# Patient Record
Sex: MALE | Race: BLACK OR AFRICAN AMERICAN | NOT HISPANIC OR LATINO | Employment: OTHER | URBAN - METROPOLITAN AREA
[De-identification: names, ages, dates, MRNs, and addresses within clinical notes are randomized per-mention and may not be internally consistent; named-entity substitution may affect disease eponyms.]

---

## 2018-01-22 ENCOUNTER — HOSPITAL ENCOUNTER (INPATIENT)
Facility: HOSPITAL | Age: 53
LOS: 3 days | Discharge: HOME | DRG: 315 | End: 2018-01-26
Attending: INTERNAL MEDICINE | Admitting: INTERNAL MEDICINE
Payer: COMMERCIAL

## 2018-01-22 ENCOUNTER — APPOINTMENT (EMERGENCY)
Dept: RADIOLOGY | Facility: HOSPITAL | Age: 53
DRG: 315 | End: 2018-01-22
Payer: COMMERCIAL

## 2018-01-22 ENCOUNTER — APPOINTMENT (OUTPATIENT)
Dept: NON INVASIVE DIAGNOSTICS | Facility: HOSPITAL | Age: 53
DRG: 315 | End: 2018-01-22
Payer: COMMERCIAL

## 2018-01-22 ENCOUNTER — APPOINTMENT (EMERGENCY)
Dept: CT IMAGING | Facility: HOSPITAL | Age: 53
DRG: 315 | End: 2018-01-22
Payer: COMMERCIAL

## 2018-01-22 DIAGNOSIS — I31.3 PERICARDIAL EFFUSION: Primary | ICD-10-CM

## 2018-01-22 DIAGNOSIS — J44.9 COPD (CHRONIC OBSTRUCTIVE PULMONARY DISEASE) (HCC): ICD-10-CM

## 2018-01-22 PROBLEM — I82.403 LEG DVT (DEEP VENOUS THROMBOEMBOLISM), ACUTE, BILATERAL (HCC): Status: ACTIVE | Noted: 2018-01-22

## 2018-01-22 PROBLEM — I10 ESSENTIAL HYPERTENSION: Status: ACTIVE | Noted: 2018-01-22

## 2018-01-22 PROBLEM — I50.20 SYSTOLIC CONGESTIVE HEART FAILURE (HCC): Status: ACTIVE | Noted: 2018-01-22

## 2018-01-22 PROBLEM — I31.39 PERICARDIAL EFFUSION: Status: ACTIVE | Noted: 2018-01-22

## 2018-01-22 PROBLEM — R07.89 OTHER CHEST PAIN: Status: ACTIVE | Noted: 2018-01-22

## 2018-01-22 LAB
ALBUMIN SERPL BCP-MCNC: 3.9 G/DL (ref 3.5–5)
ALP SERPL-CCNC: 82 U/L (ref 46–116)
ALT SERPL W P-5'-P-CCNC: 31 U/L (ref 12–78)
ANION GAP SERPL CALCULATED.3IONS-SCNC: 9 MMOL/L (ref 4–13)
APTT PPP: 34 SECONDS (ref 23–35)
AST SERPL W P-5'-P-CCNC: 20 U/L (ref 5–45)
ATRIAL RATE: 69 BPM
BASOPHILS # BLD AUTO: 0.06 THOUSANDS/ΜL (ref 0–0.1)
BASOPHILS NFR BLD AUTO: 1 % (ref 0–1)
BILIRUB SERPL-MCNC: 0.9 MG/DL (ref 0.2–1)
BUN SERPL-MCNC: 19 MG/DL (ref 5–25)
CALCIUM SERPL-MCNC: 8.9 MG/DL (ref 8.3–10.1)
CHLORIDE SERPL-SCNC: 105 MMOL/L (ref 100–108)
CO2 SERPL-SCNC: 28 MMOL/L (ref 21–32)
CREAT SERPL-MCNC: 1.78 MG/DL (ref 0.6–1.3)
CRP SERPL QL: <3 MG/L
EOSINOPHIL # BLD AUTO: 0.18 THOUSAND/ΜL (ref 0–0.61)
EOSINOPHIL NFR BLD AUTO: 3 % (ref 0–6)
ERYTHROCYTE [DISTWIDTH] IN BLOOD BY AUTOMATED COUNT: 15.5 % (ref 11.6–15.1)
ERYTHROCYTE [SEDIMENTATION RATE] IN BLOOD: 14 MM/HOUR (ref 0–10)
GFR SERPL CREATININE-BSD FRML MDRD: 49 ML/MIN/1.73SQ M
GLUCOSE SERPL-MCNC: 84 MG/DL (ref 65–140)
HCT VFR BLD AUTO: 36.8 % (ref 36.5–49.3)
HGB BLD-MCNC: 12 G/DL (ref 12–17)
INR PPP: 1.35 (ref 0.86–1.16)
LYMPHOCYTES # BLD AUTO: 1.2 THOUSANDS/ΜL (ref 0.6–4.47)
LYMPHOCYTES NFR BLD AUTO: 21 % (ref 14–44)
MCH RBC QN AUTO: 26.8 PG (ref 26.8–34.3)
MCHC RBC AUTO-ENTMCNC: 32.6 G/DL (ref 31.4–37.4)
MCV RBC AUTO: 82 FL (ref 82–98)
MONOCYTES # BLD AUTO: 0.7 THOUSAND/ΜL (ref 0.17–1.22)
MONOCYTES NFR BLD AUTO: 12 % (ref 4–12)
NEUTROPHILS # BLD AUTO: 3.61 THOUSANDS/ΜL (ref 1.85–7.62)
NEUTS SEG NFR BLD AUTO: 63 % (ref 43–75)
NRBC BLD AUTO-RTO: 0 /100 WBCS
NT-PROBNP SERPL-MCNC: 757 PG/ML
P AXIS: 66 DEGREES
PLATELET # BLD AUTO: 305 THOUSANDS/UL (ref 149–390)
PMV BLD AUTO: 10.5 FL (ref 8.9–12.7)
POTASSIUM SERPL-SCNC: 3.7 MMOL/L (ref 3.5–5.3)
PR INTERVAL: 166 MS
PROT SERPL-MCNC: 8 G/DL (ref 6.4–8.2)
PROTHROMBIN TIME: 17 SECONDS (ref 12.1–14.4)
QRS AXIS: -54 DEGREES
QRSD INTERVAL: 88 MS
QT INTERVAL: 448 MS
QTC INTERVAL: 480 MS
RBC # BLD AUTO: 4.47 MILLION/UL (ref 3.88–5.62)
SODIUM SERPL-SCNC: 142 MMOL/L (ref 136–145)
T WAVE AXIS: 112 DEGREES
TROPONIN I SERPL-MCNC: <0.02 NG/ML
TSH SERPL DL<=0.05 MIU/L-ACNC: 4.15 UIU/ML (ref 0.36–3.74)
VENTRICULAR RATE: 69 BPM
WBC # BLD AUTO: 5.77 THOUSAND/UL (ref 4.31–10.16)

## 2018-01-22 PROCEDURE — 71275 CT ANGIOGRAPHY CHEST: CPT

## 2018-01-22 PROCEDURE — 96360 HYDRATION IV INFUSION INIT: CPT

## 2018-01-22 PROCEDURE — 85025 COMPLETE CBC W/AUTO DIFF WBC: CPT | Performed by: INTERNAL MEDICINE

## 2018-01-22 PROCEDURE — 86140 C-REACTIVE PROTEIN: CPT | Performed by: PHYSICIAN ASSISTANT

## 2018-01-22 PROCEDURE — 85730 THROMBOPLASTIN TIME PARTIAL: CPT | Performed by: INTERNAL MEDICINE

## 2018-01-22 PROCEDURE — 85610 PROTHROMBIN TIME: CPT | Performed by: INTERNAL MEDICINE

## 2018-01-22 PROCEDURE — 80053 COMPREHEN METABOLIC PANEL: CPT | Performed by: INTERNAL MEDICINE

## 2018-01-22 PROCEDURE — 96361 HYDRATE IV INFUSION ADD-ON: CPT

## 2018-01-22 PROCEDURE — 84484 ASSAY OF TROPONIN QUANT: CPT | Performed by: INTERNAL MEDICINE

## 2018-01-22 PROCEDURE — 93005 ELECTROCARDIOGRAM TRACING: CPT

## 2018-01-22 PROCEDURE — 85652 RBC SED RATE AUTOMATED: CPT | Performed by: PHYSICIAN ASSISTANT

## 2018-01-22 PROCEDURE — 93005 ELECTROCARDIOGRAM TRACING: CPT | Performed by: INTERNAL MEDICINE

## 2018-01-22 PROCEDURE — 36415 COLL VENOUS BLD VENIPUNCTURE: CPT

## 2018-01-22 PROCEDURE — 84443 ASSAY THYROID STIM HORMONE: CPT | Performed by: PHYSICIAN ASSISTANT

## 2018-01-22 PROCEDURE — 93306 TTE W/DOPPLER COMPLETE: CPT

## 2018-01-22 PROCEDURE — 71046 X-RAY EXAM CHEST 2 VIEWS: CPT

## 2018-01-22 PROCEDURE — 83880 ASSAY OF NATRIURETIC PEPTIDE: CPT | Performed by: PHYSICIAN ASSISTANT

## 2018-01-22 RX ORDER — AMLODIPINE BESYLATE 10 MG/1
10 TABLET ORAL DAILY
Status: ON HOLD | COMMUNITY
Start: 2017-05-23 | End: 2020-07-11

## 2018-01-22 RX ORDER — CHOLECALCIFEROL (VITAMIN D3) 25 MCG
1000 CAPSULE ORAL DAILY
COMMUNITY

## 2018-01-22 RX ORDER — ONDANSETRON 2 MG/ML
4 INJECTION INTRAMUSCULAR; INTRAVENOUS EVERY 6 HOURS PRN
Status: DISCONTINUED | OUTPATIENT
Start: 2018-01-22 | End: 2018-01-26 | Stop reason: HOSPADM

## 2018-01-22 RX ORDER — MINOXIDIL 10 MG/1
20 TABLET ORAL 2 TIMES DAILY
COMMUNITY
Start: 2017-11-21 | End: 2018-10-16

## 2018-01-22 RX ORDER — FUROSEMIDE 40 MG/1
80 TABLET ORAL DAILY
Status: DISCONTINUED | OUTPATIENT
Start: 2018-01-23 | End: 2018-01-23

## 2018-01-22 RX ORDER — HYDROXYZINE HYDROCHLORIDE 25 MG/1
25 TABLET, FILM COATED ORAL EVERY 6 HOURS PRN
Status: DISCONTINUED | OUTPATIENT
Start: 2018-01-22 | End: 2018-01-26 | Stop reason: HOSPADM

## 2018-01-22 RX ORDER — BUPROPION HYDROCHLORIDE 150 MG/1
150 TABLET, EXTENDED RELEASE ORAL DAILY
Status: DISCONTINUED | OUTPATIENT
Start: 2018-01-23 | End: 2018-01-26 | Stop reason: HOSPADM

## 2018-01-22 RX ORDER — PREDNISONE 20 MG/1
40 TABLET ORAL DAILY
Status: DISCONTINUED | OUTPATIENT
Start: 2018-01-22 | End: 2018-01-26 | Stop reason: HOSPADM

## 2018-01-22 RX ORDER — BUPROPION HYDROCHLORIDE 150 MG/1
150 TABLET, EXTENDED RELEASE ORAL DAILY
Status: ON HOLD | COMMUNITY
Start: 2017-12-18 | End: 2018-02-05 | Stop reason: ALTCHOICE

## 2018-01-22 RX ORDER — ALLOPURINOL 100 MG/1
100 TABLET ORAL DAILY
Status: DISCONTINUED | OUTPATIENT
Start: 2018-01-23 | End: 2018-01-26 | Stop reason: HOSPADM

## 2018-01-22 RX ORDER — HYDROXYZINE HYDROCHLORIDE 25 MG/1
25 TABLET, FILM COATED ORAL EVERY 6 HOURS PRN
COMMUNITY
Start: 2018-01-05 | End: 2018-05-21

## 2018-01-22 RX ORDER — OXYCODONE HYDROCHLORIDE 5 MG/1
5 TABLET ORAL EVERY 4 HOURS PRN
COMMUNITY
Start: 2018-01-10 | End: 2018-02-05

## 2018-01-22 RX ORDER — EPLERENONE 50 MG/1
50 TABLET, FILM COATED ORAL DAILY
COMMUNITY
Start: 2017-05-23

## 2018-01-22 RX ORDER — ALLOPURINOL 100 MG/1
100 TABLET ORAL DAILY
COMMUNITY
Start: 2017-12-18 | End: 2018-10-16

## 2018-01-22 RX ORDER — FOLIC ACID/MULTIVIT,IRON,MINER .4-18-35
1 TABLET,CHEWABLE ORAL DAILY
Status: DISCONTINUED | OUTPATIENT
Start: 2018-01-23 | End: 2018-01-22

## 2018-01-22 RX ORDER — NEBIVOLOL 20 MG/1
20 TABLET ORAL 2 TIMES DAILY
COMMUNITY
Start: 2016-06-13 | End: 2021-08-27 | Stop reason: SDUPTHER

## 2018-01-22 RX ORDER — ACETAMINOPHEN 325 MG/1
650 TABLET ORAL EVERY 6 HOURS PRN
Status: DISCONTINUED | OUTPATIENT
Start: 2018-01-22 | End: 2018-01-26 | Stop reason: HOSPADM

## 2018-01-22 RX ORDER — FOLIC ACID/MULTIVIT,IRON,MINER .4-18-35
1 TABLET,CHEWABLE ORAL DAILY
COMMUNITY
End: 2021-08-27

## 2018-01-22 RX ORDER — MINOXIDIL 2.5 MG/1
20 TABLET ORAL 2 TIMES DAILY
Status: DISCONTINUED | OUTPATIENT
Start: 2018-01-22 | End: 2018-01-26 | Stop reason: HOSPADM

## 2018-01-22 RX ORDER — DOXAZOSIN MESYLATE 4 MG/1
4 TABLET ORAL
COMMUNITY
End: 2021-04-02

## 2018-01-22 RX ORDER — GARLIC 200 MG
200 TABLET ORAL DAILY
COMMUNITY
End: 2018-02-05 | Stop reason: CLARIF

## 2018-01-22 RX ORDER — FUROSEMIDE 80 MG
80 TABLET ORAL DAILY
Status: ON HOLD | COMMUNITY
Start: 2017-03-28 | End: 2018-05-23

## 2018-01-22 RX ORDER — ATORVASTATIN CALCIUM 40 MG/1
40 TABLET, FILM COATED ORAL
Status: DISCONTINUED | OUTPATIENT
Start: 2018-01-22 | End: 2018-01-26 | Stop reason: HOSPADM

## 2018-01-22 RX ORDER — CALCIUM CARBONATE 200(500)MG
1000 TABLET,CHEWABLE ORAL DAILY PRN
Status: DISCONTINUED | OUTPATIENT
Start: 2018-01-22 | End: 2018-01-26 | Stop reason: HOSPADM

## 2018-01-22 RX ORDER — ATORVASTATIN CALCIUM 40 MG/1
40 TABLET, FILM COATED ORAL
COMMUNITY
Start: 2017-02-01 | End: 2021-08-27

## 2018-01-22 RX ORDER — NEBIVOLOL 10 MG/1
20 TABLET ORAL 2 TIMES DAILY
Status: DISCONTINUED | OUTPATIENT
Start: 2018-01-22 | End: 2018-01-26 | Stop reason: HOSPADM

## 2018-01-22 RX ORDER — MULTIVIT WITH IRON,MINERALS
15 LIQUID (ML) ORAL DAILY
Status: DISCONTINUED | OUTPATIENT
Start: 2018-01-23 | End: 2018-01-26 | Stop reason: HOSPADM

## 2018-01-22 RX ORDER — EPLERENONE 25 MG/1
50 TABLET, FILM COATED ORAL DAILY
Status: DISCONTINUED | OUTPATIENT
Start: 2018-01-23 | End: 2018-01-26 | Stop reason: HOSPADM

## 2018-01-22 RX ORDER — DOXAZOSIN MESYLATE 4 MG/1
4 TABLET ORAL
Status: DISCONTINUED | OUTPATIENT
Start: 2018-01-22 | End: 2018-01-26 | Stop reason: HOSPADM

## 2018-01-22 RX ORDER — POTASSIUM CHLORIDE 20 MEQ/1
20 TABLET, EXTENDED RELEASE ORAL 2 TIMES DAILY
Status: DISCONTINUED | OUTPATIENT
Start: 2018-01-22 | End: 2018-01-26 | Stop reason: HOSPADM

## 2018-01-22 RX ORDER — POTASSIUM CHLORIDE 20 MEQ/1
20 TABLET, EXTENDED RELEASE ORAL DAILY
Status: ON HOLD | COMMUNITY
Start: 2018-01-02 | End: 2018-05-23

## 2018-01-22 RX ORDER — AMOXICILLIN 500 MG
1200 CAPSULE ORAL
COMMUNITY

## 2018-01-22 RX ADMIN — FLUTICASONE PROPIONATE AND SALMETEROL 1 PUFF: 50; 250 POWDER RESPIRATORY (INHALATION) at 21:52

## 2018-01-22 RX ADMIN — IOHEXOL 85 ML: 350 INJECTION, SOLUTION INTRAVENOUS at 11:27

## 2018-01-22 RX ADMIN — SODIUM CHLORIDE 1000 ML: 0.9 INJECTION, SOLUTION INTRAVENOUS at 12:08

## 2018-01-22 RX ADMIN — MINOXIDIL 20 MG: 2.5 TABLET ORAL at 20:20

## 2018-01-22 RX ADMIN — NEBIVOLOL HYDROCHLORIDE 20 MG: 10 TABLET ORAL at 20:20

## 2018-01-22 RX ADMIN — APIXABAN 10 MG: 5 TABLET, FILM COATED ORAL at 18:52

## 2018-01-22 RX ADMIN — ATORVASTATIN CALCIUM 40 MG: 40 TABLET, FILM COATED ORAL at 21:52

## 2018-01-22 RX ADMIN — POTASSIUM CHLORIDE 20 MEQ: 1500 TABLET, EXTENDED RELEASE ORAL at 18:52

## 2018-01-22 RX ADMIN — PREDNISONE 40 MG: 20 TABLET ORAL at 16:14

## 2018-01-22 RX ADMIN — DOXAZOSIN 4 MG: 4 TABLET ORAL at 21:52

## 2018-01-22 NOTE — ASSESSMENT & PLAN NOTE
Has chronic systolic heart failure, it looks like his EF is around 48%  He follows with Dr Sharleen Boeck, we'll ask him to see patient    Otherwise does not appear overtly fluid overloaded, continue Lasix/K+ supplement

## 2018-01-22 NOTE — H&P
H&P- Aaron Gordon 1965, 45 y o  male MRN: [id number]    Unit/Bed#: FT 01 Encounter: [id number]    Primary Care Provider: No primary care provider on file  Date and time admitted to hospital: 1/22/2018 10:28 AM      * Pericardial effusion   Assessment & Plan    Bring in under observation, continue diuretic, consult Cardiology, obtain echocardiogram   EKG does not show any evidence of low voltage, however he does have questionable rub on exam   Patient unable to tolerate NSAIDs in the setting of CKD and anticoagulation, will give prednisone and monitor closely  BNP minimally elevated, Trop <0 02  Will repeat x1 and if negative, no further indicated  Telemetry, EKG as needed, no underlying history of CAD  Effusion is small, could be related to CHF  Check ESR/CRP, consider rheumatologic eval with [facility]UI  Systolic congestive heart failure (HCC)   Assessment & Plan    Has chronic systolic heart failure, it looks like his EF is around 48%  He follows with Dr Coleman Celestin, we'll ask him to see patient  Otherwise does not appear overtly fluid overloaded, continue Lasix/K+ supplement  CKD is at baseline creatinine 1 6-1 7        Essential hypertension   Assessment & Plan    Continue home meds, going to hold the amlodipine as he has listed anaphylaxis to nifedipine  Leg DVT (deep venous thromboembolism), acute, bilateral ([facility] [address] )   Assessment & Plan    Recently diagnosed, started on Eliquis 10 mg b i d  times 10 days through 1/28, then will decrease to 5 mg b i d  Elevate legs, CTA showing no PE; incidental finding of nodule can be followed up outpatient CT  No tobacco history  COPD (chronic obstructive pulmonary disease) ([facility])   Assessment & Plan    Stable, continue inhaler    Also has AGUS, will have Respiratory set him up CPAP tonight, setting is 11              VTE Prophylaxis: Apixaban (Eliquis)  / reason for no mechanical VTE prophylaxis acute DVT   Code Status: FULL  POLST: POLST is not applicable to this patient  Discussion with family: None, patient reports feeling comfortable relying information to his wife    Anticipated Length of Stay:  Patient will be admitted on an Observation basis with an anticipated length of stay of  < 2 midnights  Justification for Hospital Stay:  Observation for cardiac evaluation of small pericardial effusion chest pain    Total Time for Visit, including Counseling / Coordination of Care: 45 minutes  Greater than 50% of this total time spent on direct patient counseling and coordination of care  Chief Complaint:   Chest pain    History of Present Illness:    Ellyn Rodríguez is a 45 y o  male who presents with chest pain  Patient's past medical history significant for hypertension, COPD, CHF with low EF, CKD baseline creatinine 1 6-1 7  Patient presents with onset of chest pain last evening  He reports he was sitting in bed when he developed substernal pain, described as a baseball sitting in his chest   It seemed to go away on his own, he sub through the night  Then this morning again he had chest pain  He reports that he took Pepto-Bismol with a little bit of improvement this morning, but his pain continued  Symptoms worst supine, improved leaning forward  No recent illness, fevers or chills  He was recently diagnosed with a bilateral lower extremity DVT after a left inguinal lymph node excision 2 weeks ago  He has been compliant with Eliquis, CTA in the ER shows no evidence of PE  It does show small pericardial effusion  Review of Systems:    Review of Systems   Constitutional: Negative for chills and fever  Respiratory: Positive for shortness of breath (Chronic, no worse)  Cardiovascular: Positive for chest pain and leg swelling  Negative for palpitations  Hematological: Positive for adenopathy (Left inguinal, also noted to have fluid collection on ultrasound)  All other systems reviewed and are negative        Past Medical and Surgical History:     Past Medical History:   Diagnosis Date    Hypertension     Kidney disease     Leg DVT (deep venous thromboembolism), acute, bilateral ([address] ) 01/2018    AGUS on CPAP     setting 11    Systolic CHF [facility])        Past Surgical History:   Procedure Laterality Date    CHOLECYSTECTOMY      JOINT REPLACEMENT      LYMPH NODE DISSECTION Left 01/2018    left inguinal LN removed - benign        Meds/Allergies:    Prior to Admission medications    Medication Sig Start Date End Date Taking?  Authorizing Provider   allopurinol (ZYLOPRIM) 100 mg tablet Take 100 mg by mouth daily 12/18/17  Yes Historical Provider, MD   amLODIPine (NORVASC) 10 mg tablet Take 10 mg by mouth daily 5/23/17  Yes Historical Provider, MD   apixaban (ELIQUIS) 5 mg Take 10 mg by mouth 2 (two) times a day 1/18/18  Yes Historical Provider, MD   atorvastatin (LIPITOR) 40 mg tablet Take 40 mg by mouth daily at bedtime 2/1/17  Yes Historical Provider, MD   buPROPion [facility] SR) 150 mg 12 hr tablet Take 150 mg by mouth daily 12/18/17  Yes Historical Provider, MD   eplerenone (INSPRA) 50 MG tablet Take 50 mg by mouth daily 5/23/17  Yes Historical Provider, MD   furosemide (LASIX) 80 mg tablet Take 80 mg by mouth daily 3/28/17  Yes Historical Provider, MD   hydrOXYzine HCL (ATARAX) 25 mg tablet Take 25 mg by mouth every 6 (six) hours as needed for anxiety 1/5/18  Yes Historical Provider, MD   minoxidil (LONITEN) 10 mg tablet Take 20 mg by mouth 2 (two) times a day 11/21/17  Yes Historical Provider, MD   Mometasone Furo-Formoterol Fum 200-5 MCG/ACT AERO Take 2 puffs by mouth 2 (two) times a day 6/29/16  Yes Historical Provider, MD   nebivolol (BYSTOLIC) 20 MG tablet Take 20 mg by mouth 2 (two) times a day 6/13/16  Yes Historical Provider, MD   oxyCODONE (ROXICODONE) 5 mg immediate release tablet Take 5 mg by mouth every 4 (four) hours as needed for pain 1/10/18  Yes Historical Provider, MD   potassium chloride (KLOR-CON M20) 20 mEq tablet Take 20 mEq by mouth 2 (two) times a day 1/2/18  Yes Historical Provider, MD   aspirin (PX ENTERIC ASPIRIN) 325 mg EC tablet Take 325 mg by mouth 2 (two) times a day    Historical Provider, MD   Cholecalciferol (VITAMIN D-3) 1000 units CAPS Take 1,000 Units by mouth daily    Historical Provider, MD   doxazosin (CARDURA) 4 mg tablet Take 4 mg by mouth daily at bedtime    Historical Provider, MD   FERROUS GLUCONATE IRON PO Take 246 mg by mouth 2 (two) times a day    Historical Provider, MD   Garlic 360 MG TABS Take 200 mg by mouth daily    Historical Provider, MD   L-Arginine 500 MG TABS Take 500 mg by mouth 2 (two) times a day    Historical Provider, MD   L-Lysine 1000 MG TABS Take 1,000 mg by mouth 2 (two) times a day    Historical Provider, MD   MAGNESIUM PO Take 400 mg by mouth daily    Historical Provider, MD   multivitamin-iron-minerals-folic acid (CENTRUM) chewable tablet Chew 1 tablet daily    Historical Provider, MD   Omega-3 Fatty Acids (FISH OIL) 1200 MG CAPS Take 1,200 mg by mouth daily at bedtime    Historical Provider, MD     I have reviewed home medications with patient personally  Allergies:    Allergies   Allergen Reactions    Clonidine Anaphylaxis    Lisinopril Anaphylaxis    Nifedipine Anaphylaxis    Spironolactone Anaphylaxis       Social History:     Marital Status: /Civil Union   Occupation:   Patient Pre-hospital Living Situation:  Lives home with wife  Patient Pre-hospital Level of Mobility:  Independent  Patient Pre-hospital Diet Restrictions:  Regular, low-salt  Substance Use History:   History   Alcohol Use No     History   Smoking Status    Never Smoker   Smokeless Tobacco    Never Used     History   Drug Use No       Family History:    Family History   Problem Relation Age of Onset    Heart murmur Sister     Deep vein thrombosis Neg Hx        Physical Exam:     Vitals:   Blood Pressure: 162/88 (01/22/18 1230)  Pulse: 77 (01/22/18 1330)  Temperature: 98 8 °F (37 1 °C) (01/22/18 1017)  Respirations: (!) 26 (01/22/18 1330)  Height: 5' 8" (172 7 cm) (01/22/18 1017)  Weight - Scale: 102 kg (225 lb) (01/22/18 1017)  SpO2: 96 % (01/22/18 1330)    Physical Exam   Constitutional: He is oriented to person, place, and time  He appears well-developed and well-nourished  No distress  Neck: Neck supple  Cardiovascular: Normal rate, regular rhythm, S1 normal, S2 normal and intact distal pulses  Exam reveals friction rub  Exam reveals no gallop  No murmur heard  Pulmonary/Chest: Effort normal and breath sounds normal  No respiratory distress  He has no wheezes  He has no rhonchi  He has no rales  Abdominal: Soft  Normal appearance and bowel sounds are normal  He exhibits no distension  There is no tenderness  Obese   Musculoskeletal: Normal range of motion  He exhibits edema (Left > right, patient reports this is slightly improved since presentation last week)  Lymphadenopathy:     He has no cervical adenopathy  Neurological: He is alert and oriented to person, place, and time  He has normal reflexes  No cranial nerve deficit  Skin: Skin is warm, dry and intact  Psychiatric: He has a normal mood and affect  Nursing note and vitals reviewed  Additional Data:     Lab Results: I have personally reviewed pertinent reports          Results from last 7 days  Lab Units 01/22/18  1050   WBC Thousand/uL 5 77   HEMOGLOBIN g/dL 12 0   HEMATOCRIT % 36 8   PLATELETS Thousands/uL 305   NEUTROS PCT % 63   LYMPHS PCT % 21   MONOS PCT % 12   EOS PCT % 3       Results from last 7 days  Lab Units 01/22/18  1050   SODIUM mmol/L 142   POTASSIUM mmol/L 3 7   CHLORIDE mmol/L 105   CO2 mmol/L 28   BUN mg/dL 19   CREATININE mg/dL 1 78*   CALCIUM mg/dL 8 9   TOTAL PROTEIN g/dL 8 0   BILIRUBIN TOTAL mg/dL 0 90   ALK PHOS U/L 82   ALT U/L 31   AST U/L 20   GLUCOSE RANDOM mg/dL 84       Results from last 7 days  Lab Units 01/22/18  1050   INR  1 35*       Imaging: I have personally reviewed pertinent reports  CTA chest pe study   Final Result by Dimitry Garcia MD (01/22 5483)      No acute pathology  No pulmonary embolism  Cardiomegaly  Small pericardial effusion  Indeterminate 2 6 cm right basilar density  Based on current Fleischner Society 2017 Guidelines on incidental pulmonary nodule, either PET/CT scan evaluation, tissue sampling or short term interval followup non-contrast CT followup (initailly in 3    months) may be considered appropriate  This report will now be communicated to the patient's clinical team by the radiology liaison  Workstation performed: [id number]         X-ray chest 2 views   Final Result by Eagle Berg MD (01/22 5401)      Moderate cardiomegaly         Workstation performed: [id number]             EKG, Pathology, and Other Studies Reviewed on Admission:   · EKG:  Sinus rhythm, nonspecific T-wave inversion V6, left atrial enlargement  · Labs unremarkable, ESR/CRP pending    Allscripts / Epic Records Reviewed: Yes     ** Please Note: This note has been constructed using a voice recognition system   **

## 2018-01-22 NOTE — ED PROVIDER NOTES
History  Chief Complaint   Patient presents with    Chest Pain     dx with dvt on thursday, started on blood thinners and was told to go to ER if cp or sob, started last night     Hunter Golden is a 45 y o  male w PMH CHF, HTN, renal disease, DVT who presents for evaluation of chest pain  Patient with chest pain that began last night  Describes as ball of pressure in the center aspect of the chest without radiation  He does have an active DVT in the right lower extremity  The left leg was actually more swollen but this is negative for clot  He was traveling back in October, considered that this caused the DVT  Otherwise he does not have risk factors for clot development  He has no known history of CAD, reports his hypertension is well controlled call a month follows with Cardiology  Denies coughing or hemoptysis  Denies pain w inspiration  Denies f/c  He has been on Eliquis for DVT, reports compliance  Prior to Admission Medications   Prescriptions Last Dose Informant Patient Reported? Taking?    Cholecalciferol (VITAMIN D-3) 1000 units CAPS   Yes No   Sig: Take 1,000 Units by mouth daily   FERROUS GLUCONATE IRON PO   Yes No   Sig: Take 246 mg by mouth 2 (two) times a day   Garlic 962 MG TABS   Yes No   Sig: Take 200 mg by mouth daily   L-Arginine 500 MG TABS   Yes No   Sig: Take 500 mg by mouth 2 (two) times a day   L-Lysine 1000 MG TABS   Yes No   Sig: Take 1,000 mg by mouth 2 (two) times a day   MAGNESIUM PO   Yes No   Sig: Take 400 mg by mouth daily   Mometasone Furo-Formoterol Fum 200-5 MCG/ACT AERO   Yes Yes   Sig: Take 2 puffs by mouth 2 (two) times a day   Omega-3 Fatty Acids (FISH OIL) 1200 MG CAPS   Yes No   Sig: Take 1,200 mg by mouth daily at bedtime   allopurinol (ZYLOPRIM) 100 mg tablet   Yes Yes   Sig: Take 100 mg by mouth daily   amLODIPine (NORVASC) 10 mg tablet   Yes Yes   Sig: Take 10 mg by mouth daily   apixaban (ELIQUIS) 5 mg   Yes Yes   Sig: Take 10 mg by mouth 2 (two) times a day   aspirin (PX ENTERIC ASPIRIN) 325 mg EC tablet   Yes No   Sig: Take 325 mg by mouth 2 (two) times a day   atorvastatin (LIPITOR) 40 mg tablet   Yes Yes   Sig: Take 40 mg by mouth daily at bedtime   buPROPion (WELLBUTRIN SR) 150 mg 12 hr tablet   Yes Yes   Sig: Take 150 mg by mouth daily   doxazosin (CARDURA) 4 mg tablet   Yes No   Sig: Take 4 mg by mouth daily at bedtime   eplerenone (INSPRA) 50 MG tablet   Yes Yes   Sig: Take 50 mg by mouth daily   furosemide (LASIX) 80 mg tablet   Yes Yes   Sig: Take 80 mg by mouth daily   hydrOXYzine HCL (ATARAX) 25 mg tablet   Yes Yes   Sig: Take 25 mg by mouth every 6 (six) hours as needed for anxiety   minoxidil (LONITEN) 10 mg tablet   Yes Yes   Sig: Take 20 mg by mouth 2 (two) times a day   multivitamin-iron-minerals-folic acid (CENTRUM) chewable tablet   Yes No   Sig: Chew 1 tablet daily   nebivolol (BYSTOLIC) 20 MG tablet   Yes Yes   Sig: Take 20 mg by mouth 2 (two) times a day   oxyCODONE (ROXICODONE) 5 mg immediate release tablet   Yes Yes   Sig: Take 5 mg by mouth every 4 (four) hours as needed for pain   potassium chloride (KLOR-CON M20) 20 mEq tablet   Yes Yes   Sig: Take 20 mEq by mouth 2 (two) times a day      Facility-Administered Medications: None       Past Medical History:   Diagnosis Date    Hypertension     Kidney disease     Leg DVT (deep venous thromboembolism), acute, bilateral ([facility] [address] ) 01/2018    AGUS on CPAP     setting 11    Systolic CHF (HCC)        Past Surgical History:   Procedure Laterality Date    CHOLECYSTECTOMY      JOINT REPLACEMENT      LYMPH NODE DISSECTION Left 01/2018    left inguinal LN removed - benign        Family History   Problem Relation Age of Onset    Heart murmur Sister     Deep vein thrombosis Neg Hx      I have reviewed and agree with the history as documented      Social History   Substance Use Topics    Smoking status: Never Smoker    Smokeless tobacco: Never Used    Alcohol use No        Review of Systems Constitutional: Negative for activity change, chills, diaphoresis, fatigue and fever  HENT: Negative for congestion and rhinorrhea  Eyes: Negative for pain  Respiratory: Negative for cough, chest tightness, shortness of breath and wheezing  Cardiovascular: Positive for chest pain  Negative for palpitations  Gastrointestinal: Negative for abdominal distention, constipation, diarrhea, nausea and vomiting  Genitourinary: Negative for difficulty urinating and dysuria  Musculoskeletal: Negative for arthralgias and myalgias  Neurological: Negative for dizziness, weakness, light-headedness and headaches  Psychiatric/Behavioral: The patient is not nervous/anxious  Physical Exam  ED Triage Vitals [01/22/18 1017]   Temperature Pulse Respirations Blood Pressure SpO2   98 8 °F (37 1 °C) 70 18 (!) 174/93 97 %      Temp src Heart Rate Source Patient Position - Orthostatic VS BP Location FiO2 (%)   -- -- -- -- --      Pain Score       5           Orthostatic Vital Signs  Vitals:    01/22/18 1017 01/22/18 1200 01/22/18 1230 01/22/18 1330   BP: (!) 174/93 158/89 162/88    Pulse: 70 72 72 77       Physical Exam   Constitutional: He is oriented to person, place, and time  He appears well-developed and well-nourished  No distress  HENT:   Head: Normocephalic and atraumatic  Eyes: Pupils are equal, round, and reactive to light  Neck: Neck supple  No tracheal deviation present  Cardiovascular: Normal rate, regular rhythm and intact distal pulses  Exam reveals no gallop and no friction rub  No murmur heard  Pulmonary/Chest: Effort normal and breath sounds normal  No respiratory distress  He has no wheezes  He has no rales  Abdominal: Soft  Bowel sounds are normal  He exhibits no distension and no mass  There is no tenderness  There is no guarding  Musculoskeletal: He exhibits no edema or deformity     The left leg is more edematous than the right, nontender   Neurological: He is alert and oriented to person, place, and time  Skin: Skin is warm and dry  He is not diaphoretic  Psychiatric: He has a normal mood and affect  His behavior is normal    Nursing note and vitals reviewed  ED Medications  Medications   predniSONE tablet 40 mg (40 mg Oral Given 1/22/18 1614)   sodium chloride 0 9 % bolus 1,000 mL (0 mL Intravenous Stopped 1/22/18 1532)   iohexol (OMNIPAQUE) 350 MG/ML injection (MULTI-DOSE) 85 mL (85 mL Intravenous Given 1/22/18 1127)       Diagnostic Studies  Results Reviewed     Procedure Component Value Units Date/Time    Sedimentation rate, automated [[id number]]  (Abnormal) Collected:  01/22/18 1050    Lab Status:  Final result Specimen:  Blood from Arm, Left Updated:  01/22/18 1640     Sed Rate 14 (H) mm/hour     C-reactive protein [[id number]]  (Normal) Collected:  01/22/18 1050    Lab Status:  Final result Specimen:  Blood from Arm, Left Updated:  01/22/18 1536     CRP <3 0 mg/L     BNP [[id number]]  (Abnormal) Collected:  01/22/18 1050    Lab Status:  Final result Specimen:  Blood from Arm, Left Updated:  01/22/18 1408     NT-proBNP 757 (H) pg/mL     Troponin I [[id number]]  (Normal) Collected:  01/22/18 1050    Lab Status:  Final result Specimen:  Blood from Arm, Left Updated:  01/22/18 1118     Troponin I <0 02 ng/mL     Narrative:         [employer] Chemistry analyzer 99% cutoff is > 0 04 ng/mL in network labs    o cTnI 99% cutoff is useful only when applied to patients in the clinical setting of myocardial ischemia  o cTnI 99% cutoff should be interpreted in the context of clinical history, ECG findings and possibly cardiac imaging to establish correct diagnosis  o cTnI 99% cutoff may be suggestive but clearly not indicative of a coronary event without the clinical setting of myocardial ischemia      Comprehensive metabolic panel [[id number]]  (Abnormal) Collected:  01/22/18 1050    Lab Status:  Final result Specimen:  Blood from Arm, Left Updated:  01/22/18 1114     Sodium 142 mmol/L Potassium 3 7 mmol/L      Chloride 105 mmol/L      CO2 28 mmol/L      Anion Gap 9 mmol/L      BUN 19 mg/dL      Creatinine 1 78 (H) mg/dL      Glucose 84 mg/dL      Calcium 8 9 mg/dL      AST 20 U/L      ALT 31 U/L      Alkaline Phosphatase 82 U/L      Total Protein 8 0 g/dL      Albumin 3 9 g/dL      Total Bilirubin 0 90 mg/dL      eGFR 49 ml/min/1 73sq m     Narrative:         National Kidney Disease Education Program recommendations are as follows:  GFR calculation is accurate only with a steady state creatinine  Chronic Kidney disease less than 60 ml/min/1 73 sq  meters  Kidney failure less than 15 ml/min/1 73 sq  meters  Protime-INR [[id number]]  (Abnormal) Collected:  01/22/18 1050    Lab Status:  Final result Specimen:  Blood from Arm, Left Updated:  01/22/18 1113     Protime 17 0 (H) seconds      INR 1 35 (H)    APTT [[id number]]  (Normal) Collected:  01/22/18 1050    Lab Status:  Final result Specimen:  Blood from Arm, Left Updated:  01/22/18 1113     PTT 34 seconds     Narrative: Therapeutic Heparin Range = 60-90 seconds    CBC and differential [[id number]]  (Abnormal) Collected:  01/22/18 1050    Lab Status:  Final result Specimen:  Blood from Arm, Left Updated:  01/22/18 1104     WBC 5 77 Thousand/uL      RBC 4 47 Million/uL      Hemoglobin 12 0 g/dL      Hematocrit 36 8 %      MCV 82 fL      MCH 26 8 pg      MCHC 32 6 g/dL      RDW 15 5 (H) %      MPV 10 5 fL      Platelets 954 Thousands/uL      nRBC 0 /100 WBCs      Neutrophils Relative 63 %      Lymphocytes Relative 21 %      Monocytes Relative 12 %      Eosinophils Relative 3 %      Basophils Relative 1 %      Neutrophils Absolute 3 61 Thousands/µL      Lymphocytes Absolute 1 20 Thousands/µL      Monocytes Absolute 0 70 Thousand/µL      Eosinophils Absolute 0 18 Thousand/µL      Basophils Absolute 0 06 Thousands/µL                  CTA chest pe study   Final Result by Raina Cotto MD (01/22 1152)      No acute pathology   No pulmonary embolism  Cardiomegaly  Small pericardial effusion  Indeterminate 2 6 cm right basilar density  Based on current Fleischner Society 2017 Guidelines on incidental pulmonary nodule, either PET/CT scan evaluation, tissue sampling or short term interval followup non-contrast CT followup (initailly in 3    months) may be considered appropriate  This report will now be communicated to the patient's clinical team by the radiology liaison           Workstation performed: [id number]         X-ray chest 2 views   Final Result by Tyrone Gonzalez MD (01/22 1115)      Moderate cardiomegaly         Workstation performed: [id number]                    Procedures  Procedures       Phone Contacts  ED Phone Contact    ED Course  ED Course          HEART Risk Score    Flowsheet Row Most Recent Value   History  0 Filed at: 01/22/2018 1237   ECG  1 Filed at: 01/22/2018 1237   Age  1 Filed at: 01/22/2018 1237   Risk Factors  1 [HTN / male ] Filed at: 01/22/2018 1237   Troponin  0 Filed at: 01/22/2018 1237   Heart Score Risk Calculator   History  0 Filed at: 01/22/2018 1237   ECG  1 Filed at: 01/22/2018 1237   Age  1 Filed at: 01/22/2018 1237   Risk Factors  1 [HTN / male ] Filed at: 01/22/2018 1237   Troponin  0 Filed at: 01/22/2018 1237   HEART Score  3 Filed at: 01/22/2018 1237   HEART Score  3 Filed at: 01/22/2018 1237                            MDM  Number of Diagnoses or Management Options  Pericardial effusion:   Diagnosis management comments: DDX includes but not ltd to:   PE, ACS, msk related pain, not consistent w anxiety, do not suspect dissection or infectious etiology   Consider CHF exacerbation that is causing edema to the LLE    Plan is to obtain:  EKG/trop to check for ischemic changes   CXR to check for congestive changes, active pulmonary disease   CBC to check for anemia, leukocytosis, hydration status  Chemistry panel to check for lyte abnormalities, organ function   PT/INR and PTT to assess for coagulopathy and liver function  CTA PE study for PE     Based on results:  Suspect CHF exacerbation  Suspect pericardial effusion causing pain as worse w laying flat, relieved leaning forward, consider ACS  Will admit for diuresis       Portions of the record may have been created with voice recognition software   Occasional wrong word or "sound a like" substitutions may have occurred due to the inherent limitations of voice recognition software   Read the chart carefully and recognize, using context, where substitutions have occurred  CritCare Time    Disposition  Final diagnoses:   Pericardial effusion     Time reflects when diagnosis was documented in both MDM as applicable and the Disposition within this note     Time User Action Codes Description Comment    1/22/2018  2:41 PM Cortes Simms Add [I31 3] Pericardial effusion       ED Disposition     ED Disposition Condition Comment    Admit  Case was discussed with Dr Nirmal Mayberry and the patient's admission status was agreed to be Admission Status: observation status to the service of Dr Nirmal Mayberry   Follow-up Information    None       Patient's Medications   Discharge Prescriptions    No medications on file     No discharge procedures on file      ED Provider  Electronically Signed by           Kaley Coyle PA-C  01/22/18 2338

## 2018-01-22 NOTE — ASSESSMENT & PLAN NOTE
Stable, continue inhaler    Also has AGUS, will have Respiratory set him up CPAP tonight, setting is 11

## 2018-01-22 NOTE — ASSESSMENT & PLAN NOTE
Bring in under observation, continue diuretic, consult Cardiology, obtain echocardiogram   EKG does not show any evidence of low voltage, however he does have questionable rub on exam   Patient unable to tolerate NSAIDs in the setting of CKD and anticoagulation, will give prednisone and monitor closely  Telemetry, EKG as needed, no underlying history of CAD  Effusion is small, could be related to CHF

## 2018-01-22 NOTE — ASSESSMENT & PLAN NOTE
Recently diagnosed, started on Eliquis 10 mg b i d  times 10 days through 1/28, then will decrease to 5 mg b i d  Elevate legs, CTA showing no PE; incidental finding of nodule can be followed up outpatient CT  No tobacco history

## 2018-01-23 ENCOUNTER — APPOINTMENT (OUTPATIENT)
Dept: NON INVASIVE DIAGNOSTICS | Facility: HOSPITAL | Age: 53
DRG: 315 | End: 2018-01-23
Payer: COMMERCIAL

## 2018-01-23 LAB
ANION GAP SERPL CALCULATED.3IONS-SCNC: 11 MMOL/L (ref 4–13)
BASOPHILS # BLD AUTO: 0.02 THOUSANDS/ΜL (ref 0–0.1)
BASOPHILS NFR BLD AUTO: 0 % (ref 0–1)
BUN SERPL-MCNC: 19 MG/DL (ref 5–25)
CALCIUM SERPL-MCNC: 9.4 MG/DL (ref 8.3–10.1)
CHLORIDE SERPL-SCNC: 103 MMOL/L (ref 100–108)
CO2 SERPL-SCNC: 26 MMOL/L (ref 21–32)
CREAT SERPL-MCNC: 1.88 MG/DL (ref 0.6–1.3)
EOSINOPHIL # BLD AUTO: 0 THOUSAND/ΜL (ref 0–0.61)
EOSINOPHIL NFR BLD AUTO: 0 % (ref 0–6)
ERYTHROCYTE [DISTWIDTH] IN BLOOD BY AUTOMATED COUNT: 15.4 % (ref 11.6–15.1)
GFR SERPL CREATININE-BSD FRML MDRD: 46 ML/MIN/1.73SQ M
GLUCOSE SERPL-MCNC: 161 MG/DL (ref 65–140)
HCT VFR BLD AUTO: 39.3 % (ref 36.5–49.3)
HGB BLD-MCNC: 12.9 G/DL (ref 12–17)
LYMPHOCYTES # BLD AUTO: 0.96 THOUSANDS/ΜL (ref 0.6–4.47)
LYMPHOCYTES NFR BLD AUTO: 13 % (ref 14–44)
MCH RBC QN AUTO: 27 PG (ref 26.8–34.3)
MCHC RBC AUTO-ENTMCNC: 32.8 G/DL (ref 31.4–37.4)
MCV RBC AUTO: 82 FL (ref 82–98)
MONOCYTES # BLD AUTO: 0.31 THOUSAND/ΜL (ref 0.17–1.22)
MONOCYTES NFR BLD AUTO: 4 % (ref 4–12)
NEUTROPHILS # BLD AUTO: 6.11 THOUSANDS/ΜL (ref 1.85–7.62)
NEUTS SEG NFR BLD AUTO: 81 % (ref 43–75)
NRBC BLD AUTO-RTO: 0 /100 WBCS
PLATELET # BLD AUTO: 297 THOUSANDS/UL (ref 149–390)
PMV BLD AUTO: 9.7 FL (ref 8.9–12.7)
POTASSIUM SERPL-SCNC: 3.9 MMOL/L (ref 3.5–5.3)
RBC # BLD AUTO: 4.78 MILLION/UL (ref 3.88–5.62)
SODIUM SERPL-SCNC: 140 MMOL/L (ref 136–145)
TROPONIN I SERPL-MCNC: <0.02 NG/ML
WBC # BLD AUTO: 7.53 THOUSAND/UL (ref 4.31–10.16)

## 2018-01-23 PROCEDURE — 80048 BASIC METABOLIC PNL TOTAL CA: CPT | Performed by: PHYSICIAN ASSISTANT

## 2018-01-23 PROCEDURE — 36415 COLL VENOUS BLD VENIPUNCTURE: CPT | Performed by: PHYSICIAN ASSISTANT

## 2018-01-23 PROCEDURE — 94760 N-INVAS EAR/PLS OXIMETRY 1: CPT

## 2018-01-23 PROCEDURE — 85025 COMPLETE CBC W/AUTO DIFF WBC: CPT | Performed by: PHYSICIAN ASSISTANT

## 2018-01-23 PROCEDURE — 99285 EMERGENCY DEPT VISIT HI MDM: CPT

## 2018-01-23 PROCEDURE — 94640 AIRWAY INHALATION TREATMENT: CPT

## 2018-01-23 PROCEDURE — 84484 ASSAY OF TROPONIN QUANT: CPT | Performed by: PHYSICIAN ASSISTANT

## 2018-01-23 PROCEDURE — 93308 TTE F-UP OR LMTD: CPT

## 2018-01-23 PROCEDURE — 94660 CPAP INITIATION&MGMT: CPT

## 2018-01-23 RX ORDER — FUROSEMIDE 10 MG/ML
40 INJECTION INTRAMUSCULAR; INTRAVENOUS ONCE
Status: COMPLETED | OUTPATIENT
Start: 2018-01-23 | End: 2018-01-23

## 2018-01-23 RX ORDER — FUROSEMIDE 10 MG/ML
80 INJECTION INTRAMUSCULAR; INTRAVENOUS DAILY
Status: DISCONTINUED | OUTPATIENT
Start: 2018-01-23 | End: 2018-01-26 | Stop reason: HOSPADM

## 2018-01-23 RX ORDER — IPRATROPIUM BROMIDE AND ALBUTEROL SULFATE 2.5; .5 MG/3ML; MG/3ML
3 SOLUTION RESPIRATORY (INHALATION)
Status: DISCONTINUED | OUTPATIENT
Start: 2018-01-23 | End: 2018-01-26 | Stop reason: HOSPADM

## 2018-01-23 RX ADMIN — DOXAZOSIN 4 MG: 4 TABLET ORAL at 21:16

## 2018-01-23 RX ADMIN — MINOXIDIL 20 MG: 2.5 TABLET ORAL at 19:57

## 2018-01-23 RX ADMIN — ALLOPURINOL 100 MG: 100 TABLET ORAL at 09:20

## 2018-01-23 RX ADMIN — EPLERENONE 50 MG: 25 TABLET, FILM COATED ORAL at 09:20

## 2018-01-23 RX ADMIN — NEBIVOLOL HYDROCHLORIDE 20 MG: 10 TABLET ORAL at 09:19

## 2018-01-23 RX ADMIN — NEBIVOLOL HYDROCHLORIDE 20 MG: 10 TABLET ORAL at 17:18

## 2018-01-23 RX ADMIN — APIXABAN 10 MG: 5 TABLET, FILM COATED ORAL at 09:39

## 2018-01-23 RX ADMIN — BUPROPION HYDROCHLORIDE 150 MG: 150 TABLET, EXTENDED RELEASE ORAL at 09:22

## 2018-01-23 RX ADMIN — IPRATROPIUM BROMIDE AND ALBUTEROL SULFATE 3 ML: .5; 3 SOLUTION RESPIRATORY (INHALATION) at 20:30

## 2018-01-23 RX ADMIN — FUROSEMIDE 40 MG: 10 INJECTION, SOLUTION INTRAVENOUS at 15:38

## 2018-01-23 RX ADMIN — POTASSIUM CHLORIDE 20 MEQ: 1500 TABLET, EXTENDED RELEASE ORAL at 17:19

## 2018-01-23 RX ADMIN — FLUTICASONE PROPIONATE AND SALMETEROL 1 PUFF: 50; 250 POWDER RESPIRATORY (INHALATION) at 21:16

## 2018-01-23 RX ADMIN — ATORVASTATIN CALCIUM 40 MG: 40 TABLET, FILM COATED ORAL at 21:16

## 2018-01-23 RX ADMIN — MULTIVITAMIN 15 ML: LIQUID ORAL at 09:22

## 2018-01-23 RX ADMIN — PREDNISONE 40 MG: 20 TABLET ORAL at 09:19

## 2018-01-23 RX ADMIN — FLUTICASONE PROPIONATE AND SALMETEROL 1 PUFF: 50; 250 POWDER RESPIRATORY (INHALATION) at 09:39

## 2018-01-23 RX ADMIN — APIXABAN 10 MG: 5 TABLET, FILM COATED ORAL at 17:19

## 2018-01-23 RX ADMIN — FUROSEMIDE 80 MG: 10 INJECTION, SOLUTION INTRAMUSCULAR; INTRAVENOUS at 09:14

## 2018-01-23 RX ADMIN — POTASSIUM CHLORIDE 20 MEQ: 1500 TABLET, EXTENDED RELEASE ORAL at 09:19

## 2018-01-23 RX ADMIN — MINOXIDIL 20 MG: 2.5 TABLET ORAL at 09:21

## 2018-01-23 NOTE — ED NOTES
Pt was given personal care items to clean up, new gown, pajama pants and washcloths/towels  Pts linens were changed and bed was remade while cleaning up in the restroom       Jose D Matthew  01/23/18 9614

## 2018-01-23 NOTE — CONSULTS
Consultation - Cardiology   Tamia Orellana 45 y o  male MRN: [de-identified]  Unit/Bed#: FT 01 Encounter: [id number]    Assessment/Plan     Assessment:  1  Pericardial effusion  Moderate in size  2  Chest pain  MI ruled out  3  S/P left femoral lymph node resection  4  Localized edema  5  DVT  On Eliquis  6  Hx of hypertrophic cardiomyopathy secondary to HTN    Plan:  1  Limited echo in AM to assess stability of effusion  2  No cardiac tamponade noted, thus no clear indication for pericardial drainage    History of Present Illness   Physician Requesting Consult: Myles Tristan MD  Reason for Consult / Principal Problem: Pericardial effusion  HPI: Tamia Orellana is a 45y o  year old male who presents with chest pain  The pain is midsternal without radiation  He has some belching  Pain is alleviated by antiacids  CTA of chest showed no pulmonary embolus and normal thoracic aorta  A small pericardial effusion is noted  Echocardiogram shows moderate sized effusion with no evidence of cardiac tamponade  Pt has history of trace pericardial effusion on prior echocardiographic studies, last evaluation done 2/2017  Effusion has clearly increased in size  Pt has history of left femoral lymph node resection  He has residual nonpitting edema of left leg  He subsequently developed DVT in right leg and was resently started on Eliquis  Inpatient consult to Cardiology  Consult performed by: Dory Shepherd ordered by: Kishore Esteban          Review of Systems   Constitutional: Positive for activity change  Respiratory: Positive for chest tightness  Negative for cough, choking, shortness of breath, wheezing and stridor  Cardiovascular: Positive for chest pain and leg swelling  Negative for palpitations  Gastrointestinal: Negative for abdominal pain  Musculoskeletal: Negative for back pain         Historical Information   Past Medical History:   Diagnosis Date    Hypertension     Kidney disease     Leg DVT (deep venous thromboembolism), acute, bilateral ([facility] [address] ) 01/2018    AGUS on CPAP     setting 11    Systolic CHF ([facility] [address] )      Past Surgical History:   Procedure Laterality Date    CHOLECYSTECTOMY      JOINT REPLACEMENT      LYMPH NODE DISSECTION Left 01/2018    left inguinal LN removed - benign      History   Alcohol Use No     History   Drug Use No     History   Smoking Status    Never Smoker   Smokeless Tobacco    Never Used     Family History:   Family History   Problem Relation Age of Onset    Heart murmur Sister     Deep vein thrombosis Neg Hx        Meds/Allergies   all current active meds have been reviewed and current meds:   Current Facility-Administered Medications   Medication Dose Route Frequency    acetaminophen (TYLENOL) tablet 650 mg  650 mg Oral Q6H PRN    allopurinol (ZYLOPRIM) tablet 100 mg  100 mg Oral Daily    apixaban (ELIQUIS) tablet 10 mg  10 mg Oral BID    atorvastatin (LIPITOR) tablet 40 mg  40 mg Oral HS    buPROPion (WELLBUTRIN SR) 12 hr tablet 150 mg  150 mg Oral Daily    calcium carbonate (TUMS) chewable tablet 1,000 mg  1,000 mg Oral Daily PRN    doxazosin (CARDURA) tablet 4 mg  4 mg Oral HS    eplerenone (INSPRA) tablet 50 mg  50 mg Oral Daily    fluticasone-salmeterol (ADVAIR) 250-50 mcg/dose inhaler 1 puff  1 puff Inhalation Q12H [facility]    furosemide (LASIX) injection 80 mg  80 mg Intravenous Daily    hydrOXYzine HCL (ATARAX) tablet 25 mg  25 mg Oral Q6H PRN    minoxidil (LONITEN) tablet 20 mg  20 mg Oral BID    multivitamin with iron-minerals liquid 15 mL  15 mL Oral Daily    nebivolol (BYSTOLIC) tablet 20 mg  20 mg Oral BID    ondansetron (ZOFRAN) injection 4 mg  4 mg Intravenous Q6H PRN    potassium chloride (K-DUR,KLOR-CON) CR tablet 20 mEq  20 mEq Oral BID    predniSONE tablet 40 mg  40 mg Oral Daily     Allergies   Allergen Reactions    Clonidine Anaphylaxis    Lisinopril Anaphylaxis    Nifedipine Anaphylaxis    Spironolactone Anaphylaxis       Objective Vitals: Blood pressure (!) 172/90, pulse 81, temperature 98 8 °F (37 1 °C), resp  rate 20, height 5' 8" (1 727 m), weight 102 kg (225 lb), SpO2 97 %  Orthostatic Blood Pressures    Flowsheet Row Most Recent Value   Blood Pressure   172/90 filed at 01/23/2018 0387   Patient Position - Orthostatic VS  Lying filed at 01/23/2018 0912          No intake or output data in the 24 hours ending 01/23/18 0939    Invasive Devices     Peripheral Intravenous Line            Peripheral IV 01/23/18 Right Arm less than 1 day                Physical Exam   Constitutional: He is oriented to person, place, and time  He appears well-developed and well-nourished  No distress  HENT:   Head: Normocephalic and atraumatic  Neck: Neck supple  Cardiovascular: Normal rate and regular rhythm  Exam reveals no gallop and no friction rub  No murmur heard  Pulmonary/Chest: Breath sounds normal  No respiratory distress  He has no wheezes  He has no rales  He exhibits no tenderness  Abdominal: He exhibits no distension  Musculoskeletal:        Left ankle: He exhibits swelling  Neurological: He is alert and oriented to person, place, and time  Skin: Skin is warm and dry  He is not diaphoretic  Psychiatric: He has a normal mood and affect         Lab Results:   CBC with diff:   Results from last 7 days  Lab Units 01/23/18  0857   WBC Thousand/uL 7 53   RBC Million/uL 4 78   HEMOGLOBIN g/dL 12 9   HEMATOCRIT % 39 3   MCV fL 82   MCH pg 27 0   MCHC g/dL 32 8   RDW % 15 4*   MPV fL 9 7   PLATELETS Thousands/uL 297     CMP:   Results from last 7 days  Lab Units 01/23/18  0857 01/22/18  1050   SODIUM mmol/L 140 142   POTASSIUM mmol/L 3 9 3 7   CHLORIDE mmol/L 103 105   CO2 mmol/L 26 28   ANION GAP mmol/L 11 9   BUN mg/dL 19 19   CREATININE mg/dL 1 88* 1 78*   GLUCOSE RANDOM mg/dL 161* 84   CALCIUM mg/dL 9 4 8 9   AST U/L  --  20   ALT U/L  --  31   ALK PHOS U/L  --  82   TOTAL PROTEIN g/dL  --  8 0   ALBUMIN g/dL  --  3 9   BILIRUBIN TOTAL mg/dL  --  0 90   EGFR ml/min/1 73sq m 46 49     Troponin:   0  Lab Value Date/Time   TROPONINI <0 02 01/23/2018 0857   TROPONINI <0 02 01/22/2018 1050     BNP:   Results from last 7 days  Lab Units 01/23/18  0857   SODIUM mmol/L 140   POTASSIUM mmol/L 3 9   CHLORIDE mmol/L 103   CO2 mmol/L 26   ANION GAP mmol/L 11   BUN mg/dL 19   CREATININE mg/dL 1 88*   GLUCOSE RANDOM mg/dL 161*   CALCIUM mg/dL 9 4   EGFR ml/min/1 73sq m 46     Coags:   Results from last 7 days  Lab Units 01/22/18  1050   PTT seconds 34   INR  1 35*     TSH:   Results from last 7 days  Lab Units 01/22/18  1050   TSH 3RD GENERATON uIU/mL 4 153*     Magnesium:     Imaging: I have personally reviewed pertinent reports  EKG: Normal sinus rhythm  Left atrial enlargement  Left anterior fascicular block  VTE Prophylaxis:     Code Status: Level 1 - Full Code  Advance Directive and Living Will:      Power of :    POLST:      Counseling / Coordination of Care  Total floor / unit time spent today 50 minutes  Greater than 50% of total time was spent with the patient and / or family counseling and / or coordination of care  A description of the counseling / coordination of care: 50

## 2018-01-23 NOTE — PROGRESS NOTES
[facility] Progress Note  Patient: Elli Bruno 45 y o  male   MRN: [de-identified]  PCP: No primary care provider on file  Unit/Bed#: FT 01 Encounter: [id number]  Date Of Visit: 01/23/18    Assessment:    Principal Problem:    Pericardial effusion  Active Problems:    Essential hypertension    Leg DVT (deep venous thromboembolism), acute, bilateral (HCC)    COPD (chronic obstructive pulmonary disease) (HCC)    Systolic congestive heart failure (HCC)      Plan:  #1 pericardial effusion  Initially diagnosed by CT chest  Confirmed by 2-D echocardiogram moderate pericardial effusion  Patient is responding to conservative management at this time with IV Lasix  We'll give 1 more additional dose today  Overall her likely stable still reports some dyspnea on exertion  Cardiology following  Treatment plan discussed with patient's wife    #2History of HTN:       We will continue patient home medications  Although initially his blood pressure was higher in emergency department, it later stabilized  Well also initiate IV hydralazine and IV metoprolol for SBP greater than 1 60 mmHg  We will advise patient to follow-up with next PCP in regards to further better management of ongoing HTN  #3 recurrent DVT  Continue treatment with ELiQUIS  Recommended outpatient hypercoagulability workup with PCP  Denies any recent history of malignancy, denies weight loss weight and denies any other symptoms  #4Hyperlipidemia:    Currently on statin therapy for elevated lipids  Previously not at goal of LDL < 100 as indicated in PMHx  Will order fasting lipid panel to assess status of hyperlipidemia  Will restart [id number] mg po QHS  Patient was counseled in regards to Appropriate nutritional and lifestyle modifications      #5 COPD  With mild exertion  Continue prednisone  Continue toinhaled treatment with DuoNeb's      VTE Pharmacologic Prophylaxis:   Pharmacologic: Apixaban (Eliquis)  Mechanical VTE Prophylaxis in Place: No    Patient Centered Rounds: I have performed bedside rounds with nursing staff today  Discussions with Specialists or Other Care Team Provider: Yes    Education and Discussions with Family / Patient: Yes    Time Spent for Care: 45 minutes  More than 50% of total time spent on counseling and coordination of care as described above  Current Length of Stay: 0 day(s)    Current Patient Status: Observation   Certification Statement: The patient will continue to require additional inpatient hospital stay due to regarding effusion    Discharge Plan / Estimated Discharge Date: 24-48 hours    Code Status: Level 1 - Full Code      Subjective:   "feeling okay but short of breath when I walk around"  Objective:     Vitals:   No data recorded  HR:  [59-84] 84  Resp:  [17-22] 18  BP: (135-182)/(65-94) 174/92  SpO2:  [97 %-99 %] 99 %  Body mass index is 34 21 kg/m²  Input and Output Summary (last 24 hours): Intake/Output Summary (Last 24 hours) at 01/23/18 1440  Last data filed at 01/23/18 1132   Gross per 24 hour   Intake                0 ml   Output             1000 ml   Net            -1000 ml       Physical Exam:     Physical Exam  GENERAL APPEARANCE: WD/WN in NAD pleasant  SKIN: no rash  HEENT: NC/AT, PERRLA (B), moist MM, no epistaxis  NECK: Supple, no JVD    LUNGS: CTA (B) mildly prolonged expiratory phase,   no use of accessory muscles    HEART:          S1S 2, RRR  , PMI is not displaced  ABDOMEN: Soft, nontender, nondistended, +BS  Rectal exam:  EXTREMITIES: no edema   PERIPHERAL VASCULAR: palpable pulses   NEURO:  AAO x 3, CN 2-12: non focal  MUSCLE STRENGHT: 5/5 (B), SENSATION: nonfocal  DTR: ++, CEREBELLAR: non focal  Additional Data:     Labs:      Results from last 7 days  Lab Units 01/23/18  0857   WBC Thousand/uL 7 53   HEMOGLOBIN g/dL 12 9   HEMATOCRIT % 39 3   PLATELETS Thousands/uL 297   NEUTROS PCT % 81*   LYMPHS PCT % 13*   MONOS PCT % 4   EOS PCT % 0 Results from last 7 days  Lab Units 01/23/18  0857 01/22/18  1050   SODIUM mmol/L 140 142   POTASSIUM mmol/L 3 9 3 7   CHLORIDE mmol/L 103 105   CO2 mmol/L 26 28   BUN mg/dL 19 19   CREATININE mg/dL 1 88* 1 78*   CALCIUM mg/dL 9 4 8 9   TOTAL PROTEIN g/dL  --  8 0   BILIRUBIN TOTAL mg/dL  --  0 90   ALK PHOS U/L  --  82   ALT U/L  --  31   AST U/L  --  20   GLUCOSE RANDOM mg/dL 161* 84       Results from last 7 days  Lab Units 01/22/18  1050   INR  1 35*       * I Have Reviewed All Lab Data Listed Above  * Additional Pertinent Lab Tests Reviewed: [facility] Admission Reviewed    Imaging:    Imaging Reports Reviewed Today Include: Yes  Imaging Personally Reviewed by Myself Includes:  Yes    Recent Cultures (last 7 days):           Last 24 Hours Medication List:     allopurinol 100 mg Oral Daily   apixaban 10 mg Oral BID   atorvastatin 40 mg Oral HS   buPROPion 150 mg Oral Daily   doxazosin 4 mg Oral HS   eplerenone 50 mg Oral Daily   fluticasone-salmeterol 1 puff Inhalation Q12H JOANN   furosemide 40 mg Intravenous Once   furosemide 80 mg Intravenous Daily   ipratropium-albuterol 3 mL Nebulization Q6H   minoxidil 20 mg Oral BID   multivitamin with iron-minerals 15 mL Oral Daily   nebivolol 20 mg Oral BID   potassium chloride 20 mEq Oral BID   predniSONE 40 mg Oral Daily        Today, Patient Was Seen By: Mayte Castrejon MD    ** Please Note: This note has been constructed using a voice recognition system   **

## 2018-01-23 NOTE — PLAN OF CARE
CARDIOVASCULAR - ADULT     Maintains optimal cardiac output and hemodynamic stability Progressing     Absence of cardiac dysrhythmias or at baseline rhythm Progressing        HEMATOLOGIC - ADULT     Maintains hematologic stability Progressing        METABOLIC, FLUID AND ELECTROLYTES - ADULT     Electrolytes maintained within normal limits Progressing     Fluid balance maintained Progressing        PAIN - ADULT     Verbalizes/displays adequate comfort level or baseline comfort level Progressing        Potential for Falls     Patient will remain free of falls Progressing

## 2018-01-23 NOTE — PLAN OF CARE
CARDIOVASCULAR - ADULT     Maintains optimal cardiac output and hemodynamic stability Progressing     Absence of cardiac dysrhythmias or at baseline rhythm Progressing        PAIN - ADULT     Verbalizes/displays adequate comfort level or baseline comfort level Progressing

## 2018-01-23 NOTE — CASE MANAGEMENT
Initial Clinical Review    Admission: Date/Time/Statement: IP Order 1/23   1836  Admitting Physician Taylor Kovacs    Level of Care Med Surg    Estimated length of stay More than 2 Midnights    Certification I certify that inpatient services are medically necessary for this patient for a duration of greater than two midnights  See H&P and MD Progress Notes for additional information about the patient's course of treatment  OBS order 1/22   1443      ED: Date/Time/Mode of Arrival:   ED Arrival Information     Expected Arrival Acuity Means of Arrival Escorted By Service Admission Type    - 1/22/2018 10:15 Urgent Walk-In Self General Medicine Urgent    Arrival Complaint    SOB,CHEST PAIN          Chief Complaint:   Chief Complaint   Patient presents with    Chest Pain     dx with dvt on thursday, started on blood thinners and was told to go to ER if cp or sob, started last night       History of Illness: Lia Heredia is a 45 y o  male who presents with chest pain  Patient's past medical history significant for hypertension, COPD, CHF with low EF, CKD baseline creatinine 1 6-1 7      Patient presents with onset of chest pain last evening  He reports he was sitting in bed when he developed substernal pain, described as a baseball sitting in his chest   It seemed to go away on his own, he sub through the night  Then this morning again he had chest pain  He reports that he took    Pepto-Bismol with a little bit of improvement this morning, but his pain continued  Symptoms worst supine, improved leaning forward  No recent illness, fevers or chills  He was recently diagnosed with a bilateral lower extremity DVT after a left inguinal lymph node excision 2 weeks ago  He has been compliant with Eliquis, CTA in the ER shows no evidence of PE  It does show small pericardial effusion      ED Vital Signs:   ED Triage Vitals   Temperature Pulse Respirations Blood Pressure SpO2   01/22/18 1017 01/22/18 1017 01/22/18 1017 01/22/18 1017 01/22/18 1017   98 8 °F (37 1 °C) 70 18 (!) 174/93 97 %      Temp src Heart Rate Source Patient Position - Orthostatic VS BP Location FiO2 (%)   -- 01/22/18 1739 01/22/18 1739 01/22/18 1739 --    Monitor Sitting Right arm       Pain Score       01/22/18 1017       5        Wt Readings from Last 1 Encounters:   01/22/18 102 kg (225 lb)       Vital Signs (abnormal):   01/22/18 2100  --  59  17   182/91  98 %  --  --   01/22/18 2030  --  68  20  162/89  97 %  --  --   01/22/18 1739  --  75  18   175/91  97 %  None (Room air)  Sitting   01/22/18 1330  --  77   26  --  96 %  --  --   01/22/18 1230  --  72  20  162/88  95 %  --  --   01/22/18 1200  --  72   24  158/89  94 %  --       Abnormal Labs/Diagnostic Test Results: BNP  757, BUN creat   19 1 78, pt inr   17 0 1 35  CT chest -    No acute pathology  No pulmonary embolism  Cardiomegaly   Small pericardial effusion      CXR - Moderate cardiomegaly  EKG- NSR     ED Treatment:   Medication Administration from 01/22/2018 1015 to 01/23/2018 9172       Date/Time Order Dose Route Action Action by Comments     01/22/2018 1532 sodium chloride 0 9 % bolus 1,000 mL 0 mL Intravenous Stopped Sarita Santiago RN      01/22/2018 1208 sodium chloride 0 9 % bolus 1,000 mL 1,000 mL Intravenous New Bag Fany Ruffin RN      01/22/2018 1127 iohexol (OMNIPAQUE) 350 MG/ML injection (MULTI-DOSE) 85 mL 85 mL Intravenous Given Rhiannon Finley      01/22/2018 1614 predniSONE tablet 40 mg 40 mg Oral Given Sarita Santiago RN      01/22/2018 1852 apixaban (ELIQUIS) tablet 10 mg 10 mg Oral Given Sarita Santiago RN      01/22/2018 2152 atorvastatin (LIPITOR) tablet 40 mg 40 mg Oral Given Sarita Santiago RN      01/22/2018 2020 nebivolol (BYSTOLIC) tablet 20 mg 20 mg Oral Given Sarita Santiago RN      01/22/2018 2152 doxazosin (CARDURA) tablet 4 mg 4 mg Oral Given Sarita Santiago RN      01/22/2018 1852 potassium chloride (K-DUR,KLOR-CON) CR tablet 20 mEq 20 mEq Oral Given Lea Hood, RN      01/22/2018 2152 fluticasone-salmeterol (ADVAIR) 250-50 mcg/dose inhaler 1 puff 1 puff Inhalation Given Lea Hood, RN      01/22/2018 2020 minoxidil (LONITEN) tablet 20 mg 20 mg Oral Given Lea Hood, RN           Past Medical/Surgical History: Active Ambulatory Problems     Diagnosis Date Noted    No Active Ambulatory Problems     Resolved Ambulatory Problems     Diagnosis Date Noted    No Resolved Ambulatory Problems     Past Medical History:   Diagnosis Date    Hypertension     Kidney disease     Leg DVT (deep venous thromboembolism), acute, bilateral ([facility] [address] [address] ) 01/2018    AGUS on CPAP     Systolic CHF ([facility] )        Admitting Diagnosis: Chest pain [R07 9]    Age/Sex: 45 y o  male    Assessment/Plan:   * Pericardial effusion   Assessment & Plan     Bring in under observation, continue diuretic, consult Cardiology, obtain echocardiogram   EKG does not show any evidence of low voltage, however he does have questionable rub on exam   Patient unable to tolerate NSAIDs in the setting of CKD and anticoagulation, will give prednisone and monitor closely  BNP minimally elevated, Trop <0 02  Will repeat x1 and if negative, no further indicated      Telemetry, EKG as needed, no underlying history of CAD  Effusion is small, could be related to CHF  Check ESR/CRP, consider rheumatologic eval with HUI              Systolic congestive heart failure (HCC)   Assessment & Plan     Has chronic systolic heart failure, it looks like his EF is around 48%  He follows with Dr Abigail Kern, we'll ask him to see patient  Otherwise does not appear overtly fluid overloaded, continue Lasix/K+ supplement  CKD is at baseline creatinine 1 6-1  7          Essential hypertension   Assessment & Plan     Continue home meds, going to hold the amlodipine as he has listed anaphylaxis to nifedipine           Leg DVT (deep venous thromboembolism), acute, bilateral ([facility] )   Assessment & Plan     Recently diagnosed, started on Eliquis 10 mg b i d  times 10 days through 1/28, then will decrease to 5 mg b i d  Elevate legs, CTA showing no PE; incidental finding of nodule can be followed up outpatient CT  No tobacco history           COPD (chronic obstructive pulmonary disease) ([facility])   Assessment & Plan     Stable, continue inhaler  Also has AGUS, will have Respiratory set him up CPAP tonight, setting is 11              VTE Prophylaxis: Apixaban (Eliquis)  / reason for no mechanical VTE prophylaxis acute DVT   Code Status: FULL  POLST: POLST is not applicable to this patient  Discussion with family: None, patient reports feeling comfortable relying information to his wife     Anticipated Length of Stay:  Patient will be admitted on an Observation basis with an anticipated length of stay of  < 2 midnights     Justification for Hospital Stay:  Observation for cardiac evaluation of small pericardial effusion chest pain     Admission Orders:  Scheduled Meds:   allopurinol 100 mg Oral Daily   apixaban 10 mg Oral BID   atorvastatin 40 mg Oral HS   buPROPion 150 mg Oral Daily   doxazosin 4 mg Oral HS   eplerenone 50 mg Oral Daily   fluticasone-salmeterol 1 puff Inhalation Q12H JOANN   furosemide 80 mg Intravenous Daily   minoxidil 20 mg Oral BID   multivitamin with iron-minerals 15 mL Oral Daily   nebivolol 20 mg Oral BID   potassium chloride 20 mEq Oral BID   predniSONE 40 mg Oral Daily     Continuous Infusions:    PRN Meds:   acetaminophen    calcium carbonate    hydrOXYzine HCL    ondansetron     Cardiology consult   Up and OOB   Tele  Serial trop   1/23 cbc , bmp     IM progress note 1/23  Assessment:   Principal Problem:    Pericardial effusion  Active Problems:    Essential hypertension    Leg DVT (deep venous thromboembolism), acute, bilateral (HCC)    COPD (chronic obstructive pulmonary disease) ([facility])    Systolic congestive heart failure (HCC)   Plan:  #1 pericardial effusion  Initially diagnosed by CT chest  Confirmed by 2-D echocardiogram moderate pericardial effusion  Patient is responding to conservative management at this time with IV Lasix  We'll give 1 more additional dose today  Overall her likely stable still reports some dyspnea on exertion  Cardiology following  Treatment plan discussed with patient's wife   #2History of HTN:       We will continue patient home medications  Although initially his blood pressure was higher in emergency department, it later stabilized  Well also initiate IV hydralazine and IV metoprolol for SBP greater than 1 60 mmHg  We will advise patient to follow-up with next PCP in regards to further better management of ongoing HTN     #3 recurrent DVT  Continue [facility] with [facility]  Recommended outpatient hypercoagulability workup with PCP  Denies any recent history of malignancy, denies weight loss weight and denies any other symptoms    #4Hyperlipidemia:    Currently on statin therapy for elevated lipids  Previously not at goal of LDL < 100 as indicated in PMHx  Will order fasting lipid panel to assess status of hyperlipidemia  Will restart [id number] mg po QHS  Patient was counseled in regards to Appropriate nutritional and lifestyle modifications    #5 COPD  With mild exertion  Continue prednisone  Continue toinhaled treatment with DuoNeb's   VTE Pharmacologic Prophylaxis:   Pharmacologic: Apixaban (Eliquis)  Mechanical VTE Prophylaxis in Place: No   Patient Centered Rounds: I have performed bedside rounds with nursing staff today    Discussions with Specialists or Other Care Team Provider: Yes   Education and Discussions with Family / Patient: Yes   Time Spent for Care: 45 minutes    More than 50% of total time spent on counseling and coordination of care as described above    Current Length of Stay: 0 day(s)   Current Patient Status: Observation   Certification Statement: The patient will continue to require additional inpatient hospital stay due to regarding effusion   Discharge Plan / Estimated Discharge Date: 24-48 hours      IM progress note   1/24   Assessment:     Principal Problem:    Pericardial effusion  Active Problems:    Essential hypertension    Leg DVT (deep venous thromboembolism), acute, bilateral (HCC)    COPD (chronic obstructive pulmonary disease) (HCC)    Systolic congestive heart failure (HCC)   Plan:  #1 pericardial effusion  Initially diagnosed by CT chest  Patient continues to improve with IV Lasix  Ambulated 400 feet oxygen remains stable  We'll repeat echocardiogram if cardiogram negative or unchanged plan to DC in a m  If clinical course allows  Treatment plan discussed with patient's wife      #2History of HTN:       We will continue patient home medications  Although initially his blood pressure was higher in emergency department, it later stabilized  Well also initiate IV hydralazine and IV metoprolol for SBP greater than 1 60 mmHg  We will advise patient to follow-up with next PCP in regards to further better management of ongoing HTN     #3 recurrent DVT  Continue treatment with [facility]  Recommended outpatient hypercoagulability workup with PCP  Denies any recent history of malignancy, denies weight loss weight and denies any other symptoms    #4Hyperlipidemia:    Currently on statin therapy for elevated lipids  Previously not at goal of LDL < 100 as indicated in PMHx  Will order fasting lipid panel to assess status of hyperlipidemia  Will restart [id number] mg po QHS    Patient was counseled in regards to Appropriate nutritional and lifestyle modifications      #5 COPD  With mild exertion  Continue prednisone  Continue toinhaled treatment with DuoNeb's   VTE Pharmacologic Prophylaxis:   Pharmacologic: Apixaban (Eliquis)  Mechanical VTE Prophylaxis in Place: No   Patient Centered Rounds: I have performed bedside rounds with nursing staff today    Discussions with Specialists or Other Care Team Provider: Yes   Education and Discussions with Family / Patient: Yes   Time Spent for Care: 45 minutes  More than 50% of total time spent on counseling and coordination of care as described above    Current Length of Stay: 1 day(s)   Current Patient Status: Inpatient   Certification Statement: The patient will continue to require additional inpatient hospital stay due to regarding effusion  [address] / Estimated Discharge Date: 24-48 hours    Cardiology progress note  1/24   Assessment:  1  Pericardial effusion  Stable  2  Chest pain  Resolved   Plan:  1  Stable cardiovascular status  2  Follow pericardial effusion with serial echocardiograms as outpatient  DC summary  1/25   patient was subsequently admitted for further evaluation of chest pain  His workup included CTA chest which was negative for pulmonary embolism however did show small amount of pericardial effusion  This was further evaluated by 2-D echocardiogram which showed moderate pleural effusion however it remained stable over the next 72 hours  Patient was given IV Lasix he responded very well and currently has a completely normal exam   He denies any chest pain is always remained symptomatic and was able to ambulate with 450 feet today without any difficulty as well as his oxygen remained stable  Patient was evaluated by cardiology as well and they have recommended outpatient follow-up for repeat serial echocardiogram to ensure pleural effusion remained stable  To be precise CT chest as well as 3 echocardiograms done while inpatient does not reveal any kind of inflammatory process of the pericardium  Occasionally due to anxiety patient did express, it was noted patient's blood pressure would be elevated for that reason his discharge was prolonged to ensure this was not a permanent event that needed to be addressed  I did recommend him to follow-up with PCP and subsequently with psychiatry if anxiety issues not resolved    Currently is calm collected his mood and affect is was in normal limits and he does not have any suicidal or homicidal ideation  His current vital signs within normal limits including blood pressure of 130/74 which was confirmed manually  I have recommended him to follow-up with his PCP ×1 week he had expressed verbal understanding  Since patient'sPericardial effusion size has remained completely stable he will be discharged today but he does understand that he will need serial echocardiograms to ensure pericardial effusion remains stable which will be set up by his PCP and all cardiology teams      Thank you,  [address] [place] [address] in the [facility] by Everette Sanchez for 2017  Network Utilization Review Department  Phone: [phone]; Fax [phone]  ATTENTION: The Network Utilization Review Department is now centralized for our 7 Facilities  Make a note that we have a new phone and fax numbers for our Department  Please call with any questions or concerns to [phone] and carefully follow the prompts so that you are directed to the right person  All voicemails are confidential  Fax any determinations, approvals, denials, and requests for initial or continue stay review clinical to [phone]  Due to HIGH CALL volume, it would be easier if you could please send faxed requests to expedite your requests and in part, help us provide discharge notifications faster

## 2018-01-24 ENCOUNTER — APPOINTMENT (INPATIENT)
Dept: NON INVASIVE DIAGNOSTICS | Facility: HOSPITAL | Age: 53
DRG: 315 | End: 2018-01-24
Payer: COMMERCIAL

## 2018-01-24 LAB
ANION GAP SERPL CALCULATED.3IONS-SCNC: 9 MMOL/L (ref 4–13)
BUN SERPL-MCNC: 29 MG/DL (ref 5–25)
CALCIUM SERPL-MCNC: 9 MG/DL (ref 8.3–10.1)
CHLORIDE SERPL-SCNC: 104 MMOL/L (ref 100–108)
CO2 SERPL-SCNC: 28 MMOL/L (ref 21–32)
CREAT SERPL-MCNC: 1.67 MG/DL (ref 0.6–1.3)
GFR SERPL CREATININE-BSD FRML MDRD: 53 ML/MIN/1.73SQ M
GLUCOSE SERPL-MCNC: 100 MG/DL (ref 65–140)
POTASSIUM SERPL-SCNC: 3.4 MMOL/L (ref 3.5–5.3)
SODIUM SERPL-SCNC: 141 MMOL/L (ref 136–145)

## 2018-01-24 PROCEDURE — 94640 AIRWAY INHALATION TREATMENT: CPT

## 2018-01-24 PROCEDURE — 93308 TTE F-UP OR LMTD: CPT

## 2018-01-24 PROCEDURE — 99233 SBSQ HOSP IP/OBS HIGH 50: CPT | Performed by: INTERNAL MEDICINE

## 2018-01-24 PROCEDURE — 94660 CPAP INITIATION&MGMT: CPT

## 2018-01-24 PROCEDURE — 94760 N-INVAS EAR/PLS OXIMETRY 1: CPT

## 2018-01-24 PROCEDURE — 80048 BASIC METABOLIC PNL TOTAL CA: CPT | Performed by: INTERNAL MEDICINE

## 2018-01-24 RX ORDER — POTASSIUM CHLORIDE 14.9 MG/ML
20 INJECTION INTRAVENOUS
Status: COMPLETED | OUTPATIENT
Start: 2018-01-24 | End: 2018-01-24

## 2018-01-24 RX ORDER — ECHINACEA PURPUREA EXTRACT 125 MG
1 TABLET ORAL EVERY 4 HOURS PRN
Status: DISCONTINUED | OUTPATIENT
Start: 2018-01-24 | End: 2018-01-26 | Stop reason: HOSPADM

## 2018-01-24 RX ORDER — POTASSIUM CHLORIDE 20 MEQ/1
40 TABLET, EXTENDED RELEASE ORAL ONCE
Status: DISCONTINUED | OUTPATIENT
Start: 2018-01-24 | End: 2018-01-24

## 2018-01-24 RX ORDER — ALPRAZOLAM 0.5 MG/1
0.5 TABLET ORAL 2 TIMES DAILY PRN
Status: DISCONTINUED | OUTPATIENT
Start: 2018-01-24 | End: 2018-01-26 | Stop reason: HOSPADM

## 2018-01-24 RX ORDER — POTASSIUM CHLORIDE 29.8 MG/ML
40 INJECTION INTRAVENOUS ONCE
Status: DISCONTINUED | OUTPATIENT
Start: 2018-01-24 | End: 2018-01-24

## 2018-01-24 RX ADMIN — ATORVASTATIN CALCIUM 40 MG: 40 TABLET, FILM COATED ORAL at 21:15

## 2018-01-24 RX ADMIN — IPRATROPIUM BROMIDE AND ALBUTEROL SULFATE 3 ML: .5; 3 SOLUTION RESPIRATORY (INHALATION) at 08:13

## 2018-01-24 RX ADMIN — PREDNISONE 40 MG: 20 TABLET ORAL at 08:59

## 2018-01-24 RX ADMIN — BUPROPION HYDROCHLORIDE 150 MG: 150 TABLET, EXTENDED RELEASE ORAL at 08:59

## 2018-01-24 RX ADMIN — POTASSIUM CHLORIDE 20 MEQ: 1500 TABLET, EXTENDED RELEASE ORAL at 08:59

## 2018-01-24 RX ADMIN — EPLERENONE 50 MG: 25 TABLET, FILM COATED ORAL at 09:00

## 2018-01-24 RX ADMIN — ALPRAZOLAM 0.5 MG: 0.5 TABLET ORAL at 22:50

## 2018-01-24 RX ADMIN — POTASSIUM CHLORIDE 20 MEQ: 200 INJECTION, SOLUTION INTRAVENOUS at 12:38

## 2018-01-24 RX ADMIN — MINOXIDIL 20 MG: 2.5 TABLET ORAL at 17:10

## 2018-01-24 RX ADMIN — ALLOPURINOL 100 MG: 100 TABLET ORAL at 08:59

## 2018-01-24 RX ADMIN — APIXABAN 10 MG: 5 TABLET, FILM COATED ORAL at 17:10

## 2018-01-24 RX ADMIN — POTASSIUM CHLORIDE 20 MEQ: 200 INJECTION, SOLUTION INTRAVENOUS at 10:00

## 2018-01-24 RX ADMIN — Medication 1 SPRAY: at 15:55

## 2018-01-24 RX ADMIN — NEBIVOLOL HYDROCHLORIDE 20 MG: 10 TABLET ORAL at 17:10

## 2018-01-24 RX ADMIN — POTASSIUM CHLORIDE 20 MEQ: 1500 TABLET, EXTENDED RELEASE ORAL at 17:09

## 2018-01-24 RX ADMIN — APIXABAN 10 MG: 5 TABLET, FILM COATED ORAL at 08:58

## 2018-01-24 RX ADMIN — IPRATROPIUM BROMIDE AND ALBUTEROL SULFATE 3 ML: .5; 3 SOLUTION RESPIRATORY (INHALATION) at 20:23

## 2018-01-24 RX ADMIN — HYDROXYZINE HYDROCHLORIDE 25 MG: 25 TABLET, FILM COATED ORAL at 11:04

## 2018-01-24 RX ADMIN — FLUTICASONE PROPIONATE AND SALMETEROL 1 PUFF: 50; 250 POWDER RESPIRATORY (INHALATION) at 08:59

## 2018-01-24 RX ADMIN — MINOXIDIL 20 MG: 2.5 TABLET ORAL at 10:00

## 2018-01-24 RX ADMIN — FLUTICASONE PROPIONATE AND SALMETEROL 1 PUFF: 50; 250 POWDER RESPIRATORY (INHALATION) at 21:14

## 2018-01-24 RX ADMIN — NEBIVOLOL HYDROCHLORIDE 20 MG: 10 TABLET ORAL at 08:58

## 2018-01-24 RX ADMIN — FUROSEMIDE 80 MG: 10 INJECTION, SOLUTION INTRAMUSCULAR; INTRAVENOUS at 08:58

## 2018-01-24 RX ADMIN — MULTIVITAMIN 15 ML: LIQUID ORAL at 09:00

## 2018-01-24 RX ADMIN — DOXAZOSIN 4 MG: 4 TABLET ORAL at 21:15

## 2018-01-24 RX ADMIN — IPRATROPIUM BROMIDE AND ALBUTEROL SULFATE 3 ML: .5; 3 SOLUTION RESPIRATORY (INHALATION) at 14:13

## 2018-01-24 NOTE — SOCIAL WORK
Cm met w [place] who [place]) w wife and 5 children  Has support at home  ind w adls  No dme  Uses [facility] [place] for RXs  Hx of VNA w rev home health  No POA or AD  Has ride at d c no other reported needs at this time    CM reviewed discharge planning process including the following: identifying help at home, patient preference for discharge planning needs, pharmacy preference, and availability of treatment team to discuss questions or concerns patient and/or family may have regarding understanding medications and recognizing signs and symptoms once discharged  CM also encouraged patient to follow up with all recommended appointments after discharge  Patient advised of importance for patient and family to participate in managing patients medical well being

## 2018-01-24 NOTE — PLAN OF CARE
Problem: DISCHARGE PLANNING - CARE MANAGEMENT  Goal: Discharge to post-acute care or home with appropriate resources  INTERVENTIONS:  - Conduct assessment to determine patient/family and health care team treatment goals, and need for post-acute services based on payer coverage, community resources, and patient preferences, and barriers to discharge  - Address psychosocial, clinical, and financial barriers to discharge as identified in assessment in conjunction with the patient/family and health care team  - Arrange appropriate level of post-acute services according to patients   needs and preference and payer coverage in collaboration with the physician and health care team  - Communicate with and update the patient/family, physician, and health care team regarding progress on the discharge plan  - Arrange appropriate transportation to post-acute venues  Outcome: Progressing  Cm met w pt who [place] w wife and 5 children  Has support at home  ind w adls  No dme  Uses [facility] [place] for RXs  Hx of VNA w rev home health  No POA or AD  Has ride at d c no other reported needs at this time    CM reviewed discharge planning process including the following: identifying help at home, patient preference for discharge planning needs, pharmacy preference, and availability of treatment team to discuss questions or concerns patient and/or family may have regarding understanding medications and recognizing signs and symptoms once discharged  CM also encouraged patient to follow up with all recommended appointments after discharge  Patient advised of importance for patient and family to participate in managing patients medical well being

## 2018-01-24 NOTE — PROGRESS NOTES
[facility] Internal Medicine Progress Note  Patient: General Benavides 45 y o  male   MRN: [de-identified]  PCP: No primary care provider on file  Unit/Bed#: -01 Encounter: [id number]  Date Of Visit: 01/24/18    Assessment:    Principal Problem:    Pericardial effusion  Active Problems:    Essential hypertension    Leg DVT (deep venous thromboembolism), acute, bilateral (HCC)    COPD (chronic obstructive pulmonary disease) (HCC)    Systolic congestive heart failure (HCC)      Plan:  #1 pericardial effusion  Initially diagnosed by CT chest  Patient continues to improve with IV Lasix  Ambulated 400 feet oxygen remains stable  We'll repeat echocardiogram if cardiogram negative or unchanged plan to DC in a m  If clinical course allows  Treatment plan discussed with patient's wife  #2History of HTN:       We will continue patient home medications  Although initially his blood pressure was higher in emergency department, it later stabilized  Well also initiate IV hydralazine and IV metoprolol for SBP greater than 1 60 mmHg  We will advise patient to follow-up with next PCP in regards to further better management of ongoing HTN  #3 recurrent DVT  Continue treatment with ELiQUIS  Recommended outpatient hypercoagulability workup with PCP  Denies any recent history of malignancy, denies weight loss weight and denies any other symptoms  #4Hyperlipidemia:    Currently on statin therapy for elevated lipids  Previously not at goal of LDL < 100 as indicated in PMHx  Will order fasting lipid panel to assess status of hyperlipidemia  Will restart [id number] mg po QHS  Patient was counseled in regards to Appropriate nutritional and lifestyle modifications      #5 COPD  With mild exertion  Continue prednisone  Continue toinhaled treatment with DuoNeb's      VTE Pharmacologic Prophylaxis:   Pharmacologic: Apixaban (Eliquis)  Mechanical VTE Prophylaxis in Place: No    Patient Centered Rounds: I have performed bedside rounds with nursing staff today  Discussions with Specialists or Other Care Team Provider: Yes    Education and Discussions with Family / Patient: Yes    Time Spent for Care: 45 minutes  More than 50% of total time spent on counseling and coordination of care as described above  Current Length of Stay: 1 day(s)    Current Patient Status: Inpatient   Certification Statement: The patient will continue to require additional inpatient hospital stay due to regarding effusion    Discharge Plan / Estimated Discharge Date: 24-48 hours    Code Status: Level 1 - Full Code      Subjective:   "feeling okay but short of breath when I walk around"  Objective:     Vitals:   Temp (24hrs), Av 1 °F (36 7 °C), Min:97 8 °F (36 6 °C), Max:98 4 °F (36 9 °C)    HR:  [71-92] 71  Resp:  [18] 18  BP: (143-177)/(76-92) 152/81  SpO2:  [94 %-99 %] 97 %  Body mass index is 34 21 kg/m²  Input and Output Summary (last 24 hours): Intake/Output Summary (Last 24 hours) at 18 1122  Last data filed at 18 1052   Gross per 24 hour   Intake                0 ml   Output             4300 ml   Net            -4300 ml       Physical Exam:     Physical Exam  GENERAL APPEARANCE: WD/WN in NAD pleasant  SKIN: no rash  HEENT: NC/AT, PERRLA (B), moist MM, no epistaxis  NECK: Supple, no JVD    LUNGS: CTA (B) mildly prolonged expiratory phase,   no use of accessory muscles    HEART:          S1S 2, RRR  , PMI is not displaced  ABDOMEN: Soft, nontender, nondistended, +BS  Rectal exam:  EXTREMITIES: no edema   PERIPHERAL VASCULAR: palpable pulses   NEURO:  AAO x 3, CN 2-12: non focal  MUSCLE STRENGHT: 5/5 (B), SENSATION: nonfocal  DTR: ++, CEREBELLAR: non focal  Additional Data:     Labs:      Results from last 7 days  Lab Units 18  0857   WBC Thousand/uL 7 53   HEMOGLOBIN g/dL 12 9   HEMATOCRIT % 39 3   PLATELETS Thousands/uL 297   NEUTROS PCT % 81*   LYMPHS PCT % 13*   MONOS PCT % 4   EOS PCT % 0       Results from last 7 days  Lab Units 01/24/18  0542  01/22/18  1050   SODIUM mmol/L 141  < > 142   POTASSIUM mmol/L 3 4*  < > 3 7   CHLORIDE mmol/L 104  < > 105   CO2 mmol/L 28  < > 28   BUN mg/dL 29*  < > 19   CREATININE mg/dL 1 67*  < > 1 78*   CALCIUM mg/dL 9 0  < > 8 9   TOTAL PROTEIN g/dL  --   --  8 0   BILIRUBIN TOTAL mg/dL  --   --  0 90   ALK PHOS U/L  --   --  82   ALT U/L  --   --  31   AST U/L  --   --  20   GLUCOSE RANDOM mg/dL 100  < > 84   < > = values in this interval not displayed  Results from last 7 days  Lab Units 01/22/18  1050   INR  1 35*       * I Have Reviewed All Lab Data Listed Above  * Additional Pertinent Lab Tests Reviewed: Jennifer 66 Admission Reviewed    Imaging:    Imaging Reports Reviewed Today Include: Yes  Imaging Personally Reviewed by Myself Includes:  Yes    Recent Cultures (last 7 days):           Last 24 Hours Medication List:     allopurinol 100 mg Oral Daily   apixaban 10 mg Oral BID   atorvastatin 40 mg Oral HS   buPROPion 150 mg Oral Daily   doxazosin 4 mg Oral HS   eplerenone 50 mg Oral Daily   fluticasone-salmeterol 1 puff Inhalation Q12H JOANN   furosemide 80 mg Intravenous Daily   ipratropium-albuterol 3 mL Nebulization Q6H   minoxidil 20 mg Oral BID   multivitamin with iron-minerals 15 mL Oral Daily   nebivolol 20 mg Oral BID   potassium chloride 20 mEq Oral BID   potassium chloride 20 mEq Intravenous Q2H   predniSONE 40 mg Oral Daily        Today, Patient Was Seen By: Olman Lerner MD    ** Please Note: This note has been constructed using a voice recognition system   **

## 2018-01-24 NOTE — PLAN OF CARE
Problem: Potential for Falls  Goal: Patient will remain free of falls  INTERVENTIONS:  - Assess patient frequently for physical needs  -  Identify cognitive and physical deficits and behaviors that affect risk of falls  -  Earling fall precautions as indicated by assessment   - Educate patient/family on patient safety including physical limitations  - Instruct patient to call for assistance with activity based on assessment  - Modify environment to reduce risk of injury  - Consider OT/PT consult to assist with strengthening/mobility   Outcome: Progressing      Problem: CARDIOVASCULAR - ADULT  Goal: Maintains optimal cardiac output and hemodynamic stability  INTERVENTIONS:  - Monitor I/O, vital signs and rhythm  - Monitor for S/S and trends of decreased cardiac output i e  bleeding, hypotension  - Administer and titrate ordered vasoactive medications to optimize hemodynamic stability  - Assess quality of pulses, skin color and temperature  - Assess for signs of decreased coronary artery perfusion - ex   Angina  - Instruct patient to report change in severity of symptoms   Outcome: Progressing    Goal: Absence of cardiac dysrhythmias or at baseline rhythm  INTERVENTIONS:  - Continuous cardiac monitoring, monitor vital signs, obtain 12 lead EKG if indicated  - Administer antiarrhythmic and heart rate control medications as ordered  - Monitor electrolytes and administer replacement therapy as ordered   Outcome: Progressing      Problem: METABOLIC, FLUID AND ELECTROLYTES - ADULT  Goal: Electrolytes maintained within normal limits  INTERVENTIONS:  - Monitor labs and assess patient for signs and symptoms of electrolyte imbalances  - Administer electrolyte replacement as ordered  - Monitor response to electrolyte replacements, including repeat lab results as appropriate  - Instruct patient on fluid and nutrition as appropriate   Outcome: Progressing    Goal: Fluid balance maintained  INTERVENTIONS:  - Monitor labs and assess for signs and symptoms of volume excess or deficit  - Monitor I/O and WT  - Instruct patient on fluid and nutrition as appropriate   Outcome: Progressing      Problem: HEMATOLOGIC - ADULT  Goal: Maintains hematologic stability  INTERVENTIONS  - Assess for signs and symptoms of bleeding or hemorrhage  - Monitor labs  - Administer supportive blood products/factors as ordered and appropriate   Outcome: Progressing      Problem: PAIN - ADULT  Goal: Verbalizes/displays adequate comfort level or baseline comfort level  Interventions:  - Encourage patient to monitor pain and request assistance  - Assess pain using appropriate pain scale  - Administer analgesics based on type and severity of pain and evaluate response  - Implement non-pharmacological measures as appropriate and evaluate response  - Consider cultural and social influences on pain and pain management  - Notify physician/advanced practitioner if interventions unsuccessful or patient reports new pain   Outcome: Progressing

## 2018-01-25 LAB
ANION GAP SERPL CALCULATED.3IONS-SCNC: 8 MMOL/L (ref 4–13)
BASOPHILS # BLD AUTO: 0.03 THOUSANDS/ΜL (ref 0–0.1)
BASOPHILS NFR BLD AUTO: 0 % (ref 0–1)
BUN SERPL-MCNC: 25 MG/DL (ref 5–25)
CALCIUM SERPL-MCNC: 9.1 MG/DL (ref 8.3–10.1)
CHLORIDE SERPL-SCNC: 105 MMOL/L (ref 100–108)
CO2 SERPL-SCNC: 28 MMOL/L (ref 21–32)
CREAT SERPL-MCNC: 1.73 MG/DL (ref 0.6–1.3)
EOSINOPHIL # BLD AUTO: 0.01 THOUSAND/ΜL (ref 0–0.61)
EOSINOPHIL NFR BLD AUTO: 0 % (ref 0–6)
ERYTHROCYTE [DISTWIDTH] IN BLOOD BY AUTOMATED COUNT: 15.5 % (ref 11.6–15.1)
GFR SERPL CREATININE-BSD FRML MDRD: 51 ML/MIN/1.73SQ M
GLUCOSE SERPL-MCNC: 101 MG/DL (ref 65–140)
HCT VFR BLD AUTO: 34.3 % (ref 36.5–49.3)
HGB BLD-MCNC: 11.2 G/DL (ref 12–17)
LYMPHOCYTES # BLD AUTO: 1.5 THOUSANDS/ΜL (ref 0.6–4.47)
LYMPHOCYTES NFR BLD AUTO: 14 % (ref 14–44)
MCH RBC QN AUTO: 26.9 PG (ref 26.8–34.3)
MCHC RBC AUTO-ENTMCNC: 32.7 G/DL (ref 31.4–37.4)
MCV RBC AUTO: 83 FL (ref 82–98)
MONOCYTES # BLD AUTO: 1.02 THOUSAND/ΜL (ref 0.17–1.22)
MONOCYTES NFR BLD AUTO: 10 % (ref 4–12)
NEUTROPHILS # BLD AUTO: 7.95 THOUSANDS/ΜL (ref 1.85–7.62)
NEUTS SEG NFR BLD AUTO: 75 % (ref 43–75)
NRBC BLD AUTO-RTO: 0 /100 WBCS
PLATELET # BLD AUTO: 270 THOUSANDS/UL (ref 149–390)
PMV BLD AUTO: 9.3 FL (ref 8.9–12.7)
POTASSIUM SERPL-SCNC: 3.7 MMOL/L (ref 3.5–5.3)
RBC # BLD AUTO: 4.16 MILLION/UL (ref 3.88–5.62)
SODIUM SERPL-SCNC: 141 MMOL/L (ref 136–145)
WBC # BLD AUTO: 10.58 THOUSAND/UL (ref 4.31–10.16)

## 2018-01-25 PROCEDURE — 80048 BASIC METABOLIC PNL TOTAL CA: CPT | Performed by: INTERNAL MEDICINE

## 2018-01-25 PROCEDURE — 94640 AIRWAY INHALATION TREATMENT: CPT

## 2018-01-25 PROCEDURE — 94760 N-INVAS EAR/PLS OXIMETRY 1: CPT

## 2018-01-25 PROCEDURE — 85025 COMPLETE CBC W/AUTO DIFF WBC: CPT | Performed by: INTERNAL MEDICINE

## 2018-01-25 PROCEDURE — 94660 CPAP INITIATION&MGMT: CPT

## 2018-01-25 PROCEDURE — 99239 HOSP IP/OBS DSCHRG MGMT >30: CPT | Performed by: INTERNAL MEDICINE

## 2018-01-25 RX ORDER — HYDRALAZINE HYDROCHLORIDE 20 MG/ML
20 INJECTION INTRAMUSCULAR; INTRAVENOUS EVERY 6 HOURS PRN
Status: DISCONTINUED | OUTPATIENT
Start: 2018-01-25 | End: 2018-01-26 | Stop reason: HOSPADM

## 2018-01-25 RX ADMIN — IPRATROPIUM BROMIDE AND ALBUTEROL SULFATE 3 ML: .5; 3 SOLUTION RESPIRATORY (INHALATION) at 14:06

## 2018-01-25 RX ADMIN — FLUTICASONE PROPIONATE AND SALMETEROL 1 PUFF: 50; 250 POWDER RESPIRATORY (INHALATION) at 09:17

## 2018-01-25 RX ADMIN — DOXAZOSIN 4 MG: 4 TABLET ORAL at 21:29

## 2018-01-25 RX ADMIN — MULTIVITAMIN 15 ML: LIQUID ORAL at 09:20

## 2018-01-25 RX ADMIN — FLUTICASONE PROPIONATE AND SALMETEROL 1 PUFF: 50; 250 POWDER RESPIRATORY (INHALATION) at 21:29

## 2018-01-25 RX ADMIN — EPLERENONE 50 MG: 25 TABLET, FILM COATED ORAL at 09:18

## 2018-01-25 RX ADMIN — APIXABAN 10 MG: 5 TABLET, FILM COATED ORAL at 09:16

## 2018-01-25 RX ADMIN — HYDRALAZINE HYDROCHLORIDE 20 MG: 20 INJECTION INTRAMUSCULAR; INTRAVENOUS at 13:38

## 2018-01-25 RX ADMIN — POTASSIUM CHLORIDE 20 MEQ: 1500 TABLET, EXTENDED RELEASE ORAL at 09:16

## 2018-01-25 RX ADMIN — NEBIVOLOL HYDROCHLORIDE 20 MG: 10 TABLET ORAL at 09:16

## 2018-01-25 RX ADMIN — APIXABAN 10 MG: 5 TABLET, FILM COATED ORAL at 17:26

## 2018-01-25 RX ADMIN — NEBIVOLOL HYDROCHLORIDE 20 MG: 10 TABLET ORAL at 17:25

## 2018-01-25 RX ADMIN — MINOXIDIL 20 MG: 2.5 TABLET ORAL at 17:27

## 2018-01-25 RX ADMIN — FUROSEMIDE 80 MG: 10 INJECTION, SOLUTION INTRAMUSCULAR; INTRAVENOUS at 09:17

## 2018-01-25 RX ADMIN — ATORVASTATIN CALCIUM 40 MG: 40 TABLET, FILM COATED ORAL at 21:28

## 2018-01-25 RX ADMIN — POTASSIUM CHLORIDE 20 MEQ: 1500 TABLET, EXTENDED RELEASE ORAL at 17:26

## 2018-01-25 RX ADMIN — ALPRAZOLAM 0.5 MG: 0.5 TABLET ORAL at 11:49

## 2018-01-25 RX ADMIN — IPRATROPIUM BROMIDE AND ALBUTEROL SULFATE 3 ML: .5; 3 SOLUTION RESPIRATORY (INHALATION) at 08:34

## 2018-01-25 RX ADMIN — IPRATROPIUM BROMIDE AND ALBUTEROL SULFATE 3 ML: .5; 3 SOLUTION RESPIRATORY (INHALATION) at 19:36

## 2018-01-25 RX ADMIN — MINOXIDIL 20 MG: 2.5 TABLET ORAL at 09:19

## 2018-01-25 RX ADMIN — BUPROPION HYDROCHLORIDE 150 MG: 150 TABLET, EXTENDED RELEASE ORAL at 09:17

## 2018-01-25 RX ADMIN — ALLOPURINOL 100 MG: 100 TABLET ORAL at 09:16

## 2018-01-25 RX ADMIN — PREDNISONE 40 MG: 20 TABLET ORAL at 09:16

## 2018-01-25 RX ADMIN — IPRATROPIUM BROMIDE AND ALBUTEROL SULFATE 3 ML: .5; 3 SOLUTION RESPIRATORY (INHALATION) at 01:30

## 2018-01-25 NOTE — PROGRESS NOTES
Progress Note - Cardiology   Lia Heredia 45 y o  male MRN: [de-identified]  Unit/Bed#: -01 Encounter: [id number]    Assessment:  1  Pericardial effusion  Stable  2  Chest pain  Resolved    Plan:  1  Stable cardiovascular status  2  Follow pericardial effusion with serial echocardiograms as outpatient      Subjective/Objective   Chief Complaint: None    Subjective: No chest pain    Objective: No distress    Vitals: /72 (BP Location: Left arm)   Pulse 74   Temp 97 8 °F (36 6 °C) (Oral)   Resp 18   Ht 5' 8" (1 727 m)   Wt 99 7 kg (219 lb 12 8 oz)   SpO2 97%   BMI 33 42 kg/m²   Vitals:    01/24/18 0600 01/25/18 0600   Weight: 102 kg (225 lb) 99 7 kg (219 lb 12 8 oz)     Orthostatic Blood Pressures    Flowsheet Row Most Recent Value   Blood Pressure  166/72 filed at 01/25/2018 0300   Patient Position - Orthostatic VS  Lying filed at 01/25/2018 0300            Intake/Output Summary (Last 24 hours) at 01/25/18 0659  Last data filed at 01/24/18 1831   Gross per 24 hour   Intake              960 ml   Output             2500 ml   Net            -1540 ml       Invasive Devices     Peripheral Intravenous Line            Peripheral IV 01/23/18 Right Arm 1 day                Review of Systems: Cardiovascular ROS: no chest pain or dyspnea on exertion    Physical Exam:   Chest: CTA  Cardiac: RRR, No murmur, Normal S1 and S2, no rub    Lab Results:   CBC with diff:   Results from last 7 days  Lab Units 01/25/18  0449   WBC Thousand/uL 10 58*   RBC Million/uL 4 16   HEMOGLOBIN g/dL 11 2*   HEMATOCRIT % 34 3*   MCV fL 83   MCH pg 26 9   MCHC g/dL 32 7   RDW % 15 5*   MPV fL 9 3   PLATELETS Thousands/uL 270     CMP:   Results from last 7 days  Lab Units 01/25/18  0449  01/22/18  1050   SODIUM mmol/L 141  < > 142   POTASSIUM mmol/L 3 7  < > 3 7   CHLORIDE mmol/L 105  < > 105   CO2 mmol/L 28  < > 28   ANION GAP mmol/L 8  < > 9   BUN mg/dL 25  < > 19   CREATININE mg/dL 1 73*  < > 1 78*   GLUCOSE RANDOM mg/dL 101  < > 84 CALCIUM mg/dL 9 1  < > 8 9   AST U/L  --   --  20   ALT U/L  --   --  31   ALK PHOS U/L  --   --  82   TOTAL PROTEIN g/dL  --   --  8 0   BILIRUBIN TOTAL mg/dL  --   --  0 90   EGFR ml/min/1 73sq m 51  < > 49   < > = values in this interval not displayed  Troponin:   0  Lab Value Date/Time   TROPONINI <0 02 01/23/2018 0857   TROPONINI <0 02 01/22/2018 1050     Imaging: I have personally reviewed pertinent reports  EKG:   VTE Pharmacologic Prophylaxis:   VTE Mechanical Prophylaxis:     Counseling / Coordination of Care  Total Critical Care time spent 50 minutes excluding procedures, teaching and family updates    27

## 2018-01-25 NOTE — PLAN OF CARE
CARDIOVASCULAR - ADULT     Maintains optimal cardiac output and hemodynamic stability Progressing     Absence of cardiac dysrhythmias or at baseline rhythm Progressing        DISCHARGE PLANNING - CARE MANAGEMENT     Discharge to post-acute care or home with appropriate resources Progressing        HEMATOLOGIC - ADULT     Maintains hematologic stability Progressing        METABOLIC, FLUID AND ELECTROLYTES - ADULT     Electrolytes maintained within normal limits Progressing     Fluid balance maintained Progressing        PAIN - ADULT     Verbalizes/displays adequate comfort level or baseline comfort level Progressing        Potential for Falls     Patient will remain free of falls Progressing

## 2018-01-25 NOTE — DISCHARGE SUMMARY
Discharge Summary - Rosetta Swift 45 y o  male MRN: [de-identified]    Unit/Bed#: -01 Encounter: [id number]    Admission Date: 1/22/2018     Admitting Diagnosis: Pericardial effusion [I31 3]  Chest pain [R07 9]    Homero Angel is a 45 y o  male who presents with chest pain  Patient's past medical history significant for hypertension, COPD, CHF with low EF, CKD baseline creatinine 1 6-1 7      Patient presents with onset of chest pain last evening  He reports he was sitting in bed when he developed substernal pain, described as a baseball sitting in his chest   It seemed to go away on his own, he sub through the night  Then this morning again he had chest pain  He reports that he took Pepto-Bismol with a little bit of improvement this morning, but his pain continued  Symptoms worst supine, improved leaning forward  No recent illness, fevers or chills  He was recently diagnosed with a bilateral lower extremity DVT after a left inguinal lymph node excision 2 weeks ago  He has been compliant with Eliquis, CTA in the ER shows no evidence of PE  It does show small pericardial effusion  Procedures Performed: No orders of the defined types were placed in this encounter  Hospital Course: patient was subsequently admitted for further evaluation of chest pain  His workup included CTA chest which was negative for pulmonary embolism however did show small amount of pericardial effusion  This was further evaluated by 2-D echocardiogram which showed moderate pleural effusion however it remained stable over the next 72 hours  Patient was given IV Lasix he responded very well and currently has a completely normal exam   He denies any chest pain is always remained symptomatic and was able to ambulate with 450 feet today without any difficulty as well as his oxygen remained stable    Patient was evaluated by cardiology as well and they have recommended outpatient follow-up for repeat serial echocardiogram to ensure pleural effusion remained stable  To be precise CT chest as well as 3 echocardiograms done while inpatient does not reveal any kind of inflammatory process of the pericardium  Occasionally due to anxiety patient did express, it was noted patient's blood pressure would be elevated for that reason his discharge was prolonged to ensure this was not a permanent event that needed to be addressed  I did recommend him to follow-up with PCP and subsequently with psychiatry if anxiety issues not resolved  Currently is calm collected his mood and affect is was in normal limits and he does not have any suicidal or homicidal ideation  His current vital signs within normal limits including blood pressure of 130/74 which was confirmed manually  I have recommended him to follow-up with his PCP ×1 week he had expressed verbal understanding  Since patient'sPericardial effusion size has remained completely stable he will be discharged today but he does understand that he will need serial echocardiograms to ensure pericardial effusion remains stable which will be set up by his PCP and all cardiology teams  I also attempted to reach out to patient's wife:  Shyanne Eddy [phone]   Unfortunately this telephone line was disconnected and no longer in service  However I was able to speak to her in detail approximately 24 hours ago on the same telephone number And she is aware about treatment plan as well as discharge planning  Patient's discharge exam is as following:    GENERAL APPEARANCE: WD/WN in NAD pleasant  SKIN: no rash  HEENT: NC/AT, PERRLA (B), moist MM, no epistaxis  NECK: Supple, no JVD    LUNGS: CTA (B) mildly prolonged expiratory phase,   no use of accessory muscles    HEART:          S1S 2, RRR  , PMI is not displaced  ABDOMEN: Soft, nontender, nondistended, +BS  Rectal exam:  EXTREMITIES: no edema   PERIPHERAL VASCULAR: palpable pulses   NEURO:  AAO x 3, CN 2-12: non focal  MUSCLE STRENGHT: 5/5 (B), SENSATION: nonfocal  DTR: ++, CEREBELLAR: non focal      Significant Findings, Care, Treatment and Services Provided: as per description    Complications: no known complications    Discharge Diagnosis:     Pericardial effusion  Chest pain  Accelerated hypertension  Chronic kidney disease  History of CHF  Hyperlipidemia      Condition at Discharge: good     Discharge instructions/Information to patient and family:   See after visit summary for information provided to patient and family  Provisions for Follow-Up Care:  See after visit summary for information related to follow-up care and any pertinent home health orders  Disposition: Home    Planned Readmission: No    Discharge Statement   I spent 45 minutes discharging the patient  This time was spent on the day of discharge  I had direct contact with the patient on the day of discharge  Additional documentation is required if more than 30 minutes were spent on discharge  Discharge Medications:  See after visit summary for reconciled discharge medications provided to patient and family

## 2018-01-26 VITALS
RESPIRATION RATE: 18 BRPM | TEMPERATURE: 97.3 F | HEART RATE: 77 BPM | SYSTOLIC BLOOD PRESSURE: 168 MMHG | HEIGHT: 68 IN | WEIGHT: 216.49 LBS | DIASTOLIC BLOOD PRESSURE: 89 MMHG | BODY MASS INDEX: 32.81 KG/M2 | OXYGEN SATURATION: 98 %

## 2018-01-26 LAB
ANION GAP SERPL CALCULATED.3IONS-SCNC: 8 MMOL/L (ref 4–13)
BUN SERPL-MCNC: 26 MG/DL (ref 5–25)
CALCIUM SERPL-MCNC: 9.2 MG/DL (ref 8.3–10.1)
CHLORIDE SERPL-SCNC: 104 MMOL/L (ref 100–108)
CO2 SERPL-SCNC: 30 MMOL/L (ref 21–32)
CREAT SERPL-MCNC: 1.63 MG/DL (ref 0.6–1.3)
GFR SERPL CREATININE-BSD FRML MDRD: 54 ML/MIN/1.73SQ M
GLUCOSE SERPL-MCNC: 111 MG/DL (ref 65–140)
POTASSIUM SERPL-SCNC: 3.5 MMOL/L (ref 3.5–5.3)
SODIUM SERPL-SCNC: 142 MMOL/L (ref 136–145)

## 2018-01-26 PROCEDURE — 94760 N-INVAS EAR/PLS OXIMETRY 1: CPT

## 2018-01-26 PROCEDURE — 94660 CPAP INITIATION&MGMT: CPT

## 2018-01-26 PROCEDURE — 80048 BASIC METABOLIC PNL TOTAL CA: CPT | Performed by: INTERNAL MEDICINE

## 2018-01-26 PROCEDURE — 94640 AIRWAY INHALATION TREATMENT: CPT

## 2018-01-26 RX ORDER — FUROSEMIDE 40 MG/1
40 TABLET ORAL DAILY
Qty: 30 TABLET | Refills: 0 | Status: SHIPPED | OUTPATIENT
Start: 2018-01-26 | End: 2018-02-05 | Stop reason: CLARIF

## 2018-01-26 RX ORDER — PREDNISONE 20 MG/1
10 TABLET ORAL DAILY
Qty: 10 TABLET | Refills: 0 | Status: SHIPPED | OUTPATIENT
Start: 2018-01-26 | End: 2018-02-05 | Stop reason: CLARIF

## 2018-01-26 RX ADMIN — NEBIVOLOL HYDROCHLORIDE 20 MG: 10 TABLET ORAL at 08:34

## 2018-01-26 RX ADMIN — PREDNISONE 40 MG: 20 TABLET ORAL at 08:33

## 2018-01-26 RX ADMIN — FUROSEMIDE 80 MG: 10 INJECTION, SOLUTION INTRAMUSCULAR; INTRAVENOUS at 08:32

## 2018-01-26 RX ADMIN — FLUTICASONE PROPIONATE AND SALMETEROL 1 PUFF: 50; 250 POWDER RESPIRATORY (INHALATION) at 08:35

## 2018-01-26 RX ADMIN — IPRATROPIUM BROMIDE AND ALBUTEROL SULFATE 3 ML: .5; 3 SOLUTION RESPIRATORY (INHALATION) at 01:19

## 2018-01-26 RX ADMIN — BUPROPION HYDROCHLORIDE 150 MG: 150 TABLET, EXTENDED RELEASE ORAL at 08:34

## 2018-01-26 RX ADMIN — EPLERENONE 50 MG: 25 TABLET, FILM COATED ORAL at 08:35

## 2018-01-26 RX ADMIN — POTASSIUM CHLORIDE 20 MEQ: 1500 TABLET, EXTENDED RELEASE ORAL at 08:34

## 2018-01-26 RX ADMIN — MINOXIDIL 20 MG: 2.5 TABLET ORAL at 08:36

## 2018-01-26 RX ADMIN — APIXABAN 10 MG: 5 TABLET, FILM COATED ORAL at 08:34

## 2018-01-26 RX ADMIN — IPRATROPIUM BROMIDE AND ALBUTEROL SULFATE 3 ML: .5; 3 SOLUTION RESPIRATORY (INHALATION) at 08:18

## 2018-01-26 RX ADMIN — MULTIVITAMIN 15 ML: LIQUID ORAL at 08:37

## 2018-01-26 NOTE — SOCIAL WORK
CM met with pt at bedside  Pt to be discharged today with no needs  He will transport himself home-his car is here  IMM reviewed  No questions  Copy to pt and copy to MR for scanning

## 2018-01-26 NOTE — PLAN OF CARE
Problem: DISCHARGE PLANNING - CARE MANAGEMENT  Goal: Discharge to post-acute care or home with appropriate resources  INTERVENTIONS:  - Conduct assessment to determine patient/family and health care team treatment goals, and need for post-acute services based on payer coverage, community resources, and patient preferences, and barriers to discharge  - Address psychosocial, clinical, and financial barriers to discharge as identified in assessment in conjunction with the patient/family and health care team  - Arrange appropriate level of post-acute services according to patient's   needs and preference and payer coverage in collaboration with the physician and health care team  - Communicate with and update the patient/family, physician, and health care team regarding progress on the discharge plan  - Arrange appropriate transportation to post-acute venues   Outcome: Completed Date Met: 01/26/18  CM met with pt at bedside  Pt to be discharged today with no needs  He will transport himself home-his car is here  IMM reviewed  No questions  Copy to pt and copy to MR for scanning

## 2018-01-26 NOTE — PROGRESS NOTES
Progress Note - Cardiology   General Mcardle 45 y o  male MRN: [de-identified]  Unit/Bed#: -01 Encounter: [id number]    Assessment:  1  Pericardial effusion  Stable  2  Chest pain  Resolved  3  HTN    Plan:  1  Stable cardiovascular status  2  Follow pericardial effusion with serial echocardiograms as outpatient  Subjective/Objective   Chief Complaint: None    Subjective: No chest pain    Objective: No distress    Vitals: /89 (BP Location: Left arm)   Pulse 77   Temp (!) 97 3 °F (36 3 °C) (Oral)   Resp 18   Ht 5' 8" (1 727 m)   Wt 98 2 kg (216 lb 7 9 oz)   SpO2 98%   BMI 32 92 kg/m²   Vitals:    01/25/18 0600 01/26/18 0600   Weight: 99 7 kg (219 lb 12 8 oz) 98 2 kg (216 lb 7 9 oz)     Orthostatic Blood Pressures    Flowsheet Row Most Recent Value   Blood Pressure  168/89 filed at 01/26/2018 0700   Patient Position - Orthostatic VS  Sitting filed at 01/26/2018 0700            Intake/Output Summary (Last 24 hours) at 01/26/18 0859  Last data filed at 01/25/18 1614   Gross per 24 hour   Intake              940 ml   Output             1800 ml   Net             -860 ml       Invasive Devices          No matching active lines, drains, or airways          Review of Systems: Cardiovascular ROS: no chest pain or dyspnea on exertion    Physical Exam:   Chest: CTA  Cardiac: RRR, No murmur, Normal S1 and S2, no rub    Lab Results: I have personally reviewed pertinent lab results  Imaging: I have personally reviewed pertinent reports  EKG:   VTE Pharmacologic Prophylaxis:   VTE Mechanical Prophylaxis:     Counseling / Coordination of Care  Total time spent today 30 minutes  Greater than 50% of total time was spent with the patient and / or family counseling and / or coordination of care   A description of the counseling / coordination of care: 30

## 2018-01-26 NOTE — PLAN OF CARE
CARDIOVASCULAR - ADULT     Maintains optimal cardiac output and hemodynamic stability Adequate for Discharge     Absence of cardiac dysrhythmias or at baseline rhythm Adequate for Discharge        HEMATOLOGIC - ADULT     Maintains hematologic stability Adequate for Discharge        METABOLIC, FLUID AND ELECTROLYTES - ADULT     Electrolytes maintained within normal limits Adequate for Discharge     Fluid balance maintained Adequate for Discharge        PAIN - ADULT     Verbalizes/displays adequate comfort level or baseline comfort level Adequate for Discharge        Potential for Falls     Patient will remain free of falls Adequate for Discharge

## 2018-02-05 ENCOUNTER — APPOINTMENT (EMERGENCY)
Dept: CT IMAGING | Facility: HOSPITAL | Age: 53
DRG: 880 | End: 2018-02-05
Payer: COMMERCIAL

## 2018-02-05 ENCOUNTER — APPOINTMENT (EMERGENCY)
Dept: RADIOLOGY | Facility: HOSPITAL | Age: 53
DRG: 880 | End: 2018-02-05
Payer: COMMERCIAL

## 2018-02-05 ENCOUNTER — HOSPITAL ENCOUNTER (INPATIENT)
Facility: HOSPITAL | Age: 53
LOS: 1 days | Discharge: HOME | DRG: 880 | End: 2018-02-06
Attending: EMERGENCY MEDICINE | Admitting: INTERNAL MEDICINE
Payer: COMMERCIAL

## 2018-02-05 DIAGNOSIS — R77.8 ELEVATED TROPONIN: ICD-10-CM

## 2018-02-05 DIAGNOSIS — R07.9 CHEST PAIN: Primary | ICD-10-CM

## 2018-02-05 DIAGNOSIS — I50.9 CHF (CONGESTIVE HEART FAILURE) (HCC): ICD-10-CM

## 2018-02-05 DIAGNOSIS — I31.3 PERICARDIAL EFFUSION: ICD-10-CM

## 2018-02-05 PROBLEM — K06.8 GUMS, BLEEDING: Status: ACTIVE | Noted: 2018-02-05

## 2018-02-05 LAB
ALBUMIN SERPL BCP-MCNC: 3.6 G/DL (ref 3.5–5)
ALP SERPL-CCNC: 93 U/L (ref 46–116)
ALT SERPL W P-5'-P-CCNC: 39 U/L (ref 12–78)
ANION GAP SERPL CALCULATED.3IONS-SCNC: 9 MMOL/L (ref 4–13)
APTT PPP: 32 SECONDS (ref 23–35)
AST SERPL W P-5'-P-CCNC: 13 U/L (ref 5–45)
ATRIAL RATE: 77 BPM
ATRIAL RATE: 88 BPM
BASOPHILS # BLD AUTO: 0.04 THOUSANDS/ΜL (ref 0–0.1)
BASOPHILS NFR BLD AUTO: 0 % (ref 0–1)
BILIRUB SERPL-MCNC: 0.8 MG/DL (ref 0.2–1)
BUN SERPL-MCNC: 29 MG/DL (ref 5–25)
CALCIUM SERPL-MCNC: 9 MG/DL (ref 8.3–10.1)
CHLORIDE SERPL-SCNC: 106 MMOL/L (ref 100–108)
CO2 SERPL-SCNC: 29 MMOL/L (ref 21–32)
CREAT SERPL-MCNC: 1.56 MG/DL (ref 0.6–1.3)
EOSINOPHIL # BLD AUTO: 0.25 THOUSAND/ΜL (ref 0–0.61)
EOSINOPHIL NFR BLD AUTO: 2 % (ref 0–6)
ERYTHROCYTE [DISTWIDTH] IN BLOOD BY AUTOMATED COUNT: 16 % (ref 11.6–15.1)
GFR SERPL CREATININE-BSD FRML MDRD: 57 ML/MIN/1.73SQ M
GLUCOSE SERPL-MCNC: 103 MG/DL (ref 65–140)
HCT VFR BLD AUTO: 35.4 % (ref 36.5–49.3)
HGB BLD-MCNC: 11.5 G/DL (ref 12–17)
INR PPP: 1.21 (ref 0.86–1.16)
LYMPHOCYTES # BLD AUTO: 0.91 THOUSANDS/ΜL (ref 0.6–4.47)
LYMPHOCYTES NFR BLD AUTO: 9 % (ref 14–44)
MCH RBC QN AUTO: 26.9 PG (ref 26.8–34.3)
MCHC RBC AUTO-ENTMCNC: 32.5 G/DL (ref 31.4–37.4)
MCV RBC AUTO: 83 FL (ref 82–98)
MONOCYTES # BLD AUTO: 0.59 THOUSAND/ΜL (ref 0.17–1.22)
MONOCYTES NFR BLD AUTO: 6 % (ref 4–12)
NEUTROPHILS # BLD AUTO: 8.61 THOUSANDS/ΜL (ref 1.85–7.62)
NEUTS SEG NFR BLD AUTO: 82 % (ref 43–75)
NRBC BLD AUTO-RTO: 0 /100 WBCS
NT-PROBNP SERPL-MCNC: 1243 PG/ML
P AXIS: 64 DEGREES
P AXIS: 66 DEGREES
PLATELET # BLD AUTO: 239 THOUSANDS/UL (ref 149–390)
PMV BLD AUTO: 9.6 FL (ref 8.9–12.7)
POTASSIUM SERPL-SCNC: 3.8 MMOL/L (ref 3.5–5.3)
PR INTERVAL: 142 MS
PR INTERVAL: 152 MS
PROT SERPL-MCNC: 7.1 G/DL (ref 6.4–8.2)
PROTHROMBIN TIME: 15.6 SECONDS (ref 12.1–14.4)
QRS AXIS: -64 DEGREES
QRS AXIS: -64 DEGREES
QRSD INTERVAL: 88 MS
QRSD INTERVAL: 88 MS
QT INTERVAL: 384 MS
QT INTERVAL: 406 MS
QTC INTERVAL: 459 MS
QTC INTERVAL: 464 MS
RBC # BLD AUTO: 4.27 MILLION/UL (ref 3.88–5.62)
SODIUM SERPL-SCNC: 144 MMOL/L (ref 136–145)
T WAVE AXIS: 110 DEGREES
T WAVE AXIS: 96 DEGREES
TROPONIN I SERPL-MCNC: 0.04 NG/ML
TROPONIN I SERPL-MCNC: 0.04 NG/ML
TROPONIN I SERPL-MCNC: 0.05 NG/ML
TROPONIN I SERPL-MCNC: 0.05 NG/ML
VENTRICULAR RATE: 77 BPM
VENTRICULAR RATE: 88 BPM
WBC # BLD AUTO: 10.55 THOUSAND/UL (ref 4.31–10.16)

## 2018-02-05 PROCEDURE — 84484 ASSAY OF TROPONIN QUANT: CPT | Performed by: INTERNAL MEDICINE

## 2018-02-05 PROCEDURE — 36415 COLL VENOUS BLD VENIPUNCTURE: CPT | Performed by: EMERGENCY MEDICINE

## 2018-02-05 PROCEDURE — 93005 ELECTROCARDIOGRAM TRACING: CPT

## 2018-02-05 PROCEDURE — 85610 PROTHROMBIN TIME: CPT | Performed by: EMERGENCY MEDICINE

## 2018-02-05 PROCEDURE — 71046 X-RAY EXAM CHEST 2 VIEWS: CPT

## 2018-02-05 PROCEDURE — 80053 COMPREHEN METABOLIC PANEL: CPT | Performed by: EMERGENCY MEDICINE

## 2018-02-05 PROCEDURE — 93010 ELECTROCARDIOGRAM REPORT: CPT | Performed by: INTERNAL MEDICINE

## 2018-02-05 PROCEDURE — 99222 1ST HOSP IP/OBS MODERATE 55: CPT | Performed by: INTERNAL MEDICINE

## 2018-02-05 PROCEDURE — 94760 N-INVAS EAR/PLS OXIMETRY 1: CPT

## 2018-02-05 PROCEDURE — 94660 CPAP INITIATION&MGMT: CPT

## 2018-02-05 PROCEDURE — 85025 COMPLETE CBC W/AUTO DIFF WBC: CPT | Performed by: EMERGENCY MEDICINE

## 2018-02-05 PROCEDURE — 99285 EMERGENCY DEPT VISIT HI MDM: CPT

## 2018-02-05 PROCEDURE — 83880 ASSAY OF NATRIURETIC PEPTIDE: CPT | Performed by: EMERGENCY MEDICINE

## 2018-02-05 PROCEDURE — 85730 THROMBOPLASTIN TIME PARTIAL: CPT | Performed by: EMERGENCY MEDICINE

## 2018-02-05 PROCEDURE — 71275 CT ANGIOGRAPHY CHEST: CPT

## 2018-02-05 PROCEDURE — 84484 ASSAY OF TROPONIN QUANT: CPT | Performed by: EMERGENCY MEDICINE

## 2018-02-05 RX ORDER — ALLOPURINOL 100 MG/1
100 TABLET ORAL DAILY
Status: DISCONTINUED | OUTPATIENT
Start: 2018-02-06 | End: 2018-02-06 | Stop reason: HOSPADM

## 2018-02-05 RX ORDER — FUROSEMIDE 80 MG
80 TABLET ORAL DAILY
Status: DISCONTINUED | OUTPATIENT
Start: 2018-02-06 | End: 2018-02-06 | Stop reason: HOSPADM

## 2018-02-05 RX ORDER — MINOXIDIL 2.5 MG/1
20 TABLET ORAL 2 TIMES DAILY
Status: DISCONTINUED | OUTPATIENT
Start: 2018-02-05 | End: 2018-02-06 | Stop reason: HOSPADM

## 2018-02-05 RX ORDER — MELATONIN
1000 DAILY
Status: DISCONTINUED | OUTPATIENT
Start: 2018-02-06 | End: 2018-02-06 | Stop reason: HOSPADM

## 2018-02-05 RX ORDER — EPLERENONE 25 MG/1
50 TABLET, FILM COATED ORAL DAILY
Status: DISCONTINUED | OUTPATIENT
Start: 2018-02-06 | End: 2018-02-06 | Stop reason: HOSPADM

## 2018-02-05 RX ORDER — ATORVASTATIN CALCIUM 40 MG/1
40 TABLET, FILM COATED ORAL
Status: DISCONTINUED | OUTPATIENT
Start: 2018-02-05 | End: 2018-02-06 | Stop reason: HOSPADM

## 2018-02-05 RX ORDER — AMLODIPINE BESYLATE 10 MG/1
10 TABLET ORAL DAILY
Status: DISCONTINUED | OUTPATIENT
Start: 2018-02-06 | End: 2018-02-06 | Stop reason: HOSPADM

## 2018-02-05 RX ORDER — ACETAMINOPHEN 325 MG/1
650 TABLET ORAL EVERY 6 HOURS PRN
Status: DISCONTINUED | OUTPATIENT
Start: 2018-02-05 | End: 2018-02-06 | Stop reason: HOSPADM

## 2018-02-05 RX ORDER — HYDROXYZINE HYDROCHLORIDE 25 MG/1
25 TABLET, FILM COATED ORAL EVERY 6 HOURS PRN
Status: DISCONTINUED | OUTPATIENT
Start: 2018-02-05 | End: 2018-02-06 | Stop reason: HOSPADM

## 2018-02-05 RX ORDER — LORAZEPAM 1 MG/1
1 TABLET ORAL ONCE
Status: COMPLETED | OUTPATIENT
Start: 2018-02-05 | End: 2018-02-05

## 2018-02-05 RX ORDER — BUPROPION HYDROCHLORIDE 150 MG/1
150 TABLET, EXTENDED RELEASE ORAL DAILY
Status: DISCONTINUED | OUTPATIENT
Start: 2018-02-06 | End: 2018-02-06 | Stop reason: HOSPADM

## 2018-02-05 RX ORDER — ASPIRIN 325 MG
325 TABLET, DELAYED RELEASE (ENTERIC COATED) ORAL DAILY
Status: DISCONTINUED | OUTPATIENT
Start: 2018-02-06 | End: 2018-02-06 | Stop reason: HOSPADM

## 2018-02-05 RX ORDER — POTASSIUM CHLORIDE 20 MEQ/1
20 TABLET, EXTENDED RELEASE ORAL 2 TIMES DAILY
Status: DISCONTINUED | OUTPATIENT
Start: 2018-02-05 | End: 2018-02-06 | Stop reason: HOSPADM

## 2018-02-05 RX ORDER — LIDOCAINE HYDROCHLORIDE AND EPINEPHRINE 10; 10 MG/ML; UG/ML
5 INJECTION, SOLUTION INFILTRATION; PERINEURAL ONCE
Status: DISCONTINUED | OUTPATIENT
Start: 2018-02-05 | End: 2018-02-06 | Stop reason: HOSPADM

## 2018-02-05 RX ORDER — DOXAZOSIN MESYLATE 4 MG/1
4 TABLET ORAL
Status: DISCONTINUED | OUTPATIENT
Start: 2018-02-05 | End: 2018-02-06 | Stop reason: HOSPADM

## 2018-02-05 RX ORDER — NEBIVOLOL 10 MG/1
20 TABLET ORAL 2 TIMES DAILY
Status: DISCONTINUED | OUTPATIENT
Start: 2018-02-05 | End: 2018-02-06 | Stop reason: HOSPADM

## 2018-02-05 RX ORDER — FERROUS SULFATE 325(65) MG
325 TABLET ORAL 2 TIMES DAILY WITH MEALS
Status: DISCONTINUED | OUTPATIENT
Start: 2018-02-06 | End: 2018-02-06 | Stop reason: HOSPADM

## 2018-02-05 RX ORDER — ESCITALOPRAM OXALATE 10 MG/1
5 TABLET ORAL DAILY
COMMUNITY
End: 2018-02-06 | Stop reason: HOSPADM

## 2018-02-05 RX ADMIN — IOHEXOL 85 ML: 350 INJECTION, SOLUTION INTRAVENOUS at 14:57

## 2018-02-05 RX ADMIN — MINOXIDIL 20 MG: 2.5 TABLET ORAL at 21:28

## 2018-02-05 RX ADMIN — ATORVASTATIN CALCIUM 40 MG: 40 TABLET, FILM COATED ORAL at 21:28

## 2018-02-05 RX ADMIN — NEBIVOLOL HYDROCHLORIDE 20 MG: 10 TABLET ORAL at 21:27

## 2018-02-05 RX ADMIN — POTASSIUM CHLORIDE 20 MEQ: 1500 TABLET, EXTENDED RELEASE ORAL at 21:27

## 2018-02-05 RX ADMIN — LORAZEPAM 1 MG: 1 TABLET ORAL at 14:36

## 2018-02-05 RX ADMIN — DOXAZOSIN 4 MG: 4 TABLET ORAL at 21:27

## 2018-02-05 NOTE — ED PROVIDER NOTES
History  Chief Complaint   Patient presents with    Chest Pain     Was discharged from here last Friday and is still continuning to have chest pain  Patient also noticed that his gums started bleeding today and he is on eliquis  47-year-old male presents for evaluation of chest pain  He states that he was recently discharged from the hospital (Friday)  He was diagnosed with the DVT at that time and started on Eliquis  He comes back today because he has a return of his chest pain, feels short of breath, pain when he breathes, and he continues to have leg tenderness  He is also concerned because he noticed some gum bleeding after biting his cheek  No other excessive bleeding  He denies fever, chills, cough, any other concerning symptoms  No nausea, vomiting, no blood per rectum  Prior to Admission Medications   Prescriptions Last Dose Informant Patient Reported? Taking?    Cholecalciferol (VITAMIN D-3) 1000 units CAPS   Yes No   Sig: Take 1,000 Units by mouth daily   FERROUS GLUCONATE IRON PO   Yes No   Sig: Take 246 mg by mouth 2 (two) times a day   L-Arginine 500 MG TABS   Yes No   Sig: Take 500 mg by mouth 2 (two) times a day   L-Lysine 1000 MG TABS   Yes No   Sig: Take 1,000 mg by mouth 2 (two) times a day   MAGNESIUM PO   Yes No   Sig: Take 400 mg by mouth daily   Mometasone Furo-Formoterol Fum 200-5 MCG/ACT AERO   Yes No   Sig: Take 2 puffs by mouth 2 (two) times a day   Omega-3 Fatty Acids (FISH OIL) 1200 MG CAPS   Yes No   Sig: Take 1,200 mg by mouth daily at bedtime   allopurinol (ZYLOPRIM) 100 mg tablet   Yes No   Sig: Take 100 mg by mouth daily   amLODIPine (NORVASC) 10 mg tablet   Yes No   Sig: Take 10 mg by mouth daily   apixaban (ELIQUIS) 5 mg   Yes No   Sig: Take 5 mg by mouth 2 (two) times a day     aspirin (PX ENTERIC ASPIRIN) 325 mg EC tablet   Yes No   Sig: Take 325 mg by mouth daily At night    atorvastatin (LIPITOR) 40 mg tablet   Yes No   Sig: Take 40 mg by mouth daily at bedtime   doxazosin (CARDURA) 4 mg tablet   Yes No   Sig: Take 4 mg by mouth daily at bedtime   eplerenone (INSPRA) 50 MG tablet   Yes No   Sig: Take 50 mg by mouth daily   escitalopram (LEXAPRO) 10 mg tablet   Yes Yes   Sig: Take 5 mg by mouth daily   furosemide (LASIX) 80 mg tablet   Yes No   Sig: Take 80 mg by mouth daily   hydrOXYzine HCL (ATARAX) 25 mg tablet   Yes No   Sig: Take 25 mg by mouth every 6 (six) hours as needed for anxiety   minoxidil (LONITEN) 10 mg tablet   Yes No   Sig: Take 20 mg by mouth 2 (two) times a day   multivitamin-iron-minerals-folic acid (CENTRUM) chewable tablet   Yes No   Sig: Chew 1 tablet daily   nebivolol (BYSTOLIC) 20 MG tablet   Yes No   Sig: Take 20 mg by mouth 2 (two) times a day   potassium chloride (KLOR-CON M20) 20 mEq tablet   Yes No   Sig: Take 20 mEq by mouth 2 (two) times a day      Facility-Administered Medications: None       Past Medical History:   Diagnosis Date    Hypertension     Kidney disease     Leg DVT (deep venous thromboembolism), acute, [facility] ([facility] [address] ) 01/2018    AGUS on CPAP     setting 11    Systolic CHF (HCC)        Past Surgical History:   Procedure Laterality Date    CHOLECYSTECTOMY      JOINT REPLACEMENT      LYMPH NODE DISSECTION Left 01/2018    left inguinal LN removed - benign        Family History   Problem Relation Age of Onset    Heart murmur Sister     Deep vein thrombosis Neg Hx      I have reviewed and agree with the history as documented  Social History   Substance Use Topics    Smoking status: Never Smoker    Smokeless tobacco: Never Used    Alcohol use No        Review of Systems   Constitutional: Negative for chills, fatigue and fever  HENT: Negative for congestion and sore throat  Bleeding gums   Respiratory: Positive for shortness of breath  Negative for apnea, cough, chest tightness and wheezing  Cardiovascular: Positive for chest pain  Negative for palpitations  Gastrointestinal: Positive for nausea  Negative for abdominal pain, constipation, diarrhea and vomiting  Genitourinary: Negative for dysuria and flank pain  Musculoskeletal: Negative for back pain, neck pain and neck stiffness  Skin: Negative for color change and rash  Allergic/Immunologic: Negative for immunocompromised state  Neurological: Negative for dizziness, syncope and headaches  Hematological: Bruises/bleeds easily  Psychiatric/Behavioral: Negative for confusion  Physical Exam  ED Triage Vitals   Temperature Pulse Respirations Blood Pressure SpO2   02/05/18 1307 02/05/18 1306 02/05/18 1306 02/05/18 1306 02/05/18 1306   99 °F (37 2 °C) 80 22 (!) 173/89 96 %      Temp Source Heart Rate Source Patient Position - Orthostatic VS BP Location FiO2 (%)   02/05/18 1306 02/05/18 1306 02/05/18 1306 02/05/18 1306 --   Oral Monitor Lying Right arm       Pain Score       02/05/18 1306       No Pain           Orthostatic Vital Signs  Vitals:    02/06/18 0700 02/06/18 1100 02/06/18 1453 02/06/18 1748   BP: 158/74 163/79 146/70 (!) 171/90   Pulse: 63 73 73 72   Patient Position - Orthostatic VS: Lying Sitting Lying Sitting       Physical Exam   Constitutional: He is oriented to person, place, and time  He appears well-developed and well-nourished  No distress  HENT:   Head: Normocephalic and atraumatic  Mouth/Throat: Oropharynx is clear and moist    Bleeding gums on the left, appears consistent with having bit his lip and bleeding from the site  Eyes: Conjunctivae and EOM are normal  Pupils are equal, round, and reactive to light  Right eye exhibits no discharge  Left eye exhibits no discharge  No scleral icterus  Neck: Normal range of motion  Neck supple  No JVD present  Cardiovascular: Normal rate, regular rhythm, normal heart sounds and intact distal pulses  Exam reveals no gallop and no friction rub  No murmur heard  Pulmonary/Chest: Breath sounds normal  No respiratory distress  He has no wheezes  He has no rales   He exhibits tenderness  Increased respiratory effort   Abdominal: Soft  Bowel sounds are normal  He exhibits no distension  There is no tenderness  There is no rebound and no guarding  Musculoskeletal: Normal range of motion  He exhibits no edema, tenderness or deformity  Neurological: He is alert and oriented to person, place, and time  No cranial nerve deficit  Skin: Skin is warm and dry  No rash noted  He is not diaphoretic  No erythema  No pallor  Psychiatric: He has a normal mood and affect  His behavior is normal    Vitals reviewed  ED Medications  Medications   LORazepam (ATIVAN) tablet 1 mg (1 mg Oral Given 2/5/18 1436)   iohexol (OMNIPAQUE) 350 MG/ML injection (MULTI-DOSE) 100 mL (85 mL Intravenous Given 2/5/18 8937)   influenza inactivated quadrivalent vaccine (FLULAVAL) IM injection 0 5 mL (0 5 mL Intramuscular Given 2/6/18 1750)       Diagnostic Studies  Results Reviewed     Procedure Component Value Units Date/Time    Basic metabolic panel [[id number]]  (Abnormal) Collected:  02/06/18 0529    Lab Status:  Final result Specimen:  Blood from Arm, Right Updated:  02/06/18 4085     Sodium 143 mmol/L      Potassium 3 5 mmol/L      Chloride 107 mmol/L      CO2 28 mmol/L      Anion Gap 8 mmol/L      BUN 29 (H) mg/dL      Creatinine 1 51 (H) mg/dL      Glucose 88 mg/dL      Calcium 8 6 mg/dL      eGFR 60 ml/min/1 73sq m     Narrative:         National Kidney Disease Education Program recommendations are as follows:  GFR calculation is accurate only with a steady state creatinine  Chronic Kidney disease less than 60 ml/min/1 73 sq  meters  Kidney failure less than 15 ml/min/1 73 sq  meters      Magnesium [[id number]]  (Normal) Collected:  02/06/18 0529    Lab Status:  Final result Specimen:  Blood from Arm, Right Updated:  02/06/18 0626     Magnesium 2 2 mg/dL     CBC (With Platelets) [[id number]]  (Abnormal) Collected:  02/06/18 0529    Lab Status:  Final result Specimen:  Blood from Arm, Right Updated: 02/06/18 0548     WBC 9 28 Thousand/uL      RBC 3 99 Million/uL      Hemoglobin 10 8 (L) g/dL      Hematocrit 33 2 (L) %      MCV 83 fL      MCH 27 1 pg      MCHC 32 5 g/dL      RDW 15 8 (H) %      Platelets 562 Thousands/uL      MPV 10 1 fL     Troponin I [[id number]]  (Abnormal) Collected:  02/05/18 2228    Lab Status:  Final result Specimen:  Blood from Arm, Left Updated:  02/05/18 2254     Troponin I 0 05 (H) ng/mL     Narrative:         [employer] Chemistry analyzer 99% cutoff is > 0 04 ng/mL in network labs    o cTnI 99% cutoff is useful only when applied to patients in the clinical setting of myocardial ischemia  o cTnI 99% cutoff should be interpreted in the context of clinical history, ECG findings and possibly cardiac imaging to establish correct diagnosis  o cTnI 99% cutoff may be suggestive but clearly not indicative of a coronary event without the clinical setting of myocardial ischemia  Troponin I [[id number]]  (Normal) Collected:  02/05/18 2014    Lab Status:  Final result Specimen:  Blood from Arm, Left Updated:  02/05/18 2103     Troponin I 0 04 ng/mL     Narrative:         [employer] Chemistry analyzer 99% cutoff is > 0 04 ng/mL in network labs    o cTnI 99% cutoff is useful only when applied to patients in the clinical setting of myocardial ischemia  o cTnI 99% cutoff should be interpreted in the context of clinical history, ECG findings and possibly cardiac imaging to establish correct diagnosis  o cTnI 99% cutoff may be suggestive but clearly not indicative of a coronary event without the clinical setting of myocardial ischemia      Troponin I [[id number]]  (Normal) Collected:  02/05/18 1644    Lab Status:  Final result Specimen:  Blood from Arm, Left Updated:  02/05/18 1712     Troponin I 0 04 ng/mL     Narrative:         [employer] Chemistry analyzer 99% cutoff is > 0 04 ng/mL in network labs    o cTnI 99% cutoff is useful only when applied to patients in the clinical setting of myocardial ischemia  o cTnI 99% cutoff should be interpreted in the context of clinical history, ECG findings and possibly cardiac imaging to establish correct diagnosis  o cTnI 99% cutoff may be suggestive but clearly not indicative of a coronary event without the clinical setting of myocardial ischemia  Troponin I [[id number]]  (Abnormal) Collected:  02/05/18 1405    Lab Status:  Final result Specimen:  Blood from Arm, Right Updated:  02/05/18 1436     Troponin I 0 05 (H) ng/mL     Narrative:         [employer] Chemistry analyzer 99% cutoff is > 0 04 ng/mL in network labs    o cTnI 99% cutoff is useful only when applied to patients in the clinical setting of myocardial ischemia  o cTnI 99% cutoff should be interpreted in the context of clinical history, ECG findings and possibly cardiac imaging to establish correct diagnosis  o cTnI 99% cutoff may be suggestive but clearly not indicative of a coronary event without the clinical setting of myocardial ischemia      BNP [[id number]]  (Abnormal) Collected:  02/05/18 1400    Lab Status:  Final result Specimen:  Blood from Arm, Right Updated:  02/05/18 1432     NT-proBNP 1,243 (H) pg/mL     Comprehensive metabolic panel [[id number]]  (Abnormal) Collected:  02/05/18 1400    Lab Status:  Final result Specimen:  Blood from Arm, Right Updated:  02/05/18 1424     Sodium 144 mmol/L      Potassium 3 8 mmol/L      Chloride 106 mmol/L      CO2 29 mmol/L      Anion Gap 9 mmol/L      BUN 29 (H) mg/dL      Creatinine 1 56 (H) mg/dL      Glucose 103 mg/dL      Calcium 9 0 mg/dL      AST 13 U/L      ALT 39 U/L      Alkaline Phosphatase 93 U/L      Total Protein 7 1 g/dL      Albumin 3 6 g/dL      Total Bilirubin 0 80 mg/dL      eGFR 57 ml/min/1 73sq m     Narrative:         National Kidney Disease Education Program recommendations are as follows:  GFR calculation is accurate only with a steady state creatinine  Chronic Kidney disease less than 60 ml/min/1 73 sq  meters  Kidney failure less than 15 ml/min/1 73 sq  meters  Protime-INR [[id number]]  (Abnormal) Collected:  02/05/18 1400    Lab Status:  Final result Specimen:  Blood from Arm, Right Updated:  02/05/18 1418     Protime 15 6 (H) seconds      INR 1 21 (H)    APTT [[id number]]  (Normal) Collected:  02/05/18 1400    Lab Status:  Final result Specimen:  Blood from Arm, Right Updated:  02/05/18 1418     PTT 32 seconds     Narrative: Therapeutic Heparin Range = 60-90 seconds    CBC and differential [[id number]]  (Abnormal) Collected:  02/05/18 1400    Lab Status:  Final result Specimen:  Blood from Arm, Right Updated:  02/05/18 1406     WBC 10 55 (H) Thousand/uL      RBC 4 27 Million/uL      Hemoglobin 11 5 (L) g/dL      Hematocrit 35 4 (L) %      MCV 83 fL      MCH 26 9 pg      MCHC 32 5 g/dL      RDW 16 0 (H) %      MPV 9 6 fL      Platelets 502 Thousands/uL      nRBC 0 /100 WBCs      Neutrophils Relative 82 (H) %      Lymphocytes Relative 9 (L) %      Monocytes Relative 6 %      Eosinophils Relative 2 %      Basophils Relative 0 %      Neutrophils Absolute 8 61 (H) Thousands/µL      Lymphocytes Absolute 0 91 Thousands/µL      Monocytes Absolute 0 59 Thousand/µL      Eosinophils Absolute 0 25 Thousand/µL      Basophils Absolute 0 04 Thousands/µL                  CTA ED chest PE study   ED Interpretation by Denise Gonzalez DO (02/05 1607)   No acute findings; specifically, no pulmonary arterial embolism        Stable cardiomegaly and pericardial effusion        Stable 26 mm right lung base nodular density  Based on current Fleischner Society 2017 Guidelines on incidental pulmonary nodule, either PET/CT scan evaluation, tissue sampling or short term interval followup non-contrast CT followup (initailly in 3    months) may be considered appropriate  Final Result by Fox Adhikari MD (02/05 1450)      No acute findings; specifically, no pulmonary arterial embolism  Stable cardiomegaly and pericardial effusion        Stable 26 mm right lung base nodular density  Based on current Fleischner Society 2017 Guidelines on incidental pulmonary nodule, either PET/CT scan evaluation, tissue sampling or short term interval followup non-contrast CT followup (initailly in 3    months) may be considered appropriate  Workstation performed: [id number]         X-ray chest 2 views   ED Interpretation by Ivonne Farr DO (02/05 1348)   Chest x-ray is consistent with prior chest x-ray from 01/22/2018  No acute infiltrate      Final Result by Nohemi Pina MD (02/05 5499)   Stable cardiomegaly  No active pulmonary disease  Workstation performed: [id number]                    Procedures  ECG 12 Lead Documentation  Date/Time: 2/5/2018 1:10 PM  Performed by: Inez Estrada by: Lucas Lucio     Indications / Diagnosis:  Chest pain  ECG reviewed by me, the ED Provider: yes    Patient location:  ED  Previous ECG:     Previous ECG:  Compared to current    Comparison ECG info:  January 22nd    Similarity:  No change    Comparison to cardiac monitor: Yes    Interpretation:     Interpretation: normal    Rate:     ECG rate:  77    ECG rate assessment: normal    Rhythm:     Rhythm: sinus rhythm    Ectopy:     Ectopy: none    QRS:     QRS axis:  Normal    QRS intervals:  Normal  Conduction:     Conduction: normal    ST segments:     ST segments:  Normal  T waves:     T waves: normal    Comments:      Left atrial enlargement, T-wave inversion in V6 (consistent with prior)  Phone Contacts  ED Phone Contact    ED Course  ED Course as of Feb 08 1940   Mon Feb 05, 2018   1434 Worse from 2 weeks ago NT-proBNP: (!) 1,243   1435 Improved from 10 days ago Creatinine: (!) 1 56   1455 Takes daily aspirin, also on Eliquis  Troponin I: (!) 0 05   1518 Had a discussion with patient's wife    Patient is agreeable to admission to the hospital   She had the opportunity to ask questions regarding his care in she had no further questions  MDM  Number of Diagnoses or Management Options  Chest pain:   CHF (congestive heart failure) [facility]):   Elevated troponin:   Diagnosis management comments: Chest pain, shortness of breath  He will have an ACS workup, evaluate for CHF  He will have a scan performed for PE  Patient found to have elevated troponin  He will require admission for continued ACS workup  Amount and/or Complexity of Data Reviewed  Clinical lab tests: ordered and reviewed  Tests in the radiology section of CPT®: ordered and reviewed      CritCare Time    Disposition  Final diagnoses:   Chest pain   CHF (congestive heart failure) ([facility])   Elevated troponin     Time reflects when diagnosis was documented in both MDM as applicable and the Disposition within this note     Time User Action Codes Description Comment    2/5/2018  4:38 PM Mary Schaeffer Add [R07 9] Chest pain     2/5/2018  4:38 PM Geronimo Schaeffer Add [I50 9] CHF (congestive heart failure) ([address] )     2/5/2018  4:38 PM Geronimo Schaeffer Add [R74 8] Elevated troponin     2/5/2018  7:10 PM Carey Gtz [I31 3] Pericardial effusion     2/5/2018  7:10 PM Radha Corrales Modify [I31 3] Pericardial effusion       ED Disposition     ED Disposition Condition Comment    Admit  [facility] and the patient's admission status was agreed to be Admission Status: inpatient status to the service of Dr Glinda Oppenheim ([place])   Follow-up Information     Follow up With Specialties Details Why Contact Info    Beronica Dexter MD  Schedule an appointment as soon as possible for a visit in 1 week(s)  [address]      Yulissa Mckoy MD Cardiology Schedule an appointment as soon as possible for a visit in 1 week(s)  [address] [facility] [address] Du Hernández DO Pulmonary Disease, Neurology, Pulmonology Schedule an appointment as soon as possible for a visit in 1 month(s)  Emily [address]  [phone]          Discharge Medication List as of 2/6/2018  6:32 PM      CONTINUE these medications which have NOT CHANGED    Details   allopurinol (ZYLOPRIM) 100 mg tablet Take 100 mg by mouth daily, Starting Mon 12/18/2017, Historical Med      amLODIPine (NORVASC) 10 mg tablet Take 10 mg by mouth daily, Starting Tue 5/23/2017, Historical Med      apixaban (ELIQUIS) 5 mg Take 5 mg by mouth 2 (two) times a day  , Starting Thu 1/18/2018, Historical Med      atorvastatin (LIPITOR) 40 mg tablet Take 40 mg by mouth daily at bedtime, Starting Wed 2/1/2017, Historical Med      eplerenone (INSPRA) 50 MG tablet Take 50 mg by mouth daily, Starting Tue 5/23/2017, Historical Med      furosemide (LASIX) 80 mg tablet Take 80 mg by mouth daily, Starting Tue 3/28/2017, Historical Med      hydrOXYzine HCL (ATARAX) 25 mg tablet Take 25 mg by mouth every 6 (six) hours as needed for anxiety, Starting Fri 1/5/2018, Historical Med      minoxidil (LONITEN) 10 mg tablet Take 20 mg by mouth 2 (two) times a day, Starting Tue 11/21/2017, Historical Med      Mometasone Furo-Formoterol Fum 200-5 MCG/ACT AERO Take 2 puffs by mouth 2 (two) times a day, Starting Wed 6/29/2016, Historical Med      nebivolol (BYSTOLIC) 20 MG tablet Take 20 mg by mouth 2 (two) times a day, Starting Mon 6/13/2016, Historical Med      potassium chloride (KLOR-CON M20) 20 mEq tablet Take 20 mEq by mouth 2 (two) times a day, Starting Tue 1/2/2018, Historical Med      aspirin (PX ENTERIC ASPIRIN) 325 mg EC tablet Take 325 mg by mouth daily At night , Historical Med      Cholecalciferol (VITAMIN D-3) 1000 units CAPS Take 1,000 Units by mouth daily, Historical Med      doxazosin (CARDURA) 4 mg tablet Take 4 mg by mouth daily at bedtime, Historical Med      FERROUS GLUCONATE IRON PO Take 246 mg by mouth 2 (two) times a day, Historical Med      L-Arginine 500 MG TABS Take 500 mg by mouth 2 (two) times a day, Historical Med      MAGNESIUM PO Take 400 mg by mouth daily, Historical Med      multivitamin-iron-minerals-folic acid (CENTRUM) chewable tablet Chew 1 tablet daily, Historical Med      Omega-3 Fatty Acids (FISH OIL) 1200 MG CAPS Take 1,200 mg by mouth daily at bedtime, Historical Med         STOP taking these medications       buPROPion (WELLBUTRIN SR) 150 mg 12 hr tablet Comments:   Reason for Stopping:         escitalopram (LEXAPRO) 10 mg tablet Comments:   Reason for Stopping:         L-Lysine 1000 MG TABS Comments:   Reason for Stopping:               Outpatient Discharge Orders  Discharge Diet     Activity as tolerated     Call provider for: active or persistent bleeding     Call provider for:  difficulty breathing, headache or visual disturbances     Call provider for:  persistent dizziness or light-headedness     Call provider for:  extreme fatigue         ED Provider  Electronically Signed by           Kaleb Joyce DO  02/08/18 3097

## 2018-02-06 ENCOUNTER — APPOINTMENT (INPATIENT)
Dept: NON INVASIVE DIAGNOSTICS | Facility: HOSPITAL | Age: 53
DRG: 880 | End: 2018-02-06
Payer: COMMERCIAL

## 2018-02-06 VITALS
SYSTOLIC BLOOD PRESSURE: 171 MMHG | RESPIRATION RATE: 16 BRPM | OXYGEN SATURATION: 94 % | HEART RATE: 72 BPM | BODY MASS INDEX: 32.64 KG/M2 | TEMPERATURE: 98.2 F | WEIGHT: 215.39 LBS | DIASTOLIC BLOOD PRESSURE: 90 MMHG | HEIGHT: 68 IN

## 2018-02-06 PROBLEM — I50.32 CHRONIC DIASTOLIC CHF (CONGESTIVE HEART FAILURE) (HCC): Chronic | Status: ACTIVE | Noted: 2018-02-06

## 2018-02-06 PROBLEM — R91.1 PULMONARY NODULE, RIGHT: Status: ACTIVE | Noted: 2018-02-06

## 2018-02-06 PROBLEM — K06.8 GUMS, BLEEDING: Status: RESOLVED | Noted: 2018-02-05 | Resolved: 2018-02-06

## 2018-02-06 PROBLEM — D72.829 LEUKOCYTOSIS: Status: RESOLVED | Noted: 2018-02-06 | Resolved: 2018-02-06

## 2018-02-06 PROBLEM — D72.829 LEUKOCYTOSIS: Status: ACTIVE | Noted: 2018-02-06

## 2018-02-06 PROBLEM — R07.9 CHEST PAIN: Status: RESOLVED | Noted: 2018-02-05 | Resolved: 2018-02-06

## 2018-02-06 LAB
ANION GAP SERPL CALCULATED.3IONS-SCNC: 8 MMOL/L (ref 4–13)
BUN SERPL-MCNC: 29 MG/DL (ref 5–25)
CALCIUM SERPL-MCNC: 8.6 MG/DL (ref 8.3–10.1)
CHLORIDE SERPL-SCNC: 107 MMOL/L (ref 100–108)
CHOLEST SERPL-MCNC: 110 MG/DL (ref 50–200)
CO2 SERPL-SCNC: 28 MMOL/L (ref 21–32)
CREAT SERPL-MCNC: 1.51 MG/DL (ref 0.6–1.3)
ERYTHROCYTE [DISTWIDTH] IN BLOOD BY AUTOMATED COUNT: 15.8 % (ref 11.6–15.1)
FERRITIN SERPL-MCNC: 393 NG/ML (ref 8–388)
GFR SERPL CREATININE-BSD FRML MDRD: 60 ML/MIN/1.73SQ M
GLUCOSE SERPL-MCNC: 88 MG/DL (ref 65–140)
HCT VFR BLD AUTO: 33.2 % (ref 36.5–49.3)
HDLC SERPL-MCNC: 61 MG/DL (ref 40–60)
HGB BLD-MCNC: 10.8 G/DL (ref 12–17)
IRON SATN MFR SERPL: 23 %
IRON SERPL-MCNC: 46 UG/DL (ref 65–175)
LDLC SERPL CALC-MCNC: 45 MG/DL (ref 0–100)
MAGNESIUM SERPL-MCNC: 2.2 MG/DL (ref 1.6–2.6)
MCH RBC QN AUTO: 27.1 PG (ref 26.8–34.3)
MCHC RBC AUTO-ENTMCNC: 32.5 G/DL (ref 31.4–37.4)
MCV RBC AUTO: 83 FL (ref 82–98)
PLATELET # BLD AUTO: 221 THOUSANDS/UL (ref 149–390)
PMV BLD AUTO: 10.1 FL (ref 8.9–12.7)
POTASSIUM SERPL-SCNC: 3.5 MMOL/L (ref 3.5–5.3)
RBC # BLD AUTO: 3.99 MILLION/UL (ref 3.88–5.62)
SODIUM SERPL-SCNC: 143 MMOL/L (ref 136–145)
TIBC SERPL-MCNC: 204 UG/DL (ref 250–450)
TRIGL SERPL-MCNC: 22 MG/DL
WBC # BLD AUTO: 9.28 THOUSAND/UL (ref 4.31–10.16)

## 2018-02-06 PROCEDURE — 93308 TTE F-UP OR LMTD: CPT

## 2018-02-06 PROCEDURE — G0008 ADMIN INFLUENZA VIRUS VAC: HCPCS | Performed by: INTERNAL MEDICINE

## 2018-02-06 PROCEDURE — 83550 IRON BINDING TEST: CPT | Performed by: INTERNAL MEDICINE

## 2018-02-06 PROCEDURE — 80061 LIPID PANEL: CPT | Performed by: INTERNAL MEDICINE

## 2018-02-06 PROCEDURE — 90686 IIV4 VACC NO PRSV 0.5 ML IM: CPT | Performed by: INTERNAL MEDICINE

## 2018-02-06 PROCEDURE — 83735 ASSAY OF MAGNESIUM: CPT | Performed by: INTERNAL MEDICINE

## 2018-02-06 PROCEDURE — 85027 COMPLETE CBC AUTOMATED: CPT | Performed by: INTERNAL MEDICINE

## 2018-02-06 PROCEDURE — 82728 ASSAY OF FERRITIN: CPT | Performed by: INTERNAL MEDICINE

## 2018-02-06 PROCEDURE — 83540 ASSAY OF IRON: CPT | Performed by: INTERNAL MEDICINE

## 2018-02-06 PROCEDURE — 80048 BASIC METABOLIC PNL TOTAL CA: CPT | Performed by: INTERNAL MEDICINE

## 2018-02-06 PROCEDURE — 94760 N-INVAS EAR/PLS OXIMETRY 1: CPT

## 2018-02-06 PROCEDURE — 99238 HOSP IP/OBS DSCHRG MGMT 30/<: CPT | Performed by: INTERNAL MEDICINE

## 2018-02-06 RX ADMIN — POTASSIUM CHLORIDE 20 MEQ: 1500 TABLET, EXTENDED RELEASE ORAL at 10:02

## 2018-02-06 RX ADMIN — APIXABAN 5 MG: 5 TABLET, FILM COATED ORAL at 10:05

## 2018-02-06 RX ADMIN — EPLERENONE 50 MG: 25 TABLET, FILM COATED ORAL at 10:12

## 2018-02-06 RX ADMIN — ALLOPURINOL 100 MG: 100 TABLET ORAL at 10:21

## 2018-02-06 RX ADMIN — Medication 325 MG: at 10:04

## 2018-02-06 RX ADMIN — MINOXIDIL 20 MG: 2.5 TABLET ORAL at 17:54

## 2018-02-06 RX ADMIN — AMLODIPINE BESYLATE 10 MG: 10 TABLET ORAL at 10:05

## 2018-02-06 RX ADMIN — Medication 325 MG: at 16:15

## 2018-02-06 RX ADMIN — Medication 1 TABLET: at 10:04

## 2018-02-06 RX ADMIN — MINOXIDIL 20 MG: 2.5 TABLET ORAL at 10:15

## 2018-02-06 RX ADMIN — NEBIVOLOL HYDROCHLORIDE 20 MG: 10 TABLET ORAL at 17:46

## 2018-02-06 RX ADMIN — CHOLECALCIFEROL TAB 25 MCG (1000 UNIT) 1000 UNITS: 25 TAB at 10:02

## 2018-02-06 RX ADMIN — ASPIRIN 325 MG: 325 TABLET, DELAYED RELEASE ORAL at 10:04

## 2018-02-06 RX ADMIN — NEBIVOLOL HYDROCHLORIDE 20 MG: 10 TABLET ORAL at 10:03

## 2018-02-06 RX ADMIN — Medication 400 MG: at 10:05

## 2018-02-06 RX ADMIN — APIXABAN 5 MG: 5 TABLET, FILM COATED ORAL at 17:46

## 2018-02-06 RX ADMIN — INFLUENZA VIRUS VACCINE 0.5 ML: 15; 15; 15; 15 SUSPENSION INTRAMUSCULAR at 17:50

## 2018-02-06 RX ADMIN — POTASSIUM CHLORIDE 20 MEQ: 1500 TABLET, EXTENDED RELEASE ORAL at 17:46

## 2018-02-06 RX ADMIN — BUPROPION HYDROCHLORIDE 150 MG: 150 TABLET, EXTENDED RELEASE ORAL at 10:17

## 2018-02-06 RX ADMIN — FUROSEMIDE 80 MG: 80 TABLET ORAL at 10:04

## 2018-02-06 NOTE — PLAN OF CARE
Problem: DISCHARGE PLANNING - CARE MANAGEMENT  Goal: Discharge to post-acute care or home with appropriate resources  INTERVENTIONS:  - Conduct assessment to determine patient/family and health care team treatment goals, and need for post-acute services based on payer coverage, community resources, and patient preferences, and barriers to discharge  - Address psychosocial, clinical, and financial barriers to discharge as identified in assessment in conjunction with the patient/family and health care team  - Arrange appropriate level of post-acute services according to patients   needs and preference and payer coverage in collaboration with the physician and health care team  - Communicate with and update the patient/family, physician, and health care team regarding progress on the discharge plan  - Arrange appropriate transportation to post-acute venues  Outcome: Progressing  Spoke with patient in his room and patient states he lives with his family in a house with a full flight of stairs which he has no trouble using  He is independent with ADL's and ambulation  Does have a walker and a W/C if he needs it  He did state he did go to a [facility] after knee surgery  He has used [facility] in the past  He purchases his meds at [facility] in [facility] and can afford all of his meds  There is no history of substance abuse or mental illness  He has no POA  He does drive and his wife drives   No needs anticipated

## 2018-02-06 NOTE — CONSULTS
H&P Exam - Cardiology   Hermilo Lofton 45 y o  male MRN: [de-identified]  Unit/Bed#: -01 Encounter: [id number]    Assessment/Plan     Assessment:  1  Chest pain  MI ruled out  2  HOCM  3  Moderate sized pericardial effusion  Stable    Plan:  1  Stable cardiovascular status  2  May be discharged with follow up in my office  History of Present Illness   HPI:  Hermilo Lofton is a 45 y o  male who presents with chest pain  Patient states he developed bleeding from his mouth yesterday  He developed chest pain after this  He is pain free now  Troponin levels are minimally elevated  Echocardiogram shows persistent  Moderate sized pericardial effusion and HOCM with mild dynamic outflow tract gradient  Patient was recently admitted with pericarditis  Review of Systems   Constitutional: Positive for activity change  Respiratory: Positive for cough and chest tightness  Negative for shortness of breath  Cardiovascular: Positive for chest pain  Negative for palpitations and leg swelling  Historical Information   Past Medical History:   Diagnosis Date    Hypertension     Kidney disease     Leg DVT (deep venous thromboembolism), acute, bilateral ([address] ) 01/2018    AGUS on CPAP     setting 11    Systolic [address] [address]      Past Surgical History:   Procedure Laterality Date    CHOLECYSTECTOMY      JOINT REPLACEMENT      LYMPH NODE DISSECTION Left 01/2018    left inguinal LN removed - benign      Social History   History   Alcohol Use No     History   Drug Use No     History   Smoking Status    Never Smoker   Smokeless Tobacco    Never Used     Family History:   Family History   Problem Relation Age of Onset    Heart murmur Sister     Deep vein thrombosis Neg Hx        Meds/Allergies   PTA meds:   Prior to Admission Medications   Prescriptions Last Dose Informant Patient Reported? Taking?    Cholecalciferol (VITAMIN D-3) 1000 units CAPS   Yes No   Sig: Take 1,000 Units by mouth daily   FERROUS GLUCONATE IRON PO   Yes No   Sig: Take 246 mg by mouth 2 (two) times a day   L-Arginine 500 MG TABS   Yes No   Sig: Take 500 mg by mouth 2 (two) times a day   L-Lysine 1000 MG TABS   Yes No   Sig: Take 1,000 mg by mouth 2 (two) times a day   MAGNESIUM PO   Yes No   Sig: Take 400 mg by mouth daily   Mometasone Furo-Formoterol Fum 200-5 MCG/ACT AERO   Yes No   Sig: Take 2 puffs by mouth 2 (two) times a day   Omega-3 Fatty Acids (FISH OIL) 1200 MG CAPS   Yes No   Sig: Take 1,200 mg by mouth daily at bedtime   allopurinol (ZYLOPRIM) 100 mg tablet   Yes No   Sig: Take 100 mg by mouth daily   amLODIPine (NORVASC) 10 mg tablet   Yes No   Sig: Take 10 mg by mouth daily   apixaban (ELIQUIS) 5 mg   Yes No   Sig: Take 5 mg by mouth 2 (two) times a day     aspirin (PX ENTERIC ASPIRIN) 325 mg EC tablet   Yes No   Sig: Take 325 mg by mouth daily At night    atorvastatin (LIPITOR) 40 mg tablet   Yes No   Sig: Take 40 mg by mouth daily at bedtime   doxazosin (CARDURA) 4 mg tablet   Yes No   Sig: Take 4 mg by mouth daily at bedtime   eplerenone (INSPRA) 50 MG tablet   Yes No   Sig: Take 50 mg by mouth daily   escitalopram (LEXAPRO) 10 mg tablet   Yes Yes   Sig: Take 5 mg by mouth daily   furosemide (LASIX) 80 mg tablet   Yes No   Sig: Take 80 mg by mouth daily   hydrOXYzine HCL (ATARAX) 25 mg tablet   Yes No   Sig: Take 25 mg by mouth every 6 (six) hours as needed for anxiety   minoxidil (LONITEN) 10 mg tablet   Yes No   Sig: Take 20 mg by mouth 2 (two) times a day   multivitamin-iron-minerals-folic acid (CENTRUM) chewable tablet   Yes No   Sig: Chew 1 tablet daily   nebivolol (BYSTOLIC) 20 MG tablet   Yes No   Sig: Take 20 mg by mouth 2 (two) times a day   potassium chloride (KLOR-CON M20) 20 mEq tablet   Yes No   Sig: Take 20 mEq by mouth 2 (two) times a day      Facility-Administered Medications: None     Allergies   Allergen Reactions    Clonidine Anaphylaxis    Lisinopril Anaphylaxis    Nifedipine Anaphylaxis    Spironolactone Anaphylaxis       Objective   Vitals: Blood pressure 163/79, pulse 73, temperature 98 °F (36 7 °C), temperature source Oral, resp  rate 18, height 5' 8" (1 727 m), weight 97 7 kg (215 lb 6 2 oz), SpO2 95 %  Orthostatic Blood Pressures    Flowsheet Row Most Recent Value   Blood Pressure  163/79 filed at 02/06/2018 1100   Patient Position - Orthostatic VS  Sitting filed at 02/06/2018 1100          No intake or output data in the 24 hours ending 02/06/18 1411    Invasive Devices     Peripheral Intravenous Line            Peripheral IV 02/05/18 Right Antecubital 1 day                Physical Exam   Constitutional: He is oriented to person, place, and time  He appears well-developed and well-nourished  No distress  HENT:   Head: Normocephalic and atraumatic  Cardiovascular: Normal rate, regular rhythm and normal heart sounds  Exam reveals no gallop and no friction rub  No murmur heard  Pulmonary/Chest: Effort normal and breath sounds normal  No respiratory distress  He has no wheezes  He has no rales  He exhibits no tenderness  Musculoskeletal: He exhibits no edema  Neurological: He is alert and oriented to person, place, and time  Skin: Skin is warm and dry  He is not diaphoretic         Lab Results:   CBC with diff:   Results from last 7 days  Lab Units 02/06/18  0529   WBC Thousand/uL 9 28   RBC Million/uL 3 99   HEMOGLOBIN g/dL 10 8*   HEMATOCRIT % 33 2*   MCV fL 83   MCH pg 27 1   MCHC g/dL 32 5   RDW % 15 8*   MPV fL 10 1   PLATELETS Thousands/uL 221     CMP:   Results from last 7 days  Lab Units 02/06/18  0529 02/05/18  1400   SODIUM mmol/L 143 144   POTASSIUM mmol/L 3 5 3 8   CHLORIDE mmol/L 107 106   CO2 mmol/L 28 29   ANION GAP mmol/L 8 9   BUN mg/dL 29* 29*   CREATININE mg/dL 1 51* 1 56*   GLUCOSE RANDOM mg/dL 88 103   CALCIUM mg/dL 8 6 9 0   AST U/L  --  13   ALT U/L  --  39   ALK PHOS U/L  --  93   TOTAL PROTEIN g/dL  --  7 1   BILIRUBIN TOTAL mg/dL  --  0 80   EGFR ml/min/1 73sq m 60 57 Troponin:   0  Lab Value Date/Time   TROPONINI 0 05 (H) 02/05/2018 2228   TROPONINI 0 04 02/05/2018 2014   TROPONINI 0 04 02/05/2018 1644   TROPONINI 0 05 (H) 02/05/2018 1405   TROPONINI <0 02 01/23/2018 0857   TROPONINI <0 02 01/22/2018 1050     BNP:   Results from last 7 days  Lab Units 02/06/18  0529   SODIUM mmol/L 143   POTASSIUM mmol/L 3 5   CHLORIDE mmol/L 107   CO2 mmol/L 28   ANION GAP mmol/L 8   BUN mg/dL 29*   CREATININE mg/dL 1 51*   GLUCOSE RANDOM mg/dL 88   CALCIUM mg/dL 8 6   EGFR ml/min/1 73sq m 60     Coags:   Results from last 7 days  Lab Units 02/05/18  1400   PTT seconds 32   INR  1 21*     TSH:     Imaging: I have personally reviewed pertinent reports  EKG: NSR, LAFB, Nonspecific T wave abnormalities  Pathology and Other Studies: I have personally reviewed pertinent reports  VTE Prophylaxis:     Code Status: Level 1 - Full Code  Advance Directive and Living Will:      Power of :    POLST:      Counseling / Coordination of Care  Total floor / unit time spent today 50 minutes  Greater than 50% of total time was spent with the patient and / or family counseling and / or coordination of care  A description of the counseling / coordination of care: 50

## 2018-02-06 NOTE — DISCHARGE SUMMARY
Discharge Summary - [facility] Internal Medicine    Patient Information: Danielle Suresh 45 y o  male MRN: [de-identified]  Unit/Bed#: -01 Encounter: [id number]    Discharging Physician / Practitioner: Ashlyn Veras MD  PCP: Angella Pitts MD  Admission Date: 2/5/2018  Discharge Date: 02/06/18    Disposition:     Home    Reason for Admission: Chest pain and bleeding gums      Discharge Diagnoses:     Principal Problem:    Chest pain  Active Problems:    Gums, bleeding  Resolved Problems:    * No resolved hospital problems  *    Other problems:   RLE DVT   Moderate pericardial effusion   HOCM  HTN  Chronic diastolic CHF   HLP  Right lung nodule   Leukocytosis     Consultations During Hospital Stay:  · Cardiology     Procedures Performed:     · None    Significant Findings / Test Results:     2D ECHO: Systolic function was normal  Ejection fraction was estimated to be 60 %  There were no regional wall motion abnormalities  Wall thickness was increased      LEFT ATRIUM:  The atrium was mildly dilated      RIGHT ATRIUM:  The atrium was mildly dilated      MITRAL VALVE:  There was mild systolic anterior motion of the anterior leaflet  There was trace regurgitation      AORTIC VALVE:  Transaortic velocity was increased due to valvular stenosis      TRICUSPID VALVE:  There was mild regurgitation      PERICARDIUM:  · A moderate pericardial effusion was identified  · CXR - stable cardiomegaly, no acute pulmonary disease  · CTA chest - No acute findings; specifically, no pulmonary arterial embolism  Stable cardiomegaly and pericardial effusion  Stable 26 mm right lung base nodular density  Based on current Fleischner Society 2017 Guidelines on incidental pulmonary nodule, either PET/CT scan evaluation, tissue sampling or short term interval followup non-contrast CT followup (initailly in 3   · months) may be considered appropriate  Test Results Pending at Discharge (will require follow up):    · None Outpatient Tests Requested:  · None    Complications:  None    Hospital Course:     Terrence Chapman is a 45 y o  male patient w/PMH of chronic diastolic CHF, CKD stage III, moderate pericardial effusion, HOCM, RLE DVT on Eliquis and who originally presented to the hospital on 2/5/2018 due to chest pain  The patient reports that he bit down on his gums and had a large amount of bleeding from his gums to the point where he actually spat a significant amount of blood out  Thereafter, he became extremely anxious and nervous  He subsequently developed left sided chest pain radiating down to his armpit associated with SOB which lasted for about 45 minutes  He had no additional chest pain thereafter  He reports some SOB again when he underwent CT chest but states that he was nervous again  The patient was recently admitted about 1 wk ago where he had presented with chest pain  At that time, he was found to have pericarditis with a pericardial effusion for which the plan was to have it serially monitored outpatient  At that time, it was noted that his chest pain could be related to anxiety as well  Of note, that patient had been taking Eliquis 10 mg PO BID for the past 3 wks  He was supposed to decrease the dose to 5 mg PO BID after the 1st wk of therapy  In the ED, labs were drawn  CXR, EKG and CTA chest were obtained  The pericardial effusion was found to be stable  CTA chest did not show an acute PE  The bleeding from his gums stopped  The patient underwent another 2D ECHO which revealed a moderate sized pericardial effusion and HOCM with mild dynamic outflow tract gradient  There was no evidence of cardiac tamponade  TNI were 0 04, 0 04 and lastly 0 05  He was evaluated by cardiology who noted that his cardiovascular status has remained stable and that he is to f/u cardiology outpatient in the office       In regards to his HTN and chronic diastolic CHF, he remains on Nebivolol, Minoxidil, Eplerenone, Lasix, Amlodipine and Doxazosin  Eliquis was decreased to 5 mg PO BID as above  Of note, he is on  mg PO Qdaily as well  His renal function remained stable  Leukocytosis was self-limited and resolved without intervention  Other home medications such as Lipitor, ferrous sulfate, and Allopurinol were resumed as well  CTA chest revealed a right pulmonary nodule for which he will be referred to pulmonary outpatient for repeat CT chest as recommended  Condition at Discharge: stable     Discharge Day Visit / Exam:     Subjective:  Denies any chest pain or SOB  Also, states that he sleeps w/CPAP at night and woke up with some dried blood this AM   Otherwise, denies persistently/actively bleeding gums or epistaxis   States that he feels much better   Vitals: Blood Pressure: 146/70 (02/06/18 1453)  Pulse: 73 (02/06/18 1453)  Temperature: 98 2 °F (36 8 °C) (02/06/18 1453)  Temp Source: Oral (02/06/18 1453)  Respirations: 16 (02/06/18 1453)  Height: 5' 8" (172 7 cm) (02/05/18 1937)  Weight - Scale: 97 7 kg (215 lb 6 2 oz) (02/06/18 0600)  SpO2: 94 % (02/06/18 1453)  Exam:   Physical Exam   Constitutional: He appears well-developed and well-nourished  No distress  HENT:   Head: Normocephalic and atraumatic  Nose: Nose normal    Mouth/Throat: Oropharynx is clear and moist    Eyes: Conjunctivae and EOM are normal  Pupils are equal, round, and reactive to light  Neck: Normal range of motion  Neck supple  No JVD present  Cardiovascular: Normal rate, regular rhythm and normal heart sounds  Exam reveals no gallop and no friction rub  No murmur heard  Pulmonary/Chest: Effort normal and breath sounds normal  No respiratory distress  He has no wheezes  He has no rales  He exhibits no tenderness  Abdominal: Soft  Bowel sounds are normal  He exhibits no distension  There is no tenderness  There is no rebound and no guarding  Musculoskeletal: He exhibits no edema  Neurological: He is alert   No cranial nerve deficit  Skin: Skin is warm and dry  No rash noted  Psychiatric: He has a normal mood and affect  Discharge instructions/Information to patient and family:   See after visit summary for information provided to patient and family  Provisions for Follow-Up Care:  See after visit summary for information related to follow-up care and any pertinent home health orders  Planned Readmission: No     Discharge Statement:  I spent <30 minutes discharging the patient  This time was spent on the day of discharge  I had direct contact with the patient on the day of discharge  Greater than 50% of the total time was spent examining patient, answering all patient questions, arranging and discussing plan of care with patient as well as directly providing post-discharge instructions  Additional time then spent on discharge activities  Discharge Medications:  See after visit summary for reconciled discharge medications provided to patient and family        ** Please Note: This note has been constructed using a voice recognition system **

## 2018-02-06 NOTE — H&P
History and Physical - [facility]    Patient Information: Olinda Campbell 45 y o  male MRN: [de-identified]  Unit/Bed#: ED 06 Encounter: [id number]  Admitting Physician: Goldie Santacruz DO  PCP: Josette Rodarte MD  Date of Admission:  02/05/18    Assessment/Plan:    Hospital Problem List:     Principal Problem:    Chest pain  Active Problems:    Gums, bleeding      Plan for the Primary Problem(s):    # CP - ? If anxiety induced  - Given hx will admit to r/o acs, cont serial troponin  - Hx of pericardial effusion, s/p cta stable, will defer to cardiology if another echo is warranted as patient had recently had 1 done in January 24th on his last admission and even then he had a total of 3  Patient displays no signs of cardiac tamponade  - patient is also chest pain-free with no ischemic acute changes on his EKG  - CTA negative for PE  - consult Cardiology, Dr Mary Alice Clemons who he follows up with    # Chronic diastolic HF   - though elevated proBNP, patient displays no sign of acute decompensation  - continue Lasix    # Hx of RLE DVT  - adjusted dose of Eliquis, should be decreased to 5 mg b i d   Patient was initially diagnosed with this 3 weeks ago and has completed the 1st week of therapy with 10 mg b i d   communicated to patient the change of dosage of his medication and has been changed on his med list accordingly  At the elevated Eliquis does is likely the reason for bleeding from his gum which is now resolved and stable H&H  Plan for Additional Problems:     # HTN - cont meds    # CKD stg III - cr at baseline    # Leukocytosis, mild pt had been on steroid taper per him from last admission which was completed, inaddition maybe stress induced  Cont to monitor  No sign of acute infxn process    # Hx of depression/anxiety - cont wellbutrin, prn hydroxyzine    Disposition    Plan of care extensively discussed with patient  VTE Prophylaxis: Apixaban (Eliquis)  / reason for no mechanical VTE prophylaxis Fully anticoagulated   Code Status:  Full code  POLST: There is no POLST form on file for this patient (pre-hospital)    Anticipated Length of Stay:  Patient will be admitted on an Inpatient basis with an anticipated length of stay of  > 2 midnights  Justification for Hospital Stay: clinical condition    Total Time for Visit, including Counseling / Coordination of Care: 1 hour  Greater than 50% of this total time spent on direct patient counseling and coordination of care  Chief Complaint:   Chest pain    History of Present Illness:    Rob Avelar is a 45 y o  male medical history significant for chronic preserved EF heart failure, CKD stage 3, pericardial effusion, right lower extremity DVT on Eliquis presents from home with complaint of chest pain  As mentioned patient is chronically on Eliquis for right lower extremity edema obtained postop after the lymph node biopsy, he was in his usual state of health on today until today  Patient states that he subsequently bit down on his gums had a large amount of gum bleeding to the point where he actually spat a significant amount of blood  He states there after he became extremely anxious and nervous; subsequently had left sternal chest pain radiated under his armpit and associated with shortness of breath  He states it lasted for 45 minutes  Has had no recurrence of chest pain  He only had recurrence of shortness of breath when he subsequently went to have his CAT scan but he admits that he was nervous then as well  He was recently admitted about a week ago where he had presented with chest pain found with pericardial effusion and the plan was for serial monitoring as outpatient  During that time it was also felt that possibly was chest pain was been anxiety induced as well      As mentioned patient is on Eliquis for 3 weeks now secondary to postop right lower extremity DVT, upon review of his medications it states that he is on Eliquis 10 mg b i d , he has since completed the 1st week of therapy and is supposed to be on 5 mg b i d  but he reports that he has rather been taking 10 mg b i d  Lajean Cassette Upon presentation to the ER, another CTA chest was done rule out PE  Pericardial effusion was found to be stable  Patient displays no signs of cardiac tamponade  At this time he is chest pain-free  He only had recurrence of shortness of breath while he was obtaining  a CT scan and and that that has also resolved  Bleeding from his gum has also subsequently resolved  Review of Systems:    Review of Systems   Constitutional: Negative  HENT: Negative  Bleeding from gum, resolved   Respiratory: Negative  Cardiovascular: Negative  Gastrointestinal: Negative  Endocrine: Negative  Genitourinary: Negative  Musculoskeletal: Negative  Allergic/Immunologic: Negative  Neurological: Negative  Hematological: Negative  Psychiatric/Behavioral: Negative  Past Medical and Surgical History:     Past Medical History:   Diagnosis Date    Hypertension     Kidney disease     Leg DVT (deep venous thromboembolism), acute, bilateral ([facility] [address] ) 01/2018    AGUS on CPAP     setting 11    Systolic CHF [facility])        Past Surgical History:   Procedure Laterality Date    CHOLECYSTECTOMY      JOINT REPLACEMENT      LYMPH NODE DISSECTION Left 01/2018    left inguinal LN removed - benign        Meds/Allergies:    Prior to Admission medications    Medication Sig Start Date End Date Taking?  Authorizing Provider   allopurinol (ZYLOPRIM) 100 mg tablet Take 100 mg by mouth daily 12/18/17   Historical Provider, MD   amLODIPine (NORVASC) 10 mg tablet Take 10 mg by mouth daily 5/23/17   Historical Provider, MD   apixaban (ELIQUIS) 5 mg Take 5 mg by mouth 2 (two) times a day   1/18/18   Historical Provider, MD   aspirin (PX ENTERIC ASPIRIN) 325 mg EC tablet Take 325 mg by mouth daily At night     Historical Provider, MD   atorvastatin (LIPITOR) 40 mg tablet Take 40 mg by mouth daily at bedtime 2/1/17   Historical Provider, MD   buPROPion [facility] SR) 150 mg 12 hr tablet Take 150 mg by mouth daily 12/18/17   Historical Provider, MD   Cholecalciferol (VITAMIN D-3) 1000 units CAPS Take 1,000 Units by mouth daily    Historical Provider, MD   doxazosin (CARDURA) 4 mg tablet Take 4 mg by mouth daily at bedtime    Historical Provider, MD   eplerenone (INSPRA) 50 MG tablet Take 50 mg by mouth daily 5/23/17   Historical Provider, MD   FERROUS GLUCONATE IRON PO Take 246 mg by mouth 2 (two) times a day    Historical Provider, MD   furosemide (LASIX) 80 mg tablet Take 80 mg by mouth daily 3/28/17   Historical Provider, MD   hydrOXYzine HCL (ATARAX) 25 mg tablet Take 25 mg by mouth every 6 (six) hours as needed for anxiety 1/5/18   Historical Provider, MD   L-Arginine 500 MG TABS Take 500 mg by mouth 2 (two) times a day    Historical Provider, MD   L-Lysine 1000 MG TABS Take 1,000 mg by mouth 2 (two) times a day    Historical Provider, MD   MAGNESIUM PO Take 400 mg by mouth daily    Historical Provider, MD   minoxidil (LONITEN) 10 mg tablet Take 20 mg by mouth 2 (two) times a day 11/21/17   Historical Provider, MD   Mometasone Furo-Formoterol Fum 200-5 MCG/ACT AERO Take 2 puffs by mouth 2 (two) times a day 6/29/16   Historical Provider, MD   multivitamin-iron-minerals-folic acid (CENTRUM) chewable tablet Chew 1 tablet daily    Historical Provider, MD   nebivolol (BYSTOLIC) 20 MG tablet Take 20 mg by mouth 2 (two) times a day 6/13/16   Historical Provider, MD   Omega-3 Fatty Acids (FISH OIL) 1200 MG CAPS Take 1,200 mg by mouth daily at bedtime    Historical Provider, MD   potassium chloride (KLOR-CON M20) 20 mEq tablet Take 20 mEq by mouth 2 (two) times a day 1/2/18   Historical Provider, MD   furosemide (LASIX) 40 mg tablet Take 1 tablet by mouth daily 1/26/18 2/5/18  Clamarilys Larch, MD   Garlic 778 MG TABS Take 200 mg by mouth daily  2/5/18  Historical Provider, MD oxyCODONE (ROXICODONE) 5 mg immediate release tablet Take 5 mg by mouth every 4 (four) hours as needed for pain 1/10/18 2/5/18  Historical Provider, MD   predniSONE 20 mg tablet Take 0 5 tablets by mouth daily 1/26/18 2/5/18  Lupe Chu MD     I have reviewed home medications with patient personally  Allergies:    Allergies   Allergen Reactions    Clonidine Anaphylaxis    Lisinopril Anaphylaxis    Nifedipine Anaphylaxis    Spironolactone Anaphylaxis       Social History:     Marital Status: /Civil Union   Occupation:   Patient Pre-hospital Living Situation:  Lives at home with wife  Patient Pre-hospital Level of Mobility:  Independent  Patient Pre-hospital Diet Restrictions:  None  Substance Use History:   History   Alcohol Use No     History   Smoking Status    Never Smoker   Smokeless Tobacco    Never Used     History   Drug Use No       Family History:    non-contributory    Physical Exam:     Vitals:   Blood Pressure: 165/80 (02/05/18 1700)  Pulse: 79 (02/05/18 1700)  Temperature: 99 °F (37 2 °C) (02/05/18 1307)  Temp Source: Oral (02/05/18 1306)  Respirations: 22 (02/05/18 1700)  Height: 5' 8" (172 7 cm) (02/05/18 1306)  Weight - Scale: 102 kg (225 lb) (02/05/18 1306)  SpO2: 95 % (02/05/18 1700)    General Appearance:  Alert, cooperative, no distress, appears stated age   HEENT Normocephalic, without obvious abnormality, atraumatic; residual blood on gum no active bleeding   Neck: Supple   Lungs:   Clear to auscultation bilaterally, respirations unlabored   Chest Wall:  No tenderness or deformity    Heart:  Regular rate and rhythm, S1 and S2 normal, no murmur, rub or gallop   Abdomen:   Soft, non-tender, bowel sounds active all four quadrants,  no masses, no organomegaly   Extremities: Extremities normal, atraumatic, no cyanosis or edema   Pulses: 2+ and symmetric all extremities   Skin: Skin color, texture, turgor normal, no rashes or lesions   Lymph nodes:    Neurologic: CNII-XII intact, speech fluent, comprehensible, no facial asymmetry; normal strength 5/5 in all major muscle groups, sensation and reflexes throughout       Additional Data:     Lab Results: I have personally reviewed pertinent reports  Results from last 7 days  Lab Units 02/05/18  1400   WBC Thousand/uL 10 55*   HEMOGLOBIN g/dL 11 5*   HEMATOCRIT % 35 4*   PLATELETS Thousands/uL 239   NEUTROS PCT % 82*   LYMPHS PCT % 9*   MONOS PCT % 6   EOS PCT % 2       Results from last 7 days  Lab Units 02/05/18  1400   SODIUM mmol/L 144   POTASSIUM mmol/L 3 8   CHLORIDE mmol/L 106   CO2 mmol/L 29   BUN mg/dL 29*   CREATININE mg/dL 1 56*   CALCIUM mg/dL 9 0   TOTAL PROTEIN g/dL 7 1   BILIRUBIN TOTAL mg/dL 0 80   ALK PHOS U/L 93   ALT U/L 39   AST U/L 13   GLUCOSE RANDOM mg/dL 103       Results from last 7 days  Lab Units 02/05/18  1400   INR  1 21*       Imaging: I have personally reviewed pertinent reports  X-ray Chest 2 Views    Result Date: 2/5/2018  Narrative: CHEST INDICATION: chest pain  History taken directly from the electronic ordering system  COMPARISON:  1/22/2018 VIEWS:  Frontal and lateral projections IMAGES:  2 FINDINGS: Heart shadow is enlarged but unchanged from prior exam  The lungs are clear  No pneumothorax or pleural effusion  Visualized osseous structures appear within normal limits for the patient's age  Impression: Stable cardiomegaly  No active pulmonary disease  Workstation performed: [id number]     X-ray Chest 2 Views    Result Date: 1/22/2018  Narrative: CHEST INDICATION:  Chest pain and shortness of breath COMPARISON:  None VIEWS:  Frontal and lateral projections IMAGES:  2 FINDINGS:     There is moderate cardiomegaly  The lungs are clear  No pneumothorax or pleural effusion  Visualized osseous structures appear within normal limits for the patient's age       Impression: Moderate cardiomegaly Workstation performed: [id number]     Cta Chest Pe Study    Result Date: 1/22/2018  Narrative: CTA - CHEST WITH IV CONTRAST - PULMONARY ANGIOGRAM INDICATION: Chest pain  DVT  COMPARISON: None  TECHNIQUE: CTA examination of the chest was performed using angiographic technique according to a protocol specifically tailored to evaluate for pulmonary embolism  Reformatted images were created in axial, sagittal, and coronal planes  In addition, coronal 3D MIP postprocessing was performed on the acquisition scanner  Radiation dose length product (DLP) for this visit:  654 mGy-cm   This examination, like all CT scans performed in the [facility], was performed utilizing techniques to minimize radiation dose exposure, including the use of iterative reconstruction and automated exposure control  IV Contrast:  85 mL of iohexol (OMNIPAQUE)          FINDINGS: PULMONARY ARTERIAL TREE:  No pulmonary embolus is seen  LUNGS:  There is a lobulated pleural-based nodular at the right lung base along the hemidiaphragm, measuring 2 6 x 1 7 cm on series 3 image 54, and coronal series 601 image 108  There is subsegmental atelectasis or scarring in the lingula and left lung base  There is no tracheal or endobronchial lesion  PLEURA:  Unremarkable  HEART/AORTA:  Heart is enlarged  There is a small pericardial effusion  Thoracic aorta within normal limits  MEDIASTINUM AND VIJAYA:  Unremarkable  CHEST WALL AND LOWER NECK:       Normal  VISUALIZED STRUCTURES IN THE UPPER ABDOMEN:  Unremarkable  OSSEOUS STRUCTURES:  No acute fracture or destructive osseous lesion  Impression: No acute pathology  No pulmonary embolism  Cardiomegaly  Small pericardial effusion  Indeterminate 2 6 cm right basilar density  Based on current Fleischner Society 2017 Guidelines on incidental pulmonary nodule, either PET/CT scan evaluation, tissue sampling or short term interval followup non-contrast CT followup (initailly in 3 months) may be considered appropriate   This report will now be communicated to the patient's clinical team by the radiology [facility]  Workstation performed: [id number]     Cta Ed Chest Pe Study    Result Date: 2/5/2018  Narrative: CTA - CHEST WITH IV CONTRAST - PULMONARY ANGIOGRAM INDICATION: "Was discharged from here last Friday and is still continuning to have chest pain  Patient also noticed that his gums started bleeding today and he is on eliquis" COMPARISON: CTA chest 1/22/2018 TECHNIQUE: CTA examination of the chest was performed using angiographic technique according to a protocol specifically tailored to evaluate for pulmonary embolism  Reformatted images were created in axial, sagittal, and coronal planes  In addition, coronal 3D MIP postprocessing was performed on the acquisition scanner  Radiation dose length product (DLP) for this visit:  651 mGy-cm   This examination, like all CT scans performed in the [facility], was performed utilizing techniques to minimize radiation dose exposure, including the use of iterative reconstruction and automated exposure control  IV Contrast:  85 mL of iohexol (OMNIPAQUE)  350 Multi-dose  FINDINGS: PULMONARY ARTERIAL TREE:  No pulmonary embolus is seen  LUNGS:  As seen on the prior study there is a lobulated 2 6 cm indeterminate right lung base density  No acute findings  PLEURA:  Unremarkable  HEART/AORTA:  Stable cardiomegaly and pericardial effusion  No thoracic aortic aneurysm  MEDIASTINUM AND VIJAYA:  Small stable scattered lymph nodes  CHEST WALL AND LOWER NECK: Unremarkable  VISUALIZED STRUCTURES IN THE UPPER ABDOMEN:  Unremarkable  OSSEOUS STRUCTURES:  No acute fracture or destructive osseous lesion  Impression: No acute findings; specifically, no pulmonary arterial embolism  Stable cardiomegaly and pericardial effusion  Stable 26 mm right lung base nodular density   Based on current Fleischner Society 2017 Guidelines on incidental pulmonary nodule, either PET/CT scan evaluation, tissue sampling or short term interval followup non-contrast CT followup (initailly in 3 months) may be considered appropriate  Workstation performed: [id number]       EKG, Pathology, and Other Studies Reviewed on Admission:   · EKG: NSR @ 88bpm, left axis deviation, TWI in v6, no change from prior    [employer] Records Reviewed: Yes     ** Please Note: Dragon 360 Dictation voice to text software may have been used in the creation of this document   **

## 2018-02-06 NOTE — CASE MANAGEMENT
Initial Clinical Review    Admission: Date/Time/Statement: 2/5/18 @ 1640     Orders Placed This Encounter   Procedures    Inpatient Admission (expected length of stay for this patient is greater than two midnights)     Standing Status:   Standing     Number of Occurrences:   1     Order Specific Question:   Admitting Physician     Answer:   Makenna Rivera     Order Specific Question:   Level of Care     Answer:   Med Surg [16]     Order Specific Question:   Bed request comments     Answer:   tele     Order Specific Question:   Estimated length of stay     Answer:   More than 2 Midnights     Order Specific Question:   Certification     Answer:   I certify that inpatient services are medically necessary for this patient for a duration of greater than two midnights  See H&P and MD Progress Notes for additional information about the patient's course of treatment  ED: Date/Time/Mode of Arrival:   ED Arrival Information     Expected Arrival Acuity Means of Arrival Escorted By Service Admission Type    - 2/5/2018 13:00 Urgent Walk-In Self General Medicine Urgent    Arrival Complaint    CHEST PAIN/MOUTH BLEEDING          Chief Complaint:   Chief Complaint   Patient presents with    Chest Pain     Was discharged from here last Friday and is still continuning to have chest pain  Patient also noticed that his gums started bleeding today and he is on eliquis  History of Illness: Nick Smith is a 45 y o  male medical history significant for chronic preserved EF heart failure, CKD stage 3, pericardial effusion, right lower extremity DVT on Eliquis presents from home with complaint of chest pain  As mentioned patient is chronically on Eliquis for right lower extremity edema obtained postop after the lymph node biopsy, he was in his usual state of health on today until today    Patient states that he subsequently bit down on his gums had a large amount of gum bleeding to the point where he actually spat a significant amount of blood  He states there after he became extremely anxious and nervous; subsequently had left sternal chest pain radiated under his armpit and associated with shortness of breath  He states it lasted for 45 minutes  Has had no recurrence of chest pain  He only had recurrence of shortness of breath when he subsequently went to have his CAT scan but he admits that he was nervous then as well  He was recently admitted about a week ago where he had presented with chest pain found with pericardial effusion and the plan was for serial monitoring as outpatient  During that time it was also felt that possibly was chest pain was been anxiety induced as well      As mentioned patient is on Eliquis for 3 weeks now secondary to postop right lower extremity DVT, upon review of his medications it states that he is on Eliquis 10 mg b i d , he has since completed the 1st week of therapy and is supposed to be on 5 mg b i d  but he reports that he has rather been taking 10 mg b i d  ED Vital Signs:   ED Triage Vitals   Temperature Pulse Respirations Blood Pressure SpO2   02/05/18 1307 02/05/18 1306 02/05/18 1306 02/05/18 1306 02/05/18 1306   99 °F (37 2 °C) 80 22 (!) 173/89 96 %      Temp Source Heart Rate Source Patient Position - Orthostatic VS BP Location FiO2 (%)   02/05/18 1306 02/05/18 1306 02/05/18 1306 02/05/18 1306 --   Oral Monitor Lying Right arm       Pain Score       02/05/18 1306       No Pain        Wt Readings from Last 1 Encounters:   02/06/18 97 7 kg (215 lb 6 2 oz)       Vital Signs (abnormal): wnl     Abnormal Labs/Diagnostic Test Results: BNp  1243, BUN creat  29  1 56, pt inr  15 6 1 21, wbc 10 55, H&H   11 5  35 4  Ct chest -      No acute findings; specifically, no pulmonary arterial embolism  Stable cardiomegaly and pericardial effusion  CXR -    Stable cardiomegaly        EKG- NSR     ED Treatment:   Medication Administration from 02/05/2018 1300 to 02/05/2018 1930 Date/Time Order Dose Route Action Action by Comments     02/05/2018 1436 LORazepam (ATIVAN) tablet 1 mg 1 mg Oral Given Susanne Brooks RN      02/05/2018 1457 iohexol (OMNIPAQUE) 350 MG/ML injection (MULTI-DOSE) 100 mL 85 mL Intravenous Given Eric Oneillsavita           Past Medical/Surgical History: Active Ambulatory Problems     Diagnosis Date Noted    Pericardial effusion 01/22/2018    Essential hypertension 01/22/2018    Leg DVT (deep venous thromboembolism), acute, bilateral ([facility] ) 01/22/2018    [facility]ch[facility] 19/50/4760    Systolic congestive heart failure ([facility] ) 01/22/2018     Resolved Ambulatory Problems     Diagnosis Date Noted    No Resolved Ambulatory Problems     Past Medical History:   Diagnosis Date    Hypertension     Kidney disease     Leg DVT (deep venous thromboembolism), [facility] 01/2018    AGUS on CPAP     Systolic CHF ([facility] )        Admitting Diagnosis: CHF (congestive heart failure) ([facility]) [I50 9]  Pericardial effusion [I31 3]  Chest pain [R07 9]  Elevated troponin [R74 8]    Age/Sex: 45 y o  male    Assessment/Plan: Assessment/Plan:     Hospital Problem List:      Principal Problem:    Chest pain  Active Problems:    Gums, bleeding        Plan for the Primary Problem(s):     # CP - ? If anxiety induced  - Given hx will admit to r/o acs, cont serial troponin  - Hx of pericardial effusion, s/p cta stable, will defer to cardiology if another echo is warranted as patient had recently had 1 done in January 24th on his last admission and even then he had a total of 3  Patient displays no signs of cardiac tamponade    - patient is also chest pain-free with no ischemic acute changes on his EKG  - CTA negative for PE  - consult Cardiology, Dr Meghan Zeng who he follows up with     # Chronic diastolic HF   - though elevated proBNP, patient displays no sign of acute decompensation  - continue Lasix     # Hx of RLE DVT  - adjusted dose of Eliquis, should be decreased to 5 mg b i d   Patient was initially diagnosed with this 3 weeks ago and has completed the 1st week of therapy with 10 mg b i d   communicated to patient the change of dosage of his medication and has been changed on his med list accordingly  At the elevated Eliquis does is likely the reason for bleeding from his gum which is now resolved and stable H&H  Plan for Additional Problems:      # HTN - cont meds     # CKD stg III - cr at baseline     # Leukocytosis, mild pt had been on steroid taper per him from last admission which was completed, inaddition maybe stress induced  Cont to monitor  No sign of acute infxn process     # Hx of depression/anxiety - cont wellbutrin, prn hydroxyzine     Disposition  Plan of care extensively discussed with patient  VTE Prophylaxis: Apixaban (Eliquis)  / reason for no mechanical VTE prophylaxis Fully anticoagulated   Code Status:  Full code  POLST: There is no POLST form on file for this patient (pre-hospital)     Anticipated Length of Stay:  Patient will be admitted on an Inpatient basis with an anticipated length of stay of  > 2 midnights     Justification for Hospital Stay: clinical condition       Admission Orders:  Scheduled Meds:   Current Facility-Administered Medications:  acetaminophen 650 mg Oral Q6H PRN Ethyl Patricio, DO   allopurinol 100 mg Oral Daily Ethyl Patricio, DO   amLODIPine 10 mg Oral Daily Ethyl Patricio, DO   apixaban 5 mg Oral BID Ethyl Patricio, DO   aspirin 325 mg Oral Daily Ethyl Patricio, DO   atorvastatin 40 mg Oral HS Ethyl Patricio, DO   buPROPion 150 mg Oral Daily Ethyl Patricio, DO   cholecalciferol 1,000 Units Oral Daily Ethyl Patricio, DO   doxazosin 4 mg Oral HS Ethyl Patricio, DO   eplerenone 50 mg Oral Daily Ethyl Patricio, DO   ferrous sulfate 325 mg Oral BID With Meals Ethyl Patricio, DO   furosemide 80 mg Oral Daily Ethyl Patricio, DO   hydrOXYzine HCL 25 mg Oral Q6H PRN Ethyl Patricio, DO influenza vaccine 0 5 mL Intramuscular Prior to discharge Leela Roberson MD   lidocaine-epinephrine 5 mL Infiltration Once Meli L Coppersmith, DO   magnesium oxide 400 mg Oral Daily Theodor Jeramie, DO   minoxidil 20 mg Oral BID Theodor Jeramie, DO   multivitamin-minerals 1 tablet Oral Daily Theodor Jeramie, DO   nebivolol 20 mg Oral BID Theodor Jeramie, DO   potassium chloride 20 mEq Oral BID Theodor Jeramie, DO     Continuous Infusions:    PRN Meds:   acetaminophen    hydrOXYzine HCL    influenza vaccine    Cardiac diet   Up and OOB as delano   Daily weight   I&O   Tele   cpap  Echo   fe panel   Cardiology consult   2/6 ferritin, fe panel , cbc ,mg   H&H   10 8  33 2, BUN creat   29 1 51  Serial trop    #1  0 05   #4  0 05    Thank you,  [address] [facility] in the [facility] by Everette Sanchez for 2017  Network Utilization Review Department  Phone: [phone]; Fax [phone]  ATTENTION: The Network Utilization Review Department is now centralized for our 7 Facilities  Make a note that we have a new phone and fax numbers for our Department  Please call with any questions or concerns to [phone] and carefully follow the prompts so that you are directed to the right person  All voicemails are confidential  Fax any determinations, approvals, denials, and requests for initial or continue stay review clinical to [phone]  Due to HIGH CALL volume, it would be easier if you could please send faxed requests to expedite your requests and in part, help us provide discharge notifications faster

## 2018-02-06 NOTE — SOCIAL WORK
Spoke with patient in his room and patient states he lives with his family in a house with a full flight of stairs which he has no trouble using  He is independent with ADL's and ambulation  Does have a walker and a W/C if he needs it  He did state he did go to a [facility] in [facility] after knee surgery  He has used [facility] in the past  He purchases his meds at [facility] in [place] and can afford all of his meds  There is no history of substance abuse or mental illness  He has no POA  He does drive and his wife drives  No needs anticipated  CM reviewed discharge planning process including the following: identifying help at home, patient preference for discharge planning needs, pharmacy preference, and availability of treatment team to discuss questions or concerns patient and/or family may have regarding understanding medications and recognizing signs and symptoms once discharged  CM also encouraged patient to follow up with all recommended appointments after discharge  Patient advised of importance for patient and family to participate in managing patients medical well being

## 2018-02-06 NOTE — PLAN OF CARE
CARDIOVASCULAR - ADULT     Maintains optimal cardiac output and hemodynamic stability Progressing     Absence of cardiac dysrhythmias or at baseline rhythm Progressing        DISCHARGE PLANNING     Discharge to home or other facility with appropriate resources Progressing        INFECTION - ADULT     Absence or prevention of progression during hospitalization Progressing     Absence of fever/infection during neutropenic period Progressing        Knowledge Deficit     Patient/family/caregiver demonstrates understanding of disease process, treatment plan, medications, and discharge instructions Progressing        METABOLIC, FLUID AND ELECTROLYTES - ADULT     Electrolytes maintained within normal limits Progressing     Fluid balance maintained Progressing     Glucose maintained within target range Progressing        PAIN - ADULT     Verbalizes/displays adequate comfort level or baseline comfort level Progressing        Potential for Falls     Patient will remain free of falls Progressing        SAFETY ADULT     Maintain or return to baseline ADL function Progressing     Maintain or return mobility status to optimal level Progressing

## 2018-02-07 NOTE — CASE MANAGEMENT
Notification of Discharge  This is a Notification of Discharge from our facility [address]  Please be advised that this patient has been discharge from our facility  Below you will find the admission and discharge date and time including the patients disposition  PRESENTATION DATE: 2/5/2018  1:02 PM  IP ADMISSION DATE: 2/5/18 1640  DISCHARGE DATE: 2/6/2018  6:36 PM  DISPOSITION: [address] [facility] in the [facility] by Everette Sanchez for 2017  Network Utilization Review Department  Phone: [phone]; Fax [phone]  ATTENTION: The Network Utilization Review Department is now centralized for our 7 Facilities  Make a note that we have a new phone and fax numbers for our Department  Please call with any questions or concerns to [phone] and carefully follow the prompts so that you are directed to the right person  All voicemails are confidential  Fax any determinations, approvals, denials, and requests for initial or continue stay review clinical to [phone]  Due to HIGH CALL volume, it would be easier if you could please send faxed requests to expedite your requests and in part, help us provide discharge notifications faster

## 2018-03-15 ENCOUNTER — APPOINTMENT (EMERGENCY)
Dept: RADIOLOGY | Facility: HOSPITAL | Age: 53
DRG: 193 | End: 2018-03-15
Payer: COMMERCIAL

## 2018-03-15 ENCOUNTER — HOSPITAL ENCOUNTER (INPATIENT)
Facility: HOSPITAL | Age: 53
LOS: 4 days | Discharge: HOME | DRG: 193 | End: 2018-03-19
Attending: EMERGENCY MEDICINE | Admitting: FAMILY MEDICINE
Payer: COMMERCIAL

## 2018-03-15 ENCOUNTER — APPOINTMENT (EMERGENCY)
Dept: CT IMAGING | Facility: HOSPITAL | Age: 53
DRG: 193 | End: 2018-03-15
Payer: COMMERCIAL

## 2018-03-15 DIAGNOSIS — E87.3 ACUTE RESPIRATORY ALKALOSIS: ICD-10-CM

## 2018-03-15 DIAGNOSIS — J18.9 HEALTHCARE-ASSOCIATED PNEUMONIA: Primary | ICD-10-CM

## 2018-03-15 DIAGNOSIS — R04.2 HEMOPTYSIS: ICD-10-CM
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
## 2018-03-15 DIAGNOSIS — N17.9 AKI (ACUTE KIDNEY INJURY) (HCC): ICD-10-CM

## 2018-03-15 DIAGNOSIS — J18.9 HAP (HOSPITAL-ACQUIRED PNEUMONIA): ICD-10-CM

## 2018-03-15 DIAGNOSIS — R19.09 INGUINAL SWELLING: ICD-10-CM

## 2018-03-15 DIAGNOSIS — R04.2 COUGH WITH HEMOPTYSIS: ICD-10-CM

## 2018-03-15 PROBLEM — O22.30 DVT (DEEP VEIN THROMBOSIS) IN PREGNANCY: Status: ACTIVE | Noted: 2018-03-15

## 2018-03-15 PROBLEM — J96.01 ACUTE RESPIRATORY FAILURE WITH HYPOXIA (HCC): Status: ACTIVE | Noted: 2018-03-15

## 2018-03-15 PROBLEM — N18.30 STAGE 3 CHRONIC KIDNEY DISEASE (HCC): Status: ACTIVE | Noted: 2018-03-15

## 2018-03-15 LAB
ALBUMIN SERPL BCP-MCNC: 3.7 G/DL (ref 3.5–5)
ALP SERPL-CCNC: 77 U/L (ref 46–116)
ALT SERPL W P-5'-P-CCNC: 23 U/L (ref 12–78)
ANION GAP SERPL CALCULATED.3IONS-SCNC: 11 MMOL/L (ref 4–13)
APTT PPP: 39 SECONDS (ref 23–35)
AST SERPL W P-5'-P-CCNC: 16 U/L (ref 5–45)
ATRIAL RATE: 81 BPM
BASE EX.OXY STD BLDV CALC-SCNC: 71.1 % (ref 60–80)
BASE EXCESS BLDV CALC-SCNC: -1.6 MMOL/L
BASOPHILS # BLD AUTO: 0.03 THOUSANDS/ΜL (ref 0–0.1)
BASOPHILS NFR BLD AUTO: 0 % (ref 0–1)
BILIRUB DIRECT SERPL-MCNC: 0.41 MG/DL (ref 0–0.2)
BILIRUB SERPL-MCNC: 2.4 MG/DL (ref 0.2–1)
BUN SERPL-MCNC: 20 MG/DL (ref 5–25)
CALCIUM SERPL-MCNC: 9.6 MG/DL (ref 8.3–10.1)
CHLORIDE SERPL-SCNC: 105 MMOL/L (ref 100–108)
CO2 SERPL-SCNC: 25 MMOL/L (ref 21–32)
CREAT SERPL-MCNC: 1.55 MG/DL (ref 0.6–1.3)
EOSINOPHIL # BLD AUTO: 0.13 THOUSAND/ΜL (ref 0–0.61)
EOSINOPHIL NFR BLD AUTO: 1 % (ref 0–6)
ERYTHROCYTE [DISTWIDTH] IN BLOOD BY AUTOMATED COUNT: 15.8 % (ref 11.6–15.1)
GFR SERPL CREATININE-BSD FRML MDRD: 58 ML/MIN/1.73SQ M
GLUCOSE SERPL-MCNC: 85 MG/DL (ref 65–140)
HCO3 BLDV-SCNC: 21.4 MMOL/L (ref 24–30)
HCT VFR BLD AUTO: 37.7 % (ref 36.5–49.3)
HGB BLD-MCNC: 12.4 G/DL (ref 12–17)
INR PPP: 1.34 (ref 0.86–1.16)
LACTATE SERPL-SCNC: 0.9 MMOL/L (ref 0.5–2)
LYMPHOCYTES # BLD AUTO: 0.99 THOUSANDS/ΜL (ref 0.6–4.47)
LYMPHOCYTES NFR BLD AUTO: 10 % (ref 14–44)
MAGNESIUM SERPL-MCNC: 2 MG/DL (ref 1.6–2.6)
MCH RBC QN AUTO: 27 PG (ref 26.8–34.3)
MCHC RBC AUTO-ENTMCNC: 32.9 G/DL (ref 31.4–37.4)
MCV RBC AUTO: 82 FL (ref 82–98)
MONOCYTES # BLD AUTO: 0.8 THOUSAND/ΜL (ref 0.17–1.22)
MONOCYTES NFR BLD AUTO: 8 % (ref 4–12)
NEUTROPHILS # BLD AUTO: 7.9 THOUSANDS/ΜL (ref 1.85–7.62)
NEUTS SEG NFR BLD AUTO: 79 % (ref 43–75)
NRBC BLD AUTO-RTO: 0 /100 WBCS
NT-PROBNP SERPL-MCNC: 1773 PG/ML
O2 CT BLDV-SCNC: 13.1 ML/DL
P AXIS: 72 DEGREES
PCO2 BLDV: 31 MM HG (ref 42–50)
PH BLDV: 7.46 [PH] (ref 7.3–7.4)
PLATELET # BLD AUTO: 282 THOUSANDS/UL (ref 149–390)
PMV BLD AUTO: 10.1 FL (ref 8.9–12.7)
PO2 BLDV: 36.7 MM HG (ref 35–45)
POTASSIUM SERPL-SCNC: 3.5 MMOL/L (ref 3.5–5.3)
PR INTERVAL: 154 MS
PROT SERPL-MCNC: 7.7 G/DL (ref 6.4–8.2)
PROTHROMBIN TIME: 16.9 SECONDS (ref 12.1–14.4)
QRS AXIS: -55 DEGREES
QRSD INTERVAL: 86 MS
QT INTERVAL: 418 MS
QTC INTERVAL: 485 MS
RBC # BLD AUTO: 4.59 MILLION/UL (ref 3.88–5.62)
SODIUM SERPL-SCNC: 141 MMOL/L (ref 136–145)
T WAVE AXIS: 95 DEGREES
TROPONIN I SERPL-MCNC: <0.02 NG/ML
VENTRICULAR RATE: 81 BPM
WBC # BLD AUTO: 9.99 THOUSAND/UL (ref 4.31–10.16)

## 2018-03-15 PROCEDURE — 36415 COLL VENOUS BLD VENIPUNCTURE: CPT | Performed by: PHYSICIAN ASSISTANT

## 2018-03-15 PROCEDURE — 85025 COMPLETE CBC W/AUTO DIFF WBC: CPT | Performed by: PHYSICIAN ASSISTANT

## 2018-03-15 PROCEDURE — 96375 TX/PRO/DX INJ NEW DRUG ADDON: CPT

## 2018-03-15 PROCEDURE — 82805 BLOOD GASES W/O2 SATURATION: CPT | Performed by: PHYSICIAN ASSISTANT

## 2018-03-15 PROCEDURE — 99285 EMERGENCY DEPT VISIT HI MDM: CPT

## 2018-03-15 PROCEDURE — 83605 ASSAY OF LACTIC ACID: CPT | Performed by: PHYSICIAN ASSISTANT

## 2018-03-15 PROCEDURE — 87798 DETECT AGENT NOS DNA AMP: CPT | Performed by: FAMILY MEDICINE

## 2018-03-15 PROCEDURE — 94664 DEMO&/EVAL PT USE INHALER: CPT

## 2018-03-15 PROCEDURE — 71275 CT ANGIOGRAPHY CHEST: CPT

## 2018-03-15 PROCEDURE — 93005 ELECTROCARDIOGRAM TRACING: CPT

## 2018-03-15 PROCEDURE — 94760 N-INVAS EAR/PLS OXIMETRY 1: CPT

## 2018-03-15 PROCEDURE — 83735 ASSAY OF MAGNESIUM: CPT | Performed by: PHYSICIAN ASSISTANT

## 2018-03-15 PROCEDURE — 87081 CULTURE SCREEN ONLY: CPT | Performed by: FAMILY MEDICINE

## 2018-03-15 PROCEDURE — 85610 PROTHROMBIN TIME: CPT | Performed by: PHYSICIAN ASSISTANT

## 2018-03-15 PROCEDURE — 87205 SMEAR GRAM STAIN: CPT | Performed by: FAMILY MEDICINE

## 2018-03-15 PROCEDURE — 99223 1ST HOSP IP/OBS HIGH 75: CPT | Performed by: FAMILY MEDICINE

## 2018-03-15 PROCEDURE — 94660 CPAP INITIATION&MGMT: CPT

## 2018-03-15 PROCEDURE — 85730 THROMBOPLASTIN TIME PARTIAL: CPT | Performed by: PHYSICIAN ASSISTANT

## 2018-03-15 PROCEDURE — 83880 ASSAY OF NATRIURETIC PEPTIDE: CPT | Performed by: PHYSICIAN ASSISTANT

## 2018-03-15 PROCEDURE — 71045 X-RAY EXAM CHEST 1 VIEW: CPT

## 2018-03-15 PROCEDURE — 87070 CULTURE OTHR SPECIMN AEROBIC: CPT | Performed by: FAMILY MEDICINE

## 2018-03-15 PROCEDURE — 94640 AIRWAY INHALATION TREATMENT: CPT

## 2018-03-15 PROCEDURE — 87040 BLOOD CULTURE FOR BACTERIA: CPT | Performed by: PHYSICIAN ASSISTANT

## 2018-03-15 PROCEDURE — 96365 THER/PROPH/DIAG IV INF INIT: CPT

## 2018-03-15 PROCEDURE — 80048 BASIC METABOLIC PNL TOTAL CA: CPT | Performed by: PHYSICIAN ASSISTANT

## 2018-03-15 PROCEDURE — 80076 HEPATIC FUNCTION PANEL: CPT | Performed by: PHYSICIAN ASSISTANT

## 2018-03-15 PROCEDURE — 84484 ASSAY OF TROPONIN QUANT: CPT | Performed by: PHYSICIAN ASSISTANT

## 2018-03-15 PROCEDURE — 87449 NOS EACH ORGANISM AG IA: CPT | Performed by: FAMILY MEDICINE

## 2018-03-15 PROCEDURE — 93010 ELECTROCARDIOGRAM REPORT: CPT | Performed by: INTERNAL MEDICINE

## 2018-03-15 RX ORDER — AMLODIPINE BESYLATE 10 MG/1
10 TABLET ORAL DAILY
Status: DISCONTINUED | OUTPATIENT
Start: 2018-03-16 | End: 2018-03-19 | Stop reason: HOSPADM

## 2018-03-15 RX ORDER — ALBUTEROL SULFATE 2.5 MG/3ML
5 SOLUTION RESPIRATORY (INHALATION) ONCE
Status: COMPLETED | OUTPATIENT
Start: 2018-03-15 | End: 2018-03-15

## 2018-03-15 RX ORDER — ACETAMINOPHEN 325 MG/1
650 TABLET ORAL EVERY 6 HOURS PRN
Status: DISCONTINUED | OUTPATIENT
Start: 2018-03-15 | End: 2018-03-19 | Stop reason: HOSPADM

## 2018-03-15 RX ORDER — IPRATROPIUM BROMIDE AND ALBUTEROL SULFATE 2.5; .5 MG/3ML; MG/3ML
3 SOLUTION RESPIRATORY (INHALATION)
Status: DISCONTINUED | OUTPATIENT
Start: 2018-03-15 | End: 2018-03-17

## 2018-03-15 RX ORDER — BENZONATATE 100 MG/1
100 CAPSULE ORAL ONCE
Status: COMPLETED | OUTPATIENT
Start: 2018-03-15 | End: 2018-03-15

## 2018-03-15 RX ORDER — NEBIVOLOL 10 MG/1
20 TABLET ORAL 2 TIMES DAILY
Status: DISCONTINUED | OUTPATIENT
Start: 2018-03-15 | End: 2018-03-19 | Stop reason: HOSPADM

## 2018-03-15 RX ORDER — DOXAZOSIN MESYLATE 4 MG/1
4 TABLET ORAL
Status: DISCONTINUED | OUTPATIENT
Start: 2018-03-15 | End: 2018-03-19 | Stop reason: HOSPADM

## 2018-03-15 RX ORDER — ALLOPURINOL 100 MG/1
100 TABLET ORAL DAILY
Status: DISCONTINUED | OUTPATIENT
Start: 2018-03-16 | End: 2018-03-19 | Stop reason: HOSPADM

## 2018-03-15 RX ORDER — HYDROXYZINE HYDROCHLORIDE 25 MG/1
25 TABLET, FILM COATED ORAL EVERY 6 HOURS PRN
Status: DISCONTINUED | OUTPATIENT
Start: 2018-03-15 | End: 2018-03-19 | Stop reason: HOSPADM

## 2018-03-15 RX ORDER — ASPIRIN 325 MG
325 TABLET, DELAYED RELEASE (ENTERIC COATED) ORAL DAILY
Status: DISCONTINUED | OUTPATIENT
Start: 2018-03-16 | End: 2018-03-19 | Stop reason: HOSPADM

## 2018-03-15 RX ORDER — MINOXIDIL 2.5 MG/1
10 TABLET ORAL 2 TIMES DAILY
Status: DISCONTINUED | OUTPATIENT
Start: 2018-03-15 | End: 2018-03-18

## 2018-03-15 RX ORDER — LORAZEPAM 2 MG/ML
0.5 INJECTION INTRAMUSCULAR ONCE
Status: COMPLETED | OUTPATIENT
Start: 2018-03-15 | End: 2018-03-15

## 2018-03-15 RX ORDER — ATORVASTATIN CALCIUM 40 MG/1
40 TABLET, FILM COATED ORAL
Status: DISCONTINUED | OUTPATIENT
Start: 2018-03-15 | End: 2018-03-19 | Stop reason: HOSPADM

## 2018-03-15 RX ADMIN — IOHEXOL 85 ML: 350 INJECTION, SOLUTION INTRAVENOUS at 12:46

## 2018-03-15 RX ADMIN — VANCOMYCIN HYDROCHLORIDE 1500 MG: 1 INJECTION, POWDER, LYOPHILIZED, FOR SOLUTION INTRAVENOUS at 14:26

## 2018-03-15 RX ADMIN — BENZONATATE 100 MG: 100 CAPSULE ORAL at 12:32

## 2018-03-15 RX ADMIN — IPRATROPIUM BROMIDE AND ALBUTEROL SULFATE 3 ML: .5; 3 SOLUTION RESPIRATORY (INHALATION) at 20:52

## 2018-03-15 RX ADMIN — ALBUTEROL SULFATE 5 MG: 2.5 SOLUTION RESPIRATORY (INHALATION) at 12:32

## 2018-03-15 RX ADMIN — ATORVASTATIN CALCIUM 40 MG: 40 TABLET, FILM COATED ORAL at 21:17

## 2018-03-15 RX ADMIN — APIXABAN 5 MG: 5 TABLET, FILM COATED ORAL at 21:17

## 2018-03-15 RX ADMIN — CEFEPIME HYDROCHLORIDE 2000 MG: 2 INJECTION, POWDER, FOR SOLUTION INTRAVENOUS at 13:16

## 2018-03-15 RX ADMIN — IPRATROPIUM BROMIDE 0.5 MG: 0.5 SOLUTION RESPIRATORY (INHALATION) at 12:33

## 2018-03-15 RX ADMIN — NEBIVOLOL HYDROCHLORIDE 20 MG: 10 TABLET ORAL at 21:17

## 2018-03-15 RX ADMIN — LORAZEPAM 0.5 MG: 2 INJECTION INTRAMUSCULAR; INTRAVENOUS at 12:09

## 2018-03-15 RX ADMIN — ACETAMINOPHEN 650 MG: 325 TABLET, FILM COATED ORAL at 22:58

## 2018-03-15 RX ADMIN — MINOXIDIL 10 MG: 2.5 TABLET ORAL at 22:58

## 2018-03-15 RX ADMIN — DOXAZOSIN 4 MG: 4 TABLET ORAL at 21:17

## 2018-03-15 NOTE — ASSESSMENT & PLAN NOTE
Required BiPAP  Chest x-ray: New focal patchy consolidation in the left upper lobe compatible with pneumonia   Patient was hospitalized here 1 month ago  IV antibiotic  Now on 2 lt NC   Cont respiratory therapy  schedule nebulizer

## 2018-03-15 NOTE — ED PROVIDER NOTES
History  Chief Complaint   Patient presents with    Shortness of Breath     sudden onset on monday and getting worse, recent admission for dvt     This is a 61-year-old male here for shortness of breath  Began about 1 week ago  Hospitalized last month for chest pain, found to have a small pericardial effusion which they are continuing to monitor  Now over the past week having worsening cough with hemoptysis  Chills but no recorded fever  Burning in his chest   Dyspnea on exertion  States he can walk only very short distances for example from the ER stretcher to the door before becoming short of breath  No sick contacts  No prior history of similar symptoms  He does know swelling in his lower extra which are chronic and unchanged from baseline  He does have a history of DVT in [place] taking Eliquis for this  He is compliant with his medication has not missed any doses      Patient presents in mild respiratory distress        History provided by:  Patient   used: No    Shortness of Breath   Severity:  Severe  Onset quality:  Gradual  Duration:  1 week  Timing:  Constant  Progression:  Worsening  Chronicity:  New  Context: not activity, not animal exposure, not emotional upset, not fumes, not known allergens, not occupational exposure, not pollens, not smoke exposure, not strong odors, not URI and not weather changes    Relieved by:  Nothing  Worsened by:  Exertion  Ineffective treatments:  None tried  Associated symptoms: chest pain, cough, diaphoresis and sputum production    Associated symptoms: no abdominal pain, no claudication, no ear pain, no fever, no headaches, no hemoptysis, no neck pain, no PND, no rash, no sore throat, no syncope, no swollen glands, no vomiting and no wheezing    Cough:     Cough characteristics:  Productive and hacking    Sputum characteristics:  Bloody    Severity:  Mild    Onset quality:  Gradual    Duration:  1 week    Timing:  Sporadic    Progression: Unchanged    Chronicity:  New  Risk factors: hx of PE/DVT    Risk factors: no hx of cancer, no obesity, no prolonged immobilization, no recent surgery and no tobacco use        Prior to Admission Medications   Prescriptions Last Dose Informant Patient Reported? Taking?    Cholecalciferol (VITAMIN D-3) 1000 units CAPS   Yes No   Sig: Take 1,000 Units by mouth daily   FERROUS GLUCONATE IRON PO   Yes No   Sig: Take 246 mg by mouth 2 (two) times a day   L-Arginine 500 MG TABS   Yes No   Sig: Take 500 mg by mouth 2 (two) times a day   MAGNESIUM PO   Yes No   Sig: Take 400 mg by mouth daily   Mometasone Furo-Formoterol Fum 200-5 MCG/ACT AERO   Yes No   Sig: Take 2 puffs by mouth 2 (two) times a day   Omega-3 Fatty Acids (FISH OIL) 1200 MG CAPS   Yes No   Sig: Take 1,200 mg by mouth daily at bedtime   allopurinol (ZYLOPRIM) 100 mg tablet   Yes No   Sig: Take 100 mg by mouth daily   amLODIPine (NORVASC) 10 mg tablet   Yes No   Sig: Take 10 mg by mouth daily   apixaban (ELIQUIS) 5 mg   Yes No   Sig: Take 5 mg by mouth 2 (two) times a day     aspirin (PX ENTERIC ASPIRIN) 325 mg EC tablet   Yes No   Sig: Take 325 mg by mouth daily At night    atorvastatin (LIPITOR) 40 mg tablet   Yes No   Sig: Take 40 mg by mouth daily at bedtime   doxazosin (CARDURA) 4 mg tablet   Yes No   Sig: Take 4 mg by mouth daily at bedtime   eplerenone (INSPRA) 50 MG tablet   Yes No   Sig: Take 50 mg by mouth daily   furosemide (LASIX) 80 mg tablet   Yes No   Sig: Take 80 mg by mouth daily   hydrOXYzine HCL (ATARAX) 25 mg tablet   Yes No   Sig: Take 25 mg by mouth every 6 (six) hours as needed for anxiety   minoxidil (LONITEN) 10 mg tablet   Yes No   Sig: Take 20 mg by mouth 2 (two) times a day   multivitamin-iron-minerals-folic acid (CENTRUM) chewable tablet   Yes No   Sig: Chew 1 tablet daily   nebivolol (BYSTOLIC) 20 MG tablet   Yes No   Sig: Take 20 mg by mouth 2 (two) times a day   potassium chloride (KLOR-CON M20) 20 mEq tablet   Yes No   Sig: Take 20 mEq by mouth 2 (two) times a day      Facility-Administered Medications: None       Past Medical History:   Diagnosis Date    Hypertension     Kidney disease     Leg DVT (deep venous thromboembolism), acute[facility] ([facility]) 01/2018    AGUS on CPAP     setting 11    Systolic CHF ([facility])        Past Surgical History:   Procedure Laterality Date    CHOLECYSTECTOMY      JOINT REPLACEMENT      LYMPH NODE DISSECTION Left 01/2018    left inguinal LN removed - benign        Family History   Problem Relation Age of Onset    Heart murmur Sister     Deep vein thrombosis Neg Hx      I have reviewed and agree with the history as documented  Social History   Substance Use Topics    Smoking status: Never Smoker    Smokeless tobacco: Never Used    Alcohol use No        Review of Systems   Constitutional: Positive for diaphoresis  Negative for activity change, appetite change, chills, fatigue, fever and unexpected weight change  HENT: Negative for congestion, ear pain, rhinorrhea, sinus pressure, sore throat and trouble swallowing  Eyes: Negative for photophobia and visual disturbance  Respiratory: Positive for cough, sputum production and shortness of breath  Negative for apnea, hemoptysis, choking, chest tightness, wheezing and stridor  Cardiovascular: Positive for chest pain  Negative for palpitations, claudication, leg swelling, syncope and PND  Gastrointestinal: Negative for abdominal distention, abdominal pain, blood in stool, constipation, diarrhea, nausea and vomiting  Genitourinary: Negative for decreased urine volume, difficulty urinating, dysuria, enuresis, flank pain, frequency, hematuria and urgency  Musculoskeletal: Negative for arthralgias, myalgias, neck pain and neck stiffness  Skin: Negative for color change, pallor, rash and wound  Allergic/Immunologic: Negative  Neurological: Negative for dizziness, tremors, syncope, weakness, light-headedness, numbness and headaches  Hematological: Negative  Psychiatric/Behavioral: Negative  All other systems reviewed and are negative  Physical Exam  ED Triage Vitals   Temperature Pulse Respirations Blood Pressure SpO2   03/15/18 1107 03/15/18 1107 03/15/18 1107 03/15/18 1107 03/15/18 1107   99 3 °F (37 4 °C) 81 18 (!) 195/95 96 %      Temp src Heart Rate Source Patient Position - Orthostatic VS BP Location FiO2 (%)   -- 03/15/18 1304 03/15/18 1304 03/15/18 1304 --    Monitor Sitting Right arm       Pain Score       03/15/18 1107       No Pain           Orthostatic Vital Signs  Vitals:    03/15/18 1107 03/15/18 1304   BP: (!) 195/95 (!) 193/93   Pulse: 81 77   Patient Position - Orthostatic VS:  Sitting       Physical Exam   Constitutional: He is oriented to person, place, and time  He appears well-developed and well-nourished  Non-toxic appearance  He does not have a sickly appearance  He appears ill  He appears distressed  Diaphoretic   HENT:   Head: Normocephalic and atraumatic  Eyes: EOM and lids are normal  Pupils are equal, round, and reactive to light  Neck: Normal range of motion  Neck supple  Cardiovascular: Normal rate, regular rhythm, S1 normal, S2 normal, normal heart sounds, intact distal pulses and normal pulses  Exam reveals no gallop, no distant heart sounds, no friction rub and no decreased pulses  No murmur heard  Pulses:       Radial pulses are 2+ on the right side, and 2+ on the left side  Pulmonary/Chest: No accessory muscle usage  Tachypnea noted  No apnea and no bradypnea  He is in respiratory distress  He has no decreased breath sounds  He has no wheezes  He has no rhonchi  He has no rales  Tachypnea  Abdominal: Soft  Normal appearance and bowel sounds are normal  He exhibits no distension and no mass  There is no tenderness  There is no rigidity, no rebound and no guarding  No hernia  Musculoskeletal: Normal range of motion  He exhibits no edema, tenderness or deformity     Neurological: He is alert and oriented to person, place, and time  No cranial nerve deficit  GCS eye subscore is 4  GCS verbal subscore is 5  GCS motor subscore is 6  GCS 15  AAOx3  Ambulating in department without difficulty  CN II-XII grossly intact  No focal neuro deficits  Skin: Skin is warm, dry and intact  No rash noted  He is not diaphoretic  No erythema  No pallor  Psychiatric: His speech is normal    Nursing note and vitals reviewed        ED Medications  Medications   sodium chloride 0 9 % bolus 1,000 mL (0 mL Intravenous Hold 3/15/18 1133)   vancomycin (VANCOCIN) 1,500 mg in sodium chloride 0 9 % 250 mL IVPB (1,500 mg Intravenous New Bag 3/15/18 1426)   LORazepam (ATIVAN) 2 mg/mL injection 0 5 mg (0 5 mg Intravenous Given 3/15/18 1209)   benzonatate (TESSALON PERLES) capsule 100 mg (100 mg Oral Given 3/15/18 1232)   albuterol inhalation solution 5 mg (5 mg Nebulization Given 3/15/18 1232)   ipratropium (ATROVENT) 0 02 % inhalation solution 0 5 mg (0 5 mg Nebulization Given 3/15/18 1233)   iohexol (OMNIPAQUE) 350 MG/ML injection (MULTI-DOSE) 85 mL (85 mL Intravenous Given 3/15/18 1246)   cefepime (MAXIPIME) 2,000 mg in dextrose 5 % 50 mL IVPB (0 mg Intravenous Stopped 3/15/18 1426)       Diagnostic Studies  Results Reviewed     Procedure Component Value Units Date/Time    Blood gas, venous [[id number]]  (Abnormal) Collected:  03/15/18 1204    Lab Status:  Final result Specimen:  Blood from Arm, Left Updated:  03/15/18 1209     pH, Gold 7 456 (H)     pCO2, Gold 31 0 (L) mm Hg      pO2, Gold 36 7 mm Hg      HCO3, Gold 21 4 (L) mmol/L      Base Excess, Gold -1 6 mmol/L      O2 Content, Gold 13 1 ml/dL      O2 HGB, VENOUS 71 1 %     Hepatic function panel [[id number]]  (Abnormal) Collected:  03/15/18 1126    Lab Status:  Final result Specimen:  Blood from Arm, Right Updated:  03/15/18 1155     Total Bilirubin 2 40 (H) mg/dL      Bilirubin, Direct 0 41 (H) mg/dL      Alkaline Phosphatase 77 U/L      AST 16 U/L      ALT 23 U/L Total Protein 7 7 g/dL      Albumin 3 7 g/dL     Magnesium [[id number]]  (Normal) Collected:  03/15/18 1126    Lab Status:  Final result Specimen:  Blood from Arm, Right Updated:  03/15/18 1155     Magnesium 2 0 mg/dL     Lactic acid, plasma [[id number]]  (Normal) Collected:  03/15/18 1126    Lab Status:  Final result Specimen:  Blood from Arm, Right Updated:  03/15/18 1155     LACTIC ACID 0 9 mmol/L     Narrative:         Result may be elevated if tourniquet was used during collection  B-type natriuretic peptide [[id number]]  (Abnormal) Collected:  03/15/18 1126    Lab Status:  Final result Specimen:  Blood from Arm, Right Updated:  03/15/18 1155     NT-proBNP 1,773 (H) pg/mL     Troponin I [[id number]]  (Normal) Collected:  03/15/18 1126    Lab Status:  Final result Specimen:  Blood from Arm, Right Updated:  03/15/18 1152     Troponin I <0 02 ng/mL     Narrative:         [employer] Chemistry analyzer 99% cutoff is > 0 04 ng/mL in network labs    o cTnI 99% cutoff is useful only when applied to patients in the clinical setting of myocardial ischemia  o cTnI 99% cutoff should be interpreted in the context of clinical history, ECG findings and possibly cardiac imaging to establish correct diagnosis  o cTnI 99% cutoff may be suggestive but clearly not indicative of a coronary event without the clinical setting of myocardial ischemia  Protime-INR [[id number]]  (Abnormal) Collected:  03/15/18 1126    Lab Status:  Final result Specimen:  Blood from Arm, Right Updated:  03/15/18 1151     Protime 16 9 (H) seconds      INR 1 34 (H)    APTT [[id number]]  (Abnormal) Collected:  03/15/18 1126    Lab Status:  Final result Specimen:  Blood from Arm, Right Updated:  03/15/18 1151     PTT 39 (H) seconds     Narrative:          Therapeutic Heparin Range = 60-90 seconds    Basic metabolic panel [[id number]]  (Abnormal) Collected:  03/15/18 1126    Lab Status:  Final result Specimen:  Blood from Arm, Right Updated:  03/15/18 1150     Sodium 141 mmol/L      Potassium 3 5 mmol/L      Chloride 105 mmol/L      CO2 25 mmol/L      Anion Gap 11 mmol/L      BUN 20 mg/dL      Creatinine 1 55 (H) mg/dL      Glucose 85 mg/dL      Calcium 9 6 mg/dL      eGFR 58 ml/min/1 73sq m     Narrative:         National Kidney Disease Education Program recommendations are as follows:  GFR calculation is accurate only with a steady state creatinine  Chronic Kidney disease less than 60 ml/min/1 73 sq  meters  Kidney failure less than 15 ml/min/1 73 sq  meters  CBC and differential [[id number]]  (Abnormal) Collected:  03/15/18 1126    Lab Status:  Final result Specimen:  Blood from Arm, Right Updated:  03/15/18 1137     WBC 9 99 Thousand/uL      RBC 4 59 Million/uL      Hemoglobin 12 4 g/dL      Hematocrit 37 7 %      MCV 82 fL      MCH 27 0 pg      MCHC 32 9 g/dL      RDW 15 8 (H) %      MPV 10 1 fL      Platelets 217 Thousands/uL      nRBC 0 /100 WBCs      Neutrophils Relative 79 (H) %      Lymphocytes Relative 10 (L) %      Monocytes Relative 8 %      Eosinophils Relative 1 %      Basophils Relative 0 %      Neutrophils Absolute 7 90 (H) Thousands/µL      Lymphocytes Absolute 0 99 Thousands/µL      Monocytes Absolute 0 80 Thousand/µL      Eosinophils Absolute 0 13 Thousand/µL      Basophils Absolute 0 03 Thousands/µL     Blood culture #2 [[id number]] Collected:  03/15/18 1126    Lab Status: In process Specimen:  Blood from Arm, Right Updated:  03/15/18 1131    Blood culture #1 [[id number]] Collected:  03/15/18 1126    Lab Status: In process Specimen:  Blood from Arm, Left Updated:  03/15/18 1131                 CTA ED chest PE study   Final Result by Lazarus Harts, MD (03/15 1333)      No pulmonary embolus  New left upper lobe and to a lesser extent superior segment left lower lobe alveolar infiltrates  New trace left pleural effusion  Mild interval enlargement of AP window lymph nodes measuring up to 14 mm in short axis    This is likely reactive grace disease  Cardiomegaly and small to moderate pericardial effusion unchanged  Minimal subdiaphragmatic free fluid  Workstation performed: [id number]         XR chest 1 view portable   Final Result by Johnna Bucio MD (03/15 1314)      New focal patchy consolidation in the left upper lobe compatible with pneumonia  Recommend follow up to resolution  The study was marked in EPIC for significant notification              Workstation performed: [id number]                    Procedures  ECG 12 Lead Documentation  Date/Time: 3/15/2018 1:47 PM  Performed by: Vargas Bobo  Authorized by: Savannah Gates     Indications / Diagnosis:  Sob  ECG reviewed by me, the ED Provider: yes    Patient location:  ED  Previous ECG:     Previous ECG:  Unavailable    Comparison to cardiac monitor: Yes    Interpretation:     Interpretation: normal    Quality:     Tracing quality:  Limited by artifact  Rate:     ECG rate:  81    ECG rate assessment: normal    Rhythm:     Rhythm: sinus rhythm    Ectopy:     Ectopy: none    QRS:     QRS axis:  Normal    QRS intervals:  Normal  Conduction:     Conduction: normal    ST segments:     ST segments:  Normal  T waves:     T waves: normal    Other findings:     Other findings: YANDY      CriticalCare Time  Performed by: Vargas Bobo  Authorized by: Savannah Gates     Critical care provider statement:     Critical care time (minutes):  35    Critical care time was exclusive of:  Separately billable procedures and treating other patients    Critical care was necessary to treat or prevent imminent or life-threatening deterioration of the following conditions:  Respiratory failure and dehydration    Critical care was time spent personally by me on the following activities:  Blood draw for specimens, obtaining history from patient or surrogate, development of treatment plan with patient or surrogate, evaluation of patient's response to treatment, examination of patient, discussions with consultants, ordering and performing treatments and interventions, ordering and review of laboratory studies, ordering and review of radiographic studies, re-evaluation of patient's condition, review of old charts and ventilator management  Comments:      Critical care time documented for acute respiratory failure requiring BiPAP  Phone Contacts  ED Phone Contact    ED Course  ED Course as of Mar 15 1548   Thu Mar 15, 2018   1232 Respiratory alkalosis likely from his tachypnea    1232 Deferring heparin drip at this time as alternative diagnoses for his shortness of breath and cough would potentially cause heparin drip to be detrimental such as pulmonary alveolar hemorrhage  1300 Respiratory distress much better after BiPap    1342 Spoke with ICU, will come down to see patient for dispo (step down vs med/surg)                                MDM  Number of Diagnoses or Management Options  Acute respiratory alkalosis: new and requires workup  TRUDI (acute kidney injury) ([facility] [address] ): new and requires workup  Healthcare-associated pneumonia: new and requires workup  Hemoptysis: new and requires workup  Diagnosis management comments: DDX including but not limited to: pneumonia, pleural effusion, CHF, PE, PTX, ACS, MI, asthma exacerbation, COPD exacerbation, anemia, metabolic abnormality, renal failure  Plan:  Cardiac workup, portable chest x-ray, breathing treatment, will initiate BiPAP given his increased work of breathing  PE study for concern of PE versus pulmonary alveolar hemorrhage       Amount and/or Complexity of Data Reviewed  Clinical lab tests: ordered and reviewed  Tests in the radiology section of CPT®: ordered and reviewed  Independent visualization of images, tracings, or specimens: yes    Risk of Complications, Morbidity, and/or Mortality  Presenting problems: moderate  Management options: low  General comments: 20-year-old male with shortness of breath    Found to have large left-sided pneumonia  PE study obtained for concern of PE versus pulmonary alveolar hemorrhage with his hemoptysis  This again showed the large pneumonia, no signs of hemorrhage in no signs of PE  He feels much better after short period of time on BiPAP  He clinically looks like he is improving  He is not hypoxic  Patient was titrated down to 2 L nasal cannula and continues to feel comfortable  I spoke with intensive care who evaluated the patient at bedside and feels the patient is okay to go to Med surge  Patient will be treated for healthcare associated pneumonia with is admission recently  Will give vancomycin and cefepime  IV fluids, breathing treatment as needed  Patient is stable at the time of admission  He was informed of all results and agrees with current plan  Patient Progress  Patient progress: stable    CritCare Time    Disposition  Final diagnoses:   Healthcare-associated pneumonia   TRUDI (acute kidney injury) ([facility] )   Hemoptysis   Acute respiratory alkalosis     Time reflects when diagnosis was documented in both MDM as applicable and the Disposition within this note     Time User Action Codes Description Comment    3/15/2018  2:37 PM Jarome Brought Add [J18 9] Healthcare-associated pneumonia     3/15/2018  2:37 PM [place] Ridgeort[place] Flores L Add [N17 9] [facility]UDI ([facility] ([facility] )     3/15/2018  2:37 PM [place] Flores L Add [R04 2] Hemoptysis     3/15/2018  2:37 PM [place] Flores L Add [E87 3] Acute respiratory alkalosis       ED Disposition     ED Disposition Condition Comment    Admit  Case was discussed with Dr Michelle Salamanca and the patient's admission status was agreed to be Admission Status: inpatient status to the service of Dr Michelle Salamanca  Follow-up Information    None       Patient's Medications   Discharge Prescriptions    No medications on file     No discharge procedures on file      ED Provider  Electronically Signed by           eJsus Henriquez PA-C  03/15/18 4018

## 2018-03-15 NOTE — PROGRESS NOTES
Progress Note - Nicolasa Norwood 45 y o  male MRN: [de-identified]    Unit/Bed#: FT 02 Encounter: [id number]    Cristo Sampson is 45year old male who presented to [facility] ED 3/15/18 for evaluation of shortness of breath  Patient has ascitic past medical history of AGUS on CPAP, CHF, hypertension, CKD III and subacute DVT on Eliquis  Patient reported 3-4 days of acute onset shortness of breath, and dyspnea with exertion  He reports productive cough with increase sputum production  Sputum described as as thickened blood tinge, of note patient is on Eliquis for subacute  DVT  He states that's he has had zero appetite, with poor PO intake  He denies fever, chills, lightheadedness, dizziness or chest pain  Upon arrival to the ED patient noted to be in diaphoretic and in acute respiratory distress  Patient was placed on BiPAP for his work of breathing  Patient afebrile without acute leukocytosis  CT chest was obtained which revealed multilobar pneumonia of left chest   Patient was started on broad-spectrum antibiotic therapy cefepime and vancomycin  At time of critical care evaluation patient was transitioned off of BiPAP to daily nasal cannula  He notes subjective improvement in breathing  He is saturating 95% on 2 L nasal cannula  Patient is conversational and non dyspneic during time of interview  Assessment  Multilobar pneumonia  Acute hypoxic respiratory distress  Systolic CHF not in exacerbation  Accelerated hypertension  CKD III    Plan  - Blood cultures pending  - would recommend sputum cultures, strep/Legionella urine antigen, and influenza  - antibiotic coverage for community-acquired pneumonia  - maintain O2 saturations greater than 92%  - respiratory protocol   - Q h s  CPAP  - restart home antihypertensives  - Continue Eliquis for acute DVT      - patient is stable for Med surge floor    Should anything change please call critical Care Medicine for ongoing medical management    Objective: Vitals: Blood pressure (!) 193/93, pulse 77, temperature 99 3 °F (37 4 °C), resp  rate 22, height 5' 8" (1 727 m), weight 102 kg (224 lb), SpO2 97 %  ,Body mass index is 34 06 kg/m²  No intake or output data in the 24 hours ending 03/15/18 1418   Physical Exam        Physical Exam   Constitutional: He is oriented to person, place, and time  He appears well-developed and well-nourished  No distress  Neck: Normal range of motion  Neck supple  Cardiovascular: Normal rate and regular rhythm  Pulmonary/Chest: Effort normal  No respiratory distress  He has no wheezes  Rhonchorous breath sounds of the left posterior lung field   Abdominal: Soft  Bowel sounds are normal  He exhibits no distension and no mass  There is no tenderness  There is no guarding  Musculoskeletal: Normal range of motion  Edema of left lower extremities   Neurological: He is alert and oriented to person, place, and time  He displays normal reflexes  No cranial nerve deficit  He exhibits normal muscle tone  Coordination normal    Skin:   Enlarged lymph node of left groin  Invasive Devices     Peripheral Intravenous Line            Peripheral IV 03/15/18 Left Antecubital less than 1 day                Lab, Imaging and other studies: I have personally reviewed pertinent reports     and I have personally reviewed pertinent films in PACS  VTE Pharmacologic Prophylaxis: Eliquis  VTE Mechanical Prophylaxis: sequential compression device

## 2018-03-15 NOTE — ASSESSMENT & PLAN NOTE
Less likely to be acute CHF decompensation even though  Even though elevated BNP closely monitor   Continue home medication  Input and output  Continue telemetric

## 2018-03-15 NOTE — H&P
H&P- Aaron Gordon 1965, 45 y o  male MRN: [id number]    Unit/Bed#: FT 02 Encounter: [id number]    Primary Care Provider: Jose Rubio MD   Date and time admitted to hospital: 3/15/2018 11:10 AM        * Acute respiratory failure with hypoxia (HCC)   Assessment & Plan    Required BiPAP  Chest x-ray: New focal patchy consolidation in the left upper lobe compatible with pneumonia  IV antibiotic  Now on 2 lt NC   Cont respiratory therapy  schedule nebulizer         HAP (hospital-acquired pneumonia)   Assessment & Plan    Required BiPAP  Chest x-ray: New focal patchy consolidation in the left upper lobe compatible with pneumonia  Patient was hospitalized here 1 month ago  IV antibiotic  Now on 2 lt NC   Cont respiratory therapy  schedule nebulizer         Chronic diastolic CHF (congestive heart failure) (HCC)   Assessment & Plan    Less likely to be acute CHF decompensation even though  Even though elevated BNP closely monitor   Continue home medication  Input and output  Continue telemetric        DVT (deep vein thrombosis) in pregnancy [facility])   Assessment & Plan    Continue Eliquis        Stage 3 chronic kidney disease   Assessment & Plan    No TRUDI,  Hold Lasix for today         Essential hypertension   Assessment & Plan    Stable   Cont home medication           VTE Prophylaxis: Apixaban (Eliquis)  / sequential compression device   Code Status:   Full cod  eCode Status:POLST: There is no POLST form on file for this patient (pre-hospital)    Anticipated Length of Stay:  Patient will be admitted on an Inpatient basis with an anticipated length of stay of  > 2 midnights  Justification for Hospital Stay:  Acute respiratory failure secondary to Hcap on IV antibiotic   Total Time for Visit, including Counseling / Coordination of Care: 45 minutes  Greater than 50% of this total time spent on direct patient counseling and coordination of care      Chief Complaint:  Short of breath  History of Present Illness:    Srikanth Vick is a 45 y o  male significant past medical history of congestive heart failure, HTN, DVT, patient was brought to the ER complaining of short of breath, he states short of breath began 1 week ago but for the last 24 hours is getting worse  Short of breath is accompanied with dry cough and hemoptysis previous  He states he has been feeling chills, no fever, denies any headache,  chest pain, palpitation, abdominal pain, urinary symptoms  No ill contact  Review of Systems:    Review of Systems   Constitutional: Positive for diaphoresis and fatigue  Negative for fever and unexpected weight change  HENT: Negative  Negative for congestion, dental problem, drooling, ear discharge, ear pain and facial swelling  Eyes: Negative for pain, discharge, redness and itching  Respiratory: Positive for cough, shortness of breath and wheezing  Negative for apnea, choking and chest tightness  Cardiovascular: Negative for chest pain, palpitations and leg swelling  Gastrointestinal: Negative for abdominal distention, abdominal pain, anal bleeding, blood in stool and constipation  Endocrine: Negative for cold intolerance, heat intolerance and polydipsia  Genitourinary: Negative for difficulty urinating, dysuria, enuresis, flank pain, frequency and genital sores  Musculoskeletal: Negative for arthralgias, back pain, gait problem, joint swelling, myalgias, neck pain and neck stiffness  Skin: Negative  Negative for color change and pallor  Neurological: Negative for dizziness, tremors, seizures, syncope, facial asymmetry, speech difficulty, weakness, light-headedness, numbness and headaches  Hematological: Negative  Negative for adenopathy  Psychiatric/Behavioral: Negative  Negative for agitation         Past Medical and Surgical History:     Past Medical History:   Diagnosis Date    Hypertension     Kidney disease     Leg DVT (deep venous thromboembolism), acute, bilateral ([facility] [place] [address] ) 01/2018    AGUS on CPAP     setting 11    Systolic CHF [facility])        Past Surgical History:   Procedure Laterality Date    CHOLECYSTECTOMY      JOINT REPLACEMENT      LYMPH NODE DISSECTION Left 01/2018    left inguinal LN removed - benign        Meds/Allergies:    Prior to Admission medications    Medication Sig Start Date End Date Taking?  Authorizing Provider   allopurinol (ZYLOPRIM) 100 mg tablet Take 100 mg by mouth daily 12/18/17   Historical Provider, MD   amLODIPine (NORVASC) 10 mg tablet Take 10 mg by mouth daily 5/23/17   Historical Provider, MD   apixaban (ELIQUIS) 5 mg Take 5 mg by mouth 2 (two) times a day   1/18/18   Historical Provider, MD   aspirin (PX ENTERIC ASPIRIN) 325 mg EC tablet Take 325 mg by mouth daily At night     Historical Provider, MD   atorvastatin (LIPITOR) 40 mg tablet Take 40 mg by mouth daily at bedtime 2/1/17   Historical Provider, MD   Cholecalciferol (VITAMIN D-3) 1000 units CAPS Take 1,000 Units by mouth daily    Historical Provider, MD   doxazosin (CARDURA) 4 mg tablet Take 4 mg by mouth daily at bedtime    Historical Provider, MD   eplerenone (INSPRA) 50 MG tablet Take 50 mg by mouth daily 5/23/17   Historical Provider, MD   FERROUS GLUCONATE IRON PO Take 246 mg by mouth 2 (two) times a day    Historical Provider, MD   furosemide (LASIX) 80 mg tablet Take 80 mg by mouth daily 3/28/17   Historical Provider, MD   hydrOXYzine HCL (ATARAX) 25 mg tablet Take 25 mg by mouth every 6 (six) hours as needed for anxiety 1/5/18   Historical Provider, MD   L-Arginine 500 MG TABS Take 500 mg by mouth 2 (two) times a day    Historical Provider, MD   MAGNESIUM PO Take 400 mg by mouth daily    Historical Provider, MD   minoxidil (LONITEN) 10 mg tablet Take 20 mg by mouth 2 (two) times a day 11/21/17   Historical Provider, MD   Mometasone Furo-Formoterol Fum 200-5 MCG/ACT AERO Take 2 puffs by mouth 2 (two) times a day 6/29/16   Historical Provider, MD   multivitamin-iron-minerals-folic acid (CENTRUM) chewable tablet Chew 1 tablet daily    Historical Provider, MD   nebivolol (BYSTOLIC) 20 MG tablet Take 20 mg by mouth 2 (two) times a day 6/13/16   Historical Provider, MD   Omega-3 Fatty Acids (FISH OIL) 1200 MG CAPS Take 1,200 mg by mouth daily at bedtime    Historical Provider, MD   potassium chloride (KLOR-CON M20) 20 mEq tablet Take 20 mEq by mouth 2 (two) times a day 1/2/18   Historical Provider, MD     I have reviewed home medications with patient personally  Allergies: Allergies   Allergen Reactions    Clonidine Anaphylaxis    Lisinopril Anaphylaxis    Nifedipine Anaphylaxis    Spironolactone Anaphylaxis       Social History:     Marital Status: /Civil Union   Occupation:  Patient Pre-hospital Living Situation:   Patient Pre-hospital Level of Mobility:   Patient Pre-hospital Diet Restrictions:   Substance Use History:   History   Alcohol Use No     History   Smoking Status    Never Smoker   Smokeless Tobacco    Never Used     History   Drug Use No       Family History:    non-contributory    Physical Exam:     Vitals:   Blood Pressure: 170/83 (03/15/18 1600)  Pulse: 81 (03/15/18 1600)  Temperature: 99 3 °F (37 4 °C) (03/15/18 1107)  Respirations: (!) 28 (03/15/18 1600)  Height: 5' 8" (172 7 cm) (03/15/18 1107)  Weight - Scale: 102 kg (224 lb) (03/15/18 1107)  SpO2: 96 % (03/15/18 1600)    Physical Exam   Constitutional: He is oriented to person, place, and time  No distress  Neck: No JVD present  No tracheal deviation present  No thyromegaly present  Cardiovascular: Normal rate and normal heart sounds  Exam reveals no gallop and no friction rub  No murmur heard  Pulmonary/Chest: He has wheezes  He has rales  Abdominal: Soft  He exhibits no distension and no mass  There is no tenderness  There is no rebound and no guarding  Musculoskeletal: He exhibits no edema, tenderness or deformity  Lymphadenopathy:     He has no cervical adenopathy     Neurological: He is alert and oriented to person, place, and time  He has normal reflexes  He displays normal reflexes  No cranial nerve deficit  Coordination normal    Skin: Skin is warm  He is not diaphoretic  Additional Data:     Lab Results: I have personally reviewed pertinent reports  Results from last 7 days  Lab Units 03/15/18  1126   WBC Thousand/uL 9 99   HEMOGLOBIN g/dL 12 4   HEMATOCRIT % 37 7   PLATELETS Thousands/uL 282   NEUTROS PCT % 79*   LYMPHS PCT % 10*   MONOS PCT % 8   EOS PCT % 1       Results from last 7 days  Lab Units 03/15/18  1126   SODIUM mmol/L 141   POTASSIUM mmol/L 3 5   CHLORIDE mmol/L 105   CO2 mmol/L 25   BUN mg/dL 20   CREATININE mg/dL 1 55*   CALCIUM mg/dL 9 6   TOTAL PROTEIN g/dL 7 7   BILIRUBIN TOTAL mg/dL 2 40*   ALK PHOS U/L 77   ALT U/L 23   AST U/L 16   GLUCOSE RANDOM mg/dL 85       Results from last 7 days  Lab Units 03/15/18  1126   INR  1 34*       Imaging: I have personally reviewed pertinent reports  Xr Chest 1 View Portable    Result Date: 3/15/2018  Narrative: CHEST INDICATION:   Shortness of breath  COMPARISON:  Chest x-ray 2/5/2018 EXAM PERFORMED/VIEWS:  XR CHEST PORTABLE FINDINGS: Stable cardiomegaly noted  New focal patchy consolidation is noted in the left upper lobe  Right lung is clear  Limited evaluation of the left lung base due to the enlarged cardiac silhouette  Osseous structures appear within normal limits for patient age  Impression: New focal patchy consolidation in the left upper lobe compatible with pneumonia  Recommend follow up to resolution  The study was marked in EPIC for significant notification  Workstation performed: [id number]     Cta Ed Chest Pe Study    Result Date: 3/15/2018  Narrative: CTA - CHEST WITH IV CONTRAST - PULMONARY ANGIOGRAM INDICATION:   hemoptysis, sob, tachypnea, h/o dvt  r/o pe   COMPARISON: CTA chest 2/5/2018 TECHNIQUE: CTA examination of the chest was performed using angiographic technique according to a protocol specifically tailored to evaluate for pulmonary embolism  Axial, sagittal, and coronal 2D reformatted images were created from the source data and  submitted for interpretation  In addition, coronal 3D MIP postprocessing was performed on the acquisition scanner  Radiation dose length product (DLP) for this visit:  637 mGy-cm   This examination, like all CT scans performed in the [facility], was performed utilizing techniques to minimize radiation dose exposure, including the use of iterative reconstruction and automated exposure control  IV Contrast:  85 mL of iohexol (OMNIPAQUE)  350  FINDINGS: PULMONARY ARTERIAL TREE:  No pulmonary embolus is seen  LUNGS:  New left upper lobe multifocal alveolar consolidation  New minimal alveolar consolidation superior segment left lower lobe  No cavitary airspace disease, abscess, or alveolar necrosis  Trace subpleural bullous disease at the lung apices  Minimal bibasilar and lingular subsegmental atelectasis  Minimal more focal discoid-like scar or atelectasis right lung base is unchanged  PLEURA:  New trace left pleural effusion  HEART/AORTA:  Heart is enlarged  Small to moderate pericardial effusion pooling in the dependent sac unchanged    MEDIASTINUM AND VIJAYA:  14 mm short axis medial AP window lymph node image 100 previously measured 10 mm  8 mm short axis lateral AP window lymph node image 99 series 2 previously measured 6 mm  Shotty prevascular and right hilar lymph nodes are present  CHEST WALL AND LOWER NECK:   Unremarkable  VISUALIZED STRUCTURES IN THE UPPER ABDOMEN:  Trace subdiaphragmatic perihepatic and perisplenic free fluid  Colonic diverticulosis  Surgical clips adjacent to the origin of the celiac and superior mesenteric arteries  OSSEOUS STRUCTURES:  No acute fracture or destructive osseous lesion  Multilevel cervicothoracic spondylosis  Bilateral acromioclavicular degenerative disease  Impression: No pulmonary embolus   New left upper lobe and to a lesser extent superior segment left lower lobe alveolar infiltrates  New trace left pleural effusion  Mild interval enlargement of AP window lymph nodes measuring up to 14 mm in short axis  This is likely reactive grace disease  Cardiomegaly and small to moderate pericardial effusion unchanged  Minimal subdiaphragmatic free fluid  Workstation performed: [id number]       EKG, Pathology, and Other Studies Reviewed on Admission:   · EKG: No available     Allscripts / Epic Records Reviewed: Yes     ** Please Note: This note has been constructed using a voice recognition system   **

## 2018-03-15 NOTE — ASSESSMENT & PLAN NOTE
Required BiPAP    Chest x-ray: New focal patchy consolidation in the left upper lobe compatible with pneumonia  IV antibiotic  Now on 2 lt NC   Cont respiratory therapy  schedule nebulizer

## 2018-03-16 PROBLEM — I82.409 DVT (DEEP VENOUS THROMBOSIS) (HCC): Status: ACTIVE | Noted: 2018-03-15

## 2018-03-16 PROBLEM — I89.0 LYMPHEDEMA: Status: ACTIVE | Noted: 2018-03-16

## 2018-03-16 LAB
ANION GAP SERPL CALCULATED.3IONS-SCNC: 12 MMOL/L (ref 4–13)
BACTERIA SPT RESP CULT: NORMAL
BASOPHILS # BLD AUTO: 0.04 THOUSANDS/ΜL (ref 0–0.1)
BASOPHILS NFR BLD AUTO: 1 % (ref 0–1)
BUN SERPL-MCNC: 21 MG/DL (ref 5–25)
CALCIUM SERPL-MCNC: 8.6 MG/DL (ref 8.3–10.1)
CHLORIDE SERPL-SCNC: 107 MMOL/L (ref 100–108)
CO2 SERPL-SCNC: 25 MMOL/L (ref 21–32)
CREAT SERPL-MCNC: 1.75 MG/DL (ref 0.6–1.3)
EOSINOPHIL # BLD AUTO: 0.23 THOUSAND/ΜL (ref 0–0.61)
EOSINOPHIL NFR BLD AUTO: 3 % (ref 0–6)
ERYTHROCYTE [DISTWIDTH] IN BLOOD BY AUTOMATED COUNT: 15.8 % (ref 11.6–15.1)
FLUAV AG SPEC QL: NORMAL
FLUBV AG SPEC QL: NORMAL
GFR SERPL CREATININE-BSD FRML MDRD: 50 ML/MIN/1.73SQ M
GLUCOSE SERPL-MCNC: 88 MG/DL (ref 65–140)
GRAM STN SPEC: NORMAL
HCT VFR BLD AUTO: 31.5 % (ref 36.5–49.3)
HGB BLD-MCNC: 10.1 G/DL (ref 12–17)
L PNEUMO1 AG UR QL IA.RAPID: NEGATIVE
LYMPHOCYTES # BLD AUTO: 1.09 THOUSANDS/ΜL (ref 0.6–4.47)
LYMPHOCYTES NFR BLD AUTO: 13 % (ref 14–44)
MCH RBC QN AUTO: 26.9 PG (ref 26.8–34.3)
MCHC RBC AUTO-ENTMCNC: 32.1 G/DL (ref 31.4–37.4)
MCV RBC AUTO: 84 FL (ref 82–98)
MONOCYTES # BLD AUTO: 1.13 THOUSAND/ΜL (ref 0.17–1.22)
MONOCYTES NFR BLD AUTO: 13 % (ref 4–12)
NEUTROPHILS # BLD AUTO: 6.04 THOUSANDS/ΜL (ref 1.85–7.62)
NEUTS SEG NFR BLD AUTO: 70 % (ref 43–75)
NRBC BLD AUTO-RTO: 0 /100 WBCS
PLATELET # BLD AUTO: 247 THOUSANDS/UL (ref 149–390)
PMV BLD AUTO: 10.3 FL (ref 8.9–12.7)
POTASSIUM SERPL-SCNC: 3.5 MMOL/L (ref 3.5–5.3)
RBC # BLD AUTO: 3.76 MILLION/UL (ref 3.88–5.62)
RSV B RNA SPEC QL NAA+PROBE: NORMAL
S PNEUM AG UR QL: NEGATIVE
SODIUM SERPL-SCNC: 144 MMOL/L (ref 136–145)
WBC # BLD AUTO: 8.57 THOUSAND/UL (ref 4.31–10.16)

## 2018-03-16 PROCEDURE — 94640 AIRWAY INHALATION TREATMENT: CPT

## 2018-03-16 PROCEDURE — 85025 COMPLETE CBC W/AUTO DIFF WBC: CPT | Performed by: FAMILY MEDICINE

## 2018-03-16 PROCEDURE — 94760 N-INVAS EAR/PLS OXIMETRY 1: CPT

## 2018-03-16 PROCEDURE — 94664 DEMO&/EVAL PT USE INHALER: CPT

## 2018-03-16 PROCEDURE — 80048 BASIC METABOLIC PNL TOTAL CA: CPT | Performed by: FAMILY MEDICINE

## 2018-03-16 PROCEDURE — 99232 SBSQ HOSP IP/OBS MODERATE 35: CPT | Performed by: PHYSICIAN ASSISTANT

## 2018-03-16 PROCEDURE — 94660 CPAP INITIATION&MGMT: CPT

## 2018-03-16 RX ORDER — FUROSEMIDE 40 MG/1
40 TABLET ORAL DAILY
Status: DISCONTINUED | OUTPATIENT
Start: 2018-03-16 | End: 2018-03-17

## 2018-03-16 RX ORDER — LORAZEPAM 0.5 MG/1
0.5 TABLET ORAL ONCE
Status: COMPLETED | OUTPATIENT
Start: 2018-03-16 | End: 2018-03-16

## 2018-03-16 RX ADMIN — IPRATROPIUM BROMIDE AND ALBUTEROL SULFATE 3 ML: .5; 3 SOLUTION RESPIRATORY (INHALATION) at 20:02

## 2018-03-16 RX ADMIN — IPRATROPIUM BROMIDE AND ALBUTEROL SULFATE 3 ML: .5; 3 SOLUTION RESPIRATORY (INHALATION) at 01:09

## 2018-03-16 RX ADMIN — NEBIVOLOL HYDROCHLORIDE 20 MG: 10 TABLET ORAL at 09:24

## 2018-03-16 RX ADMIN — LORAZEPAM 0.5 MG: 0.5 TABLET ORAL at 14:11

## 2018-03-16 RX ADMIN — DOXAZOSIN 4 MG: 4 TABLET ORAL at 23:23

## 2018-03-16 RX ADMIN — MINOXIDIL 10 MG: 2.5 TABLET ORAL at 09:24

## 2018-03-16 RX ADMIN — MINOXIDIL 10 MG: 2.5 TABLET ORAL at 21:25

## 2018-03-16 RX ADMIN — IPRATROPIUM BROMIDE AND ALBUTEROL SULFATE 3 ML: .5; 3 SOLUTION RESPIRATORY (INHALATION) at 14:10

## 2018-03-16 RX ADMIN — AMLODIPINE BESYLATE 10 MG: 10 TABLET ORAL at 09:24

## 2018-03-16 RX ADMIN — VANCOMYCIN HYDROCHLORIDE 750 MG: 750 INJECTION, SOLUTION INTRAVENOUS at 13:00

## 2018-03-16 RX ADMIN — IPRATROPIUM BROMIDE AND ALBUTEROL SULFATE 3 ML: .5; 3 SOLUTION RESPIRATORY (INHALATION) at 08:42

## 2018-03-16 RX ADMIN — APIXABAN 5 MG: 5 TABLET, FILM COATED ORAL at 21:22

## 2018-03-16 RX ADMIN — NEBIVOLOL HYDROCHLORIDE 20 MG: 10 TABLET ORAL at 21:22

## 2018-03-16 RX ADMIN — ACETAMINOPHEN 650 MG: 325 TABLET, FILM COATED ORAL at 14:10

## 2018-03-16 RX ADMIN — FUROSEMIDE 40 MG: 40 TABLET ORAL at 15:00

## 2018-03-16 RX ADMIN — ASPIRIN 325 MG: 325 TABLET, COATED ORAL at 09:23

## 2018-03-16 RX ADMIN — ALLOPURINOL 100 MG: 100 TABLET ORAL at 09:24

## 2018-03-16 RX ADMIN — APIXABAN 5 MG: 5 TABLET, FILM COATED ORAL at 09:24

## 2018-03-16 RX ADMIN — ATORVASTATIN CALCIUM 40 MG: 40 TABLET, FILM COATED ORAL at 23:23

## 2018-03-16 RX ADMIN — CEFEPIME HYDROCHLORIDE 2000 MG: 2 INJECTION, POWDER, FOR SOLUTION INTRAVENOUS at 15:00

## 2018-03-16 NOTE — ASSESSMENT & PLAN NOTE
· Required BiPAP  Improved, saturating well on nasal cannula  Likely related to hospital-acquired pneumonia  · Chest x-ray: New focal patchy consolidation in the left upper lobe compatible with pneumonia  · CTA -  No pulmonary embolus  New left upper lobe and to a lesser extent superior segment left lower lobe alveolar infiltrates    · Restart IV antibiotics of IV cefepime and vancomycin  · O2 sat mid to low 90s currently on nasal cannula, attempt to wean with improvement  · Continue nebulizer treatments

## 2018-03-16 NOTE — PLAN OF CARE
DISCHARGE PLANNING     Discharge to home or other facility with appropriate resources Progressing        GENITOURINARY - ADULT     Maintains or returns to baseline urinary function Progressing     Absence of urinary retention Progressing     Urinary catheter remains patent Progressing        HEMATOLOGIC - ADULT     Maintains hematologic stability Progressing        INFECTION - ADULT     Absence or prevention of progression during hospitalization Progressing     Absence of fever/infection during neutropenic period Progressing        Knowledge Deficit     Patient/family/caregiver demonstrates understanding of disease process, treatment plan, medications, and discharge instructions Progressing        METABOLIC, FLUID AND ELECTROLYTES - ADULT     Electrolytes maintained within normal limits Progressing     Fluid balance maintained Progressing     Glucose maintained within target range Progressing        Nutrition/Hydration-ADULT     Nutrient/Hydration intake appropriate for improving, restoring or maintaining nutritional needs Progressing        PAIN - ADULT     Verbalizes/displays adequate comfort level or baseline comfort level Progressing        Potential for Falls     Patient will remain free of falls Progressing        RESPIRATORY - ADULT     Achieves optimal ventilation and oxygenation Progressing        SAFETY ADULT     Maintain or return to baseline ADL function Progressing     Maintain or return mobility status to optimal level Progressing

## 2018-03-16 NOTE — ASSESSMENT & PLAN NOTE
· Less likely to be acute CHF decompensation patient with elevated BNP, however BNP has been elevated in the past per records  · Patient with mild edema of the lower extremities bilaterally  · Will restart patient Lasix 40 mg daily (decreased from home dose)  · Obtain daily weights, I&Os, will place on low-sodium diet  · Continue telemetry

## 2018-03-16 NOTE — PROGRESS NOTES
Progress Note Ashley Older 1965, 45 y o  male MRN: [id number]    Unit/Bed#: -01 Encounter: [id number]    Primary Care Provider: Tasha Espinal MD   Date and time admitted to hospital: 3/15/2018 11:10 AM       DOS: 3/16/2018      * Acute respiratory failure with hypoxia (HCC)   Assessment & Plan    · Required BiPAP  Improved, saturating well on nasal cannula  Likely related to hospital-acquired pneumonia  · Chest x-ray: New focal patchy consolidation in the left upper lobe compatible with pneumonia  · CTA -  No pulmonary embolus  New left upper lobe and to a lesser extent superior segment left lower lobe alveolar infiltrates  · Restart IV antibiotics of IV cefepime and vancomycin  · O2 sat mid to low 90s currently on nasal cannula, attempt to wean with improvement  · Continue nebulizer treatments          Lymphedema   Assessment & Plan    · Patient with recent biopsy of inguinal lymph nodes on the left side  · Patient continues to have swelling in the area of biopsy and along the left lower extremity  · Encourage compression  · Patient was seen and examined with nursing staff at bedside, no evidence of infection, no erythema or drainage, no induration  · Patient to follow up with staff who performed biopsy as an outpatient after discharge        DVT (deep venous thrombosis) (HCC)   Assessment & Plan    · Continue anticoagulation with Eliquis  · Diagnosed in January of this year        Stage 3 chronic kidney disease   Assessment & Plan    · Patient's creatinine baseline per records around 1 5-1 6  · Creatinine increased today to 1 75  · Patient's Lasix has been on hold, patient with mild edema of the lower extremities bilaterally, will restart patient's Lasix at lower dose and monitor creatinine closely  Patient is on Lasix 80 mg daily at home, will start Lasix 40 mg daily here          HAP (hospital-acquired pneumonia)   Assessment & Plan    · As evidenced on chest x-ray and CTA with left upper lobe infiltrate  · Patient was hospitalized in January >2 days  · Will treat with IV cefepime and vancomycin  · Influenza negative  · Blood cultures and MRSA culture pending  · Sputum culture pending  · Strep pneumo and Legionella negative  · Continue nebulizer treatments        Chronic diastolic CHF (congestive heart failure) ([facility] [place] [address] )   Assessment & Plan    · Less likely to be acute CHF decompensation patient with elevated BNP, however BNP has been elevated in the past per records  · Patient with mild edema of the lower extremities bilaterally  · Will restart patient Lasix 40 mg daily (decreased from home dose)  · Obtain daily weights, I&Os, will place on low-sodium diet  · Continue telemetry        COPD (chronic obstructive pulmonary disease) ([facility])   Assessment & Plan    · Does not seem to be in acute exacerbation, mild wheezing on exam, no indication for IV steroids at this time  · Continue nebulizer treatments  · Monitor symptoms        Essential hypertension   Assessment & Plan    · Most recent /70  · Found to be in hypertensive urgency on admission, has improved  · Continue amlodipine 10 mg daily, doxazosin 4 mg, minoxidil 10 mg bid, bystolic 20 mg bid   · Monitor        Pericardial effusion   Assessment & Plan    · Unchanged from previous imaging  · Monitor          VTE Pharmacologic Prophylaxis:   Pharmacologic: Apixaban (Eliquis)  Mechanical VTE Prophylaxis in Place: No    Patient Centered Rounds: I have performed bedside rounds with nursing staff today  Discussions with Specialists or Other Care Team Provider:  Discussed with RNalexus and reviewed previous notes    Education and Discussions with Family / Patient:  Discussed with patient at bedside, attempted to call patient's wife, however was unable to reach at this time  Will continue to attempt to call patient's wife  Time Spent for Care: 30 minutes    More than 50% of total time spent on counseling and coordination of care as described above     Current Length of Stay: 1 day(s)    Current Patient Status: Inpatient   Certification Statement: The patient will continue to require additional inpatient hospital stay due to IV antibiotics, symptomatic improvement, monitoring creatinine    Discharge Plan:  Pending symptomatic improvement and improvement of creatinine  Patient not currently medically stable for discharge at this time  Code Status: Level 1 - Full Code      Subjective:   Patient reports that he does not feel well today  States that his shortness of breath started about 3-5 days ago  States that he is unable to walk from his bed to the bathroom without getting shortness of breath  He was also having hemoptysis  Reports that usually takes about 10 minutes to regain his strength  He currently denies chest pain, abdominal pain, nausea, vomiting, constipation or urinary difficulties  Patient did report some loose stools yesterday, and reported 1 episode today  He reports that he recently had an inguinal lymph node biopsy and excision and is scheduled to follow up with this provider next week  Objective:     Vitals:   Temp (24hrs), Av 8 °F (37 1 °C), Min:98 2 °F (36 8 °C), Max:99 3 °F (37 4 °C)    HR:  [77-98] 89  Resp:  [20-44] 20  BP: (135-193)/(70-93) 135/70  SpO2:  [92 %-97 %] 93 %  Body mass index is 31 98 kg/m²  Input and Output Summary (last 24 hours): Intake/Output Summary (Last 24 hours) at 18 1239  Last data filed at 18 0900   Gross per 24 hour   Intake              290 ml   Output                0 ml   Net              290 ml       Physical Exam:     Physical Exam   Constitutional: No distress  Patient is in no acute distress lying in his hospital bed  Nasal cannula in place  Does have conversational dyspnea   HENT:   Head: Normocephalic and atraumatic  Eyes: Conjunctivae are normal  No scleral icterus  Cardiovascular: Normal rate, regular rhythm and intact distal pulses      Murmur heard   Pulmonary/Chest: Effort normal  No respiratory distress  He has wheezes  Breath sounds decreased bilaterally, with some scattered wheezing   Abdominal: Soft  Bowel sounds are normal  He exhibits no distension  There is no tenderness  There is no rebound  Musculoskeletal: He exhibits edema (mild trace, lower extemities bilaterally  L>R )  Neurological: He is alert  Skin: Skin is warm and dry  He is not diaphoretic  No erythema  Left groin with swelling, no erythema, drainage or induration  Psychiatric: He has a normal mood and affect  Vitals reviewed  Additional Data:     Labs:      Results from last 7 days  Lab Units 03/16/18  0504   WBC Thousand/uL 8 57   HEMOGLOBIN g/dL 10 1*   HEMATOCRIT % 31 5*   PLATELETS Thousands/uL 247   NEUTROS PCT % 70   LYMPHS PCT % 13*   MONOS PCT % 13*   EOS PCT % 3       Results from last 7 days  Lab Units 03/16/18  0504 03/15/18  1126   SODIUM mmol/L 144 141   POTASSIUM mmol/L 3 5 3 5   CHLORIDE mmol/L 107 105   CO2 mmol/L 25 25   BUN mg/dL 21 20   CREATININE mg/dL 1 75* 1 55*   CALCIUM mg/dL 8 6 9 6   TOTAL PROTEIN g/dL  --  7 7   BILIRUBIN TOTAL mg/dL  --  2 40*   ALK PHOS U/L  --  77   ALT U/L  --  23   AST U/L  --  16   GLUCOSE RANDOM mg/dL 88 85       Results from last 7 days  Lab Units 03/15/18  1126   INR  1 34*       * I Have Reviewed All Lab Data Listed Above  * Additional Pertinent Lab Tests Reviewed:  All Labs Within Last 24 Hours Reviewed    Imaging:    Imaging Reports Reviewed Today Include:  CTA, chest x-ray  Imaging Personally Reviewed by Myself Includes:  None    Recent Cultures (last 7 days):       Results from last 7 days  Lab Units 03/15/18  2221 03/15/18  2036   INFLUENZA A PCR   --  None Detected   INFLUENZA B PCR   --  None Detected   RSV PCR   --  None Detected   LEGIONELLA URINARY ANTIGEN  Negative  --        Last 24 Hours Medication List:     Current Facility-Administered Medications:  acetaminophen 650 mg Oral Q6H PRN Katja Corona Antionette Barrow PA-C    allopurinol 100 mg Oral Daily Mary Reynolds MD    amLODIPine 10 mg Oral Daily Mary Reynolds MD    ap[facility]aban 5 [facility] BID [facility]    aspirin 325 mg Oral Daily Mary Reynolds MD    atorvastatin 40 mg Oral HS Mary Reynolds MD    cefepime 2,000 mg Intravenous Q12H Cindy Donahue PA-C    doxazosin 4 mg Oral HS Mary Reynolds MD    hydrOXYzine HCL 25 mg Oral Q6H PRN [facility] [place]    ipratropium-albuterol 3 mL Nebulization Q6H [facility] [place], MD    minoxidil 10 mg Oral BID Cindy Donahue PA-C    nebivolol 20 mg Oral BID Mary Reynolds MD    sodium chloride 1,000 mL Intravenous Once Justine Campbell PA-C Last Rate: Stopped (03/15/18 1133)   vancomycin 10 mg/kg (Adjusted) Intravenous Q12H Elias Marin PA-C         Today, Patient Was Seen By: Elias Marin PA-C    ** Please Note: Dictation voice to text software may have been used in the creation of this document   **

## 2018-03-16 NOTE — ASSESSMENT & PLAN NOTE
· Patient with recent biopsy of inguinal lymph nodes on the left side  · Patient continues to have swelling in the area of biopsy and along the left lower extremity  · Encourage compression  · Patient was seen and examined with nursing staff at bedside, no evidence of infection, no erythema or drainage, no induration    · Patient to follow up with staff who performed biopsy as an outpatient after discharge

## 2018-03-16 NOTE — ASSESSMENT & PLAN NOTE
· Patient's creatinine baseline per records around 1 5-1 6  · Creatinine increased today to 1 75  · Patient's Lasix has been on hold, patient with mild edema of the lower extremities bilaterally, will restart patient's Lasix at lower dose and monitor creatinine closely  Patient is on Lasix 80 mg daily at home, will start Lasix 40 mg daily here

## 2018-03-16 NOTE — ASSESSMENT & PLAN NOTE
· Most recent /70  · Found to be in hypertensive urgency on admission, has improved  · Continue amlodipine 10 mg daily, doxazosin 4 mg, minoxidil 10 mg bid, bystolic 20 mg bid   · Monitor

## 2018-03-16 NOTE — CASE MANAGEMENT
Initial Clinical Review    Admission: Date/Time/Statement: 3/15/18 @ 1439     Orders Placed This Encounter   Procedures    Inpatient Admission (expected length of stay for this patient is greater than two midnights)     Standing Status:   Standing     Number of Occurrences:   1     Order Specific Question:   Admitting Physician     Answer:   Tamar Chavez [[id number]]     Order Specific Question:   Level of Care     Answer:   Med Surg [[id number]]     Order Specific Question:   Estimated length of stay     Answer:   More than 2 Midnights     Order Specific Question:   Certification     Answer:   I certify that inpatient services are medically necessary for this patient for a duration of greater than two midnights  See H&P and MD Progress Notes for additional information about the patient's course of treatment  ED: Date/Time/Mode of Arrival:   ED Arrival Information     Expected Arrival Acuity Means of Arrival Escorted By Service Admission Type    - 3/15/2018 11:03 Urgent Walk-In Self General Medicine Urgent    Arrival Complaint    SHORTNESS OF BREATH          Chief Complaint:   Chief Complaint   Patient presents with    Shortness of Breath     sudden onset on monday and getting worse, recent admission for dvt       History of Illness: Gray Tang is a 45 y o  male significant past medical history of congestive heart failure, HTN, DVT, patient was brought to the ER complaining of short of breath, he states short of breath began 1 week ago but for the last 24 hours is getting worse  Short of breath is accompanied with dry cough and hemoptysis previous  He states he has been feeling chills, no fever, denies any headache,  chest pain, palpitation, abdominal pain, urinary symptoms    No ill contact    ED Vital Signs:   ED Triage Vitals   Temperature Pulse Respirations Blood Pressure SpO2   03/15/18 1107 03/15/18 1107 03/15/18 1107 03/15/18 1107 03/15/18 1107   99 3 °F (37 4 °C) 81 18 (!) 195/95 96 %      Temp Source Heart Rate Source Patient Position - Orthostatic VS BP Location FiO2 (%)   03/15/18 2145 03/15/18 1304 03/15/18 1304 03/15/18 1304 --   Oral Monitor Sitting Right arm       Pain Score       03/15/18 1107       No Pain        Wt Readings from Last 1 Encounters:   03/15/18 95 4 kg (210 lb 5 1 oz)       Vital Signs (abnormal):   03/15/18 2053  --  --  --  --  96 %  Nasal cannula  --   03/15/18 1945  --  86   27  162/76  96 %  Nasal cannula  Sitting   03/15/18 1930  --  86   28   173/87  94 %  --  --   03/15/18 1800  --  85   44   172/87  --  --  --   03/15/18 1715  --  83   32  --  95 %  --  --   03/15/18 1631  --  --  --  --  --  Nasal cannula  --   03/15/18 1600  --  81   28  170/83  96 %  Nasal cannula  --   03/15/18 1304  --  77  22   193/93  97 %  Other (comment)  Sitting   O2 Device: Bi PAP at 03/15/18 1304         Abnormal Labs/Diagnostic Test Results:   pH, Gold 7 456 (H) 7 300 - 7 400     pCO2, Gold 31 0 (L) 42 0 - 50 0 mm Hg     pO2, Gold 36 7 35 0 - 45 0 mm Hg     HCO3, Gold 21 4 (L) 24 - 30 mmol/L     Base Excess, Gold -1 6 mmol/L     O2 Content, Gold 13 1 ml/dL     O2 HGB, VENOUS 71 1 60 0 - 80 0 %           Total Bilirubin 2 40 (H) 0 20 - 1 00 mg/dL     Bilirubin, Direct 0 41 (H) 0 00 - 0 20 mg/dL           NT-proBNP 1,773 (H) <125 pg/mL       Protime 16 9 (H) 12 1 - 14 4 seconds     INR 1 34 (H) 0 86 - 1 16       PTT 39 (H) 23 - 35 seconds     BUN 20 5 - 25 mg/dL     Creatinine 1 55 (H) 0 60 - 1 30 mg/dL   CT chest      No pulmonary embolus  New left upper lobe and to a lesser extent superior segment left lower lobe alveolar infiltrates  New trace left pleural effusion  Mild interval enlargement of AP window lymph nodes measuring up to 14 mm in short axis   This is likely reactive grace disease  Cardiomegaly and small to moderate pericardial effusion unchanged        PCXR-     New focal patchy consolidation in the left upper lobe compatible with pneumonia        EKG- NSR       ED Treatment: Medication Administration from 03/15/2018 1103 to 03/15/2018 2015       Date/Time Order Dose Route Action Action by Comments     03/15/2018 1133 sodium chloride 0 9 % bolus 1,000 mL 0 mL Intravenous Hold Alexsander Robles RN      03/15/2018 1209 LORazepam (ATIVAN) 2 mg/mL injection 0 5 mg 0 5 mg Intravenous Given Alexsander Robles RN      03/15/2018 1232 benzonatate (TESSALON PERLES) capsule 100 mg 100 mg Oral Given Alexsander Robles RN      03/15/2018 1232 albuterol inhalation solution 5 mg 5 mg Nebulization Given Alexsander Robles RN      03/15/2018 1233 ipratropium (ATROVENT) 0 02 % inhalation solution 0 5 mg 0 5 mg Nebulization Given Alexsander Robles RN      03/15/2018 1246 iohexol (OMNIPAQUE) 350 MG/ML injection (MULTI-DOSE) 85 mL 85 mL Intravenous Given [employer]      03/15/2018 1556 vancomycin (VANCOCIN) 1,500 mg in sodium chloride 0 9 % 250 mL IVPB 0 mg/kg Intravenous Stopped Alexsander Robles RN      03/15/2018 1426 vancomycin (VANCOCIN) 1,500 mg in sodium chloride 0 9 % 250 mL IVPB 1,500 mg Intravenous [address] Avelino Murphy RN      03/15/2018 1426 cefepime (MAXIPIME) 2,000 mg in dextrose 5 % 50 mL IVPB 0 mg Intravenous Stopped Avelino Murphy RN      03/15/2018 1316 cefepime (MAXIPIME) 2,000 mg in dextrose 5 % 50 mL IVPB 2,000 mg Intravenous New Bag Alexsander Robles RN           Past Medical/Surgical History:    Active Ambulatory Problems     Diagnosis Date Noted    Pericardial effusion 01/22/2018    Essential hypertension 01/22/2018    Leg DVT (deep venous thromboembolism), acute, bilateral ([facility] ) 01/22/2018    COPD (chronic obstructive pulmonary disease) ([facility] [facility] ) 78/33/6937    Systolic congestive heart failure (HCC) 01/22/2018    Chronic diastolic CHF (congestive heart failure) ([facility] ) 02/06/2018    Pulmonary nodule, right 02/06/2018     Resolved Ambulatory Problems     Diagnosis Date Noted    Chest pain 02/05/2018    Gums, bleeding 02/05/2018    Leukocytosis 02/06/2018     Past Medical History: Diagnosis Date    Hypertension     Kidney disease     Leg DVT [facility]deep [facility]boembolism[facility] 01/2018    AGUS on CPAP     Systolic CHF ([facility] )        Admitting Diagnosis: Acute respiratory alkalosis [E87 3]  SOB (shortness of breath) [R06 02]  Healthcare-associated pneumonia [J18 9]  TRUDI (acute kidney injury) ([facility] ) [N17 9]  Hemoptysis [R04 2]    Age/Sex: 45 y o  male    Assessment/Plan:   * Acute respiratory failure with hypoxia (HCC)   Assessment & Plan     Required BiPAP  Chest x-ray: New focal patchy consolidation in the left upper lobe compatible with pneumonia  IV antibiotic  Now on 2 lt NC   Cont respiratory therapy  schedule nebulizer           HAP (hospital-acquired pneumonia)   Assessment & Plan     Required BiPAP  Chest x-ray: New focal patchy consolidation in the left upper lobe compatible with pneumonia  Patient was hospitalized here 1 month ago  IV antibiotic  Now on 2 lt NC   Cont respiratory therapy  schedule nebulizer           Chronic diastolic CHF (congestive heart failure) (HCC)   Assessment & Plan     Less likely to be acute CHF decompensation even though  Even though elevated BNP closely monitor   Continue home medication  Input and output  Continue telemetric          DVT (deep vein thrombosis) in pregnancy ([facility] )   Assessment & Plan     Continue Eliquis          Stage 3 chronic kidney disease   Assessment & Plan     No TRUDI,  Hold Lasix for today           Essential hypertension   Assessment & Plan     Stable   Cont home medication              VTE Prophylaxis: Apixaban (Eliquis)  / sequential compression device   Code Status:   Full cod  eCode Status:POLST: There is no POLST form on file for this patient (pre-hospital)     Anticipated Length of Stay:  Patient will be admitted on an Inpatient basis with an anticipated length of stay of  > 2 midnights     Justification for Hospital Stay:  Acute respiratory failure secondary to Hcap on IV antibiotic   Total Time for Visit, including Counseling / Coordination of Care: 45 minutes  Greater than 50% of this total time spent on direct patient counseling and coordination of care          Admission Orders:  Scheduled Meds:   Current Facility-Administered Medications:  acetaminophen 650 mg Oral Q6H PRN Pushpa Osorio PA-C    allopurinol 100 mg Oral Daily Mary Reynolds MD    amLODIPine 10 mg Oral Daily Mary Reynolds MD    apixaban 5 mg Oral BID Augustin Ames MD    aspirin 325 mg Oral Daily Augustin Ames MD    atorvastatin 40 mg Oral HS Mary Reynolds MD    cefepime 2,000 mg Intravenous Q12H Cindy Donahue PA-C    doxazosin 4 mg Oral HS Augustin Ames MD    furosemide 40 mg Oral Daily Cindy Donahue PA-C    hydrOXYzine HCL 25 mg Oral Q6H PRN Augustin Ames MD    ipratropium-albuterol 3 mL Nebulization Q6H Augustin Ames MD    minoxidil 10 mg Oral BID Cindy Donahue PA-C    nebivolol 20 mg Oral BID Mary Reynolds MD    sodium chloride 1,000 mL Intravenous Once Sharon Gonzalez PA-C Last Rate: Stopped (03/15/18 1133)   vancomycin 10 mg/kg (Adjusted) Intravenous Q12H Cindy Donahue PA-C      Continuous Infusions:    PRN Meds:   acetaminophen    hydrOXYzine HCL     Cardiac diet   Up and OOB as delano   I&O   3/17 bmp, cbc   vanco trough   Elevate HOB   SCD  Tele   Droplet isolation   3/16 cbc , bmp , vanco trough   BUN creat   21  1 75, H&H   10 1  31 5      IM note  3/16  * Acute respiratory failure with hypoxia (HCC)   Assessment & Plan     · Required BiPAP  Improved, saturating well on nasal cannula  Likely related to hospital-acquired pneumonia  · Chest x-ray: New focal patchy consolidation in the left upper lobe compatible with pneumonia  · CTA -  No pulmonary embolus  New left upper lobe and to a lesser extent superior segment left lower lobe alveolar infiltrates    · Restart IV antibiotics of IV cefepime and vancomycin  · O2 sat mid to low 90s currently on nasal cannula, attempt to wean with improvement  · Continue nebulizer treatments             Lymphedema   Assessment & Plan     · Patient with recent biopsy of inguinal lymph nodes on the left side  · Patient continues to have swelling in the area of biopsy and along the left lower extremity  · Encourage compression  · Patient was seen and examined with nursing staff at bedside, no evidence of infection, no erythema or drainage, no induration  · Patient to follow up with staff who performed biopsy as an outpatient after discharge          DVT (deep venous thrombosis) ([facility])   Assessment & Plan     · Continue anticoagulation with Eliquis  · Diagnosed in January of this year          Stage 3 chronic kidney disease   Assessment & Plan     · Patient's creatinine baseline per records around 1 5-1 6  · Creatinine increased today to 1 75  · Patient's Lasix has been on hold, patient with mild edema of the lower extremities bilaterally, will restart patient's Lasix at lower dose and monitor creatinine closely    Patient is on Lasix 80 mg daily at home, will start Lasix 40 mg daily here           HAP (hospital-acquired pneumonia)   Assessment & Plan     · As evidenced on chest x-ray and CTA with left upper lobe infiltrate  · Patient was hospitalized in January >2 days  · Will treat with IV cefepime and vancomycin  · Influenza negative  · Blood cultures and MRSA culture pending  · Sputum culture pending  · Strep pneumo and Legionella negative  · Continue nebulizer treatments          Chronic diastolic CHF (congestive heart failure) ([facility] [address] )   Assessment & Plan     · Less likely to be acute CHF decompensation patient with elevated BNP, however BNP has been elevated in the past per records  · Patient with mild edema of the lower extremities bilaterally  · Will restart patient Lasix 40 mg daily (decreased from home dose)  · Obtain daily weights, I&Os, will place on low-sodium diet  · Continue telemetry        COPD (chronic obstructive pulmonary disease) ([facility])   Assessment & Plan     · Does not seem to be in acute exacerbation, mild wheezing on exam, no indication for IV steroids at this time  · Continue nebulizer treatments  · Monitor symptoms          Essential hypertension   Assessment & Plan     · Most recent /70  · Found to be in hypertensive urgency on admission, has improved  · Continue amlodipine 10 mg daily, doxazosin 4 mg, minoxidil 10 mg bid, bystolic 20 mg bid   · Monitor          Pericardial effusion   Assessment & Plan     · Unchanged from previous imaging  · Monitor             VTE Pharmacologic Prophylaxis:   Pharmacologic: Apixaban (Eliquis)  Mechanical VTE Prophylaxis in Place: No     Patient Centered Rounds: I have performed bedside rounds with nursing staff today      Discussions with Specialists or Other Care Team Provider:  Discussed with RNalexus and reviewed previous notes     Education and Discussions with Family / Patient:  Discussed with patient at bedside, attempted to call patient's wife, however was unable to reach at this time  Will continue to attempt to call patient's wife      Time Spent for Care: 30 minutes  More than 50% of total time spent on counseling and coordination of care as described above      Current Length of Stay: 1 day(s)     Current Patient Status: Inpatient   Certification Statement: The patient will continue to require additional inpatient hospital stay due to IV antibiotics, symptomatic improvement, monitoring creatinine     Discharge Plan:  Pending symptomatic improvement and improvement of creatinine    Patient not currently medically stable for discharge at this time

## 2018-03-16 NOTE — ASSESSMENT & PLAN NOTE
· Does not seem to be in acute exacerbation, mild wheezing on exam, no indication for IV steroids at this time  · Continue nebulizer treatments  · Monitor symptoms

## 2018-03-16 NOTE — ASSESSMENT & PLAN NOTE
· As evidenced on chest x-ray and CTA with left upper lobe infiltrate  · Patient was hospitalized in January >2 days  · Will treat with IV cefepime and vancomycin  · Influenza negative  · Blood cultures and MRSA culture pending  · Sputum culture pending  · Strep pneumo and Legionella negative  · Continue nebulizer treatments

## 2018-03-17 LAB
ANION GAP SERPL CALCULATED.3IONS-SCNC: 11 MMOL/L (ref 4–13)
BUN SERPL-MCNC: 16 MG/DL (ref 5–25)
CALCIUM SERPL-MCNC: 8.5 MG/DL (ref 8.3–10.1)
CHLORIDE SERPL-SCNC: 108 MMOL/L (ref 100–108)
CO2 SERPL-SCNC: 25 MMOL/L (ref 21–32)
CREAT SERPL-MCNC: 1.42 MG/DL (ref 0.6–1.3)
ERYTHROCYTE [DISTWIDTH] IN BLOOD BY AUTOMATED COUNT: 15.4 % (ref 11.6–15.1)
GFR SERPL CREATININE-BSD FRML MDRD: 64 ML/MIN/1.73SQ M
GLUCOSE SERPL-MCNC: 89 MG/DL (ref 65–140)
HCT VFR BLD AUTO: 30.1 % (ref 36.5–49.3)
HGB BLD-MCNC: 9.9 G/DL (ref 12–17)
MCH RBC QN AUTO: 27 PG (ref 26.8–34.3)
MCHC RBC AUTO-ENTMCNC: 32.9 G/DL (ref 31.4–37.4)
MCV RBC AUTO: 82 FL (ref 82–98)
MRSA NOSE QL CULT: NORMAL
PLATELET # BLD AUTO: 265 THOUSANDS/UL (ref 149–390)
PMV BLD AUTO: 10 FL (ref 8.9–12.7)
POTASSIUM SERPL-SCNC: 3.2 MMOL/L (ref 3.5–5.3)
RBC # BLD AUTO: 3.67 MILLION/UL (ref 3.88–5.62)
SODIUM SERPL-SCNC: 144 MMOL/L (ref 136–145)
WBC # BLD AUTO: 8.74 THOUSAND/UL (ref 4.31–10.16)

## 2018-03-17 PROCEDURE — 94660 CPAP INITIATION&MGMT: CPT

## 2018-03-17 PROCEDURE — 80048 BASIC METABOLIC PNL TOTAL CA: CPT | Performed by: PHYSICIAN ASSISTANT

## 2018-03-17 PROCEDURE — 99232 SBSQ HOSP IP/OBS MODERATE 35: CPT | Performed by: PHYSICIAN ASSISTANT

## 2018-03-17 PROCEDURE — 94760 N-INVAS EAR/PLS OXIMETRY 1: CPT

## 2018-03-17 PROCEDURE — 85027 COMPLETE CBC AUTOMATED: CPT | Performed by: PHYSICIAN ASSISTANT

## 2018-03-17 PROCEDURE — 94640 AIRWAY INHALATION TREATMENT: CPT

## 2018-03-17 RX ORDER — IPRATROPIUM BROMIDE AND ALBUTEROL SULFATE 2.5; .5 MG/3ML; MG/3ML
3 SOLUTION RESPIRATORY (INHALATION)
Status: DISCONTINUED | OUTPATIENT
Start: 2018-03-18 | End: 2018-03-19 | Stop reason: HOSPADM

## 2018-03-17 RX ORDER — IPRATROPIUM BROMIDE AND ALBUTEROL SULFATE 2.5; .5 MG/3ML; MG/3ML
3 SOLUTION RESPIRATORY (INHALATION)
Status: DISCONTINUED | OUTPATIENT
Start: 2018-03-17 | End: 2018-03-17

## 2018-03-17 RX ORDER — FUROSEMIDE 40 MG/1
40 TABLET ORAL
Status: DISCONTINUED | OUTPATIENT
Start: 2018-03-17 | End: 2018-03-19 | Stop reason: HOSPADM

## 2018-03-17 RX ORDER — POTASSIUM CHLORIDE 20 MEQ/1
40 TABLET, EXTENDED RELEASE ORAL DAILY
Status: DISCONTINUED | OUTPATIENT
Start: 2018-03-17 | End: 2018-03-19 | Stop reason: HOSPADM

## 2018-03-17 RX ADMIN — FUROSEMIDE 40 MG: 40 TABLET ORAL at 15:07

## 2018-03-17 RX ADMIN — HYDROXYZINE HYDROCHLORIDE 25 MG: 25 TABLET, FILM COATED ORAL at 12:36

## 2018-03-17 RX ADMIN — ASPIRIN 325 MG: 325 TABLET, COATED ORAL at 09:19

## 2018-03-17 RX ADMIN — NEBIVOLOL HYDROCHLORIDE 20 MG: 10 TABLET ORAL at 17:23

## 2018-03-17 RX ADMIN — HYDROXYZINE HYDROCHLORIDE 25 MG: 25 TABLET, FILM COATED ORAL at 18:53

## 2018-03-17 RX ADMIN — MINOXIDIL 10 MG: 2.5 TABLET ORAL at 09:20

## 2018-03-17 RX ADMIN — IPRATROPIUM BROMIDE AND ALBUTEROL SULFATE 3 ML: .5; 3 SOLUTION RESPIRATORY (INHALATION) at 08:54

## 2018-03-17 RX ADMIN — CEFEPIME HYDROCHLORIDE 2000 MG: 2 INJECTION, POWDER, FOR SOLUTION INTRAVENOUS at 12:27

## 2018-03-17 RX ADMIN — DOXAZOSIN 4 MG: 4 TABLET ORAL at 21:14

## 2018-03-17 RX ADMIN — APIXABAN 5 MG: 5 TABLET, FILM COATED ORAL at 09:20

## 2018-03-17 RX ADMIN — APIXABAN 5 MG: 5 TABLET, FILM COATED ORAL at 17:23

## 2018-03-17 RX ADMIN — ATORVASTATIN CALCIUM 40 MG: 40 TABLET, FILM COATED ORAL at 21:14

## 2018-03-17 RX ADMIN — NEBIVOLOL HYDROCHLORIDE 20 MG: 10 TABLET ORAL at 09:19

## 2018-03-17 RX ADMIN — IPRATROPIUM BROMIDE AND ALBUTEROL SULFATE 3 ML: .5; 3 SOLUTION RESPIRATORY (INHALATION) at 01:31

## 2018-03-17 RX ADMIN — VANCOMYCIN HYDROCHLORIDE 750 MG: 750 INJECTION, SOLUTION INTRAVENOUS at 13:08

## 2018-03-17 RX ADMIN — POTASSIUM CHLORIDE 40 MEQ: 1500 TABLET, EXTENDED RELEASE ORAL at 15:07

## 2018-03-17 RX ADMIN — AMLODIPINE BESYLATE 10 MG: 10 TABLET ORAL at 09:20

## 2018-03-17 RX ADMIN — ALLOPURINOL 100 MG: 100 TABLET ORAL at 09:19

## 2018-03-17 RX ADMIN — CEFEPIME HYDROCHLORIDE 2000 MG: 2 INJECTION, POWDER, FOR SOLUTION INTRAVENOUS at 01:22

## 2018-03-17 RX ADMIN — VANCOMYCIN HYDROCHLORIDE 750 MG: 750 INJECTION, SOLUTION INTRAVENOUS at 01:30

## 2018-03-17 RX ADMIN — MINOXIDIL 10 MG: 2.5 TABLET ORAL at 17:23

## 2018-03-17 RX ADMIN — IPRATROPIUM BROMIDE AND ALBUTEROL SULFATE 3 ML: .5; 3 SOLUTION RESPIRATORY (INHALATION) at 21:35

## 2018-03-17 RX ADMIN — FUROSEMIDE 40 MG: 40 TABLET ORAL at 09:19

## 2018-03-17 NOTE — ASSESSMENT & PLAN NOTE
· Persistent, if does not continue to improve, will have cardiology evaluate  No evidence of tamponade on previous echoes, do not feel he needs pericardial window at this time, symptoms have improved    Monitor

## 2018-03-17 NOTE — ASSESSMENT & PLAN NOTE
· Blood pressure is on the higher side, increased furosemide as above  · Continue amlodipine 10 mg daily, doxazosin 4 mg, minoxidil 10 mg bid, bystolic 20 mg bid and Monitor

## 2018-03-17 NOTE — ASSESSMENT & PLAN NOTE
· I do believe there is a degree of acute on chronic diastolic CHF   · Elevated BNP, lower extremity edema, improvement with resumption of Lasix  · Patient also has pericardial effusion, known  · Increase Lasix back to 40 mg b i d  (he is on 80 mg daily at home, hold eplerenone for now)  · Replete K po and check BMP tomorrow    · Obtain daily weights, I&Os, will place on low-sodium diet  · Tele reviewed - no events, Ok to d/c

## 2018-03-17 NOTE — PROGRESS NOTES
Progress Note Krissy Fallon 1965, 45 y o  male MRN: [id number]    Unit/Bed#: -Greg Encounter: [id number]    Primary Care Provider: Elvi Alonzo MD   Date and time admitted to hospital: 3/15/2018 11:10 AM        * Acute respiratory failure with hypoxia ([address] )   Assessment & Plan    · Required BiPAP on admission  Overall improved, saturating well on nasal cannula  Likely related to hospital-acquired pneumonia +/- acute CHF with elevated BNP present on admission  · Chest x-ray: New focal patchy consolidation in the left upper lobe compatible with pneumonia  · CTA -  No pulmonary embolus  New left upper lobe and to a lesser extent superior segment left lower lobe alveolar infiltrates    · BNP 1773  · Continue cefepime, discontinue Vanco with MRSA screen (-)    · O2 sat mid to low 90s currently on nasal cannula, attempt to wean with improvement, ambulatory O2 sat tomorrow  · Continue nebulizer treatments, incentive spirometer          Chronic diastolic CHF (congestive heart failure) (HCC)   Assessment & Plan    · I do believe there is a degree of acute on chronic diastolic CHF   · Elevated BNP, lower extremity edema, improvement with resumption of Lasix  · Patient also has pericardial effusion, known  · Increase Lasix back to 40 mg b i d  (he is on 80 mg daily at home, hold eplerenone for now)  · Replete K po and check BMP tomorrow    · Obtain daily weights, I&Os, will place on low-sodium diet  · Tele reviewed - no events, Ok to d/c        Essential hypertension   Assessment & Plan    · Blood pressure is on the higher side, increased furosemide as above  · Continue amlodipine 10 mg daily, doxazosin 4 mg, minoxidil 10 mg bid, bystolic 20 mg bid and Monitor        HAP (hospital-acquired pneumonia)   Assessment & Plan    · As evidenced on chest x-ray and CTA with left upper lobe infiltrate, Patient was hospitalized in January >2 days  · IV cefepime as above, Vanco discontinued with MRSA (-)  · Influenza negative, blood culture NGTD x 24 hours  · Sputum culture pending contaminated  · Strep pneumo and Legionella negative  · Continue nebulizer treatments b i d  set        COPD (chronic obstructive pulmonary disease) ([facility] [address] )   Assessment & Plan    · Does not seem to be in acute exacerbation, mild wheezing on exam, no indication for IV steroids at this time  Continue nebulizer treatments  · Monitor symptoms        Pericardial effusion   Assessment & Plan    · Persistent, if does not continue to improve, will have cardiology evaluate  No evidence of tamponade on previous echoes, do not feel he needs pericardial window at this time, symptoms have improved  Monitor        Stage 3 chronic kidney disease   Assessment & Plan    · Patient's creatinine baseline per records around 1 5-1 6  · Creatinine improved with resumption of furosemide, likely cardiorenal   · BMP in a m  DVT (deep venous thrombosis) ([facility])   Assessment & Plan    · Continue anticoagulation with Eliquis, diagnosed in January of this year, has been compliant        Lymphedema   Assessment & Plan    · Patient with recent biopsy of inguinal lymph nodes on the left side  Follow up outpatient as planned  · Patient continues to have swelling in the area of biopsy and along the left lower extremity  · Encourage compression - he is not wearing presently          VTE Pharmacologic Prophylaxis:   Pharmacologic: Apixaban (Eliquis)  Mechanical VTE Prophylaxis in Place: No - he is OOB presently     Patient Centered Rounds: I have performed bedside rounds with nursing staff today  Discussions with Specialists or Other Care Team Provider:  None at this time    Education and Discussions with Family / Patient:  No family members present this time, he did ask me to call his wife -- the number listed is not working, will retry     Time Spent for Care: 20 minutes    More than 50% of total time spent on counseling and coordination of care as described above     Current Length of Stay: 2 day(s)    Current Patient Status: Inpatient   Certification Statement: The patient will continue to require additional inpatient hospital stay due to Acute hypoxic respiratory failure secondary to cap and heart failure    Discharge Plan: not cleared, pending continued improvement in respiratory status     Code Status: Level 1 - Full Code    CC:  Shortness of breath, improved  Subjective:   Patient seen at of bed to chair  Overall he thinks he is better, still getting short of breath with getting up out of bed to use the facility  He denies orthopnea, chest pain or palpitations  He does not feel fevers  He has a cough but that is better  Does not report any hemoptysis today  Objective:     Vitals:   Temp (24hrs), Av 4 °F (36 9 °C), Min:98 °F (36 7 °C), Max:98 8 °F (37 1 °C)    HR:  [69-87] 82  Resp:  [18] 18  BP: (140-167)/(69-80) 150/79  SpO2:  [91 %-96 %] 94 %  Body mass index is 31 98 kg/m²  Input and Output Summary (last 24 hours): Intake/Output Summary (Last 24 hours) at 18 1439  Last data filed at 18 1334   Gross per 24 hour   Intake             1340 ml   Output             1775 ml   Net             -435 ml       Physical Exam:     Physical Exam   Constitutional: Vital signs are normal  He appears well-developed and well-nourished  No distress   gentleman, mildly short of breath, he just ambulated from the bathroom to chair   Cardiovascular: Normal rate, regular rhythm, S1 normal, S2 normal and normal heart sounds  Exam reveals no friction rub  No murmur heard  Pulmonary/Chest: Effort normal  No respiratory distress  He has decreased breath sounds  He has no wheezes  He has no rhonchi  He has no rales  Abdominal: Soft  Bowel sounds are normal  He exhibits no distension  There is no tenderness  Obese   Musculoskeletal: He exhibits edema (Trace bilat)  Nursing note and vitals reviewed        Additional Data: Labs:      Results from last 7 days  Lab Units 03/17/18  0531 03/16/18  0504   WBC Thousand/uL 8 74 8 57   HEMOGLOBIN g/dL 9 9* 10 1*   HEMATOCRIT % 30 1* 31 5*   PLATELETS Thousands/uL 265 247   NEUTROS PCT %  --  70   LYMPHS PCT %  --  13*   MONOS PCT %  --  13*   EOS PCT %  --  3       Results from last 7 days  Lab Units 03/17/18  0531  03/15/18  1126   SODIUM mmol/L 144  < > 141   POTASSIUM mmol/L 3 2*  < > 3 5   CHLORIDE mmol/L 108  < > 105   CO2 mmol/L 25  < > 25   BUN mg/dL 16  < > 20   CREATININE mg/dL 1 42*  < > 1 55*   CALCIUM mg/dL 8 5  < > 9 6   TOTAL PROTEIN g/dL  --   --  7 7   BILIRUBIN TOTAL mg/dL  --   --  2 40*   ALK PHOS U/L  --   --  77   ALT U/L  --   --  23   AST U/L  --   --  16   GLUCOSE RANDOM mg/dL 89  < > 85   < > = values in this interval not displayed  Results from last 7 days  Lab Units 03/15/18  1126   INR  1 34*       * I Have Reviewed All Lab Data Listed Above  * Additional Pertinent Lab Tests Reviewed: Jennifer [facility] Admission Reviewed    Imaging:    Imaging Reports Reviewed Today Include:  None at this time  Imaging Personally Reviewed by Myself Includes:  Telemetry reviewed    Recent Cultures (last 7 days):       Results from last 7 days  Lab Units 03/15/18  2221 03/15/18  2038 03/15/18  2036 03/15/18  1126   BLOOD CULTURE   --   --   --  No Growth at 24 hrs  No Growth at 24 hrs  SPUTUM CULTURE   --  Test not performed  Suggest repeat specimen  --   --    GRAM STAIN RESULT   --  >10 squamous epithelial cells/lpf, indicating orophayngeal contamination    1+ Polys  2+ Gram positive cocci in pairs  --   --    INFLUENZA A PCR   --   --  None Detected  --    INFLUENZA B PCR   --   --  None Detected  --    RSV PCR   --   --  None Detected  --    LEGIONELLA URINARY ANTIGEN  Negative  --   --   --        Last 24 Hours Medication List:     Current Facility-Administered Medications:  acetaminophen 650 mg Oral Q6H PRN Daysi Han PA-C    allopurinol 100 mg Oral Daily Jamir Verdugo MD    amLODIPine 10 mg Oral Daily Mary Reynolds MD    apixaban 5 mg Oral BID Jamir Verdugo MD    aspirin 325 mg Oral Daily Mary Reynolds MD    atorvastatin 40 mg Oral HS Mary Reynolds MD    cefepime 2,000 mg Intravenous Q12H Michelle Pritchett PA-C Last Rate: 2,000 mg (03/17/18 1227)   doxazosin 4 mg Oral HS Jamir Verdugo MD    furosemide 40 mg Oral BID (diuretic) [place] PA-C    hydrOXYzine HCL 25 mg Oral Q6H PRN Jamir Verdugo MD    ipratropium-albuterol 3 mL Nebulization BID Jamir Verdugo MD    minoxidil 10 mg Oral BID Cindy Donahue PA-C    nebivolol 20 mg Oral BID Mary Reynolds MD    potassium chloride 40 mEq Oral Daily [place] PA-C    sodium chloride 1,000 mL Intravenous Once Wiley Delaney PA-C Last Rate: Stopped (03/15/18 1133)        Today, Patient Was Seen By: Orestes Ceron PA-C    ** Please Note: Dictation voice to text software may have been used in the creation of this document   **

## 2018-03-17 NOTE — ASSESSMENT & PLAN NOTE
· As evidenced on chest x-ray and CTA with left upper lobe infiltrate, Patient was hospitalized in January >2 days  · IV cefepime as above, Vanco discontinued with MRSA (-)  · Influenza negative, blood culture NGTD x 24 hours  · Sputum culture pending contaminated  · Strep pneumo and Legionella negative  · Continue nebulizer treatments b i d  set

## 2018-03-17 NOTE — ASSESSMENT & PLAN NOTE
· Does not seem to be in acute exacerbation, mild wheezing on exam, no indication for IV steroids at this time    Continue nebulizer treatments  · Monitor symptoms

## 2018-03-17 NOTE — ASSESSMENT & PLAN NOTE
· Required BiPAP on admission  Overall improved, saturating well on nasal cannula  Likely related to hospital-acquired pneumonia +/- acute CHF with elevated BNP present on admission  · Chest x-ray: New focal patchy consolidation in the left upper lobe compatible with pneumonia  · CTA -  No pulmonary embolus  New left upper lobe and to a lesser extent superior segment left lower lobe alveolar infiltrates    · BNP 1773  · Continue cefepime, discontinue Vanco with MRSA screen (-)    · O2 sat mid to low 90s currently on nasal cannula, attempt to wean with improvement, ambulatory O2 sat tomorrow  · Continue nebulizer treatments, incentive spirometer

## 2018-03-17 NOTE — ASSESSMENT & PLAN NOTE
· Patient's creatinine baseline per records around 1 5-1 6  · Creatinine improved with resumption of furosemide, likely cardiorenal   · BMP in a m

## 2018-03-17 NOTE — ASSESSMENT & PLAN NOTE
· Patient with recent biopsy of inguinal lymph nodes on the left side  Follow up outpatient as planned    · Patient continues to have swelling in the area of biopsy and along the left lower extremity  · Encourage compression - he is not wearing presently

## 2018-03-18 ENCOUNTER — APPOINTMENT (INPATIENT)
Dept: ULTRASOUND IMAGING | Facility: HOSPITAL | Age: 53
DRG: 193 | End: 2018-03-18
Payer: COMMERCIAL

## 2018-03-18 LAB
ANION GAP SERPL CALCULATED.3IONS-SCNC: 10 MMOL/L (ref 4–13)
BUN SERPL-MCNC: 12 MG/DL (ref 5–25)
CALCIUM SERPL-MCNC: 8.8 MG/DL (ref 8.3–10.1)
CHLORIDE SERPL-SCNC: 107 MMOL/L (ref 100–108)
CO2 SERPL-SCNC: 27 MMOL/L (ref 21–32)
CREAT SERPL-MCNC: 1.3 MG/DL (ref 0.6–1.3)
GFR SERPL CREATININE-BSD FRML MDRD: 72 ML/MIN/1.73SQ M
GLUCOSE SERPL-MCNC: 95 MG/DL (ref 65–140)
HCT VFR BLD AUTO: 34.1 % (ref 36.5–49.3)
HGB BLD-MCNC: 11.2 G/DL (ref 12–17)
MAGNESIUM SERPL-MCNC: 1.7 MG/DL (ref 1.6–2.6)
POTASSIUM SERPL-SCNC: 3.3 MMOL/L (ref 3.5–5.3)
SODIUM SERPL-SCNC: 144 MMOL/L (ref 136–145)

## 2018-03-18 PROCEDURE — 85018 HEMOGLOBIN: CPT | Performed by: PHYSICIAN ASSISTANT

## 2018-03-18 PROCEDURE — 83735 ASSAY OF MAGNESIUM: CPT | Performed by: PHYSICIAN ASSISTANT

## 2018-03-18 PROCEDURE — 94660 CPAP INITIATION&MGMT: CPT

## 2018-03-18 PROCEDURE — 99232 SBSQ HOSP IP/OBS MODERATE 35: CPT | Performed by: PHYSICIAN ASSISTANT

## 2018-03-18 PROCEDURE — 80048 BASIC METABOLIC PNL TOTAL CA: CPT | Performed by: PHYSICIAN ASSISTANT

## 2018-03-18 PROCEDURE — 76705 ECHO EXAM OF ABDOMEN: CPT

## 2018-03-18 PROCEDURE — 85014 HEMATOCRIT: CPT | Performed by: PHYSICIAN ASSISTANT

## 2018-03-18 PROCEDURE — 94640 AIRWAY INHALATION TREATMENT: CPT

## 2018-03-18 PROCEDURE — 94760 N-INVAS EAR/PLS OXIMETRY 1: CPT

## 2018-03-18 RX ORDER — MINOXIDIL 2.5 MG/1
20 TABLET ORAL 2 TIMES DAILY
Status: DISCONTINUED | OUTPATIENT
Start: 2018-03-18 | End: 2018-03-19 | Stop reason: HOSPADM

## 2018-03-18 RX ORDER — ECHINACEA PURPUREA EXTRACT 125 MG
2 TABLET ORAL AS NEEDED
Status: DISCONTINUED | OUTPATIENT
Start: 2018-03-18 | End: 2018-03-19 | Stop reason: HOSPADM

## 2018-03-18 RX ORDER — PREDNISONE 20 MG/1
40 TABLET ORAL ONCE
Status: COMPLETED | OUTPATIENT
Start: 2018-03-18 | End: 2018-03-18

## 2018-03-18 RX ADMIN — CEFEPIME HYDROCHLORIDE 2000 MG: 2 INJECTION, POWDER, FOR SOLUTION INTRAVENOUS at 03:04

## 2018-03-18 RX ADMIN — ASPIRIN 325 MG: 325 TABLET, COATED ORAL at 08:38

## 2018-03-18 RX ADMIN — ALLOPURINOL 100 MG: 100 TABLET ORAL at 08:38

## 2018-03-18 RX ADMIN — FUROSEMIDE 40 MG: 40 TABLET ORAL at 16:55

## 2018-03-18 RX ADMIN — AMLODIPINE BESYLATE 10 MG: 10 TABLET ORAL at 08:38

## 2018-03-18 RX ADMIN — MINOXIDIL 10 MG: 2.5 TABLET ORAL at 08:39

## 2018-03-18 RX ADMIN — ATORVASTATIN CALCIUM 40 MG: 40 TABLET, FILM COATED ORAL at 21:53

## 2018-03-18 RX ADMIN — FUROSEMIDE 40 MG: 40 TABLET ORAL at 08:38

## 2018-03-18 RX ADMIN — IPRATROPIUM BROMIDE AND ALBUTEROL SULFATE 3 ML: .5; 3 SOLUTION RESPIRATORY (INHALATION) at 13:59

## 2018-03-18 RX ADMIN — HYDROXYZINE HYDROCHLORIDE 25 MG: 25 TABLET, FILM COATED ORAL at 23:40

## 2018-03-18 RX ADMIN — APIXABAN 5 MG: 5 TABLET, FILM COATED ORAL at 08:38

## 2018-03-18 RX ADMIN — CEFEPIME HYDROCHLORIDE 2000 MG: 2 INJECTION, POWDER, FOR SOLUTION INTRAVENOUS at 23:20

## 2018-03-18 RX ADMIN — NEBIVOLOL HYDROCHLORIDE 20 MG: 10 TABLET ORAL at 08:38

## 2018-03-18 RX ADMIN — IPRATROPIUM BROMIDE AND ALBUTEROL SULFATE 3 ML: .5; 3 SOLUTION RESPIRATORY (INHALATION) at 20:01

## 2018-03-18 RX ADMIN — NEBIVOLOL HYDROCHLORIDE 20 MG: 10 TABLET ORAL at 17:15

## 2018-03-18 RX ADMIN — MINOXIDIL 20 MG: 2.5 TABLET ORAL at 21:54

## 2018-03-18 RX ADMIN — IPRATROPIUM BROMIDE AND ALBUTEROL SULFATE 3 ML: .5; 3 SOLUTION RESPIRATORY (INHALATION) at 09:20

## 2018-03-18 RX ADMIN — PREDNISONE 40 MG: 20 TABLET ORAL at 11:40

## 2018-03-18 RX ADMIN — CEFEPIME HYDROCHLORIDE 2000 MG: 2 INJECTION, POWDER, FOR SOLUTION INTRAVENOUS at 12:41

## 2018-03-18 RX ADMIN — DOXAZOSIN 4 MG: 4 TABLET ORAL at 21:53

## 2018-03-18 RX ADMIN — APIXABAN 5 MG: 5 TABLET, FILM COATED ORAL at 17:15

## 2018-03-18 RX ADMIN — POTASSIUM CHLORIDE 40 MEQ: 1500 TABLET, EXTENDED RELEASE ORAL at 08:38

## 2018-03-18 NOTE — ASSESSMENT & PLAN NOTE
· Required BiPAP on admission  Overall improved, saturating well on nasal cannula  Likely related to hospital-acquired pneumonia +/- acute CHF with elevated BNP present on admission  · Chest x-ray: New focal patchy consolidation in the left upper lobe compatible with pneumonia  · CTA -  No pulmonary embolus  New left upper lobe and to a lesser extent superior segment left lower lobe alveolar infiltrates  · BNP 1773  · Continue cefepime, MRSA (-)    · O2 sat mid to low 90s currently on nasal cannula - patient feels he does well with oxygen at night with his breathing machine    Will do ambulatory O2 sat today, will also spot check oxygen sat at night within without supplement  · Continue nebulizer treatments, incentive spirometer

## 2018-03-18 NOTE — ASSESSMENT & PLAN NOTE
· Patient with recent biopsy of inguinal lymph nodes on the left side  He has significant swelling in the left inguinal region, will get follow-up ultrasound to see if this is just postop from the biopsy with a seroma or hematoma  Can consider surgical drainage, though he is having no pain    · Knee-high compression

## 2018-03-18 NOTE — ASSESSMENT & PLAN NOTE
· He has mild wheezing on exam today, will give single dose prednisone 40 mg today  Continue nebulizer treatments    · Monitor symptoms

## 2018-03-18 NOTE — PROGRESS NOTES
Progress Note Karen Stephenson 1965, 45 y o  male MRN: [id number]    Unit/Bed#: -Greg Encounter: [id number]    Primary Care Provider: Marcella Candelario MD   Date and time admitted to hospital: 3/15/2018 11:10 AM        * Acute respiratory failure with hypoxia ([address] )   Assessment & Plan    · Required BiPAP on admission  Overall improved, saturating well on nasal cannula  Likely related to hospital-acquired pneumonia +/- acute CHF with elevated BNP present on admission  · Chest x-ray: New focal patchy consolidation in the left upper lobe compatible with pneumonia  · CTA -  No pulmonary embolus  New left upper lobe and to a lesser extent superior segment left lower lobe alveolar infiltrates  · BNP 1773  · Continue cefepime, MRSA (-)    · O2 sat mid to low 90s currently on nasal cannula - patient feels he does well with oxygen at night with his breathing machine  Will do ambulatory O2 sat today, will also spot check oxygen sat at night within without supplement  · Continue nebulizer treatments, incentive spirometer          Chronic diastolic CHF (congestive heart failure) (HCC)   Assessment & Plan    · Suspect mild degree acute on chronic diastolic CHF   · Elevated BNP on admission w/ lower extremity edema - improvement with resumption of Lasix  · Patient also has pericardial effusion, known  · Continue Lasix 40 mg b i d , resume 80 mg daily on discharge  · Creatinine improving with diuresis, suspected cardiorenal  · Continue replete K po and daily BMP     · Obtain daily weights, I&Os, will place on low-sodium diet        Essential hypertension   Assessment & Plan    · Blood pressure remains above goal  · Continue amlodipine 10 mg daily, doxazosin 4 mg, minoxidil 10 mg b i d , bystolic 20 mg b i d  and monitor  · Will increase minoxidil today to 20 mg b i d          HAP (hospital-acquired pneumonia)   Assessment & Plan    · As evidenced on chest x-ray and CTA with left upper lobe infiltrate, Patient was hospitalized in January >2 days  · IV cefepime as above, Vanco discontinued with MRSA (-)  · Influenza negative, blood culture NGTD x 48 hours  · Sputum culture pending contaminated, no need to repeat  · Strep pneumo and Legionella negative  · Continue nebulizer treatments b i d  Set  · Patient with continued complaints of hemoptysis, seems more irritated than anything, will add nasal saline, humidification to oxygen, pulmonology consult  No fevers or sweats, no unintentional weight loss, no cavitary lesions noted on CTA  He has had a (-) PPD in the past        COPD (chronic obstructive pulmonary disease) ([address] )   Assessment & Plan    · He has mild wheezing on exam today, will give single dose prednisone 40 mg today  Continue nebulizer treatments  · Monitor symptoms        Pericardial effusion   Assessment & Plan    · Persistent and stable  I think his respiratory status has improved, will hold off on cardiology evaluation  He can follow up outpatient  Lasix has been resumed  Previous echoes have not showed any evidence of tamponade        Stage 3 chronic kidney disease   Assessment & Plan    · Patient's creatinine baseline per records around 1 5-1 6  · Creatinine improved with resumption of furosemide, likely cardiorenal   · BMP in a m  DVT (deep venous thrombosis) (HCC)   Assessment & Plan    · Continue anticoagulation with Eliquis, diagnosed in January of this year, has been compliant        Lymphedema   Assessment & Plan    · Patient with recent biopsy of inguinal lymph nodes on the left side  He has significant swelling in the left inguinal region, will get follow-up ultrasound to see if this is just postop from the biopsy with a seroma or hematoma  Can consider surgical drainage, though he is having no pain    · Knee-high compression          VTE Pharmacologic Prophylaxis:   Pharmacologic: Apixaban (Eliquis)  Mechanical VTE Prophylaxis in Place: No - he has compression on presently     Patient Centered Rounds: I have performed bedside rounds with nursing staff today  Discussions with Specialists or Other Care Team Provider:  None at this time    Education and Discussions with Family / Patient:  Discussed with the patient, I discussed with his wife over the phone yesterday  Time Spent for Care: 20 minutes  More than 50% of total time spent on counseling and coordination of care as described above  Current Length of Stay: 3 day(s)    Current Patient Status: Inpatient   Certification Statement: The patient will continue to require additional inpatient hospital stay due to Will trial prednisone today, overall respiratory status is improved however he continues to wheeze today  Await ultrasound of the groin    Discharge Plan:  Possible discharge in the next 24 hours if respiratory status improves    Code Status: Level 1 - Full Code    CC:  Inguinal swelling  Subjective:   Patient seen examined with a nurse  He is wheezing this morning, he feels better overnight with the oxygen on for his CPAP  He does not have that at home  He denies chest pain or palpitations presently  He really has not been ambulatory today but does continue to have shortness of breath with ambulation  He has persistent left inguinal swelling, recent lymph node biopsy taken  Prior to the biopsy, he reports he did not have this area of swelling  Reports lower extremity edema is improved  Objective:     Vitals:   Temp (24hrs), Av 2 °F (36 8 °C), Min:97 5 °F (36 4 °C), Max:98 6 °F (37 °C)    HR:  [70-85] 70  Resp:  [18] 18  BP: (150-162)/(79-85) 162/85  SpO2:  [91 %-98 %] 97 %  Body mass index is 32 52 kg/m²  Input and Output Summary (last 24 hours):        Intake/Output Summary (Last 24 hours) at 18 1033  Last data filed at 18 0952   Gross per 24 hour   Intake             1020 ml   Output             1925 ml   Net             -905 ml       Physical Exam:     Physical Exam   Constitutional: Vital signs are normal  He appears well-developed and well-nourished  No distress  AAM in NAD, oxygen not on currently (he has it lying next to him on bed)   Cardiovascular: Normal rate, regular rhythm, S1 normal, S2 normal and normal heart sounds  Exam reveals no friction rub  No murmur heard  Pulmonary/Chest: Effort normal  No respiratory distress  He has decreased breath sounds in the right lower field and the left lower field  He has wheezes in the right lower field and the left lower field  He has no rhonchi  He has no rales  Abdominal: Soft  Bowel sounds are normal  He exhibits no distension  There is no tenderness  obese   Musculoskeletal: He exhibits no edema  Compression stockings on    Nursing note and vitals reviewed  Additional Data:     Labs:      Results from last 7 days  Lab Units 03/18/18  1005 03/17/18  0531 03/16/18  0504   WBC Thousand/uL  --  8 74 8 57   HEMOGLOBIN g/dL 11 2* 9 9* 10 1*   HEMATOCRIT % 34 1* 30 1* 31 5*   PLATELETS Thousands/uL  --  265 247   NEUTROS PCT %  --   --  70   LYMPHS PCT %  --   --  13*   MONOS PCT %  --   --  13*   EOS PCT %  --   --  3       Results from last 7 days  Lab Units 03/18/18  0518  03/15/18  1126   SODIUM mmol/L 144  < > 141   POTASSIUM mmol/L 3 3*  < > 3 5   CHLORIDE mmol/L 107  < > 105   CO2 mmol/L 27  < > 25   BUN mg/dL 12  < > 20   CREATININE mg/dL 1 30  < > 1 55*   CALCIUM mg/dL 8 8  < > 9 6   TOTAL PROTEIN g/dL  --   --  7 7   BILIRUBIN TOTAL mg/dL  --   --  2 40*   ALK PHOS U/L  --   --  77   ALT U/L  --   --  23   AST U/L  --   --  16   GLUCOSE RANDOM mg/dL 95  < > 85   < > = values in this interval not displayed  Results from last 7 days  Lab Units 03/15/18  1126   INR  1 34*       * I Have Reviewed All Lab Data Listed Above  * Additional Pertinent Lab Tests Reviewed:  All Labs Within Last 24 Hours Reviewed    Imaging:    Imaging Reports Reviewed Today Include:  None at this time, inguinal ultrasound pending  Imaging Personally Reviewed by Myself Includes:  None    Recent Cultures (last 7 days):       Results from last 7 days  Lab Units 03/15/18  2221 03/15/18  2038 03/15/18  2036 03/15/18  1126   BLOOD CULTURE   --   --   --  No Growth at 48 hrs  No Growth at 48 hrs  SPUTUM CULTURE   --  Test not performed  Suggest repeat specimen  --   --    GRAM STAIN RESULT   --  >10 squamous epithelial cells/lpf, indicating orophayngeal contamination  1+ Polys  2+ Gram positive cocci in pairs  --   --    INFLUENZA A PCR   --   --  None Detected  --    INFLUENZA B PCR   --   --  None Detected  --    RSV PCR   --   --  None Detected  --    LEGIONELLA URINARY ANTIGEN  Negative  --   --   --        Last 24 Hours Medication List:     Current Facility-Administered Medications:  acetaminophen 650 mg Oral Q6H PRN Darleen Gunter PA-C    allopurinol 100 mg Oral Daily Mary Reynolds MD    amLODIPine 10 mg Oral Daily Mary Reynolds MD    apixaban 5 mg Oral BID Joaquina Daniel MD    aspirin 325 mg Oral Daily Mary Reynolds MD    atorvastatin 40 mg Oral HS Mary Reynolds MD    cefepime 2,000 mg Intravenous Q12H Tiana Olson PA-C Last Rate: 2,000 mg (03/18/18 0304)   doxazosin 4 mg Oral HS Joaquina Daniel MD    furosemide 40 mg Oral BID (diuretic) [place] PA-C    hydrOXYzine HCL 25 mg Oral Q6H PRN Joaquina Daniel MD    ipratropium-albuterol 3 mL Nebulization TID Joaquina Daniel MD    minoxidil 20 mg Oral BID [place] PA-C    nebivolol 20 mg Oral BID Joaquina Daniel MD    potassium chloride 40 mEq Oral Daily [place] PA-C    predniSONE 40 mg Oral Once [place] PA-C    sodium chloride 1,000 mL Intravenous Once Cristin Frederick PA-C Last Rate: Stopped (03/15/18 1133)        Today, Patient Was Seen By: Joon Watts PA-C    ** Please Note: Dictation voice to text software may have been used in the creation of this document   **

## 2018-03-18 NOTE — ASSESSMENT & PLAN NOTE
· Blood pressure remains above goal  · Continue amlodipine 10 mg daily, doxazosin 4 mg, minoxidil 10 mg b i d , bystolic 20 mg b i d  and monitor  · Will increase minoxidil today to 20 mg b i d

## 2018-03-18 NOTE — ASSESSMENT & PLAN NOTE
· As evidenced on chest x-ray and CTA with left upper lobe infiltrate, Patient was hospitalized in January >2 days  · IV cefepime as above, Vanco discontinued with MRSA (-)  · Influenza negative, blood culture NGTD x 48 hours  · Sputum culture pending contaminated, no need to repeat  · Strep pneumo and Legionella negative  · Continue nebulizer treatments b i d  Set  · Patient with continued complaints of hemoptysis, seems more irritated than anything, will add nasal saline, humidification to oxygen, pulmonology consult  No fevers or sweats, no unintentional weight loss, no cavitary lesions noted on CTA    He has had a (-) PPD in the past

## 2018-03-18 NOTE — ASSESSMENT & PLAN NOTE
· Suspect mild degree acute on chronic diastolic CHF   · Elevated BNP on admission w/ lower extremity edema - improvement with resumption of Lasix  · Patient also has pericardial effusion, known  · Continue Lasix 40 mg b i d , resume 80 mg daily on discharge  · Creatinine improving with diuresis, suspected cardiorenal  · Continue replete K po and daily BMP     · Obtain daily weights, I&Os, will place on low-sodium diet

## 2018-03-19 VITALS
TEMPERATURE: 97.5 F | HEART RATE: 69 BPM | OXYGEN SATURATION: 93 % | RESPIRATION RATE: 18 BRPM | DIASTOLIC BLOOD PRESSURE: 81 MMHG | BODY MASS INDEX: 32.41 KG/M2 | SYSTOLIC BLOOD PRESSURE: 153 MMHG | HEIGHT: 68 IN | WEIGHT: 213.86 LBS

## 2018-03-19 LAB
ANION GAP SERPL CALCULATED.3IONS-SCNC: 9 MMOL/L (ref 4–13)
BUN SERPL-MCNC: 18 MG/DL (ref 5–25)
CALCIUM SERPL-MCNC: 9.2 MG/DL (ref 8.3–10.1)
CHLORIDE SERPL-SCNC: 107 MMOL/L (ref 100–108)
CO2 SERPL-SCNC: 27 MMOL/L (ref 21–32)
CREAT SERPL-MCNC: 1.33 MG/DL (ref 0.6–1.3)
GFR SERPL CREATININE-BSD FRML MDRD: 70 ML/MIN/1.73SQ M
GLUCOSE SERPL-MCNC: 127 MG/DL (ref 65–140)
MAGNESIUM SERPL-MCNC: 1.8 MG/DL (ref 1.6–2.6)
POTASSIUM SERPL-SCNC: 3.6 MMOL/L (ref 3.5–5.3)
SODIUM SERPL-SCNC: 143 MMOL/L (ref 136–145)

## 2018-03-19 PROCEDURE — 94660 CPAP INITIATION&MGMT: CPT

## 2018-03-19 PROCEDURE — 83735 ASSAY OF MAGNESIUM: CPT | Performed by: PHYSICIAN ASSISTANT

## 2018-03-19 PROCEDURE — 94760 N-INVAS EAR/PLS OXIMETRY 1: CPT

## 2018-03-19 PROCEDURE — 94640 AIRWAY INHALATION TREATMENT: CPT

## 2018-03-19 PROCEDURE — 99221 1ST HOSP IP/OBS SF/LOW 40: CPT | Performed by: SURGERY

## 2018-03-19 PROCEDURE — 99238 HOSP IP/OBS DSCHRG MGMT 30/<: CPT | Performed by: PHYSICIAN ASSISTANT

## 2018-03-19 PROCEDURE — 94762 N-INVAS EAR/PLS OXIMTRY CONT: CPT

## 2018-03-19 PROCEDURE — 99222 1ST HOSP IP/OBS MODERATE 55: CPT | Performed by: INTERNAL MEDICINE

## 2018-03-19 PROCEDURE — 80048 BASIC METABOLIC PNL TOTAL CA: CPT | Performed by: PHYSICIAN ASSISTANT

## 2018-03-19 RX ORDER — LEVOFLOXACIN 750 MG/1
750 TABLET ORAL EVERY 24 HOURS
Qty: 4 TABLET | Refills: 0 | Status: SHIPPED | OUTPATIENT
Start: 2018-03-19 | End: 2018-03-23

## 2018-03-19 RX ORDER — ALBUTEROL SULFATE 90 UG/1
2 AEROSOL, METERED RESPIRATORY (INHALATION) EVERY 6 HOURS PRN
Qty: 1 EACH | Refills: 0 | Status: ON HOLD | OUTPATIENT
Start: 2018-03-19 | End: 2020-07-11

## 2018-03-19 RX ADMIN — IPRATROPIUM BROMIDE AND ALBUTEROL SULFATE 3 ML: .5; 3 SOLUTION RESPIRATORY (INHALATION) at 13:08

## 2018-03-19 RX ADMIN — NEBIVOLOL HYDROCHLORIDE 20 MG: 10 TABLET ORAL at 09:40

## 2018-03-19 RX ADMIN — ALLOPURINOL 100 MG: 100 TABLET ORAL at 09:42

## 2018-03-19 RX ADMIN — MINOXIDIL 20 MG: 2.5 TABLET ORAL at 09:44

## 2018-03-19 RX ADMIN — ASPIRIN 325 MG: 325 TABLET, COATED ORAL at 09:41

## 2018-03-19 RX ADMIN — AMLODIPINE BESYLATE 10 MG: 10 TABLET ORAL at 09:42

## 2018-03-19 RX ADMIN — IPRATROPIUM BROMIDE AND ALBUTEROL SULFATE 3 ML: .5; 3 SOLUTION RESPIRATORY (INHALATION) at 08:24

## 2018-03-19 RX ADMIN — APIXABAN 5 MG: 5 TABLET, FILM COATED ORAL at 09:42

## 2018-03-19 RX ADMIN — POTASSIUM CHLORIDE 40 MEQ: 1500 TABLET, EXTENDED RELEASE ORAL at 09:40

## 2018-03-19 RX ADMIN — FUROSEMIDE 40 MG: 40 TABLET ORAL at 09:39

## 2018-03-19 NOTE — PROGRESS NOTES
Patient ambulated approximately 100 ft, tolerated well, talking while walking pulse ox remained at 94%, when back at bed pulse ox dropped to 93% and came back up to 94-95% on room air

## 2018-03-19 NOTE — SOCIAL WORK
Spoke with patient in his room and patient stated that he lives in a house with his wife and there is a full flight of stairs which he uses without difficulty  He is independent with ADL's and ambulation  He does have a walker, W/C, shower chair  He has used [facility] in the past  He purchases his meds at [facility] and has been able to purchases all of his meds  There is no history of substance abuse or mental illness  He has no POA  He drives and does have a ride home at [place]  CM reviewed discharge planning process including the following: identifying help at home, patient preference for discharge planning needs, pharmacy preference, and availability of treatment team to discuss questions or concerns patient and/or family may have regarding understanding medications and recognizing signs and symptoms once discharged  CM also encouraged patient to follow up with all recommended appointments after discharge  Patient advised of importance for patient and family to participate in managing patients medical well being

## 2018-03-19 NOTE — ASSESSMENT & PLAN NOTE
· Suspect mild degree acute on chronic diastolic CHF   · Elevated BNP on admission w/ lower extremity edema - improvement with resumption of Lasix  · Patient also has pericardial effusion, known  · Continue Lasix 40 mg b i d , resume 80 mg daily on discharge and restart eplerenone  · Creatinine improving with diuresis, suspected cardiorenal  · Creatinine appears to be baseline on discharge    · Continue daily weights, low-sodium diet

## 2018-03-19 NOTE — PLAN OF CARE
Problem: DISCHARGE PLANNING - CARE MANAGEMENT  Goal: Discharge to post-acute care or home with appropriate resources  INTERVENTIONS:  - Conduct assessment to determine patient/family and health care team treatment goals, and need for post-acute services based on payer coverage, community resources, and patient preferences, and barriers to discharge  - Address psychosocial, clinical, and financial barriers to discharge as identified in assessment in conjunction with the patient/family and health care team  - Arrange appropriate level of post-acute services according to patients   needs and preference and payer coverage in collaboration with the physician and health care team  - Communicate with and update the patient/family, physician, and health care team regarding progress on the discharge plan  - Arrange appropriate transportation to post-acute venues  Outcome: Progressing  Spoke with patient in his room and patient stated that he lives in a house with his wife and there is a full flight of stairs which he uses without difficulty  He is independent with ADL's and ambulation  He does have a walker, W/C, shower chair  He has used [facility] in the past  He purchases his meds at [facility] and has been able to purchases all of his meds  There is no history of substance abuse or mental illness  He has no POA  He drives and does have a ride home at [place]

## 2018-03-19 NOTE — DISCHARGE INSTRUCTIONS
COPD: Prevent Exacerbations   AMBULATORY CARE:   An exacerbation of COPD  means your symptoms get much worse very quickly  Exacerbations can be triggered by infections such as a cold or the flu  Lung irritants such as air pollution, dust, fumes, or smoke can also trigger an exacerbation  Exacerbations of COPD can be life-threatening  Protect yourself from germs:  Germs can get into your lungs and cause an infection  An infection in your lungs can create more mucus and make it harder to breathe  An infection can also create swelling in your airways and prevent air from getting in  You can decrease your risk for infection by doing the following:  · Wash your hands often with soap and water  Carry germ-killing gel with you  You can use the gel to clean your hands when soap and water are not available  · Do not touch your eyes, nose, or mouth unless you have washed your hands first      · Always cover your mouth when you cough  Cough into a tissue or your shirtsleeve so you do not spread germs from your hands  · Try to avoid people who have a cold or the flu  If you are sick, stay away from others as much as possible  Do not smoke, and avoid others who smoke:  Nicotine and other substances can cause lung irritation or damage and make it harder for you to breathe  Do not use e-cigarettes or smokeless tobacco  They still contain nicotine  Ask your healthcare provider for information if you currently smoke and need help to quit  For support and more information:  · [employer]  Phone: 3- 975 - 322-2147  Web Address: [employer]  Use pursed-lip breathing any time you feel short of breath: Take a deep breath in through your nose  Slowly breathe out through your mouth with your lips pursed for twice as long as you inhaled  You can also practice this breathing pattern while you bend, lift, climb stairs, or exercise  It slows down your breathing and helps move more air in and out of your lungs  Take your medicines as directed: Your healthcare provider may prescribe medicine to treat an infection or help you breathe easier  It is important that you take your medicines as directed to prevent an exacerbation  Refill your medicines before you are out so that you do not miss a dose  Ask your healthcare provider if you have any questions on how to take your medicines  Avoid lung irritants:  Stay out of high altitudes and places with high humidity  Stay inside, or cover your mouth and nose with a scarf when you are outside during cold weather  Stay inside on days when air pollution or pollen counts are high  Do not use aerosol sprays such as deodorant, bug spray, and hair spray  Drink more liquids: This will help to keep your air passages moist and help you cough up mucus  Ask how much liquid to drink each day and which liquids are best for you  Ask about vaccines: Your healthcare provider may recommend that you get regular flu and pneumonia vaccines  Pneumonia can become life-threatening for a person who has COPD  Ask about other vaccines you may need  Ask your healthcare provider about the flu and pneumonia vaccines  All adults should get the flu (influenza) vaccine every year as soon as it becomes available  The pneumonia vaccine is given to adults aged 72 or older to prevent pneumococcal disease, such as pneumonia  Adults aged 23 to 59 years who are at high risk for pneumococcal disease also should get the pneumococcal vaccine  It may need to be repeated 1 or 5 years later  © 2017 [address] Information is for End User's use only and may not be sold, redistributed or otherwise used for commercial purposes  All illustrations and images included in CareNotes® are the copyrighted property of [employer]  or [employer]  The above information is an  only  It is not intended as medical advice for individual conditions or treatments   Talk to your doctor, nurse or pharmacist before following any medical regimen to see if it is safe and effective for you  Community Acquired Pneumonia   WHAT YOU NEED TO KNOW:   What is community-acquired pneumonia (CAP)? CAP is a lung infection that you get outside of a hospital or nursing home setting  Your lungs become inflamed and cannot work well  CAP may be caused by bacteria, viruses, or fungi  What increases my risk for CAP? · Chronic lung disease    · Cigarette smoking    · Brain disorders such as stroke, dementia, and cerebral palsy    · Weakened immune system    · Recent surgery or trauma    · Surgery for cancer of the mouth, throat, or neck    · Medical conditions such as diabetes or heart disease  What are the signs and symptoms of CAP?   · Cough that may bring up green, yellow, or bloody mucus    · Fever, chills, or severe shaking    · Shortness of breath    · Breathing and heartbeat that are faster than usual    · Pain in your chest or back when you breathe in or cough    · Fatigue and loss of appetite    · Trouble thinking clearly (especially in elderly people)  How is CAP diagnosed? Your healthcare provider will listen to your lungs for abnormal sounds  You may also need any of the following:  · X-ray or CT scan  pictures may show a lung infection or other problems, such as fluid around your lungs  You may be given contrast liquid to help your lungs show up better in the pictures  Tell the healthcare provider if you have ever had an allergic reaction to contrast liquid  · A pulse oximeter  is a device that measures the amount of oxygen in your blood  · Blood and sputum tests  may be done to check for the germ causing your infection  · Bronchoscopy  is a procedure to look inside your airway and learn the cause of your airway or lung condition  A bronchoscope (thin tube with a light) is inserted into your mouth and moved down your throat to your airway   You may be given medicine to numb your throat and help you relax during the procedure  Tissue and fluid may be collected from your airway or lungs to be tested  How is CAP treated? Treatment will depend on what type of germ is causing your CAP, and how bad your symptoms are  You may need antibiotics if your pneumonia is caused by bacteria  Antiviral medicines may be given if you have viral pneumonia  You may need medicines that dilate your bronchial tubes  You may need oxygen if your blood oxygen level is lower than it should be  You may need to be admitted to the hospital if your pneumonia is severe  What can I do to manage CAP?   · Do not smoke or allow others to smoke around you  Nicotine and other chemicals in cigarettes and cigars can cause lung damage  Ask your healthcare provider for information if you currently smoke and need help to quit  E-cigarettes or smokeless tobacco still contain nicotine  Talk to your healthcare provider before you use these products  · Breathe warm, moist air  This helps loosen mucus  Loosely place a warm, wet washcloth over your nose and mouth  A room humidifier may also help make the air moist     · Take deep breaths  Deep breaths help open your airway  Take 2 deep breaths and cough 2 or 3 times every hour  Coughing helps get mucus out of your body  · Drink liquids as directed  Ask your healthcare provider how much liquid to drink each day and which liquids to drink  Liquids help make mucus thin and easier to get out of your body  · Gently tap your chest   This helps loosen mucus so it is easier to cough  Lie with your head lower than your chest several times a day and tap your chest      · Get plenty of rest   Rest helps your body heal   How can I prevent CAP? · Wash your hands often with soap and water  Carry germ-killing hand gel with you  You can use the gel to clean your hands when soap and water are not available   Do not touch your eyes, nose, or mouth unless you have washed your hands first      · Clean surfaces often  Clean doorknobs, countertops, cell phones, and other surfaces that are touched often  · Always cover your mouth when you cough  Cough into a tissue or your shirtsleeve so you do not spread germs from your hands  · Try to avoid people who have a cold or the flu  If you are sick, stay away from others as much as possible  · Ask about vaccines  You may need a vaccine to help prevent pneumonia  Get an influenza (flu) vaccine every year as soon as it becomes available  When should I seek immediate care? · You are confused and cannot think clearly  · You have increased trouble breathing  · Your lips or fingernails turn gray or blue  When should I contact my healthcare provider? · Your symptoms do not get better, or get worse  · You are urinating less, or not at all  · You have questions or concerns about your condition or care  CARE AGREEMENT:   You have the right to help plan your care  Learn about your health condition and how it may be treated  Discuss treatment options with your caregivers to decide what care you want to receive  You always have the right to refuse treatment  The above information is an  only  It is not intended as medical advice for individual conditions or treatments  Talk to your doctor, nurse or pharmacist before following any medical regimen to see if it is safe and effective for you  © 2017 [address]  Information is for End User's use only and may not be sold, redistributed or otherwise used for commercial purposes  All illustrations and images included in CareNotes® are the copyrighted property of [employer]  or Everette Sanchez  Pneumonia   WHAT YOU NEED TO KNOW:   Pneumonia is an infection in your lungs caused by bacteria, viruses, fungi, or parasites  You can become infected if you come in contact with someone who is sick   You can get pneumonia if you recently had surgery or needed a ventilator to help you breathe  Pneumonia can also be caused by accidentally inhaling saliva or small pieces of food  Pneumonia may cause mild symptoms, or it can be severe and life-threatening  DISCHARGE INSTRUCTIONS:   Seek care immediately if:   · You cough up blood  · Your heart beats more than 100 beats in 1 minute  · You are very tired, confused, and cannot think clearly  · You have chest pain or trouble breathing  · Your lips or fingernails turn gray or blue  Contact your healthcare provider if:   · Your symptoms are the same or get worse 48 hours after you start antibiotics  · Your fever is not below 99°F (37 2°C) 48 hours after you start antibiotics  · You have a fever higher than 101°F (38 3°C)  · You cannot eat, or you have loss of appetite, nausea, or are vomiting  · You have questions or concerns about your condition or care  Medicines:   · Antibiotics  treat pneumonia caused by bacteria  · Acetaminophen  decreases pain and fever  It is available without a doctor's order  Ask how much to take and how often to take it  Follow directions  Read the labels of all other medicines you are using to see if they also contain acetaminophen, or ask your doctor or pharmacist  Acetaminophen can cause liver damage if not taken correctly  Do not use more than 4 grams (4,000 milligrams) total of acetaminophen in one day  · NSAIDs , such as ibuprofen, help decrease swelling, pain, and fever  This medicine is available with or without a doctor's order  NSAIDs can cause stomach bleeding or kidney problems in certain people  If you take blood thinner medicine, always ask your healthcare provider if NSAIDs are safe for you  Always read the medicine label and follow directions  · Take your medicine as directed  Contact your healthcare provider if you think your medicine is not helping or if you have side effects  Tell him or her if you are allergic to any medicine  Keep a list of the medicines, vitamins, and herbs you take  Include the amounts, and when and why you take them  Bring the list or the pill bottles to follow-up visits  Carry your medicine list with you in case of an emergency  Follow up with your healthcare provider as directed: You will need to return for more tests  Write down your questions so you remember to ask them during your visits  Manage your symptoms:   · Rest as needed  Rest often throughout the day  Alternate times of activity with times of rest     · Drink liquids as directed  Ask how much liquid to drink each day and which liquids are best for you  Liquids help thin your mucus, which may make it easier for you to cough it up  · Do not smoke  Avoid secondhand smoke  Smoking increases your risk for pneumonia  Smoking also makes it harder for you to get better after you have had pneumonia  Ask your healthcare provider for information if you need help to quit smoking  · Use a cool mist humidifier  A humidifier will help increase air moisture in your home  This may make it easier for you to breathe and help decrease your cough  · Keep your head elevated  You may be able to breathe better if you lie down with the head of your bed up  Prevent pneumonia:   · Prevent the spread of germs  Wash your hands often with soap and water  Use gel hand cleanser when there is no soap and water available  Do not touch your eyes, nose, or mouth unless you have washed your hands first  Cover your mouth when you cough  Cough into a tissue or your shirtsleeve so you do not spread germs from your hands  If you are sick, stay away from others as much as possible  · Limit alcohol  Women should limit alcohol to 1 drink a day  Men should limit alcohol to 2 drinks a day  A drink of alcohol is 12 ounces of beer, 5 ounces of wine, or 1½ ounces of liquor  · Ask about vaccines  You may need a vaccine to help prevent pneumonia   Get an influenza (flu) vaccine every year as soon as it becomes available  © 2017 [address] Information is for End User's use only and may not be sold, redistributed or otherwise used for commercial purposes  All illustrations and images included in CareNotes® are the copyrighted property of [employer]  or [address]  The above information is an  only  It is not intended as medical advice for individual conditions or treatments  Talk to your doctor, nurse or pharmacist before following any medical regimen to see if it is safe and effective for you  Pericardial Effusion   WHAT YOU NEED TO KNOW:   Pericardial effusion is a buildup of fluid in the pericardium  The pericardium is a 2-layer sac that surrounds the heart  The sac normally contains a small amount of clear fluid between its layers  This allows the heart to move smoothly against other organs in the chest as it beats  The buildup of fluid may affect how the heart works  DISCHARGE INSTRUCTIONS:   Follow up with your healthcare provider as directed:  Write down your questions so you remember to ask them during your visits  Contact your healthcare provider if:   · You have a fever  · You have questions or concerns about your condition or care  Seek care immediately or call 911 if:   · You feel lightheaded or faint  · You have swelling in your legs or feet  · You have shortness of breath  · Your chest pain does not get better or becomes worse  © 2017 [address] Information is for End User's use only and may not be sold, redistributed or otherwise used for commercial purposes  All illustrations and images included in CareNotes® are the copyrighted property of [employer]  or [address]  The above information is an  only  It is not intended as medical advice for individual conditions or treatments   Talk to your doctor, nurse or pharmacist before following any medical regimen to see if it is safe and effective for you

## 2018-03-19 NOTE — CONSULTS
Consultation - Pulmonary Medicine   Terrence Chapman 45 y o  male MRN: [de-identified]  Unit/Bed#: -01 Encounter: [id number]      Assessment:  1  Acute hypoxemic respiratory failure  2  HAP  3  Chronic CHF  4  COPD without exacerbation  5  AGUS on CPAP    Plan:   1  Acute hypoxemic respiratory failure secondary to HAP and chronic CHF  - CT chest shows new OSIEL and LLL alveolar infiltrates with reactive mediastinal nodes  Will need repeat CT chest in 6-8 weeks  - He required BiPAP on admission, now on RA with sats in mid-high 90s  No oxygen required with exertion or at night, Pulse ox done on CPAP  - No leukocytosis, has been afebrile  No hemoptysis during admission  - Also had elevated BNP, diuresis restarted with improvement in symptoms and edema  - Influenza negative, sputum culture with oropharyngeal contamination  Blood cultures NGTD  - He is stable for discharge home from pulmonary standpoint to complete 7 days of antibiotics, no need for steroids  Follow up with Dr Amarilys Hein in 1 week   2  COPD without exacerbation  - Continue dulera and spiriva on discharge, no need for steroids  3  AGUS on CPAP  - Continue CPAP  No need for nocturnal O2, pulse ox done overnight on CPAP showed sats remained in 90s for 99% of the night    History of Present Illness   Physician Requesting Consult: Mackenzie Santiago MD  Reason for Consult / Principal Problem: HAP  Hx and PE limited by: None  HPI: Terrence Chapman is a 45y o  year old male nonsmoker with second hand smoke exposure, PMH of COPD, HTN, CHF, AGUS who presents with complaint of shortness of breath that began about 1 week ago  He also had some sinus congestion and a cough  Cough was mostly dry but did have some streaky hemoptysis prior to admission  He initially required BiPAP on admission, now on room air  No fever or chills  He was recently admitted here  He follows with Dr Amarilys Hein for his COPD   He does not frequenlty have exacerbations, has not had pneumonia in the past      Inpatient consult to Pulmonology  Consult performed by: Carlee Buckner ordered by: Jesús Schumacher          Review of Systems   Constitutional: Negative  HENT: Negative  Respiratory: Positive for cough  Cardiovascular: Negative  Gastrointestinal: Negative  Genitourinary: Negative  Musculoskeletal: Negative  Skin: Negative  Allergic/Immunologic: Negative  Neurological: Negative  Psychiatric/Behavioral: Negative          Historical Information   Past Medical History:   Diagnosis Date    Hypertension     Kidney disease     Leg DVT (deep venous thromboembolism), acute, bilateral ([facility] [address] 01/2018    AGUS on CPAP     setting 11    [facility]      Past Surgical History:   Procedure Laterality Date    CHOLECYSTECTOMY      JOINT REPLACEMENT      LYMPH NODE DISSECTION Left 01/2018    left inguinal LN removed - benign      Social History   History   Alcohol Use No     History   Drug Use No     History   Smoking Status    Never Smoker   Smokeless Tobacco    Never Used     Occupational History: Noncontributlry    Family History: non-contributory    Meds/Allergies   all current active meds have been reviewed, pertinent pulmonary meds have been reviewed and current meds:   Current Facility-Administered Medications   Medication Dose Route Frequency    acetaminophen (TYLENOL) tablet 650 mg  650 mg Oral Q6H PRN    allopurinol (ZYLOPRIM) tablet 100 mg  100 mg Oral Daily    amLODIPine (NORVASC) tablet 10 mg  10 mg Oral Daily    apixaban (ELIQUIS) tablet 5 mg  5 mg Oral BID    aspirin (ECOTRIN) EC tablet 325 mg  325 mg Oral Daily    atorvastatin (LIPITOR) tablet 40 mg  40 mg Oral HS    cefepime (MAXIPIME) 2,000 mg in dextrose 5 % 50 mL IVPB  2,000 mg Intravenous Q12H    doxazosin (CARDURA) tablet 4 mg  4 mg Oral HS    furosemide (LASIX) tablet 40 mg  40 mg Oral BID (diuretic)    hydrOXYzine HCL (ATARAX) tablet 25 mg  25 mg Oral Q6H PRN    ipratropium-albuterol (DUO-NEB) 0 5-2 5 mg/3 mL inhalation solution 3 mL  3 mL Nebulization TID    minoxidil (LONITEN) tablet 20 mg  20 mg Oral BID    nebivolol (BYSTOLIC) tablet 20 mg  20 mg Oral BID    potassium chloride (K-DUR,KLOR-CON) CR tablet 40 mEq  40 mEq Oral Daily    sodium chloride (OCEAN) 0 65 % nasal spray 2 spray  2 spray Each Nare PRN    sodium chloride 0 9 % bolus 1,000 mL  1,000 mL Intravenous Once       Allergies   Allergen Reactions    Clonidine Anaphylaxis    Lisinopril Anaphylaxis    Nifedipine Anaphylaxis    Spironolactone Anaphylaxis       Objective   Vitals: Blood pressure 153/81, pulse 69, temperature 97 5 °F (36 4 °C), temperature source Oral, resp  rate 18, height 5' 8" (1 727 m), weight 97 kg (213 lb 13 7 oz), SpO2 93 %  ,Body mass index is 32 52 kg/m²  Intake/Output Summary (Last 24 hours) at 03/19/18 1547  Last data filed at 03/18/18 1901   Gross per 24 hour   Intake              420 ml   Output              300 ml   Net              120 ml     Invasive Devices     Peripheral Intravenous Line            Peripheral IV 03/17/18 Right Antecubital 1 day                Physical Exam   Constitutional: He is oriented to person, place, and time  He appears well-developed and well-nourished  No distress  HENT:   Mouth/Throat: Oropharynx is clear and moist    Eyes: Pupils are equal, round, and reactive to light  Cardiovascular: Normal rate, regular rhythm and normal heart sounds  No murmur heard  Pulmonary/Chest: Effort normal and breath sounds normal  No accessory muscle usage  No respiratory distress  He has no decreased breath sounds  He has no wheezes  He has no rhonchi  He has no rales  Abdominal: Soft  There is no tenderness  Musculoskeletal: Normal range of motion  Neurological: He is alert and oriented to person, place, and time  Skin: Skin is warm and dry  No rash noted  Psychiatric: He has a normal mood and affect         Lab Results:   I have personally reviewed pertinent lab results  , CBC: No results found for: WBC, HGB, HCT, MCV, PLT, ADJUSTEDWBC, MCH, MCHC, RDW, MPV, NRBC, CMP:   Lab Results   Component Value Date     03/19/2018    K 3 6 03/19/2018     03/19/2018    CO2 27 03/19/2018    ANIONGAP 9 03/19/2018    BUN 18 03/19/2018    CREATININE 1 33 (H) 03/19/2018    GLUCOSE 127 03/19/2018    CALCIUM 9 2 03/19/2018    EGFR 70 03/19/2018     Imaging Studies: I have personally reviewed pertinent reports  and I have personally reviewed pertinent films in PACS  EKG, Pathology, and Other Studies: I have personally reviewed pertinent reports      VTE Prophylaxis: Sequential compression device [place] [employer])     Code Status: Level 1 - Full Code  Advance Directive and Living Will:      Power of :    POLST:

## 2018-03-19 NOTE — ASSESSMENT & PLAN NOTE
· Blood pressure stable, still above goal  · Continue amlodipine 10 mg daily, doxazosin 4 mg, minoxidil 10 mg b i d , bystolic 20 mg b i d  and monitor  · Increased minoxidil to 20 mg b i d    · Resume lasix 80 mg and eplerenone on discharge

## 2018-03-19 NOTE — NURSING NOTE
Discharged to home, PIV removed, tolerated well, patient able to ambulate, given ride to lobby in wheelchair for comfort

## 2018-03-19 NOTE — ASSESSMENT & PLAN NOTE
· Patient with recent biopsy of inguinal lymph nodes on the left side  Ultrasound showing cyst versus hematoma, more likely cyst as there is no signs of infection  · Surgery evaluated the area, no indication for incision and drainage this time    Continue with supportive measures, heat  · Follow up with surgeon and Hematology/Oncology

## 2018-03-19 NOTE — ASSESSMENT & PLAN NOTE
· Persistent and stable  He can follow up outpatient  Lasix has been resumed    Previous echoes have not showed any evidence of tamponade, resume eplerenone

## 2018-03-19 NOTE — ASSESSMENT & PLAN NOTE
· Patient's creatinine baseline per records around 1 5-1 6  · Creatinine improved with resumption of furosemide, likely cardiorenal   · Below baseline on discharge

## 2018-03-19 NOTE — ASSESSMENT & PLAN NOTE
· Wheezing resolved, no further indication for steroids, received single dose of prednisone this hospitalization

## 2018-03-19 NOTE — ASSESSMENT & PLAN NOTE
· Required BiPAP on admission  Overall improved, saturating well on room air  · Likely related to hospital-acquired pneumonia +/- acute CHF with elevated BNP present on admission  · Workup included:  · Chest x-ray: New focal patchy consolidation in the left upper lobe compatible with pneumonia  · CTA -  No pulmonary embolus  New left upper lobe and to a lesser extent superior segment left lower lobe alveolar infiltrates  · BNP 1773  · Continue cefepime, MRSA (-)    · Ambulatory O2 sat shows no desaturations, overnight oxygen testing showed no desaturations  No need for oxygen at home    · Continue albuterol as needed on discharge, maintenance inhaler, incentive spirometer  · Follow up with Dr Donn Cross

## 2018-03-19 NOTE — ASSESSMENT & PLAN NOTE
· As evidenced on chest x-ray and CTA with left upper lobe infiltrate, Patient was hospitalized in January >2 days  · IV cefepime transition to oral Levaquin on discharge x7 day total treatment course  · Influenza negative, blood culture NGTD x 72 hours  · Sputum culture contaminated, no need to repeat, Strep pneumo and Legionella negative  · Appreciate pulmonology input, cleared from their standpoint for discharge with follow-up with his primary pulmonologist

## 2018-03-19 NOTE — CONSULTS
Consultation - General Surgery   Tim Garcia 45 y o  male MRN: [de-identified]  Unit/Bed#: -01 Encounter: [id number]    Assessment/Plan     Assessment:  Left groin hematoma, uninfected and nondraining   Plan:  Continue conservative TX  Recommend f/u with his surgeon as scheduled previously   No acute surgical intervention indicated    History of Present Illness     HPI:  Tim Garcia is a 45 y o  male who presents with a HX of excisional lymph node excision left groin weeks ago which developed a hematoma postoperatively  It has been followed by his surgeon from [facility] with conservative management  It has never been erythematous or tender  He was told that it was a hematoma  Recently it has softened and now is quite soft and completely nontender  US evaluation was performed as ordered by his surgeon which confirmed appearance of hematoma and also found incidental DVT on the other side (RLE)  As a result he is now taking Eliquis      Inpatient consult to Acute Care Surgery  Consult performed by: Umer Clarke ordered by: Agustín Chapin          Review of Systems    Historical Information   Past Medical History:   Diagnosis Date    Hypertension     Kidney disease     Leg DVT (deep venous thromboembolism), acute, bilateral ([address] ) 01/2018    AGUS on CPAP     setting 11    Systolic CHF ([facility] [address] )      Past Surgical History:   Procedure Laterality Date    CHOLECYSTECTOMY      JOINT REPLACEMENT      LYMPH NODE DISSECTION Left 01/2018    left inguinal LN removed - benign      Social History   History   Alcohol Use No     History   Drug Use No     History   Smoking Status    Never Smoker   Smokeless Tobacco    Never Used     Family History: non-contributory    Meds/Allergies   all current active meds have been reviewed  Allergies   Allergen Reactions    Clonidine Anaphylaxis    Lisinopril Anaphylaxis    Nifedipine Anaphylaxis    Spironolactone Anaphylaxis       Objective   First Vitals:   Blood Pressure: (!) 195/95 (03/15/18 1107)  Pulse: 81 (03/15/18 1107)  Temperature: 99 3 °F (37 4 °C) (03/15/18 1107)  Temp Source: Oral (03/15/18 2145)  Respirations: 18 (03/15/18 1107)  Height: 5' 8" (172 7 cm) (03/15/18 1107)  Weight - Scale: 102 kg (224 lb) (03/15/18 1107)  SpO2: 96 % (03/15/18 1107)    Current Vitals:   Blood Pressure: 153/81 (03/19/18 0700)  Pulse: 69 (03/19/18 0700)  Temperature: 97 5 °F (36 4 °C) (03/19/18 0700)  Temp Source: Oral (03/19/18 0700)  Respirations: 18 (03/19/18 0700)  Height: 5' 8" (172 7 cm) (03/15/18 2145)  Weight - Scale: 97 kg (213 lb 13 7 oz) (03/18/18 0822)  SpO2: 96 % (03/19/18 1308)      Intake/Output Summary (Last 24 hours) at 03/19/18 1421  Last data filed at 03/18/18 1901   Gross per 24 hour   Intake              420 ml   Output              300 ml   Net              120 ml       Invasive Devices     Peripheral Intravenous Line            Peripheral IV 03/17/18 Right Antecubital 1 day                Physical Exam   Constitutional: He is oriented to person, place, and time  He appears well-developed and well-nourished  HENT:   Head: Normocephalic  Abdominal: Soft  Bowel sounds are normal  He exhibits no distension  There is no tenderness  Neurological: He is oriented to person, place, and time  No cranial nerve deficit  Skin:   There is a 6 CM by 4 CM soft collection in the left anterior groin beneath previous incision for LNBx  The incision is well healed  There is no drainage or erythema or tenderness  Psychiatric: He has a normal mood and affect   His behavior is normal  Judgment and thought content normal        Lab Results:   CBC: No results found for: WBC, HGB, HCT, MCV, PLT, ADJUSTEDWBC, MCH, MCHC, RDW, MPV, NRBC, CMP:   Lab Results   Component Value Date     03/19/2018    K 3 6 03/19/2018     03/19/2018    CO2 27 03/19/2018    ANIONGAP 9 03/19/2018    BUN 18 03/19/2018    CREATININE 1 33 (H) 03/19/2018    GLUCOSE 127 03/19/2018    CALCIUM 9 2 03/19/2018    EGFR 70 03/19/2018     Imaging: I have personally reviewed pertinent reports  EKG, Pathology, and Other Studies: I have personally reviewed pertinent reports  Counseling / Coordination of Care  Total floor / unit time spent today 30 minutes  Greater than 50% of total time was spent with the patient and / or family counseling and / or coordination of care

## 2018-03-19 NOTE — DISCHARGE SUMMARY
Discharge- General Benavides 1965, 45 y o  male MRN: [id number]    Unit/Bed#: -01 Encounter: [id number]    Primary Care Provider: Abelino Scheuermann, MD   Date and time admitted to hospital: 3/15/2018 11:10 AM        * Acute respiratory failure with hypoxia ([address] )   Assessment & Plan    · Required BiPAP on admission  Overall improved, saturating well on room air  · Likely related to hospital-acquired pneumonia +/- acute CHF with elevated BNP present on admission  · Workup included:  · Chest x-ray: New focal patchy consolidation in the left upper lobe compatible with pneumonia  · CTA -  No pulmonary embolus  New left upper lobe and to a lesser extent superior segment left lower lobe alveolar infiltrates  · BNP 1773  · Continue cefepime, MRSA (-)    · Ambulatory O2 sat shows no desaturations, overnight oxygen testing showed no desaturations  No need for oxygen at home  · Continue albuterol as needed on discharge, maintenance inhaler, incentive spirometer  · Follow up with Dr Victorino Conway          Chronic diastolic CHF (congestive heart failure) (HCC)   Assessment & Plan    · Suspect mild degree acute on chronic diastolic CHF   · Elevated BNP on admission w/ lower extremity edema - improvement with resumption of Lasix  · Patient also has pericardial effusion, known  · Continue Lasix 40 mg b i d , resume 80 mg daily on discharge and restart eplerenone  · Creatinine improving with diuresis, suspected cardiorenal  · Creatinine appears to be baseline on discharge    · Continue daily weights, low-sodium diet        Essential hypertension   Assessment & Plan    · Blood pressure stable, still above goal  · Continue amlodipine 10 mg daily, doxazosin 4 mg, minoxidil 10 mg b i d , bystolic 20 mg b i d  and monitor  · Increased minoxidil to 20 mg b i d    · Resume lasix 80 mg and eplerenone on discharge        HAP (hospital-acquired pneumonia)   Assessment & Plan    · As evidenced on chest x-ray and CTA with left upper lobe infiltrate, Patient was hospitalized in January >2 days  · IV cefepime transition to oral Levaquin on discharge x7 day total treatment course  · Influenza negative, blood culture NGTD x 72 hours  · Sputum culture contaminated, no need to repeat, Strep pneumo and Legionella negative  · Appreciate pulmonology input, cleared from their standpoint for discharge with follow-up with his primary pulmonologist        COPD (chronic obstructive pulmonary disease) ([facility] [address] )   Assessment & Plan    · Wheezing resolved, no further indication for steroids, received single dose of prednisone this hospitalization        Pericardial effusion   Assessment & Plan    · Persistent and stable  He can follow up outpatient  Lasix has been resumed  Previous echoes have not showed any evidence of tamponade, resume eplerenone         Stage 3 chronic kidney disease   Assessment & Plan    · Patient's creatinine baseline per records around 1 5-1 6  · Creatinine improved with resumption of furosemide, likely cardiorenal   · Below baseline on discharge        DVT (deep venous thrombosis) ([facility])   Assessment & Plan    · Continue anticoagulation with Eliquis, diagnosed in January of this year, has been compliant        Lymphedema   Assessment & Plan    · Patient with recent biopsy of inguinal lymph nodes on the left side  Ultrasound showing cyst versus hematoma, more likely cyst as there is no signs of infection  · Surgery evaluated the area, no indication for incision and drainage this time    Continue with supportive measures, heat  · Follow up with surgeon and Hematology/Oncology              Resolved Problems  Date Reviewed: 3/19/2018    None          Consultations During Hospital Stay:  · Pulmonology  · General surgery    Procedures Performed:     · CXR - new focal patchy consolidation left upper lobe  · CTA chest - no PE, new OSIEL with lesser extent LLL alveolar infiltrates, new trace left pleural effusion, reactive lymph nodes, cardiomegaly with small to moderate pericardial effusion, unchanged  · Ultrasound groin - large cystic mass versus chronic hematoma left groin    Significant Findings / Test Results:     · As above  · BUN 18/creatinine 1 33 on discharge  · Potassium 3 6, magnesium 1 8 on discharge  · Legionella, MRSA, strep (-)    Incidental Findings:   · None     Test Results Pending at Discharge (will require follow up): · Final blood cultures, NGTD x 72h on discharge     Outpatient Tests Requested:  · Follow-up CXR in 6 weeks to ensure resolution  · Follow-up Cardiology in 10-14 days for management pericardial effusion  · Follow-up pulmonology in 7-10 days for follow-up pneumonia  · Follow-up PCP in 7-10 days for general medical follow-up    Complications:  None    Reason for Admission:  Shortness of breath    Hospital Course:     Damari Aguilera is a 45 y o  male patient who originally presented to the hospital on 3/15/2018 due to shortness of breath  He has past medical history significant for CHF, pericardial effusion, hypertension, DVT, left inguinal lymph node  Patient had about 1 week of shortness of breath, acutely worsened 24 hours prior to admission  Associated with dry cough, complaints of hemoptysis  Workup revealed left upper lobe and left lower lobe pneumonia  He was started on healthcare associated pathway secondary to recent admission in January  Symptomatically improved, patient was weaned from BiPAP and oxygen  Saturating well on room air on discharge  Pulmonology evaluated the patient, no further workup needed at this time  Follow up outpatient  General surgery saw the patient for the left inguinal swelling, no acute surgical intervention this time  Follow up outpatient with his previous surgeon, Hematology Oncology, PCP  Please see above list of diagnoses and related plan for additional information       Condition at Discharge: good     Discharge Day Visit / Exam:     Subjective:  Patient seen examined, overall he feels a lot better  He denies any shortness of breath presently  He says I feel dry now that the oxygen/medication is off "  He denies lower extremity edema, no dyspnea orthopnea  He denies any palpitations  No pain in the left inguinal region, no warmth, no increase in swelling  He says that it actually feels better since he has been here  Vitals: Blood Pressure: 153/81 (03/19/18 0700)  Pulse: 69 (03/19/18 0700)  Temperature: 97 5 °F (36 4 °C) (03/19/18 0700)  Temp Source: Oral (03/19/18 0700)  Respirations: 18 (03/19/18 0700)  Height: 5' 8" (172 7 cm) (03/15/18 2145)  Weight - Scale: 97 kg (213 lb 13 7 oz) (03/18/18 0822)  SpO2: 93 % (ambulating O2) (03/19/18 1440)  Exam:   Physical Exam   Constitutional: Vital signs are normal  He appears well-developed and well-nourished  No distress  Afternoon American gentleman in no apparent distress, speaking full sentences   Cardiovascular: Normal rate, regular rhythm, S1 normal, S2 normal, normal heart sounds and intact distal pulses  No murmur heard  Pulmonary/Chest: Effort normal and breath sounds normal  No respiratory distress  He has no wheezes  He has no rhonchi  He has no rales  Abdominal: Soft  Bowel sounds are normal  He exhibits no distension  There is no hepatosplenomegaly  There is no tenderness  Obese   Musculoskeletal: He exhibits no edema  Left inguinal swelling, no warmth no overlying erythema   Nursing note and vitals reviewed  Discussion with Family:  Plan of care discussed with his wife as well as patient    Discharge instructions/Information to patient and family:   See after visit summary for information provided to patient and family  Provisions for Follow-Up Care:  See after visit summary for information related to follow-up care and any pertinent home health orders        Disposition:     Home    For Discharges to [facility] SNF:   · Not Applicable to this Patient - Not Applicable to this Patient    Planned Readmission:  None     Discharge Statement:  I spent approximately 25 minutes discharging the patient  This time was spent on the day of discharge  I had direct contact with the patient on the day of discharge  Greater than 50% of the total time was spent examining patient, answering all patient questions, arranging and discussing plan of care with patient as well as directly providing post-discharge instructions  Additional time then spent on discharge activities  Discharge Medications:  See after visit summary for reconciled discharge medications provided to patient and family        ** Please Note: This note has been constructed using a voice recognition system **

## 2018-03-20 LAB
BACTERIA BLD CULT: NORMAL
BACTERIA BLD CULT: NORMAL

## 2018-05-21 ENCOUNTER — APPOINTMENT (INPATIENT)
Dept: CT IMAGING | Facility: HOSPITAL | Age: 53
DRG: 194 | End: 2018-05-21
Payer: COMMERCIAL

## 2018-05-21 ENCOUNTER — APPOINTMENT (EMERGENCY)
Dept: RADIOLOGY | Facility: HOSPITAL | Age: 53
DRG: 194 | End: 2018-05-21
Payer: COMMERCIAL

## 2018-05-21 ENCOUNTER — HOSPITAL ENCOUNTER (INPATIENT)
Facility: HOSPITAL | Age: 53
LOS: 2 days | Discharge: HOME | DRG: 194 | End: 2018-05-23
Attending: EMERGENCY MEDICINE | Admitting: FAMILY MEDICINE
Payer: COMMERCIAL

## 2018-05-21 DIAGNOSIS — N17.9 AKI (ACUTE KIDNEY INJURY) (HCC): ICD-10-CM

## 2018-05-21 DIAGNOSIS — I50.32 CHRONIC DIASTOLIC CHF (CONGESTIVE HEART FAILURE) (HCC): Chronic | ICD-10-CM

## 2018-05-21 DIAGNOSIS — J18.9 PNEUMONIA: ICD-10-CM

## 2018-05-21 DIAGNOSIS — R19.09 INGUINAL MASS: ICD-10-CM

## 2018-05-21 DIAGNOSIS — R06.00 DYSPNEA: Primary | ICD-10-CM

## 2018-05-21 DIAGNOSIS — I50.9 CHF (CONGESTIVE HEART FAILURE) (HCC): ICD-10-CM

## 2018-05-21 DIAGNOSIS — R94.31 ABNORMAL EKG: ICD-10-CM

## 2018-05-21 DIAGNOSIS — I31.3 PERICARDIAL EFFUSION: ICD-10-CM

## 2018-05-21 DIAGNOSIS — J44.9 CHRONIC OBSTRUCTIVE PULMONARY DISEASE, UNSPECIFIED COPD TYPE (HCC): ICD-10-CM

## 2018-05-21 PROBLEM — R06.02 SOB (SHORTNESS OF BREATH): Status: ACTIVE | Noted: 2018-05-21

## 2018-05-21 PROBLEM — N18.2 HYPERTENSIVE KIDNEY DISEASE WITH STAGE 2 CHRONIC KIDNEY DISEASE: Status: ACTIVE | Noted: 2018-05-21

## 2018-05-21 PROBLEM — I12.9 HYPERTENSIVE KIDNEY DISEASE WITH STAGE 2 CHRONIC KIDNEY DISEASE: Status: ACTIVE | Noted: 2018-05-21

## 2018-05-21 LAB
ALBUMIN SERPL BCP-MCNC: 4 G/DL (ref 3.5–5)
ALP SERPL-CCNC: 83 U/L (ref 46–116)
ALT SERPL W P-5'-P-CCNC: 52 U/L (ref 12–78)
ANION GAP SERPL CALCULATED.3IONS-SCNC: 9 MMOL/L (ref 4–13)
AST SERPL W P-5'-P-CCNC: 38 U/L (ref 5–45)
BACTERIA UR QL AUTO: ABNORMAL /HPF
BASOPHILS # BLD AUTO: 0.04 THOUSANDS/ΜL (ref 0–0.1)
BASOPHILS NFR BLD AUTO: 1 % (ref 0–1)
BILIRUB SERPL-MCNC: 1.4 MG/DL (ref 0.2–1)
BILIRUB UR QL STRIP: NEGATIVE
BUN SERPL-MCNC: 17 MG/DL (ref 5–25)
CALCIUM SERPL-MCNC: 9.2 MG/DL (ref 8.3–10.1)
CHLORIDE SERPL-SCNC: 104 MMOL/L (ref 100–108)
CLARITY UR: CLEAR
CO2 SERPL-SCNC: 30 MMOL/L (ref 21–32)
COLOR UR: ABNORMAL
CREAT SERPL-MCNC: 1.62 MG/DL (ref 0.6–1.3)
CREAT UR-MCNC: 145 MG/DL
EOSINOPHIL # BLD AUTO: 0.21 THOUSAND/ΜL (ref 0–0.61)
EOSINOPHIL NFR BLD AUTO: 3 % (ref 0–6)
ERYTHROCYTE [DISTWIDTH] IN BLOOD BY AUTOMATED COUNT: 15.9 % (ref 11.6–15.1)
GFR SERPL CREATININE-BSD FRML MDRD: 55 ML/MIN/1.73SQ M
GLUCOSE SERPL-MCNC: 89 MG/DL (ref 65–140)
GLUCOSE UR STRIP-MCNC: NEGATIVE MG/DL
HCT VFR BLD AUTO: 38.5 % (ref 36.5–49.3)
HGB BLD-MCNC: 12.4 G/DL (ref 12–17)
HGB UR QL STRIP.AUTO: ABNORMAL
HYALINE CASTS #/AREA URNS LPF: ABNORMAL /LPF
IMM GRANULOCYTES # BLD AUTO: 0.03 THOUSAND/UL (ref 0–0.2)
IMM GRANULOCYTES NFR BLD AUTO: 0 % (ref 0–2)
KETONES UR STRIP-MCNC: NEGATIVE MG/DL
L PNEUMO1 AG UR QL IA.RAPID: NEGATIVE
LACTATE SERPL-SCNC: 1.2 MMOL/L (ref 0.5–2)
LEUKOCYTE ESTERASE UR QL STRIP: NEGATIVE
LYMPHOCYTES # BLD AUTO: 0.8 THOUSANDS/ΜL (ref 0.6–4.47)
LYMPHOCYTES NFR BLD AUTO: 11 % (ref 14–44)
MCH RBC QN AUTO: 26.7 PG (ref 26.8–34.3)
MCHC RBC AUTO-ENTMCNC: 32.2 G/DL (ref 31.4–37.4)
MCV RBC AUTO: 83 FL (ref 82–98)
MICROALBUMIN UR-MCNC: 265 MG/L (ref 0–20)
MICROALBUMIN/CREAT 24H UR: 183 MG/G CREATININE (ref 0–30)
MONOCYTES # BLD AUTO: 1.05 THOUSAND/ΜL (ref 0.17–1.22)
MONOCYTES NFR BLD AUTO: 15 % (ref 4–12)
NEUTROPHILS # BLD AUTO: 4.86 THOUSANDS/ΜL (ref 1.85–7.62)
NEUTS SEG NFR BLD AUTO: 70 % (ref 43–75)
NITRITE UR QL STRIP: NEGATIVE
NON-SQ EPI CELLS URNS QL MICRO: ABNORMAL /HPF
NRBC BLD AUTO-RTO: 0 /100 WBCS
NT-PROBNP SERPL-MCNC: 1411 PG/ML
PH UR STRIP.AUTO: 6 [PH] (ref 4.5–8)
PLATELET # BLD AUTO: 220 THOUSANDS/UL (ref 149–390)
PMV BLD AUTO: 10.3 FL (ref 8.9–12.7)
POTASSIUM SERPL-SCNC: 3.7 MMOL/L (ref 3.5–5.3)
PROCALCITONIN SERPL-MCNC: <0.05 NG/ML
PROT SERPL-MCNC: 8 G/DL (ref 6.4–8.2)
PROT UR STRIP-MCNC: ABNORMAL MG/DL
RBC # BLD AUTO: 4.64 MILLION/UL (ref 3.88–5.62)
RBC #/AREA URNS AUTO: ABNORMAL /HPF
S PNEUM AG UR QL: NEGATIVE
SODIUM 24H UR-SCNC: 55 MOL/L
SODIUM SERPL-SCNC: 143 MMOL/L (ref 136–145)
SP GR UR STRIP.AUTO: 1.01 (ref 1–1.03)
TROPONIN I SERPL-MCNC: 0.02 NG/ML
TROPONIN I SERPL-MCNC: 0.02 NG/ML
TROPONIN I SERPL-MCNC: <0.02 NG/ML
UROBILINOGEN UR QL STRIP.AUTO: 0.2 E.U./DL
UUN 24H UR-MCNC: 524 MG/DL
WBC # BLD AUTO: 6.99 THOUSAND/UL (ref 4.31–10.16)
WBC #/AREA URNS AUTO: ABNORMAL /HPF

## 2018-05-21 PROCEDURE — 87449 NOS EACH ORGANISM AG IA: CPT | Performed by: GENERAL PRACTICE

## 2018-05-21 PROCEDURE — 99223 1ST HOSP IP/OBS HIGH 75: CPT | Performed by: INTERNAL MEDICINE

## 2018-05-21 PROCEDURE — 84540 ASSAY OF URINE/UREA-N: CPT | Performed by: INTERNAL MEDICINE

## 2018-05-21 PROCEDURE — 96365 THER/PROPH/DIAG IV INF INIT: CPT

## 2018-05-21 PROCEDURE — 71250 CT THORAX DX C-: CPT

## 2018-05-21 PROCEDURE — 81001 URINALYSIS AUTO W/SCOPE: CPT | Performed by: INTERNAL MEDICINE

## 2018-05-21 PROCEDURE — 93005 ELECTROCARDIOGRAM TRACING: CPT

## 2018-05-21 PROCEDURE — 99223 1ST HOSP IP/OBS HIGH 75: CPT | Performed by: GENERAL PRACTICE

## 2018-05-21 PROCEDURE — 80053 COMPREHEN METABOLIC PANEL: CPT | Performed by: EMERGENCY MEDICINE

## 2018-05-21 PROCEDURE — 94640 AIRWAY INHALATION TREATMENT: CPT

## 2018-05-21 PROCEDURE — 83880 ASSAY OF NATRIURETIC PEPTIDE: CPT | Performed by: EMERGENCY MEDICINE

## 2018-05-21 PROCEDURE — 82570 ASSAY OF URINE CREATININE: CPT | Performed by: INTERNAL MEDICINE

## 2018-05-21 PROCEDURE — 94660 CPAP INITIATION&MGMT: CPT

## 2018-05-21 PROCEDURE — 99285 EMERGENCY DEPT VISIT HI MDM: CPT

## 2018-05-21 PROCEDURE — 84300 ASSAY OF URINE SODIUM: CPT | Performed by: INTERNAL MEDICINE

## 2018-05-21 PROCEDURE — 94760 N-INVAS EAR/PLS OXIMETRY 1: CPT

## 2018-05-21 PROCEDURE — 87040 BLOOD CULTURE FOR BACTERIA: CPT | Performed by: EMERGENCY MEDICINE

## 2018-05-21 PROCEDURE — 96375 TX/PRO/DX INJ NEW DRUG ADDON: CPT

## 2018-05-21 PROCEDURE — 84484 ASSAY OF TROPONIN QUANT: CPT | Performed by: GENERAL PRACTICE

## 2018-05-21 PROCEDURE — 99222 1ST HOSP IP/OBS MODERATE 55: CPT | Performed by: INTERNAL MEDICINE

## 2018-05-21 PROCEDURE — 85025 COMPLETE CBC W/AUTO DIFF WBC: CPT | Performed by: EMERGENCY MEDICINE

## 2018-05-21 PROCEDURE — 84484 ASSAY OF TROPONIN QUANT: CPT | Performed by: EMERGENCY MEDICINE

## 2018-05-21 PROCEDURE — 84145 PROCALCITONIN (PCT): CPT | Performed by: INTERNAL MEDICINE

## 2018-05-21 PROCEDURE — 99232 SBSQ HOSP IP/OBS MODERATE 35: CPT | Performed by: PHYSICIAN ASSISTANT

## 2018-05-21 PROCEDURE — 71046 X-RAY EXAM CHEST 2 VIEWS: CPT

## 2018-05-21 PROCEDURE — 96367 TX/PROPH/DG ADDL SEQ IV INF: CPT

## 2018-05-21 PROCEDURE — 83605 ASSAY OF LACTIC ACID: CPT | Performed by: EMERGENCY MEDICINE

## 2018-05-21 PROCEDURE — 36415 COLL VENOUS BLD VENIPUNCTURE: CPT | Performed by: EMERGENCY MEDICINE

## 2018-05-21 PROCEDURE — 82043 UR ALBUMIN QUANTITATIVE: CPT | Performed by: INTERNAL MEDICINE

## 2018-05-21 RX ORDER — BUDESONIDE AND FORMOTEROL FUMARATE DIHYDRATE 160; 4.5 UG/1; UG/1
2 AEROSOL RESPIRATORY (INHALATION) 2 TIMES DAILY
Status: DISCONTINUED | OUTPATIENT
Start: 2018-05-21 | End: 2018-05-23 | Stop reason: HOSPADM

## 2018-05-21 RX ORDER — LORAZEPAM 0.5 MG/1
0.5 TABLET ORAL EVERY 6 HOURS PRN
Status: DISCONTINUED | OUTPATIENT
Start: 2018-05-21 | End: 2018-05-23 | Stop reason: HOSPADM

## 2018-05-21 RX ORDER — FUROSEMIDE 10 MG/ML
40 INJECTION INTRAMUSCULAR; INTRAVENOUS ONCE
Status: COMPLETED | OUTPATIENT
Start: 2018-05-21 | End: 2018-05-21

## 2018-05-21 RX ORDER — MINOXIDIL 2.5 MG/1
20 TABLET ORAL 2 TIMES DAILY
Status: DISCONTINUED | OUTPATIENT
Start: 2018-05-21 | End: 2018-05-23 | Stop reason: HOSPADM

## 2018-05-21 RX ORDER — POTASSIUM CHLORIDE 20 MEQ/1
20 TABLET, EXTENDED RELEASE ORAL DAILY
Status: DISCONTINUED | OUTPATIENT
Start: 2018-05-21 | End: 2018-05-23 | Stop reason: HOSPADM

## 2018-05-21 RX ORDER — ACETAMINOPHEN 325 MG/1
650 TABLET ORAL EVERY 6 HOURS PRN
Status: DISCONTINUED | OUTPATIENT
Start: 2018-05-21 | End: 2018-05-23 | Stop reason: HOSPADM

## 2018-05-21 RX ORDER — MILK THISTLE 500 MG
500 CAPSULE ORAL 2 TIMES DAILY
COMMUNITY
End: 2021-08-27

## 2018-05-21 RX ORDER — DOXAZOSIN MESYLATE 4 MG/1
4 TABLET ORAL
Status: DISCONTINUED | OUTPATIENT
Start: 2018-05-21 | End: 2018-05-23 | Stop reason: HOSPADM

## 2018-05-21 RX ORDER — FERROUS SULFATE 325(65) MG
325 TABLET ORAL 2 TIMES DAILY
COMMUNITY

## 2018-05-21 RX ORDER — METHYLPREDNISOLONE SODIUM SUCCINATE 40 MG/ML
20 INJECTION, POWDER, LYOPHILIZED, FOR SOLUTION INTRAMUSCULAR; INTRAVENOUS 3 TIMES DAILY
Status: DISCONTINUED | OUTPATIENT
Start: 2018-05-21 | End: 2018-05-22

## 2018-05-21 RX ORDER — ONDANSETRON 2 MG/ML
4 INJECTION INTRAMUSCULAR; INTRAVENOUS EVERY 6 HOURS PRN
Status: DISCONTINUED | OUTPATIENT
Start: 2018-05-21 | End: 2018-05-23 | Stop reason: HOSPADM

## 2018-05-21 RX ORDER — ESCITALOPRAM OXALATE 10 MG/1
10 TABLET ORAL DAILY
COMMUNITY
End: 2020-12-11

## 2018-05-21 RX ORDER — AZITHROMYCIN 250 MG/1
500 TABLET, FILM COATED ORAL EVERY 24 HOURS
Status: DISCONTINUED | OUTPATIENT
Start: 2018-05-22 | End: 2018-05-22

## 2018-05-21 RX ORDER — ATORVASTATIN CALCIUM 40 MG/1
40 TABLET, FILM COATED ORAL
Status: DISCONTINUED | OUTPATIENT
Start: 2018-05-21 | End: 2018-05-23 | Stop reason: HOSPADM

## 2018-05-21 RX ORDER — ALLOPURINOL 100 MG/1
100 TABLET ORAL DAILY
Status: DISCONTINUED | OUTPATIENT
Start: 2018-05-21 | End: 2018-05-23 | Stop reason: HOSPADM

## 2018-05-21 RX ORDER — FUROSEMIDE 80 MG
80 TABLET ORAL DAILY
Status: DISCONTINUED | OUTPATIENT
Start: 2018-05-21 | End: 2018-05-22

## 2018-05-21 RX ORDER — AMLODIPINE BESYLATE 10 MG/1
10 TABLET ORAL DAILY
Status: DISCONTINUED | OUTPATIENT
Start: 2018-05-21 | End: 2018-05-23 | Stop reason: HOSPADM

## 2018-05-21 RX ORDER — LEVALBUTEROL 1.25 MG/.5ML
1.25 SOLUTION, CONCENTRATE RESPIRATORY (INHALATION)
Status: DISCONTINUED | OUTPATIENT
Start: 2018-05-21 | End: 2018-05-23 | Stop reason: HOSPADM

## 2018-05-21 RX ORDER — ACETAMINOPHEN 325 MG/1
650 TABLET ORAL ONCE
Status: COMPLETED | OUTPATIENT
Start: 2018-05-21 | End: 2018-05-21

## 2018-05-21 RX ORDER — LORAZEPAM 0.5 MG/1
0.5 TABLET ORAL EVERY 6 HOURS PRN
Status: ON HOLD | COMMUNITY
End: 2020-07-11

## 2018-05-21 RX ORDER — ASPIRIN 325 MG
325 TABLET, DELAYED RELEASE (ENTERIC COATED) ORAL DAILY
Status: DISCONTINUED | OUTPATIENT
Start: 2018-05-21 | End: 2018-05-23 | Stop reason: HOSPADM

## 2018-05-21 RX ORDER — GARLIC 200 MG
200 TABLET ORAL DAILY
COMMUNITY
End: 2018-05-21

## 2018-05-21 RX ORDER — EPLERENONE 25 MG/1
50 TABLET, FILM COATED ORAL DAILY
Status: DISCONTINUED | OUTPATIENT
Start: 2018-05-21 | End: 2018-05-23 | Stop reason: HOSPADM

## 2018-05-21 RX ORDER — ESCITALOPRAM OXALATE 10 MG/1
10 TABLET ORAL DAILY
Status: DISCONTINUED | OUTPATIENT
Start: 2018-05-21 | End: 2018-05-23 | Stop reason: HOSPADM

## 2018-05-21 RX ORDER — ALBUTEROL SULFATE 2.5 MG/3ML
5 SOLUTION RESPIRATORY (INHALATION) ONCE
Status: COMPLETED | OUTPATIENT
Start: 2018-05-21 | End: 2018-05-21

## 2018-05-21 RX ORDER — FERROUS SULFATE 325(65) MG
325 TABLET ORAL
Status: DISCONTINUED | OUTPATIENT
Start: 2018-05-22 | End: 2018-05-23 | Stop reason: HOSPADM

## 2018-05-21 RX ORDER — NEBIVOLOL 10 MG/1
20 TABLET ORAL 2 TIMES DAILY
Status: DISCONTINUED | OUTPATIENT
Start: 2018-05-21 | End: 2018-05-23 | Stop reason: HOSPADM

## 2018-05-21 RX ADMIN — SODIUM CHLORIDE 500 ML: 0.9 INJECTION, SOLUTION INTRAVENOUS at 11:48

## 2018-05-21 RX ADMIN — IPRATROPIUM BROMIDE 0.5 MG: 0.5 SOLUTION RESPIRATORY (INHALATION) at 19:13

## 2018-05-21 RX ADMIN — DOXAZOSIN 4 MG: 4 TABLET ORAL at 21:03

## 2018-05-21 RX ADMIN — LORAZEPAM 0.5 MG: 0.5 TABLET ORAL at 16:41

## 2018-05-21 RX ADMIN — BUDESONIDE AND FORMOTEROL FUMARATE DIHYDRATE 2 PUFF: 160; 4.5 AEROSOL RESPIRATORY (INHALATION) at 17:11

## 2018-05-21 RX ADMIN — ACETAMINOPHEN 650 MG: 325 TABLET, FILM COATED ORAL at 17:46

## 2018-05-21 RX ADMIN — FUROSEMIDE 80 MG: 80 TABLET ORAL at 17:10

## 2018-05-21 RX ADMIN — ESCITALOPRAM OXALATE 10 MG: 10 TABLET ORAL at 17:09

## 2018-05-21 RX ADMIN — NEBIVOLOL HYDROCHLORIDE 20 MG: 10 TABLET ORAL at 17:14

## 2018-05-21 RX ADMIN — IPRATROPIUM BROMIDE 0.5 MG: 0.5 SOLUTION RESPIRATORY (INHALATION) at 10:04

## 2018-05-21 RX ADMIN — ALLOPURINOL 100 MG: 100 TABLET ORAL at 17:09

## 2018-05-21 RX ADMIN — POTASSIUM CHLORIDE 20 MEQ: 1500 TABLET, EXTENDED RELEASE ORAL at 17:10

## 2018-05-21 RX ADMIN — ACETAMINOPHEN 650 MG: 325 TABLET ORAL at 11:48

## 2018-05-21 RX ADMIN — METHYLPREDNISOLONE SODIUM SUCCINATE 20 MG: 40 INJECTION, POWDER, FOR SOLUTION INTRAMUSCULAR; INTRAVENOUS at 17:09

## 2018-05-21 RX ADMIN — ASPIRIN 325 MG: 325 TABLET, COATED ORAL at 17:10

## 2018-05-21 RX ADMIN — FUROSEMIDE 40 MG: 10 INJECTION, SOLUTION INTRAMUSCULAR; INTRAVENOUS at 11:36

## 2018-05-21 RX ADMIN — AZITHROMYCIN MONOHYDRATE 500 MG: 500 INJECTION, POWDER, LYOPHILIZED, FOR SOLUTION INTRAVENOUS at 12:58

## 2018-05-21 RX ADMIN — ATORVASTATIN CALCIUM 40 MG: 40 TABLET, FILM COATED ORAL at 21:03

## 2018-05-21 RX ADMIN — METHYLPREDNISOLONE SODIUM SUCCINATE 20 MG: 40 INJECTION, POWDER, FOR SOLUTION INTRAMUSCULAR; INTRAVENOUS at 21:03

## 2018-05-21 RX ADMIN — MINOXIDIL 20 MG: 2.5 TABLET ORAL at 17:12

## 2018-05-21 RX ADMIN — EPLERENONE 50 MG: 25 TABLET, FILM COATED ORAL at 17:11

## 2018-05-21 RX ADMIN — CEFTRIAXONE 1000 MG: 1 INJECTION, SOLUTION INTRAVENOUS at 11:41

## 2018-05-21 RX ADMIN — ALBUTEROL SULFATE 5 MG: 2.5 SOLUTION RESPIRATORY (INHALATION) at 10:04

## 2018-05-21 RX ADMIN — LEVALBUTEROL HYDROCHLORIDE 1.25 MG: 1.25 SOLUTION, CONCENTRATE RESPIRATORY (INHALATION) at 19:13

## 2018-05-21 RX ADMIN — AMLODIPINE BESYLATE 10 MG: 10 TABLET ORAL at 17:09

## 2018-05-21 RX ADMIN — APIXABAN 5 MG: 5 TABLET, FILM COATED ORAL at 17:15

## 2018-05-21 NOTE — CONSULTS
NEPHROLOGY CONSULTATION NOTE    Patient: Hima Mills               Sex: male          DOA: 5/21/2018  9:10 AM   YOB: 1965        Age:  45 y o         LOS:  LOS: 0 days     REFERRING PHYSICIAN: Nuvia Cheatham MD      REASON FOR THE REFERRAL / CONSULTATION:  Further management of TRUDI    DATE OF CONSULTATION / SERVICE: 5/21/2018    ADMISSION DIAGNOSIS: CHF (congestive heart failure) ([address] )     CHIEF COMPLAINT     I have shortness of breath    HPI     This is a 70-year-old male with a past medical history of underlying chronic kidney disease admitted with shortness of breath  Patient does have a history of diastolic heart failure  On admission patient was found to have elevated creatinine of 1 62 and Nephrology were consulted for further management of TRUDI  Upon review of old medical record patient is been followed by [facility] associates-Dr Lauren Gusman  According to patient he has underlying CKD stage 3 and his kidney function was around 52% when he was seen last by outpatient nephrologist   Upon review of old medical record patient's baseline creatinine seems to be around 1 3 which is more like CKD stage 2  Patient does have a history of diastolic heart failure and on admission due to shortness of breath patient was started on diuretics  Pulmonology were also consulted and I have personally reviewed their note with the recommendations  Patient is also currently being treated for severe persistent asthma/COPD exacerbation  Patient is currently also receiving IV steroid + IV antibiotics  Patient does have a history of DVT  Currently patient is taking Eliquis 5 mg PO BID  Patient also a history of hypertension which seems to be under well controlled on current regimen  Currently patient denies nausea, vomiting, headache, dizziness, abdominal pain, constipation or rash      PAST MEDICAL HISTORY     Past Medical History:   Diagnosis Date    COPD (chronic obstructive pulmonary disease) ([facility] )     Hypertension     Kidney disease     Leg DVT (deep venous thromboembolism), acute, bilateral ([place] [address] ) 01/2018    AGUS on CPAP     setting 11    Systolic CHF ([place] [address] )        PAST SURGICAL HISTORY     Past Surgical History:   Procedure Laterality Date    CHOLECYSTECTOMY      JOINT REPLACEMENT      LYMPH NODE DISSECTION Left 01/2018    left inguinal LN removed - benign        ALLERGIES     Allergies   Allergen Reactions    Clonidine Anaphylaxis    Lisinopril Anaphylaxis    Nifedipine Anaphylaxis    Spironolactone Anaphylaxis       SOCIAL HISTORY     History   Alcohol Use No     Comment: socially     History   Drug Use No     History   Smoking Status    Never Smoker   Smokeless Tobacco    Never Used       FAMILY HISTORY     Family History   Problem Relation Age of Onset    Heart murmur Sister     Deep vein thrombosis Neg Hx        CURRENT MEDICATIONS       Current Facility-Administered Medications:     acetaminophen (TYLENOL) tablet 650 mg, 650 mg, Oral, Q6H PRN, Charles Levy MD    allopurinol (ZYLOPRIM) tablet 100 mg, 100 mg, Oral, Daily, Mick Banks MD    amLODIPine (NORVASC) tablet 10 mg, 10 mg, Oral, Daily, Charles Levy MD    apixaban (ELIQUIS) tablet 5 mg, 5 mg, Oral, BID, Mick Banks MD    aspirin (ECOTRIN) EC tablet 325 mg, 325 mg, Oral, Daily, Mick Banks MD    atorvastatin (LIPITOR) tablet 40 mg, 40 mg, Oral, HS, Carla Banks MD    [START ON 5/22/2018] azithromycin (ZITHROMAX) tablet 500 mg, 500 mg, Oral, Q24H, Mary Reynolds MD    budesonide-formoterol (SYMBICORT) 160-4 5 mcg/act inhaler 2 puff, 2 puff, Inhalation, BID, Enrrique Jones DO    [START ON 5/22/2018] cefTRIAXone (ROCEPHIN) IVPB (premix) 1,000 mg, 1,000 mg, Intravenous, Q24H, Carla Banks MD    doxazosin (CARDURA) tablet 4 mg, 4 mg, Oral, HS, Carla Banks MD    eplerenone (INSPRA) tablet 50 mg, 50 mg, Oral, Daily, Kavin Banks MD    escitalopram (LEXAPRO) tablet 10 mg, 10 mg, Oral, Daily, Kavin Banks MD  Meri Yusuf  [START ON 5/22/2018] ferrous sulfate tablet 325 mg, 325 mg, Oral, Daily With Breakfast, Kavin Banks MD    furosemide (LASIX) tablet 80 mg, 80 mg, Oral, Daily, Kavin Banks MD    ipratropium (ATROVENT) 0 02 % inhalation solution 0 5 mg, 0 5 mg, Nebulization, TID, Jenness Friday, DO    levalbuterol [facility]) inhalation solution 1 25 mg, 1 25 mg, Nebulization, TID, Jenness Friday, DO    LORazepam (ATIVAN) tablet 0 5 mg, 0 5 mg, Oral, Q6H PRN, Edward Cuadra MD    methylPREDNISolone sodium succinate (Solu-MEDROL) injection 20 mg, 20 mg, Intravenous, TID, Kavin Banks MD    minoxidil (LONITEN) tablet 20 mg, 20 mg, Oral, BID, Kavin Banks MD    nebivolol (BYSTOLIC) tablet 20 mg, 20 mg, Oral, BID, Kavin Banks MD    ondansetron (ZOFRAN) injection 4 mg, 4 mg, Intravenous, Q6H PRN, Edward Cuadra MD    potassium chloride (K-DUR,KLOR-CON) CR tablet 20 mEq, 20 mEq, Oral, Daily, Kavin Banks MD    REVIEW OF SYSTEMS     Complete 10 points of review of systems were obtained and discussed in length with patient today  Complete 10 points of review of systems were negative/unremarkable except mentioned in the HPI section  OBJECTIVE     Current Weight: Weight - Scale: 101 kg (222 lb)  Vitals:    05/21/18 1500   BP: 142/65   Pulse: 82   Resp: 18   Temp: 99 8 °F (37 7 °C)   SpO2: 97%     Body mass index is 33 75 kg/m²  Intake/Output Summary (Last 24 hours) at 05/21/18 1630  Last data filed at 05/21/18 1337   Gross per 24 hour   Intake           121 67 ml   Output             1300 ml   Net         -1178 33 ml       PHYSICAL EXAMINATION     Physical Exam   Constitutional: He appears well-nourished  No distress  HENT:   Head: Normocephalic and atraumatic     Eyes: Conjunctivae are normal  No scleral icterus  Right eye exhibits normal extraocular motion  Left eye exhibits normal extraocular motion  Neck: Neck supple  No JVD present  Cardiovascular: Normal rate, S1 normal and S2 normal     Pulmonary/Chest: No accessory muscle usage  No respiratory distress  He has no wheezes  Abdominal: Soft  He exhibits no distension  Musculoskeletal:        Right ankle: He exhibits swelling  Left ankle: He exhibits swelling  Right hand: He exhibits no laceration  Left hand: He exhibits no laceration  Lymphadenopathy:        Right cervical: No superficial cervical adenopathy present  Left cervical: No superficial cervical adenopathy present  Right: No supraclavicular adenopathy present  Left: No supraclavicular adenopathy present  Neurological: He is alert  He is not disoriented  Skin: Skin is warm  No laceration noted  No cyanosis  Psychiatric: He is not combative  He does not exhibit a depressed mood  He expresses no suicidal ideation  LAB RESULTS          Results from last 7 days  Lab Units 05/21/18  0932   WBC Thousand/uL 6 99   HEMOGLOBIN g/dL 12 4   HEMATOCRIT % 38 5   PLATELETS Thousands/uL 220   SODIUM mmol/L 143   POTASSIUM mmol/L 3 7   CHLORIDE mmol/L 104   CO2 mmol/L 30   BUN mg/dL 17   CREATININE mg/dL 1 62*   EGFR ml/min/1 73sq m 55   CALCIUM mg/dL 9 2   TOTAL PROTEIN g/dL 8 0   GLUCOSE RANDOM mg/dL 89       I have personally reviewed the old medical records and patient's previously known baseline creatinine level is 1 3    RADIOLOGY RESULTS     Results for orders placed during the hospital encounter of 05/21/18   X-ray chest 2 views:  I have personally reviewed the images of the chest x-ray along with radiology report    Narrative CHEST     INDICATION:   chest pain  COMPARISON:  03/15/2018    EXAM PERFORMED/VIEWS:  XR CHEST PA & LATERAL  Images: 2    FINDINGS:    Cardiomediastinal silhouette appears enlarged    Pulmonary vessels are normal     Suspected infiltrate or atelectasis at the left lung base  The right lung is clear  Minimal blunting of the costophrenic angles  Osseous structures appear within normal limits for patient age  Impression Cardiomegaly  Suspected infiltrate or atelectasis at the left lung base  Workstation performed: [id number]         12 lead EKG done on 5/21/2017 showed sinus rhythm with ventricular rate 70 per minute  2D echocardiogram done on 2/6/2018 showed EF of 60%  Sherrill Holder PLAN / RECOMMENDATIONS      1  TRUDI on top of CKD stage 2   Multifactorial    Upon review of old medical record patient is been followed by [facility] associates-Dr Jose Alfredo Walter and was told that he was in stage III  Patient told me that his GFR was around 46 when he was seen last by Dr Jose Alfredo Walter  Upon review of old medical record patient's baseline creatinine seems to be at around 1 3 which is more like CKD stage 2  Patient was admitted with elevated creatinine of 1 6 to  Currently patient is been treated for CHF/COPD exacerbation and currently receiving Lasix, IV steroid and antibiotics  Plan to check urine lytes, CK and BNP for further evaluation of TRUDI  Patient does have leg swelling and plan to continue Lasix for time being  Plan to recheck renal function again in the morning  2   Hypertension in chronic kidney disease  Currently patient's blood pressure is controlled and at goal and plan to monitor hypertension with doxazosin 4 mg PO daily, amlodipine 10 mg PO daily, Bystolic 20 mg PO BID and minoxidil 20 mg PO BID  Thank you for the consultation to participate in patient's care  I have personally discussed my plan with the referring physician  Corrie Mancilla MD  Nephrology  5/21/2018        Portions of the record may have been created with voice recognition software  Occasional wrong word or "sound a like" substitutions may have occurred due to the inherent limitations of voice recognition software   Read the chart carefully and recognize, using context, where substitutions have occurred

## 2018-05-21 NOTE — H&P
History and Physical - [facility]    Patient Information: Jesica Scales 45 y o  male MRN: [de-identified]  Unit/Bed#: -01 Encounter: [id number]  Admitting Physician: Shai Maciel MD  PCP: Zabrina Chavez MD  Date of Admission:  05/21/18    Assessment/Plan:    Hospital Problem List:     Principal Problem:    CHF (congestive heart failure) ([facility] )  Active Problems:    Leg DVT (deep venous thromboembolism), acute, bilateral (HCC)    Chronic diastolic CH[facility] (congestive [facility] failure) ([facility] )    SOB (shortness of breath)    TRUDI (acute kidney injury) ([place] [address] )      Plan for the Primary Problem(s):  Shortness of breath abnormal ekg-serial enzymes; echo 5/18 reviewed ef 60% no wma; telemetry; cxr unrevealling of infiltrate; bnp similar to prior; check ct chest; cardiology consult for transient ekg changes ; follows with dr Nusrat To for chf and recent dx pericardial effusion-check echo; ct chest today; also with h/o copd-start solumedrol and respiratory protocol; zithromax/rocephin for now for bronchitis vs infiltrate pending ct chest-pulmonary consult ordered    h/o dvt-on eliquis        VTE Prophylaxis: Apixaban (Eliquis)  / sequential compression device   Code Status: full  POLST: POLST is not applicable to this patient    Anticipated Length of Stay:  Patient will be admitted on an Inpatient basis with an anticipated length of stay of  > 2 midnights  Justification for Hospital Stay: dyspnea    Total Time for Visit, including Counseling / Coordination of Care: 30 minutes  Greater than 50% of this total time spent on direct patient counseling and coordination of care  Chief Complaint:   Shortness of breath    History of Present Illness:    Jesica Scales is a 45 y o  male who presents with shortness of breath  Patient has a history of chronic diastolic heart failure he had an echo in February of 2018 which showed an ejection fraction of 60 percent    Apparently has had a 2 day history of increasing shortness of breath to the point where he has difficulty speaking in full sentences  He saw his primary care provider in EKG changes were noted in his office that apparently resolved by the time he got to the emergency department  He denies associated chest pain he does have a history of DVT    Review of Systems:    Review of Systems   Constitutional: Positive for activity change  HENT: Positive for congestion  Respiratory: Positive for cough and shortness of breath  Cardiovascular: Negative for chest pain, palpitations and leg swelling  Gastrointestinal: Negative  Genitourinary: Negative  Musculoskeletal: Negative  Hematological: Negative  Past Medical and Surgical History:     Past Medical History:   Diagnosis Date    [facility]     Hypertension     Kidney disease     Leg DVT (deep venous thromboembolism), acute, bilateral ([facility] ) 01/2018    AGUS on CPAP     setting 11    Systolic CHF [facility])        Past Surgical History:   Procedure Laterality Date    CHOLECYSTECTOMY      JOINT REPLACEMENT      LYMPH NODE DISSECTION Left 01/2018    left inguinal LN removed - benign        Meds/Allergies:    Prior to Admission medications    Medication Sig Start Date End Date Taking?  Authorizing Provider   albuterol (PROVENTIL HFA,VENTOLIN HFA) 90 mcg/act inhaler Inhale 2 puffs every 6 (six) hours as needed for wheezing 3/19/18  Yes Dave Higuera PA-C   allopurinol (ZYLOPRIM) 100 mg tablet Take 100 mg by mouth daily 12/18/17  Yes Historical Provider, MD   amLODIPine (NORVASC) 10 mg tablet Take 10 mg by mouth daily 5/23/17  Yes Historical Provider, MD   apixaban (ELIQUIS) 5 mg Take 5 mg by mouth 2 (two) times a day   1/18/18  Yes Historical Provider, MD   aspirin (PX ENTERIC ASPIRIN) 325 mg EC tablet Take 325 mg by mouth daily At night    Yes Historical Provider, MD   atorvastatin (LIPITOR) 40 mg tablet Take 40 mg by mouth daily at bedtime 2/1/17  Yes Historical Provider, MD   Cholecalciferol (VITAMIN D-3) 1000 units CAPS Take 1,000 Units by mouth daily   Yes Historical Provider, MD   doxazosin (CARDURA) 4 mg tablet Take 4 mg by mouth daily at bedtime   Yes Historical Provider, MD   eplerenone (INSPRA) 50 MG tablet Take 50 mg by mouth daily 5/23/17  Yes Historical Provider, MD   escitalopram (LEXAPRO) 10 mg tablet Take 10 mg by mouth daily   Yes Historical Provider, MD   ferrous sulfate 325 (65 Fe) mg tablet Take 325 mg by mouth daily with breakfast   Yes Historical Provider, MD   furosemide (LASIX) 80 mg tablet Take 80 mg by mouth daily 3/28/17  Yes Historical Provider, MD   Glycopyrrolate-Formoterol (Kenard Golder) 9-4 8 MCG/ACT AERO Inhale 2 puffs daily after breakfast   Yes Historical Provider, MD   Glycopyrrolate-Formoterol (Kenard Golder) 9-4 8 MCG/ACT AERO Inhale 1 puff daily at bedtime   Yes Historical Provider, MD   LORazepam (ATIVAN) 0 5 mg tablet Take 0 5 mg by mouth every 6 (six) hours as needed for anxiety   Yes Historical Provider, MD   MAGNESIUM PO Take 400 mg by mouth daily   Yes Historical Provider, MD   Milk Thistle 500 MG CAPS Take 500 mg by mouth 2 (two) times a day   Yes Historical Provider, MD   minoxidil (LONITEN) 10 mg tablet Take 20 mg by mouth 2 (two) times a day 11/21/17  Yes Historical Provider, MD   Mometasone Furo-Formoterol Fum 200-5 MCG/ACT AERO Take 2 puffs by mouth 2 (two) times a day 6/29/16  Yes Historical Provider, MD   multivitamin-iron-minerals-folic acid (CENTRUM) chewable tablet Chew 1 tablet daily   Yes Historical Provider, MD   nebivolol (BYSTOLIC) 20 MG tablet Take 20 mg by mouth 2 (two) times a day 6/13/16  Yes Historical Provider, MD   Omega-3 Fatty Acids (FISH OIL) 1200 MG CAPS Take 1,200 mg by mouth daily at bedtime   Yes Historical Provider, MD   potassium chloride (KLOR-CON M20) 20 mEq tablet Take 20 mEq by mouth daily   1/2/18  Yes Historical Provider, MD   Garlic 418 MG TABS Take 200 mg by mouth daily 5/21/18 Yes Historical Provider, MD   hydrOXYzine HCL (ATARAX) 25 mg tablet Take 25 mg by mouth every 6 (six) hours as needed for anxiety 1/5/18 5/21/18 Yes Historical Provider, MD   FERROUS GLUCONATE IRON PO Take 246 mg by mouth 2 (two) times a day  5/21/18  Historical Provider, MD   L-Arginine 500 MG TABS Take 500 mg by mouth 2 (two) times a day  5/21/18  Historical Provider, MD     I have reviewed home medications with patient personally  Allergies: Allergies   Allergen Reactions    Clonidine Anaphylaxis    Lisinopril Anaphylaxis    Nifedipine Anaphylaxis    Spironolactone Anaphylaxis       Social History:     Marital Status: /Civil Union   Occupation:   Patient Pre-hospital Living Situation:   Patient Pre-hospital Level of Mobility:   Patient Pre-hospital Diet Restrictions:   Substance Use History:   History   Alcohol Use No     Comment: socially     History   Smoking Status    Never Smoker   Smokeless Tobacco    Never Used     History   Drug Use No       Family History:    non-contributory    Physical Exam:     Vitals:   Blood Pressure: 142/68 (05/21/18 1344)  Pulse: 89 (05/21/18 1344)  Temperature: 97 5 °F (36 4 °C) (05/21/18 1344)  Temp Source: Oral (05/21/18 1344)  Respirations: (!) 24 (05/21/18 1344)  Height: 5' 8" (172 7 cm) (05/21/18 1344)  Weight - Scale: 101 kg (222 lb) (05/21/18 1344)  SpO2: 96 % (05/21/18 1344)    Physical Exam   Constitutional: He appears well-developed and well-nourished  HENT:   Head: Normocephalic and atraumatic  Eyes: Pupils are equal, round, and reactive to light  Cardiovascular: Normal rate and regular rhythm  Pulmonary/Chest: Effort normal and breath sounds normal    Abdominal: Soft  Bowel sounds are normal    Musculoskeletal: He exhibits edema  Skin:   Left groin with large fluctuant mass       Additional Data:     Lab Results: I have personally reviewed pertinent reports          Results from last 7 days  Lab Units 05/21/18  0932   WBC Thousand/uL 6  99   HEMOGLOBIN g/dL 12 4   HEMATOCRIT % 38 5   PLATELETS Thousands/uL 220   NEUTROS PCT % 70   LYMPHS PCT % 11*   MONOS PCT % 15*   EOS PCT % 3       Results from last 7 days  Lab Units 05/21/18  0932   SODIUM mmol/L 143   POTASSIUM mmol/L 3 7   CHLORIDE mmol/L 104   CO2 mmol/L 30   BUN mg/dL 17   CREATININE mg/dL 1 62*   CALCIUM mg/dL 9 2   TOTAL PROTEIN g/dL 8 0   BILIRUBIN TOTAL mg/dL 1 40*   ALK PHOS U/L 83   ALT U/L 52   AST U/L 38   GLUCOSE RANDOM mg/dL 89           Imaging: I have personally reviewed pertinent reports  X-ray Chest 2 Views    Result Date: 5/21/2018  Narrative: CHEST INDICATION:   chest pain  COMPARISON:  03/15/2018 EXAM PERFORMED/VIEWS:  XR CHEST PA & LATERAL Images: 2 FINDINGS: Cardiomediastinal silhouette appears enlarged  Pulmonary vessels are normal  Suspected infiltrate or atelectasis at the left lung base  The right lung is clear  Minimal blunting of the costophrenic angles  Osseous structures appear within normal limits for patient age  Impression: Cardiomegaly  Suspected infiltrate or atelectasis at the left lung base  Workstation performed: [id number]       EKG, Pathology, and Other Studies Reviewed on Admission:   · EKG:     Allscripts Records Reviewed: Yes     ** Please Note: Dragon 360 Dictation voice to text software may have been used in the creation of this document   **

## 2018-05-21 NOTE — RESPIRATORY THERAPY NOTE
RT Protocol Note  Vargas Brock 45 y o  male MRN: [de-identified]  Unit/Bed#: -01 Encounter: [id number]    Assessment    Principal Problem:    CHF (congestive heart failure) ([place] [address] )  Active Problems:    Leg DVT (deep venous thromboembolism), acute, bilateral (HCC)    Chronic diastolic [facility]) ([facility])    SOB (shortness of breath)    TRUDI (acute kidney injury) ([place] [address] )      Home Pulmonary Medications:  none       Past Medical History:   Diagnosis Date    COPD (chronic obstructive pulmonary disease) ([place] [address] )     Hypertension     Kidney disease     Leg DVT (deep venous thromboembolism), acute, bilateral ([place] [address] ) 01/2018    AGUS on CPAP     setting 11    Systolic CHF ([place] [address] )      Social History     Social History    Marital status: /Civil Union     Spouse name: N/A    Number of children: N/A    Years of education: N/A     Social History Main Topics    Smoking status: Never Smoker    Smokeless tobacco: Never Used    Alcohol use No      Comment: socially    Drug use: No    Sexual activity: Not Asked     Other Topics Concern    None     Social History Narrative    None       Subjective         Objective    Physical Exam:        Vitals:  Blood pressure 142/65, pulse 82, temperature 99 8 °F (37 7 °C), temperature source Oral, resp  rate 18, height 5' 8" (1 727 m), weight 101 kg (222 lb), SpO2 97 %  Imaging and other studies: I have personally reviewed pertinent reports  Plan    Respiratory Plan: Mild Distress pathway        Resp Comments: pt has a pulmonary consult    Will continue with xop/atr tid

## 2018-05-21 NOTE — ED PROVIDER NOTES
History  Chief Complaint   Patient presents with    Shortness of Breath     pt c/o feeling SOB for the past 2-3 days  was sent from PCP for abnormal ECG     45 y o  M presents for evaluation of shortness of breath over the past 2 days  Wife says that he has been getting short of breath lately in over the past 2 days is when it has been the worst   Patient now has difficulty speaking in full sentences  He went to the primary care doctor today for routine visit, had EKG performed and was sent in for evaluation of abnormal EKG  The only abnormality noted in the EKG was that the machine read " possible anterior infarct" which was not noted on the EKG on physician review, and he has a new isolated flipped T in V5, which is resolved by the time he is in the ED  Otherwise the EKG appears consistent with his prior EKG from March  Patient denies CP, states his on actual complaint is SOB, cough  Patient has concerning PMHx:  COPD, hypertension, kidney disease, DVT, sleep apnea, CHF, chronic left lower extremity lymphedema  Prior to Admission Medications   Prescriptions Last Dose Informant Patient Reported? Taking?    Cholecalciferol (VITAMIN D-3) 1000 units CAPS  Spouse/Significant Other Yes Yes   Sig: Take 1,000 Units by mouth daily   Glycopyrrolate-Formoterol (BEVESPI AEROSPHERE) 9-4 8 MCG/ACT AERO  Spouse/Significant Other Yes Yes   Sig: Inhale 2 puffs daily after breakfast   Glycopyrrolate-Formoterol (BEVESPI AEROSPHERE) 9-4 8 MCG/ACT AERO  Spouse/Significant Other Yes Yes   Sig: Inhale 1 puff daily at bedtime   LORazepam (ATIVAN) 0 5 mg tablet  Spouse/Significant Other Yes Yes   Sig: Take 0 5 mg by mouth every 6 (six) hours as needed for anxiety   MAGNESIUM PO  Spouse/Significant Other Yes Yes   Sig: Take 400 mg by mouth daily   Milk Thistle 500 MG CAPS  Spouse/Significant Other Yes Yes   Sig: Take 500 mg by mouth 2 (two) times a day   Mometasone Furo-Formoterol Fum 200-5 MCG/ACT AERO Spouse/Significant Other Yes Yes   Sig: Take 2 puffs by mouth 2 (two) times a day   Omega-3 Fatty Acids (FISH OIL) 1200 MG CAPS  Spouse/Significant Other Yes Yes   Sig: Take 1,200 mg by mouth daily at bedtime   albuterol (PROVENTIL HFA,VENTOLIN HFA) 90 mcg/act inhaler  Spouse/Significant Other No Yes   Sig: Inhale 2 puffs every 6 (six) hours as needed for wheezing   allopurinol (ZYLOPRIM) 100 mg tablet  Spouse/Significant Other Yes Yes   Sig: Take 100 mg by mouth daily   amLODIPine (NORVASC) 10 mg tablet  Spouse/Significant Other Yes Yes   Sig: Take 10 mg by mouth daily   apixaban (ELIQUIS) 5 mg  Spouse/Significant Other Yes Yes   Sig: Take 5 mg by mouth 2 (two) times a day     aspirin (PX ENTERIC ASPIRIN) 325 mg EC tablet  Spouse/Significant Other Yes Yes   Sig: Take 325 mg by mouth daily At night    atorvastatin (LIPITOR) 40 mg tablet  Spouse/Significant Other Yes Yes   Sig: Take 40 mg by mouth daily at bedtime   doxazosin (CARDURA) 4 mg tablet  Spouse/Significant Other Yes Yes   Sig: Take 4 mg by mouth daily at bedtime   eplerenone (INSPRA) 50 MG tablet  Spouse/Significant Other Yes Yes   Sig: Take 50 mg by mouth daily   escitalopram (LEXAPRO) 10 mg tablet  Spouse/Significant Other Yes Yes   Sig: Take 10 mg by mouth daily   ferrous sulfate 325 (65 Fe) mg tablet  Spouse/Significant Other Yes Yes   Sig: Take 325 mg by mouth daily with breakfast   furosemide (LASIX) 80 mg tablet  Spouse/Significant Other Yes Yes   Sig: Take 80 mg by mouth daily   minoxidil (LONITEN) 10 mg tablet  Spouse/Significant Other Yes Yes   Sig: Take 20 mg by mouth 2 (two) times a day   multivitamin-iron-minerals-folic acid (CENTRUM) chewable tablet  Spouse/Significant Other Yes Yes   Sig: Chew 1 tablet daily   nebivolol (BYSTOLIC) 20 MG tablet  Spouse/Significant Other Yes Yes   Sig: Take 20 mg by mouth 2 (two) times a day   potassium chloride (KLOR-CON M20) 20 mEq tablet  Spouse/Significant Other Yes Yes   Sig: Take 20 mEq by mouth daily Facility-Administered Medications: None       Past Medical History:   Diagnosis Date    [facility]chronic obstructive pulmonary disease) ([facility] )     Hypertension     Kidney disease     Leg DVT (deep venous thromboembolism), acute, bilateral ([facility] ) 01/2018    AGUS on CPAP     setting 11    Systolic CHF ([facility] )        Past Surgical History:   Procedure Laterality Date    CHOLECYSTECTOMY      JOINT REPLACEMENT      LYMPH NODE DISSECTION Left 01/2018    left inguinal LN removed - benign        Family History   Problem Relation Age of Onset    Heart murmur Sister     Deep vein thrombosis Neg Hx      I have reviewed and agree with the history as documented  Social History   Substance Use Topics    Smoking status: Never Smoker    Smokeless tobacco: Never Used    Alcohol use No      Comment: socially        Review of Systems   Constitutional: Negative for chills, fatigue and fever  HENT: Negative for congestion and sore throat  Respiratory: Positive for cough, chest tightness, shortness of breath and wheezing  Negative for apnea  Cardiovascular: Positive for leg swelling (chronic lymphedema LLE)  Negative for chest pain and palpitations  Gastrointestinal: Negative for abdominal pain, constipation, diarrhea, nausea and vomiting  Genitourinary: Negative for dysuria and flank pain  Musculoskeletal: Negative for back pain and neck pain  Skin: Negative for color change and rash  Allergic/Immunologic: Negative for immunocompromised state  Neurological: Negative for dizziness, syncope, weakness and headaches  Hematological: Does not bruise/bleed easily  Physical Exam  Physical Exam   Constitutional: He is oriented to person, place, and time  He appears well-developed and well-nourished  He appears distressed (resp)  HENT:   Head: Normocephalic and atraumatic  Eyes: Conjunctivae and EOM are normal  Pupils are equal, round, and reactive to light  Right eye exhibits no discharge   Left eye exhibits no discharge  No scleral icterus  Neck: Normal range of motion  Neck supple  JVD present  Cardiovascular: Normal rate, regular rhythm, normal heart sounds and intact distal pulses  Exam reveals no gallop and no friction rub  No murmur heard  Pulmonary/Chest: He is in respiratory distress  He has no wheezes  He has rales  He exhibits no tenderness  Abdominal: Soft  Bowel sounds are normal  He exhibits no distension  There is tenderness (mild, diffuse)  There is no rebound and no guarding  Musculoskeletal: Normal range of motion  He exhibits edema (LLE, chronic lymphedema, no change from prior ED visit)  He exhibits no tenderness or deformity  Neurological: He is alert and oriented to person, place, and time  No cranial nerve deficit or sensory deficit  Skin: Skin is warm and dry  No rash noted  He is not diaphoretic  No erythema  No pallor  Psychiatric: He has a normal mood and affect  His behavior is normal    Vitals reviewed        Vital Signs  ED Triage Vitals   Temperature Pulse Respirations Blood Pressure SpO2   05/21/18 0914 05/21/18 0914 05/21/18 0914 05/21/18 0914 05/21/18 0914   98 8 °F (37 1 °C) 78 22 (!) 176/85 97 %      Temp Source Heart Rate Source Patient Position - Orthostatic VS BP Location FiO2 (%)   05/21/18 0914 05/21/18 0914 05/21/18 0914 05/21/18 0914 --   Oral Monitor Sitting Right arm       Pain Score       05/21/18 1123       No Pain           Vitals:    05/21/18 0914 05/21/18 1123   BP: (!) 176/85 156/74   Pulse: 78 92   Patient Position - Orthostatic VS: Sitting Lying       Visual Acuity      ED Medications  Medications   azithromycin (ZITHROMAX) 500 mg in sodium chloride 0 9% 250mL IVPB 500 mg (500 mg Intravenous New Bag 5/21/18 1258)   albuterol inhalation solution 5 mg (5 mg Nebulization Given 5/21/18 1004)   ipratropium (ATROVENT) 0 02 % inhalation solution 0 5 mg (0 5 mg Nebulization Given 5/21/18 1004)   cefTRIAXone (ROCEPHIN) IVPB (premix) 1,000 mg (0 mg Intravenous Stopped 5/21/18 1254)   furosemide (LASIX) injection 40 mg (40 mg Intravenous Given 5/21/18 1136)   acetaminophen (TYLENOL) tablet 650 mg (650 mg Oral Given 5/21/18 1148)   sodium chloride 0 9 % bolus 500 mL (500 mL Intravenous New Bag 5/21/18 1148)       Diagnostic Studies  Results Reviewed     Procedure Component Value Units Date/Time    Troponin I [[id number]]     Lab Status:  No result Specimen:  Blood     Lactic acid, plasma [[id number]]  (Normal) Collected:  05/21/18 1130    Lab Status:  Final result Specimen:  Blood from Arm, Right Updated:  05/21/18 1204     LACTIC ACID 1 2 mmol/L     Narrative:         Result may be elevated if tourniquet was used during collection  Blood culture [[id number]] Collected:  05/21/18 1130    Lab Status: In process Specimen:  Blood from Arm, Right Updated:  05/21/18 1140    Blood culture [[id number]] Collected:  05/21/18 1134    Lab Status:   In process Specimen:  Blood from Line, Venous Updated:  05/21/18 1140    NT-BNP PRO [[id number]]  (Abnormal) Collected:  05/21/18 0932    Lab Status:  Final result Specimen:  Blood from Arm, Left Updated:  05/21/18 1047     NT-proBNP 1,411 (H) pg/mL     Comprehensive metabolic panel [[id number]]  (Abnormal) Collected:  05/21/18 0932    Lab Status:  Final result Specimen:  Blood from Arm, Left Updated:  05/21/18 1041     Sodium 143 mmol/L      Potassium 3 7 mmol/L      Chloride 104 mmol/L      CO2 30 mmol/L      Anion Gap 9 mmol/L      BUN 17 mg/dL      Creatinine 1 62 (H) mg/dL      Glucose 89 mg/dL      Calcium 9 2 mg/dL      AST 38 U/L      ALT 52 U/L      Alkaline Phosphatase 83 U/L      Total Protein 8 0 g/dL      Albumin 4 0 g/dL      Total Bilirubin 1 40 (H) mg/dL      eGFR 55 ml/min/1 73sq m     Narrative:         National Kidney Disease Education Program recommendations are as follows:  GFR calculation is accurate only with a steady state creatinine  Chronic Kidney disease less than 60 ml/min/1 73 sq  meters  Kidney failure less than 15 ml/min/1 73 sq  meters  Troponin I [[id number]]  (Normal) Collected:  05/21/18 0932    Lab Status:  Final result Specimen:  Blood from Arm, Left Updated:  05/21/18 1012     Troponin I <0 02 ng/mL     Narrative:         [employer] Chemistry analyzer 99% cutoff is > 0 04 ng/mL in network labs    o cTnI 99% cutoff is useful only when applied to patients in the clinical setting of myocardial ischemia  o cTnI 99% cutoff should be interpreted in the context of clinical history, ECG findings and possibly cardiac imaging to establish correct diagnosis  o cTnI 99% cutoff may be suggestive but clearly not indicative of a coronary event without the clinical setting of myocardial ischemia  CBC and differential [[id number]]  (Abnormal) Collected:  05/21/18 0932    Lab Status:  Final result Specimen:  Blood from Arm, Left Updated:  05/21/18 0950     WBC 6 99 Thousand/uL      RBC 4 64 Million/uL      Hemoglobin 12 4 g/dL      Hematocrit 38 5 %      MCV 83 fL      MCH 26 7 (L) pg      MCHC 32 2 g/dL      RDW 15 9 (H) %      MPV 10 3 fL      Platelets 581 Thousands/uL      nRBC 0 /100 WBCs      Neutrophils Relative 70 %      Immat GRANS % 0 %      Lymphocytes Relative 11 (L) %      Monocytes Relative 15 (H) %      Eosinophils Relative 3 %      Basophils Relative 1 %      Neutrophils Absolute 4 86 Thousands/µL      Immature Grans Absolute 0 03 Thousand/uL      Lymphocytes Absolute 0 80 Thousands/µL      Monocytes Absolute 1 05 Thousand/µL      Eosinophils Absolute 0 21 Thousand/µL      Basophils Absolute 0 04 Thousands/µL                  X-ray chest 2 views   ED Interpretation by Sherman Marquez DO (05/21 1002)   Left lower and retrocardiac infiltrate  Increased congestion concerning for CHF  Final Result by Michelle Atkins MD (05/21 1128)      Cardiomegaly  Suspected infiltrate or atelectasis at the left lung base              Workstation performed: [id number]                    Procedures  ECG 12 Lead Documentation  Date/Time: 5/21/2018 9:45 AM  Performed by: Tien Trejo  Authorized by: Tien Trejo     Indications / Diagnosis:  SOB  ECG reviewed by me, the ED Provider: yes    Patient location:  ED  Previous ECG:     Previous ECG:  Compared to current    Comparison ECG info:  March 15 2018    Similarity:  No change    Comparison to cardiac monitor: Yes    Interpretation:     Interpretation: non-specific    Rate:     ECG rate:  70    ECG rate assessment: normal    Rhythm:     Rhythm: sinus rhythm    Ectopy:     Ectopy: none    QRS:     QRS axis:  Normal    QRS intervals:  Normal  Conduction:     Conduction: normal    ST segments:     ST segments:  Normal  T waves:     T waves: inverted      Inverted:  V6, III, II, I and aVL           Phone Contacts  ED Phone Contact    ED Course  ED Course as of May 21 1317   Mon May 21, 2018   1027 Troponin I: <0 02   1110 baseline NT-proBNP: (!) 1,411   1115   Advised patient of findings of the workup, concern for CHF and pneumonia  Advised patient he needs to stay in the hospital and he is agreeable to admission  States that the breathing treatment did help him somewhat however he is still short of breath  1116 Slightly worse than baseline - mild acute kidney injury Creatinine: (!) 1 62   1132 Patient's temperature starts to elevate - will give APAP    1227 LACTIC ACID: 1 2                               MDM  Number of Diagnoses or Management Options  Abnormal EKG:   TRUDI (acute kidney injury) ([facility] ):   [facility]congestive heart failure) ([facility] ):   Dyspnea:   Pneumonia:   Diagnosis management comments: Shortness of breath, either pneumonia, CHF, COPD  Also abnormal EKG prior to arrival   Will evaluate with ACS workup, chest x-ray  Will provide breathing treatment for symptomatic relief  Will require admission for pneumonia, CHF, ACS workup         Amount and/or Complexity of Data Reviewed  Clinical lab tests: ordered and reviewed  Tests in the radiology section of CPT®: ordered and reviewed      CritCare Time    Disposition  Final diagnoses:   Dyspnea   Pneumonia   CHF (congestive heart failure) ([facility])   Abnormal EKG   TRUDI (acute kidney injury) ([facility] )     Time reflects when diagnosis was documented in both MDM as applicable and the Disposition within this note     Time User Action Codes Description Comment    5/21/2018  9:47 AM CoppersmAugusta cabezas Benny L Add [R06 00] Dyspnea     5/21/2018  1:13 PM CoppersmithMeli L Add [J18 9] Pneumonia     5/21/2018  1:14 PM [place], [place] Benny L Add [I50 9] CHF (congestive heart failure) ([facility] )     5/21/2018  1:14 PM CoppersmithMaurice Add [R94 31] Abnormal EKG     5/21/2018  1:14 PM Co[place]mith, [place] Benny L Add [N17 9] TRUDI (acute kidney injury) [facility])       ED Disposition     ED Disposition Condition Comment    Admit  Case was discussed with Lynette Roman ([facility]) and the patient's admission status was agreed to be Admission Status: inpatient status to the service of Dr Lynette Roman [facility])   Follow-up Information    None         Patient's Medications   Discharge Prescriptions    No medications on file     No discharge procedures on file      ED Provider  Electronically Signed by           Kendall Rabago DO  05/21/18 6642

## 2018-05-21 NOTE — CONSULTS
Pulmonary Consultation   Elma Portillo 45 y o  male MRN: [de-identified]  Unit/Bed#: -01 Encounter: [id number]      Reason for consultation:  Pneumonia    Requesting physician: Dr Medardo Cameron    Impressions/Recommendations:     · Acute respiratory insufficiency with abnormal chest x-ray findings concerning for pneumonia  Await chest CT  Check procalcitonin  Continue antibiotics pending results  · Severe persistent Asthma/COPD with acute exacerbation - most recent pulmonary function testing showed restrictive impairment  He does have some mild wheezing on exam today  Continue IV steroids at current dose  Schedule Xopenex with Atrovent 3 times daily  Outpatient regimen includes Dulera and Bevespi  Place on Symbicort while hospitalized  OP follow-up with Dr Uyen Zarate    · Lower extremity edema in patient with history of DVT  Per patient report, recent Doppler negative for DVT  Await repeat echocardiogram and cardiology evaluation  He was given 1 dose of IV Lasix  History of Present Illness   HPI:  Elma Portillo is a 45 y o  male who has history of asthma/COPD and follows with Dr Uyen Zarate from pulmonary standpoint  His most recent visit there was about 2 weeks ago  At that time, the patient was complaining of congestion and he was treated with steroids and a Z-Carlitos  His symptoms mostly improved, but he presented to the emergency department today after being seen by his primary care physician with complaints of worsening lower extremity edema and concern for abnormal EKG findings  He reports a cough productive of yellow sputum  He has mild wheezing  He has increasing shortness of breath  As an outpatient, he takes Michele  He reports low-grade fevers with associated chills  On presentation, patient underwent chest x-ray which showed concern for left lower lobe infiltrate  He was given antibiotics and CT has been ordered  He has 5 cats and 2 dogs at home    He denies having allergy to them  He also noted increasing lower extremity edema, left greater than right without associated calf tenderness  Patient states that he recently underwent lower extremity Dopplers which revealed no evidence of ongoing DVT  He is maintained on Eliquis as an outpatient  Review of systems:  Patient denies headache or vision changes  Weight is stable  (+) sore throat and runny nose  (+) fever, chills no sweats  Denies chest pain or palpitations  Denies nausea, vomiting, (+) diarrhea  (+) lower extremity edema  Denies skin rashes  Denies dysuria or hematuria  All other 12-point review of systems are negative      Historical Information   Past Medical History:   Diagnosis Date    [facility]chronic obstructive [facility] disease) ([facility] )     Hypertension     Kidney disease     Leg DVT (deep venous thromboembolism), acute, bilateral ([facility] ) 01/2018    AGUS on CPAP     setting 11    [facility]      Past Surgical History:   Procedure Laterality Date    CHOLECYSTECTOMY      JOINT REPLACEMENT      LYMPH NODE DISSECTION Left 01/2018    left inguinal LN removed - benign      Family History   Problem Relation Age of Onset    Heart murmur Sister     Deep vein thrombosis Neg Hx        Tobacco history:  Lifelong nonsmoker, but did have significant secondhand exposure    Family history:  Negative from pulmonary standpoint    Meds/Allergies   Current Facility-Administered Medications   Medication Dose Route Frequency    acetaminophen (TYLENOL) tablet 650 mg  650 mg Oral Q6H PRN    allopurinol (ZYLOPRIM) tablet 100 mg  100 mg Oral Daily    amLODIPine (NORVASC) tablet 10 mg  10 mg Oral Daily    apixaban (ELIQUIS) tablet 5 mg  5 mg Oral BID    aspirin (ECOTRIN) EC tablet 325 mg  325 mg Oral Daily    atorvastatin (LIPITOR) tablet 40 mg  40 mg Oral HS    [START ON 5/22/2018] azithromycin (ZITHROMAX) tablet 500 mg  500 mg Oral Q24H    [START ON 5/22/2018] cefTRIAXone (ROCEPHIN) IVPB (premix) 1,000 mg  1,000 mg Intravenous Q24H    doxazosin (CARDURA) tablet 4 mg  4 mg Oral HS    eplerenone (INSPRA) tablet 50 mg  50 mg Oral Daily    escitalopram (LEXAPRO) tablet 10 mg  10 mg Oral Daily    [START ON 5/22/2018] ferrous sulfate tablet 325 mg  325 mg Oral Daily With Breakfast    furosemide (LASIX) tablet 80 mg  80 mg Oral Daily    LORazepam (ATIVAN) tablet 0 5 mg  0 5 mg Oral Q6H PRN    methylPREDNISolone sodium succinate (Solu-MEDROL) injection 20 mg  20 mg Intravenous TID    minoxidil (LONITEN) tablet 20 mg  20 mg Oral BID    nebivolol (BYSTOLIC) tablet 20 mg  20 mg Oral BID    ondansetron (ZOFRAN) injection 4 mg  4 mg Intravenous Q6H PRN    potassium chloride (K-DUR,KLOR-CON) CR tablet 20 mEq  20 mEq Oral Daily     Allergies   Allergen Reactions    Clonidine Anaphylaxis    Lisinopril Anaphylaxis    Nifedipine Anaphylaxis    Spironolactone Anaphylaxis       Vitals: Blood pressure 142/65, pulse 82, temperature 99 8 °F (37 7 °C), temperature source Oral, resp  rate 18, height 5' 8" (1 727 m), weight 101 kg (222 lb), SpO2 97 % , room air, Body mass index is 33 75 kg/m²      Intake/Output Summary (Last 24 hours) at 05/21/18 1557  Last data filed at 05/21/18 1337   Gross per 24 hour   Intake           121 67 ml   Output             1300 ml   Net         -1178 33 ml       Physical exam:    General Appearance:    Alert, cooperative, no conversational dyspnea or   accessory muscle use       Head/eyes:    Normocephalic, without obvious abnormality, atraumatic,         PERRL, extraocular muscles intact, no scleral icterus    Nose:   Nares normal, septum midline, mucosa normal, no drainage    or sinus tenderness   Throat:   Moist mucous membranes, no thrush   Neck:   Supple, trachea midline, no adenopathy; no carotid    bruit or JVD   Lungs:     Bilateral bronchi with scant expiratory wheeze   Chest Wall:    No tenderness or deformity    Heart:    Regular rate and rhythm, S1 and S2 normal, no murmur, rub   or gallop   Abdomen:     Soft, non-tender, bowel sounds active all four quadrants,     no masses, no organomegaly   Extremities:   Extremities normal, atraumatic, no cyanosis, (+) L>R LE edema   Skin:   Warm, dry, turgor normal, no rashes or lesions   Lymph nodes:   Cervical and supraclavicular nodes normal, left inguinal adenopathy/mass   Neurologic:   CNII-XII intact, normal strength, non-focal     Labs: I have personally reviewed pertinent lab results  Results from last 7 days  Lab Units 05/21/18  0932   WBC Thousand/uL 6 99   HEMOGLOBIN g/dL 12 4   HEMATOCRIT % 38 5   PLATELETS Thousands/uL 220           Results from last 7 days  Lab Units 05/21/18  0932   SODIUM mmol/L 143   POTASSIUM mmol/L 3 7   CHLORIDE mmol/L 104   CO2 mmol/L 30   BUN mg/dL 17   CREATININE mg/dL 1 62*   CALCIUM mg/dL 9 2   TOTAL PROTEIN g/dL 8 0   BILIRUBIN TOTAL mg/dL 1 40*   ALK PHOS U/L 83   ALT U/L 52   AST U/L 38   GLUCOSE RANDOM mg/dL 89     NT-BNP 1411    Imaging and other studies: I have personally reviewed pertinent films in PACS chest x-ray done today shows left lower lobe infiltrate versus atelectasis    Pulmonary function testing:  Performed at [facility] on 05/11/2018  FEV1/FVC ratio - increased per report    FEV1 66% predicted  There is response to bronchodilators  TLC 71 % predicted  DLCO corrected for hemoglobin 73 % predicted    Other Studies: I have personally reviewed pertinent reports  Echocardiogram from 02/06/2018 - EF 60%    Code Status: Level 1 - Full Code    Thank you for allowing us to participate in the care of your patient      Marilee GarcíaDO

## 2018-05-21 NOTE — PROGRESS NOTES
The azithromycin has / have been converted to Oral per [facility] IV-to-PO Auto-Conversion Protocol for Adults as approved by the Pharmacy and Therapeutics Committee  The patient met all eligible criteria:  3 Age = 25years old   2) Received at least one dose of the IV form   3) Receiving at least one other scheduled oral/enteral medication   4) Tolerating an oral/enteral diet   and did not have any exclusions:   1) Critical care patient   2) Active GI bleed (IF assessing H2RAs or PPIs)   3) Continuous tube feeding (IF assessing cipro, doxycycline, levofloxacin, minocycline, rifampin, or voriconazole)   4) Receiving PO vancomycin (IF assessing metronidazole)   5) Persistent nausea and/or vomiting   6) Ileus or gastrointestinal obstruction   7) Esvin/nasogastric tube set for continuous suction   8) Specific order not to automatically convert to PO (in the order's comments or if discussed in the most recent Infectious Disease or primary team's progress notes)

## 2018-05-21 NOTE — CONSULTS
Consultation - General Surgery   Denilson Albarran 45 y o  male MRN: [de-identified]  Unit/Bed#: -01 Encounter: [id number]    Assessment/Plan     Assessment:  Left groin seroma  Plan:  f/u with his surgeon as scheduled  No acute surgical intervention indicated  History of Present Illness     HPI:  Denilson Albarran is a 45 y o  male who presents with a HX of excisional lymph node excision left groin several months ago which developed a hematoma postoperatively  It has been followed by his surgeon from [facility] with conservative management  It has never been erythematous or tender  He was told that it was a hematoma  It has softened in there past and now is a bit firmer and completely nontender  US evaluation was performed as ordered by his surgeon in the past which confirmed appearance of hematoma and also found incidental DVT on the other side (RLE)  As a result he is now taking Eliquis         Inpatient consult to Acute Care Surgery  Consult performed by: Teodoro Gonzalez ordered by: Brittanie Franks          Review of Systems    Historical Information   Past Medical History:   Diagnosis Date    [facility]ch[facility]) [facility]     Hypertension     Kidney disease     Leg DVT (deep venous thromboembolism), acute, bilateral ([facility] ) 01/2018    AGUS on CPAP     setting 11    Systolic CHF ([facility] )      Past Surgical History:   Procedure Laterality Date    CHOLECYSTECTOMY      JOINT REPLACEMENT      LYMPH NODE DISSECTION Left 01/2018    left inguinal LN removed - benign      Social History   History   Alcohol Use No     Comment: socially     History   Drug Use No     History   Smoking Status    Never Smoker   Smokeless Tobacco    Never Used     Family History: non-contributory    Meds/Allergies   all current active meds have been reviewed  Allergies   Allergen Reactions    Clonidine Anaphylaxis    Lisinopril Anaphylaxis    Nifedipine Anaphylaxis    Spironolactone Anaphylaxis Objective   First Vitals:   Blood Pressure: (!) 176/85 (05/21/18 0914)  Pulse: 78 (05/21/18 0914)  Temperature: 98 8 °F (37 1 °C) (05/21/18 0914)  Temp Source: Oral (05/21/18 0914)  Respirations: 22 (05/21/18 0914)  Height: 5' 8" (172 7 cm) (05/21/18 1344)  Weight - Scale: 102 kg (224 lb 10 4 oz) (05/21/18 0914)  SpO2: 97 % (05/21/18 0914)    Current Vitals:   Blood Pressure: 142/65 (05/21/18 1500)  Pulse: 82 (05/21/18 1500)  Temperature: 99 8 °F (37 7 °C) (05/21/18 1500)  Temp Source: Oral (05/21/18 1500)  Respirations: 18 (05/21/18 1500)  Height: 5' 8" (172 7 cm) (05/21/18 1344)  Weight - Scale: 101 kg (222 lb) (05/21/18 1344)  SpO2: 97 % (05/21/18 1500)      Intake/Output Summary (Last 24 hours) at 05/21/18 1656  Last data filed at 05/21/18 1337   Gross per 24 hour   Intake           121 67 ml   Output             1300 ml   Net         -1178 33 ml       Invasive Devices     Peripheral Intravenous Line            Peripheral IV 05/21/18 Left Antecubital less than 1 day                Physical Exam   Constitutional: He is oriented to person, place, and time  He appears well-developed and well-nourished  HENT:   Head: Normocephalic and atraumatic  Eyes: No scleral icterus  Neck: Normal range of motion  No JVD present  No tracheal deviation present  Pulmonary/Chest: Effort normal  No respiratory distress  Abdominal: Soft  He exhibits no distension  Genitourinary:   Genitourinary Comments: A 7 cm by 4 cm mass is palpable and nontender in the left groin over area of previous LNBx months ago   Musculoskeletal: Normal range of motion  Neurological: He is alert and oriented to person, place, and time  No cranial nerve deficit  Skin: Skin is warm and dry  Psychiatric: He has a normal mood and affect   His behavior is normal  Judgment and thought content normal        Lab Results:   CBC:   Lab Results   Component Value Date    WBC 6 99 05/21/2018    HGB 12 4 05/21/2018    HCT 38 5 05/21/2018    MCV 83 05/21/2018     05/21/2018    MCH 26 7 (L) 05/21/2018    MCHC 32 2 05/21/2018    RDW 15 9 (H) 05/21/2018    MPV 10 3 05/21/2018    NRBC 0 05/21/2018   , CMP:   Lab Results   Component Value Date     05/21/2018    K 3 7 05/21/2018     05/21/2018    CO2 30 05/21/2018    ANIONGAP 9 05/21/2018    BUN 17 05/21/2018    CREATININE 1 62 (H) 05/21/2018    GLUCOSE 89 05/21/2018    CALCIUM 9 2 05/21/2018    AST 38 05/21/2018    ALT 52 05/21/2018    ALKPHOS 83 05/21/2018    PROT 8 0 05/21/2018    BILITOT 1 40 (H) 05/21/2018    EGFR 55 05/21/2018     Imaging: I have personally reviewed pertinent reports  EKG, Pathology, and Other Studies: I have personally reviewed pertinent reports  Counseling / Coordination of Care  Total floor / unit time spent today 25 minutes  Greater than 50% of total time was spent with the patient and / or family counseling and / or coordination of care

## 2018-05-22 ENCOUNTER — APPOINTMENT (INPATIENT)
Dept: NON INVASIVE DIAGNOSTICS | Facility: HOSPITAL | Age: 53
DRG: 194 | End: 2018-05-22
Payer: COMMERCIAL

## 2018-05-22 LAB
ALBUMIN SERPL BCP-MCNC: 3.8 G/DL (ref 3.5–5)
ALP SERPL-CCNC: 77 U/L (ref 46–116)
ALT SERPL W P-5'-P-CCNC: 42 U/L (ref 12–78)
ANION GAP SERPL CALCULATED.3IONS-SCNC: 10 MMOL/L (ref 4–13)
AST SERPL W P-5'-P-CCNC: 22 U/L (ref 5–45)
ATRIAL RATE: 70 BPM
BASOPHILS # BLD AUTO: 0.01 THOUSANDS/ΜL (ref 0–0.1)
BASOPHILS NFR BLD AUTO: 0 % (ref 0–1)
BILIRUB SERPL-MCNC: 1.2 MG/DL (ref 0.2–1)
BUN SERPL-MCNC: 19 MG/DL (ref 5–25)
CALCIUM SERPL-MCNC: 9.6 MG/DL (ref 8.3–10.1)
CHLORIDE SERPL-SCNC: 103 MMOL/L (ref 100–108)
CK SERPL-CCNC: 155 U/L (ref 39–308)
CO2 SERPL-SCNC: 27 MMOL/L (ref 21–32)
CREAT SERPL-MCNC: 1.73 MG/DL (ref 0.6–1.3)
EOSINOPHIL # BLD AUTO: 0 THOUSAND/ΜL (ref 0–0.61)
EOSINOPHIL NFR BLD AUTO: 0 % (ref 0–6)
ERYTHROCYTE [DISTWIDTH] IN BLOOD BY AUTOMATED COUNT: 15.8 % (ref 11.6–15.1)
GFR SERPL CREATININE-BSD FRML MDRD: 51 ML/MIN/1.73SQ M
GLUCOSE SERPL-MCNC: 143 MG/DL (ref 65–140)
HCT VFR BLD AUTO: 37.8 % (ref 36.5–49.3)
HGB BLD-MCNC: 12 G/DL (ref 12–17)
IMM GRANULOCYTES # BLD AUTO: 0.03 THOUSAND/UL (ref 0–0.2)
IMM GRANULOCYTES NFR BLD AUTO: 1 % (ref 0–2)
LYMPHOCYTES # BLD AUTO: 0.48 THOUSANDS/ΜL (ref 0.6–4.47)
LYMPHOCYTES NFR BLD AUTO: 8 % (ref 14–44)
MCH RBC QN AUTO: 26.3 PG (ref 26.8–34.3)
MCHC RBC AUTO-ENTMCNC: 31.7 G/DL (ref 31.4–37.4)
MCV RBC AUTO: 83 FL (ref 82–98)
MONOCYTES # BLD AUTO: 0.07 THOUSAND/ΜL (ref 0.17–1.22)
MONOCYTES NFR BLD AUTO: 1 % (ref 4–12)
NEUTROPHILS # BLD AUTO: 5.46 THOUSANDS/ΜL (ref 1.85–7.62)
NEUTS SEG NFR BLD AUTO: 90 % (ref 43–75)
NRBC BLD AUTO-RTO: 0 /100 WBCS
NT-PROBNP SERPL-MCNC: 2755 PG/ML
P AXIS: 71 DEGREES
PLATELET # BLD AUTO: 223 THOUSANDS/UL (ref 149–390)
PMV BLD AUTO: 10 FL (ref 8.9–12.7)
POTASSIUM SERPL-SCNC: 4.2 MMOL/L (ref 3.5–5.3)
PR INTERVAL: 156 MS
PROT SERPL-MCNC: 8.3 G/DL (ref 6.4–8.2)
QRS AXIS: -56 DEGREES
QRSD INTERVAL: 84 MS
QT INTERVAL: 448 MS
QTC INTERVAL: 483 MS
RBC # BLD AUTO: 4.57 MILLION/UL (ref 3.88–5.62)
SODIUM SERPL-SCNC: 140 MMOL/L (ref 136–145)
T WAVE AXIS: 88 DEGREES
VENTRICULAR RATE: 70 BPM
WBC # BLD AUTO: 6.05 THOUSAND/UL (ref 4.31–10.16)

## 2018-05-22 PROCEDURE — 85025 COMPLETE CBC W/AUTO DIFF WBC: CPT | Performed by: INTERNAL MEDICINE

## 2018-05-22 PROCEDURE — 87070 CULTURE OTHR SPECIMN AEROBIC: CPT | Performed by: INTERNAL MEDICINE

## 2018-05-22 PROCEDURE — 94760 N-INVAS EAR/PLS OXIMETRY 1: CPT

## 2018-05-22 PROCEDURE — 82550 ASSAY OF CK (CPK): CPT | Performed by: INTERNAL MEDICINE

## 2018-05-22 PROCEDURE — 80053 COMPREHEN METABOLIC PANEL: CPT | Performed by: GENERAL PRACTICE

## 2018-05-22 PROCEDURE — 94640 AIRWAY INHALATION TREATMENT: CPT

## 2018-05-22 PROCEDURE — 94660 CPAP INITIATION&MGMT: CPT

## 2018-05-22 PROCEDURE — 93010 ELECTROCARDIOGRAM REPORT: CPT | Performed by: INTERNAL MEDICINE

## 2018-05-22 PROCEDURE — 94664 DEMO&/EVAL PT USE INHALER: CPT

## 2018-05-22 PROCEDURE — 83880 ASSAY OF NATRIURETIC PEPTIDE: CPT | Performed by: INTERNAL MEDICINE

## 2018-05-22 PROCEDURE — 99232 SBSQ HOSP IP/OBS MODERATE 35: CPT | Performed by: INTERNAL MEDICINE

## 2018-05-22 PROCEDURE — 93308 TTE F-UP OR LMTD: CPT

## 2018-05-22 PROCEDURE — 87205 SMEAR GRAM STAIN: CPT | Performed by: INTERNAL MEDICINE

## 2018-05-22 RX ORDER — CEFUROXIME AXETIL 250 MG/1
500 TABLET ORAL EVERY 12 HOURS SCHEDULED
Status: DISCONTINUED | OUTPATIENT
Start: 2018-05-22 | End: 2018-05-23 | Stop reason: HOSPADM

## 2018-05-22 RX ORDER — PREDNISONE 20 MG/1
40 TABLET ORAL DAILY
Status: DISCONTINUED | OUTPATIENT
Start: 2018-05-22 | End: 2018-05-23 | Stop reason: HOSPADM

## 2018-05-22 RX ADMIN — MINOXIDIL 20 MG: 2.5 TABLET ORAL at 09:37

## 2018-05-22 RX ADMIN — IPRATROPIUM BROMIDE 0.5 MG: 0.5 SOLUTION RESPIRATORY (INHALATION) at 14:02

## 2018-05-22 RX ADMIN — FERROUS SULFATE TAB 325 MG (65 MG ELEMENTAL FE) 325 MG: 325 (65 FE) TAB at 07:30

## 2018-05-22 RX ADMIN — IPRATROPIUM BROMIDE 0.5 MG: 0.5 SOLUTION RESPIRATORY (INHALATION) at 08:25

## 2018-05-22 RX ADMIN — LEVALBUTEROL HYDROCHLORIDE 1.25 MG: 1.25 SOLUTION, CONCENTRATE RESPIRATORY (INHALATION) at 19:35

## 2018-05-22 RX ADMIN — ATORVASTATIN CALCIUM 40 MG: 40 TABLET, FILM COATED ORAL at 21:02

## 2018-05-22 RX ADMIN — ESCITALOPRAM OXALATE 10 MG: 10 TABLET ORAL at 09:36

## 2018-05-22 RX ADMIN — CEFUROXIME AXETIL 500 MG: 250 TABLET ORAL at 13:52

## 2018-05-22 RX ADMIN — BUDESONIDE AND FORMOTEROL FUMARATE DIHYDRATE 2 PUFF: 160; 4.5 AEROSOL RESPIRATORY (INHALATION) at 17:04

## 2018-05-22 RX ADMIN — NEBIVOLOL HYDROCHLORIDE 20 MG: 10 TABLET ORAL at 09:35

## 2018-05-22 RX ADMIN — APIXABAN 5 MG: 5 TABLET, FILM COATED ORAL at 09:36

## 2018-05-22 RX ADMIN — APIXABAN 5 MG: 5 TABLET, FILM COATED ORAL at 17:03

## 2018-05-22 RX ADMIN — CEFUROXIME AXETIL 500 MG: 250 TABLET ORAL at 21:02

## 2018-05-22 RX ADMIN — IPRATROPIUM BROMIDE 0.5 MG: 0.5 SOLUTION RESPIRATORY (INHALATION) at 19:35

## 2018-05-22 RX ADMIN — BUDESONIDE AND FORMOTEROL FUMARATE DIHYDRATE 2 PUFF: 160; 4.5 AEROSOL RESPIRATORY (INHALATION) at 09:37

## 2018-05-22 RX ADMIN — POTASSIUM CHLORIDE 20 MEQ: 1500 TABLET, EXTENDED RELEASE ORAL at 09:35

## 2018-05-22 RX ADMIN — METHYLPREDNISOLONE SODIUM SUCCINATE 20 MG: 40 INJECTION, POWDER, FOR SOLUTION INTRAMUSCULAR; INTRAVENOUS at 09:35

## 2018-05-22 RX ADMIN — LEVALBUTEROL HYDROCHLORIDE 1.25 MG: 1.25 SOLUTION, CONCENTRATE RESPIRATORY (INHALATION) at 14:02

## 2018-05-22 RX ADMIN — ALLOPURINOL 100 MG: 100 TABLET ORAL at 09:36

## 2018-05-22 RX ADMIN — ASPIRIN 325 MG: 325 TABLET, COATED ORAL at 09:35

## 2018-05-22 RX ADMIN — LEVALBUTEROL HYDROCHLORIDE 1.25 MG: 1.25 SOLUTION, CONCENTRATE RESPIRATORY (INHALATION) at 08:25

## 2018-05-22 RX ADMIN — NEBIVOLOL HYDROCHLORIDE 20 MG: 10 TABLET ORAL at 17:03

## 2018-05-22 RX ADMIN — AMLODIPINE BESYLATE 10 MG: 10 TABLET ORAL at 09:36

## 2018-05-22 RX ADMIN — MINOXIDIL 20 MG: 2.5 TABLET ORAL at 17:04

## 2018-05-22 RX ADMIN — DOXAZOSIN 4 MG: 4 TABLET ORAL at 21:02

## 2018-05-22 RX ADMIN — EPLERENONE 50 MG: 25 TABLET, FILM COATED ORAL at 09:37

## 2018-05-22 NOTE — PROGRESS NOTES
NEPHROLOGY PROGRESS NOTE    Patient: Hima Mills               Sex: male          DOA: 5/21/2018  9:10 AM   YOB: 1965        Age:  45 y o         LOS:  LOS: 1 day   5/22/2018    REASON FOR THE CONSULTATION:  Further management of TRUDI    HPI     This is a 45 y o  male admitted for CHF (congestive heart failure) (HCC)     SUBJECTIVE     -overnight breathing has improved significantly  Patient was also found to be nonoliguric  Patient denies nausea / vomiting / headache / dizziness / SOB / chest pain today    - Reviewed last 24 hrs events     CURRENT MEDICATIONS       Current Facility-Administered Medications:     acetaminophen (TYLENOL) tablet 650 mg, 650 mg, Oral, Q6H PRN, Greer Banks MD, 650 mg at 05/21/18 1746    allopurinol (ZYLOPRIM) tablet 100 mg, 100 mg, Oral, Daily, Greer Banks MD, 100 mg at 05/22/18 0936    amLODIPine (NORVASC) tablet 10 mg, 10 mg, Oral, Daily, Greer Banks MD, 10 mg at 05/22/18 7528    apixaban (ELIQUIS) tablet 5 mg, 5 mg, Oral, BID, Greer Banks MD, 5 mg at 05/22/18 1805    aspirin (ECOTRIN) EC tablet 325 mg, 325 mg, Oral, Daily, Greer Banks MD, 325 mg at 05/22/18 0935    atorvastatin (LIPITOR) tablet 40 mg, 40 mg, Oral, HS, Carla Banks MD, 40 mg at 05/21/18 2103    budesonide-formoterol (SYMBICORT) 160-4 5 mcg/act inhaler 2 puff, 2 puff, Inhalation, BID, [employer], DO, 2 puff at 05/22/18 2235    cefuroxime (CEFTIN) tablet 500 mg, 500 mg, Oral, Q12H [address], Ladarius De Luna MD    doxazosin (CARDURA) tablet 4 mg, 4 mg, Oral, HS, Carla Banks MD, 4 mg at 05/21/18 2103    eplerenone (INSPRA) tablet 50 mg, 50 mg, Oral, Daily, Greer Banks MD, 50 mg at 05/22/18 3861    escitalopram (LEXAPRO) tablet 10 mg, 10 mg, Oral, Daily, Greer Banks MD, 10 mg at 05/22/18 9975    ferrous sulfate tablet 325 mg, 325 mg, Oral, Daily With Breakfast, Carla Banks, MD, 325 mg at 05/22/18 0730    ipratropium (ATROVENT) 0 02 % inhalation solution 0 5 mg, 0 5 mg, Nebulization, TID, [employer], DO, 0 5 mg at 05/22/18 0825    levalbuterol (Wynelle San) inhalation solution 1 25 mg, 1 25 mg, Nebulization, TID, [employer], DO, 1 25 mg at 05/22/18 0825    LORazepam (ATIVAN) tablet 0 5 mg, 0 5 mg, Oral, Q6H PRN, Felicia Tom MD, 0 5 mg at 05/21/18 1641    minoxidil (LONITEN) tablet 20 mg, 20 mg, Oral, BID, Agueda Banks MD, 20 mg at 05/22/18 0361    nebivolol (BYSTOLIC) tablet 20 mg, 20 mg, Oral, BID, Agueda Banks MD, 20 mg at 05/22/18 0935    ondansetron (ZOFRAN) injection 4 mg, 4 mg, Intravenous, Q6H PRN, Felicia Tom MD    potassium chloride (K-DUR,KLOR-CON) CR tablet 20 mEq, 20 mEq, Oral, Daily, Agueda Banks MD, 20 mEq at 05/22/18 0935    predniSONE tablet 40 mg, 40 mg, Oral, Daily, Rosie Rios MD    OBJECTIVE     Current Weight: Weight - Scale: 101 kg (222 lb)  Vitals:    05/22/18 1100   BP: 169/78   Pulse: 94   Resp: 18   Temp: 98 °F (36 7 °C)   SpO2: 97%     Body mass index is 33 75 kg/m²  Intake/Output Summary (Last 24 hours) at 05/22/18 1311  Last data filed at 05/22/18 0901   Gross per 24 hour   Intake              360 ml   Output              475 ml   Net             -115 ml       PHYSICAL EXAMINATION     Physical Exam   Constitutional: He appears well-nourished  No distress  HENT:   Head: Normocephalic and atraumatic  Eyes: Conjunctivae are normal  No scleral icterus  Right eye exhibits normal extraocular motion  Left eye exhibits normal extraocular motion  Neck: Neck supple  No JVD present  Cardiovascular: Normal rate, S1 normal and S2 normal     Pulmonary/Chest: No accessory muscle usage  No respiratory distress  He has no wheezes  Abdominal: Soft  He exhibits no distension  Musculoskeletal:        Right ankle: He exhibits swelling  Left ankle: He exhibits swelling  Right hand: He exhibits no laceration  Left hand: He exhibits no laceration  Lymphadenopathy:        Right cervical: No superficial cervical adenopathy present  Left cervical: No superficial cervical adenopathy present  Right: No supraclavicular adenopathy present  Left: No supraclavicular adenopathy present  Neurological: He is alert  He is not disoriented  Skin: Skin is warm  No laceration noted  No cyanosis  Psychiatric: He is not combative  He does not exhibit a depressed mood  He expresses no suicidal ideation  LAB RESULTS       Results from last 7 days  Lab Units 05/22/18  0521 05/21/18  0932   WBC Thousand/uL 6 05 6 99   HEMOGLOBIN g/dL 12 0 12 4   HEMATOCRIT % 37 8 38 5   PLATELETS Thousands/uL 223 220   SODIUM mmol/L 140 143   POTASSIUM mmol/L 4 2 3 7   CHLORIDE mmol/L 103 104   CO2 mmol/L 27 30   BUN mg/dL 19 17   CREATININE mg/dL 1 73* 1 62*   EGFR ml/min/1 73sq m 51 55   CALCIUM mg/dL 9 6 9 2   TOTAL PROTEIN g/dL 8 3* 8 0   GLUCOSE RANDOM mg/dL 143* 89       I have personally reviewed the old medical records and patient's previously known baseline creatinine level is ~ 1 3    RADIOLOGY RESULTS      Results for orders placed during the hospital encounter of 03/15/18   XR chest 1 view portable    Narrative CHEST     INDICATION:   Shortness of breath  COMPARISON:  Chest x-ray 2/5/2018    EXAM PERFORMED/VIEWS:  XR CHEST PORTABLE      FINDINGS:    Stable cardiomegaly noted  New focal patchy consolidation is noted in the left upper lobe  Right lung is clear  Limited evaluation of the left lung base due to the enlarged cardiac silhouette  Osseous structures appear within normal limits for patient age  Impression New focal patchy consolidation in the left upper lobe compatible with pneumonia  Recommend follow up to resolution  The study was marked in EPIC for significant notification          Workstation performed: [id number]       Results for orders placed during the hospital encounter of 05/21/18   X-ray chest 2 views    Narrative CHEST     INDICATION:   chest pain  COMPARISON:  03/15/2018    EXAM PERFORMED/VIEWS:  XR CHEST PA & LATERAL  Images: 2    FINDINGS:    Cardiomediastinal silhouette appears enlarged  Pulmonary vessels are normal     Suspected infiltrate or atelectasis at the left lung base  The right lung is clear  Minimal blunting of the costophrenic angles  Osseous structures appear within normal limits for patient age  Impression Cardiomegaly  Suspected infiltrate or atelectasis at the left lung base  Workstation performed: [id number]         PLAN / RECOMMENDATIONS      1  TRUDI on top of CKD stage 2  Multifactorial, urine lytes were intrinsic in nature suggesting underlying cardiorenal syndrome would be least likely etiology  Patient is been followed by Dr Shirni Ocasio at [facility]  Upon review of old medical record patient's baseline creatinine seems to be around 1 3  Currently patient is on Lasix and overnight patient's renal function has worsened to 1 73  Patient's breathing has significantly improved during the hospital stay  Plan to stop Lasix today and advised patient to continue to hold Lasix this week     Continue monitor renal function while in the hospital     2   Hypertension in chronic kidney disease  Overnight patient's blood pressure has remained controlled and at goal and plan to monitor hypertension with doxazosin 4 mg PO daily, amlodipine 10 mg PO daily, Bystolic 20 mg PO BID and minoxidil 20 mg PO BID today  Disposition:  Okay to discharge patient from renal standpoint  Plan to hold Lasix this week  Please have patient follow up with his primary nephrologist-  [facility] in 1-2 weeks upon discharge      Plan was also d/w Asuncion Phalen, MD  Nephrology  5/22/2018        Portions of the record may have been created with voice recognition software  Occasional wrong word or "sound a like" substitutions may have occurred due to the inherent limitations of voice recognition software  Read the chart carefully and recognize, using context, where substitutions have occurred

## 2018-05-22 NOTE — CONSULTS
Consultation - Cardiology   Lexx Zapata 45 y o  male MRN: [de-identified]  Unit/Bed#: -01 Encounter: [id number]    Assessment/Plan     Assessment:  Abnormal ECG  Plan:  Echocardiogram pending  History of Present Illness   Physician Requesting Consult: Merlin Alfredo MD  Reason for Consult / Principal Problem: Abnormal ECG  HPI: Lexx Zapata is a 45y o  year old male who presents with cough productive for yellowish sputum, swelling in left foot and abnormal ECG noted by family physician  Consults    Review of Systems   Respiratory: Positive for cough and wheezing  Cardiovascular: Positive for leg swelling  Negative for chest pain and palpitations         Historical Information   Past Medical History:   Diagnosis Date    [facility]) [facility]     Hypertension     Kidney disease     Leg DVT (deep venous thromboembolism), acute, bilateral ([facility] ) 01/2018    AGUS on CPAP     setting 11    Systolic CHF ([facility] )      Past Surgical History:   Procedure Laterality Date    CHOLECYSTECTOMY      JOINT REPLACEMENT      LYMPH NODE DISSECTION Left 01/2018    left inguinal LN removed - benign      History   Alcohol Use No     Comment: socially     History   Drug Use No     History   Smoking Status    Never Smoker   Smokeless Tobacco    Never Used     Family History:   Family History   Problem Relation Age of Onset    Heart murmur Sister     Deep vein thrombosis Neg Hx        Meds/Allergies   current meds:   Current Facility-Administered Medications   Medication Dose Route Frequency    acetaminophen (TYLENOL) tablet 650 mg  650 mg Oral Q6H PRN    allopurinol (ZYLOPRIM) tablet 100 mg  100 mg Oral Daily    amLODIPine (NORVASC) tablet 10 mg  10 mg Oral Daily    apixaban (ELIQUIS) tablet 5 mg  5 mg Oral BID    aspirin (ECOTRIN) EC tablet 325 mg  325 mg Oral Daily    atorvastatin (LIPITOR) tablet 40 mg  40 mg Oral HS    azithromycin (ZITHROMAX) tablet 500 mg  500 mg Oral Q24H    budesonide-formoterol (SYMBICORT) 160-4 5 mcg/act inhaler 2 puff  2 puff Inhalation BID    cefTRIAXone (ROCEPHIN) IVPB (premix) 1,000 mg  1,000 mg Intravenous Q24H    doxazosin (CARDURA) tablet 4 mg  4 mg Oral HS    eplerenone (INSPRA) tablet 50 mg  50 mg Oral Daily    escitalopram (LEXAPRO) tablet 10 mg  10 mg Oral Daily    ferrous sulfate tablet 325 mg  325 mg Oral Daily With Breakfast    ipratropium (ATROVENT) 0 02 % inhalation solution 0 5 mg  0 5 mg Nebulization TID    levalbuterol (XOPENEX) inhalation solution 1 25 mg  1 25 mg Nebulization TID    LORazepam (ATIVAN) tablet 0 5 mg  0 5 mg Oral Q6H PRN    methylPREDNISolone sodium succinate (Solu-MEDROL) injection 20 mg  20 mg Intravenous TID    minoxidil (LONITEN) tablet 20 mg  20 mg Oral BID    nebivolol (BYSTOLIC) tablet 20 mg  20 mg Oral BID    ondansetron (ZOFRAN) injection 4 mg  4 mg Intravenous Q6H PRN    potassium chloride (K-DUR,KLOR-CON) CR tablet 20 mEq  20 mEq Oral Daily    and PTA meds:   Prior to Admission Medications   Prescriptions Last Dose Informant Patient Reported? Taking?    Cholecalciferol (VITAMIN D-3) 1000 units CAPS 5/21/2018 at Unknown time Spouse/Significant Other Yes Yes   Sig: Take 1,000 Units by mouth daily   Glycopyrrolate-Formoterol (BEVESPI AEROSPHERE) 9-4 8 MCG/ACT AERO 5/21/2018 at Unknown time Spouse/Significant Other Yes Yes   Sig: Inhale 2 puffs daily after breakfast   Glycopyrrolate-Formoterol (BEVESPI AEROSPHERE) 9-4 8 MCG/ACT AERO 5/21/2018 at Unknown time Spouse/Significant Other Yes Yes   Sig: Inhale 1 puff daily at bedtime   LORazepam (ATIVAN) 0 5 mg tablet 5/21/2018 at Unknown time Spouse/Significant Other Yes Yes   Sig: Take 0 5 mg by mouth every 6 (six) hours as needed for anxiety   MAGNESIUM PO 5/21/2018 at Unknown time Spouse/Significant Other Yes Yes   Sig: Take 400 mg by mouth daily   Milk Thistle 500 MG CAPS 5/21/2018 at Unknown time Spouse/Significant Other Yes Yes   Sig: Take 500 mg by mouth 2 (two) times a day   Mometasone Furo-Formoterol Fum 200-5 MCG/ACT AERO 5/21/2018 at Unknown time Spouse/Significant Other Yes Yes   Sig: Take 2 puffs by mouth 2 (two) times a day   Omega-3 Fatty Acids (FISH OIL) 1200 MG CAPS 5/21/2018 at Unknown time Spouse/Significant Other Yes Yes   Sig: Take 1,200 mg by mouth daily at bedtime   albuterol (PROVENTIL HFA,VENTOLIN HFA) 90 mcg/act inhaler 5/21/2018 at Unknown time Spouse/Significant Other No Yes   Sig: Inhale 2 puffs every 6 (six) hours as needed for wheezing   allopurinol (ZYLOPRIM) 100 mg tablet 5/21/2018 at Unknown time Spouse/Significant Other Yes Yes   Sig: Take 100 mg by mouth daily   amLODIPine (NORVASC) 10 mg tablet 5/21/2018 at Unknown time Spouse/Significant Other Yes Yes   Sig: Take 10 mg by mouth daily   apixaban (ELIQUIS) 5 mg 5/21/2018 at Unknown time Spouse/Significant Other Yes Yes   Sig: Take 5 mg by mouth 2 (two) times a day     aspirin (PX ENTERIC ASPIRIN) 325 mg EC tablet 5/21/2018 at Unknown time Spouse/Significant Other Yes Yes   Sig: Take 325 mg by mouth daily At night    atorvastatin (LIPITOR) 40 mg tablet 5/21/2018 at Unknown time Spouse/Significant Other Yes Yes   Sig: Take 40 mg by mouth daily at bedtime   doxazosin (CARDURA) 4 mg tablet 5/21/2018 at Unknown time Spouse/Significant Other Yes Yes   Sig: Take 4 mg by mouth daily at bedtime   eplerenone (INSPRA) 50 MG tablet 5/21/2018 at Unknown time Spouse/Significant Other Yes Yes   Sig: Take 50 mg by mouth daily   escitalopram (LEXAPRO) 10 mg tablet 5/21/2018 at Unknown time Spouse/Significant Other Yes Yes   Sig: Take 10 mg by mouth daily   ferrous sulfate 325 (65 Fe) mg tablet 5/21/2018 at Unknown time Spouse/Significant Other Yes Yes   Sig: Take 325 mg by mouth daily with breakfast   furosemide (LASIX) 80 mg tablet 5/21/2018 at Unknown time Spouse/Significant Other Yes Yes   Sig: Take 80 mg by mouth daily   minoxidil (LONITEN) 10 mg tablet 5/21/2018 at Unknown time Spouse/Significant Other Yes Yes   Sig: Take 20 mg by mouth 2 (two) times a day   multivitamin-iron-minerals-folic acid (CENTRUM) chewable tablet 5/21/2018 at Unknown time Spouse/Significant Other Yes Yes   Sig: Chew 1 tablet daily   nebivolol (BYSTOLIC) 20 MG tablet 2/76/8596 at Unknown time Spouse/Significant Other Yes Yes   Sig: Take 20 mg by mouth 2 (two) times a day   potassium chloride (KLOR-CON M20) 20 mEq tablet 5/21/2018 at Unknown time Spouse/Significant Other Yes Yes   Sig: Take 20 mEq by mouth daily        Facility-Administered Medications: None     Allergies   Allergen Reactions    Clonidine Anaphylaxis    Lisinopril Anaphylaxis    Nifedipine Anaphylaxis    Spironolactone Anaphylaxis       Objective   Vitals: Blood pressure 145/74, pulse 75, temperature 97 5 °F (36 4 °C), temperature source Oral, resp  rate 18, height 5' 8" (1 727 m), weight 101 kg (222 lb), SpO2 96 %  Orthostatic Blood Pressures      Most Recent Value   Blood Pressure  145/74 filed at 05/22/2018 0700   Patient Position - Orthostatic VS  Sitting filed at 05/22/2018 0700            Intake/Output Summary (Last 24 hours) at 05/22/18 7305  Last data filed at 05/21/18 1859   Gross per 24 hour   Intake           241 67 ml   Output             1500 ml   Net         -1258 33 ml       Invasive Devices     Peripheral Intravenous Line            Peripheral IV 05/21/18 Left Antecubital less than 1 day                Physical Exam   Constitutional: He is oriented to person, place, and time  He appears well-developed and well-nourished  No distress  Cardiovascular: Normal rate, regular rhythm and normal heart sounds  Exam reveals no gallop and no friction rub  No murmur heard  Pulmonary/Chest: Effort normal  No respiratory distress  He has wheezes  He has no rales  Musculoskeletal: He exhibits edema  Neurological: He is alert and oriented to person, place, and time         Lab Results:   CBC with diff:   Results from last 7 days  Lab Units 05/22/18  0521 WBC Thousand/uL 6 05   RBC Million/uL 4 57   HEMOGLOBIN g/dL 12 0   HEMATOCRIT % 37 8   MCV fL 83   MCH pg 26 3*   MCHC g/dL 31 7   RDW % 15 8*   MPV fL 10 0   PLATELETS Thousands/uL 223     CMP:   Results from last 7 days  Lab Units 05/22/18  0521   SODIUM mmol/L 140   POTASSIUM mmol/L 4 2   CHLORIDE mmol/L 103   CO2 mmol/L 27   ANION GAP mmol/L 10   BUN mg/dL 19   CREATININE mg/dL 1 73*   GLUCOSE RANDOM mg/dL 143*   CALCIUM mg/dL 9 6   AST U/L 22   ALT U/L 42   ALK PHOS U/L 77   TOTAL PROTEIN g/dL 8 3*   BILIRUBIN TOTAL mg/dL 1 20*   EGFR ml/min/1 73sq m 51     Troponin:   0  Lab Value Date/Time   TROPONINI 0 02 05/21/2018 1743   TROPONINI 0 02 05/21/2018 1359   TROPONINI <0 02 05/21/2018 0932   TROPONINI <0 02 03/15/2018 1126   TROPONINI 0 05 (H) 02/05/2018 2228   TROPONINI 0 04 02/05/2018 2014   TROPONINI 0 04 02/05/2018 1644   TROPONINI 0 05 (H) 02/05/2018 1405   TROPONINI <0 02 01/23/2018 0857   TROPONINI <0 02 01/22/2018 1050     BNP:   Results from last 7 days  Lab Units 05/22/18  0521   SODIUM mmol/L 140   POTASSIUM mmol/L 4 2   CHLORIDE mmol/L 103   CO2 mmol/L 27   ANION GAP mmol/L 10   BUN mg/dL 19   CREATININE mg/dL 1 73*   GLUCOSE RANDOM mg/dL 143*   CALCIUM mg/dL 9 6   EGFR ml/min/1 73sq m 51     Coags:     TSH:     Magnesium:     Imaging: I have personally reviewed pertinent reports  EKG: Abnormal  VTE Prophylaxis:     Code Status: Level 1 - Full Code  Advance Directive and Living Will:      Power of :    POLST:      Counseling / Coordination of Care  Total floor / unit time spent today 50 minutes  Greater than 50% of total time was spent with the patient and / or family counseling and / or coordination of care  A description of the counseling / coordination of care: 50

## 2018-05-22 NOTE — SOCIAL WORK
CM met with pt at bedside  Pt lives with family in [facility] in a two story home with stairs and railings  Pt denies hx of DME/Oxygen and Substance abuse  Pt has hx of Rehab in [facility] and [place] [id number] hx with [facility]  Pt has hx of depression and anxiety  Pt is able to complete ADL's on his own  Pt uses [employer] in [place]  Pt's wife is POA/AD  Pt is un-employed and able to drive himself to necessary appointments  Pt's family will transport pt home when ready for discharge  Pt has no other questions at this time  CM department to follow pt through discharge process  CM reviewed discharge planning process including the following: identifying help at home, patient preference for discharge planning needs, pharmacy preference, and availability of treatment team to discuss questions or concerns patient and/or family may have regarding understanding medications and recognizing signs and symptoms once discharged  CM also encouraged patient to follow up with all recommended appointments after discharge  Patient advised of importance for patient and family to participate in managing patients medical well being

## 2018-05-22 NOTE — PLAN OF CARE
Problem: DISCHARGE PLANNING - CARE MANAGEMENT  Goal: Discharge to post-acute care or home with appropriate resources  INTERVENTIONS:  - Conduct assessment to determine patient/family and health care team treatment goals, and need for post-acute services based on payer coverage, community resources, and patient preferences, and barriers to discharge  - Address psychosocial, clinical, and financial barriers to discharge as identified in assessment in conjunction with the patient/family and health care team  - Arrange appropriate level of post-acute services according to patients   needs and preference and payer coverage in collaboration with the physician and health care team  - Communicate with and update the patient/family, physician, and health care team regarding progress on the discharge plan  - Arrange appropriate transportation to post-acute venues  Outcome: Progressing  CM met with pt at bedside  Pt lives with family in [facility] in a two story home with stairs and railings  Pt denies hx of DME/Oxygen and Substance abuse  Pt has hx of Rehab in [place] [address] hx with [facility]  Pt has hx of depression and anxiety  Pt is able to complete ADL's on his own  Pt uses [facility] in [place]  Pt's wife is POA/AD  Pt is un-employed and able to drive himself to necessary appointments  Pt's family will transport pt home when ready for discharge  Pt has no other questions at this time  CM department to follow pt through discharge process  CM reviewed discharge planning process including the following: identifying help at home, patient preference for discharge planning needs, pharmacy preference, and availability of treatment team to discuss questions or concerns patient and/or family may have regarding understanding medications and recognizing signs and symptoms once discharged  CM also encouraged patient to follow up with all recommended appointments after discharge   Patient advised of importance for patient and family to participate in managing patients medical well being

## 2018-05-22 NOTE — PLAN OF CARE
CARDIOVASCULAR - ADULT     Maintains optimal cardiac output and hemodynamic stability Progressing     Absence of cardiac dysrhythmias or at baseline rhythm Progressing        DISCHARGE PLANNING     Discharge to home or other facility with appropriate resources Progressing        INFECTION - ADULT     Absence or prevention of progression during hospitalization Progressing     Absence of fever/infection during neutropenic period Progressing        PAIN - ADULT     Verbalizes/displays adequate comfort level or baseline comfort level Progressing        Potential for Falls     Patient will remain free of falls Progressing        RESPIRATORY - ADULT     Achieves optimal ventilation and oxygenation Progressing        SAFETY ADULT     Maintain or return to baseline ADL function Progressing     Maintain or return mobility status to optimal level Progressing

## 2018-05-22 NOTE — CASE MANAGEMENT
Initial Clinical Review    Admission: Date/Time/Statement: 5/21/18 @ 1315     Orders Placed This Encounter   Procedures    Inpatient Admission (expected length of stay for this patient is greater than two midnights)     Standing Status:   Standing     Number of Occurrences:   1     Order Specific Question:   Admitting Physician     Answer:   Andrew Reid [[id number]]     Order Specific Question:   Level of Care     Answer:   Med Surg [[id number]]     Order Specific Question:   Bed request comments     Answer:   tele     Order Specific Question:   Estimated length of stay     Answer:   More than 2 Midnights     Order Specific Question:   Certification     Answer:   I certify that inpatient services are medically necessary for this patient for a duration of greater than two midnights  See H&P and MD Progress Notes for additional information about the patient's course of treatment  ED: Date/Time/Mode of Arrival:   ED Arrival Information     Expected Arrival Acuity Means of Arrival Escorted By Service Admission Type    - 5/21/2018 09:08 Urgent Walk-In Family Member General Medicine Urgent    Arrival Complaint    ABNORMAL ECG/SOB          Chief Complaint:   Chief Complaint   Patient presents with    Shortness of Breath     pt c/o feeling SOB for the past 2-3 days  was sent from PCP for abnormal ECG       History of Illness: 45 y o  male who presents with shortness of breath  Patient has a history of chronic diastolic heart failure he had an echo in February of 2018 which showed an ejection fraction of 60 percent  Apparently has had a 2 day history of increasing shortness of breath to the point where he has difficulty speaking in full sentences    He saw his primary care provider in EKG changes were noted in his office    ED Vital Signs:   ED Triage Vitals   Temperature Pulse Respirations Blood Pressure SpO2   05/21/18 0914 05/21/18 0914 05/21/18 0914 05/21/18 0914 05/21/18 0914   98 8 °F (37 1 °C) 78 22 (!) 176/85 97 %      Temp Source Heart Rate Source Patient Position - Orthostatic VS BP Location FiO2 (%)   05/21/18 0914 05/21/18 0914 05/21/18 0914 05/21/18 0914 --   Oral Monitor Sitting Right arm       Pain Score       05/21/18 1123       No Pain        Wt Readings from Last 1 Encounters:   05/21/18 101 kg (222 lb)       Vital Signs (abnormal): respiratory rate 27 - 24  Maximum /85  Maximum temperature 101 1  Exam JVD  Respiratory distress  Rales  Abdomen mild diffuse tenderness  LLE, chronic lymphedema  Abnormal Labs/Diagnostic Test Results:   NT- proBNP 1411  Total bilirubin 1 40  UA 1+ protein  Small blood  Microalbumin / creatinine urine ratio [[id number]] (Abnormal) Collected: 05/21/18 1642   Lab Status: Final result Specimen: Urine from Urine, Clean Catch Updated: 05/21/18 1908    Creatinine, Ur 145 0 mg/dL     Microalbum  ,U,Random 265 0 (H) 0 0 - 20 0 mg/L     Microalb Creat Ratio 183 (H) 0 - 30 mg/g creatinine        CxR- Cardiomegaly   Suspected infiltrate or atelectasis at the left lung base    ekg - sinus  T wave inverted V6, III, III, I and aVl    Ct chest - Small infiltrate at the posterior right upper lobe suspicious for bronchopneumonia  Yumiko Winsted is also patchy bibasilar atelectasis versus early infiltrates   A repeat CT chest in 6-8 weeks following treatment is recommended to assess for   improvement/resolution  Interval resolution of a previously described left upper lobe alveolar infiltrate when compared to a CTA chest dated March 15, 2018  Stable cardiomegaly with a small pericardial effusion    5/22/2018-  Bun 19  Creatinine 1 73  Glucose 143  Total protein 8 3  Total bilirubin 1 20  ED Treatment: blood cultures     Medication Administration from 05/21/2018 0908 to 05/21/2018 1343       Date/Time Order Dose Route Action Action by Comments     05/21/2018 1004 albuterol inhalation solution 5 mg 5 mg Nebulization Given Dora Wyman RN      05/21/2018 1004 ipratropium (ATROVENT) 0 02 % inhalation solution 0 5 mg 0 5 mg Nebulization Given Getachew Lerma RN      05/21/2018 1254 cefTRIAXone (ROCEPHIN) IVPB (premix) 1,000 mg 0 mg Intravenous Stopped Getachew Lerma RN      05/21/2018 1141 cefTRIAXone (ROCEPHIN) IVPB (premix) 1,000 mg 1,000 mg Intravenous [address] Getachew Lerma RN      05/21/2018 1258 azithromycin (ZITHROMAX) 500 mg in sodium chloride 0 9% 250mL IVPB 500 mg 500 mg Intravenous New Bag Getachew Lerma RN      05/21/2018 1136 furosemide (LASIX) injection 40 mg 40 mg Intravenous Given Getachew Lerma RN      05/21/2018 1148 acetaminophen (TYLENOL) tablet 650 mg 650 mg Oral Given Getachew Lerma RN      05/21/2018 1148 sodium chloride 0 9 % bolus 500 mL 500 mL Intravenous New Bag Getachew Lerma RN           Past Medical/Surgical History:    Active Ambulatory Problems     Diagnosis Date Noted    Pericardial effusion 01/22/2018    Essential hypertension 01/22/2018    Leg DVT (deep venous thromboembolism), acute, bilateral ([facility] ) 01/22/2018    COPD (chronic obstructive pulmonary disease) ([facility] ) 01/22/2018    CHF (congestive heart failure) ([facility]) 01/22/2018    Chronic diastolic CHF (congestive heart failure) ([facility] ) 02/06/2018    Pulmonary nodule, right 02/06/2018    Acute respiratory failure with hypoxia (HCC) 03/15/2018    HAP (hospital-acquired pneumonia) 03/15/2018    Stage 3 chronic kidney disease 03/15/2018    DVT (deep venous thrombosis) ([facility] ) 03/15/2018    Lymphedema 03/16/2018     Resolved Ambulatory Problems     Diagnosis Date Noted    Chest pain 02/05/2018    Gums, bleeding 02/05/2018    Leukocytosis 02/06/2018     Past Medical History:   Diagnosis Date    COPD (chronic obstructive pulmonary disease) ([facility] )     Hypertension     Kidney disease     Leg DVT (deep venous thromboembolism), acute, bilateral ([facility] ) 01/2018    AGUS on CPAP     Systolic CHF ([facility] )        Admitting Diagnosis: CHF (congestive heart failure) ([facility]) [I50 9]  Pneumonia [J18 9]  Dyspnea [R06 00]  SOB (shortness of breath) [R06 02]  Abnormal EKG [R94 31]  TRUDI (acute kidney injury) ([facility] ) [N17 9]    Age/Sex: 45 y o  male    Assessment/Plan: CHF (congestive heart failure) ([facility])  Active Problems:    Leg DVT (deep venous thromboembolism), acute, bilateral ([facility])    Chronic diastolic CHF (congestive heart failure) ([facility])    SOB (shortness of breath)    TRUDI (acute kidney injury) ([facility] )        Plan for the Primary Problem(s):  Shortness of breath abnormal ekg-serial enzymes; echo 5/18 reviewed ef 60% no wma; telemetry; cxr unrevealling of infiltrate; bnp similar to prior; check ct chest; cardiology consult for transient ekg changes ; follows with dr Bhumika Osorio for chf and recent dx pericardial effusion-check echo; ct chest today; also with h/o copd-start solumedrol and respiratory protocol; zithromax/rocephin for now for bronchitis vs infiltrate pending ct chest-pulmonary consult ordered     h/o dvt-on eliquis    Admission Orders:  5/21/2018  1316 INPATIENT   Scheduled Meds:   Current Facility-Administered Medications:  allopurinol 100 mg Oral Daily Opal Banks MD   amLODIPine 10 mg Oral Daily Opal Banks MD   apixaban 5 mg Oral BID Nathaniel Hendrix MD   aspirin 325 mg Oral Daily Nathaniel Hendrix MD   atorvastatin 40 mg Oral HS Opal Banks MD   azithromycin 500 mg Oral Q24H Mary Reynolds MD   budesonide-formoterol 2 puff Inhalation BID Rosiland Denisha, DO   cefTRIAXone 1,000 mg Intravenous Q24H Nathaniel Hendrix MD   doxazosin 4 mg Oral HS Opal Banks MD   eplerenone 50 mg Oral Daily Opal Banks MD   escitalopram 10 mg Oral Daily Carla Banks MD   ferrous sulfate 325 mg Oral Daily With Breakfast Carla Banks MD   ipratropium 0 5 mg Nebulization TID Rosiland Denisha, DO   levalbuterol 1 25 mg Nebulization TID Rosiland Denisha, DO   methylPREDNISolone sodium succinate 20 mg Intravenous TID Octavio Amin MD   minoxidil 20 mg Oral BID Jeniffer Banks MD   nebivolol 20 mg Oral BID Jeniffer Banks MD   potassium chloride 20 mEq Oral Daily Jeniffer Banks MD     Continuous Infusions:    PRN Meds:   acetaminophen    LORazepam    Ondansetron    Aspiration precautions   Telemetry  Consult nephrology; cardiology; pulmonary; acute care surgery  cpap  Respiratory protocol - on room air    · Per pulmonary - Acute respiratory insufficiency with abnormal chest x-ray findings concerning for pneumonia  Await chest CT  Check procalcitonin  Continue antibiotics pending results  · Severe persistent Asthma/COPD with acute exacerbation - most recent pulmonary function testing showed restrictive impairment  He does have some mild wheezing on exam today  Continue IV steroids at current dose  Schedule Xopenex with Atrovent 3 times daily  Outpatient regimen includes Dulera and Bevespi  Place on Symbicort while hospitalized  OP follow-up with Dr Tori Bishop  Lower extremity edema in patient with history of DVT  Per patient report, recent Doppler negative for DVT  Await repeat echocardiogram and cardiology evaluation  He was given 1 dose of IV Lasix  Per nephrology - TRUDI on top of CKD stage 2   Multifactorial  Upon review of old medical record patient is been followed by [facility]-Dr Mora Miner and was told that he was in stage III  Patient told me that his GFR was around 46 when he was seen last by Dr Mora Miner  Upon review of old medical record patient's baseline creatinine seems to be at around 1 3 which is more like CKD stage 2  Patient was admitted with elevated creatinine of 1 6 to  Currently patient is been treated for CHF/COPD exacerbation and currently receiving Lasix, IV steroid and antibiotics  Plan to check urine lytes, CK and BNP for further evaluation of TRUDI    Patient does have leg swelling and plan to continue Lasix for time being   Plan to recheck renal function again in the morning  2   Hypertension in chronic kidney disease  Currently patient's blood pressure is controlled and at goal and plan to monitor hypertension with doxazosin 4 mg PO daily, amlodipine 10 mg PO daily, Bystolic 20 mg PO BID and minoxidil 20 mg PO BID  Per surgery - Impressions: seroma after lymph node biopsy, no evidence of infection at this time    Thank you,  [address] [facility] in the [facility] [address] for 2017  Network Utilization Review Department  Phone: [phone]; Fax [phone]  ATTENTION: The Network Utilization Review Department is now centralized for our 7 Facilities  Make a note that we have a new phone and fax numbers for our Department  Please call with any questions or concerns to [phone] and carefully follow the prompts so that you are directed to the right person  All voicemails are confidential  Fax any determinations, approvals, denials, and requests for initial or continue stay review clinical to [phone]  Due to HIGH CALL volume, it would be easier if you could please send faxed requests to expedite your requests and in part, help us provide discharge notifications faster

## 2018-05-22 NOTE — PROGRESS NOTES
Progress Note - Pulmonary   Santhosh Cook 45 y o  male MRN: [de-identified]  Unit/Bed#: -01 Encounter: [id number]    Assessment:  1  Acute pulmonary insufficiency, multifactorial as below, improving  2  Severe persistent asthma with acute exacerbation (patient states that he has COPD but his pulmonologist note states only severe persistent asthma, patient never smoked cigarettes), improving  3  Community-acquired pneumonia as on CT scan  4  Acute on chronic diastolic CHF, improved  5  AGUS on CPAP at home  6  History of DVT on Eliquis    Plan:  · Switch to prednisone and taper by 10 mg every 3 days to stop  · Continue bronchodilators nebulizer therapy  · Continue inhalers with Symbicort for now, discharge patient on home regimen  · Stop azithromycin, switch to Ceftin 500 mg b i d  for 5 days  · Diurese as per Cardiology  · Continue CPAP at night  · From pulmonary standpoint patient is stable for discharge, he needs to follow with his pulmonologist as outpatient  Will sign off, please call with questions    ----------------------------------------------------------------------------------------------------------------------    HPI/Interval History:   Patient feels better but still feels mild chest congestion  Improving shortness of breath and cough  Afebrile    Vitals:   Blood pressure 145/74, pulse 75, temperature 97 5 °F (36 4 °C), temperature source Oral, resp  rate 18, height 5' 8" (1 727 m), weight 101 kg (222 lb), SpO2 96 %  ,Body mass index is 33 75 kg/m²  SpO2: 96 %  SpO2 Activity: At Rest  O2 Device: None (Room air)    Physical Exam:   Gen: Alert and without respiratory distress  Chest:   Equal breath sounds with minimal wheezing bilaterally  Cardiac:  S1-S2 regular  Abdomen: Soft and nontender  Bowel sounds are present    Extremities:  + edema      Labs:    CBC:    Results from last 7 days  Lab Units 05/22/18  0521 05/21/18  0932   WBC Thousand/uL 6 05 6 99   HEMOGLOBIN g/dL 12 0 12 4   HEMATOCRIT % 37 8 38 5   PLATELETS Thousands/uL 223 220       CMP:     Results from last 7 days  Lab Units 05/22/18  0521 05/21/18  0932   SODIUM mmol/L 140 143   POTASSIUM mmol/L 4 2 3 7   CHLORIDE mmol/L 103 104   CO2 mmol/L 27 30   BUN mg/dL 19 17   CREATININE mg/dL 1 73* 1 62*   CALCIUM mg/dL 9 6 9 2   TOTAL PROTEIN g/dL 8 3* 8 0   BILIRUBIN TOTAL mg/dL 1 20* 1 40*   ALK PHOS U/L 77 83   ALT U/L 42 52   AST U/L 22 38   GLUCOSE RANDOM mg/dL 143* 89         Imaging studies:  Chest CT scan reviewed on PACs:  Questionable infiltrates in the right upper lobe posteriorly and also atelectasis in the lower lobes      Debi Beal MD    Portions of the record may have been created with voice recognition software  Occasional wrong word or "sound a like" substitutions may have occurred due to the inherent limitations of voice recognition software  Read the chart carefully and recognize, using context, where substitutions have occurred

## 2018-05-23 VITALS
WEIGHT: 222 LBS | TEMPERATURE: 98.2 F | DIASTOLIC BLOOD PRESSURE: 62 MMHG | SYSTOLIC BLOOD PRESSURE: 136 MMHG | OXYGEN SATURATION: 94 % | HEART RATE: 87 BPM | RESPIRATION RATE: 18 BRPM | HEIGHT: 68 IN | BODY MASS INDEX: 33.65 KG/M2

## 2018-05-23 PROBLEM — D64.9 ANEMIA: Status: ACTIVE | Noted: 2018-05-23

## 2018-05-23 LAB
ANION GAP SERPL CALCULATED.3IONS-SCNC: 7 MMOL/L (ref 4–13)
BASOPHILS # BLD AUTO: 0.02 THOUSANDS/ΜL (ref 0–0.1)
BASOPHILS NFR BLD AUTO: 0 % (ref 0–1)
BUN SERPL-MCNC: 26 MG/DL (ref 5–25)
CALCIUM SERPL-MCNC: 9.2 MG/DL (ref 8.3–10.1)
CHLORIDE SERPL-SCNC: 106 MMOL/L (ref 100–108)
CO2 SERPL-SCNC: 28 MMOL/L (ref 21–32)
CREAT SERPL-MCNC: 1.65 MG/DL (ref 0.6–1.3)
EOSINOPHIL # BLD AUTO: 0.01 THOUSAND/ΜL (ref 0–0.61)
EOSINOPHIL NFR BLD AUTO: 0 % (ref 0–6)
ERYTHROCYTE [DISTWIDTH] IN BLOOD BY AUTOMATED COUNT: 15.9 % (ref 11.6–15.1)
GFR SERPL CREATININE-BSD FRML MDRD: 54 ML/MIN/1.73SQ M
GLUCOSE SERPL-MCNC: 113 MG/DL (ref 65–140)
HCT VFR BLD AUTO: 32.3 % (ref 36.5–49.3)
HGB BLD-MCNC: 10.4 G/DL (ref 12–17)
IMM GRANULOCYTES # BLD AUTO: 0.14 THOUSAND/UL (ref 0–0.2)
IMM GRANULOCYTES NFR BLD AUTO: 1 % (ref 0–2)
LYMPHOCYTES # BLD AUTO: 1.35 THOUSANDS/ΜL (ref 0.6–4.47)
LYMPHOCYTES NFR BLD AUTO: 14 % (ref 14–44)
MCH RBC QN AUTO: 26.7 PG (ref 26.8–34.3)
MCHC RBC AUTO-ENTMCNC: 32.2 G/DL (ref 31.4–37.4)
MCV RBC AUTO: 83 FL (ref 82–98)
MONOCYTES # BLD AUTO: 1.33 THOUSAND/ΜL (ref 0.17–1.22)
MONOCYTES NFR BLD AUTO: 13 % (ref 4–12)
NEUTROPHILS # BLD AUTO: 7.06 THOUSANDS/ΜL (ref 1.85–7.62)
NEUTS SEG NFR BLD AUTO: 72 % (ref 43–75)
NRBC BLD AUTO-RTO: 0 /100 WBCS
PLATELET # BLD AUTO: 195 THOUSANDS/UL (ref 149–390)
PMV BLD AUTO: 9.2 FL (ref 8.9–12.7)
POTASSIUM SERPL-SCNC: 3.9 MMOL/L (ref 3.5–5.3)
RBC # BLD AUTO: 3.9 MILLION/UL (ref 3.88–5.62)
SODIUM SERPL-SCNC: 141 MMOL/L (ref 136–145)
WBC # BLD AUTO: 9.91 THOUSAND/UL (ref 4.31–10.16)

## 2018-05-23 PROCEDURE — 80048 BASIC METABOLIC PNL TOTAL CA: CPT | Performed by: INTERNAL MEDICINE

## 2018-05-23 PROCEDURE — 94760 N-INVAS EAR/PLS OXIMETRY 1: CPT

## 2018-05-23 PROCEDURE — 99232 SBSQ HOSP IP/OBS MODERATE 35: CPT | Performed by: INTERNAL MEDICINE

## 2018-05-23 PROCEDURE — 99238 HOSP IP/OBS DSCHRG MGMT 30/<: CPT | Performed by: INTERNAL MEDICINE

## 2018-05-23 PROCEDURE — 94640 AIRWAY INHALATION TREATMENT: CPT

## 2018-05-23 PROCEDURE — 85025 COMPLETE CBC W/AUTO DIFF WBC: CPT | Performed by: INTERNAL MEDICINE

## 2018-05-23 RX ORDER — GUAIFENESIN 100 MG/5ML
200 SOLUTION ORAL EVERY 4 HOURS PRN
Status: DISCONTINUED | OUTPATIENT
Start: 2018-05-23 | End: 2018-05-23 | Stop reason: HOSPADM

## 2018-05-23 RX ORDER — FUROSEMIDE 80 MG
80 TABLET ORAL DAILY
Qty: 30 TABLET | Refills: 0 | Status: ON HOLD | OUTPATIENT
Start: 2018-05-23 | End: 2020-07-11

## 2018-05-23 RX ORDER — CEFUROXIME AXETIL 500 MG/1
500 TABLET ORAL EVERY 12 HOURS SCHEDULED
Qty: 7 TABLET | Refills: 0 | Status: SHIPPED | OUTPATIENT
Start: 2018-05-23 | End: 2018-05-27

## 2018-05-23 RX ORDER — FLUTICASONE PROPIONATE 50 MCG
1 SPRAY, SUSPENSION (ML) NASAL 2 TIMES DAILY
Status: DISCONTINUED | OUTPATIENT
Start: 2018-05-23 | End: 2018-05-23 | Stop reason: HOSPADM

## 2018-05-23 RX ORDER — FLUTICASONE PROPIONATE 50 MCG
1 SPRAY, SUSPENSION (ML) NASAL 2 TIMES DAILY
Qty: 16 G | Refills: 0 | Status: ON HOLD | OUTPATIENT
Start: 2018-05-23 | End: 2020-07-11

## 2018-05-23 RX ORDER — GUAIFENESIN 600 MG
600 TABLET, EXTENDED RELEASE 12 HR ORAL 2 TIMES DAILY
Status: DISCONTINUED | OUTPATIENT
Start: 2018-05-23 | End: 2018-05-23 | Stop reason: HOSPADM

## 2018-05-23 RX ORDER — POTASSIUM CHLORIDE 20 MEQ/1
20 TABLET, EXTENDED RELEASE ORAL DAILY
Qty: 1 TABLET | Refills: 0 | Status: SHIPPED | OUTPATIENT
Start: 2018-05-23 | End: 2021-04-18 | Stop reason: HOSPADM

## 2018-05-23 RX ORDER — PREDNISONE 20 MG/1
40 TABLET ORAL DAILY
Qty: 15 TABLET | Refills: 0 | Status: SHIPPED | OUTPATIENT
Start: 2018-05-24 | End: 2018-06-29 | Stop reason: ALTCHOICE

## 2018-05-23 RX ORDER — GUAIFENESIN 600 MG
600 TABLET, EXTENDED RELEASE 12 HR ORAL 2 TIMES DAILY
Qty: 60 TABLET | Refills: 0 | Status: SHIPPED | OUTPATIENT
Start: 2018-05-23 | End: 2018-06-29 | Stop reason: ALTCHOICE

## 2018-05-23 RX ADMIN — IPRATROPIUM BROMIDE 0.5 MG: 0.5 SOLUTION RESPIRATORY (INHALATION) at 07:48

## 2018-05-23 RX ADMIN — FERROUS SULFATE TAB 325 MG (65 MG ELEMENTAL FE) 325 MG: 325 (65 FE) TAB at 08:00

## 2018-05-23 RX ADMIN — APIXABAN 5 MG: 5 TABLET, FILM COATED ORAL at 08:58

## 2018-05-23 RX ADMIN — CEFUROXIME AXETIL 500 MG: 250 TABLET ORAL at 09:04

## 2018-05-23 RX ADMIN — ASPIRIN 325 MG: 325 TABLET, COATED ORAL at 08:59

## 2018-05-23 RX ADMIN — NEBIVOLOL HYDROCHLORIDE 20 MG: 10 TABLET ORAL at 08:59

## 2018-05-23 RX ADMIN — MINOXIDIL 20 MG: 2.5 TABLET ORAL at 17:44

## 2018-05-23 RX ADMIN — LEVALBUTEROL HYDROCHLORIDE 1.25 MG: 1.25 SOLUTION, CONCENTRATE RESPIRATORY (INHALATION) at 13:56

## 2018-05-23 RX ADMIN — APIXABAN 5 MG: 5 TABLET, FILM COATED ORAL at 17:42

## 2018-05-23 RX ADMIN — BUDESONIDE AND FORMOTEROL FUMARATE DIHYDRATE 2 PUFF: 160; 4.5 AEROSOL RESPIRATORY (INHALATION) at 17:43

## 2018-05-23 RX ADMIN — NEBIVOLOL HYDROCHLORIDE 20 MG: 10 TABLET ORAL at 17:43

## 2018-05-23 RX ADMIN — LEVALBUTEROL HYDROCHLORIDE 1.25 MG: 1.25 SOLUTION, CONCENTRATE RESPIRATORY (INHALATION) at 07:48

## 2018-05-23 RX ADMIN — GUAIFENESIN 600 MG: 600 TABLET, EXTENDED RELEASE ORAL at 17:42

## 2018-05-23 RX ADMIN — PREDNISONE 40 MG: 20 TABLET ORAL at 08:59

## 2018-05-23 RX ADMIN — GUAIFENESIN 600 MG: 600 TABLET, EXTENDED RELEASE ORAL at 09:19

## 2018-05-23 RX ADMIN — BUDESONIDE AND FORMOTEROL FUMARATE DIHYDRATE 2 PUFF: 160; 4.5 AEROSOL RESPIRATORY (INHALATION) at 09:01

## 2018-05-23 RX ADMIN — ALLOPURINOL 100 MG: 100 TABLET ORAL at 08:59

## 2018-05-23 RX ADMIN — IPRATROPIUM BROMIDE 0.5 MG: 0.5 SOLUTION RESPIRATORY (INHALATION) at 13:56

## 2018-05-23 RX ADMIN — POTASSIUM CHLORIDE 20 MEQ: 1500 TABLET, EXTENDED RELEASE ORAL at 09:00

## 2018-05-23 RX ADMIN — ESCITALOPRAM OXALATE 10 MG: 10 TABLET ORAL at 09:00

## 2018-05-23 RX ADMIN — FLUTICASONE PROPIONATE 1 SPRAY: 50 SPRAY, METERED NASAL at 12:24

## 2018-05-23 RX ADMIN — EPLERENONE 50 MG: 25 TABLET, FILM COATED ORAL at 09:01

## 2018-05-23 RX ADMIN — AMLODIPINE BESYLATE 10 MG: 10 TABLET ORAL at 09:00

## 2018-05-23 RX ADMIN — MINOXIDIL 20 MG: 2.5 TABLET ORAL at 09:03

## 2018-05-23 RX ADMIN — FLUTICASONE PROPIONATE 1 SPRAY: 50 SPRAY, METERED NASAL at 17:43

## 2018-05-23 NOTE — PROGRESS NOTES
NEPHROLOGY PROGRESS NOTE    Patient: Jorge Perez               Sex: male          DOA: 5/21/2018  9:10 AM   YOB: 1965        Age:  45 y o         LOS:  LOS: 2 days   5/23/2018    REASON FOR THE CONSULTATION:  Further management of TRUDI    HPI     This is a 45 y o  male admitted for Chronic diastolic CHF (congestive heart failure) (HCC)     SUBJECTIVE     -overnight breathing has remained stable and also found to be nonoliguric  Patient denies nausea / vomiting / headache / dizziness / SOB / chest pain today    - Reviewed last 24 hrs events     CURRENT MEDICATIONS       Current Facility-Administered Medications:     acetaminophen (TYLENOL) tablet 650 mg, 650 mg, Oral, Q6H PRN, Bev Banks MD, 650 mg at 05/21/18 1746    allopurinol (ZYLOPRIM) tablet 100 mg, 100 mg, Oral, Daily, Bev Banks MD, 100 mg at 05/23/18 0859    amLODIPine (NORVASC) tablet 10 mg, 10 mg, Oral, Daily, Bev Banks MD, 10 mg at 05/23/18 0900    apixaban (ELIQUIS) tablet 5 mg, 5 mg, Oral, BID, Bev Banks MD, 5 mg at 05/23/18 5846    aspirin (ECOTRIN) EC tablet 325 mg, 325 mg, Oral, Daily, Bev Banks MD, 325 mg at 05/23/18 0859    atorvastatin (LIPITOR) tablet 40 mg, 40 mg, Oral, HS, Carla Banks MD, 40 mg at 05/22/18 2102    budesonide-formoterol (SYMBICORT) 160-4 5 mcg/act inhaler 2 puff, 2 puff, Inhalation, BID, Karolyn Mancera DO, 2 puff at 05/23/18 0901    cefuroxime (CEFTIN) tablet 500 mg, 500 mg, Oral, Q12H [facility], Viola Mcgowan MD, 500 mg at 05/23/18 8601    doxazosin (CARDURA) tablet 4 mg, 4 mg, Oral, HS, Carla Banks MD, 4 mg at 05/22/18 2102    eplerenone (INSPRA) tablet 50 mg, 50 mg, Oral, Daily, Bev Banks MD, 50 mg at 05/23/18 0901    escitalopram (LEXAPRO) tablet 10 mg, 10 mg, Oral, Daily, Carla Banks MD, 10 mg at 05/23/18 0900    ferrous sulfate tablet 325 mg, 325 mg, Oral, Daily With Breakfast, Kuldeep Banks MD, 325 mg at 05/23/18 0800    fluticasone (FLONASE) 50 mcg/act nasal spray 1 spray, 1 spray, Each Nare, BID, Ayleen Culp MD    guaiFENesin (MUCINEX) 12 hr tablet 600 mg, 600 mg, Oral, BID, Jaiden Posada MD, 600 mg at 05/23/18 0919    guaiFENesin (ROBITUSSIN) oral solution 200 mg, 200 mg, Oral, Q4H PRN, Jaiden Posada MD    ipratropium (ATROVENT) 0 02 % inhalation solution 0 5 mg, 0 5 mg, Nebulization, TID, Syl Aguilera, DO, 0 5 mg at 05/23/18 5191    levalbuterol ([employer]ia [employer]) inhalation solution 1 25 mg, 1 25 mg, Nebulization, TID, Syl Aguilera, DO, 1 25 mg at 05/23/18 0748    LORazepam (ATIVAN) tablet 0 5 mg, 0 5 mg, Oral, Q6H PRN, Shai Maciel MD, 0 5 mg at 05/21/18 1641    minoxidil (LONITEN) tablet 20 mg, 20 mg, Oral, BID, Kuldeep Banks MD, 20 mg at 05/23/18 0903    nebivolol (BYSTOLIC) tablet 20 mg, 20 mg, Oral, BID, Kuldeep Banks MD, 20 mg at 05/23/18 0859    ondansetron (ZOFRAN) injection 4 mg, 4 mg, Intravenous, Q6H PRN, Shai Maciel MD    potassium chloride (K-DUR,KLOR-CON) CR tablet 20 mEq, 20 mEq, Oral, Daily, Kuldeep Banks MD, 20 mEq at 05/23/18 0900    predniSONE tablet 40 mg, 40 mg, Oral, Daily, Ayleen Culp MD, 40 mg at 05/23/18 0859    OBJECTIVE     Current Weight: Weight - Scale: 101 kg (222 lb)  Vitals:    05/23/18 1100   BP: 161/83   Pulse: 81   Resp: 20   Temp: 98 4 °F (36 9 °C)   SpO2: 96%     Body mass index is 33 75 kg/m²  Intake/Output Summary (Last 24 hours) at 05/23/18 1158  Last data filed at 05/23/18 0800   Gross per 24 hour   Intake             1450 ml   Output              850 ml   Net              600 ml       PHYSICAL EXAMINATION     Physical Exam   Constitutional: He appears well-nourished  No distress  HENT:   Head: Normocephalic and atraumatic  Eyes: Conjunctivae are normal  No scleral icterus  Right eye exhibits normal extraocular motion   Left eye exhibits normal extraocular motion  Neck: Neck supple  No JVD present  Cardiovascular: Normal rate, S1 normal and S2 normal     Pulmonary/Chest: No accessory muscle usage  No respiratory distress  He has no wheezes  Abdominal: Soft  He exhibits no distension  Musculoskeletal:        Right ankle: He exhibits swelling  Left ankle: He exhibits swelling  Right hand: He exhibits no laceration  Left hand: He exhibits no laceration  Lymphadenopathy:        Right cervical: No superficial cervical adenopathy present  Left cervical: No superficial cervical adenopathy present  Right: No supraclavicular adenopathy present  Left: No supraclavicular adenopathy present  Neurological: He is alert  He is not disoriented  Skin: Skin is warm  No laceration noted  No cyanosis  Psychiatric: He is not combative  He does not exhibit a depressed mood  He expresses no suicidal ideation  LAB RESULTS       Results from last 7 days  Lab Units 05/23/18  0532 05/22/18  0521 05/21/18  0932   WBC Thousand/uL 9 91 6 05 6 99   HEMOGLOBIN g/dL 10 4* 12 0 12 4   HEMATOCRIT % 32 3* 37 8 38 5   PLATELETS Thousands/uL 195 223 220   SODIUM mmol/L 141 140 143   POTASSIUM mmol/L 3 9 4 2 3 7   CHLORIDE mmol/L 106 103 104   CO2 mmol/L 28 27 30   BUN mg/dL 26* 19 17   CREATININE mg/dL 1 65* 1 73* 1 62*   EGFR ml/min/1 73sq m 54 51 55   CALCIUM mg/dL 9 2 9 6 9 2   TOTAL PROTEIN g/dL  --  8 3* 8 0   GLUCOSE RANDOM mg/dL 113 143* 89       I have personally reviewed the old medical records and patient's previously known baseline creatinine level is ~ 1 3    RADIOLOGY RESULTS      Results for orders placed during the hospital encounter of 03/15/18   XR chest 1 view portable    Narrative CHEST     INDICATION:   Shortness of breath  COMPARISON:  Chest x-ray 2/5/2018    EXAM PERFORMED/VIEWS:  XR CHEST PORTABLE      FINDINGS:    Stable cardiomegaly noted      New focal patchy consolidation is noted in the left upper lobe   Right lung is clear  Limited evaluation of the left lung base due to the enlarged cardiac silhouette  Osseous structures appear within normal limits for patient age  Impression New focal patchy consolidation in the left upper lobe compatible with pneumonia  Recommend follow up to resolution  The study was marked in EPIC for significant notification  Workstation performed: [id number]       Results for orders placed during the hospital encounter of 05/21/18   X-ray chest 2 views    Narrative CHEST     INDICATION:   chest pain  COMPARISON:  03/15/2018    EXAM PERFORMED/VIEWS:  XR CHEST PA & LATERAL  Images: 2    FINDINGS:    Cardiomediastinal silhouette appears enlarged  Pulmonary vessels are normal     Suspected infiltrate or atelectasis at the left lung base  The right lung is clear  Minimal blunting of the costophrenic angles  Osseous structures appear within normal limits for patient age  Impression Cardiomegaly  Suspected infiltrate or atelectasis at the left lung base  Workstation performed: [id number]         PLAN / RECOMMENDATIONS      1  TRUDI on top of CKD stage 2  Multifactorial    Patient been [facility] by [facility] and upon review of old medical record patient's baseline creatinine seems to be fluctuating around 1 3  Patient's urine lytes were intrinsic in nature and due to rise in creatinine Lasix was stopped earlier  Overnight patient has remained nonoliguric and renal function has also improved to creatinine of 1 65  Currently patient is not in respiratory distress and plan to hold off Lasix for time being  Advised patient to resume Lasix next week if leg swelling starts to get worse  Plan to monitor renal function while in the hospital     2   Hypertension in chronic kidney disease  Overnight patient's blood pressure was found to be controlled and at goal and plan to monitor hypertension without the Zosyn 4 mg p o  daily, amlodipine 10 mg p o  daily, minoxidil 20 mg p o  BID and Bystolic 20 mg p o  BID today  3   Anemia  Multifactorial area no active signs of bleeding seen overnight  Current hemoglobin is 10 4  Monitor hemoglobin level today  Disposition:  Stable from renal standpoint for discharge  Will consider holding Lasix until next week  Please have patient follow up with Dr [facility] in 1-2 weeks upon discharge  Malcolm Cee MD  Nephrology  5/23/2018        Portions of the record may have been created with voice recognition software  Occasional wrong word or "sound a like" substitutions may have occurred due to the inherent limitations of voice recognition software  Read the chart carefully and recognize, using context, where substitutions have occurred

## 2018-05-23 NOTE — ASSESSMENT & PLAN NOTE
Reviewed with nephrology  Lasix stopped  Instruction not to resume until next week   F/U with primary nephrologist

## 2018-05-23 NOTE — ASSESSMENT & PLAN NOTE
Right upper lobe pneumonia noted on CT scan  Patient with persistent symptoms of cough with mild hypoxemia on exertion   Will send sputum culture and step up respiratory protocol  Continue ceftin for now and monitor for improvement  Can add mucinex and or robitussion for cough

## 2018-05-23 NOTE — PLAN OF CARE
CARDIOVASCULAR - ADULT     Maintains optimal cardiac output and hemodynamic stability Progressing     Absence of cardiac dysrhythmias or at baseline rhythm Progressing        DISCHARGE PLANNING     Discharge to home or other facility with appropriate resources Progressing        DISCHARGE PLANNING - CARE MANAGEMENT     Discharge to post-acute care or home with appropriate resources Progressing        INFECTION - ADULT     Absence or prevention of progression during hospitalization Progressing     Absence of fever/infection during neutropenic period Progressing        PAIN - ADULT     Verbalizes/displays adequate comfort level or baseline comfort level Progressing        Potential for Falls     Patient will remain free of falls Progressing        RESPIRATORY - ADULT     Achieves optimal ventilation and oxygenation Progressing        SAFETY ADULT     Maintain or return to baseline ADL function Progressing     Maintain or return mobility status to optimal level Progressing

## 2018-05-23 NOTE — DISCHARGE SUMMARY
Discharge Summary - [facility]    Patient Information: Denilson Albarran 45 y o  male MRN: [[id number]]  Unit/Bed#: -01 Encounter: [id number]    Discharging Physician / Practitioner: Karen Stoll MD  PCP: Krishan Bobo MD  Admission Date: 5/21/2018  Discharge Date: 05/23/18    Reason for Admission:  Shortness of breath and cough    Discharge Diagnoses:     Principal Problem:    Chronic diastolic CHF (congestive heart failure) [facility]):  Patient euvolemic at the time of discharge  Echo was completed showing ejection fraction of 55-65% with no wall motion abnormalities  He does have mild concentric hypertrophy  There is also mild regurgitation of mitral valve moderate regurgitation of the tricuspid valve mild regurgitation of pulmonic valve and a small pericardial effusion in the posterior heart  Nephrology has recommended the patient be off of his Lasix until the Monday after discharge at which time he can resume his medications  He will follow up with his primary care physician nephrologist Dr Nile Olguin  He is to call his primary office if he becomes short of breath or has a weight gain of greater than 2 lb  Patient was to follow up with Dr Osiris Esqueda regarding noted aortic stenosis  Active Problems:  ·   Leg DVT (deep venous thromboembolism), acute, bilateral [facility]):  Patient will continue Ellik  ·   SOB (shortness of breath): Improved, patient completed a course of azithromycin prior to and during this admission  He was also treated with Ceftin, which was switched to cefuroxime for 7 more days at the time of discharge to treat documented pneumonia  Patient will resume his home inhalers  And continue with his CPAP at night  He is to follow up with his primary pulmonologist as an outpatient  He was discharged on a tapering dose of prednisone  ·   TRUDI (acute kidney injury) [facility]):  Improved at the time of discharge discharging creatinine 1 65    Patient appears to have an element of chronic kidney disease stage 2 with baseline creatinines in the 1 3-1 5 range  Patient will be off Lasix and will follow up with his primary care physician for repeat laboratories  ·   Hypertensive kidney disease with stage 2 chronic kidney disease:  Patient continue with Norvasc, Bystolic, minoxidil, and and follow up with Dr Chester Rowland or the cardiologist of his choice for evaluation of aortic stenosis  ·   Pneumonia:  Right upper lobe infiltrate on CT scan with patchy bibasilar atelectasis  Patient completed a Z-Carlitos and will be sent home on 7 days of Ceftin  He is to follow up with his pulmonologist in the next month for re-evaluation by CT scan to assure resolution of the disease process  ·   Anemia:  Likely multifactorial   Currently clinically stable  Will follow up with his primary care physician for repeat laboratories  Discharging hemoglobin 10 4  · Hyperlipidemia:  Continue statin  · Aortic stenosis noted on echo: f/u in 1 month with Cardiology        Consultations During Hospital Stay:  · Nephrology  · Pulmonary  · Cardiology  ·     Procedures Performed:     · 2D echo:  Ejection fraction 55-65% with no wall motion abnormalities  There is mild concentric hypertrophy  No evidence for apical thrombus  Left ventricular diastolic function parameters were normal  · Chest x-ray shows suspected infiltrate and atelectasis at the left lung base  · CT chest:  Small infiltrate in the posterior right upper lobe suspicious for broncho pneumonia as well as patchy dense bibasilar infiltrates  Patient will need follow-up CT in 6-8 weeks    Significant Findings / Test Results:   · As above  · Discharging creatinine 1 65  · Discharging potassium 3 9  · Discharging hemoglobin 10 4  · Discharging white count 9 9  ·     Incidental Findings:   · Aortic stenosis    Test Results Pending at Discharge (will require follow up):    · None     Outpatient Tests Requested:  · None    Complications:  None    Hospital Course: Katlyn Truong is a 45 y o  male patient who originally presented to the hospital on 5/21/2018 due to shortness of breath  Patient had a 2 day history of increasing shortness of breath to the point where he is having difficulty completing sentences  He is also primary care physician who noted some EKG changes or different from previous incident to the urgency room for further evaluation  Patient is a known history of chronic diastolic heart failure and history of DVT on Eliquis  Patient was seen and evaluated he was admitted for diagnosis  He was found to have what appeared to be a right upper lobe occult pneumonia with some bibasilar changes  He was started on antibiotic therapy and completed a course of azithromycin which had already started prior to admission  He was seen and evaluated by Pulmonary who felt there was an element of exacerbation of his asthma along with pneumonia  He was therefore discharged home on completing 7 more days of antibiotic with Ceftin and a tapering dose of prednisone  He was bridge to promote aggressive pulmonary toilet by using his home nebulizers and inhalers along with Mucinex  Patient was seen evaluated by Cardiology echo was completed which again showed a ejection fraction 55-65% but also an element of aortic stenosis    Held for follow-up as an outpatient with cardiologist of his choice or Rosibel Campbell who saw him in the hospital     Condition at Discharge: good     Discharge Day Visit / Exam:     Subjective:  Patient feeling better mobilizing mucus cough is improved shortness of breath improved  Vitals: Blood Pressure: 136/62 (05/23/18 1500)  Pulse: 87 (05/23/18 1500)  Temperature: 98 2 °F (36 8 °C) (05/23/18 1500)  Temp Source: Oral (05/23/18 1500)  Respirations: 18 (05/23/18 1500)  Height: 5' 8" (172 7 cm) (05/21/18 1344)  Weight - Scale: 101 kg (222 lb) (05/21/18 1344)  SpO2: 94 % (05/23/18 1500)  Exam:   Physical Exam   Constitutional: He is oriented to person, place, and time  He appears well-developed  No distress  HENT:   Head: Normocephalic and atraumatic  Right Ear: External ear normal    Left Ear: External ear normal    Nose: Nose normal    Mouth/Throat: Oropharynx is clear and moist  No oropharyngeal exudate  Eyes: Conjunctivae and EOM are normal  Pupils are equal, round, and reactive to light  Right eye exhibits no discharge  Left eye exhibits no discharge  No scleral icterus  Neck: Normal range of motion  Neck supple  No JVD present  No thyromegaly present  Cardiovascular: Normal rate, regular rhythm and intact distal pulses  Exam reveals no gallop and no friction rub  Murmur (Wanted to have 6) heard  Pulmonary/Chest: No respiratory distress  He has no wheezes  He has rales (Improved but intermittently present does appear with cough)  Abdominal: Soft  Bowel sounds are normal  He exhibits no distension  There is no tenderness  There is no rebound and no guarding  Musculoskeletal: Normal range of motion  He exhibits no edema or deformity  Lymphadenopathy:     He has no cervical adenopathy  Neurological: He is alert and oriented to person, place, and time  He has normal reflexes  No cranial nerve deficit  He exhibits normal muscle tone  Skin: Skin is warm and dry  No rash noted  He is not diaphoretic  No erythema  Psychiatric: He has a normal mood and affect  Vitals reviewed  Discharge instructions/Information to patient and family:   See after visit summary for information provided to patient and family  Provisions for Follow-Up Care:  See after visit summary for information related to follow-up care and any pertinent home health orders  Disposition:     Home    For Discharges to [facility] SNF:   · Not Applicable to this Patient - Not Applicable to this Patient    Planned Readmission:   None  Planned Readmission:  None     Discharge Statement:  I spent 20 minutes discharging the patient   This time was spent on the day of discharge  I had direct contact with the patient on the day of discharge  Greater than 50% of the total time was spent examining patient, answering all patient questions, arranging and discussing plan of care with patient as well as directly providing post-discharge instructions  Additional time then spent on discharge activities  Discharge Medications:  See after visit summary for reconciled discharge medications provided to patient and family        ** Please Note: This note has been constructed using a voice recognition system **

## 2018-05-23 NOTE — ASSESSMENT & PLAN NOTE
Patient with persistent loose wet cough right greater than right consistent with known right sided pneumonia  Nephrology has recommended to stop lasix at this time and resume next Monday, as he appears euvolemic with a bump in creatinine  Patient to follow up with his nephrologist Dr Miranda Monroy at [facility]  Continue beta blocker

## 2018-05-23 NOTE — ASSESSMENT & PLAN NOTE
Persistent shortness of breath with mild hypoxemia on exertion  Reviewed Pulmonolgy note  Patient switched to Ceftin and tapering dose of prednisone but remains very symptomatic with cough and congestion  Patient completed course of Azithromycin prior to and extending into admission  This was , therefore, discontinued     · Request airway clearance protocol with respiratory  · Continue Symbicort  · As needed cough medicine   · Sputum culture

## 2018-05-23 NOTE — PROGRESS NOTES
Progress Note Lindsay Fernandez 1965, 45 y o  male MRN: [id number]    Unit/Bed#: -01 Encounter: [id number]    Primary Care Provider: Kylee Philip MD   Date and time admitted to hospital: 5/21/2018  9:10 AM        * Chronic diastolic CHF (congestive heart failure) ([facility] [address] )   Assessment & Plan    Patient with persistent loose wet cough right greater than right consistent with known right sided pneumonia  Nephrology has recommended to stop lasix at this time and resume next Monday, as he appears euvolemic with a bump in creatinine  Patient to follow up with his nephrologist Dr Raymon Boswell at [facility]  Continue beta blocker  Echo completed but not read yet  Pneumonia   Assessment & Plan    Right upper lobe pneumonia noted on CT scan  Patient with persistent symptoms of cough with mild hypoxemia on exertion   Will send sputum culture and step up respiratory protocol  Continue ceftin for now and monitor for improvement  Can add mucinex and or robitussion for cough  Hypertensive kidney disease with stage 2 chronic kidney disease   Assessment & Plan    Continue treatment of hypertension with  norvasc, minoxidil , nebivolol,  And cardura        TRUDI (acute kidney injury) [facility])   Assessment & Plan    Reviewed with nephrology  Lasix stopped  Instruction not to resume until next week  F/U with primary nephrologist         SOB (shortness of breath)   Assessment & Plan    Persistent shortness of breath with mild hypoxemia on exertion  Reviewed Pulmonolgy note  Patient switched to Ceftin and tapering dose of prednisone but remains very symptomatic with cough and congestion  Patient completed course of Azithromycin prior to and extending into admission  This was , therefore, discontinued     · Request airway clearance protocol with respiratory  · Continue Symbicort  · As needed cough medicine   · Sputum culture        Leg DVT (deep venous thromboembolism), acute, bilateral ([facility] [address] )   Assessment & Plan    Continue eliquis  VTE Pharmacologic Prophylaxis:   Pharmacologic: Apixaban (Eliquis)  Mechanical: Mechanical VTE prophylaxis in place  Patient Centered Rounds: I have performed bedside rounds with nursing staff today  Discussions with Specialists or Other Care Team Provider: pulmonary and nephrology  Education and Discussions with Family / Patient: updated patient  Time Spent for Care: 30 minutes  More than 50% of total time spent on counseling and coordination of care as described above  Current Length of Stay: 1 day(s)  Current Patient Status: Inpatient   Certification Statement: The patient will continue to require additional inpatient hospital stay due to persistent cough hypoxemia    Discharge Plan: to be determined hopeful for next 24 hours  Code Status: Level 1 - Full Code    Subjective:   Patient reports shortness of breatht and worsened cough    Objective:   Vitals:   Temp (24hrs), Av 2 °F (36 8 °C), Min:97 5 °F (36 4 °C), Max:99 2 °F (37 3 °C)    HR:  [66-98] 84  Resp:  [18-20] 18  BP: (127-169)/(64-78) 137/64  SpO2:  [94 %-97 %] 96 %  Body mass index is 33 75 kg/m²  Input and Output Summary (last 24 hours): Intake/Output Summary (Last 24 hours) at 18 0149  Last data filed at 18 0103   Gross per 24 hour   Intake             1320 ml   Output              350 ml   Net              970 ml       Physical Exam:     Physical Exam   Constitutional: He is oriented to person, place, and time  He appears well-developed and well-nourished  No distress  HENT:   Head: Normocephalic and atraumatic  Right Ear: External ear normal    Left Ear: External ear normal    Nose: Nose normal    Mouth/Throat: Oropharynx is clear and moist  No oropharyngeal exudate  Eyes: Conjunctivae and EOM are normal  Pupils are equal, round, and reactive to light  Right eye exhibits no discharge  Left eye exhibits no discharge  No scleral icterus  Neck: Normal range of motion   Neck supple  No JVD present  No thyromegaly present  Cardiovascular: Normal rate, regular rhythm, normal heart sounds and intact distal pulses  Exam reveals no gallop and no friction rub  No murmur heard  Pulmonary/Chest: No respiratory distress  He has no wheezes  He has rales (rigth greater than left base and upper field)  Abdominal: Soft  Bowel sounds are normal  He exhibits no distension  There is no tenderness  There is no rebound and no guarding  Musculoskeletal: Normal range of motion  He exhibits no edema or deformity  Lymphadenopathy:     He has no cervical adenopathy  Neurological: He is alert and oriented to person, place, and time  He has normal reflexes  No cranial nerve deficit  He exhibits normal muscle tone  Skin: Skin is warm and dry  No rash noted  He is not diaphoretic  No erythema  Psychiatric: He has a normal mood and affect  Vitals reviewed  Additional Data:   Labs:    Results from last 7 days  Lab Units 05/22/18  0521   WBC Thousand/uL 6 05   HEMOGLOBIN g/dL 12 0   HEMATOCRIT % 37 8   PLATELETS Thousands/uL 223   NEUTROS PCT % 90*   LYMPHS PCT % 8*   MONOS PCT % 1*   EOS PCT % 0       Results from last 7 days  Lab Units 05/22/18  0521   SODIUM mmol/L 140   POTASSIUM mmol/L 4 2   CHLORIDE mmol/L 103   CO2 mmol/L 27   BUN mg/dL 19   CREATININE mg/dL 1 73*   CALCIUM mg/dL 9 6   TOTAL PROTEIN g/dL 8 3*   BILIRUBIN TOTAL mg/dL 1 20*   ALK PHOS U/L 77   ALT U/L 42   AST U/L 22   GLUCOSE RANDOM mg/dL 143*           * I Have Reviewed All Lab Data Listed Above  * Additional Pertinent Lab Tests Reviewed: All Labs Within Last 24 Hours Reviewed    Imaging:    Imaging Reports Reviewed Today Include: none      Cultures:   Blood Culture:   Lab Results   Component Value Date    BLOODCX No Growth at 24 hrs  05/21/2018    BLOODCX No Growth at 24 hrs  05/21/2018    BLOODCX No Growth After 5 Days  03/15/2018    BLOODCX No Growth After 5 Days   03/15/2018     Urine Culture: No results found for: URINECX  Sputum Culture: No components found for: SPUTUMCX  Wound Culture: No results found for: WOUNDCULT    Last 24 Hours Medication List:     Current Facility-Administered Medications:  acetaminophen 650 mg Oral Q6H PRN Reena Arora MD   allopurinol 100 mg Oral Daily Reena Arora MD   amLODIPine 10 mg Oral Daily Reena Arora MD   apixaban 5 mg Oral BID Reena Arora MD   aspirin 325 mg Oral Daily Nael Banks MD   atorvastatin 40 mg Oral HS Nael Banks MD   budesonide-formoterol 2 puff Inhalation BID Bessie Beatty DO   cefuroxime 500 mg Oral Q12H Nan Perez MD   doxazosin 4 mg Oral HS Reena Arora MD   eplerenone 50 mg Oral Daily Reena Arora MD   escitalopram 10 mg Oral Daily Carla Banks MD   ferrous sulfate 325 mg Oral Daily With Breakfast Carla Banks MD   guaiFENesin 600 mg Oral BID Lashae Jewell MD   guaiFENesin 200 mg Oral Q4H PRN Lashae Jewell MD   ipratropium 0 5 mg Nebulization TID Bessie Beatty DO   levalbuterol 1 25 mg Nebulization TID Bessie Beatty DO   LORazepam 0 5 mg Oral Q6H PRN Reena Arora MD   minoxidil 20 mg Oral BID Nael Banks MD   nebivolol 20 mg Oral BID Nael Banks MD   ondansetron 4 mg Intravenous Q6H PRN Nael Banks MD   potassium chloride 20 mEq Oral Daily Nael Banks MD   predniSONE 40 mg Oral Daily Parker Patel MD        Today, Patient Was Seen By: Lashae Jewell MD    ** Please Note: Dragon 360 Dictation voice to text software may have been used in the creation of this document   **

## 2018-05-23 NOTE — PROGRESS NOTES
Progress Note - Pulmonary   Bernard Negro 45 y o  male MRN: [de-identified]  Unit/Bed#: -01 Encounter: [id number]    Assessment:  1  Acute pulmonary insufficiency, multifactorial as below, improving  2  Severe persistent asthma with acute exacerbation (patient states that he has COPD but his pulmonologist note states only severe persistent asthma, patient never smoked cigarettes), improving  3  Community-acquired pneumonia as on CT scan  4  Acute on chronic diastolic CHF, improved  5  AGUS on CPAP at home  6  History of DVT on Eliquis  7  Allergic rhinitis with possible postnasal drip     Plan:  · Continue prednisone tapering course as planned  · Continue antibiotic course with Ceftin as planned  · Continue Symbicort while in hospital, on discharge she can go back to his home inhalers regimen as per his pulmonologist  · Continue diuretics as per Cardiology  · Continue CPAP at night  · Start on Flonase b i d   · Outpatient follow-up with his home pulmonologist, I believe patient is stable from pulmonary standpoint for discharge, will sign off, please call with questions    ----------------------------------------------------------------------------------------------------------------------    HPI/Interval History:   Patient feels fine, yesterday had some dyspnea on exertion but is pulse ox remained 91 with exertion as he states, he still has cough productive of clear sputum, he feels increased nasal congestion but otherwise he feels fine  Afebrile  Vitals:   Blood pressure 150/82, pulse 81, temperature 98 °F (36 7 °C), temperature source Oral, resp  rate 20, height 5' 8" (1 727 m), weight 101 kg (222 lb), SpO2 95 %  ,Body mass index is 33 75 kg/m²  SpO2: 95 %  SpO2 Activity: At Rest  O2 Device: None (Room air)    Physical Exam:   Gen: Alert and without respiratory distress  Chest:  Equal breath sounds with mild rhonchi, no wheezing or crackles  Cardiac:  S1-S2 regular  Abdomen: Soft and nontender   Bowel sounds are present  Extremities:  No edema      Labs:    CBC:    Results from last 7 days  Lab Units 05/23/18  0532 05/22/18  0521 05/21/18  0932   WBC Thousand/uL 9 91 6 05 6 99   HEMOGLOBIN g/dL 10 4* 12 0 12 4   HEMATOCRIT % 32 3* 37 8 38 5   PLATELETS Thousands/uL 195 223 220       CMP:     Results from last 7 days  Lab Units 05/23/18  0532 05/22/18  0521 05/21/18  0932   SODIUM mmol/L 141 140 143   POTASSIUM mmol/L 3 9 4 2 3 7   CHLORIDE mmol/L 106 103 104   CO2 mmol/L 28 27 30   BUN mg/dL 26* 19 17   CREATININE mg/dL 1 65* 1 73* 1 62*   CALCIUM mg/dL 9 2 9 6 9 2   TOTAL PROTEIN g/dL  --  8 3* 8 0   BILIRUBIN TOTAL mg/dL  --  1 20* 1 40*   ALK PHOS U/L  --  77 83   ALT U/L  --  42 52   AST U/L  --  22 38   GLUCOSE RANDOM mg/dL 113 143* 89         Royal Hollis MD    Portions of the record may have been created with voice recognition software  Occasional wrong word or "sound a like" substitutions may have occurred due to the inherent limitations of voice recognition software  Read the chart carefully and recognize, using context, where substitutions have occurred

## 2018-05-25 LAB
BACTERIA SPT RESP CULT: NORMAL
GRAM STN SPEC: NORMAL

## 2018-05-26 LAB
BACTERIA BLD CULT: NORMAL
BACTERIA BLD CULT: NORMAL

## 2018-06-26 ENCOUNTER — TELEPHONE (OUTPATIENT)
Dept: INTERVENTIONAL RADIOLOGY/VASCULAR | Facility: HOSPITAL | Age: 53
End: 2018-06-26

## 2018-06-26 RX ORDER — SODIUM CHLORIDE 9 MG/ML
50 INJECTION, SOLUTION INTRAVENOUS CONTINUOUS
Status: CANCELLED | OUTPATIENT
Start: 2018-06-29 | End: 2019-06-28

## 2018-06-29 ENCOUNTER — HOSPITAL ENCOUNTER (OUTPATIENT)
Dept: INTERVENTIONAL RADIOLOGY/VASCULAR | Facility: HOSPITAL | Age: 53
Discharge: HOME | End: 2018-06-29
Admitting: RADIOLOGY
Payer: COMMERCIAL

## 2018-06-29 VITALS
HEIGHT: 68 IN | SYSTOLIC BLOOD PRESSURE: 156 MMHG | BODY MASS INDEX: 33.61 KG/M2 | TEMPERATURE: 97.3 F | WEIGHT: 221.78 LBS | RESPIRATION RATE: 11 BRPM | OXYGEN SATURATION: 95 % | DIASTOLIC BLOOD PRESSURE: 77 MMHG | HEART RATE: 74 BPM

## 2018-06-29 DIAGNOSIS — I89.8 LYMPHOCELE AFTER SURGICAL PROCEDURE: ICD-10-CM

## 2018-06-29 DIAGNOSIS — T81.89XA LYMPHOCELE AFTER SURGICAL PROCEDURE: ICD-10-CM

## 2018-06-29 LAB
ERYTHROCYTE [DISTWIDTH] IN BLOOD BY AUTOMATED COUNT: 16.4 % (ref 11.6–15.1)
HCT VFR BLD AUTO: 37.3 % (ref 36.5–49.3)
HGB BLD-MCNC: 12.1 G/DL (ref 12–17)
INR PPP: 1.22 (ref 0.86–1.17)
MCH RBC QN AUTO: 26.9 PG (ref 26.8–34.3)
MCHC RBC AUTO-ENTMCNC: 32.4 G/DL (ref 31.4–37.4)
MCV RBC AUTO: 83 FL (ref 82–98)
PLATELET # BLD AUTO: 298 THOUSANDS/UL (ref 149–390)
PMV BLD AUTO: 9.1 FL (ref 8.9–12.7)
PROTHROMBIN TIME: 15.3 SECONDS (ref 11.8–14.2)
RBC # BLD AUTO: 4.5 MILLION/UL (ref 3.88–5.62)
WBC # BLD AUTO: 5.86 THOUSAND/UL (ref 4.31–10.16)

## 2018-06-29 PROCEDURE — 87205 SMEAR GRAM STAIN: CPT | Performed by: RADIOLOGY

## 2018-06-29 PROCEDURE — 85027 COMPLETE CBC AUTOMATED: CPT | Performed by: RADIOLOGY

## 2018-06-29 PROCEDURE — 76942 ECHO GUIDE FOR BIOPSY: CPT | Performed by: RADIOLOGY

## 2018-06-29 PROCEDURE — 87077 CULTURE AEROBIC IDENTIFY: CPT | Performed by: RADIOLOGY

## 2018-06-29 PROCEDURE — 10030 IMG GID FLU COLL DRG SFT TIS: CPT

## 2018-06-29 PROCEDURE — 87186 SC STD MICRODIL/AGAR DIL: CPT | Performed by: RADIOLOGY

## 2018-06-29 PROCEDURE — 10160 PNXR ASPIR ABSC HMTMA BULLA: CPT | Performed by: RADIOLOGY

## 2018-06-29 PROCEDURE — 85610 PROTHROMBIN TIME: CPT | Performed by: RADIOLOGY

## 2018-06-29 PROCEDURE — 87070 CULTURE OTHR SPECIMN AEROBIC: CPT | Performed by: RADIOLOGY

## 2018-06-29 RX ORDER — SODIUM CHLORIDE 9 MG/ML
50 INJECTION, SOLUTION INTRAVENOUS CONTINUOUS
Status: DISCONTINUED | OUTPATIENT
Start: 2018-06-29 | End: 2018-07-03 | Stop reason: HOSPADM

## 2018-06-29 RX ORDER — LIDOCAINE HYDROCHLORIDE 10 MG/ML
INJECTION, SOLUTION INFILTRATION; PERINEURAL CODE/TRAUMA/SEDATION MEDICATION
Status: COMPLETED | OUTPATIENT
Start: 2018-06-29 | End: 2018-06-29

## 2018-06-29 RX ADMIN — SODIUM CHLORIDE 50 ML/HR: 0.9 INJECTION, SOLUTION INTRAVENOUS at 08:28

## 2018-06-29 RX ADMIN — LIDOCAINE HYDROCHLORIDE 10 ML: 10 INJECTION, SOLUTION INFILTRATION; PERINEURAL at 09:14

## 2018-06-29 NOTE — BRIEF OP NOTE (RAD/CATH)
Ultrasound guided seroma aspiration  Procedure Note    PATIENT NAME: Cesar Fenton  : 1965  MRN: [id number]     Pre-op Diagnosis:   1  Lymphocele after surgical procedure      Post-op Diagnosis:   1   Lymphocele after surgical procedure        Surgeon:   Salena Buchanan DO  Assistants:     No qualified resident was available, Resident is only observing    Estimated Blood Loss: minimal  Findings: serous fluid aspirated from left groin seroma    Specimens: 180 cc serous fluid    Complications:  None apparent    Anesthesia: Local    Salena Buchanan DO     Date: 2018  Time: 9:21 AM

## 2018-06-29 NOTE — PROGRESS NOTES
Patient arrived to IR for left groin seroma aspiration  The procedure and risks were discussed with the patient  All questions were answered  Informed consent was obtained

## 2018-07-03 LAB
BACTERIA SPEC BFLD CULT: ABNORMAL
BACTERIA SPEC BFLD CULT: ABNORMAL
GRAM STN SPEC: ABNORMAL

## 2018-07-11 ENCOUNTER — TELEPHONE (OUTPATIENT)
Dept: INTERVENTIONAL RADIOLOGY/VASCULAR | Facility: HOSPITAL | Age: 53
End: 2018-07-11

## 2018-07-11 ENCOUNTER — TELEPHONE (OUTPATIENT)
Dept: NON INVASIVE DIAGNOSTICS | Facility: HOSPITAL | Age: 53
End: 2018-07-11

## 2018-07-11 RX ORDER — SODIUM CHLORIDE 9 MG/ML
50 INJECTION, SOLUTION INTRAVENOUS CONTINUOUS
Status: CANCELLED | OUTPATIENT
Start: 2018-07-11 | End: 2019-07-11

## 2018-07-12 ENCOUNTER — HOSPITAL ENCOUNTER (OUTPATIENT)
Dept: INTERVENTIONAL RADIOLOGY/VASCULAR | Facility: HOSPITAL | Age: 53
Discharge: HOME | End: 2018-07-12
Payer: COMMERCIAL

## 2018-07-12 VITALS
SYSTOLIC BLOOD PRESSURE: 168 MMHG | HEART RATE: 69 BPM | DIASTOLIC BLOOD PRESSURE: 87 MMHG | HEIGHT: 68 IN | TEMPERATURE: 97.4 F | OXYGEN SATURATION: 95 % | RESPIRATION RATE: 20 BRPM | BODY MASS INDEX: 34.56 KG/M2 | WEIGHT: 228 LBS

## 2018-07-12 DIAGNOSIS — I89.8 LYMPHOCELE: ICD-10-CM

## 2018-07-12 PROCEDURE — 10030 IMG GID FLU COLL DRG SFT TIS: CPT

## 2018-07-12 PROCEDURE — 99152 MOD SED SAME PHYS/QHP 5/>YRS: CPT

## 2018-07-12 PROCEDURE — 49185 SCLEROTX FLUID COLLECTION: CPT

## 2018-07-12 PROCEDURE — C1729 CATH, DRAINAGE: HCPCS

## 2018-07-12 PROCEDURE — 76942 ECHO GUIDE FOR BIOPSY: CPT | Performed by: RADIOLOGY

## 2018-07-12 PROCEDURE — 10160 PNXR ASPIR ABSC HMTMA BULLA: CPT | Performed by: RADIOLOGY

## 2018-07-12 PROCEDURE — C1769 GUIDE WIRE: HCPCS

## 2018-07-12 RX ORDER — SODIUM CHLORIDE 9 MG/ML
50 INJECTION, SOLUTION INTRAVENOUS CONTINUOUS
Status: DISCONTINUED | OUTPATIENT
Start: 2018-07-12 | End: 2018-07-16 | Stop reason: HOSPADM

## 2018-07-12 RX ORDER — FENTANYL CITRATE 50 UG/ML
INJECTION, SOLUTION INTRAMUSCULAR; INTRAVENOUS CODE/TRAUMA/SEDATION MEDICATION
Status: COMPLETED | OUTPATIENT
Start: 2018-07-12 | End: 2018-07-12

## 2018-07-12 RX ORDER — MIDAZOLAM HYDROCHLORIDE 1 MG/ML
INJECTION INTRAMUSCULAR; INTRAVENOUS CODE/TRAUMA/SEDATION MEDICATION
Status: COMPLETED | OUTPATIENT
Start: 2018-07-12 | End: 2018-07-12

## 2018-07-12 RX ORDER — LIDOCAINE HYDROCHLORIDE 10 MG/ML
INJECTION, SOLUTION INFILTRATION; PERINEURAL CODE/TRAUMA/SEDATION MEDICATION
Status: COMPLETED | OUTPATIENT
Start: 2018-07-12 | End: 2018-07-12

## 2018-07-12 RX ADMIN — FENTANYL CITRATE 50 MCG: 50 INJECTION, SOLUTION INTRAMUSCULAR; INTRAVENOUS at 15:32

## 2018-07-12 RX ADMIN — MIDAZOLAM HYDROCHLORIDE 1 MG: 1 INJECTION, SOLUTION INTRAMUSCULAR; INTRAVENOUS at 15:32

## 2018-07-12 RX ADMIN — MIDAZOLAM HYDROCHLORIDE 1 MG: 1 INJECTION, SOLUTION INTRAMUSCULAR; INTRAVENOUS at 15:40

## 2018-07-12 RX ADMIN — LIDOCAINE HYDROCHLORIDE 10 ML: 10 INJECTION, SOLUTION INFILTRATION; PERINEURAL at 15:39

## 2018-07-12 RX ADMIN — ALCOHOL 20 ML: 0.98 INJECTION INTRASPINAL at 15:48

## 2018-07-12 NOTE — BRIEF OP NOTE (RAD/CATH)
IR IMAGE GUIDED DRAINAGE W TUBE PLACEMENT  Procedure Note    PATIENT NAME: Joselin Staton  : 1965  MRN: [id number]     Pre-op Diagnosis:   1  Lymphocele      Post-op Diagnosis:   1  Lymphocele        Surgeon:   Yuniel Lea MD  Assistants:     No qualified resident was available  Estimated Blood Loss: None  Findings: Recurrent left groin seroma  20 mL anhydrous alcohol instilled after aspiration  Alcohol aspirated after 1 hour dwell time  Specimens: 175 mL clear pale yellow serous fluid aspirated  Complications:  None      Anesthesia: Trini Shelton MD     Date: 2018  Time: 5:43 PM

## 2018-07-12 NOTE — DISCHARGE INSTRUCTIONS
Drainage Tube Removal    Your drainage tube was removed today  What you need know at home:   Keep a clean dry dressing at the tube site until the small opening closes  It will take twenty four to forty eight hours  Keep the site dry until it heals  A small amount of drainage on your dressing is normal  Resume your normal diet  Small sips of flat soda will help with any nausea  Contact Interventional Radiology for any of the following: You have pain, fever greater than 101, shaking chills  If you have increased redness or swelling at the site  I the drainage from your site does not stop  If the site drains pus or has a bad odor       Contact Interventional Radiology at [phone]   Js PATIENTS: Contact Interventional Radiology at [phone]) Betzaida Ronquillo PATIENTS: Contact Interventional Radiology at [phone]) if:

## 2018-07-12 NOTE — PROGRESS NOTES
52 M, left groin lymph node biopsy in Jan 2018  Developed a seroma which was drained once  This re-accumulated  H&P reviewed, labs checked  Stable for seroma aspiration and sclerosis

## 2018-07-19 ENCOUNTER — APPOINTMENT (EMERGENCY)
Dept: RADIOLOGY | Facility: HOSPITAL | Age: 53
End: 2018-07-19
Payer: COMMERCIAL

## 2018-07-19 ENCOUNTER — HOSPITAL ENCOUNTER (EMERGENCY)
Facility: HOSPITAL | Age: 53
Discharge: HOME | End: 2018-07-19
Attending: EMERGENCY MEDICINE
Payer: COMMERCIAL

## 2018-07-19 VITALS
OXYGEN SATURATION: 93 % | TEMPERATURE: 98.7 F | DIASTOLIC BLOOD PRESSURE: 77 MMHG | WEIGHT: 225.31 LBS | BODY MASS INDEX: 34.26 KG/M2 | RESPIRATION RATE: 20 BRPM | SYSTOLIC BLOOD PRESSURE: 160 MMHG | HEART RATE: 72 BPM

## 2018-07-19 DIAGNOSIS — R07.89 CHEST WALL PAIN: Primary | ICD-10-CM

## 2018-07-19 LAB
ALBUMIN SERPL BCP-MCNC: 3.9 G/DL (ref 3.5–5)
ALP SERPL-CCNC: 70 U/L (ref 46–116)
ALT SERPL W P-5'-P-CCNC: 41 U/L (ref 12–78)
ANION GAP SERPL CALCULATED.3IONS-SCNC: 7 MMOL/L (ref 4–13)
AST SERPL W P-5'-P-CCNC: 29 U/L (ref 5–45)
ATRIAL RATE: 70 BPM
BACTERIA UR QL AUTO: ABNORMAL /HPF
BASOPHILS # BLD AUTO: 0.05 THOUSANDS/ΜL (ref 0–0.1)
BASOPHILS NFR BLD AUTO: 1 % (ref 0–1)
BILIRUB DIRECT SERPL-MCNC: 0.3 MG/DL (ref 0–0.2)
BILIRUB SERPL-MCNC: 1.4 MG/DL (ref 0.2–1)
BILIRUB UR QL STRIP: NEGATIVE
BUN SERPL-MCNC: 17 MG/DL (ref 5–25)
CALCIUM SERPL-MCNC: 8.8 MG/DL (ref 8.3–10.1)
CHLORIDE SERPL-SCNC: 105 MMOL/L (ref 100–108)
CLARITY UR: CLEAR
CO2 SERPL-SCNC: 30 MMOL/L (ref 21–32)
COLOR UR: YELLOW
CREAT SERPL-MCNC: 1.69 MG/DL (ref 0.6–1.3)
EOSINOPHIL # BLD AUTO: 0.21 THOUSAND/ΜL (ref 0–0.61)
EOSINOPHIL NFR BLD AUTO: 3 % (ref 0–6)
ERYTHROCYTE [DISTWIDTH] IN BLOOD BY AUTOMATED COUNT: 16 % (ref 11.6–15.1)
GFR SERPL CREATININE-BSD FRML MDRD: 52 ML/MIN/1.73SQ M
GLUCOSE SERPL-MCNC: 88 MG/DL (ref 65–140)
GLUCOSE UR STRIP-MCNC: NEGATIVE MG/DL
HCT VFR BLD AUTO: 34.7 % (ref 36.5–49.3)
HGB BLD-MCNC: 11.4 G/DL (ref 12–17)
HGB UR QL STRIP.AUTO: NEGATIVE
IMM GRANULOCYTES # BLD AUTO: 0.04 THOUSAND/UL (ref 0–0.2)
IMM GRANULOCYTES NFR BLD AUTO: 1 % (ref 0–2)
INR PPP: 1.29 (ref 0.86–1.17)
KETONES UR STRIP-MCNC: NEGATIVE MG/DL
LEUKOCYTE ESTERASE UR QL STRIP: NEGATIVE
LYMPHOCYTES # BLD AUTO: 1.52 THOUSANDS/ΜL (ref 0.6–4.47)
LYMPHOCYTES NFR BLD AUTO: 25 % (ref 14–44)
MCH RBC QN AUTO: 27.1 PG (ref 26.8–34.3)
MCHC RBC AUTO-ENTMCNC: 32.9 G/DL (ref 31.4–37.4)
MCV RBC AUTO: 82 FL (ref 82–98)
MONOCYTES # BLD AUTO: 0.79 THOUSAND/ΜL (ref 0.17–1.22)
MONOCYTES NFR BLD AUTO: 13 % (ref 4–12)
NEUTROPHILS # BLD AUTO: 3.55 THOUSANDS/ΜL (ref 1.85–7.62)
NEUTS SEG NFR BLD AUTO: 57 % (ref 43–75)
NITRITE UR QL STRIP: NEGATIVE
NON-SQ EPI CELLS URNS QL MICRO: ABNORMAL /HPF
NRBC BLD AUTO-RTO: 0 /100 WBCS
P AXIS: 70 DEGREES
PH UR STRIP.AUTO: 6 [PH] (ref 4.5–8)
PLATELET # BLD AUTO: 226 THOUSANDS/UL (ref 149–390)
PMV BLD AUTO: 9.8 FL (ref 8.9–12.7)
POTASSIUM SERPL-SCNC: 3.5 MMOL/L (ref 3.5–5.3)
PR INTERVAL: 154 MS
PROT SERPL-MCNC: 7.5 G/DL (ref 6.4–8.2)
PROT UR STRIP-MCNC: ABNORMAL MG/DL
PROTHROMBIN TIME: 15.9 SECONDS (ref 11.8–14.2)
QRS AXIS: -78 DEGREES
QRSD INTERVAL: 88 MS
QT INTERVAL: 446 MS
QTC INTERVAL: 481 MS
RBC # BLD AUTO: 4.21 MILLION/UL (ref 3.88–5.62)
RBC #/AREA URNS AUTO: ABNORMAL /HPF
SODIUM SERPL-SCNC: 142 MMOL/L (ref 136–145)
SP GR UR STRIP.AUTO: 1.02 (ref 1–1.03)
T WAVE AXIS: 76 DEGREES
TROPONIN I SERPL-MCNC: 0.03 NG/ML
UROBILINOGEN UR QL STRIP.AUTO: 0.2 E.U./DL
VENTRICULAR RATE: 70 BPM
WBC # BLD AUTO: 6.16 THOUSAND/UL (ref 4.31–10.16)
WBC #/AREA URNS AUTO: ABNORMAL /HPF

## 2018-07-19 PROCEDURE — 99285 EMERGENCY DEPT VISIT HI MDM: CPT

## 2018-07-19 PROCEDURE — 81001 URINALYSIS AUTO W/SCOPE: CPT | Performed by: EMERGENCY MEDICINE

## 2018-07-19 PROCEDURE — 84484 ASSAY OF TROPONIN QUANT: CPT | Performed by: EMERGENCY MEDICINE

## 2018-07-19 PROCEDURE — 36415 COLL VENOUS BLD VENIPUNCTURE: CPT | Performed by: EMERGENCY MEDICINE

## 2018-07-19 PROCEDURE — 85610 PROTHROMBIN TIME: CPT | Performed by: EMERGENCY MEDICINE

## 2018-07-19 PROCEDURE — 93010 ELECTROCARDIOGRAM REPORT: CPT | Performed by: INTERNAL MEDICINE

## 2018-07-19 PROCEDURE — 80076 HEPATIC FUNCTION PANEL: CPT | Performed by: EMERGENCY MEDICINE

## 2018-07-19 PROCEDURE — 93005 ELECTROCARDIOGRAM TRACING: CPT

## 2018-07-19 PROCEDURE — 71046 X-RAY EXAM CHEST 2 VIEWS: CPT

## 2018-07-19 PROCEDURE — 80048 BASIC METABOLIC PNL TOTAL CA: CPT | Performed by: EMERGENCY MEDICINE

## 2018-07-19 PROCEDURE — 85025 COMPLETE CBC W/AUTO DIFF WBC: CPT | Performed by: EMERGENCY MEDICINE

## 2018-07-19 NOTE — ED PROVIDER NOTES
History  Chief Complaint   Patient presents with    Chest Pain     pt c/o chest pain since this morning  states that he has been feeling SOB for the past three days     HPI patient is a 78-year-old male, reports a history of COPD, CHF, reports never had a heart attack, reports history of a DVT in his right leg  Patient reports the last 3-4 days he has been having some shortness of breath  Patient reports he was seen by his doctor and placed on antibiotics for UTI patient does not know the name of the antibiotic  He reports he did tell his doctor he was short of breath but he is chronically short of breath so they did not think it was anything severe  the patient was apparently in the office yesterday but he reports he has been on antibiotics for few days  Patient reports some sharp left-sided chest pain over the last 24 hours  Describes it as a sharp somewhat stabbing feeling in his left chest worse when he moves or takes a deep breath  Patient reports he developed the pain earlier this morning, possibly at noon, greater than 3 4 hours ago  Past medical history of diastolic heart failure, pulmonary embolism, chronic leg edema, chronic dyspnea  Family history noncontributory  Social history, nonsmoker no history of drug abuse    Prior to Admission Medications   Prescriptions Last Dose Informant Patient Reported? Taking?    Cholecalciferol (VITAMIN D-3) 1000 units CAPS  Spouse/Significant Other Yes No   Sig: Take 1,000 Units by mouth daily   Glycopyrrolate-Formoterol (BEVESPI AEROSPHERE) 9-4 8 MCG/ACT AERO  Spouse/Significant Other Yes No   Sig: Inhale 2 puffs daily after breakfast   LORazepam (ATIVAN) 0 5 mg tablet  Spouse/Significant Other Yes No   Sig: Take 0 5 mg by mouth every 6 (six) hours as needed for anxiety   MAGNESIUM PO  Spouse/Significant Other Yes No   Sig: Take 400 mg by mouth daily   Milk Thistle 500 MG CAPS  Spouse/Significant Other Yes No   Sig: Take 500 mg by mouth 2 (two) times a day Mometasone Furo-Formoterol Fum 200-5 MCG/ACT AERO  Spouse/Significant Other Yes No   Sig: Take 2 puffs by mouth 2 (two) times a day   Omega-3 Fatty Acids (FISH OIL) 1200 MG CAPS  Spouse/Significant Other Yes No   Sig: Take 1,200 mg by mouth daily at bedtime   albuterol (PROVENTIL HFA,VENTOLIN HFA) 90 mcg/act inhaler  Spouse/Significant Other No No   Sig: Inhale 2 puffs every 6 (six) hours as needed for wheezing   allopurinol (ZYLOPRIM) 100 mg tablet  Spouse/Significant Other Yes No   Sig: Take 100 mg by mouth daily   amLODIPine (NORVASC) 10 mg tablet  Spouse/Significant Other Yes No   Sig: Take 10 mg by mouth daily   apixaban (ELIQUIS) 5 mg  Spouse/Significant Other Yes No   Sig: Take 5 mg by mouth 2 (two) times a day     aspirin (PX ENTERIC ASPIRIN) 325 mg EC tablet  Spouse/Significant Other Yes No   Sig: Take 325 mg by mouth daily At night    atorvastatin (LIPITOR) 40 mg tablet  Spouse/Significant Other Yes No   Sig: Take 40 mg by mouth daily at bedtime   doxazosin (CARDURA) 4 mg tablet  Spouse/Significant Other Yes No   Sig: Take 4 mg by mouth daily at bedtime   eplerenone (INSPRA) 50 MG tablet  Spouse/Significant Other Yes No   Sig: Take 50 mg by mouth daily   escitalopram (LEXAPRO) 10 mg tablet  Spouse/Significant Other Yes No   Sig: Take 10 mg by mouth daily   ferrous sulfate 325 (65 Fe) mg tablet  Spouse/Significant Other Yes No   Sig: Take 325 mg by mouth daily with breakfast   fluticasone (FLONASE) 50 mcg/act nasal spray   No No   Si spray into each nostril 2 (two) times a day   furosemide (LASIX) 80 mg tablet   No No   Sig: Take 1 tablet (80 mg total) by mouth daily Starting next Monday  If you become short of breath in meantime call your pcp to consider resuming earlier, or if you have 2 lb wt gain     minoxidil (LONITEN) 10 mg tablet  Spouse/Significant Other Yes No   Sig: Take 20 mg by mouth 2 (two) times a day   multivitamin-iron-minerals-folic acid (CENTRUM) chewable tablet  Spouse/Significant Other Yes No   Sig: Chew 1 tablet daily   nebivolol (BYSTOLIC) 20 MG tablet  Spouse/Significant Other Yes No   Sig: Take 20 mg by mouth 2 (two) times a day   potassium chloride (KLOR-CON M20) 20 mEq tablet   No No   Sig: Take 1 tablet (20 mEq total) by mouth daily Restart with your lasix next monday      Facility-Administered Medications: None       Past Medical History:   Diagnosis Date    [facility]     Gout     Hypertension     Kidney disease     renal failure    Leg DVT (deep venous thromboembolism), acute, bilateral ([facility] ) 01/2018    AGUS on CPAP     setting 11    Systolic CHF ([facility] )        Past Surgical History:   Procedure Laterality Date    CHOLECYSTECTOMY      JOINT REPLACEMENT Bilateral     knee    LYMPH NODE DISSECTION Left 01/2018    left inguinal LN removed - benign     OTHER SURGICAL HISTORY      kidney nodule removal    PALATE / UVULA BIOPSY / EXCISION      SINUS SURGERY         Family History   Problem Relation Age of Onset    Heart murmur Sister     Deep vein thrombosis Neg Hx      I have reviewed and agree with the history as documented  Social History   Substance Use Topics    Smoking status: Never Smoker    Smokeless tobacco: Never Used    Alcohol use Yes      Comment: socially        Review of Systems   Constitutional: Negative for diaphoresis, fatigue and fever  HENT: Negative for congestion, ear pain, nosebleeds and sore throat  Eyes: Negative for photophobia, pain, discharge and visual disturbance  Respiratory: Positive for shortness of breath  Negative for cough, choking, chest tightness and wheezing  Cardiovascular: Positive for chest pain  Negative for palpitations  Gastrointestinal: Negative for abdominal distention, abdominal pain, diarrhea and vomiting  Genitourinary: Negative for dysuria, flank pain and frequency  Musculoskeletal: Negative for back pain, gait problem and joint swelling     Skin: Negative for color change and rash  Neurological: Negative for dizziness, syncope and headaches  Psychiatric/Behavioral: Negative for behavioral problems and confusion  The patient is not nervous/anxious  All other systems reviewed and are negative  Physical Exam  Physical Exam   Constitutional: He is oriented to person, place, and time  He appears well-developed and well-nourished  HENT:   Head: Normocephalic  Right Ear: External ear normal    Left Ear: External ear normal    Nose: Nose normal    Mouth/Throat: Oropharynx is clear and moist    Eyes: EOM and lids are normal  Pupils are equal, round, and reactive to light  Neck: Normal range of motion  Neck supple  Cardiovascular: Normal rate, regular rhythm and normal heart sounds  Pulmonary/Chest: Effort normal and breath sounds normal  No respiratory distress  He exhibits tenderness  Pain is reproduced with deep inspiration and moving and palpation  Abdominal: Soft  Bowel sounds are normal  There is no tenderness  Musculoskeletal: Normal range of motion  He exhibits no deformity  Neurological: He is alert and oriented to person, place, and time  Skin: Skin is warm and dry  Psychiatric: He has a normal mood and affect  Nursing note and vitals reviewed     Pulse oximetry 95% on room air adequate oxygenation, there is no hypoxia    Vital Signs  ED Triage Vitals [07/19/18 1657]   Temperature Pulse Respirations Blood Pressure SpO2   98 7 °F (37 1 °C) 74 17 163/77 95 %      Temp Source Heart Rate Source Patient Position - Orthostatic VS BP Location FiO2 (%)   Oral Monitor Lying Right arm --      Pain Score       3           Vitals:    07/19/18 1657 07/19/18 1830   BP: 163/77 160/77   Pulse: 74 72   Patient Position - Orthostatic VS: Lying Lying       Visual Acuity      ED Medications  Medications - No data to display    Diagnostic Studies  Results Reviewed     Procedure Component Value Units Date/Time    Urine Microscopic [[id number]]  (Abnormal) Collected:  07/19/18 1730    Lab Status:  Final result Specimen:  Urine from Urine, Clean Catch Updated:  07/19/18 1803     RBC, UA None Seen /hpf      WBC, UA 0-1 (A) /hpf      Epithelial Cells Occasional /hpf      Bacteria, UA None Seen /hpf     Troponin I [[id number]]  (Normal) Collected:  07/19/18 1722    Lab Status:  Final result Specimen:  Blood from Arm, Right Updated:  07/19/18 1754     Troponin I 0 03 ng/mL     UA w Reflex to Microscopic w Reflex to Culture [[id number]]  (Abnormal) Collected:  07/19/18 1730    Lab Status:  Final result Specimen:  Urine from Urine, Clean Catch Updated:  07/19/18 1753     Color, UA Yellow     Clarity, UA Clear     Specific Burton, UA 1 020     pH, UA 6 0     Leukocytes, UA Negative     Nitrite, UA Negative     Protein,  (2+) (A) mg/dl      Glucose, UA Negative mg/dl      Ketones, UA Negative mg/dl      Urobilinogen, UA 0 2 E U /dl      Bilirubin, UA Negative     Blood, UA Negative    Basic metabolic panel [[id number]]  (Abnormal) Collected:  07/19/18 1722    Lab Status:  Final result Specimen:  Blood from Arm, Right Updated:  07/19/18 1751     Sodium 142 mmol/L      Potassium 3 5 mmol/L      Chloride 105 mmol/L      CO2 30 mmol/L      Anion Gap 7 mmol/L      BUN 17 mg/dL      Creatinine 1 69 (H) mg/dL      Glucose 88 mg/dL      Calcium 8 8 mg/dL      eGFR 52 ml/min/1 73sq m     Narrative:         National Kidney Disease Education Program recommendations are as follows:  GFR calculation is accurate only with a steady state creatinine  Chronic Kidney disease less than 60 ml/min/1 73 sq  meters  Kidney failure less than 15 ml/min/1 73 sq  meters      Hepatic function panel [[id number]]  (Abnormal) Collected:  07/19/18 1722    Lab Status:  Final result Specimen:  Blood from Arm, Right Updated:  07/19/18 1751     Total Bilirubin 1 40 (H) mg/dL      Bilirubin, Direct 0 30 (H) mg/dL      Alkaline Phosphatase 70 U/L      AST 29 U/L      ALT 41 U/L      Total Protein 7 5 g/dL Albumin 3 9 g/dL     Protime-INR [[id number]]  (Abnormal) Collected:  07/19/18 1722    Lab Status:  Final result Specimen:  Blood from Arm, Right Updated:  07/19/18 1750     Protime 15 9 (H) seconds      INR 1 29 (H)    CBC and differential [[id number]]  (Abnormal) Collected:  07/19/18 1722    Lab Status:  Final result Specimen:  Blood from Arm, Right Updated:  07/19/18 1736     WBC 6 16 Thousand/uL      RBC 4 21 Million/uL      Hemoglobin 11 4 (L) g/dL      Hematocrit 34 7 (L) %      MCV 82 fL      MCH 27 1 pg      MCHC 32 9 g/dL      RDW 16 0 (H) %      MPV 9 8 fL      Platelets 161 Thousands/uL      nRBC 0 /100 WBCs      Neutrophils Relative 57 %      Immat GRANS % 1 %      Lymphocytes Relative 25 %      Monocytes Relative 13 (H) %      Eosinophils Relative 3 %      Basophils Relative 1 %      Neutrophils Absolute 3 55 Thousands/µL      Immature Grans Absolute 0 04 Thousand/uL      Lymphocytes Absolute 1 52 Thousands/µL      Monocytes Absolute 0 79 Thousand/µL      Eosinophils Absolute 0 21 Thousand/µL      Basophils Absolute 0 05 Thousands/µL                  XR chest pa & lateral   Final Result by Octavia Sampson MD (07/19 1759)      Stable cardio mainly  Minimal improved atelectasis left lingular region  Minimal right base atelectasis  Otherwise no new consolidation  Workstation performed: [id number]                    Procedures  ECG 12 Lead Documentation  Date/Time: 7/19/2018 5:43 PM  Performed by: Lamar Orr  Authorized by: Lamar Orr     Indications / Diagnosis:  Chest pain  ECG reviewed by me, the ED Provider: yes    Patient location:  ED  Previous ECG:     Previous ECG:  Compared to current    Comparison ECG info:   May 21, 2018  Interpretation:     Interpretation: non-specific    Rate:     ECG rate:  Seventy    ECG rate assessment: normal    Rhythm:     Rhythm: sinus rhythm    Ectopy:     Ectopy: none    QRS:     QRS axis:  Normal  ST segments:     ST segments:  Normal  Comments: Normal sinus rhythm, left anterior fascicular block, no acute ST-T wave changes           Phone Contacts  ED Phone Contact    ED Course         HEART Risk Score      Most Recent Value   History  0 Filed at: 07/19/2018 1839   ECG  0 Filed at: 07/19/2018 1839   Age  1 Filed at: 07/19/2018 1839   Risk Factors  2 Filed at: 07/19/2018 1839   Troponin  0 Filed at: 07/19/2018 1839   Heart Score Risk Calculator   History  0 Filed at: 07/19/2018 1839   ECG  0 Filed at: 07/19/2018 1839   Age  1 Filed at: 07/19/2018 1839   Risk Factors  2 Filed at: 07/19/2018 1839   Troponin  0 Filed at: 07/19/2018 1839   HEART Score  3 Filed at: 07/19/2018 1839   HEART Score  3 Filed at: 07/19/2018 1839         chest x-ray showed significant cardiomegaly, there were no focal infiltrates, no pneumonia, read by Radiology as possible atelectasis, initially interpreted by me I was initial   Urinalysis showed no sign of infection  Patient's electrolytes showed a serum creatinine 1 69 consistent with patient's past renal insufficiency  Patient is glucose was 88, other electrolytes were normal    liver functions were not elevated no sign of hepatitis  Cardiac troponin was not elevated no sign of cardiac ischemia  Pain was present greater than 3 hours prior to arrival                   MDM medical decision making 49-year-old male with left-sided chest pain worse with palpation and movement worse with deep inspiration, history of chronic shortness of breath due to the diastolic congestive heart failure, no history of heart ischemic heart disease secondary to the patient    Pain was most consistent with chest wall pain, patient's heart score was 3, cardiac troponin the showed no acute acute change, discussed with patient  consistent with chest wall     CritCare Time    Disposition  Final diagnoses:   Chest wall pain     Time reflects when diagnosis was documented in both MDM as applicable and the Disposition within this note     Time User Action Codes Description Comment    7/19/2018  6:04 PM Uyen Hernández Add [R07 89] Chest wall pain       ED Disposition     ED Disposition Condition Comment    Discharge  [facility] discharge to home/self care  Condition at discharge: Good        Follow-up Information     Follow up With Specialties Details Why Contact Info    Sagrario Mcfarland MD    [facility] [address] [facility] [address]  [phone]            Discharge Medication List as of 7/19/2018  6:05 PM      CONTINUE these medications which have NOT CHANGED    Details   albuterol (PROVENTIL HFA,VENTOLIN HFA) 90 mcg/act inhaler Inhale 2 puffs every 6 (six) hours as needed for wheezing, Starting Mon 3/19/2018, Print      allopurinol (ZYLOPRIM) 100 mg tablet Take 100 mg by mouth daily, Starting Mon 12/18/2017, Historical Med      amLODIPine (NORVASC) 10 mg tablet Take 10 mg by mouth daily, Starting Tue 5/23/2017, Historical Med      apixaban (ELIQUIS) 5 mg Take 5 mg by mouth 2 (two) times a day  , Starting Thu 1/18/2018, Historical Med      aspirin (PX ENTERIC ASPIRIN) 325 mg EC tablet Take 325 mg by mouth daily At night , Historical Med      atorvastatin (LIPITOR) 40 mg tablet Take 40 mg by mouth daily at bedtime, Starting Wed 2/1/2017, Historical Med      Cholecalciferol (VITAMIN D-3) 1000 units CAPS Take 1,000 Units by mouth daily, Historical Med      doxazosin (CARDURA) 4 mg tablet Take 4 mg by mouth daily at bedtime, Historical Med      eplerenone (INSPRA) 50 MG tablet Take 50 mg by mouth daily, Starting Tue 5/23/2017, Historical Med      escitalopram (LEXAPRO) 10 mg tablet Take 10 mg by mouth daily, Historical Med      ferrous sulfate 325 (65 Fe) mg tablet Take 325 mg by mouth daily with breakfast, Historical Med      fluticasone (FLONASE) 50 mcg/act nasal spray 1 spray into each nostril 2 (two) times a day, Starting Wed 5/23/2018, Normal      furosemide (LASIX) 80 mg tablet Take 1 tablet (80 mg total) by mouth daily Starting next Monday    If you become short of breath in meantime call your pcp to consider resuming earlier, or if you have 2 lb wt gain  , Starting Wed 5/23/2018, Print      Glycopyrrolate-Formoterol (BEVESPI AEROSPHERE) 9-4 8 MCG/ACT AERO Inhale 2 puffs daily after breakfast, Historical Med      LORazepam (ATIVAN) 0 5 mg tablet Take 0 5 mg by mouth every 6 (six) hours as needed for anxiety, Historical Med      MAGNESIUM PO Take 400 mg by mouth daily, Historical Med      Milk Thistle 500 MG CAPS Take 500 mg by mouth 2 (two) times a day, Historical Med      minoxidil (LONITEN) 10 mg tablet Take 20 mg by mouth 2 (two) times a day, Starting Tue 11/21/2017, Historical Med      Mometasone Furo-Formoterol Fum 200-5 MCG/ACT AERO Take 2 puffs by mouth 2 (two) times a day, Starting Wed 6/29/2016, Historical Med      multivitamin-iron-minerals-folic acid (CENTRUM) chewable tablet Chew 1 tablet daily, Historical Med      nebivolol (BYSTOLIC) 20 MG tablet Take 20 mg by mouth 2 (two) times a day, Starting Mon 6/13/2016, Historical Med      Omega-3 Fatty Acids (FISH OIL) 1200 MG CAPS Take 1,200 mg by mouth daily at bedtime, Historical Med      potassium chloride (KLOR-CON M20) 20 mEq tablet Take 1 tablet (20 mEq total) by mouth daily Restart with your lasix next monday, Starting Wed 5/23/2018, Normal           No discharge procedures on file      ED Provider  Electronically Signed by           Hong Sanchez MD  07/19/18 7790

## 2018-07-19 NOTE — ED NOTES
D/c reviewed with pt prior to discharge, ambulatory off unit with steady gait        Jone Holder RN  46/32/00 [id number]

## 2018-07-19 NOTE — DISCHARGE INSTRUCTIONS
Follow up with your physician  Return increasing pain, increasing shortness of breath, sweating or any problems    Chest Wall Pain   WHAT YOU NEED TO KNOW:   Chest wall pain may be caused by problems with the muscles, cartilage, or bones of the chest wall  Chest wall pain may also be caused by pain that spreads to your chest from another part of your body  The pain may be aching, severe, dull, or sharp  It may come and go, or it may be constant  The pain may be worse when you move in certain ways, breathe deeply, or cough  DISCHARGE INSTRUCTIONS:   Call 911 if:   · You have any of the following signs of a heart attack:      ¨ Squeezing, pressure, or pain in your chest that lasts longer than 5 minutes or returns    ¨ Discomfort or pain in your back, neck, jaw, stomach, or arm     ¨ Trouble breathing    ¨ Nausea or vomiting    ¨ Lightheadedness or a sudden cold sweat, especially with chest pain or trouble breathing    Return to the emergency department if:   · You have severe pain  Contact your healthcare provider if:   · You develop a rash  · You have other new symptoms  · Your pain does not improve, even with treatment  · You have questions or concerns about your condition or care  Medicines: You may need any of the following:  · NSAIDs , such as ibuprofen, help decrease swelling, pain, and fever  This medicine is available with or without a doctor's order  NSAIDs can cause stomach bleeding or kidney problems in certain people  If you take blood thinner medicine, always ask your healthcare provider if NSAIDs are safe for you  Always read the medicine label and follow directions  · Acetaminophen  decreases pain  It is available without a doctor's order  Ask how much to take and how often to take it  Follow directions  Acetaminophen can cause liver damage if not taken correctly  · A cream  may be applied to your chest to decrease pain  · Take your medicine as directed    Contact your healthcare provider if you think your medicine is not helping or if you have side effects  Tell him of her if you are allergic to any medicine  Keep a list of the medicines, vitamins, and herbs you take  Include the amounts, and when and why you take them  Bring the list or the pill bottles to follow-up visits  Carry your medicine list with you in case of an emergency  Follow up with your healthcare provider as directed:  Write down your questions so you remember to ask them during your visits  Self-care:   · Rest  as needed  Avoid activities that make your chest wall pain worse  · Apply heat  on your chest for 20 to 30 minutes every 2 hours for as many days as directed  Heat helps decrease pain and muscle spasms  · Apply ice  on your chest for 15 to 20 minutes every hour or as directed  Use an ice pack, or put crushed ice in a plastic bag  Cover it with a towel  Ice helps prevent tissue damage and decreases swelling and pain  © 2017 [employer] Information is for End User's use only and may not be sold, redistributed or otherwise used for commercial purposes  All illustrations and images included in CareNotes® are the copyrighted property of A D A M , Inc  or Everette Sanchez  The above information is an  only  It is not intended as medical advice for individual conditions or treatments  Talk to your doctor, nurse or pharmacist before following any medical regimen to see if it is safe and effective for you

## 2018-07-20 ENCOUNTER — TELEPHONE (OUTPATIENT)
Dept: INTERVENTIONAL RADIOLOGY/VASCULAR | Facility: HOSPITAL | Age: 53
End: 2018-07-20

## 2018-07-23 ENCOUNTER — TELEPHONE (OUTPATIENT)
Dept: NON INVASIVE DIAGNOSTICS | Facility: HOSPITAL | Age: 53
End: 2018-07-23

## 2018-07-23 RX ORDER — SODIUM CHLORIDE 9 MG/ML
75 INJECTION, SOLUTION INTRAVENOUS CONTINUOUS
Status: CANCELLED | OUTPATIENT
Start: 2018-07-23 | End: 2019-07-23

## 2018-07-26 ENCOUNTER — HOSPITAL ENCOUNTER (OUTPATIENT)
Dept: INTERVENTIONAL RADIOLOGY/VASCULAR | Facility: HOSPITAL | Age: 53
Discharge: HOME | End: 2018-07-26
Payer: COMMERCIAL

## 2018-07-26 VITALS
DIASTOLIC BLOOD PRESSURE: 95 MMHG | HEART RATE: 70 BPM | OXYGEN SATURATION: 96 % | WEIGHT: 221.78 LBS | TEMPERATURE: 97.6 F | BODY MASS INDEX: 34.81 KG/M2 | HEIGHT: 67 IN | SYSTOLIC BLOOD PRESSURE: 165 MMHG | RESPIRATION RATE: 22 BRPM

## 2018-07-26 DIAGNOSIS — T79.2XXA POST-TRAUMATIC SEROMA (HCC): ICD-10-CM

## 2018-07-26 PROCEDURE — 10030 IMG GID FLU COLL DRG SFT TIS: CPT | Performed by: RADIOLOGY

## 2018-07-26 PROCEDURE — 10030 IMG GID FLU COLL DRG SFT TIS: CPT

## 2018-07-26 PROCEDURE — C1729 CATH, DRAINAGE: HCPCS

## 2018-07-26 PROCEDURE — C1769 GUIDE WIRE: HCPCS

## 2018-07-26 PROCEDURE — 49185 SCLEROTX FLUID COLLECTION: CPT

## 2018-07-26 RX ORDER — SODIUM CHLORIDE 9 MG/ML
75 INJECTION, SOLUTION INTRAVENOUS CONTINUOUS
Status: DISCONTINUED | OUTPATIENT
Start: 2018-07-26 | End: 2018-07-30 | Stop reason: HOSPADM

## 2018-07-26 RX ORDER — FENTANYL CITRATE 50 UG/ML
INJECTION, SOLUTION INTRAMUSCULAR; INTRAVENOUS CODE/TRAUMA/SEDATION MEDICATION
Status: COMPLETED | OUTPATIENT
Start: 2018-07-26 | End: 2018-07-26

## 2018-07-26 RX ORDER — LIDOCAINE HYDROCHLORIDE 10 MG/ML
INJECTION, SOLUTION INFILTRATION; PERINEURAL CODE/TRAUMA/SEDATION MEDICATION
Status: COMPLETED | OUTPATIENT
Start: 2018-07-26 | End: 2018-07-26

## 2018-07-26 RX ADMIN — LIDOCAINE HYDROCHLORIDE 10 ML: 10 INJECTION, SOLUTION INFILTRATION; PERINEURAL at 11:23

## 2018-07-26 RX ADMIN — ALCOHOL 20 ML: 0.98 INJECTION INTRASPINAL at 11:39

## 2018-07-26 RX ADMIN — FENTANYL CITRATE 50 MCG: 50 INJECTION, SOLUTION INTRAMUSCULAR; INTRAVENOUS at 11:18

## 2018-07-26 RX ADMIN — FENTANYL CITRATE 50 MCG: 50 INJECTION, SOLUTION INTRAMUSCULAR; INTRAVENOUS at 11:25

## 2018-07-26 NOTE — SEDATION DOCUMENTATION
Pt shown how to manage catheter by Dr Geri Bates instructed to open draianage valve in 2hrs post alcohol injection and leave valve open for draianage to monitor output  Pt also instructed if alcohol starts to burn before the 2hrs to open valve early and drain  Pt verbalized understanding

## 2018-07-26 NOTE — DISCHARGE INSTRUCTIONS
TUBE CARE INSTRUCTIONS    Care after your procedure:    Resume your normal diet  Small sips of flat soda will help with nausea  1  The properly functioning catheter should be forward flushed once (1x) daily with 10ml of normal saline using clean technique  You will be given a prescription for flushes  To flush the tube, clean both connections with alcohol swab  Twist off the drainage bag/ bulb  tubing and twist the saline syringe into the drainage tube and flush  Remove the syringe and twist the drainage bag / bulb tubing tubing back on     2  The drainage bag/bulb may be emptied as necessary  Keep a record of the amount of fluid you drain from your tube  This should be done with clean technique as well  3  A fresh dressing should be applied daily over the tube insertion site  4  As the tube is secured to the skin with only a suture,try not to pull on your tube  Tub baths are not permitted  Showers are permitted if the patient's skin entry site is prevented from getting wet  Similarly, washcloth "baths" are acceptable  Contact Interventional Radiology at [phone] Js PATIENTS: Contact Interventional Radiology at [phone]) Fredi Rodriguez PATIENTS: Contact Interventional Radiology at [phone]) if:    1  Leakage or large amounts of liquid around the catheter  2  Fever of 101 degrees lasting several hours without other obvious cause (such as sore throat, flu, etc)  3  Persistent nausea or vomiting  4  Diminished drainage, which may be associated with pressure or pain  Or when the     drainage from your tube is less than 10mls for 48 hours  5  Catheter pulled back or falls out  The following pharmacies carry the flush syringes         [facility]                     [facility]  [address]                         [address] [facility]  Phone [phone]            Phone [phone]   [place] [address]                                [phone]  [address] [facility] [address]                      [address] [facility]  Phone [phone]            Phone [phone]                      Maricel Mitchell                                                                                                          [phone]  [facility]  [facility] [address]    [address]  Phone [phone]

## 2018-07-31 ENCOUNTER — HOSPITAL ENCOUNTER (OUTPATIENT)
Dept: INTERVENTIONAL RADIOLOGY/VASCULAR | Facility: HOSPITAL | Age: 53
Discharge: HOME | End: 2018-07-31
Admitting: SURGERY
Payer: COMMERCIAL

## 2018-07-31 DIAGNOSIS — I89.8 LYMPHOCELE: ICD-10-CM

## 2018-07-31 PROCEDURE — 49185 SCLEROTX FLUID COLLECTION: CPT

## 2018-07-31 PROCEDURE — 49185 SCLEROTX FLUID COLLECTION: CPT | Performed by: RADIOLOGY

## 2018-07-31 RX ORDER — LIDOCAINE HYDROCHLORIDE 10 MG/ML
INJECTION, SOLUTION INFILTRATION; PERINEURAL CODE/TRAUMA/SEDATION MEDICATION
Status: COMPLETED | OUTPATIENT
Start: 2018-07-31 | End: 2018-07-31

## 2018-07-31 RX ORDER — ALCOHOL 0.98 ML/ML
INJECTION INTRASPINAL CODE/TRAUMA/SEDATION MEDICATION
Status: COMPLETED | OUTPATIENT
Start: 2018-07-31 | End: 2018-07-31

## 2018-07-31 RX ADMIN — ALCOHOL 15 ML: 0.98 INJECTION INTRASPINAL at 14:16

## 2018-07-31 RX ADMIN — LIDOCAINE HYDROCHLORIDE 5 ML: 10 INJECTION, SOLUTION INFILTRATION; PERINEURAL at 14:14

## 2018-08-06 ENCOUNTER — HOSPITAL ENCOUNTER (OUTPATIENT)
Dept: INTERVENTIONAL RADIOLOGY/VASCULAR | Facility: HOSPITAL | Age: 53
Discharge: HOME | End: 2018-08-06
Payer: COMMERCIAL

## 2018-08-06 DIAGNOSIS — I89.8 LYMPHOCELE: ICD-10-CM

## 2018-08-06 PROCEDURE — 49424 ASSESS CYST CONTRAST INJECT: CPT

## 2018-08-06 PROCEDURE — 49185 SCLEROTX FLUID COLLECTION: CPT | Performed by: RADIOLOGY

## 2018-08-06 RX ORDER — ALCOHOL 0.98 ML/ML
INJECTION INTRASPINAL CODE/TRAUMA/SEDATION MEDICATION
Status: COMPLETED | OUTPATIENT
Start: 2018-08-06 | End: 2018-08-06

## 2018-08-06 RX ADMIN — IOHEXOL 20 ML: 300 INJECTION, SOLUTION INTRAVENOUS at 13:47

## 2018-08-06 RX ADMIN — ALCOHOL 20 ML: 0.98 INJECTION INTRASPINAL at 13:46

## 2018-08-06 NOTE — SEDATION DOCUMENTATION
Pt taken to [facility]  Instructed to rotate positions every 10 minutes, then dehydrated alcohol will be removed

## 2018-08-06 NOTE — BRIEF OP NOTE (RAD/CATH)
IR IMAGE GUIDED ASPIRATION / DRAINAGE W TUBE  Procedure Note    PATIENT NAME: Vesna Pagan  : 1965  MRN: [id number]     Pre-op Diagnosis:   1  Lymphocele      Post-op Diagnosis:   1  Lymphocele        Surgeon:   Cristy Vieira MD  Assistants:     No qualified resident was available, Resident is only observing    Estimated Blood Loss: None  Findings:   Output from drain is 100 mL daily  Aspiration of catheter failed to return fluid  However, the drain had been open all day  Catheter flushed easily and was then aspirated easily with return of the full flushed volume  Contrast injection demonstrates a large residual pocket, easily filled with 40 mL fluid  The catheter was filled with 20 mL of 98% alcohol and the patient was taken to APU  Patient was then positioned on his back, right side down, prone, then left side down for 10 minutes in each position to allow the alcohol to contact all the walls of the cyst/seroma  The catheter was then aspirated with full return of the 20 mL of alcohol  Patient had no pain with alcohol sclerosis  No leakage of alcohol  Drain was left in place  Patient to return in two weeks  Patient instructed to leave the drain capped 24 hours before the next procedure, to better estimate the volume of the seroma  Will likely need repeat sclerosis  May need to change the sclerosant agent      Specimens: None    Complications:  Nothing immediate    Anesthesia: None    Cristy Vieira MD     Date: 2018  Time: 2:45 PM

## 2018-08-17 ENCOUNTER — HOSPITAL ENCOUNTER (OUTPATIENT)
Dept: INTERVENTIONAL RADIOLOGY/VASCULAR | Facility: HOSPITAL | Age: 53
Discharge: HOME | End: 2018-08-17
Payer: COMMERCIAL

## 2018-08-17 DIAGNOSIS — I89.8 LYMPHOCELE: ICD-10-CM

## 2018-08-17 PROCEDURE — C1729 CATH, DRAINAGE: HCPCS

## 2018-08-17 PROCEDURE — 75984 XRAY CONTROL CATHETER CHANGE: CPT | Performed by: RADIOLOGY

## 2018-08-17 PROCEDURE — 75984 XRAY CONTROL CATHETER CHANGE: CPT

## 2018-08-17 PROCEDURE — 49423 EXCHANGE DRAINAGE CATHETER: CPT | Performed by: RADIOLOGY

## 2018-08-17 PROCEDURE — C1769 GUIDE WIRE: HCPCS

## 2018-08-17 PROCEDURE — 49185 SCLEROTX FLUID COLLECTION: CPT | Performed by: RADIOLOGY

## 2018-08-17 PROCEDURE — 49185 SCLEROTX FLUID COLLECTION: CPT

## 2018-08-17 PROCEDURE — 49423 EXCHANGE DRAINAGE CATHETER: CPT

## 2018-08-17 RX ORDER — ALCOHOL 0.98 ML/ML
INJECTION INTRASPINAL CODE/TRAUMA/SEDATION MEDICATION
Status: COMPLETED | OUTPATIENT
Start: 2018-08-17 | End: 2018-08-17

## 2018-08-17 RX ORDER — LIDOCAINE HYDROCHLORIDE 10 MG/ML
INJECTION, SOLUTION INFILTRATION; PERINEURAL CODE/TRAUMA/SEDATION MEDICATION
Status: COMPLETED | OUTPATIENT
Start: 2018-08-17 | End: 2018-08-17

## 2018-08-17 RX ADMIN — LIDOCAINE HYDROCHLORIDE 5 ML: 10 INJECTION, SOLUTION INFILTRATION; PERINEURAL at 13:58

## 2018-08-17 RX ADMIN — IOHEXOL 25 ML: 300 INJECTION, SOLUTION INTRAVENOUS at 14:33

## 2018-08-17 RX ADMIN — ALCOHOL 25 ML: 0.98 INJECTION INTRASPINAL at 13:58

## 2018-08-17 NOTE — BRIEF OP NOTE (RAD/CATH)
IR IMAGE GUIDED ASPIRATION / DRAINAGE W TUBE  Procedure Note    PATIENT NAME: Khang Gray  : 1965  MRN: [id number]     Pre-op Diagnosis:   1  Lymphocele      Post-op Diagnosis:   1  Lymphocele        Surgeon:   Gretel Jarquin MD  Assistants:     No qualified resident was available, Resident is only observing    Estimated Blood Loss:  1 milliliter  Findings:   Gilliam yellow drainage within bulb, approximately 100 milliliters  Drain had retracted into the tract with the tip remaining in the collection  Successful exchange of 8 [place] drain over a wire under fluoroscopy  Contrast injection confirmed placement within the collection and aspiration confirmed tube function  Tube was then injected with saline until there was resistance, a total of 50 milliliters  The fluid was then removed in its entirety and replaced with 25 milliliters of 98 percent ethanol, half the volume of the total collection  Patient was then taken to recovery and rotated every 20 minutes, said he was supine, left-side-down, prone, right-side-down, then supine  Patient remained in each position for 20 minutes  The drain was then aspirated with removal of 40 milliliters fluid  Patient had no complaints  Patient denies pain  Patient's wife drove him home  Given the significant amount of recurrent fluid in the small time that the patient was in recovery, this appears to be a high-flow leak  Patient has undergone multiple sclerosis attempts  Prior to the next check in 2 weeks, patient should undergo ultrasound, CT or MRI of the left thigh for evaluation of the fluid collection  If there is no change and we are to continue with sclerosis, would recommend a different agent such as Betadine or doxycycline  Alternately, patient can undergo evaluation by surgeon      Specimens:  None requested    Complications:  Nothing immediately apparent    Anesthesia: Ambrosio Howard MD     Date: 2018  Time: 2:06 PM

## 2018-09-04 DIAGNOSIS — T81.89XA LYMPHOCELE AFTER SURGICAL PROCEDURE: Primary | ICD-10-CM

## 2018-09-04 DIAGNOSIS — I89.8 LYMPHOCELE AFTER SURGICAL PROCEDURE: Primary | ICD-10-CM

## 2018-09-04 NOTE — PROGRESS NOTES
Vascular and Interventional Radiology brief note    Patient called and states he continues to have over 100 mL of daily output from his left seroma/lymphocele drain  Patient has undergone multiple attempts at sclerosis  As previously discussed, will obtain MRI to evaluate for other underlying abnormality or additional collections that may not be receiving sclerotic treatment and thus contributing to continued output  This should be completed prior to the next attempt at sclerosis

## 2018-09-17 ENCOUNTER — HOSPITAL ENCOUNTER (OUTPATIENT)
Dept: MRI IMAGING | Facility: HOSPITAL | Age: 53
Discharge: HOME | End: 2018-09-17
Attending: RADIOLOGY
Payer: COMMERCIAL

## 2018-09-17 DIAGNOSIS — T81.89XA LYMPHOCELE AFTER SURGICAL PROCEDURE: ICD-10-CM

## 2018-09-17 DIAGNOSIS — I89.8 LYMPHOCELE AFTER SURGICAL PROCEDURE: ICD-10-CM

## 2018-09-17 PROCEDURE — A9585 GADOBUTROL INJECTION: HCPCS | Performed by: RADIOLOGY

## 2018-09-17 PROCEDURE — 73720 MRI LWR EXTREMITY W/O&W/DYE: CPT

## 2018-09-17 RX ADMIN — GADOBUTROL 9 ML: 604.72 INJECTION INTRAVENOUS at 10:43

## 2018-09-28 ENCOUNTER — TELEPHONE (OUTPATIENT)
Dept: NON INVASIVE DIAGNOSTICS | Facility: HOSPITAL | Age: 53
End: 2018-09-28

## 2018-10-04 ENCOUNTER — TELEPHONE (OUTPATIENT)
Dept: SURGERY | Facility: HOSPITAL | Age: 53
End: 2018-10-04

## 2018-10-04 ENCOUNTER — TELEPHONE (OUTPATIENT)
Dept: NON INVASIVE DIAGNOSTICS | Facility: HOSPITAL | Age: 53
End: 2018-10-04

## 2018-10-05 ENCOUNTER — TELEPHONE (OUTPATIENT)
Dept: NON INVASIVE DIAGNOSTICS | Facility: HOSPITAL | Age: 53
End: 2018-10-05

## 2018-10-08 ENCOUNTER — HOSPITAL ENCOUNTER (EMERGENCY)
Facility: HOSPITAL | Age: 53
Discharge: HOME | End: 2018-10-09
Attending: EMERGENCY MEDICINE
Payer: COMMERCIAL

## 2018-10-08 ENCOUNTER — HOSPITAL ENCOUNTER (OUTPATIENT)
Dept: INTERVENTIONAL RADIOLOGY/VASCULAR | Facility: HOSPITAL | Age: 53
Discharge: HOME | End: 2018-10-08
Admitting: RADIOLOGY
Payer: COMMERCIAL

## 2018-10-08 VITALS
BODY MASS INDEX: 34.22 KG/M2 | OXYGEN SATURATION: 98 % | DIASTOLIC BLOOD PRESSURE: 98 MMHG | HEART RATE: 65 BPM | TEMPERATURE: 98.5 F | WEIGHT: 218.03 LBS | SYSTOLIC BLOOD PRESSURE: 176 MMHG | HEIGHT: 67 IN | RESPIRATION RATE: 18 BRPM

## 2018-10-08 DIAGNOSIS — R58 BLEEDING: Primary | ICD-10-CM

## 2018-10-08 DIAGNOSIS — I89.8 LYMPHOCELE: ICD-10-CM

## 2018-10-08 PROCEDURE — 75984 XRAY CONTROL CATHETER CHANGE: CPT | Performed by: RADIOLOGY

## 2018-10-08 PROCEDURE — 49423 EXCHANGE DRAINAGE CATHETER: CPT

## 2018-10-08 PROCEDURE — C1769 GUIDE WIRE: HCPCS

## 2018-10-08 PROCEDURE — 99283 EMERGENCY DEPT VISIT LOW MDM: CPT

## 2018-10-08 PROCEDURE — 49423 EXCHANGE DRAINAGE CATHETER: CPT | Performed by: RADIOLOGY

## 2018-10-08 PROCEDURE — 49185 SCLEROTX FLUID COLLECTION: CPT | Performed by: RADIOLOGY

## 2018-10-08 PROCEDURE — C1729 CATH, DRAINAGE: HCPCS

## 2018-10-08 PROCEDURE — 76080 X-RAY EXAM OF FISTULA: CPT

## 2018-10-08 PROCEDURE — 49185 SCLEROTX FLUID COLLECTION: CPT

## 2018-10-08 RX ORDER — LIDOCAINE HYDROCHLORIDE 10 MG/ML
INJECTION, SOLUTION INFILTRATION; PERINEURAL CODE/TRAUMA/SEDATION MEDICATION
Status: COMPLETED | OUTPATIENT
Start: 2018-10-08 | End: 2018-10-08

## 2018-10-08 RX ADMIN — IOHEXOL 10 ML: 300 INJECTION, SOLUTION INTRAVENOUS at 13:30

## 2018-10-08 RX ADMIN — LIDOCAINE HYDROCHLORIDE 5 ML: 10 INJECTION, SOLUTION INFILTRATION; PERINEURAL at 13:04

## 2018-10-08 NOTE — BRIEF OP NOTE (RAD/CATH)
IR SCLEROTHERAPY and TUBE CHANGE  Procedure Note    PATIENT NAME: Justus Jeronimo  : 1965  MRN: [id number]     Pre-op Diagnosis:   1  Lymphocele      Post-op Diagnosis:   1  Lymphocele        Surgeon:   Chance Greer MD    Estimated Blood Loss: Minimal    Findings:   1  Pre-procedure  film shows left groin drain in stable position  2  Contrast injection through drain shows interval decrease in size of seroma cavity  3  Successful exchange of existing drain with a new 8 5 Fr catheter  4  Repeat sclerotherapy performed through drain using 10 mL 100% alcohol  A different sclerosing agent such as Sotradecol was not available  5  Patient will call for drain check when output is less than 10 mL per day for 2 consecutive days      Specimens: None    Complications:  None    Anesthesia: Local    Chance Greer MD     Date: 10/8/2018  Time: 1:36 PM

## 2018-10-08 NOTE — SEDATION DOCUMENTATION
10cc dehydrated alcohol instilled, will dwell for 1 hour and then reconnect to ANNE MARIE drainage system

## 2018-10-08 NOTE — H&P
IR H&P    HPI:  52year old male with history of bilateral inguinal lymphadenopathy s/p left inguinal lymphadenectomy on 1/10/2018 returns for a left groin drain check  Patient underwent aspiration of left groin seroma on 6/29/2018, followed by alcohol sclerotherapy and drain placement on 7/12/2018 for recurrent seroma  Patient has returned 4 times for repeat alcohol sclerotherapy, most recently on 8/17/2018  MRI left leg was performed on 9/17/2018 which showed residual left inguinal fluid collection  Patient has had minimal output from the drain, measuring less than 20 cc per day, for the past week  He feels that it could be due to the drain being clogged  Patient has not been flushing the drain  PMH:  COPD  HTN  DVT  AGUS  CHF    PSH:  Left inguinal lymphadenectomy  Cholecystectomy  Bilateral knee replacement  Sinus surgery    Physical exam:  Gen: NAD  Left groin: Drain in place with scant serous fluid in bulb    Lab Results   Component Value Date    WBC 6 16 07/19/2018    HGB 11 4 (L) 07/19/2018    HCT 34 7 (L) 07/19/2018    MCV 82 07/19/2018     07/19/2018     Lab Results   Component Value Date    INR 1 29 (H) 07/19/2018    INR 1 22 (H) 06/29/2018    INR 1 34 (H) 03/15/2018    PROTIME 15 9 (H) 07/19/2018    PROTIME 15 3 (H) 06/29/2018    PROTIME 16 9 (H) 03/15/2018     Lab Results   Component Value Date     07/19/2018    K 3 5 07/19/2018     07/19/2018    CO2 30 07/19/2018    BUN 17 07/19/2018    CREATININE 1 69 (H) 07/19/2018    CALCIUM 8 8 07/19/2018    AST 29 07/19/2018    ALT 41 07/19/2018    ALKPHOS 70 07/19/2018    EGFR 52 07/19/2018     A:  52year old with long standing left groin seroma s/p 5 alcohol sclerotherapy treatments continues to have minimal output through the left groin drain      P:  - Left groin drain check with possible exchange and repeat sclerotherapy

## 2018-10-08 NOTE — PROGRESS NOTES
Aspirated 10cc dehydrated alcohol out of cavity  Patient tolerated well  Instructed patient that  will schedule him for a 2 week follow up

## 2018-10-09 ENCOUNTER — HOSPITAL ENCOUNTER (OUTPATIENT)
Dept: INTERVENTIONAL RADIOLOGY/VASCULAR | Facility: HOSPITAL | Age: 53
Discharge: HOME | End: 2018-10-09
Admitting: RADIOLOGY
Payer: COMMERCIAL

## 2018-10-09 DIAGNOSIS — I89.8 LYMPHOCELE: ICD-10-CM

## 2018-10-09 PROBLEM — R58 BLEEDING: Status: ACTIVE | Noted: 2018-02-05

## 2018-10-09 PROCEDURE — 49424 ASSESS CYST CONTRAST INJECT: CPT

## 2018-10-09 PROCEDURE — 76080 X-RAY EXAM OF FISTULA: CPT

## 2018-10-09 PROCEDURE — 76080 X-RAY EXAM OF FISTULA: CPT | Performed by: RADIOLOGY

## 2018-10-09 PROCEDURE — 49424 ASSESS CYST CONTRAST INJECT: CPT | Performed by: RADIOLOGY

## 2018-10-09 RX ADMIN — IOHEXOL 4 ML: 300 INJECTION, SOLUTION INTRAVENOUS at 15:03

## 2018-10-09 NOTE — ED PROVIDER NOTES
History  Chief Complaint   Patient presents with    Post-op Problem     pt states he had a procedure done today to replace a drain for a lymphocele and now drain is bleeding and full of blood      Pt is a 52year old male with complaints of bloody drainage into his ANNE MARIE drain  Pt has an IR placed drain for a lymphocele in his left groin  Pt had a catheter exchange today in IR  Pt states that the procedure was uneventful and was discharged home  Pt states that he went to use the restroom and noticed blood on his pants and his underwear  Pt states that the ANNE MARIE drain was full of blood  Pt emptied the drain and it continued to empty blood into the drain  Pt denies fever, chills, pain  Prior to Admission Medications   Prescriptions Last Dose Informant Patient Reported? Taking?    Cholecalciferol (VITAMIN D-3) 1000 units CAPS  Spouse/Significant Other Yes No   Sig: Take 1,000 Units by mouth daily   Glycopyrrolate-Formoterol (BEVESPI AEROSPHERE) 9-4 8 MCG/ACT AERO  Spouse/Significant Other Yes No   Sig: Inhale 2 puffs daily after breakfast   LORazepam (ATIVAN) 0 5 mg tablet  Spouse/Significant Other Yes No   Sig: Take 0 5 mg by mouth every 6 (six) hours as needed for anxiety   MAGNESIUM PO  Spouse/Significant Other Yes No   Sig: Take 400 mg by mouth daily   Milk Thistle 500 MG CAPS  Spouse/Significant Other Yes No   Sig: Take 500 mg by mouth 2 (two) times a day   Mometasone Furo-Formoterol Fum 200-5 MCG/ACT AERO  Spouse/Significant Other Yes No   Sig: Take 2 puffs by mouth 2 (two) times a day   Omega-3 Fatty Acids (FISH OIL) 1200 MG CAPS  Spouse/Significant Other Yes No   Sig: Take 1,200 mg by mouth daily at bedtime   albuterol (PROVENTIL HFA,VENTOLIN HFA) 90 mcg/act inhaler  Spouse/Significant Other No No   Sig: Inhale 2 puffs every 6 (six) hours as needed for wheezing   allopurinol (ZYLOPRIM) 100 mg tablet  Spouse/Significant Other Yes No   Sig: Take 100 mg by mouth daily   amLODIPine (NORVASC) 10 mg tablet  Spouse/Significant Other Yes No   Sig: Take 10 mg by mouth daily   apixaban (ELIQUIS) 5 mg  Spouse/Significant Other Yes No   Sig: Take 5 mg by mouth 2 (two) times a day     aspirin (PX ENTERIC ASPIRIN) 325 mg EC tablet  Spouse/Significant Other Yes No   Sig: Take 325 mg by mouth daily At night    atorvastatin (LIPITOR) 40 mg tablet  Spouse/Significant Other Yes No   Sig: Take 40 mg by mouth daily at bedtime   doxazosin (CARDURA) 4 mg tablet  Spouse/Significant Other Yes No   Sig: Take 4 mg by mouth daily at bedtime   eplerenone (INSPRA) 50 MG tablet  Spouse/Significant Other Yes No   Sig: Take 50 mg by mouth daily   escitalopram (LEXAPRO) 10 mg tablet  Spouse/Significant Other Yes No   Sig: Take 10 mg by mouth daily   ferrous sulfate 325 (65 Fe) mg tablet  Spouse/Significant Other Yes No   Sig: Take 325 mg by mouth daily with breakfast   fluticasone (FLONASE) 50 mcg/act nasal spray   No No   Si spray into each nostril 2 (two) times a day   furosemide (LASIX) 80 mg tablet   No No   Sig: Take 1 tablet (80 mg total) by mouth daily Starting next Monday  If you become short of breath in meantime call your pcp to consider resuming earlier, or if you have 2 lb wt gain     minoxidil (LONITEN) 10 mg tablet  Spouse/Significant Other Yes No   Sig: Take 20 mg by mouth 2 (two) times a day   multivitamin-iron-minerals-folic acid (CENTRUM) chewable tablet  Spouse/Significant Other Yes No   Sig: Chew 1 tablet daily   nebivolol (BYSTOLIC) 20 MG tablet  Spouse/Significant Other Yes No   Sig: Take 20 mg by mouth 2 (two) times a day   potassium chloride (KLOR-CON M20) 20 mEq tablet   No No   Sig: Take 1 tablet (20 mEq total) by mouth daily Restart with your lasix next monday      Facility-Administered Medications: None       Past Medical History:   Diagnosis Date    COPD (chronic obstructive pulmonary disease) (HCC)     Gout     Hypertension     Kidney disease     renal failure    Leg DVT (deep venous thromboembolism), acute, bilateral ([facility] [address] ) 01/2018    AGUS on CPAP     setting 11    Systolic CHF [facility])        Past Surgical History:   Procedure Laterality Date    CHOLECYSTECTOMY      IR IMAGE GUIDED ASPIRATION / DRAINAGE W TUBE  8/17/2018    IR IMAGE GUIDED ASPIRATION / DRAINAGE W TUBE  7/31/2018    JOINT REPLACEMENT Bilateral     knee    KIDNEY SURGERY  2009    nodule removal    LYMPH NODE DISSECTION Left 01/2018    left inguinal LN removed - benign     OTHER SURGICAL HISTORY      kidney nodule removal    PALATE / UVULA BIOPSY / EXCISION      SINUS SURGERY         Family History   Problem Relation Age of Onset    Heart murmur Sister     Deep vein thrombosis Neg Hx      I have reviewed and agree with the history as documented  Social History   Substance Use Topics    Smoking status: Never Smoker    Smokeless tobacco: Never Used    Alcohol use Yes      Comment: socially        Review of Systems   Constitutional: Negative for fever  Respiratory: Negative for shortness of breath  Cardiovascular: Negative for chest pain  Hematological: Bruises/bleeds easily  All other systems reviewed and are negative  Physical Exam  Physical Exam   Constitutional: He is oriented to person, place, and time  He appears well-developed and well-nourished  HENT:   Head: Normocephalic and atraumatic  Eyes: Pupils are equal, round, and reactive to light  Conjunctivae and EOM are normal    Neck: Normal range of motion  Cardiovascular: Normal rate, regular rhythm and normal heart sounds  Pulmonary/Chest: Effort normal and breath sounds normal    Abdominal:   DP drain noted from left upper thigh  There is no evidence of hematoma  Catheter site looks well  There is no draining from the site  There is jose blood in the tubing and into the ANNE MARIE drain  Neurological: He is alert and oriented to person, place, and time  Skin: Skin is warm  Psychiatric: He has a normal mood and affect   His behavior is normal  Judgment and thought content normal    Vitals reviewed  Vital Signs  ED Triage Vitals [10/08/18 2128]   Temperature Pulse Respirations Blood Pressure SpO2   98 5 °F (36 9 °C) 65 18 (!) 176/98 98 %      Temp src Heart Rate Source Patient Position - Orthostatic VS BP Location FiO2 (%)   -- Monitor -- -- --      Pain Score       --           Vitals:    10/08/18 2128   BP: (!) 176/98   Pulse: 65       Visual Acuity      ED Medications  Medications - No data to display    Diagnostic Studies  Results Reviewed     None                 No orders to display              Procedures  Procedures       Phone Contacts  ED Phone Contact    ED Course  ED Course as of Oct 09 0047   Mon Oct 08, 2018   8558 Spoke with Dr Antonio De Los Santos from IR, recommended removing the suction ANNE MARIE, capping, and have patient follow up with IR tomorrow  MDM  Number of Diagnoses or Management Options  Bleeding:   Diagnosis management comments: Discussed patient with Dr Antonio De Los Santos from Interventional Radiology  He recommended removing the ANNE MARIE suction drain and capping  They will follow up with him in the IR lab in the morning  Amount and/or Complexity of Data Reviewed  Review and summarize past medical records: yes  Discuss the patient with other providers: yes    Risk of Complications, Morbidity, and/or Mortality  Presenting problems: moderate  Diagnostic procedures: low  Management options: moderate    Patient Progress  Patient progress: stable    CritCare Time    Disposition  Final diagnoses:   Bleeding     Time reflects when diagnosis was documented in both MDM as applicable and the Disposition within this note     Time User Action Codes Description Comment    10/9/2018 12:08 AM Fatemeh Carrizales Add [R58] Bleeding       ED Disposition     ED Disposition Condition Comment    Discharge  [facility] discharge to home/self care      Condition at discharge: Good        Follow-up Information     Follow up With Specialties Details Why Merritt Lozano MD Radiology Call  [address]  [phone]            Discharge Medication List as of 10/9/2018 12:13 AM      CONTINUE these medications which have NOT CHANGED    Details   albuterol (PROVENTIL HFA,VENTOLIN HFA) 90 mcg/act inhaler Inhale 2 puffs every 6 (six) hours as needed for wheezing, Starting Mon 3/19/2018, Print      allopurinol (ZYLOPRIM) 100 mg tablet Take 100 mg by mouth daily, Starting Mon 12/18/2017, Historical Med      amLODIPine (NORVASC) 10 mg tablet Take 10 mg by mouth daily, Starting Tue 5/23/2017, Historical Med      apixaban (ELIQUIS) 5 mg Take 5 mg by mouth 2 (two) times a day  , Starting Thu 1/18/2018, Historical Med      aspirin (PX ENTERIC ASPIRIN) 325 mg EC tablet Take 325 mg by mouth daily At night , Historical Med      atorvastatin (LIPITOR) 40 mg tablet Take 40 mg by mouth daily at bedtime, Starting Wed 2/1/2017, Historical Med      Cholecalciferol (VITAMIN D-3) 1000 units CAPS Take 1,000 Units by mouth daily, Historical Med      doxazosin (CARDURA) 4 mg tablet Take 4 mg by mouth daily at bedtime, Historical Med      eplerenone (INSPRA) 50 MG tablet Take 50 mg by mouth daily, Starting Tue 5/23/2017, Historical Med      escitalopram (LEXAPRO) 10 mg tablet Take 10 mg by mouth daily, Historical Med      ferrous sulfate 325 (65 Fe) mg tablet Take 325 mg by mouth daily with breakfast, Historical Med      fluticasone (FLONASE) 50 mcg/act nasal spray 1 spray into each nostril 2 (two) times a day, Starting Wed 5/23/2018, Normal      furosemide (LASIX) 80 mg tablet Take 1 tablet (80 mg total) by mouth daily Starting next Monday  If you become short of breath in meantime call your pcp to consider resuming earlier, or if you have 2 lb wt gain  , Starting Wed 5/23/2018, Print      Glycopyrrolate-Formoterol (BEVESPI AEROSPHERE) 9-4 8 MCG/ACT AERO Inhale 2 puffs daily after breakfast, Historical Med      LORazepam (ATIVAN) 0 5 mg tablet Take 0 5 mg by mouth every 6 (six) hours as needed for anxiety, Historical Med      MAGNESIUM PO Take 400 mg by mouth daily, Historical Med      Milk Thistle 500 MG CAPS Take 500 mg by mouth 2 (two) times a day, Historical Med      minoxidil (LONITEN) 10 mg tablet Take 20 mg by mouth 2 (two) times a day, Starting Tue 11/21/2017, Historical Med      Mometasone Furo-Formoterol Fum 200-5 MCG/ACT AERO Take 2 puffs by mouth 2 (two) times a day, Starting Wed 6/29/2016, Historical Med      multivitamin-iron-minerals-folic acid (CENTRUM) chewable tablet Chew 1 tablet daily, Historical Med      nebivolol (BYSTOLIC) 20 MG tablet Take 20 mg by mouth 2 (two) times a day, Starting Mon 6/13/2016, Historical Med      Omega-3 Fatty Acids (FISH OIL) 1200 MG CAPS Take 1,200 mg by mouth daily at bedtime, Historical Med      potassium chloride (KLOR-CON M20) 20 mEq tablet Take 1 tablet (20 mEq total) by mouth daily Restart with your lasix next monday, Starting Wed 5/23/2018, Normal           No discharge procedures on file      ED Provider  Electronically Signed by           Sebastien Garrett PA-C  10/09/18 9028

## 2018-10-16 ENCOUNTER — APPOINTMENT (EMERGENCY)
Dept: CT IMAGING | Facility: HOSPITAL | Age: 53
DRG: 580 | End: 2018-10-16
Payer: COMMERCIAL

## 2018-10-16 ENCOUNTER — HOSPITAL ENCOUNTER (INPATIENT)
Facility: HOSPITAL | Age: 53
LOS: 4 days | Discharge: HOME | DRG: 580 | End: 2018-10-20
Attending: EMERGENCY MEDICINE | Admitting: SURGERY
Payer: COMMERCIAL

## 2018-10-16 DIAGNOSIS — I50.33 ACUTE ON CHRONIC DIASTOLIC CONGESTIVE HEART FAILURE (HCC): ICD-10-CM

## 2018-10-16 DIAGNOSIS — J44.9 CHRONIC OBSTRUCTIVE PULMONARY DISEASE, UNSPECIFIED COPD TYPE (HCC): ICD-10-CM

## 2018-10-16 DIAGNOSIS — L02.214 ABSCESS OF LEFT GROIN: Primary | ICD-10-CM

## 2018-10-16 DIAGNOSIS — I82.409 DEEP VEIN THROMBOSIS (DVT) OF LOWER EXTREMITY, UNSPECIFIED CHRONICITY, UNSPECIFIED LATERALITY, UNSPECIFIED VEIN (HCC): ICD-10-CM

## 2018-10-16 DIAGNOSIS — I10 ESSENTIAL HYPERTENSION: ICD-10-CM

## 2018-10-16 DIAGNOSIS — I50.32 CHRONIC DIASTOLIC CHF (CONGESTIVE HEART FAILURE) (HCC): Chronic | ICD-10-CM

## 2018-10-16 DIAGNOSIS — N18.30 STAGE 3 CHRONIC KIDNEY DISEASE (HCC): ICD-10-CM

## 2018-10-16 DIAGNOSIS — I31.3 PERICARDIAL EFFUSION: ICD-10-CM

## 2018-10-16 PROBLEM — G47.33 OSA (OBSTRUCTIVE SLEEP APNEA): Status: ACTIVE | Noted: 2018-10-16

## 2018-10-16 LAB
ANION GAP SERPL CALCULATED.3IONS-SCNC: 9 MMOL/L (ref 4–13)
BACTERIA UR QL AUTO: ABNORMAL /HPF
BASOPHILS # BLD AUTO: 0.07 THOUSANDS/ΜL (ref 0–0.1)
BASOPHILS NFR BLD AUTO: 1 % (ref 0–1)
BILIRUB UR QL STRIP: NEGATIVE
BUN SERPL-MCNC: 23 MG/DL (ref 5–25)
CALCIUM SERPL-MCNC: 9.6 MG/DL (ref 8.3–10.1)
CHLORIDE SERPL-SCNC: 101 MMOL/L (ref 100–108)
CLARITY UR: CLEAR
CO2 SERPL-SCNC: 32 MMOL/L (ref 21–32)
COLOR UR: YELLOW
CREAT SERPL-MCNC: 1.35 MG/DL (ref 0.6–1.3)
EOSINOPHIL # BLD AUTO: 0.38 THOUSAND/ΜL (ref 0–0.61)
EOSINOPHIL NFR BLD AUTO: 3 % (ref 0–6)
ERYTHROCYTE [DISTWIDTH] IN BLOOD BY AUTOMATED COUNT: 15.6 % (ref 11.6–15.1)
GFR SERPL CREATININE-BSD FRML MDRD: 68 ML/MIN/1.73SQ M
GLUCOSE SERPL-MCNC: 95 MG/DL (ref 65–140)
GLUCOSE UR STRIP-MCNC: NEGATIVE MG/DL
HCT VFR BLD AUTO: 41.5 % (ref 36.5–49.3)
HGB BLD-MCNC: 13.7 G/DL (ref 12–17)
HGB UR QL STRIP.AUTO: NEGATIVE
IMM GRANULOCYTES # BLD AUTO: 0.04 THOUSAND/UL (ref 0–0.2)
IMM GRANULOCYTES NFR BLD AUTO: 0 % (ref 0–2)
KETONES UR STRIP-MCNC: NEGATIVE MG/DL
LEUKOCYTE ESTERASE UR QL STRIP: NEGATIVE
LYMPHOCYTES # BLD AUTO: 1.72 THOUSANDS/ΜL (ref 0.6–4.47)
LYMPHOCYTES NFR BLD AUTO: 14 % (ref 14–44)
MCH RBC QN AUTO: 27.1 PG (ref 26.8–34.3)
MCHC RBC AUTO-ENTMCNC: 33 G/DL (ref 31.4–37.4)
MCV RBC AUTO: 82 FL (ref 82–98)
MONOCYTES # BLD AUTO: 1.25 THOUSAND/ΜL (ref 0.17–1.22)
MONOCYTES NFR BLD AUTO: 10 % (ref 4–12)
MUCOUS THREADS UR QL AUTO: ABNORMAL
NEUTROPHILS # BLD AUTO: 9.05 THOUSANDS/ΜL (ref 1.85–7.62)
NEUTS SEG NFR BLD AUTO: 72 % (ref 43–75)
NITRITE UR QL STRIP: NEGATIVE
NON-SQ EPI CELLS URNS QL MICRO: ABNORMAL /HPF
NRBC BLD AUTO-RTO: 0 /100 WBCS
PH UR STRIP.AUTO: 8 [PH] (ref 4.5–8)
PLATELET # BLD AUTO: 326 THOUSANDS/UL (ref 149–390)
PMV BLD AUTO: 9.2 FL (ref 8.9–12.7)
POTASSIUM SERPL-SCNC: 3.8 MMOL/L (ref 3.5–5.3)
PROT UR STRIP-MCNC: ABNORMAL MG/DL
RBC # BLD AUTO: 5.05 MILLION/UL (ref 3.88–5.62)
RBC #/AREA URNS AUTO: ABNORMAL /HPF
SODIUM SERPL-SCNC: 142 MMOL/L (ref 136–145)
SP GR UR STRIP.AUTO: 1.01 (ref 1–1.03)
UROBILINOGEN UR QL STRIP.AUTO: 0.2 E.U./DL
WBC # BLD AUTO: 12.51 THOUSAND/UL (ref 4.31–10.16)
WBC #/AREA URNS AUTO: ABNORMAL /HPF

## 2018-10-16 PROCEDURE — 36415 COLL VENOUS BLD VENIPUNCTURE: CPT | Performed by: EMERGENCY MEDICINE

## 2018-10-16 PROCEDURE — 80048 BASIC METABOLIC PNL TOTAL CA: CPT | Performed by: EMERGENCY MEDICINE

## 2018-10-16 PROCEDURE — 99285 EMERGENCY DEPT VISIT HI MDM: CPT

## 2018-10-16 PROCEDURE — 96367 TX/PROPH/DG ADDL SEQ IV INF: CPT

## 2018-10-16 PROCEDURE — 81001 URINALYSIS AUTO W/SCOPE: CPT | Performed by: EMERGENCY MEDICINE

## 2018-10-16 PROCEDURE — 96375 TX/PRO/DX INJ NEW DRUG ADDON: CPT

## 2018-10-16 PROCEDURE — 99223 1ST HOSP IP/OBS HIGH 75: CPT | Performed by: PHYSICIAN ASSISTANT

## 2018-10-16 PROCEDURE — 74177 CT ABD & PELVIS W/CONTRAST: CPT

## 2018-10-16 PROCEDURE — 93005 ELECTROCARDIOGRAM TRACING: CPT

## 2018-10-16 PROCEDURE — 99221 1ST HOSP IP/OBS SF/LOW 40: CPT | Performed by: INTERNAL MEDICINE

## 2018-10-16 PROCEDURE — 96365 THER/PROPH/DIAG IV INF INIT: CPT

## 2018-10-16 PROCEDURE — 85025 COMPLETE CBC W/AUTO DIFF WBC: CPT | Performed by: EMERGENCY MEDICINE

## 2018-10-16 RX ORDER — FLUTICASONE FUROATE AND VILANTEROL 100; 25 UG/1; UG/1
1 POWDER RESPIRATORY (INHALATION)
Status: DISCONTINUED | OUTPATIENT
Start: 2018-10-17 | End: 2018-10-20 | Stop reason: HOSPADM

## 2018-10-16 RX ORDER — ONDANSETRON 2 MG/ML
4 INJECTION INTRAMUSCULAR; INTRAVENOUS EVERY 6 HOURS PRN
Status: DISCONTINUED | OUTPATIENT
Start: 2018-10-16 | End: 2018-10-20 | Stop reason: HOSPADM

## 2018-10-16 RX ORDER — CHLORAL HYDRATE 500 MG
1000 CAPSULE ORAL
Status: DISCONTINUED | OUTPATIENT
Start: 2018-10-16 | End: 2018-10-20 | Stop reason: HOSPADM

## 2018-10-16 RX ORDER — ESCITALOPRAM OXALATE 10 MG/1
10 TABLET ORAL DAILY
Status: DISCONTINUED | OUTPATIENT
Start: 2018-10-17 | End: 2018-10-20 | Stop reason: HOSPADM

## 2018-10-16 RX ORDER — CLINDAMYCIN PHOSPHATE 600 MG/50ML
600 INJECTION INTRAVENOUS EVERY 8 HOURS
Status: DISCONTINUED | OUTPATIENT
Start: 2018-10-16 | End: 2018-10-20 | Stop reason: HOSPADM

## 2018-10-16 RX ORDER — POTASSIUM CHLORIDE 20 MEQ/1
20 TABLET, EXTENDED RELEASE ORAL DAILY
Status: DISCONTINUED | OUTPATIENT
Start: 2018-10-17 | End: 2018-10-20 | Stop reason: HOSPADM

## 2018-10-16 RX ORDER — HYDROMORPHONE HCL/PF 1 MG/ML
0.2 SYRINGE (ML) INJECTION
Status: DISCONTINUED | OUTPATIENT
Start: 2018-10-16 | End: 2018-10-20 | Stop reason: HOSPADM

## 2018-10-16 RX ORDER — FLUTICASONE PROPIONATE 50 MCG
1 SPRAY, SUSPENSION (ML) NASAL 2 TIMES DAILY
Status: DISCONTINUED | OUTPATIENT
Start: 2018-10-16 | End: 2018-10-20 | Stop reason: HOSPADM

## 2018-10-16 RX ORDER — FERROUS SULFATE 325(65) MG
325 TABLET ORAL
Status: DISCONTINUED | OUTPATIENT
Start: 2018-10-17 | End: 2018-10-20 | Stop reason: HOSPADM

## 2018-10-16 RX ORDER — ASPIRIN 81 MG/1
81 TABLET ORAL DAILY
Status: DISCONTINUED | OUTPATIENT
Start: 2018-10-17 | End: 2018-10-20 | Stop reason: HOSPADM

## 2018-10-16 RX ORDER — ACETAMINOPHEN 325 MG/1
650 TABLET ORAL EVERY 6 HOURS PRN
Status: DISCONTINUED | OUTPATIENT
Start: 2018-10-16 | End: 2018-10-20 | Stop reason: HOSPADM

## 2018-10-16 RX ORDER — MORPHINE SULFATE 10 MG/ML
6 INJECTION, SOLUTION INTRAMUSCULAR; INTRAVENOUS ONCE
Status: COMPLETED | OUTPATIENT
Start: 2018-10-16 | End: 2018-10-16

## 2018-10-16 RX ORDER — ATORVASTATIN CALCIUM 40 MG/1
40 TABLET, FILM COATED ORAL
Status: DISCONTINUED | OUTPATIENT
Start: 2018-10-16 | End: 2018-10-20 | Stop reason: HOSPADM

## 2018-10-16 RX ORDER — HYDRALAZINE HYDROCHLORIDE 20 MG/ML
5 INJECTION INTRAMUSCULAR; INTRAVENOUS EVERY 6 HOURS PRN
Status: DISCONTINUED | OUTPATIENT
Start: 2018-10-16 | End: 2018-10-20 | Stop reason: HOSPADM

## 2018-10-16 RX ORDER — DOXAZOSIN MESYLATE 4 MG/1
8 TABLET ORAL
Status: DISCONTINUED | OUTPATIENT
Start: 2018-10-16 | End: 2018-10-20 | Stop reason: HOSPADM

## 2018-10-16 RX ORDER — SODIUM CHLORIDE 9 MG/ML
50 INJECTION, SOLUTION INTRAVENOUS CONTINUOUS
Status: DISCONTINUED | OUTPATIENT
Start: 2018-10-17 | End: 2018-10-20 | Stop reason: HOSPADM

## 2018-10-16 RX ORDER — ALBUTEROL SULFATE 90 UG/1
2 AEROSOL, METERED RESPIRATORY (INHALATION) EVERY 6 HOURS PRN
Status: DISCONTINUED | OUTPATIENT
Start: 2018-10-16 | End: 2018-10-20 | Stop reason: HOSPADM

## 2018-10-16 RX ORDER — EPLERENONE 25 MG/1
50 TABLET, FILM COATED ORAL DAILY
Status: DISCONTINUED | OUTPATIENT
Start: 2018-10-17 | End: 2018-10-20 | Stop reason: HOSPADM

## 2018-10-16 RX ORDER — LORAZEPAM 0.5 MG/1
0.5 TABLET ORAL EVERY 6 HOURS PRN
Status: DISCONTINUED | OUTPATIENT
Start: 2018-10-16 | End: 2018-10-20 | Stop reason: HOSPADM

## 2018-10-16 RX ORDER — FLUTICASONE FUROATE AND VILANTEROL 100; 25 UG/1; UG/1
1 POWDER RESPIRATORY (INHALATION) DAILY
Status: DISCONTINUED | OUTPATIENT
Start: 2018-10-17 | End: 2018-10-16

## 2018-10-16 RX ORDER — MELATONIN
1000 DAILY
Status: DISCONTINUED | OUTPATIENT
Start: 2018-10-17 | End: 2018-10-20 | Stop reason: HOSPADM

## 2018-10-16 RX ORDER — NEBIVOLOL 10 MG/1
20 TABLET ORAL 2 TIMES DAILY
Status: DISCONTINUED | OUTPATIENT
Start: 2018-10-16 | End: 2018-10-20 | Stop reason: HOSPADM

## 2018-10-16 RX ORDER — AMLODIPINE BESYLATE 10 MG/1
10 TABLET ORAL DAILY
Status: DISCONTINUED | OUTPATIENT
Start: 2018-10-17 | End: 2018-10-20 | Stop reason: HOSPADM

## 2018-10-16 RX ADMIN — NEBIVOLOL HYDROCHLORIDE 20 MG: 10 TABLET ORAL at 20:19

## 2018-10-16 RX ADMIN — VANCOMYCIN HYDROCHLORIDE 1250 MG: 1 INJECTION, POWDER, LYOPHILIZED, FOR SOLUTION INTRAVENOUS at 17:25

## 2018-10-16 RX ADMIN — ATORVASTATIN CALCIUM 40 MG: 40 TABLET, FILM COATED ORAL at 21:23

## 2018-10-16 RX ADMIN — IOHEXOL 100 ML: 350 INJECTION, SOLUTION INTRAVENOUS at 14:49

## 2018-10-16 RX ADMIN — DOXAZOSIN 8 MG: 4 TABLET ORAL at 21:24

## 2018-10-16 RX ADMIN — MORPHINE SULFATE 6 MG: 10 INJECTION, SOLUTION INTRAMUSCULAR; INTRAVENOUS at 13:56

## 2018-10-16 RX ADMIN — HYDROMORPHONE HYDROCHLORIDE 0.2 MG: 1 INJECTION, SOLUTION INTRAMUSCULAR; INTRAVENOUS; SUBCUTANEOUS at 20:19

## 2018-10-16 RX ADMIN — CLINDAMYCIN IN 5 PERCENT DEXTROSE 600 MG: 12 INJECTION, SOLUTION INTRAVENOUS at 21:24

## 2018-10-16 RX ADMIN — Medication 1000 MG: at 21:24

## 2018-10-16 RX ADMIN — CEFEPIME HYDROCHLORIDE 2000 MG: 2 INJECTION, POWDER, FOR SOLUTION INTRAVENOUS at 16:11

## 2018-10-16 RX ADMIN — MAGNESIUM OXIDE TAB 400 MG (241.3 MG ELEMENTAL MG) 400 MG: 400 (241.3 MG) TAB at 21:24

## 2018-10-16 NOTE — ED PROVIDER NOTES
History  Chief Complaint   Patient presents with    Wound Check     pt had ANNE MARIE drain removed from left groin a week ago, now pt c/o pain and infection at the site     HPI   58-year-old male with history COPD, gout, HTN, CKD, DVT, CHF, left groin lymphocele presents to the emergency department with left groin pain  Patient states that he underwent left groin lymph node biopsy in January at [address]  He subsequently developed left groin lymphocele/seroma and has undergone repeated sclerosis and IR drain placement/tube exchanges over the past few months  Tube was pulled on 10/9 and patient has had increasing swelling since that time  Over the past 2 days, he has also noted warmth, erythema, and pain along swelling in his left groin  Pain has been worse with Valsalva, slightly better with rest   He denies having had similar pain in the past   On ROS, he denies fevers, chills, chest pain, shortness of breath, abdominal pain, nausea, vomiting, testicular pain, change in urinary/bowel function, or complaints other than stated above  Prior to Admission Medications   Prescriptions Last Dose Informant Patient Reported? Taking?    Cholecalciferol (VITAMIN D-3) 1000 units CAPS  Spouse/Significant Other Yes Yes   Sig: Take 1,000 Units by mouth daily   Glycopyrrolate-Formoterol (BEVESPI AEROSPHERE) 9-4 8 MCG/ACT AERO  Spouse/Significant Other Yes Yes   Sig: Inhale 2 puffs daily after breakfast   LORazepam (ATIVAN) 0 5 mg tablet  Spouse/Significant Other Yes Yes   Sig: Take 0 5 mg by mouth every 6 (six) hours as needed for anxiety   MAGNESIUM PO  Spouse/Significant Other Yes Yes   Sig: Take 400 mg by mouth daily   Milk Thistle 500 MG CAPS  Spouse/Significant Other Yes Yes   Sig: Take 500 mg by mouth 2 (two) times a day   Mometasone Furo-Formoterol Fum 200-5 MCG/ACT AERO  Spouse/Significant Other Yes Yes   Sig: Take 2 puffs by mouth 2 (two) times a day   Omega-3 Fatty Acids (FISH OIL) 1200 MG CAPS  Spouse/Significant Other Yes Yes   Sig: Take 1,200 mg by mouth daily at bedtime   albuterol (PROVENTIL HFA,VENTOLIN HFA) 90 mcg/act inhaler  Spouse/Significant Other No Yes   Sig: Inhale 2 puffs every 6 (six) hours as needed for wheezing   amLODIPine (NORVASC) 10 mg tablet  Spouse/Significant Other Yes Yes   Sig: Take 10 mg by mouth daily   apixaban (ELIQUIS) 5 mg  Spouse/Significant Other Yes Yes   Sig: Take 5 mg by mouth 2 (two) times a day     aspirin (PX ENTERIC ASPIRIN) 325 mg EC tablet  Spouse/Significant Other Yes Yes   Sig: Take 325 mg by mouth daily At night    atorvastatin (LIPITOR) 40 mg tablet  Spouse/Significant Other Yes Yes   Sig: Take 40 mg by mouth daily at bedtime   doxazosin (CARDURA) 4 mg tablet  Spouse/Significant Other Yes Yes   Sig: Take 4 mg by mouth daily at bedtime   eplerenone (INSPRA) 50 MG tablet  Spouse/Significant Other Yes Yes   Sig: Take 50 mg by mouth daily   escitalopram (LEXAPRO) 10 mg tablet  Spouse/Significant Other Yes Yes   Sig: Take 10 mg by mouth daily   ferrous sulfate 325 (65 Fe) mg tablet  Spouse/Significant Other Yes Yes   Sig: Take 325 mg by mouth 2 (two) times a day     fluticasone (FLONASE) 50 mcg/act nasal spray   No Yes   Si spray into each nostril 2 (two) times a day   furosemide (LASIX) 80 mg tablet   No Yes   Sig: Take 1 tablet (80 mg total) by mouth daily Starting next Monday  If you become short of breath in meantime call your pcp to consider resuming earlier, or if you have 2 lb wt gain     multivitamin-iron-minerals-folic acid (CENTRUM) chewable tablet  Spouse/Significant Other Yes Yes   Sig: Chew 1 tablet daily   nebivolol (BYSTOLIC) 20 MG tablet  Spouse/Significant Other Yes Yes   Sig: Take 20 mg by mouth 2 (two) times a day   potassium chloride (KLOR-CON M20) 20 mEq tablet   No Yes   Sig: Take 1 tablet (20 mEq total) by mouth daily Restart with your lasix next monday      Facility-Administered Medications: None       Past Medical History:   Diagnosis Date    [facility]) [facility] [address] [facility]     Gout     Hypertension     Kidney disease     renal failure    Leg DVT (deep venous thromboembolism), acute, bilateral ([facility] [address] ) 01/2018    AGUS on CPAP     setting 11    Systolic CHF [facility])        Past Surgical History:   Procedure Laterality Date    CHOLECYSTECTOMY      INCISION AND DRAINAGE OF WOUND Left 10/17/2018    Procedure: INCISION AND DRAINAGE (I&D) GROIN;  Surgeon: Ulises Calles MD;  Location: [facility];  Service: General    IR IMAGE [address] / DRAINAGE W TUBE  8/17/2018    IR IMAGE GUIDED ASPIRATION / DRAINAGE W TUBE  7/31/2018    JOINT REPLACEMENT Bilateral     knee    KIDNEY SURGERY  2009    nodule removal    LYMPH NODE DISSECTION Left 01/2018    left inguinal LN removed - benign     OTHER SURGICAL HISTORY      kidney nodule removal    PALATE / UVULA BIOPSY / EXCISION      SINUS SURGERY         Family History   Problem Relation Age of Onset    Heart murmur Sister     Deep vein thrombosis Neg Hx      I have reviewed and agree with the history as documented  Social History   Substance Use Topics    Smoking status: Never Smoker    Smokeless tobacco: Never Used    Alcohol use Yes      Comment: socially        Review of Systems   Constitutional: Negative for chills and fever  Respiratory: Negative for shortness of breath  Cardiovascular: Negative for chest pain  Gastrointestinal: Negative for abdominal pain, nausea and vomiting  Musculoskeletal: Negative for back pain  Skin: Positive for color change  Negative for rash  Allergic/Immunologic: Negative for immunocompromised state  Neurological: Negative for headaches  Hematological: Bruises/bleeds easily  Psychiatric/Behavioral: The patient is not nervous/anxious  All other systems reviewed and are negative  Physical Exam  Physical Exam   Constitutional: He is oriented to person, place, and time  He appears well-nourished  No distress     HENT:   Head: Normocephalic and atraumatic  Eyes: EOM are normal    Neck: Normal range of motion  Neck supple  Cardiovascular: Normal rate and regular rhythm  Pulmonary/Chest: Effort normal and breath sounds normal  No respiratory distress  Abdominal: Soft  He exhibits no distension  There is no tenderness  Musculoskeletal: Normal range of motion  Left hip: He exhibits tenderness and swelling  Legs:  Neurological: He is alert and oriented to person, place, and time  Skin: Skin is warm and dry  He is not diaphoretic  Psychiatric: He has a normal mood and affect  His behavior is normal    Nursing note and vitals reviewed      LEFT GROIN       Vital Signs  ED Triage Vitals   Temperature Pulse Respirations Blood Pressure SpO2   10/16/18 1256 10/16/18 1256 10/16/18 1256 10/16/18 1256 10/16/18 1256   98 4 °F (36 9 °C) 68 18 161/85 98 %      Temp Source Heart Rate Source Patient Position - Orthostatic VS BP Location FiO2 (%)   10/16/18 1256 10/16/18 1515 10/16/18 1256 10/16/18 1256 --   Oral Monitor Sitting Right arm       Pain Score       10/16/18 1256       Worst Possible Pain           Vitals:    10/18/18 1718 10/18/18 2159 10/18/18 2215 10/18/18 2300   BP: (!) 181/97 (!) 179/81 (!) 180/85 170/87   Pulse:    58   Patient Position - Orthostatic VS:    Lying       Visual Acuity      ED Medications  Medications   ondansetron (ZOFRAN) injection 4 mg ( Intravenous MAR Unhold 10/18/18 0713)   sodium chloride 0 9 % infusion (50 mL/hr Intravenous New Bag 10/18/18 1730)   acetaminophen (TYLENOL) tablet 650 mg ( Oral MAR Unhold 10/18/18 0713)   HYDROmorphone (DILAUDID) injection 0 2 mg (0 2 mg Intravenous Given 10/18/18 0800)   albuterol (PROVENTIL HFA,VENTOLIN HFA) inhaler 2 puff (not administered)   amLODIPine (NORVASC) tablet 10 mg (10 mg Oral Given 10/18/18 0920)   atorvastatin (LIPITOR) tablet 40 mg (40 mg Oral Given 10/18/18 2137)   cholecalciferol (VITAMIN D3) tablet 1,000 Units (1,000 Units Oral Given 10/18/18 0920)   aspirin (ECOTRIN LOW STRENGTH) EC tablet 81 mg (81 mg Oral Given 10/18/18 0920)   doxazosin (CARDURA) tablet 8 mg (8 mg Oral Given 10/18/18 2215)   escitalopram (LEXAPRO) tablet 10 mg (10 mg Oral Given 10/18/18 0919)   eplerenone (INSPRA) tablet 50 mg (50 mg Oral Given 10/18/18 0920)   ferrous sulfate tablet 325 mg (325 mg Oral Given 10/18/18 0919)   fluticasone (FLONASE) 50 mcg/act nasal spray 1 spray (1 spray Nasal Given 10/18/18 1715)   LORazepam (ATIVAN) tablet 0 5 mg (not administered)   magnesium oxide (MAG-OX) tablet 400 mg (400 mg Oral Given 10/18/18 1715)   nebivolol (BYSTOLIC) tablet 20 mg (20 mg Oral Given 10/18/18 1715)   fish oil capsule 1,000 mg (1,000 mg Oral Given 10/18/18 2136)   potassium chloride (K-DUR,KLOR-CON) CR tablet 20 mEq (20 mEq Oral Given 10/18/18 0920)   fluticasone-vilanterol (BREO ELLIPTA) 100-25 mcg/inh inhaler 1 puff (1 puff Inhalation Given 10/18/18 0918)   hydrALAZINE (APRESOLINE) injection 5 mg (5 mg Intravenous Given 10/18/18 2215)   vancomycin (VANCOCIN) 1,250 mg in sodium chloride 0 9 % 250 mL IVPB (1,250 mg Intravenous New Bag 10/18/18 2135)   clindamycin (CLEOCIN) IVPB (premix) 600 mg (600 mg Intravenous New Bag 10/19/18 0248)   furosemide (LASIX) tablet 80 mg (80 mg Oral Given 10/18/18 0920)   oxyCODONE-acetaminophen (PERCOCET) 5-325 mg per tablet 1 tablet (1 tablet Oral Given 10/18/18 1049)   HYDROmorphone (DILAUDID) injection 0 5 mg (0 5 mg Intravenous Given 10/17/18 1712)   morphine (PF) 10 mg/mL injection 6 mg (6 mg Intravenous Given 10/16/18 1356)   iohexol (OMNIPAQUE) 350 MG/ML injection (MULTI-DOSE) 100 mL (100 mL Intravenous Given 10/16/18 1449)   vancomycin (VANCOCIN) 1,250 mg in sodium chloride 0 9 % 250 mL IVPB (0 mg Intravenous Stopped 10/17/18 1322)   cefepime (MAXIPIME) 2,000 mg in dextrose 5 % 50 mL IVPB (0 mg Intravenous Stopped 10/16/18 1641)       Diagnostic Studies  Results Reviewed     Procedure Component Value Units Date/Time Comprehensive metabolic panel [[id number]]  (Abnormal) Collected:  10/17/18 0511    Lab Status:  Final result Specimen:  Blood from Arm, Left Updated:  10/17/18 0540     Sodium 139 mmol/L      Potassium 3 7 mmol/L      Chloride 102 mmol/L      CO2 30 mmol/L      ANION GAP 7 mmol/L      BUN 21 mg/dL      Creatinine 1 44 (H) mg/dL      Glucose 96 mg/dL      Calcium 8 9 mg/dL      AST 18 U/L      ALT 29 U/L      Alkaline Phosphatase 85 U/L      Total Protein 7 4 g/dL      Albumin 3 1 (L) g/dL      Total Bilirubin 0 90 mg/dL      eGFR 63 ml/min/1 73sq m     Narrative:         National Kidney Disease Education Program recommendations are as follows:  GFR calculation is accurate only with a steady state creatinine  Chronic Kidney disease less than 60 ml/min/1 73 sq  meters  Kidney failure less than 15 ml/min/1 73 sq  meters      CBC (With Platelets) [[id number]]  (Abnormal) Collected:  10/17/18 0511    Lab Status:  Final result Specimen:  Blood from Arm, Left Updated:  10/17/18 0516     WBC 12 54 (H) Thousand/uL      RBC 4 56 Million/uL      Hemoglobin 12 3 g/dL      Hematocrit 37 9 %      MCV 83 fL      MCH 27 0 pg      MCHC 32 5 g/dL      RDW 15 6 (H) %      Platelets 864 Thousands/uL      MPV 9 0 fL     Urinalysis with microscopic [[id number]]  (Abnormal) Collected:  10/16/18 1431    Lab Status:  Final result Specimen:  Urine from Urine, Clean Catch Updated:  10/16/18 1443     Clarity, UA Clear     Color, UA Yellow     Specific Gravity, UA 1 015     pH, UA 8 0     Glucose, UA Negative mg/dl      Ketones, UA Negative mg/dl      Blood, UA Negative     Protein, UA 30 (1+) (A) mg/dl      Nitrite, UA Negative     Bilirubin, UA Negative     Urobilinogen, UA 0 2 E U /dl      Leukocytes, UA Negative     WBC, UA 0-1 (A) /hpf      RBC, UA 0-1 (A) /hpf      Bacteria, UA None Seen /hpf      Epithelial Cells Occasional /hpf      MUCOUS THREADS Occasional (A)    Basic metabolic panel [[id number]]  (Abnormal) Collected:  10/16/18 1354    Lab Status:  Final result Specimen:  Blood from Arm, Right Updated:  10/16/18 1415     Sodium 142 mmol/L      Potassium 3 8 mmol/L      Chloride 101 mmol/L      CO2 32 mmol/L      ANION GAP 9 mmol/L      BUN 23 mg/dL      Creatinine 1 35 (H) mg/dL      Glucose 95 mg/dL      Calcium 9 6 mg/dL      eGFR 68 ml/min/1 73sq m     Narrative:         National Kidney Disease Education Program recommendations are as follows:  GFR calculation is accurate only with a steady state creatinine  Chronic Kidney disease less than 60 ml/min/1 73 sq  meters  Kidney failure less than 15 ml/min/1 73 sq  meters  CBC and differential [[id number]]  (Abnormal) Collected:  10/16/18 1354    Lab Status:  Final result Specimen:  Blood from Arm, Right Updated:  10/16/18 1403     WBC 12 51 (H) Thousand/uL      RBC 5 05 Million/uL      Hemoglobin 13 7 g/dL      Hematocrit 41 5 %      MCV 82 fL      MCH 27 1 pg      MCHC 33 0 g/dL      RDW 15 6 (H) %      MPV 9 2 fL      Platelets 077 Thousands/uL      nRBC 0 /100 WBCs      Neutrophils Relative 72 %      Immat GRANS % 0 %      Lymphocytes Relative 14 %      Monocytes Relative 10 %      Eosinophils Relative 3 %      Basophils Relative 1 %      Neutrophils Absolute 9 05 (H) Thousands/µL      Immature Grans Absolute 0 04 Thousand/uL      Lymphocytes Absolute 1 72 Thousands/µL      Monocytes Absolute 1 25 (H) Thousand/µL      Eosinophils Absolute 0 38 Thousand/µL      Basophils Absolute 0 07 Thousands/µL                  CT abdomen pelvis with contrast   Final Result by Yulissa Bergman MD (10/16 6855)   4 3 x 4 8 x 5 6 cm left inguinal region fluid collection with small foci of air  Larger from the previous the MRI from September 17, 2018    Correlate clinically for infected fluid collection      A lobulated fluid collection seen in the lower left anterior abdominal, measuring 3 6 x 3 8 cm interposed between the adjacent bowel loops and located deep to the anterior abdominal wall is seen in image 45 series 2  This may be a seroma or lymphocele    or mesenteric cyst      An indeterminate small splenic lesion measuring about 8 mm, too small to characterize, can be evaluated for stability with follow-up MRI at 6 months          I personally discussed this study with Tracey Lenug on 10/16/2018 at 3:56 PM                    Workstation performed: [id number]                    Procedures  Procedures       Phone Contacts  ED Phone Contact    ED Course  ED Course as of Oct 19 0537   Tue Oct 16, 2018   1555 Left groin collection,     1623 Discussed with Dr Caroline Borjas  Will page surgical PA     9958 Surgery aware     Surgery evaluated - requested medical admission                                MDM  Number of Diagnoses or Management Options  Abscess of left groin:   Diagnosis management comments: 55-year-old male with left groin collection, likely abscess  Will obtain CT, labs, provide analgesia, discuss with surgery       CritCare Time    Disposition  Final diagnoses:   Abscess of left groin     Time reflects when diagnosis was documented in both MDM as applicable and the Disposition within this note     Time User Action Codes Description Comment    10/16/2018  5:13 PM Peshtigo Add [L02 214] Abscess of left groin     10/16/2018  6:11 PM Adrienne Regan Add [N17 9] TRUDI (acute kidney injury) ([facility] [address] )     10/16/2018  6:11 PM Dorlene Schooner Modify [N17 9] TRUDI (acute kidney injury) ([facility] [address] )     10/16/2018  6:11 PM Dorlene Schooner Remove [N17 9] TRUDI (acute kidney injury) ([facility] [address] )     10/16/2018  6:11 PM Dorlene Schooner Add [I12 9,  N18 2] Hypertensive kidney disease with stage 2 chronic kidney disease     10/16/2018  6:11 PM Dorlene Schooner Modify [I12 9,  N18 2] Hypertensive kidney disease with stage 2 chronic kidney disease     10/16/2018  6:11 PM Dorlene Schooner Add [N18 3] Stage 3 chronic kidney disease ([facility] [address] )     10/16/2018  6:11 PM Dorlene Schooner Modify [N18 3] Stage 3 chronic kidney disease ([facility] [address] )     10/16/2018 6:11 PM Wan Rodriguez [I12 9,  N18 2] Hypertensive kidney disease with stage 2 chronic kidney disease     10/16/2018  6:11 PM Dollene Hatter Add [I82 409] Deep vein thrombosis (DVT) of lower extremity, unspecified chronicity, unspecified laterality, unspecified vein ([place] [address] )     10/16/2018  6:11 PM Dollene Hatter Modify [I82 409] Deep vein thrombosis (DVT) of lower extremity, unspecified chronicity, unspecified laterality, unspecified vein ([place] [address] )     10/16/2018  6:11 PM Dollene Hatter Add [M40 73] Chronic diastolic CHF (congestive heart failure) ([place] [address] )     10/16/2018  6:11 PM Dollene Hatter Modify [T64 42] Chronic diastolic CHF (congestive heart failure) ([place] [address] )     10/16/2018  6:11 PM Dollene Hatter Modify [K03 64] Chronic diastolic CHF (congestive heart failure) ([place] [address] )     10/16/2018  6:11 PM Dollene Hatter Add [I10] Essential hypertension     10/16/2018  6:11 PM Dollene Hatter Modify [I10] Essential hypertension     10/16/2018  6:11 PM Dollene Hatter Add [J44 9] Chronic obstructive pulmonary disease, unspecified COPD type ([place] [address] )     10/16/2018  6:11 PM Dollene Hatter Modify [J44 9] Chronic obstructive pulmonary disease, unspecified COPD type ([place] [address] )     10/16/2018  6:12 PM Dollene Hatter Add [I50 33] Acute on chronic diastolic congestive heart failure ([place] [address] )     10/16/2018  6:12 PM Dollene Hatter Modify [I50 33] Acute on chronic diastolic congestive heart failure ([place] [address] )     10/17/2018  2:43 PM Corrie Copas Modify [N29 912] Abscess of left groin     10/17/2018  3:11 PM Jennifer Desanctis Modify [L02 214] Abscess of left groin     10/17/2018  3:11 PM Jermaine RUSSELL Add [I31 3] Pericardial effusion       ED Disposition     ED Disposition Condition Comment    Admit  Case was discussed with Dr Elmer Presley and the patient's admission status was agreed to be Admission Status: inpatient status to the service of Dr Woodrow Babb   Follow-up Information    None       No discharge procedures on file      ED Provider  Electronically Signed by           Elizabeth Lowry MD  10/19/18 2710

## 2018-10-16 NOTE — H&P
GENERAL SURGERY HISTORY AND PHYSICAL    Jonathan Aldrich 52 y o  male MRN: [de-identified]  Unit/Bed#: ED 08 Encounter: [id number]      Assessment/Plan   Left proximal thigh/inguinal abscess with cellulitis  - 61-year-old male status post left inguinal lymph node excision in January of 2018 with sequela of persistent seroma, status post IR drain with several changes, last on 10/8/18  Patient then had bleeding into the drain so it was removed  Over the past week patient developed increased swelling and erythema to the left proximal thigh/groin in this region  Now with 8 x 15 cm well-demarcated fluid collection with tenderness and erythema  Leukocytosis of 12 41  HTN - elevated in the ED, in the setting of acute pain  [facility]IM following   AGUS on CPAP   H/o Acute DVT in 1/2018  - incidental finding on US, asymptomatic at time  - on Eliquis  CKD Stage III    Plan:  - consult SLIM for medical management of patient conditions while inpatient including HTN, AGUS, anticoagulation use, CKD stage 3, COPD, along with medication regimen  - well demarcated area marked with a dotted line and erythema outlined with solid line with skin marking pen  - plan for I&D of L thigh abscess in the OR tomorrow  - Broad spectrum antibiotics  Will obtain cultures with surgical intervention and follow  - Heat to affected area with Aqua K 20m every hour, while awake  NO ICE  - NPO and IVF starting at midnight  - analgesics, prn    ______________________________________________________________________  Chief Complaint:  I had the drain taken out last Wednesday and then the started to swell again  Now it is very painful when it even hurts to walk around  HPI: Jonathan Aldrich is a 52y o  year old male with PMHx of hypertension, CKD stage 3, acute unprovoked DVT in January of 2018 now on Eliquis  He had an excisional biopsy of left inguinal lymph node which had become enlarged in January of 2018   Subsequently had a persistent seroma for which she had IR drainage  Patient had several drain changes the last being on 10/8/18 after which she experienced bleeding into the drain  He presented to the emergency department on 10/09/18 for evaluation  The bulb was removed and tubing capped  Upon following with IR the next morning they removed the drain  Since that time he has experienced increased swelling and pain in the area  He had been applying intermittent heat and ice to the area with little improvement  He was taking Tylenol for pain which helps a little bit  He states that it had become so painful that it hurt to walk around  He denies fever or chills  He denies nausea or vomiting  He denies chest pain, shortness of breath, palpitations  Review of Systems  Otherwise negative    Meds/Allergies   Allergies   Allergen Reactions    Clonidine Anaphylaxis    Lisinopril Anaphylaxis    Nifedipine Anaphylaxis    Spironolactone Anaphylaxis     PTA meds:   Prior to Admission Medications   Prescriptions Last Dose Informant Patient Reported? Taking?    Cholecalciferol (VITAMIN D-3) 1000 units CAPS  Spouse/Significant Other Yes Yes   Sig: Take 1,000 Units by mouth daily   Glycopyrrolate-Formoterol (BEVESPI AEROSPHERE) 9-4 8 MCG/ACT AERO  Spouse/Significant Other Yes Yes   Sig: Inhale 2 puffs daily after breakfast   LORazepam (ATIVAN) 0 5 mg tablet  Spouse/Significant Other Yes Yes   Sig: Take 0 5 mg by mouth every 6 (six) hours as needed for anxiety   MAGNESIUM PO  Spouse/Significant Other Yes Yes   Sig: Take 400 mg by mouth daily   Milk Thistle 500 MG CAPS  Spouse/Significant Other Yes Yes   Sig: Take 500 mg by mouth 2 (two) times a day   Mometasone Furo-Formoterol Fum 200-5 MCG/ACT AERO  Spouse/Significant Other Yes Yes   Sig: Take 2 puffs by mouth 2 (two) times a day   Omega-3 Fatty Acids (FISH OIL) 1200 MG CAPS  Spouse/Significant Other Yes Yes   Sig: Take 1,200 mg by mouth daily at bedtime   albuterol (PROVENTIL HFA,VENTOLIN HFA) 90 mcg/act inhaler Spouse/Significant Other No Yes   Sig: Inhale 2 puffs every 6 (six) hours as needed for wheezing   amLODIPine (NORVASC) 10 mg tablet  Spouse/Significant Other Yes Yes   Sig: Take 10 mg by mouth daily   apixaban (ELIQUIS) 5 mg  Spouse/Significant Other Yes Yes   Sig: Take 5 mg by mouth 2 (two) times a day     aspirin (PX ENTERIC ASPIRIN) 325 mg EC tablet  Spouse/Significant Other Yes Yes   Sig: Take 325 mg by mouth daily At night    atorvastatin (LIPITOR) 40 mg tablet  Spouse/Significant Other Yes Yes   Sig: Take 40 mg by mouth daily at bedtime   doxazosin (CARDURA) 4 mg tablet  Spouse/Significant Other Yes Yes   Sig: Take 4 mg by mouth daily at bedtime   eplerenone (INSPRA) 50 MG tablet  Spouse/Significant Other Yes Yes   Sig: Take 50 mg by mouth daily   escitalopram (LEXAPRO) 10 mg tablet  Spouse/Significant Other Yes Yes   Sig: Take 10 mg by mouth daily   ferrous sulfate 325 (65 Fe) mg tablet  Spouse/Significant Other Yes Yes   Sig: Take 325 mg by mouth daily with breakfast   fluticasone (FLONASE) 50 mcg/act nasal spray   No Yes   Si spray into each nostril 2 (two) times a day   furosemide (LASIX) 80 mg tablet   No Yes   Sig: Take 1 tablet (80 mg total) by mouth daily Starting next Monday  If you become short of breath in meantime call your pcp to consider resuming earlier, or if you have 2 lb wt gain     multivitamin-iron-minerals-folic acid (CENTRUM) chewable tablet  Spouse/Significant Other Yes Yes   Sig: Chew 1 tablet daily   nebivolol (BYSTOLIC) 20 MG tablet  Spouse/Significant Other Yes Yes   Sig: Take 20 mg by mouth 2 (two) times a day   potassium chloride (KLOR-CON M20) 20 mEq tablet   No Yes   Sig: Take 1 tablet (20 mEq total) by mouth daily Restart with your lasix next monday      Facility-Administered Medications: None       Historical Information   Past Medical History:   Diagnosis Date    COPD (chronic obstructive pulmonary disease) (HCC)     Gout     Hypertension     Kidney disease     renal failure    Leg DVT (deep venous thromboembolism), acute, bilateral ([address] ) 01/2018    AGUS on CPAP     setting 11    Systolic CHF [facility])      Past Surgical History:   Procedure Laterality Date    CHOLECYSTECTOMY      IR IMAGE GUIDED ASPIRATION / DRAINAGE W TUBE  8/17/2018    IR IMAGE GUIDED ASPIRATION / DRAINAGE W TUBE  7/31/2018    JOINT REPLACEMENT Bilateral     knee    KIDNEY SURGERY  2009    nodule removal    LYMPH NODE DISSECTION Left 01/2018    left inguinal LN removed - benign     OTHER SURGICAL HISTORY      kidney nodule removal    PALATE / UVULA BIOPSY / EXCISION      SINUS SURGERY       Social History   History   Alcohol Use    Yes     Comment: socially     History   Drug Use No     History   Smoking Status    Never Smoker   Smokeless Tobacco    Never Used       Family History:  Negative/unremarkable except as detailed in HPI  Objective   Vitals: BP (!) 194/103 (BP Location: Right arm)   Pulse 72   Temp 98 4 °F (36 9 °C) (Oral)   Resp 18   Wt 90 7 kg (200 lb)   SpO2 98%   BMI 31 32 kg/m² ,Body mass index is 31 32 kg/m²      Intake/Output Summary (Last 24 hours) at 10/16/18 1744  Last data filed at 10/16/18 1641   Gross per 24 hour   Intake               50 ml   Output                0 ml   Net               50 ml     Invasive Devices     Peripheral Intravenous Line            Peripheral IV 10/16/18 Right Antecubital less than 1 day                Lab Results:    CBC with diff:   Lab Results   Component Value Date    WBC 12 51 (H) 10/16/2018    HGB 13 7 10/16/2018    HCT 41 5 10/16/2018    MCV 82 10/16/2018     10/16/2018    MCH 27 1 10/16/2018    MCHC 33 0 10/16/2018    RDW 15 6 (H) 10/16/2018    MPV 9 2 10/16/2018    NRBC 0 10/16/2018     BMP/CMP:   Lab Results   Component Value Date     10/16/2018    K 3 8 10/16/2018     10/16/2018    CO2 32 10/16/2018    BUN 23 10/16/2018    CREATININE 1 35 (H) 10/16/2018    CALCIUM 9 6 10/16/2018    EGFR 68 10/16/2018 Urinalysis:   Lab Results   Component Value Date    COLORU Yellow 10/16/2018    CLARITYU Clear 10/16/2018    SPECGRAV 1 015 10/16/2018    PHUR 8 0 10/16/2018    LEUKOCYTESUR Negative 10/16/2018    NITRITE Negative 10/16/2018    PROTEINUA 30 (1+) (A) 10/16/2018    GLUCOSEU Negative 10/16/2018    KETONESU Negative 10/16/2018    BILIRUBINUR Negative 10/16/2018    BLOODU Negative 10/16/2018       Physical Exam  Vitals: BP (!) 194/103 (BP Location: Right arm)   Pulse 72   Temp 98 4 °F (36 9 °C) (Oral)   Resp 18   Wt 90 7 kg (200 lb)   SpO2 98%   BMI 31 32 kg/m² ,Body mass index is 31 32 kg/m²  General appearance: AAO x3, mild distress, appears stated age, cooperative  HEENT: PERRL, sclera clear, anicterus, oral mucosa is moist  Neck: No carotid bruits, trachea is midline    Back: no tenderness,deformity,   Lungs:clear throughout, no wheezes or rhonchi  Heart[de-identified] RRR, S1, S2 normal, no murmur  Abdomen: +BS, soft nontender, no masses or guarding  Extremities: LLE with 8x15cm well demarcated fluid collection and overlying erythema consistent with cellulitis  Notably tender to palpation  Previous drain site inferior to area, well healed with no erythema or drainage  Neurologic: CN II-XII grossly intact, no tremor, affect appropriate    Imaging Studies: Ct Abdomen Pelvis With Contrast    Result Date: 10/16/2018  Impression: 4 3 x 4 8 x 5 6 cm left inguinal region fluid collection with small foci of air  Larger from the previous the MRI from September 17, 2018  Correlate clinically for infected fluid collection A lobulated fluid collection seen in the lower left anterior abdominal, measuring 3 6 x 3 8 cm interposed between the adjacent bowel loops and located deep to the anterior abdominal wall is seen in image 45 series 2    This may be a seroma or lymphocele or mesenteric cyst An indeterminate small splenic lesion measuring about 8 mm, too small to characterize, can be evaluated for stability with follow-up MRI at 6 months  I personally discussed this study with Robert Guidry on 10/16/2018 at 3:56 PM   Workstation performed: [id number]     EKG, Pathology, and Other Studies: I have personally reviewed pertinent reports  VTE Prophylaxis: Sequential compression device (Venodyne)  Will start heparin scheduled   On Eliquis at home, to be held until post op   Code Status: Prior    Hawk Shepherd PA-C  10/16/2018

## 2018-10-17 ENCOUNTER — ANESTHESIA (INPATIENT)
Dept: PERIOP | Facility: HOSPITAL | Age: 53
DRG: 580 | End: 2018-10-17
Payer: COMMERCIAL

## 2018-10-17 ENCOUNTER — DOCUMENTATION (OUTPATIENT)
Dept: MEDSURG UNIT | Facility: HOSPITAL | Age: 53
End: 2018-10-17

## 2018-10-17 ENCOUNTER — ANESTHESIA EVENT (INPATIENT)
Dept: PERIOP | Facility: HOSPITAL | Age: 53
DRG: 580 | End: 2018-10-17
Payer: COMMERCIAL

## 2018-10-17 PROBLEM — Z86.718 HISTORY OF DVT (DEEP VEIN THROMBOSIS): Status: ACTIVE | Noted: 2018-10-17

## 2018-10-17 PROBLEM — D73.89 SPLENIC LESION: Status: ACTIVE | Noted: 2018-10-17

## 2018-10-17 LAB
ALBUMIN SERPL BCP-MCNC: 3.1 G/DL (ref 3.5–5)
ALP SERPL-CCNC: 85 U/L (ref 46–116)
ALT SERPL W P-5'-P-CCNC: 29 U/L (ref 12–78)
ANION GAP SERPL CALCULATED.3IONS-SCNC: 7 MMOL/L (ref 4–13)
APTT PPP: 58 SECONDS (ref 24–36)
AST SERPL W P-5'-P-CCNC: 18 U/L (ref 5–45)
ATRIAL RATE: 60 BPM
ATRIAL RATE: 62 BPM
BILIRUB SERPL-MCNC: 0.9 MG/DL (ref 0.2–1)
BUN SERPL-MCNC: 21 MG/DL (ref 5–25)
CALCIUM SERPL-MCNC: 8.9 MG/DL (ref 8.3–10.1)
CHLORIDE SERPL-SCNC: 102 MMOL/L (ref 100–108)
CO2 SERPL-SCNC: 30 MMOL/L (ref 21–32)
CREAT SERPL-MCNC: 1.44 MG/DL (ref 0.6–1.3)
ERYTHROCYTE [DISTWIDTH] IN BLOOD BY AUTOMATED COUNT: 15.6 % (ref 11.6–15.1)
GFR SERPL CREATININE-BSD FRML MDRD: 63 ML/MIN/1.73SQ M
GLUCOSE SERPL-MCNC: 96 MG/DL (ref 65–140)
HCT VFR BLD AUTO: 37.9 % (ref 36.5–49.3)
HGB BLD-MCNC: 12.3 G/DL (ref 12–17)
INR PPP: 1.24 (ref 0.86–1.17)
MCH RBC QN AUTO: 27 PG (ref 26.8–34.3)
MCHC RBC AUTO-ENTMCNC: 32.5 G/DL (ref 31.4–37.4)
MCV RBC AUTO: 83 FL (ref 82–98)
P AXIS: 65 DEGREES
P AXIS: 73 DEGREES
PLATELET # BLD AUTO: 320 THOUSANDS/UL (ref 149–390)
PMV BLD AUTO: 9 FL (ref 8.9–12.7)
POTASSIUM SERPL-SCNC: 3.7 MMOL/L (ref 3.5–5.3)
PR INTERVAL: 172 MS
PR INTERVAL: 174 MS
PROT SERPL-MCNC: 7.4 G/DL (ref 6.4–8.2)
PROTHROMBIN TIME: 15.5 SECONDS (ref 11.8–14.2)
QRS AXIS: -32 DEGREES
QRS AXIS: -45 DEGREES
QRSD INTERVAL: 94 MS
QRSD INTERVAL: 96 MS
QT INTERVAL: 464 MS
QT INTERVAL: 476 MS
QTC INTERVAL: 470 MS
QTC INTERVAL: 476 MS
RBC # BLD AUTO: 4.56 MILLION/UL (ref 3.88–5.62)
SODIUM SERPL-SCNC: 139 MMOL/L (ref 136–145)
T WAVE AXIS: 73 DEGREES
T WAVE AXIS: 83 DEGREES
VENTRICULAR RATE: 60 BPM
VENTRICULAR RATE: 62 BPM
WBC # BLD AUTO: 12.54 THOUSAND/UL (ref 4.31–10.16)

## 2018-10-17 PROCEDURE — 94660 CPAP INITIATION&MGMT: CPT

## 2018-10-17 PROCEDURE — 93010 ELECTROCARDIOGRAM REPORT: CPT | Performed by: INTERNAL MEDICINE

## 2018-10-17 PROCEDURE — 87070 CULTURE OTHR SPECIMN AEROBIC: CPT | Performed by: SURGERY

## 2018-10-17 PROCEDURE — 10061 I&D ABSCESS COMP/MULTIPLE: CPT | Performed by: SURGERY

## 2018-10-17 PROCEDURE — 99232 SBSQ HOSP IP/OBS MODERATE 35: CPT | Performed by: PHYSICIAN ASSISTANT

## 2018-10-17 PROCEDURE — 87075 CULTR BACTERIA EXCEPT BLOOD: CPT | Performed by: SURGERY

## 2018-10-17 PROCEDURE — 85027 COMPLETE CBC AUTOMATED: CPT | Performed by: PHYSICIAN ASSISTANT

## 2018-10-17 PROCEDURE — 87186 SC STD MICRODIL/AGAR DIL: CPT | Performed by: SURGERY

## 2018-10-17 PROCEDURE — 0Y960ZZ DRAINAGE OF LEFT INGUINAL REGION, OPEN APPROACH: ICD-10-PCS | Performed by: SURGERY

## 2018-10-17 PROCEDURE — 87205 SMEAR GRAM STAIN: CPT | Performed by: SURGERY

## 2018-10-17 PROCEDURE — 85610 PROTHROMBIN TIME: CPT | Performed by: SURGERY

## 2018-10-17 PROCEDURE — 87147 CULTURE TYPE IMMUNOLOGIC: CPT | Performed by: SURGERY

## 2018-10-17 PROCEDURE — 87077 CULTURE AEROBIC IDENTIFY: CPT | Performed by: SURGERY

## 2018-10-17 PROCEDURE — 94760 N-INVAS EAR/PLS OXIMETRY 1: CPT

## 2018-10-17 PROCEDURE — 85730 THROMBOPLASTIN TIME PARTIAL: CPT | Performed by: SURGERY

## 2018-10-17 PROCEDURE — 80053 COMPREHEN METABOLIC PANEL: CPT | Performed by: PHYSICIAN ASSISTANT

## 2018-10-17 RX ORDER — ALBUTEROL SULFATE 2.5 MG/3ML
2.5 SOLUTION RESPIRATORY (INHALATION) ONCE
Status: DISCONTINUED | OUTPATIENT
Start: 2018-10-17 | End: 2018-10-17 | Stop reason: HOSPADM

## 2018-10-17 RX ORDER — FENTANYL CITRATE 50 UG/ML
INJECTION, SOLUTION INTRAMUSCULAR; INTRAVENOUS AS NEEDED
Status: DISCONTINUED | OUTPATIENT
Start: 2018-10-17 | End: 2018-10-17 | Stop reason: SURG

## 2018-10-17 RX ORDER — MAGNESIUM HYDROXIDE 1200 MG/15ML
LIQUID ORAL AS NEEDED
Status: DISCONTINUED | OUTPATIENT
Start: 2018-10-17 | End: 2018-10-17 | Stop reason: HOSPADM

## 2018-10-17 RX ORDER — ONDANSETRON 2 MG/ML
4 INJECTION INTRAMUSCULAR; INTRAVENOUS ONCE
Status: DISCONTINUED | OUTPATIENT
Start: 2018-10-17 | End: 2018-10-17 | Stop reason: HOSPADM

## 2018-10-17 RX ORDER — LIDOCAINE HYDROCHLORIDE 10 MG/ML
INJECTION, SOLUTION INFILTRATION; PERINEURAL AS NEEDED
Status: DISCONTINUED | OUTPATIENT
Start: 2018-10-17 | End: 2018-10-17 | Stop reason: SURG

## 2018-10-17 RX ORDER — FUROSEMIDE 80 MG
80 TABLET ORAL DAILY
Status: DISCONTINUED | OUTPATIENT
Start: 2018-10-17 | End: 2018-10-20 | Stop reason: HOSPADM

## 2018-10-17 RX ORDER — HYDROMORPHONE HCL 110MG/55ML
0.5 PATIENT CONTROLLED ANALGESIA SYRINGE INTRAVENOUS EVERY 4 HOURS PRN
Status: DISCONTINUED | OUTPATIENT
Start: 2018-10-17 | End: 2018-10-20 | Stop reason: HOSPADM

## 2018-10-17 RX ORDER — FENTANYL CITRATE/PF 50 MCG/ML
50 SYRINGE (ML) INJECTION
Status: DISCONTINUED | OUTPATIENT
Start: 2018-10-17 | End: 2018-10-17 | Stop reason: HOSPADM

## 2018-10-17 RX ORDER — OXYCODONE HYDROCHLORIDE AND ACETAMINOPHEN 5; 325 MG/1; MG/1
1 TABLET ORAL EVERY 4 HOURS PRN
Status: DISCONTINUED | OUTPATIENT
Start: 2018-10-17 | End: 2018-10-20 | Stop reason: HOSPADM

## 2018-10-17 RX ORDER — SODIUM CHLORIDE, SODIUM LACTATE, POTASSIUM CHLORIDE, CALCIUM CHLORIDE 600; 310; 30; 20 MG/100ML; MG/100ML; MG/100ML; MG/100ML
INJECTION, SOLUTION INTRAVENOUS CONTINUOUS PRN
Status: DISCONTINUED | OUTPATIENT
Start: 2018-10-17 | End: 2018-10-17 | Stop reason: SURG

## 2018-10-17 RX ORDER — MIDAZOLAM HYDROCHLORIDE 1 MG/ML
INJECTION INTRAMUSCULAR; INTRAVENOUS AS NEEDED
Status: DISCONTINUED | OUTPATIENT
Start: 2018-10-17 | End: 2018-10-17 | Stop reason: SURG

## 2018-10-17 RX ORDER — HYDROMORPHONE HCL/PF 1 MG/ML
0.5 SYRINGE (ML) INJECTION
Status: DISCONTINUED | OUTPATIENT
Start: 2018-10-17 | End: 2018-10-17 | Stop reason: HOSPADM

## 2018-10-17 RX ORDER — ONDANSETRON 2 MG/ML
INJECTION INTRAMUSCULAR; INTRAVENOUS AS NEEDED
Status: DISCONTINUED | OUTPATIENT
Start: 2018-10-17 | End: 2018-10-17 | Stop reason: SURG

## 2018-10-17 RX ORDER — PROPOFOL 10 MG/ML
INJECTION, EMULSION INTRAVENOUS AS NEEDED
Status: DISCONTINUED | OUTPATIENT
Start: 2018-10-17 | End: 2018-10-17 | Stop reason: SURG

## 2018-10-17 RX ADMIN — VITAMIN D, TAB 1000IU (100/BT) 1000 UNITS: 25 TAB at 09:04

## 2018-10-17 RX ADMIN — MAGNESIUM OXIDE TAB 400 MG (241.3 MG ELEMENTAL MG) 400 MG: 400 (241.3 MG) TAB at 09:04

## 2018-10-17 RX ADMIN — PROPOFOL 200 MG: 10 INJECTION, EMULSION INTRAVENOUS at 14:26

## 2018-10-17 RX ADMIN — FENTANYL CITRATE 25 MCG: 50 INJECTION, SOLUTION INTRAMUSCULAR; INTRAVENOUS at 14:30

## 2018-10-17 RX ADMIN — POTASSIUM CHLORIDE 20 MEQ: 1500 TABLET, EXTENDED RELEASE ORAL at 09:04

## 2018-10-17 RX ADMIN — Medication 1000 MG: at 22:01

## 2018-10-17 RX ADMIN — SODIUM CHLORIDE 100 ML/HR: 0.9 INJECTION, SOLUTION INTRAVENOUS at 00:50

## 2018-10-17 RX ADMIN — ATORVASTATIN CALCIUM 40 MG: 40 TABLET, FILM COATED ORAL at 22:01

## 2018-10-17 RX ADMIN — VANCOMYCIN HYDROCHLORIDE 1250 MG: 1 INJECTION, POWDER, LYOPHILIZED, FOR SOLUTION INTRAVENOUS at 04:05

## 2018-10-17 RX ADMIN — EPLERENONE 50 MG: 25 TABLET, FILM COATED ORAL at 09:05

## 2018-10-17 RX ADMIN — FENTANYL CITRATE 25 MCG: 50 INJECTION, SOLUTION INTRAMUSCULAR; INTRAVENOUS at 14:35

## 2018-10-17 RX ADMIN — VANCOMYCIN HYDROCHLORIDE 1250 MG: 1 INJECTION, POWDER, LYOPHILIZED, FOR SOLUTION INTRAVENOUS at 16:06

## 2018-10-17 RX ADMIN — LIDOCAINE HYDROCHLORIDE 50 MG: 10 INJECTION, SOLUTION INFILTRATION; PERINEURAL at 14:26

## 2018-10-17 RX ADMIN — FENTANYL CITRATE 50 MCG: 50 INJECTION INTRAMUSCULAR; INTRAVENOUS at 15:35

## 2018-10-17 RX ADMIN — SODIUM CHLORIDE, SODIUM LACTATE, POTASSIUM CHLORIDE, AND CALCIUM CHLORIDE: .6; .31; .03; .02 INJECTION, SOLUTION INTRAVENOUS at 14:15

## 2018-10-17 RX ADMIN — FLUTICASONE PROPIONATE 1 SPRAY: 50 SPRAY, METERED NASAL at 17:13

## 2018-10-17 RX ADMIN — MAGNESIUM OXIDE TAB 400 MG (241.3 MG ELEMENTAL MG) 400 MG: 400 (241.3 MG) TAB at 17:12

## 2018-10-17 RX ADMIN — HYDROMORPHONE HYDROCHLORIDE 0.5 MG: 2 INJECTION, SOLUTION INTRAMUSCULAR; INTRAVENOUS; SUBCUTANEOUS at 17:12

## 2018-10-17 RX ADMIN — ESCITALOPRAM OXALATE 10 MG: 10 TABLET, FILM COATED ORAL at 09:04

## 2018-10-17 RX ADMIN — ONDANSETRON 4 MG: 2 INJECTION INTRAMUSCULAR; INTRAVENOUS at 14:26

## 2018-10-17 RX ADMIN — NEBIVOLOL HYDROCHLORIDE 20 MG: 10 TABLET ORAL at 17:12

## 2018-10-17 RX ADMIN — FENTANYL CITRATE 50 MCG: 50 INJECTION, SOLUTION INTRAMUSCULAR; INTRAVENOUS at 14:40

## 2018-10-17 RX ADMIN — NEBIVOLOL HYDROCHLORIDE 20 MG: 10 TABLET ORAL at 09:03

## 2018-10-17 RX ADMIN — FENTANYL CITRATE 50 MCG: 50 INJECTION INTRAMUSCULAR; INTRAVENOUS at 15:24

## 2018-10-17 RX ADMIN — FLUTICASONE FUROATE AND VILANTEROL TRIFENATATE 1 PUFF: 100; 25 POWDER RESPIRATORY (INHALATION) at 09:05

## 2018-10-17 RX ADMIN — HYDRALAZINE HYDROCHLORIDE 5 MG: 20 INJECTION INTRAMUSCULAR; INTRAVENOUS at 17:19

## 2018-10-17 RX ADMIN — DOXAZOSIN 8 MG: 4 TABLET ORAL at 22:04

## 2018-10-17 RX ADMIN — FLUTICASONE PROPIONATE 1 SPRAY: 50 SPRAY, METERED NASAL at 09:05

## 2018-10-17 RX ADMIN — HYDROMORPHONE HYDROCHLORIDE 0.2 MG: 1 INJECTION, SOLUTION INTRAMUSCULAR; INTRAVENOUS; SUBCUTANEOUS at 09:03

## 2018-10-17 RX ADMIN — FUROSEMIDE 80 MG: 80 TABLET ORAL at 16:07

## 2018-10-17 RX ADMIN — MIDAZOLAM HYDROCHLORIDE 2 MG: 1 INJECTION, SOLUTION INTRAMUSCULAR; INTRAVENOUS at 14:20

## 2018-10-17 RX ADMIN — CLINDAMYCIN IN 5 PERCENT DEXTROSE 600 MG: 12 INJECTION, SOLUTION INTRAVENOUS at 10:19

## 2018-10-17 RX ADMIN — FERROUS SULFATE TAB 325 MG (65 MG ELEMENTAL FE) 325 MG: 325 (65 FE) TAB at 09:03

## 2018-10-17 RX ADMIN — CLINDAMYCIN IN 5 PERCENT DEXTROSE 600 MG: 12 INJECTION, SOLUTION INTRAVENOUS at 03:07

## 2018-10-17 RX ADMIN — ASPIRIN 81 MG: 81 TABLET, COATED ORAL at 09:04

## 2018-10-17 RX ADMIN — AMLODIPINE BESYLATE 10 MG: 10 TABLET ORAL at 09:04

## 2018-10-17 NOTE — ANESTHESIA PREPROCEDURE EVALUATION
Review of Systems/Medical History  Patient summary reviewed    No history of anesthetic complications     Cardiovascular  EKG reviewed, Exercise tolerance (METS): >4,  Hypertension , CHF , DVT  Comment: LEFT VENTRICLE:  Systolic function was normal  Ejection fraction was estimated in the range of 55 % to 65 %  There were no regional wall motion abnormalities  Wall thickness was increased  There was mild concentric hypertrophy      LEFT ATRIUM:  The atrium was dilated      RIGHT ATRIUM:  The atrium was dilated      MITRAL VALVE:  There was mild systolic anterior motion of the anterior leaflet  There was mild regurgitation      AORTIC VALVE:  Transaortic velocity was increased due to valvular stenosis      TRICUSPID VALVE:  There was mild regurgitation      PULMONIC VALVE:  There was mild regurgitation      PERICARDIUM:  A small pericardial effusion was identified posterior to the heart ,  Pulmonary  Pneumonia, COPD , Shortness of breath, Sleep apnea ,        GI/Hepatic  Negative GI/hepatic ROS          Negative  ROS        Endo/Other  Negative endo/other ROS      GYN  Negative gynecology ROS          Hematology  Anemia ,     Musculoskeletal  Gout,        Neurology  Negative neurology ROS      Psychology   Negative psychology ROS              Physical Exam    Airway    Mallampati score: II  TM Distance: >3 FB  Neck ROM: full     Dental       Cardiovascular  Cardiovascular exam normal    Pulmonary  Pulmonary exam normal     Other Findings        Anesthesia Plan  ASA Score- 3     Anesthesia Type- general with ASA Monitors  Additional Monitors:   Airway Plan: LMA  Plan Factors-    Induction- intravenous  Postoperative Plan-     Informed Consent- Anesthetic plan and risks discussed with patient  I personally reviewed this patient with the CRNA  Discussed and agreed on the Anesthesia Plan with the CRNA  Heriberto Camejo

## 2018-10-17 NOTE — ASSESSMENT & PLAN NOTE
· Appears euvolemic on exam   · Will restart patient's lasix and monitor  · Gentle IVF for acute infection

## 2018-10-17 NOTE — UTILIZATION REVIEW
Thank you,  [address]  Utilization Review Department  Phone: [phone]; Fax [phone]  ATTENTION: Please call with any questions or concerns to [phone]  and carefully follow the prompts so that you are directed to the right person  Send all requests for admission clinical reviews, approved or denied determinations and any other requests to fax [phone]  All voicemails are confidential      Initial Clinical Review    Admission: Date/Time/Statement: inpatient 10/16/18 @ 1812     Orders Placed This Encounter   Procedures    Inpatient Admission     Standing Status:   Standing     Number of Occurrences:   1     Order Specific Question:   Admitting Physician     Answer:   [employer]     Order Specific Question:   Level of Care     Answer:   Med Surg [16]     Order Specific Question:   Estimated length of stay     Answer:   More than 2 Midnights     Order Specific Question:   Certification     Answer:   I certify that inpatient services are medically necessary for this patient for a duration of greater than two midnights  See H&P and MD Progress Notes for additional information about the patient's course of treatment  ED: Date/Time/Mode of Arrival:   ED Arrival Information     Expected Arrival Acuity Means of Arrival Escorted By Service Admission Type    - 10/16/2018 12:40 Urgent Walk-In Self Surgery-General Urgent    Arrival Complaint    Wound check          Chief Complaint:   Chief Complaint   Patient presents with    Wound Check     pt had ANNE MARIE drain removed from left groin a week ago, now pt c/o pain and infection at the site       History of Illness: 53 yo m to ED from home due to increased swelling and pain in area where L inguinal drain was removed  Applying intermittent heat/ice w/o improvement  Tylenol helps  Too painful to walk  [In January 2018 had excisional bx L inguinal node with persistent seroma requiring IR drainage   Several drain changes performed, last on 10/8, and was bleeding into the drain  On 10/9, went to ED and bulb was removed with tubing capped   Then to IR the following AM for drain removal ]     ED Vital Signs:   ED Triage Vitals   Temperature Pulse Respirations Blood Pressure SpO2   10/16/18 1256 10/16/18 1256 10/16/18 1256 10/16/18 1256 10/16/18 1256   98 4 °F (36 9 °C) 68 18 161/85 98 %      Temp Source Heart Rate Source Patient Position - Orthostatic VS BP Location FiO2 (%)   10/16/18 1256 10/16/18 1515 10/16/18 1256 10/16/18 1256 --   Oral Monitor Sitting Right arm       Pain Score       10/16/18 1256       Worst Possible Pain        Wt Readings from Last 1 Encounters:   10/16/18 90 7 kg (200 lb)       Vital Signs (abnormal): 214/105   194/103   184/102   174/97   179/93    Abnormal Labs/Diagnostic Test Results:   Creat 1 35, 1 44  Wbc 12 51, 12 54     Pt 15 5  inr 1 24   ptt 58  UA: +1 prot   0-1 rbc/wbc   occ mucous  occ epithelials  CT a&P: 4 3 x 4 8 x 5 6 cm left inguinal region fluid collection with small foci of air   Larger from the previous the MRI from September 17, 2018   Correlate clinically for infected fluid collection  A lobulated fluid collection seen in the lower left anterior abdominal, measuring 3 6 x 3 8 cm interposed between the adjacent bowel loops and located deep to the anterior abdominal wall is seen in image 45 series 2   This may be a seroma or lymphocele or mesenteric cyst  An indeterminate small splenic lesion measuring about 8 mm, too small to characterize      ED Treatment:   Medication Administration from 10/16/2018 1240 to 10/16/2018 1949       Date/Time Order Dose Route Action Action by Comments     10/16/2018 1356 morphine (PF) 10 mg/mL injection 6 mg 6 mg Intravenous Given Jeremy Shea RN      10/16/2018 1725 vancomycin (VANCOCIN) 1,250 mg in sodium chloride 0 9 % 250 mL IVPB 1,250 mg Intravenous [address] Jeremy Shea RN      10/16/2018 1611 cefepime (MAXIPIME) 2,000 mg in dextrose 5 % 50 mL IVPB 2,000 mg Intravenous New Kristen Blood RN           Past Medical/Surgical History:    Active Ambulatory Problems     Diagnosis Date Noted    Pericardial effusion 01/22/2018    Essential hypertension 01/22/2018    Leg DVT (deep venous thromboembolism), acute, bilateral ([facility] ) 01/22/2018    COPD (chronic obstructive pulmonary disease) ([place] [address] ) 01/22/2018    CHF (congestive heart failure) ([facility] ) 01/22/2018    Bleeding 02/05/2018    Chronic diastolic CHF (congestive heart failure) ([place] [address] ) 02/06/2018    Pulmonary nodule, right 02/06/2018    Acute respiratory failure with hypoxia (HCC) 03/15/2018    HAP (hospital-acquired pneumonia) 03/15/2018    Stage 3 chronic kidney disease ([place] [address] ) 03/15/2018    DVT (deep venous thrombosis) ([place] [address] ) 03/15/2018    Lymphedema 03/16/2018    SOB (shortness of breath) 05/21/2018    TRUDI (acute kidney injury) ([place] [address] ) 05/21/2018    Hypertensive kidney disease with stage 2 chronic kidney disease 05/21/2018    Pneumonia     Anemia 05/23/2018     Resolved Ambulatory Problems     Diagnosis Date Noted    Chest pain 02/05/2018    Leukocytosis 02/06/2018     Past Medical History:   Diagnosis Date    COPD (chronic obstructive pulmonary disease) ([place] [address] )     Gout     Hypertension     Kidney disease     Leg DVT (deep venous thromboembolism), acute, bilateral ([place] [address] ) 01/2018    AGUS on CPAP     Systolic CHF ([place] [address] )        Admitting Diagnosis: Essential hypertension [I10]  Acute on chronic diastolic congestive heart failure (HCC) [I50 33]  Abscess of left groin [L02 214]  Visit for wound check [P69 52]  Chronic diastolic CHF (congestive heart failure) (HCC) [I50 32]  Chronic obstructive pulmonary disease, unspecified COPD type ([place] [address] ) [J44 9]  Deep vein thrombosis (DVT) of lower extremity, unspecified chronicity, unspecified laterality, unspecified vein ([place] [address] ) [I82 409]  Stage 3 chronic kidney disease ([place] [address] ) [N18 3]    Age/Sex: 52 y o  male    Assessment/Plan: 51 yo m to ED from home admitted to surgical service due to L proximal thigh/inguinal abscess with cellulitis  Has 8x15 cm well demarcated fluid collection with tenderness & erythema  Cons medicine, plan I&D L thigh abscess in the OR on 10/17, broad spectrum antbx, obtain cultures during surgery   Aqua K for heat hourly wa, no ice, NPO, IVF at midnight, analgesics    Admission Orders:  Scheduled Meds:   Current Facility-Administered Medications:  acetaminophen 650 mg Oral Q6H PRN   albuterol 2 puff Inhalation Q6H PRN   amLODIPine 10 mg Oral Daily   aspirin 81 mg Oral Daily   atorvastatin 40 mg Oral HS   cholecalciferol 1,000 Units Oral Daily   clindamycin 600 mg Intravenous Q8H   doxazosin 8 mg Oral HS   eplerenone 50 mg Oral Daily   escitalopram 10 mg Oral Daily   ferrous sulfate 325 mg Oral Daily With Breakfast   fish oil 1,000 mg Oral HS   fluticasone 1 spray Nasal BID   fluticasone-vilanterol 1 puff Inhalation Daily   hydrALAZINE 5 mg Intravenous Q6H PRN   HYDROmorphone 0 2 mg Intravenous Q3H PRN  X 2   LORazepam 0 5 mg Oral Q6H PRN   magnesium oxide 400 mg Oral BID   nebivolol 20 mg Oral BID   ondansetron 4 mg Intravenous Q6H PRN   potassium chloride 20 mEq Oral Daily   sodium chloride 100 mL/hr Intravenous Continuous   vancomycin 15 mg/kg Intravenous Q12H     scd's  I/O  Incentive spirom hourly wa  oob as [place]  NPO  Cons medicine  cpap HS

## 2018-10-17 NOTE — ASSESSMENT & PLAN NOTE
· Appears improved today, however still mildly elevated, could be secondary to pain  · Continue amlodipine, cardura, eplerenone, bystolic and PRN hydralazine  · Will restart patient's lasix today   · Monitor

## 2018-10-17 NOTE — ASSESSMENT & PLAN NOTE
· CT with evidence of left inguinal fluid collection with foci of air  · General surgery is primary  · S/p left inguinal node excision 1/2018 with seroma needing drain placed, this was removed and patient now experiencing erythema over inguinal site    · Pt to go to OR today for I&D of abscess   · Continue IV clindamycin and vancomycin for abx coverage   · PRN pain management per surgery

## 2018-10-17 NOTE — OP NOTE
OPERATIVE REPORT  PATIENT NAME: Kiara Burden    :  1965  MRN: [id number]  Pt Location: [facility] OR ROOM 01    SURGERY DATE: 10/17/2018    Surgeon(s) and Role:     * Piyush Velez MD - Primary     * Ethan Wagner Arm - Assisting    Preop Diagnosis:  Abscess of left groin [L02 214]    Post-Op Diagnosis Codes:     * Abscess of left groin [L02 214]    Procedure(s) (LRB):  INCISION AND DRAINAGE (I&D) GROIN (Left)    Specimen(s):  * No specimens in log *    Estimated Blood Loss:   Minimal    Drains:       Anesthesia Type:   General/LMA    Operative Indications:  Abscess of left groin [L02 214]      Operative Findings: There was a moderate size abscess on the left groin, multiloculated, moderate amount of pus  Complications:   None    Procedure and Technique:  Patient was identified he was placed in the operating table in a supine position  After adequate anesthesia induction and satisfactory LMA intubation the left groin was prepped and draped in a sterile usual fashion with ChloraPrep  Time-out was called the patient was identified as was surgical site  An elliptical incision was made along the groin crease with a scalpel, taken down through the subcutaneous tissue with cautery until the abscess cavity was entered  Copious amount of pus was identified, cultures were taken for aerobic and anaerobic  Hemostasis was accomplished with cautery  The wound was packed open with Kerlix soaked in saline  Sterile dressing was applied  At the end of the case instrument, needles, and sponges counts were correct  The patient tolerated the procedure well     I was present for the entire procedure, A qualified resident physician was not available and A physician assistant was required during the procedure for retraction tissue handling,dissection and suturing    Patient Disposition:  PACU , hemodynamically stable and extubated and stable    SIGNATURE: Piyush Velez MD  DATE: 2018  TIME: 2:39 PM

## 2018-10-17 NOTE — SOCIAL WORK
CM met with pt at bedside  Pt lives with his wife Sanam Rosales in a 2 story house with 13 steps w/railing to reach his bedroom and 4 RAFIA  Pt has no problem navigating steps and is independent with ADL's  He uses a C-pap at  for sleep apnea  His PCP is Dr Scott Vega and he has a [facility]  CM attempted to reach her at [phone], but had to leave message  Denies substance abuse  He does have anxiety and depression that is treated with medication by his PCP  He was in a rehab in [facility] following b/l knee replacements 8 years ago  He has also used OP/PT but does not remember the name of the agency  He has never used [facility] services  He uses [facility] in [facility] and has no problem with his co-pays  He does not have a POA or Advanced Directive  CM supplied info on both  He does not work-he is disabled, but still drives  CM discussed d/c needs including [facility] services, but pt does not feel he will need this on d/c   CM department will continue to follow through hospitalization  CM reviewed discharge planning process including the following: identifying help at home, patient preference for discharge planning needs, pharmacy preference, and availability of treatment team to discuss questions or concerns patient and/or family may have regarding understanding medications and recognizing signs and symptoms once discharged  CM also encouraged patient to follow up with all recommended appointments after discharge  Patient advised of importance for patient and family to participate in managing patients medical well being  CM name and role reviewed  Discharge Checklist reviewed and CM will continue to monitor for progress toward discharge goals in nursing and provider rounds

## 2018-10-17 NOTE — ANESTHESIA POSTPROCEDURE EVALUATION
Post-Op Assessment Note      CV Status:  Stable    Mental Status:  Alert and awake    Hydration Status:  Euvolemic    PONV Controlled:  Controlled    Airway Patency:  Patent    Post Op Vitals Reviewed: Yes          Staff: CRNA           BP  154/82   Temp   99 1   Pulse 59   Resp   16   SpO2   98%

## 2018-10-17 NOTE — ASSESSMENT & PLAN NOTE
· Does not appear to be in any acute exacerbation   · Continue Breo and PRN albuterol inhaler  · Will add PRN neb treatments

## 2018-10-17 NOTE — ASSESSMENT & PLAN NOTE
· Pt on Eliquis, on hold due to surgical procedure today  · Restart a/c per surgery recommendations   · Pt to see hematology as an outpatient for recommendations of continued a/c as he has completed 6 months of Eliquis therapy for provoked DVT  · Monitor

## 2018-10-17 NOTE — PLAN OF CARE
Problem: DISCHARGE PLANNING - CARE MANAGEMENT  Goal: Discharge to post-acute care or home with appropriate resources  INTERVENTIONS:  - Conduct assessment to determine patient/family and health care team treatment goals, and need for post-acute services based on payer coverage, community resources, and patient preferences, and barriers to discharge  - Address psychosocial, clinical, and financial barriers to discharge as identified in assessment in conjunction with the patient/family and health care team  - Arrange appropriate level of post-acute services according to patients   needs and preference and payer coverage in collaboration with the physician and health care team  - Communicate with and update the patient/family, physician, and health care team regarding progress on the discharge plan  - Arrange appropriate transportation to post-acute venues   Outcome: [place]  CM spoke to Nu RN/CM from [address] [place] [address]  Pt is involved in a new program where a doctor and nurse will go to pt's home a couple times per wk to prevent readmissions when pt is discharged  Will keep Shavon informed of d/c date

## 2018-10-17 NOTE — CONSULTS
Inpatient Medical Consultation - [facility] Internal Medicine    Patient Information: Jonathan Aldrich 52 y o  male MRN: [de-identified]  Unit/Bed#: -01 Encounter: [id number]  PCP: Rosio Mendoza MD  Date of Admission:  10/16/2018  Date of Consultation: 10/16/18  Requesting Physician: Sarahy Sosa MD    Reason For Consultation:   Medical management    Assessment/Plan:  Left proximal thigh/inguinal abscess with cellulitis  CT of the abdomen/pelvis did show 4 in to 6 cm left inguinal region fluid collection with small foci of air  White count 12 41  Management per surgery team    Hypertension  Blood pressure was elevated in the ED, will resume home medications amlodipine, Bystolic, doxycycline, eplerenone  Will hold Lasix for tonight as patient is currently on IV fluid hydration  Hydralazine p r n  Monitor blood pressure    CKD stage 3  Creatinine at baseline    History of provoked acute DVT in January 2018  Currently Eliquis held secondary to anticipated surgery tomorrow  Patient has seen his PCP recently who recommended him to see hematologist for recommendations to discontinue anticoagulation  he completed 6 months of Eliquis  Preoperative evaluation  No prior history of cardiac disease, stents, valvular heart disease as reported by patient  EKG ordered and pending  Previous echocardiogram in May 2018 did not show any evidence of valvular or wall motion abnormality  Overall patient is low risk for low risk surgery    VTE Prophylaxis: Heparin  / sequential compression device     Recommendations for Discharge:    Counseling / Coordination of Care Time: 30 minutes  Greater than 50% of total time spent on patient counseling and coordination of care  Collaboration of Care:  Were Recommendations Directly Discussed with Primary Treatment Team? - Yes     History of Present Illness:    Jonathan Aldrich is a 52 y o  male who is originally admitted to the acute care surgery service on 10/16/2018 due to left inguinal region abscess  We are consulted for medical management of hypertension, sleep apnea    Review of Systems:    Review of Systems   Constitutional: Negative for chills and fever  Respiratory: Negative for cough, chest tightness and shortness of breath  Gastrointestinal: Negative for abdominal pain and constipation  Genitourinary: Negative for dysuria  Musculoskeletal: Negative for back pain  Skin: Positive for rash  Neurological: Negative for dizziness, light-headedness and numbness  Past Medical and Surgical History:     Past Medical History:   Diagnosis Date    COPD (chronic obstructive pulmonary disease) ([place] [id number] )     Gout     Hypertension     Kidney disease     renal failure    Leg DVT (deep venous thromboembolism), acute, bilateral ([place] [address] ) 01/2018    AGUS on CPAP     setting 11    Systolic CHF ([place] [address] )        Past Surgical History:   Procedure Laterality Date    CHOLECYSTECTOMY      IR IMAGE GUIDED ASPIRATION / DRAINAGE W TUBE  8/17/2018    IR IMAGE GUIDED ASPIRATION / DRAINAGE W TUBE  7/31/2018    JOINT REPLACEMENT Bilateral     knee    KIDNEY SURGERY  2009    nodule removal    LYMPH NODE DISSECTION Left 01/2018    left inguinal LN removed - benign     OTHER SURGICAL HISTORY      kidney nodule removal    PALATE / UVULA BIOPSY / EXCISION      SINUS SURGERY         Meds/Allergies:    all medications and allergies reviewed    Allergies:    Allergies   Allergen Reactions    Clonidine Anaphylaxis    Lisinopril Anaphylaxis    Nifedipine Anaphylaxis    Spironolactone Anaphylaxis       Social History:     Marital Status: /Civil Union    Substance Use History:   History   Alcohol Use    Yes     Comment: socially     History   Smoking Status    Never Smoker   Smokeless Tobacco    Never Used     History   Drug Use No       Family History:    Family History   Problem Relation Age of Onset    Heart murmur Sister     Deep vein thrombosis Neg Hx        Physical Exam: Vitals:   Blood Pressure: (!) 179/93 (10/16/18 2004)  Pulse: 57 (10/16/18 2004)  Temperature: 98 6 °F (37 °C) (10/16/18 2004)  Temp Source: Oral (10/16/18 2004)  Respirations: 18 (10/16/18 2004)  Height: 5' 7" (170 2 cm) (10/16/18 2004)  Weight - Scale: 90 7 kg (200 lb) (10/16/18 2004)  SpO2: 97 % (10/16/18 2004)    Physical Exam   Constitutional: He is oriented to person, place, and time  He appears well-developed and well-nourished  HENT:   Head: Normocephalic and atraumatic  Eyes: Pupils are equal, round, and reactive to light  EOM are normal    Neck: Normal range of motion  Neck supple  Cardiovascular: Normal rate, regular rhythm and normal heart sounds  Pulmonary/Chest: Effort normal and breath sounds normal    Abdominal: Soft  Bowel sounds are normal    Musculoskeletal: Normal range of motion  Left lower extremity-about 8-12 cm fluid collection/abscess with area of cellulitis/erythema on upper medial part of thigh   Neurological: He is alert and oriented to person, place, and time  Skin: Skin is warm  Additional Data:     Lab Results: I have personally reviewed pertinent reports  Results from last 7 days  Lab Units 10/16/18  1354   WBC Thousand/uL 12 51*   HEMOGLOBIN g/dL 13 7   HEMATOCRIT % 41 5   PLATELETS Thousands/uL 326   NEUTROS PCT % 72   LYMPHS PCT % 14   MONOS PCT % 10   EOS PCT % 3       Results from last 7 days  Lab Units 10/16/18  1354   SODIUM mmol/L 142   POTASSIUM mmol/L 3 8   CHLORIDE mmol/L 101   CO2 mmol/L 32   BUN mg/dL 23   CREATININE mg/dL 1 35*   CALCIUM mg/dL 9 6           Imaging: I have personally reviewed pertinent reports  and I have personally reviewed pertinent films in PACS    Ir Tube Check    Result Date: 10/12/2018  Narrative: Abscess drainage catheter check and removal Clinical History: Low outputs from indwelling drain   Fluoro time: 0 12 Technique: The patient was brought to the interventional radiology suite and identified verbally and by wristband  The patient was placed supine on the table and a  view was obtained of indwelling catheter within the left hip   After review of the patient's prior imaging studies, the indwelling catheter was injected with a small amount of contrast   The abscess cavity held less than 5 mL's of contrast  Given low outputs and relatively small size of the remaining cavity, the decision to remove the catheter was made  Catheter was cut, internal locking suture was released, and the catheter was removed in sterile fashion  Sterile dressing was placed  Patient tolerated the procedure well with no immediate post procedural complication  Impression: Impression: Check and removal of indwelling drainage catheter  Workstation performed: [id number]     Mri Femur Left W Wo Contrast    Result Date: 9/17/2018  Narrative: MRI LEFT LOWER EXTREMITY WITH AND WITHOUT CONTRAST (Extremity) INDICATION:   I89 8: Other specified noninfective disorders of lymphatic vessels and lymph nodes  COMPARISON:  None  TECHNIQUE:  MR examination of the was performed with and without contrast  Precontrast pulse sequences:Pulse sequences Postcontrast pulse sequences: Pulse Sequences (Please note the coronal images also include the contralateral lower extremity )  IV Contrast:  9 mL of gadobutrol injection (MULTI-DOSE) FINDINGS: SUBCUTANEOUS TISSUES: There is a fluid collection measuring about 5 cm x 1 7 cm x 0 8 cm in the left Compatible with patient's known seroma  There is no extension of this collection within the pelvis on above the inguinal ligament  Multiple left inguinal region lymph nodes are seen  The largest lymph node in the left inguinal region measures 1 9 x 1 3 cm  A left-sided pelvic sidewall lymph node is seen measuring about 8 mm BONES: No fracture, dislocation or destructive osseous lesion  ARTICULAR SURFACES:  Normal  VISUALIZED MUSCULATURE:  Unremarkable  OTHER SOFT TISSUES:  Unremarkable  OTHER PERTINENT FINDINGS:  None  PARTIALLY IMAGED CONTRALATERAL LOWER EXTREMITY: There are no abnormalities in the partially imaged contralateral lower extremity  Impression: There is a residual linear fluid collection in the left inguinal region measuring about 5 cm x 1 7 x 0 8 cm  This has become smaller from the previous study from August 17, 2018 multiple enlarged left inguinal lymph nodes No extension of this fluid collection within the abdominal cavity or above the inguinal ligament Workstation performed: [id number]     Ir Tube Change    Result Date: 10/8/2018  Narrative: EXAMINATION: Drain check with sclerotherapy INDICATION: 47-year-old male with history of bilateral inguinal lymphadenopathy at the status post left inguinal lymphadenectomy on 1/10/2018 underwent left groin seroma aspiration on 6/29/2018, followed by drain placement with alcohol sclerotherapy on 7/12/2018  Patient has had a total of 5 alcohol sclerotherapies since then, however, continues to have output through the drain  MRI left leg was performed on 9/17/2018 which showed residual left inguinal fluid collection  Patient has had minimal output from the drain, however, he feels that it may be due to the drain being clogged  CONTRAST: 10 mL Omnipaque 300 intracatheter FLUOROSCOPY TIME:   1MIN 34SEC IMAGES:  178 ANESTHESIA: Local lidocaine PROCEDURE:  The patient was identified verbally and by wristband  Timeout was performed  Informed consent was obtained  Following obtaining informed consent, the patient was prepped and draped in the usual sterile fashion  All elements of maximal sterile barrier technique, cap and mask and sterile gown and sterile gloves and sterile full-body drape and hand hygiene and 2% chlorhexidine for cutaneous antisepsis  Contrast was injected through the drain to assess for residual fluid cavity  1% local lidocaine was infiltrated in the skin and subcutaneous tissues surrounding the drain    The existing drain was cut to release its locking mechanism and unsecured from skin  The catheter was exchanged for a new 8 5-Latvian pigtail catheter over a [employer]son guidewire  The new catheter was locked into place and secured to skin using 2-0 Prolene suture  10 mL of 100% alcohol was injected through the drain for sclerotherapy  The sclerotherapy was performed over an hour with the patient in various positions to allow the sclerosing agent to contact all surfaces of the cavity  The patient tolerated the procedure well without complication  The patient left the IR department in stable condition  Findings: 1  Preprocedure  film showed the existing left groin drain to be in stable position  2   Contrast injection through the drain showed interval decrease in size of the seroma cavity  3   Successful exchange of existing left groin drain with a new 8 5-Latvian catheter  4   Repeat sclerotherapy was performed through the drain using 10 mL 100% alcohol  A different sclerosing agent such as Sotradecol was not available  5   Patient will call for repeat drain check when output is less than 10 mL per day for 2 consecutive days  Impression: Impression: 1  Interval decrease in size of left seroma cavity  2   Successful exchange of left groin seroma drainage catheter followed by repeat alcohol sclerotherapy  Workstation performed: [id number]     Ct Abdomen Pelvis With Contrast    Result Date: 10/16/2018  Narrative: CT ABDOMEN AND PELVIS WITH IV CONTRAST INDICATION:   left groin pain, swelling, erythema s/p lymphocele drain d/c  COMPARISON:  None  TECHNIQUE:  CT examination of the abdomen and pelvis was performed  Axial, sagittal, and coronal 2D reformatted images were created from the source data and submitted for interpretation  Radiation dose length product (DLP) for this visit:  752 mGy-cm     This examination, like all CT scans performed in the [facility], was performed utilizing techniques to minimize radiation dose exposure, including the use of iterative reconstruction and automated exposure control  IV Contrast:  100 mL of iohexol (OMNIPAQUE) Enteric Contrast:  Enteric contrast was not administered  FINDINGS: ABDOMEN LOWER CHEST:  Right basilar density seen suggests atelectasis LIVER/BILIARY TREE:  Unremarkable  GALLBLADDER:  Gallbladder is surgically absent SPLEEN:  A rounded hypodensity seen within the spleen measuring about 8 mm PANCREAS:  Unremarkable  ADRENAL GLANDS:  The right adrenal gland appear unremarkable Surgical clips are seen in the left side KIDNEYS/URETERS:  Right renal cyst seen measuring about 3 3 cm] additional cysts seen in the lower pole measuring 2 8 cm STOMACH AND BOWEL:  No abnormal dilation of the bowel loops seen APPENDIX:  No findings to suggest appendicitis  ABDOMINOPELVIC CAVITY: There is a lobulated fluid-containing area at the anterolateral aspect of the mid to lower abdomen, measuring 3 8 x 3 6 cm this demonstrates attenuation of 2 8 Hounsfield units No pelvic sidewall lymph node enlargement seen Left external iliac lymph nodes are seen measuring about 10 mm VESSELS:  The celiac trunk, SMA, KI are patent PELVIS REPRODUCTIVE ORGANS:  Prostate demonstrates calcification URINARY BLADDER:  Unremarkable  ABDOMINAL WALL/INGUINAL REGIONS:  Umbilical hernia containing fat seen Left inguinal region lymph nodes are seen with largest lymph node measuring about 18 mm There is a fluid collection noted within the left inguinal region which demonstrates foci of air  This measures 4 3 x 4 8 x 5 6 cm OSSEOUS STRUCTURES:  No acute fracture or destructive osseous lesion  Impression: 4 3 x 4 8 x 5 6 cm left inguinal region fluid collection with small foci of air  Larger from the previous the MRI from September 17, 2018    Correlate clinically for infected fluid collection A lobulated fluid collection seen in the lower left anterior abdominal, measuring 3 6 x 3 8 cm interposed between the adjacent bowel loops and located deep to the anterior abdominal wall is seen in image 45 series 2  This may be a seroma or lymphocele or mesenteric cyst An indeterminate small splenic lesion measuring about 8 mm, too small to characterize, can be evaluated for stability with follow-up MRI at 6 months  I personally discussed this study with Mimi Adams on 10/16/2018 at 3:56 PM   Workstation performed: [id number]       EKG, Pathology, and Other Studies Reviewed on Admission:   · EKG:  Currently unavailable    ** Please Note: This note has been constructed using a voice recognition system   **

## 2018-10-17 NOTE — PLAN OF CARE
Problem: DISCHARGE PLANNING - CARE MANAGEMENT  Goal: Discharge to post-acute care or home with appropriate resources  INTERVENTIONS:  - Conduct assessment to determine patient/family and health care team treatment goals, and need for post-acute services based on payer coverage, community resources, and patient preferences, and barriers to discharge  - Address psychosocial, clinical, and financial barriers to discharge as identified in assessment in conjunction with the patient/family and health care team  - Arrange appropriate level of post-acute services according to patients   needs and preference and payer coverage in collaboration with the physician and health care team  - Communicate with and update the patient/family, physician, and health care team regarding progress on the discharge plan  - Arrange appropriate transportation to post-acute venues  Outcome: Progressing  CM met with pt at bedside  Pt lives with his wife Ade Leigh in a 2 story house with 13 steps w/railing to reach his bedroom and 4 RAFIA  Pt has no problem navigating steps and is independent with ADL's  He uses a C-pap at  for sleep apnea  His PCP is Dr Veda Gong and he has a [facility]  CM attempted to reach her at [phone], but had to leave message  Denies substance abuse  He does have anxiety and depression that is treated with medication by his PCP  He was in a rehab in [facility] following b/l knee replacements 8 years ago  He has also used OP/PT but does not remember the name of the agency  He has never used [place] services  He uses [employer] in [facility] and has no problem with his co-pays  He does not have a POA or Advanced Directive  CM supplied info on both  He does not work-he is disabled, but still drives  CM discussed d/c needs including [place] services, but pt does not feel he will need this on d/c   CM department will continue to follow through hospitalization      CM reviewed discharge planning process including the following: identifying help at home, patient preference for discharge planning needs, pharmacy preference, and availability of treatment team to discuss questions or concerns patient and/or family may have regarding understanding medications and recognizing signs and symptoms once discharged  CM also encouraged patient to follow up with all recommended appointments after discharge  Patient advised of importance for patient and family to participate in managing patients medical well being  CM name and role reviewed  Discharge Checklist reviewed and CM will continue to monitor for progress toward discharge goals in nursing and provider rounds

## 2018-10-17 NOTE — ASSESSMENT & PLAN NOTE
· Cr  Worsened slightly today to 1 44, however still within patient's baseline  · Baseline per records around 1 4-1 5   · Would decrease rate of IVF hydration and monitor BMP in the AM

## 2018-10-18 LAB
ANION GAP SERPL CALCULATED.3IONS-SCNC: 6 MMOL/L (ref 4–13)
BUN SERPL-MCNC: 23 MG/DL (ref 5–25)
CALCIUM SERPL-MCNC: 8.8 MG/DL (ref 8.3–10.1)
CHLORIDE SERPL-SCNC: 101 MMOL/L (ref 100–108)
CO2 SERPL-SCNC: 31 MMOL/L (ref 21–32)
CREAT SERPL-MCNC: 1.38 MG/DL (ref 0.6–1.3)
ERYTHROCYTE [DISTWIDTH] IN BLOOD BY AUTOMATED COUNT: 15.6 % (ref 11.6–15.1)
GFR SERPL CREATININE-BSD FRML MDRD: 66 ML/MIN/1.73SQ M
GLUCOSE SERPL-MCNC: 100 MG/DL (ref 65–140)
HCT VFR BLD AUTO: 37.8 % (ref 36.5–49.3)
HGB BLD-MCNC: 12.2 G/DL (ref 12–17)
MCH RBC QN AUTO: 26.8 PG (ref 26.8–34.3)
MCHC RBC AUTO-ENTMCNC: 32.3 G/DL (ref 31.4–37.4)
MCV RBC AUTO: 83 FL (ref 82–98)
PLATELET # BLD AUTO: 339 THOUSANDS/UL (ref 149–390)
PMV BLD AUTO: 8.7 FL (ref 8.9–12.7)
POTASSIUM SERPL-SCNC: 3.6 MMOL/L (ref 3.5–5.3)
RBC # BLD AUTO: 4.55 MILLION/UL (ref 3.88–5.62)
SODIUM SERPL-SCNC: 138 MMOL/L (ref 136–145)
WBC # BLD AUTO: 9.89 THOUSAND/UL (ref 4.31–10.16)

## 2018-10-18 PROCEDURE — 80048 BASIC METABOLIC PNL TOTAL CA: CPT | Performed by: PHYSICIAN ASSISTANT

## 2018-10-18 PROCEDURE — 99232 SBSQ HOSP IP/OBS MODERATE 35: CPT | Performed by: NURSE PRACTITIONER

## 2018-10-18 PROCEDURE — 94660 CPAP INITIATION&MGMT: CPT

## 2018-10-18 PROCEDURE — 99024 POSTOP FOLLOW-UP VISIT: CPT | Performed by: PHYSICIAN ASSISTANT

## 2018-10-18 PROCEDURE — 85027 COMPLETE CBC AUTOMATED: CPT | Performed by: PHYSICIAN ASSISTANT

## 2018-10-18 RX ADMIN — FERROUS SULFATE TAB 325 MG (65 MG ELEMENTAL FE) 325 MG: 325 (65 FE) TAB at 09:19

## 2018-10-18 RX ADMIN — AMLODIPINE BESYLATE 10 MG: 10 TABLET ORAL at 09:20

## 2018-10-18 RX ADMIN — POTASSIUM CHLORIDE 20 MEQ: 1500 TABLET, EXTENDED RELEASE ORAL at 09:20

## 2018-10-18 RX ADMIN — OXYCODONE HYDROCHLORIDE AND ACETAMINOPHEN 1 TABLET: 5; 325 TABLET ORAL at 10:49

## 2018-10-18 RX ADMIN — MAGNESIUM OXIDE TAB 400 MG (241.3 MG ELEMENTAL MG) 400 MG: 400 (241.3 MG) TAB at 09:20

## 2018-10-18 RX ADMIN — SODIUM CHLORIDE 50 ML/HR: 0.9 INJECTION, SOLUTION INTRAVENOUS at 00:11

## 2018-10-18 RX ADMIN — FUROSEMIDE 80 MG: 80 TABLET ORAL at 09:20

## 2018-10-18 RX ADMIN — CLINDAMYCIN IN 5 PERCENT DEXTROSE 600 MG: 12 INJECTION, SOLUTION INTRAVENOUS at 10:49

## 2018-10-18 RX ADMIN — MAGNESIUM OXIDE TAB 400 MG (241.3 MG ELEMENTAL MG) 400 MG: 400 (241.3 MG) TAB at 17:15

## 2018-10-18 RX ADMIN — ATORVASTATIN CALCIUM 40 MG: 40 TABLET, FILM COATED ORAL at 21:37

## 2018-10-18 RX ADMIN — FLUTICASONE FUROATE AND VILANTEROL TRIFENATATE 1 PUFF: 100; 25 POWDER RESPIRATORY (INHALATION) at 09:18

## 2018-10-18 RX ADMIN — NEBIVOLOL HYDROCHLORIDE 20 MG: 10 TABLET ORAL at 09:19

## 2018-10-18 RX ADMIN — Medication 1000 MG: at 21:36

## 2018-10-18 RX ADMIN — VITAMIN D, TAB 1000IU (100/BT) 1000 UNITS: 25 TAB at 09:20

## 2018-10-18 RX ADMIN — FLUTICASONE PROPIONATE 1 SPRAY: 50 SPRAY, METERED NASAL at 17:15

## 2018-10-18 RX ADMIN — SODIUM CHLORIDE 50 ML/HR: 0.9 INJECTION, SOLUTION INTRAVENOUS at 17:30

## 2018-10-18 RX ADMIN — ESCITALOPRAM OXALATE 10 MG: 10 TABLET, FILM COATED ORAL at 09:19

## 2018-10-18 RX ADMIN — DOXAZOSIN 8 MG: 4 TABLET ORAL at 22:15

## 2018-10-18 RX ADMIN — NEBIVOLOL HYDROCHLORIDE 20 MG: 10 TABLET ORAL at 17:15

## 2018-10-18 RX ADMIN — EPLERENONE 50 MG: 25 TABLET, FILM COATED ORAL at 09:20

## 2018-10-18 RX ADMIN — HYDRALAZINE HYDROCHLORIDE 5 MG: 20 INJECTION INTRAMUSCULAR; INTRAVENOUS at 22:15

## 2018-10-18 RX ADMIN — VANCOMYCIN HYDROCHLORIDE 1250 MG: 1 INJECTION, POWDER, LYOPHILIZED, FOR SOLUTION INTRAVENOUS at 09:31

## 2018-10-18 RX ADMIN — HYDROMORPHONE HYDROCHLORIDE 0.2 MG: 1 INJECTION, SOLUTION INTRAMUSCULAR; INTRAVENOUS; SUBCUTANEOUS at 08:00

## 2018-10-18 RX ADMIN — CLINDAMYCIN IN 5 PERCENT DEXTROSE 600 MG: 12 INJECTION, SOLUTION INTRAVENOUS at 19:03

## 2018-10-18 RX ADMIN — FLUTICASONE PROPIONATE 1 SPRAY: 50 SPRAY, METERED NASAL at 09:18

## 2018-10-18 RX ADMIN — ASPIRIN 81 MG: 81 TABLET, COATED ORAL at 09:20

## 2018-10-18 RX ADMIN — HYDRALAZINE HYDROCHLORIDE 5 MG: 20 INJECTION INTRAMUSCULAR; INTRAVENOUS at 17:18

## 2018-10-18 RX ADMIN — VANCOMYCIN HYDROCHLORIDE 1250 MG: 1 INJECTION, POWDER, LYOPHILIZED, FOR SOLUTION INTRAVENOUS at 21:35

## 2018-10-18 NOTE — ASSESSMENT & PLAN NOTE
· CT with evidence of left inguinal fluid collection with foci of air  · General surgery is primary  · S/p left inguinal node excision 1/2018 with seroma needing drain placed, this was removed and patient now experiencing erythema over inguinal site    · Postop day 1 of I&D of inguinal abscess  · Continue IV clindamycin and vancomycin for abx coverage   · PRN pain management per surgery

## 2018-10-18 NOTE — PROGRESS NOTES
Progress Note - General Surgery   Deandra Carter 52 y o  male MRN: [de-identified]  Unit/Bed#: -01 Encounter: [id number]    Assessment/Plan  Left Groin Abscess, POD 1 I & D, surgical packing remains  -plan removal of surgical packing tomorrow  Ok to change or reinforce abd     -await cultures  -cont Clindamycin and Vanco      Chief Complaint: I have some pain at that area  But it is better than when I came in         Objective Directed Exam:  Surgical packing left upper thigh  Blood pressure 167/88, pulse 55, temperature 97 5 °F (36 4 °C), temperature source Axillary, resp  rate 18, height 5' 7" (1 702 m), weight 90 7 kg (200 lb), SpO2 98 %  ,Body mass index is 31 32 kg/m²        Intake/Output Summary (Last 24 hours) at 10/18/18 1101  Last data filed at 10/18/18 0931   Gross per 24 hour   Intake              910 ml   Output             1975 ml   Net            -1065 ml       Invasive Devices     Peripheral Intravenous Line            Peripheral IV 10/16/18 Right Antecubital 1 day                Physical Exam:   General Appearance:    Alert and orientated x 3, cooperative, no distress   Lungs:     Clear to auscultation bilaterally, respirations unlabored    Heart:    Regular rate and rhythm   Abdomen:     As above     Wound/Dressing:  C/d/i,       Extremities:   Extremities normal,  no cyanosis or edema   Pulses:   2+ and symmetric all extremities, no calf tenderness   Skin:   Skin color, texture, turgor normal, no rashes or lesions   Neurologic:   CNII-XII intact, normal strength, sensation and reflexes     Throughout, affect appropriate                           Labs:   CBC with diff: Lab Results   Component Value Date    WBC 9 89 10/18/2018    HGB 12 2 10/18/2018    HCT 37 8 10/18/2018    MCV 83 10/18/2018     10/18/2018    MCH 26 8 10/18/2018    MCHC 32 3 10/18/2018    RDW 15 6 (H) 10/18/2018    MPV 8 7 (L) 10/18/2018    NRBC 0 10/16/2018   ,   BMP/CMP:  Lab Results   Component Value Date     10/18/2018    K 3 6 10/18/2018     10/18/2018    CO2 31 10/18/2018    BUN 23 10/18/2018    CREATININE 1 38 (H) 10/18/2018    CALCIUM 8 8 10/18/2018    AST 18 10/17/2018    ALT 29 10/17/2018    ALKPHOS 85 10/17/2018    EGFR 66 10/18/2018   ,   Lipid Panel: No results found for: CHOL,   Coags:   Lab Results   Component Value Date    PTT 58 (H) 10/17/2018    INR 1 24 (H) 10/17/2018   ,     Blood Culture:   Lab Results   Component Value Date    BLOODCX No Growth After 5 Days  05/21/2018   ,   Urinalysis: Lab Results   Component Value Date    COLORU Yellow 10/16/2018    CLARITYU Clear 10/16/2018    SPECGRAV 1 015 10/16/2018    PHUR 8 0 10/16/2018    LEUKOCYTESUR Negative 10/16/2018    NITRITE Negative 10/16/2018    PROTEINUA 30 (1+) (A) 10/16/2018    GLUCOSEU Negative 10/16/2018    KETONESU Negative 10/16/2018    BILIRUBINUR Negative 10/16/2018    BLOODU Negative 10/16/2018   ,   Urine Culture: No results found for: URINECX,   Wound Culure: No results found for: WOUNDCULT      Imaging: Ct Abdomen Pelvis With Contrast    Result Date: 10/16/2018  Impression: 4 3 x 4 8 x 5 6 cm left inguinal region fluid collection with small foci of air  Larger from the previous the MRI from September 17, 2018  Correlate clinically for infected fluid collection A lobulated fluid collection seen in the lower left anterior abdominal, measuring 3 6 x 3 8 cm interposed between the adjacent bowel loops and located deep to the anterior abdominal wall is seen in image 45 series 2    This may be a seroma or lymphocele or mesenteric cyst An indeterminate small splenic lesion measuring about 8 mm, too small to characterize, can be evaluated for stability with follow-up MRI at 6 months  I personally discussed this study with Iris Valdivia on 10/16/2018 at 3:56 PM   Workstation performed: Cj Raya PA-C  10/18/2018

## 2018-10-18 NOTE — PROGRESS NOTES
[facility]  Progress Note - Hailey Solano 1965, 52 y o  male MRN: [id number]    Unit/Bed#: -01 Encounter: [id number]    Primary Care Provider: Elizabeth Tiwari MD   Date and time admitted to hospital: 10/16/2018  1:05 PM        * Abscess of left groin   Assessment & Plan    · CT with evidence of left inguinal fluid collection with foci of air  · General surgery is primary  · S/p left inguinal node excision 1/2018 with seroma needing drain placed, this was removed and patient now experiencing erythema over inguinal site  · Postop day 1 of I&D of inguinal abscess  · Continue IV clindamycin and vancomycin for abx coverage   · PRN pain management per surgery      Essential hypertension   Assessment & Plan    · Continuing to improve, however still mildly elevated, could be secondary to pain  · Continue amlodipine, cardura, eplerenone, bystolic and PRN hydralazine  · Patient's Lasix restarted  · Monitor     Stage 3 chronic kidney disease (HCC)   Assessment & Plan    · Cr    Improved to 1 38, still within patient's baseline  · Baseline per records around 1 4-1 5        COPD (chronic obstructive pulmonary disease) ([facility])   Assessment & Plan    · Does not appear to be in any acute exacerbation   · Continue Breo and PRN albuterol inhaler  · Will add PRN neb treatments      History of DVT (deep vein thrombosis)   Assessment & Plan    · Pt on Eliquis, on hold due to surgical procedure today  · Restart a/c per surgery recommendations   · Pt to see hematology as an outpatient for recommendations of continued a/c as he has completed 6 months of Eliquis therapy for provoked DVT  · Monitor     Splenic lesion   Assessment & Plan    · POA, noted on CT scan, 8 mm lesion  · Will need follow up MRI in next 6 months as an outpatient     AGUS (obstructive sleep apnea)   Assessment & Plan    · Continue Cpap at bedtime     Chronic diastolic CHF (congestive heart failure) [facility] )   Assessment & Plan    · Appears euvolemic on exam   · Will restart patient's lasix and monitor              VTE Pharmacologic Prophylaxis:   Pharmacologic: On hold given recent procedure  Mechanical VTE Prophylaxis in Place: Yes    Patient Centered Rounds: I have performed bedside rounds with nursing staff today  Discussions with Specialists or Other Care Team Provider:  Reviewed previous provider's notes, reviewed OR note discussed with case management discussed with primary RN    Education and Discussions with Family / Patient:  Discussed with patient    Time Spent for Care: 15 minutes  More than 50% of total time spent on counseling and coordination of care as described above  Current Length of Stay: 2 day(s)    Current Patient Status: Inpatient   Certification Statement: The patient will continue to require additional inpatient hospital stay due to IV antibiotics and packing change  Pain control    Discharge Plan / Estimated Discharge Date:  Once patient is medically improved      Code Status: Level 1 - Full Code      Subjective:   Patient reports that pain is improved with the use of oral pain medication  He is tolerating his diet  He is ambulating to the restroom with minimal difficulty  He denies any chest pain chest tightness shortness of breath or difficulty breathing    Objective:     Vitals:   Temp (24hrs), Av 4 °F (36 9 °C), Min:97 5 °F (36 4 °C), Max:99 1 °F (37 3 °C)    Temp:  [97 5 °F (36 4 °C)-99 1 °F (37 3 °C)] 97 5 °F (36 4 °C)  HR:  [52-63] 55  Resp:  [12-27] 18  BP: (150-183)/(72-97) 167/88  SpO2:  [92 %-100 %] 98 %  Body mass index is 31 32 kg/m²  Input and Output Summary (last 24 hours): Intake/Output Summary (Last 24 hours) at 10/18/18 1321  Last data filed at 10/18/18 0931   Gross per 24 hour   Intake              910 ml   Output             1975 ml   Net            -1065 ml       Physical Exam:     Physical Exam   Constitutional: He is oriented to person, place, and time   He appears well-developed and well-nourished  HENT:   Head: Normocephalic  Eyes: Pupils are equal, round, and reactive to light  Neck: Normal range of motion  Neck supple  Cardiovascular: Normal rate and regular rhythm  Pulmonary/Chest: Effort normal and breath sounds normal    Abdominal: Soft  Bowel sounds are normal    Musculoskeletal: Normal range of motion  Neurological: He is alert and oriented to person, place, and time  Skin: Skin is warm and dry  Psychiatric: He has a normal mood and affect  His behavior is normal  Judgment and thought content normal        Additional Data:     Labs:      Results from last 7 days  Lab Units 10/18/18  0449  10/16/18  1354   WBC Thousand/uL 9 89  < > 12 51*   HEMOGLOBIN g/dL 12 2  < > 13 7   HEMATOCRIT % 37 8  < > 41 5   PLATELETS Thousands/uL 339  < > 326   NEUTROS PCT %  --   --  72   LYMPHS PCT %  --   --  14   MONOS PCT %  --   --  10   EOS PCT %  --   --  3   < > = values in this interval not displayed  Results from last 7 days  Lab Units 10/18/18  0449 10/17/18  0511   SODIUM mmol/L 138 139   POTASSIUM mmol/L 3 6 3 7   CHLORIDE mmol/L 101 102   CO2 mmol/L 31 30   BUN mg/dL 23 21   CREATININE mg/dL 1 38* 1 44*   CALCIUM mg/dL 8 8 8 9   ALK PHOS U/L  --  85   ALT U/L  --  29   AST U/L  --  18       Results from last 7 days  Lab Units 10/17/18  0551   INR  1 24*       * I Have Reviewed All Lab Data Listed Above  * Additional Pertinent Lab Tests Reviewed:  [facility]        Recent Cultures (last 7 days):       Results from last 7 days  Lab Units 10/17/18  1442   GRAM STAIN RESULT  1+ Disintegrating polys  1+ Gram positive cocci in clusters   WOUND CULTURE  Culture too young- will reincubate       Last 24 Hours Medication List:     Current Facility-Administered Medications:  acetaminophen 650 mg Oral Q6H PRN Adrienne Dewitt PA-C    albuterol 2 puff Inhalation Q6H PRN Alyssa Christian MD    amLODIPine 10 mg Oral Daily Alyssa Christian MD aspirin 81 mg Oral Daily Dewanda Shock, MD    atorvastatin 40 mg Oral HS Dewanda Shock, MD    cholecalciferol 1,000 Units Oral Daily Dewanda Shock, MD    clindamycin 600 mg Intravenous Q8H Adrienne Dewitt PA-C Last Rate: 600 mg (10/18/18 1049)   doxazosin 8 mg Oral HS Dewanda Shock, MD    eplerenone 50 mg Oral Daily Dewanda Shock, MD    escitalopram 10 mg Oral Daily Dewanda Shock, MD    ferrous sulfate 325 mg Oral Daily With Breakfast Dewanda Shock, MD    fish oil 1,000 mg Oral HS Dewanda Shock, MD    fluticasone 1 spray Nasal BID Dewanda Shock, MD    fluticasone-vilanterol 1 puff Inhalation Daily Dewanda Shock, MD    furosemide 80 mg Oral Daily Cindy Donahue PA-C    hydrALAZINE 5 mg Intravenous Q6H PRN Dewanda Shock, MD    HYDROmorphone 0 2 mg Intravenous Q3H PRN Adrienne Dewitt PA-C    HYDROmorphone 0 5 mg Intravenous Q4H PRN Ivis Taveras PA-C    LORazepam 0 5 mg Oral Q6H PRN Dewanda Shock, MD    magnesium oxide 400 mg Oral BID Dewanda Shock, MD    nebivolol 20 mg Oral BID Dewanda Shock, MD    ondansetron 4 mg Intravenous Q6H PRN Yomi Dewitt PA-C    oxyCODONE-acetaminophen 1 tablet Oral Q4H PRN Leander Campa PA-C    potassium chloride 20 mEq Oral Daily Dewanda Shock, MD    sodium chloride 50 mL/hr Intravenous Continuous Etha HEMANT Osullivan Last Rate: 50 mL/hr (10/18/18 0011)   vancomycin 15 mg/kg Intravenous Q12H Adrienne Dewitt PA-C Last Rate: 1,250 mg (10/18/18 0931)        Today, Patient Was Seen By: MARBIN Coelho    ** Please Note: Dragon 360 Dictation voice to text software may have been used in the creation of this document   **

## 2018-10-18 NOTE — ASSESSMENT & PLAN NOTE
· Continuing to improve, however still mildly elevated, could be secondary to pain  · Continue amlodipine, cardura, eplerenone, bystolic and PRN hydralazine  · Patient's Lasix restarted  · Monitor

## 2018-10-19 LAB
ANION GAP SERPL CALCULATED.3IONS-SCNC: 4 MMOL/L (ref 4–13)
BACTERIA SPEC ANAEROBE CULT: NORMAL
BUN SERPL-MCNC: 15 MG/DL (ref 5–25)
CALCIUM SERPL-MCNC: 8.9 MG/DL (ref 8.3–10.1)
CHLORIDE SERPL-SCNC: 102 MMOL/L (ref 100–108)
CO2 SERPL-SCNC: 33 MMOL/L (ref 21–32)
CREAT SERPL-MCNC: 1.19 MG/DL (ref 0.6–1.3)
ERYTHROCYTE [DISTWIDTH] IN BLOOD BY AUTOMATED COUNT: 15.2 % (ref 11.6–15.1)
GFR SERPL CREATININE-BSD FRML MDRD: 79 ML/MIN/1.73SQ M
GLUCOSE SERPL-MCNC: 110 MG/DL (ref 65–140)
HCT VFR BLD AUTO: 39.1 % (ref 36.5–49.3)
HGB BLD-MCNC: 12.6 G/DL (ref 12–17)
MCH RBC QN AUTO: 26.6 PG (ref 26.8–34.3)
MCHC RBC AUTO-ENTMCNC: 32.2 G/DL (ref 31.4–37.4)
MCV RBC AUTO: 83 FL (ref 82–98)
PLATELET # BLD AUTO: 351 THOUSANDS/UL (ref 149–390)
PMV BLD AUTO: 8.8 FL (ref 8.9–12.7)
POTASSIUM SERPL-SCNC: 3.4 MMOL/L (ref 3.5–5.3)
RBC # BLD AUTO: 4.74 MILLION/UL (ref 3.88–5.62)
SODIUM SERPL-SCNC: 139 MMOL/L (ref 136–145)
VANCOMYCIN TROUGH SERPL-MCNC: 15.9 UG/ML (ref 10–20)
WBC # BLD AUTO: 8.23 THOUSAND/UL (ref 4.31–10.16)

## 2018-10-19 PROCEDURE — 80202 ASSAY OF VANCOMYCIN: CPT | Performed by: PHYSICIAN ASSISTANT

## 2018-10-19 PROCEDURE — 85027 COMPLETE CBC AUTOMATED: CPT | Performed by: NURSE PRACTITIONER

## 2018-10-19 PROCEDURE — 99232 SBSQ HOSP IP/OBS MODERATE 35: CPT | Performed by: NURSE PRACTITIONER

## 2018-10-19 PROCEDURE — 80048 BASIC METABOLIC PNL TOTAL CA: CPT | Performed by: NURSE PRACTITIONER

## 2018-10-19 PROCEDURE — 99024 POSTOP FOLLOW-UP VISIT: CPT | Performed by: PHYSICIAN ASSISTANT

## 2018-10-19 RX ORDER — OXYCODONE HYDROCHLORIDE AND ACETAMINOPHEN 5; 325 MG/1; MG/1
1 TABLET ORAL EVERY 4 HOURS PRN
Qty: 24 TABLET | Refills: 0 | Status: SHIPPED | OUTPATIENT
Start: 2018-10-19 | End: 2018-10-29

## 2018-10-19 RX ORDER — CEPHALEXIN 500 MG/1
500 CAPSULE ORAL EVERY 8 HOURS SCHEDULED
Qty: 21 CAPSULE | Refills: 0 | Status: SHIPPED | OUTPATIENT
Start: 2018-10-19 | End: 2018-10-26

## 2018-10-19 RX ADMIN — CLINDAMYCIN IN 5 PERCENT DEXTROSE 600 MG: 12 INJECTION, SOLUTION INTRAVENOUS at 20:23

## 2018-10-19 RX ADMIN — MAGNESIUM OXIDE TAB 400 MG (241.3 MG ELEMENTAL MG) 400 MG: 400 (241.3 MG) TAB at 08:23

## 2018-10-19 RX ADMIN — HYDROMORPHONE HYDROCHLORIDE 0.2 MG: 1 INJECTION, SOLUTION INTRAMUSCULAR; INTRAVENOUS; SUBCUTANEOUS at 13:31

## 2018-10-19 RX ADMIN — VANCOMYCIN HYDROCHLORIDE 1250 MG: 1 INJECTION, POWDER, LYOPHILIZED, FOR SOLUTION INTRAVENOUS at 22:10

## 2018-10-19 RX ADMIN — FLUTICASONE PROPIONATE 1 SPRAY: 50 SPRAY, METERED NASAL at 08:28

## 2018-10-19 RX ADMIN — CLINDAMYCIN IN 5 PERCENT DEXTROSE 600 MG: 12 INJECTION, SOLUTION INTRAVENOUS at 02:48

## 2018-10-19 RX ADMIN — ESCITALOPRAM OXALATE 10 MG: 10 TABLET, FILM COATED ORAL at 08:23

## 2018-10-19 RX ADMIN — FUROSEMIDE 80 MG: 80 TABLET ORAL at 08:24

## 2018-10-19 RX ADMIN — ASPIRIN 81 MG: 81 TABLET, COATED ORAL at 08:24

## 2018-10-19 RX ADMIN — ATORVASTATIN CALCIUM 40 MG: 40 TABLET, FILM COATED ORAL at 22:08

## 2018-10-19 RX ADMIN — OXYCODONE HYDROCHLORIDE AND ACETAMINOPHEN 1 TABLET: 5; 325 TABLET ORAL at 22:09

## 2018-10-19 RX ADMIN — OXYCODONE HYDROCHLORIDE AND ACETAMINOPHEN 1 TABLET: 5; 325 TABLET ORAL at 08:23

## 2018-10-19 RX ADMIN — POTASSIUM CHLORIDE 20 MEQ: 1500 TABLET, EXTENDED RELEASE ORAL at 08:23

## 2018-10-19 RX ADMIN — NEBIVOLOL HYDROCHLORIDE 20 MG: 10 TABLET ORAL at 08:24

## 2018-10-19 RX ADMIN — OXYCODONE HYDROCHLORIDE AND ACETAMINOPHEN 1 TABLET: 5; 325 TABLET ORAL at 17:14

## 2018-10-19 RX ADMIN — VITAMIN D, TAB 1000IU (100/BT) 1000 UNITS: 25 TAB at 08:24

## 2018-10-19 RX ADMIN — CLINDAMYCIN IN 5 PERCENT DEXTROSE 600 MG: 12 INJECTION, SOLUTION INTRAVENOUS at 12:17

## 2018-10-19 RX ADMIN — VANCOMYCIN HYDROCHLORIDE 1250 MG: 1 INJECTION, POWDER, LYOPHILIZED, FOR SOLUTION INTRAVENOUS at 10:50

## 2018-10-19 RX ADMIN — FLUTICASONE PROPIONATE 1 SPRAY: 50 SPRAY, METERED NASAL at 17:15

## 2018-10-19 RX ADMIN — FERROUS SULFATE TAB 325 MG (65 MG ELEMENTAL FE) 325 MG: 325 (65 FE) TAB at 08:24

## 2018-10-19 RX ADMIN — FLUTICASONE FUROATE AND VILANTEROL TRIFENATATE 1 PUFF: 100; 25 POWDER RESPIRATORY (INHALATION) at 08:28

## 2018-10-19 RX ADMIN — Medication 1000 MG: at 22:08

## 2018-10-19 RX ADMIN — MAGNESIUM OXIDE TAB 400 MG (241.3 MG ELEMENTAL MG) 400 MG: 400 (241.3 MG) TAB at 17:14

## 2018-10-19 RX ADMIN — AMLODIPINE BESYLATE 10 MG: 10 TABLET ORAL at 08:23

## 2018-10-19 RX ADMIN — NEBIVOLOL HYDROCHLORIDE 20 MG: 10 TABLET ORAL at 17:14

## 2018-10-19 RX ADMIN — DOXAZOSIN 8 MG: 4 TABLET ORAL at 22:08

## 2018-10-19 RX ADMIN — HYDROMORPHONE HYDROCHLORIDE 0.2 MG: 1 INJECTION, SOLUTION INTRAMUSCULAR; INTRAVENOUS; SUBCUTANEOUS at 13:19

## 2018-10-19 RX ADMIN — EPLERENONE 50 MG: 25 TABLET, FILM COATED ORAL at 08:29

## 2018-10-19 NOTE — DISCHARGE SUMMARY
Discharge Summary - sEtrella Ramirez 52 y o  male MRN: [de-identified]    Unit/Bed#: -01 Encounter: [id number]    Admission Date:   Admission Orders     Ordered        10/16/18 1812  Inpatient Admission (expected length of stay for this patient is greater than two midnights)  Once         10/16/18 1816  Inpatient Admission  Once               Admitting Diagnosis: Essential hypertension [I10]  Acute on chronic diastolic congestive heart failure (HCC) [I50 33]  Abscess of left groin [L02 214]  Visit for wound check [U36 70]  Chronic diastolic CHF (congestive heart failure) ([facility] ) [I50 32]  Chronic obstructive pulmonary disease, unspecified COPD type ([facility] ) [J44 9]  Deep vein thrombosis (DVT) of lower extremity, unspecified chronicity, unspecified laterality, unspecified vein ([facility] ) [I82 409]  Stage 3 chronic kidney disease ([facility] ) [N18 3]    HPI: Estrella Ramirez is a 52y o  year old male with PMHx of hypertension, CKD stage 3, acute unprovoked DVT in January of 2018 now on Eliquis  He had an excisional biopsy of left inguinal lymph node which had become enlarged in January of 2018  Subsequently had a persistent seroma for which she had IR drainage  Patient had several drain changes the last being on 10/8/18 after which she experienced bleeding into the drain  He presented to the emergency department on 10/09/18 for evaluation  The bulb was removed and tubing capped  Upon following with IR the next morning they removed the drain  Since that time he has experienced increased swelling and pain in the area  He had been applying intermittent heat and ice to the area with little improvement  He was taking Tylenol for pain which helps a little bit  He states that it had become so painful that it hurt to walk around  He denies fever or chills  He denies nausea or vomiting    He denies chest pain, shortness of breath, palpitations    Procedures Performed: Incision and drainage of left inguinal abscess    Summary of Hospital Course: Upon presentation to the Emergency Department, CT of his abdomen and pelvis showed a 4 3 x 4 8 x 5 6 cm left inguinal region fluid collection with a small foci of air  Given findings, the patient was admitted to the hospital and started IV antibiotics  The following day, the patient was brought back to the OR for an incision and drainage of his left inguinal region  Intraoperative findings showed a moderate size abscess on the left groin, multiloculated, moderate amount of pus  The wound was packed with wet to dry dressings and the patient was transferred back to the floor without any complications  POD#1 the packing was notably saturated with serous drainage  Patient was symptomatically improved with resolution of leukocytosis  POD#2 the packing was removed and dry dressings were placed overtop  IV antibiotics were transitioned to PO Keflex given findings on wound culture  The patient was then discharged home with a 7 day course of PO Keflex and informed to follow up with the surgeon in two weeks  Significant Findings, Care, Treatment and Services Provided: See above    Complications: None    Discharge Diagnosis: left inguinal abscess    Resolved Problems  Date Reviewed: 10/17/2018    None          Condition at Discharge: good         Discharge instructions/Information to patient and family:   See after visit summary for information provided to patient and family  Provisions for Follow-Up Care:  See after visit summary for information related to follow-up care and any pertinent home health orders  PCP: Daniel Hill MD    Disposition: See After Visit Summary for discharge disposition information  Planned Readmission: No    Discharge Statement   I spent 30 minutes discharging the patient  This time was spent on the day of discharge  I had direct contact with the patient on the day of discharge  Additional documentation is required if more than 30 minutes were spent on discharge  Discharge Medications:  See after visit summary for reconciled discharge medications provided to patient and family

## 2018-10-19 NOTE — PLAN OF CARE
Problem: DISCHARGE PLANNING - CARE MANAGEMENT  Goal: Discharge to post-acute care or home with appropriate resources  INTERVENTIONS:  - Conduct assessment to determine patient/family and health care team treatment goals, and need for post-acute services based on payer coverage, community resources, and patient preferences, and barriers to discharge  - Address psychosocial, clinical, and financial barriers to discharge as identified in assessment in conjunction with the patient/family and health care team  - Arrange appropriate level of post-acute services according to patient's   needs and preference and payer coverage in collaboration with the physician and health care team  - Communicate with and update the patient/family, physician, and health care team regarding progress on the discharge plan  - Arrange appropriate transportation to post-acute venues   Outcome: Completed Date Met: 10/19/18  CM met with pt at bedside  Pt to be discharged home with [facility] at [address] [place] [address] from [facility] notified of d/c

## 2018-10-19 NOTE — PLAN OF CARE
Problem: DISCHARGE PLANNING - CARE MANAGEMENT  Goal: Discharge to post-acute care or home with appropriate resources  INTERVENTIONS:  - Conduct assessment to determine patient/family and health care team treatment goals, and need for post-acute services based on payer coverage, community resources, and patient preferences, and barriers to discharge  - Address psychosocial, clinical, and financial barriers to discharge as identified in assessment in conjunction with the patient/family and health care team  - Arrange appropriate level of post-acute services according to patient's   needs and preference and payer coverage in collaboration with the physician and health care team  - Communicate with and update the patient/family, physician, and health care team regarding progress on the discharge plan  - Arrange appropriate transportation to post-acute venues    Outcome: Completed Date Met: 10/19/18  Pt's car is in the lot and he is going to drive himself home

## 2018-10-19 NOTE — INCIDENTAL FINDINGS
The following findings require follow up:  Radiographic finding   Finding:  An indeterminate small splenic lesion measuring about 8 mm,   Follow up required: MRI   Follow up should be done within 8 month(s)    Please notify the following clinician to assist with the follow up:   Dr Forest Tesfaye

## 2018-10-19 NOTE — ASSESSMENT & PLAN NOTE
· Continuing to improve, however still mildly elevated, could be secondary to pain  · Continue amlodipine, cardura, eplerenone, bystolic  · Patient's Lasix restarted

## 2018-10-19 NOTE — ASSESSMENT & PLAN NOTE
· Pt on Eliquis, on hold due to surgical procedure today  · Restart a/c per surgery recommendations   · Pt to see hematology as an outpatient for recommendations of continued a/c as he has completed 6 months of Eliquis therapy for provoked DVT

## 2018-10-19 NOTE — PROGRESS NOTES
Received a call from patient's nurse that he had bleeding from the wound which was soaking his dressing  I checked the patient's wound and he does have an area of slow active bleeding in the medial edge of the left groin wound  Wound was irrigated with saline and passed a wet to dry dressing  Pressure dressing applied  Patient will stay overnight for monitoring of wound  Dressing to be removed tomorrow and wound assessed at that time    Fina Johnson PA-C  10/19/2018

## 2018-10-19 NOTE — ASSESSMENT & PLAN NOTE
· CT with evidence of left inguinal fluid collection with foci of air  · General surgery is primary  · S/p left inguinal node excision 1/2018 with seroma needing drain placed, this was removed and patient now experiencing erythema over inguinal site  · Postop day 2 of I&D of inguinal abscess, packing and dressing change by surgery today  · Patient is in a new [facility] program where a nurse in position will come out to the home to monitor his progress, no need for home care at this time  · Transitioned to p o   Keflex for 7 days  · Follow up with surgery outpatient in 1-2 weeks

## 2018-10-19 NOTE — SOCIAL WORK
CM met with pt at bedside  Pt to be discharged home with [facility] at [address] [place] [address] from [facility] notified of d/c

## 2018-10-19 NOTE — PROGRESS NOTES
Progress Note - General Surgery   Lawyer Day 52 y o  male MRN: [de-identified]  Unit/Bed#: -01 Encounter: [id number]    Assessment:  1  Lawyer Day is a 52 y o  male POD#2 s/p I&D of left inguinal abscess    Plan:  - Plan for packing to be removed today  Will place topical dry dressings overtop afterwards   - Continue IV antibiotics  Wound cultures pending  Patient will be transitioned to PO antibiotics prior to discharge   - Continue regular house diet  - Pain control PRN    Subjective/Objective     Subjective: Lying in bed in no acute distress  Still having pain over surgical site but it is improved since yesterday  Having serous drainage from incision saturating dressing  Denies fevers, chills, chest pain, or shortness of breath  Ambulating and able to tolerate diet  No other complaints  Objective:     Blood pressure (!) 173/93, pulse 58, temperature 98 2 °F (36 8 °C), temperature source Oral, resp  rate 18, height 5' 7" (1 702 m), weight 90 7 kg (200 lb), SpO2 99 %  ,Body mass index is 31 32 kg/m²        Intake/Output Summary (Last 24 hours) at 10/19/18 1019  Last data filed at 10/19/18 0131   Gross per 24 hour   Intake              740 ml   Output             2700 ml   Net            -1960 ml       Invasive Devices     Peripheral Intravenous Line            Peripheral IV 10/18/18 Right Forearm less than 1 day                Physical Exam: BP (!) 173/93 (BP Location: Right arm)   Pulse 58   Temp 98 2 °F (36 8 °C) (Oral)   Resp 18   Ht 5' 7" (1 702 m)   Wt 90 7 kg (200 lb)   SpO2 99%   BMI 31 32 kg/m²   General appearance: alert and oriented, in no acute distress  Lungs: clear to auscultation bilaterally  Heart: regular rate and rhythm, S1, S2 normal, no murmur, click, rub or gallop  Extremities: left inguinal wound packed with wet to dry dressing and covered with abd, noted serous saturation of dressing, minimal surrounding erythema    Lab, Imaging and other studies:I have personally reviewed pertinent lab results       VTE Pharmacologic Prophylaxis: Sequential compression device (Venodyne)   VTE Mechanical Prophylaxis: sequential compression device    Recent Results (from the past 36 hour(s))   Basic metabolic panel    Collection Time: 10/18/18  4:49 AM   Result Value Ref Range    Sodium 138 136 - 145 mmol/L    Potassium 3 6 3 5 - 5 3 mmol/L    Chloride 101 100 - 108 mmol/L    CO2 31 21 - 32 mmol/L    ANION GAP 6 4 - 13 mmol/L    BUN 23 5 - 25 mg/dL    Creatinine 1 38 (H) 0 60 - 1 30 mg/dL    Glucose 100 65 - 140 mg/dL    Calcium 8 8 8 3 - 10 1 mg/dL    eGFR 66 ml/min/1 73sq m   CBC    Collection Time: 10/18/18  4:49 AM   Result Value Ref Range    WBC 9 89 4 31 - 10 16 Thousand/uL    RBC 4 55 3 88 - 5 62 Million/uL    Hemoglobin 12 2 12 0 - 17 0 g/dL    Hematocrit 37 8 36 5 - 49 3 %    MCV 83 82 - 98 fL    MCH 26 8 26 8 - 34 3 pg    MCHC 32 3 31 4 - 37 4 g/dL    RDW 15 6 (H) 11 6 - 15 1 %    Platelets 198 586 - 651 Thousands/uL    MPV 8 7 (L) 8 9 - 12 7 fL   Basic metabolic panel    Collection Time: 10/19/18  4:46 AM   Result Value Ref Range    Sodium 139 136 - 145 mmol/L    Potassium 3 4 (L) 3 5 - 5 3 mmol/L    Chloride 102 100 - 108 mmol/L    CO2 33 (H) 21 - 32 mmol/L    ANION GAP 4 4 - 13 mmol/L    BUN 15 5 - 25 mg/dL    Creatinine 1 19 0 60 - 1 30 mg/dL    Glucose 110 65 - 140 mg/dL    Calcium 8 9 8 3 - 10 1 mg/dL    eGFR 79 ml/min/1 73sq m   CBC    Collection Time: 10/19/18  4:47 AM   Result Value Ref Range    WBC 8 23 4 31 - 10 16 Thousand/uL    RBC 4 74 3 88 - 5 62 Million/uL    Hemoglobin 12 6 12 0 - 17 0 g/dL    Hematocrit 39 1 36 5 - 49 3 %    MCV 83 82 - 98 fL    MCH 26 6 (L) 26 8 - 34 3 pg    MCHC 32 2 31 4 - 37 4 g/dL    RDW 15 2 (H) 11 6 - 15 1 %    Platelets 457 535 - 930 Thousands/uL    MPV 8 8 (L) 8 9 - 12 7 fL

## 2018-10-19 NOTE — PROGRESS NOTES
Progress- Dariela Bustamante 1965, 52 y o  male MRN: [id number]    Unit/Bed#: -01 Encounter: [id number]    Primary Care Provider: Olena Casey MD   Date and time admitted to hospital: 10/16/2018  1:05 PM        * Abscess of left groin   Assessment & Plan    · CT with evidence of left inguinal fluid collection with foci of air  · General surgery is primary  · S/p left inguinal node excision 1/2018 with seroma needing drain placed, this was removed and patient now experiencing erythema over inguinal site  · Postop day 2 of I&D of inguinal abscess, packing and dressing change by surgery today  · [facility] where a nurse in position will come out to the home to monitor his progress, no need for home care at this time  · Transitioned to p o  Keflex for 7 days  · Follow up with surgery outpatient in 1-2 weeks     Essential hypertension   Assessment & Plan    · Continuing to improve, however still mildly elevated, could be secondary to pain  · Continue amlodipine, cardura, eplerenone, bystolic  · Patient's Lasix restarted       Stage 3 chronic kidney disease (HCC)   Assessment & Plan    · Cr    Improved to 1 38, still within patient's baseline  · Baseline per records around 1 4-1 5        COPD (chronic obstructive pulmonary disease) ([facility] [address] )   Assessment & Plan    · Does not appear to be in any acute exacerbation   · Continue Breo and PRN albuterol inhaler       History of DVT (deep vein thrombosis)   Assessment & Plan    · Pt on Eliquis, on hold due to surgical procedure today  · Restart a/c per surgery recommendations   · Pt to see hematology as an outpatient for recommendations of continued a/c as he has completed 6 months of Eliquis therapy for provoked DVT       Splenic lesion   Assessment & Plan    · POA, noted on CT scan, 8 mm lesion  · Will need follow up MRI in next 6 months as an outpatient     AGUS (obstructive sleep apnea)   Assessment & Plan    · Continue Cpap at bedtime     Chronic diastolic CHF (congestive heart failure) ([facility])   Assessment & Plan    · Appears euvolemic on exam   · Will restart patient's lasix               VTE Pharmacologic Prophylaxis:   Pharmacologic:discharging on eliquis  Mechanical VTE Prophylaxis in Place: Yes    Patient Centered Rounds: I have performed bedside rounds with nursing staff today  Discussions with Specialists or Other Care Team Provider:  Reviewed surgery notes, discussed with primary RN, discussed with case management    Education and Discussions with Family / Patient:  Discussed with patient    Time Spent for Care: 20 minutes  More than 50% of total time spent on counseling and coordination of care as described above  Current Length of Stay: 3 day(s)    Current Patient Status: Inpatient   Certification Statement: The patient will continue to require additional inpatient hospital stay due to Patient will likely be discharged today with antibiotics and pain management per primary service    Discharge Plan / Estimated Discharge Date: today      Code Status: Level 1 - Full Code      Subjective:   Patient denies any chest pain chest tightness shortness of breath or difficulty breathing  Denies any headaches lightheadedness dizziness  Tolerating his meals ambulating freely to the restroom pain is controlled with current regimen    Objective:     Vitals:   Temp (24hrs), Av 2 °F (36 8 °C), Min:98 °F (36 7 °C), Max:98 4 °F (36 9 °C)    Temp:  [98 °F (36 7 °C)-98 4 °F (36 9 °C)] 98 2 °F (36 8 °C)  HR:  [58-60] 58  Resp:  [18] 18  BP: (170-181)/(81-97) 173/93  SpO2:  [96 %-99 %] 99 %  Body mass index is 31 32 kg/m²  Input and Output Summary (last 24 hours):        Intake/Output Summary (Last 24 hours) at 10/19/18 1335  Last data filed at 10/19/18 0131   Gross per 24 hour   Intake              320 ml   Output             2700 ml   Net            -2380 ml       Physical Exam:     Physical Exam   Constitutional: He is oriented to person, place, and time  He appears well-developed and well-nourished  HENT:   Head: Normocephalic  Eyes: Pupils are equal, round, and reactive to light  Neck: Normal range of motion  Neck supple  Cardiovascular: Normal rate and regular rhythm  Pulmonary/Chest: Effort normal and breath sounds normal    Abdominal: Soft  Bowel sounds are normal    Musculoskeletal: Normal range of motion  Neurological: He is alert and oriented to person, place, and time  Skin: Skin is warm and dry  Surgical groin site dressing is CDI   Psychiatric: He has a normal mood and affect  His behavior is normal  Judgment and thought content normal    Nursing note and vitals reviewed  Additional Data:     Labs:      Results from last 7 days  Lab Units 10/19/18  0447  10/16/18  1354   WBC Thousand/uL 8 23  < > 12 51*   HEMOGLOBIN g/dL 12 6  < > 13 7   HEMATOCRIT % 39 1  < > 41 5   PLATELETS Thousands/uL 351  < > 326   NEUTROS PCT %  --   --  72   LYMPHS PCT %  --   --  14   MONOS PCT %  --   --  10   EOS PCT %  --   --  3   < > = values in this interval not displayed  Results from last 7 days  Lab Units 10/19/18  0446  10/17/18  0511   SODIUM mmol/L 139  < > 139   POTASSIUM mmol/L 3 4*  < > 3 7   CHLORIDE mmol/L 102  < > 102   CO2 mmol/L 33*  < > 30   BUN mg/dL 15  < > 21   CREATININE mg/dL 1 19  < > 1 44*   CALCIUM mg/dL 8 9  < > 8 9   ALK PHOS U/L  --   --  85   ALT U/L  --   --  29   AST U/L  --   --  18   < > = values in this interval not displayed  Results from last 7 days  Lab Units 10/17/18  0551   INR  1 24*       * I Have Reviewed All Lab Data Listed Above  * Additional Pertinent Lab Tests Reviewed:  [facility]        Recent Cultures (last 7 days):       Results from last 7 days  Lab Units 10/17/18  1442   GRAM STAIN RESULT  1+ Disintegrating polys  1+ Gram positive cocci in clusters   WOUND CULTURE  2+ Growth of Staphylococcus coagulase negative*       Last 24 Hours Medication List:     Current Facility-Administered Medications:  acetaminophen 650 mg Oral Q6H PRN Adrienne Dewitt PA-C    albuterol 2 puff Inhalation Q6H PRN Dewanda Shock, MD    amLODIPine 10 mg Oral Daily Dewanda Shock, MD    aspirin 81 mg Oral Daily Dewanda Shock, MD    atorvastatin 40 mg Oral HS Dewanda Shock, MD    cholecalciferol 1,000 Units Oral Daily Dewanda Shock, MD    clindamycin 600 mg Intravenous Q8H Adrienne Dewitt PA-C Last Rate: 600 mg (10/19/18 1217)   doxazosin 8 mg Oral HS Dewanda Shock, MD    eplerenone 50 mg Oral Daily Dewanda Shock, MD    escitalopram 10 mg Oral Daily Dewanda Shock, MD    ferrous sulfate 325 mg Oral Daily With Breakfast Dewanda Shock, MD    fish oil 1,000 mg Oral HS Dewanda Shock, MD    fluticasone 1 spray Nasal BID Dewanda Shock, MD    fluticasone-vilanterol 1 puff Inhalation Daily Dewanda Shock, MD    furosemide 80 mg Oral Daily Cindy Donahue PA-C    hydrALAZINE 5 mg Intravenous Q6H PRN Dewanda Shock, MD    HYDROmorphone 0 2 mg Intravenous Q3H PRN Adrienne Dewitt PA-C    HYDROmorphone 0 5 mg Intravenous Q4H PRN Ivis Taveras PA-C    LORazepam 0 5 mg Oral Q6H PRN Dewanda Shock, MD    magnesium oxide 400 mg Oral BID Dewanda Shock, MD    nebivolol 20 mg Oral BID Dewanda Shock, MD    ondansetron 4 mg Intravenous Q6H PRN Yomi Dewitt PA-C    oxyCODONE-acetaminophen 1 tablet Oral Q4H PRN Leander Campa PA-C    potassium chloride 20 mEq Oral Daily Dewanda Shock, MD    sodium chloride 50 mL/hr Intravenous Continuous Yves Osullivan PA-C Last Rate: 50 mL/hr (10/18/18 1730)   vancomycin 15 mg/kg Intravenous Q12H Adrienne Dewitt PA-C Last Rate: 1,250 mg (10/19/18 1050)        Today, Patient Was Seen By: MARBIN Coelho    ** Please Note: Dragon 360 Dictation voice to text software may have been used in the creation of this document   **

## 2018-10-20 VITALS
WEIGHT: 200 LBS | HEIGHT: 67 IN | BODY MASS INDEX: 31.39 KG/M2 | TEMPERATURE: 98.2 F | RESPIRATION RATE: 18 BRPM | OXYGEN SATURATION: 95 % | SYSTOLIC BLOOD PRESSURE: 192 MMHG | DIASTOLIC BLOOD PRESSURE: 98 MMHG | HEART RATE: 65 BPM

## 2018-10-20 LAB
BACTERIA WND AEROBE CULT: ABNORMAL
GRAM STN SPEC: ABNORMAL
GRAM STN SPEC: ABNORMAL

## 2018-10-20 PROCEDURE — 94660 CPAP INITIATION&MGMT: CPT

## 2018-10-20 PROCEDURE — 99024 POSTOP FOLLOW-UP VISIT: CPT | Performed by: SURGERY

## 2018-10-20 PROCEDURE — 94760 N-INVAS EAR/PLS OXIMETRY 1: CPT

## 2018-10-20 RX ADMIN — FLUTICASONE PROPIONATE 1 SPRAY: 50 SPRAY, METERED NASAL at 09:06

## 2018-10-20 RX ADMIN — MAGNESIUM OXIDE TAB 400 MG (241.3 MG ELEMENTAL MG) 400 MG: 400 (241.3 MG) TAB at 17:26

## 2018-10-20 RX ADMIN — FLUTICASONE PROPIONATE 1 SPRAY: 50 SPRAY, METERED NASAL at 17:28

## 2018-10-20 RX ADMIN — NEBIVOLOL HYDROCHLORIDE 20 MG: 10 TABLET ORAL at 09:04

## 2018-10-20 RX ADMIN — CLINDAMYCIN IN 5 PERCENT DEXTROSE 600 MG: 12 INJECTION, SOLUTION INTRAVENOUS at 10:43

## 2018-10-20 RX ADMIN — ASPIRIN 81 MG: 81 TABLET, COATED ORAL at 09:05

## 2018-10-20 RX ADMIN — MAGNESIUM OXIDE TAB 400 MG (241.3 MG ELEMENTAL MG) 400 MG: 400 (241.3 MG) TAB at 09:04

## 2018-10-20 RX ADMIN — CLINDAMYCIN IN 5 PERCENT DEXTROSE 600 MG: 12 INJECTION, SOLUTION INTRAVENOUS at 05:15

## 2018-10-20 RX ADMIN — FERROUS SULFATE TAB 325 MG (65 MG ELEMENTAL FE) 325 MG: 325 (65 FE) TAB at 09:04

## 2018-10-20 RX ADMIN — FLUTICASONE FUROATE AND VILANTEROL TRIFENATATE 1 PUFF: 100; 25 POWDER RESPIRATORY (INHALATION) at 09:06

## 2018-10-20 RX ADMIN — POTASSIUM CHLORIDE 20 MEQ: 1500 TABLET, EXTENDED RELEASE ORAL at 09:04

## 2018-10-20 RX ADMIN — VITAMIN D, TAB 1000IU (100/BT) 1000 UNITS: 25 TAB at 09:04

## 2018-10-20 RX ADMIN — FUROSEMIDE 80 MG: 80 TABLET ORAL at 09:04

## 2018-10-20 RX ADMIN — EPLERENONE 50 MG: 25 TABLET, FILM COATED ORAL at 09:06

## 2018-10-20 RX ADMIN — AMLODIPINE BESYLATE 10 MG: 10 TABLET ORAL at 09:04

## 2018-10-20 RX ADMIN — ESCITALOPRAM OXALATE 10 MG: 10 TABLET, FILM COATED ORAL at 09:04

## 2018-10-20 RX ADMIN — NEBIVOLOL HYDROCHLORIDE 20 MG: 10 TABLET ORAL at 17:26

## 2018-10-20 RX ADMIN — VANCOMYCIN HYDROCHLORIDE 1250 MG: 1 INJECTION, POWDER, LYOPHILIZED, FOR SOLUTION INTRAVENOUS at 09:13

## 2018-10-20 RX ADMIN — OXYCODONE HYDROCHLORIDE AND ACETAMINOPHEN 1 TABLET: 5; 325 TABLET ORAL at 11:47

## 2018-10-20 NOTE — PROGRESS NOTES
Progress Note - General Surgery   Salli Sires 52 y o  male MRN: [de-identified]  Unit/Bed#: -01 Encounter: [id number]    Assessment:  The patient is status post incision and drainage of left groin abscess, improved  No further bleeding noted  Plan:  Discharge home later today  Subjective/Objective   Chief Complaint:  No major complaints at this time  Pain from the left groin subsided    Subjective:  Denies any chills or fever, drainage or bleeding  Objective:     Blood pressure 169/91, pulse (!) 52, temperature 98 3 °F (36 8 °C), temperature source Oral, resp  rate 16, height 5' 7" (1 702 m), weight 90 7 kg (200 lb), SpO2 100 %  ,Body mass index is 31 32 kg/m²  Intake/Output Summary (Last 24 hours) at 10/20/18 1059  Last data filed at 10/20/18 0745   Gross per 24 hour   Intake             1890 ml   Output             1650 ml   Net              240 ml       Invasive Devices     Peripheral Intravenous Line            Peripheral IV 10/19/18 Left Arm less than 1 day                Physical Exam:  The left groin wound had pressure dressing  Pressure dressing was removed and there was no evidence of active bleeding at this time  Dressings were changed

## 2018-10-20 NOTE — DISCHARGE INSTRUCTIONS
Abscess   AMBULATORY CARE:   An abscess  is an area under the skin where pus (infected fluid) collects  An abscess is often caused by bacteria, fungi or other germs that get into an open wound  You can get an abscess anywhere on your body  Common signs and symptoms of an abscess: You may have a swollen mass that is red and painful  Pus may leak out of the mass  The pus will be white or yellow and may smell bad  You may have redness and pain days before the mass appears  You may have a fever and chills if the infection spreads  Seek immediate care if:   · The area around your abscess becomes very painful, warm, or has red streaks  · You have a fever and chills  · Your heart is beating faster than usual      · You feel faint or confused  Contact your healthcare provider if:   · Your abscess gets bigger or does not get better  · Your abscess returns  · You have questions or concerns about your condition or care  Treatment for an abscess: Your healthcare provider may need to make a cut in the abscess to allow the pus to drain  You may need surgery to remove your abscess  You may  need any of the following:  · Antibiotics  help treat a bacterial infection  You will be given a 10 day course of Keflex     · Acetaminophen  decreases pain and fever  It is available without a doctor's order  Ask how much to take and how often to take it  Follow directions  Acetaminophen can cause liver damage if not taken correctly  · NSAIDs , such as ibuprofen, help decrease swelling, pain, and fever  This medicine is available with or without a doctor's order  NSAIDs can cause stomach bleeding or kidney problems in certain people  If you take blood thinner medicine, always ask your healthcare provider if NSAIDs are safe for you  Always read the medicine label and follow directions  · Take your medicine as directed    Contact your healthcare provider if you think your medicine is not helping or if you have side effects  Tell him or her if you are allergic to any medicine  Keep a list of the medicines, vitamins, and herbs you take  Include the amounts, and when and why you take them  Bring the list or the pill bottles to follow-up visits  Carry your medicine list with you in case of an emergency  Self-care:   · Apply a warm compress to your abscess  This will help it open and drain  Wet a washcloth in warm, but not hot, water  Apply the compress for 10 minutes  Repeat this 4 times each day  Do not  press on an abscess or try to open it with a needle  You may push the bacteria deeper or into your blood  · Do not share your clothes, towels, or sheets with anyone  This can spread the infection to others  · Wash your hands often  This can help prevent the spread of germs  Use soap and water or an alcohol-based hand rub  Care for your wound after it is drained:   Care for your wound as directed  After the packing is removed, continue with daily topical dry dressings over the wound  Follow up with your healthcare provider in two weeks      No diet restriction for this surgery  May shower every day  Removed dressings every day, apply dry gauze after drying wound  Call office to make an appointment in 2 weeks [phone]  Call office with any issues regarding the surgery  No driving, heavy lifting or strenuous exercise for one week  Resume home medications  May take regular Tylenol or ibuprofen for pain  Alternating narcotics with ibuprofen or Advil leave will have better pain control

## 2018-10-20 NOTE — ASSESSMENT & PLAN NOTE
· CT with evidence of left inguinal fluid collection with foci of air  · General surgery is primary  · S/p left inguinal node excision 1/2018 with seroma needing drain placed, this was removed and patient now experiencing erythema over inguinal site  · Postop day 3 of I&D of inguinal abscess, packing and dressing change by surgery yesterday  After dressing change, patient began to ooze from incision site  A pressure dressing was placed hemostasis was achieved and patient is now stable for discharge today  · Patient is in a new [facility] program where a nurse in position will come out to the home to monitor his progress, no need for home care at this time  · Transitioned to p o   Keflex for 7 days  · Follow up with surgery outpatient in 1-2 weeks

## 2018-10-20 NOTE — ASSESSMENT & PLAN NOTE
· Continuing to improve, however still mildly elevated, could be secondary to pain  · Continue amlodipine, cardura, eplerenone, bystolic, Lasix

## 2018-10-20 NOTE — PROGRESS NOTES
[facility]  Progress Note - Ramesh Mccurdy 1965, 52 y o  male MRN: [id number]    Unit/Bed#: -01 Encounter: [id number]    Primary Care Provider: Daniel Hill MD   Date and time admitted to hospital: 10/16/2018  1:05 PM        * Abscess of left groin   Assessment & Plan    · CT with evidence of left inguinal fluid collection with foci of air  · General surgery is primary  · S/p left inguinal node excision 1/2018 with seroma needing drain placed, this was removed and patient now experiencing erythema over inguinal site  · Postop day 3 of I&D of inguinal abscess, packing and dressing change by surgery yesterday  After dressing change, patient began to ooze from incision site  A pressure dressing was placed hemostasis was achieved and patient is now stable for discharge today  · Patient is in a new [facility] program where a nurse in position will come out to the home to monitor his progress, no need for home care at this time  · Transitioned to p o  Keflex for 7 days  · Follow up with surgery outpatient in 1-2 weeks     Essential hypertension   Assessment & Plan    · Continuing to improve, however still mildly elevated, could be secondary to pain  · Continue amlodipine, cardura, eplerenone, bystolic, Lasix         Stage 3 chronic kidney disease (HCC)   Assessment & Plan    · Cr    Improved to 1 19, still within patient's baseline  · Baseline per records around 1 4-1 5        COPD (chronic obstructive pulmonary disease) (HCC)   Assessment & Plan    · Does not appear to be in any acute exacerbation   · Continue Breo and PRN albuterol inhaler       History of DVT (deep vein thrombosis)   Assessment & Plan    · Pt on Eliquis, on hold due to recent surgical procedure, can resume at discharge, this will be determined by surgery  · Restart a/c per surgery recommendations   · Pt to see hematology as an outpatient for recommendations of continued a/c as he has completed 6 months of Eliquis therapy for provoked DVT       Splenic lesion   Assessment & Plan    · POA, noted on CT scan, 8 mm lesion  · Will need follow up MRI in next 6 months as an outpatient     AGUS (obstructive sleep apnea)   Assessment & Plan    · Continue Cpap at bedtime     Chronic diastolic CHF (congestive heart failure) (HCC)   Assessment & Plan    · Appears euvolemic on exam   Continue Lasix            VTE Pharmacologic Prophylaxis:   Pharmacologic: Pharmacologic VTE Prophylaxis contraindicated due to Recent surgical procedure  Mechanical VTE Prophylaxis in Place: Yes    Patient Centered Rounds: I have performed bedside rounds with nursing staff today  Discussions with Specialists or Other Care Team Provider:  Discussed with surgery    Education and Discussions with Family / Patient:  Discussed with patient    Time Spent for Care: 15 minutes  More than 50% of total time spent on counseling and coordination of care as described above  Current Length of Stay: 4 day(s)    Current Patient Status: Inpatient   Certification Statement: The patient will continue to require additional inpatient hospital stay due to Evaluation of bleeding surgical site    Discharge Plan / Estimated Discharge Date:  Today      Code Status: Level 1 - Full Code      Subjective:   Patient denies any chest pain chest tightness shortness of breath or difficulty breathing  Patient is trying to ambulate more and sit in a chair more to assure that his incision site in lead today  He is tolerating meals, moving his bowels  His pain is controlled with oral pain medications    Objective:     Vitals:   Temp (24hrs), Av 4 °F (36 9 °C), Min:98 °F (36 7 °C), Max:98 6 °F (37 °C)    Temp:  [98 °F (36 7 °C)-98 6 °F (37 °C)] 98 °F (36 7 °C)  HR:  [52-68] 67  Resp:  [16-18] 18  BP: (154-190)/(86-95) 186/86  SpO2:  [96 %-100 %] 97 %  Body mass index is 31 32 kg/m²  Input and Output Summary (last 24 hours):        Intake/Output Summary (Last 24 hours) at 10/20/18 [address] filed at 10/20/18 1101   Gross per 24 hour   Intake             1890 ml   Output             1850 ml   Net               40 ml       Physical Exam:     Physical Exam   Constitutional: He is oriented to person, place, and time  He appears well-developed and well-nourished  HENT:   Head: Normocephalic  Eyes: Pupils are equal, round, and reactive to light  Neck: Normal range of motion  Neck supple  Cardiovascular: Normal rate and regular rhythm  Pulmonary/Chest: Effort normal and breath sounds normal    Abdominal: Soft  Bowel sounds are normal    Musculoskeletal: Normal range of motion  Neurological: He is alert and oriented to person, place, and time  Skin: Skin is warm and dry  Psychiatric: He has a normal mood and affect  His behavior is normal  Judgment and thought content normal    Nursing note and vitals reviewed  Additional Data:     Labs:      Results from last 7 days  Lab Units 10/19/18  0447  10/16/18  1354   WBC Thousand/uL 8 23  < > 12 51*   HEMOGLOBIN g/dL 12 6  < > 13 7   HEMATOCRIT % 39 1  < > 41 5   PLATELETS Thousands/uL 351  < > 326   NEUTROS PCT %  --   --  72   LYMPHS PCT %  --   --  14   MONOS PCT %  --   --  10   EOS PCT %  --   --  3   < > = values in this interval not displayed  Results from last 7 days  Lab Units 10/19/18  0446  10/17/18  0511   SODIUM mmol/L 139  < > 139   POTASSIUM mmol/L 3 4*  < > 3 7   CHLORIDE mmol/L 102  < > 102   CO2 mmol/L 33*  < > 30   BUN mg/dL 15  < > 21   CREATININE mg/dL 1 19  < > 1 44*   CALCIUM mg/dL 8 9  < > 8 9   ALK PHOS U/L  --   --  85   ALT U/L  --   --  29   AST U/L  --   --  18   < > = values in this interval not displayed  Results from last 7 days  Lab Units 10/17/18  0551   INR  1 24*       * I Have Reviewed All Lab Data Listed Above  * Additional Pertinent Lab Tests Reviewed:  [facility]      Recent Cultures (last 7 days):       Results from last 7 days  Lab Units 10/17/18  1442   GRAM STAIN RESULT  1+ Disintegrating polys  1+ Gram positive cocci in clusters   WOUND CULTURE  2+ Growth of Staphylococcus lugdunensis*       Last 24 Hours Medication List:     Current Facility-Administered Medications:  acetaminophen 650 mg Oral Q6H PRN Adrienne Dewitt PA-C    albuterol 2 puff Inhalation Q6H PRN Gerald Burkett MD    amLODIPine 10 mg Oral Daily Gerald Burkett MD    aspirin 81 mg Oral Daily Gerald Burkett MD    atorvastatin 40 mg Oral HS Gerald Burkett MD    cholecalciferol 1,000 Units Oral Daily Denis Diaz MD    clindamycin 600 mg Intravenous Q8H Adrienne Dewitt PA-C Last Rate: 600 mg (10/20/18 1043)   doxazosin 8 mg Oral HS Gerald Burkett MD    eplerenone 50 mg Oral Daily Gerald Burkett MD    escitalopram 10 mg Oral Daily Gerald Burkett MD    ferrous sulfate 325 mg Oral Daily With Breakfast Gerald Burkett MD    fish oil 1,000 mg Oral HS Gerald Burkett MD    fluticasone 1 spray Nasal BID Gerald Burkett MD    fluticasone-vilanterol 1 puff Inhalation Daily Gerald Burkett MD    furosemide 80 mg Oral Daily Cindy Donahue PA-C    hydrALAZINE 5 mg Intravenous Q6H PRN Gerald Burkett MD    HYDROmorphone 0 2 mg Intravenous Q3H PRN Adrienne Dewitt PA-C    HYDROmorphone 0 5 mg Intravenous Q4H PRN Ivis Taveras PA-C    LORazepam 0 5 mg Oral Q6H PRN Gerald Burkett MD    magnesium oxide 400 mg Oral BID Gerald Burkett MD    nebivolol 20 mg Oral BID Gerald Burkett MD    ondansetron 4 mg Intravenous Q6H PRN Mardell Elizabet Dewitt PA-C    oxyCODONE-acetaminophen 1 tablet Oral Q4H PRN Paula Walker PA-C    potassium chloride 20 mEq Oral Daily Gerald Burkett MD    sodium chloride 50 mL/hr Intravenous Continuous Sarah Naranjo PA-C Last Rate: Stopped (10/19/18 0900)   vancomycin 15 mg/kg Intravenous Q12H Adrienne Dewitt PA-C Last Rate: 1,250 mg (10/20/18 0913)        Today, Patient Was Seen By: MARBIN Stone    ** Please Note: Dragon 360 Dictation voice to text software may have been used in the creation of this document   **

## 2018-10-20 NOTE — ASSESSMENT & PLAN NOTE
· Pt on Eliquis, on hold due to recent surgical procedure, can resume at discharge, this will be determined by surgery  · Restart a/c per surgery recommendations   · Pt to see hematology as an outpatient for recommendations of continued a/c as he has completed 6 months of Eliquis therapy for provoked DVT

## 2018-10-24 ENCOUNTER — TELEPHONE (OUTPATIENT)
Dept: SURGERY | Facility: CLINIC | Age: 53
End: 2018-10-24

## 2018-10-24 NOTE — TELEPHONE ENCOUNTER
Dr Delfino Akers pt    Nurse who is taking care of patient after being discharged is questioning why patient was not discharged with wound care orders  Concerned with infection,  Has suggestions    Please call ASAP  [phone]

## 2018-10-24 NOTE — TELEPHONE ENCOUNTER
Dr Caroline Borjas The nurse is concerned with the hole being exposed, the area the wound is and that the patient has a multiple cats all over the home  She is worried that the pt will get an infection  She would like to pack it and keep an eye on it until pt follows up with you  Is this ok?

## 2018-10-24 NOTE — TELEPHONE ENCOUNTER
All he needs is shower every day, dry the wound well after shower, then apply dry dressings, nothing else

## 2018-11-01 ENCOUNTER — OFFICE VISIT (OUTPATIENT)
Dept: SURGERY | Facility: CLINIC | Age: 53
End: 2018-11-01

## 2018-11-01 VITALS
HEART RATE: 68 BPM | TEMPERATURE: 98.9 F | WEIGHT: 200 LBS | RESPIRATION RATE: 14 BRPM | HEIGHT: 67 IN | BODY MASS INDEX: 31.39 KG/M2 | DIASTOLIC BLOOD PRESSURE: 92 MMHG | SYSTOLIC BLOOD PRESSURE: 158 MMHG

## 2018-11-01 DIAGNOSIS — Z48.89 POSTOPERATIVE VISIT: Primary | ICD-10-CM

## 2018-11-01 PROCEDURE — 99024 POSTOP FOLLOW-UP VISIT: CPT | Performed by: SURGERY

## 2018-11-01 NOTE — PROGRESS NOTES
Post-Op Follow Up- General Surgery   Jonathan Aldrich 52 y o  male MRN: [de-identified]  Unit/Bed#:  Encounter: [id number]    Assessment/Plan     Assessment:  The patient is status post incision and drainage of infected seroma from the left groin after multiple attempts to drain the seroma by IR, clinically improving  Plan:  The patient was advised to do dressing changes 2 to 3 times a day to keep the wound dry as much as possible  I also advised to avoid packing  He will return to my office in 2 weeks for follow-up  History of Present Illness     HPI:  Jonathan Aldrich is a 52 y o  male who presents to my office for 1st postop follow-up after incision and drainage of infected seroma from the left groin  He initially had lymph node removed and he developed seroma  He had several attempts to drain the seroma by IR but the seroma getting infected  Subsequently I proceeded to open the wound and drain the infected seroma  Cultures positive for Staph lugdunensis  The patient denied having any fever or chills        Historical Information   Past Medical History:   Diagnosis Date    [facility]) [facility]     Gout     Hypertension     Kidney disease     renal failure    Leg DVT (deep venous thromboembolism), acute, bilateral ([facility] ) 01/2018    AGUS on CPAP     setting 11    Systolic CHF [facility])      Past Surgical History:   Procedure Laterality Date    CHOLECYSTECTOMY      INCISION AND DRAINAGE OF WOUND Left 10/17/2018    Procedure: INCISION AND DRAINAGE (I&D) GROIN;  Surgeon: Sarahy Sosa MD;  Location: [facility];  Service: General    IR IMAGE [address] / DRAINAGE W TUBE  8/17/2018    IR IMAGE GUIDED ASPIRATION / DRAINAGE W TUBE  7/31/2018    JOINT REPLACEMENT Bilateral     knee    KIDNEY SURGERY  2009    nodule removal    LYMPH NODE DISSECTION Left 01/2018    left inguinal LN removed - benign     OTHER SURGICAL HISTORY      kidney nodule removal    PALATE / Lety Trevizo / EXCISION      SINUS SURGERY       Social History   History   Alcohol Use    Yes     Comment: socially     History   Drug Use No     History   Smoking Status    Never Smoker   Smokeless Tobacco    Never Used     Family History: non-contributory    Meds/Allergies   all medications and allergies reviewed     Current Outpatient Prescriptions:     albuterol (PROVENTIL HFA,VENTOLIN HFA) 90 mcg/act inhaler, Inhale 2 puffs every 6 (six) hours as needed for wheezing, Disp: 1 each, Rfl: 0    amLODIPine (NORVASC) 10 mg tablet, Take 10 mg by mouth daily, Disp: , Rfl:     apixaban (ELIQUIS) 5 mg, Take 5 mg by mouth 2 (two) times a day  , Disp: , Rfl:     aspirin (PX ENTERIC ASPIRIN) 325 mg EC tablet, Take 325 mg by mouth daily At night , Disp: , Rfl:     atorvastatin (LIPITOR) 40 mg tablet, Take 40 mg by mouth daily at bedtime, Disp: , Rfl:     Cholecalciferol (VITAMIN D-3) 1000 units CAPS, Take 1,000 Units by mouth daily, Disp: , Rfl:     doxazosin (CARDURA) 4 mg tablet, Take 4 mg by mouth daily at bedtime, Disp: , Rfl:     eplerenone (INSPRA) 50 MG tablet, Take 50 mg by mouth daily, Disp: , Rfl:     escitalopram (LEXAPRO) 10 mg tablet, Take 10 mg by mouth daily, Disp: , Rfl:     ferrous sulfate 325 (65 Fe) mg tablet, Take 325 mg by mouth 2 (two) times a day  , Disp: , Rfl:     fluticasone (FLONASE) 50 mcg/act nasal spray, 1 spray into each nostril 2 (two) times a day, Disp: 16 g, Rfl: 0    furosemide (LASIX) 80 mg tablet, Take 1 tablet (80 mg total) by mouth daily Starting next Monday  If you become short of breath in meantime call your pcp to consider resuming earlier, or if you have 2 lb wt gain  , Disp: 30 tablet, Rfl: 0    Glycopyrrolate-Formoterol (BEVESPI AEROSPHERE) 9-4 8 MCG/ACT AERO, Inhale 2 puffs daily after breakfast, Disp: , Rfl:     LORazepam (ATIVAN) 0 5 mg tablet, Take 0 5 mg by mouth every 6 (six) hours as needed for anxiety, Disp: , Rfl:     MAGNESIUM PO, Take 400 mg by mouth daily, Disp: , Rfl:     Milk Thistle 500 MG CAPS, Take 500 mg by mouth 2 (two) times a day, Disp: , Rfl:     Mometasone Furo-Formoterol Fum 200-5 MCG/ACT AERO, Take 2 puffs by mouth 2 (two) times a day, Disp: , Rfl:     multivitamin-iron-minerals-folic acid (CENTRUM) chewable tablet, Chew 1 tablet daily, Disp: , Rfl:     nebivolol (BYSTOLIC) 20 MG tablet, Take 20 mg by mouth 2 (two) times a day, Disp: , Rfl:     Omega-3 Fatty Acids (FISH OIL) 1200 MG CAPS, Take 1,200 mg by mouth daily at bedtime, Disp: , Rfl:     potassium chloride (KLOR-CON M20) 20 mEq tablet, Take 1 tablet (20 mEq total) by mouth daily Restart with your lasix next monday, Disp: 1 tablet, Rfl: 0  Allergies   Allergen Reactions    Clonidine Anaphylaxis    Lisinopril Anaphylaxis    Nifedipine Anaphylaxis    Spironolactone Anaphylaxis       Objective     Current Vitals:   Blood Pressure: 158/92 (11/01/18 0907)  Pulse: 68 (11/01/18 0907)  Temperature: 98 9 °F (37 2 °C) (11/01/18 0907)  Temp Source: Oral (11/01/18 0907)  Respirations: 14 (11/01/18 0907)  Height: 5' 7" (170 2 cm) (11/01/18 0907)  Weight - Scale: 90 7 kg (200 lb) (11/01/18 0907)      Invasive Devices          No matching active lines, drains, or airways          Physical Exam:  The incision from the left groin measures approximately 4 cm and it is 0 5 cm wide, it is approximately 1 cm deep  There is no evidence of infection  The patient still has moderate amount of drainage from the wound

## 2018-11-15 ENCOUNTER — OFFICE VISIT (OUTPATIENT)
Dept: SURGERY | Facility: CLINIC | Age: 53
End: 2018-11-15
Payer: COMMERCIAL

## 2018-11-15 VITALS
DIASTOLIC BLOOD PRESSURE: 90 MMHG | WEIGHT: 207 LBS | TEMPERATURE: 99.3 F | SYSTOLIC BLOOD PRESSURE: 140 MMHG | HEIGHT: 67 IN | HEART RATE: 68 BPM | RESPIRATION RATE: 13 BRPM | BODY MASS INDEX: 32.49 KG/M2

## 2018-11-15 DIAGNOSIS — L02.214 ABSCESS OF LEFT GROIN: Primary | ICD-10-CM

## 2018-11-15 PROCEDURE — 99213 OFFICE O/P EST LOW 20 MIN: CPT | Performed by: SURGERY

## 2018-11-15 NOTE — PROGRESS NOTES
Follow Up - General Surgery   Ankur Garcia 52 y o  male MRN: [de-identified]  Unit/Bed#:  Encounter: [id number]    Assessment/Plan     Assessment:  The patient is status post incision and drainage of infected left groin seroma, improved  Plan:  The patient will return to my office in 1 month for follow-up  The patient will continue with local wound care  History of Present Illness     HPI:  Ankur Garcia is a 52 y o  male who presents to my office for follow-up after incision and drainage of infected seroma from the left groin  He offers no complaints at this time  He stated that the wound is almost closed and minimal drainage  Denies having any fever or chills  Review of Systems: The rest of the review of system total of 10 were negative except for the HPI      Historical Information   Past Medical History:   Diagnosis Date    [facility]     Gout     Hypertension     Kidney disease     renal failure    Leg DVT (deep venous [facility]boembolism[facility], [facility] 01/2018    AGUS on CPAP     setting 11    Systolic CHF [facility])      Past Surgical History:   Procedure Laterality Date    CHOLECYSTECTOMY      INCISION AND DRAINAGE OF WOUND Left 10/17/2018    Procedure: INCISION AND DRAINAGE (I&D) GROIN;  Surgeon: Chris Garces MD;  Location: [facility] OR;  Service: General    IR IMAGE [address] / DRAINAGE W TUBE  8/17/2018    IR IMAGE GUIDED ASPIRATION / DRAINAGE W TUBE  7/31/2018    JOINT REPLACEMENT Bilateral     knee    KIDNEY SURGERY  2009    nodule removal    LYMPH NODE DISSECTION Left 01/2018    left inguinal LN removed - benign     OTHER SURGICAL HISTORY      kidney nodule removal    PALATE / UVULA BIOPSY / EXCISION      SINUS SURGERY       Social History   History   Alcohol Use    Yes     Comment: socially     History   Drug Use No     History   Smoking Status    Never Smoker   Smokeless Tobacco    Never Used     Family History: non-contributory    Meds/Allergies   all medications and allergies reviewed     Current Outpatient Prescriptions:     albuterol (PROVENTIL HFA,VENTOLIN HFA) 90 mcg/act inhaler, Inhale 2 puffs every 6 (six) hours as needed for wheezing, Disp: 1 each, Rfl: 0    amLODIPine (NORVASC) 10 mg tablet, Take 10 mg by mouth daily, Disp: , Rfl:     apixaban (ELIQUIS) 5 mg, Take 5 mg by mouth 2 (two) times a day  , Disp: , Rfl:     aspirin (PX ENTERIC ASPIRIN) 325 mg EC tablet, Take 325 mg by mouth daily At night , Disp: , Rfl:     atorvastatin (LIPITOR) 40 mg tablet, Take 40 mg by mouth daily at bedtime, Disp: , Rfl:     Cholecalciferol (VITAMIN D-3) 1000 units CAPS, Take 1,000 Units by mouth daily, Disp: , Rfl:     doxazosin (CARDURA) 4 mg tablet, Take 4 mg by mouth daily at bedtime, Disp: , Rfl:     eplerenone (INSPRA) 50 MG tablet, Take 50 mg by mouth daily, Disp: , Rfl:     escitalopram (LEXAPRO) 10 mg tablet, Take 10 mg by mouth daily, Disp: , Rfl:     ferrous sulfate 325 (65 Fe) mg tablet, Take 325 mg by mouth 2 (two) times a day  , Disp: , Rfl:     fluticasone (FLONASE) 50 mcg/act nasal spray, 1 spray into each nostril 2 (two) times a day, Disp: 16 g, Rfl: 0    furosemide (LASIX) 80 mg tablet, Take 1 tablet (80 mg total) by mouth daily Starting next Monday  If you become short of breath in meantime call your pcp to consider resuming earlier, or if you have 2 lb wt gain  , Disp: 30 tablet, Rfl: 0    Glycopyrrolate-Formoterol (BEVESPI AEROSPHERE) 9-4 8 MCG/ACT AERO, Inhale 2 puffs daily after breakfast, Disp: , Rfl:     LORazepam (ATIVAN) 0 5 mg tablet, Take 0 5 mg by mouth every 6 (six) hours as needed for anxiety, Disp: , Rfl:     MAGNESIUM PO, Take 400 mg by mouth daily, Disp: , Rfl:     Milk Thistle 500 MG CAPS, Take 500 mg by mouth 2 (two) times a day, Disp: , Rfl:     Mometasone Furo-Formoterol Fum 200-5 MCG/ACT AERO, Take 2 puffs by mouth 2 (two) times a day, Disp: , Rfl:    multivitamin-iron-minerals-folic acid (CENTRUM) chewable tablet, Chew 1 tablet daily, Disp: , Rfl:     nebivolol (BYSTOLIC) 20 MG tablet, Take 20 mg by mouth 2 (two) times a day, Disp: , Rfl:     Omega-3 Fatty Acids (FISH OIL) 1200 MG CAPS, Take 1,200 mg by mouth daily at bedtime, Disp: , Rfl:     potassium chloride (KLOR-CON M20) 20 mEq tablet, Take 1 tablet (20 mEq total) by mouth daily Restart with your lasix next monday, Disp: 1 tablet, Rfl: 0  Allergies   Allergen Reactions    Clonidine Anaphylaxis    Lisinopril Anaphylaxis    Nifedipine Anaphylaxis    Spironolactone Anaphylaxis       Objective     Current Vitals:   Blood Pressure: 140/90 (11/15/18 0829)  Pulse: 68 (11/15/18 0829)  Temperature: 99 3 °F (37 4 °C) (11/15/18 0829)  Respirations: 13 (11/15/18 0829)  Height: 5' 7" (170 2 cm) (11/15/18 0829)  Weight - Scale: 93 9 kg (207 lb) (11/15/18 0829)      Invasive Devices          No matching active lines, drains, or airways          Physical Exam   Constitutional: He is oriented to person, place, and time  He appears well-developed and well-nourished  No distress  HENT:   Head: Normocephalic  Mouth/Throat: No oropharyngeal exudate  Eyes: Pupils are equal, round, and reactive to light  No scleral icterus  Neck: Normal range of motion  Cardiovascular: Normal rate and regular rhythm  No murmur heard  Pulmonary/Chest: Effort normal and breath sounds normal  No respiratory distress  Abdominal: Soft  He exhibits no mass  There is no tenderness  Musculoskeletal: He exhibits no edema or tenderness  Lymphadenopathy:     He has no cervical adenopathy  Neurological: He is alert and oriented to person, place, and time  No cranial nerve deficit  Skin: No rash noted  No erythema  The wound from the left groin is healing well, measure approximately 4 cm long and 0 3 cm wide with hypergranulation tissue without evidence of infection or residual abscess     Psychiatric: He has a normal mood and affect   His behavior is normal

## 2018-12-26 ENCOUNTER — TELEPHONE (OUTPATIENT)
Dept: SURGERY | Facility: CLINIC | Age: 53
End: 2018-12-26

## 2018-12-26 NOTE — TELEPHONE ENCOUNTER
Surgery on 10/17 for  groin lymphnode bx  3rd Post Op was supposed to be 12/18  90 days of covered Post Op surgery f/u appts  ends 1/14/2019  Pt did not want to schedule yet for the missed appt on 12/18 because he needs to get testing done prior  Patient is aware of the Post OP dates ( for insurance/ coverage purposes)  He said he would call our office back to r/s at a later time        FYALISSA

## 2019-01-01 NOTE — SOCIAL WORK
CM spoke to Dianne Doshi RN/CM from [facility]  Pt is involved in a new program where a doctor and nurse will go to pt's home a couple times per wk to prevent readmissions when pt is discharged  Will keep Shavon informed of d/c date  46

## 2019-10-10 ENCOUNTER — APPOINTMENT (EMERGENCY)
Dept: RADIOLOGY | Facility: HOSPITAL | Age: 54
End: 2019-10-10
Payer: COMMERCIAL

## 2019-10-10 ENCOUNTER — HOSPITAL ENCOUNTER (EMERGENCY)
Facility: HOSPITAL | Age: 54
Discharge: HOME | End: 2019-10-10
Attending: EMERGENCY MEDICINE | Admitting: EMERGENCY MEDICINE
Payer: COMMERCIAL

## 2019-10-10 ENCOUNTER — APPOINTMENT (EMERGENCY)
Dept: CT IMAGING | Facility: HOSPITAL | Age: 54
End: 2019-10-10
Payer: COMMERCIAL

## 2019-10-10 VITALS
WEIGHT: 230 LBS | BODY MASS INDEX: 36.1 KG/M2 | DIASTOLIC BLOOD PRESSURE: 95 MMHG | SYSTOLIC BLOOD PRESSURE: 185 MMHG | HEIGHT: 67 IN | HEART RATE: 67 BPM | TEMPERATURE: 98 F | OXYGEN SATURATION: 97 % | RESPIRATION RATE: 18 BRPM

## 2019-10-10 DIAGNOSIS — I16.0 HYPERTENSIVE URGENCY: Primary | ICD-10-CM

## 2019-10-10 LAB
ABO GROUP BLD: NORMAL
ALBUMIN SERPL BCP-MCNC: 3.9 G/DL (ref 3.5–5)
ALP SERPL-CCNC: 145 U/L (ref 46–116)
ALT SERPL W P-5'-P-CCNC: 64 U/L (ref 12–78)
ANION GAP SERPL CALCULATED.3IONS-SCNC: 9 MMOL/L (ref 4–13)
AST SERPL W P-5'-P-CCNC: 28 U/L (ref 5–45)
ATRIAL RATE: 61 BPM
ATRIAL RATE: 66 BPM
ATRIAL RATE: 74 BPM
BASOPHILS # BLD AUTO: 0.07 THOUSANDS/ΜL (ref 0–0.1)
BASOPHILS NFR BLD AUTO: 1 % (ref 0–1)
BILIRUB SERPL-MCNC: 0.5 MG/DL (ref 0.2–1)
BLD GP AB SCN SERPL QL: NEGATIVE
BUN SERPL-MCNC: 15 MG/DL (ref 5–25)
CALCIUM SERPL-MCNC: 9.3 MG/DL (ref 8.3–10.1)
CHLORIDE SERPL-SCNC: 102 MMOL/L (ref 100–108)
CO2 SERPL-SCNC: 28 MMOL/L (ref 21–32)
CREAT SERPL-MCNC: 1.28 MG/DL (ref 0.6–1.3)
EOSINOPHIL # BLD AUTO: 0.39 THOUSAND/ΜL (ref 0–0.61)
EOSINOPHIL NFR BLD AUTO: 4 % (ref 0–6)
ERYTHROCYTE [DISTWIDTH] IN BLOOD BY AUTOMATED COUNT: 15.9 % (ref 11.6–15.1)
GFR SERPL CREATININE-BSD FRML MDRD: 72 ML/MIN/1.73SQ M
GLUCOSE SERPL-MCNC: 83 MG/DL (ref 65–140)
HCT VFR BLD AUTO: 42.9 % (ref 36.5–49.3)
HGB BLD-MCNC: 13.6 G/DL (ref 12–17)
IMM GRANULOCYTES # BLD AUTO: 0.03 THOUSAND/UL (ref 0–0.2)
IMM GRANULOCYTES NFR BLD AUTO: 0 % (ref 0–2)
INR PPP: 2.31 (ref 0.84–1.19)
LYMPHOCYTES # BLD AUTO: 2.42 THOUSANDS/ΜL (ref 0.6–4.47)
LYMPHOCYTES NFR BLD AUTO: 26 % (ref 14–44)
MCH RBC QN AUTO: 26.7 PG (ref 26.8–34.3)
MCHC RBC AUTO-ENTMCNC: 31.7 G/DL (ref 31.4–37.4)
MCV RBC AUTO: 84 FL (ref 82–98)
MONOCYTES # BLD AUTO: 0.89 THOUSAND/ΜL (ref 0.17–1.22)
MONOCYTES NFR BLD AUTO: 10 % (ref 4–12)
NEUTROPHILS # BLD AUTO: 5.58 THOUSANDS/ΜL (ref 1.85–7.62)
NEUTS SEG NFR BLD AUTO: 59 % (ref 43–75)
NRBC BLD AUTO-RTO: 0 /100 WBCS
NT-PROBNP SERPL-MCNC: 359 PG/ML
P AXIS: 53 DEGREES
P AXIS: 56 DEGREES
P AXIS: 58 DEGREES
PLATELET # BLD AUTO: 413 THOUSANDS/UL (ref 149–390)
PMV BLD AUTO: 8.5 FL (ref 8.9–12.7)
POTASSIUM SERPL-SCNC: 3.5 MMOL/L (ref 3.5–5.3)
PR INTERVAL: 162 MS
PR INTERVAL: 168 MS
PR INTERVAL: 172 MS
PROT SERPL-MCNC: 8.6 G/DL (ref 6.4–8.2)
PROTHROMBIN TIME: 25.6 SECONDS (ref 11.6–14.5)
QRS AXIS: -34 DEGREES
QRS AXIS: -35 DEGREES
QRS AXIS: -36 DEGREES
QRSD INTERVAL: 90 MS
QRSD INTERVAL: 92 MS
QRSD INTERVAL: 92 MS
QT INTERVAL: 428 MS
QT INTERVAL: 464 MS
QT INTERVAL: 470 MS
QTC INTERVAL: 467 MS
QTC INTERVAL: 475 MS
QTC INTERVAL: 492 MS
RBC # BLD AUTO: 5.09 MILLION/UL (ref 3.88–5.62)
RH BLD: POSITIVE
SODIUM SERPL-SCNC: 139 MMOL/L (ref 136–145)
SPECIMEN EXPIRATION DATE: NORMAL
T WAVE AXIS: 63 DEGREES
T WAVE AXIS: 65 DEGREES
T WAVE AXIS: 68 DEGREES
TROPONIN I SERPL-MCNC: <0.02 NG/ML
VENTRICULAR RATE: 61 BPM
VENTRICULAR RATE: 66 BPM
VENTRICULAR RATE: 74 BPM
WBC # BLD AUTO: 9.38 THOUSAND/UL (ref 4.31–10.16)

## 2019-10-10 PROCEDURE — 99283 EMERGENCY DEPT VISIT LOW MDM: CPT | Performed by: EMERGENCY MEDICINE

## 2019-10-10 PROCEDURE — 85025 COMPLETE CBC W/AUTO DIFF WBC: CPT | Performed by: EMERGENCY MEDICINE

## 2019-10-10 PROCEDURE — 70450 CT HEAD/BRAIN W/O DYE: CPT

## 2019-10-10 PROCEDURE — 71046 X-RAY EXAM CHEST 2 VIEWS: CPT

## 2019-10-10 PROCEDURE — 99285 EMERGENCY DEPT VISIT HI MDM: CPT

## 2019-10-10 PROCEDURE — 85610 PROTHROMBIN TIME: CPT | Performed by: EMERGENCY MEDICINE

## 2019-10-10 PROCEDURE — 83880 ASSAY OF NATRIURETIC PEPTIDE: CPT | Performed by: EMERGENCY MEDICINE

## 2019-10-10 PROCEDURE — 80053 COMPREHEN METABOLIC PANEL: CPT | Performed by: EMERGENCY MEDICINE

## 2019-10-10 PROCEDURE — 86901 BLOOD TYPING SEROLOGIC RH(D): CPT | Performed by: EMERGENCY MEDICINE

## 2019-10-10 PROCEDURE — 86850 RBC ANTIBODY SCREEN: CPT | Performed by: EMERGENCY MEDICINE

## 2019-10-10 PROCEDURE — 36415 COLL VENOUS BLD VENIPUNCTURE: CPT | Performed by: EMERGENCY MEDICINE

## 2019-10-10 PROCEDURE — 93010 ELECTROCARDIOGRAM REPORT: CPT | Performed by: INTERNAL MEDICINE

## 2019-10-10 PROCEDURE — 86900 BLOOD TYPING SEROLOGIC ABO: CPT | Performed by: EMERGENCY MEDICINE

## 2019-10-10 PROCEDURE — 93005 ELECTROCARDIOGRAM TRACING: CPT

## 2019-10-10 PROCEDURE — 84484 ASSAY OF TROPONIN QUANT: CPT | Performed by: EMERGENCY MEDICINE

## 2019-10-10 RX ORDER — NITROGLYCERIN 0.4 MG/1
0.8 TABLET SUBLINGUAL ONCE
Status: COMPLETED | OUTPATIENT
Start: 2019-10-10 | End: 2019-10-10

## 2019-10-10 RX ORDER — AMLODIPINE BESYLATE 10 MG/1
10 TABLET ORAL ONCE
Status: DISCONTINUED | OUTPATIENT
Start: 2019-10-10 | End: 2019-10-10 | Stop reason: HOSPADM

## 2019-10-10 RX ADMIN — NITROGLYCERIN 0.8 MG: 0.4 TABLET SUBLINGUAL at 11:22

## 2019-10-10 NOTE — ED NOTES
Pt concerned with blood pressure at time of discharge  Provider made aware       Collette Shad, RN  10/10/19 [id number]

## 2019-10-10 NOTE — ED NOTES
Discharge instructions reviewed, patient has no new complaints or concerns at time of discharge  Patient ambulated out of department, steady gait, vss         Madonna Tripathi RN  10/10/19 [id number]

## 2019-10-10 NOTE — ED PROVIDER NOTES
History  Chief Complaint   Patient presents with    High Blood Pressure     pt presents for c/o HTN home [facility] states BP was 199/133    Shortness of Breath     Also reports developing SOB while sitting in triage      38-year-old male presenting to the emergency department for evaluation of high blood pressure  Patient has history of hypertension COPD and CHF  Patient was checking his blood pressure routinely at home last night he noticed his blood pressure cuff was reading there were check that again today noticed that his systolic blood pressure was 027 systolic, thought about going to his primary care but recognize the word of sent into the emergency room so presented here instead  He also describes some chest tightness and mild frontal headaches as well  Prior to Admission Medications   Prescriptions Last Dose Informant Patient Reported? Taking?    Cholecalciferol (VITAMIN D-3) 1000 units CAPS  Spouse/Significant Other Yes No   Sig: Take 1,000 Units by mouth daily   Glycopyrrolate-Formoterol (BEVESPI AEROSPHERE) 9-4 8 MCG/ACT AERO  Spouse/Significant Other Yes No   Sig: Inhale 2 puffs daily after breakfast   LORazepam (ATIVAN) 0 5 mg tablet  Spouse/Significant Other Yes No   Sig: Take 0 5 mg by mouth every 6 (six) hours as needed for anxiety   MAGNESIUM PO  Spouse/Significant Other Yes No   Sig: Take 400 mg by mouth daily   Milk Thistle 500 MG CAPS  Spouse/Significant Other Yes No   Sig: Take 500 mg by mouth 2 (two) times a day   Mometasone Furo-Formoterol Fum 200-5 MCG/ACT AERO  Spouse/Significant Other Yes No   Sig: Take 2 puffs by mouth 2 (two) times a day   Omega-3 Fatty Acids (FISH OIL) 1200 MG CAPS  Spouse/Significant Other Yes No   Sig: Take 1,200 mg by mouth daily at bedtime   albuterol (PROVENTIL HFA,VENTOLIN HFA) 90 mcg/act inhaler  Spouse/Significant Other No No   Sig: Inhale 2 puffs every 6 (six) hours as needed for wheezing   amLODIPine (NORVASC) 10 mg tablet  Spouse/Significant Other Yes No   Sig: Take 10 mg by mouth daily   apixaban (ELIQUIS) 5 mg  Spouse/Significant Other Yes No   Sig: Take 5 mg by mouth 2 (two) times a day     aspirin (PX ENTERIC ASPIRIN) 325 mg EC tablet  Spouse/Significant Other Yes No   Sig: Take 325 mg by mouth daily At night    atorvastatin (LIPITOR) 40 mg tablet  Spouse/Significant Other Yes No   Sig: Take 40 mg by mouth daily at bedtime   doxazosin (CARDURA) 4 mg tablet  Spouse/Significant Other Yes No   Sig: Take 4 mg by mouth daily at bedtime   eplerenone (INSPRA) 50 MG tablet  Spouse/Significant Other Yes No   Sig: Take 50 mg by mouth daily   escitalopram (LEXAPRO) 10 mg tablet  Spouse/Significant Other Yes No   Sig: Take 10 mg by mouth daily   ferrous sulfate 325 (65 Fe) mg tablet  Spouse/Significant Other Yes No   Sig: Take 325 mg by mouth 2 (two) times a day     fluticasone (FLONASE) 50 mcg/act nasal spray   No No   Si spray into each nostril 2 (two) times a day   furosemide (LASIX) 80 mg tablet   No No   Sig: Take 1 tablet (80 mg total) by mouth daily Starting next Monday  If you become short of breath in meantime call your pcp to consider resuming earlier, or if you have 2 lb wt gain     multivitamin-iron-minerals-folic acid (CENTRUM) chewable tablet  Spouse/Significant Other Yes No   Sig: Chew 1 tablet daily   nebivolol (BYSTOLIC) 20 MG tablet  Spouse/Significant Other Yes No   Sig: Take 20 mg by mouth 2 (two) times a day   potassium chloride (KLOR-CON M20) 20 mEq tablet   No No   Sig: Take 1 tablet (20 mEq total) by mouth daily Restart with your lasix next monday      Facility-Administered Medications: None       Past Medical History:   Diagnosis Date    COPD (chronic obstructive pulmonary disease) ([address] )     Gout     Hypertension     Kidney disease     renal failure    Leg DVT (deep venous thromboembolism), acute, bilateral ([address] ) 2018    AGUS on CPAP     setting 11    Systolic CHF [facility])        Past Surgical History:   Procedure Laterality Date    CHOLECYSTECTOMY      INCISION AND DRAINAGE OF WOUND Left 10/17/2018    Procedure: INCISION AND DRAINAGE (I&D) GROIN;  Surgeon: Kristina Best MD;  Location: [facility];  Service: General    IR IMAGE [address] / DRAINAGE W TUBE  8/17/2018    IR IMAGE GUIDED ASPIRATION / DRAINAGE W TUBE  7/31/2018    JOINT REPLACEMENT Bilateral     knee    KIDNEY SURGERY  2009    nodule removal    LYMPH NODE DISSECTION Left 01/2018    left inguinal LN removed - benign     OTHER SURGICAL HISTORY      kidney nodule removal    PALATE / UVULA BIOPSY / EXCISION      SINUS SURGERY         Family History   Problem Relation Age of Onset    Heart murmur Sister     Deep vein thrombosis Neg Hx      I have reviewed and agree with the history as documented  Social History     Tobacco Use    Smoking status: Never Smoker    Smokeless tobacco: Never Used   Substance Use Topics    Alcohol use: Yes     Comment: socially    Drug use: No        Review of Systems   Constitutional: Negative for appetite change, chills, fatigue and fever  HENT: Negative for sneezing and sore throat  Eyes: Negative for visual disturbance  Respiratory: Positive for chest tightness and shortness of breath  Negative for cough, choking and wheezing  Cardiovascular: Negative for chest pain and palpitations  Gastrointestinal: Negative for abdominal pain, constipation, diarrhea, nausea and vomiting  Genitourinary: Negative for difficulty urinating and dysuria  Neurological: Positive for headaches  Negative for dizziness, weakness, light-headedness and numbness  All other systems reviewed and are negative  Physical Exam  Physical Exam   Constitutional: He is oriented to person, place, and time  He appears well-developed and well-nourished  No distress  HENT:   Head: Normocephalic and atraumatic  Mouth/Throat: Oropharynx is clear and moist    Eyes: Pupils are equal, round, and reactive to light   EOM are normal    Neck: No JVD present  No tracheal deviation present  Cardiovascular: Normal rate, regular rhythm, normal heart sounds and intact distal pulses  Exam reveals no gallop and no friction rub  No murmur heard  Pulmonary/Chest: Effort normal and breath sounds normal  No respiratory distress  He has no wheezes  He has no rales  Abdominal: Soft  Bowel sounds are normal  He exhibits no distension  There is no tenderness  There is no rebound and no guarding  Neurological: He is alert and oriented to person, place, and time  No cranial nerve deficit  He exhibits normal muscle tone  Skin: Skin is warm and dry  He is not diaphoretic  No pallor  Psychiatric: He has a normal mood and affect  His behavior is normal    Nursing note and vitals reviewed        Vital Signs  ED Triage Vitals   Temperature Pulse Respirations Blood Pressure SpO2   10/10/19 1046 10/10/19 1045 10/10/19 1045 10/10/19 1046 10/10/19 1045   98 °F (36 7 °C) 80 20 (!) 213/120 99 %      Temp src Heart Rate Source Patient Position - Orthostatic VS BP Location FiO2 (%)   -- 10/10/19 1045 10/10/19 1045 10/10/19 1045 --    Monitor Sitting Left arm       Pain Score       10/10/19 1045       No Pain           Vitals:    10/10/19 1330 10/10/19 1400 10/10/19 1430 10/10/19 1511   BP: (!) 183/99 (!) 188/105 (!) 203/104 (!) 185/95   Pulse: 71 65 63 67   Patient Position - Orthostatic VS: Lying Lying Lying Lying         Visual Acuity      ED Medications  Medications   amLODIPine (NORVASC) tablet 10 mg (10 mg Oral Not Given 10/10/19 1516)   nitroglycerin (NITROSTAT) SL tablet 0 8 mg (0 8 mg Sublingual Given 10/10/19 1122)       Diagnostic Studies  Results Reviewed     Procedure Component Value Units Date/Time    NT-BNP PRO (BNP for [place], [place], [place], [place], [place], [facility], [facility], [place] campuses) [[id number]]  (Abnormal) Collected:  10/10/19 1102    Lab Status:  Final result Specimen:  Blood from Arm, Right Updated:  10/10/19 1407     NT-proBNP 359 pg/mL     Comprehensive metabolic panel [[id number]] (Abnormal) Collected:  10/10/19 1102    Lab Status:  Final result Specimen:  Blood from Arm, Right Updated:  10/10/19 1143     Sodium 139 mmol/L      Potassium 3 5 mmol/L      Chloride 102 mmol/L      CO2 28 mmol/L      ANION GAP 9 mmol/L      BUN 15 mg/dL      Creatinine 1 28 mg/dL      Glucose 83 mg/dL      Calcium 9 3 mg/dL      AST 28 U/L      ALT 64 U/L      Alkaline Phosphatase 145 U/L      Total Protein 8 6 g/dL      Albumin 3 9 g/dL      Total Bilirubin 0 50 mg/dL      eGFR 72 ml/min/1 73sq m     Narrative:       National Kidney Disease Foundation guidelines for Chronic Kidney Disease (CKD):     Stage 1 with normal or high GFR (GFR > 90 mL/min/1 73 square meters)    Stage 2 Mild CKD (GFR = 60-89 mL/min/1 73 square meters)    Stage 3A Moderate CKD (GFR = 45-59 mL/min/1 73 square meters)    Stage 3B Moderate CKD (GFR = 30-44 mL/min/1 73 square meters)    Stage 4 Severe CKD (GFR = 15-29 mL/min/1 73 square meters)    Stage 5 End Stage CKD (GFR <15 mL/min/1 73 square meters)  Note: GFR calculation is accurate only with a steady state creatinine    Protime-INR [[id number]]  (Abnormal) Collected:  10/10/19 1102    Lab Status:  Final result Specimen:  Blood from Arm, Right Updated:  10/10/19 1142     Protime 25 6 seconds      INR 2 31    Troponin I [[id number]]  (Normal) Collected:  10/10/19 1102    Lab Status:  Final result Specimen:  Blood from Arm, Right Updated:  10/10/19 1142     Troponin I <0 02 ng/mL     CBC and differential [[id number]]  (Abnormal) Collected:  10/10/19 1102    Lab Status:  Final result Specimen:  Blood from Arm, Right Updated:  10/10/19 1115     WBC 9 38 Thousand/uL      RBC 5 09 Million/uL      Hemoglobin 13 6 g/dL      Hematocrit 42 9 %      MCV 84 fL      MCH 26 7 pg      MCHC 31 7 g/dL      RDW 15 9 %      MPV 8 5 fL      Platelets 802 Thousands/uL      nRBC 0 /100 WBCs      Neutrophils Relative 59 %      Immat GRANS % 0 %      Lymphocytes Relative 26 %      Monocytes Relative 10 % Eosinophils Relative 4 %      Basophils Relative 1 %      Neutrophils Absolute 5 58 Thousands/µL      Immature Grans Absolute 0 03 Thousand/uL      Lymphocytes Absolute 2 42 Thousands/µL      Monocytes Absolute 0 89 Thousand/µL      Eosinophils Absolute 0 39 Thousand/µL      Basophils Absolute 0 07 Thousands/µL                  XR chest 2 views   ED Interpretation by Alex Messer MD (10/10 1125)   No acute cardiopulmonary disease  Final Result by Eduardo Liu DO (10/10 1210)      No acute cardiopulmonary disease  Cardiomegaly  Workstation performed: [id number]         CT head without contrast   Final Result by Eduardo Liu DO (10/10 [phone])      No acute intracranial abnormality  1 7 x 1 1 cm peripherally calcified dural based mass along the medial left cerebellar tentorium, likely representing meningioma  Patient would benefit from nonemergent outpatient neurosurgical consultation and follow-up MRI brain with and without IV    contrast (timeline to be determined by neurosurgery)  Workstation performed: [id number]                    Procedures  Procedures       ED Course  ED Course as of Oct 10 1626   Thu Oct 10, 2019   1450 Patient had been discharged with improved blood pressure however blood pressure is now elevated, will give Norvasc and reassess              HEART Risk Score      Most Recent Value   History  0 Filed at: 10/10/2019 1418   ECG  0 Filed at: 10/10/2019 1418   Age  1 Filed at: 10/10/2019 1418   Risk Factors  2 Filed at: 10/10/2019 1418   Troponin  0 Filed at: 10/10/2019 1418   Heart Score Risk Calculator   History  0 Filed at: 10/10/2019 1418   ECG  0 Filed at: 10/10/2019 1418   Age  1 Filed at: 10/10/2019 1418   Risk Factors  2 Filed at: 10/10/2019 1418   Troponin  0 Filed at: 10/10/2019 1418   HEART Score  3 Filed at: 10/10/2019 1418   HEART Score  3 Filed at: 10/10/2019 1418                            MDM  Number of Diagnoses or Management Options  Diagnosis management comments: 66-year-old male with symptomatic hypertension, will check CT head, cardiac workup, give nitroglycerin, reassess  Disposition  Final diagnoses:   Hypertensive urgency     Time reflects when diagnosis was documented in both MDM as applicable and the Disposition within this note     Time User Action Codes Description Comment    10/10/2019  2:14 PM Cristian, Harriet1 [facility] [I16 0] Hypertensive urgency       ED Disposition     ED Disposition Condition Date/Time Comment    Discharge Stable Thu Oct 10, 2019  2:14 PM Shant Mosher discharge to home/self care              Follow-up Information     Follow up With Specialties Details Why Contact Info    Garima Arora MD    [address] [facility] [address] [place] [address]  N  [phone]            Discharge Medication List as of 10/10/2019  2:14 PM      CONTINUE these medications which have NOT CHANGED    Details   albuterol (PROVENTIL HFA,VENTOLIN HFA) 90 mcg/act inhaler Inhale 2 puffs every 6 (six) hours as needed for wheezing, Starting Mon 3/19/2018, Print      amLODIPine (NORVASC) 10 mg tablet Take 10 mg by mouth daily, Starting Tue 5/23/2017, Historical Med      apixaban (ELIQUIS) 5 mg Take 5 mg by mouth 2 (two) times a day  , Starting Thu 1/18/2018, Historical Med      aspirin (PX ENTERIC ASPIRIN) 325 mg EC tablet Take 325 mg by mouth daily At night , Historical Med      atorvastatin (LIPITOR) 40 mg tablet Take 40 mg by mouth daily at bedtime, Starting Wed 2/1/2017, Historical Med      Cholecalciferol (VITAMIN D-3) 1000 units CAPS Take 1,000 Units by mouth daily, Historical Med      doxazosin (CARDURA) 4 mg tablet Take 4 mg by mouth daily at bedtime, Historical Med      eplerenone (INSPRA) 50 MG tablet Take 50 mg by mouth daily, Starting Tue 5/23/2017, Historical Med      escitalopram (LEXAPRO) 10 mg tablet Take 10 mg by mouth daily, Historical Med      ferrous sulfate 325 (65 Fe) mg tablet Take 325 mg by mouth 2 (two) times a day  , Historical Med fluticasone (FLONASE) 50 mcg/act nasal spray 1 spray into each nostril 2 (two) times a day, Starting Wed 5/23/2018, Normal      furosemide (LASIX) 80 mg tablet Take 1 tablet (80 mg total) by mouth daily Starting next Monday  If you become short of breath in meantime call your pcp to consider resuming earlier, or if you have 2 lb wt gain  , Starting Wed 5/23/2018, Print      Glycopyrrolate-Formoterol (BEVESPI AEROSPHERE) 9-4 8 MCG/ACT AERO Inhale 2 puffs daily after breakfast, Historical Med      LORazepam (ATIVAN) 0 5 mg tablet Take 0 5 mg by mouth every 6 (six) hours as needed for anxiety, Historical Med      MAGNESIUM PO Take 400 mg by mouth daily, Historical Med      Milk Thistle 500 MG CAPS Take 500 mg by mouth 2 (two) times a day, Historical Med      Mometasone Furo-Formoterol Fum 200-5 MCG/ACT AERO Take 2 puffs by mouth 2 (two) times a day, Starting Wed 6/29/2016, Historical Med      multivitamin-iron-minerals-folic acid (CENTRUM) chewable tablet Chew 1 tablet daily, Historical Med      nebivolol (BYSTOLIC) 20 MG tablet Take 20 mg by mouth 2 (two) times a day, Starting Mon 6/13/2016, Historical Med      Omega-3 Fatty Acids (FISH OIL) 1200 MG CAPS Take 1,200 mg by mouth daily at bedtime, Historical Med      potassium chloride (KLOR-CON M20) 20 mEq tablet Take 1 tablet (20 mEq total) by mouth daily Restart with your lasix next monday, Starting Wed 5/23/2018, Normal           No discharge procedures on file      ED Provider  Electronically Signed by           Teagan Grossman MD  10/10/19 1415       Teagan Grossman MD  10/10/19 1411       Teagan Grossman MD  10/10/19 3220

## 2019-12-24 ENCOUNTER — TELEPHONE (OUTPATIENT)
Dept: BARIATRICS | Facility: CLINIC | Age: 54
End: 2019-12-24

## 2019-12-26 RX ORDER — CLONAZEPAM 0.5 MG/1
1 TABLET ORAL
Status: ON HOLD | COMMUNITY
End: 2020-07-11

## 2019-12-26 RX ORDER — VALSARTAN 320 MG/1
TABLET ORAL
Status: ON HOLD | COMMUNITY
Start: 2018-01-22 | End: 2020-07-11

## 2019-12-26 RX ORDER — ALLOPURINOL 100 MG/1
TABLET ORAL
Status: ON HOLD | COMMUNITY
Start: 2018-03-22 | End: 2020-07-11

## 2019-12-26 RX ORDER — METHYLPREDNISOLONE 4 MG/1
TABLET ORAL
Refills: 0 | Status: ON HOLD | COMMUNITY
Start: 2019-10-07 | End: 2020-07-11

## 2019-12-26 RX ORDER — ESCITALOPRAM OXALATE 20 MG/1
20 TABLET ORAL
Status: ON HOLD | COMMUNITY
Start: 2019-11-04 | End: 2020-07-11

## 2019-12-26 RX ORDER — AZITHROMYCIN 250 MG/1
TABLET, FILM COATED ORAL
Status: ON HOLD | COMMUNITY
Start: 2018-10-31 | End: 2020-07-11

## 2019-12-26 RX ORDER — PREDNISONE 10 MG/1
TABLET ORAL
Status: ON HOLD | COMMUNITY
Start: 2018-10-31 | End: 2020-07-11

## 2019-12-26 RX ORDER — MINOXIDIL 10 MG/1
TABLET ORAL
Status: ON HOLD | COMMUNITY
Start: 2018-02-13 | End: 2020-07-11

## 2019-12-26 RX ORDER — WARFARIN SODIUM 5 MG/1
TABLET ORAL
Refills: 5 | COMMUNITY
Start: 2019-11-14 | End: 2020-07-24

## 2019-12-30 ENCOUNTER — CLINICAL SUPPORT (OUTPATIENT)
Dept: BARIATRICS | Facility: CLINIC | Age: 54
End: 2019-12-30

## 2019-12-30 VITALS — WEIGHT: 225 LBS | HEIGHT: 67 IN | BODY MASS INDEX: 35.31 KG/M2

## 2019-12-30 DIAGNOSIS — E66.01 MORBID (SEVERE) OBESITY DUE TO EXCESS CALORIES (HCC): Primary | ICD-10-CM

## 2019-12-30 PROCEDURE — RECHECK

## 2019-12-30 NOTE — PROGRESS NOTES
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

## 2020-01-28 ENCOUNTER — APPOINTMENT (EMERGENCY)
Dept: CT IMAGING | Facility: HOSPITAL | Age: 55
End: 2020-01-28
Payer: COMMERCIAL

## 2020-01-28 ENCOUNTER — HOSPITAL ENCOUNTER (EMERGENCY)
Facility: HOSPITAL | Age: 55
Discharge: HOME | End: 2020-01-28
Attending: EMERGENCY MEDICINE | Admitting: EMERGENCY MEDICINE
Payer: COMMERCIAL

## 2020-01-28 VITALS
OXYGEN SATURATION: 96 % | DIASTOLIC BLOOD PRESSURE: 124 MMHG | SYSTOLIC BLOOD PRESSURE: 234 MMHG | HEART RATE: 60 BPM | RESPIRATION RATE: 16 BRPM | WEIGHT: 233.69 LBS | BODY MASS INDEX: 36.17 KG/M2 | TEMPERATURE: 98 F

## 2020-01-28 DIAGNOSIS — I10 HYPERTENSION: Primary | ICD-10-CM

## 2020-01-28 LAB
ALBUMIN SERPL BCP-MCNC: 3.8 G/DL (ref 3.5–5)
ALP SERPL-CCNC: 122 U/L (ref 46–116)
ALT SERPL W P-5'-P-CCNC: 45 U/L (ref 12–78)
ANION GAP SERPL CALCULATED.3IONS-SCNC: 7 MMOL/L (ref 4–13)
AST SERPL W P-5'-P-CCNC: 30 U/L (ref 5–45)
ATRIAL RATE: 62 BPM
BASOPHILS # BLD AUTO: 0.06 THOUSANDS/ΜL (ref 0–0.1)
BASOPHILS NFR BLD AUTO: 1 % (ref 0–1)
BILIRUB SERPL-MCNC: 0.8 MG/DL (ref 0.2–1)
BUN SERPL-MCNC: 22 MG/DL (ref 5–25)
CALCIUM SERPL-MCNC: 8.9 MG/DL (ref 8.3–10.1)
CHLORIDE SERPL-SCNC: 102 MMOL/L (ref 100–108)
CO2 SERPL-SCNC: 33 MMOL/L (ref 21–32)
CREAT SERPL-MCNC: 1.56 MG/DL (ref 0.6–1.3)
EOSINOPHIL # BLD AUTO: 0.42 THOUSAND/ΜL (ref 0–0.61)
EOSINOPHIL NFR BLD AUTO: 5 % (ref 0–6)
ERYTHROCYTE [DISTWIDTH] IN BLOOD BY AUTOMATED COUNT: 15.5 % (ref 11.6–15.1)
GFR SERPL CREATININE-BSD FRML MDRD: 57 ML/MIN/1.73SQ M
GLUCOSE SERPL-MCNC: 68 MG/DL (ref 65–140)
HCT VFR BLD AUTO: 42.6 % (ref 36.5–49.3)
HGB BLD-MCNC: 14.3 G/DL (ref 12–17)
IMM GRANULOCYTES # BLD AUTO: 0.02 THOUSAND/UL (ref 0–0.2)
IMM GRANULOCYTES NFR BLD AUTO: 0 % (ref 0–2)
LYMPHOCYTES # BLD AUTO: 3.39 THOUSANDS/ΜL (ref 0.6–4.47)
LYMPHOCYTES NFR BLD AUTO: 37 % (ref 14–44)
MCH RBC QN AUTO: 28.8 PG (ref 26.8–34.3)
MCHC RBC AUTO-ENTMCNC: 33.6 G/DL (ref 31.4–37.4)
MCV RBC AUTO: 86 FL (ref 82–98)
MONOCYTES # BLD AUTO: 0.87 THOUSAND/ΜL (ref 0.17–1.22)
MONOCYTES NFR BLD AUTO: 10 % (ref 4–12)
NEUTROPHILS # BLD AUTO: 4.42 THOUSANDS/ΜL (ref 1.85–7.62)
NEUTS SEG NFR BLD AUTO: 47 % (ref 43–75)
NRBC BLD AUTO-RTO: 0 /100 WBCS
P AXIS: 61 DEGREES
PLATELET # BLD AUTO: 404 THOUSANDS/UL (ref 149–390)
PMV BLD AUTO: 9.9 FL (ref 8.9–12.7)
POTASSIUM SERPL-SCNC: 2.9 MMOL/L (ref 3.5–5.3)
PR INTERVAL: 154 MS
PROT SERPL-MCNC: 8.3 G/DL (ref 6.4–8.2)
QRS AXIS: -54 DEGREES
QRSD INTERVAL: 92 MS
QT INTERVAL: 506 MS
QTC INTERVAL: 513 MS
RBC # BLD AUTO: 4.96 MILLION/UL (ref 3.88–5.62)
SODIUM SERPL-SCNC: 142 MMOL/L (ref 136–145)
T WAVE AXIS: -3 DEGREES
TROPONIN I SERPL-MCNC: 0.02 NG/ML
VENTRICULAR RATE: 62 BPM
WBC # BLD AUTO: 9.18 THOUSAND/UL (ref 4.31–10.16)

## 2020-01-28 PROCEDURE — 80053 COMPREHEN METABOLIC PANEL: CPT | Performed by: EMERGENCY MEDICINE

## 2020-01-28 PROCEDURE — 36415 COLL VENOUS BLD VENIPUNCTURE: CPT | Performed by: EMERGENCY MEDICINE

## 2020-01-28 PROCEDURE — 70450 CT HEAD/BRAIN W/O DYE: CPT

## 2020-01-28 PROCEDURE — 99284 EMERGENCY DEPT VISIT MOD MDM: CPT | Performed by: EMERGENCY MEDICINE

## 2020-01-28 PROCEDURE — 84484 ASSAY OF TROPONIN QUANT: CPT | Performed by: EMERGENCY MEDICINE

## 2020-01-28 PROCEDURE — 85025 COMPLETE CBC W/AUTO DIFF WBC: CPT | Performed by: EMERGENCY MEDICINE

## 2020-01-28 PROCEDURE — 99284 EMERGENCY DEPT VISIT MOD MDM: CPT

## 2020-01-28 PROCEDURE — 93005 ELECTROCARDIOGRAM TRACING: CPT

## 2020-01-28 PROCEDURE — 93010 ELECTROCARDIOGRAM REPORT: CPT | Performed by: INTERNAL MEDICINE

## 2020-01-28 RX ORDER — EPLERENONE 25 MG/1
50 TABLET, FILM COATED ORAL DAILY
Status: DISCONTINUED | OUTPATIENT
Start: 2020-01-29 | End: 2020-01-28

## 2020-01-28 RX ORDER — DOXAZOSIN MESYLATE 4 MG/1
4 TABLET ORAL DAILY
Status: DISCONTINUED | OUTPATIENT
Start: 2020-01-29 | End: 2020-01-28

## 2020-01-28 RX ORDER — POTASSIUM CHLORIDE 20 MEQ/1
40 TABLET, EXTENDED RELEASE ORAL ONCE
Status: COMPLETED | OUTPATIENT
Start: 2020-01-28 | End: 2020-01-28

## 2020-01-28 RX ORDER — AMLODIPINE BESYLATE 10 MG/1
10 TABLET ORAL ONCE
Status: COMPLETED | OUTPATIENT
Start: 2020-01-28 | End: 2020-01-28

## 2020-01-28 RX ORDER — DOXAZOSIN MESYLATE 4 MG/1
4 TABLET ORAL ONCE
Status: COMPLETED | OUTPATIENT
Start: 2020-01-28 | End: 2020-01-28

## 2020-01-28 RX ORDER — EPLERENONE 25 MG/1
50 TABLET, FILM COATED ORAL ONCE
Status: COMPLETED | OUTPATIENT
Start: 2020-01-28 | End: 2020-01-28

## 2020-01-28 RX ADMIN — AMLODIPINE BESYLATE 10 MG: 10 TABLET ORAL at 17:07

## 2020-01-28 RX ADMIN — POTASSIUM CHLORIDE 40 MEQ: 1500 TABLET, EXTENDED RELEASE ORAL at 17:06

## 2020-01-28 RX ADMIN — EPLERENONE 50 MG: 25 TABLET, FILM COATED ORAL at 17:07

## 2020-01-28 RX ADMIN — DOXAZOSIN 4 MG: 4 TABLET ORAL at 17:07

## 2020-01-28 NOTE — ED PROVIDER NOTES
History  Chief Complaint   Patient presents with    Hypertension     Patient states he was at his nephrologist office who advised him to come to ED for furthur evaluation of high blood pressure  78-year-old male referred for evaluation of high lightheaded  Patient has chronic hypertension managed by his cardiologist and nephrologist, was seen at Nephrology today for routine follow-up, endorse that he was having some mild headache as well as shortness of breath, however to me states that he has COPD and that his shortness of breath is not worse now than it normally is  He does endorse medication noncompliance  Patient states that his nephrologist previously increased his doses of medications, and so it been taking those medicines at higher doses ran out sooner, the pharmacy would not fill because the new           Prior to Admission Medications   Prescriptions Last Dose Informant Patient Reported? Taking?    Cholecalciferol (VITAMIN D-3) 1000 units CAPS  Spouse/Significant Other Yes No   Sig: Take 1,000 Units by mouth daily   Coenzyme Q10 (CO Q 10) 100 MG CAPS   Yes No   Si capsule   Glycopyrrolate-Formoterol (BEVESPI AEROSPHERE) 9-4 8 MCG/ACT AERO  Spouse/Significant Other Yes No   Sig: Inhale 2 puffs daily after breakfast   LORazepam (ATIVAN) 0 5 mg tablet  Spouse/Significant Other Yes No   Sig: Take 0 5 mg by mouth every 6 (six) hours as needed for anxiety   MAGNESIUM PO  Spouse/Significant Other Yes No   Sig: Take 400 mg by mouth daily   Milk Thistle 500 MG CAPS  Spouse/Significant Other Yes No   Sig: Take 500 mg by mouth 2 (two) times a day   Mometasone Furo-Formoterol Fum 200-5 MCG/ACT AERO  Spouse/Significant Other Yes No   Sig: Take 2 puffs by mouth 2 (two) times a day   Multiple Vitamins-Minerals (MULTIVITAMIN & MINERAL PO)   Yes No   Si tablet   Omega-3 Fatty Acids (FISH OIL) 1200 MG CAPS  Spouse/Significant Other Yes No   Sig: Take 1,200 mg by mouth daily at bedtime   albuterol (PROVENTIL HFA,VENTOLIN HFA) 90 mcg/act inhaler  Spouse/Significant Other No No   Sig: Inhale 2 puffs every 6 (six) hours as needed for wheezing   allopurinol (ZYLOPRIM) 100 mg tablet   Yes No   amLODIPine (NORVASC) 10 mg tablet  Spouse/Significant Other Yes No   Sig: Take 10 mg by mouth daily   apixaban (ELIQUIS) 5 mg  Spouse/Significant Other Yes No   Sig: Take 5 mg by mouth 2 (two) times a day     aspirin (PX ENTERIC ASPIRIN) 325 mg EC tablet  Spouse/Significant Other Yes No   Sig: Take 325 mg by mouth daily At night    atorvastatin (LIPITOR) 40 mg tablet  Spouse/Significant Other Yes No   Sig: Take 40 mg by mouth daily at bedtime   azilsartan medoxomil (EDARBI) 80 MG tablet   Yes No   Sig: Once a day   azithromycin (ZITHROMAX) 250 mg tablet   Yes No   Sig: Take 2 tablets by mouth on day one; then one tablet daily for 4 days  clonazePAM (KlonoPIN) 0 5 mg tablet   Yes No   Si tablet   doxazosin (CARDURA) 4 mg tablet  Spouse/Significant Other Yes No   Sig: Take 4 mg by mouth daily at bedtime   enoxaparin (LOVENOX) 100 mg/mL   Yes No   Sig: INJECT 1 SYRINGE SUBCUTANEOUSLY EVERY 12 HOURS   eplerenone (INSPRA) 50 MG tablet  Spouse/Significant Other Yes No   Sig: Take 50 mg by mouth daily   escitalopram (LEXAPRO) 10 mg tablet  Spouse/Significant Other Yes No   Sig: Take 10 mg by mouth daily   escitalopram (LEXAPRO) 20 mg tablet   Yes No   Sig: Take 20 mg by mouth   ferrous sulfate 325 (65 Fe) mg tablet  Spouse/Significant Other Yes No   Sig: Take 325 mg by mouth 2 (two) times a day     fluticasone (FLONASE) 50 mcg/act nasal spray   No No   Si spray into each nostril 2 (two) times a day   fluticasone-salmeterol (ADVAIR, WIXELA) 250-50 mcg/dose inhaler   Yes No   Sig: INHALE 1 DOSE BY MOUTH TWICE DAILY   furosemide (LASIX) 80 mg tablet   No No   Sig: Take 1 tablet (80 mg total) by mouth daily Starting next Monday    If you become short of breath in meantime call your pcp to consider resuming earlier, or if you have 2 lb wt gain    methylPREDNISolone 4 MG tablet therapy pack   Yes No   Sig: TAKE BY MOUTH AS DIRECTED ON INSIDE OF PACKAGE   minoxidil (LONITEN) 10 mg tablet   Yes No   multivitamin-iron-minerals-folic acid (CENTRUM) chewable tablet  Spouse/Significant Other Yes No   Sig: Chew 1 tablet daily   nebivolol (BYSTOLIC) 20 MG tablet  Spouse/Significant Other Yes No   Sig: Take 20 mg by mouth 2 (two) times a day   potassium chloride (KLOR-CON M20) 20 mEq tablet   No No   Sig: Take 1 tablet (20 mEq total) by mouth daily Restart with your lasix next monday   predniSONE 10 mg tablet   Yes No   Sig: Take 4 tablets for 2 days then take 3 tablets for 2 days then take 2 tablets for 2 days then take 1 tablet for 2 days   tiotropium (SPIRIVA RESPIMAT) 2 5 MCG/ACT AERS inhaler   Yes No   Sig: Inhale daily   valsartan (DIOVAN) 320 MG tablet   Yes No   Sig: Once a day   warfarin (COUMADIN) 5 mg tablet   Yes No   Sig: TAKE 1 TO 2 TABLETS BY MOUTH ONCE DAILY IN THE EVENING AS DIRECTED BY COUMADIN CLINIC      Facility-Administered Medications: None       Past Medical History:   Diagnosis Date    COPD (chronic obstructive pulmonary disease) (HCC)     Gout     Hypertension     Kidney disease     renal failure    Leg DVT (deep venous thromboembolism), acute, bilateral (HCC) 01/2018    Obesity (BMI 30-39  9)     AGUS on CPAP     setting 11    Systolic CHF [facility])        Past Surgical History:   Procedure Laterality Date    CHOLECYSTECTOMY      INCISION AND DRAINAGE OF WOUND Left 10/17/2018    Procedure: INCISION AND DRAINAGE (I&D) GROIN;  Surgeon: Maria A Souza MD;  Location: [facility] OR;  Service: General    IR IMAGE [address] / DRAINAGE W TUBE  8/17/2018    IR IMAGE GUIDED ASPIRATION / DRAINAGE W TUBE  7/31/2018    JOINT REPLACEMENT Bilateral     knee    KIDNEY SURGERY  2009    nodule removal    LYMPH NODE DISSECTION Left 01/2018    left inguinal LN removed - benign     OTHER SURGICAL HISTORY      kidney nodule removal    PALATE / UVULA BIOPSY / EXCISION      SINUS SURGERY         Family History   Problem Relation Age of Onset    Heart murmur Sister     Deep vein thrombosis Neg Hx      I have reviewed and agree with the history as documented  Social History     Tobacco Use    Smoking status: Never Smoker    Smokeless tobacco: Never Used   Substance Use Topics    Alcohol use: Yes     Comment: socially    Drug use: No        Review of Systems   Constitutional: Negative for appetite change, chills, fatigue and fever  HENT: Negative for sneezing and sore throat  Eyes: Negative for visual disturbance  Respiratory: Negative for cough, choking, chest tightness, shortness of breath and wheezing  Cardiovascular: Negative for chest pain and palpitations  Gastrointestinal: Negative for abdominal pain, constipation, diarrhea, nausea and vomiting  Genitourinary: Negative for difficulty urinating and dysuria  Neurological: Negative for dizziness, weakness, light-headedness, numbness and headaches  All other systems reviewed and are negative  Physical Exam  Physical Exam   Constitutional: He is oriented to person, place, and time  He appears well-developed and well-nourished  No distress  HENT:   Head: Normocephalic and atraumatic  Mouth/Throat: Oropharynx is clear and moist    Eyes: Pupils are equal, round, and reactive to light  EOM are normal    Neck: No JVD present  No tracheal deviation present  Cardiovascular: Normal rate, regular rhythm, normal heart sounds and intact distal pulses  Exam reveals no gallop and no friction rub  No murmur heard  Pulmonary/Chest: Effort normal and breath sounds normal  No respiratory distress  He has no wheezes  He has no rales  Abdominal: Soft  Bowel sounds are normal  He exhibits no distension  There is no tenderness  There is no rebound and no guarding  Neurological: He is alert and oriented to person, place, and time  No cranial nerve deficit   He exhibits normal muscle tone  Skin: Skin is warm and dry  He is not diaphoretic  No pallor  Psychiatric: He has a normal mood and affect  His behavior is normal    Nursing note and vitals reviewed  Vital Signs  ED Triage Vitals   Temperature Pulse Respirations Blood Pressure SpO2   01/28/20 1531 01/28/20 1531 01/28/20 1531 01/28/20 1531 01/28/20 1531   98 °F (36 7 °C) 60 17 (!) 228/125 98 %      Temp Source Heart Rate Source Patient Position - Orthostatic VS BP Location FiO2 (%)   01/28/20 1531 01/28/20 1531 01/28/20 1531 01/28/20 1531 --   Oral Monitor Sitting Left arm       Pain Score       01/28/20 1541       No Pain           Vitals:    01/28/20 1531 01/28/20 1541 01/28/20 1700 01/28/20 1747   BP: (!) 228/125 (!) 244/132 (!) 218/114 (!) 234/124   Pulse: 60 65 60    Patient Position - Orthostatic VS: Sitting  Lying Lying         Visual Acuity      ED Medications  Medications   amLODIPine (NORVASC) tablet 10 mg (10 mg Oral Given 1/28/20 1707)   potassium chloride (K-DUR,KLOR-CON) CR tablet 40 mEq (40 mEq Oral Given 1/28/20 1706)   doxazosin (CARDURA) tablet 4 mg (4 mg Oral Given 1/28/20 1707)   eplerenone (INSPRA) tablet 50 mg (50 mg Oral Given 1/28/20 1707)       Diagnostic Studies  Results Reviewed     Procedure Component Value Units Date/Time    Beth[place] draw [[id number]] Collected:  01/28/20 1543    Lab Status:  Final result Specimen:  Blood from Arm, Right Updated:  01/28/20 1703    Narrative: The following orders were created for panel order [place] draw  Procedure                               Abnormality         Status                     ---------                               -----------         ------                     Rosamaria Scriver Top on HLBS[[id number]]                           Final result               Gold top on JOSEPH[[id number]]                                 Final result                 Please view results for these tests on the individual orders      Troponin I [[id number]]  (Normal) Collected: 01/28/20 1543    Lab Status:  Final result Specimen:  Blood from Arm, Right Updated:  01/28/20 1611     Troponin I 0 02 ng/mL     Comprehensive metabolic panel [[id number]]  (Abnormal) Collected:  01/28/20 1543    Lab Status:  Final result Specimen:  Blood from Arm, Right Updated:  01/28/20 1609     Sodium 142 mmol/L      Potassium 2 9 mmol/L      Chloride 102 mmol/L      CO2 33 mmol/L      ANION GAP 7 mmol/L      BUN 22 mg/dL      Creatinine 1 56 mg/dL      Glucose 68 mg/dL      Calcium 8 9 mg/dL      AST 30 U/L      ALT 45 U/L      Alkaline Phosphatase 122 U/L      Total Protein 8 3 g/dL      Albumin 3 8 g/dL      Total Bilirubin 0 80 mg/dL      eGFR 57 ml/min/1 73sq m     Narrative:       [place] guidelines for Chronic Kidney Disease (CKD):     Stage 1 with normal or high GFR (GFR > 90 mL/min/1 73 square meters)    Stage 2 Mild CKD (GFR = 60-89 mL/min/1 73 square meters)    Stage 3A Moderate CKD (GFR = 45-59 mL/min/1 73 square meters)    Stage 3B Moderate CKD (GFR = 30-44 mL/min/1 73 square meters)    Stage 4 Severe CKD (GFR = 15-29 mL/min/1 73 square meters)    Stage 5 End Stage CKD (GFR <15 mL/min/1 73 square meters)  Note: GFR calculation is accurate only with a steady state creatinine    CBC and differential [[id number]]  (Abnormal) Collected:  01/28/20 1543    Lab Status:  Final result Specimen:  Blood from Arm, Right Updated:  01/28/20 1550     WBC 9 18 Thousand/uL      RBC 4 96 Million/uL      Hemoglobin 14 3 g/dL      Hematocrit 42 6 %      MCV 86 fL      MCH 28 8 pg      MCHC 33 6 g/dL      RDW 15 5 %      MPV 9 9 fL      Platelets 063 Thousands/uL      nRBC 0 /100 WBCs      Neutrophils Relative 47 %      Immat GRANS % 0 %      Lymphocytes Relative 37 %      Monocytes Relative 10 %      Eosinophils Relative 5 %      Basophils Relative 1 %      Neutrophils Absolute 4 42 Thousands/µL      Immature Grans Absolute 0 02 Thousand/uL      Lymphocytes Absolute 3 39 Thousands/µL Monocytes Absolute 0 87 Thousand/µL      Eosinophils Absolute 0 42 Thousand/µL      Basophils Absolute 0 06 Thousands/µL                  CT head without contrast   Final Result by Colletta Riser, MD (01/28 1636)      No acute intracranial abnormality  Mild cerebral chronic microangiopathic disease  Stable partially calcified left infratentorial meningioma  Workstation performed: [id number]                    Procedures  Procedures         ED Course  ED Course as of Jan 28 1809 Tue Jan 28, 2020 1808 Patient's blood pressure still high, though he remains to be asymptomatic, I expect this will correct when he is compliant again with his medications  MDM  Number of Diagnoses or Management Options  Hypertension:   Diagnosis management comments: 12-year-old male presenting for evaluation of hypertension, well-appearing here, this is likely rebound hypertension due to his history of medication noncompliance, explained why is dangerous to him, expresses understanding, also tells me that he has access to medications new it filled when he gets home, will give him 1 dose of each here  Check cardiac panel CT head to make sure there is no evidence of end-organ damage and discharge with PCP/        Disposition  Final diagnoses:   Hypertension     Time reflects when diagnosis was documented in both MDM as applicable and the Disposition within this note     Time User Action Codes Description Comment    1/28/2020  5:46 PM Elsa Foster 1334 Hypertension       ED Disposition     ED Disposition Condition Date/Time Comment    Discharge Stable Tue Jan 28, 2020  5:46 PM Vera Betzy discharge to home/self care              Follow-up Information     Follow up With Specialties Details Why Contact Info    Nelson Finnegan MD    [facility] [address] [facility]  [facility] [address]  [phone]            Patient's Medications   Discharge Prescriptions    No medications on file     No discharge procedures on file      ED Provider  Electronically Signed by           Sanju Valdivia MD  01/28/20 1746       Sanju Valdivia MD  01/28/20 1076       Sanju Valdivia MD  01/28/20 0399

## 2020-05-27 ENCOUNTER — TELEPHONE (OUTPATIENT)
Dept: BARIATRICS | Facility: CLINIC | Age: 55
End: 2020-05-27

## 2020-05-29 RX ORDER — ASPIRIN 325 MG
TABLET ORAL
Status: ON HOLD | COMMUNITY
End: 2020-07-11

## 2020-05-29 RX ORDER — BUSPIRONE HYDROCHLORIDE 5 MG/1
5 TABLET ORAL
Status: ON HOLD | COMMUNITY
End: 2020-07-11

## 2020-06-01 ENCOUNTER — CLINICAL SUPPORT (OUTPATIENT)
Dept: BARIATRICS | Facility: CLINIC | Age: 55
End: 2020-06-01

## 2020-06-01 VITALS — WEIGHT: 241.5 LBS | BODY MASS INDEX: 37.9 KG/M2 | HEIGHT: 67 IN

## 2020-06-01 DIAGNOSIS — E66.01 MORBID (SEVERE) OBESITY DUE TO EXCESS CALORIES (HCC): Primary | ICD-10-CM

## 2020-06-01 PROCEDURE — RECHECK

## 2020-06-01 RX ORDER — BUDESONIDE 0.25 MG/2ML
0.25 INHALANT ORAL 2 TIMES DAILY
Status: ON HOLD | COMMUNITY
End: 2020-07-11

## 2020-06-15 ENCOUNTER — TELEPHONE (OUTPATIENT)
Dept: BARIATRICS | Facility: CLINIC | Age: 55
End: 2020-06-15

## 2020-06-16 ENCOUNTER — OFFICE VISIT (OUTPATIENT)
Dept: BARIATRICS | Facility: CLINIC | Age: 55
End: 2020-06-16
Payer: COMMERCIAL

## 2020-06-16 ENCOUNTER — TELEPHONE (OUTPATIENT)
Dept: BARIATRICS | Facility: CLINIC | Age: 55
End: 2020-06-16

## 2020-06-16 ENCOUNTER — PREP FOR PROCEDURE (OUTPATIENT)
Dept: BARIATRICS | Facility: CLINIC | Age: 55
End: 2020-06-16

## 2020-06-16 VITALS
HEART RATE: 72 BPM | DIASTOLIC BLOOD PRESSURE: 78 MMHG | SYSTOLIC BLOOD PRESSURE: 156 MMHG | HEIGHT: 67 IN | WEIGHT: 242.2 LBS | RESPIRATION RATE: 18 BRPM | TEMPERATURE: 99.4 F | BODY MASS INDEX: 38.01 KG/M2

## 2020-06-16 DIAGNOSIS — Z48.815 ENCOUNTER FOR SURGICAL AFTERCARE FOLLOWING SURGERY ON THE DIGESTIVE SYSTEM: Primary | ICD-10-CM

## 2020-06-16 DIAGNOSIS — J18.9 PNEUMONIA: ICD-10-CM

## 2020-06-16 DIAGNOSIS — R53.81 MALAISE AND FATIGUE: ICD-10-CM

## 2020-06-16 DIAGNOSIS — I10 ESSENTIAL HYPERTENSION: ICD-10-CM

## 2020-06-16 DIAGNOSIS — Z86.718 HISTORY OF DVT (DEEP VEIN THROMBOSIS): ICD-10-CM

## 2020-06-16 DIAGNOSIS — I31.3 PERICARDIAL EFFUSION: ICD-10-CM

## 2020-06-16 DIAGNOSIS — I50.32 CHRONIC DIASTOLIC CHF (CONGESTIVE HEART FAILURE) (HCC): Chronic | ICD-10-CM

## 2020-06-16 DIAGNOSIS — I89.0 LYMPHEDEMA: ICD-10-CM

## 2020-06-16 DIAGNOSIS — E66.01 MORBID (SEVERE) OBESITY DUE TO EXCESS CALORIES (HCC): ICD-10-CM

## 2020-06-16 DIAGNOSIS — I82.409 DVT (DEEP VENOUS THROMBOSIS) (HCC): ICD-10-CM

## 2020-06-16 DIAGNOSIS — J44.9 COPD (CHRONIC OBSTRUCTIVE PULMONARY DISEASE) (HCC): ICD-10-CM

## 2020-06-16 DIAGNOSIS — G47.33 OSA (OBSTRUCTIVE SLEEP APNEA): ICD-10-CM

## 2020-06-16 DIAGNOSIS — E66.01 MORBID OBESITY (HCC): Primary | ICD-10-CM

## 2020-06-16 DIAGNOSIS — R53.83 MALAISE AND FATIGUE: ICD-10-CM

## 2020-06-16 DIAGNOSIS — I82.403 LEG DVT (DEEP VENOUS THROMBOEMBOLISM), ACUTE, BILATERAL (HCC): ICD-10-CM

## 2020-06-16 DIAGNOSIS — J96.01 ACUTE RESPIRATORY FAILURE WITH HYPOXIA (HCC): ICD-10-CM

## 2020-06-16 DIAGNOSIS — Z20.828 EXPOSURE TO SARS-ASSOCIATED CORONAVIRUS: Primary | ICD-10-CM

## 2020-06-16 DIAGNOSIS — N18.30 STAGE 3 CHRONIC KIDNEY DISEASE (HCC): ICD-10-CM

## 2020-06-16 DIAGNOSIS — I50.9 CHF (CONGESTIVE HEART FAILURE) (HCC): ICD-10-CM

## 2020-06-16 DIAGNOSIS — N17.9 AKI (ACUTE KIDNEY INJURY) (HCC): ICD-10-CM

## 2020-06-16 PROCEDURE — 99204 OFFICE O/P NEW MOD 45 MIN: CPT | Performed by: SURGERY

## 2020-06-30 ENCOUNTER — TELEPHONE (OUTPATIENT)
Dept: BARIATRICS | Facility: CLINIC | Age: 55
End: 2020-06-30

## 2020-07-03 ENCOUNTER — HOSPITAL ENCOUNTER (EMERGENCY)
Facility: HOSPITAL | Age: 55
Discharge: HOME | End: 2020-07-03
Attending: EMERGENCY MEDICINE | Admitting: EMERGENCY MEDICINE
Payer: COMMERCIAL

## 2020-07-03 VITALS
HEART RATE: 65 BPM | BODY MASS INDEX: 35.15 KG/M2 | TEMPERATURE: 98.5 F | WEIGHT: 231.92 LBS | RESPIRATION RATE: 15 BRPM | OXYGEN SATURATION: 98 % | HEIGHT: 68 IN | DIASTOLIC BLOOD PRESSURE: 91 MMHG | SYSTOLIC BLOOD PRESSURE: 176 MMHG

## 2020-07-03 DIAGNOSIS — E11.9 DIABETES MELLITUS, NEW ONSET (HCC): Primary | ICD-10-CM

## 2020-07-03 LAB
ALBUMIN SERPL BCP-MCNC: 3.7 G/DL (ref 3.5–5)
ALP SERPL-CCNC: 173 U/L (ref 46–116)
ALT SERPL W P-5'-P-CCNC: 31 U/L (ref 12–78)
ANION GAP SERPL CALCULATED.3IONS-SCNC: 10 MMOL/L (ref 4–13)
ANION GAP SERPL CALCULATED.3IONS-SCNC: 9 MMOL/L (ref 4–13)
AST SERPL W P-5'-P-CCNC: 20 U/L (ref 5–45)
ATRIAL RATE: 71 BPM
BACTERIA UR QL AUTO: ABNORMAL /HPF
BASE EX.OXY STD BLDV CALC-SCNC: 66.5 % (ref 60–80)
BASE EXCESS BLDV CALC-SCNC: -0.6 MMOL/L
BASOPHILS # BLD AUTO: 0.08 THOUSANDS/ΜL (ref 0–0.1)
BASOPHILS NFR BLD AUTO: 1 % (ref 0–1)
BETA-HYDROXYBUTYRATE: 1.5 MMOL/L
BILIRUB SERPL-MCNC: 0.8 MG/DL (ref 0.2–1)
BILIRUB UR QL STRIP: NEGATIVE
BUN SERPL-MCNC: 28 MG/DL (ref 5–25)
BUN SERPL-MCNC: 28 MG/DL (ref 5–25)
CALCIUM SERPL-MCNC: 8 MG/DL (ref 8.3–10.1)
CALCIUM SERPL-MCNC: 9.5 MG/DL (ref 8.3–10.1)
CHLORIDE SERPL-SCNC: 92 MMOL/L (ref 100–108)
CHLORIDE SERPL-SCNC: 99 MMOL/L (ref 100–108)
CLARITY UR: CLEAR
CO2 SERPL-SCNC: 25 MMOL/L (ref 21–32)
CO2 SERPL-SCNC: 26 MMOL/L (ref 21–32)
COLOR UR: YELLOW
CREAT SERPL-MCNC: 1.81 MG/DL (ref 0.6–1.3)
CREAT SERPL-MCNC: 2.07 MG/DL (ref 0.6–1.3)
EOSINOPHIL # BLD AUTO: 0.36 THOUSAND/ΜL (ref 0–0.61)
EOSINOPHIL NFR BLD AUTO: 4 % (ref 0–6)
ERYTHROCYTE [DISTWIDTH] IN BLOOD BY AUTOMATED COUNT: 14 % (ref 11.6–15.1)
GFR SERPL CREATININE-BSD FRML MDRD: 40 ML/MIN/1.73SQ M
GFR SERPL CREATININE-BSD FRML MDRD: 47 ML/MIN/1.73SQ M
GLUCOSE SERPL-MCNC: 446 MG/DL (ref 65–140)
GLUCOSE SERPL-MCNC: 577 MG/DL (ref 65–140)
GLUCOSE UR STRIP-MCNC: ABNORMAL MG/DL
HCO3 BLDV-SCNC: 24.9 MMOL/L (ref 24–30)
HCT VFR BLD AUTO: 42.4 % (ref 36.5–49.3)
HGB BLD-MCNC: 13.8 G/DL (ref 12–17)
HGB UR QL STRIP.AUTO: ABNORMAL
HYALINE CASTS #/AREA URNS LPF: ABNORMAL /LPF
IMM GRANULOCYTES # BLD AUTO: 0.03 THOUSAND/UL (ref 0–0.2)
IMM GRANULOCYTES NFR BLD AUTO: 0 % (ref 0–2)
KETONES UR STRIP-MCNC: NEGATIVE MG/DL
LEUKOCYTE ESTERASE UR QL STRIP: ABNORMAL
LYMPHOCYTES # BLD AUTO: 1.85 THOUSANDS/ΜL (ref 0.6–4.47)
LYMPHOCYTES NFR BLD AUTO: 22 % (ref 14–44)
MCH RBC QN AUTO: 27.2 PG (ref 26.8–34.3)
MCHC RBC AUTO-ENTMCNC: 32.5 G/DL (ref 31.4–37.4)
MCV RBC AUTO: 84 FL (ref 82–98)
MONOCYTES # BLD AUTO: 0.61 THOUSAND/ΜL (ref 0.17–1.22)
MONOCYTES NFR BLD AUTO: 7 % (ref 4–12)
NEUTROPHILS # BLD AUTO: 5.39 THOUSANDS/ΜL (ref 1.85–7.62)
NEUTS SEG NFR BLD AUTO: 66 % (ref 43–75)
NITRITE UR QL STRIP: NEGATIVE
NON-SQ EPI CELLS URNS QL MICRO: ABNORMAL /HPF
NRBC BLD AUTO-RTO: 0 /100 WBCS
O2 CT BLDV-SCNC: 12.8 ML/DL
P AXIS: 78 DEGREES
PCO2 BLDV: 44.3 MM HG (ref 42–50)
PH BLDV: 7.37 [PH] (ref 7.3–7.4)
PH UR STRIP.AUTO: 6 [PH]
PLATELET # BLD AUTO: 348 THOUSANDS/UL (ref 149–390)
PMV BLD AUTO: 9.6 FL (ref 8.9–12.7)
PO2 BLDV: 35.8 MM HG (ref 35–45)
POTASSIUM SERPL-SCNC: 4.1 MMOL/L (ref 3.5–5.3)
POTASSIUM SERPL-SCNC: 4.5 MMOL/L (ref 3.5–5.3)
PR INTERVAL: 164 MS
PROT SERPL-MCNC: 8.3 G/DL (ref 6.4–8.2)
PROT UR STRIP-MCNC: ABNORMAL MG/DL
QRS AXIS: -54 DEGREES
QRSD INTERVAL: 96 MS
QT INTERVAL: 446 MS
QTC INTERVAL: 484 MS
RBC # BLD AUTO: 5.08 MILLION/UL (ref 3.88–5.62)
RBC #/AREA URNS AUTO: ABNORMAL /HPF
SODIUM SERPL-SCNC: 128 MMOL/L (ref 136–145)
SODIUM SERPL-SCNC: 133 MMOL/L (ref 136–145)
SP GR UR STRIP.AUTO: <=1.005 (ref 1–1.03)
T WAVE AXIS: 65 DEGREES
TROPONIN I SERPL-MCNC: <0.02 NG/ML
UROBILINOGEN UR QL STRIP.AUTO: 0.2 E.U./DL
VENTRICULAR RATE: 71 BPM
WBC # BLD AUTO: 8.32 THOUSAND/UL (ref 4.31–10.16)
WBC #/AREA URNS AUTO: ABNORMAL /HPF

## 2020-07-03 PROCEDURE — 80048 BASIC METABOLIC PNL TOTAL CA: CPT | Performed by: PHYSICIAN ASSISTANT

## 2020-07-03 PROCEDURE — 93005 ELECTROCARDIOGRAM TRACING: CPT

## 2020-07-03 PROCEDURE — 84484 ASSAY OF TROPONIN QUANT: CPT | Performed by: PHYSICIAN ASSISTANT

## 2020-07-03 PROCEDURE — 82010 KETONE BODYS QUAN: CPT | Performed by: PHYSICIAN ASSISTANT

## 2020-07-03 PROCEDURE — 80053 COMPREHEN METABOLIC PANEL: CPT

## 2020-07-03 PROCEDURE — 99284 EMERGENCY DEPT VISIT MOD MDM: CPT

## 2020-07-03 PROCEDURE — 36415 COLL VENOUS BLD VENIPUNCTURE: CPT

## 2020-07-03 PROCEDURE — 96374 THER/PROPH/DIAG INJ IV PUSH: CPT

## 2020-07-03 PROCEDURE — 82805 BLOOD GASES W/O2 SATURATION: CPT | Performed by: PHYSICIAN ASSISTANT

## 2020-07-03 PROCEDURE — 93010 ELECTROCARDIOGRAM REPORT: CPT | Performed by: INTERNAL MEDICINE

## 2020-07-03 PROCEDURE — 99285 EMERGENCY DEPT VISIT HI MDM: CPT | Performed by: PHYSICIAN ASSISTANT

## 2020-07-03 PROCEDURE — 81001 URINALYSIS AUTO W/SCOPE: CPT | Performed by: PHYSICIAN ASSISTANT

## 2020-07-03 PROCEDURE — 96361 HYDRATE IV INFUSION ADD-ON: CPT

## 2020-07-03 PROCEDURE — 85025 COMPLETE CBC W/AUTO DIFF WBC: CPT

## 2020-07-03 RX ADMIN — INSULIN HUMAN 10 UNITS: 100 INJECTION, SOLUTION PARENTERAL at 11:36

## 2020-07-03 RX ADMIN — SODIUM CHLORIDE 1000 ML: 0.9 INJECTION, SOLUTION INTRAVENOUS at 11:38

## 2020-07-03 RX ADMIN — METFORMIN HYDROCHLORIDE 500 MG: 500 TABLET ORAL at 13:30

## 2020-07-03 RX ADMIN — SODIUM CHLORIDE 1000 ML: 0.9 INJECTION, SOLUTION INTRAVENOUS at 09:36

## 2020-07-03 NOTE — ED PROVIDER NOTES
History  Chief Complaint   Patient presents with    Abnormal Lab     Patient states he was phoned by his primary care doctor after bloodwork for evaluation of elevated blood sugar  56yo male with a remote history of type 2 diabetes, COPD, systolic heart failure, and previous DVT on Coumadin presenting for evaluation of an abnormal outpatient lab  Patient has been experiencing fatigue, polyuria, polydipsia, and blurred vision over the past several days  He was sent for blood work by his PCP and he was found to have a blood sugar of 631 and a HbA1c of 11  Patient was advised to report to the ER for evaluation  Patient reports a remote history of type 2 diabetes and was on metformin approximately 5 years ago  Patient denies chest pain, shortness of breath, dizziness, abdominal pain, vomiting, fevers, chills  History provided by:  Patient   used: No    Hyperglycemia - Symptomatic   Blood sugar level PTA:  631  Severity:  Moderate  Timing:  Constant  Progression:  Worsening  Chronicity:  New  Diabetes status:  Non-diabetic  Current diabetic therapy:  N/a  Relieved by:  Nothing  Ineffective treatments:  None tried  Associated symptoms: blurred vision, fatigue, increased thirst, malaise and polyuria    Associated symptoms: no abdominal pain, no chest pain, no confusion, no diaphoresis, no dizziness, no dysuria, no fever, no shortness of breath, no syncope, no vomiting and no weakness        Prior to Admission Medications   Prescriptions Last Dose Informant Patient Reported? Taking?    Azilsartan-Chlorthalidone (EDARBYCLOR PO)   Yes No   Sig: Take by mouth   Cholecalciferol (VITAMIN D-3) 1000 units CAPS  Spouse/Significant Other Yes No   Sig: Take 1,000 Units by mouth daily   Coenzyme Q10 (CO Q 10) 100 MG CAPS   Yes No   Si capsule   Glycopyrrolate-Formoterol (BEVESPI AEROSPHERE) 9-4 8 MCG/ACT AERO  Spouse/Significant Other Yes No   Sig: Inhale 2 puffs daily after breakfast   LORazepam (ATIVAN) 0 5 mg tablet  Spouse/Significant Other Yes No   Sig: Take 0 5 mg by mouth every 6 (six) hours as needed for anxiety   MAGNESIUM PO  Spouse/Significant Other Yes No   Sig: Take 400 mg by mouth daily   Milk Thistle 500 MG CAPS  Spouse/Significant Other Yes No   Sig: Take 500 mg by mouth 2 (two) times a day   Mometasone Furo-Formoterol Fum 200-5 MCG/ACT AERO  Spouse/Significant Other Yes No   Sig: Take 2 puffs by mouth 2 (two) times a day   Multiple Vitamins-Minerals (MULTIVITAMIN & MINERAL PO)   Yes No   Si tablet   NON FORMULARY   Yes No   Omega-3 Fatty Acids (FISH OIL) 1200 MG CAPS  Spouse/Significant Other Yes No   Sig: Take 1,200 mg by mouth daily at bedtime   Turmeric Curcumin 500 MG CAPS   Yes No   Sig: Take 1 capsule by mouth   albuterol (PROVENTIL HFA,VENTOLIN HFA) 90 mcg/act inhaler  Spouse/Significant Other No No   Sig: Inhale 2 puffs every 6 (six) hours as needed for wheezing   allopurinol (ZYLOPRIM) 100 mg tablet   Yes No   amLODIPine (NORVASC) 10 mg tablet  Spouse/Significant Other Yes No   Sig: Take 10 mg by mouth daily   apixaban (ELIQUIS) 5 mg  Spouse/Significant Other Yes No   Sig: Take 5 mg by mouth 2 (two) times a day     aspirin (PX ENTERIC ASPIRIN) 325 mg EC tablet  Spouse/Significant Other Yes No   Sig: Take 325 mg by mouth daily At night    aspirin 325 mg tablet   Yes No   Sig: TAKE ONE TABLET BY MOUTH EVERY DAY   atorvastatin (LIPITOR) 40 mg tablet  Spouse/Significant Other Yes No   Sig: Take 40 mg by mouth daily at bedtime   azilsartan medoxomil (EDARBI) 80 MG tablet   Yes No   Sig: Once a day   azithromycin (ZITHROMAX) 250 mg tablet   Yes No   Sig: Take 2 tablets by mouth on day one; then one tablet daily for 4 days  budesonide (PULMICORT) 0 25 mg/2 mL nebulizer solution   Yes No   Sig: Take 0 25 mg by nebulization 2 (two) times a day Rinse mouth after use     busPIRone (BUSPAR) 5 mg tablet   Yes No   Sig: Take 5 mg by mouth   clonazePAM (KlonoPIN) 0 5 mg tablet   Yes No   Sig: 1 tablet   doxazosin (CARDURA) 4 mg tablet  Spouse/Significant Other Yes No   Sig: Take 4 mg by mouth daily at bedtime   enoxaparin (LOVENOX) 100 mg/mL   Yes No   Sig: INJECT 1 SYRINGE SUBCUTANEOUSLY EVERY 12 HOURS   eplerenone (INSPRA) 50 MG tablet  Spouse/Significant Other Yes No   Sig: Take 50 mg by mouth daily   escitalopram (LEXAPRO) 10 mg tablet  Spouse/Significant Other Yes No   Sig: Take 10 mg by mouth daily   escitalopram (LEXAPRO) 20 mg tablet   Yes No   Sig: Take 20 mg by mouth   ferrous sulfate 325 (65 Fe) mg tablet  Spouse/Significant Other Yes No   Sig: Take 325 mg by mouth 2 (two) times a day     fluticasone (FLONASE) 50 mcg/act nasal spray   No No   Si spray into each nostril 2 (two) times a day   Patient not taking: Reported on 2020   fluticasone-salmeterol (ADVAIR, WIXELA) 250-50 mcg/dose inhaler   Yes No   Sig: INHALE 1 DOSE BY MOUTH TWICE DAILY   furosemide (LASIX) 80 mg tablet   No No   Sig: Take 1 tablet (80 mg total) by mouth daily Starting next Monday  If you become short of breath in meantime call your pcp to consider resuming earlier, or if you have 2 lb wt gain     methylPREDNISolone 4 MG tablet therapy pack   Yes No   Sig: TAKE BY MOUTH AS DIRECTED ON INSIDE OF PACKAGE   minoxidil (LONITEN) 10 mg tablet   Yes No   multivitamin-iron-minerals-folic acid (CENTRUM) chewable tablet  Spouse/Significant Other Yes No   Sig: Chew 1 tablet daily   nebivolol (BYSTOLIC) 20 MG tablet  Spouse/Significant Other Yes No   Sig: Take 20 mg by mouth 2 (two) times a day   potassium chloride (KLOR-CON M20) 20 mEq tablet   No No   Sig: Take 1 tablet (20 mEq total) by mouth daily Restart with your lasix next monday   predniSONE 10 mg tablet   Yes No   Sig: Take 4 tablets for 2 days then take 3 tablets for 2 days then take 2 tablets for 2 days then take 1 tablet for 2 days   tiotropium (SPIRIVA RESPIMAT) 2 5 MCG/ACT AERS inhaler   Yes No   Sig: Inhale daily   umeclidinium-vilanterol (Anoro Ellipta) 62 5-25 MCG/INH inhaler   Yes No   Sig: Inhale 1 puff daily   valsartan (DIOVAN) 320 MG tablet   Yes No   Sig: Once a day   warfarin (COUMADIN) 5 mg tablet   Yes No   Sig: TAKE 1 TO 2 TABLETS BY MOUTH ONCE DAILY IN THE EVENING AS DIRECTED BY COUMADIN CLINIC      Facility-Administered Medications: None       Past Medical History:   Diagnosis Date    Asthma     COPD (chronic obstructive pulmonary disease) (HCC)     Emphysema of lung (HCC)     Gout     Hypertension     Kidney disease     renal failure    Leg DVT (deep venous thromboembolism), acute, bilateral ([address] ) 01/2018    Obesity (BMI 30-39  9)     AGUS on CPAP     setting 11    Systolic CHF [facility])        Past Surgical History:   Procedure Laterality Date    ADRENALECTOMY      CHOLECYSTECTOMY      INCISION AND DRAINAGE OF WOUND Left 10/17/2018    Procedure: INCISION AND DRAINAGE (I&D) GROIN;  Surgeon: Nissa Herr MD;  Location: [facility] OR;  Service: General    IR IMAGE [address] / DRAINAGE W TUBE  8/17/2018    IR IMAGE GUIDED ASPIRATION / DRAINAGE W TUBE  7/31/2018    JOINT REPLACEMENT Bilateral     knee    KIDNEY SURGERY  2009    nodule removal    LYMPH NODE DISSECTION Left 01/2018    left inguinal LN removed - benign     OTHER SURGICAL HISTORY      kidney nodule removal    PALATE / UVULA BIOPSY / EXCISION      SINUS SURGERY         Family History   Problem Relation Age of Onset    Heart murmur Sister     Asthma Sister     Hypertension Sister     Kidney disease Mother     Cancer Father     Bell's palsy Brother     Kidney disease Brother     Deep vein thrombosis Neg Hx      I have reviewed and agree with the history as documented      E-Cigarette/Vaping    E-Cigarette Use Never User      E-Cigarette/Vaping Substances     Social History     Tobacco Use    Smoking status: Never Smoker    Smokeless tobacco: Never Used   Substance Use Topics    Alcohol use: Yes     Comment: socially    Drug use: No       Review of Systems Constitutional: Positive for fatigue  Negative for chills, diaphoresis and fever  HENT: Negative for congestion and drooling  Eyes: Positive for blurred vision  Negative for discharge and redness  Respiratory: Negative for cough and shortness of breath  Cardiovascular: Negative for chest pain, palpitations and syncope  Gastrointestinal: Negative for abdominal pain and vomiting  Endocrine: Positive for polydipsia and polyuria  Genitourinary: Negative for dysuria  Musculoskeletal: Negative for neck pain and neck stiffness  Skin: Negative for color change and rash  Allergic/Immunologic: Negative for immunocompromised state  Neurological: Negative for dizziness, syncope and weakness  Psychiatric/Behavioral: Negative for confusion  All other systems reviewed and are negative  Physical Exam  Physical Exam   Constitutional: He appears well-developed and well-nourished  No distress  Non-toxic appearing    HENT:   Head: Normocephalic and atraumatic  Right Ear: External ear normal    Left Ear: External ear normal    Mouth/Throat: Oropharynx is clear and moist    Eyes: Conjunctivae are normal  Right eye exhibits no discharge  Left eye exhibits no discharge  No scleral icterus  Neck: Normal range of motion  Neck supple  Cardiovascular: Normal rate, regular rhythm and normal heart sounds  No murmur heard  Pulmonary/Chest: Effort normal and breath sounds normal  No stridor  No respiratory distress  He has no wheezes  He has no rales  Abdominal: Soft  Bowel sounds are normal  He exhibits no distension  There is no tenderness  There is no guarding  Musculoskeletal: Normal range of motion  He exhibits no deformity  Neurological: He is alert  He is not disoriented  GCS eye subscore is 4  GCS verbal subscore is 5  GCS motor subscore is 6  Skin: Skin is warm and dry  He is not diaphoretic  Psychiatric: He has a normal mood and affect   His behavior is normal    Nursing note and vitals reviewed        Vital Signs  ED Triage Vitals   Temperature Pulse Respirations Blood Pressure SpO2   07/03/20 0927 07/03/20 0923 07/03/20 0923 07/03/20 0923 07/03/20 0923   98 5 °F (36 9 °C) 73 18 (!) 180/94 98 %      Temp Source Heart Rate Source Patient Position - Orthostatic VS BP Location FiO2 (%)   07/03/20 0927 07/03/20 0923 07/03/20 0923 07/03/20 0923 --   Oral Monitor Sitting Right arm       Pain Score       07/03/20 1139       No Pain           Vitals:    07/03/20 0923 07/03/20 0929 07/03/20 1139 07/03/20 1259   BP: (!) 180/94 163/82 151/93 (!) 176/91   Pulse: 73 76 70 65   Patient Position - Orthostatic VS: Sitting Sitting Lying Sitting         Visual Acuity  Visual Acuity      Most Recent Value   L Pupil Size (mm)  5   R Pupil Size (mm)  5          ED Medications  Medications   sodium chloride 0 9 % bolus 1,000 mL (0 mL Intravenous Stopped 7/3/20 1244)   sodium chloride 0 9 % bolus 1,000 mL (0 mL Intravenous Stopped 7/3/20 1244)   insulin regular (HumuLIN R,NovoLIN R) injection 10 Units (10 Units Intravenous Given 7/3/20 1136)   metFORMIN (GLUCOPHAGE) tablet 500 mg (500 mg Oral Given 7/3/20 1330)       Diagnostic Studies  Results Reviewed     Procedure Component Value Units Date/Time    Basic metabolic panel [[id number]]  (Abnormal) Collected:  07/03/20 1246    Lab Status:  Final result Specimen:  Blood from Arm, Right Updated:  07/03/20 1306     Sodium 133 mmol/L      Potassium 4 1 mmol/L      Chloride 99 mmol/L      CO2 25 mmol/L      ANION GAP 9 mmol/L      BUN 28 mg/dL      Creatinine 1 81 mg/dL      Glucose 446 mg/dL      Calcium 8 0 mg/dL      eGFR 47 ml/min/1 73sq m     Narrative:       [place] guidelines for Chronic Kidney Disease (CKD):     Stage 1 with normal or high GFR (GFR > 90 mL/min/1 73 square meters)    Stage 2 Mild CKD (GFR = 60-89 mL/min/1 73 square meters)    Stage 3A Moderate CKD (GFR = 45-59 mL/min/1 73 square meters)    Stage 3B Moderate CKD (GFR = 30-44 mL/min/1 73 square meters)    Stage 4 Severe CKD (GFR = 15-29 mL/min/1 73 square meters)    Stage 5 End Stage CKD (GFR <15 mL/min/1 73 square meters)  Note: GFR calculation is accurate only with a steady state creatinine    Urine Microscopic [[id number]]  (Abnormal) Collected:  07/03/20 1009    Lab Status:  Final result Specimen:  Urine, Clean Catch Updated:  07/03/20 1036     RBC, UA 0-1 /hpf      WBC, UA 1-2 /hpf      Epithelial Cells Occasional /hpf      Bacteria, UA None Seen /hpf      Hyaline Casts, UA 0-1 /lpf     UA w Reflex to Microscopic w Reflex to Culture [[id number]]  (Abnormal) Collected:  07/03/20 1009    Lab Status:  Final result Specimen:  Urine, Clean Catch Updated:  07/03/20 1023     Color, UA Yellow     Clarity, UA Clear     Specific Gravity, UA <=1 005     pH, UA 6 0     Leukocytes, UA Elevated glucose may cause decreased leukocyte values   See urine microscopic for [facility] result/     Nitrite, UA Negative     Protein, UA Trace mg/dl      Glucose, UA >=1000 (1%) mg/dl      Ketones, UA Negative mg/dl      Urobilinogen, UA 0 2 E U /dl      Bilirubin, UA Negative     Blood, UA Trace-Intact    Troponin I [[id number]]  (Normal) Collected:  07/03/20 0952    Lab Status:  Final result Specimen:  Blood from Arm, Right Updated:  07/03/20 1022     Troponin I <0 02 ng/mL     Comprehensive metabolic panel [[id number]]  (Abnormal) Collected:  07/03/20 0937    Lab Status:  Final result Specimen:  Blood from Arm, Right Updated:  07/03/20 1010     Sodium 128 mmol/L      Potassium 4 5 mmol/L      Chloride 92 mmol/L      CO2 26 mmol/L      ANION GAP 10 mmol/L      BUN 28 mg/dL      Creatinine 2 07 mg/dL      Glucose 577 mg/dL      Calcium 9 5 mg/dL      AST 20 U/L      ALT 31 U/L      Alkaline Phosphatase 173 U/L      Total Protein 8 3 g/dL      Albumin 3 7 g/dL      Total Bilirubin 0 80 mg/dL      eGFR 40 ml/min/1 73sq m     Narrative:       [place] guidelines for Chronic Kidney Disease (CKD):     Stage 1 with normal or high GFR (GFR > 90 mL/min/1 73 square meters)    Stage 2 Mild CKD (GFR = 60-89 mL/min/1 73 square meters)    Stage 3A Moderate CKD (GFR = 45-59 mL/min/1 73 square meters)    Stage 3B Moderate CKD (GFR = 30-44 mL/min/1 73 square meters)    Stage 4 Severe CKD (GFR = 15-29 mL/min/1 73 square meters)    Stage 5 End Stage CKD (GFR <15 mL/min/1 73 square meters)  Note: GFR calculation is accurate only with a steady state creatinine    Beta Hydroxybutyrate [[id number]]  (Abnormal) Collected:  07/03/20 0952    Lab Status:  Final result Specimen:  Blood from Arm, Right Updated:  07/03/20 1008     BETA-HYDROXYBUTYRATE 1 5 mmol/L     Blood gas, venous [[id number]] Collected:  07/03/20 0952    Lab Status:  Final result Specimen:  Blood from Arm, Right Updated:  07/03/20 0958     pH, Gold 7 368     pCO2, Gold 44 3 mm Hg      pO2, Gold 35 8 mm Hg      HCO3, Gold 24 9 mmol/L      Base Excess, Gold -0 6 mmol/L      O2 Content, Gold 12 8 ml/dL      O2 HGB, VENOUS 66 5 %     CBC and differential [[id number]] Collected:  07/03/20 0937    Lab Status:  Final result Specimen:  Blood from Arm, Right Updated:  07/03/20 0942     WBC 8 32 Thousand/uL      RBC 5 08 Million/uL      Hemoglobin 13 8 g/dL      Hematocrit 42 4 %      MCV 84 fL      MCH 27 2 pg      MCHC 32 5 g/dL      RDW 14 0 %      MPV 9 6 fL      Platelets 306 Thousands/uL      nRBC 0 /100 WBCs      Neutrophils Relative 66 %      Immat GRANS % 0 %      Lymphocytes Relative 22 %      Monocytes Relative 7 %      Eosinophils Relative 4 %      Basophils Relative 1 %      Neutrophils Absolute 5 39 Thousands/µL      Immature Grans Absolute 0 03 Thousand/uL      Lymphocytes Absolute 1 85 Thousands/µL      Monocytes Absolute 0 61 Thousand/µL      Eosinophils Absolute 0 36 Thousand/µL      Basophils Absolute 0 08 Thousands/µL                  No orders to display              Procedures  ECG 12 Lead Documentation Only  Date/Time: 7/3/2020 4:13 PM  Performed by: Umesh Mckeon PA-C  Authorized by: Umesh Mckeon PA-C     Indications / Diagnosis:  Hyperglycemia   ECG reviewed by me, the ED Provider: yes    Patient location:  ED  Previous ECG:     Previous ECG:  Unavailable  Rate:     ECG rate:  71    ECG rate assessment: normal    Rhythm:     Rhythm: sinus rhythm    Ectopy:     Ectopy: none    QRS:     QRS axis:  Left    QRS intervals:  Normal  Conduction:     Conduction: normal    ST segments:     ST segments:  Normal  T waves:     T waves: non-specific               ED Course  ED Course as of Jul 03 1614 Fri Jul 03, 2020   1018 CO2: 26   1033 Creatinine(!): 2 07   1220 Baseline creatinine 1 5-1 6  Will give 2L IV fluids and re-check BMP  US AUDIT      Most Recent Value   Initial Alcohol Screen: US AUDIT-C    1  How often do you have a drink containing alcohol?  0 Filed at: 07/03/2020 1332   2  How many drinks containing alcohol do you have on a typical day you are drinking? 0 Filed at: 07/03/2020 1332   3a  Male UNDER 65: How often do you have five or more drinks on one occasion? 0 Filed at: 07/03/2020 1332   Audit-C Score  0 Filed at: 07/03/2020 1332                  MDM  Number of Diagnoses or Management Options  Diabetes mellitus, new onset [facility]): new and requires workup  Diagnosis management comments: 58yo male presenting for evaluation of an elevated blood sugar of 631 on outpatient labs  HbA1c 11  Patient has been experiencing polyuria, polydipsia, and intermittent blurred vision for the past week  He has a remote history of type 2 diabetes and was on metformin about 5 years ago  Differential diagnosis includes but is not limited to: new onset diabetes, DKA, HHS, TRUDI, dehydration    Initial ED plan: Check CBC, CMP, VBG, beta hydroxybutyrate, UA, troponin, and EKG  IV fluid bolus and re-evaluate  Final assessment: Glucose 570 here  Creatinine 2 07 (baseline 1 5-1 6)  Bicarb and pH normal, no evidence of acidosis   Ketones negative  DKA criteria are not met  Troponin negative and EKG reveals NSR without ST changes  Remainder of labs unremarkable  Patient ultimately given 2L fluids and 10 units regular insulin IV  BMP repeated after fluids and creatinine improved to 1 8  Suspect prerenal TRUDI  Given that creatinine improved to almost baseline and glucose down-trended to 450, no indication for admission at this time  Will initiate patient on metformin  Discussed with patient that he will likely require additional diabetic medication vs insulin  Stressed the needed for close PCP follow-up  ED return precautions discussed  Patient expressed understanding and is agreeable to plan  Patient discharged in stable condition  Amount and/or Complexity of Data Reviewed  Clinical lab tests: ordered and reviewed  Tests in the medicine section of CPT®: ordered and reviewed  Review and summarize past medical records: yes  Independent visualization of images, tracings, or specimens: yes    Risk of Complications, Morbidity, and/or Mortality  Presenting problems: high  Diagnostic procedures: moderate  Management options: moderate    Patient Progress  Patient progress: improved        Disposition  Final diagnoses:   Diabetes mellitus, new onset ([facility] [address] )     Time reflects when diagnosis was documented in both MDM as applicable and the Disposition within this note     Time User Action Codes Description Comment    7/3/2020  1:19 PM [address] [place] [address] [E11 9] Diabetes mellitus, new onset [facility])       ED Disposition     ED Disposition Condition Date/Time Comment    Discharge Stable Fri Jul 3, 2020  1:19 PM Monica Ruiz discharge to home/self care              Follow-up Information     Follow up With Specialties Details Why Contact Info Additional Information    Patricia Inman MD  Schedule an appointment as soon as possible for a visit   [address] [facility] [id number]  [facility] Emergency Department Emergency Medicine  If symptoms worsen [address] [facility] [place] [id number]  [address] [place] [address], [place] [address], [place], [place], [address]          Discharge Medication List as of 7/3/2020  1:20 PM      START taking these medications    Details   metFORMIN (GLUCOPHAGE) 500 mg tablet Take 1 tablet (500 mg total) by mouth 2 (two) times a day with meals for 14 days, Starting Fri 7/3/2020, Until Fri 7/17/2020, Normal         CONTINUE these medications which have NOT CHANGED    Details   albuterol (PROVENTIL HFA,VENTOLIN HFA) 90 mcg/act inhaler Inhale 2 puffs every 6 (six) hours as needed for wheezing, Starting Mon 3/19/2018, Print      allopurinol (ZYLOPRIM) 100 mg tablet Starting Thu 3/22/2018, Historical Med      amLODIPine (NORVASC) 10 mg tablet Take 10 mg by mouth daily, Starting Tue 5/23/2017, Historical Med      apixaban (ELIQUIS) 5 mg Take 5 mg by mouth 2 (two) times a day  , Starting Thu 1/18/2018, Historical Med      aspirin (PX ENTERIC ASPIRIN) 325 mg EC tablet Take 325 mg by mouth daily At night , Historical Med      aspirin 325 mg tablet TAKE ONE TABLET BY MOUTH EVERY DAY, Historical Med      atorvastatin (LIPITOR) 40 mg tablet Take 40 mg by mouth daily at bedtime, Starting Wed 2/1/2017, Historical Med      azilsartan medoxomil (EDARBI) 80 MG tablet Once a day, Historical Med      Azilsartan-Chlorthalidone (EDARBYCLOR PO) Take by mouth, Historical Med      azithromycin (ZITHROMAX) 250 mg tablet Take 2 tablets by mouth on day one; then one tablet daily for 4 days  , Historical Med      budesonide (PULMICORT) 0 25 mg/2 mL nebulizer solution Take 0 25 mg by nebulization 2 (two) times a day Rinse mouth after use , Historical Med      busPIRone (BUSPAR) 5 mg tablet Take 5 mg by mouth, Historical Med      Cholecalciferol (VITAMIN D-3) 1000 units CAPS Take 1,000 Units by mouth daily, Historical Med      clonazePAM (KlonoPIN) 0 5 mg tablet 1 tablet, Historical Med      Coenzyme Q10 (CO Q 10) 100 MG CAPS 1 capsule, Historical Med      doxazosin (CARDURA) 4 mg tablet Take 4 mg by mouth daily at bedtime, Historical Med      enoxaparin (LOVENOX) 100 mg/mL INJECT 1 SYRINGE SUBCUTANEOUSLY EVERY 12 HOURS, Historical Med      eplerenone (INSPRA) 50 MG tablet Take 50 mg by mouth daily, Starting Tue 5/23/2017, Historical Med      !! escitalopram (LEXAPRO) 10 mg tablet Take 10 mg by mouth daily, Historical Med      !! escitalopram (LEXAPRO) 20 mg tablet Take 20 mg by mouth, Starting Mon 11/4/2019, Historical Med      ferrous sulfate 325 (65 Fe) mg tablet Take 325 mg by mouth 2 (two) times a day  , Historical Med      fluticasone (FLONASE) 50 mcg/act nasal spray 1 spray into each nostril 2 (two) times a day, Starting Wed 5/23/2018, Normal      fluticasone-salmeterol (ADVAIR, WIXELA) 250-50 mcg/dose inhaler INHALE 1 DOSE BY MOUTH TWICE DAILY, Historical Med      furosemide (LASIX) 80 mg tablet Take 1 tablet (80 mg total) by mouth daily Starting next Monday  If you become short of breath in meantime call your pcp to consider resuming earlier, or if you have 2 lb wt gain  , Starting Wed 5/23/2018, Print      Glycopyrrolate-Formoterol (BEVESPI AEROSPHERE) 9-4 8 MCG/ACT AERO Inhale 2 puffs daily after breakfast, Historical Med      LORazepam (ATIVAN) 0 5 mg tablet Take 0 5 mg by mouth every 6 (six) hours as needed for anxiety, Historical Med      MAGNESIUM PO Take 400 mg by mouth daily, Historical Med      methylPREDNISolone 4 MG tablet therapy pack TAKE BY MOUTH AS DIRECTED ON INSIDE OF PACKAGE, Historical Med      Milk Thistle 500 MG CAPS Take 500 mg by mouth 2 (two) times a day, Historical Med      minoxidil (LONITEN) 10 mg tablet Starting Tue 2/13/2018, Historical Med      Mometasone Furo-Formoterol Fum 200-5 MCG/ACT AERO Take 2 puffs by mouth 2 (two) times a day, Starting Wed 6/29/2016, Historical Med      Multiple Vitamins-Minerals (MULTIVITAMIN & MINERAL PO) 1 tablet, Historical Med multivitamin-iron-minerals-folic acid (CENTRUM) chewable tablet Chew 1 tablet daily, Historical Med      nebivolol (BYSTOLIC) 20 MG tablet Take 20 mg by mouth 2 (two) times a day, Starting Mon 6/13/2016, Historical Med      NON FORMULARY Historical Med      Omega-3 Fatty Acids (FISH OIL) 1200 MG CAPS Take 1,200 mg by mouth daily at bedtime, Historical Med      potassium chloride (KLOR-CON M20) 20 mEq tablet Take 1 tablet (20 mEq total) by mouth daily Restart with your lasix next monday, Starting Wed 5/23/2018, Normal      predniSONE 10 mg tablet Take 4 tablets for 2 days then take 3 tablets for 2 days then take 2 tablets for 2 days then take 1 tablet for 2 days, Historical Med      tiotropium (SPIRIVA RESPIMAT) 2 5 MCG/ACT AERS inhaler Inhale daily, Starting Mon 8/12/2019, Historical Med      Turmeric Curcumin 500 MG CAPS Take 1 capsule by mouth, Historical Med      umeclidinium-vilanterol (Anoro Ellipta) 62 5-25 MCG/INH inhaler Inhale 1 puff daily, Historical Med      valsartan (DIOVAN) 320 MG tablet Once a day, Historical Med      warfarin (COUMADIN) 5 mg tablet TAKE 1 TO 2 TABLETS BY MOUTH ONCE DAILY IN THE EVENING AS DIRECTED BY COUMADIN CLINIC, Historical Med       !! - Potential duplicate medications found  Please discuss with provider  No discharge procedures on file      PDMP Review     None          ED Provider  Electronically Signed by           Davy Rod PA-C  07/03/20 5216

## 2020-07-03 NOTE — DISCHARGE INSTRUCTIONS
Take metformin as prescribed  Increase your fluid intake  Watch your carb intake  Call your family doctor later today to schedule a follow-up appointment  Return to ER with any new or worsening symptoms

## 2020-07-09 ENCOUNTER — HOSPITAL ENCOUNTER (INPATIENT)
Facility: HOSPITAL | Age: 55
LOS: 2 days | Discharge: HOME HEALTH | DRG: 638 | End: 2020-07-11
Attending: EMERGENCY MEDICINE | Admitting: INTERNAL MEDICINE
Payer: COMMERCIAL

## 2020-07-09 ENCOUNTER — APPOINTMENT (INPATIENT)
Dept: ULTRASOUND IMAGING | Facility: HOSPITAL | Age: 55
DRG: 638 | End: 2020-07-09
Payer: COMMERCIAL

## 2020-07-09 ENCOUNTER — APPOINTMENT (EMERGENCY)
Dept: CT IMAGING | Facility: HOSPITAL | Age: 55
DRG: 638 | End: 2020-07-09
Payer: COMMERCIAL

## 2020-07-09 DIAGNOSIS — R74.8 ELEVATED LIPASE: ICD-10-CM

## 2020-07-09 DIAGNOSIS — N17.9 AKI (ACUTE KIDNEY INJURY) (HCC): ICD-10-CM

## 2020-07-09 DIAGNOSIS — IMO0002 UNCONTROLLED DIABETES MELLITUS: Primary | ICD-10-CM

## 2020-07-09 DIAGNOSIS — E11.65 HYPERGLYCEMIA DUE TO TYPE 2 DIABETES MELLITUS (HCC): ICD-10-CM

## 2020-07-09 PROBLEM — R30.0 DYSURIA: Status: ACTIVE | Noted: 2020-07-09

## 2020-07-09 PROBLEM — E11.22 CKD STAGE 3 SECONDARY TO DIABETES (HCC): Status: ACTIVE | Noted: 2018-03-15

## 2020-07-09 PROBLEM — E11.9 TYPE 2 DIABETES MELLITUS (HCC): Status: ACTIVE | Noted: 2020-07-09

## 2020-07-09 LAB
ALBUMIN SERPL BCP-MCNC: 3.7 G/DL (ref 3.5–5)
ALP SERPL-CCNC: 188 U/L (ref 46–116)
ALT SERPL W P-5'-P-CCNC: 29 U/L (ref 12–78)
ANION GAP SERPL CALCULATED.3IONS-SCNC: 11 MMOL/L (ref 4–13)
ANION GAP SERPL CALCULATED.3IONS-SCNC: 8 MMOL/L (ref 4–13)
AST SERPL W P-5'-P-CCNC: 18 U/L (ref 5–45)
ATRIAL RATE: 73 BPM
BACTERIA UR QL AUTO: ABNORMAL /HPF
BASE EX.OXY STD BLDV CALC-SCNC: 56.1 % (ref 60–80)
BASE EXCESS BLDV CALC-SCNC: -2 MMOL/L
BASOPHILS # BLD AUTO: 0.08 THOUSANDS/ΜL (ref 0–0.1)
BASOPHILS NFR BLD AUTO: 1 % (ref 0–1)
BETA-HYDROXYBUTYRATE: 1.2 MMOL/L
BILIRUB DIRECT SERPL-MCNC: 0.23 MG/DL (ref 0–0.2)
BILIRUB SERPL-MCNC: 0.8 MG/DL (ref 0.2–1)
BILIRUB UR QL STRIP: NEGATIVE
BUN SERPL-MCNC: 24 MG/DL (ref 5–25)
BUN SERPL-MCNC: 27 MG/DL (ref 5–25)
CALCIUM SERPL-MCNC: 8.7 MG/DL (ref 8.3–10.1)
CALCIUM SERPL-MCNC: 9.3 MG/DL (ref 8.3–10.1)
CHLORIDE SERPL-SCNC: 102 MMOL/L (ref 100–108)
CHLORIDE SERPL-SCNC: 91 MMOL/L (ref 100–108)
CLARITY UR: CLEAR
CO2 SERPL-SCNC: 26 MMOL/L (ref 21–32)
CO2 SERPL-SCNC: 27 MMOL/L (ref 21–32)
COLOR UR: YELLOW
CREAT SERPL-MCNC: 1.71 MG/DL (ref 0.6–1.3)
CREAT SERPL-MCNC: 2.03 MG/DL (ref 0.6–1.3)
EOSINOPHIL # BLD AUTO: 0.32 THOUSAND/ΜL (ref 0–0.61)
EOSINOPHIL NFR BLD AUTO: 4 % (ref 0–6)
ERYTHROCYTE [DISTWIDTH] IN BLOOD BY AUTOMATED COUNT: 13.8 % (ref 11.6–15.1)
GFR SERPL CREATININE-BSD FRML MDRD: 41 ML/MIN/1.73SQ M
GFR SERPL CREATININE-BSD FRML MDRD: 50 ML/MIN/1.73SQ M
GLUCOSE SERPL-MCNC: 159 MG/DL (ref 65–140)
GLUCOSE SERPL-MCNC: 211 MG/DL (ref 65–140)
GLUCOSE SERPL-MCNC: 216 MG/DL (ref 65–140)
GLUCOSE SERPL-MCNC: 258 MG/DL (ref 65–140)
GLUCOSE SERPL-MCNC: 297 MG/DL (ref 65–140)
GLUCOSE SERPL-MCNC: 315 MG/DL (ref 65–140)
GLUCOSE SERPL-MCNC: 318 MG/DL (ref 65–140)
GLUCOSE SERPL-MCNC: 677 MG/DL (ref 65–140)
GLUCOSE SERPL-MCNC: >500 MG/DL (ref 65–140)
GLUCOSE SERPL-MCNC: >500 MG/DL (ref 65–140)
GLUCOSE UR STRIP-MCNC: ABNORMAL MG/DL
HCO3 BLDV-SCNC: 24 MMOL/L (ref 24–30)
HCT VFR BLD AUTO: 40.2 % (ref 36.5–49.3)
HGB BLD-MCNC: 13.2 G/DL (ref 12–17)
HGB UR QL STRIP.AUTO: ABNORMAL
IMM GRANULOCYTES # BLD AUTO: 0.02 THOUSAND/UL (ref 0–0.2)
IMM GRANULOCYTES NFR BLD AUTO: 0 % (ref 0–2)
KETONES UR STRIP-MCNC: ABNORMAL MG/DL
LEUKOCYTE ESTERASE UR QL STRIP: ABNORMAL
LIPASE SERPL-CCNC: 608 U/L (ref 73–393)
LYMPHOCYTES # BLD AUTO: 1.58 THOUSANDS/ΜL (ref 0.6–4.47)
LYMPHOCYTES NFR BLD AUTO: 21 % (ref 14–44)
MCH RBC QN AUTO: 27.3 PG (ref 26.8–34.3)
MCHC RBC AUTO-ENTMCNC: 32.8 G/DL (ref 31.4–37.4)
MCV RBC AUTO: 83 FL (ref 82–98)
MONOCYTES # BLD AUTO: 0.66 THOUSAND/ΜL (ref 0.17–1.22)
MONOCYTES NFR BLD AUTO: 9 % (ref 4–12)
NEUTROPHILS # BLD AUTO: 4.84 THOUSANDS/ΜL (ref 1.85–7.62)
NEUTS SEG NFR BLD AUTO: 65 % (ref 43–75)
NITRITE UR QL STRIP: NEGATIVE
NON-SQ EPI CELLS URNS QL MICRO: ABNORMAL /HPF
NRBC BLD AUTO-RTO: 0 /100 WBCS
O2 CT BLDV-SCNC: 10.9 ML/DL
P AXIS: 70 DEGREES
PCO2 BLDV: 45.9 MM HG (ref 42–50)
PH BLDV: 7.34 [PH] (ref 7.3–7.4)
PH UR STRIP.AUTO: 5.5 [PH]
PLATELET # BLD AUTO: 376 THOUSANDS/UL (ref 149–390)
PMV BLD AUTO: 10 FL (ref 8.9–12.7)
PO2 BLDV: 31.2 MM HG (ref 35–45)
POTASSIUM SERPL-SCNC: 3.3 MMOL/L (ref 3.5–5.3)
POTASSIUM SERPL-SCNC: 4.4 MMOL/L (ref 3.5–5.3)
PR INTERVAL: 166 MS
PROT SERPL-MCNC: 8.1 G/DL (ref 6.4–8.2)
PROT UR STRIP-MCNC: NEGATIVE MG/DL
QRS AXIS: -59 DEGREES
QRSD INTERVAL: 90 MS
QT INTERVAL: 432 MS
QTC INTERVAL: 475 MS
RBC # BLD AUTO: 4.84 MILLION/UL (ref 3.88–5.62)
RBC #/AREA URNS AUTO: ABNORMAL /HPF
SODIUM SERPL-SCNC: 128 MMOL/L (ref 136–145)
SODIUM SERPL-SCNC: 137 MMOL/L (ref 136–145)
SP GR UR STRIP.AUTO: <=1.005 (ref 1–1.03)
T WAVE AXIS: 37 DEGREES
TROPONIN I SERPL-MCNC: <0.02 NG/ML
TSH SERPL DL<=0.05 MIU/L-ACNC: 1.1 UIU/ML (ref 0.36–3.74)
UROBILINOGEN UR QL STRIP.AUTO: 0.2 E.U./DL
VENTRICULAR RATE: 73 BPM
WBC # BLD AUTO: 7.5 THOUSAND/UL (ref 4.31–10.16)
WBC #/AREA URNS AUTO: ABNORMAL /HPF

## 2020-07-09 PROCEDURE — 36415 COLL VENOUS BLD VENIPUNCTURE: CPT | Performed by: PHYSICIAN ASSISTANT

## 2020-07-09 PROCEDURE — 82948 REAGENT STRIP/BLOOD GLUCOSE: CPT

## 2020-07-09 PROCEDURE — 94760 N-INVAS EAR/PLS OXIMETRY 1: CPT

## 2020-07-09 PROCEDURE — 84484 ASSAY OF TROPONIN QUANT: CPT | Performed by: PHYSICIAN ASSISTANT

## 2020-07-09 PROCEDURE — 87086 URINE CULTURE/COLONY COUNT: CPT | Performed by: FAMILY MEDICINE

## 2020-07-09 PROCEDURE — 96361 HYDRATE IV INFUSION ADD-ON: CPT

## 2020-07-09 PROCEDURE — 99223 1ST HOSP IP/OBS HIGH 75: CPT | Performed by: FAMILY MEDICINE

## 2020-07-09 PROCEDURE — 86341 ISLET CELL ANTIBODY: CPT | Performed by: INTERNAL MEDICINE

## 2020-07-09 PROCEDURE — 99285 EMERGENCY DEPT VISIT HI MDM: CPT | Performed by: PHYSICIAN ASSISTANT

## 2020-07-09 PROCEDURE — NC001 PR NO CHARGE: Performed by: INTERNAL MEDICINE

## 2020-07-09 PROCEDURE — 80048 BASIC METABOLIC PNL TOTAL CA: CPT | Performed by: PHYSICIAN ASSISTANT

## 2020-07-09 PROCEDURE — 74177 CT ABD & PELVIS W/CONTRAST: CPT

## 2020-07-09 PROCEDURE — 94660 CPAP INITIATION&MGMT: CPT

## 2020-07-09 PROCEDURE — 80076 HEPATIC FUNCTION PANEL: CPT | Performed by: PHYSICIAN ASSISTANT

## 2020-07-09 PROCEDURE — 76770 US EXAM ABDO BACK WALL COMP: CPT

## 2020-07-09 PROCEDURE — 93010 ELECTROCARDIOGRAM REPORT: CPT | Performed by: INTERNAL MEDICINE

## 2020-07-09 PROCEDURE — G0427 INPT/ED TELECONSULT70: HCPCS | Performed by: INTERNAL MEDICINE

## 2020-07-09 PROCEDURE — 93005 ELECTROCARDIOGRAM TRACING: CPT

## 2020-07-09 PROCEDURE — 83690 ASSAY OF LIPASE: CPT | Performed by: PHYSICIAN ASSISTANT

## 2020-07-09 PROCEDURE — 85025 COMPLETE CBC W/AUTO DIFF WBC: CPT | Performed by: PHYSICIAN ASSISTANT

## 2020-07-09 PROCEDURE — 83519 RIA NONANTIBODY: CPT | Performed by: INTERNAL MEDICINE

## 2020-07-09 PROCEDURE — 99285 EMERGENCY DEPT VISIT HI MDM: CPT

## 2020-07-09 PROCEDURE — 84443 ASSAY THYROID STIM HORMONE: CPT | Performed by: INTERNAL MEDICINE

## 2020-07-09 PROCEDURE — 82010 KETONE BODYS QUAN: CPT | Performed by: PHYSICIAN ASSISTANT

## 2020-07-09 PROCEDURE — 81001 URINALYSIS AUTO W/SCOPE: CPT | Performed by: PHYSICIAN ASSISTANT

## 2020-07-09 PROCEDURE — 96374 THER/PROPH/DIAG INJ IV PUSH: CPT

## 2020-07-09 PROCEDURE — 94664 DEMO&/EVAL PT USE INHALER: CPT

## 2020-07-09 PROCEDURE — 80048 BASIC METABOLIC PNL TOTAL CA: CPT | Performed by: FAMILY MEDICINE

## 2020-07-09 PROCEDURE — 82805 BLOOD GASES W/O2 SATURATION: CPT | Performed by: PHYSICIAN ASSISTANT

## 2020-07-09 RX ORDER — SODIUM CHLORIDE FOR INHALATION 0.9 %
3 VIAL, NEBULIZER (ML) INHALATION
Status: DISCONTINUED | OUTPATIENT
Start: 2020-07-10 | End: 2020-07-11 | Stop reason: HOSPADM

## 2020-07-09 RX ORDER — ESCITALOPRAM OXALATE 10 MG/1
20 TABLET ORAL DAILY
Status: DISCONTINUED | OUTPATIENT
Start: 2020-07-09 | End: 2020-07-11 | Stop reason: HOSPADM

## 2020-07-09 RX ORDER — ATORVASTATIN CALCIUM 40 MG/1
40 TABLET, FILM COATED ORAL
COMMUNITY
Start: 2020-06-18 | End: 2020-07-09

## 2020-07-09 RX ORDER — IPRATROPIUM BROMIDE AND ALBUTEROL SULFATE 2.5; .5 MG/3ML; MG/3ML
3 SOLUTION RESPIRATORY (INHALATION)
Status: DISCONTINUED | OUTPATIENT
Start: 2020-07-09 | End: 2020-07-09

## 2020-07-09 RX ORDER — SODIUM CHLORIDE 9 MG/ML
75 INJECTION, SOLUTION INTRAVENOUS CONTINUOUS
Status: DISCONTINUED | OUTPATIENT
Start: 2020-07-09 | End: 2020-07-09

## 2020-07-09 RX ORDER — CLONAZEPAM 0.5 MG/1
0.5 TABLET ORAL 2 TIMES DAILY PRN
Status: DISCONTINUED | OUTPATIENT
Start: 2020-07-09 | End: 2020-07-11 | Stop reason: HOSPADM

## 2020-07-09 RX ORDER — BUDESONIDE 0.25 MG/2ML
0.25 INHALANT ORAL
Status: DISCONTINUED | OUTPATIENT
Start: 2020-07-09 | End: 2020-07-11 | Stop reason: HOSPADM

## 2020-07-09 RX ORDER — POTASSIUM CHLORIDE AND SODIUM CHLORIDE 900; 300 MG/100ML; MG/100ML
75 INJECTION, SOLUTION INTRAVENOUS CONTINUOUS
Status: DISCONTINUED | OUTPATIENT
Start: 2020-07-09 | End: 2020-07-10

## 2020-07-09 RX ORDER — BUSPIRONE HYDROCHLORIDE 5 MG/1
5 TABLET ORAL DAILY
Status: DISCONTINUED | OUTPATIENT
Start: 2020-07-09 | End: 2020-07-11 | Stop reason: HOSPADM

## 2020-07-09 RX ORDER — ATORVASTATIN CALCIUM 40 MG/1
40 TABLET, FILM COATED ORAL
Status: DISCONTINUED | OUTPATIENT
Start: 2020-07-09 | End: 2020-07-11 | Stop reason: HOSPADM

## 2020-07-09 RX ORDER — CHLORAL HYDRATE 500 MG
1000 CAPSULE ORAL
Status: DISCONTINUED | OUTPATIENT
Start: 2020-07-09 | End: 2020-07-11 | Stop reason: HOSPADM

## 2020-07-09 RX ORDER — BUDESONIDE 0.25 MG/2ML
0.25 INHALANT ORAL 2 TIMES DAILY
Status: DISCONTINUED | OUTPATIENT
Start: 2020-07-09 | End: 2020-07-09

## 2020-07-09 RX ORDER — NEBIVOLOL 10 MG/1
20 TABLET ORAL 2 TIMES DAILY
Status: DISCONTINUED | OUTPATIENT
Start: 2020-07-09 | End: 2020-07-11 | Stop reason: HOSPADM

## 2020-07-09 RX ORDER — LEVALBUTEROL 1.25 MG/.5ML
1.25 SOLUTION, CONCENTRATE RESPIRATORY (INHALATION)
Status: DISCONTINUED | OUTPATIENT
Start: 2020-07-10 | End: 2020-07-11 | Stop reason: HOSPADM

## 2020-07-09 RX ORDER — POTASSIUM CHLORIDE 20 MEQ/1
20 TABLET, EXTENDED RELEASE ORAL DAILY
Status: DISCONTINUED | OUTPATIENT
Start: 2020-07-09 | End: 2020-07-09

## 2020-07-09 RX ORDER — FLUTICASONE PROPIONATE 50 MCG
1 SPRAY, SUSPENSION (ML) NASAL 2 TIMES DAILY
Status: DISCONTINUED | OUTPATIENT
Start: 2020-07-09 | End: 2020-07-11 | Stop reason: HOSPADM

## 2020-07-09 RX ORDER — ALBUTEROL SULFATE 90 UG/1
2 AEROSOL, METERED RESPIRATORY (INHALATION) EVERY 6 HOURS PRN
Status: DISCONTINUED | OUTPATIENT
Start: 2020-07-09 | End: 2020-07-11 | Stop reason: HOSPADM

## 2020-07-09 RX ORDER — DOXAZOSIN MESYLATE 4 MG/1
4 TABLET ORAL
Status: DISCONTINUED | OUTPATIENT
Start: 2020-07-09 | End: 2020-07-11 | Stop reason: HOSPADM

## 2020-07-09 RX ORDER — DOXAZOSIN 8 MG/1
8 TABLET ORAL 2 TIMES DAILY
COMMUNITY
Start: 2020-04-23 | End: 2020-07-09

## 2020-07-09 RX ORDER — AMLODIPINE BESYLATE 10 MG/1
10 TABLET ORAL DAILY
Status: DISCONTINUED | OUTPATIENT
Start: 2020-07-09 | End: 2020-07-11 | Stop reason: HOSPADM

## 2020-07-09 RX ORDER — FERROUS SULFATE 325(65) MG
325 TABLET ORAL 2 TIMES DAILY
Status: DISCONTINUED | OUTPATIENT
Start: 2020-07-09 | End: 2020-07-11 | Stop reason: HOSPADM

## 2020-07-09 RX ORDER — POTASSIUM CHLORIDE 20 MEQ/1
20 TABLET, EXTENDED RELEASE ORAL DAILY
Status: DISCONTINUED | OUTPATIENT
Start: 2020-07-09 | End: 2020-07-11 | Stop reason: HOSPADM

## 2020-07-09 RX ORDER — WARFARIN SODIUM 5 MG/1
5 TABLET ORAL
Status: DISCONTINUED | OUTPATIENT
Start: 2020-07-09 | End: 2020-07-11 | Stop reason: HOSPADM

## 2020-07-09 RX ADMIN — DOXAZOSIN 4 MG: 4 TABLET ORAL at 22:15

## 2020-07-09 RX ADMIN — INSULIN LISPRO 3 UNITS: 100 INJECTION, SOLUTION INTRAVENOUS; SUBCUTANEOUS at 17:21

## 2020-07-09 RX ADMIN — MAGNESIUM GLUCONATE 500 MG ORAL TABLET 400 MG: 500 TABLET ORAL at 14:14

## 2020-07-09 RX ADMIN — METFORMIN HYDROCHLORIDE 1000 MG: 500 TABLET ORAL at 09:56

## 2020-07-09 RX ADMIN — IPRATROPIUM BROMIDE AND ALBUTEROL SULFATE 3 ML: 2.5; .5 SOLUTION RESPIRATORY (INHALATION) at 20:26

## 2020-07-09 RX ADMIN — SODIUM CHLORIDE 125 ML/HR: 0.9 INJECTION, SOLUTION INTRAVENOUS at 12:36

## 2020-07-09 RX ADMIN — SODIUM CHLORIDE 1000 ML: 0.9 INJECTION, SOLUTION INTRAVENOUS at 10:04

## 2020-07-09 RX ADMIN — POTASSIUM CHLORIDE 20 MEQ: 1500 TABLET, EXTENDED RELEASE ORAL at 17:21

## 2020-07-09 RX ADMIN — POTASSIUM CHLORIDE AND SODIUM CHLORIDE 75 ML/HR: 900; 300 INJECTION, SOLUTION INTRAVENOUS at 17:11

## 2020-07-09 RX ADMIN — ESCITALOPRAM OXALATE 20 MG: 20 TABLET ORAL at 14:14

## 2020-07-09 RX ADMIN — SODIUM CHLORIDE 1000 ML: 0.9 INJECTION, SOLUTION INTRAVENOUS at 10:05

## 2020-07-09 RX ADMIN — POTASSIUM CHLORIDE 20 MEQ: 1500 TABLET, EXTENDED RELEASE ORAL at 14:14

## 2020-07-09 RX ADMIN — IOHEXOL 100 ML: 350 INJECTION, SOLUTION INTRAVENOUS at 11:04

## 2020-07-09 RX ADMIN — IPRATROPIUM BROMIDE AND ALBUTEROL SULFATE 3 ML: 2.5; .5 SOLUTION RESPIRATORY (INHALATION) at 14:16

## 2020-07-09 RX ADMIN — BUSPIRONE HYDROCHLORIDE 5 MG: 5 TABLET ORAL at 14:14

## 2020-07-09 RX ADMIN — NEBIVOLOL HYDROCHLORIDE 20 MG: 10 TABLET ORAL at 17:29

## 2020-07-09 RX ADMIN — ATORVASTATIN CALCIUM 40 MG: 40 TABLET, FILM COATED ORAL at 22:16

## 2020-07-09 RX ADMIN — SODIUM CHLORIDE 6 UNITS/HR: 9 INJECTION, SOLUTION INTRAVENOUS at 11:58

## 2020-07-09 RX ADMIN — INSULIN HUMAN 10 UNITS: 100 INJECTION, SOLUTION PARENTERAL at 11:15

## 2020-07-09 RX ADMIN — BUDESONIDE 0.25 MG: 0.25 INHALANT RESPIRATORY (INHALATION) at 20:26

## 2020-07-09 RX ADMIN — AMLODIPINE BESYLATE 10 MG: 10 TABLET ORAL at 14:13

## 2020-07-09 RX ADMIN — FERROUS SULFATE TAB 325 MG (65 MG ELEMENTAL FE) 325 MG: 325 (65 FE) TAB at 17:29

## 2020-07-09 RX ADMIN — OMEGA-3 FATTY ACIDS CAP 1000 MG 1000 MG: 1000 CAP at 22:16

## 2020-07-09 RX ADMIN — WARFARIN SODIUM 5 MG: 5 TABLET ORAL at 17:29

## 2020-07-09 NOTE — ED NOTES
1  CC Hyperglycemia     2  Is this admission due to an injury? No     3  Orientation status A&Oxs4     4  Abnormal labs/abnormal focused assessment/abnormal vitals POCT Blood Glucose due @ 20:00    5  Medications/ drips Insulin gtt @ 6 units/hr, NSS with [email or web address]    6  Last time narcotics were given/ pain medication    7  IV lines/drains/ etc  20RAC, 20RW    8  Isolation status None    9  Skin WDL    10  Ambulation status Ambulatory     11   ED phone # [phone]     Jesu Gorman RN  07/09/20 [id number] [facility] Alejo Angelucci RN  07/09/20 [id number]

## 2020-07-09 NOTE — ASSESSMENT & PLAN NOTE
No results found for: HGBA1C    Recent Labs     07/09/20  0912 07/09/20  1148   POCGLU >500* >500*       Blood Sugar Average: Last 72 hrs:       Noted blood glucose of more than 600  Emergency room started the patient on insulin drip  Continue the same management  Will involve Endocrinology and monitor glucoses  No DKA at this time  Will recheck BMP now

## 2020-07-09 NOTE — ASSESSMENT & PLAN NOTE
No results found for: HGBA1C    Recent Labs     07/09/20  0912 07/09/20  1148   POCGLU >500* >500*       Blood Sugar Average: Last 72 hrs: This appears to be new onset diabetes  Actually, upon review of the chart I have found his hemoglobin A1c from the beginning of July 2020  It was recorded at 11 3%  Therefore, patient is poorly controlled diabetic and would benefit from long-term use of insulin

## 2020-07-09 NOTE — CONSULTS
REQUIRED DOCUMENTATION:     1  This service was provided via Telemedicine  2  Provider located at [address] [place] [address] and Endocrinology at [facility]  3  TeleMed provider: Josh Black MD   4  Identify all parties in room with patient during tele consult:  None  5  After connecting through [employer], patient was identified by name and date of birth and assistant checked wristband  Patient was then informed that this was a Telemedicine visit and that the exam was being conducted confidentially over secure lines  My office door was closed  No one else was in the room  Patient acknowledged consent and understanding of privacy and security of the Telemedicine visit, and gave us permission to have the assistant stay in the room in order to assist with the history and to conduct the exam   I informed the patient that I have reviewed their record in Epic and presented the opportunity for them to ask any questions regarding the visit today  The patient agreed to participate  Consultation - Lawyer Day 60 y o  male MRN: [de-identified]    Unit/Bed#: ED 29 Encounter: [id number]      Assessment/Plan     Assessment: This is a 60y o -year-old male with past medical history of diabetes mellitus, hypertension, hyperlipidemia, CKD, COPD, CHF, DVT obesity who presented with hyperglycemia, polyuria, polydipsia, blurry vision    Plan:  1   Diabetes mellitus not on any medication with hyperglycemia  Poorly controlled with A1c 11 3%  Currently on insulin drip, with improvement in blood sugars however still hyperglycemic  Given presentation with polyuria, polydipsia, unintentional significant weight loss, elevated ketone levels and past history of diabetes, not requiring medication over the last few years (though labs, glycemic management during this time is not known), recommend ruling out type 1 diabetes mellitus/SHERI    Recommend the following for now  -continue insulin drip for now  -start Humalog 3 units with meals  -check anti RETA, anti islet antibody  -when clinically appropriate, will transition to subcutaneous basal bolus insulin like to tomorrow  - if renal function improves, may resume metformin at the time of discharge  -patient will need insulin teaching, Glucometer with supplies on discharge  -recommend follow-up with Endocrinology as an outpatient    2  Acute kidney injury on CKD-creatinine is improving  Continue fluids per primary team   Trend BMP  3  Hypertension - blood pressure close to goal   Medications per primary team  4  Hyperlipidemia-continue statin  5  History of DVT-continue anticoagulation  7  Abnormal thyroid function tests-Mildly elevated TSH in 2018  Check TSH     CC: Diabetes Consult    History of Present Illness     HPI: Monica Ruiz is a 60y o  year old male with past medical history of hypertension, hyperlipidemia, CKD, COPD, CHF, DVT, obesity, AGUS     Was diagnosed with type 2 diabetes mellitus 6-8 years ago, took metformin for a short time and was then told to discontinue  2 weeks ago - started having blurriness in vision and polyuria, polydipsia   Went to primary care physician and on labs was diagnosed with diabetes mellitus  Was advised to come to the ER whech he tid on 7/3, was given medication and discharged home on metformin 500 mg po daily  Did not notice any changes  Followed up with his heme/onc and based on labs was again advised to come to the ER in view of BG >600 mg/dl   Was supposed to follow up with primary care physician tomorrow and diabetes educator at [facility]     On presentation, blood sugars 677 mg/dL, not in DKA based on BMP  Had trace ketones in the urine, elevated beta hydroxybutyrate to 1 2  Patient was given regular insulin 10 units x 1, metformin 1000 mg orally x1 is started on an insulin drip  Receiving IV fluids with potassium     Also noticed dysuria  No fever   Has been having cramps in the legs   Some diarrhea   Appetite is low for the lats few days   Lost 15 lbs in the last 2 weeks   Had lunch at the hospital today   Has been following up with the bariatrics center     No known history of CAD/CVA  Denies numbness, tingling in the hands or feet    All other systems were reviewed and are negative  Review of Systems    Historical Information   Past Medical History:   Diagnosis Date    Asthma     [facility]     Diabetes mellitus ([facility] )     Emphysema of lung ([facility] )     Gout     History of transfusion     pt stated they had a transfusion when they had gallbladder surgery   Hypertension     Kidney disease     renal failure    Leg DVT (deep venous thromboembolism), acute, bilateral ([facility] ) 01/2018    Obesity (BMI 30-39  9)     AGUS on CPAP     setting 11    Sleep apnea     Systolic CHF [facility])      Past Surgical History:   Procedure Laterality Date    ADRENALECTOMY      CHOLECYSTECTOMY      INCISION AND DRAINAGE OF WOUND Left 10/17/2018    Procedure: INCISION AND DRAINAGE (I&D) GROIN;  Surgeon: Hillary Casanova MD;  Location: [facility] OR;  Service: General    IR IMAGE [address] / DRAINAGE W TUBE  8/17/2018    IR IMAGE GUIDED ASPIRATION / DRAINAGE W TUBE  7/31/2018    JOINT REPLACEMENT Bilateral     knee    KIDNEY SURGERY  2009    nodule removal    LYMPH NODE DISSECTION Left 01/2018    left inguinal LN removed - benign     OTHER SURGICAL HISTORY      kidney nodule removal    PALATE / UVULA BIOPSY / EXCISION      SINUS SURGERY       Social History   Social History     Substance and Sexual Activity   Alcohol Use Yes    Comment: socially     Social History     Substance and Sexual Activity   Drug Use No     Social History     Tobacco Use   Smoking Status Never Smoker   Smokeless Tobacco Never Used     Family History:   Family History   Problem Relation Age of Onset    Heart murmur Sister     Asthma Sister     Hypertension Sister     Kidney disease Mother     Cancer Father     Bell's palsy Brother     Kidney disease Brother     Deep vein thrombosis Neg Hx        Meds/Allergies   Current Facility-Administered Medications   Medication Dose Route Frequency Provider Last Rate Last Dose    albuterol (PROVENTIL HFA,VENTOLIN HFA) inhaler 2 puff  2 puff Inhalation Q6H PRN Beena Gupta MD        amLODIPine (NORVASC) tablet 10 mg  10 mg Oral Daily Beena Gupta MD   10 mg at 07/09/20 1413    atorvastatin (LIPITOR) tablet 40 mg  40 mg Oral HS Beena Gupta MD        budesonide (PULMICORT) inhalation solution 0 25 mg  0 25 mg Nebulization Q12H Viviana Cordova DO        busPIRone (BUSPAR) tablet 5 mg  5 mg Oral Daily Beena Gupta MD   5 mg at 07/09/20 1414    clonazePAM (KlonoPIN) tablet 0 5 mg  0 5 mg Oral BID PRN Beena Gupta MD        doxazosin (CARDURA) tablet 4 mg  4 mg Oral HS Beena Gupta MD        escitalopram (LEXAPRO) tablet 20 mg  20 mg Oral Daily Beena Gupta MD   20 mg at 07/09/20 1414    ferrous sulfate tablet 325 mg  325 mg Oral BID Beena Gupta MD        fish oil capsule 1,000 mg  1,000 mg Oral HS Beena Gupta MD        fluticasone (FLONASE) 50 mcg/act nasal spray 1 spray  1 spray Nasal BID Beena Gupta MD        insulin lispro (HumaLOG) 100 units/mL subcutaneous injection 3 Units  3 Units Subcutaneous TID With Meals Dawit Miller MD        insulin regular (HumuLIN R,NovoLIN R) 1 Units/mL in sodium chloride 0 9 % 100 mL infusion  0 3-21 Units/hr Intravenous Titrated Tricia Carrillo PA-C 4 mL/hr at 07/09/20 1609 4 Units/hr at 07/09/20 1609    ipratropium-albuterol (DUO-NEB) 0 5-2 5 mg/3 mL inhalation solution 3 mL  3 mL Nebulization Q6H Beena Gupta MD   3 mL at 07/09/20 1416    magnesium oxide (MAG-OX) tablet 400 mg  400 mg Oral Daily Beena Gupta MD   400 mg at 07/09/20 1414    nebivolol (BYSTOLIC) tablet 20 mg  20 mg Oral BID Beena Gupta MD        potassium chloride (K-DUR,KLOR-CON) CR tablet 20 mEq  20 mEq Oral Daily Beena Gupta MD   20 mEq at 07/09/20 1414    sodium chloride 0 9 % with KCl 40 mEq/L infusion (premix)  75 mL/hr Intravenous Continuous Leticia Langford MD        warfarin (COUMADIN) tablet 5 mg  5 mg Oral Daily (warfarin) Leticia Langford MD         Current Outpatient Medications   Medication Sig Dispense Refill    amLODIPine (NORVASC) 10 mg tablet Take 10 mg by mouth daily      aspirin (PX ENTERIC ASPIRIN) 325 mg EC tablet Take 325 mg by mouth daily At night       atorvastatin (LIPITOR) 40 mg tablet Take 40 mg by mouth daily at bedtime      azilsartan medoxomil (EDARBI) 80 MG tablet Once a day      budesonide (PULMICORT) 0 25 mg/2 mL nebulizer solution Take 0 25 mg by nebulization 2 (two) times a day Rinse mouth after use   Cholecalciferol (VITAMIN D-3) 1000 units CAPS Take 1,000 Units by mouth daily      doxazosin (CARDURA) 4 mg tablet Take 4 mg by mouth daily at bedtime      eplerenone (INSPRA) 50 MG tablet Take 50 mg by mouth daily      escitalopram (LEXAPRO) 10 mg tablet Take 10 mg by mouth daily      ferrous sulfate 325 (65 Fe) mg tablet Take 325 mg by mouth 2 (two) times a day        furosemide (LASIX) 80 mg tablet Take 1 tablet (80 mg total) by mouth daily Starting next Monday  If you become short of breath in meantime call your pcp to consider resuming earlier, or if you have 2 lb wt gain   30 tablet 0    Glycopyrrolate-Formoterol (BEVESPI AEROSPHERE) 9-4 8 MCG/ACT AERO Inhale 2 puffs daily after breakfast      LORazepam (ATIVAN) 0 5 mg tablet Take 0 5 mg by mouth every 6 (six) hours as needed for anxiety      MAGNESIUM PO Take 400 mg by mouth daily      metFORMIN (GLUCOPHAGE) 500 mg tablet Take 1 tablet (500 mg total) by mouth 2 (two) times a day with meals for 14 days 28 tablet 0    Milk Thistle 500 MG CAPS Take 500 mg by mouth 2 (two) times a day      multivitamin-iron-minerals-folic acid (CENTRUM) chewable tablet Chew 1 tablet daily      nebivolol (BYSTOLIC) 20 MG tablet Take 20 mg by mouth 2 (two) times a day      Omega-3 Fatty Acids (FISH OIL) 1200 MG CAPS Take 1,200 mg by mouth daily at bedtime      potassium chloride (KLOR-CON M20) 20 mEq tablet Take 1 tablet (20 mEq total) by mouth daily Restart with your lasix next monday 1 tablet 0    Turmeric Curcumin 500 MG CAPS Take 1 capsule by mouth      umeclidinium-vilanterol (Anoro Ellipta) 62 5-25 MCG/INH inhaler Inhale 1 puff daily      warfarin (COUMADIN) 5 mg tablet TAKE 1 TO 2 TABLETS BY MOUTH ONCE DAILY IN THE EVENING AS DIRECTED BY COUMADIN CLINIC  5    albuterol (PROVENTIL HFA,VENTOLIN HFA) 90 mcg/act inhaler Inhale 2 puffs every 6 (six) hours as needed for wheezing (Patient not taking: Reported on 7/9/2020) 1 each 0    allopurinol (ZYLOPRIM) 100 mg tablet       aspirin 325 mg tablet TAKE ONE TABLET BY MOUTH EVERY DAY      Azilsartan-Chlorthalidone (EDARBYCLOR PO) Take by mouth      azithromycin (ZITHROMAX) 250 mg tablet Take 2 tablets by mouth on day one; then one tablet daily for 4 days        busPIRone (BUSPAR) 5 mg tablet Take 5 mg by mouth      clonazePAM (KlonoPIN) 0 5 mg tablet 1 tablet      Coenzyme Q10 (CO Q 10) 100 MG CAPS 1 capsule      enoxaparin (LOVENOX) 100 mg/mL INJECT 1 SYRINGE SUBCUTANEOUSLY EVERY 12 HOURS  1    escitalopram (LEXAPRO) 20 mg tablet Take 20 mg by mouth      fluticasone (FLONASE) 50 mcg/act nasal spray 1 spray into each nostril 2 (two) times a day (Patient not taking: Reported on 6/1/2020) 16 g 0    methylPREDNISolone 4 MG tablet therapy pack TAKE BY MOUTH AS DIRECTED ON INSIDE OF PACKAGE  0    minoxidil (LONITEN) 10 mg tablet       Mometasone Furo-Formoterol Fum 200-5 MCG/ACT AERO Take 2 puffs by mouth 2 (two) times a day      Multiple Vitamins-Minerals (MULTIVITAMIN & MINERAL PO) 1 tablet      NON FORMULARY       predniSONE 10 mg tablet Take 4 tablets for 2 days then take 3 tablets for 2 days then take 2 tablets for 2 days then take 1 tablet for 2 days      valsartan (DIOVAN) 320 MG tablet Once a day Allergies   Allergen Reactions    Clonidine Anaphylaxis    Lisinopril Anaphylaxis    Nifedipine Anaphylaxis    Spironolactone Anaphylaxis, Other (See Comments) and Shortness Of Breath    Buspirone      Headaches,     Enoxaparin     Minoxidil      Retains fluid around heart       Objective   Vitals: Blood pressure 147/82, pulse 72, temperature 99 2 °F (37 3 °C), temperature source Oral, resp  rate 20, height 5' 7 5" (1 715 m), weight 104 kg (229 lb 8 oz), SpO2 100 %  Intake/Output Summary (Last 24 hours) at 7/9/2020 1654  Last data filed at 7/9/2020 1159  Gross per 24 hour   Intake 4000 ml   Output    Net 4000 ml     Invasive Devices     Peripheral Intravenous Line            Peripheral IV 07/09/20 Right Antecubital less than 1 day    Peripheral IV 07/09/20 Right Wrist less than 1 day                Physical Exam  Constitutional:Oriented to person, place, and time  Appears well-developed and well-nourished  Not in any acute distress  HENT:   Head: Normocephalic and atraumatic  Neck: Normal range of motion  Pulmonary/Chest: Effort normal/ breathing comfortably on room air   Musculoskeletal: Normal range of motion  Neurological: Alert and oriented to person, place, and time  Skin: Not diaphoretic  Psychiatric: Normal mood and affect  Behavior is normal      The history was obtained from the review of the chart, patient  Lab Results:       Lab Results   Component Value Date    WBC 7 50 07/09/2020    HGB 13 2 07/09/2020    HCT 40 2 07/09/2020    MCV 83 07/09/2020     07/09/2020     Lab Results   Component Value Date/Time    BUN 24 07/09/2020 02:06 PM    K 3 3 (L) 07/09/2020 02:06 PM     07/09/2020 02:06 PM    CO2 27 07/09/2020 02:06 PM    CREATININE 1 71 (H) 07/09/2020 02:06 PM    AST 18 07/09/2020 10:01 AM    ALT 29 07/09/2020 10:01 AM    ALB 3 7 07/09/2020 10:01 AM     No results for input(s): CHOL, HDL, LDL, TRIG, VLDL in the last 72 hours    No results found for: Kris Field, [id number]  POC Glucose (mg/dl)   Date Value   07/09/2020 258 (H)   07/09/2020 318 (H)   07/09/2020 >500 (HH)   07/09/2020 >500 (HH)     A1c 7/2/2020 11 3%    Imaging Studies: I have personally reviewed pertinent reports  Portions of the record may have been created with voice recognition software  Occasional wrong word or "sound a like" substitutions may have occurred due to the inherent limitations of voice recognition software  Read the chart carefully and recognize, using context, where substitutions have occurred

## 2020-07-09 NOTE — ASSESSMENT & PLAN NOTE
Wt Readings from Last 3 Encounters:   07/09/20 104 kg (229 lb 8 oz)   07/03/20 105 kg (231 lb 14 8 oz)   06/16/20 110 kg (242 lb 3 2 oz)       Patient with a history of chronic CHF with preserved ejection fraction  Review 2D echo from 2018  Patient continues to take Lasix 80 mg daily  However, currently presents with acute kidney injury, therefore will hold diuresis  Patient is not in acute exacerbation

## 2020-07-09 NOTE — ASSESSMENT & PLAN NOTE
Patient with a history of reported stage 3 kidney disease  Current creatinine appears to be worsened  His baseline is around 1 5 in January 2020  Will hold Ace inhibitors and diuretics and provide with mild hydration

## 2020-07-09 NOTE — ASSESSMENT & PLAN NOTE
Chronic COPD without acute exacerbation  Continue Pulmicort b i d , albuterol as needed and DuoNeb  Patient does not use oxygen at home

## 2020-07-09 NOTE — ED PROVIDER NOTES
History  Chief Complaint   Patient presents with    Hyperglycemia - Symptomatic     Pt stated he went to do blood work yesterday ([id number]) and recieved a call today by PCP office stating BS level was high and to go to ER     58yo male with a history of hypertension, CKD, type 2 diabetes, AGUS on CPAP, and DVT on Coumadin presenting for evaluation of hyperglycemia  Patient was seen here last week for the same  He was diagnosed with new type 2 diabetes and was given a prescription for metformin 500mg BID which he reports compliance with  Patient called his PCP and scheduled a follow-up appointment and diabetes education session for tomorrow  He was also in contact with case management and is set to get a glucometer soon  Patient was sent for routine labs yesterday and was found to have a blood sugar of 648  He was called this morning and advised to report to the ER  Patient has no new complaints from his last visit  Patient continues to have blurred vision, dry mouth, polydipsia, and polyuria  Patient admits to a weight loss of about 30 pounds in the past 2 weeks  He is otherwise asymptomatic and denies fevers, chills, nausea, vomiting, abdominal pain, chest pain, and shortness of breath  History provided by:  Patient and medical records   used: No    Hyperglycemia - Symptomatic   Blood sugar level PTA:  648  Severity:  Moderate  Onset quality:  Gradual  Timing:  Constant  Progression:  Unchanged  Chronicity:  New  Current diabetic treatments: Uncontrolled    Current diabetic therapy:  Metformin 500mg BID  Context: new diabetes diagnosis    Relieved by:  Nothing  Ineffective treatments:  Oral agents  Associated symptoms: blurred vision, increased thirst, polyuria and weight change    Associated symptoms: no abdominal pain, no chest pain, no confusion, no diaphoresis, no dizziness, no dysuria, no fatigue, no fever, no nausea, no shortness of breath, no syncope and no vomiting        Prior to Admission Medications   Prescriptions Last Dose Informant Patient Reported? Taking?    Azilsartan-Chlorthalidone (EDARBYCLOR PO) Not Taking at Unknown time  Yes No   Sig: Take by mouth   Cholecalciferol (VITAMIN D-3) 1000 units CAPS  Spouse/Significant Other Yes Yes   Sig: Take 1,000 Units by mouth daily   Coenzyme Q10 (CO Q 10) 100 MG CAPS Not Taking at Unknown time  Yes No   Si capsule   Glycopyrrolate-Formoterol (BEVESPI AEROSPHERE) 9-4 8 MCG/ACT AERO  Spouse/Significant Other Yes Yes   Sig: Inhale 2 puffs daily after breakfast   LORazepam (ATIVAN) 0 5 mg tablet  Spouse/Significant Other Yes Yes   Sig: Take 0 5 mg by mouth every 6 (six) hours as needed for anxiety   MAGNESIUM PO  Spouse/Significant Other Yes Yes   Sig: Take 400 mg by mouth daily   Milk Thistle 500 MG CAPS  Spouse/Significant Other Yes Yes   Sig: Take 500 mg by mouth 2 (two) times a day   Mometasone Furo-Formoterol Fum 200-5 MCG/ACT AERO Not Taking at Unknown time Spouse/Significant Other Yes No   Sig: Take 2 puffs by mouth 2 (two) times a day   Multiple Vitamins-Minerals (MULTIVITAMIN & MINERAL PO) Not Taking at Unknown time  Yes No   Si tablet   NON FORMULARY Not Taking at Unknown time  Yes No   Omega-3 Fatty Acids (FISH OIL) 1200 MG CAPS  Spouse/Significant Other Yes Yes   Sig: Take 1,200 mg by mouth daily at bedtime   Turmeric Curcumin 500 MG CAPS   Yes Yes   Sig: Take 1 capsule by mouth   albuterol (PROVENTIL HFA,VENTOLIN HFA) 90 mcg/act inhaler Not Taking at Unknown time Spouse/Significant Other No No   Sig: Inhale 2 puffs every 6 (six) hours as needed for wheezing   Patient not taking: Reported on 2020   allopurinol (ZYLOPRIM) 100 mg tablet Not Taking at Unknown time  Yes No   amLODIPine (NORVASC) 10 mg tablet  Spouse/Significant Other Yes Yes   Sig: Take 10 mg by mouth daily   apixaban (ELIQUIS) 5 mg Not Taking at Unknown time Spouse/Significant Other Yes No   Sig: Take 5 mg by mouth 2 (two) times a day     aspirin (PX ENTERIC ASPIRIN) 325 mg EC tablet  Spouse/Significant Other Yes Yes   Sig: Take 325 mg by mouth daily At night    aspirin 325 mg tablet Not Taking at Unknown time  Yes No   Sig: TAKE ONE TABLET BY MOUTH EVERY DAY   atorvastatin (LIPITOR) 40 mg tablet  Spouse/Significant Other Yes Yes   Sig: Take 40 mg by mouth daily at bedtime   azilsartan medoxomil (EDARBI) 80 MG tablet   Yes Yes   Sig: Once a day   azithromycin (ZITHROMAX) 250 mg tablet Not Taking at Unknown time  Yes No   Sig: Take 2 tablets by mouth on day one; then one tablet daily for 4 days  budesonide (PULMICORT) 0 25 mg/2 mL nebulizer solution   Yes Yes   Sig: Take 0 25 mg by nebulization 2 (two) times a day Rinse mouth after use  busPIRone (BUSPAR) 5 mg tablet Not Taking at Unknown time  Yes No   Sig: Take 5 mg by mouth   clonazePAM (KlonoPIN) 0 5 mg tablet Not Taking at Unknown time  Yes No   Si tablet   doxazosin (CARDURA) 4 mg tablet  Spouse/Significant Other Yes Yes   Sig: Take 4 mg by mouth daily at bedtime   enoxaparin (LOVENOX) 100 mg/mL Not Taking at Unknown time  Yes No   Sig: INJECT 1 SYRINGE SUBCUTANEOUSLY EVERY 12 HOURS   eplerenone (INSPRA) 50 MG tablet  Spouse/Significant Other Yes Yes   Sig: Take 50 mg by mouth daily   escitalopram (LEXAPRO) 10 mg tablet  Spouse/Significant Other Yes Yes   Sig: Take 10 mg by mouth daily   escitalopram (LEXAPRO) 20 mg tablet Not Taking at Unknown time  Yes No   Sig: Take 20 mg by mouth   ferrous sulfate 325 (65 Fe) mg tablet  Spouse/Significant Other Yes Yes   Sig: Take 325 mg by mouth 2 (two) times a day     fluticasone (FLONASE) 50 mcg/act nasal spray Not Taking at Unknown time  No No   Si spray into each nostril 2 (two) times a day   Patient not taking: Reported on 2020   furosemide (LASIX) 80 mg tablet   No Yes   Sig: Take 1 tablet (80 mg total) by mouth daily Starting next Monday  If you become short of breath in meantime call your pcp to consider resuming earlier, or if you have 2 lb wt gain  metFORMIN (GLUCOPHAGE) 500 mg tablet   No Yes   Sig: Take 1 tablet (500 mg total) by mouth 2 (two) times a day with meals for 14 days   methylPREDNISolone 4 MG tablet therapy pack Not Taking at Unknown time  Yes No   Sig: TAKE BY MOUTH AS DIRECTED ON INSIDE OF PACKAGE   minoxidil (LONITEN) 10 mg tablet Not Taking at Unknown time  Yes No   multivitamin-iron-minerals-folic acid (CENTRUM) chewable tablet  Spouse/Significant Other Yes Yes   Sig: Chew 1 tablet daily   nebivolol (BYSTOLIC) 20 MG tablet  Spouse/Significant Other Yes Yes   Sig: Take 20 mg by mouth 2 (two) times a day   potassium chloride (KLOR-CON M20) 20 mEq tablet   No Yes   Sig: Take 1 tablet (20 mEq total) by mouth daily Restart with your lasix next monday   predniSONE 10 mg tablet Not Taking at Unknown time  Yes No   Sig: Take 4 tablets for 2 days then take 3 tablets for 2 days then take 2 tablets for 2 days then take 1 tablet for 2 days   umeclidinium-vilanterol (Anoro Ellipta) 62 5-25 MCG/INH inhaler   Yes Yes   Sig: Inhale 1 puff daily   valsartan (DIOVAN) 320 MG tablet Not Taking at Unknown time  Yes No   Sig: Once a day   warfarin (COUMADIN) 5 mg tablet   Yes Yes   Sig: TAKE 1 TO 2 TABLETS BY MOUTH ONCE DAILY IN THE EVENING AS DIRECTED BY COUMADIN CLINIC      Facility-Administered Medications: None       Past Medical History:   Diagnosis Date    Asthma     COPD (chronic obstructive pulmonary disease) (HCC)     Diabetes mellitus (HCC)     Emphysema of lung (HCC)     Gout     History of transfusion     pt stated they had a transfusion when they had gallbladder surgery   Hypertension     Kidney disease     renal failure    Leg DVT (deep venous thromboembolism), acute, bilateral ([facility] [address] ) 01/2018    Obesity (BMI 30-39  9)     AGUS on CPAP     setting 11    Sleep apnea     Systolic CHF [facility])        Past Surgical History:   Procedure Laterality Date    ADRENALECTOMY      CHOLECYSTECTOMY      INCISION AND DRAINAGE OF WOUND Left 10/17/2018 Procedure: INCISION AND DRAINAGE (I&D) GROIN;  Surgeon: Jai Ramsey MD;  Location: [facility];  Service: General    IR IMAGE [address] / DRAINAGE W TUBE  8/17/2018    IR IMAGE GUIDED ASPIRATION / DRAINAGE W TUBE  7/31/2018    JOINT REPLACEMENT Bilateral     knee    KIDNEY SURGERY  2009    nodule removal    LYMPH NODE DISSECTION Left 01/2018    left inguinal LN removed - benign     OTHER SURGICAL HISTORY      kidney nodule removal    PALATE / UVULA BIOPSY / EXCISION      SINUS SURGERY         Family History   Problem Relation Age of Onset    Heart murmur Sister     Asthma Sister     Hypertension Sister     Kidney disease Mother     Cancer Father     Bell's palsy Brother     Kidney disease Brother     Deep vein thrombosis Neg Hx      I have reviewed and agree with the history as documented  E-Cigarette/Vaping    E-Cigarette Use Never User      E-Cigarette/Vaping Substances     Social History     Tobacco Use    Smoking status: Never Smoker    Smokeless tobacco: Never Used   Substance Use Topics    Alcohol use: Yes     Comment: socially    Drug use: No       Review of Systems   Constitutional: Negative for chills, diaphoresis, fatigue and fever  HENT: Negative for congestion and drooling  Eyes: Positive for blurred vision  Negative for discharge and redness  Respiratory: Negative for shortness of breath and stridor  Cardiovascular: Negative for chest pain, palpitations and syncope  Gastrointestinal: Negative for abdominal pain, nausea and vomiting  Endocrine: Positive for polydipsia and polyuria  Genitourinary: Positive for frequency  Negative for difficulty urinating and dysuria  Musculoskeletal: Negative for neck pain and neck stiffness  Skin: Negative for color change and rash  Allergic/Immunologic: Negative for immunocompromised state  Neurological: Negative for dizziness and syncope  Psychiatric/Behavioral: Negative for confusion     All other systems reviewed and are negative  Physical Exam  Physical Exam   Constitutional: He appears well-developed and well-nourished  No distress  Non-toxic appearing   HENT:   Head: Normocephalic and atraumatic  Right Ear: External ear normal    Left Ear: External ear normal    Mouth/Throat: Oropharynx is clear and moist    Eyes: Conjunctivae are normal  Right eye exhibits no discharge  Left eye exhibits no discharge  No scleral icterus  Neck: Normal range of motion  Neck supple  Cardiovascular: Normal rate, regular rhythm and normal heart sounds  No murmur heard  Pulmonary/Chest: Effort normal and breath sounds normal  No stridor  No respiratory distress  He has no wheezes  He has no rales  Abdominal: Soft  Bowel sounds are normal  He exhibits no distension  There is tenderness in the right upper quadrant and epigastric area  There is no rigidity, no rebound and no guarding  Musculoskeletal: Normal range of motion  He exhibits no deformity  Neurological: He is alert  He is not disoriented  GCS eye subscore is 4  GCS verbal subscore is 5  GCS motor subscore is 6  Skin: Skin is warm and dry  He is not diaphoretic  Psychiatric: He has a normal mood and affect  His behavior is normal    Nursing note and vitals reviewed        Vital Signs  ED Triage Vitals   Temperature Pulse Respirations Blood Pressure SpO2   07/09/20 0913 07/09/20 0910 07/09/20 0910 07/09/20 0910 07/09/20 0910   99 2 °F (37 3 °C) 85 18 122/75 97 %      Temp Source Heart Rate Source Patient Position - Orthostatic VS BP Location FiO2 (%)   07/09/20 0913 07/09/20 0910 07/09/20 0910 07/09/20 0910 --   Oral Monitor Lying Right arm       Pain Score       --                  Vitals:    07/09/20 0910 07/09/20 1115   BP: 122/75 167/96   Pulse: 85 76   Patient Position - Orthostatic VS: Lying Lying         Visual Acuity      ED Medications  Medications   insulin regular (HumuLIN R,NovoLIN R) 1 Units/mL in sodium chloride 0 9 % 100 mL infusion (has no administration in time range)   sodium chloride 0 9 % bolus 1,000 mL (1,000 mL Intravenous New Bag 7/9/20 1005)   sodium chloride 0 9 % bolus 1,000 mL (1,000 mL Intravenous New Bag 7/9/20 1004)   metFORMIN (GLUCOPHAGE) tablet 1,000 mg (1,000 mg Oral Given 7/9/20 0956)   insulin regular (HumuLIN R,NovoLIN R) injection 10 Units (10 Units Intravenous Given 7/9/20 1115)   iohexol (OMNIPAQUE) 350 MG/ML injection (MULTI-DOSE) 100 mL (100 mL Intravenous Given 7/9/20 1104)       Diagnostic Studies  Results Reviewed     Procedure Component Value Units Date/Time    Fingerstick Glucose (POCT) [[id number]]  (Abnormal) Collected:  07/09/20 1148    Lab Status:  Final result Updated:  07/09/20 1150     POC Glucose >500 mg/dl     Magnesium [[id number]]     Lab Status:  No result Specimen:  Blood     Phosphorus [[id number]]     Lab Status:  No result Specimen:  Blood     UA w Reflex to Microscopic w Reflex to Culture [[id number]]  (Abnormal) Collected:  07/09/20 1100    Lab Status:  Final result Specimen:  Urine, Clean Catch Updated:  07/09/20 1127     Color, UA Yellow     Clarity, UA Clear     Specific Gravity, UA <=1 005     pH, UA 5 5     Leukocytes, UA Elevated glucose may cause decreased leukocyte values   See urine microscopic for [facility] result/     Nitrite, UA Negative     Protein, UA Negative mg/dl      Glucose, UA >=1000 (1%) mg/dl      Ketones, UA Trace mg/dl      Urobilinogen, UA 0 2 E U /dl      Bilirubin, UA Negative     Blood, UA Trace-Intact    Urine Microscopic [[id number]]  (Abnormal) Collected:  07/09/20 1100    Lab Status:  Final result Specimen:  Urine, Clean Catch Updated:  07/09/20 1127     RBC, UA None Seen /hpf      WBC, UA 0-1 /hpf      Epithelial Cells None Seen /hpf      Bacteria, UA None Seen /hpf     Basic metabolic panel [[id number]]  (Abnormal) Collected:  07/09/20 1001    Lab Status:  Final result Specimen:  Blood from Arm, Right Updated:  07/09/20 1050     Sodium 128 mmol/L      Potassium 4 4 mmol/L Chloride 91 mmol/L      CO2 26 mmol/L      ANION GAP 11 mmol/L      BUN 27 mg/dL      Creatinine 2 03 mg/dL      Glucose 677 mg/dL      Calcium 9 3 mg/dL      eGFR 41 ml/min/1 73sq m     Narrative:       [place] guidelines for Chronic Kidney Disease (CKD):     Stage 1 with normal or high GFR (GFR > 90 mL/min/1 73 square meters)    Stage 2 Mild CKD (GFR = 60-89 mL/min/1 73 square meters)    Stage 3A Moderate CKD (GFR = 45-59 mL/min/1 73 square meters)    Stage 3B Moderate CKD (GFR = 30-44 mL/min/1 73 square meters)    Stage 4 Severe CKD (GFR = 15-29 mL/min/1 73 square meters)    Stage 5 End Stage CKD (GFR <15 mL/min/1 73 square meters)  Note: GFR calculation is accurate only with a steady state creatinine    Hepatic function panel [[id number]]  (Abnormal) Collected:  07/09/20 1001    Lab Status:  Final result Specimen:  Blood from Arm, Right Updated:  07/09/20 1045     Total Bilirubin 0 80 mg/dL      Bilirubin, Direct 0 23 mg/dL      Alkaline Phosphatase 188 U/L      AST 18 U/L      ALT 29 U/L      Total Protein 8 1 g/dL      Albumin 3 7 g/dL     Lipase [[id number]]  (Abnormal) Collected:  07/09/20 1001    Lab Status:  Final result Specimen:  Blood from Arm, Right Updated:  07/09/20 1045     Lipase 608 u/L     Troponin I [[id number]]  (Normal) Collected:  07/09/20 1001    Lab Status:  Final result Specimen:  Blood from Arm, Right Updated:  07/09/20 1032     Troponin I <0 02 ng/mL     Beta Hydroxybutyrate [[id number]]  (Abnormal) Collected:  07/09/20 1001    Lab Status:  Final result Specimen:  Blood from Arm, Right Updated:  07/09/20 1018     BETA-HYDROXYBUTYRATE 1 2 mmol/L     CBC and differential [[id number]] Collected:  07/09/20 1001    Lab Status:  Final result Specimen:  Blood from Arm, Right Updated:  07/09/20 1016     WBC 7 50 Thousand/uL      RBC 4 84 Million/uL      Hemoglobin 13 2 g/dL      Hematocrit 40 2 %      MCV 83 fL      MCH 27 3 pg      MCHC 32 8 g/dL      RDW 13 8 % MPV 10 0 fL      Platelets 031 Thousands/uL      nRBC 0 /100 WBCs      Neutrophils Relative 65 %      Immat GRANS % 0 %      Lymphocytes Relative 21 %      Monocytes Relative 9 %      Eosinophils Relative 4 %      Basophils Relative 1 %      Neutrophils Absolute 4 84 Thousands/µL      Immature Grans Absolute 0 02 Thousand/uL      Lymphocytes Absolute 1 58 Thousands/µL      Monocytes Absolute 0 66 Thousand/µL      Eosinophils Absolute 0 32 Thousand/µL      Basophils Absolute 0 08 Thousands/µL     Blood gas, venous [[id number]]  (Abnormal) Collected:  07/09/20 1001    Lab Status:  Final result Specimen:  Blood from Arm, Right Updated:  07/09/20 1015     pH, Gold 7 337     pCO2, Gold 45 9 mm Hg      pO2, Gold 31 2 mm Hg      HCO3, Gold 24 0 mmol/L      Base Excess, Gold -2 0 mmol/L      O2 Content, Gold 10 9 ml/dL      O2 HGB, VENOUS 56 1 %     Fingerstick Glucose (POCT) [[id number]]  (Abnormal) Collected:  07/09/20 0912    Lab Status:  Final result Updated:  07/09/20 0914     POC Glucose >500 mg/dl                  CT abdomen pelvis with contrast   Final Result by Srini Stack MD (07/09 1136)      Interval resolution of the left inguinal abscess and left mid abdominal intraperitoneal fluid collection  No acute inflammatory changes within the abdomen or pelvis  Hepatic steatosis  Chronic hypodense lesions of the spleen and kidneys as described above                    Workstation performed: [id number]                    Procedures  ECG 12 Lead Documentation Only  Date/Time: 7/9/2020 12:03 PM  Performed by: Michael Lackey PA-C  Authorized by: Michael Lackey PA-C     Indications / Diagnosis:  Hyperglycemia  ECG reviewed by me, the ED Provider: yes    Patient location:  ED  Previous ECG:     Previous ECG:  Compared to current    Comparison ECG info:  7/3/20    Similarity:  No change  Interpretation:     Interpretation: non-specific    Rate:     ECG rate:  73    ECG rate assessment: normal    Rhythm: Rhythm: sinus rhythm    Ectopy:     Ectopy: none    QRS:     QRS axis:  Left    QRS intervals:  Normal  Conduction:     Conduction: normal    ST segments:     ST segments:  Normal  T waves:     T waves: non-specific    Comments:      QTc 475             ED Course       US AUDIT      Most Recent Value   Initial Alcohol Screen: US AUDIT-C    1  How often do you have a drink containing alcohol? 1 Filed at: 07/09/2020 0948   2  How many drinks containing alcohol do you have on a typical day you are drinking? 0 Filed at: 07/09/2020 0948   3a  Male UNDER 65: How often do you have five or more drinks on one occasion? 0 Filed at: 07/09/2020 0948   Audit-C Score  1 Filed at: 07/09/2020 7070                    MDM  Number of Diagnoses or Management Options  TRUDI (acute kidney injury) [facility]): new and requires workup  Elevated lipase: new and requires workup  Uncontrolled diabetes mellitus ([facility] [address] ): new and requires workup  Diagnosis management comments: 24-year-old male with a history of type 2 diabetes presenting for hyperglycemia  Patient was seen here by me last week for the same  He was diagnosed with type 2 diabetes at that time and prescribed metformin 500 mg b i d  He reports compliance with this medication  Patient was sent for labs yesterday by his PCP  He is found to have a blood sugar above 600 and was referred here for further evaluation  Patient denies any new complaints from last week  He continues to complain of blurred vision, polyuria, polydipsia  Mild epigastric and right upper quadrant tenderness on exam   Exam otherwise unremarkable  Differential diagnosis includes but is not limited to: Uncontrolled diabetes, HHS, DKA, dehydration, TRUDI, electrolyte abnormality, ACS, pancreatitis    Initial ED plan: Will check CBC, CMP, lipase, VBG, ketones, UA  Will give metformin dose here as he did not take his medication this morning  2L IV fluid bolus       Final assessment: Labs significant for an elevated blood sugar of 677  Bicarb is normal so does not meet DKA criteria  Creatinine is 2, baseline appears to be 1 6  Lipase mildly elevated at 600  Remainder of labs unremarkable  CT abdomen negative and there are no imaging findings to suggest pancreatitis  Patient given 10 units IV insulin and started on insulin infusion  Patient admitted for further management  Amount and/or Complexity of Data Reviewed  Clinical lab tests: ordered and reviewed  Tests in the radiology section of CPT®: ordered and reviewed  Review and summarize past medical records: yes  Discuss the patient with other providers: yes  Independent visualization of images, tracings, or specimens: yes    Risk of Complications, Morbidity, and/or Mortality  Presenting problems: high  Diagnostic procedures: high  Management options: high    Patient Progress  Patient progress: stable        Disposition  Final diagnoses:   Uncontrolled diabetes mellitus ([facility] [address] )   Elevated lipase   TRUDI (acute kidney injury) ([facility] )     Time reflects when diagnosis was documented in both MDM as applicable and the Disposition within this note     Time User Action Codes Description Comment    7/9/2020 11:54 AM Yonatan Carrillo [E11 [id number]] Uncontrolled diabetes mellitus ([facility] [address] )     7/9/2020 11:54 AM Tricia Carrillo Add [R74 8] Elevated lipase     7/9/2020 11:54 AM Tricia Carrillo Add [N17 9] TRUDI (acute kidney injury) [facility])       ED Disposition     ED Disposition Condition Date/Time Comment    Admit Stable Thu Jul 9, 2020 11:54 AM Case was discussed with Dr Sunday Bran and the patient's admission status was agreed to be Admission Status: inpatient status to the service of Dr Sunday Bran   Follow-up Information    None         Patient's Medications   Discharge Prescriptions    No medications on file     No discharge procedures on file      PDMP Review     None          ED Provider  Electronically Signed by           Laina Khan PA-C  07/09/20 5828

## 2020-07-09 NOTE — H&P
H&P- Esperanza Anderson 1965, 60 y o  male MRN: [id number]    Unit/Bed#: ED 29 Encounter: [id number]    Primary Care Provider: Gopi Shaw MD   Date and time admitted to hospital: 7/9/2020  9:07 AM        Dysuria  Assessment & Plan  Patient complains with burning with urination  Urinary analysis was negative for acute infection  Will continue monitoring for symptoms  Type 2 diabetes mellitus ([facility] [address] )  Assessment & Plan  No results found for: HGBA1C    Recent Labs     07/09/20  0912 07/09/20  1148   POCGLU >500* >500*       Blood Sugar Average: Last 72 hrs: This appears to be new onset diabetes  Actually, upon review of the chart I have found his hemoglobin A1c from the beginning of July 2020  It was recorded at 11 3%  Therefore, patient is poorly controlled diabetic and would benefit from long-term use of insulin  History of DVT (deep vein thrombosis)  Assessment & Plan  Distance history of DVT  Patient follows with Oncology  Continue Coumadin  INR is therapeutic  TRUDI (acute kidney injury) [facility])  Assessment & Plan  Patient with a history of reported stage 3 kidney disease  Current creatinine appears to be worsened  His baseline is around 1 5 in January 2020  Will hold Ace inhibitors and diuretics and provide with mild hydration  Chronic diastolic CHF (congestive heart failure) ([facility])  Assessment & Plan  Wt Readings from Last 3 Encounters:   07/09/20 104 kg (229 lb 8 oz)   07/03/20 105 kg (231 lb 14 8 oz)   06/16/20 110 kg (242 lb 3 2 oz)       Patient with a history of chronic CHF with preserved ejection fraction  Review 2D echo from 2018  Patient continues to take Lasix 80 mg daily  However, currently presents with acute kidney injury, therefore will hold diuresis  Patient is not in acute exacerbation  COPD (chronic obstructive pulmonary disease) ([facility])  Assessment & Plan  Chronic COPD without acute exacerbation    Continue Pulmicort b i d , albuterol as needed and DuoNeb  Patient does not use oxygen at home  Essential hypertension  Assessment & Plan  Currently well controlled  Continue Bystolic, Cardura and Norvasc  Holding diuretics and Ace inhibitors due to renal failure  * Hyperglycemia due to type 2 diabetes mellitus [facility])  Assessment & Plan  No results found for: HGBA1C    Recent Labs     07/09/20  0912 07/09/20  1148   POCGLU >500* >500*       Blood Sugar Average: Last 72 hrs:       Noted blood glucose of more than 600  Emergency room started the patient on insulin drip  Continue the same management  Will involve Endocrinology and monitor glucoses  No DKA at this time  Will recheck BMP now  VTE Prophylaxis: Warfarin (Coumadin)  / sequential compression device   Code Status: full  POLST: POLST form is not discussed and not completed at this time  Discussion with family:  No    Anticipated Length of Stay:  Patient will be admitted on an Inpatient basis with an anticipated length of stay of  at least 2 midnights  Justification for Hospital Stay:  Hyperglycemia    Total Time for Visit, including Counseling / Coordination of Care: 1 hour  Greater than 50% of this total time spent on direct patient counseling and coordination of care  Chief Complaint:  Abnormal lab work    History of Present Illness:    Ankur Garcia is a 60 y o  male with a history of CHF , COPD, CKD who presents with hyperglycemia  Patient was seen in emergency room on 7/5 and diagnosed with diabetes  He was started on metformin  Later this week, he was seen by his PCP who did blood work and revealed glucose of more than 600  He also has hemoglobin A1c of more than 11% on 07/03  Patient confirms that he has been having blurry vision, burning with urination, urinary frequency and thirst in addition to weight loss of about 30 lb in the past 2 weeks  Patient denies chest pain, but he reports chronic shortness of breath and cough due to COPD    Patient is chronically disabled and lives at home with family and ambulates without assistance  Review of Systems:    Review of Systems   Constitutional: Positive for activity change, fatigue and unexpected weight change  Negative for diaphoresis and fever  Respiratory: Positive for cough and shortness of breath  Cardiovascular: Negative for chest pain and leg swelling  Gastrointestinal: Negative for abdominal pain, nausea and vomiting  Endocrine: Positive for polydipsia and polyuria  Negative for polyphagia  Genitourinary: Positive for dysuria, frequency and urgency  Negative for discharge  Musculoskeletal: Negative for back pain  Neurological: Negative for dizziness  Psychiatric/Behavioral: Negative for behavioral problems  Past Medical and Surgical History:     Past Medical History:   Diagnosis Date    Asthma     COPD (chronic obstructive pulmonary disease) ([facility] )     Diabetes mellitus ([facility] )     Emphysema of lung ([facility] )     Gout     History of transfusion     pt stated they had a transfusion when they had gallbladder surgery   Hypertension     Kidney disease     renal failure    Leg DVT (deep venous thromboembolism), [facility] ([facility] ) 01/2018    Obesity (BMI 30-39  9)     AGUS on CPAP     setting 11    Sleep apnea     Systolic CHF [facility])        Past Surgical History:   Procedure Laterality Date    ADRENALECTOMY      CHOLECYSTECTOMY      INCISION AND DRAINAGE OF WOUND Left 10/17/2018    Procedure: INCISION AND DRAINAGE (I&D) GROIN;  Surgeon: Laurinda Lundborg, MD;  Location: [facility];  Service: General    IR IMAGE [address] / DRAINAGE W TUBE  8/17/2018    IR IMAGE GUIDED ASPIRATION / DRAINAGE W TUBE  7/31/2018    JOINT REPLACEMENT Bilateral     knee    KIDNEY SURGERY  2009    nodule removal    LYMPH NODE DISSECTION Left 01/2018    left inguinal LN removed - benign     OTHER SURGICAL HISTORY      kidney nodule removal    PALATE / UVULA BIOPSY / EXCISION      SINUS SURGERY Meds/Allergies:    Prior to Admission medications    Medication Sig Start Date End Date Taking? Authorizing Provider   amLODIPine (NORVASC) 10 mg tablet Take 10 mg by mouth daily 5/23/17  Yes Historical Provider, MD   aspirin (PX ENTERIC ASPIRIN) 325 mg EC tablet Take 325 mg by mouth daily At night    Yes Historical Provider, MD   atorvastatin (LIPITOR) 40 mg tablet Take 40 mg by mouth daily at bedtime 2/1/17  Yes Historical Provider, MD   azilsartan medoxomil (EDARBI) 80 MG tablet Once a day 10/9/18  Yes Historical Provider, MD   budesonide (PULMICORT) 0 25 mg/2 mL nebulizer solution Take 0 25 mg by nebulization 2 (two) times a day Rinse mouth after use  Yes Historical Provider, MD   Cholecalciferol (VITAMIN D-3) 1000 units CAPS Take 1,000 Units by mouth daily   Yes Historical Provider, MD   doxazosin (CARDURA) 4 mg tablet Take 4 mg by mouth daily at bedtime   Yes Historical Provider, MD   eplerenone (INSPRA) 50 MG tablet Take 50 mg by mouth daily 5/23/17  Yes Historical Provider, MD   escitalopram (LEXAPRO) 10 mg tablet Take 10 mg by mouth daily   Yes Historical Provider, MD   ferrous sulfate 325 (65 Fe) mg tablet Take 325 mg by mouth 2 (two) times a day     Yes Historical Provider, MD   furosemide (LASIX) 80 mg tablet Take 1 tablet (80 mg total) by mouth daily Starting next Monday  If you become short of breath in meantime call your pcp to consider resuming earlier, or if you have 2 lb wt gain   5/23/18  Yes Urszula Reynolds MD   Glycopyrrolate-Formoterol (BEVESPI AEROSPHERE) 9-4 8 MCG/ACT AERO Inhale 2 puffs daily after breakfast   Yes Historical Provider, MD   LORazepam (ATIVAN) 0 5 mg tablet Take 0 5 mg by mouth every 6 (six) hours as needed for anxiety   Yes Historical Provider, MD   MAGNESIUM PO Take 400 mg by mouth daily   Yes Historical Provider, MD   metFORMIN (GLUCOPHAGE) 500 mg tablet Take 1 tablet (500 mg total) by mouth 2 (two) times a day with meals for 14 days 7/3/20 7/17/20 Yes Tricia CLARK HEMANT Carrillo   Milk Thistle 500 MG CAPS Take 500 mg by mouth 2 (two) times a day   Yes Historical Provider, MD   multivitamin-iron-minerals-folic acid (CENTRUM) chewable tablet Chew 1 tablet daily   Yes Historical Provider, MD   nebivolol (BYSTOLIC) 20 MG tablet Take 20 mg by mouth 2 (two) times a day 6/13/16  Yes Historical Provider, MD   Omega-3 Fatty Acids (FISH OIL) 1200 MG CAPS Take 1,200 mg by mouth daily at bedtime   Yes Historical Provider, MD   potassium chloride (KLOR-CON M20) 20 mEq tablet Take 1 tablet (20 mEq total) by mouth daily Restart with your lasix next monday 5/23/18  Yes Yoselin Diaz MD   Turmeric Curcumin 500 MG CAPS Take 1 capsule by mouth   Yes Historical Provider, MD   umeclidinium-vilanterol (Anoro Ellipta) 62 5-25 MCG/INH inhaler Inhale 1 puff daily   Yes Historical Provider, MD   warfarin (COUMADIN) 5 mg tablet TAKE 1 TO 2 TABLETS BY MOUTH ONCE DAILY IN THE EVENING AS DIRECTED BY COUMADIN CLINIC 11/14/19  Yes Historical Provider, MD   atorvastatin (LIPITOR) 40 mg tablet Take 40 mg by mouth 6/18/20 7/9/20 Yes Historical Provider, MD   umeclidinium-vilanterol (Anoro Ellipta) 62 5-25 MCG/INH inhaler INHALE 1 PUFF BY MOUTH ONCE DAILY 6/8/20 7/9/20 Yes Historical Provider, MD   albuterol (PROVENTIL HFA,VENTOLIN HFA) 90 mcg/act inhaler Inhale 2 puffs every 6 (six) hours as needed for wheezing  Patient not taking: Reported on 7/9/2020 3/19/18   Lindsay Bay PA-C   allopurinol (ZYLOPRIM) 100 mg tablet  3/22/18   Historical Provider, MD   aspirin 325 mg tablet TAKE ONE TABLET BY MOUTH EVERY DAY    Historical Provider, MD   Azilsartan-Chlorthalidone (EDARBYCLOR PO) Take by mouth    Historical Provider, MD   azithromycin (ZITHROMAX) 250 mg tablet Take 2 tablets by mouth on day one; then one tablet daily for 4 days   10/31/18   Historical Provider, MD   busPIRone (BUSPAR) 5 mg tablet Take 5 mg by mouth    Historical Provider, MD   clonazePAM (KlonoPIN) 0 5 mg tablet 1 tablet    Historical Provider, MD   Coenzyme Q10 (CO Q 10) 100 MG CAPS 1 capsule    Historical Provider, MD   enoxaparin (LOVENOX) 100 mg/mL INJECT 1 SYRINGE SUBCUTANEOUSLY EVERY 12 HOURS 9/30/19   Historical Provider, MD   escitalopram (LEXAPRO) 20 mg tablet Take 20 mg by mouth 11/4/19   Historical Provider, MD   fluticasone (FLONASE) 50 mcg/act nasal spray 1 spray into each nostril 2 (two) times a day  Patient not taking: Reported on 6/1/2020 5/23/18   Mallory Fernandez MD   methylPREDNISolone 4 MG tablet therapy pack TAKE BY MOUTH AS DIRECTED ON INSIDE OF PACKAGE 10/7/19   Historical Provider, MD   minoxidil (LONITEN) 10 mg tablet  2/13/18   Historical Provider, MD   Mometasone Furo-Formoterol Fum 200-5 MCG/ACT AERO Take 2 puffs by mouth 2 (two) times a day 6/29/16   Historical Provider, MD   Multiple Vitamins-Minerals (MULTIVITAMIN & MINERAL PO) 1 tablet    Historical Provider, MD   NON FORMULARY     Historical Provider, MD   predniSONE 10 mg tablet Take 4 tablets for 2 days then take 3 tablets for 2 days then take 2 tablets for 2 days then take 1 tablet for 2 days 10/31/18   Historical Provider, MD   valsartan (DIOVAN) 320 MG tablet Once a day 1/22/18   Historical Provider, MD   apixaban (ELIQUIS) 5 mg Take 5 mg by mouth 2 (two) times a day   1/18/18 7/9/20  Historical Provider, MD   doxazosin (CARDURA) 8 MG tablet Take 8 mg by mouth 2 (two) times a day 4/23/20 7/9/20  Historical Provider, MD   fluticasone-salmeterol (ADVAIR, [address]) 250-50 mcg/dose inhaler INHALE 1 DOSE BY MOUTH TWICE DAILY 11/11/19 7/9/20  Historical Provider, MD   tiotropium (SPIRIVA RESPIMAT) 2 5 MCG/ACT AERS inhaler Inhale daily 8/12/19 7/9/20  Historical Provider, MD MAXWELL have reviewed home medications using allscripts  Allergies:    Allergies   Allergen Reactions    Clonidine Anaphylaxis    Lisinopril Anaphylaxis    Nifedipine Anaphylaxis    Spironolactone Anaphylaxis, Other (See Comments) and Shortness Of Breath    Buspirone      Headaches,     Enoxaparin     Minoxidil      Retains fluid around heart       Social History:     Marital Status: /Civil Union   Occupation:  Disabled  Patient Pre-hospital Living Situation:  Lives with family  Patient Pre-hospital Level of Mobility:  Ambulates without assistance  Patient Pre-hospital Diet Restrictions:  None  Substance Use History:   Social History     Substance and Sexual Activity   Alcohol Use Yes    Comment: socially     Social History     Tobacco Use   Smoking Status Never Smoker   Smokeless Tobacco Never Used     Social History     Substance and Sexual Activity   Drug Use No       Family History:    non-contributory    Physical Exam:     Vitals:   Blood Pressure: 154/83 (07/09/20 1330)  Pulse: 70 (07/09/20 1330)  Temperature: 99 2 °F (37 3 °C) (07/09/20 0913)  Temp Source: Oral (07/09/20 0913)  Respirations: 18 (07/09/20 1330)  Height: 5' 7 5" (171 5 cm) (07/09/20 0910)  Weight - Scale: 104 kg (229 lb 8 oz) (07/09/20 0910)  SpO2: 95 % (07/09/20 1330)    Physical Exam   Constitutional: He appears well-developed and well-nourished  HENT:   Head: Normocephalic and atraumatic  Cardiovascular: Normal rate, regular rhythm and normal heart sounds  Pulmonary/Chest: Effort normal and breath sounds normal  No respiratory distress  Abdominal: Soft  There is no tenderness  Musculoskeletal: He exhibits no edema or deformity  Neurological: He is alert  Skin: Skin is warm  No rash noted  No erythema  Psychiatric: He has a normal mood and affect  Additional Data:     Lab Results: I have personally reviewed pertinent reports        Results from last 7 days   Lab Units 07/09/20  1001   WBC Thousand/uL 7 50   HEMOGLOBIN g/dL 13 2   HEMATOCRIT % 40 2   PLATELETS Thousands/uL 376   NEUTROS PCT % 65   LYMPHS PCT % 21   MONOS PCT % 9   EOS PCT % 4     Results from last 7 days   Lab Units 07/09/20  1001   SODIUM mmol/L 128*   POTASSIUM mmol/L 4 4   CHLORIDE mmol/L 91*   CO2 mmol/L 26   BUN mg/dL 27* CREATININE mg/dL 2 03*   ANION GAP mmol/L 11   CALCIUM mg/dL 9 3   ALBUMIN g/dL 3 7   TOTAL BILIRUBIN mg/dL 0 80   ALK PHOS U/L 188*   ALT U/L 29   AST U/L 18   GLUCOSE RANDOM mg/dL 677*         Results from last 7 days   Lab Units 07/09/20  1148 07/09/20  0912   POC GLUCOSE mg/dl >500* >500*               Imaging: I have personally reviewed pertinent reports  CT abdomen pelvis with contrast   Final Result by Sherley Gramajo MD (07/09 1136)      Interval resolution of the left inguinal abscess and left mid abdominal intraperitoneal fluid collection  No acute inflammatory changes within the abdomen or pelvis  Hepatic steatosis  Chronic hypodense lesions of the spleen and kidneys as described above  Workstation performed: [id number]             EKG, Pathology, and Other Studies Reviewed on Admission:   · EKG:  Sinus at 73 beats per minute    Allscripts / Epic Records Reviewed: Yes     ** Please Note: This note has been constructed using a voice recognition system   **

## 2020-07-09 NOTE — ASSESSMENT & PLAN NOTE
Patient complains with burning with urination  Urinary analysis was negative for acute infection  Will continue monitoring for symptoms

## 2020-07-09 NOTE — ASSESSMENT & PLAN NOTE
Currently well controlled  Continue Bystolic, Cardura and Norvasc  Holding diuretics and Ace inhibitors due to renal failure

## 2020-07-10 LAB
ANION GAP SERPL CALCULATED.3IONS-SCNC: 8 MMOL/L (ref 4–13)
BACTERIA UR CULT: NORMAL
BASOPHILS # BLD AUTO: 0.06 THOUSANDS/ΜL (ref 0–0.1)
BASOPHILS NFR BLD AUTO: 1 % (ref 0–1)
BUN SERPL-MCNC: 18 MG/DL (ref 5–25)
CALCIUM SERPL-MCNC: 8.5 MG/DL (ref 8.3–10.1)
CHLORIDE SERPL-SCNC: 107 MMOL/L (ref 100–108)
CO2 SERPL-SCNC: 26 MMOL/L (ref 21–32)
CREAT SERPL-MCNC: 1.39 MG/DL (ref 0.6–1.3)
EOSINOPHIL # BLD AUTO: 0.45 THOUSAND/ΜL (ref 0–0.61)
EOSINOPHIL NFR BLD AUTO: 6 % (ref 0–6)
ERYTHROCYTE [DISTWIDTH] IN BLOOD BY AUTOMATED COUNT: 14.2 % (ref 11.6–15.1)
EST. AVERAGE GLUCOSE BLD GHB EST-MCNC: 321 MG/DL
GFR SERPL CREATININE-BSD FRML MDRD: 65 ML/MIN/1.73SQ M
GLUCOSE SERPL-MCNC: 154 MG/DL (ref 65–140)
GLUCOSE SERPL-MCNC: 158 MG/DL (ref 65–140)
GLUCOSE SERPL-MCNC: 179 MG/DL (ref 65–140)
GLUCOSE SERPL-MCNC: 182 MG/DL (ref 65–140)
GLUCOSE SERPL-MCNC: 189 MG/DL (ref 65–140)
GLUCOSE SERPL-MCNC: 203 MG/DL (ref 65–140)
GLUCOSE SERPL-MCNC: 233 MG/DL (ref 65–140)
GLUCOSE SERPL-MCNC: 310 MG/DL (ref 65–140)
GLUCOSE SERPL-MCNC: 389 MG/DL (ref 65–140)
HBA1C MFR BLD: 12.8 %
HCT VFR BLD AUTO: 37.2 % (ref 36.5–49.3)
HGB BLD-MCNC: 12 G/DL (ref 12–17)
IMM GRANULOCYTES # BLD AUTO: 0.03 THOUSAND/UL (ref 0–0.2)
IMM GRANULOCYTES NFR BLD AUTO: 0 % (ref 0–2)
INR PPP: 2.39 (ref 0.84–1.19)
LYMPHOCYTES # BLD AUTO: 2.36 THOUSANDS/ΜL (ref 0.6–4.47)
LYMPHOCYTES NFR BLD AUTO: 29 % (ref 14–44)
MCH RBC QN AUTO: 27.5 PG (ref 26.8–34.3)
MCHC RBC AUTO-ENTMCNC: 32.3 G/DL (ref 31.4–37.4)
MCV RBC AUTO: 85 FL (ref 82–98)
MONOCYTES # BLD AUTO: 0.7 THOUSAND/ΜL (ref 0.17–1.22)
MONOCYTES NFR BLD AUTO: 9 % (ref 4–12)
NEUTROPHILS # BLD AUTO: 4.61 THOUSANDS/ΜL (ref 1.85–7.62)
NEUTS SEG NFR BLD AUTO: 55 % (ref 43–75)
NRBC BLD AUTO-RTO: 0 /100 WBCS
PLATELET # BLD AUTO: 335 THOUSANDS/UL (ref 149–390)
PMV BLD AUTO: 9.6 FL (ref 8.9–12.7)
POTASSIUM SERPL-SCNC: 4 MMOL/L (ref 3.5–5.3)
PROTHROMBIN TIME: 26.4 SECONDS (ref 11.6–14.5)
RBC # BLD AUTO: 4.37 MILLION/UL (ref 3.88–5.62)
SODIUM SERPL-SCNC: 141 MMOL/L (ref 136–145)
WBC # BLD AUTO: 8.21 THOUSAND/UL (ref 4.31–10.16)

## 2020-07-10 PROCEDURE — 83036 HEMOGLOBIN GLYCOSYLATED A1C: CPT | Performed by: INTERNAL MEDICINE

## 2020-07-10 PROCEDURE — 82948 REAGENT STRIP/BLOOD GLUCOSE: CPT

## 2020-07-10 PROCEDURE — 99232 SBSQ HOSP IP/OBS MODERATE 35: CPT | Performed by: INTERNAL MEDICINE

## 2020-07-10 PROCEDURE — 94660 CPAP INITIATION&MGMT: CPT

## 2020-07-10 PROCEDURE — 94760 N-INVAS EAR/PLS OXIMETRY 1: CPT

## 2020-07-10 PROCEDURE — 85025 COMPLETE CBC W/AUTO DIFF WBC: CPT | Performed by: FAMILY MEDICINE

## 2020-07-10 PROCEDURE — 85610 PROTHROMBIN TIME: CPT | Performed by: FAMILY MEDICINE

## 2020-07-10 PROCEDURE — 80048 BASIC METABOLIC PNL TOTAL CA: CPT | Performed by: FAMILY MEDICINE

## 2020-07-10 PROCEDURE — 94640 AIRWAY INHALATION TREATMENT: CPT

## 2020-07-10 RX ORDER — FUROSEMIDE 80 MG
80 TABLET ORAL DAILY
Status: DISCONTINUED | OUTPATIENT
Start: 2020-07-11 | End: 2020-07-11 | Stop reason: HOSPADM

## 2020-07-10 RX ORDER — LOSARTAN POTASSIUM 50 MG/1
100 TABLET ORAL DAILY
Status: DISCONTINUED | OUTPATIENT
Start: 2020-07-10 | End: 2020-07-11 | Stop reason: HOSPADM

## 2020-07-10 RX ORDER — INSULIN ASPART 100 [IU]/ML
25 INJECTION, SUSPENSION SUBCUTANEOUS
Status: DISCONTINUED | OUTPATIENT
Start: 2020-07-10 | End: 2020-07-11 | Stop reason: HOSPADM

## 2020-07-10 RX ADMIN — ISODIUM CHLORIDE 3 ML: 0.03 SOLUTION RESPIRATORY (INHALATION) at 13:19

## 2020-07-10 RX ADMIN — INSULIN ASPART 25 UNITS: 100 INJECTION, SUSPENSION SUBCUTANEOUS at 16:22

## 2020-07-10 RX ADMIN — FERROUS SULFATE TAB 325 MG (65 MG ELEMENTAL FE) 325 MG: 325 (65 FE) TAB at 18:34

## 2020-07-10 RX ADMIN — FERROUS SULFATE TAB 325 MG (65 MG ELEMENTAL FE) 325 MG: 325 (65 FE) TAB at 09:05

## 2020-07-10 RX ADMIN — BUSPIRONE HYDROCHLORIDE 5 MG: 5 TABLET ORAL at 09:05

## 2020-07-10 RX ADMIN — LEVALBUTEROL HYDROCHLORIDE 1.25 MG: 1.25 SOLUTION, CONCENTRATE RESPIRATORY (INHALATION) at 08:07

## 2020-07-10 RX ADMIN — ISODIUM CHLORIDE 3 ML: 0.03 SOLUTION RESPIRATORY (INHALATION) at 19:31

## 2020-07-10 RX ADMIN — MAGNESIUM GLUCONATE 500 MG ORAL TABLET 400 MG: 500 TABLET ORAL at 09:05

## 2020-07-10 RX ADMIN — ESCITALOPRAM OXALATE 20 MG: 20 TABLET ORAL at 09:06

## 2020-07-10 RX ADMIN — INSULIN LISPRO 6 UNITS: 100 INJECTION, SOLUTION INTRAVENOUS; SUBCUTANEOUS at 16:22

## 2020-07-10 RX ADMIN — LOSARTAN POTASSIUM 100 MG: 50 TABLET, FILM COATED ORAL at 13:02

## 2020-07-10 RX ADMIN — INSULIN ASPART 25 UNITS: 100 INJECTION, SUSPENSION SUBCUTANEOUS at 13:03

## 2020-07-10 RX ADMIN — NEBIVOLOL HYDROCHLORIDE 20 MG: 10 TABLET ORAL at 18:34

## 2020-07-10 RX ADMIN — INSULIN LISPRO 3 UNITS: 100 INJECTION, SOLUTION INTRAVENOUS; SUBCUTANEOUS at 09:07

## 2020-07-10 RX ADMIN — BUDESONIDE 0.25 MG: 0.25 INHALANT RESPIRATORY (INHALATION) at 08:07

## 2020-07-10 RX ADMIN — OMEGA-3 FATTY ACIDS CAP 1000 MG 1000 MG: 1000 CAP at 21:50

## 2020-07-10 RX ADMIN — POTASSIUM CHLORIDE 20 MEQ: 1500 TABLET, EXTENDED RELEASE ORAL at 09:05

## 2020-07-10 RX ADMIN — WARFARIN SODIUM 5 MG: 5 TABLET ORAL at 18:34

## 2020-07-10 RX ADMIN — DOXAZOSIN 4 MG: 4 TABLET ORAL at 21:50

## 2020-07-10 RX ADMIN — LEVALBUTEROL HYDROCHLORIDE 1.25 MG: 1.25 SOLUTION, CONCENTRATE RESPIRATORY (INHALATION) at 13:19

## 2020-07-10 RX ADMIN — NEBIVOLOL HYDROCHLORIDE 20 MG: 10 TABLET ORAL at 09:05

## 2020-07-10 RX ADMIN — POTASSIUM CHLORIDE AND SODIUM CHLORIDE 75 ML/HR: 900; 300 INJECTION, SOLUTION INTRAVENOUS at 06:37

## 2020-07-10 RX ADMIN — INSULIN LISPRO 3 UNITS: 100 INJECTION, SOLUTION INTRAVENOUS; SUBCUTANEOUS at 21:51

## 2020-07-10 RX ADMIN — INSULIN LISPRO 5 UNITS: 100 INJECTION, SOLUTION INTRAVENOUS; SUBCUTANEOUS at 13:02

## 2020-07-10 RX ADMIN — AMLODIPINE BESYLATE 10 MG: 10 TABLET ORAL at 09:06

## 2020-07-10 RX ADMIN — LEVALBUTEROL HYDROCHLORIDE 1.25 MG: 1.25 SOLUTION, CONCENTRATE RESPIRATORY (INHALATION) at 19:31

## 2020-07-10 RX ADMIN — ISODIUM CHLORIDE 3 ML: 0.03 SOLUTION RESPIRATORY (INHALATION) at 08:07

## 2020-07-10 RX ADMIN — ATORVASTATIN CALCIUM 40 MG: 40 TABLET, FILM COATED ORAL at 21:50

## 2020-07-10 RX ADMIN — BUDESONIDE 0.25 MG: 0.25 INHALANT RESPIRATORY (INHALATION) at 19:31

## 2020-07-10 NOTE — RESPIRATORY THERAPY NOTE
RT Protocol Note  Nella Chaudhry 60 y o  male MRN: [de-identified]  Unit/Bed#: -01 Encounter: [id number]    Assessment    Principal Problem:    Hyperglycemia due to type 2 diabetes mellitus ([place] [address] )  Active Problems:    Essential hypertension    COPD (chronic obstructive pulmonary disease) ([facility])    Chronic diastolic CHF (congestive heart failure) ([facility])    CKD stage 3 secondary to diabetes ([facility])    TRUDI (acute kidney injury) ([place] [address] )    History of DVT (deep vein thrombosis)    Type 2 diabetes mellitus ([facility])    Dysuria      Home Pulmonary Medications:  Pulmicort  Bevespi  Albuterol MDI PRN  Home Devices/Therapy: BiPAP/CPAP    Past Medical History:   Diagnosis Date    Asthma     COPD (chronic obstructive pulmonary disease) ([place] [address] )     Diabetes mellitus ([place] [address] )     Emphysema of lung ([place] [address] )     Gout     History of transfusion     pt stated they had a transfusion when they had gallbladder surgery   Hypertension     Kidney disease     renal failure    Leg DVT (deep venous thromboembolism), acute, bilateral ([place] [address] ) 01/2018    Obesity (BMI 30-39  9)     AGUS on CPAP     setting 11    Sleep apnea     Systolic CHF ([place] [address] )      Social History     Socioeconomic History    Marital status: /Civil Union     Spouse name: Not on file    Number of children: Not on file    Years of education: Not on file    Highest education level: Not on file   Occupational History    Not on file   Social Needs    Financial resource strain: Not on file    Food insecurity:     Worry: Not on file     Inability: Not on file    Transportation needs:     Medical: Not on file     Non-medical: Not on file   Tobacco Use    Smoking status: Never Smoker    Smokeless tobacco: Never Used   Substance and Sexual Activity    Alcohol use: Yes     Comment: socially    Drug use: No    Sexual activity: Never   Lifestyle    Physical activity:     Days per week: Not on file     Minutes per session: Not on file    Stress: Not on file   Relationships    Social connections:     Talks on phone: Not on file     Gets together: Not on file     Attends Oriental orthodox service: Not on file     Active member of club or organization: Not on file     Attends meetings of clubs or organizations: Not on file     Relationship status: Not on file    Intimate partner violence:     Fear of current or ex partner: Not on file     Emotionally abused: Not on file     Physically abused: Not on file     Forced sexual activity: Not on file   Other Topics Concern    Not on file   Social History Narrative    Not on file       Subjective         Objective    Physical Exam:   Assessment Type: Pre-treatment  General Appearance: Alert, Awake  Respiratory Pattern: Normal  Chest Assessment: Chest expansion symmetrical  Bilateral Breath Sounds: Clear  O2 Device: RA    Vitals:  Blood pressure 169/96, pulse 68, temperature 98 °F (36 7 °C), resp  rate 18, height 5' 7 5" (1 715 m), weight 104 kg (229 lb 8 oz), SpO2 97 %  Imaging and other studies: I have personally reviewed pertinent reports  O2 Device: RA     Plan    Respiratory Plan: Home Bronchodilator Patient pathway        Resp Comments: pt with history of COPD and asthma was admitted for diabetes related illness  Pt states his COPD and asthma are well controlled on Pulm and Bevespi daily, pt states he has not needed to use his rescue inhaler in a long time  Will change patinet to X/NSS TID

## 2020-07-10 NOTE — SOCIAL WORK
CM spoke with pt at bedside &  Introduced self and role     Pt lives with his wife Eddie Henderson in a 2 story house with 13 steps w/railing to reach his bedroom and 4 RAFIA  Pt has no problem navigating steps and is independent with ADL's  He uses a C-pap at  for sleep apnea  His PCP is Dr Fabby Will and he has a [facility]  at [phone], who has ordered Diabetic supplies and it is being delivered  Denies substance abuse  He does have anxiety and depression that is treated with medication by his PCP  He was in a rehab in [facility] following b/l knee replacements 8 years ago  He has also used OP/PT but does not remember the name of the agency  He has never used [facility] services  He uses [facility] in [facility] and has no problem with his co-pays  He does not have a POA or Advanced Directive  He has been supplied info on both  He does not work-he is disabled, but still drives  CM discussed d/c needs including [facility] services, and pt agreeshe will need this on d/c  [facility] home care as agreed and elected  Referral in [facility]  CM department will continue to follow through hospitalization  CM reviewed discharge planning process including the following: identifying help at home, patient preference for discharge planning needs, pharmacy preference, and availability of treatment team to discuss questions or concerns patient and/or family may have regarding understanding medications and recognizing signs and symptoms once discharged  CM also encouraged patient to follow up with all recommended appointments after discharge  Patient advised of importance for patient and family to participate in managing patients medical well being  CM name and role reviewed  Discharge Checklist reviewed and CM will continue to monitor for progress toward discharge goals in nursing and provider rounds  A post acute care recommendation was made by your care team for [facility]  Discussed [place] [facility] Choice with patient   List of agencies given to patient via in person  patient aware the list is custom filtered for them by preference  and that [facility]'s post acute providers are designated

## 2020-07-10 NOTE — ASSESSMENT & PLAN NOTE
· History of DVT on warfarin  · Patient states that hematologist planning on discontinuing warfarin if D-dimer within limits as DVT was two years ago    Results from last 7 days   Lab Units 07/10/20  0551   INR  2 39*

## 2020-07-10 NOTE — UTILIZATION REVIEW
Notification of Inpatient Admission/Inpatient Authorization Request   This is a Notification of Inpatient Admission for [address]  Be advised that this patient was admitted to our facility under Inpatient Status  Contact Mary Molina at [phone] for additional admission information  [facility] DEPT DEDICATED [facility] Daily [phone]  Patient Name:   Parth Smith   YOB: 1965       [address]   [address]:   [address]   Tax ID: [id number]  NPI: [id number] Attending Provider/NPI:  Phone:  Address: Janiya Turner [[id number]]  [phone]  Same as PASQUALE/Pippa Cavazos [address] of Service Code: 24     Place of Service Name:  [address] [place] [address]   Start Date: 7/9/20 1154 Discharge Date & Time: No discharge date for patient encounter  Type of Admission: Inpatient Status Discharge Disposition   (if discharged): Home/Self Care   Patient Diagnoses: Hyperglycemia [R73 9]  Uncontrolled diabetes mellitus ([facility] ) [E11 65]  TRUDI (acute kidney injury) ([facility] ) [N17 9]  Elevated lipase [R74 8]     Orders: Admission Orders (From admission, onward)     Ordered        07/09/20 1154  Inpatient Admission  Once                    Assigned Utilization Review Contact: Mary Molina  Utilization   Network Utilization Review Department  Phone: [phone]; Fax [phone]  Email: [email or web address]   ATTENTION PAYERS: Please call the assigned Utilization  directly with any questions or concerns ALL voicemails in the department are confidential  Send all requests for admission clinical reviews, approved or denied determinations and any other requests to dedicated fax number belonging to the campus where the patient is receiving treatment

## 2020-07-10 NOTE — ASSESSMENT & PLAN NOTE
· TRUDI on CKD 3 secondary to hyperglycemia  · Resolved  Will stop IV fluids and restart furosemide in a m      Results from last 7 days   Lab Units 07/10/20  0551 07/09/20  1406 07/09/20  1001 07/03/20  1246   BUN mg/dL 18 24 27* 28*   CREATININE mg/dL 1 39* 1 71* 2 03* 1 81*

## 2020-07-10 NOTE — PROGRESS NOTES
Progress Note - Osvaldo James 1965, 60 y o  male MRN: [id number]    Unit/Bed#: -01 Encounter: [id number]    Primary Care Provider: Angella Pitts MD   Date and time admitted to hospital: 7/9/2020  9:07 AM        * Hyperglycemia due to type 2 diabetes mellitus [facility])  Assessment & Plan  No results found for: HGBA1C    Results from last 7 days   Lab Units 07/10/20  0811 07/10/20  0611 07/10/20  0402 07/10/20  0201 07/10/20  0001 07/09/20  2205 07/09/20  1957 07/09/20  1821 07/09/20  1711 07/09/20  1554 07/09/20  1400 07/09/20  1148   POC GLUCOSE mg/dl 189* 182* 179* 158* 154* 159* 211* 315* 216* 258* 318* >500*     · Diabetes mellitus with hyperglycemia  Prior to admission on metformin monotherapy  · No ketosis  Seen by endocrinology  Will discontinue insulin infusion and start 70/30 mix insulin with sliding scale    History of DVT (deep vein thrombosis)  Assessment & Plan  · History of DVT on warfarin  · Patient states that hematologist planning on discontinuing warfarin if D-dimer within limits as DVT was two years ago    Results from last 7 days   Lab Units 07/10/20  0551   INR  2 39*       TRUDI (acute kidney injury) ([facility] [address] )  Assessment & Plan  · TRUDI on CKD 3 secondary to hyperglycemia  · Resolved  Will stop IV fluids and restart furosemide in a m      Results from last 7 days   Lab Units 07/10/20  0551 07/09/20  1406 07/09/20  1001 07/03/20  1246   BUN mg/dL 18 24 27* 28*   CREATININE mg/dL 1 39* 1 71* 2 03* 1 81*       Chronic diastolic CHF (congestive heart failure) ([facility])  Assessment & Plan  Wt Readings from Last 3 Encounters:   07/10/20 104 kg (229 lb 4 5 oz)   07/03/20 105 kg (231 lb 14 8 oz)   06/16/20 110 kg (242 lb 3 2 oz)     · Chronic diastolic CHF furosemide was on hold secondary to kidney injury  · Restart furosemide in a m  but discontinue IVF today    COPD (chronic obstructive pulmonary disease) (HCC)  Assessment & Plan  · COPD without exacerbation on Anoro prior to admission  · Continue nebulized bronchodilators during hospitalization    Essential hypertension  Assessment & Plan  · Essential hypertension continue nebivolol doxazosin and amlodipine  · Restart ARB given resolution of kidney injury      VTE Pharmacologic Prophylaxis: Warfarin (Coumadin)    Patient Centered Rounds: I have performed bedside rounds with nursing staff today  Discussions with Specialists or Other Care Team Provider:   Education and Discussions with Family / Patient:     Time Spent for Care: 25 mins  More than 50% of total time spent on counseling and coordination of care as described above  Current Length of Stay: 1 day(s)  Current Patient Status: Inpatient     Certification Statement: The patient will continue to require additional inpatient hospital stay due to Hyperglycemia due to type 2 diabetes mellitus [facility])  Discharge Plan / Estimated Discharge Date:  Possibly next 24 hours    Code Status: Level 1 - Full Code  ______________________________________________________________________________    Subjective:   Patient seen and examined  Feeling much better today  No new complaints  No shortness of breath  Objective:   Vitals: Blood pressure 164/97, pulse 73, temperature 98 1 °F (36 7 °C), resp  rate 18, height 5' 7 5" (1 715 m), weight 104 kg (229 lb 4 5 oz), SpO2 98 %      Physical Exam:   General appearance: alert, appears stated age and cooperative  Head: Normocephalic, without obvious abnormality, atraumatic  Lungs: clear to auscultation bilaterally  Heart: regular rate and rhythm  Abdomen: soft, non-tender, positive bowel sounds   Back: negative, range of motion normal  Extremities: extremities atraumatic, no cyanosis or edema  Neurologic: Grossly normal    Additional Data:   Labs:  Results from last 7 days   Lab Units 07/10/20  0551 07/09/20  1001   WBC Thousand/uL 8 21 7 50   HEMOGLOBIN g/dL 12 0 13 2   HEMATOCRIT % 37 2 40 2   MCV fL 85 83   PLATELETS Thousands/uL 335 376   INR 2 39*  --      Results from last 7 days   Lab Units 07/10/20  0551 07/09/20  1406 07/09/20  1001 07/03/20  1246   SODIUM mmol/L 141 137 128* 133*   POTASSIUM mmol/L 4 0 3 3* 4 4 4 1   CHLORIDE mmol/L 107 102 91* 99*   CO2 mmol/L 26 27 26 25   ANION GAP mmol/L 8 8 11 9   BUN mg/dL 18 24 27* 28*   CREATININE mg/dL 1 39* 1 71* 2 03* 1 81*   CALCIUM mg/dL 8 5 8 7 9 3 8 0*   ALBUMIN g/dL  --   --  3 7  --    TOTAL BILIRUBIN mg/dL  --   --  0 80  --    ALK PHOS U/L  --   --  188*  --    ALT U/L  --   --  29  --    AST U/L  --   --  18  --    EGFR ml/min/1 73sq m 65 50 41 47   GLUCOSE RANDOM mg/dL 203* 297* 677* 446*         Results from last 7 days   Lab Units 07/09/20  1001   TROPONIN I ng/mL <0 02              Results from last 7 days   Lab Units 07/10/20  0811 07/10/20  0611 07/10/20  0402 07/10/20  0201 07/10/20  0001 07/09/20  2205 07/09/20  1957 07/09/20  1821 07/09/20  1711 07/09/20  1554 07/09/20  1400 07/09/20  1148   POC GLUCOSE mg/dl 189* 182* 179* 158* 154* 159* 211* 315* 216* 258* 318* >500*         Results from last 7 days   Lab Units 07/09/20  1406   TSH 3RD GENERATON uIU/mL 1 096     * I Have Reviewed All Lab Data Listed Above  Cultures:               Imaging:  Imaging Reports Reviewed Today Include:   Us Kidney And Bladder    Result Date: 7/9/2020  Impression: Unremarkable ultrasound of the kidneys and bladder  Workstation performed: [id number]     Ct Abdomen Pelvis With Contrast    Result Date: 7/9/2020  Impression: Interval resolution of the left inguinal abscess and left mid abdominal intraperitoneal fluid collection  No acute inflammatory changes within the abdomen or pelvis  Hepatic steatosis  Chronic hypodense lesions of the spleen and kidneys as described above   Workstation performed: [id number]     Scheduled Meds:  Current Facility-Administered Medications:  albuterol 2 puff Inhalation Q6H PRN Yuli Remy MD    amLODIPine 10 mg Oral Daily Yuli Remy MD    atorvastatin 40 mg Oral HS Gerson Cabral MD    budesonide 0 25 mg Nebulization Q12H Viviana Cordova DO    busPIRone 5 mg Oral Daily Gerson Cabral MD    clonazePAM 0 5 mg Oral BID PRN Gerson Cabral MD    doxazosin 4 mg Oral HS Gerson Cabral MD    escitalopram 20 mg Oral Daily Gerson Cabral MD    ferrous sulfate 325 mg Oral BID Gerson Cabral MD    fish oil 1,000 mg Oral HS Gerson Cabral MD    fluticasone 1 spray Nasal BID Gerson Cabral MD    insulin lispro 3 Units Subcutaneous TID With Meals Lam Drew MD    insulin regular (HumuLIN R,NovoLIN R) infusion 0 3-21 Units/hr Intravenous Titrated Tricia Carrillo PA-C Last Rate: 2 Units/hr (07/10/20 4440)   levalbuterol 1 25 mg Nebulization TID Christopher Pavon MD    magnesium oxide 400 mg Oral Daily Gerson Cabral MD    nebivolol 20 mg Oral BID Gerson Cabral MD    potassium chloride 20 mEq Oral Daily Gerson Cabral MD    sodium chloride 3 mL Nebulization TID Christopher Pavon MD    sodium chloride 0 9 % with KCl 40 mEq/L 75 mL/hr Intravenous Continuous Gerson Cabral MD Last Rate: 75 mL/hr (07/10/20 9711)   warfarin 5 mg Oral Daily (warfarin) MD Roxana Shipman DO [facility]  Hospitalist    ** Please Note: This note has been constructed using a voice recognition system   **

## 2020-07-10 NOTE — UTILIZATION REVIEW
Initial Clinical Review    Admission: Date/Time/Statement: Admission Orders (From admission, onward)     Ordered        07/09/20 1154  Inpatient Admission  Once                   Orders Placed This Encounter   Procedures    Inpatient Admission     Standing Status:   Standing     Number of Occurrences:   1     Order Specific Question:   Admitting Physician     Answer:   Arianne President [[id number]]     Order Specific Question:   Level of Care     Answer:   Med Surg [16]     Order Specific Question:   Estimated length of stay     Answer:   More than 2 Midnights     Order Specific Question:   Certification     Answer:   I certify that inpatient services are medically necessary for this patient for a duration of greater than two midnights  See H&P and MD Progress Notes for additional information about the patient's course of treatment  ED Arrival Information     Expected Arrival Acuity Means of Arrival Escorted By Service Admission Type    - 7/9/2020 08:58 Urgent Walk-In Self Hospitalist Urgent    Arrival Complaint    hyperglycemia        Chief Complaint   Patient presents with    Hyperglycemia - Symptomatic     Pt stated he went to do blood work yesterday (SDMI7) and recieved a call today by PCP office stating BS level was high and to go to ER     Assessment/Plan:60year old male instructed to go to ED by PCP for glucose of 648 on blood work from the day prior  Admitted to inpatient for hyperglycemia  H/O htn, CKD, AGUS, DVT new onset DMtype II diagnosed a week prior  He was started on Metformin at that time and reports compliance  Pending glucometer set up at home with case management  HE camplins of blurred vision, polydipsia, dry mouth, and polyuria  He arrives with right upper quad abdominal tenderness  Glucose 677  IV fluid bolus given in the ED  Started on insulin drip  Creatinine 2 with baseline 1 6  UA does not show infection  INR therapeutic   HOld ace inhibitor and diuretics and continue with mild hydration  Endocrine consult    7/9 Endocrine consult: Poorly controlled with A1c 11 3%  Continue insulin drip  If renal function improves, restart Metformin  Check anti RETA anti islet antibody  7/10 Update: DC insulin drip  Start 70/30 insulin with sliding scale  Case management following for discharge needs and diabetic teaching       ED Triage Vitals   Temperature Pulse Respirations Blood Pressure SpO2   07/09/20 0913 07/09/20 0910 07/09/20 0910 07/09/20 0910 07/09/20 0910   99 2 °F (37 3 °C) 85 18 122/75 97 %      Temp Source Heart Rate Source Patient Position - Orthostatic VS BP Location FiO2 (%)   07/09/20 0913 07/09/20 0910 07/09/20 0910 07/09/20 0910 --   Oral Monitor Lying Right arm       Pain Score       07/09/20 1933       No Pain        Wt Readings from Last 1 Encounters:   07/10/20 104 kg (229 lb 4 5 oz)     Additional Vital Signs:   Date/Time Temp Pulse  Resp  BP  MAP (mmHg)  SpO2 O2 Device Patient Position - Orthostatic VS   07/10/20 0808          98 % None (Room air)    07/10/20 08:07:51 98 1 °F (36 7 °C) 73  18  164/97  119  98 %     07/09/20 2300 98 1 °F (36 7 °C) 66  18  160/96    98 % None (Room air) Lying   07/09/20 22:16:26  63    148/78  101  97 %     07/09/20 2215      148/78         07/09/20 2026          97 % None (Room air)    07/09/20 18:52:05 98 °F (36 7 °C) 68  18  169/96  120  97 %     07/09/20 1815  66  20  168/77  110  96 % None (Room air) Lying   07/09/20 1730  68  20  168/91  123  98 % None (Room air) Lying   07/09/20 1700  68  20  152/79  108  100 % None (Room air) Lying   07/09/20 1630  72  20  147/82  108  100 % None (Room air) Lying   07/09/20 1600  72  18  142/79  104  98 % None (Room air) Lying   07/09/20 1500  77  18  132/71  95  100 % None (Room air) Lying   07/09/20 1415  72  20  154/89  115  96 % None (Room air) Lying   07/09/20 1413      154/89         07/09/20 1300  73  18  174/86Abnormal   118  95 % None (Room air) Lying   07/09/20 1230  69  18  169/94  125  97 % None (Room air) Lying   07/09/20 1200  72  20  156/79  111  98 % None (Room air) Lying   07/09/20 1115  76  20  167/96             Pertinent Labs/Diagnostic Test Results:   7/9 Renal U/S: Unremarkable ultrasound of the kidneys and bladder  7/9 CT A/P: Interval resolution of the left inguinal abscess and left mid abdominal intraperitoneal fluid collection  No acute inflammatory changes within the abdomen or pelvis  Hepatic steatosis  Chronic hypodense lesions of the spleen and kidneys  7/9 EKG Interpretation:     Interpretation: non-specific    Rate:     ECG rate:  73    ECG rate assessment: normal    Rhythm:     Rhythm: sinus rhythm    Ectopy:     Ectopy: none    QRS:     QRS axis:  Left    QRS intervals:  Normal  Conduction:     Conduction: normal    ST segments:     ST segments:  Normal  T waves:     T waves: non-specific    Comments:      QTc 475      Results from last 7 days   Lab Units 07/10/20  0551 07/09/20  1001   WBC Thousand/uL 8 21 7 50   HEMOGLOBIN g/dL 12 0 13 2   HEMATOCRIT % 37 2 40 2   PLATELETS Thousands/uL 335 376   NEUTROS ABS Thousands/µL 4 61 4 84         Results from last 7 days   Lab Units 07/10/20  0551 07/09/20  1406 07/09/20  1001 07/03/20  1246   SODIUM mmol/L 141 137 128* 133*   POTASSIUM mmol/L 4 0 3 3* 4 4 4 1   CHLORIDE mmol/L 107 102 91* 99*   CO2 mmol/L 26 27 26 25   ANION GAP mmol/L 8 8 11 9   BUN mg/dL 18 24 27* 28*   CREATININE mg/dL 1 39* 1 71* 2 03* 1 81*   EGFR ml/min/1 73sq m 65 50 41 47   CALCIUM mg/dL 8 5 8 7 9 3 8 0*     Results from last 7 days   Lab Units 07/09/20  1001   AST U/L 18   ALT U/L 29   ALK PHOS U/L 188*   TOTAL PROTEIN g/dL 8 1   ALBUMIN g/dL 3 7   TOTAL BILIRUBIN mg/dL 0 80   BILIRUBIN DIRECT mg/dL 0 23*     Results from last 7 days   Lab Units 07/10/20  0811 07/10/20  1192 07/10/20  0402 07/10/20  0201 07/10/20  0001 07/09/20  2205 07/09/20  1957 07/09/20  1821 07/09/20  1711 07/09/20  1559 07/09/20  1400 07/09/20  1148   POC GLUCOSE mg/dl 189* 182* 179* 158* 154* 159* 211* 315* 216* 258* 318* >500*     Results from last 7 days   Lab Units 07/10/20  0551 07/09/20  1406 07/09/20  1001 07/03/20  1246   GLUCOSE RANDOM mg/dL 203* 297* 677* 446*     BETA-HYDROXYBUTYRATE   Date Value Ref Range Status   07/09/2020 1 2 (H) <0 6 mmol/L Final      Results from last 7 days   Lab Units 07/09/20  1001   PH OSKAR  7 337   PCO2 OSKAR mm Hg 45 9   PO2 OSKAR mm Hg 31 2*   HCO3 OSKAR mmol/L 24 0   BASE EXC OSKAR mmol/L -2 0   O2 CONTENT OSKAR ml/dL 10 9   O2 HGB, VENOUS % 56 1*             Results from last 7 days   Lab Units 07/09/20  1001   TROPONIN I ng/mL <0 02         Results from last 7 days   Lab Units 07/10/20  0551   PROTIME seconds 26 4*   INR  2 39*     Results from last 7 days   Lab Units 07/09/20  1406   TSH 3RD GENERATON uIU/mL 1 096     Results from last 7 days   Lab Units 07/09/20  1001   LIPASE u/L 608*     Results from last 7 days   Lab Units 07/09/20  1100   CLARITY UA  Clear   COLOR UA  Yellow   SPEC GRAV UA  <=1 005   PH UA  5 5   GLUCOSE UA mg/dl >=1000 (1%)*   KETONES UA mg/dl Trace*   BLOOD UA  Trace-Intact*   PROTEIN UA mg/dl Negative   NITRITE UA  Negative   BILIRUBIN UA  Negative   UROBILINOGEN UA E U /dl 0 2   LEUKOCYTES UA  Elevated glucose may cause decreased leukocyte values   See urine microscopic for [facility] result/*   WBC UA /hpf 0-1*   RBC UA /hpf None Seen   BACTERIA UA /hpf None Seen   EPITHELIAL CELLS WET PREP /hpf None Seen     ED Treatment:   Medication Administration from 07/09/2020 0858 to 07/09/2020 1844       Date/Time Order Dose Route Action     07/09/2020 1005 sodium chloride 0 9 % bolus 1,000 mL 1,000 mL Intravenous New Bag     07/09/2020 1004 sodium chloride 0 9 % bolus 1,000 mL 1,000 mL Intravenous New Bag     07/09/2020 0956 metFORMIN (GLUCOPHAGE) tablet 1,000 mg 1,000 mg Oral Given     07/09/2020 1115 insulin regular (HumuLIN R,NovoLIN R) injection 10 Units 10 Units Intravenous Given 07/09/2020 1158 insulin regular (HumuLIN R,NovoLIN R) 1 Units/mL in sodium chloride 0 9 % 100 mL infusion 6 Units/hr Intravenous New Bag     07/09/2020 1236 sodium chloride 0 9 % infusion 125 mL/hr Intravenous New Bag     07/09/2020 1413 amLODIPine (NORVASC) tablet 10 mg 10 mg Oral Given     07/09/2020 1414 busPIRone (BUSPAR) tablet 5 mg 5 mg Oral Given     07/09/2020 1414 escitalopram (LEXAPRO) tablet 20 mg 20 mg Oral Given     07/09/2020 1729 ferrous sulfate tablet 325 mg 325 mg Oral Given     07/09/2020 1416 ipratropium-albuterol (DUO-NEB) 0 5-2 5 mg/3 mL inhalation solution 3 mL 3 mL Nebulization Given     07/09/2020 1414 magnesium oxide (MAG-OX) tablet 400 mg 400 mg Oral Given     07/09/2020 1729 nebivolol (BYSTOLIC) tablet 20 mg 20 mg Oral Given     07/09/2020 1414 potassium chloride (K-DUR,KLOR-CON) CR tablet 20 mEq 20 mEq Oral Given     07/09/2020 1729 warfarin (COUMADIN) tablet 5 mg 5 mg Oral Given     07/09/2020 1711 sodium chloride 0 9 % with KCl 40 mEq/L infusion (premix) 75 mL/hr Intravenous New Bag     07/09/2020 1721 insulin lispro (HumaLOG) 100 units/mL subcutaneous injection 3 Units 3 Units Subcutaneous Given     07/09/2020 1721 potassium chloride (K-DUR,KLOR-CON) CR tablet 20 mEq 20 mEq Oral Given        Past Medical History:   Diagnosis Date    Asthma     [facility]     Diabetes mellitus ([facility] )     Emphysema of lung ([facility] )     Gout     History of transfusion     pt stated they had a transfusion when they had gallbladder surgery   Hypertension     Kidney disease     renal failure    Leg DVT (deep venous thromboembolism), acute, bilateral ([facility] ) 01/2018    Obesity (BMI 30-39  9)     AGUS on CPAP     setting 11    Sleep apnea     Systolic CHF (HCC)      Admitting Diagnosis: Hyperglycemia [R73 9]  Uncontrolled diabetes mellitus (HCC) [E11 65]  TRUDI (acute kidney injury) ([facility] ) [N17 9]  Elevated lipase [R74 8]  Age/Sex: 60 y o  male  Admission Orders:  SCDS  Up and OOB  I/O  CBC, CMP, HGB A1c, pt/inr, Urine culture, mag, phos  Scheduled Medications:    Medications:  amLODIPine 10 mg Oral Daily   atorvastatin 40 mg Oral HS   budesonide 0 25 mg Nebulization Q12H   busPIRone 5 mg Oral Daily   doxazosin 4 mg Oral HS   escitalopram 20 mg Oral Daily   ferrous sulfate 325 mg Oral BID   fish oil 1,000 mg Oral HS   fluticasone 1 spray Nasal BID   [START ON 7/11/2020] furosemide 80 mg Oral Daily   insulin aspart protamine-insulin aspart 25 Units Subcutaneous BID AC   insulin lispro 1-6 Units Subcutaneous 4x Daily (AC & HS)   levalbuterol 1 25 mg Nebulization TID   losartan 100 mg Oral Daily   magnesium oxide 400 mg Oral Daily   nebivolol 20 mg Oral BID   potassium chloride 20 mEq Oral Daily   sodium chloride 3 mL Nebulization TID   warfarin 5 mg Oral Daily (warfarin)     Continuous IV Infusions:     PRN Meds:    albuterol 2 puff Inhalation Q6H PRN   clonazePAM 0 5 mg Oral BID PRN       IP CONSULT TO ENDOCRINOLOGY    Network Utilization Review Department  [email or web address]  ATTENTION: Please call with any questions or concerns to [phone] and carefully listen to the prompts so that you are directed to the right person  All voicemails are confidential   Preston Delfco all requests for admission clinical reviews, approved or denied determinations and any other requests to dedicated fax number below belonging to the campus where the patient is receiving treatment   List of dedicated fax numbers for the Facilities:  [address] DENIALS (Administrative/Medical Necessity) [phone]   [address] [place] [address] (Maternity/NICU/Pediatrics) [phone]   Chanel Marquez [phone]   Vivian Mcfadden [phone]   [facility]  [phone]   [address] [facility] [address] [facility] [phone] [address] [facility]  [phone]   [address] [place] [address] [facility] [phone]

## 2020-07-10 NOTE — ASSESSMENT & PLAN NOTE
· COPD without exacerbation on Anoro prior to admission  · Continue nebulized bronchodilators during hospitalization

## 2020-07-10 NOTE — ASSESSMENT & PLAN NOTE
Wt Readings from Last 3 Encounters:   07/10/20 104 kg (229 lb 4 5 oz)   07/03/20 105 kg (231 lb 14 8 oz)   06/16/20 110 kg (242 lb 3 2 oz)     · Chronic diastolic CHF furosemide was on hold secondary to kidney injury  · Restart furosemide in a m  but discontinue IVF today

## 2020-07-10 NOTE — ASSESSMENT & PLAN NOTE
No results found for: HGBA1C    Results from last 7 days   Lab Units 07/10/20  0811 07/10/20  0611 07/10/20  0402 07/10/20  0201 07/10/20  0001 07/09/20  2205 07/09/20  1957 07/09/20  1821 07/09/20  1711 07/09/20  1554 07/09/20  1400 07/09/20  1148   POC GLUCOSE mg/dl 189* 182* 179* 158* 154* 159* 211* 315* 216* 258* 318* >500*     · Diabetes mellitus with hyperglycemia  Prior to admission on metformin monotherapy  · No ketosis  Seen by endocrinology    Will discontinue insulin infusion and start 70/30 mix insulin with sliding scale

## 2020-07-10 NOTE — ASSESSMENT & PLAN NOTE
· Essential hypertension continue nebivolol doxazosin and amlodipine  · Restart ARB given resolution of kidney injury

## 2020-07-10 NOTE — PLAN OF CARE
Problem: Nutrition/Hydration-ADULT  Goal: Nutrient/Hydration intake appropriate for improving, restoring or maintaining nutritional needs  Description  Monitor and assess patient's nutrition/hydration status for malnutrition  Collaborate with interdisciplinary team and initiate plan and interventions as ordered  Monitor patient's weight and dietary intake as ordered or per policy  Utilize nutrition screening tool and intervene as necessary  Determine patient's food preferences and provide high-protein, high-caloric foods as appropriate       INTERVENTIONS:  - Monitor oral intake, urinary output, labs, and treatment plans  - Assess nutrition and hydration status and recommend course of action  - Evaluate amount of meals eaten  - Assist patient with eating if necessary   - Allow adequate time for meals  - Recommend/ encourage appropriate diets, oral nutritional supplements, and vitamin/mineral supplements  - Order, calculate, and assess calorie counts as needed  - Assess need for intravenous fluids  - Provide CHO intake nutrition/hydration education as appropriate  - Include patient/family/caregiver in decisions related to nutrition   Outcome: Progressing

## 2020-07-11 VITALS
OXYGEN SATURATION: 98 % | SYSTOLIC BLOOD PRESSURE: 175 MMHG | HEIGHT: 68 IN | HEART RATE: 77 BPM | DIASTOLIC BLOOD PRESSURE: 98 MMHG | WEIGHT: 228.4 LBS | TEMPERATURE: 99.2 F | RESPIRATION RATE: 16 BRPM | BODY MASS INDEX: 34.62 KG/M2

## 2020-07-11 PROBLEM — N28.1 RENAL CYST: Status: ACTIVE | Noted: 2020-07-11

## 2020-07-11 LAB
ALBUMIN SERPL BCP-MCNC: 2.9 G/DL (ref 3.5–5)
ALP SERPL-CCNC: 89 U/L (ref 46–116)
ALT SERPL W P-5'-P-CCNC: 28 U/L (ref 12–78)
ANION GAP SERPL CALCULATED.3IONS-SCNC: 9 MMOL/L (ref 4–13)
AST SERPL W P-5'-P-CCNC: 22 U/L (ref 5–45)
BILIRUB SERPL-MCNC: 0.6 MG/DL (ref 0.2–1)
BUN SERPL-MCNC: 13 MG/DL (ref 5–25)
CALCIUM SERPL-MCNC: 8.7 MG/DL (ref 8.3–10.1)
CHLORIDE SERPL-SCNC: 107 MMOL/L (ref 100–108)
CO2 SERPL-SCNC: 26 MMOL/L (ref 21–32)
CREAT SERPL-MCNC: 1.3 MG/DL (ref 0.6–1.3)
ERYTHROCYTE [DISTWIDTH] IN BLOOD BY AUTOMATED COUNT: 14.4 % (ref 11.6–15.1)
GFR SERPL CREATININE-BSD FRML MDRD: 70 ML/MIN/1.73SQ M
GLUCOSE SERPL-MCNC: 183 MG/DL (ref 65–140)
GLUCOSE SERPL-MCNC: 201 MG/DL (ref 65–140)
GLUCOSE SERPL-MCNC: 224 MG/DL (ref 65–140)
GLUCOSE SERPL-MCNC: 314 MG/DL (ref 65–140)
HCT VFR BLD AUTO: 35.4 % (ref 36.5–49.3)
HGB BLD-MCNC: 11.5 G/DL (ref 12–17)
INR PPP: 2.02 (ref 0.84–1.19)
MCH RBC QN AUTO: 27.6 PG (ref 26.8–34.3)
MCHC RBC AUTO-ENTMCNC: 32.5 G/DL (ref 31.4–37.4)
MCV RBC AUTO: 85 FL (ref 82–98)
PLATELET # BLD AUTO: 307 THOUSANDS/UL (ref 149–390)
PMV BLD AUTO: 9.4 FL (ref 8.9–12.7)
POTASSIUM SERPL-SCNC: 3.3 MMOL/L (ref 3.5–5.3)
PROT SERPL-MCNC: 6.5 G/DL (ref 6.4–8.2)
PROTHROMBIN TIME: 23.1 SECONDS (ref 11.6–14.5)
RBC # BLD AUTO: 4.16 MILLION/UL (ref 3.88–5.62)
SODIUM SERPL-SCNC: 142 MMOL/L (ref 136–145)
WBC # BLD AUTO: 7.97 THOUSAND/UL (ref 4.31–10.16)

## 2020-07-11 PROCEDURE — 85027 COMPLETE CBC AUTOMATED: CPT | Performed by: INTERNAL MEDICINE

## 2020-07-11 PROCEDURE — 94640 AIRWAY INHALATION TREATMENT: CPT

## 2020-07-11 PROCEDURE — 80053 COMPREHEN METABOLIC PANEL: CPT | Performed by: INTERNAL MEDICINE

## 2020-07-11 PROCEDURE — 94760 N-INVAS EAR/PLS OXIMETRY 1: CPT

## 2020-07-11 PROCEDURE — 82948 REAGENT STRIP/BLOOD GLUCOSE: CPT

## 2020-07-11 PROCEDURE — 99239 HOSP IP/OBS DSCHRG MGMT >30: CPT | Performed by: INTERNAL MEDICINE

## 2020-07-11 PROCEDURE — 85610 PROTHROMBIN TIME: CPT | Performed by: FAMILY MEDICINE

## 2020-07-11 RX ORDER — BLOOD-GLUCOSE METER
1 KIT MISCELLANEOUS 2 TIMES DAILY
Qty: 1 EACH | Refills: 0 | Status: SHIPPED | OUTPATIENT
Start: 2020-07-11

## 2020-07-11 RX ORDER — PEN NEEDLE, DIABETIC 30 GX3/16"
NEEDLE, DISPOSABLE MISCELLANEOUS
Qty: 100 EACH | Refills: 0 | Status: SHIPPED | OUTPATIENT
Start: 2020-07-11 | End: 2020-12-11

## 2020-07-11 RX ORDER — FUROSEMIDE 80 MG
80 TABLET ORAL DAILY
COMMUNITY
End: 2021-04-18 | Stop reason: HOSPADM

## 2020-07-11 RX ADMIN — INSULIN ASPART 25 UNITS: 100 INJECTION, SUSPENSION SUBCUTANEOUS at 16:51

## 2020-07-11 RX ADMIN — LEVALBUTEROL HYDROCHLORIDE 1.25 MG: 1.25 SOLUTION, CONCENTRATE RESPIRATORY (INHALATION) at 08:56

## 2020-07-11 RX ADMIN — ESCITALOPRAM OXALATE 20 MG: 20 TABLET ORAL at 08:53

## 2020-07-11 RX ADMIN — MAGNESIUM GLUCONATE 500 MG ORAL TABLET 400 MG: 500 TABLET ORAL at 08:53

## 2020-07-11 RX ADMIN — NEBIVOLOL HYDROCHLORIDE 20 MG: 10 TABLET ORAL at 17:19

## 2020-07-11 RX ADMIN — FERROUS SULFATE TAB 325 MG (65 MG ELEMENTAL FE) 325 MG: 325 (65 FE) TAB at 17:19

## 2020-07-11 RX ADMIN — INSULIN ASPART 25 UNITS: 100 INJECTION, SUSPENSION SUBCUTANEOUS at 08:54

## 2020-07-11 RX ADMIN — ISODIUM CHLORIDE 3 ML: 0.03 SOLUTION RESPIRATORY (INHALATION) at 08:55

## 2020-07-11 RX ADMIN — NEBIVOLOL HYDROCHLORIDE 20 MG: 10 TABLET ORAL at 08:53

## 2020-07-11 RX ADMIN — INSULIN LISPRO 5 UNITS: 100 INJECTION, SOLUTION INTRAVENOUS; SUBCUTANEOUS at 12:09

## 2020-07-11 RX ADMIN — INSULIN LISPRO 2 UNITS: 100 INJECTION, SOLUTION INTRAVENOUS; SUBCUTANEOUS at 08:55

## 2020-07-11 RX ADMIN — WARFARIN SODIUM 5 MG: 5 TABLET ORAL at 17:19

## 2020-07-11 RX ADMIN — LEVALBUTEROL HYDROCHLORIDE 1.25 MG: 1.25 SOLUTION, CONCENTRATE RESPIRATORY (INHALATION) at 13:09

## 2020-07-11 RX ADMIN — INSULIN LISPRO 2 UNITS: 100 INJECTION, SOLUTION INTRAVENOUS; SUBCUTANEOUS at 16:50

## 2020-07-11 RX ADMIN — FERROUS SULFATE TAB 325 MG (65 MG ELEMENTAL FE) 325 MG: 325 (65 FE) TAB at 08:53

## 2020-07-11 RX ADMIN — POTASSIUM CHLORIDE 20 MEQ: 1500 TABLET, EXTENDED RELEASE ORAL at 08:53

## 2020-07-11 RX ADMIN — BUSPIRONE HYDROCHLORIDE 5 MG: 5 TABLET ORAL at 08:53

## 2020-07-11 RX ADMIN — BUDESONIDE 0.25 MG: 0.25 INHALANT RESPIRATORY (INHALATION) at 09:00

## 2020-07-11 RX ADMIN — FUROSEMIDE 80 MG: 80 TABLET ORAL at 08:53

## 2020-07-11 RX ADMIN — AMLODIPINE BESYLATE 10 MG: 10 TABLET ORAL at 08:53

## 2020-07-11 RX ADMIN — ISODIUM CHLORIDE 3 ML: 0.03 SOLUTION RESPIRATORY (INHALATION) at 13:09

## 2020-07-11 NOTE — ASSESSMENT & PLAN NOTE
Wt Readings from Last 3 Encounters:   07/11/20 104 kg (228 lb 6 3 oz)   07/03/20 105 kg (231 lb 14 8 oz)   06/16/20 110 kg (242 lb 3 2 oz)     · Chronic diastolic CHF which furosemide was on hold secondary to kidney injury at time of admission      · Restarted furosemide on day of discharge

## 2020-07-11 NOTE — ASSESSMENT & PLAN NOTE
Wt Readings from Last 3 Encounters:   07/11/20 104 kg (228 lb 6 3 oz)   07/03/20 105 kg (231 lb 14 8 oz)   06/16/20 110 kg (242 lb 3 2 oz)     · Chronic diastolic CHF which furosemide was on hold secondary to kidney injury    Restarted furosemide morning

## 2020-07-11 NOTE — ASSESSMENT & PLAN NOTE
Lab Results   Component Value Date    HGBA1C 12 8 (H) 07/10/2020       Results from last 7 days   Lab Units 07/11/20  1104 07/11/20  0750 07/10/20  2043 07/10/20  1620 07/10/20  1154 07/10/20  0811 07/10/20  0611 07/10/20  0402 07/10/20  0201 07/10/20  0001 07/09/20  2205 07/09/20  1957   POC GLUCOSE mg/dl 314* 224* 233* 389* 310* 189* 182* 179* 158* 154* 159* 211*     · Diabetes mellitus with hyperglycemia  Prior to admission on metformin monotherapy  · No ketosis  Seen by endocrinology    Was on insulin infusion and has been discontinued and started 70/30 mix insulin with sliding scale

## 2020-07-11 NOTE — ASSESSMENT & PLAN NOTE
Lab Results   Component Value Date    HGBA1C 12 8 (H) 07/10/2020     Results from last 7 days   Lab Units 07/11/20  1556 07/11/20  1104 07/11/20  0750 07/10/20  2043 07/10/20  1620 07/10/20  1154 07/10/20  0811 07/10/20  0611 07/10/20  0402 07/10/20  0201 07/10/20  0001 07/09/20  2205   POC GLUCOSE mg/dl 201* 314* 224* 233* 389* 310* 189* 182* 179* 158* 154* 159*     · Diabetes mellitus with hyperglycemia  Prior to admission was started on metformin monotherapy  · No ketosis  Seen by endocrinology    Was on insulin infusion and has been discontinued and started 70/30 mix insulin 28 units b i d  with sliding scale  · will be discharged with 28 units b i d  and should follow up with PCP

## 2020-07-11 NOTE — ASSESSMENT & PLAN NOTE
· Essential hypertension continue nebivolol doxazosin and edarbi after discharge as previously taken without change

## 2020-07-11 NOTE — ASSESSMENT & PLAN NOTE
· Essential hypertension continue nebivolol doxazosin and amlodipine  · Restarted ARB given resolution of kidney injury

## 2020-07-11 NOTE — PLAN OF CARE
Problem: METABOLIC, FLUID AND ELECTROLYTES - ADULT  Goal: Glucose maintained within target range  Description  INTERVENTIONS:  - Monitor Blood Glucose as ordered  - Assess for signs and symptoms of hyperglycemia and hypoglycemia  - Administer ordered medications to maintain glucose within target range  - Assess nutritional intake and initiate nutrition service referral as needed  Outcome: Progressing     Problem: DISCHARGE PLANNING  Goal: Discharge to home or other facility with appropriate resources  Description  INTERVENTIONS:  - Identify barriers to discharge w/patient and caregiver  - Arrange for needed discharge resources and transportation as appropriate  - Identify discharge learning needs (diabetes medication syringes/pen teaching of insulin administration)  - Arrange for interpretive services to assist at discharge as needed  - Refer to Case Management Department for coordinating discharge planning if the patient needs post-hospital services based on physician/advanced practitioner order or complex needs related to functional status, cognitive ability, or social support system   Outcome: Progressing

## 2020-07-11 NOTE — PLAN OF CARE
Problem: Potential for Falls  Goal: Patient will remain free of falls  Description  INTERVENTIONS:  - Assess patient frequently for physical needs  -  Identify cognitive and physical deficits and behaviors that affect risk of falls    -  [place] fall precautions as indicated by assessment   - Educate patient/family on patient safety including physical limitations  - Instruct patient to call for assistance with activity based on assessment  - Modify environment to reduce risk of injury  - Consider OT/PT consult to assist with strengthening/mobility  Outcome: Progressing     Problem: METABOLIC, FLUID AND ELECTROLYTES - ADULT  Goal: Glucose maintained within target range  Description  INTERVENTIONS:  - Monitor Blood Glucose as ordered  - Assess for signs and symptoms of hyperglycemia and hypoglycemia  - Administer ordered medications to maintain glucose within target range  - Assess nutritional intake and initiate nutrition service referral as needed  Outcome: Progressing     Problem: DISCHARGE PLANNING  Goal: Discharge to home or other facility with appropriate resources  Description  INTERVENTIONS:  - Identify barriers to discharge w/patient and caregiver  - Arrange for needed discharge resources and transportation as appropriate  - Identify discharge learning needs (diabetes medication syringes/pen teaching of insulin administration)  - Arrange for interpretive services to assist at discharge as needed  - Refer to Case Management Department for coordinating discharge planning if the patient needs post-hospital services based on physician/advanced practitioner order or complex needs related to functional status, cognitive ability, or social support system   Outcome: Progressing     Problem: Knowledge Deficit  Goal: Patient/family/caregiver demonstrates understanding of disease process, treatment plan, medications, and discharge instructions  Description  Complete learning assessment and assess knowledge base   Interventions:  - Provide teaching at level of understanding  - Provide teaching via preferred learning methods  Outcome: Progressing

## 2020-07-11 NOTE — ASSESSMENT & PLAN NOTE
· TRUDI on CKD 3 secondary to hyperglycemia    · Resolved with IV fluids and restart furosemide this morning    Results from last 7 days   Lab Units 07/11/20  0605 07/10/20  0551 07/09/20  1406 07/09/20  1001   BUN mg/dL 13 18 24 27*   CREATININE mg/dL 1 30 1 39* 1 71* 2 03*

## 2020-07-11 NOTE — PROGRESS NOTES
Progress Note - Kaira Burden 1965, 60 y o  male MRN: [id number]    Unit/Bed#: -01 Encounter: [id number]    Primary Care Provider: Halie White MD   Date and time admitted to hospital: 7/9/2020  9:07 AM        * Hyperglycemia due to type 2 diabetes mellitus [facility])  Assessment & Plan  Lab Results   Component Value Date    HGBA1C 12 8 (H) 07/10/2020       Results from last 7 days   Lab Units 07/11/20  1104 07/11/20  0750 07/10/20  2043 07/10/20  1620 07/10/20  1154 07/10/20  0811 07/10/20  0611 07/10/20  0402 07/10/20  0201 07/10/20  0001 07/09/20  2205 07/09/20  1957   POC GLUCOSE mg/dl 314* 224* 233* 389* 310* 189* 182* 179* 158* 154* 159* 211*     · Diabetes mellitus with hyperglycemia  Prior to admission on metformin monotherapy  · No ketosis  Seen by endocrinology  Was on insulin infusion and has been discontinued and started 70/30 mix insulin with sliding scale    History of DVT (deep vein thrombosis)  Assessment & Plan  · History of DVT on warfarin  · Patient states that hematologist planning on discontinuing warfarin if D-dimer within limits as DVT was two years ago    Results from last 7 days   Lab Units 07/11/20  0605 07/10/20  0551   INR  2 02* 2 39*       TRUDI (acute kidney injury) ([facility] [address] )  Assessment & Plan  · TRUDI on CKD 3 secondary to hyperglycemia  · Resolved with IV fluids and restart furosemide this morning    Results from last 7 days   Lab Units 07/11/20  0605 07/10/20  0551 07/09/20  1406 07/09/20  1001   BUN mg/dL 13 18 24 27*   CREATININE mg/dL 1 30 1 39* 1 71* 2 03*       Chronic diastolic CHF (congestive heart failure) ([facility])  Assessment & Plan  Wt Readings from Last 3 Encounters:   07/11/20 104 kg (228 lb 6 3 oz)   07/03/20 105 kg (231 lb 14 8 oz)   06/16/20 110 kg (242 lb 3 2 oz)     · Chronic diastolic CHF which furosemide was on hold secondary to kidney injury    Restarted furosemide morning    COPD (chronic obstructive pulmonary disease) (HCC)  Assessment & Plan  · COPD without exacerbation on Anoro prior to admission  · Continue nebulized bronchodilators during hospitalization    Essential hypertension  Assessment & Plan  · Essential hypertension continue nebivolol doxazosin and amlodipine  · Restarted ARB given resolution of kidney injury      VTE Pharmacologic Prophylaxis: Warfarin (Coumadin)    Patient Centered Rounds: I have performed bedside rounds with nursing staff today  Discussions with Specialists or Other Care Team Provider:   Education and Discussions with Family / Patient:  Spouse    Time Spent for Care: 25 mins  More than 50% of total time spent on counseling and coordination of care as described above  Current Length of Stay: 2 day(s)  Current Patient Status: Inpatient     Certification Statement: The patient will continue to require additional inpatient hospital stay due to Hyperglycemia due to type 2 diabetes mellitus [facility])  Discharge Plan / Estimated Discharge Date:  Today or tomorrow depending on accucheks    Code Status: Level 1 - Full Code  ______________________________________________________________________________    Subjective:   Patient seen and examined  No new complaints  Feeling pretty well    Objective:   Vitals: Blood pressure (!) 175/98, pulse 77, temperature 99 2 °F (37 3 °C), resp  rate 16, height 5' 7 5" (1 715 m), weight 104 kg (228 lb 6 3 oz), SpO2 98 %      Physical Exam:   General appearance: alert, appears stated age and cooperative  Head: Normocephalic, without obvious abnormality, atraumatic  Lungs: clear to auscultation bilaterally  Heart: regular rate and rhythm  Abdomen: soft, non-tender, positive bowel sounds   Back: negative, range of motion normal  Extremities: extremities atraumatic, no cyanosis or edema  Neurologic: Grossly normal    Additional Data:   Labs:  Results from last 7 days   Lab Units 07/11/20  0605 07/10/20  0551 07/09/20  1001   WBC Thousand/uL 7 97 8 21 7 50   HEMOGLOBIN g/dL 11 5* 12 0 13 2   HEMATOCRIT % 35 4* 37 2 40 2   MCV fL 85 85 83   PLATELETS Thousands/uL 307 335 376   INR  2 02* 2 39*  --      Results from last 7 days   Lab Units 07/11/20  0605 07/10/20  0551 07/09/20  1406 07/09/20  1001   SODIUM mmol/L 142 141 137 128*   POTASSIUM mmol/L 3 3* 4 0 3 3* 4 4   CHLORIDE mmol/L 107 107 102 91*   CO2 mmol/L 26 26 27 26   ANION GAP mmol/L 9 8 8 11   BUN mg/dL 13 18 24 27*   CREATININE mg/dL 1 30 1 39* 1 71* 2 03*   CALCIUM mg/dL 8 7 8 5 8 7 9 3   ALBUMIN g/dL 2 9*  --   --  3 7   TOTAL BILIRUBIN mg/dL 0 60  --   --  0 80   ALK PHOS U/L 89  --   --  188*   ALT U/L 28  --   --  29   AST U/L 22  --   --  18   EGFR ml/min/1 73sq m 70 65 50 41   GLUCOSE RANDOM mg/dL 183* 203* 297* 677*         Results from last 7 days   Lab Units 07/09/20  1001   TROPONIN I ng/mL <0 02              Results from last 7 days   Lab Units 07/11/20  1104 07/11/20  0750 07/10/20  2043 07/10/20  1620 07/10/20  1154 07/10/20  0811 07/10/20  0611 07/10/20  0402 07/10/20  0201 07/10/20  0001 07/09/20  2205 07/09/20  1957   POC GLUCOSE mg/dl 314* 224* 233* 389* 310* 189* 182* 179* 158* 154* 159* 211*     Results from last 7 days   Lab Units 07/10/20  0551   HEMOGLOBIN A1C % 12 8*     Results from last 7 days   Lab Units 07/09/20  1406   TSH 3RD GENERATON uIU/mL 1 096     * I Have Reviewed All Lab Data Listed Above  Cultures:   Results from last 7 days   Lab Units 07/09/20  1407   URINE CULTURE  1503-8194 cfu/ml              Imaging:  Imaging Reports Reviewed Today Include:   Us Kidney And Bladder    Result Date: 7/9/2020  Impression: Unremarkable ultrasound of the kidneys and bladder  Workstation performed: [id number]     Ct Abdomen Pelvis With Contrast    Result Date: 7/9/2020  Impression: Interval resolution of the left inguinal abscess and left mid abdominal intraperitoneal fluid collection  No acute inflammatory changes within the abdomen or pelvis  Hepatic steatosis   Chronic hypodense lesions of the spleen and kidneys as described above  Workstation performed: [id number]     Scheduled Meds:  Current Facility-Administered Medications:  albuterol 2 puff Inhalation Q6H PRN Katharina Castillo MD   amLODIPine 10 mg Oral Daily Katharina Castillo MD   atorvastatin 40 mg Oral HS Katharina Castillo MD   budesonide 0 25 mg Nebulization Q12H Viviana Cordova DO   busPIRone 5 mg Oral Daily Katharina Castillo MD   clonazePAM 0 5 mg Oral BID PRN Katharina Castillo MD   doxazosin 4 mg Oral HS Katharina Castillo MD   escitalopram 20 mg Oral Daily Katharina Castillo MD   ferrous sulfate 325 mg Oral BID Katharina Castillo MD   fish oil 1,000 mg Oral HS Katharina Castillo MD   fluticasone 1 spray Nasal BID Katharina Castillo MD   furosemide 80 mg Oral Daily Virgil Reveles DO   insulin aspart protamine-insulin aspart 25 Units Subcutaneous BID AC Virgil Reveles DO   insulin lispro 1-6 Units Subcutaneous 4x Daily (AC & HS) Virgil Reveles DO   levalbuterol 1 25 mg Nebulization TID Barbara Mckeon MD   losartan 100 mg Oral Daily Virgil Reveles DO   magnesium oxide 400 mg Oral Daily Katharina Castillo MD   nebivolol 20 mg Oral BID Katharina Castillo MD   potassium chloride 20 mEq Oral Daily Katharina Castillo MD   sodium chloride 3 mL Nebulization TID Barbara Mckeon MD   warfarin 5 mg Oral Daily (warfarin) MD Shirin Robles DO  [facility] Internal Medicine  Hospitalist    ** Please Note: This note has been constructed using a voice recognition system   **

## 2020-07-11 NOTE — DISCHARGE SUMMARY
Discharge- Margy Butcher 1965, 60 y o  male MRN: [id number]  Unit/Bed#: -01 Encounter: [id number]  Primary Care Provider: Zahida Stevenson MD   Date and time admitted to hospital: 7/9/2020  9:07 AM        Admitting Provider:  Lance Sawyer MD  Discharge Provider:  Toshia Penn DO  Admission Date: 7/9/2020       Discharge Date: 07/11/20   LOS: 2  Primary Care Physician at Discharge: Zahida Stevenson, [phone] COURSE:  Margy Butcher is a 60 y o  male who presented hospital with abnormal blood work  The patient was called by his PCP and found to have hyperglycemia  He was recently started on metformin a continued to have blurry vision with increased urination  In the emergency department he was started on insulin infusion and admitted to medical floor  He had tele medicine consultation with endocrinology which anti islet cell antibodies are pending at time of this note  He was transitioned to 70/30 insulin with stability and will be discharged with instructions to take 20 units b i d  until seen by PCP  He is advised to keep a log of accucheks  The case was discussed with spouse on telephone during hospitalization and at time of discharge  All questions answered at this time  Please see problem list listed below  DISCHARGE DIAGNOSES  * Hyperglycemia due to type 2 diabetes mellitus [facility])  Assessment & Plan  Lab Results   Component Value Date    HGBA1C 12 8 (H) 07/10/2020     Results from last 7 days   Lab Units 07/11/20  1556 07/11/20  1104 07/11/20  0750 07/10/20  2043 07/10/20  1620 07/10/20  1154 07/10/20  0811 07/10/20  0611 07/10/20  0402 07/10/20  0201 07/10/20  0001 07/09/20  2205   POC GLUCOSE mg/dl 201* 314* 224* 233* 389* 310* 189* 182* 179* 158* 154* 159*     · Diabetes mellitus with hyperglycemia  Prior to admission was started on metformin monotherapy  · No ketosis  Seen by endocrinology    Was on insulin infusion and has been discontinued and started 70/30 mix insulin 28 units b i d  with sliding scale  · will be discharged with 28 units b i d  and should follow up with PCP    Renal cyst  Assessment & Plan  · Incidental finding of renal cyst on imaging    History of DVT (deep vein thrombosis)  Assessment & Plan  · History of DVT on warfarin  · Patient states that hematologist planning on discontinuing warfarin if D-dimer within limits as DVT was two years ago    Results from last 7 days   Lab Units 07/11/20  0605 07/10/20  0551   INR  2 02* 2 39*       TRUDI (acute kidney injury) ([facility] [address] )  Assessment & Plan  · TRUDI on CKD 3 secondary to hyperglycemia  · Resolved with IV fluids and restart furosemide this morning    Results from last 7 days   Lab Units 07/11/20  0605 07/10/20  0551 07/09/20  1406 07/09/20  1001   BUN mg/dL 13 18 24 27*   CREATININE mg/dL 1 30 1 39* 1 71* 2 03*       Chronic diastolic CHF (congestive heart failure) ([facility])  Assessment & Plan  Wt Readings from Last 3 Encounters:   07/11/20 104 kg (228 lb 6 3 oz)   07/03/20 105 kg (231 lb 14 8 oz)   06/16/20 110 kg (242 lb 3 2 oz)     · Chronic diastolic CHF which furosemide was on hold secondary to kidney injury at time of admission  · Restarted furosemide on day of discharge    COPD (chronic obstructive pulmonary disease) ([facility])  Assessment & Plan  · COPD without exacerbation on anoro prior to admission    Essential hypertension  Assessment & Plan  · Essential hypertension continue nebivolol doxazosin and edarbi after discharge as previously taken without change    CONSULTING PROVIDERS   IP CONSULT TO ENDOCRINOLOGY      RADIOLOGY RESULTS  Us Kidney And Bladder  Result Date: 7/9/2020  Impression: Unremarkable ultrasound of the kidneys and bladder  Workstation performed: [id number]     Ct Abdomen Pelvis With Contrast  Result Date: 7/9/2020  Impression: Interval resolution of the left inguinal abscess and left mid abdominal intraperitoneal fluid collection   No acute inflammatory changes within the abdomen or pelvis  Hepatic steatosis  Chronic hypodense lesions of the spleen and kidneys as described above  Workstation performed: [id number]       LABS  Results from last 7 days   Lab Units 07/11/20  0605 07/10/20  0551 07/09/20  1001   WBC Thousand/uL 7 97 8 21 7 50   HEMOGLOBIN g/dL 11 5* 12 0 13 2   HEMATOCRIT % 35 4* 37 2 40 2   MCV fL 85 85 83   PLATELETS Thousands/uL 307 335 376   INR  2 02* 2 39*  --      Results from last 7 days   Lab Units 07/11/20  0605 07/10/20  0551 07/09/20  1406 07/09/20  1001   SODIUM mmol/L 142 141 137 128*   POTASSIUM mmol/L 3 3* 4 0 3 3* 4 4   CHLORIDE mmol/L 107 107 102 91*   CO2 mmol/L 26 26 27 26   BUN mg/dL 13 18 24 27*   CREATININE mg/dL 1 30 1 39* 1 71* 2 03*   CALCIUM mg/dL 8 7 8 5 8 7 9 3   ALBUMIN g/dL 2 9*  --   --  3 7   TOTAL BILIRUBIN mg/dL 0 60  --   --  0 80   ALK PHOS U/L 89  --   --  188*   ALT U/L 28  --   --  29   AST U/L 22  --   --  18   EGFR ml/min/1 73sq m 70 65 50 41   GLUCOSE RANDOM mg/dL 183* 203* 297* 677*     Results from last 7 days   Lab Units 07/09/20  1001   TROPONIN I ng/mL <0 02              Results from last 7 days   Lab Units 07/11/20  1556 07/11/20  1104 07/11/20  0750 07/10/20  2043 07/10/20  1620 07/10/20  1154 07/10/20  0811 07/10/20  0611 07/10/20  0402 07/10/20  0201   POC GLUCOSE mg/dl 201* 314* 224* 233* 389* 310* 189* 182* 179* 158*     Results from last 7 days   Lab Units 07/10/20  0551   HEMOGLOBIN A1C % 12 8*     Results from last 7 days   Lab Units 07/09/20  1406   TSH 3RD GENERATON uIU/mL 1 096               Cultures:   Results from last 7 days   Lab Units 07/09/20  1100   COLOR UA  Yellow   CLARITY UA  Clear   SPEC GRAV UA  <=1 005   PH UA  5 5   LEUKOCYTES UA  Elevated glucose may cause decreased leukocyte values   See urine microscopic for [facility] result/*   NITRITE UA  Negative   GLUCOSE UA mg/dl >=1000 (1%)*   KETONES UA mg/dl Trace*   BILIRUBIN UA  Negative   BLOOD UA  Trace-Intact*      Results from last 7 days   Lab Units 07/09/20  1100   RBC UA /hpf None Seen   WBC UA /hpf 0-1*   EPITHELIAL CELLS WET PREP /hpf None Seen   BACTERIA UA /hpf None Seen      Results from last 7 days   Lab Units 07/09/20  1407   URINE CULTURE  1039-2755 cfu/ml        PHYSICAL EXAM:  Please see physical exam found on progress note dated from earlier today    Planned Re-admission:  No  Discharge Disposition:  Home with [facility]    Test Results Pending at Discharge:    Order Current Status    Anti-islet cell antibody In process    Glutamic acid decarboxylase In process      Incidental findings:  Renal and splenic cyst   Did have ultrasound  Medications   · Discharge Medication List: See after visit summary for reconciled discharge medications  Diet restrictions:  Diabetic diet   Activity restrictions: No strenuous activity  Discharge Condition: stable    Outpatient Follow-Up and Discharge Instructions  See after visit summary section titled Discharge Instructions for information provided to patient and family  Code Status: Level 1 - Full Code  Discharge Statement   I spent 35 minutes discharging the patient  This time was spent on the day of discharge  Greater than 50% of total time was spent with the patient and / or family counseling and / or coordination of care  ** Please Note: This note has been constructed using a voice recognition system   **

## 2020-07-11 NOTE — ASSESSMENT & PLAN NOTE
· History of DVT on warfarin  · Patient states that hematologist planning on discontinuing warfarin if D-dimer within limits as DVT was two years ago    Results from last 7 days   Lab Units 07/11/20  0605 07/10/20  0551   INR  2 02* 2 39*

## 2020-07-13 LAB
GAD65 AB SER-ACNC: <5 U/ML (ref 0–5)
PANC ISLET CELL AB TITR SER: NEGATIVE {TITER}

## 2020-07-13 NOTE — UTILIZATION REVIEW
Notification of Discharge  This is a Notification of Discharge from our facility [address]  Please be advised that this patient has been discharge from our facility  Below you will find the admission and discharge date and time including the patients disposition  PRESENTATION DATE: 7/9/2020  9:07 AM  OBS ADMISSION DATE:   IP ADMISSION DATE: 7/9/20 1154   DISCHARGE DATE: 7/11/2020  6:32 PM  [facility]   Admission Orders listed below:  Admission Orders (From admission, onward)     Ordered        07/09/20 1154  Inpatient Admission  Once                   Please contact the UR Department if additional information is required to close this patient's authorization/case  [address] [facility] Utilization Review Department  Main: [phone] x carefully listen to the prompts  All voicemails are confidential   [email or web address]  Send all requests for admission clinical reviews, approved or denied determinations and any other requests to dedicated fax number below belonging to the campus where the patient is receiving treatment   List of dedicated fax numbers:  [address] DENIALS (Administrative/Medical Necessity) [phone]   [address] (Maternity/NICU/Pediatrics) [phone]   [place] [phone]   [place] [phone]   [address] [place] [address] [phone]   [address] [facility] [address] [place] [address] [phone]   [place] [phone]   [address] [facility]  [address] [facility] [address] [facility] [phone]

## 2020-07-21 ENCOUNTER — OFFICE VISIT (OUTPATIENT)
Dept: BARIATRICS | Facility: CLINIC | Age: 55
End: 2020-07-21

## 2020-07-21 VITALS — HEIGHT: 67 IN | WEIGHT: 231 LBS | BODY MASS INDEX: 36.26 KG/M2

## 2020-07-21 VITALS — BODY MASS INDEX: 35.75 KG/M2 | TEMPERATURE: 97.6 F | WEIGHT: 231 LBS

## 2020-07-21 DIAGNOSIS — Z98.84 BARIATRIC SURGERY STATUS: ICD-10-CM

## 2020-07-21 DIAGNOSIS — E11.69 DIABETES MELLITUS TYPE 2 IN OBESE (HCC): ICD-10-CM

## 2020-07-21 DIAGNOSIS — E66.9 DIABETES MELLITUS TYPE 2 IN OBESE (HCC): ICD-10-CM

## 2020-07-21 DIAGNOSIS — Z20.828 EXPOSURE TO SARS-ASSOCIATED CORONAVIRUS: ICD-10-CM

## 2020-07-21 DIAGNOSIS — E66.9 OBESITY, CLASS II, BMI 35-39.9: Primary | ICD-10-CM

## 2020-07-21 PROCEDURE — RECHECK

## 2020-07-21 PROCEDURE — U0003 INFECTIOUS AGENT DETECTION BY NUCLEIC ACID (DNA OR RNA); SEVERE ACUTE RESPIRATORY SYNDROME CORONAVIRUS 2 (SARS-COV-2) (CORONAVIRUS DISEASE [COVID-19]), AMPLIFIED PROBE TECHNIQUE, MAKING USE OF HIGH THROUGHPUT TECHNOLOGIES AS DESCRIBED BY CMS-2020-01-R: HCPCS

## 2020-07-21 NOTE — PROGRESS NOTES
Bariatric Nutrition Assessment Note    Type of surgery  Pre-op program: Pt requires 0 weight checks 2/2 insurance requirements  Surgery Date: TBD  Surgeon: Dr Sadie Lopez  60 y o   male    There were no vitals filed for this visit  Weight (last 2 days)     Date/Time   Weight    07/21/20 1001   105 (231)            Height: 67 4 inches  Body mass index is 35 75 kg/m²  Weight in (lb) to have BMI = 25: 161 5 lbs  Pre-Op Excess Wt: 69 5 lbs  Pt qualifies for surgery at BMI of 35  At BMI of 35 and 67 4 inches, minimum qualifying weight at BMI 35 = 226 2 lbs  (4 8 difference from today's weight)  Pt is aware that his final pre-operative weight goal is based on surgeon's individual recommendations  Pt is aware that he cannot gain weight in the pre-operative process  Estimated needs via [address] Equation based on pt's current height, weight, age, and sex:  BMR: 1893 kcal/day  Estimated calorie needs for weight maintenance = 2271 kcal per day (sedentary)  Estimated calorie needs for weight loss = 5424-3042 kcal per day (1-2 lb  wt loss per week - sedentary)  Estimated protein needs = 73-88 grams per day (1 0-1 2 grams/kg IBW)    Review of History and Medications   Past Medical History:   Diagnosis Date    Asthma     COPD (chronic obstructive pulmonary disease) ([facility] )     Diabetes mellitus ([facility] )     Emphysema of lung ([facility] )     Gout     History of transfusion     pt stated they had a transfusion when they had gallbladder surgery   Hypertension     Kidney disease     renal failure    Leg DVT (deep venous thromboembolism), acute, bilateral ([facility] ) 01/2018    Obesity (BMI 30-39  9)     AGUS on CPAP     setting 11    Sleep apnea     Systolic CHF [facility])      Past Surgical History:   Procedure Laterality Date    ADRENALECTOMY      CHOLECYSTECTOMY      INCISION AND DRAINAGE OF WOUND Left 10/17/2018    Procedure: INCISION AND DRAINAGE (I&D) GROIN;  Surgeon: Stefania Tao MD;  Location: [facility];  Service: General    IR [address] / DRAINAGE W TUBE  8/17/2018    IR IMAGE GUIDED ASPIRATION / DRAINAGE W TUBE  7/31/2018    JOINT REPLACEMENT Bilateral     knee    KIDNEY SURGERY  2009    nodule removal    LYMPH NODE DISSECTION Left 01/2018    left inguinal LN removed - benign     OTHER SURGICAL HISTORY      kidney nodule removal    PALATE / UVULA BIOPSY / EXCISION      SINUS SURGERY       Social History     Socioeconomic History    Marital status: /Civil Union     Spouse name: Not on file    Number of children: Not on file    Years of education: Not on file    Highest education level: Not on file   Occupational History    Not on file   Social Needs    Financial resource strain: Not on file    Food insecurity:     Worry: Not on file     Inability: Not on file    Transportation needs:     Medical: Not on file     Non-medical: Not on file   Tobacco Use    Smoking status: Never Smoker    Smokeless tobacco: Never Used   Substance and Sexual Activity    Alcohol use: Yes     Comment: socially    Drug use: No    Sexual activity: Never   Lifestyle    Physical activity:     Days per week: Not on file     Minutes per session: Not on file    Stress: Not on file   Relationships    Social connections:     Talks on phone: Not on file     Gets together: Not on file     Attends [employer] service: Not on file     Active member of club or organization: Not on file     Attends meetings of clubs or organizations: Not on file     Relationship status: Not on file    Intimate partner violence:     Fear of current or ex partner: Not on file     Emotionally abused: Not on file     Physically abused: Not on file     Forced sexual activity: Not on file   Other Topics Concern    Not on file   Social History Narrative    Not on file       Current Outpatient Medications:     aspirin (PX ENTERIC ASPIRIN) 325 mg EC tablet, Take 325 mg by mouth daily At night , Disp: , Rfl:     atorvastatin (LIPITOR) 40 mg tablet, Take 40 mg by mouth daily at bedtime, Disp: , Rfl:     azilsartan medoxomil (EDARBI) 80 MG tablet, Take 80 mg by mouth daily , Disp: , Rfl:     Cholecalciferol (VITAMIN D-3) 1000 units CAPS, Take 1,000 Units by mouth daily, Disp: , Rfl:     doxazosin (CARDURA) 4 mg tablet, Take 4 mg by mouth daily at bedtime, Disp: , Rfl:     eplerenone (INSPRA) 50 MG tablet, Take 50 mg by mouth daily, Disp: , Rfl:     escitalopram (LEXAPRO) 10 mg tablet, Take 10 mg by mouth daily, Disp: , Rfl:     ferrous sulfate 325 (65 Fe) mg tablet, Take 325 mg by mouth 2 (two) times a day  , Disp: , Rfl:     furosemide (LASIX) 80 mg tablet, Take 80 mg by mouth daily, Disp: , Rfl:     glucose monitoring kit (FREESTYLE) monitoring kit, 1 each by Does not apply route 2 (two) times a day Any brand covered by insurance:  Dispense one glucometer, test strips #50, lancets #100, Disp: 1 each, Rfl: 0    Glycopyrrolate-Formoterol (BEVESPI AEROSPHERE) 9-4 8 MCG/ACT AERO, Inhale 2 puffs daily after breakfast, Disp: , Rfl:     insulin aspart protamine-insulin aspart (NovoLOG Mix 70/30 FlexPen) 100 Units/mL injection pen, Inject 28 Units under the skin 2 (two) times a day before meals, Disp: 5 pen, Rfl: 0    Insulin Pen Needle (PEN NEEDLES) 31G X 8 MM MISC, by Does not apply route 2 (two) times a day before meals (any size/brand covered by insurance), Disp: 100 each, Rfl: 0    MAGNESIUM PO, Take 400 mg by mouth daily, Disp: , Rfl:     metFORMIN (GLUCOPHAGE) 500 mg tablet, Take 1 tablet (500 mg total) by mouth 2 (two) times a day with meals for 14 days, Disp: 28 tablet, Rfl: 0    Milk Thistle 500 MG CAPS, Take 500 mg by mouth 2 (two) times a day, Disp: , Rfl:     multivitamin-iron-minerals-folic acid (CENTRUM) chewable tablet, Chew 1 tablet daily, Disp: , Rfl:     nebivolol (BYSTOLIC) 20 MG tablet, Take 20 mg by mouth 2 (two) times a day, Disp: , Rfl:     Omega-3 Fatty Acids (FISH OIL) 1200 MG CAPS, Take 1,200 mg by mouth daily at bedtime, Disp: , Rfl:     potassium chloride (KLOR-CON M20) 20 mEq tablet, Take 1 tablet (20 mEq total) by mouth daily Restart with your lasix next monday, Disp: 1 tablet, Rfl: 0    umeclidinium-vilanterol (Anoro Ellipta) 62 5-25 MCG/INH inhaler, Inhale 1 puff daily, Disp: , Rfl:     warfarin (COUMADIN) 5 mg tablet, TAKE 1 TO 2 TABLETS BY MOUTH ONCE DAILY IN THE EVENING AS DIRECTED BY COUMADIN CLINIC, Disp: , Rfl: 5    Food Intake and Lifestyle Assessment:   Food Intake Assessment completed via usual diet recall:  Breakfast:  7-10am oatmeal w/ blue berries w/ almond milk  Sometimes coffee w/ sf creamer  Snack: none   Lunch: chicken salad with fruit (7 cherries)  Snack: none  Dinner: pork chops/protein with starch (cut back on the amount of the carb since dx with DM) and veggies  Yesterday was "bad" fries and whopper  Snack: apple    Beverage intake: water, no more juice, using crystal light, and coffee/tea (maybe one per day)  Estimated fluid intake per day: 3-4 bottles of water per day + 1-2 glasses of crystal light  Protein supplement: none  Estimated protein intake per day: 50-70 grams  Meals eaten away from home: 2 times per month - [facility] [employer] takeout or pizza or [employer]  Typical meal pattern: 3 meals per day and 0-1 snacks per day  Eating Behaviors: Consumption of high calorie/ high fat foods, Consumption of high calorie beverages and Large portion sizes  Cultural or Mosque considerations: n/a  Food allergies or intolerances: none per pt    Allergies   Allergen Reactions    Clonidine Anaphylaxis    Lisinopril Anaphylaxis    Nifedipine Anaphylaxis    Spironolactone Anaphylaxis, Other (See Comments) and Shortness Of Breath    Buspirone      Headaches,     Enoxaparin     Minoxidil      Retains fluid around heart     Physical Assessment  Physical Activity  Types of exercise: Walking 1-3 miles per day with dog, but more daily activity    Current physical limitations: pt reports bilateral knee surgery in 2011, which pt reports limits his movement at times    Psychosocial Assessment   Support systems: relative(s)  Socioeconomic factors: n/a    Nutrition Diagnosis  Diagnosis: Overweight / Obesity (NC-3 3)  Related to: Physical inactivity and Excessive energy intake  As Evidenced by: BMI >25     Nutrition Prescription: Recommend the following diet  Low sugar, High protein, Regular     Interventions and Teaching   Discussed pre-op and post-op nutrition guidelines  Patient educated and handouts provided  Capacity of post-surgery stomach - about 1/4 of a cup of food/fluid at a time immediately post-op  Adequate hydration  Sugar and fat restriction to decrease "dumping syndrome"  Weight loss plateaus / possibility of weight regain  Exercise  Nutrition considerations after surgery  Protein supplements  Meal planning and preparation  Appropriate carbohydrate, protein, and fat intake, and food/fluid choices to maximize safe weight loss, nutrient intake, and tolerance   Dietary and lifestyle changes  Possible problems with poor eating habits  Intuitive eating  Techniques for self monitoring and keeping daily food journal    Education provided to: patient   Barriers to learning: No barriers identified  Readiness to change: preparation  Prior research on procedure: Internet, provider  Comprehension: verbalizes understanding   Expected Compliance: very good      Evaluation / Monitoring  Dietitian to Monitor: Eating pattern as discussed, Tolerance of nutrition prescription, Body weight, Physical activity    Workflow:  Cardio:  Still needs to make appt  PCP:   EGD: scheduled for 7/24   Labs:  Orders entered  Pt will likely get done the morning of the EGD  Psych:  Needs psych and D&A clearance  Sleep:  Has AGUS-wears CPAP  Support group: completed 6/2    Pt recently dx with diabetes and started on insulin and metformin  Noted A1c 12 8 on July 10th  Advised pt need A1c below 9 0 for surgery  Briefly reviewed carbohydrates and diabetes  Advised to always have a protein when he has a carb, never to have a carbohydrate alone, and to monitor the portion size limiting to 1/2 cup at a meal  Pt going to a DM center soon for further education on the diabetic diet  Since dx pt has lost 10lbs "because afraid to eat much"  Pt is aware he can't go below a BMI of 35 (226 2lbs) to remain qualified for surgery  Did some menu planning with pt working on adding a protein to every meal and provided with a protein snack list if needed  Reviewed workflow as above  Goals:  Have protein with each meal and snack  Can incorporate a protein shake (premier or ensure max protein) as needed  Choose a protein snack from list provided       Time Spent:   30 mins

## 2020-07-21 NOTE — PROGRESS NOTES
Patient presented to office to report efforts to balance his weight loss with keeping his blood sugars controlled  Patient has a new diagnosis of diabetes, he lost a significant amount of weight when first diagnosed because he had no appetite  Lately he has been working on controlling his blood sugars while keeping his weight within qualifying range for surgery  He has been watching portions, decreasing carbohydrates and focusing on healthy snacks  He has engaged in some emotional eating but he mostly focuses on healthy snacking with the focus to control his diabetes  He was encouraged to incorporate activity as tolerated since he has been feeling pretty tired  He is getting enough rest with his CPAP machine  Patient denies any mood disruption, he is talking with his psychiatrist about having psych and D&A evaluations completed, consent form signed  Patient will let SW know when appointment is so documents can be sent over

## 2020-07-22 LAB
INPATIENT: NORMAL
SARS-COV-2 RNA SPEC QL NAA+PROBE: NOT DETECTED

## 2020-07-24 ENCOUNTER — APPOINTMENT (OUTPATIENT)
Dept: LAB | Facility: HOSPITAL | Age: 55
End: 2020-07-24
Attending: SURGERY
Payer: COMMERCIAL

## 2020-07-24 ENCOUNTER — ANESTHESIA EVENT (OUTPATIENT)
Dept: GASTROENTEROLOGY | Facility: HOSPITAL | Age: 55
End: 2020-07-24

## 2020-07-24 ENCOUNTER — ANESTHESIA (OUTPATIENT)
Dept: GASTROENTEROLOGY | Facility: HOSPITAL | Age: 55
End: 2020-07-24

## 2020-07-24 ENCOUNTER — HOSPITAL ENCOUNTER (OUTPATIENT)
Dept: GASTROENTEROLOGY | Facility: HOSPITAL | Age: 55
Setting detail: OUTPATIENT SURGERY
Discharge: HOME | End: 2020-07-24
Attending: SURGERY
Payer: COMMERCIAL

## 2020-07-24 VITALS
SYSTOLIC BLOOD PRESSURE: 154 MMHG | TEMPERATURE: 97.6 F | BODY MASS INDEX: 35.38 KG/M2 | RESPIRATION RATE: 20 BRPM | HEART RATE: 61 BPM | WEIGHT: 233.47 LBS | DIASTOLIC BLOOD PRESSURE: 89 MMHG | OXYGEN SATURATION: 97 % | HEIGHT: 68 IN

## 2020-07-24 DIAGNOSIS — E66.9 DIABETES MELLITUS TYPE 2 IN OBESE (HCC): ICD-10-CM

## 2020-07-24 DIAGNOSIS — E11.69 DIABETES MELLITUS TYPE 2 IN OBESE (HCC): ICD-10-CM

## 2020-07-24 DIAGNOSIS — E11.69 DIABETES MELLITUS TYPE 2 IN OBESE (HCC): Primary | ICD-10-CM

## 2020-07-24 DIAGNOSIS — E66.01 MORBID OBESITY (HCC): ICD-10-CM

## 2020-07-24 DIAGNOSIS — E66.9 DIABETES MELLITUS TYPE 2 IN OBESE (HCC): Primary | ICD-10-CM

## 2020-07-24 DIAGNOSIS — Z98.84 BARIATRIC SURGERY STATUS: ICD-10-CM

## 2020-07-24 DIAGNOSIS — E66.01 MORBID (SEVERE) OBESITY DUE TO EXCESS CALORIES (HCC): ICD-10-CM

## 2020-07-24 DIAGNOSIS — R53.81 MALAISE AND FATIGUE: ICD-10-CM

## 2020-07-24 DIAGNOSIS — E66.9 OBESITY, CLASS II, BMI 35-39.9: ICD-10-CM

## 2020-07-24 DIAGNOSIS — R53.83 MALAISE AND FATIGUE: ICD-10-CM

## 2020-07-24 LAB
ALBUMIN SERPL BCP-MCNC: 3.6 G/DL (ref 3.5–5)
ALP SERPL-CCNC: 105 U/L (ref 46–116)
ALT SERPL W P-5'-P-CCNC: 35 U/L (ref 12–78)
ANION GAP SERPL CALCULATED.3IONS-SCNC: 6 MMOL/L (ref 4–13)
AST SERPL W P-5'-P-CCNC: 19 U/L (ref 5–45)
BILIRUB SERPL-MCNC: 0.8 MG/DL (ref 0.2–1)
BUN SERPL-MCNC: 22 MG/DL (ref 5–25)
CALCIUM SERPL-MCNC: 9 MG/DL (ref 8.3–10.1)
CHLORIDE SERPL-SCNC: 103 MMOL/L (ref 100–108)
CHOLEST SERPL-MCNC: 126 MG/DL (ref 50–200)
CO2 SERPL-SCNC: 29 MMOL/L (ref 21–32)
CREAT SERPL-MCNC: 1.5 MG/DL (ref 0.6–1.3)
ERYTHROCYTE [DISTWIDTH] IN BLOOD BY AUTOMATED COUNT: 14.6 % (ref 11.6–15.1)
EST. AVERAGE GLUCOSE BLD GHB EST-MCNC: 298 MG/DL
GFR SERPL CREATININE-BSD FRML MDRD: 59 ML/MIN/1.73SQ M
GLUCOSE P FAST SERPL-MCNC: 86 MG/DL (ref 65–99)
GLUCOSE SERPL-MCNC: 85 MG/DL (ref 65–140)
HBA1C MFR BLD: 12 %
HCT VFR BLD AUTO: 38.9 % (ref 36.5–49.3)
HDLC SERPL-MCNC: 37 MG/DL
HGB BLD-MCNC: 12 G/DL (ref 12–17)
LDLC SERPL CALC-MCNC: 66 MG/DL (ref 0–100)
MCH RBC QN AUTO: 27 PG (ref 26.8–34.3)
MCHC RBC AUTO-ENTMCNC: 30.8 G/DL (ref 31.4–37.4)
MCV RBC AUTO: 88 FL (ref 82–98)
NONHDLC SERPL-MCNC: 89 MG/DL
PLATELET # BLD AUTO: 327 THOUSANDS/UL (ref 149–390)
PMV BLD AUTO: 9.3 FL (ref 8.9–12.7)
POTASSIUM SERPL-SCNC: 4 MMOL/L (ref 3.5–5.3)
PROT SERPL-MCNC: 7.6 G/DL (ref 6.4–8.2)
RBC # BLD AUTO: 4.44 MILLION/UL (ref 3.88–5.62)
SODIUM SERPL-SCNC: 138 MMOL/L (ref 136–145)
TRIGL SERPL-MCNC: 113 MG/DL
TSH SERPL DL<=0.05 MIU/L-ACNC: 3.09 UIU/ML (ref 0.36–3.74)
WBC # BLD AUTO: 9.4 THOUSAND/UL (ref 4.31–10.16)

## 2020-07-24 PROCEDURE — 43239 EGD BIOPSY SINGLE/MULTIPLE: CPT | Performed by: SURGERY

## 2020-07-24 PROCEDURE — 85027 COMPLETE CBC AUTOMATED: CPT

## 2020-07-24 PROCEDURE — 80053 COMPREHEN METABOLIC PANEL: CPT

## 2020-07-24 PROCEDURE — 84443 ASSAY THYROID STIM HORMONE: CPT

## 2020-07-24 PROCEDURE — 80061 LIPID PANEL: CPT

## 2020-07-24 PROCEDURE — 88305 TISSUE EXAM BY PATHOLOGIST: CPT | Performed by: PATHOLOGY

## 2020-07-24 PROCEDURE — 36415 COLL VENOUS BLD VENIPUNCTURE: CPT

## 2020-07-24 PROCEDURE — 83036 HEMOGLOBIN GLYCOSYLATED A1C: CPT

## 2020-07-24 PROCEDURE — 82948 REAGENT STRIP/BLOOD GLUCOSE: CPT

## 2020-07-24 RX ORDER — KETAMINE HYDROCHLORIDE 50 MG/ML
INJECTION, SOLUTION, CONCENTRATE INTRAMUSCULAR; INTRAVENOUS AS NEEDED
Status: DISCONTINUED | OUTPATIENT
Start: 2020-07-24 | End: 2020-07-24 | Stop reason: SURG

## 2020-07-24 RX ORDER — PROPOFOL 10 MG/ML
INJECTION, EMULSION INTRAVENOUS AS NEEDED
Status: DISCONTINUED | OUTPATIENT
Start: 2020-07-24 | End: 2020-07-24 | Stop reason: SURG

## 2020-07-24 RX ORDER — LIDOCAINE HYDROCHLORIDE 10 MG/ML
INJECTION, SOLUTION EPIDURAL; INFILTRATION; INTRACAUDAL; PERINEURAL AS NEEDED
Status: DISCONTINUED | OUTPATIENT
Start: 2020-07-24 | End: 2020-07-24 | Stop reason: SURG

## 2020-07-24 RX ORDER — LIDOCAINE HYDROCHLORIDE 10 MG/ML
0.5 INJECTION, SOLUTION EPIDURAL; INFILTRATION; INTRACAUDAL; PERINEURAL ONCE AS NEEDED
Status: DISCONTINUED | OUTPATIENT
Start: 2020-07-24 | End: 2020-07-28 | Stop reason: HOSPADM

## 2020-07-24 RX ORDER — SODIUM CHLORIDE, SODIUM LACTATE, POTASSIUM CHLORIDE, CALCIUM CHLORIDE 600; 310; 30; 20 MG/100ML; MG/100ML; MG/100ML; MG/100ML
50 INJECTION, SOLUTION INTRAVENOUS CONTINUOUS
Status: DISCONTINUED | OUTPATIENT
Start: 2020-07-24 | End: 2020-07-28 | Stop reason: HOSPADM

## 2020-07-24 RX ADMIN — PROPOFOL 50 MG: 10 INJECTION, EMULSION INTRAVENOUS at 07:34

## 2020-07-24 RX ADMIN — PROPOFOL 20 MG: 10 INJECTION, EMULSION INTRAVENOUS at 07:35

## 2020-07-24 RX ADMIN — SODIUM CHLORIDE, SODIUM LACTATE, POTASSIUM CHLORIDE, AND CALCIUM CHLORIDE: .6; .31; .03; .02 INJECTION, SOLUTION INTRAVENOUS at 07:24

## 2020-07-24 RX ADMIN — PROPOFOL 50 MG: 10 INJECTION, EMULSION INTRAVENOUS at 07:32

## 2020-07-24 RX ADMIN — PROPOFOL 100 MG: 10 INJECTION, EMULSION INTRAVENOUS at 07:29

## 2020-07-24 RX ADMIN — KETAMINE HYDROCHLORIDE 30 MG: 50 INJECTION, SOLUTION INTRAMUSCULAR; INTRAVENOUS at 07:28

## 2020-07-24 RX ADMIN — LIDOCAINE HYDROCHLORIDE 50 MG: 10 INJECTION, SOLUTION EPIDURAL; INFILTRATION; INTRACAUDAL; PERINEURAL at 07:28

## 2020-07-24 NOTE — DISCHARGE INSTRUCTIONS
Upper Endoscopy   WHAT YOU NEED TO KNOW:   An upper endoscopy is also called an upper gastrointestinal (GI) endoscopy, or an esophagogastroduodenoscopy (EGD)  You may feel bloated, gassy, or have some abdominal discomfort after your procedure  Your throat may be sore for 24 to 36 hours  You may burp or pass gas from air that is still inside your body  DISCHARGE INSTRUCTIONS:   Call 911 for any of the following:   · You have sudden chest pain or trouble breathing  Seek care immediately if:   · You feel dizzy or faint  · You have trouble swallowing  · Your bowel movements are very dark or black  · Your abdomen is hard and firm and you have severe pain  · You vomit blood  Contact your healthcare provider if:   · You feel full or bloated and cannot burp or pass gas  · You have not had a bowel movement for 3 days after your procedure  · You have neck pain  · You have a fever or chills  · You have nausea or are vomiting  · You have a rash or hives  · You have questions or concerns about your endoscopy  Relieve a sore throat:  Suck on throat lozenges or crushed ice  Gargle with a small amount of warm salt water  Mix 1 teaspoon of salt and 1 cup of warm water to make salt water  Relieve gas and discomfort from bloating:  Lie on your right side with a heating pad on your abdomen  Take short walks to help pass gas  Eat small meals until bloating is relieved  Rest after your procedure: You have been given medicine to relax you  Do not  drive or make important decisions until the day after your procedure  Return to your normal activity as directed  You can usually return to work the day after your procedure  Follow up with your healthcare provider as directed:  Write down your questions so you remember to ask them during your visits     © 2017 [address] is for End User's use only and may not be sold, redistributed or otherwise used for commercial purposes  All illustrations and images included in CareNotes® are the copyrighted property of A D A M [employer] Inc  or Everette Sanchez  The above information is an  only  It is not intended as medical advice for individual conditions or treatments  Talk to your doctor, nurse or pharmacist before following any medical regimen to see if it is safe and effective for you

## 2020-07-24 NOTE — H&P
This is a 60 y o  male with a history of morbid obesity and Body mass index is 36 03 kg/m²  Here for an EGD to evaluate the anatomy of the GI tract and to rule out the presence of H  pylori  Physical Exam    /87   Pulse 64   Temp (!) 96 9 °F (36 1 °C) (Temporal)   Resp 20   Ht 5' 7 5" (1 715 m)   Wt 106 kg (233 lb 7 5 oz)   SpO2 99%   BMI 36 03 kg/m²    AAOx3  RRR  CTA B  Abdomen obese  Benign  A/P:    This is a 60 y o  male with a history of morbid obesity and Body mass index is 36 03 kg/m²       Will proceed with the EGD and biopsies        Stefano Sanders MD  7/24/2020  7:22 AM

## 2020-07-24 NOTE — ANESTHESIA POSTPROCEDURE EVALUATION
Post-Op Assessment Note    CV Status:  Stable  Pain Score: 0    Pain management: adequate     Mental Status:  Sleepy and awake   Hydration Status:  Stable   PONV Controlled:  None   Airway Patency:  Patent and adequate   Post Op Vitals Reviewed: Yes      Staff: CRNA   Comments: 6LPM/Mask          BP   137/74   Temp  97 4   Pulse  67   Resp   20   SpO2   98

## 2020-07-24 NOTE — ANESTHESIA PREPROCEDURE EVALUATION
Review of Systems/Medical History  Patient summary reviewed  Chart reviewed  No history of anesthetic complications     Cardiovascular  EKG reviewed, Exercise tolerance (METS): >4,  Hypertension , Valvular heart disease (mild) , aortic valve stenosis, CHF (diastolic) , DVT   Pulmonary  COPD moderate- medication dependent , Sleep apnea CPAP,        GI/Hepatic  Negative GI/hepatic ROS     Comment: Confirmed NPO appropriate       Negative  ROS Chronic kidney disease ,        Endo/Other  Negative endo/other ROS Diabetes (7/10/20 HgbA1c 12 8%) poorly controlled type 2 ,   Obesity (BMI 35 8)    GYN  Negative gynecology ROS          Hematology  Anemia ,     Musculoskeletal  Gout,        Neurology  Negative neurology ROS      Psychology   Negative psychology ROS              Physical Exam    Airway    Mallampati score: II  TM Distance: >3 FB  Neck ROM: full     Dental   No notable dental hx     Cardiovascular      Pulmonary      Other Findings        5/22/18 TTE:  LEFT VENTRICLE:  Systolic function was normal  Ejection fraction was estimated in the range of 55 % to 65 %  There were no regional wall motion abnormalities  Wall thickness was increased  There was mild concentric hypertrophy      LEFT ATRIUM:  The atrium was dilated      RIGHT ATRIUM:  The atrium was dilated      MITRAL VALVE:  There was mild systolic anterior motion of the anterior leaflet  There was mild regurgitation      AORTIC VALVE:  Transaortic velocity was increased due to valvular stenosis      TRICUSPID VALVE:  There was mild regurgitation      PULMONIC VALVE:  There was mild regurgitation      PERICARDIUM:  A small pericardial effusion was identified posterior to the heart  Anesthesia Plan  ASA Score- 3     Anesthesia Type- IV sedation with anesthesia with ASA Monitors     Additional Monitors:   Airway Plan:     Comment: I discussed the risks and benefits of IV sedation anesthesia including the possibility of the need to convert to general anesthesia and the potential risk of awareness    Plan Factors-    Induction- intravenous  Postoperative Plan-     Informed Consent- Anesthetic plan and risks discussed with patient

## 2020-07-29 NOTE — ED NOTES
Patient assessed by student nurse, reviewed by primary RN     Willy David RN  07/09/20 9082 PT seen and examined. rbus sono images visualized demonstrating no hydro, enlarged prostate ~80g, pvr negligible, no stones.  he has UTI and sepsis however no anatomical abnormalities. no surgical intervention required. recommend optimize dm control and fu w gu as outpatient.  flomax 86 yo M HTN, DM gout admitted with Kleb bacteremia, suspect acute prostatitis  US enlarged prostate  CTAP unremarkable   UA + and UCX Kleb  PHILLIP    - Continue ceftriaxone  - On d/c renally dosed Cipro x 30 days  - Check PSA  - Urology f/u

## 2020-08-17 ENCOUNTER — TELEPHONE (OUTPATIENT)
Dept: BARIATRICS | Facility: CLINIC | Age: 55
End: 2020-08-17

## 2020-08-17 NOTE — TELEPHONE ENCOUNTER
COVID Pre-Visit Screening     1  Is this a family member screening? NO:[id number]}  2  Have you traveled outside of your state in the past 2 weeks? [place]:[id number]}  3  Do you presently have a fever or flu-like symptoms? NO:[id number]}  4  Do you have symptoms of an upper respiratory infection like runny nose, sore throat, or cough? NO:07324}  5  Are you suffering from new headache that you have not had in the past?  NO:39487}  6  Do you have/have you experienced any new shortness of breath recently? NO:00215}  7  Do you have any new diarrhea, nausea or vomiting? NO:14678}  8  Have you been in contact with anyone who has been sick or diagnosed with COVID-19? NO:87527}  9  Do you have any new loss of taste or smell? NO:63286}  10  Are you able to wear a mask without a valve for the entire visit?  YES

## 2020-08-18 ENCOUNTER — OFFICE VISIT (OUTPATIENT)
Dept: BARIATRICS | Facility: CLINIC | Age: 55
End: 2020-08-18

## 2020-08-18 VITALS — HEIGHT: 67 IN | BODY MASS INDEX: 37.1 KG/M2 | WEIGHT: 236.4 LBS

## 2020-08-18 DIAGNOSIS — E66.9 OBESITY, CLASS II, BMI 35-39.9: Primary | ICD-10-CM

## 2020-08-18 DIAGNOSIS — Z98.84 BARIATRIC SURGERY STATUS: ICD-10-CM

## 2020-08-18 PROCEDURE — RECHECK

## 2020-08-18 NOTE — PROGRESS NOTES
Bariatric Nutrition Assessment Note    Type of surgery  Pre-op program: Pt requires 0 weight checks 2/2 insurance requirements  Surgery Date: TBD  Surgeon: Dr Desiree Katz  60 y o   male    There were no vitals filed for this visit  Weight (last 2 days)     Date/Time   Weight    08/18/20 1054   107 (236 4)            Height: 67 4 inches  Body mass index is 36 59 kg/m²  Weight in (lb) to have BMI = 25: 161 5 lbs  Pre-Op Excess Wt: 69 5 lbs  Pt qualifies for surgery at BMI of 35  At BMI of 35 and 67 4 inches, minimum qualifying weight at BMI 35 = 226 2 lbs  Estimated needs via [address] Equation based on pt's current height, weight, age, and sex:  BMR: 1893 kcal/day  Estimated calorie needs for weight maintenance = 2271 kcal per day (sedentary)  Estimated calorie needs for weight loss = 7609-9589 kcal per day (1-2 lb  wt loss per week - sedentary)  Estimated protein needs = 73-88 grams per day (1 0-1 2 grams/kg IBW)    Review of History and Medications   This morning blood sugar was 88    Past Medical History:   Diagnosis Date    Asthma     Chronic kidney disease     [facility]     Diabetes mellitus [facility]     Emphysema of lung ([facility]     Gout     History of transfusion     pt stated they had a transfusion when they had gallbladder surgery   Hypertension     Kidney disease     renal failure    Leg DVT (deep venous thromboembolism), acute, bilateral ([facility] ) 01/2018    Obesity (BMI 30-39  9)     AGUS on CPAP     setting 11    Sleep apnea     Systolic CHF [facility])      Past Surgical History:   Procedure Laterality Date    ADRENALECTOMY      CHOLECYSTECTOMY      INCISION AND DRAINAGE OF WOUND Left 10/17/2018    Procedure: INCISION AND DRAINAGE (I&D) GROIN;  Surgeon: Royce Rondon MD;  Location: [facility];  Service: General    IR IMAGE [address] / DRAINAGE W TUBE  8/17/2018    IR IMAGE GUIDED ASPIRATION / DRAINAGE W TUBE  7/31/2018    JOINT REPLACEMENT Bilateral     knee    KIDNEY SURGERY  2009    nodule removal    LYMPH NODE DISSECTION Left 01/2018    left inguinal LN removed - benign     OTHER SURGICAL HISTORY      kidney nodule removal    PALATE / UVULA BIOPSY / EXCISION      SINUS SURGERY       Social History     Socioeconomic History    Marital status: /Civil Union     Spouse name: Not on file    Number of children: Not on file    Years of education: Not on file    Highest education level: Not on file   Occupational History    Not on file   Social Needs    Financial resource strain: Not on file    Food insecurity     Worry: Not on file     Inability: Not on file   [employer] needs     Medical: Not on file     Non-medical: Not on file   Tobacco Use    Smoking status: Never Smoker    Smokeless tobacco: Never Used   Substance and Sexual Activity    Alcohol use: Yes     Comment: socially    Drug use: No    Sexual activity: Never   Lifestyle    Physical activity     Days per week: Not on file     Minutes per session: Not on file    Stress: Not on file   Relationships    Social connections     Talks on phone: Not on file     Gets together: Not on file     Attends [employer] service: Not on file     Active member of club or organization: Not on file     Attends meetings of clubs or organizations: Not on file     Relationship status: Not on file    Intimate partner violence     Fear of current or ex partner: Not on file     Emotionally abused: Not on file     Physically abused: Not on file     Forced sexual activity: Not on file   Other Topics Concern    Not on file   Social History Narrative    Not on file       Current Outpatient Medications:     aspirin (PX ENTERIC ASPIRIN) 325 mg EC tablet, Take 325 mg by mouth daily At night , Disp: , Rfl:     atorvastatin (LIPITOR) 40 mg tablet, Take 40 mg by mouth daily at bedtime, Disp: , Rfl:     azilsartan medoxomil (EDARBI) 80 MG tablet, Take 80 mg by mouth daily , Disp: , Rfl:     Cholecalciferol (VITAMIN D-3) 1000 units CAPS, Take 1,000 Units by mouth daily, Disp: , Rfl:     doxazosin (CARDURA) 4 mg tablet, Take 4 mg by mouth daily at bedtime, Disp: , Rfl:     eplerenone (INSPRA) 50 MG tablet, Take 50 mg by mouth daily, Disp: , Rfl:     escitalopram (LEXAPRO) 10 mg tablet, Take 10 mg by mouth daily, Disp: , Rfl:     ferrous sulfate 325 (65 Fe) mg tablet, Take 325 mg by mouth 2 (two) times a day  , Disp: , Rfl:     furosemide (LASIX) 80 mg tablet, Take 80 mg by mouth daily, Disp: , Rfl:     glucose monitoring kit (FREESTYLE) monitoring kit, 1 each by Does not apply route 2 (two) times a day Any brand covered by insurance:  Dispense one glucometer, test strips #50, lancets #100, Disp: 1 each, Rfl: 0    Glycopyrrolate-Formoterol (BEVESPI AEROSPHERE) 9-4 8 MCG/ACT AERO, Inhale 2 puffs daily after breakfast, Disp: , Rfl:     insulin aspart protamine-insulin aspart (NovoLOG Mix 70/30 FlexPen) 100 Units/mL injection pen, Inject 28 Units under the skin 2 (two) times a day before meals, Disp: 5 pen, Rfl: 0    Insulin Pen Needle (PEN NEEDLES) 31G X 8 MM MISC, by Does not apply route 2 (two) times a day before meals (any size/brand covered by insurance), Disp: 100 each, Rfl: 0    MAGNESIUM PO, Take 400 mg by mouth daily, Disp: , Rfl:     metFORMIN (GLUCOPHAGE) 500 mg tablet, Take 1 tablet (500 mg total) by mouth 2 (two) times a day with meals for 14 days, Disp: 28 tablet, Rfl: 0    Milk Thistle 500 MG CAPS, Take 500 mg by mouth 2 (two) times a day, Disp: , Rfl:     multivitamin-iron-minerals-folic acid (CENTRUM) chewable tablet, Chew 1 tablet daily, Disp: , Rfl:     nebivolol (BYSTOLIC) 20 MG tablet, Take 20 mg by mouth 2 (two) times a day, Disp: , Rfl:     Omega-3 Fatty Acids (FISH OIL) 1200 MG CAPS, Take 1,200 mg by mouth daily at bedtime, Disp: , Rfl:     potassium chloride (KLOR-CON M20) 20 mEq tablet, Take 1 tablet (20 mEq total) by mouth daily Restart with your lasix next monday, Disp: 1 tablet, Rfl: 0    umeclidinium-vilanterol (Anoro Ellipta) 62 5-25 MCG/INH inhaler, Inhale 1 puff daily, Disp: , Rfl:     Food Intake and Lifestyle Assessment:   Food Intake Assessment completed via usual diet recall: Has gained some weight in the past month  Feels is probably over compensating lately (sweets and carbs)  Breakfast:  7-10am leftover pepperoni pizza or this morning was rushing so had a spoonful of PB OR 2 egg omelet with swiss and mushrooms  Snack: none   Lunch: ham sandwich or just fruit  Snack: none  Dinner: Last night was 5 guys because it was his daughter's bday  Other nights was fish, 1cup rice and veggies OR pork chops/protein with starch (cut back on the amount of the carb since dx with DM) and veggies      Snack: sometimes some fruit    Beverage intake: water, some cranberry juice, using crystal light, and coffee/tea (maybe one per day)  Estimated fluid intake per day: 2-3 bottles of water per day + 1-2 glasses of crystal light  Protein supplement: none--provided with a sample of Ensure max protein  Estimated protein intake per day: 50-70 grams  Meals eaten away from home: 2 times per month   Typical meal pattern: 3 meals per day and 0-1 snacks per day  Eating Behaviors: Consumption of high calorie/ high fat foods, Consumption of high calorie beverages and Large portion sizes  Cultural or Bahai considerations: n/a  Food allergies or intolerances: none per pt    Allergies   Allergen Reactions    Clonidine Anaphylaxis    Lisinopril Anaphylaxis    Nifedipine Anaphylaxis    Spironolactone Anaphylaxis, Other (See Comments) and Shortness Of Breath    Buspirone      Headaches,     Enoxaparin     Minoxidil      Retains fluid around heart     Physical Assessment  Physical Activity  Types of exercise: Walking dog 1x/day for 10-15min, but more daily activity--suggested to increase to 10 mins 3x/day  Current physical limitations: pt reports bilateral knee surgery in 2011, which pt reports limits his movement at times    Psychosocial Assessment   Support systems: relative(s)  Socioeconomic factors: n/a    Nutrition Diagnosis  Diagnosis: Overweight / Obesity (NC-3 3)  Related to: Physical inactivity and Excessive energy intake  As Evidenced by: BMI >25     Nutrition Prescription: Recommend the following diet  Low sugar, High protein, Regular     Interventions and Teaching   Discussed pre-op and post-op nutrition guidelines  Patient educated and handouts provided  Capacity of post-surgery stomach - about 1/4 of a cup of food/fluid at a time immediately post-op  Adequate hydration  Sugar and fat restriction to decrease "dumping syndrome"  Weight loss plateaus / possibility of weight regain  Exercise  Nutrition considerations after surgery  Protein supplements  Meal planning and preparation  Appropriate carbohydrate, protein, and fat intake, and food/fluid choices to maximize safe weight loss, nutrient intake, and tolerance   Dietary and lifestyle changes  Possible problems with poor eating habits  Intuitive eating  Techniques for self monitoring and keeping daily food journal    Education provided to: patient   Barriers to learning: No barriers identified  Readiness to change: preparation  Prior research on procedure: Internet, provider  Comprehension: verbalizes understanding   Expected Compliance: very good      Evaluation / Monitoring  Dietitian to Monitor: Eating pattern as discussed, Tolerance of nutrition prescription, Body weight, Physical activity    Workflow:  Cardio:  Still needs to make appt--will call his cardiologist  PCP:   EGD: completed 7/24   Labs:  Completed 7/24---need to bring A1c down  currently at 12 0  Psych:  Needs psych and D&A clearance--sent it in the mail on 8/14  Sleep:  Has AGUS-wears CPAP  Support group: completed 6/2    Pt recently dx with diabetes and started on insulin and metformin   Noted A1c 12 8 on July 10th  Advised pt need A1c below 9 0 for surgery  PT gained 5lbs in the past month  He states he is aware he needs to maintain a BMI of 35 to remain qualified for surgery so may be over compensating to maintain his weight  Reviewed diet recall and it appears he is choosing high fat, high carbs foods and is consuming larger portions  Discussed the need to decrease carb intake to better control diabetes and not rely on the insulin to cover the high sugar levels  Discussed having protein at each meal and limit to 1/2 cup carb at a meal       Goals:  Have protein with each meal and snack  Can incorporate a protein shake (premier or ensure max protein) for breakfast or lunch  Limit to 1/2 cup carbs per meal  Avoid juices  Call cardiologist and PCP for clearances/letter of support  Choose a protein snack from list provided       Time Spent:   30 mins

## 2020-08-19 ENCOUNTER — TELEPHONE (OUTPATIENT)
Dept: BARIATRICS | Facility: CLINIC | Age: 55
End: 2020-08-19

## 2020-08-27 ENCOUNTER — TELEPHONE (OUTPATIENT)
Dept: BARIATRICS | Facility: CLINIC | Age: 55
End: 2020-08-27

## 2020-08-27 NOTE — TELEPHONE ENCOUNTER
I received this email from the patient  Do you want to give him a call with your recommendations or shall I make an appointment? Albert So went to Dr Yohannes Morrow office on 8/25/20 for the risk assessment, Nurse Practitioner Krystina Will stated that she would write the Risk Assessment depending upon the surgery that I was having done  Also, if I decide to have the bypass surgery, she wants me to get a Stress Test      Today 8/27/20, I returned to Dr Esther Friedman office for an ultrasound of the heart  Are you able to retrieve that information? And, I want to ask Dr Skinny Robles for his professional opinion once again, considering all of my comorbidities  Attached, you'll find the letter of support from my PCP, Dr Dick Chavez      Thanks,  Lee Ann Azevedo

## 2020-09-01 RX ORDER — AMLODIPINE BESYLATE 10 MG/1
10 TABLET ORAL
COMMUNITY
Start: 2020-04-15

## 2020-09-01 RX ORDER — DOXYCYCLINE HYCLATE 100 MG/1
100 CAPSULE ORAL
COMMUNITY
Start: 2020-08-25 | End: 2020-09-04

## 2020-09-01 RX ORDER — WARFARIN SODIUM 10 MG/1
TABLET ORAL
COMMUNITY
End: 2020-12-11

## 2020-09-01 RX ORDER — LORAZEPAM 0.5 MG/1
TABLET ORAL
COMMUNITY

## 2020-09-01 RX ORDER — ALBUTEROL SULFATE 90 UG/1
AEROSOL, METERED RESPIRATORY (INHALATION)
COMMUNITY

## 2020-09-01 RX ORDER — BUSPIRONE HYDROCHLORIDE 15 MG/1
TABLET ORAL
COMMUNITY

## 2020-09-02 ENCOUNTER — TELEPHONE (OUTPATIENT)
Dept: BARIATRICS | Facility: CLINIC | Age: 55
End: 2020-09-02

## 2020-09-02 NOTE — TELEPHONE ENCOUNTER
COVID Pre-Visit Screening     1  Is this a family member screening? NO:[id number]}  2  Have you traveled outside of your state in the past 2 weeks? EY:[id number]}  3  Do you presently have a fever or flu-like symptoms? NO:71982}  4  Do you have symptoms of an upper respiratory infection like runny nose, sore throat, or cough? NO:44906}  5  Are you suffering from new headache that you have not had in the past?  NO:73618}  6  Do you have/have you experienced any new shortness of breath recently? NO:04085}  7  Do you have any new diarrhea, nausea or vomiting? NO:89058}  8  Have you been in contact with anyone who has been sick or diagnosed with COVID-19? NO:82331}  9  Do you have any new loss of taste or smell? NO:06611}  10  Are you able to wear a mask without a valve for the entire visit?  YES

## 2020-09-03 ENCOUNTER — OFFICE VISIT (OUTPATIENT)
Dept: BARIATRICS | Facility: CLINIC | Age: 55
End: 2020-09-03
Payer: COMMERCIAL

## 2020-09-03 VITALS
TEMPERATURE: 100 F | HEIGHT: 67 IN | HEART RATE: 68 BPM | SYSTOLIC BLOOD PRESSURE: 150 MMHG | WEIGHT: 236.3 LBS | DIASTOLIC BLOOD PRESSURE: 90 MMHG | BODY MASS INDEX: 37.09 KG/M2

## 2020-09-03 DIAGNOSIS — G47.33 OSA (OBSTRUCTIVE SLEEP APNEA): ICD-10-CM

## 2020-09-03 DIAGNOSIS — E66.9 OBESITY, CLASS II, BMI 35-39.9: ICD-10-CM

## 2020-09-03 DIAGNOSIS — E66.01 MORBID (SEVERE) OBESITY DUE TO EXCESS CALORIES (HCC): ICD-10-CM

## 2020-09-03 DIAGNOSIS — I10 ESSENTIAL HYPERTENSION: ICD-10-CM

## 2020-09-03 DIAGNOSIS — Z86.718 HISTORY OF DVT (DEEP VEIN THROMBOSIS): ICD-10-CM

## 2020-09-03 DIAGNOSIS — E11.65 HYPERGLYCEMIA DUE TO TYPE 2 DIABETES MELLITUS (HCC): ICD-10-CM

## 2020-09-03 DIAGNOSIS — Z01.818 ENCOUNTER FOR OTHER PREPROCEDURAL EXAMINATION: Primary | ICD-10-CM

## 2020-09-03 DIAGNOSIS — I50.32 CHRONIC DIASTOLIC CHF (CONGESTIVE HEART FAILURE) (HCC): Chronic | ICD-10-CM

## 2020-09-03 DIAGNOSIS — I50.9 CHF (CONGESTIVE HEART FAILURE) (HCC): ICD-10-CM

## 2020-09-03 DIAGNOSIS — J44.9 COPD (CHRONIC OBSTRUCTIVE PULMONARY DISEASE) (HCC): ICD-10-CM

## 2020-09-03 DIAGNOSIS — E11.22 CKD STAGE 3 SECONDARY TO DIABETES (HCC): ICD-10-CM

## 2020-09-03 DIAGNOSIS — N18.30 CKD STAGE 3 SECONDARY TO DIABETES (HCC): ICD-10-CM

## 2020-09-03 PROCEDURE — 99212 OFFICE O/P EST SF 10 MIN: CPT | Performed by: SURGERY

## 2020-09-03 RX ORDER — GLYCOPYRROLATE AND FORMOTEROL FUMARATE 9; 4.8 UG/1; UG/1
2 AEROSOL, METERED RESPIRATORY (INHALATION) 2 TIMES DAILY
COMMUNITY
End: 2020-12-11

## 2020-09-03 RX ORDER — METFORMIN HYDROCHLORIDE 500 MG/1
TABLET, EXTENDED RELEASE ORAL
COMMUNITY
Start: 2020-07-28 | End: 2020-12-11

## 2020-09-03 NOTE — PROGRESS NOTES
Progress Note - Bariatric Surgery   Jose Campbell 60 y o  male MRN: [de-identified]  Unit/Bed#:  Encounter: [id number]    Assessment:  55/M w/ morbid obesity with medical conditions caused and/or exacerbated by obesity including DM II, AGUS (on CPAP), CKD, COPD, CHF, HTN    After discussion of his medical conditions and comorbidities, it was decided that the laparoscopic humaira-en-y gastric bypass would be the optimal metabolic procedure for him    HbA1C needs to be less than 10 prior to surgery     Plan:  Cont mindful eating, stress reduction sleep hygiene   Cont exercising   Cont PCP/endocrine f/u  WIll f/u on Psych clearance   Cardiac stress test pending     Subjective/Objective     Subjective: Met w/ PCP and endocrinologist (glucose controlled now)    Objective: Looks well     Blood pressure 150/90, pulse 68, temperature 100 °F (37 8 °C), temperature source Tympanic, height 5' 7 4" (1 712 m), weight 107 kg (236 lb 4 8 oz)  ,Body mass index is 36 57 kg/m²  Physical Exam:     No distress  RRR  Breathing non labored   Abd soft; NT/ND     Lab, Imaging and other studies:I have personally reviewed pertinent lab results

## 2020-09-11 ENCOUNTER — TELEPHONE (OUTPATIENT)
Dept: BARIATRICS | Facility: CLINIC | Age: 55
End: 2020-09-11

## 2020-09-11 NOTE — TELEPHONE ENCOUNTER
COVID Pre-Visit Screening     1  Is this a family member screening? NO:89953}  2  Have you traveled outside of your state in the past 2 weeks? EJ:30416}  3  Do you presently have a fever or flu-like symptoms? NO:58506}  4  Do you have symptoms of an upper respiratory infection like runny nose, sore throat, or cough? NO:03754}  5  Are you suffering from new headache that you have not had in the past?  NO:79787}  6  Do you have/have you experienced any new shortness of breath recently? NO:84092}  7  Do you have any new diarrhea, nausea or vomiting? NO:96454}  8  Have you been in contact with anyone who has been sick or diagnosed with COVID-19? NO:13137}  9  Do you have any new loss of taste or smell? NO:22236}  10  Are you able to wear a mask without a valve for the entire visit?  YES

## 2020-09-14 ENCOUNTER — OFFICE VISIT (OUTPATIENT)
Dept: BARIATRICS | Facility: CLINIC | Age: 55
End: 2020-09-14

## 2020-09-14 VITALS — WEIGHT: 236.4 LBS | BODY MASS INDEX: 37.1 KG/M2 | HEIGHT: 67 IN

## 2020-09-14 DIAGNOSIS — E66.9 OBESITY, CLASS II, BMI 35-39.9: Primary | ICD-10-CM

## 2020-09-14 DIAGNOSIS — E11.69 DIABETES MELLITUS TYPE 2 IN OBESE (HCC): ICD-10-CM

## 2020-09-14 DIAGNOSIS — Z98.84 BARIATRIC SURGERY STATUS: ICD-10-CM

## 2020-09-14 DIAGNOSIS — E66.9 DIABETES MELLITUS TYPE 2 IN OBESE (HCC): ICD-10-CM

## 2020-09-14 PROCEDURE — RECHECK
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
## 2020-09-14 NOTE — PROGRESS NOTES
Patient reported feeling good about his weight management journey since last visit, he is more confident in his handling of his diabetes and balancing it with his weight loss  He reports eating portioned meals, his blood sugars have been within range except for one day when he eat a treat that had more sugar and he ate it from the package rather than a bowl like he normally does  ADAIR talked with patient about the benefits of physical activity for his weight management as well as a way to manage his blood sugar  Patient is going for walks with his dog and doing chores around the house  He said he and his wife plan to partner up on walks and be more active together  Patient reports he's getting good rest, he denies any mood changes and has not been emotionally eating as he had been before  Non-food coping skills he uses are playing games online and watching TV to manage stress, he is trying to limit his exposure to news and social media  Patient continues to see his PTSD doctor and psychiatrist   He is anticipating that his therapeutic treatment will be completed in October although he will continue to see his providers for ongoing treatment  Patient is aware that he needs a summary from his psychiatrist when his treatment round is completed that there are no issues with him proceeding to surgery  As a part of workflow, patient must complete psych eval, get A1C done, and complete cardiac clearance which is scheduled for 9/23 before all can be submitted to insurance for authorization  Patient is scheduled to see ADAIR and CECE in October for follow up

## 2020-09-22 ENCOUNTER — TELEPHONE (OUTPATIENT)
Dept: UROLOGY | Facility: CLINIC | Age: 55
End: 2020-09-22

## 2020-09-22 NOTE — TELEPHONE ENCOUNTER
Received records  via fax for pt to set up a NP appt for office consult  to discuss and evaluate for a Urethroplasty    Called and l/m for pt to call back to set up Np appt w/AP - records will be scanned to chart

## 2020-10-09 ENCOUNTER — TELEPHONE (OUTPATIENT)
Dept: BARIATRICS | Facility: CLINIC | Age: 55
End: 2020-10-09

## 2020-10-12 ENCOUNTER — OFFICE VISIT (OUTPATIENT)
Dept: BARIATRICS | Facility: CLINIC | Age: 55
End: 2020-10-12

## 2020-10-12 VITALS — HEIGHT: 67 IN | WEIGHT: 241.8 LBS | BODY MASS INDEX: 37.95 KG/M2

## 2020-10-12 DIAGNOSIS — Z98.84 BARIATRIC SURGERY STATUS: Primary | ICD-10-CM

## 2020-10-12 DIAGNOSIS — E66.9 OBESITY, CLASS II, BMI 35-39.9: ICD-10-CM

## 2020-10-12 DIAGNOSIS — E66.9 OBESITY, CLASS II, BMI 35-39.9: Primary | ICD-10-CM

## 2020-10-12 PROCEDURE — RECHECK

## 2020-10-16 ENCOUNTER — TELEPHONE (OUTPATIENT)
Dept: BARIATRICS | Facility: CLINIC | Age: 55
End: 2020-10-16

## 2020-11-04 ENCOUNTER — TELEPHONE (OUTPATIENT)
Dept: UROLOGY | Facility: CLINIC | Age: 55
End: 2020-11-04

## 2020-11-05 ENCOUNTER — OFFICE VISIT (OUTPATIENT)
Dept: UROLOGY | Facility: CLINIC | Age: 55
End: 2020-11-05
Payer: COMMERCIAL

## 2020-11-05 VITALS
TEMPERATURE: 97.8 F | HEIGHT: 68 IN | RESPIRATION RATE: 18 BRPM | WEIGHT: 243 LBS | SYSTOLIC BLOOD PRESSURE: 152 MMHG | DIASTOLIC BLOOD PRESSURE: 90 MMHG | BODY MASS INDEX: 36.83 KG/M2 | HEART RATE: 75 BPM

## 2020-11-05 DIAGNOSIS — R30.0 DYSURIA: Primary | ICD-10-CM

## 2020-11-05 DIAGNOSIS — N35.014 POST-TRAUMATIC MALE URETHRAL STRICTURE: ICD-10-CM

## 2020-11-05 LAB — POST-VOID RESIDUAL VOLUME, ML POC: 169 ML

## 2020-11-05 PROCEDURE — 99204 OFFICE O/P NEW MOD 45 MIN: CPT | Performed by: UROLOGY

## 2020-11-05 PROCEDURE — 51798 US URINE CAPACITY MEASURE: CPT | Performed by: UROLOGY

## 2020-11-12 ENCOUNTER — OFFICE VISIT (OUTPATIENT)
Dept: BARIATRICS | Facility: CLINIC | Age: 55
End: 2020-11-12

## 2020-11-12 ENCOUNTER — TELEPHONE (OUTPATIENT)
Dept: BARIATRICS | Facility: CLINIC | Age: 55
End: 2020-11-12

## 2020-11-12 VITALS — HEIGHT: 67 IN | BODY MASS INDEX: 38.36 KG/M2 | WEIGHT: 244.4 LBS

## 2020-11-12 DIAGNOSIS — Z98.84 BARIATRIC SURGERY STATUS: Primary | ICD-10-CM

## 2020-11-12 DIAGNOSIS — E66.9 OBESITY, CLASS II, BMI 35-39.9: ICD-10-CM

## 2020-11-12 PROCEDURE — RECHECK

## 2020-12-04 ENCOUNTER — TELEPHONE (OUTPATIENT)
Dept: BARIATRICS | Facility: CLINIC | Age: 55
End: 2020-12-04

## 2020-12-04 ENCOUNTER — PREP FOR PROCEDURE (OUTPATIENT)
Dept: BARIATRICS | Facility: CLINIC | Age: 55
End: 2020-12-04

## 2020-12-04 DIAGNOSIS — G47.33 OBSTRUCTIVE SLEEP APNEA: ICD-10-CM

## 2020-12-04 DIAGNOSIS — E11.69 DIABETES MELLITUS TYPE 2 IN OBESE (HCC): ICD-10-CM

## 2020-12-04 DIAGNOSIS — E66.01 MORBID OBESITY (HCC): Primary | ICD-10-CM

## 2020-12-04 DIAGNOSIS — I10 ESSENTIAL HYPERTENSION: ICD-10-CM

## 2020-12-04 DIAGNOSIS — E66.9 DIABETES MELLITUS TYPE 2 IN OBESE (HCC): ICD-10-CM

## 2020-12-11 ENCOUNTER — ANESTHESIA EVENT (OUTPATIENT)
Dept: PERIOP | Facility: HOSPITAL | Age: 55
DRG: 620 | End: 2020-12-11
Payer: COMMERCIAL

## 2020-12-11 ENCOUNTER — OFFICE VISIT (OUTPATIENT)
Dept: BARIATRICS | Facility: CLINIC | Age: 55
End: 2020-12-11
Payer: COMMERCIAL

## 2020-12-11 VITALS
BODY MASS INDEX: 38.34 KG/M2 | HEIGHT: 67 IN | TEMPERATURE: 97.3 F | HEART RATE: 77 BPM | DIASTOLIC BLOOD PRESSURE: 90 MMHG | SYSTOLIC BLOOD PRESSURE: 140 MMHG | WEIGHT: 244.3 LBS

## 2020-12-11 DIAGNOSIS — I10 ESSENTIAL HYPERTENSION: ICD-10-CM

## 2020-12-11 DIAGNOSIS — Z01.818 ENCOUNTER FOR OTHER PREPROCEDURAL EXAMINATION: Primary | ICD-10-CM

## 2020-12-11 DIAGNOSIS — I12.9 HYPERTENSIVE KIDNEY DISEASE WITH STAGE 2 CHRONIC KIDNEY DISEASE: ICD-10-CM

## 2020-12-11 DIAGNOSIS — E11.9 TYPE 2 DIABETES MELLITUS (HCC): ICD-10-CM

## 2020-12-11 DIAGNOSIS — E11.22 CKD STAGE 3 SECONDARY TO DIABETES (HCC): ICD-10-CM

## 2020-12-11 DIAGNOSIS — N18.30 CKD STAGE 3 SECONDARY TO DIABETES (HCC): ICD-10-CM

## 2020-12-11 DIAGNOSIS — I50.9 CHF (CONGESTIVE HEART FAILURE) (HCC): ICD-10-CM

## 2020-12-11 DIAGNOSIS — G47.33 OSA (OBSTRUCTIVE SLEEP APNEA): ICD-10-CM

## 2020-12-11 DIAGNOSIS — N18.2 HYPERTENSIVE KIDNEY DISEASE WITH STAGE 2 CHRONIC KIDNEY DISEASE: ICD-10-CM

## 2020-12-11 DIAGNOSIS — E66.01 MORBID (SEVERE) OBESITY DUE TO EXCESS CALORIES (HCC): ICD-10-CM

## 2020-12-11 DIAGNOSIS — J44.9 COPD (CHRONIC OBSTRUCTIVE PULMONARY DISEASE) (HCC): ICD-10-CM

## 2020-12-11 DIAGNOSIS — Z01.818 PRE-OP TESTING: Primary | ICD-10-CM

## 2020-12-11 DIAGNOSIS — E66.9 OBESITY, CLASS II, BMI 35-39.9: Primary | ICD-10-CM

## 2020-12-11 DIAGNOSIS — I82.403 LEG DVT (DEEP VENOUS THROMBOEMBOLISM), ACUTE, BILATERAL (HCC): ICD-10-CM

## 2020-12-11 DIAGNOSIS — E66.9 OBESITY, CLASS II, BMI 35-39.9: ICD-10-CM

## 2020-12-11 PROCEDURE — 99213 OFFICE O/P EST LOW 20 MIN: CPT | Performed by: SURGERY

## 2020-12-11 RX ORDER — ESCITALOPRAM OXALATE 20 MG/1
20 TABLET ORAL DAILY
COMMUNITY
Start: 2020-11-14

## 2020-12-11 RX ORDER — SCOLOPAMINE TRANSDERMAL SYSTEM 1 MG/1
1 PATCH, EXTENDED RELEASE TRANSDERMAL ONCE
Status: CANCELLED | OUTPATIENT
Start: 2020-12-11 | End: 2020-12-11

## 2020-12-11 RX ORDER — ACETAMINOPHEN 325 MG/1
975 TABLET ORAL ONCE
Status: CANCELLED | OUTPATIENT
Start: 2020-12-11 | End: 2020-12-11

## 2020-12-11 RX ORDER — ESCITALOPRAM OXALATE 20 MG/1
20 TABLET ORAL
COMMUNITY
Start: 2020-11-16 | End: 2020-12-11

## 2020-12-11 RX ORDER — ENOXAPARIN SODIUM 300 MG/3ML
30 INJECTION INTRAVENOUS; SUBCUTANEOUS
Status: CANCELLED | OUTPATIENT
Start: 2020-12-11 | End: 2020-12-12

## 2020-12-11 RX ORDER — OXYCODONE HYDROCHLORIDE 5 MG/1
5 TABLET ORAL EVERY 4 HOURS PRN
Qty: 10 TABLET | Refills: 0 | Status: SHIPPED | OUTPATIENT
Start: 2020-12-11 | End: 2021-04-02 | Stop reason: ALTCHOICE

## 2020-12-11 RX ORDER — OMEPRAZOLE 20 MG/1
20 CAPSULE, DELAYED RELEASE ORAL DAILY
Qty: 90 CAPSULE | Refills: 1 | Status: SHIPPED | OUTPATIENT
Start: 2020-12-11 | End: 2021-08-27

## 2020-12-11 RX ORDER — GABAPENTIN 100 MG/1
300 CAPSULE ORAL ONCE
Status: CANCELLED | OUTPATIENT
Start: 2020-12-11 | End: 2020-12-11

## 2020-12-15 ENCOUNTER — TELEPHONE (OUTPATIENT)
Dept: BARIATRICS | Facility: CLINIC | Age: 55
End: 2020-12-15

## 2020-12-16 ENCOUNTER — TELEPHONE (OUTPATIENT)
Dept: BARIATRICS | Facility: CLINIC | Age: 55
End: 2020-12-16

## 2020-12-16 ENCOUNTER — LAB (OUTPATIENT)
Dept: LAB | Facility: CLINIC | Age: 55
DRG: 620 | End: 2020-12-16
Payer: COMMERCIAL

## 2020-12-16 DIAGNOSIS — E66.9 DIABETES MELLITUS TYPE 2 IN OBESE (HCC): ICD-10-CM

## 2020-12-16 DIAGNOSIS — E11.69 DIABETES MELLITUS TYPE 2 IN OBESE (HCC): ICD-10-CM

## 2020-12-16 DIAGNOSIS — Z01.818 PRE-OP TESTING: ICD-10-CM

## 2020-12-16 DIAGNOSIS — E66.01 MORBID OBESITY (HCC): ICD-10-CM

## 2020-12-16 DIAGNOSIS — G47.33 OBSTRUCTIVE SLEEP APNEA: ICD-10-CM

## 2020-12-16 DIAGNOSIS — I10 ESSENTIAL HYPERTENSION: ICD-10-CM

## 2020-12-16 LAB
ANION GAP SERPL CALCULATED.3IONS-SCNC: 5 MMOL/L (ref 4–13)
BASOPHILS # BLD AUTO: 0.07 THOUSANDS/ΜL (ref 0–0.1)
BASOPHILS NFR BLD AUTO: 1 % (ref 0–1)
BUN SERPL-MCNC: 26 MG/DL (ref 5–25)
CALCIUM SERPL-MCNC: 9.8 MG/DL (ref 8.3–10.1)
CHLORIDE SERPL-SCNC: 106 MMOL/L (ref 100–108)
CO2 SERPL-SCNC: 29 MMOL/L (ref 21–32)
CREAT SERPL-MCNC: 1.72 MG/DL (ref 0.6–1.3)
EOSINOPHIL # BLD AUTO: 0.32 THOUSAND/ΜL (ref 0–0.61)
EOSINOPHIL NFR BLD AUTO: 4 % (ref 0–6)
ERYTHROCYTE [DISTWIDTH] IN BLOOD BY AUTOMATED COUNT: 15.7 % (ref 11.6–15.1)
GFR SERPL CREATININE-BSD FRML MDRD: 50 ML/MIN/1.73SQ M
GLUCOSE P FAST SERPL-MCNC: 77 MG/DL (ref 65–99)
HCT VFR BLD AUTO: 41.1 % (ref 36.5–49.3)
HGB BLD-MCNC: 12.7 G/DL (ref 12–17)
IMM GRANULOCYTES # BLD AUTO: 0.03 THOUSAND/UL (ref 0–0.2)
IMM GRANULOCYTES NFR BLD AUTO: 0 % (ref 0–2)
LYMPHOCYTES # BLD AUTO: 1.8 THOUSANDS/ΜL (ref 0.6–4.47)
LYMPHOCYTES NFR BLD AUTO: 20 % (ref 14–44)
MCH RBC QN AUTO: 26.6 PG (ref 26.8–34.3)
MCHC RBC AUTO-ENTMCNC: 30.9 G/DL (ref 31.4–37.4)
MCV RBC AUTO: 86 FL (ref 82–98)
MONOCYTES # BLD AUTO: 0.79 THOUSAND/ΜL (ref 0.17–1.22)
MONOCYTES NFR BLD AUTO: 9 % (ref 4–12)
NEUTROPHILS # BLD AUTO: 5.93 THOUSANDS/ΜL (ref 1.85–7.62)
NEUTS SEG NFR BLD AUTO: 66 % (ref 43–75)
NRBC BLD AUTO-RTO: 0 /100 WBCS
PLATELET # BLD AUTO: 371 THOUSANDS/UL (ref 149–390)
PMV BLD AUTO: 9 FL (ref 8.9–12.7)
POTASSIUM SERPL-SCNC: 3.6 MMOL/L (ref 3.5–5.3)
RBC # BLD AUTO: 4.77 MILLION/UL (ref 3.88–5.62)
SODIUM SERPL-SCNC: 140 MMOL/L (ref 136–145)
WBC # BLD AUTO: 8.94 THOUSAND/UL (ref 4.31–10.16)

## 2020-12-16 PROCEDURE — 36415 COLL VENOUS BLD VENIPUNCTURE: CPT

## 2020-12-16 PROCEDURE — U0003 INFECTIOUS AGENT DETECTION BY NUCLEIC ACID (DNA OR RNA); SEVERE ACUTE RESPIRATORY SYNDROME CORONAVIRUS 2 (SARS-COV-2) (CORONAVIRUS DISEASE [COVID-19]), AMPLIFIED PROBE TECHNIQUE, MAKING USE OF HIGH THROUGHPUT TECHNOLOGIES AS DESCRIBED BY CMS-2020-01-R: HCPCS | Performed by: SURGERY

## 2020-12-16 PROCEDURE — 80048 BASIC METABOLIC PNL TOTAL CA: CPT

## 2020-12-16 PROCEDURE — 85025 COMPLETE CBC W/AUTO DIFF WBC: CPT

## 2020-12-18 ENCOUNTER — OFFICE VISIT (OUTPATIENT)
Dept: BARIATRICS | Facility: CLINIC | Age: 55
End: 2020-12-18

## 2020-12-18 DIAGNOSIS — Z98.84 BARIATRIC SURGERY STATUS: Primary | ICD-10-CM

## 2020-12-18 DIAGNOSIS — E66.9 OBESITY, CLASS II, BMI 35-39.9: ICD-10-CM

## 2020-12-18 LAB — SARS-COV-2 RNA SPEC QL NAA+PROBE: NOT DETECTED

## 2020-12-18 PROCEDURE — RECHECK

## 2020-12-21 PROBLEM — I12.9 HYPERTENSIVE KIDNEY DISEASE WITH STAGE 2 CHRONIC KIDNEY DISEASE: Status: RESOLVED | Noted: 2018-05-21 | Resolved: 2020-12-21
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
## 2020-12-21 PROBLEM — J96.01 ACUTE RESPIRATORY FAILURE WITH HYPOXIA (HCC): Status: RESOLVED | Noted: 2018-03-15 | Resolved: 2020-12-21

## 2020-12-21 PROBLEM — I31.3 PERICARDIAL EFFUSION: Status: RESOLVED | Noted: 2018-01-22 | Resolved: 2020-12-21

## 2020-12-21 PROBLEM — I82.409 DVT (DEEP VENOUS THROMBOSIS) (HCC): Status: RESOLVED | Noted: 2018-03-15 | Resolved: 2020-12-21

## 2020-12-21 PROBLEM — J18.9 HAP (HOSPITAL-ACQUIRED PNEUMONIA): Status: RESOLVED | Noted: 2018-03-15 | Resolved: 2020-12-21

## 2020-12-21 PROBLEM — R58 BLEEDING: Status: RESOLVED | Noted: 2018-02-05 | Resolved: 2020-12-21

## 2020-12-21 PROBLEM — N18.2 HYPERTENSIVE KIDNEY DISEASE WITH STAGE 2 CHRONIC KIDNEY DISEASE: Status: RESOLVED | Noted: 2018-05-21 | Resolved: 2020-12-21

## 2020-12-21 PROBLEM — E11.65 HYPERGLYCEMIA DUE TO TYPE 2 DIABETES MELLITUS (HCC): Status: RESOLVED | Noted: 2020-07-09 | Resolved: 2020-12-21

## 2020-12-21 PROBLEM — R06.02 SOB (SHORTNESS OF BREATH): Status: RESOLVED | Noted: 2018-05-21 | Resolved: 2020-12-21

## 2020-12-21 PROBLEM — N17.9 AKI (ACUTE KIDNEY INJURY) (HCC): Status: RESOLVED | Noted: 2018-05-21 | Resolved: 2020-12-21

## 2020-12-21 PROBLEM — I82.403 LEG DVT (DEEP VENOUS THROMBOEMBOLISM), ACUTE, BILATERAL (HCC): Status: RESOLVED | Noted: 2018-01-22 | Resolved: 2020-12-21

## 2020-12-21 PROBLEM — I31.39 PERICARDIAL EFFUSION: Status: RESOLVED | Noted: 2018-01-22 | Resolved: 2020-12-21

## 2020-12-22 ENCOUNTER — HOSPITAL ENCOUNTER (INPATIENT)
Facility: HOSPITAL | Age: 55
LOS: 2 days | Discharge: HOME | DRG: 620 | End: 2020-12-24
Attending: SURGERY | Admitting: SURGERY
Payer: COMMERCIAL

## 2020-12-22 ENCOUNTER — ANESTHESIA (OUTPATIENT)
Dept: PERIOP | Facility: HOSPITAL | Age: 55
DRG: 620 | End: 2020-12-22
Payer: COMMERCIAL

## 2020-12-22 VITALS — HEART RATE: 76 BPM

## 2020-12-22 DIAGNOSIS — L76.82 INCISIONAL PAIN: ICD-10-CM

## 2020-12-22 DIAGNOSIS — N18.30 CKD STAGE 3 SECONDARY TO DIABETES (HCC): ICD-10-CM

## 2020-12-22 DIAGNOSIS — E11.22 CKD STAGE 3 SECONDARY TO DIABETES (HCC): ICD-10-CM

## 2020-12-22 DIAGNOSIS — I10 ESSENTIAL HYPERTENSION: ICD-10-CM

## 2020-12-22 DIAGNOSIS — E11.65 TYPE 2 DIABETES MELLITUS WITH HYPERGLYCEMIA, UNSPECIFIED WHETHER LONG TERM INSULIN USE (HCC): Primary | ICD-10-CM

## 2020-12-22 DIAGNOSIS — Z86.718 HISTORY OF DVT (DEEP VEIN THROMBOSIS): ICD-10-CM

## 2020-12-22 PROBLEM — E66.01 MORBID OBESITY DUE TO EXCESS CALORIES (HCC): Status: ACTIVE | Noted: 2020-12-22

## 2020-12-22 LAB
GLUCOSE SERPL-MCNC: 137 MG/DL (ref 65–140)
GLUCOSE SERPL-MCNC: 141 MG/DL (ref 65–140)
GLUCOSE SERPL-MCNC: 86 MG/DL (ref 65–140)
GLUCOSE SERPL-MCNC: 99 MG/DL (ref 65–140)
PLATELET # BLD AUTO: 376 THOUSANDS/UL (ref 149–390)
PMV BLD AUTO: 8.6 FL (ref 8.9–12.7)

## 2020-12-22 PROCEDURE — 99252 IP/OBS CONSLTJ NEW/EST SF 35: CPT | Performed by: INTERNAL MEDICINE

## 2020-12-22 PROCEDURE — 43644 LAP GASTRIC BYPASS/ROUX-EN-Y: CPT | Performed by: SURGERY

## 2020-12-22 PROCEDURE — C9290 INJ, BUPIVACAINE LIPOSOME: HCPCS | Performed by: SURGERY

## 2020-12-22 PROCEDURE — 94762 N-INVAS EAR/PLS OXIMTRY CONT: CPT

## 2020-12-22 PROCEDURE — 82948 REAGENT STRIP/BLOOD GLUCOSE: CPT

## 2020-12-22 PROCEDURE — 0D164ZA BYPASS STOMACH TO JEJUNUM, PERCUTANEOUS ENDOSCOPIC APPROACH: ICD-10-PCS | Performed by: SURGERY

## 2020-12-22 PROCEDURE — 0BQT4ZZ REPAIR DIAPHRAGM, PERCUTANEOUS ENDOSCOPIC APPROACH: ICD-10-PCS | Performed by: SURGERY

## 2020-12-22 PROCEDURE — 85049 AUTOMATED PLATELET COUNT: CPT | Performed by: SURGERY

## 2020-12-22 RX ORDER — FENTANYL CITRATE 50 UG/ML
INJECTION, SOLUTION INTRAMUSCULAR; INTRAVENOUS AS NEEDED
Status: DISCONTINUED | OUTPATIENT
Start: 2020-12-22 | End: 2020-12-22

## 2020-12-22 RX ORDER — PROPOFOL 10 MG/ML
INJECTION, EMULSION INTRAVENOUS CONTINUOUS PRN
Status: DISCONTINUED | OUTPATIENT
Start: 2020-12-22 | End: 2020-12-22

## 2020-12-22 RX ORDER — ALBUTEROL SULFATE 90 UG/1
2 AEROSOL, METERED RESPIRATORY (INHALATION) EVERY 6 HOURS PRN
Status: DISCONTINUED | OUTPATIENT
Start: 2020-12-22 | End: 2020-12-24 | Stop reason: HOSPADM

## 2020-12-22 RX ORDER — OXYCODONE HCL 5 MG/5 ML
10 SOLUTION, ORAL ORAL EVERY 4 HOURS PRN
Status: DISCONTINUED | OUTPATIENT
Start: 2020-12-22 | End: 2020-12-24 | Stop reason: HOSPADM

## 2020-12-22 RX ORDER — PROPOFOL 10 MG/ML
INJECTION, EMULSION INTRAVENOUS AS NEEDED
Status: DISCONTINUED | OUTPATIENT
Start: 2020-12-22 | End: 2020-12-22

## 2020-12-22 RX ORDER — GABAPENTIN 300 MG/1
300 CAPSULE ORAL ONCE
Status: COMPLETED | OUTPATIENT
Start: 2020-12-22 | End: 2020-12-22

## 2020-12-22 RX ORDER — NEBIVOLOL 10 MG/1
20 TABLET ORAL 2 TIMES DAILY
Status: DISCONTINUED | OUTPATIENT
Start: 2020-12-22 | End: 2020-12-22

## 2020-12-22 RX ORDER — METOCLOPRAMIDE HYDROCHLORIDE 5 MG/ML
10 INJECTION INTRAMUSCULAR; INTRAVENOUS EVERY 6 HOURS PRN
Status: DISCONTINUED | OUTPATIENT
Start: 2020-12-22 | End: 2020-12-24 | Stop reason: HOSPADM

## 2020-12-22 RX ORDER — SODIUM CHLORIDE, SODIUM LACTATE, POTASSIUM CHLORIDE, CALCIUM CHLORIDE 600; 310; 30; 20 MG/100ML; MG/100ML; MG/100ML; MG/100ML
75 INJECTION, SOLUTION INTRAVENOUS CONTINUOUS
Status: DISCONTINUED | OUTPATIENT
Start: 2020-12-22 | End: 2020-12-22

## 2020-12-22 RX ORDER — LORAZEPAM 2 MG/ML
0.5 INJECTION INTRAMUSCULAR EVERY 6 HOURS PRN
Status: DISCONTINUED | OUTPATIENT
Start: 2020-12-22 | End: 2020-12-24 | Stop reason: HOSPADM

## 2020-12-22 RX ORDER — OXYCODONE HCL 5 MG/5 ML
5 SOLUTION, ORAL ORAL EVERY 4 HOURS PRN
Status: DISCONTINUED | OUTPATIENT
Start: 2020-12-22 | End: 2020-12-24 | Stop reason: HOSPADM

## 2020-12-22 RX ORDER — SODIUM CHLORIDE, SODIUM LACTATE, POTASSIUM CHLORIDE, CALCIUM CHLORIDE 600; 310; 30; 20 MG/100ML; MG/100ML; MG/100ML; MG/100ML
50 INJECTION, SOLUTION INTRAVENOUS CONTINUOUS
Status: DISCONTINUED | OUTPATIENT
Start: 2020-12-22 | End: 2020-12-24

## 2020-12-22 RX ORDER — CEFAZOLIN SODIUM 2 G/50ML
SOLUTION INTRAVENOUS AS NEEDED
Status: DISCONTINUED | OUTPATIENT
Start: 2020-12-22 | End: 2020-12-22

## 2020-12-22 RX ORDER — ROCURONIUM BROMIDE 10 MG/ML
INJECTION, SOLUTION INTRAVENOUS AS NEEDED
Status: DISCONTINUED | OUTPATIENT
Start: 2020-12-22 | End: 2020-12-22

## 2020-12-22 RX ORDER — ACETAMINOPHEN 325 MG/1
975 TABLET ORAL ONCE
Status: COMPLETED | OUTPATIENT
Start: 2020-12-22 | End: 2020-12-22

## 2020-12-22 RX ORDER — ACETAMINOPHEN 325 MG/1
975 TABLET ORAL EVERY 6 HOURS SCHEDULED
Status: DISCONTINUED | OUTPATIENT
Start: 2020-12-22 | End: 2020-12-22

## 2020-12-22 RX ORDER — NEBIVOLOL 10 MG/1
20 TABLET ORAL 2 TIMES DAILY
Status: DISCONTINUED | OUTPATIENT
Start: 2020-12-22 | End: 2020-12-24 | Stop reason: HOSPADM

## 2020-12-22 RX ORDER — ONDANSETRON 2 MG/ML
INJECTION INTRAMUSCULAR; INTRAVENOUS AS NEEDED
Status: DISCONTINUED | OUTPATIENT
Start: 2020-12-22 | End: 2020-12-22

## 2020-12-22 RX ORDER — LABETALOL 20 MG/4 ML (5 MG/ML) INTRAVENOUS SYRINGE
20
Status: DISCONTINUED | OUTPATIENT
Start: 2020-12-22 | End: 2020-12-24 | Stop reason: HOSPADM

## 2020-12-22 RX ORDER — FENTANYL CITRATE/PF 50 MCG/ML
25 SYRINGE (ML) INJECTION
Status: DISCONTINUED | OUTPATIENT
Start: 2020-12-22 | End: 2020-12-22 | Stop reason: HOSPADM

## 2020-12-22 RX ORDER — AMLODIPINE BESYLATE 10 MG/1
10 TABLET ORAL DAILY
Status: DISCONTINUED | OUTPATIENT
Start: 2020-12-23 | End: 2020-12-24 | Stop reason: HOSPADM

## 2020-12-22 RX ORDER — CEFAZOLIN SODIUM 2 G/50ML
2000 SOLUTION INTRAVENOUS EVERY 8 HOURS
Status: COMPLETED | OUTPATIENT
Start: 2020-12-22 | End: 2020-12-23

## 2020-12-22 RX ORDER — ATORVASTATIN CALCIUM 40 MG/1
40 TABLET, FILM COATED ORAL
Status: DISCONTINUED | OUTPATIENT
Start: 2020-12-22 | End: 2020-12-24 | Stop reason: HOSPADM

## 2020-12-22 RX ORDER — HYDRALAZINE HYDROCHLORIDE 20 MG/ML
10 INJECTION INTRAMUSCULAR; INTRAVENOUS ONCE AS NEEDED
Status: COMPLETED | OUTPATIENT
Start: 2020-12-22 | End: 2020-12-22

## 2020-12-22 RX ORDER — LABETALOL 20 MG/4 ML (5 MG/ML) INTRAVENOUS SYRINGE
20 ONCE
Status: COMPLETED | OUTPATIENT
Start: 2020-12-22 | End: 2020-12-22

## 2020-12-22 RX ORDER — SIMETHICONE 80 MG
80 TABLET,CHEWABLE ORAL EVERY 12 HOURS
Status: DISCONTINUED | OUTPATIENT
Start: 2020-12-22 | End: 2020-12-24 | Stop reason: HOSPADM

## 2020-12-22 RX ORDER — NEOSTIGMINE METHYLSULFATE 1 MG/ML
INJECTION INTRAVENOUS AS NEEDED
Status: DISCONTINUED | OUTPATIENT
Start: 2020-12-22 | End: 2020-12-22

## 2020-12-22 RX ORDER — SODIUM CHLORIDE, SODIUM LACTATE, POTASSIUM CHLORIDE, CALCIUM CHLORIDE 600; 310; 30; 20 MG/100ML; MG/100ML; MG/100ML; MG/100ML
20 INJECTION, SOLUTION INTRAVENOUS CONTINUOUS
Status: DISCONTINUED | OUTPATIENT
Start: 2020-12-22 | End: 2020-12-23

## 2020-12-22 RX ORDER — HYDRALAZINE HYDROCHLORIDE 20 MG/ML
10 INJECTION INTRAMUSCULAR; INTRAVENOUS EVERY 6 HOURS PRN
Status: DISPENSED | OUTPATIENT
Start: 2020-12-22 | End: 2020-12-23

## 2020-12-22 RX ORDER — GLYCOPYRROLATE 0.2 MG/ML
INJECTION INTRAMUSCULAR; INTRAVENOUS AS NEEDED
Status: DISCONTINUED | OUTPATIENT
Start: 2020-12-22 | End: 2020-12-22

## 2020-12-22 RX ORDER — ONDANSETRON 2 MG/ML
4 INJECTION INTRAMUSCULAR; INTRAVENOUS EVERY 4 HOURS PRN
Status: DISCONTINUED | OUTPATIENT
Start: 2020-12-22 | End: 2020-12-24 | Stop reason: HOSPADM

## 2020-12-22 RX ORDER — DOXAZOSIN MESYLATE 4 MG/1
4 TABLET ORAL
Status: DISCONTINUED | OUTPATIENT
Start: 2020-12-22 | End: 2020-12-24 | Stop reason: HOSPADM

## 2020-12-22 RX ORDER — ESCITALOPRAM OXALATE 20 MG/1
20 TABLET ORAL DAILY
Status: DISCONTINUED | OUTPATIENT
Start: 2020-12-23 | End: 2020-12-24 | Stop reason: HOSPADM

## 2020-12-22 RX ORDER — ACETAMINOPHEN 325 MG/1
975 TABLET ORAL EVERY 6 HOURS SCHEDULED
Status: DISCONTINUED | OUTPATIENT
Start: 2020-12-22 | End: 2020-12-24 | Stop reason: HOSPADM

## 2020-12-22 RX ORDER — ONDANSETRON 2 MG/ML
4 INJECTION INTRAMUSCULAR; INTRAVENOUS ONCE AS NEEDED
Status: DISCONTINUED | OUTPATIENT
Start: 2020-12-22 | End: 2020-12-22 | Stop reason: HOSPADM

## 2020-12-22 RX ORDER — SODIUM CHLORIDE 9 MG/ML
INJECTION, SOLUTION INTRAVENOUS CONTINUOUS PRN
Status: DISCONTINUED | OUTPATIENT
Start: 2020-12-22 | End: 2020-12-22

## 2020-12-22 RX ORDER — SODIUM CHLORIDE 9 MG/ML
INJECTION INTRAVENOUS AS NEEDED
Status: DISCONTINUED | OUTPATIENT
Start: 2020-12-22 | End: 2020-12-22 | Stop reason: HOSPADM

## 2020-12-22 RX ORDER — SODIUM CHLORIDE, SODIUM LACTATE, POTASSIUM CHLORIDE, CALCIUM CHLORIDE 600; 310; 30; 20 MG/100ML; MG/100ML; MG/100ML; MG/100ML
INJECTION, SOLUTION INTRAVENOUS CONTINUOUS PRN
Status: DISCONTINUED | OUTPATIENT
Start: 2020-12-22 | End: 2020-12-22

## 2020-12-22 RX ORDER — PROMETHAZINE HYDROCHLORIDE 25 MG/ML
25 INJECTION, SOLUTION INTRAMUSCULAR; INTRAVENOUS EVERY 6 HOURS PRN
Status: DISCONTINUED | OUTPATIENT
Start: 2020-12-22 | End: 2020-12-24 | Stop reason: HOSPADM

## 2020-12-22 RX ORDER — CEFAZOLIN SODIUM 2 G/50ML
2000 SOLUTION INTRAVENOUS ONCE
Status: DISCONTINUED | OUTPATIENT
Start: 2020-12-22 | End: 2020-12-22 | Stop reason: HOSPADM

## 2020-12-22 RX ORDER — SIMETHICONE 80 MG
80 TABLET,CHEWABLE ORAL EVERY 12 HOURS
Status: DISCONTINUED | OUTPATIENT
Start: 2020-12-22 | End: 2020-12-22

## 2020-12-22 RX ORDER — HYDROMORPHONE HCL/PF 1 MG/ML
0.2 SYRINGE (ML) INJECTION
Status: DISCONTINUED | OUTPATIENT
Start: 2020-12-22 | End: 2020-12-22 | Stop reason: HOSPADM

## 2020-12-22 RX ORDER — MAGNESIUM HYDROXIDE 1200 MG/15ML
LIQUID ORAL AS NEEDED
Status: DISCONTINUED | OUTPATIENT
Start: 2020-12-22 | End: 2020-12-22 | Stop reason: HOSPADM

## 2020-12-22 RX ORDER — BUPIVACAINE HYDROCHLORIDE 5 MG/ML
INJECTION, SOLUTION EPIDURAL; INTRACAUDAL AS NEEDED
Status: DISCONTINUED | OUTPATIENT
Start: 2020-12-22 | End: 2020-12-22 | Stop reason: HOSPADM

## 2020-12-22 RX ORDER — HYDROMORPHONE HCL/PF 1 MG/ML
1 SYRINGE (ML) INJECTION EVERY 4 HOURS PRN
Status: DISCONTINUED | OUTPATIENT
Start: 2020-12-22 | End: 2020-12-24 | Stop reason: HOSPADM

## 2020-12-22 RX ORDER — SUCCINYLCHOLINE/SOD CL,ISO/PF 100 MG/5ML
SYRINGE (ML) INTRAVENOUS AS NEEDED
Status: DISCONTINUED | OUTPATIENT
Start: 2020-12-22 | End: 2020-12-22

## 2020-12-22 RX ORDER — BUSPIRONE HYDROCHLORIDE 5 MG/1
15 TABLET ORAL 2 TIMES DAILY
Status: DISCONTINUED | OUTPATIENT
Start: 2020-12-22 | End: 2020-12-24 | Stop reason: HOSPADM

## 2020-12-22 RX ORDER — HYDRALAZINE HYDROCHLORIDE 20 MG/ML
10 INJECTION INTRAMUSCULAR; INTRAVENOUS ONCE
Status: COMPLETED | OUTPATIENT
Start: 2020-12-22 | End: 2020-12-22

## 2020-12-22 RX ORDER — SCOLOPAMINE TRANSDERMAL SYSTEM 1 MG/1
1 PATCH, EXTENDED RELEASE TRANSDERMAL ONCE
Status: DISCONTINUED | OUTPATIENT
Start: 2020-12-22 | End: 2020-12-24 | Stop reason: HOSPADM

## 2020-12-22 RX ORDER — EPLERENONE 25 MG/1
50 TABLET, FILM COATED ORAL DAILY
Status: DISCONTINUED | OUTPATIENT
Start: 2020-12-23 | End: 2020-12-24 | Stop reason: HOSPADM

## 2020-12-22 RX ADMIN — SCOPALAMINE 1 PATCH: 1 PATCH, EXTENDED RELEASE TRANSDERMAL at 11:35

## 2020-12-22 RX ADMIN — ENOXAPARIN SODIUM 30 MG: 40 INJECTION SUBCUTANEOUS at 13:15

## 2020-12-22 RX ADMIN — SODIUM CHLORIDE, SODIUM LACTATE, POTASSIUM CHLORIDE, AND CALCIUM CHLORIDE: .6; .31; .03; .02 INJECTION, SOLUTION INTRAVENOUS at 13:24

## 2020-12-22 RX ADMIN — PROPOFOL 200 MG: 10 INJECTION, EMULSION INTRAVENOUS at 13:24

## 2020-12-22 RX ADMIN — CEFAZOLIN SODIUM 2000 MG: 2 SOLUTION INTRAVENOUS at 18:40

## 2020-12-22 RX ADMIN — GLYCOPYRROLATE 0.4 MG: 0.2 INJECTION, SOLUTION INTRAMUSCULAR; INTRAVENOUS at 16:09

## 2020-12-22 RX ADMIN — REMIFENTANIL HYDROCHLORIDE 0.25 MCG/KG/MIN: 1 INJECTION, POWDER, LYOPHILIZED, FOR SOLUTION INTRAVENOUS at 13:29

## 2020-12-22 RX ADMIN — ROCURONIUM BROMIDE 50 MG: 10 INJECTION, SOLUTION INTRAVENOUS at 13:33

## 2020-12-22 RX ADMIN — HYDRALAZINE HYDROCHLORIDE 10 MG: 20 INJECTION INTRAMUSCULAR; INTRAVENOUS at 16:54

## 2020-12-22 RX ADMIN — Medication 100 MG: at 13:24

## 2020-12-22 RX ADMIN — ACETAMINOPHEN 975 MG: 325 TABLET, FILM COATED ORAL at 11:34

## 2020-12-22 RX ADMIN — ROCURONIUM BROMIDE 20 MG: 10 INJECTION, SOLUTION INTRAVENOUS at 14:21

## 2020-12-22 RX ADMIN — ACETAMINOPHEN 975 MG: 325 TABLET, FILM COATED ORAL at 22:02

## 2020-12-22 RX ADMIN — HYDRALAZINE HYDROCHLORIDE 10 MG: 20 INJECTION INTRAMUSCULAR; INTRAVENOUS at 17:36

## 2020-12-22 RX ADMIN — FENTANYL CITRATE 100 MCG: 50 INJECTION, SOLUTION INTRAMUSCULAR; INTRAVENOUS at 13:24

## 2020-12-22 RX ADMIN — NEOSTIGMINE METHYLSULFATE 3 MG: 1 INJECTION, SOLUTION INTRAVENOUS at 16:09

## 2020-12-22 RX ADMIN — LABETALOL 20 MG/4 ML (5 MG/ML) INTRAVENOUS SYRINGE 20 MG: at 16:44

## 2020-12-22 RX ADMIN — METRONIDAZOLE 500 MG: 500 INJECTION, SOLUTION INTRAVENOUS at 19:51

## 2020-12-22 RX ADMIN — ONDANSETRON 4 MG: 2 INJECTION INTRAMUSCULAR; INTRAVENOUS at 15:58

## 2020-12-22 RX ADMIN — FAMOTIDINE 20 MG: 10 INJECTION, SOLUTION INTRAVENOUS at 16:29

## 2020-12-22 RX ADMIN — ROCURONIUM BROMIDE 10 MG: 10 INJECTION, SOLUTION INTRAVENOUS at 14:53

## 2020-12-22 RX ADMIN — LIDOCAINE HYDROCHLORIDE 100 MG: 20 INJECTION, SOLUTION INTRAVENOUS at 13:24

## 2020-12-22 RX ADMIN — SIMETHICONE 80 MG: 80 TABLET, CHEWABLE ORAL at 22:03

## 2020-12-22 RX ADMIN — LABETALOL 20 MG/4 ML (5 MG/ML) INTRAVENOUS SYRINGE 20 MG: at 17:39

## 2020-12-22 RX ADMIN — SODIUM CHLORIDE, SODIUM LACTATE, POTASSIUM CHLORIDE, AND CALCIUM CHLORIDE 75 ML/HR: .6; .31; .03; .02 INJECTION, SOLUTION INTRAVENOUS at 18:41

## 2020-12-22 RX ADMIN — OXYCODONE HYDROCHLORIDE 5 MG: 5 SOLUTION ORAL at 22:21

## 2020-12-22 RX ADMIN — FENTANYL CITRATE 25 MCG: 50 INJECTION, SOLUTION INTRAMUSCULAR; INTRAVENOUS at 16:54

## 2020-12-22 RX ADMIN — CEFAZOLIN SODIUM 2000 MG: 2 SOLUTION INTRAVENOUS at 13:26

## 2020-12-22 RX ADMIN — ROCURONIUM BROMIDE 20 MG: 10 INJECTION, SOLUTION INTRAVENOUS at 15:30

## 2020-12-22 RX ADMIN — HYDROMORPHONE HYDROCHLORIDE 1 MG: 1 INJECTION, SOLUTION INTRAMUSCULAR; INTRAVENOUS; SUBCUTANEOUS at 19:27

## 2020-12-22 RX ADMIN — ATORVASTATIN CALCIUM 40 MG: 40 TABLET, FILM COATED ORAL at 22:01

## 2020-12-22 RX ADMIN — DOXAZOSIN 4 MG: 4 TABLET ORAL at 22:01

## 2020-12-22 RX ADMIN — FENTANYL CITRATE 25 MCG: 50 INJECTION, SOLUTION INTRAMUSCULAR; INTRAVENOUS at 16:45

## 2020-12-22 RX ADMIN — SODIUM CHLORIDE, SODIUM LACTATE, POTASSIUM CHLORIDE, AND CALCIUM CHLORIDE 75 ML/HR: .6; .31; .03; .02 INJECTION, SOLUTION INTRAVENOUS at 11:47

## 2020-12-22 RX ADMIN — METRONIDAZOLE 500 MG: 500 INJECTION, SOLUTION INTRAVENOUS at 13:10

## 2020-12-22 RX ADMIN — NEBIVOLOL HYDROCHLORIDE 20 MG: 10 TABLET ORAL at 22:01

## 2020-12-22 RX ADMIN — GABAPENTIN 300 MG: 300 CAPSULE ORAL at 11:35

## 2020-12-22 RX ADMIN — PROPOFOL 120 MCG/KG/MIN: 10 INJECTION, EMULSION INTRAVENOUS at 13:27

## 2020-12-22 RX ADMIN — SODIUM CHLORIDE: 0.9 INJECTION, SOLUTION INTRAVENOUS at 13:28

## 2020-12-22 RX ADMIN — BUSPIRONE HYDROCHLORIDE 15 MG: 5 TABLET ORAL at 22:01

## 2020-12-23 LAB
ANION GAP SERPL CALCULATED.3IONS-SCNC: 13 MMOL/L (ref 4–13)
BUN SERPL-MCNC: 24 MG/DL (ref 5–25)
CALCIUM SERPL-MCNC: 8.7 MG/DL (ref 8.3–10.1)
CHLORIDE SERPL-SCNC: 104 MMOL/L (ref 100–108)
CO2 SERPL-SCNC: 24 MMOL/L (ref 21–32)
CREAT SERPL-MCNC: 1.7 MG/DL (ref 0.6–1.3)
ERYTHROCYTE [DISTWIDTH] IN BLOOD BY AUTOMATED COUNT: 15.7 % (ref 11.6–15.1)
GFR SERPL CREATININE-BSD FRML MDRD: 51 ML/MIN/1.73SQ M
GLUCOSE SERPL-MCNC: 103 MG/DL (ref 65–140)
GLUCOSE SERPL-MCNC: 112 MG/DL (ref 65–140)
GLUCOSE SERPL-MCNC: 124 MG/DL (ref 65–140)
GLUCOSE SERPL-MCNC: 128 MG/DL (ref 65–140)
GLUCOSE SERPL-MCNC: 134 MG/DL (ref 65–140)
GLUCOSE SERPL-MCNC: 85 MG/DL (ref 65–140)
HCT VFR BLD AUTO: 40 % (ref 36.5–49.3)
HGB BLD-MCNC: 12.6 G/DL (ref 12–17)
MCH RBC QN AUTO: 26.8 PG (ref 26.8–34.3)
MCHC RBC AUTO-ENTMCNC: 31.5 G/DL (ref 31.4–37.4)
MCV RBC AUTO: 85 FL (ref 82–98)
PLATELET # BLD AUTO: 376 THOUSANDS/UL (ref 149–390)
PMV BLD AUTO: 8.9 FL (ref 8.9–12.7)
POTASSIUM SERPL-SCNC: 3.9 MMOL/L (ref 3.5–5.3)
RBC # BLD AUTO: 4.71 MILLION/UL (ref 3.88–5.62)
SODIUM SERPL-SCNC: 141 MMOL/L (ref 136–145)
TROPONIN I SERPL-MCNC: 0.02 NG/ML
TROPONIN I SERPL-MCNC: 0.02 NG/ML
WBC # BLD AUTO: 14.09 THOUSAND/UL (ref 4.31–10.16)

## 2020-12-23 PROCEDURE — 84484 ASSAY OF TROPONIN QUANT: CPT | Performed by: INTERNAL MEDICINE

## 2020-12-23 PROCEDURE — 93005 ELECTROCARDIOGRAM TRACING: CPT

## 2020-12-23 PROCEDURE — 85027 COMPLETE CBC AUTOMATED: CPT | Performed by: SURGERY

## 2020-12-23 PROCEDURE — 82948 REAGENT STRIP/BLOOD GLUCOSE: CPT

## 2020-12-23 PROCEDURE — 99232 SBSQ HOSP IP/OBS MODERATE 35: CPT | Performed by: INTERNAL MEDICINE

## 2020-12-23 PROCEDURE — 99024 POSTOP FOLLOW-UP VISIT: CPT | Performed by: SURGERY

## 2020-12-23 PROCEDURE — 99223 1ST HOSP IP/OBS HIGH 75: CPT | Performed by: INTERNAL MEDICINE

## 2020-12-23 PROCEDURE — G0426 INPT/ED TELECONSULT50: HCPCS | Performed by: INTERNAL MEDICINE

## 2020-12-23 PROCEDURE — 80048 BASIC METABOLIC PNL TOTAL CA: CPT | Performed by: SURGERY

## 2020-12-23 RX ORDER — HYDRALAZINE HYDROCHLORIDE 25 MG/1
25 TABLET, FILM COATED ORAL EVERY 8 HOURS SCHEDULED
Status: DISCONTINUED | OUTPATIENT
Start: 2020-12-23 | End: 2020-12-24

## 2020-12-23 RX ORDER — FUROSEMIDE 80 MG
80 TABLET ORAL DAILY
Status: DISCONTINUED | OUTPATIENT
Start: 2020-12-23 | End: 2020-12-24 | Stop reason: HOSPADM

## 2020-12-23 RX ORDER — POTASSIUM CHLORIDE 20 MEQ/1
20 TABLET, EXTENDED RELEASE ORAL DAILY
Status: DISCONTINUED | OUTPATIENT
Start: 2020-12-23 | End: 2020-12-24 | Stop reason: HOSPADM

## 2020-12-23 RX ADMIN — FAMOTIDINE 20 MG: 10 INJECTION, SOLUTION INTRAVENOUS at 08:18

## 2020-12-23 RX ADMIN — CEFAZOLIN SODIUM 2000 MG: 2 SOLUTION INTRAVENOUS at 02:38

## 2020-12-23 RX ADMIN — AMLODIPINE BESYLATE 10 MG: 10 TABLET ORAL at 08:17

## 2020-12-23 RX ADMIN — ACETAMINOPHEN 975 MG: 325 TABLET, FILM COATED ORAL at 06:32

## 2020-12-23 RX ADMIN — SIMETHICONE 80 MG: 80 TABLET, CHEWABLE ORAL at 08:17

## 2020-12-23 RX ADMIN — NEBIVOLOL HYDROCHLORIDE 20 MG: 10 TABLET ORAL at 18:20

## 2020-12-23 RX ADMIN — SIMETHICONE 80 MG: 80 TABLET, CHEWABLE ORAL at 21:39

## 2020-12-23 RX ADMIN — FAMOTIDINE 20 MG: 10 INJECTION, SOLUTION INTRAVENOUS at 21:39

## 2020-12-23 RX ADMIN — METRONIDAZOLE 500 MG: 500 INJECTION, SOLUTION INTRAVENOUS at 03:25

## 2020-12-23 RX ADMIN — ACETAMINOPHEN 975 MG: 325 TABLET, FILM COATED ORAL at 18:19

## 2020-12-23 RX ADMIN — BUSPIRONE HYDROCHLORIDE 15 MG: 5 TABLET ORAL at 18:19

## 2020-12-23 RX ADMIN — ONDANSETRON 4 MG: 2 INJECTION INTRAMUSCULAR; INTRAVENOUS at 04:54

## 2020-12-23 RX ADMIN — OXYCODONE HYDROCHLORIDE 5 MG: 5 SOLUTION ORAL at 19:32

## 2020-12-23 RX ADMIN — FUROSEMIDE 80 MG: 80 TABLET ORAL at 11:00

## 2020-12-23 RX ADMIN — ATORVASTATIN CALCIUM 40 MG: 40 TABLET, FILM COATED ORAL at 21:39

## 2020-12-23 RX ADMIN — BUSPIRONE HYDROCHLORIDE 15 MG: 5 TABLET ORAL at 08:17

## 2020-12-23 RX ADMIN — OXYCODONE HYDROCHLORIDE 5 MG: 5 SOLUTION ORAL at 12:43

## 2020-12-23 RX ADMIN — ENOXAPARIN SODIUM 30 MG: 40 INJECTION SUBCUTANEOUS at 08:18

## 2020-12-23 RX ADMIN — HYDRALAZINE HYDROCHLORIDE 10 MG: 20 INJECTION, SOLUTION INTRAMUSCULAR; INTRAVENOUS at 07:02

## 2020-12-23 RX ADMIN — HYDRALAZINE HYDROCHLORIDE 25 MG: 25 TABLET, FILM COATED ORAL at 14:57

## 2020-12-23 RX ADMIN — SODIUM CHLORIDE, SODIUM LACTATE, POTASSIUM CHLORIDE, AND CALCIUM CHLORIDE 75 ML/HR: .6; .31; .03; .02 INJECTION, SOLUTION INTRAVENOUS at 07:33

## 2020-12-23 RX ADMIN — POTASSIUM CHLORIDE 20 MEQ: 1500 TABLET, EXTENDED RELEASE ORAL at 11:00

## 2020-12-23 RX ADMIN — EPLERENONE 50 MG: 25 TABLET, FILM COATED ORAL at 08:20

## 2020-12-23 RX ADMIN — NEBIVOLOL HYDROCHLORIDE 20 MG: 10 TABLET ORAL at 08:17

## 2020-12-23 RX ADMIN — ESCITALOPRAM OXALATE 20 MG: 20 TABLET ORAL at 08:20

## 2020-12-23 RX ADMIN — ONDANSETRON 4 MG: 2 INJECTION INTRAMUSCULAR; INTRAVENOUS at 12:15

## 2020-12-23 RX ADMIN — HYDRALAZINE HYDROCHLORIDE 25 MG: 25 TABLET, FILM COATED ORAL at 21:39

## 2020-12-23 RX ADMIN — DOXAZOSIN 4 MG: 4 TABLET ORAL at 21:39

## 2020-12-23 RX ADMIN — ACETAMINOPHEN 975 MG: 325 TABLET, FILM COATED ORAL at 12:43

## 2020-12-24 VITALS
DIASTOLIC BLOOD PRESSURE: 96 MMHG | SYSTOLIC BLOOD PRESSURE: 184 MMHG | HEART RATE: 78 BPM | RESPIRATION RATE: 20 BRPM | HEIGHT: 68 IN | TEMPERATURE: 99 F | WEIGHT: 232.59 LBS | BODY MASS INDEX: 35.25 KG/M2 | OXYGEN SATURATION: 92 %

## 2020-12-24 LAB
ANION GAP SERPL CALCULATED.3IONS-SCNC: 11 MMOL/L (ref 4–13)
ATRIAL RATE: 74 BPM
BASOPHILS # BLD AUTO: 0.06 THOUSANDS/ΜL (ref 0–0.1)
BASOPHILS NFR BLD AUTO: 1 % (ref 0–1)
BUN SERPL-MCNC: 22 MG/DL (ref 5–25)
CALCIUM SERPL-MCNC: 8.5 MG/DL (ref 8.3–10.1)
CHLORIDE SERPL-SCNC: 106 MMOL/L (ref 100–108)
CO2 SERPL-SCNC: 25 MMOL/L (ref 21–32)
CREAT SERPL-MCNC: 1.34 MG/DL (ref 0.6–1.3)
EOSINOPHIL # BLD AUTO: 0.1 THOUSAND/ΜL (ref 0–0.61)
EOSINOPHIL NFR BLD AUTO: 1 % (ref 0–6)
ERYTHROCYTE [DISTWIDTH] IN BLOOD BY AUTOMATED COUNT: 15.9 % (ref 11.6–15.1)
GFR SERPL CREATININE-BSD FRML MDRD: 68 ML/MIN/1.73SQ M
GLUCOSE SERPL-MCNC: 102 MG/DL (ref 65–140)
GLUCOSE SERPL-MCNC: 102 MG/DL (ref 65–140)
GLUCOSE SERPL-MCNC: 90 MG/DL (ref 65–140)
HCT VFR BLD AUTO: 36 % (ref 36.5–49.3)
HGB BLD-MCNC: 11.1 G/DL (ref 12–17)
IMM GRANULOCYTES # BLD AUTO: 0.05 THOUSAND/UL (ref 0–0.2)
IMM GRANULOCYTES NFR BLD AUTO: 0 % (ref 0–2)
LYMPHOCYTES # BLD AUTO: 1.88 THOUSANDS/ΜL (ref 0.6–4.47)
LYMPHOCYTES NFR BLD AUTO: 16 % (ref 14–44)
MAGNESIUM SERPL-MCNC: 1.9 MG/DL (ref 1.6–2.6)
MCH RBC QN AUTO: 26.5 PG (ref 26.8–34.3)
MCHC RBC AUTO-ENTMCNC: 30.8 G/DL (ref 31.4–37.4)
MCV RBC AUTO: 86 FL (ref 82–98)
MONOCYTES # BLD AUTO: 1.16 THOUSAND/ΜL (ref 0.17–1.22)
MONOCYTES NFR BLD AUTO: 10 % (ref 4–12)
NEUTROPHILS # BLD AUTO: 8.51 THOUSANDS/ΜL (ref 1.85–7.62)
NEUTS SEG NFR BLD AUTO: 72 % (ref 43–75)
NRBC BLD AUTO-RTO: 0 /100 WBCS
P AXIS: 55 DEGREES
PLATELET # BLD AUTO: 316 THOUSANDS/UL (ref 149–390)
PMV BLD AUTO: 8.7 FL (ref 8.9–12.7)
POTASSIUM SERPL-SCNC: 3.4 MMOL/L (ref 3.5–5.3)
PR INTERVAL: 172 MS
QRS AXIS: -37 DEGREES
QRSD INTERVAL: 92 MS
QT INTERVAL: 422 MS
QTC INTERVAL: 468 MS
RBC # BLD AUTO: 4.19 MILLION/UL (ref 3.88–5.62)
SODIUM SERPL-SCNC: 142 MMOL/L (ref 136–145)
T WAVE AXIS: 45 DEGREES
VENTRICULAR RATE: 74 BPM
WBC # BLD AUTO: 11.76 THOUSAND/UL (ref 4.31–10.16)

## 2020-12-24 PROCEDURE — NC001 PR NO CHARGE: Performed by: PHYSICIAN ASSISTANT

## 2020-12-24 PROCEDURE — 80048 BASIC METABOLIC PNL TOTAL CA: CPT | Performed by: PHYSICIAN ASSISTANT

## 2020-12-24 PROCEDURE — 83735 ASSAY OF MAGNESIUM: CPT | Performed by: PHYSICIAN ASSISTANT

## 2020-12-24 PROCEDURE — 82948 REAGENT STRIP/BLOOD GLUCOSE: CPT

## 2020-12-24 PROCEDURE — 99024 POSTOP FOLLOW-UP VISIT: CPT | Performed by: PHYSICIAN ASSISTANT

## 2020-12-24 PROCEDURE — 93010 ELECTROCARDIOGRAM REPORT: CPT | Performed by: INTERNAL MEDICINE

## 2020-12-24 PROCEDURE — 85025 COMPLETE CBC W/AUTO DIFF WBC: CPT | Performed by: PHYSICIAN ASSISTANT

## 2020-12-24 PROCEDURE — 94760 N-INVAS EAR/PLS OXIMETRY 1: CPT

## 2020-12-24 PROCEDURE — 99232 SBSQ HOSP IP/OBS MODERATE 35: CPT | Performed by: INTERNAL MEDICINE

## 2020-12-24 PROCEDURE — 94660 CPAP INITIATION&MGMT: CPT

## 2020-12-24 RX ORDER — HYDRALAZINE HYDROCHLORIDE 25 MG/1
50 TABLET, FILM COATED ORAL EVERY 8 HOURS SCHEDULED
Status: DISCONTINUED | OUTPATIENT
Start: 2020-12-24 | End: 2020-12-24 | Stop reason: HOSPADM

## 2020-12-24 RX ORDER — HYDRALAZINE HYDROCHLORIDE 50 MG/1
50 TABLET, FILM COATED ORAL EVERY 8 HOURS SCHEDULED
Qty: 90 TABLET | Refills: 0 | Status: SHIPPED | OUTPATIENT
Start: 2020-12-24 | End: 2021-04-18 | Stop reason: HOSPADM

## 2020-12-24 RX ORDER — ACETAMINOPHEN 325 MG/1
975 TABLET ORAL EVERY 8 HOURS SCHEDULED
Qty: 27 TABLET | Refills: 0
Start: 2020-12-24 | End: 2020-12-27

## 2020-12-24 RX ORDER — HYDRALAZINE HYDROCHLORIDE 25 MG/1
25 TABLET, FILM COATED ORAL EVERY 8 HOURS SCHEDULED
Qty: 30 TABLET | Refills: 0 | Status: SHIPPED | OUTPATIENT
Start: 2020-12-24 | End: 2020-12-24 | Stop reason: HOSPADM

## 2020-12-24 RX ADMIN — ACETAMINOPHEN 975 MG: 325 TABLET, FILM COATED ORAL at 00:08

## 2020-12-24 RX ADMIN — AMLODIPINE BESYLATE 10 MG: 10 TABLET ORAL at 08:43

## 2020-12-24 RX ADMIN — FAMOTIDINE 20 MG: 10 INJECTION, SOLUTION INTRAVENOUS at 08:44

## 2020-12-24 RX ADMIN — ESCITALOPRAM OXALATE 20 MG: 20 TABLET ORAL at 08:46

## 2020-12-24 RX ADMIN — NEBIVOLOL HYDROCHLORIDE 20 MG: 10 TABLET ORAL at 08:44

## 2020-12-24 RX ADMIN — ENOXAPARIN SODIUM 30 MG: 40 INJECTION SUBCUTANEOUS at 08:44

## 2020-12-24 RX ADMIN — SIMETHICONE 80 MG: 80 TABLET, CHEWABLE ORAL at 08:44

## 2020-12-24 RX ADMIN — BUSPIRONE HYDROCHLORIDE 15 MG: 5 TABLET ORAL at 08:45

## 2020-12-24 RX ADMIN — FUROSEMIDE 80 MG: 80 TABLET ORAL at 08:44

## 2020-12-24 RX ADMIN — EPLERENONE 50 MG: 25 TABLET, FILM COATED ORAL at 08:46

## 2020-12-24 RX ADMIN — POTASSIUM CHLORIDE 20 MEQ: 1500 TABLET, EXTENDED RELEASE ORAL at 08:47

## 2020-12-24 RX ADMIN — SODIUM CHLORIDE, SODIUM LACTATE, POTASSIUM CHLORIDE, AND CALCIUM CHLORIDE 50 ML/HR: .6; .31; .03; .02 INJECTION, SOLUTION INTRAVENOUS at 00:24

## 2020-12-24 RX ADMIN — HYDRALAZINE HYDROCHLORIDE 25 MG: 25 TABLET, FILM COATED ORAL at 05:23

## 2020-12-24 RX ADMIN — ACETAMINOPHEN 975 MG: 325 TABLET, FILM COATED ORAL at 05:24

## 2020-12-28 ENCOUNTER — TELEPHONE (OUTPATIENT)
Dept: BARIATRICS | Facility: CLINIC | Age: 55
End: 2020-12-28

## 2020-12-29 ENCOUNTER — TELEPHONE (OUTPATIENT)
Dept: BARIATRICS | Facility: CLINIC | Age: 55
End: 2020-12-29

## 2020-12-29 RX ORDER — SEMAGLUTIDE 1.34 MG/ML
INJECTION, SOLUTION SUBCUTANEOUS
COMMUNITY
Start: 2020-09-28 | End: 2021-04-15

## 2020-12-29 RX ORDER — PEN NEEDLE, DIABETIC 32GX 5/32"
NEEDLE, DISPOSABLE MISCELLANEOUS
COMMUNITY
Start: 2020-12-21

## 2020-12-31 ENCOUNTER — OFFICE VISIT (OUTPATIENT)
Dept: BARIATRICS | Facility: CLINIC | Age: 55
End: 2020-12-31

## 2020-12-31 VITALS
BODY MASS INDEX: 35 KG/M2 | HEIGHT: 67 IN | DIASTOLIC BLOOD PRESSURE: 80 MMHG | HEART RATE: 74 BPM | WEIGHT: 223 LBS | TEMPERATURE: 97.6 F | SYSTOLIC BLOOD PRESSURE: 140 MMHG

## 2020-12-31 DIAGNOSIS — J44.9 COPD (CHRONIC OBSTRUCTIVE PULMONARY DISEASE) (HCC): ICD-10-CM

## 2020-12-31 DIAGNOSIS — G47.33 OSA (OBSTRUCTIVE SLEEP APNEA): ICD-10-CM

## 2020-12-31 DIAGNOSIS — Z86.718 HISTORY OF DVT (DEEP VEIN THROMBOSIS): ICD-10-CM

## 2020-12-31 DIAGNOSIS — E11.22 CKD STAGE 3 SECONDARY TO DIABETES (HCC): ICD-10-CM

## 2020-12-31 DIAGNOSIS — E66.9 OBESITY, CLASS I, BMI 30-34.9: ICD-10-CM

## 2020-12-31 DIAGNOSIS — Z48.815 ENCOUNTER FOR SURGICAL AFTERCARE FOLLOWING SURGERY ON THE DIGESTIVE SYSTEM: Primary | ICD-10-CM

## 2020-12-31 DIAGNOSIS — K91.2 POSTSURGICAL MALABSORPTION: Primary | ICD-10-CM

## 2020-12-31 DIAGNOSIS — I50.9 CHF (CONGESTIVE HEART FAILURE) (HCC): ICD-10-CM

## 2020-12-31 DIAGNOSIS — N18.30 CKD STAGE 3 SECONDARY TO DIABETES (HCC): ICD-10-CM

## 2020-12-31 DIAGNOSIS — E11.9 TYPE 2 DIABETES MELLITUS (HCC): ICD-10-CM

## 2020-12-31 DIAGNOSIS — I10 ESSENTIAL HYPERTENSION: ICD-10-CM

## 2020-12-31 DIAGNOSIS — E66.01 MORBID (SEVERE) OBESITY DUE TO EXCESS CALORIES (HCC): ICD-10-CM

## 2020-12-31 PROCEDURE — RECHECK

## 2020-12-31 PROCEDURE — 99024 POSTOP FOLLOW-UP VISIT: CPT | Performed by: SURGERY

## 2021-02-17 ENCOUNTER — OFFICE VISIT (OUTPATIENT)
Dept: BARIATRICS | Facility: CLINIC | Age: 56
End: 2021-02-17

## 2021-02-17 DIAGNOSIS — K91.2 POSTSURGICAL MALABSORPTION: Primary | ICD-10-CM

## 2021-02-17 PROCEDURE — RECHECK

## 2021-02-17 NOTE — PROGRESS NOTES
Bariatric Post Op Nutrition Class     Diagnosis: Obesity    Bariatric Surgeon: Dr Elvi Latif    Surgery: Gastric Bypass Laparoscopic    Class: 5 week post op     Topics discussed today include:     fluid goals post op, protein goals post op, constipation, chew food well, exercise, avoidance of alcohol, PPI use, diet progression, hypoglycemia, dumping syndrome, protein supplems, vitamin/mineral supplements, calcium supplements, additional vitamin B12 and iron supplements    Patient was able to verbalize basic diet (protein, fluid, vitamin and mineral) recommendations and possible nutrition-related complications   Yes

## 2021-03-30 DIAGNOSIS — Z23 ENCOUNTER FOR IMMUNIZATION: ICD-10-CM

## 2021-04-01 PROBLEM — Z48.815 ENCOUNTER FOR SURGICAL AFTERCARE FOLLOWING SURGERY OF DIGESTIVE SYSTEM: Status: ACTIVE | Noted: 2021-04-01

## 2021-04-01 PROBLEM — K91.2 POSTSURGICAL MALABSORPTION: Status: ACTIVE | Noted: 2021-04-01

## 2021-04-01 NOTE — ASSESSMENT & PLAN NOTE
-Using his CPAP regularly  -Encouraged consistent use of CPAP and follow up with sleep medicine for mask refitting/adjustments

## 2021-04-01 NOTE — ASSESSMENT & PLAN NOTE
-s/p Shamika-En-Y Gastric Bypass and HH repair with Dr Marietta Pallas on 12/22/20  Overall doing Well  EWL is 66 6%, which places the patient ahead of schedule for expected post surgical weight loss at this time  He is not getting adequate protein, kcals, fluids, or CHO  Lengthy dietary education today and he agrees avoid skipping meals, include protein with each meal/snack, track intake, increase water, and f/u with RD asap  Initial: 241 5lbs  Current: 175lbs  EWL: 66 6%  Wilfred: current  Current BMI is Body mass index is 27 08 kg/m²  · Tolerating a regular diet-yes, but low appetite and does not always feel like eating protein foods  · Eating at least 60 grams of protein per day-no; advised on importance  · Following 30/60 minute rule with liquids-yes  · Drinking at least 64 ounces of fluid per day-no; advised on importance  · Drinking carbonated beverages-no  · Sufficient exercise-no; advised him to slowly start walking program, but only after we increase his kcals and protein  · Using NSAIDs regularly-no  · Using nicotine-no  · Using alcohol-no   Advised about the risks of alcohol s/p bariatric surgery and recommend avoiding all alcohol

## 2021-04-01 NOTE — ASSESSMENT & PLAN NOTE
-/64 today; advised him to monitor at home and f/u closely with PCP  -On multiple BP medications  -Continue monitoring and management with PCP

## 2021-04-01 NOTE — ASSESSMENT & PLAN NOTE
Lab Results   Component Value Date    HGBA1C 12 0 (H) 07/24/2020     -No longer on DM meds  -Repeat HgbA1C ordered  -Continue healthy diet and exercise; f/u with RD and lengthy dietary education today

## 2021-04-01 NOTE — ASSESSMENT & PLAN NOTE
-At risk for malabsorption of vitamins/minerals secondary to malabsorption and restriction of intake from bariatric surgery  -NOT Currently taking adequate postop bariatric surgery vitamin supplementation: No MVI d/t intolerance, calcium citrate 1,000mg (explained the serious risks associated with not taking adequate vitamins/minerals including but not limited to bone fractures, tooth loss, permanent nerve damage, and even death  -Gave samples of bariatric MVI's today; to review again with RD  -Recommend Thomas MVI w/ iron and calcium citrate 500mg TID  -First set of bariatric labs ordered for approximately asap  -Patient received education about the importance of adhering to a lifelong supplementation regimen to avoid vitamin/mineral deficiencies

## 2021-04-02 ENCOUNTER — OFFICE VISIT (OUTPATIENT)
Dept: BARIATRICS | Facility: CLINIC | Age: 56
End: 2021-04-02
Payer: COMMERCIAL

## 2021-04-02 VITALS
SYSTOLIC BLOOD PRESSURE: 130 MMHG | TEMPERATURE: 97.3 F | DIASTOLIC BLOOD PRESSURE: 64 MMHG | HEART RATE: 67 BPM | WEIGHT: 175 LBS | HEIGHT: 67 IN | RESPIRATION RATE: 12 BRPM | BODY MASS INDEX: 27.47 KG/M2

## 2021-04-02 DIAGNOSIS — Z48.815 ENCOUNTER FOR SURGICAL AFTERCARE FOLLOWING SURGERY OF DIGESTIVE SYSTEM: Primary | ICD-10-CM

## 2021-04-02 DIAGNOSIS — I10 ESSENTIAL HYPERTENSION: ICD-10-CM

## 2021-04-02 DIAGNOSIS — G47.33 OSA (OBSTRUCTIVE SLEEP APNEA): ICD-10-CM

## 2021-04-02 DIAGNOSIS — E11.65 TYPE 2 DIABETES MELLITUS WITH HYPERGLYCEMIA, UNSPECIFIED WHETHER LONG TERM INSULIN USE (HCC): ICD-10-CM

## 2021-04-02 DIAGNOSIS — K91.2 POSTSURGICAL MALABSORPTION: ICD-10-CM

## 2021-04-02 PROCEDURE — 99214 OFFICE O/P EST MOD 30 MIN: CPT | Performed by: PHYSICIAN ASSISTANT

## 2021-04-02 RX ORDER — BUDESONIDE 90 UG/1
2 AEROSOL, POWDER RESPIRATORY (INHALATION) 2 TIMES DAILY
COMMUNITY
Start: 2021-03-09

## 2021-04-02 RX ORDER — FLASH GLUCOSE SENSOR
KIT MISCELLANEOUS
COMMUNITY
Start: 2021-02-17

## 2021-04-02 RX ORDER — DOXAZOSIN 8 MG/1
8 TABLET ORAL 2 TIMES DAILY
COMMUNITY
Start: 2021-02-03

## 2021-04-02 NOTE — PROGRESS NOTES
Assessment/Plan:    Encounter for surgical aftercare following surgery of digestive system  -s/p Shamika-En-Y Gastric Bypass and HH repair with Dr Gregg Jerez on 12/22/20  Overall doing Well  EWL is 66 6%, which places the patient ahead of schedule for expected post surgical weight loss at this time  He is not getting adequate protein, kcals, fluids, or CHO  Lengthy dietary education today and he agrees avoid skipping meals, include protein with each meal/snack, track intake, increase water, and f/u with RD asap  Initial: 241 5lbs  Current: 175lbs  EWL: 66 6%  Wilfred: current  Current BMI is Body mass index is 27 08 kg/m²  · Tolerating a regular diet-yes, but low appetite and does not always feel like eating protein foods  · Eating at least 60 grams of protein per day-no; advised on importance  · Following 30/60 minute rule with liquids-yes  · Drinking at least 64 ounces of fluid per day-no; advised on importance  · Drinking carbonated beverages-no  · Sufficient exercise-no; advised him to slowly start walking program, but only after we increase his kcals and protein  · Using NSAIDs regularly-no  · Using nicotine-no  · Using alcohol-no   Advised about the risks of alcohol s/p bariatric surgery and recommend avoiding all alcohol    Postsurgical malabsorption  -At risk for malabsorption of vitamins/minerals secondary to malabsorption and restriction of intake from bariatric surgery  -NOT Currently taking adequate postop bariatric surgery vitamin supplementation: No MVI d/t intolerance, calcium citrate 1,000mg (explained the serious risks associated with not taking adequate vitamins/minerals including but not limited to bone fractures, tooth loss, permanent nerve damage, and even death  -Gave samples of bariatric MVI's today; to review again with RD  -Recommend Thomas MVI w/ iron and calcium citrate 500mg TID  -First set of bariatric labs ordered for approximately asap  -Patient received education about the importance of adhering to a lifelong supplementation regimen to avoid vitamin/mineral deficiencies       Type 2 diabetes mellitus ([facility] [address] )    Lab Results   Component Value Date    HGBA1C 12 0 (H) 07/24/2020     -No longer on DM meds  -Repeat HgbA1C ordered  -Continue healthy diet and exercise; f/u with RD and lengthy dietary education today    AGUS (obstructive sleep apnea)  -Using his CPAP regularly  -Encouraged consistent use of CPAP and follow up with sleep medicine for mask refitting/adjustments     Essential hypertension  -/64 today; advised him to monitor at home and f/u closely with PCP  -On multiple BP medications  -Continue monitoring and management with PCP       Diagnoses and all orders for this visit:    Encounter for surgical aftercare following surgery of digestive system    Postsurgical malabsorption  -     CBC and differential; Future  -     Comprehensive metabolic panel; Future  -     Copper Level; Future  -     Folate; Future  -     Hemoglobin A1C; Future  -     Iron Panel (Includes Ferritin, Iron Sat%, Iron, and TIBC); Future  -     Zinc; Future  -     Vitamin D 25 hydroxy; Future  -     Vitamin B12; Future  -     Vitamin B1, whole blood; Future    Type 2 diabetes mellitus with hyperglycemia, unspecified whether long term insulin use (HCC)  -     Hemoglobin A1C; Future    AGUS (obstructive sleep apnea)    Essential hypertension    Other orders  -     Pulmicort Flexhaler 90 MCG/ACT inhaler; 2 puffs 2 (two) times a day  -     Continuous Blood Gluc Sensor (FreeStyle Michelle 14 Day Sensor) MISC; Use as directed  -     doxazosin (CARDURA) 8 MG tablet; Take 8 mg by mouth 2 (two) times a day          Subjective:      Patient ID: Vasiliy Connell is a 60 y o  male  -s/p Shamika-En-Y Gastric Bypass and HH repair with Dr Virgilio Fiore on 12/22/20  Presents to the office today for routine follow up  He is tolerating a regular diet; denies N/V, dysphagia, reflux   He admits to mostly eating apples and raw veggies, not feeling hungry so only eats fish or animal protein when his wife makes him, not taking vitamins  Feels somewhat tired, but mostly feels great and happy with his weight loss  Off all DM meds, still on BP meds, using CPAP  Diet Recall:   B - protein shake  Snack - apple or grapes  L - tuna or fish or deli meat only 1-2 x/week and salad greens or veggies  D - usually only eats apple and sometimes yogurt fish or turkey 1-2x/week or sometimes chili    Fluids - 48oz water, 11oz protein shake, 8oz crystal lite, 1 cup coffee w/ creamer    The following portions of the patient's history were reviewed and updated as appropriate: allergies, current medications, past family history, past medical history, past social history, past surgical history and problem list     Review of Systems   Constitutional: Positive for fatigue  Negative for chills and fever  Unexpected weight change: planned weight loss  HENT: Negative for trouble swallowing  Respiratory: Negative for cough and shortness of breath  Cardiovascular: Negative for chest pain and palpitations  Gastrointestinal: Negative for abdominal pain, constipation, diarrhea, nausea and vomiting  Neurological: Negative for dizziness  Psychiatric/Behavioral:        Denies anxiety and depression         Objective:      /64 (BP Location: Left arm, Patient Position: Sitting, Cuff Size: Large)   Pulse 67   Temp (!) 97 3 °F (36 3 °C)   Resp 12   Ht 5' 7 4" (1 712 m)   Wt 79 4 kg (175 lb)   BMI 27 08 kg/m²     Colonoscopy-Completed and up to date with GI; reminded him to f/u per GI       Physical Exam  Vitals signs reviewed  Constitutional:       General: He is not in acute distress  Appearance: He is well-developed  HENT:      Head: Normocephalic and atraumatic  Eyes:      General: No scleral icterus  Cardiovascular:      Rate and Rhythm: Normal rate and regular rhythm  Pulmonary:      Effort: Pulmonary effort is normal  No respiratory distress     Abdominal: General: There is no distension  Palpations: Abdomen is soft  Tenderness: There is no abdominal tenderness  Comments: No incisional hernias appreciated   Skin:     General: Skin is warm and dry  Neurological:      Mental Status: He is alert and oriented to person, place, and time  Psychiatric:         Mood and Affect: Mood normal          Behavior: Behavior normal            BARRIERS: none identified    GOALS:   · Continued/Maintain healthy weight loss with good nutrition intakes  · Adequate hydration with at least 64oz  fluid intake  · Normal vitamin and mineral levels  · Exercise as tolerated  · Increase protein; include 2 protein shakes daily; no meals with just fruit - include protein at every meal (Yogurt, cottage cheese, eggs, fish, soft and moist ground meats)   · Follow up with Dave Butler RD  · Start Bariatric MVI with iron and take 500mg calcium citrate three times a day at least 2 hours apart from each other and from the MVI  · Increase water intake (may do 15/30 minute rule)  · Keep taking daily omeprazole x 3 more months and then may stop  · Try setting a timer to remind yourself of meals/snack times    · Follow-up in 3 months  We kindly ask that your arrive 15 minutes before your scheduled appointment time with your provider to allow our staff to room you, get your vital signs and update your chart  · Follow diet as discussed  · Get lab work done in the next 2 weeks  You have been given a lab slip today  Please call the office if you need a replacement  It is recommended to check with your insurance BEFORE getting labs done to make sure they are covered by your policy  Also, please check with your PCP and other providers before getting labs to avoid duplicate labs  Make sure to HOLD any multivitamins that may contain biotin and any biotin supplements FOR 5 DAYS before any labs since it can affect the results      · Follow vitamin and mineral recommendations as reviewed with you  · Call our office if you have any problems with abdominal pain especially associated with fever, chills, nausea, vomiting or any other concerns  · All  Post-bariatric surgery patients should be aware that very small quantities of any alcohol can cause impairment and it is very possible not to feel the effect  The effect can be in the system for several hours  It is also a stomach irritant  · It is advised to AVOID alcohol, Nonsteroidal antiinflammatory drugs (NSAIDS) and nicotine of all forms   Any of these can cause stomach irritation/pain

## 2021-04-02 NOTE — PATIENT INSTRUCTIONS
GOALS:   · Continued/Maintain healthy weight loss with good nutrition intakes  · Adequate hydration with at least 64oz  fluid intake  · Normal vitamin and mineral levels  · Exercise as tolerated  · Increase protein; include 2 protein shakes daily; no meals with just fruit - include protein at every meal (Yogurt, cottage cheese, eggs, fish, soft and moist ground meats)   · Follow up with Katia Jones RD  · Start Bariatric MVI with iron and take 500mg calcium citrate three times a day at least 2 hours apart from each other and from the MVI  · Increase water intake (may do 15/30 minute rule)  · Keep taking daily omeprazole x 3 more months and then may stop  · Try setting a timer to remind yourself of meals/snack times    · Follow-up in 3 months  We kindly ask that your arrive 15 minutes before your scheduled appointment time with your provider to allow our staff to room you, get your vital signs and update your chart  · Follow diet as discussed  · Get lab work done in the next 2 weeks  You have been given a lab slip today  Please call the office if you need a replacement  It is recommended to check with your insurance BEFORE getting labs done to make sure they are covered by your policy  Also, please check with your PCP and other providers before getting labs to avoid duplicate labs  Make sure to HOLD any multivitamins that may contain biotin and any biotin supplements FOR 5 DAYS before any labs since it can affect the results  · Follow vitamin and mineral recommendations as reviewed with you  · Call our office if you have any problems with abdominal pain especially associated with fever, chills, nausea, vomiting or any other concerns  · All  Post-bariatric surgery patients should be aware that very small quantities of any alcohol can cause impairment and it is very possible not to feel the effect  The effect can be in the system for several hours  It is also a stomach irritant       · It is advised to AVOID alcohol, Nonsteroidal antiinflammatory drugs (NSAIDS) and nicotine of all forms   Any of these can cause stomach irritation/pain

## 2021-04-02 NOTE — Clinical Note
Dimitri Ruiz, please make sure that he does in fact come to his appointment with you! I am worried about him, not really eating much!  Thanks

## 2021-04-05 LAB
CREAT ?TM UR-SCNC: 270 UMOL/L
EXT PROTEIN URINE: 66
HBA1C MFR BLD HPLC: 5.7 %
PROT/CREAT UR: 244 MG/G{CREAT}

## 2021-04-09 ENCOUNTER — TELEPHONE (OUTPATIENT)
Dept: BARIATRICS | Facility: CLINIC | Age: 56
End: 2021-04-09

## 2021-04-15 ENCOUNTER — TELEPHONE (OUTPATIENT)
Dept: BARIATRICS | Facility: CLINIC | Age: 56
End: 2021-04-15

## 2021-04-15 ENCOUNTER — APPOINTMENT (INPATIENT)
Dept: RADIOLOGY | Facility: HOSPITAL | Age: 56
DRG: 683 | End: 2021-04-15
Payer: COMMERCIAL

## 2021-04-15 ENCOUNTER — HOSPITAL ENCOUNTER (INPATIENT)
Facility: HOSPITAL | Age: 56
LOS: 3 days | Discharge: HOME HEALTH | DRG: 683 | End: 2021-04-18
Attending: EMERGENCY MEDICINE | Admitting: INTERNAL MEDICINE
Payer: COMMERCIAL

## 2021-04-15 ENCOUNTER — APPOINTMENT (INPATIENT)
Dept: ULTRASOUND IMAGING | Facility: HOSPITAL | Age: 56
DRG: 683 | End: 2021-04-15
Payer: COMMERCIAL

## 2021-04-15 ENCOUNTER — APPOINTMENT (EMERGENCY)
Dept: RADIOLOGY | Facility: HOSPITAL | Age: 56
DRG: 683 | End: 2021-04-15
Payer: COMMERCIAL

## 2021-04-15 DIAGNOSIS — N17.9 ACUTE KIDNEY INJURY (HCC): Primary | ICD-10-CM

## 2021-04-15 LAB
ALBUMIN SERPL BCP-MCNC: 3.5 G/DL (ref 3.5–5)
ALP SERPL-CCNC: 99 U/L (ref 46–116)
ALT SERPL W P-5'-P-CCNC: 77 U/L (ref 12–78)
ANION GAP SERPL CALCULATED.3IONS-SCNC: 10 MMOL/L (ref 4–13)
AST SERPL W P-5'-P-CCNC: 23 U/L (ref 5–45)
BASOPHILS # BLD AUTO: 0.04 THOUSANDS/ΜL (ref 0–0.1)
BASOPHILS NFR BLD AUTO: 1 % (ref 0–1)
BILIRUB DIRECT SERPL-MCNC: 0.22 MG/DL (ref 0–0.2)
BILIRUB SERPL-MCNC: 0.87 MG/DL (ref 0.2–1)
BILIRUB UR QL STRIP: NEGATIVE
BUN SERPL-MCNC: 48 MG/DL (ref 5–25)
CALCIUM SERPL-MCNC: 8.7 MG/DL (ref 8.3–10.1)
CHLORIDE SERPL-SCNC: 104 MMOL/L (ref 100–108)
CK SERPL-CCNC: 52 U/L (ref 39–308)
CLARITY UR: CLEAR
CO2 SERPL-SCNC: 26 MMOL/L (ref 21–32)
COLOR UR: YELLOW
CREAT SERPL-MCNC: 3.43 MG/DL (ref 0.6–1.3)
EOSINOPHIL # BLD AUTO: 0.4 THOUSAND/ΜL (ref 0–0.61)
EOSINOPHIL NFR BLD AUTO: 6 % (ref 0–6)
ERYTHROCYTE [DISTWIDTH] IN BLOOD BY AUTOMATED COUNT: 15 % (ref 11.6–15.1)
GFR SERPL CREATININE-BSD FRML MDRD: 22 ML/MIN/1.73SQ M
GLUCOSE SERPL-MCNC: 78 MG/DL (ref 65–140)
GLUCOSE UR STRIP-MCNC: NEGATIVE MG/DL
HCT VFR BLD AUTO: 34.1 % (ref 36.5–49.3)
HGB BLD-MCNC: 10.8 G/DL (ref 12–17)
HGB UR QL STRIP.AUTO: NEGATIVE
HIV 1+2 AB+HIV1 P24 AG SERPL QL IA: NORMAL
HIV1 P24 AG SER QL: NORMAL
IMM GRANULOCYTES # BLD AUTO: 0.01 THOUSAND/UL (ref 0–0.2)
IMM GRANULOCYTES NFR BLD AUTO: 0 % (ref 0–2)
KETONES UR STRIP-MCNC: NEGATIVE MG/DL
LEUKOCYTE ESTERASE UR QL STRIP: NEGATIVE
LYMPHOCYTES # BLD AUTO: 1.58 THOUSANDS/ΜL (ref 0.6–4.47)
LYMPHOCYTES NFR BLD AUTO: 22 % (ref 14–44)
MAGNESIUM SERPL-MCNC: 1.8 MG/DL (ref 1.6–2.6)
MCH RBC QN AUTO: 26.9 PG (ref 26.8–34.3)
MCHC RBC AUTO-ENTMCNC: 31.7 G/DL (ref 31.4–37.4)
MCV RBC AUTO: 85 FL (ref 82–98)
MONOCYTES # BLD AUTO: 0.71 THOUSAND/ΜL (ref 0.17–1.22)
MONOCYTES NFR BLD AUTO: 10 % (ref 4–12)
NEUTROPHILS # BLD AUTO: 4.43 THOUSANDS/ΜL (ref 1.85–7.62)
NEUTS SEG NFR BLD AUTO: 61 % (ref 43–75)
NITRITE UR QL STRIP: NEGATIVE
NRBC BLD AUTO-RTO: 0 /100 WBCS
PH UR STRIP.AUTO: 5.5 [PH]
PHOSPHATE SERPL-MCNC: 4.5 MG/DL (ref 2.7–4.5)
PLATELET # BLD AUTO: 339 THOUSANDS/UL (ref 149–390)
PMV BLD AUTO: 9.2 FL (ref 8.9–12.7)
POTASSIUM SERPL-SCNC: 3.4 MMOL/L (ref 3.5–5.3)
PROT SERPL-MCNC: 7.2 G/DL (ref 6.4–8.2)
PROT UR STRIP-MCNC: NEGATIVE MG/DL
RBC # BLD AUTO: 4.01 MILLION/UL (ref 3.88–5.62)
SODIUM SERPL-SCNC: 140 MMOL/L (ref 136–145)
SP GR UR STRIP.AUTO: 1.01 (ref 1–1.03)
URATE SERPL-MCNC: 7.8 MG/DL (ref 4.2–8)
UROBILINOGEN UR QL STRIP.AUTO: 0.2 E.U./DL
WBC # BLD AUTO: 7.17 THOUSAND/UL (ref 4.31–10.16)

## 2021-04-15 PROCEDURE — 84166 PROTEIN E-PHORESIS/URINE/CSF: CPT | Performed by: PATHOLOGY

## 2021-04-15 PROCEDURE — 81003 URINALYSIS AUTO W/O SCOPE: CPT | Performed by: PHYSICIAN ASSISTANT

## 2021-04-15 PROCEDURE — 36415 COLL VENOUS BLD VENIPUNCTURE: CPT | Performed by: PHYSICIAN ASSISTANT

## 2021-04-15 PROCEDURE — 94760 N-INVAS EAR/PLS OXIMETRY 1: CPT

## 2021-04-15 PROCEDURE — 71045 X-RAY EXAM CHEST 1 VIEW: CPT

## 2021-04-15 PROCEDURE — 73630 X-RAY EXAM OF FOOT: CPT

## 2021-04-15 PROCEDURE — 82550 ASSAY OF CK (CPK): CPT | Performed by: PHYSICIAN ASSISTANT

## 2021-04-15 PROCEDURE — 86704 HEP B CORE ANTIBODY TOTAL: CPT | Performed by: PHYSICIAN ASSISTANT

## 2021-04-15 PROCEDURE — 87340 HEPATITIS B SURFACE AG IA: CPT | Performed by: PHYSICIAN ASSISTANT

## 2021-04-15 PROCEDURE — 80048 BASIC METABOLIC PNL TOTAL CA: CPT | Performed by: PHYSICIAN ASSISTANT

## 2021-04-15 PROCEDURE — 86705 HEP B CORE ANTIBODY IGM: CPT | Performed by: PHYSICIAN ASSISTANT

## 2021-04-15 PROCEDURE — 85025 COMPLETE CBC W/AUTO DIFF WBC: CPT | Performed by: PHYSICIAN ASSISTANT

## 2021-04-15 PROCEDURE — 99223 1ST HOSP IP/OBS HIGH 75: CPT | Performed by: INTERNAL MEDICINE

## 2021-04-15 PROCEDURE — 76770 US EXAM ABDO BACK WALL COMP: CPT

## 2021-04-15 PROCEDURE — 84166 PROTEIN E-PHORESIS/URINE/CSF: CPT | Performed by: PHYSICIAN ASSISTANT

## 2021-04-15 PROCEDURE — 84100 ASSAY OF PHOSPHORUS: CPT | Performed by: PHYSICIAN ASSISTANT

## 2021-04-15 PROCEDURE — 86803 HEPATITIS C AB TEST: CPT | Performed by: PHYSICIAN ASSISTANT

## 2021-04-15 PROCEDURE — 84165 PROTEIN E-PHORESIS SERUM: CPT | Performed by: PHYSICIAN ASSISTANT

## 2021-04-15 PROCEDURE — 84165 PROTEIN E-PHORESIS SERUM: CPT | Performed by: PATHOLOGY

## 2021-04-15 PROCEDURE — 83735 ASSAY OF MAGNESIUM: CPT | Performed by: PHYSICIAN ASSISTANT

## 2021-04-15 PROCEDURE — 80076 HEPATIC FUNCTION PANEL: CPT | Performed by: PHYSICIAN ASSISTANT

## 2021-04-15 PROCEDURE — 96360 HYDRATION IV INFUSION INIT: CPT

## 2021-04-15 PROCEDURE — 99285 EMERGENCY DEPT VISIT HI MDM: CPT

## 2021-04-15 PROCEDURE — 87806 HIV AG W/HIV1&2 ANTB W/OPTIC: CPT | Performed by: PHYSICIAN ASSISTANT

## 2021-04-15 PROCEDURE — 84550 ASSAY OF BLOOD/URIC ACID: CPT | Performed by: PHYSICIAN ASSISTANT

## 2021-04-15 PROCEDURE — 99285 EMERGENCY DEPT VISIT HI MDM: CPT | Performed by: PHYSICIAN ASSISTANT

## 2021-04-15 RX ORDER — NEBIVOLOL 10 MG/1
20 TABLET ORAL 2 TIMES DAILY
Status: DISCONTINUED | OUTPATIENT
Start: 2021-04-15 | End: 2021-04-18 | Stop reason: HOSPADM

## 2021-04-15 RX ORDER — BUDESONIDE AND FORMOTEROL FUMARATE DIHYDRATE 160; 4.5 UG/1; UG/1
2 AEROSOL RESPIRATORY (INHALATION) 2 TIMES DAILY
Status: DISCONTINUED | OUTPATIENT
Start: 2021-04-15 | End: 2021-04-18 | Stop reason: HOSPADM

## 2021-04-15 RX ORDER — ONDANSETRON 2 MG/ML
4 INJECTION INTRAMUSCULAR; INTRAVENOUS EVERY 6 HOURS PRN
Status: DISCONTINUED | OUTPATIENT
Start: 2021-04-15 | End: 2021-04-18 | Stop reason: HOSPADM

## 2021-04-15 RX ORDER — DOXAZOSIN MESYLATE 4 MG/1
8 TABLET ORAL 2 TIMES DAILY
Status: DISCONTINUED | OUTPATIENT
Start: 2021-04-15 | End: 2021-04-18 | Stop reason: HOSPADM

## 2021-04-15 RX ORDER — POLYETHYLENE GLYCOL 3350 17 G/17G
17 POWDER, FOR SOLUTION ORAL DAILY PRN
Status: DISCONTINUED | OUTPATIENT
Start: 2021-04-15 | End: 2021-04-18 | Stop reason: HOSPADM

## 2021-04-15 RX ORDER — ESCITALOPRAM OXALATE 10 MG/1
20 TABLET ORAL DAILY
Status: DISCONTINUED | OUTPATIENT
Start: 2021-04-16 | End: 2021-04-18 | Stop reason: HOSPADM

## 2021-04-15 RX ORDER — ACETAMINOPHEN 325 MG/1
650 TABLET ORAL EVERY 6 HOURS PRN
Status: DISCONTINUED | OUTPATIENT
Start: 2021-04-15 | End: 2021-04-18 | Stop reason: HOSPADM

## 2021-04-15 RX ORDER — ATORVASTATIN CALCIUM 40 MG/1
40 TABLET, FILM COATED ORAL
Status: DISCONTINUED | OUTPATIENT
Start: 2021-04-15 | End: 2021-04-18 | Stop reason: HOSPADM

## 2021-04-15 RX ORDER — MELATONIN
1000 DAILY
Status: DISCONTINUED | OUTPATIENT
Start: 2021-04-16 | End: 2021-04-18 | Stop reason: HOSPADM

## 2021-04-15 RX ORDER — MAGNESIUM HYDROXIDE/ALUMINUM HYDROXICE/SIMETHICONE 120; 1200; 1200 MG/30ML; MG/30ML; MG/30ML
30 SUSPENSION ORAL EVERY 6 HOURS PRN
Status: DISCONTINUED | OUTPATIENT
Start: 2021-04-15 | End: 2021-04-18 | Stop reason: HOSPADM

## 2021-04-15 RX ORDER — LORAZEPAM 0.5 MG/1
0.5 TABLET ORAL EVERY 8 HOURS PRN
Status: DISCONTINUED | OUTPATIENT
Start: 2021-04-15 | End: 2021-04-18 | Stop reason: HOSPADM

## 2021-04-15 RX ORDER — PANTOPRAZOLE SODIUM 20 MG/1
20 TABLET, DELAYED RELEASE ORAL
Status: DISCONTINUED | OUTPATIENT
Start: 2021-04-16 | End: 2021-04-18 | Stop reason: HOSPADM

## 2021-04-15 RX ORDER — AMLODIPINE BESYLATE 10 MG/1
10 TABLET ORAL DAILY
Status: DISCONTINUED | OUTPATIENT
Start: 2021-04-16 | End: 2021-04-18 | Stop reason: HOSPADM

## 2021-04-15 RX ORDER — CHLORAL HYDRATE 500 MG
1000 CAPSULE ORAL DAILY
Status: DISCONTINUED | OUTPATIENT
Start: 2021-04-16 | End: 2021-04-18 | Stop reason: HOSPADM

## 2021-04-15 RX ORDER — BUSPIRONE HYDROCHLORIDE 5 MG/1
15 TABLET ORAL 2 TIMES DAILY
Status: DISCONTINUED | OUTPATIENT
Start: 2021-04-15 | End: 2021-04-18 | Stop reason: HOSPADM

## 2021-04-15 RX ORDER — FLUTICASONE PROPIONATE 44 UG/1
2 AEROSOL, METERED RESPIRATORY (INHALATION)
Status: DISCONTINUED | OUTPATIENT
Start: 2021-04-15 | End: 2021-04-18 | Stop reason: HOSPADM

## 2021-04-15 RX ORDER — FERROUS SULFATE 325(65) MG
325 TABLET ORAL 2 TIMES DAILY
Status: DISCONTINUED | OUTPATIENT
Start: 2021-04-15 | End: 2021-04-18 | Stop reason: HOSPADM

## 2021-04-15 RX ADMIN — BUSPIRONE HYDROCHLORIDE 15 MG: 5 TABLET ORAL at 18:39

## 2021-04-15 RX ADMIN — ATORVASTATIN CALCIUM 40 MG: 40 TABLET, FILM COATED ORAL at 21:29

## 2021-04-15 RX ADMIN — NEBIVOLOL HYDROCHLORIDE 20 MG: 10 TABLET ORAL at 18:39

## 2021-04-15 RX ADMIN — DOXAZOSIN 8 MG: 4 TABLET ORAL at 18:39

## 2021-04-15 RX ADMIN — SODIUM CHLORIDE 1000 ML: 0.9 INJECTION, SOLUTION INTRAVENOUS at 12:30

## 2021-04-15 RX ADMIN — FERROUS SULFATE TAB 325 MG (65 MG ELEMENTAL FE) 325 MG: 325 (65 FE) TAB at 18:39

## 2021-04-15 RX ADMIN — BUDESONIDE AND FORMOTEROL FUMARATE DIHYDRATE 2 PUFF: 160; 4.5 AEROSOL RESPIRATORY (INHALATION) at 18:40

## 2021-04-15 NOTE — H&P
[address] H&P- Justus Phani 1965, 60 y o  male MRN: [id number]  Unit/Bed#: -Greg Encounter: [id number]  Primary Care Provider: Josette Rodarte MD   Date and time admitted to hospital: 4/15/2021 11:52 AM    * TRUDI (acute kidney injury) [facility])  Assessment & Plan  · Present on admission  · Sent in by his primary nephrologist for worsening TRUDI   · Creatinine 3 43 - baseline 1 3-1 5   · Hold all nephrotoxic drugs  · Strict I&O's; Daily weight   · Renal ultrasound - pending  · Repeat urine and continuing lab workup - pending  · CXR - unremarkable  · Consulted nephrology for continued recommendations and work up    CKD stage 3 secondary to diabetes [facility])  Assessment & Plan  Lab Results   Component Value Date    HGBA1C 5 7 04/05/2021       No results for input(s): POCGLU in the last 72 hours  Blood Sugar Average: Last 72 hrs:      · Patient has baseline of CKD stage 3 and has followed with nephrology outpatient since before 2012  · Last f/u visit was x 2 days ago   · Repeat lab work abnormal which prompted evaluation inpatient  · Consult nephrology for continued recommendations      Chronic diastolic CHF (congestive heart failure) ([facility] )  Assessment & Plan  Wt Readings from Last 3 Encounters:   04/02/21 79 4 kg (175 lb)   12/31/20 101 kg (223 lb)   12/22/20 105 kg (232 lb 9 4 oz)       · Appears to be euvolemic - denies chest pain, SOB, or swelling of extremities  · Hold lasix due to acute TRUDI      Essential hypertension  Assessment & Plan  · Continue home medications  · Hold nephrotoxic drugs     COPD (chronic obstructive pulmonary disease) ([facility] )  Assessment & Plan  · Denies smoking history  · Continue home medications for COPD    Type 2 diabetes mellitus ([facility] )  Assessment & Plan  Lab Results   Component Value Date    HGBA1C 5 7 04/05/2021       No results for input(s): POCGLU in the last 72 hours      Blood Sugar Average: Last 72 hrs:     · Denies taking Ozempic outpatient · HbgA1C - 5 7  · Continue on cardiac low sodium diet    History of DVT (deep vein thrombosis)  Assessment & Plan  · Past history of DVT   · Not currently on oral anti-coagulation   · Continue to hold anti-coagulation for further recommendation per nephrology  VTE Prophylaxis: Pharmacologic VTE Prophylaxis contraindicated due to awaiting nephro eval in case needs renal biopsy  / sequential compression device   Code Status: FULL  POLST: POLST form is not discussed and not completed at this time  Discussion with family: none present, declines call to wife     Anticipated Length of Stay:  Patient will be admitted on an Inpatient basis with an anticipated length of stay of  > 2 midnights  Justification for Hospital Stay: TRUDI on CKD with low grade temperature elevation     Total Time for Visit, including Counseling / Coordination of Care: 45 minutes  Greater than 50% of this total time spent on direct patient counseling and coordination of care  Chief Complaint:   Abnormal labs - "my doctor sent me in"    History of Present Illness:    Christiano Sarmiento is a 60 y o  male who presents with abnormal renal functions identified on outside labs  Patient was seen today lying comfortable in bed in the ER  Reports that he had a tele-visit with his primary nephrologist x 2 days ago and had to repeat some lab work for him  His nephrologist called him this morning to report immediately to the nearest Emergency Room  Patient states that overall he has been "doing well with no complaints"  Reports that he is urinating without difficulty or dysuria  He denies noticing decreased urinary output  Denies fevers, chills, headaches, vision changes, cough, chest pain, abdominal pain, tremors, or extremity edema  Patient voiced concern over his recent worsening kidney function, especially that his GFR has dropped to 18 comparative to his baseline 60   He was assured that our Nephrology team would be in to evaluate him with further workup inpatient  Patient expressed compliance with home medication regime  Patient has been following with nephrology outpatient since before 2012  Denies personal or family history of autoimmune conditions including Lupus or hereditary disorders of the kidneys  He offers no other acute complaints       Review of Systems:    Review of Systems   Constitutional: Negative for activity change, appetite change, chills, fatigue, fever and unexpected weight change  Respiratory: Negative for cough and shortness of breath  Cardiovascular: Negative for chest pain, palpitations and leg swelling  Gastrointestinal: Negative for abdominal pain, diarrhea, nausea and vomiting  Endocrine: Negative for polydipsia and polyuria  Genitourinary: Negative for decreased urine volume, difficulty urinating, flank pain, frequency and hematuria  Musculoskeletal: Negative for arthralgias and myalgias  Skin: Negative for rash  Allergic/Immunologic: Negative for immunocompromised state  Neurological: Negative for dizziness and weakness  Psychiatric/Behavioral: Negative for confusion  All other systems reviewed and are negative  Past Medical and Surgical History:     Past Medical History:   Diagnosis Date    Asthma     Chronic kidney disease     COPD (chronic obstructive pulmonary disease) (HCC)     CPAP (continuous positive airway pressure) dependence     Diabetes mellitus (HCC)     Emphyse[facility] lung ([facility] )     Gout     History of transfusion     pt stated they had a transfusion when they had gallbladder surgery   Hypertension     Kidney disease     renal failure    Leg DVT (deep venous thromboembolism), acute, bilateral ([facility] ) 01/2018    Obesity (BMI 30-39  9)     AGUS on CPAP     setting 11    Postgastrectomy malabsorption     Sleep apnea     Systolic CHF [facility])        Past Surgical History:   Procedure Laterality Date    ADRENALECTOMY      CHOLECYSTECTOMY      COLONOSCOPY      EGD      HIATAL HERNIA REPAIR N/A 12/22/2020    Procedure: REPAIR HERNIA HIATAL LAPAROSCOPIC;  Surgeon: Juvenal Mejía MD;  Location: [facility];  Service: Bariatrics    INCISION AND DRAINAGE OF WOUND Left 10/17/2018    Procedure: INCISION AND DRAINAGE (I&D) GROIN;  Surgeon: Walter Stauffer MD;  Location: [facility];  Service: General    IR IMAGE [address] / DRAINAGE W TUBE  8/17/2018    IR IMAGE GUIDED ASPIRATION / DRAINAGE W TUBE  7/31/2018    JOINT REPLACEMENT Bilateral     knee    KIDNEY SURGERY  2009    nodule removal    LYMPH NODE DISSECTION Left 01/2018    left inguinal LN removed - benign     OTHER SURGICAL HISTORY      kidney nodule removal    PALATE / UVULA BIOPSY / EXCISION      DE LAP GASTRIC BYPASS/JASON-EN-Y N/A 12/22/2020    Procedure: BYPASS GASTRIC  JASON-EN-Y LAPAROSCOPIC WITH INTRAOPERATIVE EGD;  Surgeon: Juvenal Mejía MD;  Location: [facility];  Service: Albert Kent SINUS SURGERY      TONSILLECTOMY         Meds/Allergies:    Prior to Admission medications    Medication Sig Start Date End Date Taking?  Authorizing Provider   Acetaminophen 325 MG CAPS Take 2 tablets by mouth    Historical Provider, MD   albuterol (PROVENTIL HFA,VENTOLIN HFA) 90 mcg/act inhaler INHALE 2 PUFFS BY MOUTH EVERY 6 HOURS    Historical Provider, MD   amLODIPine (NORVASC) 10 mg tablet Take 10 mg by mouth 4/15/20   Historical Provider, MD   atorvastatin (LIPITOR) 40 mg tablet Take 40 mg by mouth daily at bedtime 2/1/17   Historical Provider, MD   azilsartan medoxomil (EDARBI) 80 MG tablet Take 80 mg by mouth daily  10/9/18   Historical Provider, MD   BD Pen Needle Amelia U/F 32G X 4 MM MISC  12/21/20   Historical Provider, MD   busPIRone (BUSPAR) 15 mg tablet TAKE ONE TABLET BY MOUTH TWICE A DAY FOR PTSD    Historical Provider, MD   Cholecalciferol (VITAMIN D-3) 1000 units CAPS Take 1,000 Units by mouth daily    Historical Provider, MD   Continuous Blood Gluc Sensor (FreeStyle Michelle 14 Day Sensor) MISC Use as directed 2/17/21   Historical Provider, MD   doxazosin (CARDURA) 8 MG tablet Take 8 mg by mouth 2 (two) times a day 2/3/21   Historical Provider, MD   eplerenone (INSPRA) 50 MG tablet Take 50 mg by mouth daily 5/23/17   Historical Provider, MD   escitalopram (LEXAPRO) 20 mg tablet Take 20 mg by mouth daily 11/14/20   Historical Provider, MD   ferrous sulfate 325 (65 Fe) mg tablet Take 325 mg by mouth 2 (two) times a day      Historical Provider, MD   furosemide (LASIX) 80 mg tablet Take 80 mg by mouth daily    Historical Provider, MD   glucose monitoring kit (FREESTYLE) monitoring kit 1 each by Does not apply route 2 (two) times a day Any brand covered by insurance:  Dispense one glucometer, test strips #50, lancets #100  Patient not taking: Reported on 4/2/2021 7/11/20   Nanci Ceballos DO   Glycopyrrolate-Formoterol (BEVESPI AEROSPHERE) 9-4 8 MCG/ACT AERO Inhale 2 puffs daily after breakfast    Historical Provider, MD   hydrALAZINE (APRESOLINE) 50 mg tablet Take 1 tablet (50 mg total) by mouth every 8 (eight) hours Disregard 25 mg order   12/24/20 1/23/21  Emili Crocker MD   LORazepam (ATIVAN) 0 5 mg tablet TAKE ONE TABLET BY MOUTH Q6 PRN    Historical Provider, MD   MAGNESIUM PO Take 400 mg by mouth daily    Historical Provider, MD   Milk Thistle 500 MG CAPS Take 500 mg by mouth 2 (two) times a day    Historical Provider, MD   multivitamin-iron-minerals-folic acid (CENTRUM) chewable tablet Chew 1 tablet daily    Historical Provider, MD   nebivolol (BYSTOLIC) 20 MG tablet Take 20 mg by mouth 2 (two) times a day 6/13/16   Historical Provider, MD   neomycin-polymyxin-hydrocortisone (CORTISPORIN) otic solution Administer 4 drops into ears 8/25/20   Historical Provider, MD   Omega-3 Fatty Acids (FISH OIL) 1200 MG CAPS Take 1,200 mg by mouth daily at bedtime    Historical Provider, MD   omeprazole (PriLOSEC) 20 mg delayed release capsule Take 1 capsule (20 mg total) by mouth daily 12/11/20   Cliff Stallings MD potassium chloride (KLOR-CON M20) 20 mEq tablet Take 1 tablet (20 mEq total) by mouth daily Restart with your lasix next monday 5/23/18   Jose Gusman MD   Pulmicort Flexhaler 90 MCG/ACT inhaler 2 puffs 2 (two) times a day 3/9/21   Historical Provider, MD   umeclidinium-vilanterol (Anoro Ellipta) 62 5-25 MCG/INH inhaler Inhale 1 puff daily    Historical Provider, MD   Semaglutide,0 25 or 0 5MG/DOS, (Ozempic, 0 25 or 0 5 MG/DOSE,) 2 MG/1 5ML SOPN INJECT 0 25 0 5MG SUBCUTANEOUSLY ONCE A WEEK AS DIRECTED BY CLINIC  9/28/20 4/15/21  Historical Provider, MD     I have reviewed home medications with patient personally  Allergies:    Allergies   Allergen Reactions    Clonidine Anaphylaxis    Lisinopril Anaphylaxis    Nifedipine Anaphylaxis    Spironolactone Anaphylaxis, Other (See Comments) and Shortness Of Breath    Buspirone      Headaches,     Enoxaparin      Injection site "bump" per Dr Aguilar Agent Minoxidil      Retains fluid around heart       Social History:     Marital Status: /Civil Union   Occupation: na  Patient Pre-hospital Living Situation: home  Patient Pre-hospital Level of Mobility: independent   Patient Pre-hospital Diet Restrictions: post-bariatric surgery   Substance Use History:   Social History     Substance and Sexual Activity   Alcohol Use Not Currently    Alcohol/week: 1 0 standard drinks    Types: 1 Shots of liquor per week    Frequency: 4 or more times a week    Drinks per session: 1 or 2    Binge frequency: Daily or almost daily    Comment: reportd 1 beer every 2 days     Social History     Tobacco Use   Smoking Status Never Smoker   Smokeless Tobacco Never Used     Social History     Substance and Sexual Activity   Drug Use No       Family History:    Family History   Problem Relation Age of Onset    Heart murmur Sister     Asthma Sister     Hypertension Sister     Kidney disease Mother     Cancer Father     Bell's palsy Brother     Kidney disease Brother     Deep vein thrombosis Neg Hx        Physical Exam:     Vitals:   Blood Pressure: 155/85 (04/15/21 1549)  Pulse: 61 (04/15/21 1549)  Temperature: 98 °F (36 7 °C) (04/15/21 1549)  Temp Source: Oral (04/15/21 1549)  Respirations: 18 (04/15/21 1549)  Height: 5' 8" (172 7 cm) (04/15/21 1549)  Weight - Scale: 80 3 kg (177 lb 0 5 oz) (04/15/21 1549)  SpO2: 98 % (04/15/21 1549)    Physical Exam  Vitals signs and nursing note reviewed  Constitutional:       General: He is not in acute distress  Appearance: Normal appearance  He is not ill-appearing or toxic-appearing  HENT:      Head: Normocephalic and atraumatic  Eyes:      Extraocular Movements: Extraocular movements intact  Conjunctiva/sclera: Conjunctivae normal       Pupils: Pupils are equal, round, and reactive to light  Cardiovascular:      Rate and Rhythm: Normal rate and regular rhythm  Pulses: Normal pulses  Heart sounds: Normal heart sounds  No murmur  Pulmonary:      Effort: Pulmonary effort is normal  No respiratory distress  Breath sounds: Normal breath sounds  No wheezing or rales  Abdominal:      General: Bowel sounds are normal  There is no distension  Palpations: Abdomen is soft  Tenderness: There is no abdominal tenderness  Musculoskeletal:         General: Tenderness (right ventral foot, base 1st metatarsal) present  No deformity  Right lower leg: No edema  Left lower leg: No edema  Skin:     General: Skin is warm and dry  Coloration: Skin is not pale  Findings: No erythema  Neurological:      Mental Status: He is alert and oriented to person, place, and time  Psychiatric:         Mood and Affect: Mood normal          Behavior: Behavior normal            Additional Data:     Lab Results: I have personally reviewed pertinent reports        Results from last 7 days   Lab Units 04/15/21  1221   WBC Thousand/uL 7 17   HEMOGLOBIN g/dL 10 8*   HEMATOCRIT % 34 1*   PLATELETS Thousands/uL 339 NEUTROS PCT % 61   LYMPHS PCT % 22   MONOS PCT % 10   EOS PCT % 6     Results from last 7 days   Lab Units 04/15/21  1221   SODIUM mmol/L 140   POTASSIUM mmol/L 3 4*   CHLORIDE mmol/L 104   CO2 mmol/L 26   BUN mg/dL 48*   CREATININE mg/dL 3 43*   ANION GAP mmol/L 10   CALCIUM mg/dL 8 7   ALBUMIN g/dL 3 5   TOTAL BILIRUBIN mg/dL 0 87   ALK PHOS U/L 99   ALT U/L 77   AST U/L 23   GLUCOSE RANDOM mg/dL 78                       Imaging: I have personally reviewed pertinent reports  and I have personally reviewed pertinent films in PACS    XR foot 3+ views RIGHT    (Results Pending)   XR chest portable    (Results Pending)   US kidney and bladder    (Results Pending)     XR foot reviewed - no obvious fracture identified  XR chest reviewed - no obvious infiltrate or effusions; normal cardiac silhouette       EKG, Pathology, and Other Studies Reviewed on Admission:   · EKG:     Allscripts / Epic Records Reviewed: Yes     ** Please Note: This note has been constructed using a voice recognition system   **

## 2021-04-15 NOTE — ASSESSMENT & PLAN NOTE
Lab Results   Component Value Date    HGBA1C 5 7 04/05/2021       No results for input(s): POCGLU in the last 72 hours      Blood Sugar Average: Last 72 hrs:     · Denies taking Ozempic outpatient   · HbgA1C - 5 7  · Continue on cardiac low sodium diet

## 2021-04-15 NOTE — ASSESSMENT & PLAN NOTE
· Present on admission  · Sent in by his primary nephrologist for worsening TRUDI   · Creatinine 3 43 - baseline 1 3-1 5   · Hold all nephrotoxic drugs  · Strict I&O's; Daily weight   · Renal ultrasound - pending  · Repeat urine and continuing lab workup - pending  · CXR - unremarkable  · Consulted nephrology for continued recommendations and work up

## 2021-04-15 NOTE — TELEPHONE ENCOUNTER
Please advise patient of labs from 04/05/21 when you are able to meet with him  Noted, he was admitted for TRUDI today       -Very elevated ferritin (>800) - likely r/t renal issues  -Elevated PTH - ensure 1500mg calcium citrate daily; but elevated PTH likely r/t impaired renal function  -Iron mildly low - suggest start Thomas MVI with 45mg iron asap  -Rest of vitamins WNL

## 2021-04-15 NOTE — ASSESSMENT & PLAN NOTE
· Past history of DVT   · Not currently on oral anti-coagulation   · Continue to hold anti-coagulation for further recommendation per nephrology

## 2021-04-15 NOTE — ASSESSMENT & PLAN NOTE
Lab Results   Component Value Date    HGBA1C 5 7 04/05/2021       No results for input(s): POCGLU in the last 72 hours      Blood Sugar Average: Last 72 hrs:      · Patient has baseline of CKD stage 3 and has followed with nephrology outpatient since before 2012  · Last f/u visit was x 2 days ago   · Repeat lab work abnormal which prompted evaluation inpatient  · Consult nephrology for continued recommendations

## 2021-04-15 NOTE — ASSESSMENT & PLAN NOTE
Wt Readings from Last 3 Encounters:   04/02/21 79 4 kg (175 lb)   12/31/20 101 kg (223 lb)   12/22/20 105 kg (232 lb 9 4 oz)       · Appears to be euvolemic - denies chest pain, SOB, or swelling of extremities    · Hold lasix due to acute TRUDI

## 2021-04-16 ENCOUNTER — APPOINTMENT (INPATIENT)
Dept: CT IMAGING | Facility: HOSPITAL | Age: 56
DRG: 683 | End: 2021-04-16
Payer: COMMERCIAL

## 2021-04-16 PROBLEM — N18.30 HYPERTENSIVE KIDNEY DISEASE WITH STAGE 3 CHRONIC KIDNEY DISEASE (HCC): Status: ACTIVE | Noted: 2021-04-16

## 2021-04-16 PROBLEM — N18.30 ACUTE RENAL FAILURE SUPERIMPOSED ON STAGE 3 CHRONIC KIDNEY DISEASE (HCC): Status: ACTIVE | Noted: 2021-04-16

## 2021-04-16 PROBLEM — N17.9 ACUTE RENAL FAILURE SUPERIMPOSED ON STAGE 3 CHRONIC KIDNEY DISEASE (HCC): Status: ACTIVE | Noted: 2021-04-16

## 2021-04-16 PROBLEM — I12.9 HYPERTENSIVE KIDNEY DISEASE WITH STAGE 3 CHRONIC KIDNEY DISEASE (HCC): Status: ACTIVE | Noted: 2021-04-16

## 2021-04-16 LAB
ALBUMIN SERPL ELPH-MCNC: 3.45 G/DL (ref 3.5–5)
ALBUMIN SERPL ELPH-MCNC: 55.7 % (ref 52–65)
ALBUMIN UR ELPH-MCNC: 100 %
ALPHA1 GLOB MFR UR ELPH: 0 %
ALPHA1 GLOB SERPL ELPH-MCNC: 0.35 G/DL (ref 0.1–0.4)
ALPHA1 GLOB SERPL ELPH-MCNC: 5.7 % (ref 2.5–5)
ALPHA2 GLOB MFR UR ELPH: 0 %
ALPHA2 GLOB SERPL ELPH-MCNC: 0.53 G/DL (ref 0.4–1.2)
ALPHA2 GLOB SERPL ELPH-MCNC: 8.5 % (ref 7–13)
ANION GAP SERPL CALCULATED.3IONS-SCNC: 9 MMOL/L (ref 4–13)
B-GLOBULIN MFR UR ELPH: 0 %
BETA GLOB ABNORMAL SERPL ELPH-MCNC: 0.36 G/DL (ref 0.4–0.8)
BETA1 GLOB SERPL ELPH-MCNC: 5.8 % (ref 5–13)
BETA2 GLOB SERPL ELPH-MCNC: 6.1 % (ref 2–8)
BETA2+GAMMA GLOB SERPL ELPH-MCNC: 0.38 G/DL (ref 0.2–0.5)
BUN SERPL-MCNC: 47 MG/DL (ref 5–25)
CALCIUM SERPL-MCNC: 8.9 MG/DL (ref 8.3–10.1)
CHLORIDE SERPL-SCNC: 106 MMOL/L (ref 100–108)
CO2 SERPL-SCNC: 25 MMOL/L (ref 21–32)
CREAT SERPL-MCNC: 3.02 MG/DL (ref 0.6–1.3)
CREAT UR-MCNC: 133 MG/DL
ERYTHROCYTE [DISTWIDTH] IN BLOOD BY AUTOMATED COUNT: 14.8 % (ref 11.6–15.1)
GAMMA GLOB ABNORMAL SERPL ELPH-MCNC: 1.13 G/DL (ref 0.5–1.6)
GAMMA GLOB MFR UR ELPH: 0 %
GAMMA GLOB SERPL ELPH-MCNC: 18.2 % (ref 12–22)
GFR SERPL CREATININE-BSD FRML MDRD: 25 ML/MIN/1.73SQ M
GLUCOSE SERPL-MCNC: 89 MG/DL (ref 65–140)
HBV CORE AB SER QL: NORMAL
HBV CORE IGM SER QL: NORMAL
HBV SURFACE AG SER QL: NORMAL
HCT VFR BLD AUTO: 33.5 % (ref 36.5–49.3)
HCV AB SER QL: NORMAL
HGB BLD-MCNC: 10.6 G/DL (ref 12–17)
IGG/ALB SER: 1.26 {RATIO} (ref 1.1–1.8)
MCH RBC QN AUTO: 27 PG (ref 26.8–34.3)
MCHC RBC AUTO-ENTMCNC: 31.6 G/DL (ref 31.4–37.4)
MCV RBC AUTO: 85 FL (ref 82–98)
MICROALBUMIN UR-MCNC: 39.5 MG/L (ref 0–20)
MICROALBUMIN/CREAT 24H UR: 30 MG/G CREATININE (ref 0–30)
PLATELET # BLD AUTO: 325 THOUSANDS/UL (ref 149–390)
PMV BLD AUTO: 9.4 FL (ref 8.9–12.7)
POTASSIUM SERPL-SCNC: 3.4 MMOL/L (ref 3.5–5.3)
PROT PATTERN SERPL ELPH-IMP: ABNORMAL
PROT PATTERN UR ELPH-IMP: NORMAL
PROT SERPL-MCNC: 6.2 G/DL (ref 6.4–8.2)
PROT UR-MCNC: 8 MG/DL
RBC # BLD AUTO: 3.93 MILLION/UL (ref 3.88–5.62)
SODIUM 24H UR-SCNC: 23 MOL/L
SODIUM SERPL-SCNC: 140 MMOL/L (ref 136–145)
UUN 24H UR-MCNC: 650 MG/DL
WBC # BLD AUTO: 7.18 THOUSAND/UL (ref 4.31–10.16)

## 2021-04-16 PROCEDURE — 85027 COMPLETE CBC AUTOMATED: CPT | Performed by: PHYSICIAN ASSISTANT

## 2021-04-16 PROCEDURE — 99223 1ST HOSP IP/OBS HIGH 75: CPT | Performed by: INTERNAL MEDICINE

## 2021-04-16 PROCEDURE — 84540 ASSAY OF URINE/UREA-N: CPT | Performed by: INTERNAL MEDICINE

## 2021-04-16 PROCEDURE — 94760 N-INVAS EAR/PLS OXIMETRY 1: CPT

## 2021-04-16 PROCEDURE — 74176 CT ABD & PELVIS W/O CONTRAST: CPT

## 2021-04-16 PROCEDURE — 82570 ASSAY OF URINE CREATININE: CPT | Performed by: INTERNAL MEDICINE

## 2021-04-16 PROCEDURE — 99232 SBSQ HOSP IP/OBS MODERATE 35: CPT | Performed by: NURSE PRACTITIONER

## 2021-04-16 PROCEDURE — 84300 ASSAY OF URINE SODIUM: CPT | Performed by: INTERNAL MEDICINE

## 2021-04-16 PROCEDURE — G1004 CDSM NDSC: HCPCS

## 2021-04-16 PROCEDURE — 82043 UR ALBUMIN QUANTITATIVE: CPT | Performed by: INTERNAL MEDICINE

## 2021-04-16 PROCEDURE — 80048 BASIC METABOLIC PNL TOTAL CA: CPT | Performed by: PHYSICIAN ASSISTANT

## 2021-04-16 RX ORDER — HEPARIN SODIUM 5000 [USP'U]/ML
5000 INJECTION, SOLUTION INTRAVENOUS; SUBCUTANEOUS EVERY 8 HOURS SCHEDULED
Status: DISCONTINUED | OUTPATIENT
Start: 2021-04-16 | End: 2021-04-18 | Stop reason: HOSPADM

## 2021-04-16 RX ORDER — SODIUM CHLORIDE 9 MG/ML
75 INJECTION, SOLUTION INTRAVENOUS CONTINUOUS
Status: DISCONTINUED | OUTPATIENT
Start: 2021-04-16 | End: 2021-04-18 | Stop reason: HOSPADM

## 2021-04-16 RX ADMIN — SODIUM CHLORIDE 75 ML/HR: 0.9 INJECTION, SOLUTION INTRAVENOUS at 17:20

## 2021-04-16 RX ADMIN — NEBIVOLOL HYDROCHLORIDE 20 MG: 10 TABLET ORAL at 09:10

## 2021-04-16 RX ADMIN — DOXAZOSIN 8 MG: 4 TABLET ORAL at 17:20

## 2021-04-16 RX ADMIN — BUSPIRONE HYDROCHLORIDE 15 MG: 5 TABLET ORAL at 17:20

## 2021-04-16 RX ADMIN — HEPARIN SODIUM 5000 UNITS: 5000 INJECTION INTRAVENOUS; SUBCUTANEOUS at 21:17

## 2021-04-16 RX ADMIN — Medication 1 TABLET: at 09:10

## 2021-04-16 RX ADMIN — ATORVASTATIN CALCIUM 40 MG: 40 TABLET, FILM COATED ORAL at 21:17

## 2021-04-16 RX ADMIN — Medication 1000 UNITS: at 09:10

## 2021-04-16 RX ADMIN — FERROUS SULFATE TAB 325 MG (65 MG ELEMENTAL FE) 325 MG: 325 (65 FE) TAB at 09:10

## 2021-04-16 RX ADMIN — HEPARIN SODIUM 5000 UNITS: 5000 INJECTION INTRAVENOUS; SUBCUTANEOUS at 14:35

## 2021-04-16 RX ADMIN — FLUTICASONE PROPIONATE 2 PUFF: 44 AEROSOL, METERED RESPIRATORY (INHALATION) at 09:10

## 2021-04-16 RX ADMIN — PANTOPRAZOLE SODIUM 20 MG: 20 TABLET, DELAYED RELEASE ORAL at 05:17

## 2021-04-16 RX ADMIN — ESCITALOPRAM OXALATE 20 MG: 10 TABLET ORAL at 09:10

## 2021-04-16 RX ADMIN — BUDESONIDE AND FORMOTEROL FUMARATE DIHYDRATE 2 PUFF: 160; 4.5 AEROSOL RESPIRATORY (INHALATION) at 09:10

## 2021-04-16 RX ADMIN — NEBIVOLOL HYDROCHLORIDE 20 MG: 10 TABLET ORAL at 17:20

## 2021-04-16 RX ADMIN — SODIUM CHLORIDE 75 ML/HR: 0.9 INJECTION, SOLUTION INTRAVENOUS at 02:31

## 2021-04-16 RX ADMIN — BUDESONIDE AND FORMOTEROL FUMARATE DIHYDRATE 2 PUFF: 160; 4.5 AEROSOL RESPIRATORY (INHALATION) at 17:23

## 2021-04-16 RX ADMIN — FERROUS SULFATE TAB 325 MG (65 MG ELEMENTAL FE) 325 MG: 325 (65 FE) TAB at 17:20

## 2021-04-16 RX ADMIN — AMLODIPINE BESYLATE 10 MG: 10 TABLET ORAL at 09:10

## 2021-04-16 RX ADMIN — BUSPIRONE HYDROCHLORIDE 15 MG: 5 TABLET ORAL at 09:10

## 2021-04-16 RX ADMIN — FLUTICASONE PROPIONATE 2 PUFF: 44 AEROSOL, METERED RESPIRATORY (INHALATION) at 21:17

## 2021-04-16 RX ADMIN — OMEGA-3 FATTY ACIDS CAP 1000 MG 1000 MG: 1000 CAP at 09:10

## 2021-04-16 RX ADMIN — DOXAZOSIN 8 MG: 4 TABLET ORAL at 09:09

## 2021-04-16 NOTE — PLAN OF CARE
Problem: PAIN - ADULT  Goal: Verbalizes/displays adequate comfort level or baseline comfort level  Description: Interventions:  - Encourage patient to monitor pain and request assistance  - Assess pain using appropriate pain scale  - Administer analgesics based on type and severity of pain and evaluate response  - Implement non-pharmacological measures as appropriate and evaluate response  - Consider cultural and social influences on pain and pain management  - Notify physician/advanced practitioner if interventions unsuccessful or patient reports new pain  Outcome: Progressing     Problem: SAFETY ADULT  Goal: Patient will remain free of falls  Description: INTERVENTIONS:  - Assess patient frequently for physical needs  -  Identify cognitive and physical deficits and behaviors that affect risk of falls    -  [place] fall precautions as indicated by assessment   - Educate patient/family on patient safety including physical limitations  - Instruct patient to call for assistance with activity based on assessment  - Modify environment to reduce risk of injury  - Consider OT/PT consult to assist with strengthening/mobility  Outcome: Progressing

## 2021-04-16 NOTE — PROGRESS NOTES
[address] [facility]  Progress Note - Gisselle Tang 1965, 60 y o  male MRN: [id number]  Unit/Bed#: -Greg Encounter: [id number]  Primary Care Provider: Radha Haji MD   Date and time admitted to hospital: 4/15/2021 11:52 AM    * Acute renal failure superimposed on stage 3 chronic kidney disease [facility])  Assessment & Plan  Lab Results   Component Value Date    EGFR 25 04/16/2021    EGFR 22 04/15/2021    EGFR 68 12/24/2020    CREATININE 3 02 (H) 04/16/2021    CREATININE 3 43 (H) 04/15/2021    CREATININE 1 34 (H) 12/24/2020   · Patient has baseline of CKD stage 3 and has followed with nephrology outpatient since before 2012  · Strict I&O's; Daily weight   · Renal ultrasound showsThere is no evidence of hydronephrosis or hydroureter   No stone could be identified  · Repeat urine and continuing lab workup - pending  · Repeat lab work abnormal which prompted evaluation inpatient  · Nephrology evaluated recommend CT to rule out obstructive stone  · Follow-up bladder scan  · Consider Urology consult if obstruction identified  Baseline creatinine appears to be 1 3-1 7    CKD stage 3 secondary to diabetes [facility])  Assessment & Plan  Lab Results   Component Value Date    HGBA1C 5 7 04/05/2021     No results for input(s): POCGLU in the last 72 hours      Blood Sugar Average: Last 72 hrs:      · Patient has baseline of CKD stage 3 and has followed with nephrology outpatient since before 2012  · Last f/u visit was x 2 days ago   · Repeat lab work abnormal which prompted evaluation inpatient  · Nephrology evaluated recommend CT to rule out obstructive stone  · Follow-up bladder scan  · Consider Urology consult if obstruction identified  · Baseline creatinine appears to be 1 3-1 7      COPD (chronic obstructive pulmonary disease) ([facility] )  Assessment & Plan  · Denies smoking history  · Continue home medications for COPD    Essential hypertension  Assessment & Plan  · Continue home medications  · Hold nephrotoxic drugs     History of DVT (deep vein thrombosis)  Assessment & Plan  · Past history of DVT   · Not currently on oral anti-coagulation   · Continue to hold anti-coagulation for further recommendation per nephrology  Hypertensive kidney disease with stage 3 chronic kidney disease  Assessment & Plan  Lab Results   Component Value Date    EGFR 25 04/16/2021    EGFR 22 04/15/2021    EGFR 68 12/24/2020    CREATININE 3 02 (H) 04/16/2021    CREATININE 3 43 (H) 04/15/2021    CREATININE 1 34 (H) 12/24/2020       Type 2 diabetes mellitus ([facility] )  Assessment & Plan  Lab Results   Component Value Date    HGBA1C 5 7 04/05/2021       No results for input(s): POCGLU in the last 72 hours  Blood Sugar Average: Last 72 hrs:   Denies taking Ozempic outpatient   · HbgA1C - 5 7  · Continue on cardiac low sodium diet       VTE Pharmacologic Prophylaxis:   Pharmacologic: Heparin  Mechanical VTE Prophylaxis in Place: Yes    Patient Centered Rounds: I have performed bedside rounds with nursing staff today  Discussions with Specialists or Other Care Team Provider:  Discussed with Nephrology reviewed H&P, discussed with case management primary RN    Education and Discussions with Family / Patient:  Discussed plan of care with patient family denies any additional questions or concerns at this time    Time Spent for Care: 20 minutes  More than 50% of total time spent on counseling and coordination of care as described above      Current Length of Stay: 1 day(s)    Current Patient Status: Inpatient   Certification Statement: The patient will continue to require additional inpatient hospital stay due to Monitoring of creatinine, CT possible urology evaluation    Discharge Plan / Estimated Discharge Date:  Greater than 48 hours    Code Status: Level 1 - Full Code    Subjective:   Patient resting comfortably in bed denies any low back pain denies any chest pain chest tightness shortness of breath or difficulty breathing denies any difficulty urinating denies any blood in urine denies any previous episodes like this  Discussed plan of care with patient answered all questions denies any additional concerns at this time    Objective:     Vitals:   Temp (24hrs), Av 9 °F (36 6 °C), Min:97 4 °F (36 3 °C), Max:98 3 °F (36 8 °C)    Temp:  [97 4 °F (36 3 °C)-98 3 °F (36 8 °C)] 97 4 °F (36 3 °C)  HR:  [60-64] 64  Resp:  [18-19] 18  BP: (152-155)/(85-90) 152/90  SpO2:  [97 %-99 %] 99 %  Body mass index is 26 21 kg/m²  Input and Output Summary (last 24 hours): Intake/Output Summary (Last 24 hours) at 2021 1426  Last data filed at 2021 1300  Gross per 24 hour   Intake 1600 ml   Output 1600 ml   Net 0 ml       Physical Exam:     Physical Exam  Vitals signs and nursing note reviewed  HENT:      Head: Normocephalic  Cardiovascular:      Rate and Rhythm: Normal rate  Pulmonary:      Effort: Pulmonary effort is normal    Abdominal:      Palpations: Abdomen is soft  Musculoskeletal: Normal range of motion  Skin:     General: Skin is warm  Neurological:      General: No focal deficit present  Mental Status: He is alert  Mental status is at baseline  Psychiatric:         Mood and Affect: Mood normal          Thought Content: Thought content normal        Additional Data:     Labs:    Results from last 7 days   Lab Units 21  0621 04/15/21  1221   WBC Thousand/uL 7 18 7 17   HEMOGLOBIN g/dL 10 6* 10 8*   HEMATOCRIT % 33 5* 34 1*   PLATELETS Thousands/uL 325 339   NEUTROS PCT %  --  61   LYMPHS PCT %  --  22   MONOS PCT %  --  10   EOS PCT %  --  6     Results from last 7 days   Lab Units 21  0514 04/15/21  1221   POTASSIUM mmol/L 3 4* 3 4*   CHLORIDE mmol/L 106 104   CO2 mmol/L 25 26   BUN mg/dL 47* 48*   CREATININE mg/dL 3 02* 3 43*   CALCIUM mg/dL 8 9 8 7   ALK PHOS U/L  --  99   ALT U/L  --  77   AST U/L  --  23           * I Have Reviewed All Lab Data Listed Above    * Additional Pertinent Lab Tests Reviewed: [facility] Reviewed    Recent Cultures (last 7 days):           Last 24 Hours Medication List:   Current Facility-Administered Medications   Medication Dose Route Frequency Provider Last Rate    acetaminophen  650 mg Oral Q6H PRN Neomia Olive, PA-C      aluminum-magnesium hydroxide-simethicone  30 mL Oral Q6H PRN Neomia Olive, PA-C      amLODIPine  10 mg Oral Daily Hazel E Phu, PA-C      atorvastatin  40 mg Oral HS Hazel E Phu, PA-C      budesonide-formoterol  2 puff Inhalation BID Hazel E Phu, PA-C      busPIRone  15 mg Oral BID Hazel E Phu, PA-C      cholecalciferol  1,000 Units Oral Daily Neomia Olive, PA-C      doxazosin  8 mg Oral BID Hazel E Phu, PA-C      escitalopram  20 mg Oral Daily Hazel E Phu, PA-C      ferrous sulfate  325 mg Oral BID Hazel E Phu, PA-C      fish oil  1,000 mg Oral Daily Neomia Olive, PA-C      fluticasone  2 puff Inhalation BID Hazel E Phu, PA-C      LORazepam  0 5 mg Oral Q8H PRN Neomia Olive, PA-C      multivitamin-minerals  1 tablet Oral Daily Neomia Olive, PA-C      nebivolol  20 mg Oral BID Hazel E Phu, PA-C      ondansetron  4 mg Intravenous Q6H PRN Hazel E Phu, PA-C      pantoprazole  20 mg Oral Early Morning Hazel E Phu, PA-C      polyethylene glycol  17 g Oral Daily PRN Neomia Olive, PA-C      sodium chloride  75 mL/hr Intravenous Continuous Neomia Olive, PA-C 75 mL/hr (04/16/21 0231)        Today, Patient Was Seen By: MARBIN Fernandez    ** Please Note: Dragon 360 Dictation voice to text software may have been used in the creation of this document   **

## 2021-04-16 NOTE — CASE MANAGEMENT
CM spoke with pt at bedside &  Introduced self and role     CM introduced self and reviewed CM role     Pt lives with his wife Eddie Henderson in a 2 story house with 13 steps w/railing to reach his bedroom and 4 RAFIA   Pt has no problem navigating steps and is independent with ADL's   He uses a C-pap at  for sleep apnea   His PCP is Dr Fabby Will and he has a [facility] CM-Shavon  at [phone], who has ordered Diabetic supplies and it is being delivered   Denies substance abuse  Talha Conner does have anxiety and depression that is treated with medication by his PCP  Talha Conner was in a rehab in [facility] following b/l knee replacements 8 years ago  Talha Conner has also used OP/PT but does not remember the name of the agency  Talha Conner has never used [address] uses [facility] in [facility] and has no problem with his Emmer Altagracia does not have a POA or Advanced Directive  He has been supplied info on both  Talha Conner does not work-he is disabled, but still drives   CM [facility], and pt agrees he will need this on d/c  [facility] home care as agreed and elected  Referral in [facility]  CM department will continue to follow through hospitalization        CM reviewed discharge planning process including the following: identifying help at home, patient preference for discharge planning needs, pharmacy preference, and availability of treatment team to discuss questions or concerns patient and/or family may have regarding understanding medications and recognizing signs and symptoms once discharged  CM also encouraged patient to follow up with all recommended appointments after discharge  Patient advised of importance for patient and family to participate in managing patients medical well being        A post acute care recommendation was made by your care team for [facility]  Discussed [place] of Choice with patient  List of agencies given to patient via in person   patient aware the list is custom filtered for them by preference  and that [facility] post acute providers are designated

## 2021-04-16 NOTE — UTILIZATION REVIEW
Notification of Inpatient Admission/Inpatient Authorization Request   This is a Notification of Inpatient Admission for [address]  Be advised that this patient was admitted to our facility under Inpatient Status  Contact Emile Delatorre at [phone] for additional admission information  [facility] DEPT DEDICATED Elizabeth Mccann [phone]  Patient Name:   Osvaldo Ramirez   YOB: 1965       [address]   [address]:   [address]   Tax ID: [id number]  NPI: [id number] Attending Provider/NPI:  Phone:  Address: Diana Hernandez, Juan David Lopez [[id number]]  [phone]  Same as PASQUALE/Pippa Cavazos 1106 of Service Code: 24     [facility]   Start Date: 4/15/21 1320 Discharge Date & Time: No discharge date for patient encounter  Type of Admission: Inpatient Status Discharge Disposition   (if discharged): Home/Self Care   Patient Diagnoses: Abnormal urine [R82 90]  Acute kidney injury ([facility] ) [N17 9]     Orders: Admission Orders (From admission, onward)     Ordered        04/15/21 1320  Inpatient Admission  Once                    Assigned Utilization Review Contact: Emile Delatorre  Utilization   Network Utilization Review Department  Phone: [phone]; Fax [phone]  Email: [email or web address]   ATTENTION PAYERS: Please call the assigned Utilization  directly with any questions or concerns ALL voicemails in the department are confidential  Send all requests for admission clinical reviews, approved or denied determinations and any other requests to dedicated fax number belonging to the campus where the patient is receiving treatment

## 2021-04-16 NOTE — ASSESSMENT & PLAN NOTE
· Hold all nephrotoxic drugs  · Strict I&O's; Daily weight   · Renal ultrasound - pending  · Repeat urine and continuing lab workup - pending  ·

## 2021-04-16 NOTE — ASSESSMENT & PLAN NOTE
Lab Results   Component Value Date    HGBA1C 5 7 04/05/2021     No results for input(s): POCGLU in the last 72 hours      Blood Sugar Average: Last 72 hrs:      · Patient has baseline of CKD stage 3 and has followed with nephrology outpatient since before 2012  · Last f/u visit was x 2 days ago   · Repeat lab work abnormal which prompted evaluation inpatient  · Nephrology evaluated recommend CT to rule out obstructive stone  · Follow-up bladder scan  · Consider Urology consult if obstruction identified  · Baseline creatinine appears to be 1 3-1 7

## 2021-04-16 NOTE — ASSESSMENT & PLAN NOTE
Lab Results   Component Value Date    EGFR 25 04/16/2021    EGFR 22 04/15/2021    EGFR 68 12/24/2020    CREATININE 3 02 (H) 04/16/2021    CREATININE 3 43 (H) 04/15/2021    CREATININE 1 34 (H) 12/24/2020

## 2021-04-16 NOTE — UTILIZATION REVIEW
Initial Clinical Review    Admission: Date/Time/Statement:   Admission Orders (From admission, onward)     Ordered        04/15/21 1320  Inpatient Admission  Once                   Orders Placed This Encounter   Procedures    Inpatient Admission     Standing Status:   Standing     Number of Occurrences:   1     Order Specific Question:   Level of Care     Answer:   Med Surg [16]     Order Specific Question:   Estimated length of stay     Answer:   More than 2 Midnights     Order Specific Question:   Certification     Answer:   I certify that inpatient services are medically necessary for this patient for a duration of greater than two midnights  See H&P and MD Progress Notes for additional information about the patient's course of treatment  ED Arrival Information     Expected Arrival Acuity Means of Arrival Escorted By Service Admission Type    - 4/15/2021 11:17 Urgent Walk-In Self General Medicine Urgent    Arrival Complaint    abnormal urine lab results        Chief Complaint   Patient presents with    Evaluation of Abnormal Diagnostic Test     reports abnormal kidney function test, sent by nephro       Initial Presentation: 61 y/o male with PMHx of CKD III, CHF, HTN, COPD, T2 DM, h/o DVT who presents to ED d/t abnormal op labs  Creatinine 3 43 with baseline 1 3-1 5  Pt has had gradual worsening over the last few months after bariatric surgery  Reportedly he has not been eating or drinking well  Admit inpatient to M/S unit -- IVF's  Monitor BMP  Hold all nephrotoxic agents  I/O's, daily wts  Renal US  UA  Nephrology consult  Nephrology consult 4/16 --  TRUDI on top of CKD stage 3, exact etiology is currently unclear  Clinically patient is not in volume overload state and for time being plan to continue IVF's today  F/u results of SPEP and UPEP  Renal US showed mild right hydronephrosis    I have also reviewed the results of recent renal ultrasound from Care everywhere which was order by primary nephrologist was suggestive of enlarged prostate with possible bladder outlet obstruction  Recommended to have CT scan renal protocol this morning which showed no hydronephrosis or obstructive stone  Check serial bladder scan to r/o urinary retention  Check urine lytes for further evaluation  Repeat renal function with AM labs  Currently BP is under good control  Continue po meds and monitor  ED Triage Vitals [04/15/21 1133]   Temperature Pulse Respirations Blood Pressure SpO2   98 2 °F (36 8 °C) 58 18 153/84 97 %      Temp Source Heart Rate Source Patient Position - Orthostatic VS BP Location FiO2 (%)   Oral Monitor Sitting Left arm --      Pain Score       No Pain          Wt Readings from Last 1 Encounters:   04/16/21 78 2 kg (172 lb 6 4 oz)     Additional Vital Signs:   Date/Time  Temp  Pulse  Resp  BP  MAP (mmHg)  SpO2  O2 Device  O2 Interface Device   04/16/21 15:33:42  98 7 °F (37 1 °C)  62  20  149/85  106  99 %  --  --   04/16/21 08:01:51  97 4 °F (36 3 °C)Abnormal   64  18  152/90  111  99 %  None (Room air)  --   04/16/21 0149  --  --  --  --  --  --  --  Face mask   04/15/21 2330  --  --  --  --  --  97 %  --  Face mask   04/15/21 22:45:34  98 3 °F (36 8 °C)  60  19  154/85  108  98 %  --  --   04/15/21 2109  --  --  --  --  --  --  --  Face mask   04/15/21 15:49:44  98 °F (36 7 °C)  61  18  155/85  108  98 %  --  --   04/15/21 1330  --  63  18  162/88  118  99 %  None (Room air)  --   04/15/21 1300  --  61  18  157/89  116  99 %  None (Room air)  --   04/15/21 1133  98 2 °F (36 8 °C)  58  18  153/84  --  97 %  None (Room air)  --       Pertinent Labs/Diagnostic Test Results:   CT renal stone study a/p 4/16 -- Airspace opacity at the right lung base may be acute on chronic suggesting possible small pneumonia  Some hyperdensity is new and should be correlated any history of aspiration    There is no evidence of hydronephrosis or hydroureter    No stone could be identified    There is a small pocket of free fluid in the left lower quadrant with slight loculation of indeterminate etiology  This could be related to patient's renal insufficiency  Correlation with any secondary signs of infection is advised as well as   follow-up    Status post gastric bypass  US kidney and bladder 4/15 -- There is increased renal cortical echogenicity, suggesting medical renal disease    Mild right hydronephrosis  Mild fullness left renal collecting system    There is a 5 mm calculus in the lower pole right kidney    Simple appearing right renal cysts    The prostate appears enlarged and heterogeneous and causes some indentation upon the base of the urinary bladder  Clinical correlation, laboratory correlation and follow-up is recommended  CXR 4/15 -- No acute cardiopulmonary disease  XR R foot 4/15 - No acute osseous abnormality          Results from last 7 days   Lab Units 04/16/21  0621 04/15/21  1221   WBC Thousand/uL 7 18 7 17   HEMOGLOBIN g/dL 10 6* 10 8*   HEMATOCRIT % 33 5* 34 1*   PLATELETS Thousands/uL 325 339   NEUTROS ABS Thousands/µL  --  4 43     Results from last 7 days   Lab Units 04/16/21  0514 04/15/21  1221   SODIUM mmol/L 140 140   POTASSIUM mmol/L 3 4* 3 4*   CHLORIDE mmol/L 106 104   CO2 mmol/L 25 26   ANION GAP mmol/L 9 10   BUN mg/dL 47* 48*   CREATININE mg/dL 3 02* 3 43*   EGFR ml/min/1 73sq m 25 22   CALCIUM mg/dL 8 9 8 7   MAGNESIUM mg/dL  --  1 8   PHOSPHORUS mg/dL  --  4 5     Results from last 7 days   Lab Units 04/15/21  1615 04/15/21  1221   AST U/L  --  23   ALT U/L  --  77   ALK PHOS U/L  --  99   TOTAL PROTEIN g/dL 6 2* 7 2   ALBUMIN g/dL  --  3 5   TOTAL BILIRUBIN mg/dL  --  0 87   BILIRUBIN DIRECT mg/dL  --  0 22*     Results from last 7 days   Lab Units 04/16/21  0514 04/15/21  1221   GLUCOSE RANDOM mg/dL 89 78     BETA-HYDROXYBUTYRATE   Date Value Ref Range Status   07/09/2020 1 2 (H) <0 6 mmol/L Final      Results from last 7 days   Lab Units 04/15/21  1221   CK TOTAL U/L 52     Results from last 7 days   Lab Units 04/15/21  1615   HEP B S AG  Non-reactive   HEP C AB  Non-reactive   HEP B C IGM  Non-reactive   HEP B C TOTAL AB  Non-reactive     Results from last 7 days   Lab Units 04/15/21  1230   CLARITY UA  Clear   COLOR UA  Yellow   SPEC GRAV UA  1 015   PH UA  5 5   GLUCOSE UA mg/dl Negative   KETONES UA mg/dl Negative   BLOOD UA  Negative   PROTEIN UA mg/dl Negative   NITRITE UA  Negative   BILIRUBIN UA  Negative   UROBILINOGEN UA E U /dl 0 2   LEUKOCYTES UA  Negative     ED Treatment:   Medication Administration from 04/15/2021 1116 to 04/15/2021 1548       Date/Time Order Dose Route Action     04/15/2021 1230 sodium chloride 0 9 % bolus 1,000 mL 1,000 mL Intravenous New Bag        Past Medical History:   Diagnosis Date    Asthma     Chronic kidney disease     [facility]) [facility]     CPAP (continuous positive airway pressure) dependence     Diabetes mellitus ([facility]     Emphyse[facility] of lung ([facility] )     Gout     History of transfusion     pt stated they had a transfusion when they had gallbladder surgery   Hypertension     Kidney disease     renal failure    Leg DVT (deep venous thromboembolism), [facility], [facility] 01/2018    Obesity (BMI 30-39  9)     AGUS on CPAP     setting 11    Postgastrectomy malabsorption     Sleep apnea     Systolic CHF ([facility] )      Present on Admission:   Chronic diastolic CHF (congestive heart failure) ([facility]   CKD stage 3 secondary to diabetes ([facility] )   Type 2 diabetes mellitus ([facility]   Essential hypertension   Hypertensive kidney disease with stage 3 chronic kidney disease   Acute renal failure superimposed on stage 3 chronic kidney disease ([facility])      Admitting Diagnosis: Abnormal urine [R82 90]  Acute kidney injury ([facility] ) [N17 9]  Age/Sex: 60 y o  male  Admission Orders:  Scheduled Medications:  amLODIPine, 10 mg, Oral, Daily  atorvastatin, 40 mg, Oral, HS  budesonide-formoterol, 2 puff, Inhalation, BID  busPIRone, 15 mg, Oral, BID  cholecalciferol, 1,000 Units, Oral, Daily  doxazosin, 8 mg, Oral, BID  escitalopram, 20 mg, Oral, Daily  ferrous sulfate, 325 mg, Oral, BID  fish oil, 1,000 mg, Oral, Daily  fluticasone, 2 puff, Inhalation, BID  heparin (porcine), 5,000 Units, Subcutaneous, Q8H [address]  multivitamin-minerals, 1 tablet, Oral, Daily  nebivolol, 20 mg, Oral, BID  pantoprazole, 20 mg, Oral, Early Morning    Continuous IV Infusions:  sodium chloride, 75 mL/hr, Intravenous, Continuous    PRN Meds:  acetaminophen, 650 mg, Oral, Q6H PRN  aluminum-magnesium hydroxide-simethicone, 30 mL, Oral, Q6H PRN  LORazepam, 0 5 mg, Oral, Q8H PRN  ondansetron, 4 mg, Intravenous, Q6H PRN  polyethylene glycol, 17 g, Oral, Daily PRN        Network Utilization Review Department  ATTENTION: Please call with any questions or concerns to [phone] and carefully listen to the prompts so that you are directed to the right person  All voicemails are confidential   Taina Hannay all requests for admission clinical reviews, approved or denied determinations and any other requests to dedicated fax number below belonging to the campus where the patient is receiving treatment   List of dedicated fax numbers for the Facilities:  [address] DENIALS (Administrative/Medical Necessity) [phone]   [address] (Maternity/NICU/Pediatrics) [phone]   [address] [address] [facility] [phone] [place] [address] Diane [address] Sheryl [address] 1323 [facility] Jonh Estrella [phone]

## 2021-04-16 NOTE — ASSESSMENT & PLAN NOTE
Lab Results   Component Value Date    HGBA1C 5 7 04/05/2021       No results for input(s): POCGLU in the last 72 hours      Blood Sugar Average: Last 72 hrs:   Denies taking Ozempic outpatient   · HbgA1C - 5 7  · Continue on cardiac low sodium diet

## 2021-04-16 NOTE — PLAN OF CARE
Problem: Potential for Falls  Goal: Patient will remain free of falls  Description: INTERVENTIONS:  - Assess patient frequently for physical needs  -  Identify cognitive and physical deficits and behaviors that affect risk of falls    -  [place] fall precautions as indicated by assessment   - Educate patient/family on patient safety including physical limitations  - Instruct patient to call for assistance with activity based on assessment  - Modify environment to reduce risk of injury  - Consider OT/PT consult to assist with strengthening/mobility  Outcome: Progressing     Problem: PAIN - ADULT  Goal: Verbalizes/displays adequate comfort level or baseline comfort level  Description: Interventions:  - Encourage patient to monitor pain and request assistance  - Assess pain using appropriate pain scale  - Administer analgesics based on type and severity of pain and evaluate response  - Implement non-pharmacological measures as appropriate and evaluate response  - Consider cultural and social influences on pain and pain management  - Notify physician/advanced practitioner if interventions unsuccessful or patient reports new pain  Outcome: Progressing     Problem: INFECTION - ADULT  Goal: Absence or prevention of progression during hospitalization  Description: INTERVENTIONS:  - Assess and monitor for signs and symptoms of infection  - Monitor lab/diagnostic results  - Monitor all insertion sites, i e  indwelling lines, tubes, and drains  - Monitor endotracheal if appropriate and nasal secretions for changes in amount and color  - [place] appropriate cooling/warming therapies per order  - Administer medications as ordered  - Instruct and encourage patient and family to use good hand hygiene technique  - Identify and instruct in appropriate isolation precautions for identified infection/condition  Outcome: Progressing     Problem: SAFETY ADULT  Goal: Patient will remain free of falls  Description: INTERVENTIONS:  - Assess patient frequently for physical needs  -  Identify cognitive and physical deficits and behaviors that affect risk of falls    -  [place] fall precautions as indicated by assessment   - Educate patient/family on patient safety including physical limitations  - Instruct patient to call for assistance with activity based on assessment  - Modify environment to reduce risk of injury  - Consider OT/PT consult to assist with strengthening/mobility  Outcome: Progressing  Goal: Maintain or return to baseline ADL function  Description: INTERVENTIONS:  -  Assess patient's ability to carry out ADLs; assess patient's baseline for ADL function and identify physical deficits which impact ability to perform ADLs (bathing, care of mouth/teeth, toileting, grooming, dressing, etc )  - Assess/evaluate cause of self-care deficits   - Assess range of motion  - Assess patient's mobility; develop plan if impaired  - Assess patient's need for assistive devices and provide as appropriate  - Encourage maximum independence but intervene and supervise when necessary  - Involve family in performance of ADLs  - Assess for home care needs following discharge   - Consider OT consult to assist with ADL evaluation and planning for discharge  - Provide patient education as appropriate  Outcome: Progressing     Problem: DISCHARGE PLANNING  Goal: Discharge to home or other facility with appropriate resources  Description: INTERVENTIONS:  - Identify barriers to discharge w/patient and caregiver  - Arrange for needed discharge resources and transportation as appropriate  - Identify discharge learning needs (meds, wound care, etc )  - Arrange for interpretive services to assist at discharge as needed  - Refer to Case Management Department for coordinating discharge planning if the patient needs post-hospital services based on physician/advanced practitioner order or complex needs related to functional status, cognitive ability, or social support system  Outcome: Progressing     Problem: Knowledge Deficit  Goal: Patient/family/caregiver demonstrates understanding of disease process, treatment plan, medications, and discharge instructions  Description: Complete learning assessment and assess knowledge base    Interventions:  - Provide teaching at level of understanding  - Provide teaching via preferred learning methods  Outcome: Progressing     Problem: GENITOURINARY - ADULT  Goal: Maintains or returns to baseline urinary function  Description: INTERVENTIONS:  - Assess urinary function  - Encourage oral fluids to ensure adequate hydration if ordered  - Administer IV fluids as ordered to ensure adequate hydration  - Administer ordered medications as needed  - Offer frequent toileting  - Follow urinary retention protocol if ordered  Outcome: Progressing     Problem: METABOLIC, FLUID AND ELECTROLYTES - ADULT  Goal: Electrolytes maintained within normal limits  Description: INTERVENTIONS:  - Monitor labs and assess patient for signs and symptoms of electrolyte imbalances  - Administer electrolyte replacement as ordered  - Monitor response to electrolyte replacements, including repeat lab results as appropriate  - Instruct patient on fluid and nutrition as appropriate  Outcome: Progressing

## 2021-04-16 NOTE — CONSULTS
NEPHROLOGY CONSULTATION NOTE    Patient: Osvaldo Ramirez               Sex: male          DOA: 4/15/2021 11:52 AM   Date of Birth: @        Age: @        LOS:  LOS: 1 day     REFERRING PHYSICIAN: Dagoberto Jimenez PA-C     REASON FOR THE REFERRAL / CONSULTATION:  Further management of TRUDI    DATE OF CONSULTATION / SERVICE: 4/16/2021    ADMISSION DIAGNOSIS: TRUDI (acute kidney injury) [facility])     Chief Complaint   Patient presents with    Evaluation of Abnormal Diagnostic Test     reports abnormal kidney function test, sent by nephro        HPI     This is 49-year-old male with past medical history significant for COPD, CKD stage 3, came to the ER for further management of abnormal blood work  Nephrology were consulted for further management of TRUDI  I have personally seen and examined patient earlier this morning  Patient has underlying CKD stage 3 and upon review of old medical records from Care everywhere, patient's baseline creatinine seems to be fluctuating around 1 3-1 5 and patient is also been followed by Dr Mirtha Peguero at [facility]  According to patient he has virtual video visit with Dr Mirtha Peguero earlier this week and was noticed to have elevated creatinine level and was advised to go to the ER for further evaluation by primary nephrologist   On admission patient was found to have creatinine of 3 4  Patient had underwent gastric bypass in December 2021  According to patient his appetite was poor for last few days per    Patient has underlying hypertension which currently seems to be under good control with current antihypertensive medications  Currently patient denies nausea, vomiting, headache, dizziness, abdominal pain, constipation or rash      PAST MEDICAL HISTORY     Past Medical History:   Diagnosis Date    Asthma     Chronic kidney disease     [facility]     CPAP (continuous positive airway pressure) dependence     [facility]     Emphysema of lung ([facility] )     Gout     History of transfusion     pt stated they had a transfusion when they had gallbladder surgery   Hypertension     Kidney disease     renal failure    Leg DVT [facility]deep venous thromboembolism[facility], [facility] 01[facility]2018    Obesity (BMI 30-39  9)     AGUS on CPAP     setting 11    Postgastrectomy malabsorption     Sleep apnea     Systolic CHF ([facility] )        PAST SURGICAL HISTORY     Past Surgical History:   Procedure Laterality Date    ADRENALECTOMY      CHOLECYSTECTOMY      COLONOSCOPY      EGD      HIATAL HERNIA REPAIR N/A 12/22/2020    Procedure: REPAIR HERNIA HIATAL LAPAROSCOPIC;  Surgeon: Donna Marshall MD;  [facility];  Service: Bariatrics    INCISION AND DRAINAGE OF WOUND Left 10/17/2018    Procedure: INCISION AND DRAINAGE (I&D) GROIN;  Surgeon: Olman Lynn MD;  [facility];  Service: General    IR IMAGE [address] / DRAINAGE W TUBE  8/17/2018    IR IMAGE GUIDED ASPIRATION / DRAINAGE W TUBE  7/31/2018    JOINT REPLACEMENT Bilateral     knee    KIDNEY SURGERY  2009    nodule removal    LYMPH NODE DISSECTION Left 01/2018    left inguinal LN removed - benign     OTHER SURGICAL HISTORY      kidney nodule removal    PALATE / UVULA BIOPSY / EXCISION      CA LAP GASTRIC BYPASS/JASON-EN-Y N/A 12/22/2020    Procedure: BYPASS GASTRIC  JASON-EN-Y LAPAROSCOPIC WITH INTRAOPERATIVE EGD;  Surgeon: Donna Marshall MD;  [facility];  Service: [facility] SINUS SURGERY      TONSILLECTOMY         ALLERGIES     Allergies   Allergen Reactions    Clonidine Anaphylaxis    Lisinopril Anaphylaxis    Nifedipine Anaphylaxis    Spironolactone Anaphylaxis, Other (See Comments) and Shortness Of Breath    Buspirone      Headaches,     Enoxaparin      Injection site "bump" per Dr Deni Pederson Minoxidil      Retains fluid around heart       SOCIAL HISTORY     Social History     Substance and Sexual Activity   Alcohol Use Not Currently    Alcohol/week: 1 0 standard drinks    Types: 1 Shots of liquor per week    Frequency: 4 or more times a week    Drinks per session: 1 or 2    Binge frequency: Daily or almost daily    Comment: reportd 1 beer every 2 days     Social History     Substance and Sexual Activity   Drug Use No     Social History     Tobacco Use   Smoking Status Never Smoker   Smokeless Tobacco Never Used       FAMILY HISTORY     Family History   Problem Relation Age of Onset    Heart murmur Sister     Asthma Sister     Hypertension Sister     Kidney disease Mother     Cancer Father     Bell's palsy Brother     Kidney disease Brother     Deep vein thrombosis Neg Hx        CURRENT MEDICATIONS       Current Facility-Administered Medications:     acetaminophen (TYLENOL) tablet 650 mg, 650 mg, Oral, Q6H PRN, HAYLEE Vital-C    aluminum-magnesium hydroxide-simethicone (MYLANTA) oral suspension 30 mL, 30 mL, Oral, Q6H PRN, HAYLEE Vital-C    amLODIPine (NORVASC) tablet 10 mg, 10 mg, Oral, Daily, HAYLEE Vital-C, 10 mg at 04/16/21 0910    atorvastatin (LIPITOR) tablet 40 mg, 40 mg, Oral, HS, HAYLEE Vital-C, 40 mg at 04/15/21 2129    budesonide-formoterol (SYMBICORT) 160-4 5 mcg/act inhaler 2 puff, 2 puff, Inhalation, BID, HAYLEE Vital-C, 2 puff at 04/16/21 0910    busPIRone (BUSPAR) tablet 15 mg, 15 mg, Oral, BID, HAYLEE Vital-C, 15 mg at 04/16/21 3123    cholecalciferol (VITAMIN D3) tablet 1,000 Units, 1,000 Units, Oral, Daily, HAYLEE Vital-C, 1,000 Units at 04/16/21 0910    doxazosin (CARDURA) tablet 8 mg, 8 mg, Oral, BID, HAYLEE Vital-C, 8 mg at 04/16/21 0909    escitalopram (LEXAPRO) tablet 20 mg, 20 mg, Oral, Daily, HAYLEE Vital-C, 20 mg at 04/16/21 2955    ferrous sulfate tablet 325 mg, 325 mg, Oral, BID, HAYLEE Vital-C, 325 mg at 04/16/21 0910    fish oil capsule 1,000 mg, 1,000 mg, Oral, Daily, [place] PA-C, 1,000 mg at 04/16/21 0910   fluticasone (FLOVENT HFA) 44 mcg/act inhaler 2 puff, 2 puff, Inhalation, BID, HAYLEE Vital-C, 2 puff at 04/16/21 0910    LORazepam (ATIVAN) tablet 0 5 mg, 0 5 mg, Oral, Q8H PRN, [place] PA-C    multivitamin-minerals (CENTRUM) tablet 1 tablet, 1 tablet, Oral, Daily, Erik FountainHAYLEE ashraf-C, 1 tablet at 04/16/21 0910    nebivolol (BYSTOLIC) tablet 20 mg, 20 mg, Oral, BID, HAYLEE Vital-C, 20 mg at 04/16/21 0910    ondansetron (ZOFRAN) injection 4 mg, 4 mg, Intravenous, Q6H PRN, [place] PA-C    pantoprazole (PROTONIX) EC tablet 20 mg, 20 mg, Oral, Early Morning, HAYLEE Vital-C, 20 mg at 04/16/21 0517    polyethylene glycol (MIRALAX) packet 17 g, 17 g, Oral, Daily PRN, [place] PA-C    sodium chloride 0 9 % infusion, 75 mL/hr, Intravenous, Continuous, HAYLEE Vital-C, Last Rate: 75 mL/hr at 04/16/21 0231, 75 mL/hr at 04/16/21 0231    REVIEW OF SYSTEMS     Complete 10 points of review of systems were obtained and discussed in length with patient today  Complete 10 points of review of systems were negative/unremarkable except mentioned in the HPI section  OBJECTIVE     Current Weight: Weight - Scale: 78 2 kg (172 lb 6 4 oz)  /90   Pulse 64   Temp (!) 97 4 °F (36 3 °C)   Resp 18   Ht 5' 8" (1 727 m)   Wt 78 2 kg (172 lb 6 4 oz)   SpO2 99%   BMI 26 21 kg/m²   Vitals:    04/16/21 0801   BP: 152/90   Pulse: 64   Resp: 18   Temp: (!) 97 4 °F (36 3 °C)   SpO2: 99%     Body mass index is 26 21 kg/m²  Intake/Output Summary (Last 24 hours) at 4/16/2021 1207  Last data filed at 4/16/2021 1148  Gross per 24 hour   Intake 1380 ml   Output 1600 ml   Net -220 ml       PHYSICAL EXAMINATION     Physical Exam  Constitutional:       General: He is not in acute distress  HENT:      Head: Normocephalic and atraumatic  Eyes:      General: No scleral icterus  Neck:      Musculoskeletal: Neck supple  Vascular: No JVD     Cardiovascular:      Rate and Rhythm: Normal rate    Pulmonary:      Effort: No accessory muscle usage or respiratory distress  Abdominal:      General: There is no distension  Palpations: Abdomen is soft  Musculoskeletal:      Right hand: He exhibits no laceration  Left hand: He exhibits no laceration  Skin:     General: Skin is warm  Comments: No Jaundice    Psychiatric:         Behavior: Behavior is not combative  LAB RESULTS        Results from last 7 days   Lab Units 04/16/21  0621 04/16/21  0514 04/15/21  1221   WBC Thousand/uL 7 18  --  7 17   HEMOGLOBIN g/dL 10 6*  --  10 8*   HEMATOCRIT % 33 5*  --  34 1*   PLATELETS Thousands/uL 325  --  339   SODIUM mmol/L  --  140 140   POTASSIUM mmol/L  --  3 4* 3 4*   CHLORIDE mmol/L  --  106 104   CO2 mmol/L  --  25 26   BUN mg/dL  --  47* 48*   CREATININE mg/dL  --  3 02* 3 43*   EGFR ml/min/1 73sq m  --  25 22   CALCIUM mg/dL  --  8 9 8 7   MAGNESIUM mg/dL  --   --  1 8   PHOSPHORUS mg/dL  --   --  4 5       I have personally reviewed the old medical records from Care everywhere and patient's previously known baseline creatinine level is ~ 1 3-1 5    RADIOLOGY RESULTS     CT renal stone study abdomen pelvis wo contrast   Final Result by Jeniffer Mcguire MD (04/16 1135)      Airspace opacity at the right lung base may be acute on chronic suggesting possible small pneumonia  Some hyperdensity is new and should be correlated any history of aspiration  There is no evidence of hydronephrosis or hydroureter  No stone could be identified  There is a small pocket of free fluid in the left lower quadrant with slight loculation of indeterminate etiology  This could be related to patient's renal insufficiency  Correlation with any secondary signs of infection is advised as well as    follow-up  Status post gastric bypass  The study was marked in EPIC for significant notification              Workstation performed: [id number]         US kidney and bladder   Final Result by Linda Maharaj MD (04/15 2315)      There is increased renal cortical echogenicity, suggesting medical renal disease  Mild right hydronephrosis  Mild fullness left renal collecting system  There is a 5 mm calculus in the lower pole right kidney  Simple appearing right renal cysts  The prostate appears enlarged and heterogeneous and causes some indentation upon the base of the urinary bladder  Clinical correlation, laboratory correlation and follow-up is recommended  Workstation performed: [id number]         XR chest portable   Final Result by Jason Sahu DO (04/15 3604)      No acute cardiopulmonary disease  Workstation performed: [id number]         XR foot 3+ views RIGHT   Final Result by Vita Amador MD (04/15 2729)      No acute osseous abnormality  Workstation performed: [id number]             PLAN / RECOMMENDATIONS      1  TRUDI on top of CKD stage 3  Present on admission, exact etiology is currently unclear  Upon review of old medical records from Care everywhere, baseline creatinine seems to be around 1 3-1 5  Patient is been followed by nephrologist at [address] [facility]  Patient had virtual video visit with Dr Brice Fischer earlier and was found to have elevated creatinine and was advised to go to the ER  Admission creatinine was 3 43  Clinically patient is not in volume overload state and for time being plan to continue IV fluid today  Will follow up results of SPEP and UPEP  Renal ultrasound done earlier on admission showed mild right hydronephrosis  I have also reviewed the results of recent renal ultrasound from Care everywhere which was order by primary nephrologist was suggestive of enlarged prostate with possible bladder outlet obstruction  Recommended to have CT scan renal protocol this morning which showed no hydronephrosis or obstructive stone    Will plan to check serial bladder scan to rule out urinary retention  I would also plan to check urine lytes for further evaluation  Plan to repeat renal function with AM labs    2  Hypertension in chronic kidney disease  Currently blood pressure is under good control and monitor hypertension with amlodipine 10 mg PO daily, doxazosin 8 mg PO BID and Bystolic 20 mg PO BID today  Thank you for the consultation to participate in patient's care  I have personally discussed my overall above mentioned plan with the current [address] [place] [address]  Shana Gupta MD  Nephrology  4/16/2021        Portions of the record may have been created with voice recognition software  Occasional wrong word or "sound a like" substitutions may have occurred due to the inherent limitations of voice recognition software  Read the chart carefully and recognize, using context, where substitutions have occurred

## 2021-04-16 NOTE — ASSESSMENT & PLAN NOTE
Lab Results   Component Value Date    EGFR 25 04/16/2021    EGFR 22 04/15/2021    EGFR 68 12/24/2020    CREATININE 3 02 (H) 04/16/2021    CREATININE 3 43 (H) 04/15/2021    CREATININE 1 34 (H) 12/24/2020   · Patient has baseline of CKD stage 3 and has followed with nephrology outpatient since before 2012  · Strict I&O's; Daily weight   · Renal ultrasound showsThere is no evidence of hydronephrosis or hydroureter   No stone could be identified     · Repeat urine and continuing lab workup - pending  · Repeat lab work abnormal which prompted evaluation inpatient  · Nephrology evaluated recommend CT to rule out obstructive stone  · Follow-up bladder scan  · Consider Urology consult if obstruction identified  Baseline creatinine appears to be 1 3-1 7

## 2021-04-17 LAB
ANION GAP SERPL CALCULATED.3IONS-SCNC: 9 MMOL/L (ref 4–13)
BUN SERPL-MCNC: 37 MG/DL (ref 5–25)
CALCIUM SERPL-MCNC: 9.1 MG/DL (ref 8.3–10.1)
CHLORIDE SERPL-SCNC: 107 MMOL/L (ref 100–108)
CO2 SERPL-SCNC: 25 MMOL/L (ref 21–32)
CREAT SERPL-MCNC: 2.45 MG/DL (ref 0.6–1.3)
ERYTHROCYTE [DISTWIDTH] IN BLOOD BY AUTOMATED COUNT: 14.8 % (ref 11.6–15.1)
GFR SERPL CREATININE-BSD FRML MDRD: 33 ML/MIN/1.73SQ M
GLUCOSE SERPL-MCNC: 89 MG/DL (ref 65–140)
HCT VFR BLD AUTO: 34.4 % (ref 36.5–49.3)
HGB BLD-MCNC: 10.9 G/DL (ref 12–17)
MCH RBC QN AUTO: 26.9 PG (ref 26.8–34.3)
MCHC RBC AUTO-ENTMCNC: 31.7 G/DL (ref 31.4–37.4)
MCV RBC AUTO: 85 FL (ref 82–98)
PLATELET # BLD AUTO: 344 THOUSANDS/UL (ref 149–390)
PMV BLD AUTO: 9.6 FL (ref 8.9–12.7)
POTASSIUM SERPL-SCNC: 3.1 MMOL/L (ref 3.5–5.3)
RBC # BLD AUTO: 4.05 MILLION/UL (ref 3.88–5.62)
SODIUM SERPL-SCNC: 141 MMOL/L (ref 136–145)
WBC # BLD AUTO: 6.84 THOUSAND/UL (ref 4.31–10.16)

## 2021-04-17 PROCEDURE — 94660 CPAP INITIATION&MGMT: CPT

## 2021-04-17 PROCEDURE — 99232 SBSQ HOSP IP/OBS MODERATE 35: CPT | Performed by: NURSE PRACTITIONER

## 2021-04-17 PROCEDURE — 99233 SBSQ HOSP IP/OBS HIGH 50: CPT | Performed by: INTERNAL MEDICINE

## 2021-04-17 PROCEDURE — 80048 BASIC METABOLIC PNL TOTAL CA: CPT | Performed by: PHYSICIAN ASSISTANT

## 2021-04-17 PROCEDURE — 94760 N-INVAS EAR/PLS OXIMETRY 1: CPT

## 2021-04-17 PROCEDURE — 85027 COMPLETE CBC AUTOMATED: CPT | Performed by: PHYSICIAN ASSISTANT

## 2021-04-17 RX ORDER — POTASSIUM CHLORIDE 20 MEQ/1
40 TABLET, EXTENDED RELEASE ORAL
Status: COMPLETED | OUTPATIENT
Start: 2021-04-17 | End: 2021-04-18

## 2021-04-17 RX ORDER — POTASSIUM CHLORIDE 20 MEQ/1
40 TABLET, EXTENDED RELEASE ORAL ONCE
Status: COMPLETED | OUTPATIENT
Start: 2021-04-17 | End: 2021-04-17

## 2021-04-17 RX ADMIN — FLUTICASONE PROPIONATE 2 PUFF: 44 AEROSOL, METERED RESPIRATORY (INHALATION) at 10:07

## 2021-04-17 RX ADMIN — DOXAZOSIN 8 MG: 4 TABLET ORAL at 10:03

## 2021-04-17 RX ADMIN — AMLODIPINE BESYLATE 10 MG: 10 TABLET ORAL at 10:05

## 2021-04-17 RX ADMIN — HEPARIN SODIUM 5000 UNITS: 5000 INJECTION INTRAVENOUS; SUBCUTANEOUS at 23:00

## 2021-04-17 RX ADMIN — HEPARIN SODIUM 5000 UNITS: 5000 INJECTION INTRAVENOUS; SUBCUTANEOUS at 15:00

## 2021-04-17 RX ADMIN — NEBIVOLOL HYDROCHLORIDE 20 MG: 10 TABLET ORAL at 17:06

## 2021-04-17 RX ADMIN — PANTOPRAZOLE SODIUM 20 MG: 20 TABLET, DELAYED RELEASE ORAL at 05:13

## 2021-04-17 RX ADMIN — FERROUS SULFATE TAB 325 MG (65 MG ELEMENTAL FE) 325 MG: 325 (65 FE) TAB at 17:06

## 2021-04-17 RX ADMIN — Medication 1 TABLET: at 10:03

## 2021-04-17 RX ADMIN — SODIUM CHLORIDE 75 ML/HR: 0.9 INJECTION, SOLUTION INTRAVENOUS at 05:13

## 2021-04-17 RX ADMIN — OMEGA-3 FATTY ACIDS CAP 1000 MG 1000 MG: 1000 CAP at 10:04

## 2021-04-17 RX ADMIN — SODIUM CHLORIDE 75 ML/HR: 0.9 INJECTION, SOLUTION INTRAVENOUS at 17:12

## 2021-04-17 RX ADMIN — HEPARIN SODIUM 5000 UNITS: 5000 INJECTION INTRAVENOUS; SUBCUTANEOUS at 05:13

## 2021-04-17 RX ADMIN — DOXAZOSIN 8 MG: 4 TABLET ORAL at 17:06

## 2021-04-17 RX ADMIN — ATORVASTATIN CALCIUM 40 MG: 40 TABLET, FILM COATED ORAL at 23:04

## 2021-04-17 RX ADMIN — BUSPIRONE HYDROCHLORIDE 15 MG: 5 TABLET ORAL at 10:04

## 2021-04-17 RX ADMIN — POTASSIUM CHLORIDE 40 MEQ: 1500 TABLET, EXTENDED RELEASE ORAL at 12:48

## 2021-04-17 RX ADMIN — FERROUS SULFATE TAB 325 MG (65 MG ELEMENTAL FE) 325 MG: 325 (65 FE) TAB at 10:05

## 2021-04-17 RX ADMIN — ESCITALOPRAM OXALATE 20 MG: 10 TABLET ORAL at 10:03

## 2021-04-17 RX ADMIN — FLUTICASONE PROPIONATE 2 PUFF: 44 AEROSOL, METERED RESPIRATORY (INHALATION) at 20:07

## 2021-04-17 RX ADMIN — BUSPIRONE HYDROCHLORIDE 15 MG: 5 TABLET ORAL at 17:06

## 2021-04-17 RX ADMIN — BUDESONIDE AND FORMOTEROL FUMARATE DIHYDRATE 2 PUFF: 160; 4.5 AEROSOL RESPIRATORY (INHALATION) at 18:00

## 2021-04-17 RX ADMIN — POTASSIUM CHLORIDE 40 MEQ: 1500 TABLET, EXTENDED RELEASE ORAL at 10:05

## 2021-04-17 RX ADMIN — Medication 1000 UNITS: at 10:05

## 2021-04-17 RX ADMIN — BUDESONIDE AND FORMOTEROL FUMARATE DIHYDRATE 2 PUFF: 160; 4.5 AEROSOL RESPIRATORY (INHALATION) at 10:07

## 2021-04-17 RX ADMIN — POTASSIUM CHLORIDE 40 MEQ: 1500 TABLET, EXTENDED RELEASE ORAL at 17:07

## 2021-04-17 RX ADMIN — NEBIVOLOL HYDROCHLORIDE 20 MG: 10 TABLET ORAL at 10:04

## 2021-04-17 NOTE — PLAN OF CARE
Problem: GENITOURINARY - ADULT  Goal: Maintains or returns to baseline urinary function  Description: INTERVENTIONS:  - Assess urinary function  - Encourage oral fluids to ensure adequate hydration if ordered  - Administer IV fluids as ordered to ensure adequate hydration  - Administer ordered medications as needed  - Offer frequent toileting  - Follow urinary retention protocol if ordered  Outcome: Progressing     Problem: METABOLIC, FLUID AND ELECTROLYTES - ADULT  Goal: Electrolytes maintained within normal limits  Description: INTERVENTIONS:  - Monitor labs and assess patient for signs and symptoms of electrolyte imbalances  - Administer electrolyte replacement as ordered  - Monitor response to electrolyte replacements, including repeat lab results as appropriate  - Instruct patient on fluid and nutrition as appropriate  Outcome: Progressing

## 2021-04-17 NOTE — PROGRESS NOTES
NEPHROLOGY PROGRESS NOTE    Patient: Juan C Valention               Sex: male          DOA: 4/15/2021 11:52 AM   Date of Birth: @        Age: @        LOS:  LOS: 2 days   4/17/2021    REASON FOR THE CONSULTATION:  Further management of TRUDI    HPI     This is a 60 y o  male admitted for Acute renal failure superimposed on stage 3 chronic kidney disease ([facility] [address] )     SUBJECTIVE     - breathing is currently stable and patient is also found to be nonoliguric  Serial bladder scan in last 24 hours showed no significant urinary retention  Patient denies nausea, vomiting, headache or dizziness today    - Reviewed last 24 hrs events     CURRENT MEDICATIONS       Current Facility-Administered Medications:     acetaminophen (TYLENOL) tablet 650 mg, 650 mg, Oral, Q6H PRN, HAYLEE Vital-C    aluminum-magnesium hydroxide-simethicone (MYLANTA) oral suspension 30 mL, 30 mL, Oral, Q6H PRN, HAYLEE Vital-C    amLODIPine (NORVASC) tablet 10 mg, 10 mg, Oral, Daily, HAYLEE Vital-C, 10 mg at 04/17/21 1005    atorvastatin (LIPITOR) tablet 40 mg, 40 mg, Oral, HS, [place] PA-C, 40 mg at 04/16/21 2117    budesonide-formoterol (SYMBICORT) 160-4 5 mcg/act inhaler 2 puff, 2 puff, Inhalation, BID, HAYLEE Vital-C, 2 puff at 04/17/21 1007    busPIRone (BUSPAR) tablet 15 mg, 15 mg, Oral, BID, HAYLEE Vital-C, 15 mg at 04/17/21 1004    cholecalciferol (VITAMIN D3) tablet 1,000 Units, 1,000 Units, Oral, Daily, [place] PA-C, 1,000 Units at 04/17/21 1005    doxazosin (CARDURA) tablet 8 mg, 8 mg, Oral, BID, [place] PA-C, 8 mg at 04/17/21 1003    escitalopram (LEXAPRO) tablet 20 mg, 20 mg, Oral, Daily, [place] PA-C, 20 mg at 04/17/21 1003    ferrous sulfate tablet 325 mg, 325 mg, Oral, BID, [place] PA-C, 325 mg at 04/17/21 1005    fish oil capsule 1,000 mg, 1,000 mg, Oral, Daily, [place] PA-C, 1,000 mg at 04/17/21 1004    fluticasone (FLOVENT HFA) 44 mcg/act inhaler 2 puff, 2 puff, Inhalation, BID, HAYLEE Redding-C, 2 puff at 04/17/21 1007    heparin (porcine) subcutaneous injection 5,000 Units, 5,000 Units, Subcutaneous, Q8H [address], Galjyoti Sheth, SVNENP, 5,000 Units at 04/17/21 0513    LORazepam (ATIVAN) tablet 0 5 mg, 0 5 mg, Oral, Q8H PRN, HAYLEE Vital-C    multivitamin-minerals (CENTRUM) tablet 1 tablet, 1 tablet, Oral, Daily, HAYLEE Vital-C, 1 tablet at 04/17/21 1003    nebivolol (BYSTOLIC) tablet 20 mg, 20 mg, Oral, BID, HAYLEE Vital-C, 20 mg at 04/17/21 1004    ondansetron (ZOFRAN) injection 4 mg, 4 mg, Intravenous, Q6H PRN, [place] PA-C    pantoprazole (PROTONIX) EC tablet 20 mg, 20 mg, Oral, Early Morning, HAYLEE Vital-C, 20 mg at 04/17/21 0513    polyethylene glycol (MIRALAX) packet 17 g, 17 g, Oral, Daily PRN, [place] PA-C    potassium chloride (K-DUR,KLOR-CON) CR tablet 40 mEq, 40 mEq, Oral, TID With Meals, MARBIN Etienne    sodium chloride 0 9 % infusion, 75 mL/hr, Intravenous, Continuous, HAYLEE [place]-PASQUALE, Last Rate: 75 mL/hr at 04/17/21 0513, 75 mL/hr at 04/17/21 0513    OBJECTIVE     Current Weight: Weight - Scale: 79 kg (174 lb 2 6 oz)  /78   Pulse 68   Temp 98 1 °F (36 7 °C)   Resp 17   Ht 5' 8" (1 727 m)   Wt 79 kg (174 lb 2 6 oz)   SpO2 97%   BMI 26 48 kg/m²   Vitals:    04/17/21 0801   BP: 134/78   Pulse:    Resp: 17   Temp:    SpO2:      Body mass index is 26 48 kg/m²  Intake/Output Summary (Last 24 hours) at 4/17/2021 1046  Last data filed at 4/17/2021 0906  Gross per 24 hour   Intake 740 ml   Output 2950 ml   Net -2210 ml       PHYSICAL EXAMINATION     Physical Exam  Constitutional:       General: He is not in acute distress  HENT:      Head: Normocephalic and atraumatic  Eyes:      General: No scleral icterus  Neck:      Musculoskeletal: Neck supple  Vascular: No JVD  Cardiovascular:      Rate and Rhythm: Normal rate     Pulmonary:      Effort: No accessory muscle usage or respiratory distress  Abdominal:      General: There is no distension  Palpations: Abdomen is soft  Musculoskeletal:      Right hand: He exhibits no laceration  Left hand: He exhibits no laceration  Skin:     General: Skin is warm  Comments: No Jaundice    Psychiatric:         Behavior: Behavior is not combative  LAB RESULTS     Results from last 7 days   Lab Units 04/17/21  0542 04/16/21  0621 04/16/21  0514 04/15/21  1221   WBC Thousand/uL 6 84 7 18  --  7 17   HEMOGLOBIN g/dL 10 9* 10 6*  --  10 8*   HEMATOCRIT % 34 4* 33 5*  --  34 1*   PLATELETS Thousands/uL 344 325  --  339   SODIUM mmol/L 141  --  140 140   POTASSIUM mmol/L 3 1*  --  3 4* 3 4*   CHLORIDE mmol/L 107  --  106 104   CO2 mmol/L 25  --  25 26   BUN mg/dL 37*  --  47* 48*   CREATININE mg/dL 2 45*  --  3 02* 3 43*   EGFR ml/min/1 73sq m 33  --  25 22   CALCIUM mg/dL 9 1  --  8 9 8 7   MAGNESIUM mg/dL  --   --   --  1 8   PHOSPHORUS mg/dL  --   --   --  4 5       I have personally reviewed the old medical records and patient's previously known baseline creatinine level is ~ 1 3-1 5    RADIOLOGY RESULTS      CT renal stone study abdomen pelvis wo contrast   Final Result by Jade Felder MD (04/16 3235)      Airspace opacity at the right lung base may be acute on chronic suggesting possible small pneumonia  Some hyperdensity is new and should be correlated any history of aspiration  There is no evidence of hydronephrosis or hydroureter  No stone could be identified  There is a small pocket of free fluid in the left lower quadrant with slight loculation of indeterminate etiology  This could be related to patient's renal insufficiency  Correlation with any secondary signs of infection is advised as well as    follow-up  Status post gastric bypass  The study was marked in EPIC for significant notification              Workstation performed: [id number]         US kidney and bladder   Final Result by Marlen Chavez MD (04/15 3975)      There is increased renal cortical echogenicity, suggesting medical renal disease  Mild right hydronephrosis  Mild fullness left renal collecting system  There is a 5 mm calculus in the lower pole right kidney  Simple appearing right renal cysts  The prostate appears enlarged and heterogeneous and causes some indentation upon the base of the urinary bladder  Clinical correlation, laboratory correlation and follow-up is recommended  Workstation performed: [id number]         XR chest portable   Final Result by Bren Hess DO (04/15 3714)      No acute cardiopulmonary disease  Workstation performed: [id number]         XR foot 3+ views RIGHT   Final Result by Charlie Read MD (04/15 3769)      No acute osseous abnormality  Workstation performed: [id number]             PLAN / RECOMMENDATIONS      1  TRUDI on top of CKD stage 3  Present on admission, urine lytes were mixed in nature and with IV fluid, renal function is improving suggesting patient does have component of intravascular volume depletion as the etiology of TRUDI  Upon review of old medical records from [facility], previously known baseline creatinine was thought to be around 1 3-1 5 and patient been followed by nephrologist at [address] [facility]  Admission creatinine was 3 43  Currently receiving IV fluid and overnight breathing has remained stable and also found to be nonoliguric and renal function has also improved to current creatinine of 2 45  Encouraged patient to increase oral fluid intake and continue IV fluid today and recheck renal function with AM labs  Underwent CT scan renal protocol yesterday which showed no hydronephrosis or obstructive stone  Serial bladder scan also showed no significant urinary retention  Will plan to discontinue serial bladder scan today  2  Hypertension in chronic kidney disease    Overnight blood pressure has remained under good control and monitor hypertension with Bystolic 20 mg PO BID, doxazosin 8 mg PO BID amlodipine 10 mg PO daily today  3  Hypokalemia  Multifactorial with current potassium of 3 1 which is below the goal   Plan potassium chloride 40 mEq PO x1 dose today  Recheck potassium level with AM labs  Overall above mentioned plan was also d/w Denita Ortiz MD  Nephrology  4/17/2021        Portions of the record may have been created with voice recognition software  Occasional wrong word or "sound a like" substitutions may have occurred due to the inherent limitations of voice recognition software  Read the chart carefully and recognize, using context, where substitutions have occurred

## 2021-04-17 NOTE — ASSESSMENT & PLAN NOTE
Lab Results   Component Value Date    EGFR 33 04/17/2021    EGFR 25 04/16/2021    EGFR 22 04/15/2021    CREATININE 2 45 (H) 04/17/2021    CREATININE 3 02 (H) 04/16/2021    CREATININE 3 43 (H) 04/15/2021   · Patient has baseline of CKD stage 3 and has followed with nephrology outpatient since before 2012  · Strict I&O's; Daily weight   · Renal ultrasound showsThere is no evidence of hydronephrosis or hydroureter   No stone could be identified     · Repeat urine and continuing lab workup - pending  · Repeat lab work abnormal which prompted evaluation inpatient  · Nephrology evaluated recommend CT to rule out obstructive stone  · Consider Urology consult if obstruction identified  Baseline creatinine appears to be 1 3-1 7

## 2021-04-17 NOTE — ASSESSMENT & PLAN NOTE
Lab Results   Component Value Date    HGBA1C 5 7 04/05/2021     No results for input(s): POCGLU in the last 72 hours      Blood Sugar Average: Last 72 hrs:      · Patient has baseline of CKD stage 3 and has followed with nephrology outpatient since before 2012  · Last f/u visit was x 2 days ago   · Repeat lab work abnormal which prompted evaluation inpatient  · Nephrology evaluated recommend CT to rule out obstructive stone  · Bladder scan revealed PVR of 40 ml   · Consider Urology consult if obstruction identified  · Baseline creatinine appears to be 1 3-1 7

## 2021-04-17 NOTE — PROGRESS NOTES
[facility]  Progress Note - Danni Codding 1965, 60 y o  male MRN: [id number]  Unit/Bed#: -Greg Encounter: [id number]  Primary Care Provider: Delisa Dumas MD   Date and time admitted to hospital: 4/15/2021 11:52 AM    * Acute renal failure superimposed on stage 3 chronic kidney disease [facility])  Assessment & Plan  Lab Results   Component Value Date    EGFR 33 04/17/2021    EGFR 25 04/16/2021    EGFR 22 04/15/2021    CREATININE 2 45 (H) 04/17/2021    CREATININE 3 02 (H) 04/16/2021    CREATININE 3 43 (H) 04/15/2021   · Patient has baseline of CKD stage 3 and has followed with nephrology outpatient since before 2012  · Strict I&O's; Daily weight   · Renal ultrasound showsThere is no evidence of hydronephrosis or hydroureter   No stone could be identified  · Repeat urine and continuing lab workup - pending  · Repeat lab work abnormal which prompted evaluation inpatient  · Nephrology evaluated recommend CT to rule out obstructive stone  · Consider Urology consult if obstruction identified  Baseline creatinine appears to be 1 3-1 7    CKD stage 3 secondary to diabetes [facility])  Assessment & Plan  Lab Results   Component Value Date    HGBA1C 5 7 04/05/2021     No results for input(s): POCGLU in the last 72 hours      Blood Sugar Average: Last 72 hrs:      · Patient has baseline of CKD stage 3 and has followed with nephrology outpatient since before 2012  · Last f/u visit was x 2 days ago   · Repeat lab work abnormal which prompted evaluation inpatient  · Nephrology evaluated recommend CT to rule out obstructive stone  · Bladder scan revealed PVR of 40 ml   · Consider Urology consult if obstruction identified  · Baseline creatinine appears to be 1 3-1 7      Essential hypertension  Assessment & Plan  · Continue home medications  · Hold nephrotoxic drugs     History of DVT (deep vein thrombosis)  Assessment & Plan  · Past history of DVT   · Not currently on oral anti-coagulation   · Continue to hold anti-coagulation for further recommendation per nephrology  Hypertensive kidney disease with stage 3 chronic kidney disease  Assessment & Plan  Lab Results   Component Value Date    EGFR 33 04/17/2021    EGFR 25 04/16/2021    EGFR 22 04/15/2021    CREATININE 2 45 (H) 04/17/2021    CREATININE 3 02 (H) 04/16/2021    CREATININE 3 43 (H) 04/15/2021       Type 2 diabetes mellitus ([facility] [address] )  Assessment & Plan  Lab Results   Component Value Date    HGBA1C 5 7 04/05/2021       No results for input(s): POCGLU in the last 72 hours  Blood Sugar Average: Last 72 hrs:   Denies taking Ozempic outpatient   · HbgA1C - 5 7  · Continue on cardiac low sodium diet    Chronic diastolic CHF (congestive heart failure) ([facility])  Assessment & Plan  Wt Readings from Last 3 Encounters:   04/17/21 79 kg (174 lb 2 6 oz)   04/02/21 79 4 kg (175 lb)   12/31/20 101 kg (223 lb)     · Appears to be euvolemic - denies chest pain, SOB, or swelling of extremities  · Hold lasix due to acute TRUDI         VTE Pharmacologic Prophylaxis:   Pharmacologic: Heparin  Mechanical VTE Prophylaxis in Place: Yes    Patient Centered Rounds: I have performed bedside rounds with nursing staff today  Discussions with Specialists or Other Care Team Provider:  Reviewed Nephrology notes discussed with case management primary RN    Education and Discussions with Family / Patient:  Discussed plan of care with patient denies any additional questions or concerns at this time    Time Spent for Care: 20 minutes  More than 50% of total time spent on counseling and coordination of care as described above      Current Length of Stay: 2 day(s)    Current Patient Status: Inpatient   Certification Statement: The patient will continue to require additional inpatient hospital stay due to Monitoring of creatinine, IV fluids    Discharge Plan / Estimated Discharge Date:  48 hours pending improvement of creatinine      Code Status: Level 1 - Full Code      Subjective:   Denies any chest pain chest tightness shortness of breath or difficulty breathing tolerating fluids well discussed at length PT and and creatinine    Objective:     Vitals:   Temp (24hrs), Av 4 °F (36 9 °C), Min:98 1 °F (36 7 °C), Max:98 7 °F (37 1 °C)    Temp:  [98 1 °F (36 7 °C)-98 7 °F (37 1 °C)] 98 1 °F (36 7 °C)  HR:  [62-68] 68  Resp:  [17-20] 17  BP: (134-149)/(78-85) 134/78  SpO2:  [97 %-99 %] 97 %  Body mass index is 26 48 kg/m²  Input and Output Summary (last 24 hours): Intake/Output Summary (Last 24 hours) at 2021 1507  Last data filed at 2021 1441  Gross per 24 hour   Intake 800 ml   Output 2475 ml   Net -1675 ml       Physical Exam:     Physical Exam  Vitals signs and nursing note reviewed  Constitutional:       Appearance: Normal appearance  Cardiovascular:      Rate and Rhythm: Normal rate  Pulses: Normal pulses  Pulmonary:      Breath sounds: Normal breath sounds  Abdominal:      Palpations: Abdomen is soft  Musculoskeletal: Normal range of motion  Skin:     General: Skin is warm  Neurological:      General: No focal deficit present  Mental Status: He is alert  Mental status is at baseline  Psychiatric:         Mood and Affect: Mood normal          Thought Content: Thought content normal          Judgment: Judgment normal        Additional Data:     Labs:    Results from last 7 days   Lab Units 21  0542  04/15/21  1221   WBC Thousand/uL 6 84   < > 7 17   HEMOGLOBIN g/dL 10 9*   < > 10 8*   HEMATOCRIT % 34 4*   < > 34 1*   PLATELETS Thousands/uL 344   < > 339   NEUTROS PCT %  --   --  61   LYMPHS PCT %  --   --  22   MONOS PCT %  --   --  10   EOS PCT %  --   --  6    < > = values in this interval not displayed       Results from last 7 days   Lab Units 21  0542  04/15/21  1221   POTASSIUM mmol/L 3 1*   < > 3 4*   CHLORIDE mmol/L 107   < > 104   CO2 mmol/L 25   < > 26   BUN mg/dL 37*   < > 48*   CREATININE mg/dL 2 45*   < > 3 43*   CALCIUM mg/dL 9 1   < > 8 7   ALK PHOS U/L  --   --  99   ALT U/L  --   --  77   AST U/L  --   --  23    < > = values in this interval not displayed  * I Have Reviewed All Lab Data Listed Above  * Additional Pertinent Lab Tests Reviewed:  Jennifer Ta Admission Reviewed    Recent Cultures (last 7 days):           Last 24 Hours Medication List:   Current Facility-Administered Medications   Medication Dose Route Frequency Provider Last Rate    acetaminophen  650 mg Oral Q6H PRN Linus Ramon, PA-C      aluminum-magnesium hydroxide-simethicone  30 mL Oral Q6H PRN Linus Ramon, PA-C      amLODIPine  10 mg Oral Daily Hazel E Phu, PA-C      atorvastatin  40 mg Oral HS Hazel E Phu, PA-C      budesonide-formoterol  2 puff Inhalation BID Hazel E Phu, PA-C      busPIRone  15 mg Oral BID Hazel E Phu, PA-C      cholecalciferol  1,000 Units Oral Daily Linus Ramon, PA-C      doxazosin  8 mg Oral BID Hazel E Phu, PA-C      escitalopram  20 mg Oral Daily Hazel E Phu, PA-C      ferrous sulfate  325 mg Oral BID Hazel E Phu, PA-C      fish oil  1,000 mg Oral Daily Linus Ramon, PA-C      fluticasone  2 puff Inhalation BID Hazel E Phu, PA-C      heparin (porcine)  5,000 Units Subcutaneous [facility] MARBIN Wan      LORazepam  0 5 mg Oral Q8H PRN Linus Ramon, PA-C      multivitamin-minerals  1 tablet Oral Daily Maximino Vincent      nebivolol  20 mg Oral BID Hazeljaylon Bay, PA-C      ondansetron  4 mg Intravenous Q6H PRN Hazel E Phu, PA-C      pantoprazole  20 mg Oral Early Morning Hazel MARI Bay, PA-C      polyethylene glycol  17 g Oral Daily PRN Linus Ramon, PA-C      potassium chloride  40 mEq Oral TID With Meals MARBIN Wan      sodium chloride  75 mL/hr Intravenous Continuous Linus Ramon, PA-C 75 mL/hr (04/17/21 0513)        Today, Patient Was Seen By: MARBIN Wan    ** Please Note: Dragon 360 Dictation voice to text software may have been used in the creation of this document   **

## 2021-04-17 NOTE — ASSESSMENT & PLAN NOTE
Lab Results   Component Value Date    EGFR 33 04/17/2021    EGFR 25 04/16/2021    EGFR 22 04/15/2021    CREATININE 2 45 (H) 04/17/2021    CREATININE 3 02 (H) 04/16/2021    CREATININE 3 43 (H) 04/15/2021

## 2021-04-17 NOTE — ASSESSMENT & PLAN NOTE
Wt Readings from Last 3 Encounters:   04/17/21 79 kg (174 lb 2 6 oz)   04/02/21 79 4 kg (175 lb)   12/31/20 101 kg (223 lb)     · Appears to be euvolemic - denies chest pain, SOB, or swelling of extremities    · Hold lasix due to acute TRUDI

## 2021-04-18 VITALS
RESPIRATION RATE: 18 BRPM | SYSTOLIC BLOOD PRESSURE: 125 MMHG | OXYGEN SATURATION: 95 % | DIASTOLIC BLOOD PRESSURE: 85 MMHG | HEART RATE: 62 BPM | BODY MASS INDEX: 26.38 KG/M2 | WEIGHT: 174.06 LBS | TEMPERATURE: 98.1 F | HEIGHT: 68 IN

## 2021-04-18 LAB
ANION GAP SERPL CALCULATED.3IONS-SCNC: 11 MMOL/L (ref 4–13)
BUN SERPL-MCNC: 27 MG/DL (ref 5–25)
CALCIUM SERPL-MCNC: 8.8 MG/DL (ref 8.3–10.1)
CHLORIDE SERPL-SCNC: 109 MMOL/L (ref 100–108)
CO2 SERPL-SCNC: 23 MMOL/L (ref 21–32)
CREAT SERPL-MCNC: 2.2 MG/DL (ref 0.6–1.3)
ERYTHROCYTE [DISTWIDTH] IN BLOOD BY AUTOMATED COUNT: 15.1 % (ref 11.6–15.1)
GFR SERPL CREATININE-BSD FRML MDRD: 37 ML/MIN/1.73SQ M
GLUCOSE SERPL-MCNC: 87 MG/DL (ref 65–140)
HCT VFR BLD AUTO: 31.9 % (ref 36.5–49.3)
HGB BLD-MCNC: 10 G/DL (ref 12–17)
MCH RBC QN AUTO: 27 PG (ref 26.8–34.3)
MCHC RBC AUTO-ENTMCNC: 31.3 G/DL (ref 31.4–37.4)
MCV RBC AUTO: 86 FL (ref 82–98)
PLATELET # BLD AUTO: 337 THOUSANDS/UL (ref 149–390)
PMV BLD AUTO: 9.7 FL (ref 8.9–12.7)
POTASSIUM SERPL-SCNC: 3.6 MMOL/L (ref 3.5–5.3)
RBC # BLD AUTO: 3.7 MILLION/UL (ref 3.88–5.62)
SODIUM SERPL-SCNC: 143 MMOL/L (ref 136–145)
WBC # BLD AUTO: 6.2 THOUSAND/UL (ref 4.31–10.16)

## 2021-04-18 PROCEDURE — 94760 N-INVAS EAR/PLS OXIMETRY 1: CPT

## 2021-04-18 PROCEDURE — 85027 COMPLETE CBC AUTOMATED: CPT | Performed by: PHYSICIAN ASSISTANT

## 2021-04-18 PROCEDURE — 94660 CPAP INITIATION&MGMT: CPT

## 2021-04-18 PROCEDURE — 99233 SBSQ HOSP IP/OBS HIGH 50: CPT | Performed by: INTERNAL MEDICINE

## 2021-04-18 PROCEDURE — 99239 HOSP IP/OBS DSCHRG MGMT >30: CPT | Performed by: NURSE PRACTITIONER

## 2021-04-18 PROCEDURE — 80048 BASIC METABOLIC PNL TOTAL CA: CPT | Performed by: INTERNAL MEDICINE

## 2021-04-18 RX ADMIN — ESCITALOPRAM OXALATE 20 MG: 10 TABLET ORAL at 09:54

## 2021-04-18 RX ADMIN — OMEGA-3 FATTY ACIDS CAP 1000 MG 1000 MG: 1000 CAP at 09:53

## 2021-04-18 RX ADMIN — NEBIVOLOL HYDROCHLORIDE 20 MG: 10 TABLET ORAL at 09:53

## 2021-04-18 RX ADMIN — FLUTICASONE PROPIONATE 2 PUFF: 44 AEROSOL, METERED RESPIRATORY (INHALATION) at 09:57

## 2021-04-18 RX ADMIN — POTASSIUM CHLORIDE 40 MEQ: 1500 TABLET, EXTENDED RELEASE ORAL at 09:55

## 2021-04-18 RX ADMIN — Medication 1000 UNITS: at 09:54

## 2021-04-18 RX ADMIN — HEPARIN SODIUM 5000 UNITS: 5000 INJECTION INTRAVENOUS; SUBCUTANEOUS at 05:46

## 2021-04-18 RX ADMIN — FERROUS SULFATE TAB 325 MG (65 MG ELEMENTAL FE) 325 MG: 325 (65 FE) TAB at 09:54

## 2021-04-18 RX ADMIN — Medication 1 TABLET: at 09:54

## 2021-04-18 RX ADMIN — DOXAZOSIN 8 MG: 4 TABLET ORAL at 09:53

## 2021-04-18 RX ADMIN — BUDESONIDE AND FORMOTEROL FUMARATE DIHYDRATE 2 PUFF: 160; 4.5 AEROSOL RESPIRATORY (INHALATION) at 09:57

## 2021-04-18 RX ADMIN — PANTOPRAZOLE SODIUM 20 MG: 20 TABLET, DELAYED RELEASE ORAL at 05:45

## 2021-04-18 RX ADMIN — BUSPIRONE HYDROCHLORIDE 15 MG: 5 TABLET ORAL at 09:55

## 2021-04-18 RX ADMIN — AMLODIPINE BESYLATE 10 MG: 10 TABLET ORAL at 09:53

## 2021-04-18 NOTE — CASE MANAGEMENT
IMM provided to pt  Follow up message to [facility] as they had not officially accepted   Awaiting response

## 2021-04-18 NOTE — ASSESSMENT & PLAN NOTE
Lab Results   Component Value Date    EGFR 37 04/18/2021    EGFR 33 04/17/2021    EGFR 25 04/16/2021    CREATININE 2 20 (H) 04/18/2021    CREATININE 2 45 (H) 04/17/2021    CREATININE 3 02 (H) 04/16/2021

## 2021-04-18 NOTE — ASSESSMENT & PLAN NOTE
Lab Results   Component Value Date    EGFR 37 04/18/2021    EGFR 33 04/17/2021    EGFR 25 04/16/2021    CREATININE 2 20 (H) 04/18/2021    CREATININE 2 45 (H) 04/17/2021    CREATININE 3 02 (H) 04/16/2021   · Patient has baseline of CKD stage 3 and has followed with nephrology outpatient since before 2012  · Strict I&O's; Daily weight   · Renal ultrasound showsThere is no evidence of hydronephrosis or hydroureter   No stone could be identified     · Creatinine continues to improve stable for discharge from a Nephrology standpoint, medications adjusted and will follow-up with nephrologist outpatient  · CT negative for obstruction  · Consider Urology consult if obstruction identified  Baseline creatinine appears to be 1 3-1 7

## 2021-04-18 NOTE — DISCHARGE INSTR - AVS FIRST PAGE
Thank you for choosing [facility] for year care, please take all prescriptions as instructed, please make appropriate follow-up visits

## 2021-04-18 NOTE — PROGRESS NOTES
NEPHROLOGY PROGRESS NOTE    Patient: Ankur Garcia               Sex: male          DOA: 4/15/2021 11:52 AM   Date of Birth: @        Age: @        LOS:  LOS: 3 days   4/18/2021    REASON FOR THE CONSULTATION:  Further management of TRUDI  HPI     This is a 60 y o  male admitted for Acute renal failure superimposed on stage 3 chronic kidney disease (HCC)     SUBJECTIVE     - breathing has remained stable and patient was also found to be nonoliguric overnight  Discussed overall case with patient's wife over the speaker phone today  Patient denies nausea, vomiting, headache or dizziness today    - Reviewed last 24 hrs events     CURRENT MEDICATIONS       Current Facility-Administered Medications:     acetaminophen (TYLENOL) tablet 650 mg, 650 mg, Oral, Q6H PRN, [place] PA-C    aluminum-magnesium hydroxide-simethicone (MYLANTA) oral suspension 30 mL, 30 mL, Oral, Q6H PRN, [place] PA-C    amLODIPine (NORVASC) tablet 10 mg, 10 mg, Oral, Daily, HAYLEE Vital-C, 10 mg at 04/18/21 9133    atorvastatin (LIPITOR) tablet 40 mg, 40 mg, Oral, HS, [place] PA-C, 40 mg at 04/17/21 2304    budesonide-formoterol (SYMBICORT) 160-4 5 mcg/act inhaler 2 puff, 2 puff, Inhalation, BID, [place] PA-C, 2 puff at 04/18/21 0957    busPIRone (BUSPAR) tablet 15 mg, 15 mg, Oral, BID, [place] PA-C, 15 mg at 04/18/21 6767    cholecalciferol (VITAMIN D3) tablet 1,000 Units, 1,000 Units, Oral, Daily, Vernida Staff PA-C, 1,000 Units at 04/18/21 0954    doxazosin (CARDURA) tablet 8 mg, 8 mg, Oral, BID, [place] PA-C, 8 mg at 04/18/21 0953    escitalopram (LEXAPRO) tablet 20 mg, 20 mg, Oral, Daily, [place] PA-C, 20 mg at 04/18/21 4687    ferrous sulfate tablet 325 mg, 325 mg, Oral, BID, [place] PA-C, 325 mg at 04/18/21 3730    fish oil capsule 1,000 mg, 1,000 mg, Oral, Daily, [place] PA-C, 1,000 mg at 04/18/21 0953    fluticasone (FLOVENT HFA) 44 mcg/act inhaler 2 puff, 2 puff, Inhalation, BID, HAYLEE Santiago-C, 2 puff at 04/18/21 0957    heparin (porcine) subcutaneous injection 5,000 Units, 5,000 Units, Subcutaneous, Q8H [address], Jamas Sic, CRNP, 5,000 Units at 04/18/21 0546    LORazepam (ATIVAN) tablet 0 5 mg, 0 5 mg, Oral, Q8H PRN, HAYLEE Vital-C    multivitamin-minerals (CENTRUM) tablet 1 tablet, 1 tablet, Oral, Daily, HAYLEE Santiago-C, 1 tablet at 04/18/21 0954    nebivolol (BYSTOLIC) tablet 20 mg, 20 mg, Oral, BID, HAYLEE Vital-PASQUALE, 20 mg at 04/18/21 0953    ondansetron (ZOFRAN) injection 4 mg, 4 mg, Intravenous, Q6H PRN, [place] PA-C    pantoprazole (PROTONIX) EC tablet 20 mg, 20 mg, Oral, Early Morning, HAYLEE Vital-C, 20 mg at 04/18/21 0545    polyethylene glycol (MIRALAX) packet 17 g, 17 g, Oral, Daily PRN, HAYLEE Vital-C    sodium chloride 0 9 % infusion, 75 mL/hr, Intravenous, Continuous, HAYLEE Santiago-PASQUALE, Last Rate: 75 mL/hr at 04/17/21 1712, 75 mL/hr at 04/17/21 1712    OBJECTIVE     Current Weight: Weight - Scale: 79 kg (174 lb 1 oz)  /85   Pulse 62   Temp 98 1 °F (36 7 °C)   Resp 18   Ht 5' 8" (1 727 m)   Wt 79 kg (174 lb 1 oz)   SpO2 95%   BMI 26 47 kg/m²   Vitals:    04/18/21 0720   BP: 125/85   Pulse:    Resp: 18   Temp: 98 1 °F (36 7 °C)   SpO2:      Body mass index is 26 47 kg/m²  Intake/Output Summary (Last 24 hours) at 4/18/2021 1054  Last data filed at 4/18/2021 0011  Gross per 24 hour   Intake 1418 75 ml   Output 1400 ml   Net 18 75 ml       PHYSICAL EXAMINATION     Physical Exam  Constitutional:       General: He is not in acute distress  HENT:      Head: Normocephalic and atraumatic  Eyes:      General: No scleral icterus  Neck:      Musculoskeletal: Neck supple  Vascular: No JVD  Cardiovascular:      Rate and Rhythm: Normal rate  Pulmonary:      Effort: No accessory muscle usage or respiratory distress  Abdominal:      General: There is no distension  Palpations: Abdomen is soft  Musculoskeletal:      Right hand: He exhibits no laceration  Left hand: He exhibits no laceration  Skin:     General: Skin is warm  Comments: No Jaundice    Psychiatric:         Behavior: Behavior is not combative  LAB RESULTS     Results from last 7 days   Lab Units 04/18/21  0515 04/17/21  0542 04/16/21  0621 04/16/21  0514 04/15/21  1221   WBC Thousand/uL 6 20 6 84 7 18  --  7 17   HEMOGLOBIN g/dL 10 0* 10 9* 10 6*  --  10 8*   HEMATOCRIT % 31 9* 34 4* 33 5*  --  34 1*   PLATELETS Thousands/uL 337 344 325  --  339   SODIUM mmol/L 143 141  --  140 140   POTASSIUM mmol/L 3 6 3 1*  --  3 4* 3 4*   CHLORIDE mmol/L 109* 107  --  106 104   CO2 mmol/L 23 25  --  25 26   BUN mg/dL 27* 37*  --  47* 48*   CREATININE mg/dL 2 20* 2 45*  --  3 02* 3 43*   EGFR ml/min/1 73sq m 37 33  --  25 22   CALCIUM mg/dL 8 8 9 1  --  8 9 8 7   MAGNESIUM mg/dL  --   --   --   --  1 8   PHOSPHORUS mg/dL  --   --   --   --  4 5       I have personally reviewed the old medical records and patient's previously known baseline creatinine level is ~ 1 3-1 5    RADIOLOGY RESULTS      CT renal stone study abdomen pelvis wo contrast   Final Result by Jeniffer Mcguire MD (04/16 1135)      Airspace opacity at the right lung base may be acute on chronic suggesting possible small pneumonia  Some hyperdensity is new and should be correlated any history of aspiration  There is no evidence of hydronephrosis or hydroureter  No stone could be identified  There is a small pocket of free fluid in the left lower quadrant with slight loculation of indeterminate etiology  This could be related to patient's renal insufficiency  Correlation with any secondary signs of infection is advised as well as    follow-up  Status post gastric bypass  The study was marked in EPIC for significant notification              Workstation performed: [id number]         US kidney and bladder   Final Result by Nunu Tinsley MD (04/15 2396)      There is increased renal cortical echogenicity, suggesting medical renal disease  Mild right hydronephrosis  Mild fullness left renal collecting system  There is a 5 mm calculus in the lower pole right kidney  Simple appearing right renal cysts  The prostate appears enlarged and heterogeneous and causes some indentation upon the base of the urinary bladder  Clinical correlation, laboratory correlation and follow-up is recommended  Workstation performed: [id number]         XR chest portable   Final Result by Maxine Davis DO (04/15 7994)      No acute cardiopulmonary disease  Workstation performed: [id number]         XR foot 3+ views RIGHT   Final Result by Teresa Pritchard MD (04/15 7319)      No acute osseous abnormality  Workstation performed: [id number]             PLAN / RECOMMENDATIONS      1  TRUDI on top of CKD stage 3  Present on admission, urine lytes were intrinsic in nature but renal function has improved with IV fluids suggesting patient does have component of intravascular volume depletion as the etiology of TRUDI  Upon review of old medical records including records from Care [facility], patient is currently been followed by Dr Carlos Dietrich at [facility] and also found to have baseline creatinine around 1 3-1 5  Currently diuretics are on hold and patient is been receiving IV fluid  Admission creatinine was 3 43 and overnight renal function has improved to current creatinine of 2 2  Advised patient to drink 60 oz of fluid for next 3-4 days  Will plan to hold off diuretics for time being and recheck renal function with AM labs  CT scan renal protocol done on admission showed no hydronephrosis of obstructive stone  Serial bladder scan earlier during the hospital stay also showed no significant evidence of urinary retention  2  Hypertension in chronic kidney disease    Currently blood pressure is under well control and monitor hypertension with amlodipine 10 mg PO daily, doxazosin 8 mg PO BID and Bystolic 20 mg PO BID today  3  Anemia  Multifactorial with current hemoglobin of 10 0  No active signs of bleeding seen overnight  Recommend blood transfusion if hemoglobin level drops below 7  Disposition:  Stable from renal standpoint for discharge  Will plan to hold off initiation of diuretics upon discharge  Please schedule outpatient Nephrology follow-up with primary nephrologist-Dr Mirtha Peguero in 2 weeks  Overall above mentioned plan was also d/w Michelle Yu MD  Nephrology  4/18/2021        Portions of the record may have been created with voice recognition software  Occasional wrong word or "sound a like" substitutions may have occurred due to the inherent limitations of voice recognition software  Read the chart carefully and recognize, using context, where substitutions have occurred

## 2021-04-18 NOTE — DISCHARGE INSTRUCTIONS
Acute Kidney Injury   AMBULATORY CARE:   Acute kidney injury (TRUDI) is also called acute kidney failure, or acute renal failure  TRUDI happens when your kidneys suddenly stop working correctly  Normally, the kidneys remove fluid, chemicals, and waste from your blood  These wastes are turned into urine by your kidneys  TRUDI usually happens over hours or days  When you have TRUDI, your kidneys do not remove the waste, chemicals, or extra fluid from your body  A normal amount of urine is not produced  TRUDI is usually temporary, but it may become a chronic kidney condition  Causes of TRUDI:   · Decreased blood flow to the kidney, such as from hypercalcemia (high blood calcium level) or severe heart disease     · A disease or condition that affects the kidneys, such as hypertension (high blood pressure) or diabetes     · A blockage in the kidney or ureter, such as a kidney or bladder stone, enlarged prostate, or tumor    Common symptoms include the following: You may not have any symptoms with early or mild TRUDI  As TRUDI progresses, you may have any of the following:  · Decrease in the amount of urine or no urination    · Swelling in your arms, legs, or feet     · Weakness, drowsiness, or no appetite    · Nausea, flank pain, muscle twitching or muscle cramps    · Itchy skin, or your, breath or body smells like urine    · Behavior changes, confusion, disorientation, or seizures    Call 911 if:   · You have sudden chest pain or trouble breathing  Seek care immediately if:   · Your symptoms get worse  Contact your healthcare provider if:   · Your symptoms return  · Your blood sugar or blood pressure level is not within the range your healthcare provider recommends  · You have questions or concerns about your condition or care  Treatment for TRUDI  depends upon the cause of your acute kidney injury and how severe it is   Usually, TRUDI will be monitored in the hospital  If you have mild TRUDI, you may be able to go home to recover  Your healthcare providers will treat the cause of your TRUDI  You may need IV fluids if your TRUDI was caused by little or no fluid in your body  You may need dialysis to remove waste and extra fluid from your body  Nutrition:  Your healthcare provider may tell you to eat food low in sodium (salt), potassium, phosphorus, or protein  A dietitian can help you plan your meals  Drink liquids as directed: Your healthcare provider may recommend that you drink a certain amount of liquids  This will help your kidneys work better and decrease your risk for dehydration  Ask how much liquid to drink each day and which liquids are best for you  What you can do to manage and prevent TRUDI:   · Monitor and manage other health conditions  such as diabetes, high blood pressure, or heart disease  These conditions increase your risk for acute kidney injury  Take your medicines for these conditions as directed  Also, monitor your blood sugar and blood pressure levels as directed  Contact your healthcare provider if your levels are not in the range he or she says it should be  · Talk to your healthcare provider before you take over-the-counter-medicine  NSAIDs, stomach medicine, or laxatives may harm your kidneys and increase your risk for acute kidney injury  If it is okay to take the medicine, follow the directions on the package  Do not take more than directed  · Tell healthcare providers you have had acute kidney injury  before you get contrast liquid for an x-ray or CT scan  Your healthcare provider may give you medicine to prevent kidney problems caused by the liquid  Follow up with your healthcare provider as directed:  Write down your questions so you remember to ask them during your visits  © Copyright [address] Information is for End User's use only and may not be sold, redistributed or otherwise used for commercial purposes   All illustrations and images included in CareNotes® are the copyrighted property of A D A M , Inc  or [facility] David Anderson   The above information is an  only  It is not intended as medical advice for individual conditions or treatments  Talk to your doctor, nurse or pharmacist before following any medical regimen to see if it is safe and effective for you

## 2021-04-18 NOTE — DISCHARGE SUMMARY
[facility]  Discharge- Josh Kennedy 1965, 60 y o  male MRN: [id number]  Unit/Bed#: -Greg Encounter: [id number]  Primary Care Provider: Derrick Clark MD   Date and time admitted to hospital: 4/15/2021 11:52 AM    * Acute renal failure superimposed on stage 3 chronic kidney disease [facility])  Assessment & Plan  Lab Results   Component Value Date    EGFR 37 04/18/2021    EGFR 33 04/17/2021    EGFR 25 04/16/2021    CREATININE 2 20 (H) 04/18/2021    CREATININE 2 45 (H) 04/17/2021    CREATININE 3 02 (H) 04/16/2021   · Patient has baseline of CKD stage 3 and has followed with nephrology outpatient since before 2012  · Strict I&O's; Daily weight   · Renal ultrasound showsThere is no evidence of hydronephrosis or hydroureter   No stone could be identified  · Creatinine continues to improve stable for discharge from a Nephrology standpoint, medications adjusted and will follow-up with nephrologist outpatient  · CT negative for obstruction  · Consider Urology consult if obstruction identified  Baseline creatinine appears to be 1 3-1 7    CKD stage 3 secondary to diabetes [facility])  Assessment & Plan  Lab Results   Component Value Date    HGBA1C 5 7 04/05/2021     No results for input(s): POCGLU in the last 72 hours      Blood Sugar Average: Last 72 hrs:      · Patient has baseline of CKD stage 3 and has followed with nephrology outpatient since before 2012  · Last f/u visit was x 2 days ago   · Repeat lab work abnormal which prompted evaluation inpatient  · Nephrology evaluated recommend CT to rule out obstructive stone  · Bladder scan revealed PVR of 40 ml   · Consider Urology consult if obstruction identified  · Baseline creatinine appears to be 1 3-1 7      Essential hypertension  Assessment & Plan  · Continue home medications  · Hold nephrotoxic drugs     History of DVT (deep vein thrombosis)  Assessment & Plan  · Past history of DVT   · Not currently on oral anti-coagulation   · Continue to hold anti-coagulation for further recommendation per nephrology  Hypertensive kidney disease with stage 3 chronic kidney disease  Assessment & Plan  Lab Results   Component Value Date    EGFR 37 04/18/2021    EGFR 33 04/17/2021    EGFR 25 04/16/2021    CREATININE 2 20 (H) 04/18/2021    CREATININE 2 45 (H) 04/17/2021    CREATININE 3 02 (H) 04/16/2021       Type 2 diabetes mellitus ([address] )  Assessment & Plan  Lab Results   Component Value Date    HGBA1C 5 7 04/05/2021       No results for input(s): POCGLU in the last 72 hours  Blood Sugar Average: Last 72 hrs:   Denies taking Ozempic outpatient   · HbgA1C - 5 7  · Continue on cardiac low sodium diet    Chronic diastolic CHF (congestive heart failure) ([facility])  Assessment & Plan  Wt Readings from Last 3 Encounters:   04/18/21 79 kg (174 lb 1 oz)   04/02/21 79 4 kg (175 lb)   12/31/20 101 kg (223 lb)     · Appears to be euvolemic - denies chest pain, SOB, or swelling of extremities  · Hold lasix due to acute TRUDI          Discharging Physician / Practitioner: MARBIN Pickard  PCP: Angella Pitts MD  Admission Date:   Admission Orders (From admission, onward)     Ordered        04/15/21 1320  Inpatient Admission  Once                   Discharge Date: 04/18/21    Resolved Problems  Date Reviewed: 4/15/2021    None          Consultations During Hospital Stay:  ·  IP CONSULT TO NEPHROLOGY    Procedures Performed:   · CT  · Renal ultrasound    Significant Findings / Test Results:      CT renal stone study abdomen pelvis wo contrast   Final Result by Lupe Hodgson MD (04/16 1174)      Airspace opacity at the right lung base may be acute on chronic suggesting possible small pneumonia  Some hyperdensity is new and should be correlated any history of aspiration  There is no evidence of hydronephrosis or hydroureter  No stone could be identified        There is a small pocket of free fluid in the left lower quadrant with slight loculation of indeterminate etiology  This could be related to patient's renal insufficiency  Correlation with any secondary signs of infection is advised as well as    follow-up  Status post gastric bypass  The study was marked in EPIC for significant notification  Workstation performed: [id number]         US kidney and bladder   Final Result by Geraldine Kingsley MD (04/15 3027)      There is increased renal cortical echogenicity, suggesting medical renal disease  Mild right hydronephrosis  Mild fullness left renal collecting system  There is a 5 mm calculus in the lower pole right kidney  Simple appearing right renal cysts  The prostate appears enlarged and heterogeneous and causes some indentation upon the base of the urinary bladder  Clinical correlation, laboratory correlation and follow-up is recommended  Workstation performed: [id number]         XR chest portable   Final Result by Josselyn Krishnan DO (04/15 2315)      No acute cardiopulmonary disease  Workstation performed: [id number]         XR foot 3+ views RIGHT   Final Result by Moriah Cho MD (04/15 0999)      No acute osseous abnormality  Workstation performed: [id number]         ·     Incidental Findings:   · None     Test Results Pending at Discharge (will require follow up): · None     Outpatient Tests Requested:  · None    Complications:  None    Reason for Admission:    Chief Complaint   Patient presents with    Evaluation of Abnormal Diagnostic Test     reports abnormal kidney function test, sent by [facility] Course:     Gisselle Tang is a 60 y o  male patient who originally presented to the hospital on 4/15/2021 due to elevated creatinine he was sent in by his nephrologist   His blood pressure medications and Lasix were stopped on admission he was given IV fluids and his creatinine continued to improve during his stay    He had a renal CT which was negative for any obstruction he had a renal ultrasound which was also negative his creatinine continued to improve and he was evaluated by Nephrology who cleared him for discharge with close outpatient follow-up which she is agreeable to  Please see above list of diagnoses and related plan for additional information  Condition at Discharge: good     Discharge Day Visit / Exam:     Subjective:  Denies any chest pain chest tightness shortness of breath or difficulty breathing is feeling well understands medication changes and the importance of follow-up  Vitals: Blood Pressure: 125/85 (04/18/21 0720)  Pulse: 62 (04/17/21 2309)  Temperature: 98 1 °F (36 7 °C) (04/18/21 0720)  Temp Source: Oral (04/15/21 1549)  Respirations: 18 (04/18/21 0720)  Height: 5' 8" (172 7 cm) (04/15/21 1549)  Weight - Scale: 79 kg (174 lb 1 oz) (04/18/21 0600)  SpO2: 95 % (04/18/21 0203)  Exam:   Physical Exam  Vitals signs and nursing note reviewed  Constitutional:       Appearance: He is well-developed  HENT:      Head: Normocephalic and atraumatic  Eyes:      Conjunctiva/sclera: Conjunctivae normal    Neck:      Musculoskeletal: Neck supple  Cardiovascular:      Rate and Rhythm: Normal rate and regular rhythm  Heart sounds: No murmur  Pulmonary:      Effort: Pulmonary effort is normal  No respiratory distress  Breath sounds: Normal breath sounds  Abdominal:      Palpations: Abdomen is soft  Tenderness: There is no abdominal tenderness  Skin:     General: Skin is warm and dry  Neurological:      Mental Status: He is alert  Discussion with Family:  Not available    Discharge instructions/Information to patient and family:   See after visit summary for information provided to patient and family  Provisions for Follow-Up Care:  See after visit summary for information related to follow-up care and any pertinent home health orders        Disposition:     Home with VNA Services (Reminder: Complete face to face encounter)    For Discharges to [facility] SNF:   · Not Applicable to this Patient - Not Applicable to this Patient    Planned Readmission:  No     Discharge Statement:  I spent 40 minutes discharging the patient  This time was spent on the day of discharge  I had direct contact with the patient on the day of discharge  Greater than 50% of the total time was spent examining patient, answering all patient questions, arranging and discussing plan of care with patient as well as directly providing post-discharge instructions  Additional time then spent on discharge activities  Discharge Medications:  See after visit summary for reconciled discharge medications provided to patient and family        ** Please Note: This note has been constructed using a voice recognition system **

## 2021-04-18 NOTE — ASSESSMENT & PLAN NOTE
Wt Readings from Last 3 Encounters:   04/18/21 79 kg (174 lb 1 oz)   04/02/21 79 4 kg (175 lb)   12/31/20 101 kg (223 lb)     · Appears to be euvolemic - denies chest pain, SOB, or swelling of extremities    · Hold lasix due to acute TRUDI

## 2021-04-19 NOTE — UTILIZATION REVIEW
Notification of Discharge   This is a Notification of Discharge from our facility [address]  Please be advised that this patient has been discharge from our facility  Below you will find the admission and discharge date and time including the patients disposition  UTILIZATION REVIEW CONTACT:  Taco Morgan  Utilization   Network Utilization Review Department  Phone: [phone] x carefully listen to the prompts  All voicemails are confidential   Email: [email or web address]     PHYSICIAN ADVISORY SERVICES:  FOR TOCA-IO-RADU REVIEW - MEDICAL NECESSITY DENIAL  Phone: [phone]  Fax: [phone]  Email: [email or web address]     PRESENTATION DATE: 4/15/2021 11:52 AM  OBERVATION ADMISSION DATE: 04/15/2021  CHANGE FROM OBSERVATION TO INPATIENT: [unfilled]   INPATIENT ADMISSION DATE: 4/15/21 1320   DISCHARGE DATE: 4/18/2021 11:42 AM  DISPOSITION: Home/Self Care Home/Self Care      IMPORTANT INFORMATION:  Send all requests for admission clinical reviews, approved or denied determinations and any other requests to dedicated fax number below belonging to the campus where the patient is receiving treatment   List of dedicated fax numbers:  [address] [place] [address] DENIALS (Administrative/Medical Necessity) [phone]   [address]  (Maternity/NICU/Pediatrics) [phone]   Kathleen Handing [phone]   Maria Luisa Chavarria [phone]   Francisca Pitts [phone]   Rosie Guardado [facility] [address] [facility] [phone]   [facility]  [phone]   [address] [facility] [address]  [facility] [address] [facility] [phone] Note Text (......Xxx Chief Complaint.): This diagnosis correlates with the Other (Free Text): DPCP treatment\\nrx faxed to pharmacy Detail Level: Zone

## 2021-04-22 ENCOUNTER — TELEPHONE (OUTPATIENT)
Dept: UROLOGY | Facility: CLINIC | Age: 56
End: 2021-04-22

## 2021-04-22 NOTE — TELEPHONE ENCOUNTER
Called patient to R/S 5/6 to 5/5 due to provider being in the OR, patient wishes to cancel apt for now and will call us to reschedule when he is ready

## 2021-04-28 ENCOUNTER — OFFICE VISIT (OUTPATIENT)
Dept: BARIATRICS | Facility: CLINIC | Age: 56
End: 2021-04-28

## 2021-04-28 VITALS — HEIGHT: 67 IN | WEIGHT: 174.6 LBS | BODY MASS INDEX: 27.4 KG/M2

## 2021-04-28 DIAGNOSIS — K91.2 POSTSURGICAL MALABSORPTION: Primary | ICD-10-CM

## 2021-04-28 PROCEDURE — RECHECK

## 2021-04-28 NOTE — PROGRESS NOTES
Bariatric Nutrition Follow-up Note    5 months post op     Height: 5'7 4" Current Weight: 174   8#  BMI: 27 1      Goal 160-165#     Hospitalized - decreased kidney function  Reevaluated for fluids and medications    No protein shakes - unsure with kidney status  Water: 2 5 bottles - strive for 64     B - Total and Wheat Chex - Oatmeal w/Protein or 3 oz greek yogurt w/granola & nuts    L - [place] 1 oz  - part of a leftover  D- 1 oz Fish  Or chicken, hamburger 1/4 c salad 2 tbsp Potatoes  Snacks: Fruit small apple, tangerine, grapes or cheese 1/2-1 oz /crackers(3)    20-30 grams protein at best daily  Volume <1/2 c at meals    79 3 kg > Protein 0 8 g/kg  = 63 grams        BS - not monitoring   Off meds    Recommend add 1 protein drink until meal content can be increased   Increase water to 64 oz  F/U 4-5 weeks

## 2021-06-02 ENCOUNTER — OFFICE VISIT (OUTPATIENT)
Dept: BARIATRICS | Facility: CLINIC | Age: 56
End: 2021-06-02

## 2021-06-02 DIAGNOSIS — K91.2 POSTSURGICAL MALABSORPTION: Primary | ICD-10-CM

## 2021-06-02 PROCEDURE — RECHECK

## 2021-06-02 NOTE — PROGRESS NOTES
Bariatric Nutrition Follow-up Note    5+ months post op   DOS 12/22/2020    Height: 5'7 4" Current Weight:160 6#  BMI: 24 9    Pt doing well "feels like a new man"  Pt met nl BMI weight goal <6 months month op  Reports to feel well  HgA1c at 5 5  Off medications for chronic diseases and able to stop using CPAP machine  Still having issues with kidneys but followed by nephrologist  Protein intake improved  Still needs to increase fluids  Suspect pt may have difficulty maintaining weight as per current volume capacity  Advised to add snack; continue protein shake until >protein intake at meals    Current Recall:   B - Scrambled egg   Cheese, ye, fruit   But ususally Oatmeal w/Protein or 4 oz greek yogurt w/granola & nuts    L -Premier Shake  D-  Taco  Or Baked Ziti (little) Casserole w/chix, nivia, rice   1 oz Fish  Vegetables   Snacks: Fruit small apple, tangerine, grapes or cheese 1/2-1 oz /crackers(3)    50-60 grams protein at  daily  Volume = 1/2 cup     Protein needs/kidney disease at  73kg  (Protein 0 8 g/kg)  = 58  grams    Water = 32 oz  1  Shake = 10 oz    Wants to get back to exercise to build muscle - currently walking    Goals  3 meals + snack; continue 1 protein drink until meal content can be increased    Increase  Volume to 3/4 c ; calories to 1000 and carbs to 100grams - Protien at 60 over next 3 months  - monitor weight  Increase water to 64 oz  Exercise regimen  F/U 6 month w/Dr Marietta Pallas  F/U RD at 9 month post op

## 2021-07-07 ENCOUNTER — OFFICE VISIT (OUTPATIENT)
Dept: BARIATRICS | Facility: CLINIC | Age: 56
End: 2021-07-07
Payer: COMMERCIAL

## 2021-07-07 VITALS
DIASTOLIC BLOOD PRESSURE: 90 MMHG | HEIGHT: 67 IN | HEART RATE: 67 BPM | WEIGHT: 160.3 LBS | BODY MASS INDEX: 25.16 KG/M2 | SYSTOLIC BLOOD PRESSURE: 140 MMHG

## 2021-07-07 DIAGNOSIS — N18.30 CKD STAGE 3 SECONDARY TO DIABETES (HCC): ICD-10-CM

## 2021-07-07 DIAGNOSIS — E11.22 CKD STAGE 3 SECONDARY TO DIABETES (HCC): ICD-10-CM

## 2021-07-07 DIAGNOSIS — Z86.718 HISTORY OF DVT (DEEP VEIN THROMBOSIS): ICD-10-CM

## 2021-07-07 DIAGNOSIS — J44.9 CHRONIC OBSTRUCTIVE PULMONARY DISEASE, UNSPECIFIED COPD TYPE (HCC): ICD-10-CM

## 2021-07-07 DIAGNOSIS — I12.9 HYPERTENSIVE KIDNEY DISEASE WITH STAGE 3 CHRONIC KIDNEY DISEASE, UNSPECIFIED WHETHER STAGE 3A OR 3B CKD (HCC): ICD-10-CM

## 2021-07-07 DIAGNOSIS — Z48.815 ENCOUNTER FOR SURGICAL AFTERCARE FOLLOWING SURGERY ON THE DIGESTIVE SYSTEM: Primary | ICD-10-CM

## 2021-07-07 DIAGNOSIS — K91.2 POSTSURGICAL MALABSORPTION: ICD-10-CM

## 2021-07-07 DIAGNOSIS — N18.30 HYPERTENSIVE KIDNEY DISEASE WITH STAGE 3 CHRONIC KIDNEY DISEASE, UNSPECIFIED WHETHER STAGE 3A OR 3B CKD (HCC): ICD-10-CM

## 2021-07-07 DIAGNOSIS — I50.32 CHRONIC DIASTOLIC CHF (CONGESTIVE HEART FAILURE) (HCC): Chronic | ICD-10-CM

## 2021-07-07 DIAGNOSIS — E11.65 TYPE 2 DIABETES MELLITUS WITH HYPERGLYCEMIA, UNSPECIFIED WHETHER LONG TERM INSULIN USE (HCC): ICD-10-CM

## 2021-07-07 DIAGNOSIS — I10 ESSENTIAL HYPERTENSION: ICD-10-CM

## 2021-07-07 PROCEDURE — 99213 OFFICE O/P EST LOW 20 MIN: CPT | Performed by: SURGERY

## 2021-07-07 RX ORDER — PREDNISONE 20 MG/1
TABLET ORAL
COMMUNITY
Start: 2021-06-23 | End: 2021-08-27

## 2021-07-07 RX ORDER — AZITHROMYCIN 250 MG/1
TABLET, FILM COATED ORAL
COMMUNITY
Start: 2021-06-16 | End: 2021-08-27

## 2021-07-07 RX ORDER — MAGNESIUM OXIDE 400 MG/1
400 TABLET ORAL
COMMUNITY
End: 2021-08-27

## 2021-07-07 NOTE — PROGRESS NOTES
Progress Note - Bariatric Surgery   Loretta Monroy 60 y o  male MRN: [de-identified]  Unit/Bed#:  Encounter: [id number]    Assessment/Plan:  56/M s/p LRYGB 12/22/20 weight great weight loss results and significant improvement in co-morbid conditions     Mindful eating, stress reduction, sleep hygiene   Increase protein intake   Continue to ensure adequate hydration   Cont exercise   MVI/minerals   Obtain CBC/metabolic panel  RTO in 6 months     Subjective/Objective     Subjective: He states he is doing very well  He tolerates food without discomfort  He is excited about his weight loss success and improvement in comorbidities  He is exercising  No NSAIDs  Inadequate protein intake  +MVI/minerals  Adequate H20 intake  Review of systems: negative except as noted above     Objective:     Blood pressure 140/90, pulse 67, height 5' 7 4" (1 712 m), weight 72 7 kg (160 lb 4 8 oz)  ,Body mass index is 24 81 kg/m²  Invasive Devices     None                 Physical Exam:     No distress   EOMI   CN II-XII grossly intact  Neck ROM wnl   RRR  Breathing non labored   Abd soft, NT/ND; wounds healed well  Skin warm/dry  Msk ROM wnl   Thought content/behavior/mood wnl       Lab, Imaging and other studies:I have personally reviewed pertinent lab results

## 2021-08-26 ENCOUNTER — APPOINTMENT (EMERGENCY)
Dept: CT IMAGING | Facility: HOSPITAL | Age: 56
End: 2021-08-26
Payer: COMMERCIAL

## 2021-08-26 ENCOUNTER — APPOINTMENT (EMERGENCY)
Dept: RADIOLOGY | Facility: HOSPITAL | Age: 56
End: 2021-08-26
Payer: COMMERCIAL

## 2021-08-26 ENCOUNTER — HOSPITAL ENCOUNTER (OUTPATIENT)
Facility: HOSPITAL | Age: 56
Setting detail: OBSERVATION
Discharge: HOME | End: 2021-08-27
Attending: INTERNAL MEDICINE | Admitting: INTERNAL MEDICINE
Payer: COMMERCIAL

## 2021-08-26 DIAGNOSIS — R94.31 QT PROLONGATION: ICD-10-CM

## 2021-08-26 DIAGNOSIS — I10 ESSENTIAL HYPERTENSION: ICD-10-CM

## 2021-08-26 DIAGNOSIS — I31.3 PERICARDIAL EFFUSION: ICD-10-CM

## 2021-08-26 DIAGNOSIS — T68.XXXA HYPOTHERMIA: Primary | ICD-10-CM

## 2021-08-26 DIAGNOSIS — R41.82 ALTERED MENTAL STATUS: ICD-10-CM

## 2021-08-26 DIAGNOSIS — G45.4 TRANSIENT GLOBAL AMNESIA: ICD-10-CM

## 2021-08-26 DIAGNOSIS — E87.6 HYPOKALEMIA: ICD-10-CM

## 2021-08-26 DIAGNOSIS — R00.1 BRADYCARDIA: ICD-10-CM

## 2021-08-26 DIAGNOSIS — N28.9 ACUTE ON CHRONIC RENAL INSUFFICIENCY: ICD-10-CM

## 2021-08-26 DIAGNOSIS — R00.1 BRADYCARDIA, SINUS: ICD-10-CM

## 2021-08-26 DIAGNOSIS — E16.2 HYPOGLYCEMIA: ICD-10-CM

## 2021-08-26 DIAGNOSIS — N18.9 ACUTE ON CHRONIC RENAL INSUFFICIENCY: ICD-10-CM

## 2021-08-26 PROBLEM — Z98.84 HISTORY OF GASTRIC BYPASS: Status: ACTIVE | Noted: 2021-08-26

## 2021-08-26 LAB
ALBUMIN SERPL BCP-MCNC: 3.7 G/DL (ref 3.5–5)
ALP SERPL-CCNC: 77 U/L (ref 46–116)
ALT SERPL W P-5'-P-CCNC: 90 U/L (ref 12–78)
AMPHETAMINES SERPL QL SCN: NEGATIVE
ANION GAP SERPL CALCULATED.3IONS-SCNC: 7 MMOL/L (ref 4–13)
APTT PPP: 33 SECONDS (ref 23–37)
AST SERPL W P-5'-P-CCNC: 43 U/L (ref 5–45)
ATRIAL RATE: 64 BPM
BARBITURATES UR QL: NEGATIVE
BASOPHILS # BLD AUTO: 0.03 THOUSANDS/ΜL (ref 0–0.1)
BASOPHILS NFR BLD AUTO: 1 % (ref 0–1)
BENZODIAZ UR QL: NEGATIVE
BILIRUB DIRECT SERPL-MCNC: 0.25 MG/DL (ref 0–0.2)
BILIRUB SERPL-MCNC: 0.89 MG/DL (ref 0.2–1)
BILIRUB UR QL STRIP: NEGATIVE
BUN SERPL-MCNC: 29 MG/DL (ref 5–25)
CALCIUM SERPL-MCNC: 8.7 MG/DL (ref 8.3–10.1)
CHLORIDE SERPL-SCNC: 106 MMOL/L (ref 100–108)
CLARITY UR: CLEAR
CO2 SERPL-SCNC: 28 MMOL/L (ref 21–32)
COCAINE UR QL: NEGATIVE
COLOR UR: YELLOW
CREAT SERPL-MCNC: 2.82 MG/DL (ref 0.6–1.3)
EOSINOPHIL # BLD AUTO: 0.19 THOUSAND/ΜL (ref 0–0.61)
EOSINOPHIL NFR BLD AUTO: 4 % (ref 0–6)
ERYTHROCYTE [DISTWIDTH] IN BLOOD BY AUTOMATED COUNT: 14.5 % (ref 11.6–15.1)
EST. AVERAGE GLUCOSE BLD GHB EST-MCNC: 108 MG/DL
GFR SERPL CREATININE-BSD FRML MDRD: 27 ML/MIN/1.73SQ M
GLUCOSE SERPL-MCNC: 107 MG/DL (ref 65–140)
GLUCOSE SERPL-MCNC: 116 MG/DL (ref 65–140)
GLUCOSE SERPL-MCNC: 128 MG/DL (ref 65–140)
GLUCOSE SERPL-MCNC: 133 MG/DL (ref 65–140)
GLUCOSE SERPL-MCNC: 139 MG/DL (ref 65–140)
GLUCOSE SERPL-MCNC: 147 MG/DL (ref 65–140)
GLUCOSE SERPL-MCNC: 153 MG/DL (ref 65–140)
GLUCOSE SERPL-MCNC: 188 MG/DL (ref 65–140)
GLUCOSE UR STRIP-MCNC: ABNORMAL MG/DL
HBA1C MFR BLD: 5.4 %
HCT VFR BLD AUTO: 34.7 % (ref 36.5–49.3)
HGB BLD-MCNC: 11.2 G/DL (ref 12–17)
HGB UR QL STRIP.AUTO: NEGATIVE
INR PPP: 1.17 (ref 0.84–1.19)
KETONES UR STRIP-MCNC: NEGATIVE MG/DL
LACTATE SERPL-SCNC: 0.6 MMOL/L (ref 0.5–2)
LEUKOCYTE ESTERASE UR QL STRIP: NEGATIVE
LYMPHOCYTES # BLD AUTO: 1.24 THOUSANDS/ΜL (ref 0.6–4.47)
LYMPHOCYTES NFR BLD AUTO: 24 % (ref 14–44)
MAGNESIUM SERPL-MCNC: 1.9 MG/DL (ref 1.6–2.6)
MCH RBC QN AUTO: 28.4 PG (ref 26.8–34.3)
MCHC RBC AUTO-ENTMCNC: 32.3 G/DL (ref 31.4–37.4)
MCV RBC AUTO: 88 FL (ref 82–98)
METHADONE UR QL: NEGATIVE
MONOCYTES # BLD AUTO: 0.43 THOUSAND/ΜL (ref 0.17–1.22)
MONOCYTES NFR BLD AUTO: 8 % (ref 4–12)
NEUTROPHILS # BLD AUTO: 3.3 THOUSANDS/ΜL (ref 1.85–7.62)
NEUTS SEG NFR BLD AUTO: 63 % (ref 43–75)
NITRITE UR QL STRIP: NEGATIVE
NT-PROBNP SERPL-MCNC: 685 PG/ML
OPIATES UR QL SCN: NEGATIVE
OXYCODONE+OXYMORPHONE UR QL SCN: NEGATIVE
P AXIS: 84 DEGREES
PCP UR QL: NEGATIVE
PH UR STRIP.AUTO: 6 [PH]
PLATELET # BLD AUTO: 234 THOUSANDS/UL (ref 149–390)
PMV BLD AUTO: 10.5 FL (ref 8.9–12.7)
POTASSIUM SERPL-SCNC: 2.8 MMOL/L (ref 3.5–5.3)
PR INTERVAL: 136 MS
PROCALCITONIN SERPL-MCNC: <0.05 NG/ML
PROT SERPL-MCNC: 6.9 G/DL (ref 6.4–8.2)
PROT UR STRIP-MCNC: NEGATIVE MG/DL
PROTHROMBIN TIME: 15.2 SECONDS (ref 11.6–14.5)
QRS AXIS: -27 DEGREES
QRSD INTERVAL: 106 MS
QT INTERVAL: 506 MS
QTC INTERVAL: 522 MS
RBC # BLD AUTO: 3.95 MILLION/UL (ref 3.88–5.62)
SARS-COV-2 RNA RESP QL NAA+PROBE: NEGATIVE
SODIUM SERPL-SCNC: 141 MMOL/L (ref 136–145)
SP GR UR STRIP.AUTO: 1.01 (ref 1–1.03)
T WAVE AXIS: 60 DEGREES
THC UR QL: POSITIVE
TROPONIN I SERPL-MCNC: <0.02 NG/ML
TROPONIN I SERPL-MCNC: <0.02 NG/ML
TSH SERPL DL<=0.05 MIU/L-ACNC: 3.55 UIU/ML (ref 0.36–3.74)
UROBILINOGEN UR QL STRIP.AUTO: 0.2 E.U./DL
VENTRICULAR RATE: 64 BPM
WBC # BLD AUTO: 5.19 THOUSAND/UL (ref 4.31–10.16)

## 2021-08-26 PROCEDURE — 96361 HYDRATE IV INFUSION ADD-ON: CPT

## 2021-08-26 PROCEDURE — 71250 CT THORAX DX C-: CPT

## 2021-08-26 PROCEDURE — 93010 ELECTROCARDIOGRAM REPORT: CPT | Performed by: INTERNAL MEDICINE

## 2021-08-26 PROCEDURE — 99285 EMERGENCY DEPT VISIT HI MDM: CPT | Performed by: EMERGENCY MEDICINE

## 2021-08-26 PROCEDURE — 82948 REAGENT STRIP/BLOOD GLUCOSE: CPT

## 2021-08-26 PROCEDURE — 83880 ASSAY OF NATRIURETIC PEPTIDE: CPT | Performed by: EMERGENCY MEDICINE

## 2021-08-26 PROCEDURE — 71045 X-RAY EXAM CHEST 1 VIEW: CPT

## 2021-08-26 PROCEDURE — 80076 HEPATIC FUNCTION PANEL: CPT | Performed by: EMERGENCY MEDICINE

## 2021-08-26 PROCEDURE — 70450 CT HEAD/BRAIN W/O DYE: CPT

## 2021-08-26 PROCEDURE — 96365 THER/PROPH/DIAG IV INF INIT: CPT

## 2021-08-26 PROCEDURE — 93005 ELECTROCARDIOGRAM TRACING: CPT

## 2021-08-26 PROCEDURE — 74176 CT ABD & PELVIS W/O CONTRAST: CPT

## 2021-08-26 PROCEDURE — 84145 PROCALCITONIN (PCT): CPT | Performed by: EMERGENCY MEDICINE

## 2021-08-26 PROCEDURE — 87040 BLOOD CULTURE FOR BACTERIA: CPT | Performed by: EMERGENCY MEDICINE

## 2021-08-26 PROCEDURE — 99285 EMERGENCY DEPT VISIT HI MDM: CPT

## 2021-08-26 PROCEDURE — 96360 HYDRATION IV INFUSION INIT: CPT

## 2021-08-26 PROCEDURE — 96366 THER/PROPH/DIAG IV INF ADDON: CPT

## 2021-08-26 PROCEDURE — 84484 ASSAY OF TROPONIN QUANT: CPT | Performed by: INTERNAL MEDICINE

## 2021-08-26 PROCEDURE — 94760 N-INVAS EAR/PLS OXIMETRY 1: CPT

## 2021-08-26 PROCEDURE — 83036 HEMOGLOBIN GLYCOSYLATED A1C: CPT | Performed by: PHYSICIAN ASSISTANT

## 2021-08-26 PROCEDURE — 80307 DRUG TEST PRSMV CHEM ANLYZR: CPT | Performed by: EMERGENCY MEDICINE

## 2021-08-26 PROCEDURE — U0003 INFECTIOUS AGENT DETECTION BY NUCLEIC ACID (DNA OR RNA); SEVERE ACUTE RESPIRATORY SYNDROME CORONAVIRUS 2 (SARS-COV-2) (CORONAVIRUS DISEASE [COVID-19]), AMPLIFIED PROBE TECHNIQUE, MAKING USE OF HIGH THROUGHPUT TECHNOLOGIES AS DESCRIBED BY CMS-2020-01-R: HCPCS | Performed by: EMERGENCY MEDICINE

## 2021-08-26 PROCEDURE — 99220 PR INITIAL OBSERVATION CARE/DAY 70 MINUTES: CPT | Performed by: INTERNAL MEDICINE

## 2021-08-26 PROCEDURE — 96367 TX/PROPH/DG ADDL SEQ IV INF: CPT

## 2021-08-26 PROCEDURE — 83605 ASSAY OF LACTIC ACID: CPT | Performed by: EMERGENCY MEDICINE

## 2021-08-26 PROCEDURE — 36415 COLL VENOUS BLD VENIPUNCTURE: CPT | Performed by: EMERGENCY MEDICINE

## 2021-08-26 PROCEDURE — 85025 COMPLETE CBC W/AUTO DIFF WBC: CPT | Performed by: EMERGENCY MEDICINE

## 2021-08-26 PROCEDURE — 84443 ASSAY THYROID STIM HORMONE: CPT | Performed by: EMERGENCY MEDICINE

## 2021-08-26 PROCEDURE — 81003 URINALYSIS AUTO W/O SCOPE: CPT | Performed by: EMERGENCY MEDICINE

## 2021-08-26 PROCEDURE — 85730 THROMBOPLASTIN TIME PARTIAL: CPT | Performed by: EMERGENCY MEDICINE

## 2021-08-26 PROCEDURE — 80048 BASIC METABOLIC PNL TOTAL CA: CPT | Performed by: EMERGENCY MEDICINE

## 2021-08-26 PROCEDURE — 83735 ASSAY OF MAGNESIUM: CPT | Performed by: EMERGENCY MEDICINE

## 2021-08-26 PROCEDURE — U0005 INFEC AGEN DETEC AMPLI PROBE: HCPCS | Performed by: EMERGENCY MEDICINE

## 2021-08-26 PROCEDURE — 85610 PROTHROMBIN TIME: CPT | Performed by: EMERGENCY MEDICINE

## 2021-08-26 PROCEDURE — 84484 ASSAY OF TROPONIN QUANT: CPT | Performed by: EMERGENCY MEDICINE

## 2021-08-26 RX ORDER — DEXTROSE 50 % IN WATER 50 %
1 SYRINGE (ML) INTRAVENOUS ONCE
Status: COMPLETED | OUTPATIENT
Start: 2021-08-26 | End: 2021-08-26

## 2021-08-26 RX ORDER — AMLODIPINE BESYLATE 5 MG/1
10 TABLET ORAL DAILY
Status: DISCONTINUED | OUTPATIENT
Start: 2021-08-26 | End: 2021-08-27 | Stop reason: HOSPADM

## 2021-08-26 RX ORDER — DEXTROSE AND SODIUM CHLORIDE 5; .9 G/100ML; G/100ML
75 INJECTION, SOLUTION INTRAVENOUS CONTINUOUS
Status: DISCONTINUED | OUTPATIENT
Start: 2021-08-26 | End: 2021-08-26

## 2021-08-26 RX ORDER — ALBUTEROL SULFATE 90 UG/1
2 AEROSOL, METERED RESPIRATORY (INHALATION) EVERY 6 HOURS
Status: DISCONTINUED | OUTPATIENT
Start: 2021-08-26 | End: 2021-08-27 | Stop reason: HOSPADM

## 2021-08-26 RX ORDER — ATORVASTATIN CALCIUM 40 MG/1
40 TABLET, FILM COATED ORAL
Status: DISCONTINUED | OUTPATIENT
Start: 2021-08-26 | End: 2021-08-27 | Stop reason: HOSPADM

## 2021-08-26 RX ORDER — POTASSIUM CHLORIDE 14.9 MG/ML
20 INJECTION INTRAVENOUS ONCE
Status: COMPLETED | OUTPATIENT
Start: 2021-08-26 | End: 2021-08-26

## 2021-08-26 RX ORDER — POTASSIUM CHLORIDE 20 MEQ/1
40 TABLET, EXTENDED RELEASE ORAL ONCE
Status: COMPLETED | OUTPATIENT
Start: 2021-08-26 | End: 2021-08-26

## 2021-08-26 RX ORDER — ATROPINE SULFATE 0.1 MG/ML
2 SYRINGE (ML) INJECTION ONCE
Status: COMPLETED | OUTPATIENT
Start: 2021-08-26 | End: 2021-08-26

## 2021-08-26 RX ORDER — FLUTICASONE PROPIONATE 110 UG/1
2 AEROSOL, METERED RESPIRATORY (INHALATION) EVERY 12 HOURS SCHEDULED
Status: DISCONTINUED | OUTPATIENT
Start: 2021-08-26 | End: 2021-08-27 | Stop reason: HOSPADM

## 2021-08-26 RX ORDER — ACETAMINOPHEN 325 MG/1
650 TABLET ORAL EVERY 6 HOURS PRN
Status: DISCONTINUED | OUTPATIENT
Start: 2021-08-26 | End: 2021-08-27 | Stop reason: HOSPADM

## 2021-08-26 RX ORDER — CALCIUM CARBONATE 500(1250)
1 TABLET ORAL
Status: DISCONTINUED | OUTPATIENT
Start: 2021-08-27 | End: 2021-08-27 | Stop reason: HOSPADM

## 2021-08-26 RX ORDER — DOXAZOSIN MESYLATE 4 MG/1
8 TABLET ORAL 2 TIMES DAILY
Status: DISCONTINUED | OUTPATIENT
Start: 2021-08-26 | End: 2021-08-27 | Stop reason: HOSPADM

## 2021-08-26 RX ORDER — HEPARIN SODIUM 5000 [USP'U]/ML
5000 INJECTION, SOLUTION INTRAVENOUS; SUBCUTANEOUS EVERY 12 HOURS SCHEDULED
Status: DISCONTINUED | OUTPATIENT
Start: 2021-08-26 | End: 2021-08-27 | Stop reason: HOSPADM

## 2021-08-26 RX ORDER — LORAZEPAM 0.5 MG/1
0.5 TABLET ORAL EVERY 6 HOURS PRN
Status: DISCONTINUED | OUTPATIENT
Start: 2021-08-26 | End: 2021-08-27 | Stop reason: HOSPADM

## 2021-08-26 RX ORDER — SODIUM CHLORIDE, SODIUM GLUCONATE, SODIUM ACETATE, POTASSIUM CHLORIDE, MAGNESIUM CHLORIDE, SODIUM PHOSPHATE, DIBASIC, AND POTASSIUM PHOSPHATE .53; .5; .37; .037; .03; .012; .00082 G/100ML; G/100ML; G/100ML; G/100ML; G/100ML; G/100ML; G/100ML
50 INJECTION, SOLUTION INTRAVENOUS CONTINUOUS
Status: DISCONTINUED | OUTPATIENT
Start: 2021-08-26 | End: 2021-08-27 | Stop reason: HOSPADM

## 2021-08-26 RX ORDER — FERROUS SULFATE 325(65) MG
325 TABLET ORAL 2 TIMES DAILY
Status: DISCONTINUED | OUTPATIENT
Start: 2021-08-26 | End: 2021-08-27 | Stop reason: HOSPADM

## 2021-08-26 RX ORDER — ESCITALOPRAM OXALATE 20 MG/1
20 TABLET ORAL DAILY
Status: DISCONTINUED | OUTPATIENT
Start: 2021-08-26 | End: 2021-08-27 | Stop reason: HOSPADM

## 2021-08-26 RX ORDER — MAGNESIUM SULFATE HEPTAHYDRATE 40 MG/ML
2 INJECTION, SOLUTION INTRAVENOUS ONCE
Status: COMPLETED | OUTPATIENT
Start: 2021-08-26 | End: 2021-08-26

## 2021-08-26 RX ORDER — BUDESONIDE AND FORMOTEROL FUMARATE DIHYDRATE 80; 4.5 UG/1; UG/1
2 AEROSOL RESPIRATORY (INHALATION) 2 TIMES DAILY
Status: DISCONTINUED | OUTPATIENT
Start: 2021-08-26 | End: 2021-08-26

## 2021-08-26 RX ADMIN — MAGNESIUM SULFATE HEPTAHYDRATE 2 G: 40 INJECTION, SOLUTION INTRAVENOUS at 01:46

## 2021-08-26 RX ADMIN — HEPARIN SODIUM 5000 UNITS: 5000 INJECTION INTRAVENOUS; SUBCUTANEOUS at 20:33

## 2021-08-26 RX ADMIN — FERROUS SULFATE TAB 325 MG (65 MG ELEMENTAL FE) 325 MG: 325 (65 FE) TAB at 19:23

## 2021-08-26 RX ADMIN — DEXTROSE AND SODIUM CHLORIDE 100 ML/HR: 5; .9 INJECTION, SOLUTION INTRAVENOUS at 01:43

## 2021-08-26 RX ADMIN — FERROUS SULFATE TAB 325 MG (65 MG ELEMENTAL FE) 325 MG: 325 (65 FE) TAB at 12:14

## 2021-08-26 RX ADMIN — FLUTICASONE PROPIONATE 2 PUFF: 110 AEROSOL, METERED RESPIRATORY (INHALATION) at 12:16

## 2021-08-26 RX ADMIN — AMLODIPINE BESYLATE 10 MG: 5 TABLET ORAL at 12:13

## 2021-08-26 RX ADMIN — TIOTROPIUM BROMIDE 18 MCG: 18 CAPSULE ORAL; RESPIRATORY (INHALATION) at 12:17

## 2021-08-26 RX ADMIN — POTASSIUM CHLORIDE 20 MEQ: 14.9 INJECTION, SOLUTION INTRAVENOUS at 03:23

## 2021-08-26 RX ADMIN — SALMETEROL XINAFOATE 1 PUFF: 50 POWDER, METERED ORAL; RESPIRATORY (INHALATION) at 20:28

## 2021-08-26 RX ADMIN — ALBUTEROL SULFATE 2 PUFF: 90 AEROSOL, METERED RESPIRATORY (INHALATION) at 19:24

## 2021-08-26 RX ADMIN — BUDESONIDE AND FORMOTEROL FUMARATE DIHYDRATE 2 PUFF: 80; 4.5 AEROSOL RESPIRATORY (INHALATION) at 12:16

## 2021-08-26 RX ADMIN — POTASSIUM CHLORIDE 40 MEQ: 1500 TABLET, EXTENDED RELEASE ORAL at 06:25

## 2021-08-26 RX ADMIN — ALBUTEROL SULFATE 2 PUFF: 90 AEROSOL, METERED RESPIRATORY (INHALATION) at 12:16

## 2021-08-26 RX ADMIN — HEPARIN SODIUM 5000 UNITS: 5000 INJECTION INTRAVENOUS; SUBCUTANEOUS at 12:15

## 2021-08-26 RX ADMIN — DOXAZOSIN 8 MG: 4 TABLET ORAL at 12:14

## 2021-08-26 RX ADMIN — ALBUTEROL SULFATE 2 PUFF: 90 AEROSOL, METERED RESPIRATORY (INHALATION) at 23:57

## 2021-08-26 RX ADMIN — FLUTICASONE PROPIONATE 2 PUFF: 110 AEROSOL, METERED RESPIRATORY (INHALATION) at 20:27

## 2021-08-26 RX ADMIN — ESCITALOPRAM OXALATE 20 MG: 20 TABLET ORAL at 12:15

## 2021-08-26 RX ADMIN — ATORVASTATIN CALCIUM 40 MG: 40 TABLET, FILM COATED ORAL at 22:26

## 2021-08-26 RX ADMIN — SODIUM CHLORIDE, SODIUM GLUCONATE, SODIUM ACETATE, POTASSIUM CHLORIDE, MAGNESIUM CHLORIDE, SODIUM PHOSPHATE, DIBASIC, AND POTASSIUM PHOSPHATE 50 ML/HR: .53; .5; .37; .037; .03; .012; .00082 INJECTION, SOLUTION INTRAVENOUS at 11:05

## 2021-08-26 RX ADMIN — SALMETEROL XINAFOATE 1 PUFF: 50 POWDER, METERED ORAL; RESPIRATORY (INHALATION) at 12:17

## 2021-08-26 RX ADMIN — DOXAZOSIN 8 MG: 4 TABLET ORAL at 19:34

## 2021-08-26 NOTE — ASSESSMENT & PLAN NOTE
Patient was driving with his daughter to  his other daughter when he suddenly felt lightheaded/dizzy, disoriented, and pulled over to the side of the road  Patient was noted to have bradycardia and mild hypoglycemia (fingerstick glucose 67)  On examination patient states his dizziness and lightheadedness have resolved- he does note some mild shortness of breath  · Hospital patient was given 1 5 mg of atropine and half amp of D50  · Symptomatic bradycardia noted in the 50s  · The patient is on a beta-blocker at home Bystolic 20 mg b i d - will hold  · EKG done in the ER shows heart rate 64 with LVH and prolonged QT interval of 506  · Avoid QT prolonging medications  · Monitor on telemetry for heart block  · Patient also notes decreased p o   Intake, will give gentle IV fluid hydration- patient does have history of CHF but appears to be dry- not in acute exacerbation  · Cardiology consult placed appreciate further input

## 2021-08-26 NOTE — H&P
[facility]  H&P- James Meza 1965, 60 y o  male MRN: [id number]  Unit/Bed#: TR 30 Encounter: [id number]  Primary Care Provider: Michele Aguilar MD   Date and time admitted to hospital: 8/26/2021  1:11 AM    * Bradycardia  Assessment & Plan  Patient was driving with his daughter to  his other daughter when he suddenly felt lightheaded/dizzy, disoriented, and pulled over to the side of the road  Patient was noted to have bradycardia and mild hypoglycemia (fingerstick glucose 67)  On examination patient states his dizziness and lightheadedness have resolved- he does note some mild shortness of breath  · Hospital patient was given 1 5 mg of atropine and half amp of D50  · Symptomatic bradycardia noted in the 50s  · The patient is on a beta-blocker at home Bystolic 20 mg b i d - will hold  · EKG done in the ER shows heart rate 64 with LVH and prolonged QT interval of 506  · Avoid QT prolonging medications  · Monitor on telemetry for heart block  · Patient also notes decreased p o  Intake, will give gentle IV fluid hydration- patient does have history of CHF but appears to be dry- not in acute exacerbation  · Cardiology consult placed appreciate further input    Hypoglycemia  Assessment & Plan  · Patient states that he used to be a type 2 diabetic but after his gastric bypass surgery in December of 2020 he no longer is on diabetic medications  · Patient noted to be hypoglycemic pre-hospital with fingerstick glucose of 67 was given half amp of D50  · Prior hemoglobin A1c from 05/2021 5 5  · Hypoglycemia protocol  · Finger stick glucose checks q i d      Acute renal failure superimposed on stage 3 chronic kidney disease [facility])  Assessment & Plan  Lab Results   Component Value Date    EGFR 27 08/26/2021    EGFR 37 04/18/2021    EGFR 33 04/17/2021    CREATININE 2 82 (H) 08/26/2021    CREATININE 2 20 (H) 04/18/2021    CREATININE 2 45 (H) 04/17/2021     · Present on admission superimposed on stage 3 chronic kidney disease as evidence by creatinine of 2 82 on admission  · Follows with Nephrology as an outpatient  · Baseline was 1 3-1 7 in 2020- Suspect new baseline to be between 2 5-3  · Suspected secondary to dehydration as patient appears to be dry will give gentle IV fluid hydration at 50 mL/hour- monitor volume status closely  · Urinary retention protocol, bladder scan Q shift  · Avoid nephrotoxin/hypertension  · Monitor creatinine in the a m  Pericardial effusion  Assessment & Plan  · 08/26/21 CT C/A/P notes cardiomegaly and pericardial effusion which is chronic, possible atelectasis, and small volume abdominopelvic ascites   · Patient has history of pericardial effusion in the past  · Notes shortness of breath on examination  · Cardiology consult was appreciate further input    History of gastric bypass  Assessment & Plan  · Noted 12/22/2020  · Follows with Dr Maryuri Hendricks as outpatient     History of DVT (deep vein thrombosis)  Assessment & Plan  Patient notes past history of DVT, not currently on oral anticoagulation    AGUS (obstructive sleep apnea)  Assessment & Plan  Noted, does not use CPAP anymore at night    CHF (congestive heart failure) ([facility])  Assessment & Plan  Wt Readings from Last 3 Encounters:   07/07/21 72 7 kg (160 lb 4 8 oz)   04/28/21 79 2 kg (174 lb 9 6 oz)   04/18/21 79 kg (174 lb 1 oz)       · Appears dry, not in acute exacerbation  · Hold eplerenone in the setting of TRUDI   · Monitor volume status closely   · Intake/output, daily weight   · Cardiac diet, sodium restricted       COPD (chronic obstructive pulmonary disease) ([facility] [address] )  Assessment & Plan  · Never smoker  · Not in acute exacerbation  · Not on oxygen at home  · Continue albuterol, Symbicort, Flovent, Anoro ellipta  · Respiratory protocol    Essential hypertension  Assessment & Plan  Blood pressures were elevated in the ER but have improved  Continue doxazosin 8 mg b i d   And amlodipine 10 mg daily  Monitor blood pressures        VTE Pharmacologic Prophylaxis: VTE Score: 6 High Risk (Score >/= 5) - Pharmacological DVT Prophylaxis Ordered: heparin  Sequential Compression Devices Ordered  Code Status: Level 1- Full code   Discussion with family: Updated  (daughter) via phone  Anticipated Length of Stay: Patient will be admitted on an inpatient basis with an anticipated length of stay of greater than 2 midnights secondary to bradycardia, TRUDI   Total Time for Visit, including Counseling / Coordination of Care: 45 minutes Greater than 50% of this total time spent on direct patient counseling and coordination of care  Chief Complaint: lightheadedness/dizziness    History of Present Illness:  Shilpa Bui is a 60 y o  male with a PMH of gastric bypass surgery 12/2020, COPD,asthma, sleep apnea, CKD, HTN, hx of bilateral DVT, hx of DM no longer on diabetes, CHF who presents with sudden onset of dizziness/lightheadedness while driving with his daughter last night at 11:30 p m  To  his daughter  Patient states that he started feeling unwell so he pulled over to the side of the road and after that he does not remember what happened  He states that his daughter had to flag someone down from the side of the road to call 911  Patient notes that this has happened before probably a few weeks ago but he did not have to go to the hospital for it  Patient was noted to be hypoglycemic and bradycardic on scene and was given atropine and D50  The patient notes that he has not been drinking and eating as much as he should be after his gastric bypass surgery  Patient is on a beta-blocker Bystolic 20 mg b i d   Patient states he is compliant with his medications  Patient currently denies any chest pain however he does note some shortness of breath and dyspnea on exertion  Denies any fever, chills, abdominal pain, nausea, vomiting  No changes in urinary or bowel habits  Patient states he is feeling a lot better now compared to when he was driving last night  Review of Systems:  Review of Systems   Constitutional: Positive for appetite change  Negative for fatigue, fever and unexpected weight change  HENT: Negative for sore throat  Eyes: Negative for visual disturbance  Respiratory: Positive for shortness of breath  Negative for cough, chest tightness and wheezing  Cardiovascular: Negative for chest pain, palpitations and leg swelling  Gastrointestinal: Negative for abdominal pain, constipation, diarrhea, nausea and vomiting  Genitourinary: Negative for dysuria  Musculoskeletal: Negative for myalgias  Neurological: Positive for dizziness and light-headedness  Negative for tremors, seizures, syncope, weakness, numbness and headaches  Psychiatric/Behavioral: Positive for confusion  The patient is nervous/anxious  Past Medical and Surgical History:   Past Medical History:   Diagnosis Date    Asthma     Chronic kidney disease     COPD [facility]ch[facility] obstructive [facility] disease) [facility])     CPAP (continuous positive airway pressure) dependence     Diabetes mellitus ([facility])     Emphysema of lung ([facility] [address] )     Gout     History of transfusion     pt stated they had a transfusion when they had gallbladder surgery   Hypertension     Kidney disease     renal failure    Leg DVT (deep venous thromboembolism), acute, bilateral ([facility] [address] ) 01/2018    Obesity (BMI 30-39  9)     AGUS on CPAP     setting 11    Postgastrectomy malabsorption     Sleep apnea     Systolic CHF [facility])        Past Surgical History:   Procedure Laterality Date    ADRENALECTOMY      CHOLECYSTECTOMY      COLONOSCOPY      EGD      HIATAL HERNIA REPAIR N/A 12/22/2020    Procedure: REPAIR HERNIA HIATAL LAPAROSCOPIC;  Surgeon: Obdulio Sahu MD;  Location: [facility] OR;  Service: Bariatrics    INCISION AND DRAINAGE OF WOUND Left 10/17/2018    Procedure: INCISION AND DRAINAGE (I&D) GROIN;  Surgeon: Ty Khalil MD;  Location: [facility];  Service: General    IR IMAGE GUIDED ASPIRATION / DRAINAGE W TUBE  8/17/2018    IR IMAGE GUIDED ASPIRATION / DRAINAGE W TUBE  7/31/2018    JOINT REPLACEMENT Bilateral     knee    KIDNEY SURGERY  2009    nodule removal    LYMPH NODE DISSECTION Left 01/2018    left inguinal LN removed - benign     OTHER SURGICAL HISTORY      kidney nodule removal    PALATE / UVULA BIOPSY / EXCISION      UT LAP GASTRIC BYPASS/JASON-EN-Y N/A 12/22/2020    Procedure: BYPASS GASTRIC  JASON-EN-Y LAPAROSCOPIC WITH INTRAOPERATIVE EGD;  Surgeon: Tyrone Sanders MD;  Location: [facility];  Service: Eden Vergara SINUS SURGERY      TONSILLECTOMY         Meds/Allergies:  Prior to Admission medications    Medication Sig Start Date End Date Taking?  Authorizing Provider   amLODIPine (NORVASC) 10 mg tablet Take 10 mg by mouth 4/15/20  Yes Historical Provider, MD   Acetaminophen 325 MG CAPS Take 2 tablets by mouth  Patient not taking: Reported on 8/26/2021    Historical Provider, MD   albuterol (PROVENTIL HFA,VENTOLIN HFA) 90 mcg/act inhaler INHALE 2 PUFFS BY MOUTH EVERY 6 HOURS    Historical Provider, MD   aspirin (ECOTRIN) 325 mg EC tablet Take 325 mg by mouth  Patient not taking: Reported on 7/7/2021    Historical Provider, MD   atorvastatin (LIPITOR) 40 mg tablet Take 40 mg by mouth daily at bedtime  Patient not taking: Reported on 7/7/2021 2/1/17   Historical Provider, MD   azithromycin (ZITHROMAX) 250 mg tablet TAKE 2 TABLETS BY MOUTH ON DAY 1 AND THEN TAKE 1 TABLET BY MOUTH ONCE A DAY ON DAY 2 THROUGH DAY 5  Patient not taking: Reported on 7/7/2021 6/16/21   Historical Provider, MD   BD Pen Needle Amelia U/F 32G X 4 MM MISC  12/21/20   Historical Provider, MD   budesonide-formoterol (SYMBICORT) 160-4 5 mcg/act inhaler Inhale 2 puffs  Patient not taking: Reported on 7/7/2021    Historical Provider, MD   busPIRone (BUSPAR) 15 mg tablet TAKE ONE TABLET BY MOUTH TWICE A DAY FOR PTSD    Historical Provider, MD   Calcium 600 MG tablet Take 600 mg by mouth    Historical Provider, MD   Cholecalciferol (VITAMIN D-3) 1000 units CAPS Take 1,000 Units by mouth daily    Historical Provider, MD   Continuous Blood Gluc Sensor (FreeStyle Michelle 14 Day Sensor) MISC Use as directed  Patient not taking: Reported on 7/7/2021 2/17/21   Historical Provider, MD   doxazosin (CARDURA) 8 MG tablet Take 8 mg by mouth 2 (two) times a day 2/3/21   Historical Provider, MD   eplerenone (INSPRA) 50 MG tablet Take 50 mg by mouth daily 5/23/17   Historical Provider, MD   escitalopram (LEXAPRO) 20 mg tablet Take 20 mg by mouth daily 11/14/20   Historical Provider, MD   ferrous sulfate 325 (65 Fe) mg tablet Take 325 mg by mouth 2 (two) times a day      Historical Provider, MD   fluticasone (FLOVENT HFA) 220 mcg/act inhaler Inhale 1 puff  Patient not taking: Reported on 7/7/2021    Historical Provider, MD   glucose monitoring kit (FREESTYLE) monitoring kit 1 each by Does not apply route 2 (two) times a day Any brand covered by insurance:  Dispense one glucometer, test strips #50, lancets #100  Patient not taking: Reported on 4/2/2021 7/11/20   Katie Vigil DO   Glycopyrrolate-Formoterol (BEVESPI AEROSPHERE) 9-4 8 MCG/ACT AERO Inhale 2 puffs daily after breakfast    Historical Provider, MD   LORazepam (ATIVAN) 0 5 mg tablet TAKE ONE TABLET BY MOUTH Q6 PRN    Historical Provider, MD   magnesium oxide (MAG-OX) 400 mg tablet Take 400 mg by mouth  Patient not taking: Reported on 7/7/2021    Historical Provider, MD   MAGNESIUM PO Take 400 mg by mouth daily  Patient not taking: Reported on 7/7/2021    Historical Provider, MD   Milk Thistle 500 MG CAPS Take 500 mg by mouth 2 (two) times a day  Patient not taking: Reported on 7/7/2021    Historical Provider, MD   Multiple Vitamins-Minerals (Bariatric Fusion) CHEW Chew  Patient not taking: Reported on 8/26/2021    Historical Provider, MD   multivitamin-iron-minerals-folic acid (CENTRUM) chewable tablet Chew 1 tablet daily  Patient not taking: Reported on 7/7/2021    Historical Provider, MD   nebivolol (BYSTOLIC) 20 MG tablet Take 20 mg by mouth 2 (two) times a day 6/13/16   Historical Provider, MD   neomycin-polymyxin-hydrocortisone (CORTISPORIN) otic solution Administer 4 drops into ears  Patient not taking: Reported on 8/26/2021 8/25/20   Historical Provider, MD   Omega-3 Fatty Acids (FISH OIL) 1200 MG CAPS Take 1,200 mg by mouth daily at bedtime    Historical Provider, MD   omeprazole (PriLOSEC) 20 mg delayed release capsule Take 1 capsule (20 mg total) by mouth daily  Patient not taking: Reported on 7/7/2021 12/11/20   Tyrone Sanders MD   predniSONE 20 mg tablet TAKE 2 TABLETS BY MOUTH ONCE DAILY FOR 5 DAYS AS NEEDED FOR RESCUE KIT  Patient not taking: Reported on 7/7/2021 6/23/21   Historical Provider, MD   Pulmicort Flexhaler 90 MCG/ACT inhaler 2 puffs 2 (two) times a day 3/9/21   Historical Provider, MD   umeclidinium-vilanterol (Anoro Ellipta) 62 5-25 MCG/INH inhaler Inhale 1 puff daily    Historical Provider, MD     I have reviewed home medications with patient personally  Allergies:    Allergies   Allergen Reactions    Clonidine Anaphylaxis    Lisinopril Anaphylaxis    Nifedipine Anaphylaxis    Spironolactone Anaphylaxis, Other (See Comments) and Shortness Of Breath    Buspirone      Headaches,     Enoxaparin      Injection site "bump" per Dr Toshia Hernandez Minoxidil      Retains fluid around heart       Social History:  Marital Status: /Civil Union     Patient Pre-hospital Living Situation: Home  Patient Pre-hospital Level of Mobility: walks  Patient Pre-hospital Diet Restrictions:  Cardiac  Substance Use History:   Social History     Substance and Sexual Activity   Alcohol Use Yes    Comment: occasionally     Social History     Tobacco Use   Smoking Status Never Smoker   Smokeless Tobacco Never Used     Social History     Substance and Sexual Activity   Drug Use No       Family History:  Family History   Problem Relation Age of Onset    Heart murmur Sister     Asthma Sister     Hypertension Sister     Kidney disease Mother     Cancer Father     Bell's palsy Brother     Kidney disease Brother     Deep vein thrombosis Neg Hx        Physical Exam:     Vitals:   Blood Pressure: 164/95 (08/26/21 0700)  Pulse: 58 (08/26/21 0730)  Temperature: 98 5 °F (36 9 °C) (08/26/21 0954)  Temp Source: Oral (08/26/21 0954)  Respirations: 19 (08/26/21 0730)  SpO2: 99 % (08/26/21 0730)    Physical Exam  Constitutional:       General: He is not in acute distress  HENT:      Head: Normocephalic and atraumatic  Mouth/Throat:      Mouth: Mucous membranes are dry  Eyes:      General: No scleral icterus  Cardiovascular:      Rate and Rhythm: Regular rhythm  Bradycardia present  Pulses: Normal pulses  Heart sounds: Normal heart sounds  Pulmonary:      Effort: Pulmonary effort is normal  No respiratory distress  Breath sounds: Normal breath sounds  No wheezing, rhonchi or rales  Abdominal:      General: Abdomen is flat  Bowel sounds are normal  There is no distension  Palpations: Abdomen is soft  Tenderness: There is no abdominal tenderness  Musculoskeletal:         General: No swelling  Normal range of motion  Right lower leg: No edema  Left lower leg: No edema  Skin:     General: Skin is warm and dry  Capillary Refill: Capillary refill takes less than 2 seconds  Neurological:      General: No focal deficit present  Mental Status: He is alert and oriented to person, place, and time  Motor: No weakness     Psychiatric:         Mood and Affect: Mood normal           Additional Data:     Lab Results:  Results from last 7 days   Lab Units 08/26/21  0137   WBC Thousand/uL 5 19   HEMOGLOBIN g/dL 11 2*   HEMATOCRIT % 34 7*   PLATELETS Thousands/uL 234   NEUTROS PCT % 63   LYMPHS PCT % 24   MONOS PCT % 8   EOS PCT % 4     Results from last 7 days   Lab Units 08/26/21  0137   SODIUM mmol/L 141   POTASSIUM mmol/L 2 8*   CHLORIDE mmol/L 106   CO2 mmol/L 28   BUN mg/dL 29*   CREATININE mg/dL 2 82*   ANION GAP mmol/L 7   CALCIUM mg/dL 8 7   ALBUMIN g/dL 3 7   TOTAL BILIRUBIN mg/dL 0 89   ALK PHOS U/L 77   ALT U/L 90*   AST U/L 43   GLUCOSE RANDOM mg/dL 153*     Results from last 7 days   Lab Units 08/26/21  0137   INR  1 17     Results from last 7 days   Lab Units 08/26/21  0723 08/26/21  0557 08/26/21  0246 08/26/21  0137 08/26/21  0113   POC GLUCOSE mg/dl 133 139 128 147* 188*         Results from last 7 days   Lab Units 08/26/21  0137   LACTIC ACID mmol/L 0 6   PROCALCITONIN ng/ml <0 05       Imaging: Reviewed radiology reports from this admission including: chest xray, abdominal/pelvic CT and CT head  CT head without contrast   Final Result by Rakesh Macias MD (08/26 9548)      No acute intracranial abnormality  Workstation performed: [id number]         CT chest abdomen pelvis wo contrast   Final Result by Rakesh Macias MD (08/26 4431)      Suboptimal evaluation secondary to lack of contrast       Cardiomegaly and pericardial effusion  Residual minimal consolidative opacity at the right lung base similar in appearance to prior studies favor atelectasis although could reflect? Acute on chronic process in the appropriate clinical setting  No other focal consolidation  Small volume abdominopelvic ascites  Study was marked in Epic for immediate notification  Workstation performed: [id number]         XR chest 1 view portable   ED Interpretation by Maureen Paul DO (08/26 3765)   Questionable right sided infiltrate vs  Edema  Final Result by Cris Cisneros MD (08/26 9721)      No acute cardiopulmonary disease  Workstation performed: [id number]             EKG and Other Studies Reviewed on Admission:   · EKG: Sinus Bradycardia   HR 64, LVH and prolonged QT interval      ** Please Note: This note has been constructed using a voice recognition system   **

## 2021-08-26 NOTE — ED PROVIDER NOTES
History  Chief Complaint   Patient presents with    Altered Mental Status     Per EMS pt found altered while driving daughter to [place]  Pt was found with BG 69, given 250mL d10, BG responded to 160, and was given 1/2 amp D50 x2  Pt also received   5mg Atropine x3 for persistent bradycardia around 40 BPM       Patient is 41-year-old male with past medical history of asthma, COPD, sleep apnea on CPAP, chronic kidney disease, hypertension, gout, prior bilateral DVT, history of diabetes but not currently on any diabetic medication as he reports he was taken off medication, presents to the emergency department by ambulance for altered mental status, hypoglycemia and bradycardia  According to EMS, patient was driving with his daughter and started to feel disoriented and did not know where he was going  He also felt dizzy and out of it" so he pulled to the side of the road and 911 was called  On their arrival, patient was diaphoretic, confused and poorly responsive  Fingerstick glucose 67 for which they provided D10 bolus  His glucose improved into the 160s slow least but started to drop very quickly so he was then given half an amp of D50  Was also noted to be bradycardic in the 40s and was given multiple doses of 0 5 mg of atropine for a total dose of 1 5 mg and his heart rate has been stable in the low 60s  On arrival, patient in no apparent distress  He is awake and reports he feels cold, slightly dizzy and still feels kind of out of it  He denies any recent fall or head injury  He denies any recent fever, chills, flu-like symptoms, chest pain, palpitations, dyspnea, headaches, vertigo, abdominal pain, nausea, vomiting, diarrhea, constipation, blood per rectum or melena, urinary symptoms, skin rash or color change, leg pain or swelling, lateralizing weakness or extremity paresthesia or other focal neurologic deficits    He denies ever having problems with low blood sugar in the past   He reports he used to be on diabetes medication but then his diabetes improved after gastric surgery and he is now off medication  History provided by:  Patient and EMS personnel   used: No        Prior to Admission Medications   Prescriptions Last Dose Informant Patient Reported? Taking?    Acetaminophen 325 MG CAPS   Yes No   Sig: Take 2 tablets by mouth   BD Pen Needle Amelia U/F 32G X 4 MM MISC   Yes No   Patient not taking: Reported on 2021   Calcium 600 MG tablet   Yes No   Sig: Take 600 mg by mouth   Cholecalciferol (VITAMIN D-3) 1000 units CAPS  Self Yes No   Sig: Take 1,000 Units by mouth daily   Continuous Blood Gluc Sensor (FreeStyle Michelle 14 Day Sensor) MISC   Yes No   Sig: Use as directed   Patient not taking: Reported on 2021   Glycopyrrolate-Formoterol (BEVESPI AEROSPHERE) 9-4 8 MCG/ACT AERO  Self Yes No   Sig: Inhale 2 puffs daily after breakfast   LORazepam (ATIVAN) 0 5 mg tablet  Self Yes No   Sig: TAKE ONE TABLET BY MOUTH Q6 PRN   MAGNESIUM PO  Self Yes No   Sig: Take 400 mg by mouth daily   Patient not taking: Reported on 2021   Milk Thistle 500 MG CAPS  Self Yes No   Sig: Take 500 mg by mouth 2 (two) times a day   Patient not taking: Reported on 2021   Multiple Vitamins-Minerals (Bariatric Fusion) CHEW   Yes No   Sig: Chew   Omega-3 Fatty Acids (FISH OIL) 1200 MG CAPS  Self Yes No   Sig: Take 1,200 mg by mouth daily at bedtime   Pulmicort Flexhaler 90 MCG/ACT inhaler   Yes No   Si puffs 2 (two) times a day   albuterol (PROVENTIL HFA,VENTOLIN HFA) 90 mcg/act inhaler  Self Yes No   Sig: INHALE 2 PUFFS BY MOUTH EVERY 6 HOURS   amLODIPine (NORVASC) 10 mg tablet  Self Yes No   Sig: Take 10 mg by mouth   aspirin (ECOTRIN) 325 mg EC tablet   Yes No   Sig: Take 325 mg by mouth   Patient not taking: Reported on 2021   atorvastatin (LIPITOR) 40 mg tablet  Self Yes No   Sig: Take 40 mg by mouth daily at bedtime   Patient not taking: Reported on 2021   azithromycin (ZITHROMAX) 250 mg tablet   Yes No   Sig: TAKE 2 TABLETS BY MOUTH ON DAY 1 AND THEN TAKE 1 TABLET BY MOUTH ONCE A DAY ON DAY 2 THROUGH DAY 5   Patient not taking: Reported on 2021   budesonide-formoterol (SYMBICORT) 160-4 5 mcg/act inhaler   Yes No   Sig: Inhale 2 puffs   Patient not taking: Reported on 2021   busPIRone (BUSPAR) 15 mg tablet  Self Yes No   Sig: TAKE ONE TABLET BY MOUTH TWICE A DAY FOR PTSD   doxazosin (CARDURA) 8 MG tablet   Yes No   Sig: Take 8 mg by mouth 2 (two) times a day   eplerenone (INSPRA) 50 MG tablet  Self Yes No   Sig: Take 50 mg by mouth daily   escitalopram (LEXAPRO) 20 mg tablet   Yes No   Sig: Take 20 mg by mouth daily   ferrous sulfate 325 (65 Fe) mg tablet  Self Yes No   Sig: Take 325 mg by mouth 2 (two) times a day     fluticasone (FLOVENT HFA) 220 mcg/act inhaler   Yes No   Sig: Inhale 1 puff   Patient not taking: Reported on 2021   glucose monitoring kit (FREESTYLE) monitoring kit  Self No No   Si each by Does not apply route 2 (two) times a day Any brand covered by insurance:  Dispense one glucometer, test strips #50, lancets #100   Patient not taking: Reported on 2021   magnesium oxide (MAG-OX) 400 mg tablet   Yes No   Sig: Take 400 mg by mouth   Patient not taking: Reported on 2021   multivitamin-iron-minerals-folic acid (CENTRUM) chewable tablet  Self Yes No   Sig: Chew 1 tablet daily   Patient not taking: Reported on 2021   nebivolol (BYSTOLIC) 20 MG tablet  Self Yes No   Sig: Take 20 mg by mouth 2 (two) times a day   neomycin-polymyxin-hydrocortisone (CORTISPORIN) otic solution  Self Yes No   Sig: Administer 4 drops into ears   omeprazole (PriLOSEC) 20 mg delayed release capsule   No No   Sig: Take 1 capsule (20 mg total) by mouth daily   Patient not taking: Reported on 2021   predniSONE 20 mg tablet   Yes No   Sig: TAKE 2 TABLETS BY MOUTH ONCE DAILY FOR 5 DAYS AS NEEDED FOR RESCUE KIT   Patient not taking: Reported on 2021   umeclidinium-vilanterol (Anoro Ellipta) 62 5-25 MCG/INH inhaler  Self Yes No   Sig: Inhale 1 puff daily      Facility-Administered Medications: None       Past Medical History:   Diagnosis Date    Asthma     Chronic kidney disease     COPD (chronic obstructive pulmonary disease) (HCC)     CPAP (continuous positive airway pressure) dependence     Diabetes mellitus (HCC)     Emphysema of lung (HCC)     Gout     History of transfusion     pt stated they had a transfusion when they had gallbladder surgery   Hypertension     Kidney disease     renal failure    Leg DVT (deep venous thromboembolism), acute, bilateral ([address] ) 01/2018    Obesity (BMI 30-39  9)     AGUS on CPAP     setting 11    Postgastrectomy malabsorption     Sleep apnea     Systolic CHF [facility])        Past Surgical History:   Procedure Laterality Date    ADRENALECTOMY      CHOLECYSTECTOMY      COLONOSCOPY      EGD      HIATAL HERNIA REPAIR N/A 12/22/2020    Procedure: REPAIR HERNIA HIATAL LAPAROSCOPIC;  Surgeon: Obdulio Sahu MD;  [facility];  Service: Bariatrics    INCISION AND DRAINAGE OF WOUND Left 10/17/2018    Procedure: INCISION AND DRAINAGE (I&D) GROIN;  Surgeon: Ty Khalil MD;  Location: [facility];  Service: General    IR IMAGE [address] / DRAINAGE W TUBE  8/17/2018    IR IMAGE GUIDED ASPIRATION / DRAINAGE W TUBE  7/31/2018    JOINT REPLACEMENT Bilateral     knee    KIDNEY SURGERY  2009    nodule removal    LYMPH NODE DISSECTION Left 01/2018    left inguinal LN removed - benign     OTHER SURGICAL HISTORY      kidney nodule removal    PALATE / UVULA BIOPSY / EXCISION      NC LAP GASTRIC BYPASS/JASON-EN-Y N/A 12/22/2020    Procedure: BYPASS GASTRIC  JASON-EN-Y LAPAROSCOPIC WITH INTRAOPERATIVE EGD;  Surgeon: Obdulio Sahu MD;  [facility]: [facility];  Service: Deborha Sport SINUS SURGERY      TONSILLECTOMY         Family History   Problem Relation Age of Onset    Heart murmur Sister     Asthma Sister    Nicole Escalera Hypertension Sister     Kidney disease Mother     Cancer Father     Bell's palsy Brother     Kidney disease Brother     Deep vein thrombosis Neg Hx      I have reviewed and agree with the history as documented  E-Cigarette/Vaping    E-Cigarette Use Never User      E-Cigarette/Vaping Substances    Nicotine No     THC No     CBD No     Flavoring No     Other No     Unknown No      Social History     Tobacco Use    Smoking status: Never Smoker    Smokeless tobacco: Never Used   Vaping Use    Vaping Use: Never used   Substance Use Topics    Alcohol use: Yes     Comment: occasionally    Drug use: No       Review of Systems   Constitutional: Positive for chills and diaphoresis  Negative for fever  HENT: Negative for congestion, ear pain, rhinorrhea and sore throat  Eyes: Negative for photophobia, pain and visual disturbance  Respiratory: Negative for cough, chest tightness, shortness of breath and wheezing  Cardiovascular: Negative for chest pain, palpitations and leg swelling  Gastrointestinal: Negative for abdominal pain, blood in stool, constipation, diarrhea, nausea and vomiting  Genitourinary: Negative for dysuria, flank pain, frequency and hematuria  Musculoskeletal: Negative for back pain, neck pain and neck stiffness  Skin: Negative for color change, pallor, rash and wound  Allergic/Immunologic: Negative for immunocompromised state  Neurological: Positive for dizziness and light-headedness  Negative for seizures, syncope, weakness, numbness and headaches  Hematological: Negative for adenopathy  Psychiatric/Behavioral: Positive for confusion and decreased concentration  All other systems reviewed and are negative  Physical Exam  Physical Exam  Vitals and nursing note reviewed  Constitutional:       General: He is not in acute distress  Appearance: Normal appearance  He is well-developed  He is not ill-appearing, toxic-appearing or diaphoretic     HENT: Head: Normocephalic and atraumatic  Right Ear: External ear normal       Left Ear: External ear normal       Nose: Nose normal       Mouth/Throat:      Mouth: Mucous membranes are moist       Pharynx: Oropharynx is clear  No oropharyngeal exudate  Eyes:      Extraocular Movements: Extraocular movements intact  Conjunctiva/sclera: Conjunctivae normal       Pupils: Pupils are equal, round, and reactive to light  Neck:      Vascular: No JVD  Cardiovascular:      Rate and Rhythm: Normal rate and regular rhythm  Pulses: Normal pulses  Heart sounds: Normal heart sounds  No murmur heard  No friction rub  No gallop  Comments: Heart rate on arrival 64 bpm, sinus rhythm  Pulmonary:      Effort: Pulmonary effort is normal  No respiratory distress  Breath sounds: Normal breath sounds  No wheezing, rhonchi or rales  Abdominal:      General: There is no distension  Palpations: Abdomen is soft  Tenderness: There is no abdominal tenderness  There is no guarding or rebound  Musculoskeletal:         General: No swelling or tenderness  Normal range of motion  Cervical back: Normal range of motion and neck supple  No rigidity  Skin:     General: Skin is warm and dry  Coloration: Skin is not pale  Findings: No rash  Neurological:      General: No focal deficit present  Mental Status: He is alert and oriented to person, place, and time  Cranial Nerves: No cranial nerve deficit  Sensory: No sensory deficit  Motor: No weakness     Psychiatric:         Mood and Affect: Mood normal          Behavior: Behavior normal          Vital Signs  ED Triage Vitals   Temperature Pulse Respirations Blood Pressure SpO2   08/26/21 0119 08/26/21 0113 08/26/21 0113 08/26/21 0113 08/26/21 0113   (!) 93 3 °F (34 1 °C) 64 18 (!) 181/106 100 %      Temp Source Heart Rate Source Patient Position - Orthostatic VS BP Location FiO2 (%)   08/26/21 0119 08/26/21 0113 08/26/21 0245 08/26/21 0245 --   Rectal Monitor Lying Right arm       Pain Score       --                Vitals:    08/26/21 0315 08/26/21 0530 08/26/21 0600 08/26/21 0615   BP:  155/96 155/100    BP Location:  Right arm Right arm    Pulse:  (!) 51 69    Resp:  14 (!) 23    Temp: (!) 95 5 °F (35 3 °C)   97 7 °F (36 5 °C)   TempSrc: Rectal   Rectal   SpO2:  99% 100%        Visual Acuity      ED Medications  Medications   dextrose 5 % and sodium chloride 0 9 % infusion (100 mL/hr Intravenous New Bag 8/26/21 0143)   magnesium sulfate 2 g/50 mL IVPB (premix) 2 g (0 g Intravenous Stopped 8/26/21 0322)   atropine (FOR EMS ONLY) 1 mg/10 mL injection 2 mg (0 mg Does not apply Given to EMS 8/26/21 0137)   dextrose (FOR EMS ONLY) 50 % IV solution 100 mL (0 mL Does not apply Given to EMS 8/26/21 0137)   potassium chloride (K-DUR,KLOR-CON) CR tablet 40 mEq (40 mEq Oral Given 8/26/21 0625)   potassium chloride 20 mEq IVPB (premix) (20 mEq Intravenous New Bag 8/26/21 0323)       Diagnostic Studies  Results Reviewed     Procedure Component Value Units Date/Time    Fingerstick Glucose (POCT) [[id number]]  (Normal) Collected: 08/26/21 0557    Lab Status: Final result Updated: 08/26/21 0559     POC Glucose 139 mg/dl     Fingerstick Glucose (POCT) [[id number]]  (Normal) Collected: 08/26/21 0246    Lab Status: Final result Updated: 08/26/21 0559     POC Glucose 128 mg/dl     Rapid drug screen, urine [[id number]]  (Abnormal) Collected: 08/26/21 0318    Lab Status: Final result Specimen: Urine, Other Updated: 08/26/21 0354     Amph/Meth UR Negative     Barbiturate Ur Negative     Benzodiazepine Urine Negative     Cocaine Urine Negative     Methadone Urine Negative     Opiate Urine Negative     PCP Ur Negative     THC Urine Positive     Oxycodone Urine Negative    Narrative:      Presumptive report  If requested, specimen will be sent to reference lab for confirmation  FOR MEDICAL PURPOSES ONLY     IF CONFIRMATION NEEDED PLEASE CONTACT THE LAB WITHIN 5 DAYS  Drug Screen Cutoff Levels:  AMPHETAMINE/METHAMPHETAMINES  1000 ng/mL  BARBITURATES     200 ng/mL  BENZODIAZEPINES     200 ng/mL  COCAINE      300 ng/mL  METHADONE      300 ng/mL  OPIATES      300 ng/mL  PHENCYCLIDINE     25 ng/mL  THC       50 ng/mL  OXYCODONE      100 ng/mL    CBC and differential [[id number]]  (Abnormal) Collected: 08/26/21 0137    Lab Status: Final result Specimen: Blood from Arm, Left Updated: 08/26/21 0324     WBC 5 19 Thousand/uL      RBC 3 95 Million/uL      Hemoglobin 11 2 g/dL      Hematocrit 34 7 %      MCV 88 fL      MCH 28 4 pg      MCHC 32 3 g/dL      RDW 14 5 %      MPV 10 5 fL      Platelets 401 Thousands/uL      Neutrophils Relative 63 %      Lymphocytes Relative 24 %      Monocytes Relative 8 %      Eosinophils Relative 4 %      Basophils Relative 1 %      Neutrophils Absolute 3 30 Thousands/µL      Lymphocytes Absolute 1 24 Thousands/µL      Monocytes Absolute 0 43 Thousand/µL      Eosinophils Absolute 0 19 Thousand/µL      Basophils Absolute 0 03 Thousands/µL     UA (URINE) with reflex to Scope [[id number]]  (Abnormal) Collected: 08/26/21 0318    Lab Status: Final result Specimen: Urine, Other Updated: 08/26/21 0323     Color, UA Yellow     Clarity, UA Clear     Specific Gravity, UA 1 010     pH, UA 6 0     Leukocytes, UA Negative     Nitrite, UA Negative     Protein, UA Negative mg/dl      Glucose,  (1/10%) mg/dl      Ketones, UA Negative mg/dl      Urobilinogen, UA 0 2 E U /dl      Bilirubin, UA Negative     Blood, UA Negative    Novel Coronavirus (Covid-19),PCR [facility] - 2 Hour Stat [[id number]]  (Normal) Collected: 08/26/21 0154    Lab Status: Final result Specimen: Nares from Nose Updated: 08/26/21 0250     SARS-CoV-2 Negative    Narrative:       The specimen collection materials, transport medium, and/or testing methodology utilized in the production of these test results have been proven to be reliable in a limited validation with an abbreviated program under the Emergency Utilization Authorization provided by the FDA  Testing reported as "Presumptive positive" will be confirmed with secondary testing to ensure result accuracy  Clinical caution and judgement should be used with the interpretation of these results with consideration of the clinical impression and other laboratory testing  Testing reported as "Positive" or "Negative" has been proven to be accurate according to standard laboratory validation requirements  All testing is performed with control materials showing appropriate reactivity at standard intervals        Basic metabolic panel [[id number]]  (Abnormal) Collected: 08/26/21 0137    Lab Status: Final result Specimen: Blood from Arm, Left Updated: 08/26/21 0211     Sodium 141 mmol/L      Potassium 2 8 mmol/L      Chloride 106 mmol/L      CO2 28 mmol/L      ANION GAP 7 mmol/L      BUN 29 mg/dL      Creatinine 2 82 mg/dL      Glucose 153 mg/dL      Calcium 8 7 mg/dL      eGFR 27 ml/min/1 73sq m     Narrative:      [place] guidelines for Chronic Kidney Disease (CKD):     Stage 1 with normal or high GFR (GFR > 90 mL/min/1 73 square meters)    Stage 2 Mild CKD (GFR = 60-89 mL/min/1 73 square meters)    Stage 3A Moderate CKD (GFR = 45-59 mL/min/1 73 square meters)    Stage 3B Moderate CKD (GFR = 30-44 mL/min/1 73 square meters)    Stage 4 Severe CKD (GFR = 15-29 mL/min/1 73 square meters)    Stage 5 End Stage CKD (GFR <15 mL/min/1 73 square meters)  Note: GFR calculation is accurate only with a steady state creatinine    Hepatic function panel [[id number]]  (Abnormal) Collected: 08/26/21 0137    Lab Status: Final result Specimen: Blood from Arm, Left Updated: 08/26/21 0211     Total Bilirubin 0 89 mg/dL      Bilirubin, Direct 0 25 mg/dL      Alkaline Phosphatase 77 U/L      AST 43 U/L      ALT 90 U/L      Total Protein 6 9 g/dL      Albumin 3 7 g/dL     TSH, 3rd generation with Free T4 reflex [[id number]]  (Normal) Collected: 08/26/21 0137    Lab Status: Final result Specimen: Blood from Arm, Left Updated: 08/26/21 0211     TSH 3RD GENERATON 3 551 uIU/mL     Narrative:      Patients undergoing fluorescein dye angiography may retain small amounts of fluorescein in the body for 48-72 hours post procedure  Samples containing fluorescein can produce falsely depressed TSH values  If the patient had this procedure,a specimen should be resubmitted post fluorescein clearance  NT-BNP PRO [[id number]]  (Abnormal) Collected: 08/26/21 0137    Lab Status: Final result Specimen: Blood from Arm, Left Updated: 08/26/21 0211     NT-proBNP 685 pg/mL     Magnesium [[id number]]  (Normal) Collected: 08/26/21 0137    Lab Status: Final result Specimen: Blood from Arm, Left Updated: 08/26/21 0211     Magnesium 1 9 mg/dL     Troponin I [[id number]]  (Normal) Collected: 08/26/21 0137    Lab Status: Final result Specimen: Blood from Arm, Left Updated: 08/26/21 0205     Troponin I <0 02 ng/mL     Lactic acid [[id number]]  (Normal) Collected: 08/26/21 0137    Lab Status: Final result Specimen: Blood from Arm, Left Updated: 08/26/21 0204     LACTIC ACID 0 6 mmol/L     Narrative:      Result may be elevated if tourniquet was used during collection  Protime-INR [[id number]]  (Abnormal) Collected: 08/26/21 0137    Lab Status: Final result Specimen: Blood from Arm, Left Updated: 08/26/21 0204     Protime 15 2 seconds      INR 1 17    APTT [[id number]]  (Normal) Collected: 08/26/21 0137    Lab Status: Final result Specimen: Blood from Arm, Left Updated: 08/26/21 0204     PTT 33 seconds     Blood culture #1 [[id number]] Collected: 08/26/21 0137    Lab Status: In process Specimen: Blood from Arm, Left Updated: 08/26/21 0143    Blood culture #2 [[id number]] Collected: 08/26/21 0137    Lab Status: In process Specimen: Blood from Arm, Left Updated: 08/26/21 0142    Procalcitonin with AM Reflex [[id number]] Collected: 08/26/21 0137    Lab Status:  In process Specimen: Blood from Arm, Left Updated: 08/26/21 0142    Fingerstick Glucose (POCT) [[id number]]  (Abnormal) Collected: 08/26/21 0137    Lab Status: Final result Updated: 08/26/21 0141     POC Glucose 147 mg/dl     Fingerstick Glucose (POCT) [[id number]]  (Abnormal) Collected: 08/26/21 0113    Lab Status: Final result Updated: 08/26/21 0114     POC Glucose 188 mg/dl                  CT head without contrast   Final Result by Herman Humphrey MD (08/26 6820)      No acute intracranial abnormality  Workstation performed: [id number]         CT chest abdomen pelvis wo contrast   Final Result by Herman Humphrey MD (08/26 0830)      Suboptimal evaluation secondary to lack of contrast       Cardiomegaly and pericardial effusion  Residual minimal consolidative opacity at the right lung base similar in appearance to prior studies favor atelectasis although could reflect? Acute on chronic process in the appropriate clinical setting  No other focal consolidation  Small volume abdominopelvic ascites  Study was marked in Epic for immediate notification  Workstation performed: [id number]         XR chest 1 view portable   ED Interpretation by Chely Hopson DO (08/26 6645)   Questionable right sided infiltrate vs  Edema                   Procedures  ECG 12 Lead Documentation Only    Date/Time: 8/26/2021 2:00 AM  Performed by: Chely Hopson DO  Authorized by: Chely Hopson DO     ECG reviewed by me, the ED Provider: yes    Patient location:  ED  Previous ECG:     Previous ECG:  Compared to current    Comparison ECG info:  12-  Rate:     ECG rate:  64    ECG rate assessment: normal    Rhythm:     Rhythm: sinus rhythm    Ectopy:     Ectopy: PVCs      PVCs:  Frequent  QRS:     QRS axis:  Normal    QRS intervals:  Normal  Conduction:     Conduction: normal    ST segments:     ST segments:  Non-specific  T waves:     T waves: non-specific    Other findings:     Other findings: LVH and prolonged qTc interval ED Course  ED Course as of Aug 26 0629   Thu Aug 26, 2021   0123 Rectal temperature 93 3°  Will use fluid warmer and bare hugger  0142 Glucose went from 188 on arrival to 83764-17 minutes later  D5 normal saline drip ordered  0222 Will replace with PO and IV potassium  Potassium(!): 2 8   0222 Creatinine(!): 2 82   0222 Slightly worse than baseline  Will obtain CT scans without contrast    eGFR: 27   0253 Last glucose 128, currently on D5NS gtt       0350 Repeat rectal temperature 95 5° F  Patient more awake and alert  [address] TSH 3RD Mehdi : 2 060 2084 Updated patient about results  He is feeling better overall and is agreeable to admission  0394 Repeat temperature 97 7 deg F                                 SBIRT 22yo+      Most Recent Value   SBIRT (22 yo +)   In order to provide better care to our patients, we are screening all of our patients for alcohol and drug use  Would it be okay to ask you these screening questions? Unable to answer at this time Filed at: 08/26/2021 0117                    MDM  Number of Diagnoses or Management Options  Diagnosis management comments: 57-year-old male presents to the ED by ambulance for acute onset altered mental status and was found to be diaphoretic, hypoglycemic and bradycardia  He received dextrose pre-hospital and his glucose does initially respond to the dextrose but is dropping precipitously  He required multiple doses of atropine bu now is currently stable with heart rate in the low 60s  Rectal temperature shows he is hypothermic with temperature of 93 3° F  Most likely his hypothermia is contributing to the hyperglycemia and bradycardia  Differential includes sepsis, myxedema coma, other metabolic abnormality  Will start warming patient with warm fluids and given dropping glucose, will start D5 normal saline drip  Will provide [employer]  Will obtain CT scan of the head, CT chest, abdomen and pelvis    Will do full workup with blood cultures, cardiac labs, TSH, lactic acid  Patient to be admitted  Amount and/or Complexity of Data Reviewed  Clinical lab tests: ordered and reviewed  Tests in the radiology section of CPT®: reviewed and ordered  Tests in the medicine section of CPT®: reviewed and ordered  Obtain history from someone other than the patient: yes  Independent visualization of images, tracings, or specimens: yes        Disposition  Final diagnoses:   Hypothermia   Hypoglycemia   Bradycardia, sinus   Altered mental status - resolved   QT prolongation   Pericardial effusion   Acute on chronic renal insufficiency   Hypokalemia     Time reflects when diagnosis was documented in both MDM as applicable and the Disposition within this note     Time User Action Codes Description Comment    8/26/2021  5:48 AM Braeden Kianna E Add [T68  XXXA] Hypothermia     8/26/2021  5:48 AM Braeden Kianna E Add [E16 2] Hypoglycemia     8/26/2021  5:48 AM Braeden Kianna E Add [R00 1] Bradycardia, sinus     8/26/2021  5:48 AM Braeden Kianna E Add [R41 82] Altered mental status     8/26/2021  5:48 AM Braeden Kianna E Modify [R41 82] Altered mental status resolved    8/26/2021  5:48 AM Braeden Kianna E Add [R94 31] QT prolongation     8/26/2021  5:48 AM Braeden Kianna E Add [I31 3] Pericardial effusion     8/26/2021  5:48 AM Braedne Kianna E Add [N28 9,  N18 9] Acute on chronic renal insufficiency     8/26/2021  5:49 AM Braeden Kianna E Add [E87 6] Hypokalemia       ED Disposition     ED Disposition Condition Date/Time Comment    Admit Stable Thu Aug 26, 2021  5:48 AM Case was discussed with SELENA and the patient's admission status was agreed to be Admission Status: observation status to the service of Dr Leopoldo Licona   Follow-up Information    None         Patient's Medications   Discharge Prescriptions    No medications on file     No discharge procedures on file      PDMP Review       Value Time User    PDMP Reviewed  Yes 12/24/2020 8:11 AM Iliana Goldstein PA-C          ED Provider  Electronically Signed by           Liya Johnson DO  08/26/21 7280

## 2021-08-26 NOTE — ASSESSMENT & PLAN NOTE
· Patient states that he used to be a type 2 diabetic but after his gastric bypass surgery in December of 2020 he no longer is on diabetic medications  · Patient noted to be hypoglycemic pre-hospital with fingerstick glucose of 67 was given half amp of D50  · Prior hemoglobin A1c from 05/2021 5 5  · Hypoglycemia protocol  · Finger stick glucose checks q i d

## 2021-08-26 NOTE — ASSESSMENT & PLAN NOTE
· 08/26/21 CT C/A/P notes cardiomegaly and pericardial effusion which is chronic, possible atelectasis, and small volume abdominopelvic ascites   · Patient has history of pericardial effusion in the past  · Notes shortness of breath on examination  · Cardiology consult was appreciate further input

## 2021-08-26 NOTE — ASSESSMENT & PLAN NOTE
Blood pressures were elevated in the ER but have improved  Continue doxazosin 8 mg b i d   And amlodipine 10 mg daily  Monitor blood pressures

## 2021-08-26 NOTE — ASSESSMENT & PLAN NOTE
Lab Results   Component Value Date    EGFR 27 08/26/2021    EGFR 37 04/18/2021    EGFR 33 04/17/2021    CREATININE 2 82 (H) 08/26/2021    CREATININE 2 20 (H) 04/18/2021    CREATININE 2 45 (H) 04/17/2021     · Present on admission superimposed on stage 3 chronic kidney disease as evidence by creatinine of 2 82 on admission  · Follows with Nephrology as an outpatient  · Baseline was 1 3-1 7 in 2020- Suspect new baseline to be between 2 5-3  · Suspected secondary to dehydration as patient appears to be dry will give gentle IV fluid hydration at 50 mL/hour- monitor volume status closely  · Urinary retention protocol, bladder scan Q shift  · Avoid nephrotoxin/hypertension  · Monitor creatinine in the a m

## 2021-08-26 NOTE — ASSESSMENT & PLAN NOTE
· Never smoker  · Not in acute exacerbation  · Not on oxygen at home  · Continue albuterol, Symbicort, Flovent, Anoro ellipta  · Respiratory protocol

## 2021-08-26 NOTE — ASSESSMENT & PLAN NOTE
Wt Readings from Last 3 Encounters:   07/07/21 72 7 kg (160 lb 4 8 oz)   04/28/21 79 2 kg (174 lb 9 6 oz)   04/18/21 79 kg (174 lb 1 oz)       · Appears dry, not in acute exacerbation  · Hold eplerenone in the setting of TRUDI   · Monitor volume status closely   · Intake/output, daily weight   · Cardiac diet, sodium restricted

## 2021-08-27 VITALS
DIASTOLIC BLOOD PRESSURE: 93 MMHG | SYSTOLIC BLOOD PRESSURE: 189 MMHG | TEMPERATURE: 98.5 F | RESPIRATION RATE: 18 BRPM | OXYGEN SATURATION: 99 % | HEART RATE: 67 BPM

## 2021-08-27 PROBLEM — E87.6 HYPOKALEMIA: Status: ACTIVE | Noted: 2021-08-27

## 2021-08-27 LAB
ALBUMIN SERPL BCP-MCNC: 2.6 G/DL (ref 3.5–5)
ALP SERPL-CCNC: 54 U/L (ref 46–116)
ALT SERPL W P-5'-P-CCNC: 76 U/L (ref 12–78)
ANION GAP SERPL CALCULATED.3IONS-SCNC: 13 MMOL/L (ref 4–13)
AST SERPL W P-5'-P-CCNC: 40 U/L (ref 5–45)
BASOPHILS # BLD AUTO: 0.05 THOUSANDS/ΜL (ref 0–0.1)
BASOPHILS NFR BLD AUTO: 1 % (ref 0–1)
BILIRUB SERPL-MCNC: 0.83 MG/DL (ref 0.2–1)
BUN SERPL-MCNC: 18 MG/DL (ref 5–25)
CALCIUM ALBUM COR SERPL-MCNC: 8.1 MG/DL (ref 8.3–10.1)
CALCIUM SERPL-MCNC: 7 MG/DL (ref 8.3–10.1)
CHLORIDE SERPL-SCNC: 110 MMOL/L (ref 100–108)
CO2 SERPL-SCNC: 22 MMOL/L (ref 21–32)
CREAT SERPL-MCNC: 1.69 MG/DL (ref 0.6–1.3)
EOSINOPHIL # BLD AUTO: 0.16 THOUSAND/ΜL (ref 0–0.61)
EOSINOPHIL NFR BLD AUTO: 3 % (ref 0–6)
ERYTHROCYTE [DISTWIDTH] IN BLOOD BY AUTOMATED COUNT: 14.8 % (ref 11.6–15.1)
FOLATE SERPL-MCNC: 16.9 NG/ML (ref 3.1–17.5)
GFR SERPL CREATININE-BSD FRML MDRD: 51 ML/MIN/1.73SQ M
GLUCOSE P FAST SERPL-MCNC: 74 MG/DL (ref 65–99)
GLUCOSE SERPL-MCNC: 118 MG/DL (ref 65–140)
GLUCOSE SERPL-MCNC: 74 MG/DL (ref 65–140)
GLUCOSE SERPL-MCNC: 90 MG/DL (ref 65–140)
HCT VFR BLD AUTO: 30.2 % (ref 36.5–49.3)
HGB BLD-MCNC: 9.9 G/DL (ref 12–17)
LYMPHOCYTES # BLD AUTO: 1.76 THOUSANDS/ΜL (ref 0.6–4.47)
LYMPHOCYTES NFR BLD AUTO: 29 % (ref 14–44)
MAGNESIUM SERPL-MCNC: 1.8 MG/DL (ref 1.6–2.6)
MCH RBC QN AUTO: 28.7 PG (ref 26.8–34.3)
MCHC RBC AUTO-ENTMCNC: 32.8 G/DL (ref 31.4–37.4)
MCV RBC AUTO: 88 FL (ref 82–98)
MONOCYTES # BLD AUTO: 0.59 THOUSAND/ΜL (ref 0.17–1.22)
MONOCYTES NFR BLD AUTO: 10 % (ref 4–12)
NEUTROPHILS # BLD AUTO: 3.58 THOUSANDS/ΜL (ref 1.85–7.62)
NEUTS SEG NFR BLD AUTO: 57 % (ref 43–75)
PHOSPHATE SERPL-MCNC: 2.9 MG/DL (ref 2.7–4.5)
PLATELET # BLD AUTO: 205 THOUSANDS/UL (ref 149–390)
PMV BLD AUTO: 10.3 FL (ref 8.9–12.7)
POTASSIUM SERPL-SCNC: 2.9 MMOL/L (ref 3.5–5.3)
POTASSIUM SERPL-SCNC: 3.4 MMOL/L (ref 3.5–5.3)
PROCALCITONIN SERPL-MCNC: <0.05 NG/ML
PROT SERPL-MCNC: 5.1 G/DL (ref 6.4–8.2)
RBC # BLD AUTO: 3.45 MILLION/UL (ref 3.88–5.62)
SODIUM SERPL-SCNC: 145 MMOL/L (ref 136–145)
TSH SERPL DL<=0.05 MIU/L-ACNC: 2.2 UIU/ML (ref 0.36–3.74)
VIT B12 SERPL-MCNC: 726 PG/ML (ref 100–900)
WBC # BLD AUTO: 6.14 THOUSAND/UL (ref 4.31–10.16)

## 2021-08-27 PROCEDURE — 84100 ASSAY OF PHOSPHORUS: CPT | Performed by: PHYSICIAN ASSISTANT

## 2021-08-27 PROCEDURE — 85025 COMPLETE CBC W/AUTO DIFF WBC: CPT | Performed by: PHYSICIAN ASSISTANT

## 2021-08-27 PROCEDURE — 84145 PROCALCITONIN (PCT): CPT | Performed by: EMERGENCY MEDICINE

## 2021-08-27 PROCEDURE — 84132 ASSAY OF SERUM POTASSIUM: CPT | Performed by: PHYSICIAN ASSISTANT

## 2021-08-27 PROCEDURE — 82607 VITAMIN B-12: CPT | Performed by: PHYSICIAN ASSISTANT

## 2021-08-27 PROCEDURE — 36415 COLL VENOUS BLD VENIPUNCTURE: CPT | Performed by: PHYSICIAN ASSISTANT

## 2021-08-27 PROCEDURE — 82746 ASSAY OF FOLIC ACID SERUM: CPT | Performed by: PHYSICIAN ASSISTANT

## 2021-08-27 PROCEDURE — 99217 PR OBSERVATION CARE DISCHARGE MANAGEMENT: CPT | Performed by: PHYSICIAN ASSISTANT

## 2021-08-27 PROCEDURE — 80053 COMPREHEN METABOLIC PANEL: CPT | Performed by: PHYSICIAN ASSISTANT

## 2021-08-27 PROCEDURE — 83735 ASSAY OF MAGNESIUM: CPT | Performed by: PHYSICIAN ASSISTANT

## 2021-08-27 PROCEDURE — 84443 ASSAY THYROID STIM HORMONE: CPT | Performed by: PHYSICIAN ASSISTANT

## 2021-08-27 PROCEDURE — 82948 REAGENT STRIP/BLOOD GLUCOSE: CPT

## 2021-08-27 RX ORDER — POTASSIUM CHLORIDE 20 MEQ/1
40 TABLET, EXTENDED RELEASE ORAL ONCE
Status: COMPLETED | OUTPATIENT
Start: 2021-08-27 | End: 2021-08-27

## 2021-08-27 RX ORDER — POTASSIUM CHLORIDE 750 MG/1
10 TABLET, EXTENDED RELEASE ORAL ONCE
Status: COMPLETED | OUTPATIENT
Start: 2021-08-27 | End: 2021-08-27

## 2021-08-27 RX ORDER — NEBIVOLOL 20 MG/1
20 TABLET ORAL DAILY
Qty: 30 TABLET | Refills: 0 | Status: SHIPPED | OUTPATIENT
Start: 2021-08-27 | End: 2021-09-26

## 2021-08-27 RX ADMIN — CALCIUM 1 TABLET: 500 TABLET ORAL at 08:35

## 2021-08-27 RX ADMIN — SODIUM CHLORIDE, SODIUM GLUCONATE, SODIUM ACETATE, POTASSIUM CHLORIDE, MAGNESIUM CHLORIDE, SODIUM PHOSPHATE, DIBASIC, AND POTASSIUM PHOSPHATE 50 ML/HR: .53; .5; .37; .037; .03; .012; .00082 INJECTION, SOLUTION INTRAVENOUS at 08:43

## 2021-08-27 RX ADMIN — AMLODIPINE BESYLATE 10 MG: 5 TABLET ORAL at 08:33

## 2021-08-27 RX ADMIN — FLUTICASONE PROPIONATE 2 PUFF: 110 AEROSOL, METERED RESPIRATORY (INHALATION) at 08:41

## 2021-08-27 RX ADMIN — DOXAZOSIN 8 MG: 4 TABLET ORAL at 08:34

## 2021-08-27 RX ADMIN — ESCITALOPRAM OXALATE 20 MG: 20 TABLET ORAL at 08:34

## 2021-08-27 RX ADMIN — POTASSIUM CHLORIDE 40 MEQ: 1500 TABLET, EXTENDED RELEASE ORAL at 08:33

## 2021-08-27 RX ADMIN — FERROUS SULFATE TAB 325 MG (65 MG ELEMENTAL FE) 325 MG: 325 (65 FE) TAB at 08:32

## 2021-08-27 RX ADMIN — ALBUTEROL SULFATE 2 PUFF: 90 AEROSOL, METERED RESPIRATORY (INHALATION) at 06:18

## 2021-08-27 RX ADMIN — ALBUTEROL SULFATE 2 PUFF: 90 AEROSOL, METERED RESPIRATORY (INHALATION) at 10:02

## 2021-08-27 RX ADMIN — SALMETEROL XINAFOATE 1 PUFF: 50 POWDER, METERED ORAL; RESPIRATORY (INHALATION) at 08:40

## 2021-08-27 RX ADMIN — POTASSIUM CHLORIDE 10 MEQ: 750 TABLET, EXTENDED RELEASE ORAL at 14:01

## 2021-08-27 RX ADMIN — TIOTROPIUM BROMIDE 18 MCG: 18 CAPSULE ORAL; RESPIRATORY (INHALATION) at 08:39

## 2021-08-27 RX ADMIN — HEPARIN SODIUM 5000 UNITS: 5000 INJECTION INTRAVENOUS; SUBCUTANEOUS at 08:35

## 2021-08-27 NOTE — UTILIZATION REVIEW
Initial Clinical Review    Admission: Date/Time/Statement:   Admission Orders (From admission, onward)     Ordered        08/26/21 0549  Place in Observation  Once                   Orders Placed This Encounter   Procedures    Place in Observation     Standing Status:   Standing     Number of Occurrences:   1     Order Specific Question:   Level of Care     Answer:   Med Surg [16]     Order Specific Question:   Bed request comments     Answer:   tele     ED Arrival Information     Expected Arrival Acuity    - 8/26/2021 01:11 Emergent         Means of arrival Escorted by Service Admission type    Ambulance [address] Emergency         Arrival complaint    ams        Chief Complaint   Patient presents with    Altered Mental Status     Per EMS pt found altered while driving daughter to [place]  Pt was found with BG 69, given 250mL d10, BG responded to 160, and was given 1/2 amp D50 x2  Pt also received   5mg Atropine x3 for persistent bradycardia around 40 BPM         Initial Presentation:  60 y o  male with a PMH of gastric bypass surgery 12/2020, COPD,asthma, sleep apnea, CKD, HTN, hx of bilateral DVT, hx of DM no longer on diabetes, CHF who presents with sudden onset of dizziness/lightheadedness while driving with his daughter last night at 11:30 p m  To  his daughter  Patient states that he started feeling unwell so he pulled over to the side of the road and after that he does not remember what happened  He states that his daughter had to flag someone down from the side of the road to call 911  Patient notes that this has happened before probably a few weeks ago but he did not have to go to the hospital for it  Patient was noted to be hypoglycemic and bradycardic on scene and was given atropine and D50  The patient notes that he has not been drinking and eating as much as he should be after his gastric bypass surgery  Patient is on a beta-blocker Bystolic 20 mg b i d    Patient states he is compliant with his medications  Patient currently denies any chest pain however he does note some shortness of breath and dyspnea on exertion  Denies any fever, chills, abdominal pain, nausea, vomiting  No changes in urinary or bowel habits  Patient states he is feeling a lot better now compared to when he was driving last night  Admit OBSERVATION STATUS  Cardiology Consult, monitor on telemetry, FS qid, bladder scan qs, monitor creatinine  8/26 CARDIOLOGY CONSULT:  Patient was noted to have bradycardia and mild hypoglycemia (fingerstick glucose 67)    On examination patient states his dizziness and lightheadedness have resolved- he does note some mild shortness of breath  Assessment:  1  Bradycardia  Resolved  2  Hypertrophic CM  3  Pericardial effusion  Trivial on recent echocardiogram   4  Transient global amnesia  Plan:  1  Resume Bystolic at lower dose    2  Neuro eval     ED Triage Vitals   Temperature Pulse Respirations Blood Pressure SpO2   08/26/21 0119 08/26/21 0113 08/26/21 0113 08/26/21 0113 08/26/21 0113   (!) 93 3 °F (34 1 °C) 64 18 (!) 181/106 100 %      Temp Source Heart Rate Source Patient Position - Orthostatic VS BP Location FiO2 (%)   08/26/21 0119 08/26/21 0113 08/26/21 0245 08/26/21 0245 --   Rectal Monitor Lying Right arm       Pain Score       08/26/21 2000       No Pain          Wt Readings from Last 1 Encounters:   07/07/21 72 7 kg (160 lb 4 8 oz)     Additional Vital Signs:   Date/Time  Temp  Pulse  Resp  BP  MAP (mmHg)  SpO2  O2 Device   08/27/21 0839  98 5 °F (36 9 °C)  --  --  --  --  --  --   08/27/21 0831  --  67  18  189/93Abnormal   --  99 %  None (Room air)   08/27/21 0600  --  67  14  164/89  120  98 %  None (Room air)   08/27/21 0330  --  65  14  161/93  122  98 %  None (Room air)   08/26/21 2330  --  70  21  161/100  125  99 %  None (Room air)   08/26/21 2000  --  79  16  145/75  102  99 %  None (Room air)   08/26/21 1934  --  --  --  157/85  --  --  -- 08/26/21 1930  --  76  20  170/96  --  99 %  None (Room air)   08/26/21 1519  --  75  16  --  --  99 %  None (Room air)   08/26/21 1400  --  86  16  118/64  87  98 %  None (Room air)   08/26/21 1200  --  52Abnormal   14  192/99Abnormal   139  100 %  None (Room air)   08/26/21 1045  --  66  16  144/104Abnormal   121  99 %  --   08/26/21 1030  --  72  22  170/108Abnormal   133  100 %  None (Room air)   08/26/21 0954  98 5 °F (36 9 °C)  --  --  --  --  --  --   08/26/21 0730  98 °F (36 7 °C)  58  19  --  --  99 %  --   08/26/21 0700  --  53Abnormal   18  164/95  124  99 %  None (Room air)   08/26/21 0615  97 7 °F (36 5 °C)  --  --  --  --  --  --   08/26/21 0600  --  69  23Abnormal   155/100  122  100 %  None (Room air)   08/26/21 0530  --  51Abnormal   14  155/96  120  99 %  None (Room air)   08/26/21 0315  95 5 °F (35 3 °C)Abnormal   --  --  --  --  --  --   08/26/21 0300  --  53Abnormal   15  193/103Abnormal   141  100 %  None (Room air)   08/26/21 0245  --  58  15  182/112Abnormal   142  98 %  None (Room air)     Pertinent Labs/Diagnostic Test Results:   8/26 CXR:  NAD  8/26 CT HEAD:  No acute intracranial abnormality  8/26 CT CAP:  Suboptimal evaluation secondary to lack of contrast   Cardiomegaly and pericardial effusion  Residual minimal consolidative opacity at the right lung base similar in appearance to prior studies favor atelectasis although could reflect? Acute on chronic process in the appropriate clinical setting  No other focal consolidation  Small volume abdominopelvic ascites    8/26 EKG:  Sinus rhythm with occasional and consecutive Premature ventricular complexes   possible left atrial enlargement  Voltage criteria for left ventricular hypertrophy   poor R-wave progression cannot rule out anterior infarct age undetermined  Nonspecific T wave abnormality  Prolonged QT  Abnormal ECG    Results from last 7 days   Lab Units 08/26/21  0154   SARS-COV-2  Negative     Results from last 7 days   Lab Units 08/27/21  0448 08/26/21  0137   WBC Thousand/uL 6 14 5 19   HEMOGLOBIN g/dL 9 9* 11 2*   HEMATOCRIT % 30 2* 34 7*   PLATELETS Thousands/uL 205 234   NEUTROS ABS Thousands/µL 3 58 3 30     Results from last 7 days   Lab Units 08/27/21  0448 08/26/21  0137   SODIUM mmol/L 145 141   POTASSIUM mmol/L 2 9* 2 8*   CHLORIDE mmol/L 110* 106   CO2 mmol/L 22 28   ANION GAP mmol/L 13 7   BUN mg/dL 18 29*   CREATININE mg/dL 1 69* 2 82*   EGFR ml/min/1 73sq m 51 27   CALCIUM mg/dL 7 0* 8 7   MAGNESIUM mg/dL 1 8 1 9   PHOSPHORUS mg/dL 2 9  --      Results from last 7 days   Lab Units 08/27/21  0448 08/26/21  0137   AST U/L 40 43   ALT U/L 76 90*   ALK PHOS U/L 54 77   TOTAL PROTEIN g/dL 5 1* 6 9   ALBUMIN g/dL 2 6* 3 7   TOTAL BILIRUBIN mg/dL 0 83 0 89   BILIRUBIN DIRECT mg/dL  --  0 25*     Results from last 7 days   Lab Units 08/27/21  0740 08/26/21  2117 08/26/21  1545 08/26/21  0723 08/26/21  0557 08/26/21  0246 08/26/21  0137 08/26/21  0113   POC GLUCOSE mg/dl 90 116 107 133 139 128 147* 188*     Results from last 7 days   Lab Units 08/27/21  0448 08/26/21  0137   GLUCOSE RANDOM mg/dL 74 153*     Results from last 7 days   Lab Units 08/26/21  0137   HEMOGLOBIN A1C % 5 4   EAG mg/dl 108     BETA-HYDROXYBUTYRATE   Date Value Ref Range Status   07/09/2020 1 2 (H) <0 6 mmol/L Final      Results from last 7 days   Lab Units 08/26/21  1552 08/26/21  0137   TROPONIN I ng/mL <0 02 <0 02     Results from last 7 days   Lab Units 08/26/21  0137   PROTIME seconds 15 2*   INR  1 17   PTT seconds 33     Results from last 7 days   Lab Units 08/27/21  0448 08/26/21  0137   TSH 3RD GENERATON uIU/mL 2 202 3 551     Results from last 7 days   Lab Units 08/26/21  0137   PROCALCITONIN ng/ml <0 05     Results from last 7 days   Lab Units 08/26/21  0137   LACTIC ACID mmol/L 0 6     Results from last 7 days   Lab Units 08/26/21  0137   NT-PRO BNP pg/mL 685*     Results from last 7 days   Lab Units 08/26/21  0318   CLARITY UA  Clear   COLOR UA Yellow   SPEC GRAV UA  1 010   PH UA  6 0   GLUCOSE UA mg/dl 100 (1/10%)*   KETONES UA mg/dl Negative   BLOOD UA  Negative   PROTEIN UA mg/dl Negative   NITRITE UA  Negative   BILIRUBIN UA  Negative   UROBILINOGEN UA E U /dl 0 2   LEUKOCYTES UA  Negative     Results from last 7 days   Lab Units 08/26/21  0318   AMPH/METH  Negative   BARBITURATE UR  Negative   BENZODIAZEPINE UR  Negative   COCAINE UR  Negative   METHADONE URINE  Negative   OPIATE UR  Negative   PCP UR  Negative   THC UR  Positive*     Results from last 7 days   Lab Units 08/26/21  0137   BLOOD CULTURE  Received in Microbiology Lab  Culture in Progress  Received in Microbiology Lab  Culture in Progress       ED Treatment:   Medication Administration from 08/26/2021 0110 to 08/27/2021 0853       Date/Time Order Dose Route Action     08/26/2021 0143 dextrose 5 % and sodium chloride 0 9 % infusion 100 mL/hr Intravenous New Bag     08/26/2021 0146 magnesium sulfate 2 g/50 mL IVPB (premix) 2 g 2 g Intravenous New Bag     08/26/2021 0137 atropine (FOR EMS ONLY) 1 mg/10 mL injection 2 mg 0 mg Does not apply Given to EMS     08/26/2021 0137 dextrose (FOR EMS ONLY) 50 % IV solution 100 mL 0 mL Does not apply Given to EMS     08/26/2021 0625 potassium chloride (K-DUR,KLOR-CON) CR tablet 40 mEq 40 mEq Oral Given     08/26/2021 0323 potassium chloride 20 mEq IVPB (premix) 20 mEq Intravenous New Bag     08/27/2021 0618 albuterol (PROVENTIL HFA,VENTOLIN HFA) inhaler 2 puff 2 puff Inhalation Given     08/26/2021 2357 albuterol (PROVENTIL HFA,VENTOLIN HFA) inhaler 2 puff 2 puff Inhalation Given     08/26/2021 1924 albuterol (PROVENTIL HFA,VENTOLIN HFA) inhaler 2 puff 2 puff Inhalation Given     08/26/2021 1216 albuterol (PROVENTIL HFA,VENTOLIN HFA) inhaler 2 puff 2 puff Inhalation Given     08/27/2021 0833 amLODIPine (NORVASC) tablet 10 mg 10 mg Oral Given     08/26/2021 1213 amLODIPine (NORVASC) tablet 10 mg 10 mg Oral Given     08/26/2021 2226 atorvastatin (LIPITOR) tablet 40 mg 40 mg Oral Given     08/27/2021 0835 calcium carbonate (OYSTER SHELL,OSCAL) 500 mg tablet 1 tablet 1 tablet Oral Given     08/27/2021 0834 doxazosin (CARDURA) tablet 8 mg 8 mg Oral Given     08/26/2021 1934 doxazosin (CARDURA) tablet 8 mg 8 mg Oral Given     08/26/2021 1214 doxazosin (CARDURA) tablet 8 mg 8 mg Oral Given     08/27/2021 0834 escitalopram (LEXAPRO) tablet 20 mg 20 mg Oral Given     08/26/2021 1215 escitalopram (LEXAPRO) tablet 20 mg 20 mg Oral Given     08/27/2021 0832 ferrous sulfate tablet 325 mg 325 mg Oral Given     08/26/2021 1923 ferrous sulfate tablet 325 mg 325 mg Oral Given     08/26/2021 1214 ferrous sulfate tablet 325 mg 325 mg Oral Given     08/26/2021 1216 budesonide-formoterol (SYMBICORT) 80-4 5 MCG/ACT inhaler 2 puff 2 puff Inhalation Given     08/27/2021 0841 fluticasone (FLOVENT HFA) 110 MCG/ACT inhaler 2 puff 2 puff Inhalation Given     08/26/2021 2027 fluticasone (FLOVENT HFA) 110 MCG/ACT inhaler 2 puff 2 puff Inhalation Given     08/26/2021 1216 fluticasone (FLOVENT HFA) 110 MCG/ACT inhaler 2 puff 2 puff Inhalation Given     08/27/2021 0843 multi-electrolyte (PLASMALYTE-A/ISOLYTE-S PH 7 4) IV solution 50 mL/hr Intravenous New Bag     08/26/2021 1105 multi-electrolyte (PLASMALYTE-A/ISOLYTE-S PH 7 4) IV solution 50 mL/hr Intravenous New Bag     08/27/2021 0835 heparin (porcine) subcutaneous injection 5,000 Units 5,000 Units Subcutaneous Given     08/26/2021 2033 heparin (porcine) subcutaneous injection 5,000 Units 5,000 Units Subcutaneous Given     08/26/2021 1215 heparin (porcine) subcutaneous injection 5,000 Units 5,000 Units Subcutaneous Given     08/27/2021 0839 tiotropium (SPIRIVA) capsule for inhaler 18 mcg 18 mcg Inhalation Given     08/26/2021 1217 tiotropium (SPIRIVA) capsule for inhaler 18 mcg 18 mcg Inhalation Given     08/27/2021 0840 salmeterol (Serevent Diskus) 50 mcg/dose inhaler 1 puff 1 puff Inhalation Given     08/26/2021 2028 salmeterol (Serevent Diskus) 50 mcg/dose inhaler 1 puff 1 puff Inhalation Given     08/26/2021 1217 salmeterol (Serevent Diskus) 50 mcg/dose inhaler 1 puff 1 puff Inhalation Given     08/27/2021 0833 potassium chloride (K-DUR,KLOR-CON) CR tablet 40 mEq 40 mEq Oral Given        Past Medical History:   Diagnosis Date    Asthma     Chronic kidney disease     COPD (chronic obstructive pulmonary disease) ([facility])     CPAP (continuous positive airway pressure) dependence     Diabetes mellitus ([facility]     [facility] lung [facility]     Gout     History of transfusion     pt stated they had a transfusion when they had gallbladder surgery   Hypertension     Kidney disease     renal failure    Leg DVT (deep venous thromboembolism), acute, bilateral ([facility] ) 01/2018    Obesity (BMI 30-39  9)     AGUS on CPAP     setting 11    Postgastrectomy malabsorption     Sleep apnea     Systolic CHF ([facility] )      Present on Admission:   Essential hypertension   Pericardial effusion   COPD (chronic obstructive pulmonary disease) ([facility])   CHF (congestive heart failure) ([facility])   AGUS (obstructive sleep apnea)   Acute renal failure superimposed on stage 3 chronic kidney disease ([facility])      Admitting Diagnosis: AMS (altered mental status) [R41 82]  Age/Sex: 60 y o  male  Admission Orders:  Scheduled Medications:  albuterol, 2 puff, Inhalation, Q6H  amLODIPine, 10 mg, Oral, Daily  atorvastatin, 40 mg, Oral, HS  calcium carbonate, 1 tablet, Oral, Daily With Breakfast  doxazosin, 8 mg, Oral, BID  escitalopram, 20 mg, Oral, Daily  ferrous sulfate, 325 mg, Oral, BID  fluticasone, 2 puff, Inhalation, Q12H JOANN  heparin (porcine), 5,000 Units, Subcutaneous, Q12H JOANN  tiotropium, 18 mcg, Inhalation, Daily   And  salmeterol, 1 puff, Inhalation, Q12H [facility]      Continuous IV Infusions:  multi-electrolyte, 50 mL/hr, Intravenous, Continuous      PRN Meds:  acetaminophen, 650 mg, Oral, Q6H PRN  LORazepam, 0 5 mg, Oral, Q6H PRN        IP CONSULT TO CARDIOLOGY    Network Utilization Review Department  ATTENTION: Please call with any questions or concerns to [phone] and carefully listen to the prompts so that you are directed to the right person  All voicemails are confidential   Tu Pereira all requests for admission clinical reviews, approved or denied determinations and any other requests to dedicated fax number below belonging to the campus where the patient is receiving treatment   List of dedicated fax numbers for the Facilities:  [address] DENIALS (Administrative/Medical Necessity) [phone]   [address] (Maternity/NICU/Pediatrics) [phone]   [address] [place] [address] [facility] [address] [id number] [place] [address] Diane Saucedo 1481 [address] [place] [address] [phone]

## 2021-08-27 NOTE — CONSULTS
Consultation - Cardiology   Marjorie Fulton 60 y o  male MRN: [de-identified]  Unit/Bed#: ED 32 Encounter: [id number]    Assessment/Plan     Assessment:  1  Bradycardia  Resolved  2  Hypertrophic CM  3  Pericardial effusion  Trivial on recent echocardiogram   4  Transient global amnesia  Plan:  1  Resume Bystolic at lower dose  2  Neuro eval     History of Present Illness   Physician Requesting Consult: Mlaa Mensah MD  Reason for Consult / Principal Problem: TGA  HPI: Marjorie Fulton is a 60y o  year old male who presents with transient amnesia  Patient was driving with his daughter to  his other daughter when he suddenly felt lightheaded/dizzy, disoriented, and pulled over to the side of the road  He says he didn't know who he was or where he was  Patient was noted to have bradycardia and mild hypoglycemia (fingerstick glucose 67)  On examination patient states his dizziness and lightheadedness have resolved  He does note some mild shortness of breath  Inpatient consult to Cardiology  Consult performed by: Kristine Alpers, MD  Consult ordered by: Rosemary Navarro PA-C          Review of Systems   Respiratory: Negative for shortness of breath, wheezing and stridor  Cardiovascular: Negative for chest pain and leg swelling  Historical Information   Past Medical History:   Diagnosis Date    Asthma     Chronic kidney disease     COPD (chronic obstructive pulmonary disease) (HCC)     CPAP (continuous positive airway pressure) dependence     Diabetes mellitus (HCC)     Emphysema of lung ([facility] [address] )     Gout     History of transfusion     pt stated they had a transfusion when they had gallbladder surgery   Hypertension     Kidney disease     renal failure    Leg DVT (deep venous thromboembolism), acute, bilateral ([address] ) 01/2018    Obesity (BMI 30-39  9)     AGUS on CPAP     setting 11    Postgastrectomy malabsorption     Sleep apnea     Systolic CHF [facility])      Past Surgical History: Procedure Laterality Date    ADRENALECTOMY      CHOLECYSTECTOMY      COLONOSCOPY      EGD      HIATAL HERNIA REPAIR N/A 12/22/2020    Procedure: REPAIR HERNIA HIATAL LAPAROSCOPIC;  Surgeon: Tyrone Sanders MD;  [facility];  Service: Bariatrics    INCISION AND DRAINAGE OF WOUND Left 10/17/2018    Procedure: INCISION AND DRAINAGE (I&D) GROIN;  Surgeon: Ana Estes MD;  [facility];  Service: General    IR IMAGE [address] / DRAINAGE W TUBE  8/17/2018    IR IMAGE GUIDED ASPIRATION / DRAINAGE W TUBE  7/31/2018    JOINT REPLACEMENT Bilateral     knee    KIDNEY SURGERY  2009    nodule removal    LYMPH NODE DISSECTION Left 01/2018    left inguinal LN removed - benign     OTHER SURGICAL HISTORY      kidney nodule removal    PALATE / UVULA BIOPSY / EXCISION      KS LAP GASTRIC BYPASS/JASON-EN-Y N/A 12/22/2020    Procedure: BYPASS GASTRIC  JASON-EN-Y LAPAROSCOPIC WITH INTRAOPERATIVE EGD;  Surgeon: Tyrone Sanders MD;  [facility];  Service: [address] [place] [address]      TONSILLECTOMY       Social History     Substance and Sexual Activity   Alcohol Use Yes    Comment: occasionally     Social History     Substance and Sexual Activity   Drug Use No     E-Cigarette/Vaping    E-Cigarette Use Never User      E-Cigarette/Vaping Substances    Nicotine No     THC No     CBD No     Flavoring No     Other No     Unknown No      Social History     Tobacco Use   Smoking Status Never Smoker   Smokeless Tobacco Never Used     Family History:   Family History   Problem Relation Age of Onset    Heart murmur Sister     Asthma Sister     Hypertension Sister     Kidney disease Mother     Cancer Father     Bell's palsy Brother     Kidney disease Brother     Deep vein thrombosis Neg Hx        Meds/Allergies   current meds:   Current Facility-Administered Medications   Medication Dose Route Frequency    acetaminophen (TYLENOL) tablet 650 mg  650 mg Oral Q6H PRN    albuterol (PROVENTIL HFA,VENTOLIN HFA) inhaler 2 puff  2 puff Inhalation Q6H    amLODIPine (NORVASC) tablet 10 mg  10 mg Oral Daily    atorvastatin (LIPITOR) tablet 40 mg  40 mg Oral HS    calcium carbonate (OYSTER SHELL,OSCAL) 500 mg tablet 1 tablet  1 tablet Oral Daily With Breakfast    doxazosin (CARDURA) tablet 8 mg  8 mg Oral BID    escitalopram (LEXAPRO) tablet 20 mg  20 mg Oral Daily    ferrous sulfate tablet 325 mg  325 mg Oral BID    fluticasone (FLOVENT HFA) 110 MCG/ACT inhaler 2 puff  2 puff Inhalation Q12H [address]    heparin (porcine) subcutaneous injection 5,000 Units  5,000 Units Subcutaneous Q12H JOANN    LORazepam (ATIVAN) tablet 0 5 mg  0 5 mg Oral Q6H PRN    multi-electrolyte (PLASMALYTE-A/ISOLYTE-S PH 7 4) IV solution  50 mL/hr Intravenous Continuous    potassium chloride (K-DUR,KLOR-CON) CR tablet 40 mEq  40 mEq Oral Once    tiotropium (SPIRIVA) capsule for inhaler 18 mcg  18 mcg Inhalation Daily    And    salmeterol (Serevent Diskus) 50 mcg/dose inhaler 1 puff  1 puff Inhalation Q12H [address]    and PTA meds:   Prior to Admission Medications   Prescriptions Last Dose Informant Patient Reported? Taking?    Acetaminophen 325 MG CAPS Not Taking at Unknown time  Yes No   Sig: Take 2 tablets by mouth   Patient not taking: Reported on 8/26/2021   BD Pen Needle Amelia U/F 32G X 4 MM MISC Not Taking at Unknown time  Yes No   Patient not taking: Reported on 7/7/2021   Calcium 600 MG tablet Unknown at Unknown time  Yes No   Sig: Take 600 mg by mouth   Cholecalciferol (VITAMIN D-3) 1000 units CAPS Unknown at Unknown time Self Yes No   Sig: Take 1,000 Units by mouth daily   Continuous Blood Gluc Sensor (FreeStyle Michelle 14 Day Sensor) MISC Not Taking at Unknown time  Yes No   Sig: Use as directed   Patient not taking: Reported on 7/7/2021   Glycopyrrolate-Formoterol (BEVESPI AEROSPHERE) 9-4 8 MCG/ACT AERO Unknown at Unknown time Self Yes No   Sig: Inhale 2 puffs daily after breakfast   LORazepam (ATIVAN) 0 5 mg tablet Unknown at Unknown time Self Yes No   Sig: TAKE ONE TABLET BY MOUTH Q6 PRN   MAGNESIUM PO Not Taking at Unknown time Self Yes No   Sig: Take 400 mg by mouth daily   Patient not taking: Reported on 2021   Milk Thistle 500 MG CAPS Not Taking at Unknown time Self Yes No   Sig: Take 500 mg by mouth 2 (two) times a day   Patient not taking: Reported on 2021   Multiple Vitamins-Minerals (Bariatric Fusion) CHEW Not Taking at Unknown time  Yes No   Sig: Chew   Patient not taking: Reported on 2021   Omega-3 Fatty Acids (FISH OIL) 1200 MG CAPS Unknown at Unknown time Self Yes No   Sig: Take 1,200 mg by mouth daily at bedtime   Pulmicort Flexhaler 90 MCG/ACT inhaler Unknown at Unknown time  Yes No   Si puffs 2 (two) times a day   albuterol (PROVENTIL HFA,VENTOLIN HFA) 90 mcg/act inhaler Unknown at Unknown time Self Yes No   Sig: INHALE 2 PUFFS BY MOUTH EVERY 6 HOURS   amLODIPine (NORVASC) 10 mg tablet 2021 at Unknown time Self Yes Yes   Sig: Take 10 mg by mouth   aspirin (ECOTRIN) 325 mg EC tablet Not Taking at Unknown time  Yes No   Sig: Take 325 mg by mouth   Patient not taking: Reported on 2021   atorvastatin (LIPITOR) 40 mg tablet Unknown at Unknown time Self Yes No   Sig: Take 40 mg by mouth daily at bedtime   Patient not taking: Reported on 2021   azithromycin (ZITHROMAX) 250 mg tablet Not Taking at Unknown time  Yes No   Sig: TAKE 2 TABLETS BY MOUTH ON DAY 1 AND THEN TAKE 1 TABLET BY MOUTH ONCE A DAY ON DAY 2 THROUGH DAY 5   Patient not taking: Reported on 2021   budesonide-formoterol (SYMBICORT) 160-4 5 mcg/act inhaler Not Taking at Unknown time  Yes No   Sig: Inhale 2 puffs   Patient not taking: Reported on 2021   busPIRone (BUSPAR) 15 mg tablet Unknown at Unknown time Self Yes No   Sig: TAKE ONE TABLET BY MOUTH TWICE A DAY FOR PTSD   doxazosin (CARDURA) 8 MG tablet Unknown at Unknown time  Yes No   Sig: Take 8 mg by mouth 2 (two) times a day   eplerenone (INSPRA) 50 MG tablet Unknown at Unknown time Self Yes No   Sig: Take 50 mg by mouth daily   escitalopram (LEXAPRO) 20 mg tablet Unknown at Unknown time  Yes No   Sig: Take 20 mg by mouth daily   ferrous sulfate 325 (65 Fe) mg tablet Unknown at Unknown time Self Yes No   Sig: Take 325 mg by mouth 2 (two) times a day     fluticasone (FLOVENT HFA) 220 mcg/act inhaler Not Taking at Unknown time  Yes No   Sig: Inhale 1 puff   Patient not taking: Reported on 2021   glucose monitoring kit (FREESTYLE) monitoring kit Not Taking at Unknown time Self No No   Si each by Does not apply route 2 (two) times a day Any brand covered by insurance:  Dispense one glucometer, test strips #50, lancets #100   Patient not taking: Reported on 2021   magnesium oxide (MAG-OX) 400 mg tablet Not Taking at Unknown time  Yes No   Sig: Take 400 mg by mouth   Patient not taking: Reported on 2021   multivitamin-iron-minerals-folic acid (CENTRUM) chewable tablet Not Taking at Unknown time Self Yes No   Sig: Chew 1 tablet daily   Patient not taking: Reported on 2021   nebivolol (BYSTOLIC) 20 MG tablet Unknown at Unknown time Self Yes No   Sig: Take 20 mg by mouth 2 (two) times a day   neomycin-polymyxin-hydrocortisone (CORTISPORIN) otic solution Not Taking at Unknown time Self Yes No   Sig: Administer 4 drops into ears   Patient not taking: Reported on 2021   omeprazole (PriLOSEC) 20 mg delayed release capsule Not Taking at Unknown time  No No   Sig: Take 1 capsule (20 mg total) by mouth daily   Patient not taking: Reported on 2021   predniSONE 20 mg tablet Not Taking at Unknown time  Yes No   Sig: TAKE 2 TABLETS BY MOUTH ONCE DAILY FOR 5 DAYS AS NEEDED FOR RESCUE KIT   Patient not taking: Reported on 2021   umeclidinium-vilanterol (Anoro Ellipta) 62 5-25 MCG/INH inhaler Unknown at Unknown time Self Yes No   Sig: Inhale 1 puff daily      Facility-Administered Medications: None     Allergies   Allergen Reactions    Clonidine Anaphylaxis    Lisinopril Anaphylaxis    Nifedipine Anaphylaxis    Spironolactone Anaphylaxis, Other (See Comments) and Shortness Of Breath    Buspirone      Headaches,     Enoxaparin      Injection site "bump" per Dr Cayden Orona Minoxidil      Retains fluid around heart       Objective   Vitals: Blood pressure 164/89, pulse 67, temperature 98 5 °F (36 9 °C), temperature source Oral, resp  rate 14, SpO2 98 %  Orthostatic Blood Pressures      Most Recent Value   Blood Pressure  164/89 filed at 08/27/2021 0600   Patient Position - Orthostatic VS  Lying filed at 08/27/2021 0600            Intake/Output Summary (Last 24 hours) at 8/27/2021 0727  Last data filed at 8/26/2021 1105  Gross per 24 hour   Intake 1000 ml   Output --   Net 1000 ml       Invasive Devices     Peripheral Intravenous Line            Peripheral IV 08/26/21 Left Antecubital 1 day    Peripheral IV 08/26/21 Left Forearm 1 day                Physical Exam  Constitutional:       Appearance: Normal appearance  Cardiovascular:      Rate and Rhythm: Normal rate and regular rhythm  Heart sounds: No murmur heard  Pulmonary:      Effort: Pulmonary effort is normal       Breath sounds: Normal breath sounds  Neurological:      Mental Status: He is alert           Lab Results:   CBC with diff:   Results from last 7 days   Lab Units 08/27/21  0448   WBC Thousand/uL 6 14   RBC Million/uL 3 45*   HEMOGLOBIN g/dL 9 9*   HEMATOCRIT % 30 2*   MCV fL 88   MCH pg 28 7   MCHC g/dL 32 8   RDW % 14 8   MPV fL 10 3   PLATELETS Thousands/uL 205     CMP:   Results from last 7 days   Lab Units 08/27/21  0448   SODIUM mmol/L 145   POTASSIUM mmol/L 2 9*   CHLORIDE mmol/L 110*   CO2 mmol/L 22   BUN mg/dL 18   CREATININE mg/dL 1 69*   CALCIUM mg/dL 7 0*   AST U/L 40   ALT U/L 76   ALK PHOS U/L 54   EGFR ml/min/1 73sq m 51     Troponin:   0   Lab Value Date/Time    TROPONINI <0 02 08/26/2021 1552    TROPONINI <0 02 08/26/2021 0137    TROPONINI 0 02 12/23/2020 1638 TROPONINI 0 02 12/23/2020 1320    TROPONINI <0 02 07/09/2020 1001    TROPONINI <0 02 07/03/2020 0952    TROPONINI 0 02 01/28/2020 1543    TROPONINI <0 02 10/10/2019 1102    TROPONINI 0 03 07/19/2018 1722    TROPONINI 0 02 05/21/2018 1743    TROPONINI 0 02 05/21/2018 1359    TROPONINI <0 02 05/21/2018 0932    TROPONINI <0 02 03/15/2018 1126    TROPONINI 0 05 (H) 02/05/2018 2228    TROPONINI 0 04 02/05/2018 2014    TROPONINI 0 04 02/05/2018 1644    TROPONINI 0 05 (H) 02/05/2018 1405    TROPONINI <0 02 01/23/2018 0857    TROPONINI <0 02 01/22/2018 1050     BNP:   Results from last 7 days   Lab Units 08/27/21  0448   POTASSIUM mmol/L 2 9*   CHLORIDE mmol/L 110*   CO2 mmol/L 22   BUN mg/dL 18   CREATININE mg/dL 1 69*   CALCIUM mg/dL 7 0*   EGFR ml/min/1 73sq m 51     Imaging: I have personally reviewed pertinent reports  EKG: NSR  VTE Prophylaxis:     Code Status: Level 1 - Full Code  Advance Directive and Living Will:      Power of :    POLST:      Counseling / Coordination of Care  Total floor / unit time spent today 60 minutes  Greater than 50% of total time was spent with the patient and / or family counseling and / or coordination of care    A description of the counseling / coordination of care: 61         ?  Licha Gomez MD

## 2021-08-27 NOTE — PROGRESS NOTES
Progress Note   Name:Genna Liu   Room: ED 27/ED 27     Echocardiogram Summary:  1  Normal sized left ventricle with concentric hypertrophy and normal systolic function  Ejection fraction is 55 %  2  Stage 1 Left ventricular diastolic dysfunction  3  Mild mitral regurgitation  4  Mild tricuspid regurgitation  5  Trace aortic insufficiency  6  A small physiologic percardial effusion is present   _______________________________________________________________________________________________    Complete M-mode, 2-D, Doppler and color flow imaging was performed  Study quality was good  Left Ventricle: The ventricle is normal in size  Systolic function is normal  Wall thickness is increased  Normal regional left ventricular wall motion is noted  Aortic Valve: The valve is trileaflet  Leaflets exhibited normal thickness and normal cuspal separation  Doppler: No aortic stenosis is present  Trace aortic insufficiency is present  Aorta: The root is normal in size  Mitral Valve: Normal mitral valve morphology  There is no annular calcification  Doppler: No mitral stenosis is present  Mild mitral regurgitation is present  Left Atrium: The atrium is normal in size  Right Ventricle: The ventricle is normal in size  Systolic function is normal   Tricuspid Valve: The valve is morphology is normal  Leaflets exhibit normal thickness and normal cuspal separation  Doppler: No tricuspid stenosis is present  Mild tricuspid regurgitation is present  Pulmonic Valve: The valve is trileaflet  Leaflets exhibit normal thickness and normal cuspal separation  Doppler: No pulmonic stenosis is present  No pulmonic insufficiency is present  Right Atrium: The atrium is normal in size    Pericardium: A small pericardial effusion is present   _______________________________________________________________________________________________    M-MODE AND 2-D DIMENSIONS (Normal Adult cms) Left ventricular End Diastolic Diameter: 5 1 cm (3 7-5 6) Left ventricular End Systolic Diameter: 3 5 cm (2 5-3 5) Interventricular Septal Thickness: 2 0 cm (0 9-1 1) Posterior Wal Thickness: 1 4 cm (0 9-1 1) Left Atrial Diameter: 4 4 cm (2 0-4 0) Aortic Root Diameter: 2 8 cm (2 0-3 7) Aortic Leaflet Excursion 2 0 cm (0 5-2 5)    DOPPLER MEASUREMENTS  Peak Aortic Velocity: 1 4 m/sec  LVOT Vmax: 0 9 m/sec  Peak Aortic Gradient: 8 mmHg  Mean Aortic Gradient: 4 mmHg  Aortiv Valve Area (Continuity Equation): 2 8 cm2  E/A: 0 7/ 0 8  Mitral Valve PHT: 75 msec  Mean Mitral Valve Gradient:  mmHg  RVOT Velocity:  m/sec  TR Vmax: 1 4 m/sec  RVSP: 7 mmHg    Sonographer: YADIRA Jimenez MD

## 2021-08-27 NOTE — ASSESSMENT & PLAN NOTE
Patient was driving with his daughter to  his other daughter when he suddenly felt lightheaded/dizzy, disoriented, and pulled over to the side of the road  Patient was noted to have bradycardia and mild hypoglycemia (fingerstick glucose 67)  On examination patient states his dizziness and lightheadedness have resolved- he does note some mild shortness of breath  · CT head: no acute intracranial abnormality   · Pre-Hospital patient was given 1 5 mg of atropine and half amp of D50  · Symptomatic bradycardia noted in the 50s  · The patient is on a beta-blocker at home Bystolic 20 mg b i d - will hold  · EKG done in the ER shows heart rate 64 with LVH and prolonged QT interval of 506  · Avoid QT prolonging medications  · Monitor on telemetry for heart block- none noted  · Patient also notes decreased p o  Intake, will give gentle IV fluid hydration- patient does have history of CHF but appears to be dry- not in acute exacerbation  · Cardiology consult placed appreciate further input:   · Recommend to decrease dose of Bystolic to 64ST daily (instead of 20mg BID)   · Recommend outpatient neurology follow up in regards to his forgetfulness  Wife at bedside states that she has noticed he has been more "forgetful" lately  Vitamin B12 and folate normal    · Bradycardia resolved with holding of BB and gentle IVF hydration, patient denies any lightheadedness/dizziness/confusion/disorientation

## 2021-08-27 NOTE — PROGRESS NOTES
Progress Note   Name:Genna Liu   Room: ED 27/ED 27     Echocardiogram Summary:  1  Normal sized left ventricle with concentric hypertrophy and normal systolic function  Ejection fraction is 55 %  2  Stage 1 Left ventricular diastolic dysfunction  3  Mild mitral regurgitation  4  Mild tricuspid regurgitation  5  Trace aortic insufficiency  6  A small physiologic percardial effusion is present   _______________________________________________________________________________________________    Complete M-mode, 2-D, Doppler and color flow imaging was performed  Study quality was good  Left Ventricle: The ventricle is normal in size  Systolic function is normal  Wall thickness is increased  Normal regional left ventricular wall motion is noted  Aortic Valve: The valve is trileaflet  Leaflets exhibited normal thickness and normal cuspal separation  Doppler: No aortic stenosis is present  Trace aortic insufficiency is present  Aorta: The root is normal in size  Mitral Valve: Normal mitral valve morphology  There is no annular calcification  Doppler: No mitral stenosis is present  Mild mitral regurgitation is present  Left Atrium: The atrium is normal in size  Right Ventricle: The ventricle is normal in size  Systolic function is normal   Tricuspid Valve: The valve is morphology is normal  Leaflets exhibit normal thickness and normal cuspal separation  Doppler: No tricuspid stenosis is present  Mild tricuspid regurgitation is present  Pulmonic Valve: The valve is trileaflet  Leaflets exhibit normal thickness and normal cuspal separation  Doppler: No pulmonic stenosis is present  No pulmonic insufficiency is present  Right Atrium: The atrium is normal in size    Pericardium: A small pericardial effusion is present   _______________________________________________________________________________________________    M-MODE AND 2-D DIMENSIONS (Normal Adult cms) Left ventricular End Diastolic Diameter: 5 1 cm (3 7-5 6) Left ventricular End Systolic Diameter: 3 5 cm (2 5-3 5) Interventricular Septal Thickness: 2 0 cm (0 9-1 1) Posterior Wal Thickness: 1 4 cm (0 9-1 1) Left Atrial Diameter: 4 4 cm (2 0-4 0) Aortic Root Diameter: 2 8 cm (2 0-3 7) Aortic Leaflet Excursion 2 0 cm (0 5-2 5)    DOPPLER MEASUREMENTS  Peak Aortic Velocity: 1 4 m/sec  LVOT Vmax: 0 9 m/sec  Peak Aortic Gradient: 8 mmHg  Mean Aortic Gradient: 4 mmHg  Aortiv Valve Area (Continuity Equation): 2 8 cm2  E/A: 0 7/ 0 8  Mitral Valve PHT: 75 msec  Mean Mitral Valve Gradient:  mmHg  RVOT Velocity:  m/sec  TR Vmax: 1 4 m/sec  RVSP: 7 mmHg    Sonographer: YADIRA Varela MD

## 2021-08-27 NOTE — DISCHARGE INSTRUCTIONS
· Instead of taking your Bystolic 70DU twice daily, only take the bystolic 83OL once daily   · Please take all your other medications as prescribed   · Please follow up with your primary care provider within 5-7 days, have the BMP lab work completed as an outpatient prior to your appointment with your primary care provider to follow up on your potassium   · Please also follow up with your cardiologist within 1 week to make sure you are doing okay on your new dose of Bystolic   · You are to follow up as an outpatient with a neurologist, a referral was placed, they should be calling you to schedule an appointment- if you do not hear from them please call the number provided                     Bradycardia   WHAT YOU NEED TO KNOW:   Bradycardia is a slow heart rate, usually fewer than 60 beats per minute  A slow heart rate is normal for some people, such as athletes, and needs no treatment  Bradycardia may also be caused by health conditions that do need treatment  Your healthcare provider will tell you what heart rate is too low for you  DISCHARGE INSTRUCTIONS:   Call your local emergency number (911 in the [address] [place] [address],[address]) or have someone call if:   · You have new or worsening dizziness, shortness of breath, chest pain, or confusion  Call your doctor or cardiologist if:   · You feel lightheaded or faint  · Your pulse rate is lower than healthcare providers say it should be, even with treatment  · You are more tired than usual, even with treatment  · You have questions or concerns about your condition or care  Medicines:   · Medicines  may be given to increase your heart rate and help your symptoms  You may also need medicine to treat a condition that is causing your symptoms  · Take your medicine as directed  Contact your healthcare provider if you think your medicine is not helping or if you have side effects  Tell him or her if you are allergic to any medicine   Keep a list of the medicines, vitamins, and herbs you take  Include the amounts, and when and why you take them  Bring the list or the pill bottles to follow-up visits  Carry your medicine list with you in case of an emergency  Manage or prevent bradycardia:   · Talk to your healthcare provider about all your current medicines  He or she may change a medicine if it is causing your slow heart rate  Do not  stop taking any medicine unless directed by your provider  · Keep a record of your symptoms  Include when you have bradycardia, and what you were doing when it started  Also include anything that made your symptoms better or worse  Bring the record to follow-up visits with your healthcare providers  · Do not smoke  Nicotine and other chemicals in cigarettes and cigars can cause heart and lung damage  Ask your healthcare provider for information if you currently smoke and need help to quit  E-cigarettes or smokeless tobacco still contain nicotine  Talk to your healthcare provider before you use these products  · Reach or maintain a healthy weight  Your healthcare provider can tell you what a healthy weight is for you  He or she can help you create a weight loss plan if needed  Weight loss can help strengthen your heartbeat  If you have sleep apnea, weight loss may help you breathe more regularly while you sleep  · Exercise as directed  Exercise can help strengthen your heart  It can also help you manage your weight and improve your sleep  Your healthcare provider can help you create an exercise plan  He or she may recommend 30 to 40 minutes of exercise most days of the week  · Eat a variety of healthy foods  Healthy foods include fruits, vegetables, whole-grain breads and cereals, low-fat dairy products, lean meats, fish, and cooked beans  Depending on the cause of your bradycardia, your healthcare provider may recommend a heart healthy diet  This diet is low in sodium (salt) and unhealthy fats   A dietitian or your healthcare provider can help you create a heart healthy diet  Heart monitoring at home: You may need to use a heart monitor at home to provide more information about your condition  This device is also called a Holter monitor, event monitor, or mobile telemetry  Bring your monitor with you to follow-up visits with your healthcare provider or cardiologist  Ask for more information about the Holter monitor  Follow up with your doctor or cardiologist as directed:  Write down your questions so you remember to ask them during your visits  You may need to see specialists for more treatment  If you have a pacemaker, your cardiologist needs to make sure that it is working as it should  For more information:   · [address]  [place] , [place]   Phone: [phone]  Web Address: https://[employer]/  [address] [facility] 2021 Information is for End User's use only and may not be sold, redistributed or otherwise used for commercial purposes  All illustrations and images included in CareNotes® are the copyrighted property of A D A M , Inc  or [address] [place] [address]  The above information is an  only  It is not intended as medical advice for individual conditions or treatments  Talk to your doctor, nurse or pharmacist before following any medical regimen to see if it is safe and effective for you  Pericardial Effusion   WHAT YOU NEED TO KNOW:   Pericardial effusion is a buildup of fluid in the pericardium  The pericardium is a 2-layer sac that surrounds the heart  The sac normally contains a small amount of clear fluid between its layers  This allows the heart to move smoothly against other organs in the chest as it beats  The buildup of fluid may affect how the heart works  DISCHARGE INSTRUCTIONS:   Follow up with your healthcare provider as directed:  Write down your questions so you remember to ask them during your visits     Contact your healthcare provider if:   · You have a fever  · You have questions or concerns about your condition or care  Seek care immediately or call 911 if:   · You feel lightheaded or faint  · You have swelling in your legs or feet  · You have shortness of breath  · Your chest pain does not get better or becomes worse  © Copyright [employer] 2021 Information is for End User's use only and may not be sold, redistributed or otherwise used for commercial purposes  All illustrations and images included in CareNotes® are the copyrighted property of [employer]  or Yonas Anderson   The above information is an  only  It is not intended as medical advice for individual conditions or treatments  Talk to your doctor, nurse or pharmacist before following any medical regimen to see if it is safe and effective for you  What to Do if Your Blood Sugar is Low   WHAT YOU NEED TO KNOW:   Low blood sugar levels (hypoglycemia) can happen with Type 1 and Type 2 diabetes  Low levels are more likely to happen if you use insulin  Hypoglycemia can cause you to have falls, accidents, and injuries  A blood sugar level that gets too low can lead to seizures, coma, and death  Learn to recognize the symptoms early so you can get treatment quickly  DISCHARGE INSTRUCTIONS:   Have someone call your local emergency number (911 in the [address] [facility]) if:   · You cannot be woken  · You have a seizure  Call your doctor if:   · You have symptoms of a low blood sugar level, such as trouble thinking, sweating, or a pounding heartbeat  · Your blood sugar level is lower than normal and it does not improve with treatment  · You often have lower blood sugar levels than your target goals  · You have trouble coping with your illness, or you feel anxious or depressed  · You have questions or concerns about your condition or care  What to do if you have symptoms of low blood sugar:   If you are sweaty, anxious, confused or have a fast, pounding heartbeat:  · Check your blood sugar level, if possible  Your blood sugar level is too low if it is at or below 70 mg/dL  · Eat or drink 15 grams of fast-acting carbohydrate  Fast-acting carbohydrates will raise your blood sugar level quickly  Examples of 15 grams of fast-acting carbohydrates:     ? 4 ounces (½ cup) of fruit juice     ? 4 ounces of regular soda    ? 2 tablespoons of raisins     ? 1 tube of glucose gel or 3 to 4 glucose tablets       · Check your blood sugar level 15 minutes later  If the level is still low (less than 100 mg/dL), eat another 15 grams of carbohydrate  When the level returns to 100 mg/dL, eat a snack or meal that contains carbohydrates  This will help prevent another drop in blood sugar  · Teach people close to you how to use your glucagon kit  Your blood sugar may be too low for you to be awake  People need to know when and how to use your kit  Prevent low blood sugar levels:  Prevent low blood sugar by knowing what increases your risk  Ask your healthcare provider for ways to prevent low blood sugar levels  Any of the following can increase your risk of low blood sugar:  · Fasting for tests or procedures    · During or after intense exercise    · Late or postponed meals    · Sleeping (you may need a bedtime snack)     · Drinking alcohol if you use insulin or insulin releasing pills    Follow up with your healthcare provider as directed:  Write down your questions so you remember to ask them during your visits  © [employer] 2021 Information is for End User's use only and may not be sold, redistributed or otherwise used for commercial purposes  All illustrations and images included in CareNotes® are the copyrighted property of [employer]  or Yonas Anderson   The above information is an  only  It is not intended as medical advice for individual conditions or treatments   Talk to your doctor, nurse or pharmacist before following any medical regimen to see if it is safe and effective for you  Nebivolol (By mouth)   Nebivolol ([id number])  Treats high blood pressure  This medicine is a beta-blocker  Brand Name(s): Bystolic   There may be other brand names for this medicine  When This Medicine Should Not Be Used:   Do not use this medicine if you have had an allergic reaction to nebivolol  Do not use this medicine if you have severe liver disease, lung disease (such as asthma or emphysema), or certain heart problems  Ask your doctor what these heart problems are  How to Use This Medicine:   Tablet  · Take your medicine as directed  Your dose may need to be changed several times to find what works best for you  · Carefully follow your doctor's instructions about any special diet  If a dose is missed:   · Take a dose as soon as you remember  If it is almost time for your next dose, wait until then and take a regular dose  Do not take extra medicine to make up for a missed dose  How to Store and Dispose of This Medicine:   · Store the medicine in a closed container at room temperature, away from heat, moisture, and direct light  · Ask your pharmacist, doctor, or health caregiver about the best way to dispose of any outdated medicine or medicine no longer needed  · Keep all medicine out of the reach of children  Never share your medicine with anyone  Drugs and Foods to Avoid:   Ask your doctor or pharmacist before using any other medicine, including over-the-counter medicines, vitamins, and herbal products    · Make sure your doctor knows if you are using digoxin (Lindaann Outlaw), guanethidine (Ismelin®), reserpine, medicine to treat depression (such as fluoxetine, paroxetine, Paxil®, or Prozac®), medicine for heart rhythm problems (such as disopyramide, propafenone, quinidine, Norpace®, or Rhythmol®), or other blood pressure medicine (such as clonidine, diltiazem, verapamil, Calan®, Cardizem®, Cartia®, Catapres®, Isoptin®, Tiazac®, or Verelan®)  Tell your doctor if you are also using numbing medicines (such as cyclopropane, ether, or trichloroethylene) or insulin or diabetes medicine that you take by mouth (such as glyburide, metformin, Actos®, or Glucotrol®)  Warnings While Using This Medicine:   · Make sure your doctor knows if you are pregnant or breastfeeding, or if you have severe chest pain or have had a recent heart attack  Tell your doctor if you have kidney disease, liver disease, blood vessel disease, diabetes, an overactive thyroid, or an adrenal gland tumor called pheochromocytoma  Tell your doctor if you have poor blood flow to your legs and feet, or if you are scheduled for any surgery  · Do not stop using this medicine suddenly without asking your doctor, or you could develop life-threatening heart problems  You may need to slowly decrease your dose before stopping it completely  · This medicine may raise or lower your blood sugar level  If you have diabetes, report any changes in your blood sugar to your doctor  · Tell any doctor or dentist who treats you that you are using this medicine  This medicine may affect certain medical test results  · This medicine may make you dizzy or drowsy  Do not drive, use machines, or do anything else that could be dangerous until you know how this medicine affects you  · Your doctor will check your progress and the effects of this medicine at regular visits  Keep all appointments  · Even if you feel well, do not stop using this medicine without asking your doctor  This medicine will not cure your high blood pressure, but it will help lower it and keep it down  You may have to take blood pressure medicine for the rest of your life    Possible Side Effects While Using This Medicine:   Call your doctor right away if you notice any of these side effects:  · Allergic reaction: Itching or hives, swelling in your face or hands, swelling or tingling in your mouth or throat, chest tightness, trouble breathing  · Chest pain  · Lightheadedness or fainting  · Rapid weight gain  · Shortness of breath  · Slow or uneven heartbeat  · Swelling in your hands, ankles, or feet  If you notice these less serious side effects, talk with your doctor:   · Headache  · Unusual tiredness or weakness  If you notice other side effects that you think are caused by this medicine, tell your doctor  Call your doctor for medical advice about side effects  You may report side effects to FDA at 4-326-QUW-9640  © Copyright [employer] 2021 Information is for End User's use only and may not be sold, redistributed or otherwise used for commercial purposes  The above information is an  only  It is not intended as medical advice for individual conditions or treatments  Talk to your doctor, nurse or pharmacist before following any medical regimen to see if it is safe and effective for you  Dehydration   WHAT YOU NEED TO KNOW:   Dehydration is a condition that develops when your body does not have enough fluid  You may become dehydrated if you do not drink enough water or lose too much fluid  Fluid loss may also cause loss of electrolytes (minerals), such as sodium  DISCHARGE INSTRUCTIONS:   Seek care immediately if:   · You have a seizure  · You are confused or cannot think clearly  · You are extremely sleepy, or another person cannot wake you  · You become dizzy or faint when you stand  · You are not able to urinate  · You have trouble breathing  · You have a fast or irregular heartbeat  · Your hands or feet are cold, or your face is pale  Contact your healthcare provider if:   · You have trouble drinking liquids because you are vomiting  · Your symptoms get worse  · You have a fever  · You feel very weak or tired  · You have questions or concerns about your condition or care      Follow up with your healthcare provider as directed:  Write down your questions so you remember to ask them during your visits  Prevent or manage dehydration:   · Drink liquids as directed  Liquids that contain water, sugar, and minerals can help your body hold in fluid and help prevent dehydration  Drink liquids throughout the day, not just when you feel thirsty  Men should drink about 3 liters (13 eight-ounce cups) of liquid each day  Women should drink about 2 liters (9 eight-ounce cups) of liquid each day  Drink even more liquid if you will be outdoors, in the sun for a long time, or exercising  · Stay cool  Limit the time you spend outdoors during the hottest part of the day  Dress in lightweight clothes  · Keep track of how often you urinate  If you urinate less than usual or your urine is darker, drink more liquids  © Copyright [employer] 2021 Information is for End User's use only and may not be sold, redistributed or otherwise used for commercial purposes  All illustrations and images included in CareNotes® are the copyrighted property of A D A M , Inc  or [facility] David Anderson   The above information is an  only  It is not intended as medical advice for individual conditions or treatments  Talk to your doctor, nurse or pharmacist before following any medical regimen to see if it is safe and effective for you

## 2021-08-28 NOTE — ASSESSMENT & PLAN NOTE
Noted to be 2 9 this AM- replete and recheck 3 4 prior to discharge, given additional 10meq prior to discharge- follow up on K at discharge with PCP

## 2021-08-28 NOTE — DISCHARGE SUMMARY
[facility]  Discharge- German Rey 1965, 60 y o  male MRN: [id number]  Unit/Bed#: ED 27 Encounter: [id number]  Primary Care Provider: Yi Nick MD   Date and time admitted to hospital: 8/26/2021  1:11 AM    * Bradycardia  Assessment & Plan  Patient was driving with his daughter to  his other daughter when he suddenly felt lightheaded/dizzy, disoriented, and pulled over to the side of the road  Patient was noted to have bradycardia and mild hypoglycemia (fingerstick glucose 67)  On examination patient states his dizziness and lightheadedness have resolved- he does note some mild shortness of breath  · CT head: no acute intracranial abnormality   · Pre-Hospital patient was given 1 5 mg of atropine and half amp of D50  · Symptomatic bradycardia noted in the 50s  · The patient is on a beta-blocker at home Bystolic 20 mg b i d - will hold  · EKG done in the ER shows heart rate 64 with LVH and prolonged QT interval of 506  · Avoid QT prolonging medications  · Monitor on telemetry for heart block- none noted  · Patient also notes decreased p o  Intake, will give gentle IV fluid hydration- patient does have history of CHF but appears to be dry- not in acute exacerbation  · Cardiology consult placed appreciate further input:   · Recommend to decrease dose of Bystolic to 54ST daily (instead of 20mg BID)   · Recommend outpatient neurology follow up in regards to his forgetfulness  Wife at bedside states that she has noticed he has been more "forgetful" lately  Vitamin B12 and folate normal    · Bradycardia resolved with holding of BB and gentle IVF hydration, patient denies any lightheadedness/dizziness/confusion/disorientation        Hypoglycemia  Assessment & Plan  · Patient states that he used to be a type 2 diabetic but after his gastric bypass surgery in December of 2020 he no longer is on diabetic medications  · Patient noted to be hypoglycemic pre-hospital with fingerstick glucose of 67 was given half amp of D50  · Prior hemoglobin A1c from 05/2021 5 5  · Hypoglycemia protocol  · Finger stick glucose checks q i d  · Now resolved     Acute renal failure superimposed on stage 3 chronic kidney disease [facility])  Assessment & Plan  Lab Results   Component Value Date    EGFR 51 08/27/2021    EGFR 27 08/26/2021    EGFR 37 04/18/2021    CREATININE 1 69 (H) 08/27/2021    CREATININE 2 82 (H) 08/26/2021    CREATININE 2 20 (H) 04/18/2021     · Present on admission superimposed on stage 3 chronic kidney disease as evidence by creatinine of 2 82 on admission  · Follows with Nephrology as an outpatient  · Baseline was 1 3-1 7 in 2020  · Suspected secondary to dehydration as patient appears to be dry will give gentle IV fluid hydration at 50 mL/hour- monitor volume status closely  · Urinary retention protocol, bladder scan Q shift  · Avoid nephrotoxin/hypertension  · Monitor creatinine in the a m    · Resolved with IVF hydration- patient creatinine now 1 69 at baseline    Pericardial effusion  Assessment & Plan  · 08/26/21 CT C/A/P notes cardiomegaly and pericardial effusion which is chronic, possible atelectasis, and small volume abdominopelvic ascites   · Patient has history of pericardial effusion in the past as evidenced by prior echocardiogram   · Notes shortness of breath on examination  · Cardiology consult was appreciate further input  · Noted on prior echocardiogram by Dr Elizabeth Ochoa- trivial findings and no acute intervention necessary at this time   · Continue to follow up with cardiology as outpatient    Hypokalemia  Assessment & Plan  Noted to be 2 9 this AM- replete and recheck 3 4 prior to discharge, given additional 10meq prior to discharge- follow up on K at discharge with PCP     History of gastric bypass  Assessment & Plan  · Noted 12/22/2020  · Follows with Dr Juan Ramon Gaming as outpatient     History of DVT (deep vein thrombosis)  Assessment & Plan  Patient notes past history of DVT, not currently on oral anticoagulation    AGUS (obstructive sleep apnea)  Assessment & Plan  Noted, does not use CPAP anymore at night    CHF (congestive heart failure) ([facility])  Assessment & Plan  Wt Readings from Last 3 Encounters:   07/07/21 72 7 kg (160 lb 4 8 oz)   04/28/21 79 2 kg (174 lb 9 6 oz)   04/18/21 79 kg (174 lb 1 oz)       · Appears dry, not in acute exacerbation  · Hold eplerenone in the setting of TRUDI - can resume at discharge   · Monitor volume status closely   · Intake/output, daily weight   · Cardiac diet, sodium restricted       COPD (chronic obstructive pulmonary disease) ([facility] [address] )  Assessment & Plan  · Never smoker  · Not in acute exacerbation  · Not on oxygen at home  · Continue albuterol, Flovent, Anoro ellipta  · Respiratory protocol    Essential hypertension  Assessment & Plan  Blood pressures were elevated in the ER but have improved  Continue doxazosin 8 mg b i d  And amlodipine 10 mg daily  Monitor blood pressures          Medical Problems     Resolved Problems  Date Reviewed: 8/27/2021    None              Discharging Physician / Practitioner: Pam Tolentino PA-C  PCP: Sonu Villatoro MD  Admission Date:   Admission Orders (From admission, onward)     Ordered        08/26/21 0549  Place in Observation  Once                   Discharge Date: 08/27/21    Consultations During Hospital Stay:  · Cardiology     Procedures Performed:   · none    Significant Findings / Test Results:   CT head without contrast   Final Result by Leidy Braga MD (08/26 0322)      No acute intracranial abnormality  Workstation performed: [id number]         CT chest abdomen pelvis wo contrast   Final Result by Leidy Braga MD (08/26 4454)      Suboptimal evaluation secondary to lack of contrast       Cardiomegaly and pericardial effusion  Residual minimal consolidative opacity at the right lung base similar in appearance to prior studies favor atelectasis although could reflect?   Acute on chronic process in the appropriate clinical setting  No other focal consolidation  Small volume abdominopelvic ascites  Study was marked in Epic for immediate notification  Workstation performed: [id number]         XR chest 1 view portable   ED Interpretation by Tera Goldmann, DO (08/26 4740)   Questionable right sided infiltrate vs  Edema  Final Result by Kate Parrish MD (08/26 9030)      No acute cardiopulmonary disease  Workstation performed: [id number]           · K on admission 2 8 on discharge 3 4   · POC glucose range during admission   · Folate 16 9  · Vitamin B12 726  · TSH 2 202   · P 2 9   · Mg 1 8   · Troponin negative   · Rapid drug screen: + THC   · UA + glucose   · hgba1c 5 4  · covid negative     Incidental Findings:   None     Test Results Pending at Discharge (will require follow up): · None      Outpatient Tests Requested:  · BMP to re-check potassium prior to PCP follow up     Complications: none     Reason for Admission:  Lightheaded/dizzy/disoriented while driving with daughter- noted to be bradycardic and hypoglycemic    Hospital Course:   Teresa Chavez is a 60 y o  male patient who originally presented to the hospital on 8/26/2021 due to sudden onset of dizziness/lightheadedness while driving with his daughter last night at 11:30 p m  To  his daughter  Patient states that he started feeling unwell so he pulled over to the side of the road and after that he does not remember what happened  He states that his daughter had to flag someone down from the side of the road to call 911  Patient notes that this has happened before probably a few weeks ago but he did not have to go to the hospital for it  Patient was noted to be hypoglycemic and bradycardic on scene and was given atropine and D50  The patient notes that he has not been drinking and eating as much as he should be after his gastric bypass surgery    Patient is on a beta-blocker Bystolic 20 mg b i d   Patient states he is compliant with his medications CT head negative  Patient was seen by cardiology  Bradycardia was resolved with holding of Bystolic beta-blocker  Hypoglycemia resolved  Patient did not have any symptoms since he was admitted  Cardiology recommended for patient to decrease dose of Bystolic to 96JD daily instead of BID and to follow up with neurology as outpatient in regards to his "transient global amnesia " Spoke with wife at bedside who states patient has been more forgetful lately  Patient also noted to have small pericardial effusion on CT C/A/P- cardiology states no emergent intervention necessary at this time as it was already noted on prior echocardiogram done as outpatient with cardiology and is trivial  Patient may benefit from cognitive evaluation as outpatient  Patient was also noted to have TRUDI on admission which resolved with gentle IVF hydration  Suspected patient's symptoms secondary to bradycardia induced by BB, hypoglycemia and dehydration due to poor PO intake per patient  Patient is stable for discharge today with outpatient follow up with primary care provider in 5 days with BMP completed prior to follow up on hypokalemia and close follow up with cardiology  Please see above list of diagnoses and related plan for additional information  Condition at Discharge: stable    Discharge Day Visit / Exam:   Subjective:  Patient was resting in bed comfortably with wife and daughter at bedside  Patient was easily arousable and states to be feeling much better today  Denies any similar symptoms as what he felt when he was driving the car the other night  No lightheadedness, dizziness, forgetfulness  No focal neurological deficits  Wife at bedside states that patient has been more "forgetful" lately and is worried about early onset dementia   Patient however is able to account the events from when he was admitted and tell me about his past medical history  Denies any chest pain, palpitations, shortness of breath, abdominal pain, N/V/D, urinary retention     Vitals: Blood Pressure: 164/89 (08/27/21 0600)  Pulse: 67 (08/27/21 0831)  Temperature: 98 5 °F (36 9 °C) (08/27/21 0839)  Temp Source: Oral (08/26/21 0954)  Respirations: 18 (08/27/21 0831)  SpO2: 99 % (08/27/21 0831)  Exam:   Physical Exam  Constitutional:       General: He is not in acute distress  HENT:      Mouth/Throat:      Mouth: Mucous membranes are moist    Eyes:      General: No scleral icterus  Cardiovascular:      Rate and Rhythm: Normal rate and regular rhythm  Pulses: Normal pulses  Heart sounds: Normal heart sounds  Pulmonary:      Effort: Pulmonary effort is normal  No respiratory distress  Breath sounds: Normal breath sounds  No wheezing, rhonchi or rales  Abdominal:      General: Abdomen is flat  Bowel sounds are normal       Palpations: Abdomen is soft  Tenderness: There is no abdominal tenderness  Musculoskeletal:         General: No swelling  Normal range of motion  Cervical back: Normal range of motion  Right lower leg: No edema  Left lower leg: No edema  Skin:     General: Skin is warm and dry  Capillary Refill: Capillary refill takes less than 2 seconds  Neurological:      General: No focal deficit present  Mental Status: He is alert and oriented to person, place, and time  Cranial Nerves: No cranial nerve deficit  Sensory: No sensory deficit  Motor: No weakness  Gait: Gait normal    Psychiatric:         Mood and Affect: Mood normal           Discussion with Family: Updated  (wife and daughter) at bedside  Discharge instructions/Information to patient and family:   See after visit summary for information provided to patient and family  Provisions for Follow-Up Care:  See after visit summary for information related to follow-up care and any pertinent home health orders  Disposition:   Home    Planned Readmission: none     Discharge Statement:  I spent 45 minutes discharging the patient  This time was spent on the day of discharge  I had direct contact with the patient on the day of discharge  Greater than 50% of the total time was spent examining patient, answering all patient questions, arranging and discussing plan of care with patient as well as directly providing post-discharge instructions  Additional time then spent on discharge activities  Discharge Medications:  See after visit summary for reconciled discharge medications provided to patient and/or family        **Please Note: This note may have been constructed using a voice recognition system**

## 2021-08-28 NOTE — ASSESSMENT & PLAN NOTE
· Patient states that he used to be a type 2 diabetic but after his gastric bypass surgery in December of 2020 he no longer is on diabetic medications  · Patient noted to be hypoglycemic pre-hospital with fingerstick glucose of 67 was given half amp of D50  · Prior hemoglobin A1c from 05/2021 5 5  · Hypoglycemia protocol  · Finger stick glucose checks q i d    · Now resolved

## 2021-08-28 NOTE — ASSESSMENT & PLAN NOTE
· Never smoker  · Not in acute exacerbation  · Not on oxygen at home  · Continue albuterol, Flovent, Anoro ellipta  · Respiratory protocol

## 2021-08-28 NOTE — ASSESSMENT & PLAN NOTE
· 08/26/21 CT C/A/P notes cardiomegaly and pericardial effusion which is chronic, possible atelectasis, and small volume abdominopelvic ascites   · Patient has history of pericardial effusion in the past as evidenced by prior echocardiogram   · Notes shortness of breath on examination  · Cardiology consult was appreciate further input  · Noted on prior echocardiogram by Dr Marty Desai- trivial findings and no acute intervention necessary at this time   · Continue to follow up with cardiology as outpatient

## 2021-08-28 NOTE — ASSESSMENT & PLAN NOTE
Wt Readings from Last 3 Encounters:   07/07/21 72 7 kg (160 lb 4 8 oz)   04/28/21 79 2 kg (174 lb 9 6 oz)   04/18/21 79 kg (174 lb 1 oz)       · Appears dry, not in acute exacerbation  · Hold eplerenone in the setting of TRUDI - can resume at discharge   · Monitor volume status closely   · Intake/output, daily weight   · Cardiac diet, sodium restricted

## 2021-08-31 LAB
BACTERIA BLD CULT: NORMAL
BACTERIA BLD CULT: NORMAL

## 2021-09-01 ENCOUNTER — TELEPHONE (OUTPATIENT)
Dept: BARIATRICS | Facility: CLINIC | Age: 56
End: 2021-09-01

## 2021-09-01 NOTE — TELEPHONE ENCOUNTER
PT CALLED AND STATED HE WAS ADMITTED TO [facility] LAST THURSDAY BECAUSE HE WAS DRIVING FOR A HALF HOUR AND DID NOT KNOW WHO HE WAS  HE HAD A  TEMP OF 93 AND LOW BLOOD SUGAR  THE NEXT MORNING HE STATED HE IS SELF DX  THIS BUT HE THOUGHT HE HAS GOUT AND HE IS WONDERING IF HOW MUCH PROTEIN HE SHOULD BE EATING IF HE HAS GOUT? PLEASE ADVISE PT

## 2021-09-09 NOTE — TELEPHONE ENCOUNTER
Discovered this message in my Inbox from 09/02/21, which was the first day of my vacation  Spoke with CECE Dixon today, who will reach out to patient directly

## 2021-09-16 ENCOUNTER — DOCUMENTATION (OUTPATIENT)
Dept: BARIATRICS | Facility: CLINIC | Age: 56
End: 2021-09-16

## 2021-09-22 NOTE — UTILIZATION REVIEW
Notification of Discharge   This is a Notification of Discharge from our facility [address]  Please be advised that this patient has been discharge from our facility  Below you will find the admission and discharge date and time including the patients disposition  UTILIZATION REVIEW CONTACT:  Jose Alberto Morgan  Utilization   Network Utilization Review Department  Phone: [phone] x carefully listen to the prompts  All voicemails are confidential   Email: [email or web address]     PHYSICIAN ADVISORY SERVICES:  FOR ILKA-BD-LJHO REVIEW - MEDICAL NECESSITY DENIAL  Phone: [phone]  Fax: [phone]  Email: [email or web address]     PRESENTATION DATE: 8/26/2021  1:11 AM  OBERVATION ADMISSION DATE:   INPATIENT ADMISSION DATE: N/A N/A   DISCHARGE DATE: 8/27/2021  2:16 PM  DISPOSITION: Home/Self Care Home/Self Care      IMPORTANT INFORMATION:  Send all requests for admission clinical reviews, approved or denied determinations and any other requests to dedicated fax number below belonging to the campus where the patient is receiving treatment   List of dedicated fax numbers:  [address] DENIALS (Administrative/Medical Necessity) [phone]   [address] (Maternity/NICU/Pediatrics) [phone]   Brannon Carlson [phone]   Arbkarla Fillers [phone]   Rufino Stack [phone]   [place] [address] [phone]   [facility]  [phone]   [address] [place] [address], [facility]  [address] [facility] [address] [facility] [phone]

## 2022-04-26 NOTE — ASSESSMENT & PLAN NOTE
Lab Results   Component Value Date    HGBA1C 5 5 03/22/2022     -No longer on DM meds  -Continue healthy diet and exercise; f/u with RD and lengthy dietary education today

## 2022-04-26 NOTE — ASSESSMENT & PLAN NOTE
-s/p Shamika-En-Y Gastric Bypass and HH repair with Dr Jeff Cannon on 12/22/20  Overall doing Well with his weight lossical weight loss, although at times he skips meals d/t not being hungry  Advised him to avoid skipping meals, focus on nutrient density, weight maintenance, and f/u with RD for additional support  Keep food records  Initial: 241 5lbs  Current: 144 1lbs  EWL: 97 4%  Wilfred: current  Current BMI is Body mass index is 22 57 kg/m²  · Tolerating a regular diet-yes, but low appetite and does not always feel like eating   · Eating at least 60 grams of protein per day-yes  · Following 30/60 minute rule with liquids-yes  · Drinking at least 64 ounces of fluid per day-yes  · Drinking carbonated beverages-no  · Sufficient exercise-yes walking daily with new dog  · Using NSAIDs regularly-no  · Using nicotine-no  · Using alcohol-no   Advised about the risks of alcohol s/p bariatric surgery and recommend avoiding all alcohol

## 2022-04-26 NOTE — ASSESSMENT & PLAN NOTE
-At risk for malabsorption of vitamins/minerals secondary to malabsorption and restriction of intake from bariatric surgery  -NOT Currently taking adequate postop bariatric surgery vitamin supplementation: Centrum Silver MVI daily, calcium citrate 500mg   -Recommend esdras MVI or take 2 MVI and increase calcium to TID - to review again with RD    -CBC/Metabolic panel (No vitamin A or PTH d/t CKD)  -Patient received education about the importance of adhering to a lifelong supplementation regimen to avoid vitamin/mineral deficiencies

## 2022-04-26 NOTE — ASSESSMENT & PLAN NOTE
-on multiple antihypertensives; BP very elevated today d/t not taking his meds yet  -continue monitoring and management with PCP and Cards

## 2022-04-27 ENCOUNTER — OFFICE VISIT (OUTPATIENT)
Dept: BARIATRICS | Facility: CLINIC | Age: 57
End: 2022-04-27
Payer: COMMERCIAL

## 2022-04-27 VITALS
HEIGHT: 67 IN | HEART RATE: 81 BPM | SYSTOLIC BLOOD PRESSURE: 164 MMHG | WEIGHT: 144.1 LBS | BODY MASS INDEX: 22.62 KG/M2 | RESPIRATION RATE: 16 BRPM | DIASTOLIC BLOOD PRESSURE: 100 MMHG

## 2022-04-27 DIAGNOSIS — E11.65 TYPE 2 DIABETES MELLITUS WITH HYPERGLYCEMIA, UNSPECIFIED WHETHER LONG TERM INSULIN USE (HCC): ICD-10-CM

## 2022-04-27 DIAGNOSIS — Z48.815 ENCOUNTER FOR SURGICAL AFTERCARE FOLLOWING SURGERY OF DIGESTIVE SYSTEM: Primary | ICD-10-CM

## 2022-04-27 DIAGNOSIS — G47.33 OSA (OBSTRUCTIVE SLEEP APNEA): ICD-10-CM

## 2022-04-27 DIAGNOSIS — I10 ESSENTIAL HYPERTENSION: ICD-10-CM

## 2022-04-27 DIAGNOSIS — K91.2 POSTSURGICAL MALABSORPTION: ICD-10-CM

## 2022-04-27 PROCEDURE — 99214 OFFICE O/P EST MOD 30 MIN: CPT | Performed by: PHYSICIAN ASSISTANT

## 2022-04-27 NOTE — PROGRESS NOTES
Assessment/Plan:    Encounter for surgical aftercare following surgery of digestive system  -s/p Shamika-En-Y Gastric Bypass and HH repair with Dr Rula Silva on 12/22/20  Overall doing Well with his weight lossical weight loss, although at times he skips meals d/t not being hungry  Advised him to avoid skipping meals, focus on nutrient density, weight maintenance, and f/u with RD for additional support  Keep food records  Initial: 241 5lbs  Current: 144 1lbs  EWL: 97 4%  Wilfred: current  Current BMI is Body mass index is 22 57 kg/m²  · Tolerating a regular diet-yes, but low appetite and does not always feel like eating   · Eating at least 60 grams of protein per day-yes  · Following 30/60 minute rule with liquids-yes  · Drinking at least 64 ounces of fluid per day-yes  · Drinking carbonated beverages-no  · Sufficient exercise-yes walking daily with new dog  · Using NSAIDs regularly-no  · Using nicotine-no  · Using alcohol-no   Advised about the risks of alcohol s/p bariatric surgery and recommend avoiding all alcohol    Postsurgical malabsorption  -At risk for malabsorption of vitamins/minerals secondary to malabsorption and restriction of intake from bariatric surgery  -NOT Currently taking adequate postop bariatric surgery vitamin supplementation: Centrum Silver MVI daily, calcium citrate 500mg   -Recommend esdras MVI or take 2 MVI and increase calcium to TID - to review again with RD    -CBC/Metabolic panel (No vitamin A or PTH d/t CKD)  -Patient received education about the importance of adhering to a lifelong supplementation regimen to avoid vitamin/mineral deficiencies     Type 2 diabetes mellitus ([facility] [address] )    Lab Results   Component Value Date    HGBA1C 5 5 03/22/2022     -No longer on DM meds  -Continue healthy diet and exercise; f/u with RD and lengthy dietary education today    AGUS (obstructive sleep apnea)  -No longer using CPAP, recommend he f/u with sleep medicine for repeat sleep study    Essential hypertension  -on multiple antihypertensives; BP very elevated today d/t not taking his meds yet  -continue monitoring and management with PCP and Cards       Diagnoses and all orders for this visit:    Encounter for surgical aftercare following surgery of digestive system    Postsurgical malabsorption  -     CBC and differential; Future  -     Comprehensive metabolic panel; Future  -     Folate; Future  -     Iron Panel (Includes Ferritin, Iron Sat%, Iron, and TIBC); Future  -     Hemoglobin A1C; Future  -     Zinc; Future  -     Vitamin D 25 hydroxy; Future  -     Vitamin B12; Future  -     Vitamin B1, whole blood; Future  -     Lipid panel; Future    Type 2 diabetes mellitus with hyperglycemia, unspecified whether long term insulin use (HCC)  -     Hemoglobin A1C; Future    AGUS (obstructive sleep apnea)    Essential hypertension          Subjective:      Patient ID: Gabriela Sarmiento is a 60 y o  male  -s/p Shamika-En-Y Gastric Bypass and HH repair with Dr Jaskaran Navarrete on 12/22/20  Presents to the office today for routine follow up  Tolerating diet without issues; denies N/V, dysphagia, reflux  Overall doing very well with his weight loss  Has low appetite and sometimes skips meals or doesn't eat much at a meal and then on occasion feels dizzy and weak  Feels better when eats  Issues with HTN - works with PCP and cards  Has new goldendoodle [employer] - walking more with him      Diet Recall:   B - protein shake or ye and fruit or eggs and sausage   Snack - SF pudding or apple and cheese  L - tuna sandwich   Snack - tries to get some fruit or small healthy snack  D - steak fajitas or chicken and broccoli     Fluids - 1 protein shake, 64oz water    The following portions of the patient's history were reviewed and updated as appropriate: allergies, current medications, past family history, past medical history, past social history, past surgical history and problem list     Review of Systems   Constitutional: Negative for chills and fever  Unexpected weight change: planned weight loss  HENT: Negative for trouble swallowing  Respiratory: Negative for cough and shortness of breath  Cardiovascular: Negative for chest pain and palpitations  Gastrointestinal: Negative for abdominal pain, constipation, diarrhea, nausea and vomiting  Neurological: Positive for dizziness  Psychiatric/Behavioral:        Denies anxiety and depression         Objective:      /100 (BP Location: Left arm, Patient Position: Sitting)   Pulse 81   Resp 16   Ht 5' 7" (1 702 m)   Wt 65 4 kg (144 lb 1 6 oz)   BMI 22 57 kg/m²     Colonoscopy-Completed       Physical Exam  Vitals reviewed  Constitutional:       General: He is not in acute distress  Appearance: He is well-developed  HENT:      Head: Normocephalic and atraumatic  Eyes:      General: No scleral icterus  Cardiovascular:      Rate and Rhythm: Normal rate and regular rhythm  Pulmonary:      Effort: Pulmonary effort is normal  No respiratory distress  Abdominal:      General: There is no distension  Palpations: Abdomen is soft  Tenderness: There is no abdominal tenderness  Comments: No incisional hernias appreciated   Skin:     General: Skin is warm and dry  Neurological:      Mental Status: He is alert and oriented to person, place, and time  Psychiatric:         Mood and Affect: Mood normal          Behavior: Behavior normal            BARRIERS: none identified    GOALS:   · Continued/Maintain healthy weight loss with good nutrition intakes  · Adequate hydration with at least 64oz  fluid intake  · Normal vitamin and mineral levels  · Exercise as tolerated  · Increase calcium to three times a day and take 2 MVI pills   · Follow up with Emeka Gómez  · Focus on nutrient density - avocado, greek yogurt, olive oil/olives, peanut butter    · Follow-up in 6 months   We kindly ask that your arrive 15 minutes before your scheduled appointment time with your provider to allow our staff to room you, get your vital signs and update your chart  · Follow diet as discussed  · Get lab work done in the next 2 weeks  You have been given a lab slip today  Please call the office if you need a replacement  It is recommended to check with your insurance BEFORE getting labs done to make sure they are covered by your policy  Also, please check with your PCP and other providers before getting labs to avoid duplicate labs  Make sure to HOLD any multivitamins that may contain biotin and any biotin supplements FOR 5 DAYS before any labs since it can affect the results  · Follow vitamin and mineral recommendations as reviewed with you  · Call our office if you have any problems with abdominal pain especially associated with fever, chills, nausea, vomiting or any other concerns  · All  Post-bariatric surgery patients should be aware that very small quantities of any alcohol can cause impairment and it is very possible not to feel the effect  The effect can be in the system for several hours  It is also a stomach irritant  · It is advised to AVOID alcohol, Nonsteroidal antiinflammatory drugs (NSAIDS) and nicotine of all forms   Any of these can cause stomach irritation/pain

## 2022-04-27 NOTE — PATIENT INSTRUCTIONS
GOALS:   · Continued/Maintain healthy weight loss with good nutrition intakes  · Adequate hydration with at least 64oz  fluid intake  · Normal vitamin and mineral levels  · Exercise as tolerated  · Increase calcium to three times a day and take 2 MVI pills   · Follow up with Genevieve Khan  · Focus on nutrient density - avocado, greek yogurt, olive oil/olives, peanut butter    · Follow-up in 6 months  We kindly ask that your arrive 15 minutes before your scheduled appointment time with your provider to allow our staff to room you, get your vital signs and update your chart  · Follow diet as discussed  · Get lab work done in the next 2 weeks  You have been given a lab slip today  Please call the office if you need a replacement  It is recommended to check with your insurance BEFORE getting labs done to make sure they are covered by your policy  Also, please check with your PCP and other providers before getting labs to avoid duplicate labs  Make sure to HOLD any multivitamins that may contain biotin and any biotin supplements FOR 5 DAYS before any labs since it can affect the results  · Follow vitamin and mineral recommendations as reviewed with you  · Call our office if you have any problems with abdominal pain especially associated with fever, chills, nausea, vomiting or any other concerns  · All  Post-bariatric surgery patients should be aware that very small quantities of any alcohol can cause impairment and it is very possible not to feel the effect  The effect can be in the system for several hours  It is also a stomach irritant  · It is advised to AVOID alcohol, Nonsteroidal antiinflammatory drugs (NSAIDS) and nicotine of all forms   Any of these can cause stomach irritation/pain

## 2022-10-11 NOTE — ASSESSMENT & PLAN NOTE
-At risk for malabsorption of vitamins/minerals secondary to malabsorption and restriction of intake from bariatric surgery  -NOT Currently taking adequate postop bariatric surgery vitamin supplementation: Centrum Silver MVI x2, calcium citrate 500mg   -Recommend increase calcium to TID     -Last labs on 09/27/22: Mildly Low H/H; no iron panel drawn for some reason - confirmed not at [facility] labs; will reorder   Rest of vitamins WNL   (No vitamin A or PTH d/t CKD)    -Repeat labs in September 2023  -Patient received education about the importance of adhering to a lifelong supplementation regimen to avoid vitamin/mineral deficiencies

## 2022-10-11 NOTE — ASSESSMENT & PLAN NOTE
-s/p Shamika-En-Y Gastric Bypass and HH repair with Dr Renay De La Rosa on 12/22/20  Overall doing very well; weight stable  He will f/u with Surgical RD prior to the new year  Initial: 241 5lbs  Current: 147 2lbs  EWL: 117%  Wilfred: 144 1lbs  Current BMI is Body mass index is 23 4 kg/m²  · Tolerating a regular diet-yes  · Eating at least 60 grams of protein per day-yes  · Following 30/60 minute rule with liquids-yes  · Drinking at least 64 ounces of fluid per day-yes, but encouraged more water  · Drinking carbonated beverages-no  · Sufficient exercise-yes walking daily with [employer]od[employer]  · Using NSAIDs regularly-no  · Using nicotine-no  · Using alcohol-no   Advised about the risks of alcohol s/p bariatric surgery and recommend avoiding all alcohol

## 2022-10-11 NOTE — ASSESSMENT & PLAN NOTE
Lab Results   Component Value Date    HGBA1C 5 4 09/27/2022     -No longer on DM meds  -Continue healthy diet and exercise; f/u with RD and lengthy dietary education today

## 2022-10-13 ENCOUNTER — OFFICE VISIT (OUTPATIENT)
Dept: BARIATRICS | Facility: CLINIC | Age: 57
End: 2022-10-13
Payer: COMMERCIAL

## 2022-10-13 VITALS
SYSTOLIC BLOOD PRESSURE: 148 MMHG | HEIGHT: 67 IN | DIASTOLIC BLOOD PRESSURE: 72 MMHG | TEMPERATURE: 97.4 F | BODY MASS INDEX: 23.1 KG/M2 | WEIGHT: 147.2 LBS | RESPIRATION RATE: 14 BRPM | HEART RATE: 64 BPM

## 2022-10-13 DIAGNOSIS — N18.30 CKD STAGE 3 SECONDARY TO DIABETES (HCC): ICD-10-CM

## 2022-10-13 DIAGNOSIS — K91.2 POSTSURGICAL MALABSORPTION: ICD-10-CM

## 2022-10-13 DIAGNOSIS — I42.1 OBSTRUCTIVE HYPERTROPHIC CARDIOMYOPATHY (HCC): ICD-10-CM

## 2022-10-13 DIAGNOSIS — E11.22 CKD STAGE 3 SECONDARY TO DIABETES (HCC): ICD-10-CM

## 2022-10-13 DIAGNOSIS — Z48.815 ENCOUNTER FOR SURGICAL AFTERCARE FOLLOWING SURGERY OF DIGESTIVE SYSTEM: Primary | ICD-10-CM

## 2022-10-13 DIAGNOSIS — J44.9 CHRONIC OBSTRUCTIVE PULMONARY DISEASE, UNSPECIFIED COPD TYPE (HCC): ICD-10-CM

## 2022-10-13 DIAGNOSIS — I82.403 LEG DVT (DEEP VENOUS THROMBOEMBOLISM), ACUTE, BILATERAL (HCC): ICD-10-CM

## 2022-10-13 DIAGNOSIS — I10 ESSENTIAL HYPERTENSION: ICD-10-CM

## 2022-10-13 DIAGNOSIS — E66.01 MORBID (SEVERE) OBESITY DUE TO EXCESS CALORIES (HCC): ICD-10-CM

## 2022-10-13 PROCEDURE — 99214 OFFICE O/P EST MOD 30 MIN: CPT | Performed by: PHYSICIAN ASSISTANT

## 2022-10-13 RX ORDER — ATORVASTATIN CALCIUM 40 MG/1
40 TABLET, FILM COATED ORAL
COMMUNITY
Start: 2022-07-21

## 2022-10-13 RX ORDER — SERTRALINE HYDROCHLORIDE 100 MG/1
2 TABLET, FILM COATED ORAL DAILY
COMMUNITY
Start: 2022-06-15

## 2022-10-13 RX ORDER — MIRTAZAPINE 15 MG/1
15 TABLET, FILM COATED ORAL
COMMUNITY
Start: 2022-09-01

## 2022-10-13 RX ORDER — LABETALOL 100 MG/1
100 TABLET, FILM COATED ORAL 2 TIMES DAILY
COMMUNITY
Start: 2022-07-30

## 2022-10-13 NOTE — PROGRESS NOTES
Assessment/Plan:    Encounter for surgical aftercare following surgery of digestive system  -s/p Shamika-En-Y Gastric Bypass and HH repair with Dr Angelo Figueroa on 12/22/20  Overall doing very well; weight stable  He will f/u with Surgical RD prior to the new year  Initial: 241 5lbs  Current: 147 2lbs  EWL: 117%  Wilfred: 144 1lbs  Current BMI is Body mass index is 23 4 kg/m²  · Tolerating a regular diet-yes  · Eating at least 60 grams of protein per day-yes  · Following 30/60 minute rule with liquids-yes  · Drinking at least 64 ounces of fluid per day-yes, but encouraged more water  · Drinking carbonated beverages-no  · Sufficient exercise-yes walking daily with Jonna [employer] doodle  · Using NSAIDs regularly-no  · Using nicotine-no  · Using alcohol-no  Advised about the risks of alcohol s/p bariatric surgery and recommend avoiding all alcohol    Postsurgical malabsorption  -At risk for malabsorption of vitamins/minerals secondary to malabsorption and restriction of intake from bariatric surgery  -NOT Currently taking adequate postop bariatric surgery vitamin supplementation: Centrum Silver MVI x2, calcium citrate 500mg   -Recommend increase calcium to TID     -Last labs on 09/27/22: Mildly Low H/H; no iron panel drawn for some reason - confirmed not at [facility] labs; will reorder   Rest of vitamins WNL   (No vitamin A or PTH d/t CKD)    -Repeat labs in September 2023  -Patient received education about the importance of adhering to a lifelong supplementation regimen to avoid vitamin/mineral deficiencies     CKD stage 3 secondary to diabetes [facility])    Lab Results   Component Value Date    HGBA1C 5 4 09/27/2022     -No longer on DM meds  -Continue healthy diet and exercise; f/u with RD and lengthy dietary education today    Essential hypertension  -on multiple antihypertensives  -continue monitoring and management with PCP and Cards       Diagnoses and all orders for this visit:    Encounter for surgical aftercare following surgery of digestive system    Postsurgical malabsorption  -     Iron Panel (Includes Ferritin, Iron Sat%, Iron, and TIBC); Future    CKD stage 3 secondary to diabetes [facility])    Essential hypertension    Chronic obstructive pulmonary disease, unspecified COPD type ([facility] )    Obstructive hypertrophic cardiomyopathy (HCC)    Leg DVT (deep venous thromboembolism), acute, bilateral (HCC)    Morbid (severe) obesity due to excess calories ([place] [id number] )    Other orders  -     atorvastatin (LIPITOR) 40 mg tablet; Take 40 mg by mouth daily at bedtime  -     labetalol (NORMODYNE) 100 mg tablet; Take 100 mg by mouth 2 (two) times a day Morning and before bedtime  -     mirtazapine (REMERON) 15 mg tablet; 15 mg  -     sertraline (ZOLOFT) 100 mg tablet; Take 2 tablets by mouth daily          Subjective:      Patient ID: Sage Haley is a 59 y o  male  -s/p Shamika-En-Y Gastric Bypass and HH repair with Dr Penelope Osman on 12/22/20  Presents to the office today for routine follow up  Tolerating a regular diet; denies N/V, dysphagia, reflux  He has epigastric pain when taking all his pills at once in the morning but passes soon after  Otherwise feeling great, weight stable  Diet Recall:   B - 1/2 bagel w/ butter 1 cup black coffee and Greek yogurt   Snack - Core power protein shake or yogurt   L - chicken pot pie   Snack - core protein powder shake  D - pasta or burgers or fish and veggies    Fluids - 1-2 protein shakes, 1 cup coffee, 32oz water, 16oz crystal lite    The following portions of the patient's history were reviewed and updated as appropriate: allergies, current medications, past family history, past medical history, past social history, past surgical history and problem list     Review of Systems   Constitutional: Negative for chills and fever  HENT: Negative for trouble swallowing  Respiratory: Negative for cough and shortness of breath  Cardiovascular: Negative for chest pain and palpitations  Gastrointestinal: Positive for abdominal pain (briefly when taking morning pills)  Negative for constipation, diarrhea, nausea and vomiting  Neurological: Negative for dizziness  Psychiatric/Behavioral:        Denies anxiety and depression         Objective:      /72 (BP Location: Right arm, Patient Position: Sitting, Cuff Size: Standard)   Pulse 64   Temp (!) 97 4 °F (36 3 °C) (Tympanic)   Resp 14   Ht 5' 6 5" (1 689 m)   Wt 66 8 kg (147 lb 3 2 oz)   BMI 23 40 kg/m²     Colonoscopy-Completed       Physical Exam  Vitals reviewed  Constitutional:       General: He is not in acute distress  Appearance: He is well-developed  HENT:      Head: Normocephalic and atraumatic  Eyes:      General: No scleral icterus  Cardiovascular:      Rate and Rhythm: Normal rate and regular rhythm  Pulmonary:      Effort: Pulmonary effort is normal  No respiratory distress  Abdominal:      General: There is no distension  Palpations: Abdomen is soft  Tenderness: There is no abdominal tenderness  Comments: No incisional hernias appreciated   Skin:     General: Skin is warm and dry  Neurological:      Mental Status: He is alert and oriented to person, place, and time  Psychiatric:         Mood and Affect: Mood normal          Behavior: Behavior normal            BARRIERS: none identified    GOALS:   · Continued/Maintain healthy weight loss with good nutrition intakes  · Adequate hydration with at least 64oz  fluid intake  · Normal vitamin and mineral levels  · Exercise as tolerated  · Increase calcium citrate to 500mg three times a day    · Follow-up in 1 year  We kindly ask that your arrive 15 minutes before your scheduled appointment time with your provider to allow our staff to room you, get your vital signs and update your chart  · Follow diet as discussed  · Get lab work done in the next 2 weeks - iron panel  You have been given a lab slip today    Please call the office if you need a replacement  It is recommended to check with your insurance BEFORE getting labs done to make sure they are covered by your policy  Also, please check with your PCP and other providers before getting labs to avoid duplicate labs  Make sure to HOLD any multivitamins that may contain biotin and any biotin supplements FOR 5 DAYS before any labs since it can affect the results  · Follow vitamin and mineral recommendations as reviewed with you  · Call our office if you have any problems with abdominal pain especially associated with fever, chills, nausea, vomiting or any other concerns  · All  Post-bariatric surgery patients should be aware that very small quantities of any alcohol can cause impairment and it is very possible not to feel the effect  The effect can be in the system for several hours  It is also a stomach irritant  · It is advised to AVOID alcohol, Nonsteroidal antiinflammatory drugs (NSAIDS) and nicotine of all forms   Any of these can cause stomach irritation/pain

## 2022-10-13 NOTE — PATIENT INSTRUCTIONS
GOALS:   Continued/Maintain healthy weight loss with good nutrition intakes  Adequate hydration with at least 64oz  fluid intake  Normal vitamin and mineral levels  Exercise as tolerated  Increase calcium citrate to 500mg three times a day    Follow-up in 1 year  We kindly ask that your arrive 15 minutes before your scheduled appointment time with your provider to allow our staff to room you, get your vital signs and update your chart  Follow diet as discussed  Get lab work done in the next 2 weeks - iron panel  You have been given a lab slip today  Please call the office if you need a replacement  It is recommended to check with your insurance BEFORE getting labs done to make sure they are covered by your policy  Also, please check with your PCP and other providers before getting labs to avoid duplicate labs  Make sure to HOLD any multivitamins that may contain biotin and any biotin supplements FOR 5 DAYS before any labs since it can affect the results  Follow vitamin and mineral recommendations as reviewed with you  Call our office if you have any problems with abdominal pain especially associated with fever, chills, nausea, vomiting or any other concerns  All  Post-bariatric surgery patients should be aware that very small quantities of any alcohol can cause impairment and it is very possible not to feel the effect  The effect can be in the system for several hours  It is also a stomach irritant  It is advised to AVOID alcohol, Nonsteroidal antiinflammatory drugs (NSAIDS) and nicotine of all forms   Any of these can cause stomach irritation/pain

## 2023-05-10 ENCOUNTER — APPOINTMENT (EMERGENCY)
Dept: CT IMAGING | Facility: HOSPITAL | Age: 58
End: 2023-05-10

## 2023-05-10 ENCOUNTER — HOSPITAL ENCOUNTER (EMERGENCY)
Facility: HOSPITAL | Age: 58
End: 2023-05-10
Attending: EMERGENCY MEDICINE

## 2023-05-10 ENCOUNTER — HOSPITAL ENCOUNTER (INPATIENT)
Facility: HOSPITAL | Age: 58
LOS: 4 days | Discharge: HOME | End: 2023-05-14
Attending: INTERNAL MEDICINE | Admitting: INTERNAL MEDICINE

## 2023-05-10 ENCOUNTER — APPOINTMENT (INPATIENT)
Dept: RADIOLOGY | Facility: HOSPITAL | Age: 58
End: 2023-05-10

## 2023-05-10 ENCOUNTER — APPOINTMENT (EMERGENCY)
Dept: RADIOLOGY | Facility: HOSPITAL | Age: 58
End: 2023-05-10

## 2023-05-10 VITALS
RESPIRATION RATE: 20 BRPM | HEART RATE: 87 BPM | SYSTOLIC BLOOD PRESSURE: 189 MMHG | OXYGEN SATURATION: 100 % | DIASTOLIC BLOOD PRESSURE: 94 MMHG | TEMPERATURE: 97.8 F

## 2023-05-10 DIAGNOSIS — I67.4 HYPERTENSIVE ENCEPHALOPATHY: ICD-10-CM

## 2023-05-10 DIAGNOSIS — R56.9 NEW ONSET SEIZURE (HCC): Primary | ICD-10-CM

## 2023-05-10 DIAGNOSIS — D73.89 SPLENIC LESION: ICD-10-CM

## 2023-05-10 DIAGNOSIS — R56.9 SEIZURE (HCC): Primary | ICD-10-CM

## 2023-05-10 DIAGNOSIS — D32.9 MENINGIOMA (HCC): ICD-10-CM

## 2023-05-10 DIAGNOSIS — I16.1 HYPERTENSIVE EMERGENCY: ICD-10-CM

## 2023-05-10 DIAGNOSIS — I10 ESSENTIAL HYPERTENSION: ICD-10-CM

## 2023-05-10 LAB
2HR DELTA HS TROPONIN: -2 NG/L
2HR DELTA HS TROPONIN: 20 NG/L
4HR DELTA HS TROPONIN: 28 NG/L
4HR DELTA HS TROPONIN: 7 NG/L
ABO GROUP BLD: NORMAL
ALBUMIN SERPL BCP-MCNC: 4.2 G/DL (ref 3.5–5)
ALBUMIN SERPL BCP-MCNC: 4.7 G/DL (ref 3.5–5)
ALP SERPL-CCNC: 92 U/L (ref 34–104)
ALP SERPL-CCNC: 94 U/L (ref 46–116)
ALT SERPL W P-5'-P-CCNC: 162 U/L (ref 7–52)
ALT SERPL W P-5'-P-CCNC: 170 U/L (ref 12–78)
AMYLASE SERPL-CCNC: 127 IU/L (ref 25–115)
ANION GAP SERPL CALCULATED.3IONS-SCNC: 13 MMOL/L (ref 4–13)
ANION GAP SERPL CALCULATED.3IONS-SCNC: 7 MMOL/L (ref 4–13)
AST SERPL W P-5'-P-CCNC: 101 U/L (ref 5–45)
AST SERPL W P-5'-P-CCNC: 156 U/L (ref 13–39)
ATRIAL RATE: 78 BPM
ATRIAL RATE: 81 BPM
BACTERIA UR QL AUTO: ABNORMAL /HPF
BASOPHILS # BLD AUTO: 0.02 THOUSANDS/ÂΜL (ref 0–0.1)
BASOPHILS NFR BLD AUTO: 0 % (ref 0–1)
BILIRUB SERPL-MCNC: 1.01 MG/DL (ref 0.2–1)
BILIRUB SERPL-MCNC: 1.31 MG/DL (ref 0.2–1)
BILIRUB UR QL STRIP: NEGATIVE
BLD GP AB SCN SERPL QL: NEGATIVE
BUN SERPL-MCNC: 36 MG/DL (ref 5–25)
BUN SERPL-MCNC: 41 MG/DL (ref 5–25)
CALCIUM SERPL-MCNC: 10.1 MG/DL (ref 8.4–10.2)
CALCIUM SERPL-MCNC: 10.4 MG/DL (ref 8.3–10.1)
CARDIAC TROPONIN I PNL SERPL HS: 117 NG/L
CARDIAC TROPONIN I PNL SERPL HS: 137 NG/L
CARDIAC TROPONIN I PNL SERPL HS: 145 NG/L
CARDIAC TROPONIN I PNL SERPL HS: 50 NG/L
CARDIAC TROPONIN I PNL SERPL HS: 52 NG/L
CARDIAC TROPONIN I PNL SERPL HS: 59 NG/L
CHLORIDE SERPL-SCNC: 101 MMOL/L (ref 96–108)
CHLORIDE SERPL-SCNC: 107 MMOL/L (ref 96–108)
CLARITY UR: CLEAR
CO2 SERPL-SCNC: 25 MMOL/L (ref 21–32)
CO2 SERPL-SCNC: 27 MMOL/L (ref 21–32)
COLOR UR: ABNORMAL
CREAT SERPL-MCNC: 1.74 MG/DL (ref 0.6–1.3)
CREAT SERPL-MCNC: 1.81 MG/DL (ref 0.6–1.3)
EOSINOPHIL # BLD AUTO: 0.01 THOUSAND/ÂΜL (ref 0–0.61)
EOSINOPHIL NFR BLD AUTO: 0 % (ref 0–6)
ERYTHROCYTE [DISTWIDTH] IN BLOOD BY AUTOMATED COUNT: 13.9 % (ref 11.6–15.1)
GFR SERPL CREATININE-BSD FRML MDRD: 40 ML/MIN/1.73SQ M
GFR SERPL CREATININE-BSD FRML MDRD: 41 ML/MIN/1.73SQ M
GLUCOSE SERPL-MCNC: 132 MG/DL (ref 65–140)
GLUCOSE SERPL-MCNC: 198 MG/DL (ref 65–140)
GLUCOSE SERPL-MCNC: 225 MG/DL (ref 65–140)
GLUCOSE UR STRIP-MCNC: NEGATIVE MG/DL
HCT VFR BLD AUTO: 43 % (ref 36.5–49.3)
HGB BLD-MCNC: 14.3 G/DL (ref 12–17)
HGB UR QL STRIP.AUTO: ABNORMAL
IMM GRANULOCYTES # BLD AUTO: 0.03 THOUSAND/UL (ref 0–0.2)
IMM GRANULOCYTES NFR BLD AUTO: 0 % (ref 0–2)
KETONES UR STRIP-MCNC: NEGATIVE MG/DL
LACTATE SERPL-SCNC: 1.9 MMOL/L (ref 0.5–2)
LEUKOCYTE ESTERASE UR QL STRIP: NEGATIVE
LIPASE SERPL-CCNC: 101 U/L (ref 73–393)
LYMPHOCYTES # BLD AUTO: 0.5 THOUSANDS/ÂΜL (ref 0.6–4.47)
LYMPHOCYTES NFR BLD AUTO: 6 % (ref 14–44)
MAGNESIUM SERPL-MCNC: 2.3 MG/DL (ref 1.6–2.6)
MCH RBC QN AUTO: 28.9 PG (ref 26.8–34.3)
MCHC RBC AUTO-ENTMCNC: 33.3 G/DL (ref 31.4–37.4)
MCV RBC AUTO: 87 FL (ref 82–98)
MONOCYTES # BLD AUTO: 0.15 THOUSAND/ÂΜL (ref 0.17–1.22)
MONOCYTES NFR BLD AUTO: 2 % (ref 4–12)
NEUTROPHILS # BLD AUTO: 7.94 THOUSANDS/ÂΜL (ref 1.85–7.62)
NEUTS SEG NFR BLD AUTO: 92 % (ref 43–75)
NITRITE UR QL STRIP: NEGATIVE
NON-SQ EPI CELLS URNS QL MICRO: ABNORMAL /HPF
NRBC BLD AUTO-RTO: 0 /100 WBCS
P AXIS: 83 DEGREES
P AXIS: 86 DEGREES
PH UR STRIP.AUTO: 7.5 [PH]
PHOSPHATE SERPL-MCNC: 1.5 MG/DL (ref 2.7–4.5)
PLATELET # BLD AUTO: 264 THOUSANDS/UL (ref 149–390)
PLATELET # BLD AUTO: 294 THOUSANDS/UL (ref 149–390)
PMV BLD AUTO: 10.3 FL (ref 8.9–12.7)
PMV BLD AUTO: 10.5 FL (ref 8.9–12.7)
POTASSIUM SERPL-SCNC: 2.8 MMOL/L (ref 3.5–5.3)
POTASSIUM SERPL-SCNC: 3.4 MMOL/L (ref 3.5–5.3)
PR INTERVAL: 150 MS
PR INTERVAL: 166 MS
PROT SERPL-MCNC: 8.2 G/DL (ref 6.4–8.4)
PROT SERPL-MCNC: 8.2 G/DL (ref 6.4–8.4)
PROT UR STRIP-MCNC: ABNORMAL MG/DL
QRS AXIS: -75 DEGREES
QRS AXIS: 74 DEGREES
QRSD INTERVAL: 100 MS
QRSD INTERVAL: 104 MS
QT INTERVAL: 429 MS
QT INTERVAL: 450 MS
QTC INTERVAL: 498 MS
QTC INTERVAL: 513 MS
RBC # BLD AUTO: 4.95 MILLION/UL (ref 3.88–5.62)
RBC #/AREA URNS AUTO: ABNORMAL /HPF
RH BLD: POSITIVE
SODIUM SERPL-SCNC: 139 MMOL/L (ref 135–147)
SODIUM SERPL-SCNC: 141 MMOL/L (ref 135–147)
SP GR UR STRIP.AUTO: 1.01 (ref 1–1.03)
SPECIMEN EXPIRATION DATE: NORMAL
T WAVE AXIS: -88 DEGREES
T WAVE AXIS: 84 DEGREES
UROBILINOGEN UR STRIP-ACNC: <2 MG/DL
VENTRICULAR RATE: 78 BPM
VENTRICULAR RATE: 81 BPM
WBC # BLD AUTO: 8.65 THOUSAND/UL (ref 4.31–10.16)
WBC #/AREA URNS AUTO: ABNORMAL /HPF

## 2023-05-10 PROCEDURE — 4A133B1 MONITORING OF ARTERIAL PRESSURE, PERIPHERAL, PERCUTANEOUS APPROACH: ICD-10-PCS | Performed by: PHYSICIAN ASSISTANT

## 2023-05-10 PROCEDURE — 4A133J1 MONITORING OF ARTERIAL PULSE, PERIPHERAL, PERCUTANEOUS APPROACH: ICD-10-PCS | Performed by: PHYSICIAN ASSISTANT

## 2023-05-10 PROCEDURE — 03HY32Z INSERTION OF MONITORING DEVICE INTO UPPER ARTERY, PERCUTANEOUS APPROACH: ICD-10-PCS | Performed by: PHYSICIAN ASSISTANT

## 2023-05-10 RX ORDER — LABETALOL 100 MG/1
100 TABLET, FILM COATED ORAL EVERY 12 HOURS SCHEDULED
Status: DISCONTINUED | OUTPATIENT
Start: 2023-05-10 | End: 2023-05-11

## 2023-05-10 RX ORDER — LEVETIRACETAM 500 MG/1
1000 TABLET ORAL EVERY 12 HOURS SCHEDULED
Status: DISCONTINUED | OUTPATIENT
Start: 2023-05-10 | End: 2023-05-11

## 2023-05-10 RX ORDER — LABETALOL HYDROCHLORIDE 5 MG/ML
10 INJECTION, SOLUTION INTRAVENOUS ONCE
Status: COMPLETED | OUTPATIENT
Start: 2023-05-10 | End: 2023-05-10

## 2023-05-10 RX ORDER — HEPARIN SODIUM 5000 [USP'U]/ML
5000 INJECTION, SOLUTION INTRAVENOUS; SUBCUTANEOUS EVERY 8 HOURS SCHEDULED
Status: DISCONTINUED | OUTPATIENT
Start: 2023-05-10 | End: 2023-05-14 | Stop reason: HOSPADM

## 2023-05-10 RX ORDER — HYDRALAZINE HYDROCHLORIDE 20 MG/ML
20 INJECTION INTRAMUSCULAR; INTRAVENOUS ONCE
Status: COMPLETED | OUTPATIENT
Start: 2023-05-10 | End: 2023-05-10

## 2023-05-10 RX ORDER — PANTOPRAZOLE SODIUM 40 MG/10ML
40 INJECTION, POWDER, LYOPHILIZED, FOR SOLUTION INTRAVENOUS EVERY 12 HOURS SCHEDULED
Status: DISCONTINUED | OUTPATIENT
Start: 2023-05-10 | End: 2023-05-11

## 2023-05-10 RX ORDER — ESCITALOPRAM OXALATE 20 MG/1
20 TABLET ORAL DAILY
Status: DISCONTINUED | OUTPATIENT
Start: 2023-05-11 | End: 2023-05-14 | Stop reason: HOSPADM

## 2023-05-10 RX ORDER — NICARDIPINE HYDROCHLORIDE 2.5 MG/ML
INJECTION INTRAVENOUS
Status: COMPLETED
Start: 2023-05-10 | End: 2023-05-10

## 2023-05-10 RX ORDER — SODIUM CHLORIDE, SODIUM GLUCONATE, SODIUM ACETATE, POTASSIUM CHLORIDE, MAGNESIUM CHLORIDE, SODIUM PHOSPHATE, DIBASIC, AND POTASSIUM PHOSPHATE .53; .5; .37; .037; .03; .012; .00082 G/100ML; G/100ML; G/100ML; G/100ML; G/100ML; G/100ML; G/100ML
50 INJECTION, SOLUTION INTRAVENOUS CONTINUOUS
Status: DISCONTINUED | OUTPATIENT
Start: 2023-05-10 | End: 2023-05-11

## 2023-05-10 RX ORDER — HYDROMORPHONE HCL IN WATER/PF 6 MG/30 ML
0.2 PATIENT CONTROLLED ANALGESIA SYRINGE INTRAVENOUS EVERY 4 HOURS PRN
Status: DISCONTINUED | OUTPATIENT
Start: 2023-05-10 | End: 2023-05-14 | Stop reason: HOSPADM

## 2023-05-10 RX ORDER — LABETALOL HYDROCHLORIDE 5 MG/ML
20 INJECTION, SOLUTION INTRAVENOUS ONCE
Status: COMPLETED | OUTPATIENT
Start: 2023-05-10 | End: 2023-05-10

## 2023-05-10 RX ORDER — ONDANSETRON 2 MG/ML
4 INJECTION INTRAMUSCULAR; INTRAVENOUS ONCE
Status: COMPLETED | OUTPATIENT
Start: 2023-05-10 | End: 2023-05-10

## 2023-05-10 RX ORDER — DOXAZOSIN MESYLATE 4 MG/1
8 TABLET ORAL 2 TIMES DAILY
Status: DISCONTINUED | OUTPATIENT
Start: 2023-05-10 | End: 2023-05-14 | Stop reason: HOSPADM

## 2023-05-10 RX ORDER — LORAZEPAM 2 MG/ML
1 INJECTION INTRAMUSCULAR ONCE
Status: COMPLETED | OUTPATIENT
Start: 2023-05-10 | End: 2023-05-10

## 2023-05-10 RX ORDER — POTASSIUM CHLORIDE 14.9 MG/ML
20 INJECTION INTRAVENOUS
Status: COMPLETED | OUTPATIENT
Start: 2023-05-10 | End: 2023-05-11

## 2023-05-10 RX ORDER — OXYCODONE HYDROCHLORIDE 10 MG/1
10 TABLET ORAL EVERY 4 HOURS PRN
Status: DISCONTINUED | OUTPATIENT
Start: 2023-05-10 | End: 2023-05-14 | Stop reason: HOSPADM

## 2023-05-10 RX ORDER — AMLODIPINE BESYLATE 10 MG/1
10 TABLET ORAL DAILY
Status: DISCONTINUED | OUTPATIENT
Start: 2023-05-11 | End: 2023-05-14 | Stop reason: HOSPADM

## 2023-05-10 RX ORDER — AMLODIPINE BESYLATE 10 MG/1
10 TABLET ORAL DAILY
Status: DISCONTINUED | OUTPATIENT
Start: 2023-05-10 | End: 2023-05-10

## 2023-05-10 RX ORDER — OXYCODONE HYDROCHLORIDE 5 MG/1
5 TABLET ORAL EVERY 4 HOURS PRN
Status: DISCONTINUED | OUTPATIENT
Start: 2023-05-10 | End: 2023-05-14 | Stop reason: HOSPADM

## 2023-05-10 RX ORDER — HYDROMORPHONE HCL IN WATER/PF 6 MG/30 ML
0.2 PATIENT CONTROLLED ANALGESIA SYRINGE INTRAVENOUS ONCE
Status: COMPLETED | OUTPATIENT
Start: 2023-05-10 | End: 2023-05-10

## 2023-05-10 RX ORDER — FERROUS SULFATE 325(65) MG
325 TABLET ORAL EVERY OTHER DAY
Status: DISCONTINUED | OUTPATIENT
Start: 2023-05-11 | End: 2023-05-14 | Stop reason: HOSPADM

## 2023-05-10 RX ORDER — CHLORHEXIDINE GLUCONATE 0.12 MG/ML
15 RINSE ORAL EVERY 12 HOURS SCHEDULED
Status: DISCONTINUED | OUTPATIENT
Start: 2023-05-10 | End: 2023-05-14 | Stop reason: HOSPADM

## 2023-05-10 RX ADMIN — ONDANSETRON 4 MG: 2 INJECTION INTRAMUSCULAR; INTRAVENOUS at 13:36

## 2023-05-10 RX ADMIN — POTASSIUM CHLORIDE 20 MEQ: 14.9 INJECTION, SOLUTION INTRAVENOUS at 19:17

## 2023-05-10 RX ADMIN — SODIUM CHLORIDE 5 MG/HR: 0.9 INJECTION, SOLUTION INTRAVENOUS at 13:56

## 2023-05-10 RX ADMIN — LABETALOL HYDROCHLORIDE 100 MG: 100 TABLET, FILM COATED ORAL at 20:19

## 2023-05-10 RX ADMIN — LEVETIRACETAM 1000 MG: 100 INJECTION, SOLUTION INTRAVENOUS at 08:18

## 2023-05-10 RX ADMIN — SODIUM CHLORIDE, SODIUM GLUCONATE, SODIUM ACETATE, POTASSIUM CHLORIDE, MAGNESIUM CHLORIDE, SODIUM PHOSPHATE, DIBASIC, AND POTASSIUM PHOSPHATE 50 ML/HR: .53; .5; .37; .037; .03; .012; .00082 INJECTION, SOLUTION INTRAVENOUS at 17:41

## 2023-05-10 RX ADMIN — LEVETIRACETAM 1000 MG: 500 TABLET, FILM COATED ORAL at 20:21

## 2023-05-10 RX ADMIN — PANTOPRAZOLE SODIUM 40 MG: 40 INJECTION, POWDER, FOR SOLUTION INTRAVENOUS at 20:21

## 2023-05-10 RX ADMIN — NICARDIPINE HYDROCHLORIDE 25 MG: 2.5 INJECTION, SOLUTION INTRAVENOUS at 20:46

## 2023-05-10 RX ADMIN — LABETALOL HYDROCHLORIDE 20 MG: 5 INJECTION, SOLUTION INTRAVENOUS at 13:50

## 2023-05-10 RX ADMIN — HEPARIN SODIUM 5000 UNITS: 5000 INJECTION INTRAVENOUS; SUBCUTANEOUS at 21:13

## 2023-05-10 RX ADMIN — LEVETIRACETAM 1500 MG: 100 INJECTION, SOLUTION INTRAVENOUS at 09:14

## 2023-05-10 RX ADMIN — NICARDIPINE HYDROCHLORIDE 7.5 MG/HR: 2.5 INJECTION, SOLUTION INTRAVENOUS at 23:05

## 2023-05-10 RX ADMIN — HYDROMORPHONE HYDROCHLORIDE 0.2 MG: 0.2 INJECTION, SOLUTION INTRAMUSCULAR; INTRAVENOUS; SUBCUTANEOUS at 19:33

## 2023-05-10 RX ADMIN — IOHEXOL 97 ML: 350 INJECTION, SOLUTION INTRAVENOUS at 18:14

## 2023-05-10 RX ADMIN — TRIMETHOBENZAMIDE HYDROCHLORIDE 200 MG: 100 INJECTION INTRAMUSCULAR at 17:48

## 2023-05-10 RX ADMIN — SODIUM CHLORIDE 1 MG/MIN: 0.9 INJECTION, SOLUTION INTRAVENOUS at 12:02

## 2023-05-10 RX ADMIN — LABETALOL HYDROCHLORIDE 2 MG/MIN: 5 INJECTION, SOLUTION INTRAVENOUS at 13:30

## 2023-05-10 RX ADMIN — HYDRALAZINE HYDROCHLORIDE 20 MG: 20 INJECTION INTRAMUSCULAR; INTRAVENOUS at 13:50

## 2023-05-10 RX ADMIN — CHLORHEXIDINE GLUCONATE 0.12% ORAL RINSE 15 ML: 1.2 LIQUID ORAL at 20:04

## 2023-05-10 RX ADMIN — NICARDIPINE HYDROCHLORIDE 15 MG/HR: 2.5 INJECTION, SOLUTION INTRAVENOUS at 18:52

## 2023-05-10 RX ADMIN — LABETALOL HYDROCHLORIDE 10 MG: 5 INJECTION, SOLUTION INTRAVENOUS at 11:19

## 2023-05-10 RX ADMIN — HYDROMORPHONE HYDROCHLORIDE 0.2 MG: 0.2 INJECTION, SOLUTION INTRAMUSCULAR; INTRAVENOUS; SUBCUTANEOUS at 17:32

## 2023-05-10 RX ADMIN — LABETALOL HYDROCHLORIDE 10 MG: 5 INJECTION, SOLUTION INTRAVENOUS at 08:09

## 2023-05-10 RX ADMIN — POTASSIUM CHLORIDE 20 MEQ: 14.9 INJECTION, SOLUTION INTRAVENOUS at 21:12

## 2023-05-10 NOTE — EMTALA/ACUTE CARE TRANSFER
[address] [facility] [address]  [address]  [place] [address]  Dept: [phone]      EMTALA TRANSFER CONSENT    NAME Deandre Rain                                         1965                              MRN [id number]    I have been informed of my rights regarding examination, treatment, and transfer   by Dr Lupe Cowart MD    Benefits: Specialized equipment and/or services available at the receiving facility (Include comment)__Neurocritical Care__    Risks: Potential for delay in receiving treatment, Increased discomfort during transfer, Possible worsening of condition or death during transfer, Potential deterioration of medical condition, Loss of IV      Consent for Transfer:  I acknowledge that my medical condition has been evaluated and explained to me by the emergency department physician or other qualified medical person and/or my attending physician, who has recommended that I be transferred to the service of  Accepting Physician: Dr Sagrario Edwards at [facility] Name, [address] : [place]  The above potential benefits of such transfer, the potential risks associated with such transfer, and the probable risks of not being transferred have been explained to me, and I fully understand them  The doctor has explained that, in my case, the benefits of transfer outweigh the risks  I agree to be transferred  I authorize the performance of emergency medical procedures and treatments upon me in both transit and upon arrival at the receiving facility  Additionally, I authorize the release of any and all medical records to the receiving facility and request they be transported with me, if possible  I understand that the safest mode of transportation during a medical emergency is an ambulance and that the Hospital advocates the use of this mode of transport   Risks of traveling to the receiving facility by car, including absence of medical control, life sustaining equipment, such as oxygen, and medical personnel has been explained to me and I fully understand them  (ANA CORRECT BOX BELOW)  [  ]  I consent to the stated transfer and to be transported by ambulance/helicopter  [  ]  I consent to the stated transfer, but refuse transportation by ambulance and accept full responsibility for my transportation by car  I understand the risks of non-ambulance transfers and I exonerate the Hospital and its staff from any deterioration in my condition that results from this refusal     X___________________________________________    DATE  05/10/23  TIME________  Signature of patient or legally responsible individual signing on patient behalf           RELATIONSHIP TO PATIENT_________________________          Provider Certification    NAME Walter Luciano                                         1965                              MRN [id number]    A medical screening exam was performed on the above named patient  Based on the examination:    Condition Necessitating Transfer The encounter diagnosis was New onset seizure ([address] )      Patient Condition: The patient has been stabilized such that within reasonable medical probability, no material deterioration of the patient condition or the condition of the unborn child(nini) is likely to result from the transfer    Reason for Transfer: Level of Care needed not available at this facility    Transfer Requirements: Facility [facility]   · Space available and qualified personnel available for treatment as acknowledged by    · Agreed to accept transfer and to provide appropriate medical treatment as acknowledged by       Dr Belkys Gonzales  · Appropriate medical records of the examination and treatment of the patient are provided at the time of transfer   [address] _______  · Transfer will be performed by qualified personnel from    and appropriate transfer equipment as required, including the use of necessary and appropriate life support measures  Provider Certification: I have examined the patient and explained the following risks and benefits of being transferred/refusing transfer to the patient/family:  General risk, such as traffic hazards, adverse weather conditions, rough terrain or turbulence, possible failure of equipment (including vehicle or aircraft), or consequences of actions of persons outside the control of the transport personnel, Unanticipated needs of medical equipment and personnel during transport, Risk of worsening condition, The possibility of a transport vehicle being unavailable      Based on these reasonable risks and benefits to the patient and/or the unborn child(nini), and based upon the information available at the time of the patient’s examination, I certify that the medical benefits reasonably to be expected from the provision of appropriate medical treatments at another medical facility outweigh the increasing risks, if any, to the individual’s medical condition, and in the case of labor to the unborn child, from effecting the transfer      X____________________________________________ DATE 05/10/23        TIME_______      ORIGINAL - SEND TO MEDICAL RECORDS   COPY - SEND WITH PATIENT DURING TRANSFER

## 2023-05-10 NOTE — PROCEDURES
Arterial Line Insertion    Date/Time: 5/10/2023 7:45 PM  Performed by: Iván Simmons PA-C  Authorized by: Iván Simmons PA-C     Patient location:  ICU  Consent:     Consent obtained:  Verbal    Consent given by:  Patient    Risks discussed:  Bleeding, ischemia, infection, pain and repeat procedure  Universal protocol:     Patient identity confirmed:  Verbally with patient  Indications:     Indications: hemodynamic monitoring, multiple ABGs and continuous blood pressure monitoring    Pre-procedure details:     Skin preparation:  Chlorhexidine  Anesthesia (see MAR for exact dosages): Anesthesia method:  None  Procedure details:     Location / Tip of Catheter:  Radial    Laterality:  Left    Philip's test performed: yes      Philip's test abnormal: no      Needle gauge:  18 G    Placement technique:  Seldinger    Number of attempts:  1    Successful placement: no      Transducer: waveform confirmed    Post-procedure details:     Post-procedure:  Secured with tape, sterile dressing applied, sutured and wrist guard applied    CMS:  Normal    Patient tolerance of procedure:   Tolerated well, no immediate complications

## 2023-05-10 NOTE — H&P
H&P Exam - Critical Care   Anna Pino 59 y o  male MRN: [de-identified]  Unit/Bed#: ICU 02 Encounter: [id number]      -------------------------------------------------------------------------------------------------------------  Chief Complaint: Seizure    History of Present Illness   HX and PE limited by: Postictal, discomfort  Anna Pino is a 59 y o  male with PMH of RXYGB, HTN, CKD3, COPD, DM, CHF who presented to [facility] for seizure  Patient reports that he was in his usual state of health until this morning when he had sudden onset abdominal pain  He does not remember the events after this but per chart review he was found shaking on the ground by his family  EMS was called and by the time they arrived patient was postictal   However he ended up having a tonic-clonic seizure requiring administration of 2 mg Ativan to break  Patient was postictal afterwards and brought to the ED  Patient found to be hypertensive into 200s requiring initiation of a Cardene drip  Labs significant for K3 4, BUN 41, CR 1 81, glucose 225, ,   Troponin 52, 50, 59  EKG NSR with QTc 513, concern for T wave inversions in anterior leads  CT head without contrast revealed slight increase in 2 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 with mild associated mass effect and chronic microangiopathic changes  Patient transferred to [facility] ICU for new onset seizure and hypertensive emergency  On arrival to [facility], patient's blood pressure had improved on the Cardene drip  Patient complaining of diffuse abdominal pain radiating to his back with nausea both progressively worsened throughout the day  He reports dizziness like he is spinning and shortness of breath but no chest pain  Medications reviewed with patient and he reports compliance  He denies any alcohol, drug, tobacco use      History obtained from chart review and the patient   -------------------------------------------------------------------------------------------------------------  Assessment and Plan:    Neuro:   • Diagnosis: New onset seizure  o Tonic-clonic seizure witnessed by EMS, broke with 2 mg Ativan  o Patient noted to be hypertensive into the 200s - suspect related to this, hypertensive encephalopathy vs PRES  o Required initiation of cardene drip prior to transfer, with improvement in BP  o CTH wo contrast shows slight increase in 2 cm inferior left tentorial meningioma with slight mass effect  o Plan:  - Continue cardene gtt for SBP < 180  - PO Keppra 1g BID  - MRI brain w wo contrast  - Neurology, NSGY consult  - q2 neuro checks  • Diagnosis: Hypertensive encephalopathy vs PRES  o Plan:  - Cardene gtt for SBP <180  • Diagnosis:  Anxiety  o Plan:  - Continue home Buspar, lexapro       CV:   • Diagnosis: Hypertensive emergency  o With encephalopathy  o Plan:  - Cardene gtt for SBP < 180  - Restart home labetalol, amlodipine, doxazosin  - Holding home eplerenone  - Continue to trend trops, EKG  • Diagnosis: dCHF  o Appears hypovolemic  o Plan:  - Gentle IVF  - Continue home beta blocker  - Holding home eplerenone  • Diagnosis: prolonged QTc  o Plan:  - Avoid QT prolonging medications  - Tigan PRN for nausea      Pulm:  • Diagnosis: COPD  o Documented history of COPD with FEV1 66% but increased FEV1/FVC ratio  o Reports not taking inhalers  o Saturating well on room air, not in acute exacerbation  o Plan:  - Monitor, can add PRN albuterol if needed      GI:   • Diagnosis: Abdominal pain  o Radiating to back, with nausea  o Given encephalopathy and HTN, need to r/o dissection  o Plan:  - CTA dissection study  - Lipase, amylase, recheck CMP  - IV protonix 40 mg BID  - Pain regimen in place  - Add bowel regimen if abdominal imaging does not reveal acute intraabdominal pathology  • Diagnosis: Elevated AST, ALT  o ,  on admission; appears to have been elevated on other recent blood work  o Plan:  - Continue to trend  - Follow up abdominal imaging  • Diagnosis: S/p RXYGB  o Plan:  - Continue home iron supplementation      :   • Diagnosis: CKD3  o Cr 1 81 - recent b/l 1 5-1 7  o Plan:  - IV isolyte 50 cc/h  - Continue to trend      F/E/N:   • F: Isolyte 50 cc/h  • E: Recheck lytes; goal K>4, Mg>2, Phos>3  • N: Cardiac, CC diet      Heme/Onc:   • Diagnosis: History of DVT, not on AC  o Plan:   - DVT ppx heparin      Endo:   • Diagnosis: DM2, diet controlled  o Plan:  - Monitor gluc on metabolic panel      ID:   • Diagnosis: No acute issues      MSK/Skin:   • Diagnosis: No acute issues  o Plan:  - PT/OT      Disposition: Continue Critical Care   Code Status: Level 1 - Full Code  --------------------------------------------------------------------------------------------------------------  Review of Systems    A 12-point, complete review of systems was reviewed and negative except as stated above     Physical Exam  Constitutional:       General: He is in acute distress  Appearance: He is ill-appearing  HENT:      Mouth/Throat:      Mouth: Mucous membranes are dry  Eyes:      Extraocular Movements: Extraocular movements intact  Conjunctiva/sclera: Conjunctivae normal       Pupils: Pupils are equal, round, and reactive to light  Comments: Bilateral eyelid droop, patient reports chronic   Cardiovascular:      Rate and Rhythm: Normal rate and regular rhythm  Heart sounds: Normal heart sounds  Pulmonary:      Effort: Pulmonary effort is normal       Breath sounds: Normal breath sounds  Abdominal:      General: There is no distension  Tenderness: There is generalized abdominal tenderness  Musculoskeletal:      Cervical back: Neck supple  Right lower leg: No edema  Left lower leg: No edema  Skin:     General: Skin is warm and dry  Neurological:      Mental Status: He is alert and oriented to person, place, and time  Cranial Nerves: Cranial nerves 2-12 are intact  Comments: 4/5 strength bilaterally throughout, may be effort related   Psychiatric:         Speech: Speech normal          Behavior: Behavior is hyperactive          --------------------------------------------------------------------------------------------------------------  Vitals:   Vitals:    05/10/23 1640 05/10/23 1700   BP: (!) 197/103 (!) 177/87   BP Location: Left arm Left arm   Pulse: 88 80   Resp: 22 (!) 32   Temp: 98 4 °F (36 9 °C)    TempSrc: Oral    SpO2: 99% 99%     Temp  Min: 97 8 °F (36 6 °C)  Max: 98 4 °F (36 9 °C)        There is no height or weight on file to calculate BMI  N/A    Laboratory and Diagnostics:  Results from last 7 days   Lab Units 05/10/23  0725   WBC Thousand/uL 8 65   HEMOGLOBIN g/dL 14 3   HEMATOCRIT % 43 0   PLATELETS Thousands/uL 264   NEUTROS PCT % 92*   MONOS PCT % 2*     Results from last 7 days   Lab Units 05/10/23  0725   SODIUM mmol/L 141   POTASSIUM mmol/L 3 4*   CHLORIDE mmol/L 101   CO2 mmol/L 27   ANION GAP mmol/L 13   BUN mg/dL 41*   CREATININE mg/dL 1 81*   CALCIUM mg/dL 10 1   GLUCOSE RANDOM mg/dL 225*   ALT U/L 162*   AST U/L 156*   ALK PHOS U/L 92   ALBUMIN g/dL 4 7   TOTAL BILIRUBIN mg/dL 1 01*                       ABG:    VBG:          Micro:        EKG: NSR, QTc 513, ?TWI in anterior leads  Imaging: I have personally reviewed pertinent reports  Historical Information   Past Medical History:   Diagnosis Date   • Asthma    • Chronic kidney disease    • COPD (chronic obstructive pulmonary disease) (HCC)    • CPAP (continuous positive airway pressure) dependence    • Diabetes mellitus (HCC)    • Emphysema of lung (HCC)    • Gout    • History of transfusion     pt stated they had a transfusion when they had gallbladder surgery  • Hypertension    • Kidney disease     renal failure   • Leg DVT (deep venous thromboembolism), acute, [facility] ) 01/2018   • Obesity (BMI 30-39  9)    • AGUS on CPAP setting 11   • Postgastrectomy malabsorption    • Sleep apnea    • Systolic CHF [facility])      Past Surgical History:   Procedure Laterality Date   • ADRENALECTOMY     • CHOLECYSTECTOMY     • COLONOSCOPY     • EGD     • HIATAL HERNIA REPAIR N/A 12/22/2020    Procedure: REPAIR HERNIA HIATAL LAPAROSCOPIC;  Surgeon: Aide Mercado MD;  Location: [facility];  Service: Bariatrics   • INCISION AND DRAINAGE OF WOUND Left 10/17/2018    Procedure: INCISION AND DRAINAGE (I&D) GROIN;  Surgeon: Tejinder Shipley MD;  Location: [facility];  Service: General   • IR IMAGE [address]  [facility] [address]  8/17/2018   • IR IMAGE GUIDED ASPIRATION / DRAINAGE W TUBE  7/31/2018   • JOINT REPLACEMENT Bilateral     knee   • KIDNEY SURGERY  2009    nodule removal   • LYMPH NODE DISSECTION Left 01/2018    left inguinal LN removed - benign    • OTHER SURGICAL HISTORY      kidney nodule removal   • PALATE / UVULA BIOPSY / EXCISION     • WI LAPS GSTR RSTCV PX W/BYP JASON-EN-Y LIMB <150 CM N/A 12/22/2020    Procedure: BYPASS GASTRIC  JASON-EN-Y LAPAROSCOPIC WITH INTRAOPERATIVE EGD;  Surgeon: Aide Mercado MD;  Location: [facility];  Service: Bariatrics   • SINUS SURGERY     • TONSILLECTOMY       Social History   Social History     Substance and Sexual Activity   Alcohol Use Yes    Comment: occasionally     Social History     Substance and Sexual Activity   Drug Use No     Social History     Tobacco Use   Smoking Status Never   Smokeless Tobacco Never     Family History:   Family History   Problem Relation Age of Onset   • Heart murmur Sister    • Asthma Sister    • Hypertension Sister    • Kidney disease Mother    • Cancer Father    • Bell's palsy Brother    • Kidney disease Brother    • Deep vein thrombosis Neg Hx      I have reviewed this patient's family history and commented on sigificant items within the HPI      Medications:  Current Facility-Administered Medications   Medication Dose Route Frequency   • amLODIPine (NORVASC) tablet 10 mg  10 mg Oral Daily   • busPIRone (BUSPAR) tablet 15 mg  15 mg Oral BID   • chlorhexidine (PERIDEX) 0 12 % oral rinse 15 mL  15 mL Mouth/Throat Q12H [address]   • doxazosin (CARDURA) tablet 8 mg  8 mg Oral BID   • [START ON 5/11/2023] escitalopram (LEXAPRO) tablet 20 mg  20 mg Oral Daily   • [START ON 5/11/2023] ferrous sulfate tablet 325 mg  325 mg Oral Every Other Day   • heparin (porcine) subcutaneous injection 5,000 Units  5,000 Units Subcutaneous Q8H [address]   • HYDROmorphone HCl (DILAUDID) injection 0 2 mg  0 2 mg Intravenous Once   • HYDROmorphone HCl (DILAUDID) injection 0 2 mg  0 2 mg Intravenous Q4H PRN   • labetalol (NORMODYNE) tablet 100 mg  100 mg Oral Q12H [address]   • levETIRAcetam (KEPPRA) tablet 1,000 mg  1,000 mg Oral Q12H [address]   • multi-electrolyte (PLASMALYTE-A/ISOLYTE-S PH 7 4) IV solution  50 mL/hr Intravenous Continuous   • niCARdipine (CARDENE) 25 mg (STANDARD CONCENTRATION) in sodium chloride 0 9% 250 mL  1-15 mg/hr Intravenous Titrated   • oxyCODONE (ROXICODONE) IR tablet 5 mg  5 mg Oral Q4H PRN    Or   • oxyCODONE (ROXICODONE) immediate release tablet 10 mg  10 mg Oral Q4H PRN   • pantoprazole (PROTONIX) injection 40 mg  40 mg Intravenous Q12H [address]   • trimethobenzamide (TIGAN) IM injection 200 mg  200 mg Intramuscular Q6H PRN     Home medications:  Prior to Admission Medications   Prescriptions Last Dose Informant Patient Reported? Taking?    BD Pen Needle Amelia U/F 32G X 4 MM MISC   Yes No   Sig:     Calcium 600 MG tablet Not Taking  Yes No   Sig: Take 600 mg by mouth   Patient not taking: Reported on 5/10/2023   Cholecalciferol (VITAMIN D-3) 1000 units CAPS Unknown  Yes No   Sig: Take 1,000 Units by mouth daily   Continuous Blood Gluc Sensor (FreeStyle Michelle 14 Day Sensor) MISC   Yes No   Sig: Use as directed   LORazepam (ATIVAN) 0 5 mg tablet Not Taking  Yes No   Sig: TAKE ONE TABLET BY MOUTH Q6 PRN   Patient not taking: Reported on 5/10/2023   Omega-3 Fatty Acids (FISH OIL) 1200 MG CAPS Unknown  Yes No   Sig: Take 1,200 mg by mouth daily at bedtime   Pulmicort Flexhaler 90 MCG/ACT inhaler Unknown  Yes No   Si puffs 2 (two) times a day   Patient not taking: Reported on 10/13/2022   albuterol (PROVENTIL HFA,VENTOLIN HFA) 90 mcg/act inhaler Not Taking  Yes No   Sig: INHALE 2 PUFFS BY MOUTH EVERY 6 HOURS   Patient not taking: Reported on 5/10/2023   amLODIPine (NORVASC) 10 mg tablet Unknown  Yes No   Sig: Take 10 mg by mouth   atorvastatin (LIPITOR) 40 mg tablet Unknown  Yes No   Sig: Take 40 mg by mouth daily at bedtime   busPIRone (BUSPAR) 15 mg tablet Unknown  Yes No   Sig: TAKE ONE TABLET BY MOUTH TWICE A DAY FOR PTSD   doxazosin (CARDURA) 8 MG tablet Unknown  Yes No   Sig: Take 8 mg by mouth 2 (two) times a day   eplerenone (INSPRA) 50 MG tablet Unknown  Yes No   Sig: Take 50 mg by mouth daily   escitalopram (LEXAPRO) 20 mg tablet Unknown  Yes No   Sig: Take 20 mg by mouth daily   ferrous sulfate 325 (65 Fe) mg tablet Unknown  Yes No   Sig: Take 325 mg by mouth 2 (two) times a day     glucose monitoring kit (FREESTYLE) monitoring kit   No No   Si each by Does not apply route 2 (two) times a day Any brand covered by insurance:  Dispense one glucometer, test strips #50, lancets #100   Patient not taking: Reported on 10/13/2022   glycopyrrolate-formoterol ([place]) 9-4 8 MCG/ACT inhaler Unknown  Yes No   Sig: Inhale 2 puffs daily after breakfast   Patient not taking: Reported on 10/13/2022   labetalol (NORMODYNE) 100 mg tablet Unknown  Yes No   Sig: Take 100 mg by mouth 2 (two) times a day Morning and before bedtime   mirtazapine (REMERON) 15 mg tablet Unknown  Yes No   Sig: 15 mg   nebivolol (BYSTOLIC) 20 MG tablet   No No   Sig: Take 1 tablet (20 mg total) by mouth daily   sertraline (ZOLOFT) 100 mg tablet Not Taking  Yes No   Sig: Take 2 tablets by mouth daily   Patient not taking: Reported on 5/10/2023   umeclidinium-vilanterol (ANORO ELLIPTA) 62 5-25 MCG/INH inhaler Unknown  Yes No   Sig: "Inhale 1 puff daily   Patient not taking: Reported on 10/13/2022      Facility-Administered Medications: None     Allergies: Allergies   Allergen Reactions   • Clonidine Anaphylaxis   • Lisinopril Anaphylaxis   • Nifedipine Anaphylaxis   • Spironolactone Anaphylaxis, Other (See Comments) and Shortness Of Breath   • Buspirone      Headaches,    • Enoxaparin      Injection site \"bump\" per Dr Janine Lynne   • Minoxidil      Retains fluid around heart     ------------------------------------------------------------------------------------------------------------  Advance Directive and Living Will:      Power of :    POLST:    ------------------------------------------------------------------------------------------------------------  Anticipated Length of Stay is > 2 midnights    Care Time Delivered:   Upon my evaluation, this patient had a high probability of imminent or life-threatening deterioration due to hypertensive emergency, which required my direct attention, intervention, and personal management  I have personally provided 60 minutes (1700 to 1800) of critical care time, exclusive of procedures, teaching, family meetings, and any prior time recorded by providers other than myself  Morena Vivas MD        Portions of the record may have been created with voice recognition software  Occasional wrong word or \"sound a like\" substitutions may have occurred due to the inherent limitations of voice recognition software    Read the chart carefully and recognize, using context, where substitutions have occurred  "

## 2023-05-10 NOTE — ED PROVIDER NOTES
History  Chief Complaint   Patient presents with   • Seizure - New Onset     Patient arrives to the ER from home via EMS with new onset seizure  Unknown head injury  Pt was shaking violently per family and pt was post-ictal upon EMS arrival to home  Pt unable to completely answer questions/ pt still post-ictal upon arrival to ED  Pt was given 2mg ativan by EMS for a seizure which occurred en route  Patient is a 57-year-old male with a past medical history of asthma, chronic kidney disease, COPD, diabetes, emphysema, hypertension, systolic CHF presenting to the emergency department for evaluation of a seizure  Patient had a seizure this morning at home, was found on the floor shaking by family  When EMS arrived patient was initially postictal then proceeded to have a 5-minute seizure,  2 mg of Ativan were administered and seizure ceased  EMS reports patient has been postictal since Ativan administration and will not follow commands  Trauma eval     Airway Intact  Breath sounds equal B/L  Circulation patent, 2+ pulses in all extremities  No deformity  Exposure completed - no further injury discovered            Prior to Admission Medications   Prescriptions Last Dose Informant Patient Reported? Taking?    BD Pen Needle Amelia U/F 32G X 4 MM MISC   Yes No   Sig:     Calcium 600 MG tablet   Yes No   Sig: Take 600 mg by mouth   Cholecalciferol (VITAMIN D-3) 1000 units CAPS   Yes No   Sig: Take 1,000 Units by mouth daily   Continuous Blood Gluc Sensor (FreeStyle Michelle 14 Day Sensor) MISC   Yes No   Sig: Use as directed    Patient not taking: Reported on 10/13/2022   LORazepam (ATIVAN) 0 5 mg tablet   Yes No   Sig: TAKE ONE TABLET BY MOUTH Q6 PRN   Omega-3 Fatty Acids (FISH OIL) 1200 MG CAPS   Yes No   Sig: Take 1,200 mg by mouth daily at bedtime   Pulmicort Flexhaler 90 MCG/ACT inhaler   Yes No   Si puffs 2 (two) times a day   Patient not taking: Reported on 10/13/2022   albuterol (PROVENTIL HFA,VENTOLIN HFA) 90 mcg/act inhaler   Yes No   Sig: INHALE 2 PUFFS BY MOUTH EVERY 6 HOURS   amLODIPine (NORVASC) 10 mg tablet   Yes No   Sig: Take 10 mg by mouth   atorvastatin (LIPITOR) 40 mg tablet   Yes No   Sig: Take 40 mg by mouth daily at bedtime   busPIRone (BUSPAR) 15 mg tablet   Yes No   Sig: TAKE ONE TABLET BY MOUTH TWICE A DAY FOR PTSD   doxazosin (CARDURA) 8 MG tablet   Yes No   Sig: Take 8 mg by mouth 2 (two) times a day   eplerenone (INSPRA) 50 MG tablet   Yes No   Sig: Take 50 mg by mouth daily   escitalopram (LEXAPRO) 20 mg tablet   Yes No   Sig: Take 20 mg by mouth daily   ferrous sulfate 325 (65 Fe) mg tablet   Yes No   Sig: Take 325 mg by mouth 2 (two) times a day     glucose monitoring kit (FREESTYLE) monitoring kit   No No   Si each by Does not apply route 2 (two) times a day Any brand covered by insurance:  Dispense one glucometer, test strips #50, lancets #100   Patient not taking: Reported on 10/13/2022   glycopyrrolate-formoterol (Otelia Lord) 9-4 8 MCG/ACT inhaler   Yes No   Sig: Inhale 2 puffs daily after breakfast   Patient not taking: Reported on 10/13/2022   labetalol (NORMODYNE) 100 mg tablet   Yes No   Sig: Take 100 mg by mouth 2 (two) times a day Morning and before bedtime   mirtazapine (REMERON) 15 mg tablet   Yes No   Sig: 15 mg   nebivolol (BYSTOLIC) 20 MG tablet   No No   Sig: Take 1 tablet (20 mg total) by mouth daily   sertraline (ZOLOFT) 100 mg tablet   Yes No   Sig: Take 2 tablets by mouth daily   umeclidinium-vilanterol (ANORO ELLIPTA) 62 5-25 MCG/INH inhaler   Yes No   Sig: Inhale 1 puff daily   Patient not taking: Reported on 10/13/2022      Facility-Administered Medications: None       Past Medical History:   Diagnosis Date   • Asthma    • Chronic kidney disease    • COPD (chronic obstructive pulmonary disease) (HCC)    • CPAP (continuous positive airway pressure) dependence    • Diabetes mellitus (HCC)    • Emphysema of lung (HCC)    • Gout    • History of transfusion     pt stated they had a transfusion when they had gallbladder surgery  • Hypertension    • Kidney disease     renal failure   • Leg DVT (deep venous thromboembolism), acute, bilateral ([facility] [address] ) 01/2018   • Obesity (BMI 30-39  9)    • AGUS on CPAP     setting 11   • Postgastrectomy malabsorption    • Sleep apnea    • Systolic CHF [facility])        Past Surgical History:   Procedure Laterality Date   • ADRENALECTOMY     • CHOLECYSTECTOMY     • COLONOSCOPY     • EGD     • HIATAL HERNIA REPAIR N/A 12/22/2020    Procedure: REPAIR HERNIA HIATAL LAPAROSCOPIC;  Surgeon: Aby Menendez MD;  Location: [facility];  Service: Bariatrics   • INCISION AND DRAINAGE OF WOUND Left 10/17/2018    Procedure: INCISION AND DRAINAGE (I&D) GROIN;  Surgeon: Cheryl Ramírez MD;  Location: [facility];  Service: General   • IR IMAGE [address] [facility] [address]  8/17/2018   • IR IMAGE GUIDED ASPIRATION / DRAINAGE W TUBE  7/31/2018   • JOINT REPLACEMENT Bilateral     knee   • KIDNEY SURGERY  2009    nodule removal   • LYMPH NODE DISSECTION Left 01/2018    left inguinal LN removed - benign    • OTHER SURGICAL HISTORY      kidney nodule removal   • PALATE / UVULA BIOPSY / EXCISION     • NM LAPS GSTR RSTCV PX W/BYP JASON-EN-Y LIMB <150 CM N/A 12/22/2020    Procedure: BYPASS GASTRIC  JASON-EN-Y LAPAROSCOPIC WITH INTRAOPERATIVE EGD;  Surgeon: Aby Menendez MD;  Location: [facility];  Service: Bariatrics   • SINUS SURGERY     • TONSILLECTOMY         Family History   Problem Relation Age of Onset   • Heart murmur Sister    • Asthma Sister    • Hypertension Sister    • Kidney disease Mother    • Cancer Father    • Bell's palsy Brother    • Kidney disease Brother    • Deep vein thrombosis Neg Hx      I have reviewed and agree with the history as documented      E-Cigarette/Vaping   • E-Cigarette Use Never User      E-Cigarette/Vaping Substances   • Nicotine No    • THC No    • CBD No    • Flavoring No    • Other No    • Unknown No      Social History Tobacco Use   • Smoking status: Never   • Smokeless tobacco: Never   Vaping Use   • Vaping Use: Never used   Substance Use Topics   • Alcohol use: Yes     Comment: occasionally   • Drug use: No       Review of Systems   Unable to perform ROS: Acuity of condition   Neurological: Positive for seizures  Physical Exam  Physical Exam  Vitals and nursing note reviewed  Constitutional:       Appearance: Normal appearance  He is well-developed  He is ill-appearing  HENT:      Head: Normocephalic and atraumatic  Right Ear: External ear normal       Left Ear: External ear normal       Nose: Nose normal       Mouth/Throat:      Mouth: Mucous membranes are moist    Eyes:      General: No scleral icterus  Right eye: No discharge  Left eye: No discharge  Conjunctiva/sclera: Conjunctivae normal    Cardiovascular:      Rate and Rhythm: Normal rate and regular rhythm  Pulmonary:      Effort: Pulmonary effort is normal  No respiratory distress  Breath sounds: Normal breath sounds  Abdominal:      Palpations: Abdomen is soft  Musculoskeletal:         General: No swelling, deformity or signs of injury  Normal range of motion  Cervical back: Normal range of motion and neck supple  No rigidity  Skin:     General: Skin is warm and dry  Capillary Refill: Capillary refill takes less than 2 seconds  Coloration: Skin is not jaundiced  Findings: No erythema or rash  Neurological:      Mental Status: He is alert  GCS: GCS eye subscore is 4  GCS verbal subscore is 4  GCS motor subscore is 6        Comments: Patient is post-ictal         Vital Signs  ED Triage Vitals   Temperature Pulse Respirations Blood Pressure SpO2   05/10/23 0702 05/10/23 0650 05/10/23 0650 05/10/23 0650 05/10/23 0650   97 8 °F (36 6 °C) 80 17 (!) 210/110 98 %      Temp src Heart Rate Source Patient Position - Orthostatic VS BP Location FiO2 (%)   -- 05/10/23 0710 05/10/23 1145 05/10/23 1145 -- Monitor Lying Left arm       Pain Score       --                  Vitals:    05/10/23 1445 05/10/23 1515 05/10/23 1530 05/10/23 1545   BP: 167/80 (!) 180/96 (!) 186/102 (!) 189/94   Pulse: 85 92 94 87   Patient Position - Orthostatic VS: Lying Lying Lying Lying         Visual Acuity  Visual Acuity    Flowsheet Row Most Recent Value   L Pupil Size (mm) 3   R Pupil Size (mm) 3          ED Medications  Medications   LORazepam (FOR EMS ONLY) (ATIVAN) 2 mg/mL injection 2 mg (0 mg Does not apply Given to EMS 5/10/23 0709)   levETIRAcetam (KEPPRA) 1,000 mg in sodium chloride 0 9 % 100 mL IVPB (0 mg Intravenous Stopped 5/10/23 0833)   labetalol (NORMODYNE) injection 10 mg (10 mg Intravenous Given 5/10/23 0809)   levETIRAcetam (KEPPRA) 1,500 mg in sodium chloride 0 9 % 100 mL IVPB (0 mg Intravenous Stopped 5/10/23 0929)   labetalol (NORMODYNE) injection 10 mg (10 mg Intravenous Given 5/10/23 1119)   ondansetron (ZOFRAN) injection 4 mg (4 mg Intravenous Given 5/10/23 1336)   hydrALAZINE (APRESOLINE) injection 20 mg (20 mg Intravenous Given 5/10/23 1350)   labetalol (NORMODYNE) injection 20 mg (20 mg Intravenous Given 5/10/23 1350)       Diagnostic Studies  Results Reviewed     Procedure Component Value Units Date/Time    HS Troponin I 4hr [[id number]]  (Abnormal) Collected: 05/10/23 1127    Lab Status: Final result Specimen: Blood from Arm, Right Updated: 05/10/23 1156     hs TnI 4hr 59 ng/L      Delta 4hr hsTnI 7 ng/L     HS Troponin I 2hr [[id number]]  (Abnormal) Collected: 05/10/23 1006    Lab Status: Final result Specimen: Blood from Arm, Left Updated: 05/10/23 1055     hs TnI 2hr 50 ng/L      Delta 2hr hsTnI -2 ng/L     Toxicology screen, urine [[id number]]     Lab Status: No result Specimen: Urine     CBC and differential [[id number]]  (Abnormal) Collected: 05/10/23 0718    Lab Status: Final result Specimen: Blood from Arm, Right Updated: 05/10/23 0808     WBC 8 65 Thousand/uL      RBC 4 95 Million/uL      Hemoglobin 14 3 g/dL      Hematocrit 43 0 %      MCV 87 fL      MCH 28 9 pg      MCHC 33 3 g/dL      RDW 13 9 %      MPV 10 5 fL      Platelets 870 Thousands/uL      nRBC 0 /100 WBCs      Neutrophils Relative 92 %      Immat GRANS % 0 %      Lymphocytes Relative 6 %      Monocytes Relative 2 %      Eosinophils Relative 0 %      Basophils Relative 0 %      Neutrophils Absolute 7 94 Thousands/µL      Immature Grans Absolute 0 03 Thousand/uL      Lymphocytes Absolute 0 50 Thousands/µL      Monocytes Absolute 0 15 Thousand/µL      Eosinophils Absolute 0 01 Thousand/µL      Basophils Absolute 0 02 Thousands/µL     Narrative: This is an appended report  These results have been appended to a previously verified report      HS Troponin 0hr (reflex protocol) [[id number]]  (Abnormal) Collected: 05/10/23 0725    Lab Status: Final result Specimen: Blood from Arm, Right Updated: 05/10/23 0805     hs TnI 0hr 52 ng/L     Comprehensive metabolic panel [[id number]]  (Abnormal) Collected: 05/10/23 0725    Lab Status: Final result Specimen: Blood from Arm, Right Updated: 05/10/23 0759     Sodium 141 mmol/L      Potassium 3 4 mmol/L      Chloride 101 mmol/L      CO2 27 mmol/L      ANION GAP 13 mmol/L      BUN 41 mg/dL      Creatinine 1 81 mg/dL      Glucose 225 mg/dL      Calcium 10 1 mg/dL       U/L       U/L      Alkaline Phosphatase 92 U/L      Total Protein 8 2 g/dL      Albumin 4 7 g/dL      Total Bilirubin 1 01 mg/dL      eGFR 40 ml/min/1 73sq m     Narrative:      [facility] guidelines for Chronic Kidney Disease (CKD):   •  Stage 1 with normal or high GFR (GFR > 90 mL/min/1 73 square meters)  •  Stage 2 Mild CKD (GFR = 60-89 mL/min/1 73 square meters)  •  Stage 3A Moderate CKD (GFR = 45-59 mL/min/1 73 square meters)  •  Stage 3B Moderate CKD (GFR = 30-44 mL/min/1 73 square meters)  •  Stage 4 Severe CKD (GFR = 15-29 mL/min/1 73 square meters)  •  Stage 5 End Stage CKD (GFR <15 mL/min/1 73 square meters)  Note: GFR calculation is accurate only with a steady state creatinine    Fingerstick Glucose (POCT) [[id number]]  (Abnormal) Collected: 05/10/23 0713    Lab Status: Final result Updated: 05/10/23 0717     POC Glucose 198 mg/dl     UA w Reflex to Microscopic w Reflex to Culture [[id number]]     Lab Status: No result Specimen: Urine                  TRAUMA - CT head wo contrast   Final Result by Elias Bingham MD (05/10 9274)         1  No acute posttraumatic intracranial abnormality  2  Interim slight increase (compared to 8/26/2021) in 2 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 with mild associated mass effect  This could be more thoroughly evaluated and characterized with MRI  3  Chronic microangiopathic change again noted  The study was marked in [facility] for immediate notification  Workstation performed: [id number]         TRAUMA - CT spine cervical wo contrast   Final Result by Elias Bingham MD (05/10 7029)      No cervical spine fracture or traumatic malalignment  Workstation performed: [id number]         XR Trauma chest portable   Final Result by Elias Bingham MD (05/10 0033)      No acute cardiopulmonary disease                    Workstation performed: [id number]                    Procedures  ECG 12 Lead Documentation Only    Date/Time: 5/10/2023 7:08 AM  Performed by: Delilah Valdez PA-C  Authorized by: Delilah Valdez PA-C     Indications / Diagnosis:  Seizure  ECG reviewed by me, the ED Provider: yes    Patient location:  ED  Previous ECG:     Previous ECG:  Compared to current    Comparison ECG info:  08/26/21    Similarity:  Changes noted (PVCs are no longer present)  Interpretation:     Interpretation: abnormal    Rate:     ECG rate:  78    ECG rate assessment: normal    Rhythm:     Rhythm: sinus rhythm    Ectopy:     Ectopy: none    QRS:     QRS axis:  Normal    QRS intervals:  Normal  Conduction:     Conduction: normal    ST segments:     ST segments:  Normal  T waves:     T waves: inverted      T waves comment:  Inferior leads  Comments:      Prolonged QT  Left anterior fascicular block             ED Course  ED Course as of 05/10/23 1645   Wed May 10, 2023   0810 Attempted both phone numbers in the patient's chart to discuss patients' allergies to nifidepine  No answer, voicemail left for callback  46 Discussed with wife, reports he has a true allergy to nifidepine, stating it is a rash  Medical Decision Making    This is a 47-year-old male presenting to the emergency department for evaluation of new onset seizure  Family found patient along bedside this morning convulsing and full clonic tonic seizure  EMS arrived and patient was postictal   EMS reports patient had a 5-minute seizure after EMS arrival   Patient's seizure ceased after 2 mg Ativan  EMS reports patient has been postictal during transport  Patient is able to tell me his name and partially follow commands  Patient does not know the year, believes it is 12  Differential diagnosis to include but is not limited to: New onset seizure, electrolyte abnormality, intracranial abnormality, ACS, STEMI, UTI    Initial ED Plan: CBC, CMP, troponin, EKG, chest x-ray, UA, CT head, CT cervical spine    ED results:  1  No acute posttraumatic intracranial abnormality  2  Interim slight increase (compared to 8/26/2021) in 2 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 with mild associated mass effect  This could be more thoroughly evaluated and characterized with MRI  3  Chronic microangiopathic change again noted   -No cervical spine fracture or traumatic malalignment  -No acute cardiopulmonary disease  All radiology studies independently viewed by me and interpreted by the radiologist     0 hour troponin: 52  Delta: -2  Heart score: 6    Patient continues to be postictal on reexamination, more alert and active     Discussed with   [facility], accepting at [facility]  Recommending labetalol infusion for continued HTN  Discussed with Dr Jeff York who discussed with Dr Silvia Ramsay, recommending cardene for continued HTN, discontinuing the labetalol infusion  Final ED assessment: Patient is stable  PACS contacted for transfer to [facility]  Wife was contacted and made aware of the plan for transfer  Amount and/or Complexity of Data Reviewed  Labs: ordered  Radiology: ordered  Risk  Prescription drug management  Disposition  Final diagnoses:   New onset seizure [facility])     Time reflects when diagnosis was documented in both MDM as applicable and the Disposition within this note     Time User Action Codes Description Comment    5/10/2023  7:53 AM Sneha Rivers Add [R56 9] New onset seizure [facility])       ED Disposition     ED Disposition   Transfer to Another Facility-In Network    Condition   --    Date/Time   Wed May 10, 2023  7:53 AM    Comment   Gabrielle Tabor should be transferred out to [facility]             MD Documentation    [employer] Most Recent Value   Patient Condition The patient has been stabilized such that within reasonable medical probability, no material deterioration of the patient condition or the condition of the unborn child(nini) is likely to result from the transfer   Reason for Transfer Level of Care needed not available at this facility   Benefits of Transfer Specialized equipment and/or services available at the receiving facility (Include comment)________________________   Risks of Transfer Potential for delay in receiving treatment, Increased discomfort during transfer, Possible worsening of condition or death during transfer, Potential deterioration of medical condition, Loss of IV   Accepting Physician Dr Irma Crain Name, Haylee Ames PA-C   Provider Certification General risk, such as traffic hazards, adverse weather conditions, rough terrain or turbulence, possible failure of equipment (including vehicle or aircraft), or consequences of actions of persons outside the control of the transport personnel, Unanticipated needs of medical equipment and personnel during transport, Risk of worsening condition, The possibility of a transport vehicle being unavailable      RN Documentation    Flowsheet Row  [address] Name, Vi Cortes  [facility]   Bed Assignment ICU 02   Report Given to Terry Lemus         Discharge Medication List as of 5/10/2023  4:33 PM      CONTINUE these medications which have NOT CHANGED    Details   albuterol (PROVENTIL HFA,VENTOLIN HFA) 90 mcg/act inhaler INHALE 2 PUFFS BY MOUTH EVERY 6 HOURS, Historical Med      amLODIPine (NORVASC) 10 mg tablet Take 10 mg by mouth, Starting Wed 4/15/2020, Historical Med      atorvastatin (LIPITOR) 40 mg tablet Take 40 mg by mouth daily at bedtime, Starting Thu 7/21/2022, Historical Med      BD Pen Needle Amelia U/F 32G X 4 MM MISC  , Starting Mon 12/21/2020, Historical Med      busPIRone (BUSPAR) 15 mg tablet TAKE ONE TABLET BY MOUTH TWICE A DAY FOR PTSD, Historical Med      Calcium 600 MG tablet Take 600 mg by mouth, Historical Med      Cholecalciferol (VITAMIN D-3) 1000 units CAPS Take 1,000 Units by mouth daily, Historical Med      Continuous Blood Gluc Sensor (FreeStyle Michelle 14 Day Sensor) MISC Use as directed , Starting Wed 2/17/2021, Historical Med      doxazosin (CARDURA) 8 MG tablet Take 8 mg by mouth 2 (two) times a day, Starting Wed 2/3/2021, Historical Med      eplerenone (INSPRA) 50 MG tablet Take 50 mg by mouth daily, Starting Tue 5/23/2017, Historical Med      escitalopram (LEXAPRO) 20 mg tablet Take 20 mg by mouth daily, Starting Sat 11/14/2020, Historical Med      ferrous sulfate 325 (65 Fe) mg tablet Take 325 mg by mouth 2 (two) times a day  , Historical Med      glucose monitoring kit (FREESTYLE) monitoring kit 1 each by Does not apply route 2 (two) times a day Any brand covered by insurance:  Dispense one glucometer, test strips #50, lancets #100, Starting Sat 7/11/2020, Normal      glycopyrrolate-formoterol (BEVESPI AEROSPHERE) 9-4 8 MCG/ACT inhaler Inhale 2 puffs daily after breakfast, Historical Med      labetalol (NORMODYNE) 100 mg tablet Take 100 mg by mouth 2 (two) times a day Morning and before bedtime, Starting Sat 7/30/2022, Historical Med      LORazepam (ATIVAN) 0 5 mg tablet TAKE ONE TABLET BY MOUTH Q6 PRN, Historical Med      mirtazapine (REMERON) 15 mg tablet 15 mg, Starting Thu 9/1/2022, Historical Med      nebivolol (BYSTOLIC) 20 MG tablet Take 1 tablet (20 mg total) by mouth daily, Starting Fri 8/27/2021, Until Sun 9/26/2021, Normal      Omega-3 Fatty Acids (FISH OIL) 1200 MG CAPS Take 1,200 mg by mouth daily at bedtime, Historical Med      Pulmicort Flexhaler 90 MCG/ACT inhaler 2 puffs 2 (two) times a day, Starting Tue 3/9/2021, Historical Med      sertraline (ZOLOFT) 100 mg tablet Take 2 tablets by mouth daily, Starting Wed 6/15/2022, Historical Med      umeclidinium-vilanterol (ANORO ELLIPTA) 62 5-25 MCG/INH inhaler Inhale 1 puff daily, Historical Med             No discharge procedures on file      PDMP Review       Value Time User    PDMP Reviewed  Yes 12/24/2020  8:11 AM Adrián Hutchins PA-C          ED Provider  Electronically Signed by           Kaycee Mann PA-C  05/10/23 [address] [id number]  05/10/23 5439

## 2023-05-10 NOTE — ED NOTES
Critical care at bedside      [facility] Uncertain  05/10/23 6624

## 2023-05-11 ENCOUNTER — APPOINTMENT (OUTPATIENT)
Dept: RADIOLOGY | Facility: HOSPITAL | Age: 58
End: 2023-05-11

## 2023-05-11 ENCOUNTER — APPOINTMENT (INPATIENT)
Dept: NON INVASIVE DIAGNOSTICS | Facility: HOSPITAL | Age: 58
End: 2023-05-11

## 2023-05-11 ENCOUNTER — APPOINTMENT (OUTPATIENT)
Dept: NEUROLOGY | Facility: CLINIC | Age: 58
End: 2023-05-11

## 2023-05-11 PROBLEM — R56.9 NEW ONSET SEIZURE (HCC): Status: ACTIVE | Noted: 2023-05-11

## 2023-05-11 PROBLEM — I16.1 HYPERTENSIVE EMERGENCY: Status: ACTIVE | Noted: 2023-05-11

## 2023-05-11 PROBLEM — D32.9 MENINGIOMA (HCC): Status: ACTIVE | Noted: 2023-05-11

## 2023-05-11 PROBLEM — R94.31 PROLONGED Q-T INTERVAL ON ECG: Chronic | Status: ACTIVE | Noted: 2023-05-11

## 2023-05-11 PROBLEM — I67.4 HYPERTENSIVE ENCEPHALOPATHY: Status: ACTIVE | Noted: 2023-05-11

## 2023-05-11 LAB
ANION GAP SERPL CALCULATED.3IONS-SCNC: 2 MMOL/L (ref 4–13)
ANION GAP SERPL CALCULATED.3IONS-SCNC: 6 MMOL/L (ref 4–13)
AORTIC ROOT: 3 CM
APICAL FOUR CHAMBER EJECTION FRACTION: 56 %
ASCENDING AORTA: 3.5 CM
ATRIAL RATE: 78 BPM
ATRIAL RATE: 98 BPM
BASOPHILS # BLD AUTO: 0.02 THOUSANDS/ÂΜL (ref 0–0.1)
BASOPHILS NFR BLD AUTO: 0 % (ref 0–1)
BUN SERPL-MCNC: 32 MG/DL (ref 5–25)
BUN SERPL-MCNC: 33 MG/DL (ref 5–25)
CALCIUM SERPL-MCNC: 9.4 MG/DL (ref 8.3–10.1)
CALCIUM SERPL-MCNC: 9.5 MG/DL (ref 8.3–10.1)
CARDIAC TROPONIN I PNL SERPL HS: 256 NG/L
CHLORIDE SERPL-SCNC: 110 MMOL/L (ref 96–108)
CHLORIDE SERPL-SCNC: 111 MMOL/L (ref 96–108)
CO2 SERPL-SCNC: 26 MMOL/L (ref 21–32)
CO2 SERPL-SCNC: 27 MMOL/L (ref 21–32)
CREAT SERPL-MCNC: 1.65 MG/DL (ref 0.6–1.3)
CREAT SERPL-MCNC: 1.71 MG/DL (ref 0.6–1.3)
E WAVE DECELERATION TIME: 180 MS
EOSINOPHIL # BLD AUTO: 0.01 THOUSAND/ÂΜL (ref 0–0.61)
EOSINOPHIL NFR BLD AUTO: 0 % (ref 0–6)
ERYTHROCYTE [DISTWIDTH] IN BLOOD BY AUTOMATED COUNT: 14.4 % (ref 11.6–15.1)
FRACTIONAL SHORTENING: 29 (ref 28–44)
GFR SERPL CREATININE-BSD FRML MDRD: 42 ML/MIN/1.73SQ M
GFR SERPL CREATININE-BSD FRML MDRD: 44 ML/MIN/1.73SQ M
GLOBAL LONGITUIDAL STRAIN: -13 %
GLUCOSE SERPL-MCNC: 115 MG/DL (ref 65–140)
GLUCOSE SERPL-MCNC: 125 MG/DL (ref 65–140)
HCT VFR BLD AUTO: 39.6 % (ref 36.5–49.3)
HGB BLD-MCNC: 12.6 G/DL (ref 12–17)
IMM GRANULOCYTES # BLD AUTO: 0.08 THOUSAND/UL (ref 0–0.2)
IMM GRANULOCYTES NFR BLD AUTO: 1 % (ref 0–2)
INTERVENTRICULAR SEPTUM IN DIASTOLE (PARASTERNAL SHORT AXIS VIEW): 1.7 CM
INTERVENTRICULAR SEPTUM: 1.7 CM (ref 0.6–1.1)
LAAS-AP2: 24.3 CM2
LAAS-AP4: 26.2 CM2
LEFT ATRIUM SIZE: 4.8 CM
LEFT INTERNAL DIMENSION IN SYSTOLE: 3.6 CM (ref 2.1–4)
LEFT VENTRICULAR INTERNAL DIMENSION IN DIASTOLE: 5.1 CM (ref 3.5–6)
LEFT VENTRICULAR POSTERIOR WALL IN END DIASTOLE: 1.8 CM
LEFT VENTRICULAR STROKE VOLUME: 70 ML
LVSV (TEICH): 70 ML
LYMPHOCYTES # BLD AUTO: 1.17 THOUSANDS/ÂΜL (ref 0.6–4.47)
LYMPHOCYTES NFR BLD AUTO: 8 % (ref 14–44)
MAGNESIUM SERPL-MCNC: 2.2 MG/DL (ref 1.6–2.6)
MAGNESIUM SERPL-MCNC: 2.3 MG/DL (ref 1.6–2.6)
MCH RBC QN AUTO: 28.1 PG (ref 26.8–34.3)
MCHC RBC AUTO-ENTMCNC: 31.8 G/DL (ref 31.4–37.4)
MCV RBC AUTO: 88 FL (ref 82–98)
MONOCYTES # BLD AUTO: 1.22 THOUSAND/ÂΜL (ref 0.17–1.22)
MONOCYTES NFR BLD AUTO: 9 % (ref 4–12)
MV E'TISSUE VEL-SEP: 7 CM/S
MV PEAK A VEL: 0.7 M/S
MV PEAK E VEL: 82 CM/S
MV STENOSIS PRESSURE HALF TIME: 52 MS
MV VALVE AREA P 1/2 METHOD: 4.23
NEUTROPHILS # BLD AUTO: 11.7 THOUSANDS/ÂΜL (ref 1.85–7.62)
NEUTS SEG NFR BLD AUTO: 82 % (ref 43–75)
NRBC BLD AUTO-RTO: 0 /100 WBCS
P AXIS: 79 DEGREES
P AXIS: 85 DEGREES
PHOSPHATE SERPL-MCNC: 4.9 MG/DL (ref 2.7–4.5)
PLATELET # BLD AUTO: 278 THOUSANDS/UL (ref 149–390)
PMV BLD AUTO: 10.7 FL (ref 8.9–12.7)
POTASSIUM SERPL-SCNC: 3 MMOL/L (ref 3.5–5.3)
POTASSIUM SERPL-SCNC: 3.6 MMOL/L (ref 3.5–5.3)
PR INTERVAL: 142 MS
PR INTERVAL: 142 MS
QRS AXIS: -85 DEGREES
QRS AXIS: 86 DEGREES
QRSD INTERVAL: 100 MS
QRSD INTERVAL: 100 MS
QT INTERVAL: 408 MS
QT INTERVAL: 438 MS
QTC INTERVAL: 499 MS
QTC INTERVAL: 521 MS
RA PRESSURE ESTIMATED: 15 MMHG
RBC # BLD AUTO: 4.49 MILLION/UL (ref 3.88–5.62)
RIGHT ATRIUM AREA SYSTOLE A4C: 24.2 CM2
RIGHT VENTRICLE ID DIMENSION: 3.8 CM
RV PSP: 38 MMHG
SL CV LEFT ATRIUM LENGTH A2C: 6 CM
SL CV LV EF: 68
SL CV PED ECHO LEFT VENTRICLE DIASTOLIC VOLUME (MOD BIPLANE) 2D: 126 ML
SL CV PED ECHO LEFT VENTRICLE SYSTOLIC VOLUME (MOD BIPLANE) 2D: 56 ML
SODIUM SERPL-SCNC: 140 MMOL/L (ref 135–147)
SODIUM SERPL-SCNC: 142 MMOL/L (ref 135–147)
T WAVE AXIS: 15 DEGREES
T WAVE AXIS: 90 DEGREES
TR MAX PG: 23 MMHG
TR PEAK VELOCITY: 2.4 M/S
TRICUSPID ANNULAR PLANE SYSTOLIC EXCURSION: 2.3 CM
TRICUSPID VALVE PEAK REGURGITATION VELOCITY: 2.4 M/S
VENTRICULAR RATE: 78 BPM
VENTRICULAR RATE: 98 BPM
WBC # BLD AUTO: 14.2 THOUSAND/UL (ref 4.31–10.16)

## 2023-05-11 RX ORDER — SENNOSIDES 8.6 MG
1 TABLET ORAL
Status: DISCONTINUED | OUTPATIENT
Start: 2023-05-11 | End: 2023-05-14 | Stop reason: HOSPADM

## 2023-05-11 RX ORDER — POLYETHYLENE GLYCOL 3350 17 G/17G
17 POWDER, FOR SOLUTION ORAL DAILY
Status: DISCONTINUED | OUTPATIENT
Start: 2023-05-11 | End: 2023-05-14 | Stop reason: HOSPADM

## 2023-05-11 RX ORDER — ALBUTEROL SULFATE 90 UG/1
2 AEROSOL, METERED RESPIRATORY (INHALATION) EVERY 4 HOURS PRN
Status: DISCONTINUED | OUTPATIENT
Start: 2023-05-11 | End: 2023-05-14 | Stop reason: HOSPADM

## 2023-05-11 RX ORDER — POTASSIUM CHLORIDE 14.9 MG/ML
20 INJECTION INTRAVENOUS
Status: COMPLETED | OUTPATIENT
Start: 2023-05-11 | End: 2023-05-11

## 2023-05-11 RX ORDER — PANTOPRAZOLE SODIUM 40 MG/1
40 TABLET, DELAYED RELEASE ORAL
Status: DISCONTINUED | OUTPATIENT
Start: 2023-05-11 | End: 2023-05-13

## 2023-05-11 RX ORDER — LABETALOL HYDROCHLORIDE 5 MG/ML
10 INJECTION, SOLUTION INTRAVENOUS EVERY 6 HOURS PRN
Status: DISCONTINUED | OUTPATIENT
Start: 2023-05-11 | End: 2023-05-13

## 2023-05-11 RX ORDER — LABETALOL 200 MG/1
200 TABLET, FILM COATED ORAL EVERY 12 HOURS SCHEDULED
Status: DISCONTINUED | OUTPATIENT
Start: 2023-05-11 | End: 2023-05-13

## 2023-05-11 RX ORDER — LORAZEPAM 2 MG/ML
1 INJECTION INTRAMUSCULAR ONCE
Status: COMPLETED | OUTPATIENT
Start: 2023-05-12 | End: 2023-05-12

## 2023-05-11 RX ORDER — MIDAZOLAM HYDROCHLORIDE 2 MG/2ML
2 INJECTION, SOLUTION INTRAMUSCULAR; INTRAVENOUS
Status: ACTIVE | OUTPATIENT
Start: 2023-05-11 | End: 2023-05-11

## 2023-05-11 RX ORDER — HYDROMORPHONE HCL/PF 1 MG/ML
0.5 SYRINGE (ML) INJECTION ONCE
Status: COMPLETED | OUTPATIENT
Start: 2023-05-11 | End: 2023-05-11

## 2023-05-11 RX ORDER — LEVETIRACETAM 750 MG/1
750 TABLET ORAL EVERY 12 HOURS SCHEDULED
Status: DISCONTINUED | OUTPATIENT
Start: 2023-05-11 | End: 2023-05-14 | Stop reason: HOSPADM

## 2023-05-11 RX ORDER — POTASSIUM CHLORIDE 20 MEQ/1
40 TABLET, EXTENDED RELEASE ORAL ONCE
Status: DISCONTINUED | OUTPATIENT
Start: 2023-05-11 | End: 2023-05-11

## 2023-05-11 RX ORDER — POTASSIUM CHLORIDE 20 MEQ/1
40 TABLET, EXTENDED RELEASE ORAL ONCE
Status: COMPLETED | OUTPATIENT
Start: 2023-05-11 | End: 2023-05-11

## 2023-05-11 RX ADMIN — SENNOSIDES 8.6 MG: 8.6 TABLET, FILM COATED ORAL at 21:05

## 2023-05-11 RX ADMIN — POLYETHYLENE GLYCOL 3350 17 G: 17 POWDER, FOR SOLUTION ORAL at 10:54

## 2023-05-11 RX ADMIN — LABETALOL HYDROCHLORIDE 10 MG: 5 INJECTION, SOLUTION INTRAVENOUS at 22:28

## 2023-05-11 RX ADMIN — NICARDIPINE HYDROCHLORIDE 7.5 MG/HR: 2.5 INJECTION, SOLUTION INTRAVENOUS at 02:42

## 2023-05-11 RX ADMIN — LEVETIRACETAM 750 MG: 750 TABLET, FILM COATED ORAL at 20:36

## 2023-05-11 RX ADMIN — DOXAZOSIN 8 MG: 4 TABLET ORAL at 17:26

## 2023-05-11 RX ADMIN — HYDROMORPHONE HYDROCHLORIDE 0.2 MG: 0.2 INJECTION, SOLUTION INTRAMUSCULAR; INTRAVENOUS; SUBCUTANEOUS at 01:34

## 2023-05-11 RX ADMIN — CHLORHEXIDINE GLUCONATE 0.12% ORAL RINSE 15 ML: 1.2 LIQUID ORAL at 09:04

## 2023-05-11 RX ADMIN — NICARDIPINE HYDROCHLORIDE 5 MG/HR: 2.5 INJECTION, SOLUTION INTRAVENOUS at 10:55

## 2023-05-11 RX ADMIN — TRIMETHOBENZAMIDE HYDROCHLORIDE 200 MG: 100 INJECTION INTRAMUSCULAR at 01:29

## 2023-05-11 RX ADMIN — LABETALOL HYDROCHLORIDE 200 MG: 200 TABLET, FILM COATED ORAL at 20:36

## 2023-05-11 RX ADMIN — HEPARIN SODIUM 5000 UNITS: 5000 INJECTION INTRAVENOUS; SUBCUTANEOUS at 21:05

## 2023-05-11 RX ADMIN — HEPARIN SODIUM 5000 UNITS: 5000 INJECTION INTRAVENOUS; SUBCUTANEOUS at 05:03

## 2023-05-11 RX ADMIN — FERROUS SULFATE TAB 325 MG (65 MG ELEMENTAL FE) 325 MG: 325 (65 FE) TAB at 09:02

## 2023-05-11 RX ADMIN — LEVETIRACETAM 1000 MG: 500 TABLET, FILM COATED ORAL at 09:02

## 2023-05-11 RX ADMIN — PANTOPRAZOLE SODIUM 40 MG: 40 INJECTION, POWDER, FOR SOLUTION INTRAVENOUS at 09:04

## 2023-05-11 RX ADMIN — BUSPIRONE HYDROCHLORIDE 15 MG: 10 TABLET ORAL at 17:26

## 2023-05-11 RX ADMIN — POTASSIUM CHLORIDE 20 MEQ: 14.9 INJECTION, SOLUTION INTRAVENOUS at 10:54

## 2023-05-11 RX ADMIN — LABETALOL HYDROCHLORIDE 200 MG: 200 TABLET, FILM COATED ORAL at 09:03

## 2023-05-11 RX ADMIN — BUSPIRONE HYDROCHLORIDE 15 MG: 10 TABLET ORAL at 09:02

## 2023-05-11 RX ADMIN — POTASSIUM CHLORIDE 40 MEQ: 1500 TABLET, EXTENDED RELEASE ORAL at 14:19

## 2023-05-11 RX ADMIN — POTASSIUM CHLORIDE 40 MEQ: 1500 TABLET, EXTENDED RELEASE ORAL at 09:03

## 2023-05-11 RX ADMIN — ESCITALOPRAM OXALATE 20 MG: 20 TABLET, FILM COATED ORAL at 09:04

## 2023-05-11 RX ADMIN — HEPARIN SODIUM 5000 UNITS: 5000 INJECTION INTRAVENOUS; SUBCUTANEOUS at 14:19

## 2023-05-11 RX ADMIN — CHLORHEXIDINE GLUCONATE 0.12% ORAL RINSE 15 ML: 1.2 LIQUID ORAL at 20:36

## 2023-05-11 RX ADMIN — NICARDIPINE HYDROCHLORIDE 7.5 MG/HR: 2.5 INJECTION, SOLUTION INTRAVENOUS at 05:58

## 2023-05-11 RX ADMIN — POTASSIUM CHLORIDE 20 MEQ: 14.9 INJECTION, SOLUTION INTRAVENOUS at 09:04

## 2023-05-11 RX ADMIN — HYDROMORPHONE HYDROCHLORIDE 0.5 MG: 1 INJECTION, SOLUTION INTRAMUSCULAR; INTRAVENOUS; SUBCUTANEOUS at 01:56

## 2023-05-11 RX ADMIN — AMLODIPINE BESYLATE 10 MG: 10 TABLET ORAL at 09:03

## 2023-05-11 RX ADMIN — DOXAZOSIN 8 MG: 4 TABLET ORAL at 09:05

## 2023-05-11 NOTE — PROGRESS NOTES
* Hypertensive encephalopathy  Assessment & Plan  New onset seizures in the setting of uncontrolled hypertension  Patient arrived after tonic clonic seizure with SBP in the 200s  Patient was managed on nicardipine gtt but has now been transitioned off of it  Patient reports noncompliance with BP meds, only taking them three times a week  Plan:  -MRI brain w wo contrast ordered  -Blood pressure control with labetalol, amlodipine, and doxazosin  Titrate up as needed  Home eplerenone held upon admission due to patient's hypovolemic status  -Keppra as seizure ppx    Essential hypertension  Assessment & Plan  Patient reported noncompliance with his prescribed blood pressure medication doses  He notes taking it roughly three times per week  On arrival to this admission, systolic pressures were in the 200s  Echo 5/11 showed preserved bi-ventricular function, severe LV concentric hypertrophy, and bi-atrial enlargement  Plan:  -Continue home labetalol 200 mg BID, amlodipine 10 mg QD, and doxazosin 8 mg BID  Home eplerenone held this admission due to hypovolemic status  Encourage medication adherence in setting of noncompliance for awhile prior to admission  -Follow-up with Cardiology given EKG findings of LVH and t wave inversions likely due to uncontrolled hypertension for many years      Prolonged Q-T interval on ECG  Assessment & Plan  Patient has history of prolonged QTc interval when reviewing prior EKGs in chart  Plan:  -Optimize electrolytes, K>4, Mg>2, PO4>4  -Avoid QTc prolonging medications    Meningioma [facility])  Assessment & Plan  Patient diagnosed with a partially calcified left infratentorial meningioma in 2020  It has reportedly been stable    Plan:  -Follow-up with Neurology outpatient    Hypertensive emergency  Assessment & Plan  Patient presented with SBPs in the 200s and new onset seizure  He was placed on a nicardipine gtt which has seen been discontinued       Plan:  -Optimize blood pressure control  -Follow-up with PCP and Cardiologist outpatient  -Continue home BP medications, encourage patient to take medications everyday as prescribed    Seizure [facility])  Assessment & Plan  First reported seizure, in setting of SBP in the 200s  EEG on 5/11 revealed excessive theta activity in the waking background that is nonspecific for mild diffuse cerebral dysfunction  Lack of epileptiform changes could not rule out seizures  No witnessed seizure like activity while patient was in ICU    Plan:  -PO Keppra 750 mg BID, patient does have history of renal dysfunction  Consider making dose adjustments as necessary  -MRI brain pending  -Neurology following    History of gastric bypass  Assessment & Plan  Patient with history of RXYGB in 2020  Thiamine, zinc, and B12 levels within normal range when checked last in Sept 2022  Patient reports abdominal pain associated with eating since procedure    Plan:  -Continue home iron supplementation  -Consider following-up with Bariatric surgery for meal associated abdominal pain      [facility] )  Assessment & Plan  Lab Results   Component Value Date    HGBA1C 5 4 09/27/2022     Patient with prior history of type 2 DM  He underwent RXYGB surgery in 2020  He is not on any insulin or oral diabetic agents  His blood sugars remained stable and controlled upon admission  Most recent A1c was 5 4 in Sept 2022  Plan:  -Continue to monitor glucose on BMP, consider adding insulin if needed   -Follow-up with PCP to monitor glucose levels on an outpatient basis and assess the need for oral medications vs diet-controlled    Recent Labs     05/10/23  0713   POCGLU 198*       Blood Sugar Average: Last 72 hrs:      History of DVT (deep vein thrombosis)  Assessment & Plan  Patient has a history of DVT but is not currently on any anticoagulation      Plan:  -DVT prophylaxis with heparin and SCDs    CKD stage 3 secondary to diabetes [facility])  Assessment & Plan  Lab Results Component Value Date    HGBA1C 5 4 09/27/2022     Patient has a history of CKD 3b  His creatine on day of transfer was 1 65 which appears to be around his baseline  His UA showed 2+ protein with moderate blood, no reported gross hematuria  Plan:  -Follow-up with PCP  Consider repeat UA as an outpatient    -Continue to monitor renal function this admission    Recent Labs     05/10/23  0713   POCGLU 198*             -----------------------------------------------------------------------------------------------------------------------------------------------------------    Code Status: Level 1 - Full Code    Reason for ICU/Stepdown Admission:   New onset seizure, hypertensive emergency    Consultants:  Neurosurgery  Neurology     HPI:   Lisseth Tejeda is a 59 y o  male with PMH of RXYGB, HTN, CKD3, COPD, DM, dCHF, DVT who presented to [facility] for seizure  Patient reports that he was in his usual state of health until the morning when he had sudden onset abdominal pain  He does not remember the events after this but per chart review he was found shaking on the ground by his family  EMS was called and by the time they arrived patient was postictal  However he ended up having a tonic-clonic seizure requiring administration of 2 mg Ativan to break  Patient was postictal afterwards and brought to the ED  Patient found to be hypertensive into 200s requiring initiation of a Cardene drip  CT head without contrast revealed slight increase in 2 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 with mild associated mass effect and chronic microangiopathic changes  Patient transferred to [facility] ICU for new onset seizure and hypertensive emergency  Summary of clinical course:  Upon arrival, patient was noted to have severe abdominal pain that he described as radiating to his back  A CTA rule out dissection was ordered which was negative for dissection   Of note, his CT revealed one or more low density hepatic lesions most likely to represent sub-centimeter hepatic cysts  No suspicious lesions or biliary dilatations  Serial EKGs were obtained which revealed prolonged QT interval, LVH, and ST and T wave abnormalities at one point  Patient does have a history of abnormal EKGs and was hypokalemic at the time  An arterial line placed overnight for blood pressure monitoring  His potassium was repleted throughout the next day  His home labetalol was increased to 200 mg BID for additional blood pressure management  He is currently on 750 mg Keppra for seizure ppx  His arterial line was later removed and the nicardipine gtt was discontinued with stable BP  He denied any pain on the day of transfer and became more coherent in conversation as the day progressed  An EEG and blood cultures were obtained to rule out additional seizures or infection respectively  His EEG  Patient also had an Echo to evaluate for ischemic cardiomyopathy  It showed preserved bi-ventricular function, severe LV concentric hypertrophy, and bi-atrial enlargement  His pressures were controlled on his labetalol, amlodipine, and doxazosin  Of note, patient reported noncompliance with his prescribed home blood pressure medications prior to admission  He notes only taking his medications roughly three times per week because they upset his stomach  Please refer to today's progress note for further clinical details    Recent procedures:  Arterial line placement 5/10/23  Arterial line removal 5/11/23    Outstanding or pending diagnostics/cultures/procedures:  MRI brain to be scheduled  Blood cultures pending    Mobilization Plan:  Per PT/OT    LDA's and reason for remaining devices:   Invasive Devices     Peripheral Intravenous Line  Duration           Peripheral IV 05/10/23 Distal;Left;Upper;Ventral (anterior) Arm 1 day    Peripheral IV 05/10/23 Distal;Right;Ventral (anterior) Forearm 1 day                Discharge Plan:  Pending MRI and Neurology recommendations  PT/OT evaluation deemed patient appropriate for home with home health rehab upon discharge     Family aware of transfer out of critical care? Yes, spoke with Analilia Cardenas (spouse) directly on the phone    Home medications not resumed and why:  Eplerenone held due to patient's hypovolemic status upon admission       Spoke with Dr Zabala Labs regarding transfer to the hospitalist service at 4:03 PM

## 2023-05-11 NOTE — PROGRESS NOTES
Daily Progress Note - Critical Care   Fabi Garcia 59 y o  male MRN: [de-identified]  Unit/Bed#: ICU 02 Encounter: [id number]        ----------------------------------------------------------------------------------------  HPI/24hr events:   Fabi Garcia is a 59 y o  male with PMH of RXYGB, HTN, CKD3, COPD, DM, dCHF, DVT who presented to [facility] for seizure  Patient reports that he was in his usual state of health until the morning when he had sudden onset abdominal pain  He does not remember the events after this but per chart review he was found shaking on the ground by his family  EMS was called and by the time they arrived patient was postictal   However he ended up having a tonic-clonic seizure requiring administration of 2 mg Ativan to break  Patient was postictal afterwards and brought to the ED  Patient found to be hypertensive into 200s requiring initiation of a Cardene drip  CT head without contrast revealed slight increase in 2 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 with mild associated mass effect and chronic microangiopathic changes  Patient transferred to [facility] ICU for new onset seizure and hypertensive emergency  Upon arrival, patient was noted to have severe abdominal pain that he described as radiating to his back  A CTA rule out dissection was ordered which was negative for dissection  Serial EKGs were obtained and revealed prolonged QT interval, LVH, and ST and T wave abnormalities at one point  Of note, patient does have a history of abnormal EKGs and was hypokalemic yesterday  An arterial line placed overnight for blood pressure monitoring    ---------------------------------------------------------------------------------------  SUBJECTIVE  Patient was sleeping upon initial exam this morning  He was difficult to arouse and only responded with one worded answers after being prompted multiple times  About five hours prior to the exam he received 0 2 mg dilaudid   He denies chest pain and abdominal pain  He also denies lightheadedness, numbness, and weakness  He reports being in no pain this morning  He had nausea yesterday but no vomiting  He was tachypnic overnight but denies any increased work of breathing or shortness of breath  During the interview, he had no observed respiratory difficulties or conversational dyspnea  He said he had good PO intake last night but did not recall what he ate  He reported having a BM yesterday  Review of Systems  Review of systems was reviewed and negative unless stated above in HPI/24-hour events   ---------------------------------------------------------------------------------------  Assessment and Plan:    Neuro:   • Diagnosis: New onset seizure  o Characterized by tonic clonic movements, resolved with 2 mg Ativan  o Upon arrival via EMS patient was hypertensive into the 200s  Etiology of seizure possibly secondary to hypertensive encephalopathy vs PRES  o CTH wo contrast showed slight increase in 2 cm inferior left tentorial meningioma (diagnosed in 2020) with slight mass effect   - Plan: Patient on cardene gtt for SBP < 180, will decrease and potentially stop it to observe pressures off it  - Decrease PO Keppra to 750 mg BID in setting of renal dysfunction  - MRI brain w wo contrast ordered  - Neurology, Neurosurgery consulted  Appreciate recommendations  Per Neurology, follow-up with routine EEG   - q2h neuro checks  • Diagnosis: Anxiety  o Plan: Continue home buspar and lexapro  • Diagnosis: Meningioma, with mild mass effect  o Plan: Monitor for any new neurologic deficits  Meningioma has been stable since 2020 per chart review         CV:   • Diagnosis: Hypertensive emergency   o Plan: Cardene gtt for SBP goal <180, will try to wean off  o Increase home labetalol to 200 mg BID  o Continue home doses of amlodipine, doxazosin  o Remove arterial line today and measure blood pressures without it  o Holding home eplerenone  • Diagnosis: Abnormal EKG  o Per chart review, patient has a history of abnormal EKGs showing LVH and prolonged QT  o Plan: Follow-up with ECHO given new EKG findings in the setting of possible ischemic cardiomyopathy  • Diagnosis: Diastolic congestive heart failure  o Plan: Gentle IVF since he appeared hypovolemic upon exam  o Continue labetalol 200 mg BID, holding home eplerenone   • Diagnosis: prolonged QTc, this has been present in prior admissions  o Plan: Avoid QT prolonging medications   o Tigan PRN for nausea      Pulm:  • Diagnosis: COPD  o Plan: Reports not taking his inhalers  o Tachypnic overnight with O2 sats 90-96%  O2 therapy if needed to maintain appropriate saturations  o Can add albuterol PRN if needed    GI:   • Diagnosis: Severe abdominal pain, now resolved  o Plan: Lipase normal, amylase slightly elevated, acute pancreatitis very unlikely  o Start oral protonix 40 mg QD for GERD related pain  • Diagnosis: Opiate-induced constipation   o Senna, Miralax ordered  • Diagnosis: elevated AST, ALT  o Plan: ,  on admission, per chart review appears to be elevated on prior blood work (2/14/23) --,   o CT abdomen 5/10 showed one or more low density hepatic lesions most likely to represent sub-centimeter hepatic cysts  No suspicious lesions or biliary dilatation  o Follow-up outpatient  • Diagnosis: s/p RXYGB in 2020  o Plan: Continue home iron supplementation     :   • Diagnosis: CKD stage 3b  o Plan: Cr 1 65 which appears to be his baseline   o UA 2+ protein with moderate blood, no reported gross hematuria or history of smoking, continue to monitor creatinine and consider repeat UA as an outpatient to follow-up       F/E/N:   • F: IVF discontinued, encourage adequate PO intake  • E: Goal K >4, Mg >2, PO4>3  Potassium supplemented so far with 80 mEq KCl  as of 1054 on 5/11   Repeat potassium after was 3 6, another 40 mEq ordered  • N: cardiac diet, CC diet      Heme/Onc:   • Diagnosis: Hx of DVT not currently "on [facility]  o Plan: DVT ppx heparin, SCD      Endo:   • Diagnosis: DM2 diet controlled  o Plan: Monitor glucose on BMP, stable so far       ID:   • Diagnosis: Elevated WBC from yesterday  o Plan: 14 20 from 8 65 yesterday  o Monitor for signs of underlying infection      MSK/Skin:   • Diagnosis: Pressure ulcer prophylaxis  o Plan: turn patient      Disposition: Continue Critical Care , if pressures stable off cardene gtt then patient can be downgraded to Stepdown 2   Code Status: Level 1 - Full Code  ---------------------------------------------------------------------------------------  ICU CORE MEASURES    Prophylaxis   VTE Pharmacologic Prophylaxis: Enoxaparin (Lovenox)  VTE Mechanical Prophylaxis: sequential compression device  Stress Ulcer Prophylaxis: Prophylaxis Not Indicated       Invasive Devices Review  Invasive Devices     Peripheral Intravenous Line  Duration           Peripheral IV 05/10/23 Distal;Left;Upper;Ventral (anterior) Arm <1 day    Peripheral IV 05/10/23 Distal;Right;Ventral (anterior) Forearm <1 day          Arterial Line  Duration           Arterial Line 05/10/23 Radial <1 day              Can any invasive devices be discontinued today?  Yes, arterial line  ---------------------------------------------------------------------------------------  OBJECTIVE    Vitals   Vitals:    23 0000 23 0100 23 0200 23 0519   BP: (!) 171/81 143/70 164/83    BP Location: Left arm      Pulse: 86 74     Resp: (!) 33 (!) 74     Temp: 98 5 °F (36 9 °C)      TempSrc: Oral      SpO2:       Weight:    65 2 kg (143 lb 11 8 oz)   Height:    5' 6\" (1 676 m)     Temp (24hrs), Av 7 °F (37 1 °C), Min:97 8 °F (36 6 °C), Max:99 9 °F (37 7 °C)  Current: Temperature: 98 5 °F (36 9 °C)  HR: 63-88  BP: 140-170/80  RR: 40s   SpO2: 90-97% on RA    Respiratory:  SpO2: SpO2: 96 %       Invasive/non-invasive ventilation settings   Respiratory    Lab Data (Last 4 hours)    None         O2/Vent Data (Last 4 " hours)    None                Physical Exam  Constitutional:       Comments: Very drowsy   HENT:      Head: Normocephalic  Mouth/Throat:      Mouth: Mucous membranes are dry  Eyes:      Comments: Slight bilateral ptosis   Cardiovascular:      Rate and Rhythm: Normal rate and regular rhythm  Pulmonary:      Effort: Pulmonary effort is normal  No respiratory distress  Abdominal:      Palpations: Abdomen is soft  Tenderness: There is no abdominal tenderness  Musculoskeletal:      Right lower leg: No edema  Left lower leg: No edema  Skin:     General: Skin is warm  CN II: PEERL  CN III-XII: exam limited due to patient drowsiness  Patient was able to open his eyes briefly but mostly had them closed  Speech was coherent and not dysarthric    Bilateral  strength: 4/5  Bilateral upper and lower extremity strength: 4/5  Sensation intact bilaterally in both extremities        Laboratory and Diagnostics:  Results from last 7 days   Lab Units 05/10/23  1753 05/10/23  0725   WBC Thousand/uL  --  8 65   HEMOGLOBIN g/dL  --  14 3   HEMATOCRIT %  --  43 0   PLATELETS Thousands/uL 294 264   NEUTROS PCT %  --  92*   MONOS PCT %  --  2*     Results from last 7 days   Lab Units 05/10/23  1753 05/10/23  0725   SODIUM mmol/L 139 141   POTASSIUM mmol/L 2 8* 3 4*   CHLORIDE mmol/L 107 101   CO2 mmol/L 25 27   ANION GAP mmol/L 7 13   BUN mg/dL 36* 41*   CREATININE mg/dL 1 74* 1 81*   CALCIUM mg/dL 10 4* 10 1   GLUCOSE RANDOM mg/dL 132 225*   ALT U/L 170* 162*   AST U/L 101* 156*   ALK PHOS U/L 94 92   ALBUMIN g/dL 4 2 4 7   TOTAL BILIRUBIN mg/dL 1 31* 1 01*     Results from last 7 days   Lab Units 05/10/23  1753   MAGNESIUM mg/dL 2 3   PHOSPHORUS mg/dL 1 5*               Results from last 7 days   Lab Units 05/10/23  1753   LACTIC ACID mmol/L 1 9       EK/10 0703: NSR, possible left atrial enlargement, left anterior fascicular block, prolonged QT interval  5/10 151: NSR, inverted t waves in inferior "leads, prolonged QT interval, left anterior fascicular block  5/10 1759: NSR, LVH, ST & T wave abnormality (ST elevation present in anterior leads and T wave inversion evident in inferior leads), consider inferior ischemia, prolonged QT  5/10 2023: NSR, anterior infarct, ST & T wave abnormality, PVCs present  5/11 0620: NSR, left atrial enlargement, pulmonary disease pattern, incomplete right bundle branch block, left anterior fascicular block, LVH, prolonged QT      Imaging: CTA dissection protocol: negative for dissection, stable cardiomegaly   I have personally reviewed pertinent reports  Intake and Output  I/O       05/09 0701  05/10 0700 05/10 0701  05/11 0700    I V  (mL/kg)  997 1 (15 3)    IV Piggyback  100    Total Intake(mL/kg)  1097 1 (16 8)    Net  +1097 1          Unmeasured Urine Occurrence  1 x          Height and Weights   Height: 5' 6\" (167 6 cm)  IBW (Ideal Body Weight): 63 8 kg  Body mass index is 23 2 kg/m²  Weight (last 2 days)     Date/Time Weight    05/11/23 0519 65 2 (143 74)            Nutrition       Diet Orders   (From admission, onward)             Start     Ordered    05/10/23 1710  Diet Cardiovascular; Cardiac; Consistent Carbohydrate Diet Level 2 (5 carb servings/75 grams CHO/meal)  Diet effective now        References:    Nutrtion Support Algorithm Enteral vs  Parenteral   Question Answer Comment   Diet Type Cardiovascular    Cardiac Cardiac    Other Restriction(s): Consistent Carbohydrate Diet Level 2 (5 carb servings/75 grams CHO/meal)    RD to adjust diet per protocol?  Yes        05/10/23 1729                  Active Medications  Scheduled Meds:  Current Facility-Administered Medications   Medication Dose Route Frequency Provider Last Rate   • amLODIPine  10 mg Oral Daily Chapincito Solis MD     • busPIRone  15 mg Oral BID Juan A Mcgrath DO     • chlorhexidine  15 mL Mouth/Throat Q12H [address] [facility], DO     • doxazosin  8 mg Oral BID Uri Baltazar " "Vasiliy Venegas, DO     • escitalopram  20 mg Oral Daily Jenita Lobe, DO     • ferrous sulfate  325 mg Oral Every Other Day Jenita Lobe, DO     • heparin (porcine)  5,000 Units Subcutaneous [facility] Jenita Lobe, DO     • HYDROmorphone  0 2 mg Intravenous Q4H PRN [facility] Lobe, DO     • labetalol  100 mg Oral Q12H [address] Jenita Lobe, DO     • levETIRAcetam  1,000 mg Oral Q12H [address] [facility] Lobe, DO     • multi-electrolyte  50 mL/hr Intravenous Continuous Jenita Lobe, DO 50 mL/hr (05/10/23 1741)   • niCARdipine  1-15 mg/hr Intravenous Titrated Jenita Lobe, DO 7 5 mg/hr (05/11/23 0242)   • oxyCODONE  5 mg Oral Q4H PRN Mardell Lira Zant, DO      Or   • oxyCODONE  10 mg Oral Q4H [facility]N [facility], [facility]     • pantoprazole  40 mg Intravenous Q12H [address] [facility] Lobe, DO     • trimethobenzamide  200 mg Intramuscular Q6H PRN Mardell Lira Zant, DO       Continuous Infusions:  multi-electrolyte, 50 mL/hr, Last Rate: 50 mL/hr (05/10/23 1741)  niCARdipine, 1-15 mg/hr, Last Rate: 7 5 mg/hr (05/11/23 0242)      PRN Meds:   HYDROmorphone, 0 2 mg, Q4H PRN  oxyCODONE, 5 mg, Q4H PRN   Or  oxyCODONE, 10 mg, Q4H PRN  trimethobenzamide, 200 mg, Q6H PRN        Allergies   Allergies   Allergen Reactions   • Clonidine Anaphylaxis   • Lisinopril Anaphylaxis   • Nifedipine Anaphylaxis   • Spironolactone Anaphylaxis, Other (See Comments) and Shortness Of Breath   • Buspirone      Headaches,    • Enoxaparin      Injection site \"bump\" per Dr Irvin Lamb   • Minoxidil      Retains fluid around heart         [address]    Portions of the record may have been created with voice recognition software  Occasional wrong word or \"sound a like\" substitutions may have occurred due to the inherent limitations of voice recognition software    Read the chart carefully and recognize, using context, where substitutions have " occurred

## 2023-05-11 NOTE — CONSULTS
NEUROLOGY RESIDENCY CONSULT NOTE     Name: Colin Vee   Age & Sex: 59 y o  male   MRN: [id number]  Unit/Bed#: ICU 02   Encounter: [id number]  Length of Stay: 1    [facility]     Hypertensive emergency  Assessment & Plan  Patient with history of hypertension and multi-drug therapy presenting with SBP in the 200s and ictal activity  Must consider for differential diagnosis, in the setting of new-onset stroke  - Patient on cardene gtt with appropriate rate of BP reduction  Currently normotensive  Continue to monitor  * New onset seizure [facility])  Assessment & Plan  Patient with history of meningioma, hypertension, CKD, CHF Shamika-en-Y gastric bypass surgery, COPD, DM presenting after new onset seizure; patient had been in usual state of health, when he developed abdominal pain preceding the onset of ictal activity, described as tonic-clonic in nature  By the time EMS arrived, patient seizures have resolved  Initial ictal activity had resolved, however patient experienced another tonic-clonic seizure required 2 mg of Ativan to break  Patient was postictal on arrival to the ED, and notably hypertensive on further work-up, requiring Cardene drip  EKG was noted for QTc prolongation of around 513, in addition to possible T wave inversions in anterior leads, however troponins were unremarkable  CT head without contrast revealed slight increase in 2 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 with mild associated mass effect and chronic microangiopathic changes  Given abdominal pain, CTA dissection protocol was obtained, which was also unremarkable  Serum chemistry is also noted for mild hypokalemia  Patient received equivalent loading dose of Keppra, and transferred to [facility] for further work-up of new onset seizure  - Obtain EEG   - Obtain MRI for further categorization of meningioma, with possible increased size over following interval based on CT findings    - As patient has experienced 2 seizures, recommend maintenance AED with Keppra 750 mg twice daily given renal function; continue Ativan as needed for breakthrough seizure  - Given suggestion of interval increase in meningioma, and new onset seizures, neurosurgery evaluation is recommended  - Maintain seizure precautions  - Avoid QTc prolonging agents  - Monitor and replete electrolytes as needed  Maintain euglycemia  Recommendations for outpatient neurological follow up have yet to be determined  Pending for discharge: MRI brain  Neurosurgery consultation  2D Echocardiogram     SUBJECTIVE     Reason for Consult / Principal Problem: New-onset seizure  Hx and PE limited by: lethargy  HPI: Audrey Figueredo is a 59 y o  right handed male who presents with new-onset seizures  Patient with history of meningioma, hypertension, CKD, CHF Shamika-en-Y gastric bypass surgery, COPD, DM presenting after new onset seizure; patient had been in usual state of health, when he developed abdominal pain preceding the onset of ictal activity, described as tonic-clonic in nature  By the time EMS arrived, patient seizures have resolved  Initial ictal activity had resolved, however patient experienced another tonic-clonic seizure required 2 mg of Ativan to break  Patient was postictal on arrival to the ED, and notably hypertensive on further work-up, requiring Cardene drip  EKG was noted for QTc prolongation of around 513, in addition to possible T wave inversions in anterior leads, however troponins were unremarkable  CT head without contrast revealed slight increase in 2 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 with mild associated mass effect and chronic microangiopathic changes  Given abdominal pain, CTA dissection protocol was obtained, which was also unremarkable  Serum chemistry is also noted for mild hypokalemia  Patient received equivalent loading dose of Keppra, and transferred to [facility] for further work-up of new onset seizure      Inpatient consult to Neurology  Consult performed by: Ly Lopez MD  Consult ordered by: Yuriy Kingston DO          Historical Information   Past Medical History:   Diagnosis Date   • Asthma    • Chronic kidney disease    • COPD (chronic obstructive pulmonary disease) ([email or web address] )    • CPAP (continuous positive airway pressure) dependence    • Diabetes mellitus ([email or web address] 75 )    • Emphysema of lung ([email or web address] 75 )    • Gout    • History of transfusion     pt stated they had a transfusion when they had gallbladder surgery  • Hypertension    • Kidney disease     renal failure   • Leg DVT (deep venous thromboembolism), acute, bilateral ([email or web address] 75 ) 01/2018   • Obesity (BMI 30-39  9)    • AGUS on CPAP     setting 11   • Postgastrectomy malabsorption    • Sleep apnea    • Systolic CHF [facility])      Past Surgical History:   Procedure Laterality Date   • ADRENALECTOMY     • CHOLECYSTECTOMY     • COLONOSCOPY     • EGD     • HIATAL HERNIA REPAIR N/A 12/22/2020    Procedure: REPAIR HERNIA HIATAL LAPAROSCOPIC;  Surgeon: Daphne Pham MD;  Location: [facility];  Service: Bariatrics   • INCISION AND DRAINAGE OF WOUND Left 10/17/2018    Procedure: INCISION AND DRAINAGE (I&D) GROIN;  Surgeon: Wes Bamberger, MD;  Location: [facility];  Service: General   • IR IMAGE [address] / [facility]  8/17/2018   • IR IMAGE GUIDED ASPIRATION / DRAINAGE W TUBE  7/31/2018   • JOINT REPLACEMENT Bilateral     knee   • KIDNEY SURGERY  2009    nodule removal   • LYMPH NODE DISSECTION Left 01/2018    left inguinal LN removed - benign    • OTHER SURGICAL HISTORY      kidney nodule removal   • PALATE / UVULA BIOPSY / EXCISION     • GA LAPS GSTR RSTCV PX W/BYP JASON-EN-Y LIMB <150 CM N/A 12/22/2020    Procedure: BYPASS GASTRIC  JASON-EN-Y LAPAROSCOPIC WITH INTRAOPERATIVE EGD;  Surgeon: Daphne Pham MD;  Location: [facility];  Service: Bariatrics   • SINUS SURGERY     • TONSILLECTOMY       Social History   Social History     Substance and Sexual Activity "  Alcohol Use Yes    Comment: occasionally     Social History     Substance and Sexual Activity   Drug Use No     E-Cigarette/Vaping   • E-Cigarette Use Never User      E-Cigarette/Vaping Substances   • Nicotine No    • THC No    • CBD No    • Flavoring No    • Other No    • Unknown No      Social History     Tobacco Use   Smoking Status Never   Smokeless Tobacco Never     Family History:   Family History   Problem Relation Age of Onset   • Heart murmur Sister    • Asthma Sister    • Hypertension Sister    • Kidney disease Mother    • Cancer Father    • Bell's palsy Brother    • Kidney disease Brother    • Deep vein thrombosis Neg Hx      Meds/Allergies   all current active meds have been reviewed  Allergies   Allergen Reactions   • Clonidine Anaphylaxis   • Lisinopril Anaphylaxis   • Nifedipine Anaphylaxis   • Spironolactone Anaphylaxis, Other (See Comments) and Shortness Of Breath   • Buspirone      Headaches,    • Enoxaparin      Injection site \"bump\" per Dr Angel Moraes   • Minoxidil      Retains fluid around heart       Review of previous medical records was  completed  Review of Systems   Constitutional: Positive for fatigue  Respiratory: Negative for shortness of breath  Cardiovascular: Negative for chest pain  Gastrointestinal: Positive for abdominal pain  Genitourinary: Negative for difficulty urinating  Neurological: Positive for seizures  Psychiatric/Behavioral: Positive for confusion  OBJECTIVE     Patient ID: Dipak Molina is a 59 y o  male  Vitals:   Vitals:    05/11/23 1045 05/11/23 1100 05/11/23 1238 05/11/23 1300   BP: 131/79  131/79    BP Location:       Pulse: 64 66 66 70   Resp: 16 16 18 20   Temp:       TempSrc:       SpO2: 96% 98% 97% 96%   Weight:   64 9 kg (143 lb)    Height:   5' 6\" (1 676 m)       Body mass index is 23 08 kg/m²       Intake/Output Summary (Last 24 hours) at 5/11/2023 1429  Last data filed at 5/11/2023 1000  Gross per 24 hour   Intake 2074 16 ml " Output 275 ml   Net 1799 16 ml       Temperature:   Temp (24hrs), Av 5 °F (36 9 °C), Min:97 1 °F (36 2 °C), Max:99 9 °F (37 7 °C)    Temperature: 98 6 °F (37 °C)    Invasive Devices: Invasive Devices     Peripheral Intravenous Line  Duration           Peripheral IV 05/10/23 Distal;Left;Upper;Ventral (anterior) Arm <1 day    Peripheral IV 05/10/23 Distal;Right;Ventral (anterior) Forearm <1 day          Arterial Line  Duration           Arterial Line 05/10/23 Radial <1 day                Physical Exam  Vitals and nursing note reviewed  Constitutional:       General: He is not in acute distress  Appearance: He is well-developed  HENT:      Head: Normocephalic and atraumatic  Eyes:      Extraocular Movements: EOM normal       Conjunctiva/sclera: Conjunctivae normal    Cardiovascular:      Rate and Rhythm: Normal rate and regular rhythm  Heart sounds: No murmur heard  Pulmonary:      Effort: Pulmonary effort is normal  No respiratory distress  Breath sounds: Wheezing present  Abdominal:      Palpations: Abdomen is soft  Tenderness: There is no abdominal tenderness  Musculoskeletal:         General: No swelling  Cervical back: Neck supple  Skin:     General: Skin is warm and dry  Capillary Refill: Capillary refill takes less than 2 seconds  Neurological:      Mental Status: He is alert and oriented to person, place, and time  Motor: Motor strength is normal       Coordination: Finger-Nose-Finger Test normal       Deep Tendon Reflexes:      Reflex Scores:       Patellar reflexes are 2+ on the right side and 2+ on the left side  Psychiatric:         Mood and Affect: Mood normal           Neurologic Exam     Mental Status   Oriented to person, place, and time  Cranial Nerves     CN III, IV, VI   Extraocular motions are normal    CN III: no CN III palsy  CN VI: no CN VI palsy    CN V   Facial sensation intact  CN VII   Facial expression full, symmetric       CN VIII   CN VIII normal      CN XII   CN XII normal      Motor Exam     Strength   Strength 5/5 throughout  Sensory Exam   Right arm light touch: normal  Left arm light touch: normal  Right leg light touch: normal  Left leg light touch: normal    Gait, Coordination, and Reflexes     Coordination   Finger to nose coordination: normal    Reflexes   Right patellar: 2+  Left patellar: 2+         LABORATORY DATA     Labs: I have personally reviewed pertinent reports  Results from last 7 days   Lab Units 05/11/23  0457 05/10/23  1753 05/10/23  0725   WBC Thousand/uL 14 20*  --  8 65   HEMOGLOBIN g/dL 12 6  --  14 3   HEMATOCRIT % 39 6  --  43 0   PLATELETS Thousands/uL 278 294 264   NEUTROS PCT % 82*  --  92*   MONOS PCT % 9  --  2*      Results from last 7 days   Lab Units 05/11/23  1235 05/11/23  0457 05/10/23  1753 05/10/23  0725   POTASSIUM mmol/L 3 6 3 0* 2 8* 3 4*   CHLORIDE mmol/L 111* 110* 107 101   CO2 mmol/L 27 26 25 27   BUN mg/dL 33* 32* 36* 41*   CREATININE mg/dL 1 71* 1 65* 1 74* 1 81*   CALCIUM mg/dL 9 4 9 5 10 4* 10 1   ALK PHOS U/L  --   --  94 92   ALT U/L  --   --  170* 162*   AST U/L  --   --  101* 156*     Results from last 7 days   Lab Units 05/11/23  1235 05/11/23  0457 05/10/23  1753   MAGNESIUM mg/dL 2 3 2 2 2 3     Results from last 7 days   Lab Units 05/11/23  0457 05/10/23  1753   PHOSPHORUS mg/dL 4 9* 1 5*          Results from last 7 days   Lab Units 05/10/23  1753   LACTIC ACID mmol/L 1 9           IMAGING & DIAGNOSTIC TESTING     Radiology Results: I have personally reviewed pertinent reports  CTA dissection protocol chest/abdomen/pelvis   Final Result by Jacqueline Dave MD (05/10 1926)      1  No evidence of aortic dissection  2   Scattered mild atelectatic changes  New somewhat nodular opacity in the right lower lobe posteromedially appears flat on the coronal reformats likely discoid atelectasis    Improving peripheral tubular opacity in the right lower lobe posteriorly probably related to chronically impacted bronchioles  3  Stable cardiomegaly  Stable small pericardial effusion  4   Mild pelvic ascites, previously present  Workstation performed: [id number]         MRI inpatient order    (Results Pending)       Other Diagnostic Testing: I have personally reviewed pertinent reports  ACTIVE MEDICATIONS     Current Facility-Administered Medications   Medication Dose Route Frequency   • albuterol (PROVENTIL HFA,VENTOLIN HFA) inhaler 2 puff  2 puff Inhalation Q4H PRN   • amLODIPine (NORVASC) tablet 10 mg  10 mg Oral Daily   • busPIRone (BUSPAR) tablet 15 mg  15 mg Oral BID   • chlorhexidine (PERIDEX) 0 12 % oral rinse 15 mL  15 mL Mouth/Throat Q12H JOANN   • doxazosin (CARDURA) tablet 8 mg  8 mg Oral BID   • escitalopram (LEXAPRO) tablet 20 mg  20 mg Oral Daily   • ferrous sulfate tablet 325 mg  325 mg Oral Every Other Day   • heparin (porcine) subcutaneous injection 5,000 Units  5,000 Units Subcutaneous Q8H [address]   • HYDROmorphone HCl (DILAUDID) injection 0 2 mg  0 2 mg Intravenous Q4H PRN   • labetalol (NORMODYNE) tablet 200 mg  200 mg Oral Q12H [address]   • levETIRAcetam (KEPPRA) tablet 750 mg  750 mg Oral Q12H [address]   • midazolam (VERSED) injection 2 mg  2 mg Intravenous On Call   • oxyCODONE (ROXICODONE) IR tablet 5 mg  5 mg Oral Q4H PRN    Or   • oxyCODONE (ROXICODONE) immediate release tablet 10 mg  10 mg Oral Q4H PRN   • pantoprazole (PROTONIX) EC tablet 40 mg  40 mg Oral Early Morning   • polyethylene glycol (MIRALAX) packet 17 g  17 g Oral Daily   • senna (SENOKOT) tablet 8 6 mg  1 tablet Oral HS   • trimethobenzamide (TIGAN) IM injection 200 mg  200 mg Intramuscular Q6H PRN       Prior to Admission medications    Medication Sig Start Date End Date Taking?  Authorizing Provider   albuterol (PROVENTIL HFA,VENTOLIN HFA) 90 mcg/act inhaler INHALE 2 PUFFS BY MOUTH EVERY 6 HOURS  Patient not taking: Reported on 5/10/2023    Historical Provider, MD   amLODIPine (NORVASC) 10 mg tablet Take 10 mg by mouth 4/15/20   Historical Provider, MD   atorvastatin (LIPITOR) 40 mg tablet Take 40 mg by mouth daily at bedtime 7/21/22   Historical Provider, MD   BD Pen Needle Amelia U/F 32G X 4 MM MISC   12/21/20   Historical Provider, MD   busPIRone (BUSPAR) 15 mg tablet TAKE ONE TABLET BY MOUTH TWICE A DAY FOR PTSD    Historical Provider, MD   Calcium 600 MG tablet Take 600 mg by mouth  Patient not taking: Reported on 5/10/2023    Historical Provider, MD   Cholecalciferol (VITAMIN D-3) 1000 units CAPS Take 1,000 Units by mouth daily    Historical Provider, MD   Continuous Blood Gluc Sensor (FreeStyle Michelle 14 Day Sensor) MISC Use as directed 2/17/21   Historical Provider, MD   doxazosin (CARDURA) 8 MG tablet Take 8 mg by mouth 2 (two) times a day 2/3/21   Historical Provider, MD   eplerenone (INSPRA) 50 MG tablet Take 50 mg by mouth daily 5/23/17   Historical Provider, MD   escitalopram (LEXAPRO) 20 mg tablet Take 20 mg by mouth daily 11/14/20   Historical Provider, MD   ferrous sulfate 325 (65 Fe) mg tablet Take 325 mg by mouth 2 (two) times a day      Historical Provider, MD   glucose monitoring kit (FREESTYLE) monitoring kit 1 each by Does not apply route 2 (two) times a day Any brand covered by insurance:  Dispense one glucometer, test strips #50, lancets #100  Patient not taking: Reported on 10/13/2022 7/11/20   Oscar Duvall DO   glycopyrrolate-formoterol (BEVESPI AEROSPHERE) 9-4 8 MCG/ACT inhaler Inhale 2 puffs daily after breakfast  Patient not taking: Reported on 10/13/2022    Historical Provider, MD   labetalol (NORMODYNE) 100 mg tablet Take 100 mg by mouth 2 (two) times a day Morning and before bedtime 7/30/22   Historical Provider, MD   LORazepam (ATIVAN) 0 5 mg tablet TAKE ONE TABLET BY MOUTH Q6 PRN  Patient not taking: Reported on 5/10/2023    Historical Provider, MD   mirtazapine (REMERON) 15 mg tablet 15 mg 9/1/22   Historical Provider, MD   nebivolol (BYSTOLIC) 20 MG tablet Take 1 tablet (20 mg total) by mouth daily 8/27/21 9/26/21  Claire Espitia PA-C   Omega-3 Fatty Acids (FISH OIL) 1200 MG CAPS Take 1,200 mg by mouth daily at bedtime    Historical Provider, MD   Pulmicort Flexhaler 90 MCG/ACT inhaler 2 puffs 2 (two) times a day  Patient not taking: Reported on 10/13/2022 3/9/21   Historical Provider, MD   sertraline (ZOLOFT) 100 mg tablet Take 2 tablets by mouth daily  Patient not taking: Reported on 5/10/2023 6/15/22   Historical Provider, MD   umeclidinium-vilanterol (ANORO ELLIPTA) 62 5-25 MCG/INH inhaler Inhale 1 puff daily  Patient not taking: Reported on 10/13/2022    Historical Provider, MD         CODE STATUS & ADVANCED DIRECTIVES     Code Status: Level 1 - Full Code  Advance Directive and Living Will:      Power of :    POLST:         VTE Pharmacologic Prophylaxis: Heparin  VTE Mechanical Prophylaxis: sequential compression device    ==  Abylorie Locke, MD [facility], PGY-3 (Neurology Service)

## 2023-05-11 NOTE — ASSESSMENT & PLAN NOTE
Patient has history of prolonged QTc interval when reviewing prior EKGs in chart       Plan:  -Optimize electrolytes, K>4, Mg>2, PO4>4  -Avoid QTc prolonging medications

## 2023-05-11 NOTE — ASSESSMENT & PLAN NOTE
Lab Results   Component Value Date    HGBA1C 5 4 09/27/2022     Patient with prior history of type 2 DM  He underwent RXYGB surgery in 2020  He is not on any insulin or oral diabetic agents  His blood sugars remained stable and controlled upon admission  Most recent A1c was 5 4 in Sept 2022       Plan:  -Continue to monitor glucose on BMP, consider adding insulin if needed   -Follow-up with PCP to monitor glucose levels on an outpatient basis and assess the need for oral medications vs diet-controlled    Recent Labs     05/10/23  0713   POCGLU 198*       Blood Sugar Average: Last 72 hrs:

## 2023-05-11 NOTE — ASSESSMENT & PLAN NOTE
New onset seizures in the setting of uncontrolled hypertension  Patient arrived after tonic clonic seizure with SBP in the 200s  Patient was managed on nicardipine gtt but has now been transitioned off of it  Patient reports noncompliance with BP meds, only taking them three times a week  Plan:  -MRI brain w wo contrast ordered  -Blood pressure control with labetalol, amlodipine, and doxazosin  Titrate up as needed  Home eplerenone held upon admission due to patient's hypovolemic status    -Keppra as seizure ppx

## 2023-05-11 NOTE — ASSESSMENT & PLAN NOTE
Patient has a history of DVT but is not currently on any anticoagulation      Plan:  -DVT prophylaxis with heparin and SCDs

## 2023-05-11 NOTE — ASSESSMENT & PLAN NOTE
2cm inferior L tentorial meningioma  - p/w new onset seizure like activity   - noted to be hypertensive on arrival and post-ictal   - current exam: mild confusion, but otherwise non-focal     Imaging:   · 5/10 CTH: No acute posttraumatic intracranial abnormality  Interim slight increase (compared to 8/26/2021) in 2 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 with mild associated mass effect  This could be more thoroughly evaluated and characterized with MRI  Chronic microangiopathic change again noted  Plan:   · Continue to closely monitor neuro exam   · Frequent neuro checks per primary team   · Repeat STAT CTH with any acute decline in GCS > 2pts or more in 1 hr   · Maintain normotensive BP goals  · Arrived hypertensive with known hx of HTN   · Currently on cardene @ 5   · Titrate as needed to maintain goal   · No acute neurosurgical intervention indicated at this time   · Case and imaging reviewed at length this am on rounds   · Pt has a known hx of meningioma for the last 6 years and reports he has been asx from this   · Does not appear he has been following with nsgy, but has had a few CTHs and it was noticed to have remained stable/slightly increased in size  · Recommend further workup at this time with an MRI brain w/wo contrast   · Continue further workup of this seizure like activity per neurology and [facility]   · Given location, size, and only subtle increase in this known meningioma, do not believe this is contributing to the pt's seizure like activity  · continue AED management per neuro and [facility]   · Currently on keppra 750mg PO q 12hrs   · DVT ppx: SCDs, okay for chem dvt pps from a nsgy standpoint   · Pain control per primary team   · Continue medical management per primary team   · Social work following for assistance with dispo once medically cleared     Neurosurgery will continue to follow and review MRI brain once completed   Anticipate the pt will require close outpatient follow-up vs any acute intervention this admission  Please reach out with any further questions or concerns

## 2023-05-11 NOTE — PLAN OF CARE
Problem: PHYSICAL THERAPY ADULT  Goal: Performs mobility at highest level of function for planned discharge setting  See evaluation for individualized goals  Description: Treatment/Interventions: OT, Spoke to case management, Spoke to nursing, Gait training, Bed mobility, Patient/family training, Endurance training, Functional transfer training, LE strengthening/ROM          See flowsheet documentation for full assessment, interventions and recommendations  Note: Prognosis: Good  Problem List: Decreased strength, Decreased range of motion, Decreased endurance, Impaired balance, Decreased mobility, Decreased coordination, Decreased cognition, Impaired judgement, Decreased safety awareness, Pain  Assessment: Pt is 59 y o  male seen for PT evaluation s/p admit to [facility] on 5/10/2023 w/ Seizure ([facility] )  PT consulted to assess pt's functional mobility and d/c needs  Order placed for PT eval and tx, w/ up w/ A order  Comorbidities affecting pt's physical performance at time of assessment include:  has a past medical history of Asthma, Chronic kidney disease, COPD (chronic obstructive pulmonary disease) ([facility]), CPAP (continuous positive airway pressure) dependence, Diabetes mellitus ([facility] ), Emphysema of lung ([facility] [address] [facility] ), Gout, History of transfusion, Hypertension, Kidney disease, Leg DVT (deep venous thromboembolism), acute, bilateral ([facility] [facility] ), Obesity (BMI 30-39 9), AGUS on CPAP, Postgastrectomy malabsorption, Sleep apnea, and Systolic CHF ([facility] )  PTA, pt was ambulates community distances and elevations and lives in multi-level home  Personal factors affecting pt at time of IE include: inaccessible home environment, inability to ambulate household distances, positive fall history, unable to perform physical activity, inability to perform IADLs and inability to perform ADLs   Please find objective findings from PT assessment regarding body systems outlined above with impairments and limitations including weakness, impaired balance, decreased endurance, impaired coordination, gait deviations, pain, decreased activity tolerance, decreased functional mobility tolerance, decreased safety awareness, impaired judgement, fall risk and decreased cognition  The following objective measures performed on IE also reveal limitations: The patient's AM-PAC Basic Mobility Inpatient Short Form Raw Score is 17, Standardized Score is 39 67  A standardized score less than 42 9 suggests the patient may benefit from discharge to post-acute rehabilitation services, although anticipate pt will progress to achieve goals to return home if family able to provide increased A  Please also refer to the recommendation of the Physical Therapist for safe discharge planning  Pt's clinical presentation is currently unstable/unpredictable seen in pt's presentation of critical care monitoring  Pt to benefit from continued PT tx to address deficits as defined above and maximize level of functional independent mobility and consistency  From PT/mobility standpoint, recommendation at time of d/c would be home with home health rehabilitation pending progress in order to facilitate return to PLOF  Barriers to Discharge: Inaccessible home environment, Decreased caregiver support     PT Discharge Recommendation: Home with home health rehabilitation    See flowsheet documentation for full assessment

## 2023-05-11 NOTE — CASE MANAGEMENT
Case Management Assessment & Discharge Planning Note    Patient name Faib Garcia  Location ICU 02ICU 02 MRN [id number]  : 1965 Date 2023       Current Admission Date: 5/10/2023  Current Admission Diagnosis:Hypertensive encephalopathy   Patient Active Problem List    Diagnosis Date Noted   • New onset seizure ([facility] ) 2023   • Hypertensive encephalopathy 2023   • Hypertensive emergency 2023   • Hypokalemia 2021   • Bradycardia 2021   • Hypoglycemia 2021   • History of gastric bypass 2021   • Hypertensive kidney disease with stage 3 chronic kidney disease ([facility] ) 2021   • Acute renal failure superimposed on stage 3 chronic kidney disease ([facility] ) 2021   • Encounter for surgical aftercare following surgery of digestive system 2021   • Postsurgical malabsorption 2021   • Morbid obesity due to excess calories ([facility] ) 2020   • Post-traumatic male urethral stricture 2020   • Renal cyst 2020   • Type 2 diabetes mellitus ([facility]  ) 2020   • Dysuria 2020   • History of DVT (deep vein thrombosis) 10/17/2018   • Splenic lesion 10/17/2018   • AGUS (obstructive sleep apnea) 10/16/2018   • Abscess of left groin 10/16/2018   • Anemia 2018   • Lymphedema 2018   • CKD stage 3 secondary to diabetes ([facility] ) 03/15/2018   • Chronic diastolic CHF (congestive heart failure) ([facility] ) 2018   • Pulmonary nodule, right 2018   • Pericardial effusion 2018   • Essential hypertension 2018   • Leg DVT (deep venous thromboembolism), acute, bilateral ([facility] ) 2018   • COPD (chronic obstructive pulmonary disease) ([facility] ) 2018   • CHF (congestive heart failure) ([facility] ) 2018      LOS (days): 1  Geometric Mean LOS (GMLOS) (days): 2 60  Days to GMLOS:1 8     OBJECTIVE:    Risk of Unplanned Readmission Score: 18 06         Current admission status: Inpatient       Preferred Pharmacy:   [facility] [address] KEVIN BEDOYA [address] [facility] [place] [facility] [address] [address] [address]  Phone: [phone] Fax: [phone]    [address] [place] [place]  Phone: [phone] Fax: [phone]    Primary Care Provider: Wan Mayberry DO    Primary Insurance: THE [facility]  Secondary Insurance:     ASSESSMENT:  Janina 26 Proxies    There are no active Health Care Proxies on file                   Readmission Root Cause  30 Day Readmission: No    Patient Information  Admitted from[de-identified] Home  Mental Status: Alert, Confused  During Assessment patient was accompanied by: Not accompanied during assessment  Assessment information provided by[de-identified] Spouse  Primary Caregiver: Self  Support Systems: Spouse/significant other, [address] of Residence: [place] [address] do you live in?: [place]  In the last 12 months, was there a time when you were not able to pay the mortgage or rent on time?: No  In the last 12 months, was there a time when you did not have a steady place to sleep or slept in a shelter (including now)?: No  Homeless/housing insecurity resource given?: N/A  Living Arrangements: Lives w/ Spouse/significant other, Lives w/ Daughter  Is patient a ?: Yes  Is patient active with VA ([address])?: Yes (receives [place] [address] services through [place] for PTSD)  Is patient service connected?: Yes (50% service connected)    Activities of Daily Living Prior to Admission  Functional Status: Independent  Completes ADLs independently?: Yes  Ambulates independently?: Yes  Does patient use assisted devices?: No  Does patient currently own DME?: No  Does patient have a history of Outpatient Therapy (PT/OT)?: No  Does the patient have a history of Short-Term Rehab?: Yes ([facility])  Does patient have a history of HHC?: No  Does patient currently have [address]?: No         Patient Information Continued  Income Source: Pension/[facility]  Does patient have prescription coverage?: Yes  Within the past 12 months, you worried that your food would run out before you got the money to buy more : Never true  Within the past 12 months, the food you bought just didn't last and you didn't have money to get more : Never true  Food insecurity resource given?: N/A  Does patient receive dialysis treatments?: No  Does patient have a history of substance abuse?: Yes  Historical substance use preference:  (uses medical marijuana)  Does patient have a history of Mental Health Diagnosis?: Yes (PTSD)  Is patient receiving treatment for mental health?: Yes (sees psychiatrist and psychologist through the [place])         Gui Galvin of Transport to Appts[de-identified] Drives Self  In the past 12 months, has lack of transportation kept you from medical appointments or from getting medications?: No  In the past 12 months, has lack of transportation kept you from meetings, work, or from getting things needed for daily living?: No  Was application for public transport provided?: N/A        DISCHARGE DETAILS:    Discharge planning discussed with[de-identified] patient at bedside, wife via phone  Freedom of Choice: Yes     CM contacted family/caregiver?: Yes  Were Treatment Team discharge recommendations reviewed with patient/caregiver?: Yes  Did patient/caregiver verbalize understanding of patient care needs?: Yes  Were patient/caregiver advised of the risks associated with not following Treatment Team discharge recommendations?: Yes    Contacts  Patient Contacts:  Juan hTompson, wife  Relationship to Patient[de-identified] Family  Contact Method: Phone  Phone Number: [phone]  Reason/Outcome: Continuity of Care, Emergency Contact, Discharge Planning                        Treatment Team Recommendation: Other (TBD)  Discharge Destination Plan[de-identified] Other (TBD -- awaiting recommendations)  Transport at Discharge : Family                      Patient/caregiver received discharge checklist   Content reviewed  Patient/caregiver encouraged to participate in discharge plan of care prior to discharge home  CM reviewed d/c planning process including the following: identifying help at home, patient preference for d/c planning needs, Discharge Lounge, [facility] Meds to Bed program, availability of treatment team to discuss questions or concerns patient and/or family may have regarding understanding medications and recognizing signs and symptoms once discharged  CM also encouraged patient to follow up with all recommended appointments after discharge  Patient advised of importance for patient and family to participate in managing patient’s medical well being  Additional Comments: Introduced self and role to patient at bedside, and wife Nora Hidsavita via phone  Patient is independent at home, drives, no use of DME  Did [facility] about 5 years ago for wound care; also went to short-term rehab about 12 years ago after knee replacement  Patient currently sees a psychiatrist and psychologist through the [place] for PTSD  Patient has been vaccinated and boosted x2 for COVID  CM will continue to follow for d/c needs and place referrals as appropriate

## 2023-05-11 NOTE — OCCUPATIONAL THERAPY NOTE
Occupational Therapy Evaluation     Patient Name: Mimi ALBERT'S Date: 5/11/2023  Problem List  Principal Problem:    New onset seizure [facility])  Active Problems:    Essential hypertension    CKD stage 3 secondary to diabetes ([place] [address] )    History of DVT (deep vein thrombosis)    Type 2 diabetes mellitus ([place] [address] )    History of gastric bypass    Hypertensive encephalopathy    Hypertensive emergency    Meningioma [facility])    Past Medical History  Past Medical History:   Diagnosis Date    Asthma     Chronic kidney disease     COPD (chronic obstructive pulmonary disease) ([facility])     CPAP (continuous positive airway pressure) dependence     Diabetes mellitus ([place] [address] )     Emphysema of lung (H[facility])     Gout     History of transfusion     pt stated they had a transfusion when they had gallbladder surgery      Hypertension     Kidney disease     renal failure    Leg DVT (deep venous thromboembolism), acute, bilateral ([place] [address] ) 01/2018    Obesity (BMI 30-39 9)     AGUS on CPAP     setting 11    Postgastrectomy malabsorption     Sleep apnea     Systolic CHF [facility])      Past Surgical History  Past Surgical History:   Procedure Laterality Date    ADRENALECTOMY      CHOLECYSTECTOMY      COLONOSCOPY      EGD      HIATAL HERNIA REPAIR N/A 12/22/2020    Procedure: REPAIR HERNIA HIATAL LAPAROSCOPIC;  Surgeon: Daniel Guerrero MD;  Location: [facility];  Service: [address] OF WOUND Left 10/17/2018    Procedure: INCISION AND DRAINAGE (I&D) GROIN;  Surgeon: Amisha Gray MD;  Location: [facility];  Service: General    IR [address] / DRAINAGE W TUBE  8/17/2018    IR IMAGE GUIDED ASPIRATION / DRAINAGE W TUBE  7/31/2018    JOINT REPLACEMENT Bilateral     knee    KIDNEY SURGERY  2009    nodule removal    LYMPH NODE DISSECTION Left 01/2018    left inguinal LN removed - benign     OTHER SURGICAL HISTORY      kidney nodule removal    PALATE / UVULA BIOPSY / EXCISION      ME LAPS GSTR RSTCV PX W/BYP JASON-EN-Y LIMB "<150 CM N/A 12/22/2020    Procedure: BYPASS GASTRIC  JASON-EN-Y LAPAROSCOPIC WITH INTRAOPERATIVE EGD;  Surgeon: Zayda Bhakta MD;  Location: [facility];  Service: Bariatrics    SINUS SURGERY      TONSILLECTOMY             05/11/23 0935   OT Last Visit   OT Visit Date 05/11/23   Note Type   Note type Evaluation   Pain Assessment   Pain Assessment Tool 0-10   Pain Score No Pain   Restrictions/Precautions   Weight Bearing Precautions Per Order No   Other Precautions Cognitive; Chair Alarm;Multiple lines;Telemetry;O2;Fall Risk   Home Living   Type of Home House   Home Layout Two level  (3-5 [place], split level- 7 steps to first floor)   Bathroom Shower/Tub Walk-in shower   Bathroom Toilet Standard   Bathroom Equipment Shower chair   Bathroom Accessibility Accessible   Home Equipment Walker;Cane   Prior Function   Level of [place] Independent with ADLs; Independent with functional mobility   Lives With Spouse; Family   Receives Help From Family   IADLs Independent with driving; Independent with meal prep   Falls in the last 6 months 0   Vocational On disability   Lifestyle   Autonomy Pt reports being I w/ ADLs, IADLs, and mobility at baseline  Has DME but does not use     Reciprocal Relationships supportive spouse and family   Service to Others on disability   Intrinsic Gratification being independent   Subjective   Subjective \"I just feel a little confused\"   ADL   Where Assessed Edge of bed   Eating Assistance 5  Supervision/Setup   Eating Deficit Setup   Grooming Assistance 4  Minimal Assistance   UB Bathing Assistance 4  Minimal Assistance   LB Bathing Assistance 3  Moderate Assistance   UB Dressing Assistance 4  Minimal Assistance   LB Dressing Assistance 3  Moderate [address]  3  Moderate Assistance   Functional Assistance 3  Moderate Assistance   Bed Mobility   Supine to Sit 4  Minimal assistance   Additional items Assist x 1   Additional Comments Pt in recliner chair at end of session " Transfers   Sit to Stand 4  Minimal assistance   Additional items Assist x 1   Stand to Sit 4  Minimal assistance   Additional items Assist x 1   Stand pivot 4  Minimal assistance   Additional items Assist x 1   Additional Comments HHAx1 from bed to recliner   Balance   Static Sitting Fair +   Dynamic Sitting Fair   Static Standing Fair -   Dynamic Standing Poor +   Activity Tolerance   Activity Tolerance Patient limited by fatigue;Patient tolerated treatment well   Medical Staff Made Aware PT 2* medical complexity and unstable clinical presentation   Nurse Made Aware Yes, cleared to see pt per RN   RUE Assessment   RUE Assessment WFL   LUE Assessment   LUE Assessment WFL   Hand Function   Gross Motor Coordination Functional   Fine Motor Coordination Functional   Cognition   Overall Cognitive Status Impaired   Arousal/Participation Responsive; Cooperative   Attention Attends with cues to redirect   Orientation Level Oriented to person;Oriented to place;Oriented to situation;Disoriented to time   Memory Decreased recall of precautions;Decreased recall of recent events   Following Commands Follows one step commands with increased time or repetition   Comments Pt pleasant and agreeable to session  He was confused at times and benefited from repetition and increased processing time  Assessment   Limitation Decreased ADL status; Decreased cognition;Decreased endurance;Decreased self-care trans;Decreased high-level ADLs   Prognosis Good   Assessment Pt is a 59 y o  male who was [facility] on 5/10/2023 with New onset seizure ([facility] [address] )  [facility] for further w/u and monitoring  CT head without contrast revealed slight increase in 2 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 with mild associated mass effect and chronic microangiopathic changes   Pt  has a past medical history of Asthma, Chronic kidney disease, COPD (chronic obstructive pulmonary disease) ([address] ), CPAP (continuous positive airway pressure) dependence, Diabetes mellitus ([facility] ), [facility] ), Gout, History of transfusion, Hypertension, Kidney disease, Leg DVT (deep venous thromboembolism), acute, bilateral ([facility] ), Obesity (BMI 30-39 9), AGUS on CPAP, Postgastrectomy malabsorption, Sleep apnea, and Systolic CHF ([facility] [address] )  At baseline pt was I w/ ADLs, IADLs, and mobility  Pt lives in split level home w/ spouse and family  Currently pt requires min-mod A for overall ADLS and Min Ax1 for functional mobility/transfers w/ HHA  Pt currently presents with impairments in the following categories -difficulty performing ADLS, difficulty performing IADLS, limited insight into deficits, activity tolerance, endurance, standing balance/tolerance, sitting balance/tolerance, memory, insight and safety   These impairments, as well as pt's fatigue and risk for falls  limit pt's ability to safely engage in all baseline areas of occupation, including grooming, bathing, dressing, toileting and functional mobility/transfers  The patient's raw score on the -PAC Daily Activity Inpatient Short Form is 16  A raw score of less than 19 suggests the patient may benefit from discharge to post-acute rehabilitation services  Please refer to the recommendation of the Occupational Therapist for safe discharge planning  From OT standpoint, recommend home w/ increased support and home OT pending progress and level of support at home upon D/C  OT will continue to follow to address the below stated goals  Goals   Patient Goals to sit in the chair   LTG Time Frame 10-14   Long Term Goal see below goals   Plan   Treatment Interventions ADL retraining;Functional transfer training; Endurance training;Cognitive reorientation;Patient/family training;Equipment evaluation/education; Compensatory technique education; Energy conservation   Goal Expiration Date 05/25/23   OT Frequency 2-3x/wk   Recommendation   OT Discharge Recommendation Home with home health rehabilitation   AM-PAC Daily Activity Inpatient   Lower Body Dressing 2   Bathing 2   Toileting 2   Upper Body Dressing 3   Grooming 3   Eating 4   Daily Activity Raw Score 16   Daily Activity Standardized Score (Calc for Raw Score >=11) 35 96     LTG (10-14 DAYS)  Pt will improve activity tolerance to G for min 30-45 min txment sessions to increase participation in ADLs    Pt will complete ADLs/self care w/ mod I using adaptive device and DME as needed    Pt will complete toileting w/ mod I w/ G hygiene/thoroughness using DME as needed    Pt will improve functional transfers on/off all surfaces to mod I using DME as needed w/ G balance/safety  Pt will improve functional mobility during ADL/IADL/leisure tasks to mod I using DME as needed w/ G balance/safety  Pt will improve standing balance to G for 8-10 minutes of purposeful activity w/ G endurance      Pt will demonstrate G carryover of pt/caregiver education and training as appropriate w/ mod I w/o cues w/ good tolerance    Pt will demonstrate 100% carryover of energy conservation techniques w/ mod I t/o functional I/ADL/leisure tasks w/o cues s/p skilled education     Pt will engage in ongoing cognitive assessment (as needed) w/ G participation w/ mod I to A w/ safe d/c planning/recommendations      Sara Quintanilla OTR/L

## 2023-05-11 NOTE — ASSESSMENT & PLAN NOTE
Patient diagnosed with a partially calcified left infratentorial meningioma in 2020   It has reportedly been stable    Plan:  -Follow-up with Neurology outpatient

## 2023-05-11 NOTE — ASSESSMENT & PLAN NOTE
Patient presented with SBPs in the 200s and new onset seizure  He was placed on a nicardipine gtt which has seen been discontinued       Plan:  -Optimize blood pressure control  -Follow-up with PCP and Cardiologist outpatient  -Continue home BP medications, encourage patient to take medications everyday as prescribed

## 2023-05-11 NOTE — NURSING NOTE
I called wife Candy Burnett [phone] twice and received a busy signal, I also called daughter phone number [phone] and left a message to call back, in attempt to do MRI screening form

## 2023-05-11 NOTE — SPEECH THERAPY NOTE
Speech Language/Pathology  Speech-Language Pathology Bedside Swallow Evaluation      Patient Name: Lisseth Tejeda    WBMEA'X Date: 5/11/2023     Problem List  Principal Problem:    Hypertensive encephalopathy  Active Problems:    Essential hypertension    CKD stage 3 secondary to diabetes ([place] [address] )    History of DVT (deep vein thrombosis)    Type 2 diabetes mellitus ([place] [address] )    History of gastric bypass    New onset seizure [facility])    Hypertensive emergency      Past Medical History  Past Medical History:   Diagnosis Date   • Asthma    • Chronic kidney disease    • COPD (chronic obstructive pulmonary disease) (HCC)    • CPAP (continuous positive airway pressure) dependence    • Diabetes mellitus ([place] [address] )    • Emphysema of lung ([place] [address] )    • Gout    • History of transfusion     pt stated they had a transfusion when they had gallbladder surgery  • Hypertension    • Kidney disease     renal failure   • Leg DVT (deep venous thromboembolism), acute, bilateral ([place] [address] ) 01/2018   • Obesity (BMI 30-39  9)    • AGUS on CPAP     setting 11   • Postgastrectomy malabsorption    • Sleep apnea    • Systolic CHF [facility])        Past Surgical History  Past Surgical History:   Procedure Laterality Date   • ADRENALECTOMY     • CHOLECYSTECTOMY     • COLONOSCOPY     • EGD     • HIATAL HERNIA REPAIR N/A 12/22/2020    Procedure: REPAIR HERNIA HIATAL LAPAROSCOPIC;  Surgeon: Daphne Pham MD;  Location: [facility];  Service: Bariatrics   • INCISION AND DRAINAGE OF WOUND Left 10/17/2018    Procedure: INCISION AND DRAINAGE (I&D) GROIN;  Surgeon: Wes Bamberger, MD;  Location: [facility];  Service: General   • IR IMAGE [address] / [address]  8/17/2018   • IR IMAGE [address] / DRAINAGE W TUBE  7/31/2018   • JOINT REPLACEMENT Bilateral     knee   • KIDNEY SURGERY  2009    nodule removal   • LYMPH NODE DISSECTION Left 01/2018    left inguinal LN removed - benign    • OTHER SURGICAL HISTORY      kidney nodule removal   • PALATE / UVULA BIOPSY / EXCISION     • PA LAPS GSTR RSTCV PX W/BYP JASON-EN-Y LIMB <150 CM N/A 12/22/2020    Procedure: BYPASS GASTRIC  JASON-EN-Y LAPAROSCOPIC WITH INTRAOPERATIVE EGD;  Surgeon: Demetri Carrasco MD;  Location: [facility];  Service: Bariatrics   • SINUS SURGERY     • TONSILLECTOMY         Summary   Order received,chart reviewed  Arrived on floor to assess pt  Per night nurse the tp was coughing with his meds last night  Pt completed his morning meal and took multiple morning medications one at a time w/ nursing w/o any overt coughing or difficulty  He is somewhat lethargic following his seizure but nursing stated when alert and eating has no dysphagia  No formal assessment at this time  Please feed pt fully upright, offer pills one at a time  Should he have any changes in his status while in house please re consult  Current Medical Status  Pt is a 59 y o  male who presented to [facility] for evaluation of a seizure  Patient had a seizure this morning at home, was found on the floor shaking by family  When EMS arrived patient was initially postictal then proceeded to have a 5-minute seizure,  2 mg of Ativan were administered and seizure ceased  EMS reports patient has been postictal since Ativan administration and will not follow commands  Transferred to B for further w/u of new onset seizure  Allergies:  No known food allergies    Past medical history:  Please see H&P for details    Special Studies:  CT head- No acute posttraumatic intracranial abnormality  2  Interim slight increase (compared to 8/26/2021) in 2 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 with mild associated mass effect  This could be more thoroughly evaluated and characterized with MRI    3  Chronic microangiopathic change again noted

## 2023-05-11 NOTE — ASSESSMENT & PLAN NOTE
Patient reported noncompliance with his prescribed blood pressure medication doses  He notes taking it roughly three times per week  On arrival to this admission, systolic pressures were in the 200s  Echo 5/11 showed preserved bi-ventricular function, severe LV concentric hypertrophy, and bi-atrial enlargement  Plan:  -Continue home labetalol 200 mg BID, amlodipine 10 mg QD, and doxazosin 8 mg BID  Home eplerenone held this admission due to hypovolemic status   Encourage medication adherence in setting of noncompliance for awhile prior to admission  -Follow-up with Cardiology given EKG findings of LVH and t wave inversions likely due to uncontrolled hypertension for many years

## 2023-05-11 NOTE — UTILIZATION REVIEW
Initial Clinical Review    Pt initially presented to [facility]  Pt was transferred by EMS to [address] [place] [address] for a higher level of care  Admission: Date/Time/Statement:   Admission Orders (From admission, onward)     Ordered        05/10/23 1729  Inpatient Admission  Once                      Orders Placed This Encounter   Procedures   • Inpatient Admission     Standing Status:   Standing     Number of Occurrences:   1     Order Specific Question:   Level of Care     Answer:   Critical Care [15]     Order Specific Question:   Estimated length of stay     Answer:   More than 2 Midnights     Order Specific Question:   Certification     Answer:   I certify that inpatient services are medically necessary for this patient for a duration of greater than two midnights  See H&P and MD Progress Notes for additional information about the patient's course of treatment  Initial Presentation: 59 y o  male who [place] [facility] [place] [address] [place] [address] ED  Inpatient admission for evaluation and treatment of seizure, hypertensive encephalopathy  PMHx: asthma, CHF, CKD, COPD, T2DM, emphysema, Gout, HTN, DVT, sleep apnea  Presented after being found on ground shaking by his family  EMS noted pt to be postictal  Had a tonic-clonic seizure that broke w/ 2 mg Ativan en route  Hypertensive to 200s, initiated cardene gtt  On exam, ill-appearing, dry mucous membranes, generalized abdominal tenderness, 4/5 strength throughout, hyperactive behavior  EKG NSR w/ QTc 513, concern for T wave inversions  CT head slight increase in 2 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 w/ mild associated mass effect and chronic microangiopathic changes   Plan: cardene gtt for SBP < 180, keppra, MRI brain, q2h neuro checks, continue PTA meds excepet eplerenone, IVF, IV PPI, analgesics, Trend labs, replete electrolytes as needed; cardiac Consistent Carbohydrate Diet; Accu-checks ACHS, PT/OT evals  Neurology, Neurosurgery consulted  Date: 05/11/23   Day 2: On exam, generalized pain, no focal deficits  Plan: keep SP<180, cardene gtt, continue keppra, neuro checks, echo, avoid QT prolonging meds, Trend labs, replete electrolytes as needed  accuchecks ACHS  Neurology: Obtain EEG, MRI for further categorization of meningioma  Recommend keppra BID w/ ativan for breakthrough seizure, seizure precautions  Neurosurgery: Pt w/ inferior L tentorial meningioma  Close monitoring of neuro exam, normotensive BP goals, titrate cardene gtt, MRI brain, continue AEDs per neuro, supportive care       Wt Readings from Last 1 Encounters:   05/11/23 65 2 kg (143 lb 11 8 oz)     Vital Signs:   Date/Time Temp Pulse Resp BP MAP (mmHg) Arterial Line BP MAP SpO2 O2 Device   05/11/23 0903 -- 82 -- 170/97 -- -- -- -- --   05/11/23 0900 98 6 °F (37 °C) 76 16 170/97 122 206/76 114 mmHg 96 % None (Room air)   05/11/23 0800 -- 72 16 152/88 114 186/70 104 mmHg 96 % --   05/11/23 0600 -- 82 19 135/69 89 174/64 96 mmHg 96 % --   05/11/23 0500 -- 84 65 Abnormal  154/77 99 -- -- -- --   05/11/23 0400 97 1 °F (36 2 °C) Abnormal  84 22 176/66 Abnormal  96 -- -- -- --   05/11/23 0200 -- 86 47 Abnormal  164/83 108 -- -- -- --   05/11/23 0100 -- 74 74 Abnormal  143/70 93 164/64 94 mmHg -- --   05/11/23 0000 98 5 °F (36 9 °C) 86 33 Abnormal  171/81 Abnormal  126 178/64 100 mmHg -- --   05/10/23 2300 -- 78 42 Abnormal  158/76 100 184/64 98 mmHg -- --   05/10/23 2200 -- 76 44 Abnormal  137/74 106 174/56 86 mmHg 96 % None (Room air)   05/10/23 2100 -- 78 64 Abnormal  136/72 112 174/58 88 mmHg 90 % --   05/10/23 2003 -- 90 37 Abnormal  163/80 118 -- -- 96 % --   05/10/23 1954 -- 86 24 Abnormal  162/81 117 180/60 88 mmHg 97 % --   05/10/23 1933 -- 86 43 Abnormal  157/80 99 176/56 86 mmHg 97 % --   05/10/23 1900 -- 82 47 Abnormal  -- -- -- -- 96 % --   05/10/23 1800 99 9 °F (37 7 °C) 78 15 174/86 Abnormal  115 -- -- 95 % --   05/10/23 1700 -- 80 32 Abnormal  177/87 Abnormal  116 -- -- 99 % None (Room air)   05/10/23 1640 98 4 °F (36 9 °C) 88 22 197/103 Abnormal  136 -- -- 99 % None (Room air)     [place]ark Coma Scale  Date and Time Eye Opening Best Verbal Response Best Motor Response Saroj Coma Scale Score   05/11/23 0600 4 4 6 14   05/11/23 0400 4 4 6 14   05/11/23 0200 4 4 6 14   05/11/23 0000 4 4 6 14   05/10/23 2200 4 4 6 14   05/10/23 2000 4 4 6 14   05/10/23 1900 4 5 6 15   05/10/23 1800 4 5 6 15   05/10/23 1700 4 5 6 15   05/10/23 1640 4 5 6 15   05/10/23 1130 3 4 6 13   05/10/23 1120 3 4 6 13   05/10/23 1020 3 4 6 13   05/10/23 0820 3 4 6 13   05/10/23 0810 3 4 6 13   05/10/23 0710 3 4 6 13   05/10/23 0700 2 4 6 12   05/10/23 0650 2 4 6 12     Pertinent Labs/Diagnostic Test Results:   CTA dissection protocol chest/abdomen/pelvis   Final Result by Shirley Fernandez MD (05/10 1926)      1  No evidence of aortic dissection  2   Scattered mild atelectatic changes  New somewhat nodular opacity in the right lower lobe posteromedially appears flat on the coronal reformats likely discoid atelectasis  Improving peripheral tubular opacity in the right lower lobe posteriorly    probably related to chronically impacted bronchioles  3  Stable cardiomegaly  Stable small pericardial effusion  4   Mild pelvic ascites, previously present                 Workstation performed: [id number]         MRI inpatient order    (Results Pending)         Results from last 7 days   Lab Units 05/11/23  0457 05/10/23  1753 05/10/23  0725   WBC Thousand/uL 14 20*  --  8 65   HEMOGLOBIN g/dL 12 6  --  14 3   HEMATOCRIT % 39 6  --  43 0   PLATELETS Thousands/uL 278 294 264   NEUTROS ABS Thousands/µL 11 70*  --  7 94*         Results from last 7 days   Lab Units 05/11/23  0457 05/10/23  1753 05/10/23  0725   SODIUM mmol/L 142 139 141   POTASSIUM mmol/L 3 0* 2 8* 3 4*   CHLORIDE mmol/L 110* 107 101   CO2 mmol/L 26 25 27   ANION GAP mmol/L 6 7 13 BUN mg/dL 32* 36* 41*   CREATININE mg/dL 1 65* 1 74* 1 81*   EGFR ml/min/1 73sq m 44 41 40   CALCIUM mg/dL 9 5 10 4* 10 1   MAGNESIUM mg/dL 2 2 2 3  --    PHOSPHORUS mg/dL 4 9* 1 5*  --      Results from last 7 days   Lab Units 05/10/23  1753 05/10/23  0725   AST U/L 101* 156*   ALT U/L 170* 162*   ALK PHOS U/L 94 92   TOTAL PROTEIN g/dL 8 2 8 2   ALBUMIN g/dL 4 2 4 7   TOTAL BILIRUBIN mg/dL 1 31* 1 01*     Results from last 7 days   Lab Units 05/10/23  0713   POC GLUCOSE mg/dl 198*     Results from last 7 days   Lab Units 05/11/23  0457 05/10/23  1753 05/10/23  0725   GLUCOSE RANDOM mg/dL 125 132 225*     Results from last 7 days   Lab Units 05/11/23  0644 05/10/23  2135 05/10/23  1922 05/10/23  1756 05/10/23  1127 05/10/23  1006 05/10/23  0725   HS TNI 0HR ng/L 256*  --   --  117*  --   --  52*   HS TNI 2HR ng/L  --   --  137*  --   --  50*  --    HSTNI D2 ng/L  --   --  20*  --   --  -2  --    HS TNI 4HR ng/L  --  145*  --   --  59*  --   --    HSTNI D4 ng/L  --  28*  --   --  7  --   --      Results from last 7 days   Lab Units 05/10/23  1753   LACTIC ACID mmol/L 1 9       Results from last 7 days   Lab Units 05/10/23  1753   LIPASE u/L 101   AMYLASE IU/L 127*     Results from last 7 days   Lab Units 05/10/23  1647   CLARITY UA  Clear   COLOR UA  Light Yellow   SPEC GRAV UA  1 014   PH UA  7 5   GLUCOSE UA mg/dl Negative   KETONES UA mg/dl Negative   BLOOD UA  Moderate*   PROTEIN UA mg/dl 200 (2+)*   NITRITE UA  Negative   BILIRUBIN UA  Negative   UROBILINOGEN UA (BE) mg/dl <2 0   LEUKOCYTES UA  Negative   WBC UA /hpf None Seen   RBC UA /hpf 2-4*   BACTERIA UA /hpf None Seen   EPITHELIAL CELLS WET PREP /hpf None Seen           Past Medical History:   Diagnosis Date   • Asthma    • Chronic kidney disease    • COPD (chronic obstructive pulmonary disease) (HCC)    • CPAP (continuous positive airway pressure) dependence    • Diabetes mellitus (HCC)    • Emphysema of lung (HCC)    • Gout    • History of transfusion pt stated they had a transfusion when they had gallbladder surgery  • Hypertension    • Kidney disease     renal failure   • Leg DVT (deep venous thromboembolism), acute, bilateral ([place] [address] ) 01/2018   • Obesity (BMI 30-39  9)    • AGUS on CPAP     setting 11   • Postgastrectomy malabsorption    • Sleep apnea    • Systolic CHF (HCC)      Present on Admission:  • CKD stage 3 secondary to diabetes ([place] [address] )  • Type 2 diabetes mellitus ([place] [address] )  • Essential hypertension      Admitting Diagnosis: Seizure ([place] [address] ) [R56 9]  Age/Sex: 59 y o  male  Admission Orders:  Cardiac Consistent Carbohydrate Diet  Accu-checks ACHS  Cardio-Pulm monitoring  DW  I&O  SCDs  q2h neuro checks      Scheduled Medications:  amLODIPine, 10 mg, Oral, Daily  busPIRone, 15 mg, Oral, BID  chlorhexidine, 15 mL, Mouth/Throat, Q12H JOANN  doxazosin, 8 mg, Oral, BID  escitalopram, 20 mg, Oral, Daily  ferrous sulfate, 325 mg, Oral, Every Other Day  heparin (porcine), 5,000 Units, Subcutaneous, Q8H JOANN  labetalol, 200 mg, Oral, Q12H JOANN  levETIRAcetam, 1,000 mg, Oral, Q12H JOANN  pantoprazole, 40 mg, Intravenous, Q12H JOANN  polyethylene glycol, 17 g, Oral, Daily  potassium chloride, 20 mEq, Intravenous, Q2H  senna, 1 tablet, Oral, HS    Continuous IV Infusions:  multi-electrolyte, 50 mL/hr, Intravenous, Continuous; Start: 05/10/23 1730 End: 05/11/23 0756  niCARdipine, 1-15 mg/hr, Intravenous, Titrated    PRN Meds:  HYDROmorphone, 0 2 mg, Intravenous, Q4H PRN; 5/10 x2, 5/11 x1  HYDROmorphone, 0 5 mg, Intravenous, 5/11 x1  labetalol, 100 mg, Oral, 5/10 x1  oxyCODONE, 5 mg, Oral, Q4H PRN  oxyCODONE, 10 mg, Oral, Q4H PRN  potassium chloride, 40 mEq, Oral; 5/11 x1  potassium chloride, 20 mEq, Intravenous; 5/10 x2, 5/11 x1  trimethobenzamide, 200 mg, Intramuscular, Q6H PRN; 5/10 x1, 5/11 x1        IP CONSULT TO CASE MANAGEMENT  IP CONSULT TO NEUROLOGY  IP CONSULT TO NEUROSURGERY    Network Utilization Review Department  ATTENTION: Please call with any questions or concerns to [phone] and carefully listen to the prompts so that you are directed to the right person  All voicemails are confidential   Yuniel Brunson all requests for admission clinical reviews, approved or denied determinations and any other requests to dedicated fax number below belonging to the campus where the patient is receiving treatment   List of dedicated fax numbers for the Facilities:  [address] DENIALS (Administrative/Medical Necessity) [phone]   [address] (Maternity/NICU/Pediatrics) [phone]   [address] [phone]   [place] [address] [phone]   [address] [place] [address] [facility] [phone]   [address] [facility] [place] [facility] 134 5 [facility] [phone]

## 2023-05-11 NOTE — PLAN OF CARE
Problem: OCCUPATIONAL THERAPY ADULT  Goal: Performs self-care activities at highest level of function for planned discharge setting  See evaluation for individualized goals  Description: Treatment Interventions: ADL retraining, Functional transfer training, Endurance training, Cognitive reorientation, Patient/family training, Equipment evaluation/education, Compensatory technique education, Energy conservation          See flowsheet documentation for full assessment, interventions and recommendations  Note: Limitation: Decreased ADL status, Decreased cognition, Decreased endurance, Decreased self-care trans, Decreased high-level ADLs  Prognosis: Good  Assessment: Pt is a 59 y o  male who was [facility] to [facility] on 5[facility]10/2023 with New onset seizure ([facility] [address] )  Pt transferred from [facility] for further w/u and monitoring  CT head without contrast revealed slight increase in 2 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 with mild associated mass effect and chronic microangiopathic changes  Pt  has a past medical history of Asthma, Chronic kidney disease, COPD (chronic obstructive pulmonary disease) ([facility]), CPAP (continuous positive airway pressure) dependence, Diabetes mellitus ([facility] [address] ), Emphysema of lung ([facility] [address] ), Gout, History of transfusion, Hypertension, Kidney disease, Leg DVT (deep venous thromboembolism), acute, bilateral ([facility] [address] ), Obesity (BMI 30-39 9), AGUS on CPAP, Postgastrectomy malabsorption, Sleep apnea, and Systolic CHF ([facility] [address] )  At baseline pt was I w/ ADLs, IADLs, and mobility  Pt lives in [facility] level home w/ spouse and family  Currently pt requires min-mod A for overall ADLS and Min Ax1 for functional mobility/transfers w/ HHA  Pt currently presents with impairments in the following categories -difficulty performing ADLS, difficulty performing IADLS, limited insight into deficits, activity tolerance, endurance, standing balance/tolerance, sitting balance/tolerance, memory, insight and safety    These impairments, as well as pt's fatigue and risk for falls  limit pt's ability to safely engage in all baseline areas of occupation, including grooming, bathing, dressing, toileting and functional mobility/transfers  The patient's raw score on the -PAC Daily Activity Inpatient Short Form is 16  A raw score of less than 19 suggests the patient may benefit from discharge to post-acute rehabilitation services  Please refer to the recommendation of the Occupational Therapist for safe discharge planning  From OT standpoint, recommend home w/ increased support and home OT pending progress and level of support at home upon D/C  OT will continue to follow to address the below stated goals       OT Discharge Recommendation: Home with home health rehabilitation

## 2023-05-11 NOTE — PHYSICAL THERAPY NOTE
Physical Therapy Evaluation     Patient's Name: [facility]    Admitting Diagnosis  Seizure [facility]) [[id number]]    Problem List  Patient Active Problem List   Diagnosis    Pericardial effusion    Essential hypertension    Leg DVT (deep venous thromboembolism), acute, bilateral (HCC)    COPD (chronic obstructive pulmonary disease) (HCC)    CHF (congestive heart failure) ([facility])    Chronic diastolic CHF (congestive heart failure) ([facility] )    Pulmonary nodule, right    CKD stage 3 secondary to diabetes ([facility])    Lymphedema    Anemia    AGUS (obstructive sleep apnea)    Abscess of left groin    History of DVT (deep vein thrombosis)    Splenic lesion    Type 2 diabetes mellitus ([facility] [address] )    Dysuria    Renal cyst    Post-traumatic male urethral stricture    Morbid obesity due to excess calories ([facility] )    Encounter for surgical aftercare following surgery of digestive system    Postsurgical malabsorption    Hypertensive kidney disease with stage 3 chronic kidney disease ([facility] [address] )    Acute renal failure superimposed on stage 3 chronic kidney disease ([facility])    Bradycardia    Hypoglycemia    History of gastric bypass    Hypokalemia    Seizure ([place] [id number] )    Hypertensive encephalopathy    Hypertensive emergency    Meningioma [facility])       Past Medical History  Past Medical History:   Diagnosis Date    Asthma     Chronic kidney disease     COPD (chronic obstructive pulmonary disease) ([facility] [address] )     CPAP (continuous positive airway pressure) dependence     Diabetes mellitus ([facility] [address] )     Emphysema of lung ([facility] [address] )     Gout     History of transfusion     pt stated they had a transfusion when they had gallbladder surgery      Hypertension     Kidney disease     renal failure    Leg DVT (deep venous thromboembolism), acute, bilateral ([facility] [address] ) 01/2018    Obesity (BMI 30-39 9)     AGUS on CPAP     setting 11    Postgastrectomy malabsorption     Sleep apnea     Systolic CHF [facility])        Past Surgical History  Past Surgical History:   Procedure Laterality Date    ADRENALECTOMY CHOLECYSTECTOMY      COLONOSCOPY      EGD      HIATAL HERNIA REPAIR N/A 12/22/2020    Procedure: REPAIR HERNIA HIATAL LAPAROSCOPIC;  Surgeon: Lonny Hernandez MD;  Location: [facility];  Service: [place] [id number] Left 10/17/2018    Procedure: INCISION AND DRAINAGE (I&D) GROIN;  Surgeon: Bria Winter MD;  Location: [facility];  Service: General    IR [address] / DRAINAGE W TUBE  8/17/2018    IR IMAGE GUIDED ASPIRATION / DRAINAGE W TUBE  7/31/2018    JOINT REPLACEMENT Bilateral     knee    KIDNEY SURGERY  2009    nodule removal    LYMPH NODE DISSECTION Left 01/2018    left inguinal LN removed - benign     OTHER SURGICAL HISTORY      kidney nodule removal    PALATE / UVULA BIOPSY / EXCISION      MI LAPS GSTR RSTCV PX W/BYP JASON-EN-Y LIMB <150 CM N/A 12/22/2020    Procedure: BYPASS GASTRIC  JASON-EN-Y LAPAROSCOPIC WITH INTRAOPERATIVE EGD;  Surgeon: Lonny Hernandez MD;  Location: [facility];  Service: [facility]            05/11/23 0936   PT Last Visit   PT Visit Date 05/11/23   Note Type   Note type Evaluation   Pain Assessment   Pain Assessment Tool 0-10   Pain Score No Pain   Hospital Pain Intervention(s) Repositioned   Restrictions/Precautions   Weight Bearing Precautions Per Order No   Other Precautions Chair Alarm;Cognitive; Bed Alarm;Multiple lines;Pain; Fall Risk   Home Living   Type of [address] [place] [address] Two level   Bathroom Shower/Tub Walk-in shower   Bathroom Toilet Standard   Bathroom Equipment Shower chair   [address] Walker;Cane   Prior Function   Level of [place] Independent with functional mobility   Lives With Spouse; Family   Receives Help From [facility] in the last 6 months 0   Vocational On disability   General   Family/Caregiver Present No   Cognition   Orientation Level Oriented to person;Oriented to place;Oriented to situation   Subjective   Subjective Pt willing and agreeable to PT session   RLE Assessment   RLE Assessment WFL   LLE Assessment   LLE Assessment WFL   Coordination   Movements are Fluid and Coordinated 0   Bed Mobility   Supine to Sit 4  Minimal assistance   Additional items Assist x 1   Transfers   Sit to Stand 4  Minimal assistance   Additional items Assist x 1   Stand to Sit 4  Minimal assistance   Additional items Assist x 1   Stand pivot 4  Minimal assistance   Additional items Assist x 1   Ambulation/Elevation   Gait pattern Shuffling;Decreased foot clearance   Gait Assistance 4  Minimal assist   Additional items Assist x 1   Assistive Device None   Distance 4   Balance   Static Sitting Fair +   Dynamic Sitting Fair   Static Standing Fair -   Dynamic Standing Poor +   Endurance Deficit   Endurance Deficit Yes   Endurance Deficit Description limited by fatigue and confusion compared to baseline mobility   Activity Tolerance   Activity Tolerance Patient limited by fatigue;Patient limited by pain   Medical Staff Made Aware OT for D/C planning   Nurse Made Aware yes, nsg gave clearance to work with pt   Assessment   Prognosis Good   Problem List Decreased strength;Decreased range of motion;Decreased endurance; Impaired balance;Decreased mobility; Decreased coordination;Decreased cognition; Impaired judgement;Decreased safety awareness;Pain   Assessment Pt is 59 y o  male seen for PT evaluation s/p admit [facility] on 5/10/2023 w/ Seizure ([facility] [address] )  PT consulted to assess pt's functional mobility and d/c needs  Order placed for PT eval and tx, w/ up w/ A order   Comorbidities affecting pt's physical performance at time of assessment include:  has a past medical history of Asthma, Chronic kidney disease, COPD (chronic obstructive pulmonary disease) (HCC), CPAP (continuous positive airway pressure) dependence, Diabetes mellitus ([facility] ), Emphysema of lung ([facility] ), Gout, History of transfusion, Hypertension, Kidney disease, Leg DVT (deep venous thromboembolism), acute, bilateral ([address] [address] ), Obesity (BMI 30-39 9), AGUS on CPAP, Postgastrectomy malabsorption, Sleep apnea, and Systolic CHF ([facility] [address] )  PTA, pt was ambulates community distances and elevations and lives in multi-level home  Personal factors affecting pt at time of IE include: inaccessible home environment, inability to ambulate household distances, positive fall history, unable to perform physical activity, inability to perform IADLs and inability to perform ADLs  Please find objective findings from PT assessment regarding body systems outlined above with impairments and limitations including weakness, impaired balance, decreased endurance, impaired coordination, gait deviations, pain, decreased activity tolerance, decreased functional mobility tolerance, decreased safety awareness, impaired judgement, fall risk and decreased cognition  The following objective measures performed on IE also reveal limitations: The patient's AM-PAC Basic Mobility Inpatient Short Form Raw Score is 17, Standardized Score is 39 67  A standardized score less than 42 9 suggests the patient may benefit from discharge to post-acute rehabilitation services, although anticipate pt will progress to achieve goals to return home if family able to provide increased A  Please also refer to the recommendation of the Physical Therapist for safe discharge planning  Pt's clinical presentation is currently unstable/unpredictable seen in pt's presentation of critical care monitoring  Pt to benefit from continued PT tx to address deficits as defined above and maximize level of functional independent mobility and consistency  From PT/mobility standpoint, recommendation at time of d/c would be home with home health rehabilitation pending progress in order to facilitate return to PLOF     Barriers to Discharge Inaccessible home environment;Decreased caregiver support   Goals   Patient Goals To get moving   STG Expiration Date 05/23/23   Short Term Goal #1 1  Complete bed mobility and transfers I to decrease need for caregiver in home  2  Ambulate 300' I to complete household and community mobility without A  3  Improve dynamic balance to good to decrease need for UE support during ambulation  4  Be educated & demonstate 12 steps to be able to enter home without A  Plan   Treatment/Interventions OT; Spoke to case management;Spoke to nursing;Gait training;Bed mobility; Patient/family training; Endurance training;Functional transfer training;LE strengthening/ROM   PT Frequency 3-5x/wk   Recommendation   PT Discharge Recommendation Home with home health rehabilitation   AM-PAC Basic Mobility Inpatient   Turning in Flat Bed Without Bedrails 3   Lying on Back to Sitting on Edge of Flat Bed Without Bedrails 3   Moving Bed to Chair 3   Standing Up From Chair Using Arms 3   Walk in Room 3   Climb 3-5 Stairs With Railing 2   Basic Mobility Inpatient Raw Score 17   Basic Mobility Standardized Score 39 67   Highest Level Of Mobility   JH-HLM Goal 5: Stand one or more mins   JH-HLM Achieved 5: Stand (1 or more minutes)         Rayray Long, PT

## 2023-05-11 NOTE — ASSESSMENT & PLAN NOTE
Patient with history of RXYGB in 2020  Thiamine, zinc, and B12 levels within normal range when checked last in Sept 2022   Patient reports abdominal pain associated with eating since procedure    Plan:  -Continue home iron supplementation  -Consider following-up with Bariatric surgery for meal associated abdominal pain

## 2023-05-11 NOTE — ASSESSMENT & PLAN NOTE
Lab Results   Component Value Date    HGBA1C 5 4 09/27/2022     Patient has a history of CKD 3b  His creatine on day of transfer was 1 65 which appears to be around his baseline  His UA showed 2+ protein with moderate blood, no reported gross hematuria  Plan:  -Follow-up with PCP   Consider repeat UA as an outpatient    -Continue to monitor renal function this admission    Recent Labs     05/10/23  0713   POCGLU 198*

## 2023-05-11 NOTE — NURSING NOTE
Wife minnie called asking for update, I spoke with her, reviewed phone number and updated chart with her new phone number, resident made aware of her request for an update, I also put her on speaker to speak with her   I did mri screening form with her as well

## 2023-05-11 NOTE — ASSESSMENT & PLAN NOTE
First reported seizure, in setting of SBP in the 200s  EEG on 5/11 revealed excessive theta activity in the waking background that is nonspecific for mild diffuse cerebral dysfunction  Lack of epileptiform changes could not rule out seizures  No witnessed seizure like activity while patient was in ICU    Plan:  -PO Keppra 750 mg BID, patient does have history of renal dysfunction   Consider making dose adjustments as necessary  -MRI brain pending  -Neurology following

## 2023-05-11 NOTE — ASSESSMENT & PLAN NOTE
Patient with history of hypertension and multi-drug therapy presenting with SBP in the 200s and ictal activity  Must consider for differential diagnosis, in the setting of new-onset stroke  Cardene drip discontinued, patient receiving oral for drug therapy with amlodipine, hydralazine, doxazosin and labetalol  Patient blood pressure is still intermittently high  States that he has difficulty in the morning taking his medications due to his GI upset  Patient recommended to attempt to remain adherent antihypertensive regimen, with consideration given to clonidine patch, however will defer to expert evaluation  Attempt to achieve blood pressure control prior to discharge  Patient also recommended follow-up with nephrology, given CKD stage III and comorbidities for renal disease inclusive of diabetes mellitus and uncontrolled hypertension

## 2023-05-11 NOTE — PLAN OF CARE
Problem: MOBILITY - ADULT  Goal: Maintain or return to baseline ADL function  Description: INTERVENTIONS:  -  Assess patient's ability to carry out ADLs; assess patient's baseline for ADL function and identify physical deficits which impact ability to perform ADLs (bathing, care of mouth/teeth, toileting, grooming, dressing, etc )  - Assess/evaluate cause of self-care deficits   - Assess range of motion  - Assess patient's mobility; develop plan if impaired  - Assess patient's need for assistive devices and provide as appropriate  - Encourage maximum independence but intervene and supervise when necessary  - Involve family in performance of ADLs  - Assess for home care needs following discharge   - Consider OT consult to assist with ADL evaluation and planning for discharge  - Provide patient education as appropriate  Outcome: Progressing  Goal: Maintains/Returns to pre admission functional level  Description: INTERVENTIONS:  - Perform BMAT or MOVE assessment daily    - Set and communicate daily mobility goal to care team and patient/family/caregiver  - Collaborate with rehabilitation services on mobility goals if consulted  - Perform Range of Motion 3 times a day  - Reposition patient every 2 hours    - Dangle patient 3 times a day  - Stand patient 2 times a day  - - Out of bed for meals 3 times a day  - Out of bed for toileting  - Record patient progress and toleration of activity level   Outcome: Progressing

## 2023-05-11 NOTE — CONSULTS
[address] [facility]  Consult  Name: Pedro Luis Fong 59 y o  male I MRN: [de-identified]  Unit/Bed#: ICU 02 I Date of Admission: 5/10/2023   Date of Service: 5/11/2023 I Hospital Day: 1    Inpatient consult to Neurosurgery  Consult performed by: Perlita Avila PA-C  Consult ordered by: Patricia Fierro,         Assessment/Plan   Meningioma [facility])  Assessment & Plan  2cm inferior L tentorial meningioma  - p/w new onset seizure like activity   - noted to be hypertensive on arrival and post-ictal   - current exam: mild confusion, but otherwise non-focal     Imaging:   · 5/10 CTH: No acute posttraumatic intracranial abnormality  Interim slight increase (compared to 8/26/2021) in 2 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 with mild associated mass effect  This could be more thoroughly evaluated and characterized with MRI  Chronic microangiopathic change again noted  Plan:   · Continue to closely monitor neuro exam   · Frequent neuro checks per primary team   · Repeat STAT CTH with any acute decline in GCS > 2pts or more in 1 hr   · Maintain normotensive BP goals  · Arrived hypertensive with known hx of HTN   · Currently on cardene @ 5   · Titrate as needed to maintain goal   · No acute neurosurgical intervention indicated at this time   · Case and imaging reviewed at length this am on rounds   · Pt has a known hx of meningioma for the last 6 years and reports he has been asx from this   · Does not appear he has been following with nsgy, but has had a few CTHs and it was noticed to have remained stable/slightly increased in size     · Recommend further workup at this time with an MRI brain w/wo contrast   · Continue further workup of this seizure like activity per neurology and [facility]   · Given location, size, and only subtle increase in this known meningioma, do not believe this is contributing to the pt's seizure like activity  · continue AED management per neuro and [facility] · Currently on keppra 750mg PO q 12hrs   · DVT ppx: SCDs, okay for chem dvt pps from a nsgy standpoint   · Pain control per primary team   · Continue medical management per primary team   · Social work following for assistance with dispo once medically cleared     Neurosurgery will continue to follow and review MRI brain once completed  Anticipate the pt will require close outpatient follow-up vs any acute intervention this admission  Please reach out with any further questions or concerns  History of Present Illness   History, ROS and PFSH limited secondary to lethargy/confusion  HPI: Anna Pino is a 59y o  year old male with PMH significant for asthma, CKD, COPD, diabetes, emphysema, HTN, systolic CHF who presented to the ER last night after concern for witnessed seizure-like activity  Concern for new onset seizures  He reports waking up in a normal state that morning, until he felt sudden onset abdominal pain radiating to his back  He denies any recollection of the seizing event and only remembers waking up in the hospital  He arrived to the ER after family found him shaking on the floor, which persisted for 5 minutes  By the time EMS arrived, the seizure had resolved  He proceeded to have another tonic-clonic seizure and was administered 2 mg of Ativan  Patient has been in postictal state since  He is verbal and oriented, but had some difficulty staying awake during encounter  Neuroimaging revealed a left inferior tentorial calcified brain mass with mild associated mass effect consistent with meningioma  Pt reports knowledge of this mass for the past 6 years with no associated symptoms  CTA was unremarkable  Pt currently offers no complaints other than that he is tired this am  Denies any headaches, dizziness, vision changes, N/V, weakness, numbness  Review of Systems   Constitutional: Negative for chills and fever  Respiratory: Negative for cough and shortness of breath  Cardiovascular: Negative for chest pain and palpitations  Gastrointestinal: Positive for abdominal pain  Negative for diarrhea, nausea and vomiting  Musculoskeletal: Negative for neck pain and neck stiffness  Skin: Negative for rash and wound  Neurological: Positive for seizures  Negative for dizziness, tremors, syncope, facial asymmetry, speech difficulty, weakness, light-headedness, numbness and headaches  Psychiatric/Behavioral: Positive for confusion  Negative for agitation, behavioral problems and decreased concentration  The patient is not nervous/anxious  Historical Information   Past Medical History:   Diagnosis Date   • Asthma    • Chronic kidney disease    • COPD (chronic obstructive pulmonary disease) (HCC)    • CPAP (continuous positive airway pressure) dependence    • Diabetes mellitus (HCC)    • Emphysema of lung (HCC)    • Gout    • History of transfusion     pt stated they had a transfusion when they had gallbladder surgery  • Hypertension    • Kidney disease     renal failure   • Leg DVT (deep venous thromboembolism), acute, bilateral ([facility] [address] ) 01/2018   • Obesity (BMI 30-39  9)    • AGUS on CPAP     setting 11   • Postgastrectomy malabsorption    • Sleep apnea    • Systolic CHF [facility])      Past Surgical History:   Procedure Laterality Date   • ADRENALECTOMY     • CHOLECYSTECTOMY     • COLONOSCOPY     • EGD     • HIATAL HERNIA REPAIR N/A 12/22/2020    Procedure: REPAIR HERNIA HIATAL LAPAROSCOPIC;  Surgeon: Bekah Colindres MD;  Location: [facility];  Service: Bariatrics   • INCISION AND DRAINAGE OF WOUND Left 10/17/2018    Procedure: INCISION AND DRAINAGE (I&D) GROIN;  Surgeon: Adiel Seymour MD;  Location: [facility];  Service: General   • IR IMAGE [address]  [facility] [address]  8/17/2018   • IR IMAGE [address] / DRAINAGE W TUBE  7/31/2018   • JOINT REPLACEMENT Bilateral     knee   • KIDNEY SURGERY  2009    nodule removal   • LYMPH NODE DISSECTION Left 01/2018    left "inguinal LN removed - benign    • OTHER SURGICAL HISTORY      kidney nodule removal   • PALATE / UVULA BIOPSY / EXCISION     • MD LAPS GSTR RSTCV PX W/BYP JASON-EN-Y LIMB <150 CM N/A 12/22/2020    Procedure: BYPASS GASTRIC  JASON-EN-Y LAPAROSCOPIC WITH INTRAOPERATIVE EGD;  Surgeon: Parker Barriga MD;  Location: [facility];  Service: Bariatrics   • SINUS SURGERY     • TONSILLECTOMY       Social History     Substance and Sexual Activity   Alcohol Use Yes    Comment: occasionally     Social History     Substance and Sexual Activity   Drug Use No     Social History     Tobacco Use   Smoking Status Never   Smokeless Tobacco Never     Family History   Problem Relation Age of Onset   • Heart murmur Sister    • Asthma Sister    • Hypertension Sister    • Kidney disease Mother    • Cancer Father    • Bell's palsy Brother    • Kidney disease Brother    • Deep vein thrombosis Neg Hx        Meds/Allergies   all current active meds have been reviewed  Allergies   Allergen Reactions   • Clonidine Anaphylaxis   • Lisinopril Anaphylaxis   • Nifedipine Anaphylaxis   • Spironolactone Anaphylaxis, Other (See Comments) and Shortness Of Breath   • Buspirone      Headaches,    • Enoxaparin      Injection site \"bump\" per Dr Farzad Reed   • Minoxidil      Retains fluid around heart     Objective   I/O       05/09 0701  05/10 0700 05/10 0701  05/11 0700 05/11 0701  05/12 0700    P  O    360    I V  (mL/kg)  997 1 (15 3) 617 1 (9 5)    IV Piggyback  100     Total Intake(mL/kg)  1097 1 (16 8) 977 1 (15)    Urine (mL/kg/hr)   275 (1)    Total Output   275    Net  +1097 1 +702 1           Unmeasured Urine Occurrence  1 x           Physical Exam  Constitutional:       General: He is not in acute distress  Appearance: Normal appearance  He is normal weight  He is not toxic-appearing        Comments: Middle aged, thin male   Sitting comfortably in bed   No acute distress   Appears lethargic, falling asleep during exam    HENT:      Head: " Normocephalic and atraumatic  Right Ear: External ear normal       Left Ear: External ear normal       Nose: Nose normal  No congestion or rhinorrhea  Mouth/Throat:      Mouth: Mucous membranes are moist    Eyes:      General: No scleral icterus  Right eye: No discharge  Left eye: No discharge  Extraocular Movements: Extraocular movements intact  Conjunctiva/sclera: Conjunctivae normal       Pupils: Pupils are equal, round, and reactive to light  Cardiovascular:      Rate and Rhythm: Normal rate  Pulmonary:      Effort: Pulmonary effort is normal  No respiratory distress  Comments: No resp distress on room air   Abdominal:      General: Abdomen is flat  There is no distension  Palpations: Abdomen is soft  Tenderness: There is no abdominal tenderness  Musculoskeletal:         General: No deformity or signs of injury  Normal range of motion  Cervical back: Normal range of motion and neck supple  No rigidity or tenderness  Right lower leg: No edema  Left lower leg: No edema  Skin:     General: Skin is warm and dry  Capillary Refill: Capillary refill takes less than 2 seconds  Findings: No lesion or rash  Neurological:      Mental Status: He is alert  He is disoriented  Cranial Nerves: No cranial nerve deficit  Sensory: No sensory deficit  Motor: No weakness        Coordination: Coordination normal       Gait: Gait normal       Comments: GCS 14-15  A&Ox2-3, mild confusion, particularly as to why he's int he hospital   - with increased questioning and prompting he answers all questions correctly   Appropriately answering questions and following commands   No dysarthria or aphasia   No appreciated CN deficits   Strength 5/5 throughout to barb UE and barb LE   Sensation intact to light touch to barb UE and barb LE   No drift or ataxia appreciated barb      Psychiatric:         Mood and Affect: Mood normal          Thought Content: "Thought content normal         Neurologic Exam     Cranial Nerves     CN III, IV, VI   Pupils are equal, round, and reactive to light  Vitals:Blood pressure 131/79, pulse 66, temperature 98 6 °F (37 °C), temperature source Oral, resp  rate 16, height 5' 6\" (1 676 m), weight 64 9 kg (143 lb), SpO2 98 %  ,Body mass index is 23 08 kg/m²  Lab Results:   Results from last 7 days   Lab Units 05/11/23  0457 05/10/23  1753 05/10/23  0725   WBC Thousand/uL 14 20*  --  8 65   HEMOGLOBIN g/dL 12 6  --  14 3   HEMATOCRIT % 39 6  --  43 0   PLATELETS Thousands/uL 278 294 264   NEUTROS PCT % 82*  --  92*   MONOS PCT % 9  --  2*     Results from last 7 days   Lab Units 05/11/23  1235 05/11/23  0457 05/10/23  1753 05/10/23  0725   POTASSIUM mmol/L 3 6 3 0* 2 8* 3 4*   CHLORIDE mmol/L 111* 110* 107 101   CO2 mmol/L 27 26 25 27   BUN mg/dL 33* 32* 36* 41*   CREATININE mg/dL 1 71* 1 65* 1 74* 1 81*   CALCIUM mg/dL 9 4 9 5 10 4* 10 1   ALK PHOS U/L  --   --  94 92   ALT U/L  --   --  170* 162*   AST U/L  --   --  101* 156*     Results from last 7 days   Lab Units 05/11/23  1235 05/11/23  0457 05/10/23  1753   MAGNESIUM mg/dL 2 3 2 2 2 3     Results from last 7 days   Lab Units 05/11/23  0457 05/10/23  1753   PHOSPHORUS mg/dL 4 9* 1 5*         No results found for: TROPONINT  ABG:No results found for: PHART, IBU7MSE, PO2ART, DAY6RWI, J9IYDUSE, BEART, SOURCE    Imaging Studies: I have personally reviewed pertinent reports  XR Trauma chest portable    Result Date: 5/10/2023  Narrative: CHEST INDICATION:   TRAUMA  COMPARISON: 8/26/2021 EXAM PERFORMED/VIEWS:  XR CHEST PORTABLE FINDINGS: The heart is top normal in size  The lungs are clear  No pneumothorax or pleural effusion  Osseous structures appear within normal limits for patient age  Postoperative changes reflecting gastric bypass noted in the upper abdomen  Impression: No acute cardiopulmonary disease   Workstation performed: [id number]     TRAUMA - CT head wo " contrast    Result Date: 5/10/2023  Narrative: CT BRAIN - WITHOUT CONTRAST INDICATION:   TRAUMA  New onset seizure COMPARISON: CT 8/26/2021 TECHNIQUE:  CT examination of the brain was performed  Multiplanar 2D reformatted images were created from the source data  Radiation dose length product (DLP) for this visit:  885 mGy-cm   This examination, like all CT scans performed in the [facility], was performed utilizing techniques to minimize radiation dose exposure, including the use of iterative reconstruction and automated exposure control  IMAGE QUALITY:  Diagnostic  FINDINGS: PARENCHYMA: Decreased attenuation is noted in periventricular and subcortical white matter demonstrating an appearance that is statistically most likely to represent mild microangiopathic change; this appearance is similar when compared to most recent prior examination  Chronic lacunar infarct left corpus stratum again noted  No CT signs of acute infarction  Again identified is a peripherally calcified 1 9 x 1 9 x 2 0 cm (previously 1 7 x 1 6 x 1 7 cm) spherical mass arising from the inferomedial aspect of the left tentorium impressing into the left superior cerebellar hemisphere most consistent with an incidental hemangioma  No definite vasogenic edema appreciated  No acute parenchymal hemorrhage  Atherosclerotic vascular calcifications in carotid and vertebral arteries are more advanced than would be expected for the  patient's age  VENTRICLES AND EXTRA-AXIAL SPACES: Ventricles and extra-axial CSF spaces are prominent commensurate with the degree of volume loss  No hydrocephalus  No acute extra-axial hemorrhage  VISUALIZED ORBITS: Normal visualized orbits  PARANASAL SINUSES: Normal visualized paranasal sinuses  CALVARIUM AND EXTRACRANIAL SOFT TISSUES:  Normal      Impression: 1  No acute posttraumatic intracranial abnormality   2  Interim slight increase (compared to 8/26/2021) in 2 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 with mild associated mass effect  This could be more thoroughly evaluated and characterized with MRI  3  Chronic microangiopathic change again noted  The study was marked in [facility] for immediate notification  Workstation performed: [id number]     TRAUMA - CT spine cervical wo contrast    Result Date: 5/10/2023  Narrative: CT CERVICAL SPINE - WITHOUT CONTRAST INDICATION:   TRAUMA  COMPARISON:  None  TECHNIQUE:  CT examination of the cervical spine was performed without intravenous contrast   Contiguous axial images were obtained  Multiplanar 2D reformatted images were created from the source data  Radiation dose length product (DLP) for this visit:  541 mGy-cm   This examination, like all CT scans performed in the [facility], was performed utilizing techniques to minimize radiation dose exposure, including the use of iterative reconstruction and automated exposure control  IMAGE QUALITY:  Diagnostic  FINDINGS: ALIGNMENT: There is straightening of normal cervical lordosis  No subluxation or compression deformity  VERTEBRAE:  No fracture  DEGENERATIVE CHANGES: Moderate multilevel cervical degenerative changes are noted, most prominent at the C4-5 and C5-6 levels, without critical central canal stenosis  PREVERTEBRAL AND PARASPINAL SOFT TISSUES: Unremarkable THORACIC INLET:  Normal  Left anterior lobe meningioma again noted, better described on the concurrent CT head examination  Impression: No cervical spine fracture or traumatic malalignment  Workstation performed: [id number]     CTA dissection protocol chest/abdomen/pelvis    Result Date: 5/10/2023  Narrative: CTA - CHEST, ABDOMEN AND PELVIS - WITHOUT AND WITH IV CONTRAST INDICATION:   abd pain radiating to back, encephalopathy, HTN, r/o dissection  COMPARISON: CT chest abdomen pelvis 8/26/2021   TECHNIQUE: CT examination of the chest, abdomen and pelvis was performed both prior to and after the administration of intravenous contrast  The noncontrast portion of this examination was performed utilizing low radiation dose technique  Thin section angiographic arterial phase post contrast technique was used in order to evaluate for aortic dissection  3D reformatted images and volume rendering were performed on an independent workstation  Multiplanar 2D reformatted images were created from the source data  Radiation dose length product (DLP) for this visit:  1969 39 mGy-cm   This examination, like all CT scans performed in the [facility], was performed utilizing techniques to minimize radiation dose exposure, including the use of iterative reconstruction and automated exposure control  IV Contrast:  97 mL of iohexol (OMNIPAQUE) Enteric Contrast:  Enteric contrast was not administered  FINDINGS: Exam is suboptimal due to streak artifact from the patient's arms and overlying cardiac monitoring leads  AORTA:  There is no aortic dissection or intramural hematoma  There is no aortic aneurysm  Tortuous abdominal aorta with mild to moderate wall calcification  CHEST LUNGS: Somewhat suboptimal evaluation due to respiratory motion  Scattered mild atelectatic changes  New somewhat nodular opacity in the right lower lobe posteromedially image 149 series 4 but appears flat on the coronal reformats likely discoid atelectasis  Improving peripheral tubular opacity in the right lower lobe posteriorly probably related to impacted bronchioles  PLEURA:  Unremarkable  HEART/PULMONARY ARTERIAL TREE: Moderate cardiomegaly  Stable small pericardial effusion  MEDIASTINUM AND VIJAYA:  Unremarkable  CHEST WALL AND LOWER NECK:  Unremarkable  ABDOMEN LIVER/BILIARY TREE: One or more subcentimeter sharply circumscribed low-density hepatic lesion(s) are noted, too small to accurately characterize, but statistically most likely to represent subcentimeter hepatic cysts  No suspicious solid hepatic lesion  is identified    Hepatic contours are normal   No biliary dilatation  GALLBLADDER: Gallbladder is surgically absent  SPLEEN: Subcentimeter hypodensity at the spleen laterally, too small to characterize but probably tiny cyst  PANCREAS:  Unremarkable  ADRENAL GLANDS: Stable thickening on the right  Not well appreciated on the left  KIDNEYS/URETERS:  One or more simple renal cyst(s) is noted  Otherwise unremarkable kidneys  No hydronephrosis  STOMACH AND BOWEL: Prior gastric bypass surgery  No acute findings  APPENDIX:  No findings to suggest appendicitis  ABDOMINOPELVIC CAVITY: Mild pelvic ascites, previously present  No free air  PELVIS REPRODUCTIVE ORGANS: Prostate is enlarged with scattered calcifications  URINARY BLADDER:  Unremarkable  ABDOMINAL WALL/INGUINAL REGIONS:  There is a small fat-containing umbilical hernia  OSSEOUS STRUCTURES:  No acute fracture or destructive osseous lesion  Impression: 1  No evidence of aortic dissection  2   Scattered mild atelectatic changes  New somewhat nodular opacity in the right lower lobe posteromedially appears flat on the coronal reformats likely discoid atelectasis  Improving peripheral tubular opacity in the right lower lobe posteriorly probably related to chronically impacted bronchioles  3  Stable cardiomegaly  Stable small pericardial effusion  4   Mild pelvic ascites, previously present  Workstation performed: [id number]     EKG, Pathology, and Other Studies: I have personally reviewed pertinent reports  VTE Prophylaxis: Sequential compression device (Venodyne)  and Heparin    Code Status: Level 1 - Full Code  Advance Directive and Living Will:      Power of :    POLST:      Counseling / Coordination of Care  I spent 30 minutes with the patient

## 2023-05-12 ENCOUNTER — TELEPHONE (OUTPATIENT)
Dept: NEUROSURGERY | Facility: CLINIC | Age: 58
End: 2023-05-12

## 2023-05-12 LAB
ANION GAP SERPL CALCULATED.3IONS-SCNC: 2 MMOL/L (ref 4–13)
BASOPHILS # BLD AUTO: 0.07 THOUSANDS/ÂΜL (ref 0–0.1)
BASOPHILS NFR BLD AUTO: 1 % (ref 0–1)
BUN SERPL-MCNC: 34 MG/DL (ref 5–25)
CALCIUM SERPL-MCNC: 9.2 MG/DL (ref 8.3–10.1)
CHLORIDE SERPL-SCNC: 111 MMOL/L (ref 96–108)
CO2 SERPL-SCNC: 27 MMOL/L (ref 21–32)
CREAT SERPL-MCNC: 1.77 MG/DL (ref 0.6–1.3)
EOSINOPHIL # BLD AUTO: 0.06 THOUSAND/ÂΜL (ref 0–0.61)
EOSINOPHIL NFR BLD AUTO: 1 % (ref 0–6)
ERYTHROCYTE [DISTWIDTH] IN BLOOD BY AUTOMATED COUNT: 14.9 % (ref 11.6–15.1)
EST. AVERAGE GLUCOSE BLD GHB EST-MCNC: 103 MG/DL
GFR SERPL CREATININE-BSD FRML MDRD: 41 ML/MIN/1.73SQ M
GLUCOSE SERPL-MCNC: 148 MG/DL (ref 65–140)
GLUCOSE SERPL-MCNC: 79 MG/DL (ref 65–140)
GLUCOSE SERPL-MCNC: 93 MG/DL (ref 65–140)
HBA1C MFR BLD: 5.2 %
HCT VFR BLD AUTO: 38.8 % (ref 36.5–49.3)
HGB BLD-MCNC: 12.9 G/DL (ref 12–17)
IMM GRANULOCYTES # BLD AUTO: 0.03 THOUSAND/UL (ref 0–0.2)
IMM GRANULOCYTES NFR BLD AUTO: 0 % (ref 0–2)
LYMPHOCYTES # BLD AUTO: 2.11 THOUSANDS/ÂΜL (ref 0.6–4.47)
LYMPHOCYTES NFR BLD AUTO: 23 % (ref 14–44)
MCH RBC QN AUTO: 28.7 PG (ref 26.8–34.3)
MCHC RBC AUTO-ENTMCNC: 33.2 G/DL (ref 31.4–37.4)
MCV RBC AUTO: 86 FL (ref 82–98)
MONOCYTES # BLD AUTO: 0.9 THOUSAND/ÂΜL (ref 0.17–1.22)
MONOCYTES NFR BLD AUTO: 10 % (ref 4–12)
NEUTROPHILS # BLD AUTO: 5.94 THOUSANDS/ÂΜL (ref 1.85–7.62)
NEUTS SEG NFR BLD AUTO: 65 % (ref 43–75)
NRBC BLD AUTO-RTO: 0 /100 WBCS
PLATELET # BLD AUTO: 249 THOUSANDS/UL (ref 149–390)
PMV BLD AUTO: 10.5 FL (ref 8.9–12.7)
POTASSIUM SERPL-SCNC: 3.6 MMOL/L (ref 3.5–5.3)
RBC # BLD AUTO: 4.49 MILLION/UL (ref 3.88–5.62)
SODIUM SERPL-SCNC: 140 MMOL/L (ref 135–147)
WBC # BLD AUTO: 9.11 THOUSAND/UL (ref 4.31–10.16)

## 2023-05-12 RX ORDER — HYDRALAZINE HYDROCHLORIDE 20 MG/ML
10 INJECTION INTRAMUSCULAR; INTRAVENOUS EVERY 6 HOURS PRN
Status: DISCONTINUED | OUTPATIENT
Start: 2023-05-12 | End: 2023-05-14 | Stop reason: HOSPADM

## 2023-05-12 RX ORDER — INSULIN LISPRO 100 [IU]/ML
1-5 INJECTION, SOLUTION INTRAVENOUS; SUBCUTANEOUS
Status: DISCONTINUED | OUTPATIENT
Start: 2023-05-12 | End: 2023-05-14

## 2023-05-12 RX ORDER — HYDRALAZINE HYDROCHLORIDE 20 MG/ML
5 INJECTION INTRAMUSCULAR; INTRAVENOUS ONCE
Status: COMPLETED | OUTPATIENT
Start: 2023-05-12 | End: 2023-05-12

## 2023-05-12 RX ORDER — HYDRALAZINE HYDROCHLORIDE 20 MG/ML
10 INJECTION INTRAMUSCULAR; INTRAVENOUS EVERY 4 HOURS PRN
Status: DISCONTINUED | OUTPATIENT
Start: 2023-05-12 | End: 2023-05-12

## 2023-05-12 RX ADMIN — CHLORHEXIDINE GLUCONATE 0.12% ORAL RINSE 15 ML: 1.2 LIQUID ORAL at 21:20

## 2023-05-12 RX ADMIN — LORAZEPAM 1 MG: 2 INJECTION INTRAMUSCULAR; INTRAVENOUS at 00:01

## 2023-05-12 RX ADMIN — SENNOSIDES 8.6 MG: 8.6 TABLET, FILM COATED ORAL at 21:20

## 2023-05-12 RX ADMIN — ESCITALOPRAM OXALATE 20 MG: 20 TABLET, FILM COATED ORAL at 08:02

## 2023-05-12 RX ADMIN — AMLODIPINE BESYLATE 10 MG: 10 TABLET ORAL at 08:02

## 2023-05-12 RX ADMIN — LABETALOL HYDROCHLORIDE 200 MG: 200 TABLET, FILM COATED ORAL at 21:20

## 2023-05-12 RX ADMIN — GADOBUTROL 6 ML: 604.72 INJECTION INTRAVENOUS at 00:45

## 2023-05-12 RX ADMIN — LEVETIRACETAM 750 MG: 750 TABLET, FILM COATED ORAL at 21:20

## 2023-05-12 RX ADMIN — HYDRALAZINE HYDROCHLORIDE 5 MG: 20 INJECTION, SOLUTION INTRAMUSCULAR; INTRAVENOUS at 02:04

## 2023-05-12 RX ADMIN — POLYETHYLENE GLYCOL 3350 17 G: 17 POWDER, FOR SOLUTION ORAL at 08:05

## 2023-05-12 RX ADMIN — LEVETIRACETAM 750 MG: 750 TABLET, FILM COATED ORAL at 08:00

## 2023-05-12 RX ADMIN — BUSPIRONE HYDROCHLORIDE 15 MG: 10 TABLET ORAL at 08:01

## 2023-05-12 RX ADMIN — LABETALOL HYDROCHLORIDE 10 MG: 5 INJECTION, SOLUTION INTRAVENOUS at 03:49

## 2023-05-12 RX ADMIN — HEPARIN SODIUM 5000 UNITS: 5000 INJECTION INTRAVENOUS; SUBCUTANEOUS at 21:20

## 2023-05-12 RX ADMIN — DOXAZOSIN 8 MG: 4 TABLET ORAL at 17:17

## 2023-05-12 RX ADMIN — HYDRALAZINE HYDROCHLORIDE 10 MG: 20 INJECTION, SOLUTION INTRAMUSCULAR; INTRAVENOUS at 13:46

## 2023-05-12 RX ADMIN — HYDRALAZINE HYDROCHLORIDE 10 MG: 20 INJECTION, SOLUTION INTRAMUSCULAR; INTRAVENOUS at 08:02

## 2023-05-12 RX ADMIN — HEPARIN SODIUM 5000 UNITS: 5000 INJECTION INTRAVENOUS; SUBCUTANEOUS at 13:46

## 2023-05-12 RX ADMIN — PANTOPRAZOLE SODIUM 40 MG: 40 TABLET, DELAYED RELEASE ORAL at 05:06

## 2023-05-12 RX ADMIN — LABETALOL HYDROCHLORIDE 200 MG: 200 TABLET, FILM COATED ORAL at 08:01

## 2023-05-12 RX ADMIN — BUSPIRONE HYDROCHLORIDE 15 MG: 10 TABLET ORAL at 17:17

## 2023-05-12 RX ADMIN — HEPARIN SODIUM 5000 UNITS: 5000 INJECTION INTRAVENOUS; SUBCUTANEOUS at 05:05

## 2023-05-12 RX ADMIN — CHLORHEXIDINE GLUCONATE 0.12% ORAL RINSE 15 ML: 1.2 LIQUID ORAL at 08:05

## 2023-05-12 RX ADMIN — DOXAZOSIN 8 MG: 4 TABLET ORAL at 08:01

## 2023-05-12 NOTE — PROGRESS NOTES
[address] [facility]  Progress Note  Name: Tracey Olsen  MRN: [de-identified]  Unit/Bed#: [facility] 502-41 I Date of Admission: 5/10/2023   Date of Service: 5/12/2023 I Hospital Day: 2    Assessment/Plan   Hypertensive emergency  Assessment & Plan  -see above    * Hypertensive encephalopathy  Assessment & Plan  -New onset seizures in the setting of uncontrolled hypertension  Patient arrived after tonic clonic seizure with SBP in the 200s  -Initially was on nicardipine gtt which has now been weaned off  -Patient was noncompliant with his antihypertensive medications only taking them 3 times a week    -Continue labetalol/Norvasc/Cardura  -Home eplerenone held upon admission due to patient's hypovolemic status   -For today, use hydralazine as needed  Tomorrow can consider increasing labetalol if needed  -MRI brain w/wo: Motion degraded examination  Extra-axial dural based lesion along the medial left tentorium with rim calcifications on CT compatible with a meningioma, mildly increased in size compared to the prior CT study of 8/26/2021  Mild chronic microangiopathic ischemic changes  Prolonged Q-T interval on ECG  Assessment & Plan  -Maintain adequate potassium and magnesium  -Most recent QTc improved to 499 ms    Meningioma [facility])  Assessment & Plan  -Appreciate neurosurgery eval  -MRI brain above    Seizure [facility])  Assessment & Plan  -First reported seizure, in setting of hypertensive emergency  -EEG on 5/11 revealed excessive theta activity in the waking background that is nonspecific for mild diffuse cerebral dysfunction   Lack of epileptiform changes could not rule out seizures    -No witnessed seizure like activity while patient was in ICU  -MRI brain above  -Neurology following  -Continue Keppra       Type 2 diabetes mellitus [facility])  Assessment & Plan  Lab Results   Component Value Date    HGBA1C 5 4 09/27/2022       Recent Labs     05/10/23  0713   POCGLU 198*       Blood Sugar Average: Last 72 hrs:     -recheck a1c  -SSI/accuchecks/diabetic diet    History of DVT (deep vein thrombosis)  Assessment & Plan  - Not on anticoagulation prior to admission    CKD stage 3 secondary to diabetes [facility])  Assessment & Plan  Lab Results   Component Value Date    HGBA1C 5 4 2022       Recent Labs     05/10/23  0713   POCGLU 198*       Blood Sugar Average: Last 72 hrs:               VTE Pharmacologic Prophylaxis:   heparin    Patient Centered Rounds: I performed bedside rounds with nursing staff today  Education and Discussions with Family / Patient: family at bedside    Total Time Spent on Date of Encounter in care of patient: 35 minutes This time was spent on one or more of the following: performing physical exam; counseling and coordination of care; obtaining or reviewing history; documenting in the medical record; reviewing/ordering tests, medications or procedures; communicating with other healthcare professionals and discussing with patient's family/caregivers  Current Length of Stay: 2 day(s)  Current Patient Status: Inpatient   Certification Statement: The patient will continue to require additional inpatient hospital stay due to Hypertensive emergency  Discharge Plan: Anticipate discharge tomorrow to home  Code Status: Level 1 - Full Code    Subjective:   Patient without acute complaints  Objective:     Vitals:   Temp (24hrs), Av 3 °F (36 8 °C), Min:98 1 °F (36 7 °C), Max:98 8 °F (37 1 °C)    Temp:  [98 1 °F (36 7 °C)-98 8 °F (37 1 °C)] 98 1 °F (36 7 °C)  HR:  [60-76] 73  Resp:  [13-27] 16  BP: (131-198)/() 166/102  SpO2:  [96 %-99 %] 98 %  Body mass index is 22 74 kg/m²  Input and Output Summary (last 24 hours):      Intake/Output Summary (Last 24 hours) at 2023 1205  Last data filed at 2023 0501  Gross per 24 hour   Intake 544 58 ml   Output 1300 ml   Net -755 42 ml       Physical Exam:   Gen: NAD, AAOx3, well developed, well nourished  Eyes: EOMI, PERRLA, no scleral icterus  ENMT:  no nasal discharge, no otic discharge, moist mucous membranes  Neck:  Supple  Cardiovascular:  Regular rate and rhythm, normal S1-S2, no murmurs, rubs, or gallops  Lungs:  Clear to auscultation bilaterally, no wheezes, or rales, or rhonchi  Abdomen:  Positive bowel sounds, soft, nontender, nondistended, no palpable organomegaly   Skin:  Intact, no obvious lesions or rashes, no edema  Neuro: Cranial nerves 2-12 are intact, non-focal, moves all 4 extremities      Additional Data:     Labs:  Results from last 7 days   Lab Units 05/12/23  0507   WBC Thousand/uL 9 11   HEMOGLOBIN g/dL 12 9   HEMATOCRIT % 38 8   PLATELETS Thousands/uL 249   NEUTROS PCT % 65   LYMPHS PCT % 23   MONOS PCT % 10   EOS PCT % 1     Results from last 7 days   Lab Units 05/12/23  0507 05/11/23  0457 05/10/23  1753   SODIUM mmol/L 140   < > 139   POTASSIUM mmol/L 3 6   < > 2 8*   CHLORIDE mmol/L 111*   < > 107   CO2 mmol/L 27   < > 25   BUN mg/dL 34*   < > 36*   CREATININE mg/dL 1 77*   < > 1 74*   ANION GAP mmol/L 2*   < > 7   CALCIUM mg/dL 9 2   < > 10 4*   ALBUMIN g/dL  --   --  4 2   TOTAL BILIRUBIN mg/dL  --   --  1 31*   ALK PHOS U/L  --   --  94   ALT U/L  --   --  170*   AST U/L  --   --  101*   GLUCOSE RANDOM mg/dL 93   < > 132    < > = values in this interval not displayed  Results from last 7 days   Lab Units 05/12/23  1136 05/10/23  0713   POC GLUCOSE mg/dl 148* 198*         Results from last 7 days   Lab Units 05/10/23  1753   LACTIC ACID mmol/L 1 9       Lines/Drains:  Invasive Devices     Peripheral Intravenous Line  Duration           Peripheral IV 05/10/23 Distal;Left;Upper;Ventral (anterior) Arm 1 day    Peripheral IV 05/10/23 Distal;Right;Ventral (anterior) Forearm 1 day                Recent Cultures (last 7 days):   Results from last 7 days   Lab Units 05/11/23  1434   BLOOD CULTURE  Received in Microbiology Lab  Culture in Progress  Received in Microbiology Lab   Culture in Progress  Last 24 Hours Medication List:   Current Facility-Administered Medications   Medication Dose Route Frequency Provider Last Rate   • albuterol  2 puff Inhalation Q4H PRN Harrison Chavez MD     • amLODIPine  10 mg Oral Daily Harrison Chavez MD     • busPIRone  15 mg Oral BID Harrison Chavez MD     • chlorhexidine  15 mL Mouth/Throat Q12H 211 Reese Martinez MD     • doxazosin  8 mg Oral BID Harrison Chavez MD     • escitalopram  20 mg Oral Daily Harrison Chavez MD     • ferrous sulfate  325 mg Oral Every Other Day Harrison Chavez MD     • heparin (porcine)  5,000 Units Subcutaneous [facility] Harrison Chavez MD     • hydrALAZINE  10 mg Intravenous Q6H PRN Donato Swift MD     • HYDROmorphone  0 2 mg Intravenous Q4H PRN Harrison Chavez MD     • insulin lispro  1-5 Units Subcutaneous TID [facility] Donato Swift MD     • labetalol  10 mg Intravenous Q6H PRN Harrison Chavez MD     • labetalol  200 mg Oral Q12H 211 Reese Martinez MD     • levETIRAcetam  750 mg Oral Q12H 211 Reese Martinez MD     • oxyCODONE  5 mg Oral Q4H PRN Harrison Chavez MD      Or   • oxyCODONE  10 mg Oral Q4H PRN Harrison Chavez MD     • pantoprazole  40 mg Oral Early Morning Harrison Chavez MD     • polyethylene glycol  17 g Oral Daily Harrison Chavez MD     • senna  1 tablet Oral HS Harrison Chavez MD     • trimethobenzamide  200 mg Intramuscular Q6H PRN Harrison Chavez MD          Today, Patient Was Seen By: Donato Swift MD    **Please Note: This note may have been constructed using a voice recognition system  **

## 2023-05-12 NOTE — UTILIZATION REVIEW
Continued Stay Review    Date: 5- 12-23                         Current Patient Class:  Inpatient Current Level of Care: step down     HPI:57 y o  male initially admitted on hypertensive encephalopathy    Assessment/Plan:       MRI completed showing compatible with a meningioma in L tentorium increased in size since 8-26-21  Routine EEG is abnormal showing excessive theta activity in waking background  This finding is etiologically non specific for mild diffuse cerebral dysfunction  Blood pressure remains elevated  New orders:  iv hydralazine  prn  Received iv hydralazine at 0204, 0802  IV labetalol given at 19736  Iv nicardipine gtt completed  5-11 1424  Iv fluids completed 5-11 0905  Vital Signs:     Date/Time Temp Pulse Resp BP MAP MAP SpO2 O2 Device   05/12/23 09:05:47 -- 63 -- 170/102   Abnormal  125 -- 98 % --   05/12/23 07:33:56 -- 76 -- 198/122   Abnormal  147 -- 99 % --   05/12/23 07:01:43 98 1 °F (36 7 °C) 70 -- 193/120   Abnormal  144 -- 97 % --   05/12/23 07:00:48 98 1 °F (36 7 °C) 68 -- 192/120   Abnormal  144 -- 98 % --   05/12/23 04:56:29 -- 69 -- 193/121   Abnormal  145 -- 98 % --   05/12/23 03:42:59 -- 64 -- 196/122   Abnormal  147 -- 97 % --   05/12/23 01:35:23 -- 72 -- 173/106   Abnormal  128 -- 97 % --   05/11/23 22:31:16 98 8 °F (37 1 °C) 61 16 189/115   Abnormal  140 -- 98 % --   05/11/23 2030 -- 63 18 190/112   Abnormal  145 -- 98 % None (Room air)   05/11/23 2011 98 2 °F (36 8 °C) 61 13 174/93   Abnormal  127 -- 98 % None (Room air)   05/11/23 1800 -- 60 20 -- -- -- 97 % --   05/11/23 1726 -- 72 24   Abnormal  168/108   Abnormal  -- -- 99 % --   05/11/23 1615 98 4 °F (36 9 °C) 64 17 150/98 126 -- 97 % --         Pertinent Labs/Diagnostic Results:     MRI BRAIN WITH AND WITHOUT CONTRAST     INDICATION: seizure    IMPRESSION:  Motion degraded examination      Extra-axial dural based lesion along the medial left tentorium with rim calcifications on CT compatible with a meningioma, mildly increased in size compared to the prior CT study of 8/26/2021            ECHO   Mild chronic microangiopathic ischemic changes  •  Left Ventricle: Left ventricular cavity size is normal  Wall thickness is severely increased  There is severe concentric hypertrophy  The left ventricular ejection fraction is 68%  Systolic function is vigorous  Global longitudinal strain is reduced at -13%  Wall motion is normal  Diastolic function is moderately abnormal, consistent with grade II (pseudonormal) relaxation  Left atrial filling pressure is elevated  •  Right Ventricle: Right ventricular cavity size is normal  Systolic function is normal   •  Left Atrium: The atrium is moderately dilated  •  Right Atrium: The atrium is moderately dilated  •  Atrial Septum: The septum bows into the right atrium, suggesting increased left atrial pressure  •  Tricuspid Valve: The right ventricular systolic pressure is mildly elevated  The estimated right ventricular systolic pressure is 86 44 mmHg  •  Aorta: The aortic root is normal in size  The ascending aorta is mildly dilated  The aortic root is 3 00 cm  The ascending aorta is 3 5 cm  •  Pericardium: There is a trivial pericardial effusion circumferential to the heart  The fluid exhibits no internal echoes            Results from last 7 days   Lab Units 05/12/23  0507 05/11/23  0457 05/10/23  1753 05/10/23  0725   WBC Thousand/uL 9 11 14 20*  --  8 65   HEMOGLOBIN g/dL 12 9 12 6  --  14 3   HEMATOCRIT % 38 8 39 6  --  43 0   PLATELETS Thousands/uL 249 278 294 264   NEUTROS ABS Thousands/µL 5 94 11 70*  --  7 94*         Results from last 7 days   Lab Units 05/12/23  0507 05/11/23  1235 05/11/23  0457 05/10/23  1753 05/10/23  0725   SODIUM mmol/L 140 140 142 139 141   POTASSIUM mmol/L 3 6 3 6 3 0* 2 8* 3 4*   CHLORIDE mmol/L 111* 111* 110* 107 101   CO2 mmol/L 27 27 26 25 27   ANION GAP mmol/L 2* 2* 6 7 13   BUN mg/dL 34* 33* 32* 36* 41*   CREATININE mg/dL 1 77* 1 71* 1 65* 1 74* 1 81*   EGFR ml/min/1 73sq m 41 42 44 41 40   CALCIUM mg/dL 9 2 9 4 9 5 10 4* 10 1   MAGNESIUM mg/dL  --  2 3 2 2 2 3  --    PHOSPHORUS mg/dL  --   --  4 9* 1 5*  --      Results from last 7 days   Lab Units 05/10/23  1753 05/10/23  0725   AST U/L 101* 156*   ALT U/L 170* 162*   ALK PHOS U/L 94 92   TOTAL PROTEIN g/dL 8 2 8 2   ALBUMIN g/dL 4 2 4 7   TOTAL BILIRUBIN mg/dL 1 31* 1 01*     Results from last 7 days   Lab Units 05/10/23  0713   POC GLUCOSE mg/dl 198*     Results from last 7 days   Lab Units 05/12/23  0507 05/11/23  1235 05/11/23  0457 05/10/23  1753 05/10/23  0725   GLUCOSE RANDOM mg/dL 93 115 125 132 225*             BETA-HYDROXYBUTYRATE   Date Value Ref Range Status   07/09/2020 1 2 (H) <0 6 mmol/L Final   07/03/2020 1 5 (H) <0 6 mmol/L Final        Results from last 7 days   Lab Units 05/11/23  0644 05/10/23  2135 05/10/23  1922 05/10/23  1756 05/10/23  1127 05/10/23  1006 05/10/23  0725   HS TNI 0HR ng/L 256*  --   --  117*  --   --  52*   HS TNI 2HR ng/L  --   --  137*  --   --  50*  --    HSTNI D2 ng/L  --   --  20*  --   --  -2  --    HS TNI 4HR ng/L  --  145*  --   --  59*  --   --    HSTNI D4 ng/L  --  28*  --   --  7  --   --        Results from last 7 days   Lab Units 05/10/23  1753   LACTIC ACID mmol/L 1 9       Results from last 7 days   Lab Units 05/10/23  1753   LIPASE u/L 101   AMYLASE IU/L 127*         Results from last 7 days   Lab Units 05/10/23  1647   CLARITY UA  Clear   COLOR UA  Light Yellow   SPEC GRAV UA  1 014   PH UA  7 5   GLUCOSE UA mg/dl Negative   KETONES UA mg/dl Negative   BLOOD UA  Moderate*   PROTEIN UA mg/dl 200 (2+)*   NITRITE UA  Negative   BILIRUBIN UA  Negative   UROBILINOGEN UA (BE) mg/dl <2 0   LEUKOCYTES UA  Negative   WBC UA /hpf None Seen   RBC UA /hpf 2-4*   BACTERIA UA /hpf None Seen   EPITHELIAL CELLS WET PREP /hpf None Seen       Results from last 7 days   Lab Units 05/11/23  1434   BLOOD CULTURE  Received in Microbiology Lab  Culture in Progress  Received in Microbiology Lab  Culture in Progress  Scheduled Medications:  amLODIPine, 10 mg, Oral, Daily  busPIRone, 15 mg, Oral, BID  chlorhexidine, 15 mL, Mouth/Throat, Q12H JOANN  doxazosin, 8 mg, Oral, BID  escitalopram, 20 mg, Oral, Daily  ferrous sulfate, 325 mg, Oral, Every Other Day  heparin (porcine), 5,000 Units, Subcutaneous, Q8H JOANN  labetalol, 200 mg, Oral, Q12H JOANN  levETIRAcetam, 750 mg, Oral, Q12H JOANN  pantoprazole, 40 mg, Oral, Early Morning  polyethylene glycol, 17 g, Oral, Daily  senna, 1 tablet, Oral, HS      Continuous IV Infusions:     PRN Meds:  albuterol, 2 puff, Inhalation, Q4H PRN  hydrALAZINE, 10 mg, Intravenous, Q4H PRN  HYDROmorphone, 0 2 mg, Intravenous, Q4H PRN  labetalol, 10 mg, Intravenous, Q6H PRN  oxyCODONE, 5 mg, Oral, Q4H PRN   Or  oxyCODONE, 10 mg, Oral, Q4H PRN  trimethobenzamide, 200 mg, Intramuscular, Q6H PRN        Discharge Plan: to be determined     Network Utilization Review Department  ATTENTION: Please call with any questions or concerns to [phone] and carefully listen to the prompts so that you are directed to the right person  All voicemails are confidential   Barbie Tucker all requests for admission clinical reviews, approved or denied determinations and any other requests to dedicated fax number below belonging to the campus where the patient is receiving treatment   List of dedicated fax numbers for the Facilities:  [address] DENIALS (Administrative/Medical Necessity) [phone]   [address] (Maternity/NICU/Pediatrics) [phone]   [address] Roxana Gonzalez [phone]   [facility] [phone]   [facility] [phone]   [address] [place] [id number] [place] [address] [address] [facility] 584 [facility] [phone] [address] [facility] [address] [place] [address] 465 [facility] [phone]

## 2023-05-12 NOTE — TELEPHONE ENCOUNTER
05/12/2023-PT STILL IN HOSPITAL  08/15/2023 APT W/MRI BRAIN    Iveth Zarate PA-C   Can we please schedule this patient for a 3-month hospital follow-up with a repeat MRI brain with and without contrast?  Appointment can be with an AP

## 2023-05-12 NOTE — ASSESSMENT & PLAN NOTE
2cm inferior L tentorial meningioma  - p/w new onset seizure like activity   - noted to be hypertensive on arrival and post-ictal   - current exam: mild confusion, but otherwise non-focal     Imaging:   · 5/11 MRI brain w/wo: Motion degraded examination  Extra-axial dural based lesion along the medial left tentorium with rim calcifications on CT compatible with a meningioma, mildly increased in size compared to the prior CT study of 8/26/2021  Mild chronic microangiopathic ischemic changes  · 5/10 CTH: No acute posttraumatic intracranial abnormality  Interim slight increase (compared to 8/26/2021) in 2 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 with mild associated mass effect  This could be more thoroughly evaluated and characterized with MRI  Chronic microangiopathic change again noted      Plan:   · Continue to closely monitor neuro exam   · Frequent neuro checks per primary team   · Repeat STAT CTH with any acute decline in GCS > 2pts or more in 1 hr   · Maintain normotensive BP goals  · Continue management per primary team, off Cardene this am     · No acute neurosurgical intervention indicated at this time   · Case and imaging, including repeat MRI brain, reviewed at length this am on rounds   · Meningioma with small increase in size but likely noncontributory to the pt's presenting sx/seizure like activity   · Recommend close outpatient follow-up for surveillance of this meningioma and education   · Pt scheduled to be seen in the office in 3 months with a repeat MRI brain   · Continue further workup of this seizure like activity per neurology and [facility]   · Given location, size, and only subtle increase in this known meningioma, do not believe this is contributing to the pt's seizure like activity  · continue AED management per neuro and [facility]   · Currently on keppra 750mg PO q 12hrs   · DVT ppx: SCDs, SQH  · Pain control per primary team   · Continue medical management per primary team   · Social work following for assistance with dispo once medically cleared     Neurosurgery will sign off at this time  Please reach out with any further questions or concerns

## 2023-05-12 NOTE — PROGRESS NOTES
[address] [facility]  Progress Note  Name: Rylee Arreola  MRN: [de-identified]  Unit/Bed#: [facility] 175-63 I Date of Admission: 5/10/2023   Date of Service: 5/12/2023 I Hospital Day: 2    Assessment/Plan   Meningioma [facility])  Assessment & Plan  2cm inferior L tentorial meningioma  - p/w new onset seizure like activity   - noted to be hypertensive on arrival and post-ictal   - current exam: mild confusion, but otherwise non-focal     Imaging:   · 5/11 MRI brain w/wo: Motion degraded examination  Extra-axial dural based lesion along the medial left tentorium with rim calcifications on CT compatible with a meningioma, mildly increased in size compared to the prior CT study of 8/26/2021  Mild chronic microangiopathic ischemic changes  · 5/10 CTH: No acute posttraumatic intracranial abnormality  Interim slight increase (compared to 8/26/2021) in 2 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 with mild associated mass effect  This could be more thoroughly evaluated and characterized with MRI  Chronic microangiopathic change again noted      Plan:   · Continue to closely monitor neuro exam   · Frequent neuro checks per primary team   · Repeat STAT CTH with any acute decline in GCS > 2pts or more in 1 hr   · Maintain normotensive BP goals  · Continue management per primary team, off Cardene this am     · No acute neurosurgical intervention indicated at this time   · Case and imaging, including repeat MRI brain, reviewed at length this am on rounds   · Meningioma with small increase in size but likely noncontributory to the pt's presenting sx/seizure like activity   · Recommend close outpatient follow-up for surveillance of this meningioma and education   · Pt scheduled to be seen in the office in 3 months with a repeat MRI brain   · Continue further workup of this seizure like activity per neurology and [facility]   · Given location, size, and only subtle increase in this known meningioma, do not believe this "is contributing to the pt's seizure like activity  · continue AED management per neuro and [facility]   · Currently on keppra 750mg PO q 12hrs   · DVT ppx: SCDs, SQH  · Pain control per primary team   · Continue medical management per primary team   · Social work following for assistance with dispo once medically cleared     Neurosurgery will sign off at this time  Please reach out with any further questions or concerns  Subjective/Objective   Chief Complaint: \" Good to see you\"    Subjective: Seen and examined this a m  on rounds  Patient offers no complaints at this time  Patient is well-appearing and significantly improved from evaluation yesterday  Does not appear to be postictal any longer and at his baseline  Patient denies any headaches, dizziness, vision changes, nausea, vomiting, weakness, numbness  Patient denies any further seizure-like activity episodes  Patient's blood pressure appears to be better controlled and he is off the continuous Cardene infusion  Explained the MRI results to the patient and that we will follow-up with him as an outpatient in our clinic which he was agreeable to  All questions answered at this time from patient and family  Objective: Pleasant, well-appearing, middle-aged male sitting comfortably in bed  No acute distress  I/O       05/10 0701  05/11 0700 05/11 0701  05/12 0700 05/12 0701  05/13 0700    P  O   850     I V  (mL/kg) 997 1 (15 3) 763 3 (11 9)     IV Piggyback 100 100     Total Intake(mL/kg) 1097 1 (16 8) 1713 3 (26 8)     Urine (mL/kg/hr)  1575 (1)     Total Output  1575     Net +1097 1 +138  3            Unmeasured Urine Occurrence 1 x          Invasive Devices     Peripheral Intravenous Line  Duration           Peripheral IV 05/10/23 Distal;Left;Upper;Ventral (anterior) Arm 1 day    Peripheral IV 05/10/23 Distal;Right;Ventral (anterior) Forearm 1 day              Physical Exam:  Vitals: Blood pressure (!) 176/103, pulse 67, temperature 98 1 °F " "(36 7 °C), resp  rate 16, height 5' 6\" (1 676 m), weight 63 9 kg (140 lb 14 oz), SpO2 98 %  ,Body mass index is 22 74 kg/m²  General appearance: alert, appears stated age, cooperative and no distress  Head: Normocephalic, without obvious abnormality, atraumatic  Eyes: EOMI, PERRL  Neck: supple, symmetrical, trachea midline and NT  Back: no kyphosis present, no tenderness to percussion or palpation  Lungs: non labored breathing, no respiratory distress on room air  Heart: regular heart rate  Neurologic:   Mental status: Alert, oriented x3, thought content appropriate  Cranial nerves: grossly intact (Cranial nerves II-XII)  Sensory: normal to light touch bilaterally throughout  Motor: moving all extremities without focal weakness    Strength 5/5 throughout bilaterally  Reflexes: 2+ and symmetric  Coordination: finger to nose normal bilaterally, no drift bilaterally    Lab Results:  Results from last 7 days   Lab Units 05/12/23  0507 05/11/23  0457 05/10/23  1753 05/10/23  0725   WBC Thousand/uL 9 11 14 20*  --  8 65   HEMOGLOBIN g/dL 12 9 12 6  --  14 3   HEMATOCRIT % 38 8 39 6  --  43 0   PLATELETS Thousands/uL 249 278 294 264   NEUTROS PCT % 65 82*  --  92*   MONOS PCT % 10 9  --  2*     Results from last 7 days   Lab Units 05/12/23  0507 05/11/23  1235 05/11/23  0457 05/10/23  1753 05/10/23  0725   POTASSIUM mmol/L 3 6 3 6 3 0* 2 8* 3 4*   CHLORIDE mmol/L 111* 111* 110* 107 101   CO2 mmol/L 27 27 26 25 27   BUN mg/dL 34* 33* 32* 36* 41*   CREATININE mg/dL 1 77* 1 71* 1 65* 1 74* 1 81*   CALCIUM mg/dL 9 2 9 4 9 5 10 4* 10 1   ALK PHOS U/L  --   --   --  94 92   ALT U/L  --   --   --  170* 162*   AST U/L  --   --   --  101* 156*     Results from last 7 days   Lab Units 05/11/23  1235 05/11/23  0457 05/10/23  1753   MAGNESIUM mg/dL 2 3 2 2 2 3     Results from last 7 days   Lab Units 05/11/23  0457 05/10/23  1753   PHOSPHORUS mg/dL 4 9* 1 5*         No results found for: TROPONINT  ABG:No results found for: PHART, " DHV7DMD, PO2ART, HNO2GTH, P2VINULW, BEART, SOURCE    Imaging Studies: I have personally reviewed pertinent reports  and I have personally reviewed pertinent films in PACS    XR Trauma chest portable    Result Date: 5/10/2023  Impression: No acute cardiopulmonary disease  Workstation performed: [id number]     TRAUMA - CT head wo contrast    Result Date: 5/10/2023  Impression: 1  No acute posttraumatic intracranial abnormality  2  Interim slight increase (compared to 8/26/2021) in 2 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 with mild associated mass effect  This could be more thoroughly evaluated and characterized with MRI  3  Chronic microangiopathic change again noted  The study was marked in [facility] for immediate notification  Workstation performed: [id number]     TRAUMA - CT spine cervical wo contrast    Result Date: 5/10/2023  Impression: No cervical spine fracture or traumatic malalignment  Workstation performed: [id number]     MRI brain w wo contrast    Result Date: 5/12/2023  Impression: Motion degraded examination  Extra-axial dural based lesion along the medial left tentorium with rim calcifications on CT compatible with a meningioma, mildly increased in size compared to the prior CT study of 8/26/2021  Mild chronic microangiopathic ischemic changes  Workstation performed: [id number]     CTA dissection protocol chest/abdomen/pelvis    Result Date: 5/10/2023  Impression: 1  No evidence of aortic dissection  2   Scattered mild atelectatic changes  New somewhat nodular opacity in the right lower lobe posteromedially appears flat on the coronal reformats likely discoid atelectasis  Improving peripheral tubular opacity in the right lower lobe posteriorly probably related to chronically impacted bronchioles  3  Stable cardiomegaly  Stable small pericardial effusion  4   Mild pelvic ascites, previously present   Workstation performed: [id number]       EKG, Pathology, and Other Studies: I have personally reviewed pertinent reports        VTE Pharmacologic Prophylaxis: Sequential compression device (Venodyne)  and Heparin    VTE Mechanical Prophylaxis: sequential compression device

## 2023-05-12 NOTE — ASSESSMENT & PLAN NOTE
Lab Results   Component Value Date    HGBA1C 5 4 09/27/2022       Recent Labs     05/10/23  0713   POCGLU 198*       Blood Sugar Average: Last 72 hrs:

## 2023-05-12 NOTE — UTILIZATION REVIEW
NOTIFICATION OF INPATIENT ADMISSION   AUTHORIZATION REQUEST   SERVICING FACILITY:   [facility]  Address: [address] [place] [address], [address] [place] [address]  Tax ID: [id number]  NPI: [id number] ATTENDING PROVIDER:  Attending Name and NPI#: Cindy Hernandez Md [[id number]]  Address: [address] [place] [address], [address] [place] [address]  Phone: [phone]   ADMISSION INFORMATION:  Place of Service: [facility]patient [facility]  [address]  Place of Service Code: 21  Inpatient Admission Date/Time: 5/10/23  4:38 PM  Discharge Date/Time: No discharge date for patient encounter  Admitting Diagnosis Code/Description:  Seizure ([facility] [address] ) [R56 9]     UTILIZATION REVIEW CONTACT:  Raven Henderson, Utilization   Network Utilization Review Department  Phone: [phone]  Fax: [phone]  Email: [email or web address]  Contact for approvals/pending authorizations, clinical reviews, and discharge  PHYSICIAN ADVISORY SERVICES:  Medical Necessity Denial & Yxac-rd-Rwry Review  Phone: [phone]  Fax: [phone]  Email: [email or web address]

## 2023-05-12 NOTE — PLAN OF CARE
Problem: Prexisting or High Potential for Compromised Skin Integrity  Goal: Skin integrity is maintained or improved  Description: INTERVENTIONS:  - Identify patients at risk for skin breakdown  - Assess and monitor skin integrity  - Assess and monitor nutrition and hydration status  - Monitor labs   - Assess for incontinence   - Turn and reposition patient  - Assist with mobility/ambulation  - Relieve pressure over bony prominences  - Avoid friction and shearing  - Provide appropriate hygiene as needed including keeping skin clean and dry  - Evaluate need for skin moisturizer/barrier cream  - Collaborate with interdisciplinary team   - Patient/family teaching  - Consider wound care consult   Outcome: Progressing     Problem: COPING  Goal: Pt/Family able to verbalize concerns and demonstrate effective coping strategies  Description: INTERVENTIONS:  - Assist patient/family to identify coping skills, available support systems and cultural and spiritual values  - Provide emotional support, including active listening and acknowledgement of concerns of patient and caregivers  - Reduce environmental stimuli, as able  - Provide patient education  - Assess for spiritual pain/suffering and initiate spiritual care, including notification of Pastoral Care or [place] based community as needed  - Assess effectiveness of coping strategies  Outcome: Progressing     Problem: CONFUSION/THOUGHT DISTURBANCE  Goal: Thought disturbances (confusion, delirium, depression, dementia or psychosis) are managed to maintain or return to baseline mental status and functional level  Description: INTERVENTIONS:  - Assess for possible contributors to  thought disturbance, including but not limited to medications, infection, impaired vision or hearing, underlying metabolic abnormalities, dehydration, respiratory compromise,  psychiatric diagnoses and notify attending PHYSICAN/AP  - Monitor and intervene to maintain adequate nutrition, hydration, elimination, sleep and activity  - Decrease environmental stimuli, including noise as appropriate  - Provide frequent contacts to provide refocusing, direction and reassurance as needed  Approach patient calmly with eye contact and at their level  - [place] high risk fall precautions, aspiration precautions and other safety measures, as indicated  - If delirium suspected, notify physician/AP of change in condition and request immediate in-person evaluation  - Pursue consults as appropriate including Geriatric (campus dependent), OT for cognitive evaluation/activity planning, psychiatric, pastoral care, etc   Outcome: Progressing     Problem: BEHAVIOR  Goal: Pt/Family maintain appropriate behavior and adhere to behavioral management agreement, if implemented  Description: INTERVENTIONS:  - Assess the family dynamic   - Encourage verbalization of thoughts and concerns in a socially appropriate manner  - Assess patient/family's coping skills and non-compliant behavior (including use of illegal substances)  - Utilize positive, consistent limit setting strategies supporting safety of patient, staff and others  - Initiate consult with Case Management, Spiritual Care or other ancillary services as appropriate  - If a patient's/visitor's behavior jeopardizes the safety of the patient, staff, or others, refer to organization procedure     - Notify Security of behavior or suspected illegal substances which indicate the need for search of the patient and/or belongings  - Encourage participation in the decision making process about a behavioral management agreement; implement if patient meets criteria  Outcome: Progressing     Problem: SAFETY,RESTRAINT: NV/NON-SELF DESTRUCTIVE BEHAVIOR  Goal: Remains free of harm/injury (restraint for non violent/non self-detsructive behavior)  Description: INTERVENTIONS:  - Instruct patient/family regarding restraint use   - Assess and monitor physiologic and psychological status   - Provide interventions and comfort measures to meet assessed patient needs   - Identify and implement measures to help patient regain control  - Assess readiness for release of restraint   Outcome: Progressing

## 2023-05-12 NOTE — ASSESSMENT & PLAN NOTE
-First reported seizure, in setting of hypertensive emergency  -EEG on 5/11 revealed excessive theta activity in the waking background that is nonspecific for mild diffuse cerebral dysfunction   Lack of epileptiform changes could not rule out seizures    -No witnessed seizure like activity while patient was in ICU  -MRI brain above  -Neurology following  -Continue Keppra

## 2023-05-12 NOTE — PROGRESS NOTES
NEUROLOGY RESIDENCY PROGRESS NOTE     Name: Lisseth Tejeda   Age & Sex: 59 y o  male   MRN: [id number]  Unit/Bed#: [facility] 713-01   Encounter: [id number]    Lisseth Tejeda will need follow up in in 2 weeks with epilepsy  Pending for discharge: Adequate blood pressure control to be obtained prior to discharge  ASSESSMENT & PLAN     Hypertensive emergency  Assessment & Plan  Patient with history of hypertension and multi-drug therapy presenting with SBP in the 200s and ictal activity  Must consider for differential diagnosis, in the setting of new-onset stroke  Cardene drip discontinued, patient receiving oral for drug therapy with amlodipine, hydralazine, doxazosin and labetalol  Patient blood pressure is still intermittently high  States that he has difficulty in the morning taking his medications due to his GI upset  Patient recommended to attempt to remain adherent antihypertensive regimen, with consideration given to clonidine patch, however will defer to expert evaluation  Attempt to achieve blood pressure control prior to discharge  Patient also recommended follow-up with nephrology, given CKD stage III and comorbidities for renal disease inclusive of diabetes mellitus and uncontrolled hypertension  Seizure [facility])  Assessment & Plan  Patient with history of meningioma, hypertension, CKD, CHF Shamika-en-Y gastric bypass surgery, COPD, DM presenting after new onset seizure; patient had been in usual state of health, when he developed abdominal pain preceding the onset of ictal activity, described as tonic-clonic in nature  By the time EMS arrived, patient seizures have resolved  Initial ictal activity had resolved, however patient experienced another tonic-clonic seizure required 2 mg of Ativan to break  Patient was postictal on arrival to the ED, and notably hypertensive on further work-up, requiring Cardene drip    EKG was noted for QTc prolongation of around 513, in addition to possible T wave inversions in anterior leads, however troponins were unremarkable  CT head without contrast revealed slight increase in 2 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 with mild associated mass effect and chronic microangiopathic changes  Given abdominal pain, CTA dissection protocol was obtained, which was also unremarkable  Serum chemistry is also noted for mild hypokalemia  Patient received equivalent loading dose of Keppra, and transferred to [facility] for further work-up of new onset seizure  No recurrent seizures either witnessed or on EEG  MRI of the brain showed slight increase in size of meningioma, with neurosurgical evaluation determined no need for immediate intervention, with outpatient follow-up recommended  Consideration given to PRES as the etiology of seizure, given patient's uncontrolled blood pressure; attempt at BP control is recommended  Patient is to continue Keppra 750 mg twice daily, and follow-up with neurology at time of discharge  I discussion with regards to limitations in the setting of seizure was had at the bedside, with advised to avoid activities such as driving, taking baths, swimming unattended or any other high risks activities that may pose detriment in the setting of ictal activity  SUBJECTIVE     Patient was seen and examined  No acute events overnight  Reports no recurrence of seizures  Nuys any headache, dizziness, chest pain, palpitations, shortness of breath, abdominal pain or focal weakness  Pertinent Negatives include: numbness, weakness, speech or visual changes, headaches, amaurosis, diplopia, other visual changes, paralysis/weakness, numbness or tingling     Review of Systems   Constitutional: Negative for chills and fever  HENT: Negative for ear pain and sore throat  Eyes: Negative for pain and visual disturbance  Respiratory: Negative for cough and shortness of breath  Cardiovascular: Negative for chest pain and palpitations  Gastrointestinal: Negative for abdominal pain and vomiting  Genitourinary: Negative for dysuria and hematuria  Musculoskeletal: Negative for arthralgias and back pain  Skin: Negative for color change and rash  Neurological: Negative for seizures and syncope  All other systems reviewed and are negative  OBJECTIVE     Patient ID: Anna Pino is a 59 y o  male  Vitals:    23 1010 23 1110 23 1136 23 1310   BP: (!) 168/102 (!) 167/102 (!) 166/102 (!) 176/103   Pulse: 62 72 73 67   Resp:       Temp:       TempSrc:       SpO2:  99% 98% 98%   Weight:       Height:          Temperature:   Temp (24hrs), Av 3 °F (36 8 °C), Min:98 1 °F (36 7 °C), Max:98 8 °F (37 1 °C)    Temperature: 98 1 °F (36 7 °C)      Physical Exam  Vitals and nursing note reviewed  Constitutional:       General: He is not in acute distress  Appearance: He is well-developed  HENT:      Head: Normocephalic and atraumatic  Eyes:      Extraocular Movements: EOM normal       Conjunctiva/sclera: Conjunctivae normal    Cardiovascular:      Rate and Rhythm: Normal rate and regular rhythm  Heart sounds: No murmur heard  Pulmonary:      Effort: Pulmonary effort is normal  No respiratory distress  Breath sounds: Normal breath sounds  Abdominal:      Palpations: Abdomen is soft  Tenderness: There is no abdominal tenderness  Musculoskeletal:         General: No swelling  Cervical back: Neck supple  Skin:     General: Skin is warm and dry  Capillary Refill: Capillary refill takes less than 2 seconds  Neurological:      Mental Status: He is alert and oriented to person, place, and time  Psychiatric:         Mood and Affect: Mood normal           Neurologic Exam     Mental Status   Oriented to person, place, and time  Cranial Nerves     CN III, IV, VI   Extraocular motions are normal      CN V   Facial sensation intact  CN VII   Facial expression full, symmetric       CN VIII   CN VIII normal      CN XI   CN XI normal      CN XII   CN XII normal      Motor Exam     Strength   Right neck flexion: 5/5  Left neck flexion: 5/5  Right neck extension: 5/5  Left neck extension: 5/5  Right deltoid: 5/5  Left deltoid: 5/5  Right biceps: 5/5  Left biceps: 5/5  Right triceps: 5/5  Left triceps: 5/5  Right wrist flexion: 5/5  Left wrist flexion: 5/5  Right wrist extension: 5/5  Left wrist extension: 5/5  Right interossei: 5/5  Left interossei: 5/5  Right abdominals: 5/5  Left abdominals: 5/5  Right iliopsoas: 5/5  Left iliopsoas: 5/5  Right quadriceps: 5/5  Left quadriceps: 5/5  Right hamstrin/5  Left hamstrin/5  Right glutei: 5/5  Left glutei: 5/5  Right anterior tibial: 5/5  Left anterior tibial: 5/5  Right posterior tibial: 5/5  Left posterior tibial: 5/5  Right peroneal: 5/5  Left peroneal: 5/5  Right gastroc: 5/5  Left gastroc: 5/5    Sensory Exam   Light touch normal             LABORATORY DATA     Labs: I have personally reviewed pertinent reports      Results from last 7 days   Lab Units 23  0507 23  0457 05/10/23  1753 05/10/23  0725   WBC Thousand/uL 9 11 14 20*  --  8 65   HEMOGLOBIN g/dL 12 9 12 6  --  14 3   HEMATOCRIT % 38 8 39 6  --  43 0   PLATELETS Thousands/uL 249 278 294 264   NEUTROS PCT % 65 82*  --  92*   MONOS PCT % 10 9  --  2*      Results from last 7 days   Lab Units 23  0507 23  1235 23  0457 05/10/23  1753 05/10/23  0725   SODIUM mmol/L 140 140 142 139 141   POTASSIUM mmol/L 3 6 3 6 3 0* 2 8* 3 4*   CHLORIDE mmol/L 111* 111* 110* 107 101   CO2 mmol/L 27 27 26 25 27   BUN mg/dL 34* 33* 32* 36* 41*   CREATININE mg/dL 1 77* 1 71* 1 65* 1 74* 1 81*   CALCIUM mg/dL 9 2 9 4 9 5 10 4* 10 1   ALK PHOS U/L  --   --   --  94 92   ALT U/L  --   --   --  170* 162*   AST U/L  --   --   --  101* 156*     Results from last 7 days   Lab Units 23  1235 23  0457 05/10/23  1753   MAGNESIUM mg/dL 2 3 2 2 2 3     Results from last 7 days Lab Units 05/11/23  0457 05/10/23  1753   PHOSPHORUS mg/dL 4 9* 1 5*          Results from last 7 days   Lab Units 05/10/23  1753   LACTIC ACID mmol/L 1 9           IMAGING & DIAGNOSTIC TESTING     Radiology Results: I have personally reviewed pertinent reports  MRI brain w wo contrast   Final Result by Laura Salcedo MD (05/12 0815)   Motion degraded examination  Extra-axial dural based lesion along the medial left tentorium with rim calcifications on CT compatible with a meningioma, mildly increased in size compared to the prior CT study of 8/26/2021  Mild chronic microangiopathic ischemic changes  Workstation performed: [id number]         CTA dissection protocol chest/abdomen/pelvis   Final Result by Earnest Sahni MD (05/10 1926)      1  No evidence of aortic dissection  2   Scattered mild atelectatic changes  New somewhat nodular opacity in the right lower lobe posteromedially appears flat on the coronal reformats likely discoid atelectasis  Improving peripheral tubular opacity in the right lower lobe posteriorly    probably related to chronically impacted bronchioles  3  Stable cardiomegaly  Stable small pericardial effusion  4   Mild pelvic ascites, previously present  Workstation performed: [id number]         MRI brain w wo contrast    (Results Pending)       Other Diagnostic Testing: I have personally reviewed pertinent reports        ACTIVE MEDICATIONS     Current Facility-Administered Medications   Medication Dose Route Frequency   • albuterol (PROVENTIL HFA,VENTOLIN HFA) inhaler 2 puff  2 puff Inhalation Q4H PRN   • amLODIPine (NORVASC) tablet 10 mg  10 mg Oral Daily   • busPIRone (BUSPAR) tablet 15 mg  15 mg Oral BID   • chlorhexidine (PERIDEX) 0 12 % oral rinse 15 mL  15 mL Mouth/Throat Q12H JOANN   • doxazosin (CARDURA) tablet 8 mg  8 mg Oral BID   • escitalopram (LEXAPRO) tablet 20 mg  20 mg Oral Daily   • ferrous sulfate tablet 325 mg  325 mg Oral Every Other Day   • heparin (porcine) subcutaneous injection 5,000 Units  5,000 Units Subcutaneous [facility]   • hydrALAZINE (APRESOLINE) injection 10 mg  10 mg Intravenous Q6H PRN   • HYDROmorphone HCl (DILAUDID) injection 0 2 mg  0 2 mg Intravenous Q4H PRN   • insulin lispro (HumaLOG) 100 units/mL subcutaneous injection 1-5 Units  1-5 Units Subcutaneous TID AC   • labetalol (NORMODYNE) injection 10 mg  10 mg Intravenous Q6H PRN   • labetalol (NORMODYNE) tablet 200 mg  200 mg Oral Q12H [address]   • levETIRAcetam (KEPPRA) tablet 750 mg  750 mg Oral Q12H [address]   • oxyCODONE (ROXICODONE) IR tablet 5 mg  5 mg Oral Q4H PRN    Or   • oxyCODONE (ROXICODONE) immediate release tablet 10 mg  10 mg Oral Q4H PRN   • pantoprazole (PROTONIX) EC tablet 40 mg  40 mg Oral Early Morning   • polyethylene glycol (MIRALAX) packet 17 g  17 g Oral Daily   • senna (SENOKOT) tablet 8 6 mg  1 tablet Oral HS   • trimethobenzamide (TIGAN) IM injection 200 mg  200 mg Intramuscular Q6H PRN       Prior to Admission medications    Medication Sig Start Date End Date Taking?  Authorizing Provider   albuterol (PROVENTIL HFA,VENTOLIN HFA) 90 mcg/act inhaler INHALE 2 PUFFS BY MOUTH EVERY 6 HOURS  Patient not taking: Reported on 5/10/2023    Historical Provider, MD   amLODIPine (NORVASC) 10 mg tablet Take 10 mg by mouth 4/15/20   Historical Provider, MD   atorvastatin (LIPITOR) 40 mg tablet Take 40 mg by mouth daily at bedtime 7/21/22   Historical Provider, MD   BD Pen Needle Amelia U/F 32G X 4 MM MISC   12/21/20   Historical Provider, MD   busPIRone (BUSPAR) 15 mg tablet TAKE ONE TABLET BY MOUTH TWICE A DAY FOR PTSD    Historical Provider, MD   Calcium 600 MG tablet Take 600 mg by mouth  Patient not taking: Reported on 5/10/2023    Historical Provider, MD   Cholecalciferol (VITAMIN D-3) 1000 units CAPS Take 1,000 Units by mouth daily    Historical Provider, MD   Continuous Blood Gluc Sensor (FreeStyle Michelle 14 Day Sensor) MISC Use as directed 2/17/21   Historical Provider, MD   doxazosin (CARDURA) 8 MG tablet Take 8 mg by mouth 2 (two) times a day 2/3/21   Historical Provider, MD   eplerenone (INSPRA) 50 MG tablet Take 50 mg by mouth daily 5/23/17   Historical Provider, MD   escitalopram (LEXAPRO) 20 mg tablet Take 20 mg by mouth daily 11/14/20   Historical Provider, MD   ferrous sulfate 325 (65 Fe) mg tablet Take 325 mg by mouth 2 (two) times a day      Historical Provider, MD   glucose monitoring kit (FREESTYLE) monitoring kit 1 each by Does not apply route 2 (two) times a day Any brand covered by insurance:  Dispense one glucometer, test strips #50, lancets #100  Patient not taking: Reported on 10/13/2022 7/11/20   Junior Diaz DO   glycopyrrolate-formoterol (BEVESPI AEROSPHERE) 9-4 8 MCG/ACT inhaler Inhale 2 puffs daily after breakfast  Patient not taking: Reported on 10/13/2022    Historical Provider, MD   labetalol (NORMODYNE) 100 mg tablet Take 100 mg by mouth 2 (two) times a day Morning and before bedtime 7/30/22   Historical Provider, MD   LORazepam (ATIVAN) 0 5 mg tablet TAKE ONE TABLET BY MOUTH Q6 PRN  Patient not taking: Reported on 5/10/2023    Historical Provider, MD   mirtazapine (REMERON) 15 mg tablet 15 mg 9/1/22   Historical Provider, MD   nebivolol (BYSTOLIC) 20 MG tablet Take 1 tablet (20 mg total) by mouth daily 8/27/21 9/26/21  Barak Marvin PA-C   Omega-3 Fatty Acids (FISH OIL) 1200 MG CAPS Take 1,200 mg by mouth daily at bedtime    Historical Provider, MD   Pulmicort Flexhaler 90 MCG/ACT inhaler 2 puffs 2 (two) times a day  Patient not taking: Reported on 10/13/2022 3/9/21   Historical Provider, MD   sertraline (ZOLOFT) 100 mg tablet Take 2 tablets by mouth daily  Patient not taking: Reported on 5/10/2023 6/15/22   Historical Provider, MD   umeclidinium-vilanterol (ANORO ELLIPTA) 62 5-25 MCG/INH inhaler Inhale 1 puff daily  Patient not taking: Reported on 10/13/2022    Historical Provider, MD         VTE Pharmacologic Prophylaxis: Heparin  VTE Mechanical Prophylaxis: sequential compression device    ==  MD Kavya Tam Internal Medicine Residency, PGY-3

## 2023-05-12 NOTE — ASSESSMENT & PLAN NOTE
Lab Results   Component Value Date    HGBA1C 5 4 09/27/2022       Recent Labs     05/10/23  0713   POCGLU 198*       Blood Sugar Average: Last 72 hrs:     -recheck a1c  -SSI/accuchecks/diabetic diet

## 2023-05-12 NOTE — CASE MANAGEMENT
Case Management Discharge Planning Note    Patient name Arpan Strong  Location [address] [place] [address] 713/[facility] 555-26 MRN [id number]  : 1965 Date 2023       Current Admission Date: 5/10/2023  Current Admission Diagnosis:Hypertensive encephalopathy   Patient Active Problem List    Diagnosis Date Noted   • Seizure ([facility] ) 2023   • Hypertensive encephalopathy 2023   • Hypertensive emergency 2023   • Meningioma ([facility] ) 2023   • Prolonged Q-T interval on ECG 2023   • Hypokalemia 2021   • Bradycardia 2021   • Hypoglycemia 2021   • History of gastric bypass 2021   • Hypertensive kidney disease with stage 3 chronic kidney disease ([facility] ) 2021   • Acute renal failure superimposed on stage 3 chronic kidney disease ([place] [address] ) 2021   • Encounter for surgical aftercare following surgery of digestive system 2021   • Postsurgical malabsorption 2021   • Morbid obesity due to excess calories ([place] [address] ) 2020   • Post-traumatic male urethral stricture 2020   • Renal cyst 2020   • Type 2 diabetes mellitus ([place] [address] ) 2020   • Dysuria 2020   • History of DVT (deep vein thrombosis) 10/17/2018   • Splenic lesion 10/17/2018   • AGUS (obstructive sleep apnea) 10/16/2018   • Abscess of left groin 10/16/2018   • Anemia 2018   • Lymphedema 2018   • CKD stage 3 secondary to diabetes ([facility] ) 03/15/2018   • Chronic diastolic CHF (congestive heart failure) ([place] [address] ) 2018   • Pulmonary nodule, right 2018   • Pericardial effusion 2018   • Essential hypertension 2018   • Leg DVT (deep venous thromboembolism), acute, bilateral ([facility] ) 2018   • COPD (chronic obstructive pulmonary disease) ([facility] ) 2018   • CHF (congestive heart failure) ([facility] ) 2018      LOS (days): 2  Geometric Mean LOS (GMLOS) (days): 2 60  Days to GMLOS:0 9     OBJECTIVE:  Risk of Unplanned Readmission Score: 20 79         Current admission status: Inpatient Preferred Pharmacy:   [facility] [address]  [facility] [address] [address]  [address]  Phone: [phone] Fax: [facility] [address] [place]  Phone: [phone] Fax: [phone]    Primary Care Provider: Shana Lara DO    Primary Insurance: [facility]  Secondary Insurance:     DISCHARGE DETAILS:        Additional Comments: CM is aware the pt transferred from ICU to P7 on 5/11  LOS upon service transfer is one day  Previous CM documentation was reviewed and is appreciated  CM will meet with the pt to discuss [facility] as PT/OT recommend he discharge with Sarah Thayer service referral  Gabriela Martínez referral will be placed for [facility] Flaca should the pt consent to receive PT/OT at home  CM will introduce self to pt/family, explain supportive post acute care planning role and continue to follow for disposition planning

## 2023-05-12 NOTE — PLAN OF CARE
Problem: PHYSICAL THERAPY ADULT  Goal: Performs mobility at highest level of function for planned discharge setting  See evaluation for individualized goals  Description: Treatment/Interventions: OT, Spoke to case management, Spoke to nursing, Gait training, Bed mobility, Patient/family training, Endurance training, Functional transfer training, LE strengthening/ROM          See flowsheet documentation for full assessment, interventions and recommendations  Outcome: Progressing  Note: Prognosis: Good  Problem List: Decreased strength, Decreased endurance, Impaired balance, Decreased mobility, Decreased coordination, Impaired judgement  Assessment: Patient lying supine in bed, agreeable to participate in therapy  He was able to navigate bed with min A  Patient sat EOB  for several minutes with no complaints of dizziness  He was able to ambulate without AD, however does use the rail in the hallway intermittently  Patient presents with lateral sway intermittently  He was cued to avoid scissoring which he is able to self correct  Patient navigated 7 steps with min A and cued for pacing and foot placement on steps  He is slowly progressing towards goals but would continue to benefit from PT  Barriers to Discharge: Inaccessible home environment, Decreased caregiver support     PT Discharge Recommendation: Home with home health rehabilitation    See flowsheet documentation for full assessment

## 2023-05-12 NOTE — PHYSICAL THERAPY NOTE
Physical Therapy Progress Note      05/12/23 1405   PT Last Visit   PT Visit Date 05/12/23   Note Type   Note Type Treatment   Pain Assessment   Pain Assessment Tool 0-10   Pain Score No Pain   Hospital Pain Intervention(s) Repositioned; Ambulation/increased activity   Restrictions/Precautions   Weight Bearing Precautions Per Order No   Other Precautions Bed Alarm;Multiple lines;Telemetry; Fall Risk   General   Chart Reviewed Yes   Response to Previous Treatment Patient with no complaints from previous session  Family/Caregiver Present No   Cognition   Arousal/Participation Alert; Cooperative   Comments Patient is pleasant and cooperative throughout session   Subjective   Subjective I want to go home tomorrow  Bed Mobility   Supine to Sit 4  Minimal assistance   Additional items Assist x 1; Increased time required   Sit to Supine 4  Minimal assistance   Additional items Assist x 1; Increased time required;Verbal cues   Transfers   Sit to Stand 4  Minimal assistance   Additional items Assist x 1; Increased time required   Stand to Sit 4  Minimal assistance   Additional items Assist x 1; Increased time required   Ambulation/Elevation   Gait pattern Inconsistent reece;Decreased foot clearance;Narrow MARCELL   Gait Assistance 4  Minimal assist   Additional items Assist x 1   Assistive Device None   Distance 30 feet x2, 60 feet x1, 40 feet x1   Stair Management Assistance 4  Minimal assist   Additional items Assist x 1; Increased time required;Verbal cues   Stair Management Technique One rail L;Step to pattern; Foreward   Number of Stairs 7   Balance   Static Sitting Fair +   Dynamic Sitting Fair   Static Standing Fair -   Dynamic Standing Poor +   Endurance Deficit   Endurance Deficit Yes   Endurance Deficit Description Fatigue   Activity Tolerance   Activity Tolerance Patient limited by fatigue   Nurse Made Aware Appropriate to see per RN   Assessment   Prognosis Good   Problem List Decreased strength;Decreased endurance; Impaired balance;Decreased mobility; Decreased coordination; Impaired judgement   Assessment Patient lying supine in bed, agreeable to participate in therapy  He was able to navigate bed with min A  Patient sat EOB  for several minutes with no complaints of dizziness  He was able to ambulate without AD, however does use the rail in the hallway intermittently  Patient presents with lateral sway intermittently  He was cued to avoid scissoring which he is able to self correct  Patient navigated 7 steps with min A and cued for pacing and foot placement on steps  He is slowly progressing towards goals but would continue to benefit from PT     Barriers to Discharge Inaccessible home environment;Decreased caregiver support   Goals   Patient Goals To go home   STG Expiration Date 05/23/23   Plan   Progress Progressing toward goals   PT Frequency 3-5x/wk   Recommendation   PT Discharge Recommendation Home with home health rehabilitation   Equipment Recommended   (Tbd if patient continues to use rail or have lateral sway with ambulation)   AM-PAC Basic Mobility Inpatient   Turning in Flat Bed Without Bedrails 3   Lying on Back to Sitting on Edge of Flat Bed Without Bedrails 3   Moving Bed to Chair 3   Standing Up From Chair Using Arms 3   Walk in Room 3   Climb 3-5 Stairs With Railing 2   Basic Mobility Inpatient Raw Score 17   Basic Mobility Standardized Score 39 67   Highest Level Of Mobility   -[facility] Goal 5: Stand one or more mins       Chato Junior PTA

## 2023-05-12 NOTE — ASSESSMENT & PLAN NOTE
-New onset seizures in the setting of uncontrolled hypertension  Patient arrived after tonic clonic seizure with SBP in the 200s  -Initially was on nicardipine gtt which has now been weaned off  -Patient was noncompliant with his antihypertensive medications only taking them 3 times a week    -Continue labetalol/Norvasc/Cardura  -Home eplerenone held upon admission due to patient's hypovolemic status   -For today, use hydralazine as needed  Tomorrow can consider increasing labetalol if needed  -MRI brain w/wo: Motion degraded examination  Extra-axial dural based lesion along the medial left tentorium with rim calcifications on CT compatible with a meningioma, mildly increased in size compared to the prior CT study of 8/26/2021  Mild chronic microangiopathic ischemic changes

## 2023-05-12 NOTE — PLAN OF CARE
Problem: MOBILITY - ADULT  Goal: Maintain or return to baseline ADL function  Description: INTERVENTIONS:  -  Assess patient's ability to carry out ADLs; assess patient's baseline for ADL function and identify physical deficits which impact ability to perform ADLs (bathing, care of mouth/teeth, toileting, grooming, dressing, etc )  - Assess/evaluate cause of self-care deficits   - Assess range of motion  - Assess patient's mobility; develop plan if impaired  - Assess patient's need for assistive devices and provide as appropriate  - Encourage maximum independence but intervene and supervise when necessary  - Involve family in performance of ADLs  - Assess for home care needs following discharge   - Consider OT consult to assist with ADL evaluation and planning for discharge  - Provide patient education as appropriate  Outcome: Progressing  Goal: Maintains/Returns to pre admission functional level  Description: INTERVENTIONS:  - Perform BMAT or MOVE assessment daily    - Set and communicate daily mobility goal to care team and patient/family/caregiver  - Collaborate with rehabilitation services on mobility goals if consulted  - Perform Range of Motion times a day  - Reposition patient every  hours    - Dangle patient  times a day  - Stand patient  times a day  - Ambulate patient  times a day  - Out of bed to chair  times a day   - Out of bed for meals times a day  - Out of bed for toileting  - Record patient progress and toleration of activity level   Outcome: Progressing     Problem: Prexisting or High Potential for Compromised Skin Integrity  Goal: Skin integrity is maintained or improved  Description: INTERVENTIONS:  - Identify patients at risk for skin breakdown  - Assess and monitor skin integrity  - Assess and monitor nutrition and hydration status  - Monitor labs   - Assess for incontinence   - Turn and reposition patient  - Assist with mobility/ambulation  - Relieve pressure over bony prominences  - Avoid friction and shearing  - Provide appropriate hygiene as needed including keeping skin clean and dry  - Evaluate need for skin moisturizer/barrier cream  - Collaborate with interdisciplinary team   - Patient/family teaching  - Consider wound care consult   Outcome: Progressing     Problem: COPING  Goal: Pt/Family able to verbalize concerns and demonstrate effective coping strategies  Description: INTERVENTIONS:  - Assist patient/family to identify coping skills, available support systems and cultural and spiritual values  - Provide emotional support, including active listening and acknowledgement of concerns of patient and caregivers  - Reduce environmental stimuli, as able  - Provide patient education  - Assess for spiritual pain/suffering and initiate spiritual care, including notification of Pastoral Care or [place] based community as needed  - Assess effectiveness of coping strategies  Outcome: Progressing  Goal: Will report anxiety at manageable levels  Description: INTERVENTIONS:  - Administer medication as ordered  - Teach and encourage coping skills  - Provide emotional support  - Assess patient/family for anxiety and ability to cope  Outcome: Progressing     Problem: CONFUSION/THOUGHT DISTURBANCE  Goal: Thought disturbances (confusion, delirium, depression, dementia or psychosis) are managed to maintain or return to baseline mental status and functional level  Description: INTERVENTIONS:  - Assess for possible contributors to  thought disturbance, including but not limited to medications, infection, impaired vision or hearing, underlying metabolic abnormalities, dehydration, respiratory compromise,  psychiatric diagnoses and notify attending PHYSICAN/AP  - Monitor and intervene to maintain adequate nutrition, hydration, elimination, sleep and activity  - Decrease environmental stimuli, including noise as appropriate  - Provide frequent contacts to provide refocusing, direction and reassurance as needed  Approach patient calmly with eye contact and at their level  - [place] high risk fall precautions, aspiration precautions and other safety measures, as indicated  - If delirium suspected, notify physician/AP of change in condition and request immediate in-person evaluation  - Pursue consults as appropriate including Geriatric (campus dependent), OT for cognitive evaluation/activity planning, psychiatric, pastoral care, etc   Outcome: Progressing     Problem: BEHAVIOR  Goal: Pt/Family maintain appropriate behavior and adhere to behavioral management agreement, if implemented  Description: INTERVENTIONS:  - Assess the family dynamic   - Encourage verbalization of thoughts and concerns in a socially appropriate manner  - Assess patient/family's coping skills and non-compliant behavior (including use of illegal substances)  - Utilize positive, consistent limit setting strategies supporting safety of patient, staff and others  - Initiate consult with Case Management, Spiritual Care or other ancillary services as appropriate  - If a patient's/visitor's behavior jeopardizes the safety of the patient, staff, or others, refer to organization procedure     - Notify Security of behavior or suspected illegal substances which indicate the need for search of the patient and/or belongings  - Encourage participation in the decision making process about a behavioral management agreement; implement if patient meets criteria  Outcome: Progressing     Problem: SAFETY,RESTRAINT: NV/NON-SELF DESTRUCTIVE BEHAVIOR  Goal: Remains free of harm/injury (restraint for non violent/non self-detsructive behavior)  Description: INTERVENTIONS:  - Instruct patient/family regarding restraint use   - Assess and monitor physiologic and psychological status   - Provide interventions and comfort measures to meet assessed patient needs   - Identify and implement measures to help patient regain control  - Assess readiness for release of restraint Outcome: Progressing  Goal: Returns to optimal restraint-free functioning  Description: INTERVENTIONS:  - Assess the patient's behavior and symptoms that indicate continued need for restraint  - Identify and implement measures to help patient regain control  - Assess readiness for release of restraint   Outcome: Progressing     Problem: Nutrition/Hydration-ADULT  Goal: Nutrient/Hydration intake appropriate for improving, restoring or maintaining nutritional needs  Description: Monitor and assess patient's nutrition/hydration status for malnutrition  Collaborate with interdisciplinary team and initiate plan and interventions as ordered  Monitor patient's weight and dietary intake as ordered or per policy  Utilize nutrition screening tool and intervene as necessary  Determine patient's food preferences and provide high-protein, high-caloric foods as appropriate       INTERVENTIONS:  - Monitor oral intake, urinary output, labs, and treatment plans  - Assess nutrition and hydration status and recommend course of action  - Evaluate amount of meals eaten  - Assist patient with eating if necessary   - Allow adequate time for meals  - Recommend/ encourage appropriate diets, oral nutritional supplements, and vitamin/mineral supplements  - Order, calculate, and assess calorie counts as needed  - Recommend, monitor, and adjust tube feedings and TPN/PPN based on assessed needs  - Assess need for intravenous fluids  - Provide specific nutrition/hydration education as appropriate  - Include patient/family/caregiver in decisions related to nutrition  Outcome: Progressing

## 2023-05-13 PROBLEM — R10.9 ABDOMINAL PAIN: Status: ACTIVE | Noted: 2023-05-13

## 2023-05-13 LAB
GLUCOSE SERPL-MCNC: 105 MG/DL (ref 65–140)
GLUCOSE SERPL-MCNC: 108 MG/DL (ref 65–140)
GLUCOSE SERPL-MCNC: 109 MG/DL (ref 65–140)
GLUCOSE SERPL-MCNC: 76 MG/DL (ref 65–140)
GLUCOSE SERPL-MCNC: 81 MG/DL (ref 65–140)

## 2023-05-13 RX ORDER — SODIUM CHLORIDE, SODIUM GLUCONATE, SODIUM ACETATE, POTASSIUM CHLORIDE, MAGNESIUM CHLORIDE, SODIUM PHOSPHATE, DIBASIC, AND POTASSIUM PHOSPHATE .53; .5; .37; .037; .03; .012; .00082 G/100ML; G/100ML; G/100ML; G/100ML; G/100ML; G/100ML; G/100ML
75 INJECTION, SOLUTION INTRAVENOUS CONTINUOUS
Status: DISPENSED | OUTPATIENT
Start: 2023-05-13 | End: 2023-05-14

## 2023-05-13 RX ORDER — PANTOPRAZOLE SODIUM 40 MG/10ML
40 INJECTION, POWDER, LYOPHILIZED, FOR SOLUTION INTRAVENOUS
Status: DISCONTINUED | OUTPATIENT
Start: 2023-05-13 | End: 2023-05-14 | Stop reason: HOSPADM

## 2023-05-13 RX ORDER — LABETALOL HYDROCHLORIDE 5 MG/ML
10 INJECTION, SOLUTION INTRAVENOUS EVERY 6 HOURS PRN
Status: DISCONTINUED | OUTPATIENT
Start: 2023-05-13 | End: 2023-05-14 | Stop reason: HOSPADM

## 2023-05-13 RX ORDER — MAGNESIUM HYDROXIDE/ALUMINUM HYDROXICE/SIMETHICONE 120; 1200; 1200 MG/30ML; MG/30ML; MG/30ML
30 SUSPENSION ORAL EVERY 4 HOURS PRN
Status: DISCONTINUED | OUTPATIENT
Start: 2023-05-13 | End: 2023-05-14 | Stop reason: HOSPADM

## 2023-05-13 RX ORDER — HYDRALAZINE HYDROCHLORIDE 25 MG/1
25 TABLET, FILM COATED ORAL EVERY 8 HOURS SCHEDULED
Status: DISCONTINUED | OUTPATIENT
Start: 2023-05-13 | End: 2023-05-14

## 2023-05-13 RX ADMIN — ESCITALOPRAM OXALATE 20 MG: 20 TABLET, FILM COATED ORAL at 08:46

## 2023-05-13 RX ADMIN — CHLORHEXIDINE GLUCONATE 0.12% ORAL RINSE 15 ML: 1.2 LIQUID ORAL at 22:04

## 2023-05-13 RX ADMIN — OXYCODONE HYDROCHLORIDE 10 MG: 10 TABLET ORAL at 08:49

## 2023-05-13 RX ADMIN — LABETALOL HYDROCHLORIDE 300 MG: 200 TABLET, FILM COATED ORAL at 22:05

## 2023-05-13 RX ADMIN — HYDRALAZINE HYDROCHLORIDE 25 MG: 25 TABLET, FILM COATED ORAL at 22:05

## 2023-05-13 RX ADMIN — BUSPIRONE HYDROCHLORIDE 15 MG: 10 TABLET ORAL at 08:46

## 2023-05-13 RX ADMIN — SODIUM CHLORIDE, SODIUM GLUCONATE, SODIUM ACETATE, POTASSIUM CHLORIDE, MAGNESIUM CHLORIDE, SODIUM PHOSPHATE, DIBASIC, AND POTASSIUM PHOSPHATE 75 ML/HR: .53; .5; .37; .037; .03; .012; .00082 INJECTION, SOLUTION INTRAVENOUS at 12:05

## 2023-05-13 RX ADMIN — FERROUS SULFATE TAB 325 MG (65 MG ELEMENTAL FE) 325 MG: 325 (65 FE) TAB at 08:46

## 2023-05-13 RX ADMIN — LABETALOL HYDROCHLORIDE 300 MG: 200 TABLET, FILM COATED ORAL at 04:00

## 2023-05-13 RX ADMIN — DOXAZOSIN 8 MG: 4 TABLET ORAL at 17:13

## 2023-05-13 RX ADMIN — OXYCODONE HYDROCHLORIDE 10 MG: 10 TABLET ORAL at 12:36

## 2023-05-13 RX ADMIN — SENNOSIDES 8.6 MG: 8.6 TABLET, FILM COATED ORAL at 22:06

## 2023-05-13 RX ADMIN — PANTOPRAZOLE SODIUM 40 MG: 40 INJECTION, POWDER, FOR SOLUTION INTRAVENOUS at 12:05

## 2023-05-13 RX ADMIN — DOXAZOSIN 8 MG: 4 TABLET ORAL at 08:45

## 2023-05-13 RX ADMIN — HYDRALAZINE HYDROCHLORIDE 10 MG: 20 INJECTION, SOLUTION INTRAMUSCULAR; INTRAVENOUS at 00:47

## 2023-05-13 RX ADMIN — AMLODIPINE BESYLATE 10 MG: 10 TABLET ORAL at 08:46

## 2023-05-13 RX ADMIN — HYDRALAZINE HYDROCHLORIDE 10 MG: 20 INJECTION, SOLUTION INTRAMUSCULAR; INTRAVENOUS at 07:47

## 2023-05-13 RX ADMIN — HYDRALAZINE HYDROCHLORIDE 25 MG: 25 TABLET, FILM COATED ORAL at 12:05

## 2023-05-13 RX ADMIN — OXYCODONE HYDROCHLORIDE 5 MG: 5 TABLET ORAL at 04:07

## 2023-05-13 RX ADMIN — HEPARIN SODIUM 5000 UNITS: 5000 INJECTION INTRAVENOUS; SUBCUTANEOUS at 06:02

## 2023-05-13 RX ADMIN — HYDRALAZINE HYDROCHLORIDE 10 MG: 20 INJECTION, SOLUTION INTRAMUSCULAR; INTRAVENOUS at 18:03

## 2023-05-13 RX ADMIN — POLYETHYLENE GLYCOL 3350 17 G: 17 POWDER, FOR SOLUTION ORAL at 08:45

## 2023-05-13 RX ADMIN — LEVETIRACETAM 750 MG: 750 TABLET, FILM COATED ORAL at 22:05

## 2023-05-13 RX ADMIN — PANTOPRAZOLE SODIUM 40 MG: 40 TABLET, DELAYED RELEASE ORAL at 06:02

## 2023-05-13 RX ADMIN — CHLORHEXIDINE GLUCONATE 0.12% ORAL RINSE 15 ML: 1.2 LIQUID ORAL at 08:46

## 2023-05-13 RX ADMIN — LEVETIRACETAM 750 MG: 750 TABLET, FILM COATED ORAL at 08:46

## 2023-05-13 RX ADMIN — LABETALOL HYDROCHLORIDE 10 MG: 5 INJECTION, SOLUTION INTRAVENOUS at 08:49

## 2023-05-13 RX ADMIN — BUSPIRONE HYDROCHLORIDE 15 MG: 10 TABLET ORAL at 17:13

## 2023-05-13 RX ADMIN — HYDROMORPHONE HYDROCHLORIDE 0.2 MG: 0.2 INJECTION, SOLUTION INTRAMUSCULAR; INTRAVENOUS; SUBCUTANEOUS at 10:06

## 2023-05-13 RX ADMIN — HEPARIN SODIUM 5000 UNITS: 5000 INJECTION INTRAVENOUS; SUBCUTANEOUS at 22:05

## 2023-05-13 RX ADMIN — ALUMINUM HYDROXIDE, MAGNESIUM HYDROXIDE, AND SIMETHICONE 30 ML: 200; 200; 20 SUSPENSION ORAL at 12:36

## 2023-05-13 NOTE — ASSESSMENT & PLAN NOTE
· New onset seizures  · Possibly due to meningioma with possibly uncontrolled high blood pressures contributing  · MRI brain revealed lesion possibly meningioma in the left medial tentorial region  · He is placed on Keppra 750 g twice daily neurology  · Seizure precautions

## 2023-05-13 NOTE — ASSESSMENT & PLAN NOTE
Lab Results   Component Value Date    HGBA1C 5 2 05/12/2023       Recent Labs     05/12/23  1601 05/13/23  0609 05/13/23  0615 05/13/23  1159   POCGLU 79 81 76 105       Blood Sugar Average: Last 72 hrs:  (P) 97 8       Patient with chronic kidney disease  Proteinuria noted  Patient reports he follows with nephrologist he does not recollect the name and he has not been regularly in following up with his nephrologist  He is encouraged strongly to follow-up with a nephrologist at discharge  Contact information for [facility] nephrology provided in discharge instructions

## 2023-05-13 NOTE — ASSESSMENT & PLAN NOTE
· Patient reports severe intractable abdominal pain epigastric with some nausea  · Denies vomiting hematemesis melena hematochezia diarrhea  · He is tolerating oral feeds  · 5/10/2023 lipase 101  · CT abdomen pelvis mild pelvic ascites otherwise unremarkable  · Patient with history of gastric bypass [id number] for bariatric surgery  · Presently will provide IV pantoprazole, antiemetics, supportive cares  · Monitor closely

## 2023-05-13 NOTE — ASSESSMENT & PLAN NOTE
MRI brain reveals features lesion suggestive of meningioma left medial tentorial area  Neurosurgery inputs noted  Patient is planned for outpatient follow-up

## 2023-05-13 NOTE — ASSESSMENT & PLAN NOTE
Blood pressures are uncontrolled  Continue Cardura 8 mg twice daily, amlodipine 10 mg daily, labetalol 300 mg twice daily  Will add hydralazine 25 mg every 8 hours  Monitor blood pressures  Avoid hypotension

## 2023-05-13 NOTE — ASSESSMENT & PLAN NOTE
Avoid QT prolonging medications  Monitor and replete potassium for goal more than 4 and magnesium more than 2

## 2023-05-13 NOTE — PLAN OF CARE
Problem: MOBILITY - ADULT  Goal: Maintain or return to baseline ADL function  Description: INTERVENTIONS:  -  Assess patient's ability to carry out ADLs; assess patient's baseline for ADL function and identify physical deficits which impact ability to perform ADLs (bathing, care of mouth/teeth, toileting, grooming, dressing, etc )  - Assess/evaluate cause of self-care deficits   - Assess range of motion  - Assess patient's mobility; develop plan if impaired  - Assess patient's need for assistive devices and provide as appropriate  - Encourage maximum independence but intervene and supervise when necessary  - Involve family in performance of ADLs  - Assess for home care needs following discharge   - Consider OT consult to assist with ADL evaluation and planning for discharge  - Provide patient education as appropriate  Outcome: Progressing  Goal: Maintains/Returns to pre admission functional level  Description: INTERVENTIONS:  - Perform BMAT or MOVE assessment daily    - Set and communicate daily mobility goal to care team and patient/family/caregiver  - Collaborate with rehabilitation services on mobility goals if consulted  - Perform Range of Motion  times a day  - Reposition patient every  hours    - Dangle patient  times a day  - Stand patient  times a day  - Ambulate patient  times a day  - Out of bed to chair  times a day   - Out of bed for meals  times a day  - Out of bed for toileting  - Record patient progress and toleration of activity level   Outcome: Progressing     Problem: Prexisting or High Potential for Compromised Skin Integrity  Goal: Skin integrity is maintained or improved  Description: INTERVENTIONS:  - Identify patients at risk for skin breakdown  - Assess and monitor skin integrity  - Assess and monitor nutrition and hydration status  - Monitor labs   - Assess for incontinence   - Turn and reposition patient  - Assist with mobility/ambulation  - Relieve pressure over bony prominences  - Avoid friction and shearing  - Provide appropriate hygiene as needed including keeping skin clean and dry  - Evaluate need for skin moisturizer/barrier cream  - Collaborate with interdisciplinary team   - Patient/family teaching  - Consider wound care consult   Outcome: Progressing     Problem: COPING  Goal: Pt/Family able to verbalize concerns and demonstrate effective coping strategies  Description: INTERVENTIONS:  - Assist patient/family to identify coping skills, available support systems and cultural and spiritual values  - Provide emotional support, including active listening and acknowledgement of concerns of patient and caregivers  - Reduce environmental stimuli, as able  - Provide patient education  - Assess for spiritual pain/suffering and initiate spiritual care, including notification of Pastoral Care or [place] based community as needed  - Assess effectiveness of coping strategies  Outcome: Progressing  Goal: Will report anxiety at manageable levels  Description: INTERVENTIONS:  - Administer medication as ordered  - Teach and encourage coping skills  - Provide emotional support  - Assess patient/family for anxiety and ability to cope  Outcome: Progressing     Problem: CONFUSION/THOUGHT DISTURBANCE  Goal: Thought disturbances (confusion, delirium, depression, dementia or psychosis) are managed to maintain or return to baseline mental status and functional level  Description: INTERVENTIONS:  - Assess for possible contributors to  thought disturbance, including but not limited to medications, infection, impaired vision or hearing, underlying metabolic abnormalities, dehydration, respiratory compromise,  psychiatric diagnoses and notify attending PHYSICAN/AP  - Monitor and intervene to maintain adequate nutrition, hydration, elimination, sleep and activity  - Decrease environmental stimuli, including noise as appropriate  - Provide frequent contacts to provide refocusing, direction and reassurance as needed  Approach patient calmly with eye contact and at their level  - [place] high risk fall precautions, aspiration precautions and other safety measures, as indicated  - If delirium suspected, notify physician/AP of change in condition and request immediate in-person evaluation  - Pursue consults as appropriate including Geriatric (campus dependent), OT for cognitive evaluation/activity planning, psychiatric, pastoral care, etc   Outcome: Progressing     Problem: BEHAVIOR  Goal: Pt/Family maintain appropriate behavior and adhere to behavioral management agreement, if implemented  Description: INTERVENTIONS:  - Assess the family dynamic   - Encourage verbalization of thoughts and concerns in a socially appropriate manner  - Assess patient/family's coping skills and non-compliant behavior (including use of illegal substances)  - Utilize positive, consistent limit setting strategies supporting safety of patient, staff and others  - Initiate consult with Case Management, Spiritual Care or other ancillary services as appropriate  - If a patient's/visitor's behavior jeopardizes the safety of the patient, staff, or others, refer to organization procedure     - Notify Security of behavior or suspected illegal substances which indicate the need for search of the patient and/or belongings  - Encourage participation in the decision making process about a behavioral management agreement; implement if patient meets criteria  Outcome: Progressing     Problem: SAFETY,RESTRAINT: NV/NON-SELF DESTRUCTIVE BEHAVIOR  Goal: Remains free of harm/injury (restraint for non violent/non self-detsructive behavior)  Description: INTERVENTIONS:  - Instruct patient/family regarding restraint use   - Assess and monitor physiologic and psychological status   - Provide interventions and comfort measures to meet assessed patient needs   - Identify and implement measures to help patient regain control  - Assess readiness for release of restraint Outcome: Progressing  Goal: Returns to optimal restraint-free functioning  Description: INTERVENTIONS:  - Assess the patient's behavior and symptoms that indicate continued need for restraint  - Identify and implement measures to help patient regain control  - Assess readiness for release of restraint   Outcome: Progressing     Problem: Nutrition/Hydration-ADULT  Goal: Nutrient/Hydration intake appropriate for improving, restoring or maintaining nutritional needs  Description: Monitor and assess patient's nutrition/hydration status for malnutrition  Collaborate with interdisciplinary team and initiate plan and interventions as ordered  Monitor patient's weight and dietary intake as ordered or per policy  Utilize nutrition screening tool and intervene as necessary  Determine patient's food preferences and provide high-protein, high-caloric foods as appropriate       INTERVENTIONS:  - Monitor oral intake, urinary output, labs, and treatment plans  - Assess nutrition and hydration status and recommend course of action  - Evaluate amount of meals eaten  - Assist patient with eating if necessary   - Allow adequate time for meals  - Recommend/ encourage appropriate diets, oral nutritional supplements, and vitamin/mineral supplements  - Order, calculate, and assess calorie counts as needed  - Recommend, monitor, and adjust tube feedings and TPN/PPN based on assessed needs  - Assess need for intravenous fluids  - Provide specific nutrition/hydration education as appropriate  - Include patient/family/caregiver in decisions related to nutrition  Outcome: Progressing

## 2023-05-13 NOTE — ASSESSMENT & PLAN NOTE
Lab Results   Component Value Date    HGBA1C 5 2 05/12/2023       Recent Labs     05/12/23  1601 05/13/23  0609 05/13/23  0615 05/13/23  1159   POCGLU 79 81 76 105       Blood Sugar Average: Last 72 hrs:  (P) 97 8     A1c 5 2  Monitor Accu-Cheks  Avoid hypoglycemia

## 2023-05-13 NOTE — PROGRESS NOTES
[facility]  Progress Note  Name: Lyndsey Gentile  MRN: [de-identified]  Unit/Bed#: [facility] 728-01 I Date of Admission: 5/10/2023   Date of Service: 5/13/2023 I Hospital Day: 3    Assessment/Plan   * Hypertensive encephalopathy  Assessment & Plan  · Patient presents with new onset seizures and uncontrolled hypertension likely hypertensive encephalopathy  · Encephalopathy is improving  · MRI brain noted revealed features suggestive of meningioma left medial tentorial area  · 2D echo revealed EF 42%, grade 2 diastolic dysfunction  · He received IV Cardene drip initial admission to the ICU and is now off of Cardene  · Continue Cardura, Norvasc, labetalol  · Add hydralazine 25 mg every 8 hours  · Monitor blood pressures  · Avoid hypotension        Abdominal pain  Assessment & Plan  · Patient reports severe intractable abdominal pain epigastric with some nausea  · Denies vomiting hematemesis melena hematochezia diarrhea  · He is tolerating oral feeds  · 5/10/2023 lipase 101  · CT abdomen pelvis mild pelvic ascites otherwise unremarkable  · Patient with history of gastric bypass Shamika-en-Y for bariatric surgery  · Presently will provide IV pantoprazole, antiemetics, supportive cares  · Monitor closely      Prolonged Q-T interval on ECG  Assessment & Plan  Avoid QT prolonging medications  Monitor and replete potassium for goal more than 4 and magnesium more than 2    Meningioma (HCC)  Assessment & Plan  MRI brain reveals features lesion suggestive of meningioma left medial tentorial area  Neurosurgery inputs noted  Patient is planned for outpatient follow-up    Seizure ([facility] [address] )  Assessment & Plan  · New onset seizures  · Possibly due to meningioma with possibly uncontrolled high blood pressures contributing  · MRI brain revealed lesion possibly meningioma in the left medial tentorial region  · He is placed on Keppra 750 g twice daily neurology  · Seizure precautions         History of gastric bypass  Assessment & Plan  Patient reports history of gastric bypass  Outpatient follow-up    Type 2 diabetes mellitus [facility])  Assessment & Plan  Lab Results   Component Value Date    HGBA1C 5 2 05/12/2023       Recent Labs     05/12/23  1601 05/13/23  0609 05/13/23  0615 05/13/23  1159   POCGLU 79 81 76 105       Blood Sugar Average: Last 72 hrs:  (P) 97 8     A1c 5 2  Monitor Accu-Cheks  Avoid hypoglycemia    History of DVT (deep vein thrombosis)  Assessment & Plan  History of DVT  Not on anticoagulation prior to admission    CKD stage 3 secondary to diabetes [facility])  Assessment & Plan  Lab Results   Component Value Date    HGBA1C 5 2 05/12/2023       Recent Labs     05/12/23  1601 05/13/23  0609 05/13/23  0615 05/13/23  1159   POCGLU 79 81 76 105       Blood Sugar Average: Last 72 hrs:  (P) 97 8       Patient with chronic kidney disease  Proteinuria noted  Patient reports he follows with nephrologist he does not recollect the name and he has not been regularly in following up with his nephrologist  He is encouraged strongly to follow-up with a nephrologist at discharge  Contact information for [facility] nephrology provided in discharge instructions    Essential hypertension  Assessment & Plan  Blood pressures are uncontrolled  Continue Cardura 8 mg twice daily, amlodipine 10 mg daily, labetalol 300 mg twice daily  Will add hydralazine 25 mg every 8 hours  Monitor blood pressures  Avoid hypotension                 VTE Pharmacologic Prophylaxis:   High Risk (Score >/= 5) - Pharmacological DVT Prophylaxis Ordered: heparin  Sequential Compression Devices Ordered  Patient Centered Rounds: I performed bedside rounds with nursing staff today  Discussions with Specialists or Other Care Team Provider:     Education and Discussions with Family / Patient: Discussed with the patient, reports he is keeping his family updated       Total Time Spent on Date of Encounter in care of patient: 38 min This time was spent on one or more of the following: performing physical exam; counseling and coordination of care; obtaining or reviewing history; documenting in the medical record; reviewing/ordering tests, medications or procedures; communicating with other healthcare professionals and discussing with patient's family/caregivers  Current Length of Stay: 3 day(s)  Current Patient Status: Inpatient   Certification Statement: The patient will continue to require additional inpatient hospital stay due to As outlined  Discharge Plan: Awaiting clinical and symptomatic improvement    Code Status: Level 1 - Full Code    Subjective:     Reports severe intractable epigastric abdominal pain  Reports 8-10/10 in intensity pressure-like  Denies radiation elsewhere  Reports some nausea  Denies vomiting hematemesis melena hematochezia  Tolerating oral feeds  History chart labs medications reviewed      Objective:     Vitals:   Temp (24hrs), Av 2 °F (37 3 °C), Min:98 8 °F (37 1 °C), Max:99 6 °F (37 6 °C)    Temp:  [98 8 °F (37 1 °C)-99 6 °F (37 6 °C)] 99 °F (37 2 °C)  HR:  [] 85  Resp:  [16-18] 18  BP: (145-201)/() 171/112  SpO2:  [94 %-99 %] 94 %  Body mass index is 22 74 kg/m²  Input and Output Summary (last 24 hours):      Intake/Output Summary (Last 24 hours) at 2023 1533  Last data filed at 2023 0816  Gross per 24 hour   Intake 175 ml   Output 475 ml   Net -300 ml       Physical Exam:   Physical Exam       Comfortably in bed  Features of protein calorie malnutrition noted  Neck supple  Lungs diminished breath sounds bilateral  No additional sounds  Heart sounds S1 and S2 noted  Abdomen soft  Epigastric tenderness noted  No organs palpable  Awake alert obey simple commands  No pedal edema  No rash      Additional Data:     Labs:  Results from last 7 days   Lab Units 23  0507   WBC Thousand/uL 9 11   HEMOGLOBIN g/dL 12 9   HEMATOCRIT % 38 8   PLATELETS Thousands/uL 249   NEUTROS PCT % 65   LYMPHS PCT % 23   MONOS PCT % 10   EOS PCT % 1     Results from last 7 days   Lab Units 05/12/23  0507 05/11/23  0457 05/10/23  1753   SODIUM mmol/L 140   < > 139   POTASSIUM mmol/L 3 6   < > 2 8*   CHLORIDE mmol/L 111*   < > 107   CO2 mmol/L 27   < > 25   BUN mg/dL 34*   < > 36*   CREATININE mg/dL 1 77*   < > 1 74*   ANION GAP mmol/L 2*   < > 7   CALCIUM mg/dL 9 2   < > 10 4*   ALBUMIN g/dL  --   --  4 2   TOTAL BILIRUBIN mg/dL  --   --  1 31*   ALK PHOS U/L  --   --  94   ALT U/L  --   --  170*   AST U/L  --   --  101*   GLUCOSE RANDOM mg/dL 93   < > 132    < > = values in this interval not displayed  Results from last 7 days   Lab Units 05/13/23  1159 05/13/23  0615 05/13/23  0609 05/12/23  1601 05/12/23  1136 05/10/23  0713   POC GLUCOSE mg/dl 105 76 81 79 148* 198*     Results from last 7 days   Lab Units 05/12/23  0507   HEMOGLOBIN A1C % 5 2     Results from last 7 days   Lab Units 05/10/23  1753   LACTIC ACID mmol/L 1 9       Lines/Drains:  Invasive Devices     Peripheral Intravenous Line  Duration           Peripheral IV 05/10/23 Distal;Left;Upper;Ventral (anterior) Arm 2 days    Peripheral IV 05/10/23 Distal;Right;Ventral (anterior) Forearm 2 days                      Imaging: Reviewed radiology reports from this admission including: abdominal/pelvic CT and MRI brain    Recent Cultures (last 7 days):   Results from last 7 days   Lab Units 05/11/23  1434   BLOOD CULTURE  No Growth at 24 hrs  No Growth at 24 hrs         Last 24 Hours Medication List:   Current Facility-Administered Medications   Medication Dose Route Frequency Provider Last Rate   • albuterol  2 puff Inhalation Q4H PRN Tejas Varela MD     • aluminum-magnesium hydroxide-simethicone  30 mL Oral Q4H PRN Placido Tsang MD     • amLODIPine  10 mg Oral Daily Tejas Varela MD     • busPIRone  15 mg Oral BID Tejas Varela MD     • chlorhexidine  15 mL Mouth/Throat Q12H 211 Reese Martinez MD     • doxazosin  8 mg Oral BID Marc Speak Madhu Atwood MD     • escitalopram  20 mg Oral Daily Meliton Miner MD     • ferrous sulfate  325 mg Oral Every Other Day Meliton Miner MD     • heparin (porcine)  5,000 Units Subcutaneous [facility] Meliton Miner MD     • hydrALAZINE  10 mg Intravenous Q6H PRN Trevor Sultana MD     • hydrALAZINE  25 mg Oral [facility] Linda Thompson MD     • HYDROmorphone  0 2 mg Intravenous Q4H PRN Meliton Miner MD     • insulin lispro  1-5 Units Subcutaneous TID [facility] Trevor Sultana MD     • labetalol  10 mg Intravenous Q6H PRN Rhea Will PA-C     • labetalol  300 mg Oral Q12H [facility] Rhea Will PA-C     • levETIRAcetam  750 mg Oral Q12H Darrin Martinez MD     • multi-electrolyte  75 mL/hr Intravenous Continuous Linda Thompson MD 75 mL/hr (05/13/23 1205)   • oxyCODONE  5 mg Oral Q4H PRN Meliton Miner MD      Or   • oxyCODONE  10 mg Oral Q4H PRN Meliton Miner MD     • pantoprazole  40 mg Intravenous Q24H [facility] Linda Thompson MD     • polyethylene glycol  17 g Oral Daily Meliton Miner MD     • senna  1 tablet Oral HS Meliton Miner MD     • trimethobenzamide  200 mg Intramuscular Q6H PRN Meliton Miner MD          Today, Patient Was Seen By: Linda Thompson MD    **Please Note: This note may have been constructed using a voice recognition system  **

## 2023-05-13 NOTE — ASSESSMENT & PLAN NOTE
· Patient presents with new onset seizures and uncontrolled hypertension likely hypertensive encephalopathy  · Encephalopathy is improving  · MRI brain noted revealed features suggestive of meningioma left medial tentorial area  · 2D echo revealed EF 87%, grade 2 diastolic dysfunction  · He received IV Cardene drip initial admission to the ICU and is now off of Cardene  · Continue Cardura, Norvasc, labetalol  · Add hydralazine 25 mg every 8 hours  · Monitor blood pressures  · Avoid hypotension

## 2023-05-14 VITALS
HEART RATE: 73 BPM | SYSTOLIC BLOOD PRESSURE: 172 MMHG | OXYGEN SATURATION: 97 % | WEIGHT: 140.87 LBS | RESPIRATION RATE: 16 BRPM | DIASTOLIC BLOOD PRESSURE: 103 MMHG | HEIGHT: 66 IN | TEMPERATURE: 99.1 F | BODY MASS INDEX: 22.64 KG/M2

## 2023-05-14 RX ORDER — HYDRALAZINE HYDROCHLORIDE 50 MG/1
50 TABLET, FILM COATED ORAL EVERY 8 HOURS SCHEDULED
Qty: 90 TABLET | Refills: 0 | Status: SHIPPED | OUTPATIENT
Start: 2023-05-14 | End: 2023-05-23 | Stop reason: DRUGHIGH

## 2023-05-14 RX ORDER — LABETALOL 300 MG/1
300 TABLET, FILM COATED ORAL EVERY 12 HOURS SCHEDULED
Qty: 60 TABLET | Refills: 0 | Status: SHIPPED | OUTPATIENT
Start: 2023-05-14 | End: 2023-06-13

## 2023-05-14 RX ORDER — HYDRALAZINE HYDROCHLORIDE 50 MG/1
50 TABLET, FILM COATED ORAL EVERY 8 HOURS SCHEDULED
Status: DISCONTINUED | OUTPATIENT
Start: 2023-05-14 | End: 2023-05-14 | Stop reason: HOSPADM

## 2023-05-14 RX ORDER — LEVETIRACETAM 750 MG/1
750 TABLET ORAL EVERY 12 HOURS SCHEDULED
Qty: 60 TABLET | Refills: 0 | Status: SHIPPED | OUTPATIENT
Start: 2023-05-14 | End: 2023-06-13

## 2023-05-14 RX ADMIN — AMLODIPINE BESYLATE 10 MG: 10 TABLET ORAL at 10:13

## 2023-05-14 RX ADMIN — HEPARIN SODIUM 5000 UNITS: 5000 INJECTION INTRAVENOUS; SUBCUTANEOUS at 05:31

## 2023-05-14 RX ADMIN — PANTOPRAZOLE SODIUM 40 MG: 40 INJECTION, POWDER, FOR SOLUTION INTRAVENOUS at 10:13

## 2023-05-14 RX ADMIN — HYDRALAZINE HYDROCHLORIDE 50 MG: 50 TABLET, FILM COATED ORAL at 10:14

## 2023-05-14 RX ADMIN — LEVETIRACETAM 750 MG: 750 TABLET, FILM COATED ORAL at 10:13

## 2023-05-14 RX ADMIN — LABETALOL HYDROCHLORIDE 300 MG: 200 TABLET, FILM COATED ORAL at 10:13

## 2023-05-14 RX ADMIN — CHLORHEXIDINE GLUCONATE 0.12% ORAL RINSE 15 ML: 1.2 LIQUID ORAL at 10:13

## 2023-05-14 RX ADMIN — ESCITALOPRAM OXALATE 20 MG: 20 TABLET, FILM COATED ORAL at 10:13

## 2023-05-14 RX ADMIN — BUSPIRONE HYDROCHLORIDE 15 MG: 10 TABLET ORAL at 10:13

## 2023-05-14 RX ADMIN — HYDRALAZINE HYDROCHLORIDE 25 MG: 25 TABLET, FILM COATED ORAL at 05:31

## 2023-05-14 RX ADMIN — DOXAZOSIN 8 MG: 4 TABLET ORAL at 10:13

## 2023-05-14 NOTE — ASSESSMENT & PLAN NOTE
Lab Results   Component Value Date    HGBA1C 5 2 05/12/2023       Recent Labs     05/13/23  0615 05/13/23  1159 05/13/23  1600 05/13/23  2111   POCGLU 76 105 108 109       Blood Sugar Average: Last 72 hrs:  (P) 610 [id number]     Therapeutic lifestyle modification encouraged

## 2023-05-14 NOTE — ASSESSMENT & PLAN NOTE
· New onset seizures  · Possibly due to meningioma with possibly uncontrolled high blood pressures contributing  · MRI brain revealed lesion possibly meningioma in the left medial tentorial region  · Continue Keppra 750 mg twice daily  · Outpatient follow-up

## 2023-05-14 NOTE — ASSESSMENT & PLAN NOTE
· Patient presents with new onset seizures and uncontrolled hypertension likely hypertensive encephalopathy  · Encephalopathy is improved  · MRI brain noted revealed features suggestive of meningioma left medial tentorial area  · 2D echo revealed EF 13%, grade 2 diastolic dysfunction  · He received IV Cardene drip initial admission to the ICU and is now off of Cardene  · Continue Cardura, labetalol, Norvasc, hydralazine  · Monitor blood pressures  · Low-salt diet encouraged

## 2023-05-14 NOTE — DISCHARGE SUMMARY
[address] [facility]  Discharge- Deandre Scriver 1965, 59 y o  male MRN: [id number]  Unit/Bed#: [facility] 728-01 Encounter: [id number]  Primary Care Provider: Jesús Das DO   Date and time admitted to hospital: 5/10/2023  4:38 PM    * Hypertensive encephalopathy  Assessment & Plan  · Patient presents with new onset seizures and uncontrolled hypertension likely hypertensive encephalopathy  · Encephalopathy is improved  · MRI brain noted revealed features suggestive of meningioma left medial tentorial area  · 2D echo revealed EF 81%, grade 2 diastolic dysfunction  · He received IV Cardene drip initial admission to the ICU and is now off of Cardene  · Continue Cardura, labetalol, Norvasc, hydralazine  · Monitor blood pressures  · Low-salt diet encouraged      Abdominal pain  Assessment & Plan  · Abdominal pain is today resolved  · He is tolerating oral feeds  · 5/10/2023 lipase 101  · CT abdomen pelvis mild pelvic ascites otherwise unremarkable  · Patient with history of gastric bypass Shamika-en-Y for bariatric surgery  · Supportive care    Meningioma [facility])  Assessment & Plan  MRI brain reveals features lesion suggestive of meningioma left medial tentorial area  Neurosurgery inputs noted  Outpatient follow-up with neurosurgery    Seizure [facility])  Assessment & Plan  · New onset seizures  · Possibly due to meningioma with possibly uncontrolled high blood pressures contributing  · MRI brain revealed lesion possibly meningioma in the left medial tentorial region  · Continue Keppra 750 mg twice daily  · Outpatient follow-up       History of gastric bypass  Assessment & Plan  Patient reports history of gastric bypass  Outpatient follow-up recommended    Type 2 diabetes mellitus [facility])  Assessment & Plan  Lab Results   Component Value Date    HGBA1C 5 2 05/12/2023       Recent Labs     05/13/23  0615 05/13/23  1159 05/13/23  1600 05/13/23  2111   POCGLU 76 105 108 109       Blood Sugar Average: Last 72 hrs:  (P) 759 [id number]     Therapeutic lifestyle modification encouraged    History of DVT (deep vein thrombosis)  Assessment & Plan  History of DVT  Not on anticoagulation prior to admission    CKD stage 3 secondary to diabetes [facility])  Assessment & Plan  Lab Results   Component Value Date    HGBA1C 5 2 05/12/2023       Recent Labs     05/13/23  0615 05/13/23  1159 05/13/23  1600 05/13/23  2111   POCGLU 76 105 108 109       Blood Sugar Average: Last 72 hrs:  (P) 100 [id number]       Patient with chronic kidney disease  Proteinuria noted  Outpatient nephrology follow-up recommended  Contact information for [facility] nephrology provided in discharge instructions    Essential hypertension  Assessment & Plan  Blood pressures are uncontrolled  Continue Cardura 8 mg twice daily, amlodipine 10 mg daily, labetalol 300 mg twice daily  Hydralazine increased to 50 mg every 8 hours  He will resume eplerenone at discharge  Monitoring ambulatory blood pressures and following with nephrology discussed and encouraged he verbalized understanding  Low-salt diet encouraged        Discharge Summary - [facility]  Internal Medicine    Patient Information: Lavon Briceno 59 y o  male MRN: [de-identified]  Unit/Bed#: [facility] 728-01 Encounter: [id number]    Discharging Physician / Practitioner: Zechariah Sanchez MD  PCP: Shana Lara DO  Admission Date: 5/10/2023  Discharge Date: 05/14/23    Disposition:     Home    Reason for Admission: Seizures, admitted to the ICU    Discharge Diagnoses:     Principal Problem:    Hypertensive encephalopathy  Active Problems:    Essential hypertension    CKD stage 3 secondary to diabetes ([facility] [address] )    History of DVT (deep vein thrombosis)    Type 2 diabetes mellitus ([facility] [address] )    History of gastric bypass    Seizure ([facility] [address] )    Hypertensive emergency    [facility]    Prolonged Q-T interval on ECG    Abdominal pain  Resolved Problems:    * No resolved hospital problems  *      Consultations During Hospital Stay:  · Neurosurgery  · Neurology    Procedures Performed:     · Chest x-ray no active lung disease  · CT head  1  No acute posttraumatic intracranial abnormality  2  Interim slight increase (compared to 8/26/2021) in 2 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 with mild associated mass effect  This could be more thoroughly evaluated and characterized with MRI  3  Chronic microangiopathic change again noted  CTA chest abdomen pelvis    1  No evidence of aortic dissection  2   Scattered mild atelectatic changes  New somewhat nodular opacity in the right lower lobe posteromedially appears flat on the coronal reformats likely discoid atelectasis  Improving peripheral tubular opacity in the right lower lobe posteriorly   probably related to chronically impacted bronchioles  3  Stable cardiomegaly  Stable small pericardial effusion  4   Mild pelvic ascites, previously present    MRI brain    Extra-axial dural based lesion along the medial left tentorium with rim calcifications on CT compatible with a meningioma, mildly increased in size compared to the prior CT study of 8/26/2021      Mild chronic microangiopathic ischemic changes      2D echo EF 02% grade 2 diastolic dysfunction        Hospital Course:     Kenneth Machado is a 59 y o  male patient who originally presented to the hospital on 5/10/2023 due to seizures  Patient admitted to the ICU with seizures  He was noted to have hypertensive encephalopathy and placed on Cardene drip and admitted to the ICU  His blood pressure stabilized and transferred to medical surgical floor  He was seen in consultation with neurology and has been placed on Keppra 750 g twice daily  Given his MRI brain suggestive of meningioma he was seen in consultation with neurosurgery with plans for outpatient follow-up  His blood pressures are uncontrolled he reports noncompliance with medications    Education compliance strongly "encouraged  He will continue with Cardura, amlodipine, labetalol, eplerenone  Hydralazine has been added and increased to 50 mg every 8 hours  Close blood pressure monitoring BMP in a week time and follow-up with nephrology strongly encouraged he verbalized understanding  Condition at Discharge: fair     Discharge Day Visit / Exam:     Subjective:      Comfortably sitting up in bed  Reports feeling better  Reports improved abdominal pain nausea  Ambulating in the room and hallways  Requesting discharge      Vitals: Blood Pressure: (!) 172/103 (05/14/23 1309)  Pulse: 73 (05/14/23 1309)  Temperature: 99 1 °F (37 3 °C) (05/14/23 1012)  Temp Source: Oral (05/14/23 1012)  Respirations: 16 (05/14/23 1012)  Height: 5' 6\" (167 6 cm) (05/11/23 1238)  Weight - Scale: 63 9 kg (140 lb 14 oz) (05/12/23 0600)  SpO2: 97 % (05/14/23 1309)  Exam:   Physical Exam    Comfortably in bed  Neck supple  Lungs diminished breath sounds bilaterally  Heart sounds S1 and S2 noted   Abdomen soft nontender  Awake and alert obey simple commands  No pedal edema  No rash    Discharge instructions/Information to patient and family:   See after visit summary for information provided to patient and family  Discharge plan discussed with the patient, called updated spouse Angela Shahid  Discussed with nephrology  Outpatient follow-up with nephrology, primary care physician, neurosurgery, neurology    Provisions for Follow-Up Care:  See after visit summary for information related to follow-up care and any pertinent home health orders  Planned Readmission: no      Discharge Statement:  I spent 48 minutes discharging the patient  This time was spent on the day of discharge  I had direct contact with the patient on the day of discharge  Greater than 50% of the total time was spent examining patient, answering all patient questions, arranging and discussing plan of care with patient as well as directly providing post-discharge instructions    " Additional time then spent on discharge activities  Discharge Medications:  See after visit summary for reconciled discharge medications provided to patient and family        ** Please Note: This note has been constructed using a voice recognition system **

## 2023-05-14 NOTE — ASSESSMENT & PLAN NOTE
Blood pressures are uncontrolled  Continue Cardura 8 mg twice daily, amlodipine 10 mg daily, labetalol 300 mg twice daily  Hydralazine increased to 50 mg every 8 hours  He will resume eplerenone at discharge  Monitoring ambulatory blood pressures and following with nephrology discussed and encouraged he verbalized understanding  Low-salt diet encouraged

## 2023-05-14 NOTE — ASSESSMENT & PLAN NOTE
· Abdominal pain is today resolved  · He is tolerating oral feeds  · 5/10/2023 lipase 101  · CT abdomen pelvis mild pelvic ascites otherwise unremarkable  · Patient with history of gastric bypass Shamika-en-Y for bariatric surgery  · Supportive care

## 2023-05-14 NOTE — ASSESSMENT & PLAN NOTE
Lab Results   Component Value Date    HGBA1C 5 2 05/12/2023       Recent Labs     05/13/23  0615 05/13/23  1159 05/13/23  1600 05/13/23  2111   POCGLU 76 105 108 109       Blood Sugar Average: Last 72 hrs:  (P) 100 [id number]       Patient with chronic kidney disease  Proteinuria noted  Outpatient nephrology follow-up recommended  Contact information for [facility] nephrology provided in discharge instructions

## 2023-05-14 NOTE — CASE MANAGEMENT
Case Management Discharge Planning Note    Patient name Duarte Lee  Location [facility] MRN [id number]  : 1965 Date 2023       Current Admission Date: 5/10/2023  Current Admission Diagnosis:Hypertensive encephalopathy   Patient Active Problem List    Diagnosis Date Noted   • Abdominal pain 2023   • Seizure ([facility] ) 2023   • Hypertensive encephalopathy 2023   • Hypertensive emergency 2023   • Meningioma ([facility] ) 2023   • Prolonged Q-T interval on ECG 2023   • Hypokalemia 2021   • Bradycardia 2021   • Hypoglycemia 2021   • History of gastric bypass 2021   • Hypertensive kidney disease with stage 3 chronic kidney disease ([facility] ) 2021   • Acute renal failure superimposed on stage 3 chronic kidney disease [facility] ) 2021   • Encounter for surgical aftercare following surgery of digestive system 2021   • Postsurgical malabsorption 2021   • Morbid obesity due to excess calories ([facility] ) 2020   • Post-traumatic male urethral stricture 2020   • Renal cyst 2020   • Type 2 diabetes mellitus ([facility] ) 2020   • Dysuria 2020   • History of DVT (deep vein thrombosis) 10/17/2018   • Splenic lesion 10/17/2018   • AGUS (obstructive sleep apnea) 10/16/2018   • Abscess of left groin 10/16/2018   • Anemia 2018   • Lymphedema 2018   • CKD stage 3 secondary to diabetes ([facility] ) 03/15/2018   • Chronic diastolic CHF (congestive heart failure) ([facility] ) 2018   • Pulmonary nodule, right 2018   • Pericardial effusion 2018   • Essential hypertension 2018   • Leg DVT (deep venous thromboembolism), acute, bilateral ([facility] ) 2018   • COPD (chronic obstructive pulmonary disease) ([facility] ) 2018   • CHF (congestive heart failure) ([facility] ) 2018      LOS (days): 4  Geometric Mean LOS (GMLOS) (days): 2 60  Days to GMLOS:-1 3     OBJECTIVE:  Risk of Unplanned Readmission Score: 20 87 Current admission status: Inpatient   Preferred Pharmacy:   [facility] [address]EV[address]  [place], [address] [address] [facility]  [address]  Phone: [phone] Fax: Yessenia John [address] RAFIA Aguirre  Phone: [phone] Fax: [phone]    Primary Care Provider: Lena Art DO    Primary Insurance: [employer] [facility]  Secondary Insurance:     DISCHARGE DETAILS:    Discharge planning discussed with[de-identified] CM spoke with the pt  Freedom of Choice: Yes  Comments - Freedom of Choice: FOC explored  CM contacted family/caregiver?: No- see comments  Were Treatment Team discharge recommendations reviewed with patient/caregiver?: Yes  Did patient/caregiver verbalize understanding of patient care needs?: Yes  Were patient/caregiver advised of the risks associated with not following Treatment Team discharge recommendations?: Yes    Contacts  Reason/Outcome: Continuity of Care, Discharge [address]         Is the patient interested in [facility] at discharge?: No    DME Referral Provided  Referral made for DME?: No    Other Referral/Resources/Interventions Provided:  Interventions: None Indicated  Referral Comments: [facility] declined    Would you like to participate in our [address] service program?  : No - Declined    Treatment Team Recommendation: Home  Discharge Destination Plan[de-identified] Home  Transport at Discharge : Family                             IMM Given (Date):: 05/14/23  IMM Given to[de-identified] Patient     Additional Comments: The pt is medically stable for hospital discharge  [facility] referral was offered and declined  The pt will return home accompanied by his spouse

## 2023-05-14 NOTE — ASSESSMENT & PLAN NOTE
MRI brain reveals features lesion suggestive of meningioma left medial tentorial area  Neurosurgery inputs noted  Outpatient follow-up with neurosurgery

## 2023-05-14 NOTE — PLAN OF CARE
Problem: MOBILITY - ADULT  Goal: Maintain or return to baseline ADL function  Description: INTERVENTIONS:  -  Assess patient's ability to carry out ADLs; assess patient's baseline for ADL function and identify physical deficits which impact ability to perform ADLs (bathing, care of mouth/teeth, toileting, grooming, dressing, etc )  - Assess/evaluate cause of self-care deficits   - Assess range of motion  - Assess patient's mobility; develop plan if impaired  - Assess patient's need for assistive devices and provide as appropriate  - Encourage maximum independence but intervene and supervise when necessary  - Involve family in performance of ADLs  - Assess for home care needs following discharge   - Consider OT consult to assist with ADL evaluation and planning for discharge  - Provide patient education as appropriate  Outcome: Progressing  Goal: Maintains/Returns to pre admission functional level  Description: INTERVENTIONS:  - Perform BMAT or MOVE assessment daily    - Set and communicate daily mobility goal to care team and patient/family/caregiver  - Collaborate with rehabilitation services on mobility goals if consulted  - Perform Range of Motion  times a day  - Reposition patient every  hours    - Dangle patient  times a day  - Stand patient  times a day  - Ambulate patient  times a day  - Out of bed to chair  times a day   - Out of bed for meals  times a day  - Out of bed for toileting  - Record patient progress and toleration of activity level   Outcome: Progressing     Problem: Prexisting or High Potential for Compromised Skin Integrity  Goal: Skin integrity is maintained or improved  Description: INTERVENTIONS:  - Identify patients at risk for skin breakdown  - Assess and monitor skin integrity  - Assess and monitor nutrition and hydration status  - Monitor labs   - Assess for incontinence   - Turn and reposition patient  - Assist with mobility/ambulation  - Relieve pressure over bony prominences  - Avoid friction and shearing  - Provide appropriate hygiene as needed including keeping skin clean and dry  - Evaluate need for skin moisturizer/barrier cream  - Collaborate with interdisciplinary team   - Patient/family teaching  - Consider wound care consult   Outcome: Progressing     Problem: COPING  Goal: Pt/Family able to verbalize concerns and demonstrate effective coping strategies  Description: INTERVENTIONS:  - Assist patient/family to identify coping skills, available support systems and cultural and spiritual values  - Provide emotional support, including active listening and acknowledgement of concerns of patient and caregivers  - Reduce environmental stimuli, as able  - Provide patient education  - Assess for spiritual pain/suffering and initiate spiritual care, including notification of Pastoral Care or [place] based community as needed  - Assess effectiveness of coping strategies  Outcome: Progressing  Goal: Will report anxiety at manageable levels  Description: INTERVENTIONS:  - Administer medication as ordered  - Teach and encourage coping skills  - Provide emotional support  - Assess patient/family for anxiety and ability to cope  Outcome: Progressing     Problem: CONFUSION/THOUGHT DISTURBANCE  Goal: Thought disturbances (confusion, delirium, depression, dementia or psychosis) are managed to maintain or return to baseline mental status and functional level  Description: INTERVENTIONS:  - Assess for possible contributors to  thought disturbance, including but not limited to medications, infection, impaired vision or hearing, underlying metabolic abnormalities, dehydration, respiratory compromise,  psychiatric diagnoses and notify attending PHYSICAN/AP  - Monitor and intervene to maintain adequate nutrition, hydration, elimination, sleep and activity  - Decrease environmental stimuli, including noise as appropriate  - Provide frequent contacts to provide refocusing, direction and reassurance as needed  Approach patient calmly with eye contact and at their level  - [place] high risk fall precautions, aspiration precautions and other safety measures, as indicated  - If delirium suspected, notify physician/AP of change in condition and request immediate in-person evaluation  - Pursue consults as appropriate including Geriatric (campus dependent), OT for cognitive evaluation/activity planning, psychiatric, pastoral care, etc   Outcome: Progressing     Problem: BEHAVIOR  Goal: Pt/Family maintain appropriate behavior and adhere to behavioral management agreement, if implemented  Description: INTERVENTIONS:  - Assess the family dynamic   - Encourage verbalization of thoughts and concerns in a socially appropriate manner  - Assess patient/family's coping skills and non-compliant behavior (including use of illegal substances)  - Utilize positive, consistent limit setting strategies supporting safety of patient, staff and others  - Initiate consult with Case Management, Spiritual Care or other ancillary services as appropriate  - If a patient's/visitor's behavior jeopardizes the safety of the patient, staff, or others, refer to organization procedure     - Notify Security of behavior or suspected illegal substances which indicate the need for search of the patient and/or belongings  - Encourage participation in the decision making process about a behavioral management agreement; implement if patient meets criteria  Outcome: Progressing     Problem: SAFETY,RESTRAINT: NV/NON-SELF DESTRUCTIVE BEHAVIOR  Goal: Remains free of harm/injury (restraint for non violent/non self-detsructive behavior)  Description: INTERVENTIONS:  - Instruct patient/family regarding restraint use   - Assess and monitor physiologic and psychological status   - Provide interventions and comfort measures to meet assessed patient needs   - Identify and implement measures to help patient regain control  - Assess readiness for release of restraint Outcome: Progressing  Goal: Returns to optimal restraint-free functioning  Description: INTERVENTIONS:  - Assess the patient's behavior and symptoms that indicate continued need for restraint  - Identify and implement measures to help patient regain control  - Assess readiness for release of restraint   Outcome: Progressing     Problem: Nutrition/Hydration-ADULT  Goal: Nutrient/Hydration intake appropriate for improving, restoring or maintaining nutritional needs  Description: Monitor and assess patient's nutrition/hydration status for malnutrition  Collaborate with interdisciplinary team and initiate plan and interventions as ordered  Monitor patient's weight and dietary intake as ordered or per policy  Utilize nutrition screening tool and intervene as necessary  Determine patient's food preferences and provide high-protein, high-caloric foods as appropriate       INTERVENTIONS:  - Monitor oral intake, urinary output, labs, and treatment plans  - Assess nutrition and hydration status and recommend course of action  - Evaluate amount of meals eaten  - Assist patient with eating if necessary   - Allow adequate time for meals  - Recommend/ encourage appropriate diets, oral nutritional supplements, and vitamin/mineral supplements  - Order, calculate, and assess calorie counts as needed  - Recommend, monitor, and adjust tube feedings and TPN/PPN based on assessed needs  - Assess need for intravenous fluids  - Provide specific nutrition/hydration education as appropriate  - Include patient/family/caregiver in decisions related to nutrition  Outcome: Progressing

## 2023-05-15 NOTE — UTILIZATION REVIEW
NOTIFICATION OF ADMISSION DISCHARGE   This is a Notification of Discharge from [facility]  Please be advised that this patient has been discharge from our facility  Below you will find the admission and discharge date and time including the patient’s disposition  UTILIZATION REVIEW CONTACT:  Ryan Hayward  Utilization   Network Utilization Review Department  Phone: [phone] x carefully listen to the prompts  All voicemails are confidential   Email: [email or web address]     ADMISSION INFORMATION  PRESENTATION DATE: 5/10/2023  4:38 PM  OBERVATION ADMISSION DATE:   INPATIENT ADMISSION DATE: 5/10/23  4:38 PM   DISCHARGE DATE: 5/14/2023  1:31 PM   DISPOSITION:Home/Self Care    IMPORTANT INFORMATION:  Send all requests for admission clinical reviews, approved or denied determinations and any other requests to dedicated fax number below belonging to the campus where the patient is receiving treatment   List of dedicated fax numbers:  [address] DENIALS (Administrative/Medical Necessity) [phone]   [address] (Maternity/NICU/Pediatrics) [phone]   [facility] [phone]   [place] [address] [phone]   [address] [phone]   [address] [facility] [phone]   [address] [place] [address] [phone]   [address] [place] [address] 119 [phone]   [facility]  [phone]   [facility] [facility] [phone]   [address] [facility] [address] [facility] [phone]

## 2023-05-16 LAB
AMPHETAMINES UR QL SCN: NEGATIVE NG/ML
BACTERIA BLD CULT: NORMAL
BACTERIA BLD CULT: NORMAL
BARBITURATES UR QL SCN: NEGATIVE NG/ML
BENZODIAZ UR QL: NEGATIVE NG/ML
BZE UR QL: NEGATIVE NG/ML
CANNABINOIDS UR QL SCN: POSITIVE
METHADONE UR QL SCN: NEGATIVE NG/ML
OPIATES UR QL: NEGATIVE NG/ML
PCP UR QL: NEGATIVE NG/ML
PROPOXYPH UR QL SCN: NEGATIVE NG/ML

## 2023-05-17 ENCOUNTER — TELEPHONE (OUTPATIENT)
Dept: NEUROLOGY | Facility: CLINIC | Age: 58
End: 2023-05-17

## 2023-05-17 ENCOUNTER — TELEPHONE (OUTPATIENT)
Dept: NEPHROLOGY | Facility: CLINIC | Age: 58
End: 2023-05-17

## 2023-05-17 DIAGNOSIS — N18.30 STAGE 3 CHRONIC KIDNEY DISEASE, UNSPECIFIED WHETHER STAGE 3A OR 3B CKD (HCC): Primary | ICD-10-CM

## 2023-05-17 NOTE — TELEPHONE ENCOUNTER
Patient calling to schedule new patient appointment for seizures after hospital stay  Testing done  Triage intake sent

## 2023-05-17 NOTE — TELEPHONE ENCOUNTER
5/17/23- PT DISCHARGED HOME  LMOM TO CONFIRM 8/15/23 F/U AND COMPLETE MRI BRAIN 4-5 DAYS PRIOR   LEFT CENTRAL SCHEDULING NUMBER    **WAITING FOR MRI BRAIN TO BE SCHEDULED

## 2023-05-18 NOTE — TELEPHONE ENCOUNTER
1st attempt to schedule per Dr Daved Dance triage that patient needs 8 week hospital follow up with epilepsy attending or AP/CRNP  Patient was offered appointments within range in locations with epilepsy outside of [facility] as no appointments available there  Patient refused scheduling for epilepsy appointments outside of [facility]

## 2023-05-19 NOTE — PROGRESS NOTES
NEPHROLOGY OFFICE NOTE    Patient: Jorge Ponce               Sex: male           YOB: 1965        Age:  59 y o        5/23/2023      BACKGROUND     I had the pleasure of seeing Jorge Ponce in the nephrology office today for follow-up  He has a past medical history significant for type 2 diabetes, CKD stage III, COPD, AGUS not currently utilizing CPAP, chronic diastolic heart failure, hypertension, renal cyst, anemia, prior Oestreich bypass surgery in 2020, and prior DVT  He was previously following with Dr Horace Zamarripa from [address] [facility] in [facility], last seen via telemedicine visit in January 2022  He is currently transferring care to our office do to ease of transportation  He has a diagnosed history of CKD stage IIIb, prior etiology suspected to be secondary to hypertensive nephrosclerosis with a baseline creatinine fluctuating around 2 1-2 2  He was briefly hospitalized at [place] [address] from 5/10 to 5/14 after presenting with seizures and was noted to have hypertensive encephalopathy  MRI of the brain was suggestive of meningioma and he was seen in consultation with neurosurgery with plans for outpatient follow-up  He was discharged on amlodipine, doxazosin, hydralazine, and labetalol  He presents today for hospital follow-up and to establish care with our office  Reports a longstanding history of hypertension, that he reports has been poorly controlled over the past several years  He has had multiple adjustments to his antihypertensive therapy in the past and does have significant medication allergies documented from the past     He notes a history of primary hyperaldosteronism status post left adrenalectomy (he estimates this occurred back in 2012)  Reports that despite this procedure he still had uncontrolled blood pressure      He had a past history of type 2 diabetes, but reports that following weight loss surgery he has had resolution and is currently not on any diabetic medications  Remote history of vitamin D deficiency and is currently maintained on cholecalciferol 1000 units daily  Most recent labs were obtained on 5/16/2023, which we have reviewed together  SUBJECTIVE     He currently has no complaints at this time and is feeling well  Patient denies any chest pain, shortness of breath, or swelling  REVIEW OF SYSTEMS     Review of Systems   Constitutional: Negative for activity change, chills, fatigue and fever  HENT: Negative for hearing loss, nosebleeds and trouble swallowing  Respiratory: Negative for cough and shortness of breath  Cardiovascular: Negative for chest pain and leg swelling  Gastrointestinal: Negative for constipation, diarrhea, nausea and vomiting  Genitourinary: Negative for difficulty urinating, dysuria, frequency and hematuria  Musculoskeletal: Negative for back pain  Pain in right toe, concerned for possible gout   Skin: Negative for pallor  Neurological: Negative for dizziness, syncope, weakness and light-headedness  Psychiatric/Behavioral: Negative for sleep disturbance  The patient is not nervous/anxious  OBJECTIVE     Current Weight: Weight - Scale: 68 9 kg (152 lb)  Vitals:    05/23/23 1310   BP: 170/100   Pulse: 74   Resp: 16   Temp: (!) 96 9 °F (36 1 °C)   SpO2: 96%     Body mass index is 24 53 kg/m²      CURRENT MEDICATIONS       Current Outpatient Medications:   •  albuterol (PROVENTIL HFA,VENTOLIN HFA) 90 mcg/act inhaler, , Disp: , Rfl:   •  amLODIPine (NORVASC) 10 mg tablet, Take 10 mg by mouth, Disp: , Rfl:   •  atorvastatin (LIPITOR) 40 mg tablet, Take 40 mg by mouth daily at bedtime, Disp: , Rfl:   •  busPIRone (BUSPAR) 15 mg tablet, TAKE ONE TABLET BY MOUTH TWICE A DAY FOR PTSD, Disp: , Rfl:   •  Calcium 600 MG tablet, Take 1,200 mg by mouth, Disp: , Rfl:   •  Cholecalciferol (VITAMIN D-3) 1000 units CAPS, Take 2,000 Units by mouth daily, Disp: , Rfl:   •  doxazosin (CARDURA) 8 MG tablet, Take 8 mg by mouth 2 (two) times a day, Disp: , Rfl:   •  eplerenone (INSPRA) 50 MG tablet, Take 50 mg by mouth in the morning, Disp: , Rfl:   •  ferrous sulfate 325 (65 Fe) mg tablet, Take 325 mg by mouth 2 (two) times a day  , Disp: , Rfl:   •  glycopyrrolate-formoterol (BEVESPI AEROSPHERE) 9-4 8 MCG/ACT inhaler, Inhale 2 puffs daily after breakfast, Disp: , Rfl:   •  hydrALAZINE (APRESOLINE) 100 MG tablet, Take 1 tablet (100 mg total) by mouth 3 (three) times a day, Disp: 180 tablet, Rfl: 2  •  labetalol (NORMODYNE) 300 mg tablet, Take 1 tablet (300 mg total) by mouth every 12 (twelve) hours, Disp: 60 tablet, Rfl: 0  •  levETIRAcetam (KEPPRA) 750 mg tablet, Take 1 tablet (750 mg total) by mouth every 12 (twelve) hours, Disp: 60 tablet, Rfl: 0  •  magnesium 30 MG tablet, Take 400 mg by mouth in the morning, Disp: , Rfl:   •  mirtazapine (REMERON) 15 mg tablet, 15 mg, Disp: , Rfl:   •  Multiple Vitamins-Minerals (multivitamin with minerals) tablet, Take 1 tablet by mouth daily, Disp: , Rfl:   •  Pulmicort Flexhaler 90 MCG/ACT inhaler, 2 puffs 2 (two) times a day, Disp: , Rfl:   •  sertraline (ZOLOFT) 100 mg tablet, Take 2 tablets by mouth daily, Disp: , Rfl:   •  umeclidinium-vilanterol (ANORO ELLIPTA) 62 5-25 MCG/INH inhaler, Inhale 1 puff daily, Disp: , Rfl:   •  Omega-3 Fatty Acids (FISH OIL) 1200 MG CAPS, Take 1,200 mg by mouth daily at bedtime (Patient not taking: Reported on 5/23/2023), Disp: , Rfl:     PHYSICAL EXAMINATION     Physical Exam  Vitals reviewed  Constitutional:       General: He is not in acute distress  HENT:      Head: Normocephalic  Mouth/Throat:      Lips: Pink  Mouth: Mucous membranes are moist    Eyes:      General: Lids are normal  No scleral icterus  Cardiovascular:      Rate and Rhythm: Normal rate and regular rhythm  Heart sounds: S1 normal and S2 normal  No murmur heard    Pulmonary:      Effort: Pulmonary effort is normal  No accessory muscle usage or respiratory distress  Breath sounds: Normal breath sounds  Abdominal:      General: There is no distension  Tenderness: There is no abdominal tenderness  Musculoskeletal:      Cervical back: Normal range of motion and neck supple  No tenderness  Skin:     General: Skin is warm  Coloration: Skin is not cyanotic or jaundiced  Neurological:      General: No focal deficit present  Mental Status: He is alert and oriented to person, place, and time  Psychiatric:         Attention and Perception: Attention normal          Speech: Speech normal          Behavior: Behavior is cooperative  LAB RESULTS     [facility] 5/16/2023:  Sodium 138  Potassium 3 6  Chloride 102  CO2 26  Anion gap 10  BUN 34  Creatinine 2 1  eGFR 37    RADIOLOGY RESULTS      Results for orders placed during the hospital encounter of 05/10/23    XR Trauma chest portable    Narrative  CHEST    INDICATION:   TRAUMA  COMPARISON: 8/26/2021    EXAM PERFORMED/VIEWS:  XR CHEST PORTABLE      FINDINGS:    The heart is top normal in size  The lungs are clear  No pneumothorax or pleural effusion  Osseous structures appear within normal limits for patient age  Postoperative changes reflecting gastric bypass noted in the upper abdomen  Impression  No acute cardiopulmonary disease  Workstation performed: [id number]    Results for orders placed during the hospital encounter of 10/10/19    XR chest 2 views    Narrative  CHEST    INDICATION:   Hypertension, shortness of breath  COMPARISON:  7/19/2018, CT chest 5/21/2018    EXAM PERFORMED/VIEWS:  XR CHEST PA & LATERAL    FINDINGS:    The cardiac silhouette appears prominent on the PA view, normal on lateral projection  Uncoiled thoracic aorta  Linear subsegmental atelectasis or scar right costophrenic angle  The lungs are otherwise clear  No pneumothorax or pleural effusion  Paravertebral ossifications thoracic spine      Impression  No acute cardiopulmonary disease  Cardiomegaly  Workstation performed: [id number]      ASSESSMENT/PLAN     Chronic kidney disease stage IIIb:  After review of the medical record and notes from Dr Alexis Chatterjee, it appears that his prior baseline creatinine was fluctuating around 2 1-2 2  His most recent creatinine level is 2 1 with an EGFR of 37 and within suspected baseline  In the interim, advised to maintain adequate hydration avoid nephrotoxic agent such as NSAIDs  Most recent urinalysis showed the presence of 2+ protein, prior microalbumin creatinine ratio from March of last year was 166  We will repeat urinalysis and microalbumin creatinine ratio prior to follow-up  Discussed the option of kidney smart class for lifestyle modifications and living with CKD, patient would like to readdress at follow-up appointment  Uncontrolled Hypertension:  Currently maintained on labetalol 300 mg twice daily, amlodipine 10 mg once daily, doxazosin 8 mg twice daily, eplenerone 50 mg once daily, and hydralazine 50 mg 3 times daily  Pressure remains elevated following hospital discharge after review of home blood pressure log  Remote history of primary aldosteronism status post left adrenalectomy at [address] [place] [address] approximately back in 2012 per patient  Recent CT imaging, he was noted to have thickening of the right adrenal gland  We will check a renin and aldosterone ratio, however, results may be affected due to use of eplenerone  Also plan to check a renal artery ultrasound to rule out renal artery stenosis  We will also plan to check a TSH prior to follow-up  At this time, we will increase hydralazine to 100 mg 3 times daily and advised him to continue to check his blood pressure daily  Low salt diet  Type 2 diabetes:  Remote history, reports resolved following weight loss surgery and is no longer on medication therapy      Morbid obesity:  Remote history of morbid obesity, he is status post "gastrectomy and current weight is stable at 152 pounds  Recommend nephrology follow-up in 3 months with CKD labs prior to appointment  Ibrahima Johnson  Nephrology  5/23/2023      Portions of the record may have been created with voice recognition software  Occasional wrong word or \"sound a like\" substitutions may have occurred due to the inherent limitations of voice recognition software  Read the chart carefully and recognize, using context, where substitutions have occurred  Ultrasound of the kidney and bladder 4/15/2021:  Increased renal cortical echogenicity, suggestive of medical renal disease  Mild right hydronephrosis with mild fullness of the left renal collecting system  Simple appearing right renal cysts  CTA 5/10/2023:  Stable thickening on the right adrenal gland, not well appreciated on the left  1 or more simple renal cyst noted, no hydronephrosis                "

## 2023-05-22 ENCOUNTER — TELEPHONE (OUTPATIENT)
Dept: NEPHROLOGY | Facility: CLINIC | Age: 58
End: 2023-05-22

## 2023-05-23 ENCOUNTER — OFFICE VISIT (OUTPATIENT)
Dept: NEPHROLOGY | Facility: CLINIC | Age: 58
End: 2023-05-23

## 2023-05-23 VITALS
DIASTOLIC BLOOD PRESSURE: 100 MMHG | RESPIRATION RATE: 16 BRPM | OXYGEN SATURATION: 96 % | SYSTOLIC BLOOD PRESSURE: 170 MMHG | BODY MASS INDEX: 24.43 KG/M2 | HEIGHT: 66 IN | WEIGHT: 152 LBS | TEMPERATURE: 96.9 F | HEART RATE: 74 BPM

## 2023-05-23 DIAGNOSIS — N18.32 STAGE 3B CHRONIC KIDNEY DISEASE (HCC): Primary | ICD-10-CM

## 2023-05-23 DIAGNOSIS — I12.9 HYPERTENSIVE KIDNEY DISEASE WITH STAGE 3B CHRONIC KIDNEY DISEASE (HCC): ICD-10-CM

## 2023-05-23 DIAGNOSIS — N18.32 HYPERTENSIVE KIDNEY DISEASE WITH STAGE 3B CHRONIC KIDNEY DISEASE (HCC): ICD-10-CM

## 2023-05-23 DIAGNOSIS — I10 UNCONTROLLED HYPERTENSION: ICD-10-CM

## 2023-05-23 DIAGNOSIS — E11.65 TYPE 2 DIABETES MELLITUS WITH HYPERGLYCEMIA, UNSPECIFIED WHETHER LONG TERM INSULIN USE (HCC): ICD-10-CM

## 2023-05-23 DIAGNOSIS — E66.01 MORBID (SEVERE) OBESITY DUE TO EXCESS CALORIES (HCC): ICD-10-CM

## 2023-05-23 DIAGNOSIS — E26.9 HYPERALDOSTERONISM (HCC): ICD-10-CM

## 2023-05-23 RX ORDER — HYDRALAZINE HYDROCHLORIDE 100 MG/1
100 TABLET, FILM COATED ORAL 3 TIMES DAILY
Qty: 180 TABLET | Refills: 2 | Status: SHIPPED | OUTPATIENT
Start: 2023-05-23

## 2023-05-23 RX ORDER — MULTIVIT-MIN/IRON FUM/FOLIC AC 7.5 MG-4
1 TABLET ORAL DAILY
COMMUNITY

## 2023-05-23 RX ORDER — MAGNESIUM 30 MG
400 TABLET ORAL DAILY
COMMUNITY
End: 2023-05-23 | Stop reason: ALTCHOICE

## 2023-05-23 NOTE — PATIENT INSTRUCTIONS
Chronic Kidney Disease   AMBULATORY CARE:   Chronic kidney disease (CKD)  is the gradual and permanent loss of kidney function  Normally, the kidneys remove fluid, chemicals, and waste from your blood  These wastes are turned into urine by your kidneys  CKD may worsen over time and lead to kidney failure  Common signs and symptoms include the following:   Changes in how often you need to urinate    Swelling in your arms, legs, or feet    Shortness of breath    Fatigue or weakness    Bad or bitter taste in your mouth    Nausea, vomiting, or loss of appetite    Call your local emergency number (911 in the [facility] [facility]) if:   You have a seizure  You have shortness of breath  Seek care immediately if:   You are confused and very drowsy  Call your doctor or nephrologist if:   You suddenly gain or lose more weight than your healthcare provider has told you is okay  You have itchy skin or a rash  You urinate more or less than you normally do  You have blood in your urine  You have nausea and are vomiting  You have fatigue or muscle weakness  You have hiccups that will not stop  You have questions or concerns about your condition or care  How CKD is diagnosed:  CKD has 5 stages  Your healthcare provider will use results from the following tests to find the stage of CKD you have:  Blood and urine tests  show how well your kidneys are working  They may also show the cause of your CKD  Ultrasound, CT scan, or MRI  pictures may be used to check your kidneys  You may be given contrast liquid to help your kidneys show up better in the pictures  Tell the healthcare provider if you have ever had an allergic reaction to contrast liquid  Do not enter the MRI room with anything metal  Metal can cause serious injury  Tell the healthcare provider if you have any metal in or on your body  A biopsy  is a procedure to remove a small piece of tissue from your kidney   It is done to find the cause of your CKD     Treatment  can help control signs and symptoms, and prevent a worse stage of CKD  Your care team may include specialists, such as a dietitian or a heart specialist  This depends on the stage of your CKD and if you have other health conditions to manage  Healthcare providers will work with you to create a plan based on your decisions for treatment  Your treatment plan may include any of the following:  Medicines  may be given to decrease your blood pressure and get rid of extra fluid  You may also receive medicine to manage health conditions that may occur with CKD, such as anemia, diabetes, and heart disease  Dialysis  is a treatment to remove chemicals and waste from your blood when your kidneys can no longer do this  Surgery  may be needed to create an arteriovenous fistula (AVF) in your arm or insert a catheter into your abdomen  This is done so you can receive dialysis  A kidney transplant  may be done if your CKD becomes severe  What you can do to manage CKD: Management may include making some lifestyle changes  Tell your healthcare provider if you have any concerns about being able to make changes  He or she can help you find solutions, including working with specialists  Ask for help creating a plan to break large goals into smaller steps  Your plan may include any of the following:  Manage other health conditions  Your healthcare provider will work with you to make a care plan that meets your needs  You will be checked regularly for heart disease or other conditions that can make CKD worse, such as diabetes  Your blood pressure will be closely monitored  You will also get a target blood pressure and help making a plan to reach your target  This may include taking your blood pressure at home  Maintain a healthy weight  Your weight and body mass index (BMI) will be checked regularly  BMI helps find if your weight is healthy for your height   Your healthcare provider will use other tests to check your muscle and protein levels  Extra weight can strain your kidneys  A low weight or low muscle mass can make you feel more tired  You may have trouble doing your daily activities  Ask your provider what a healthy weight is for you  He or she can help you create a plan to lose or gain weight safely, if needed  The plan may include keeping a food diary  This is a list of foods and liquids you have each day  Your provider will use the diary to help you make changes, if needed  Changes are based on your health and any other conditions you have, such as diabetes  Create an exercise plan  Regular exercise can help you manage CKD, high blood pressure, and diabetes  Exercise also helps control weight  Your provider can help you create exercise goals and a plan to reach those goals  For example, your goal may be to exercise for 30 minutes in a day  Your plan can include breaking exercise into 10 minute sessions, 3 times during the day  Create a healthy eating plan  Your provider may tell you to eat food low in potassium, phosphorus, or protein  Your provider may also recommend vitamin or mineral supplements  Do not take any supplements without talking to your provider  A dietitian can help you plan meals if needed  Ask how much liquid to drink each day and which liquids are best for you  Limit sodium (salt) as directed  You may need to limit sodium to less than 2,300 milligrams (mg) each day  Ask your dietitian or healthcare provider how much sodium you can have each day  The amount depends on your stage of kidney disease  Table salt, canned foods, soups, salted snacks, and processed meats, like deli meats and sausage, are high in sodium  Your provider or a dietitian can show you how to read food labels for sodium  Limit alcohol as directed  Alcohol can cause fluid retention and can affect your kidneys  Ask how much alcohol is safe for you   A drink of alcohol is 12 ounces of beer, 5 ounces of wine, or 1½ ounces of liquor  Do not smoke  Nicotine and other chemicals in cigarettes and cigars can cause kidney damage  Ask your provider for information if you currently smoke and need help to quit  E-cigarettes or smokeless tobacco still contain nicotine  Talk to your provider before you use these products  Ask about over-the-counter medicines  Medicines such as NSAIDs and laxatives may harm your kidneys  Some cough and cold medicines can raise your blood pressure  Always ask if a medicine is safe before you take it  Ask about vaccines you may need  CKD can increase your risk for infections such as pneumonia, influenza, and hepatitis  Vaccines lower your risk for infection  Your healthcare provider will tell you which vaccines you need and when to get them  Follow up with your doctor or nephrologist as directed: You will need to return for tests to monitor your kidney and nerve function, and your parathyroid hormone level  Your medicines may be changed, based on certain test results  Write down your questions so you remember to ask them during your visits  © Copyright [employer] 2022 Information is for End User's use only and may not be sold, redistributed or otherwise used for commercial purposes  The above information is an  only  It is not intended as medical advice for individual conditions or treatments  Talk to your doctor, nurse or pharmacist before following any medical regimen to see if it is safe and effective for you  How to Take a Blood Pressure Reading   AMBULATORY CARE:   Blood pressure (BP)  is the force of blood pushing on the walls of your arteries  Your BP results are written as 2 numbers  The first, or top, number is called systolic BP  This is the pressure caused by your heart pushing blood out to your body  The second, or bottom, number is called diastolic BP  This is the pressure when your heart relaxes and fills back up with blood   Ask your healthcare provider what your BP should be  For most people, a good BP goal is less than 120/80  Call your doctor if:   Your BP is higher or lower than you were told it should be  You have questions or concerns about your condition or care  Why you may need to take BP readings: You may not have any signs or symptoms of high BP  You may need to take BP readings regularly to know how often your BP is high  High BP increases your risk for a stroke, heart attack, or kidney disease  You may need to take medicine to keep your BP at a normal level  Write down and keep a log of your BP readings  Your healthcare provider can use the results to see if your BP medicines are working  How often to take BP readings: Your healthcare provider may recommend that you take BP readings 2 times a day  Take your BP at the same times each day, such as the morning and evening  How to take BP readings: You can take BP readings at home with a digital BP monitor  Read the instructions that came with your BP monitor  The monitor comes with an adjustable cuff  Ask your healthcare provider if your cuff is the correct size  Do not eat, drink, smoke, or exercise for 30 minutes before you take BP readings  Rest quietly for 5 minutes before you begin  Do not talk while you take BP readings  Sit with your feet flat on the floor and your back against a chair  Put your arm straight out and support it on a flat surface  Your arm should be at chest level  Do not move your arm while you take your BP readings  Make sure all of the air is out of the cuff  Place the BP cuff against your bare skin about 1 inch (2 5 cm) above your elbow  Wrap the cuff snugly around your arm  The BP reading may not be correct if the cuff is too loose  If you are using a wrist cuff, wrap the cuff snugly around your wrist  Hold your wrist at the same level as your heart  Turn on the BP monitor and follow the directions      Write down your BP, the date, the time, and which arm you used to take BP reading  Take 2 BP readings and write down both results  Use the same arm each time  These BP readings can be 1 minute apart  What else you need to know:   Do not take a BP reading in an arm that is injured or has an IV or shunt  The reading may not be accurate  Do not stop taking your medicines if your BP is at your goal   A BP at your goal means your medicine is working correctly  Take your BP medicines as directed  Follow up with your doctor as directed:  Bring the log of your BP readings  Also bring the BP machine  Healthcare providers can check that you are using the machine correctly  Write down your questions so you remember to ask them during your visits  © Copyright Hermina Figures 2022 Information is for End User's use only and may not be sold, redistributed or otherwise used for commercial purposes  The above information is an  only  It is not intended as medical advice for individual conditions or treatments  Talk to your doctor, nurse or pharmacist before following any medical regimen to see if it is safe and effective for you

## 2023-05-25 ENCOUNTER — TELEPHONE (OUTPATIENT)
Dept: NEUROSURGERY | Facility: CLINIC | Age: 58
End: 2023-05-25

## 2023-05-25 NOTE — TELEPHONE ENCOUNTER
05/24/2023-MY CHART MESSAGE:  Appointment canceled for Kayden Pedraza ([phone])   Visit Type: OFFICE VISIT LONG PG   Date        Time      Length    Provider                  Department   8/15/2023   10:00 AM  45 mins  Yi Shaver PA-C     [facility]      Reason for Cancellation: Canceled via [employer]       05/25/2023-CALLED PT, RESCHEDULED CANCELLED 08/15/2023 APT TO 08/15/2023 IN [facility]  PT STATES HE WILL SCHEDULE MRI BEFORE 8/15 F/U APT AT [facility]   PT WAS TOLD TO CONTACT OFFICE WITH DATE OF MRI SO PRECERT CAN BE DONE  NOTE IN MRI REFERRAL

## 2023-05-25 NOTE — TELEPHONE ENCOUNTER
5/25/23- LMOM TO R/S CX'ED 8/15/23 F/U APT    Appointment canceled for Marleni Vanessa ([phone])   Visit Type: OFFICE VISIT LONG PG   Date        Time      Length    Provider                  Department   8/15/2023   10:00 AM  45 mins  Go Abdalla PA-C     [facility]      Reason for Cancellation: Canceled via [address]

## 2023-05-25 NOTE — TELEPHONE ENCOUNTER
5/25/23 - PT CALLED TO HAVE MRI BRAIN ORDER FAXED TO Meenakshi Short  [address] - [phone]    ORDER FAXED

## 2023-07-08 ENCOUNTER — ANESTHESIA (INPATIENT)
Dept: PERIOP | Facility: HOSPITAL | Age: 58
End: 2023-07-08
Payer: COMMERCIAL

## 2023-07-08 ENCOUNTER — APPOINTMENT (EMERGENCY)
Dept: NON INVASIVE DIAGNOSTICS | Facility: HOSPITAL | Age: 58
End: 2023-07-08
Payer: COMMERCIAL

## 2023-07-08 ENCOUNTER — APPOINTMENT (OUTPATIENT)
Dept: RADIOLOGY | Facility: HOSPITAL | Age: 58
DRG: 271 | End: 2023-07-08
Payer: COMMERCIAL

## 2023-07-08 ENCOUNTER — HOSPITAL ENCOUNTER (INPATIENT)
Facility: HOSPITAL | Age: 58
LOS: 7 days | Discharge: HOME HEALTH | DRG: 271 | End: 2023-07-15
Attending: INTERNAL MEDICINE | Admitting: INTERNAL MEDICINE
Payer: COMMERCIAL

## 2023-07-08 ENCOUNTER — HOSPITAL ENCOUNTER (EMERGENCY)
Facility: HOSPITAL | Age: 58
End: 2023-07-08
Attending: EMERGENCY MEDICINE
Payer: COMMERCIAL

## 2023-07-08 ENCOUNTER — APPOINTMENT (EMERGENCY)
Dept: CT IMAGING | Facility: HOSPITAL | Age: 58
End: 2023-07-08
Payer: COMMERCIAL

## 2023-07-08 ENCOUNTER — ANESTHESIA EVENT (INPATIENT)
Dept: PERIOP | Facility: HOSPITAL | Age: 58
End: 2023-07-08
Payer: COMMERCIAL

## 2023-07-08 VITALS
DIASTOLIC BLOOD PRESSURE: 127 MMHG | BODY MASS INDEX: 24.43 KG/M2 | OXYGEN SATURATION: 96 % | HEART RATE: 99 BPM | SYSTOLIC BLOOD PRESSURE: 228 MMHG | HEIGHT: 66 IN | TEMPERATURE: 97.4 F | RESPIRATION RATE: 25 BRPM | WEIGHT: 152 LBS

## 2023-07-08 DIAGNOSIS — I31.4 CARDIAC TAMPONADE: ICD-10-CM

## 2023-07-08 DIAGNOSIS — E85.4 CARDIAC AMYLOIDOSIS (HCC): ICD-10-CM

## 2023-07-08 DIAGNOSIS — J44.9 CHRONIC OBSTRUCTIVE PULMONARY DISEASE, UNSPECIFIED COPD TYPE (HCC): ICD-10-CM

## 2023-07-08 DIAGNOSIS — E87.8 ELECTROLYTE ABNORMALITY: ICD-10-CM

## 2023-07-08 DIAGNOSIS — D64.9 ANEMIA, UNSPECIFIED TYPE: ICD-10-CM

## 2023-07-08 DIAGNOSIS — I31.39 PERICARDIAL EFFUSION: Primary | ICD-10-CM

## 2023-07-08 DIAGNOSIS — K92.1 HEMATOCHEZIA: ICD-10-CM

## 2023-07-08 DIAGNOSIS — R26.2 AMBULATORY DYSFUNCTION: ICD-10-CM

## 2023-07-08 DIAGNOSIS — K55.1 SUPERIOR MESENTERIC ARTERY STENOSIS (HCC): ICD-10-CM

## 2023-07-08 DIAGNOSIS — I10 ESSENTIAL HYPERTENSION: ICD-10-CM

## 2023-07-08 DIAGNOSIS — I43 CARDIAC AMYLOIDOSIS (HCC): ICD-10-CM

## 2023-07-08 DIAGNOSIS — I16.0 HYPERTENSIVE URGENCY: ICD-10-CM

## 2023-07-08 DIAGNOSIS — E44.0 MODERATE PROTEIN-CALORIE MALNUTRITION (HCC): ICD-10-CM

## 2023-07-08 DIAGNOSIS — R10.84 GENERALIZED ABDOMINAL PAIN: ICD-10-CM

## 2023-07-08 DIAGNOSIS — I16.1 HYPERTENSIVE EMERGENCY: ICD-10-CM

## 2023-07-08 DIAGNOSIS — I31.4 PERICARDIAL TAMPONADE: Primary | ICD-10-CM

## 2023-07-08 DIAGNOSIS — R10.9 ABDOMINAL PAIN, UNSPECIFIED ABDOMINAL LOCATION: ICD-10-CM

## 2023-07-08 PROBLEM — E11.9 TYPE 2 DIABETES MELLITUS (HCC): Status: RESOLVED | Noted: 2020-07-09 | Resolved: 2023-07-08

## 2023-07-08 PROBLEM — F41.8 DEPRESSION WITH ANXIETY: Status: ACTIVE | Noted: 2023-07-08

## 2023-07-08 LAB
ABO GROUP BLD: NORMAL
ABO GROUP BLD: NORMAL
ALBUMIN SERPL BCP-MCNC: 4.1 G/DL (ref 3.5–5)
ALP SERPL-CCNC: 58 U/L (ref 34–104)
ALT SERPL W P-5'-P-CCNC: 64 U/L (ref 7–52)
ANION GAP SERPL CALCULATED.3IONS-SCNC: 12 MMOL/L
ANION GAP SERPL CALCULATED.3IONS-SCNC: 5 MMOL/L
ANION GAP SERPL CALCULATED.3IONS-SCNC: 6 MMOL/L
AORTIC ROOT: 3.3 CM
APICAL FOUR CHAMBER EJECTION FRACTION: 45 %
ASCENDING AORTA: 3 CM
AST SERPL W P-5'-P-CCNC: 52 U/L (ref 13–39)
AV LVOT MEAN GRADIENT: 3 MMHG
AV LVOT PEAK GRADIENT: 6 MMHG
AV PEAK GRADIENT: 13 MMHG
BASE EX.OXY STD BLDV CALC-SCNC: 90.6 % (ref 60–80)
BASE EXCESS BLDV CALC-SCNC: -4.6 MMOL/L
BASOPHILS # BLD AUTO: 0.02 THOUSANDS/ÂΜL (ref 0–0.1)
BASOPHILS NFR BLD AUTO: 0 % (ref 0–1)
BILIRUB SERPL-MCNC: 1.21 MG/DL (ref 0.2–1)
BLD GP AB SCN SERPL QL: NEGATIVE
BLD GP AB SCN SERPL QL: NEGATIVE
BNP SERPL-MCNC: 538 PG/ML (ref 0–100)
BUN SERPL-MCNC: 43 MG/DL (ref 5–25)
BUN SERPL-MCNC: 43 MG/DL (ref 5–25)
BUN SERPL-MCNC: 63 MG/DL (ref 5–25)
CALCIUM SERPL-MCNC: 8.5 MG/DL (ref 8.3–10.1)
CALCIUM SERPL-MCNC: 8.8 MG/DL (ref 8.3–10.1)
CALCIUM SERPL-MCNC: 9.7 MG/DL (ref 8.4–10.2)
CARDIAC TROPONIN I PNL SERPL HS: 172 NG/L
CHLORIDE SERPL-SCNC: 106 MMOL/L (ref 96–108)
CHLORIDE SERPL-SCNC: 110 MMOL/L (ref 96–108)
CHLORIDE SERPL-SCNC: 110 MMOL/L (ref 96–108)
CO2 SERPL-SCNC: 21 MMOL/L (ref 21–32)
CO2 SERPL-SCNC: 24 MMOL/L (ref 21–32)
CO2 SERPL-SCNC: 25 MMOL/L (ref 21–32)
CREAT SERPL-MCNC: 1.41 MG/DL (ref 0.6–1.3)
CREAT SERPL-MCNC: 1.59 MG/DL (ref 0.6–1.3)
CREAT SERPL-MCNC: 1.64 MG/DL (ref 0.6–1.3)
DOP CALC LVOT PEAK VEL VTI: 21.4 CM
DOP CALC LVOT PEAK VEL: 1.18 M/S
E WAVE DECELERATION TIME: 84 MS
EOSINOPHIL # BLD AUTO: 0 THOUSAND/ÂΜL (ref 0–0.61)
EOSINOPHIL NFR BLD AUTO: 0 % (ref 0–6)
ERYTHROCYTE [DISTWIDTH] IN BLOOD BY AUTOMATED COUNT: 14.1 % (ref 11.6–15.1)
ERYTHROCYTE [DISTWIDTH] IN BLOOD BY AUTOMATED COUNT: 14.6 % (ref 11.6–15.1)
FRACTIONAL SHORTENING: 24 % (ref 28–44)
GFR SERPL CREATININE-BSD FRML MDRD: 44 ML/MIN/1.73SQ M
GFR SERPL CREATININE-BSD FRML MDRD: 46 ML/MIN/1.73SQ M
GFR SERPL CREATININE-BSD FRML MDRD: 53 ML/MIN/1.73SQ M
GLUCOSE SERPL-MCNC: 127 MG/DL (ref 65–140)
GLUCOSE SERPL-MCNC: 136 MG/DL (ref 65–140)
GLUCOSE SERPL-MCNC: 149 MG/DL (ref 65–140)
GLUCOSE SERPL-MCNC: 153 MG/DL (ref 65–140)
GLUCOSE SERPL-MCNC: 207 MG/DL (ref 65–140)
HCO3 BLDV-SCNC: 17.4 MMOL/L (ref 24–30)
HCT VFR BLD AUTO: 22.6 % (ref 36.5–49.3)
HCT VFR BLD AUTO: 23.2 % (ref 36.5–49.3)
HGB BLD-MCNC: 6.4 G/DL (ref 12–17)
HGB BLD-MCNC: 7.6 G/DL (ref 12–17)
HGB BLD-MCNC: 7.8 G/DL (ref 12–17)
IMM GRANULOCYTES # BLD AUTO: 0.06 THOUSAND/UL (ref 0–0.2)
IMM GRANULOCYTES NFR BLD AUTO: 0 % (ref 0–2)
INTERVENTRICULAR SEPTUM IN DIASTOLE (PARASTERNAL SHORT AXIS VIEW): 1.3 CM
INTERVENTRICULAR SEPTUM: 1.3 CM (ref 0.6–1.1)
LAAS-AP2: 19.5 CM2
LAAS-AP4: 24.5 CM2
LACTATE SERPL-SCNC: 0.8 MMOL/L (ref 0.5–2)
LACTATE SERPL-SCNC: 2 MMOL/L (ref 0.5–2)
LACTATE SERPL-SCNC: 3.3 MMOL/L (ref 0.5–2)
LEFT ATRIUM AREA SYSTOLE SINGLE PLANE A4C: 21.3 CM2
LEFT ATRIUM SIZE: 3.9 CM
LEFT ATRIUM VOLUME (MOD BIPLANE): 62 ML
LEFT INTERNAL DIMENSION IN SYSTOLE: 3.9 CM (ref 2.1–4)
LEFT VENTRICULAR INTERNAL DIMENSION IN DIASTOLE: 5.1 CM (ref 3.5–6)
LEFT VENTRICULAR POSTERIOR WALL IN END DIASTOLE: 1.4 CM
LEFT VENTRICULAR STROKE VOLUME: 57 ML
LIPASE SERPL-CCNC: 19 U/L (ref 11–82)
LVSV (TEICH): 57 ML
LYMPHOCYTES # BLD AUTO: 1.62 THOUSANDS/ÂΜL (ref 0.6–4.47)
LYMPHOCYTES NFR BLD AUTO: 12 % (ref 14–44)
MCH RBC QN AUTO: 28 PG (ref 26.8–34.3)
MCH RBC QN AUTO: 28.1 PG (ref 26.8–34.3)
MCHC RBC AUTO-ENTMCNC: 33.6 G/DL (ref 31.4–37.4)
MCHC RBC AUTO-ENTMCNC: 33.6 G/DL (ref 31.4–37.4)
MCV RBC AUTO: 83 FL (ref 82–98)
MCV RBC AUTO: 84 FL (ref 82–98)
MONOCYTES # BLD AUTO: 0.5 THOUSAND/ÂΜL (ref 0.17–1.22)
MONOCYTES NFR BLD AUTO: 4 % (ref 4–12)
MV E'TISSUE VEL-SEP: 6 CM/S
MV PEAK A VEL: 1.27 M/S
MV PEAK E VEL: 43 CM/S
MV STENOSIS PRESSURE HALF TIME: 24 MS
MV VALVE AREA P 1/2 METHOD: 9.17 CM2
NEUTROPHILS # BLD AUTO: 11.57 THOUSANDS/ÂΜL (ref 1.85–7.62)
NEUTS SEG NFR BLD AUTO: 84 % (ref 43–75)
NRBC BLD AUTO-RTO: 0 /100 WBCS
O2 CT BLDV-SCNC: 9.4 ML/DL
PCO2 BLDV: 21.6 MM HG (ref 42–50)
PH BLDV: 7.53 [PH] (ref 7.3–7.4)
PLATELET # BLD AUTO: 334 THOUSANDS/UL (ref 149–390)
PLATELET # BLD AUTO: 342 THOUSANDS/UL (ref 149–390)
PMV BLD AUTO: 9.1 FL (ref 8.9–12.7)
PMV BLD AUTO: 9.5 FL (ref 8.9–12.7)
PO2 BLDV: 71.3 MM HG (ref 35–45)
POTASSIUM SERPL-SCNC: 2.8 MMOL/L (ref 3.5–5.3)
POTASSIUM SERPL-SCNC: 3.3 MMOL/L (ref 3.5–5.3)
POTASSIUM SERPL-SCNC: 4.2 MMOL/L (ref 3.5–5.3)
PROT SERPL-MCNC: 7.2 G/DL (ref 6.4–8.4)
RBC # BLD AUTO: 2.71 MILLION/UL (ref 3.88–5.62)
RBC # BLD AUTO: 2.78 MILLION/UL (ref 3.88–5.62)
RH BLD: POSITIVE
RH BLD: POSITIVE
RIGHT ATRIAL 2D VOLUME: 40 ML
RIGHT ATRIUM AREA SYSTOLE A4C: 17.1 CM2
RIGHT VENTRICLE ID DIMENSION: 2.9 CM
SL CV LEFT ATRIUM LENGTH A2C: 6.2 CM
SL CV LV EF: 50
SL CV PED ECHO LEFT VENTRICLE DIASTOLIC VOLUME (MOD BIPLANE) 2D: 124 ML
SL CV PED ECHO LEFT VENTRICLE SYSTOLIC VOLUME (MOD BIPLANE) 2D: 67 ML
SODIUM SERPL-SCNC: 139 MMOL/L (ref 135–147)
SODIUM SERPL-SCNC: 139 MMOL/L (ref 135–147)
SODIUM SERPL-SCNC: 141 MMOL/L (ref 135–147)
SPECIMEN EXPIRATION DATE: NORMAL
SPECIMEN EXPIRATION DATE: NORMAL
TR MAX PG: 39 MMHG
TR PEAK VELOCITY: 3.1 M/S
TRICUSPID ANNULAR PLANE SYSTOLIC EXCURSION: 3.4 CM
TRICUSPID VALVE PEAK REGURGITATION VELOCITY: 3.11 M/S
WBC # BLD AUTO: 13.77 THOUSAND/UL (ref 4.31–10.16)
WBC # BLD AUTO: 16.14 THOUSAND/UL (ref 4.31–10.16)

## 2023-07-08 PROCEDURE — 82948 REAGENT STRIP/BLOOD GLUCOSE: CPT

## 2023-07-08 PROCEDURE — 99223 1ST HOSP IP/OBS HIGH 75: CPT | Performed by: ANESTHESIOLOGY

## 2023-07-08 PROCEDURE — 99223 1ST HOSP IP/OBS HIGH 75: CPT | Performed by: THORACIC SURGERY (CARDIOTHORACIC VASCULAR SURGERY)

## 2023-07-08 PROCEDURE — 83605 ASSAY OF LACTIC ACID: CPT | Performed by: NURSE PRACTITIONER

## 2023-07-08 PROCEDURE — 86900 BLOOD TYPING SEROLOGIC ABO: CPT | Performed by: ANESTHESIOLOGY

## 2023-07-08 PROCEDURE — 93306 TTE W/DOPPLER COMPLETE: CPT

## 2023-07-08 PROCEDURE — 88341 IMHCHEM/IMCYTCHM EA ADD ANTB: CPT | Performed by: PATHOLOGY

## 2023-07-08 PROCEDURE — 83880 ASSAY OF NATRIURETIC PEPTIDE: CPT | Performed by: EMERGENCY MEDICINE

## 2023-07-08 PROCEDURE — G1004 CDSM NDSC: HCPCS

## 2023-07-08 PROCEDURE — 87040 BLOOD CULTURE FOR BACTERIA: CPT | Performed by: EMERGENCY MEDICINE

## 2023-07-08 PROCEDURE — 88112 CYTOPATH CELL ENHANCE TECH: CPT | Performed by: PATHOLOGY

## 2023-07-08 PROCEDURE — 36415 COLL VENOUS BLD VENIPUNCTURE: CPT | Performed by: EMERGENCY MEDICINE

## 2023-07-08 PROCEDURE — 0W9D00Z DRAINAGE OF PERICARDIAL CAVITY WITH DRAINAGE DEVICE, OPEN APPROACH: ICD-10-PCS | Performed by: THORACIC SURGERY (CARDIOTHORACIC VASCULAR SURGERY)

## 2023-07-08 PROCEDURE — 82805 BLOOD GASES W/O2 SATURATION: CPT | Performed by: EMERGENCY MEDICINE

## 2023-07-08 PROCEDURE — 86901 BLOOD TYPING SEROLOGIC RH(D): CPT | Performed by: ANESTHESIOLOGY

## 2023-07-08 PROCEDURE — 86850 RBC ANTIBODY SCREEN: CPT | Performed by: EMERGENCY MEDICINE

## 2023-07-08 PROCEDURE — 84484 ASSAY OF TROPONIN QUANT: CPT | Performed by: EMERGENCY MEDICINE

## 2023-07-08 PROCEDURE — 99285 EMERGENCY DEPT VISIT HI MDM: CPT

## 2023-07-08 PROCEDURE — 80053 COMPREHEN METABOLIC PANEL: CPT | Performed by: EMERGENCY MEDICINE

## 2023-07-08 PROCEDURE — 83605 ASSAY OF LACTIC ACID: CPT | Performed by: EMERGENCY MEDICINE

## 2023-07-08 PROCEDURE — 86920 COMPATIBILITY TEST SPIN: CPT

## 2023-07-08 PROCEDURE — 88342 IMHCHEM/IMCYTCHM 1ST ANTB: CPT | Performed by: PATHOLOGY

## 2023-07-08 PROCEDURE — C9113 INJ PANTOPRAZOLE SODIUM, VIA: HCPCS | Performed by: NURSE PRACTITIONER

## 2023-07-08 PROCEDURE — 99223 1ST HOSP IP/OBS HIGH 75: CPT | Performed by: INTERNAL MEDICINE

## 2023-07-08 PROCEDURE — 87205 SMEAR GRAM STAIN: CPT | Performed by: THORACIC SURGERY (CARDIOTHORACIC VASCULAR SURGERY)

## 2023-07-08 PROCEDURE — 87075 CULTR BACTERIA EXCEPT BLOOD: CPT | Performed by: THORACIC SURGERY (CARDIOTHORACIC VASCULAR SURGERY)

## 2023-07-08 PROCEDURE — 99233 SBSQ HOSP IP/OBS HIGH 50: CPT | Performed by: PHYSICIAN ASSISTANT

## 2023-07-08 PROCEDURE — 80048 BASIC METABOLIC PNL TOTAL CA: CPT | Performed by: NURSE PRACTITIONER

## 2023-07-08 PROCEDURE — 88305 TISSUE EXAM BY PATHOLOGIST: CPT | Performed by: PATHOLOGY

## 2023-07-08 PROCEDURE — 85027 COMPLETE CBC AUTOMATED: CPT | Performed by: NURSE PRACTITIONER

## 2023-07-08 PROCEDURE — 74176 CT ABD & PELVIS W/O CONTRAST: CPT

## 2023-07-08 PROCEDURE — 85018 HEMOGLOBIN: CPT | Performed by: NURSE PRACTITIONER

## 2023-07-08 PROCEDURE — 85025 COMPLETE CBC W/AUTO DIFF WBC: CPT | Performed by: EMERGENCY MEDICINE

## 2023-07-08 PROCEDURE — 96361 HYDRATE IV INFUSION ADD-ON: CPT

## 2023-07-08 PROCEDURE — 93005 ELECTROCARDIOGRAM TRACING: CPT

## 2023-07-08 PROCEDURE — 86901 BLOOD TYPING SEROLOGIC RH(D): CPT | Performed by: EMERGENCY MEDICINE

## 2023-07-08 PROCEDURE — 86900 BLOOD TYPING SEROLOGIC ABO: CPT | Performed by: EMERGENCY MEDICINE

## 2023-07-08 PROCEDURE — 71045 X-RAY EXAM CHEST 1 VIEW: CPT

## 2023-07-08 PROCEDURE — 83690 ASSAY OF LIPASE: CPT | Performed by: EMERGENCY MEDICINE

## 2023-07-08 PROCEDURE — 33025 INCISION OF HEART SAC: CPT | Performed by: THORACIC SURGERY (CARDIOTHORACIC VASCULAR SURGERY)

## 2023-07-08 PROCEDURE — 96365 THER/PROPH/DIAG IV INF INIT: CPT

## 2023-07-08 PROCEDURE — 86850 RBC ANTIBODY SCREEN: CPT | Performed by: ANESTHESIOLOGY

## 2023-07-08 PROCEDURE — P9016 RBC LEUKOCYTES REDUCED: HCPCS

## 2023-07-08 PROCEDURE — 30233N1 TRANSFUSION OF NONAUTOLOGOUS RED BLOOD CELLS INTO PERIPHERAL VEIN, PERCUTANEOUS APPROACH: ICD-10-PCS | Performed by: GENERAL PRACTICE

## 2023-07-08 PROCEDURE — 96375 TX/PRO/DX INJ NEW DRUG ADDON: CPT

## 2023-07-08 PROCEDURE — 93306 TTE W/DOPPLER COMPLETE: CPT | Performed by: INTERNAL MEDICINE

## 2023-07-08 PROCEDURE — 87070 CULTURE OTHR SPECIMN AEROBIC: CPT | Performed by: THORACIC SURGERY (CARDIOTHORACIC VASCULAR SURGERY)

## 2023-07-08 RX ORDER — ONDANSETRON 2 MG/ML
INJECTION INTRAMUSCULAR; INTRAVENOUS AS NEEDED
Status: DISCONTINUED | OUTPATIENT
Start: 2023-07-08 | End: 2023-07-08

## 2023-07-08 RX ORDER — HYDROMORPHONE HCL IN WATER/PF 6 MG/30 ML
0.2 PATIENT CONTROLLED ANALGESIA SYRINGE INTRAVENOUS
Status: DISCONTINUED | OUTPATIENT
Start: 2023-07-08 | End: 2023-07-15 | Stop reason: HOSPADM

## 2023-07-08 RX ORDER — EPLERENONE 25 MG/1
50 TABLET, FILM COATED ORAL DAILY
Status: DISCONTINUED | OUTPATIENT
Start: 2023-07-08 | End: 2023-07-13

## 2023-07-08 RX ORDER — METOPROLOL TARTRATE 5 MG/5ML
INJECTION INTRAVENOUS AS NEEDED
Status: DISCONTINUED | OUTPATIENT
Start: 2023-07-08 | End: 2023-07-08

## 2023-07-08 RX ORDER — LABETALOL HYDROCHLORIDE 5 MG/ML
10 INJECTION, SOLUTION INTRAVENOUS ONCE
Status: COMPLETED | OUTPATIENT
Start: 2023-07-08 | End: 2023-07-08

## 2023-07-08 RX ORDER — AMLODIPINE BESYLATE 10 MG/1
10 TABLET ORAL DAILY
Status: DISCONTINUED | OUTPATIENT
Start: 2023-07-08 | End: 2023-07-15 | Stop reason: HOSPADM

## 2023-07-08 RX ORDER — CALCIUM CARBONATE 500(1250)
2 TABLET ORAL 2 TIMES DAILY WITH MEALS
Status: DISCONTINUED | OUTPATIENT
Start: 2023-07-08 | End: 2023-07-15 | Stop reason: HOSPADM

## 2023-07-08 RX ORDER — ONDANSETRON 2 MG/ML
4 INJECTION INTRAMUSCULAR; INTRAVENOUS ONCE AS NEEDED
Status: DISCONTINUED | OUTPATIENT
Start: 2023-07-08 | End: 2023-07-08 | Stop reason: HOSPADM

## 2023-07-08 RX ORDER — FERROUS SULFATE 325(65) MG
325 TABLET ORAL 2 TIMES DAILY
Status: DISCONTINUED | OUTPATIENT
Start: 2023-07-08 | End: 2023-07-08

## 2023-07-08 RX ORDER — SERTRALINE HYDROCHLORIDE 100 MG/1
200 TABLET, FILM COATED ORAL DAILY
Status: DISCONTINUED | OUTPATIENT
Start: 2023-07-08 | End: 2023-07-15 | Stop reason: HOSPADM

## 2023-07-08 RX ORDER — ONDANSETRON 2 MG/ML
4 INJECTION INTRAMUSCULAR; INTRAVENOUS ONCE
Status: COMPLETED | OUTPATIENT
Start: 2023-07-08 | End: 2023-07-08

## 2023-07-08 RX ORDER — POTASSIUM CHLORIDE 14.9 MG/ML
20 INJECTION INTRAVENOUS ONCE
Status: COMPLETED | OUTPATIENT
Start: 2023-07-08 | End: 2023-07-09

## 2023-07-08 RX ORDER — LABETALOL HYDROCHLORIDE 5 MG/ML
10 INJECTION, SOLUTION INTRAVENOUS EVERY 6 HOURS PRN
Status: DISCONTINUED | OUTPATIENT
Start: 2023-07-08 | End: 2023-07-15 | Stop reason: HOSPADM

## 2023-07-08 RX ORDER — SUCCINYLCHOLINE/SOD CL,ISO/PF 100 MG/5ML
SYRINGE (ML) INTRAVENOUS AS NEEDED
Status: DISCONTINUED | OUTPATIENT
Start: 2023-07-08 | End: 2023-07-08

## 2023-07-08 RX ORDER — LIDOCAINE HYDROCHLORIDE 20 MG/ML
INJECTION, SOLUTION EPIDURAL; INFILTRATION; INTRACAUDAL; PERINEURAL
Status: COMPLETED | OUTPATIENT
Start: 2023-07-08 | End: 2023-07-08

## 2023-07-08 RX ORDER — ACETAMINOPHEN 325 MG/1
650 TABLET ORAL EVERY 6 HOURS PRN
Status: DISCONTINUED | OUTPATIENT
Start: 2023-07-08 | End: 2023-07-15 | Stop reason: HOSPADM

## 2023-07-08 RX ORDER — MELATONIN
2000 DAILY
Status: DISCONTINUED | OUTPATIENT
Start: 2023-07-08 | End: 2023-07-15 | Stop reason: HOSPADM

## 2023-07-08 RX ORDER — MORPHINE SULFATE 4 MG/ML
4 INJECTION, SOLUTION INTRAMUSCULAR; INTRAVENOUS ONCE
Status: COMPLETED | OUTPATIENT
Start: 2023-07-08 | End: 2023-07-08

## 2023-07-08 RX ORDER — MAGNESIUM HYDROXIDE/ALUMINUM HYDROXICE/SIMETHICONE 120; 1200; 1200 MG/30ML; MG/30ML; MG/30ML
30 SUSPENSION ORAL EVERY 6 HOURS PRN
Status: DISCONTINUED | OUTPATIENT
Start: 2023-07-08 | End: 2023-07-15 | Stop reason: HOSPADM

## 2023-07-08 RX ORDER — OXYCODONE HYDROCHLORIDE 5 MG/1
5 TABLET ORAL EVERY 4 HOURS PRN
Status: DISCONTINUED | OUTPATIENT
Start: 2023-07-08 | End: 2023-07-15 | Stop reason: HOSPADM

## 2023-07-08 RX ORDER — DOXAZOSIN MESYLATE 4 MG/1
8 TABLET ORAL 2 TIMES DAILY
Status: DISCONTINUED | OUTPATIENT
Start: 2023-07-08 | End: 2023-07-15 | Stop reason: HOSPADM

## 2023-07-08 RX ORDER — LABETALOL HYDROCHLORIDE 5 MG/ML
INJECTION, SOLUTION INTRAVENOUS AS NEEDED
Status: DISCONTINUED | OUTPATIENT
Start: 2023-07-08 | End: 2023-07-08

## 2023-07-08 RX ORDER — CHLORAL HYDRATE 500 MG
1000 CAPSULE ORAL
Status: DISCONTINUED | OUTPATIENT
Start: 2023-07-08 | End: 2023-07-08

## 2023-07-08 RX ORDER — HYDRALAZINE HYDROCHLORIDE 50 MG/1
100 TABLET, FILM COATED ORAL 3 TIMES DAILY
Status: DISCONTINUED | OUTPATIENT
Start: 2023-07-08 | End: 2023-07-13

## 2023-07-08 RX ORDER — METHOCARBAMOL 500 MG/1
500 TABLET, FILM COATED ORAL EVERY 6 HOURS PRN
Status: DISCONTINUED | OUTPATIENT
Start: 2023-07-08 | End: 2023-07-15 | Stop reason: HOSPADM

## 2023-07-08 RX ORDER — PROPOFOL 10 MG/ML
INJECTION, EMULSION INTRAVENOUS AS NEEDED
Status: DISCONTINUED | OUTPATIENT
Start: 2023-07-08 | End: 2023-07-08

## 2023-07-08 RX ORDER — BUDESONIDE AND FORMOTEROL FUMARATE DIHYDRATE 160; 4.5 UG/1; UG/1
2 AEROSOL RESPIRATORY (INHALATION) 2 TIMES DAILY
Status: DISCONTINUED | OUTPATIENT
Start: 2023-07-08 | End: 2023-07-15 | Stop reason: HOSPADM

## 2023-07-08 RX ORDER — FENTANYL CITRATE/PF 50 MCG/ML
50 SYRINGE (ML) INJECTION
Status: DISCONTINUED | OUTPATIENT
Start: 2023-07-08 | End: 2023-07-08 | Stop reason: HOSPADM

## 2023-07-08 RX ORDER — FENTANYL CITRATE 50 UG/ML
INJECTION, SOLUTION INTRAMUSCULAR; INTRAVENOUS AS NEEDED
Status: DISCONTINUED | OUTPATIENT
Start: 2023-07-08 | End: 2023-07-08

## 2023-07-08 RX ORDER — SODIUM CHLORIDE, SODIUM LACTATE, POTASSIUM CHLORIDE, CALCIUM CHLORIDE 600; 310; 30; 20 MG/100ML; MG/100ML; MG/100ML; MG/100ML
INJECTION, SOLUTION INTRAVENOUS CONTINUOUS PRN
Status: DISCONTINUED | OUTPATIENT
Start: 2023-07-08 | End: 2023-07-08

## 2023-07-08 RX ORDER — DEXAMETHASONE SODIUM PHOSPHATE 10 MG/ML
INJECTION, SOLUTION INTRAMUSCULAR; INTRAVENOUS AS NEEDED
Status: DISCONTINUED | OUTPATIENT
Start: 2023-07-08 | End: 2023-07-08

## 2023-07-08 RX ORDER — POTASSIUM CHLORIDE 14.9 MG/ML
20 INJECTION INTRAVENOUS ONCE
Status: COMPLETED | OUTPATIENT
Start: 2023-07-08 | End: 2023-07-08

## 2023-07-08 RX ORDER — ROCURONIUM BROMIDE 10 MG/ML
INJECTION, SOLUTION INTRAVENOUS AS NEEDED
Status: DISCONTINUED | OUTPATIENT
Start: 2023-07-08 | End: 2023-07-08

## 2023-07-08 RX ORDER — ATORVASTATIN CALCIUM 40 MG/1
40 TABLET, FILM COATED ORAL
Status: DISCONTINUED | OUTPATIENT
Start: 2023-07-08 | End: 2023-07-15 | Stop reason: HOSPADM

## 2023-07-08 RX ORDER — MIDAZOLAM HYDROCHLORIDE 2 MG/2ML
INJECTION, SOLUTION INTRAMUSCULAR; INTRAVENOUS AS NEEDED
Status: DISCONTINUED | OUTPATIENT
Start: 2023-07-08 | End: 2023-07-08

## 2023-07-08 RX ORDER — ONDANSETRON 2 MG/ML
4 INJECTION INTRAMUSCULAR; INTRAVENOUS EVERY 6 HOURS PRN
Status: DISCONTINUED | OUTPATIENT
Start: 2023-07-08 | End: 2023-07-13

## 2023-07-08 RX ORDER — METOCLOPRAMIDE HYDROCHLORIDE 5 MG/ML
10 INJECTION INTRAMUSCULAR; INTRAVENOUS EVERY 6 HOURS PRN
Status: DISCONTINUED | OUTPATIENT
Start: 2023-07-08 | End: 2023-07-13

## 2023-07-08 RX ORDER — ONDANSETRON 2 MG/ML
INJECTION INTRAMUSCULAR; INTRAVENOUS
Status: COMPLETED
Start: 2023-07-08 | End: 2023-07-08

## 2023-07-08 RX ORDER — PANTOPRAZOLE SODIUM 40 MG/10ML
40 INJECTION, POWDER, LYOPHILIZED, FOR SOLUTION INTRAVENOUS EVERY 12 HOURS SCHEDULED
Status: DISCONTINUED | OUTPATIENT
Start: 2023-07-08 | End: 2023-07-15 | Stop reason: HOSPADM

## 2023-07-08 RX ORDER — CEFAZOLIN SODIUM 1 G/50ML
SOLUTION INTRAVENOUS AS NEEDED
Status: DISCONTINUED | OUTPATIENT
Start: 2023-07-08 | End: 2023-07-08

## 2023-07-08 RX ORDER — POTASSIUM CHLORIDE 20 MEQ/1
40 TABLET, EXTENDED RELEASE ORAL ONCE
Status: COMPLETED | OUTPATIENT
Start: 2023-07-08 | End: 2023-07-08

## 2023-07-08 RX ORDER — LORAZEPAM 2 MG/ML
0.5 INJECTION INTRAMUSCULAR ONCE
Status: DISCONTINUED | OUTPATIENT
Start: 2023-07-08 | End: 2023-07-08

## 2023-07-08 RX ORDER — LIDOCAINE HYDROCHLORIDE 10 MG/ML
INJECTION, SOLUTION EPIDURAL; INFILTRATION; INTRACAUDAL; PERINEURAL AS NEEDED
Status: DISCONTINUED | OUTPATIENT
Start: 2023-07-08 | End: 2023-07-08 | Stop reason: HOSPADM

## 2023-07-08 RX ORDER — POLYETHYLENE GLYCOL 3350 17 G/17G
17 POWDER, FOR SOLUTION ORAL DAILY
Status: DISCONTINUED | OUTPATIENT
Start: 2023-07-08 | End: 2023-07-15 | Stop reason: HOSPADM

## 2023-07-08 RX ORDER — MIRTAZAPINE 15 MG/1
15 TABLET, FILM COATED ORAL
Status: DISCONTINUED | OUTPATIENT
Start: 2023-07-08 | End: 2023-07-15 | Stop reason: HOSPADM

## 2023-07-08 RX ORDER — HYDRALAZINE HYDROCHLORIDE 20 MG/ML
10 INJECTION INTRAMUSCULAR; INTRAVENOUS EVERY 6 HOURS PRN
Status: DISCONTINUED | OUTPATIENT
Start: 2023-07-08 | End: 2023-07-15 | Stop reason: HOSPADM

## 2023-07-08 RX ORDER — HYDRALAZINE HYDROCHLORIDE 20 MG/ML
5 INJECTION INTRAMUSCULAR; INTRAVENOUS EVERY 6 HOURS PRN
Status: DISCONTINUED | OUTPATIENT
Start: 2023-07-08 | End: 2023-07-08

## 2023-07-08 RX ORDER — MAGNESIUM SULFATE HEPTAHYDRATE 40 MG/ML
2 INJECTION, SOLUTION INTRAVENOUS ONCE
Status: COMPLETED | OUTPATIENT
Start: 2023-07-08 | End: 2023-07-09

## 2023-07-08 RX ADMIN — ACETAMINOPHEN 650 MG: 325 TABLET ORAL at 16:31

## 2023-07-08 RX ADMIN — ONDANSETRON 4 MG: 2 INJECTION INTRAMUSCULAR; INTRAVENOUS at 10:12

## 2023-07-08 RX ADMIN — Medication 1 TABLET: at 13:48

## 2023-07-08 RX ADMIN — NICARDIPINE HYDROCHLORIDE 5 MG/HR: 2.5 INJECTION, SOLUTION INTRAVENOUS at 10:56

## 2023-07-08 RX ADMIN — HYDRALAZINE HYDROCHLORIDE 100 MG: 50 TABLET, FILM COATED ORAL at 13:51

## 2023-07-08 RX ADMIN — OXYCODONE HYDROCHLORIDE 5 MG: 5 TABLET ORAL at 22:32

## 2023-07-08 RX ADMIN — SUGAMMADEX 200 MG: 100 INJECTION, SOLUTION INTRAVENOUS at 10:26

## 2023-07-08 RX ADMIN — DOXAZOSIN 8 MG: 4 TABLET ORAL at 22:32

## 2023-07-08 RX ADMIN — FERROUS SULFATE TAB 325 MG (65 MG ELEMENTAL FE) 325 MG: 325 (65 FE) TAB at 13:48

## 2023-07-08 RX ADMIN — PROPOFOL 200 MG: 10 INJECTION, EMULSION INTRAVENOUS at 10:03

## 2023-07-08 RX ADMIN — POTASSIUM CHLORIDE 40 MEQ: 1500 TABLET, EXTENDED RELEASE ORAL at 15:57

## 2023-07-08 RX ADMIN — MORPHINE SULFATE 2 MG: 2 INJECTION, SOLUTION INTRAMUSCULAR; INTRAVENOUS at 12:45

## 2023-07-08 RX ADMIN — Medication 2000 UNITS: at 13:48

## 2023-07-08 RX ADMIN — AMLODIPINE BESYLATE 10 MG: 10 TABLET ORAL at 13:48

## 2023-07-08 RX ADMIN — OXYCODONE HYDROCHLORIDE 5 MG: 5 TABLET ORAL at 17:41

## 2023-07-08 RX ADMIN — Medication 2 TABLET: at 15:57

## 2023-07-08 RX ADMIN — Medication 10 MG: at 05:33

## 2023-07-08 RX ADMIN — ROCURONIUM BROMIDE 10 MG: 10 INJECTION, SOLUTION INTRAVENOUS at 10:09

## 2023-07-08 RX ADMIN — EPLERENONE 50 MG: 25 TABLET, FILM COATED ORAL at 15:57

## 2023-07-08 RX ADMIN — LABETALOL HYDROCHLORIDE 10 MG: 5 INJECTION, SOLUTION INTRAVENOUS at 10:44

## 2023-07-08 RX ADMIN — MIRTAZAPINE 15 MG: 15 TABLET, FILM COATED ORAL at 22:32

## 2023-07-08 RX ADMIN — BUSPIRONE HYDROCHLORIDE 15 MG: 10 TABLET ORAL at 22:32

## 2023-07-08 RX ADMIN — PANTOPRAZOLE SODIUM 80 MG: 40 INJECTION, POWDER, FOR SOLUTION INTRAVENOUS at 19:45

## 2023-07-08 RX ADMIN — IOHEXOL 50 ML: 240 INJECTION, SOLUTION INTRATHECAL; INTRAVASCULAR; INTRAVENOUS; ORAL at 03:52

## 2023-07-08 RX ADMIN — SODIUM CHLORIDE, SODIUM LACTATE, POTASSIUM CHLORIDE, AND CALCIUM CHLORIDE: .6; .31; .03; .02 INJECTION, SOLUTION INTRAVENOUS at 09:45

## 2023-07-08 RX ADMIN — ATORVASTATIN CALCIUM 40 MG: 40 TABLET, FILM COATED ORAL at 22:32

## 2023-07-08 RX ADMIN — ONDANSETRON 4 MG: 2 INJECTION INTRAMUSCULAR; INTRAVENOUS at 16:03

## 2023-07-08 RX ADMIN — CEFAZOLIN SODIUM 1000 MG: 1 SOLUTION INTRAVENOUS at 10:00

## 2023-07-08 RX ADMIN — MORPHINE SULFATE 4 MG: 4 INJECTION INTRAVENOUS at 01:43

## 2023-07-08 RX ADMIN — MIDAZOLAM 2 MG: 1 INJECTION INTRAMUSCULAR; INTRAVENOUS at 09:53

## 2023-07-08 RX ADMIN — DOXAZOSIN 8 MG: 4 TABLET ORAL at 13:48

## 2023-07-08 RX ADMIN — POTASSIUM CHLORIDE 20 MEQ: 14.9 INJECTION, SOLUTION INTRAVENOUS at 23:13

## 2023-07-08 RX ADMIN — BUDESONIDE AND FORMOTEROL FUMARATE DIHYDRATE 2 PUFF: 160; 4.5 AEROSOL RESPIRATORY (INHALATION) at 13:52

## 2023-07-08 RX ADMIN — PANTOPRAZOLE SODIUM 40 MG: 40 INJECTION, POWDER, FOR SOLUTION INTRAVENOUS at 22:32

## 2023-07-08 RX ADMIN — BUSPIRONE HYDROCHLORIDE 15 MG: 10 TABLET ORAL at 13:48

## 2023-07-08 RX ADMIN — METOROPROLOL TARTRATE 5 MG: 5 INJECTION, SOLUTION INTRAVENOUS at 10:30

## 2023-07-08 RX ADMIN — ONDANSETRON 4 MG: 2 INJECTION INTRAMUSCULAR; INTRAVENOUS at 01:33

## 2023-07-08 RX ADMIN — LIDOCAINE HYDROCHLORIDE 5 ML: 20 INJECTION, SOLUTION EPIDURAL; INFILTRATION; INTRACAUDAL; PERINEURAL at 09:50

## 2023-07-08 RX ADMIN — FLUTICASONE FUROATE 1 PUFF: 100 POWDER RESPIRATORY (INHALATION) at 13:51

## 2023-07-08 RX ADMIN — PIPERACILLIN AND TAZOBACTAM 3.38 G: 36; 4.5 INJECTION, POWDER, FOR SOLUTION INTRAVENOUS at 04:19

## 2023-07-08 RX ADMIN — FENTANYL CITRATE 100 MCG: 50 INJECTION, SOLUTION INTRAMUSCULAR; INTRAVENOUS at 10:03

## 2023-07-08 RX ADMIN — DEXAMETHASONE SODIUM PHOSPHATE 10 MG: 10 INJECTION, SOLUTION INTRAMUSCULAR; INTRAVENOUS at 10:12

## 2023-07-08 RX ADMIN — BUDESONIDE AND FORMOTEROL FUMARATE DIHYDRATE 2 PUFF: 160; 4.5 AEROSOL RESPIRATORY (INHALATION) at 22:35

## 2023-07-08 RX ADMIN — POTASSIUM CHLORIDE 20 MEQ: 14.9 INJECTION, SOLUTION INTRAVENOUS at 15:58

## 2023-07-08 RX ADMIN — POTASSIUM CHLORIDE 20 MEQ: 14.9 INJECTION, SOLUTION INTRAVENOUS at 20:54

## 2023-07-08 RX ADMIN — SERTRALINE HYDROCHLORIDE 200 MG: 100 TABLET ORAL at 13:48

## 2023-07-08 RX ADMIN — METOCLOPRAMIDE HYDROCHLORIDE 10 MG: 5 INJECTION INTRAMUSCULAR; INTRAVENOUS at 12:58

## 2023-07-08 RX ADMIN — SODIUM CHLORIDE 1000 ML: 0.9 INJECTION, SOLUTION INTRAVENOUS at 01:42

## 2023-07-08 RX ADMIN — HYDRALAZINE HYDROCHLORIDE 100 MG: 50 TABLET, FILM COATED ORAL at 22:32

## 2023-07-08 RX ADMIN — SODIUM CHLORIDE 1000 ML: 0.9 INJECTION, SOLUTION INTRAVENOUS at 03:06

## 2023-07-08 RX ADMIN — Medication 100 MG: at 10:03

## 2023-07-08 RX ADMIN — MAGNESIUM SULFATE HEPTAHYDRATE 2 G: 40 INJECTION, SOLUTION INTRAVENOUS at 20:53

## 2023-07-08 NOTE — ASSESSMENT & PLAN NOTE
Assessment:   · Likely due to hypertensive nephrosclerosis   · Baseline creatinine: 1.61 - 1.81  · Initial creatinine: 1.59 (7/8/23)   · Last creatinine: 1.41 (7/8/23)     Plan:   · Strict q2h I/O monitoring  · Maintain keller catheter for now  · Continue to follow renal function tests  · No need for inpatient nephrology consultation at this time

## 2023-07-08 NOTE — ASSESSMENT & PLAN NOTE
Blood pressures presently uncontrolled with hypertensive urgency noted  Postoperatively patient ambulating to level 1 stepdown for close monitoring possibly IV nicardipine administration  Monitor blood pressures

## 2023-07-08 NOTE — ASSESSMENT & PLAN NOTE
Assessment:   · Patient with known history of long-standing severe hypertension   · S/p adrenalectomy at [facility] in 2012 which did not markedly impact his hypertension   · States he is compliant with his outpatient regimen    Plan:   · Cardene 5mg/hr  · Titrate to -180  · Continue home oral antihypertensives:   · Amlodipine 10mg daily   · Doxazosin 8mg BID  · Eplerenone 50mg daily   · Hydralazine 100mg TID  · PRN antihypertensives:   · Labetalol 10mg q6h PRN  · Hydralazine 10mg q6h PRN  · Renal artery duplex ordered and pending   · Maintain arterial line for now

## 2023-07-08 NOTE — ED NOTES
Patient 100% on room air, however is complaining of shortness of breath and appears dyspneic at times. Pt placed on 2 L nasal canula as supportive treatment at this time. Dr. Marylee Lock aware.       Adrian Logan RN  07/08/23 9892

## 2023-07-08 NOTE — ASSESSMENT & PLAN NOTE
Lab Results   Component Value Date    HGBA1C 5.2 05/12/2023       Recent Labs     07/08/23  1101   POCGLU 127       Blood Sugar Average: Last 72 hrs:     Monitor Accu-Cheks  Avoid hypoglycemia

## 2023-07-08 NOTE — ASSESSMENT & PLAN NOTE
Assessment:   · First seen on imaging since 2019  · Given it's location, surgical intervention was then deferred with plan for ongoing surveillance  · Patient has prior seizures which were more likely a result of hypertensive urgency   · Relevant imaging:   · 3/29/23 Brain MRI: Left infratentorial meningioma shows slight interval enlargement compared to 12/15/2021.  Minimal mass effect on the adjacent cerebellar vermis without associated edema    Plan:   · Maintain SBP < 180  · Follow up with neurosurgery as outpatient

## 2023-07-08 NOTE — CONSULTS
[address] [place] [address]  Consult  Name: Magi Bird 59 y.o. male I MRN: [de-identified]  [facility] Date of Admission: 7/8/2023   Date of Service: 7/8/2023 I Hospital Day: 0    Consults    Assessment/Plan   * Cardiac tamponade  Assessment & Plan  Assessment:   · Patient with acute-onset epigastric pain presented to THE [facility] on 7/8/23 and was found on CT to have a pericardiac effusion   · 7/8/23 TTE: There is a moderate to large pericardial effusion circumferential to the heart. There is a reasonable likelihood of cardiac tamponade. The evidence for tamponade includes: right ventricular compression.   · Prior TTE from May 2023 with trivial pericardial effusion   · Etiology possibly related to uncontrolled hypertension, but workup on going  · POD #0 s/p pericardial window and drain (7/8/23)     Plan:   · Thoracic surgery following, appreciate recommendation  · Monitor pericardial drain output amount and character  · Follow up cytology   · Analgesia:  · Tylenol 650mg q6h PRN  · Robaxin 500mg q6h PRN  · Oxycodone 2.5-5mg q4h PRN  · Dilaudid 0.2mg q3 PRN        Hematochezia  Assessment & Plan  Assessment:   · Patient reported 4 bloody bowel movements on presentation to the ED on 7/8/23  · He states he only noticed blood the day prior, but given his iron supplementation, may have also been more chronic  · Prior humaira-en-Y gives additional high risk, particularly in the setting of uncontrolled hypertension     Plan:   · Protonix 80mg IV x1  · Protonix 40mg IV q12h   · Follow hemoglobin  · Make NPO with GI consult as indicated   · Monitor bowel movements     Hypertensive urgency  Assessment & Plan  Assessment:   · Patient with known history of long-standing severe hypertension   · S/p adrenalectomy at [facility] in 2012 which did not markedly impact his hypertension   · States he is compliant with his outpatient regimen    Plan:   · Cardene 5mg/hr  · Titrate to -180  · Continue home oral antihypertensives:   · Amlodipine 10mg daily   · Doxazosin 8mg BID  · Eplerenone 50mg daily   · Hydralazine 100mg TID  · PRN antihypertensives:   · Labetalol 10mg q6h PRN  · Hydralazine 10mg q6h PRN  · Renal artery duplex ordered and pending   · Maintain arterial line for now     CKD stage 3 secondary to diabetes [facility])  Assessment & Plan  Assessment:   · Likely due to hypertensive nephrosclerosis   · Baseline creatinine: 1.61 - 1.81  · Initial creatinine: 1.59 (7/8/23)   · Last creatinine: 1.41 (7/8/23)     Plan:   · Strict q2h I/O monitoring  · Maintain keller catheter for now  · Continue to follow renal function tests  · No need for inpatient nephrology consultation at this time     COPD (chronic obstructive pulmonary disease) ([address])  Assessment & Plan  Assessment:   · No recent PFT's for review  · Patient states his breathing has improved overall since his gastric bypass     Plan:   · Symbicort BID  · Fluticasone daily   · Continue pulmonary hygiene. Incentive spirometer q1h while awake, encourage coughing and deep breathing. Upright positioning  · Suction as needed and closely monitor secretions. Maintain HOB >30 degrees.  Q4h oral care with chlorhexidine BID  · Maintain SpO2 > 90%     Anemia  Assessment & Plan  Assessment:  · Concern for acute on chronic anemia possibly from GI source  · Likely also has some degree of malabsorption and possible nutrient deficiency as a result of gastric bypass   · Baseline hemoglobin: 12.9 - 12.6  · Initial hemoglobin: 7.8 (7/8/23)  · Last hemoglobin: 7.6 (7/8/23)    Plan:   · Transfuse for hemoglobin <7.0  · Check hemoglobin later tonight  · Monitor for signs of bleeding   · Ferrous sulfate 325mg BID  · CBC in AM      Chronic diastolic CHF (congestive heart failure) ([facility])  Assessment & Plan  Assessment:  · No evidence of acute exacerbation at this time post operatively   · RRT on 7/8/23 possibly partially contributed to by acute CHF exacerbation in the setting of hypertensive emergency in the setting of known LVH and diastolic dysfunction   · Current weight: 68.9kg  · Relevant echocardiograms:  · 7/8/23 TTE: Left ventricular cavity size is normal. Wall thickness is increased. The left ventricular ejection fraction is 50%. Systolic function is low normal. Wall motion cannot be accurately assessed. Left Atrium: The atrium is mildly dilated. Pericardium: There is a moderate to large pericardial effusion circumferential to the heart. There is a reasonable likelihood of cardiac tamponade.  The evidence for tamponade includes: right ventricular compression      Plan:   · Holding diuresis at this time  · Goal to maintain more controlled BP, ideally SBP < 180  · Daily weights  · Continue home afterload reduction agents:  · Cardene 5mg/hr  · Titrate to -180  · Continue home oral antihypertensives:   · Amlodipine 10mg daily   · Doxazosin 8mg BID  · Eplerenone 50mg daily   · Hydralazine 100mg TID  · PRN antihypertensives:   · Labetalol 10mg q6h PRN  · Hydralazine 10mg q6h PRN  · Cardiac, low Na diet             Depression with anxiety  Assessment & Plan  Assessment:   · No acute concerns at this time     Plan:   · Continue home regimen:   · Buspar 15mg BID  · Mirtazapine 15mg qHS   · Sertraline 200mg daily       Meningioma [facility])  Assessment & Plan  Assessment:   · First seen on imaging since 2019  · Given it's location, surgical intervention was then deferred with plan for ongoing surveillance  · Patient has prior seizures which were more likely a result of hypertensive urgency   · Relevant imaging:   · 3/29/23 Brain MRI: Left infratentorial meningioma shows slight interval enlargement compared to 12/15/2021.  Minimal mass effect on the adjacent cerebellar vermis without associated edema    Plan:   · Maintain SBP < 180  · Follow up with neurosurgery as outpatient          History of Present Illness     HPI: Mirta Oliva is a 59 y.o. with a complex history that includes CKD-3, chronic diastolic CHF, s/p humaira-en-Y gastric bypass, COPD, PSA, and uncontrolled hypertension. The patient originally presented to THE [facility] in the morning of 7/8/23 with epigastric pain and shortness of breath that had progressively worsened overnight. As part if his initial workup, a CT A/P was performed which revealed a pericardial effusion with apparent mass effect on the right ventricle. A STAT echocardiogram was performed with a/gain demonstrated a moderate-large pericardial effusion with RV compression. He was sent to [facility] for thoracic surgery consultation. In the AM of 7/8/23 he was a rapid response for severe hypertension, respiratory distress, and suspicion of cardiac tamponade. He was taken emergently to the OR for a pericardial window and drain placement. He put out 300mL of serosanguinous fluid, but remained persistently hypertensive with SBP > 200. Cardene was initiated post-operatively, and he was transferred to the critical care team for ongoing management. History obtained from chart review and the patient. Review of Systems   Constitutional: Positive for fatigue. HENT: Negative. Eyes: Negative. Respiratory: Positive for shortness of breath. Gastrointestinal: Positive for nausea. Endocrine: Negative. Genitourinary: Negative. Musculoskeletal: Negative. Myalgias: Chest wall pain. Skin: Negative. Allergic/Immunologic: Negative. Neurological: Negative. Hematological: Negative. Psychiatric/Behavioral: Negative. Historical Information   Past Medical History:  No date: Asthma  No date: Chronic kidney disease  No date: COPD (chronic obstructive pulmonary disease) ([facility])  No date: CPAP (continuous positive airway pressure) dependence  No date: Diabetes mellitus (HCC)  No date: Emphysema of lung ([address])  No date: Gout  No date: History of transfusion      Comment:  pt stated they had a transfusion when they had                gallbladder surgery.   No date: Hypertension  No date: Kidney disease      Comment:  renal failure  01/2018: Leg DVT (deep venous thromboembolism), acute, bilateral (HCC)  No date: Obesity (BMI 30-39. 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  No date: Sleep apnea  No date: Systolic CHF [facility]) Past Surgical History:  No date: ADRENALECTOMY  No date: CHOLECYSTECTOMY  No date: COLONOSCOPY  No date: EGD  12/22/2020: HIATAL HERNIA REPAIR; N/A      Comment:  Procedure: REPAIR HERNIA HIATAL LAPAROSCOPIC;  Surgeon:                Liu Blackman MD;  Location: [facility];  Service:                Bariatrics  10/17/2018: INCISION AND DRAINAGE OF WOUND; Left      Comment:  Procedure: INCISION AND DRAINAGE (I&D) GROIN;  Surgeon:                Ignacio Santa MD;  Location: [facility];  Service:                General  8/17/2018: IR IMAGE GUIDED ASPIRATION / DRAINAGE W TUBE  7/31/2018: IR IMAGE GUIDED ASPIRATION / DRAINAGE W TUBE  No date: JOINT REPLACEMENT; Bilateral      Comment:  knee  2009: KIDNEY SURGERY      Comment:  nodule removal  01/2018: LYMPH NODE DISSECTION;  Left      Comment:  left inguinal LN removed - benign   No date: OTHER SURGICAL HISTORY      Comment:  kidney nodule removal  No date: PALATE / Pao Medici BIOPSY / EXCISION  12/22/2020: MN LAPS GSTR RSTCV PX W/BYP JASON-EN-Y LIMB <150 CM; N/A      Comment:  Procedure: BYPASS GASTRIC  JASON-EN-Y LAPAROSCOPIC WITH                INTRAOPERATIVE EGD;  Surgeon: Liu Blackman MD;                 Location: [facility];  Service: Bariatrics  No date: SINUS SURGERY  No date: TONSILLECTOMY   Current Outpatient Medications   Medication Instructions   • albuterol (PROVENTIL HFA,VENTOLIN HFA) 90 mcg/act inhaler    • amLODIPine (NORVASC) 10 mg, Oral   • atorvastatin (LIPITOR) 40 mg, Oral, Daily at bedtime   • busPIRone (BUSPAR) 15 mg tablet TAKE ONE TABLET BY MOUTH TWICE A DAY FOR PTSD   • Calcium 1,200 mg, Oral   • doxazosin (CARDURA) 8 mg, Oral, 2 times daily   • eplerenone (INSPRA) 50 mg, Oral, Daily   • ferrous sulfate 325 mg, Oral, 2 times daily   • Fish Oil 1,200 mg, Daily at bedtime   • glycopyrrolate-formoterol (BEVESPI AEROSPHERE) 9-4.8 MCG/ACT inhaler 2 puffs, Inhalation, Daily after breakfast   • hydrALAZINE (APRESOLINE) 100 mg, Oral, 3 times daily   • labetalol (NORMODYNE) 300 mg, Oral, Every 12 hours scheduled   • levETIRAcetam (KEPPRA) 750 mg, Oral, Every 12 hours scheduled   • mirtazapine (REMERON) 15 mg   • Multiple Vitamins-Minerals (multivitamin with minerals) tablet 1 tablet, Oral, Daily   • Pulmicort Flexhaler 90 MCG/ACT inhaler 2 puffs, 2 times daily   • sertraline (ZOLOFT) 100 mg tablet 2 tablets, Oral, Daily   • umeclidinium-vilanterol (ANORO ELLIPTA) 62.5-25 MCG/INH inhaler 1 puff, Inhalation, Daily   • Vitamin D-3 2,000 Units, Oral, Daily    Allergies   Allergen Reactions   • Clonidine Anaphylaxis   • Lisinopril Anaphylaxis   • Nifedipine Anaphylaxis   • Spironolactone Anaphylaxis, Other (See Comments) and Shortness Of Breath   • Buspirone      Headaches,    • Enoxaparin      Injection site "bump" per Dr. Ulus Goldmann   • Minoxidil      Retains fluid around heart      Social History     Tobacco Use   • Smoking status: Never   • Smokeless tobacco: Never   Vaping Use   • Vaping Use: Never used   Substance Use Topics   • Alcohol use: Yes     Comment: occasionally   • Drug use: No    Family History   Problem Relation Age of Onset   • Heart murmur Sister    • Asthma Sister    • Hypertension Sister    • Kidney disease Mother    • Cancer Father    • Bell's palsy Brother    • Kidney disease Brother    • Deep vein thrombosis Neg Hx         Objective                            Vitals I/O      Most Recent Min/Max in 24hrs   Temp 98.1 °F (36.7 °C) Temp  Min: 97.4 °F (36.3 °C)  Max: 99.1 °F (37.3 °C)   Pulse 87 Pulse  Min: 86  Max: 106   Resp 19 Resp  Min: 12  Max: 37   /76 BP  Min: 139/76  Max: 244/127   O2 Sat 99 % SpO2  Min: 96 %  Max: 100 %      Intake/Output Summary (Last 24 hours) at 7/8/2023 1425  Last data filed at 7/8/2023 1405  Gross per 24 hour   Intake 240 ml   Output 520 ml   Net -280 ml         Diet Cardiovascular; Cardiac     Invasive Monitoring Physical exam   Arterial Line  Tessy /132  Arterial Line BP  Min: 182/162  Max: 188/132    mmHg  Arterial Line MAP (mmHg)  Min: 134 mmHg  Max: 166 mmHg    Physical Exam  Vitals reviewed. Constitutional:       General: He is awake. He is not in acute distress. Appearance: Normal appearance. HENT:      Head: Normocephalic and atraumatic. Mouth/Throat:      Lips: Pink. Mouth: Mucous membranes are moist.   Eyes:      Extraocular Movements: Extraocular movements intact. Pupils: Pupils are equal, round, and reactive to light. Cardiovascular:      Rate and Rhythm: Normal rate and regular rhythm. Pulses:           Radial pulses are 3+ on the right side and 3+ on the left side. Dorsalis pedis pulses are 3+ on the right side and 3+ on the left side. Posterior tibial pulses are 2+ on the right side and 2+ on the left side. Heart sounds: Normal heart sounds. Pulmonary:      Effort: Pulmonary effort is normal.   Chest:          Comments: Pericardial drain with serosang ouput   Abdominal:      General: Abdomen is flat. There is no distension. Palpations: Abdomen is soft. Tenderness: There is no abdominal tenderness. Musculoskeletal:      Cervical back: Full passive range of motion without pain. Right lower leg: No edema. Left lower leg: No edema. Skin:     General: Skin is warm and dry. Capillary Refill: Capillary refill takes less than 2 seconds. Neurological:      General: No focal deficit present. Mental Status: He is alert. GCS: GCS eye subscore is 4. GCS verbal subscore is 5. GCS motor subscore is 6. Motor: Motor function is intact. Psychiatric:         Behavior: Behavior is cooperative.               Diagnostic Studies      EKG: NSR   Imaging: No new imaging since admission I have personally reviewed pertinent reports.    and I have personally reviewed pertinent films in PACS     Medications:  Scheduled PRN   amLODIPine, 10 mg, Daily  atorvastatin, 40 mg, HS  budesonide-formoterol, 2 puff, BID  busPIRone, 15 mg, BID  calcium carbonate, 2 tablet, BID With Meals  cholecalciferol, 2,000 Units, Daily  doxazosin, 8 mg, BID  eplerenone, 50 mg, Daily  ferrous sulfate, 325 mg, BID  fish oil, 1,000 mg, HS  fluticasone, 1 puff, Daily  hydrALAZINE, 100 mg, TID  LORazepam, 0.5 mg, Once  mirtazapine, 15 mg, HS  multivitamin-minerals, 1 tablet, Daily  polyethylene glycol, 17 g, Daily  sertraline, 200 mg, Daily      acetaminophen, 650 mg, Q6H PRN  aluminum-magnesium hydroxide-simethicone, 30 mL, Q6H PRN  hydrALAZINE, 10 mg, Q6H PRN  labetalol, 10 mg, Q6H PRN  metoclopramide, 10 mg, Q6H PRN  ondansetron, 4 mg, Q6H PRN       Continuous    niCARdipine, 1-15 mg/hr, Last Rate: 5 mg/hr (07/08/23 1159)         Labs:    CBC    Recent Labs     07/08/23 0137 07/08/23  1308   WBC 13.77* 16.14*   HGB 7.8* 7.6*   HCT 23.2* 22.6*    334     BMP    Recent Labs     07/08/23 0137 07/08/23  1308   SODIUM 139 141   K 4.2 2.8*    110*   CO2 21 25   AGAP 12 6   BUN 63* 43*   CREATININE 1.59* 1.41*   CALCIUM 9.7 8.8       Coags    No recent results     Additional Electrolytes  No recent results       Blood Gas    No recent results  Recent Labs     07/08/23  0415   PHVEN 7.525*   GKM4WHP 21.6*   PO2VEN 71.3*   EKG2CQN 17.4*   BEVEN -4.6    LFTs  Recent Labs     07/08/23 0137   ALT 64*   AST 52*   ALKPHOS 58   ALB 4.1   TBILI 1.21*       Infectious  No recent results  Glucose  Recent Labs     07/08/23 0137 07/08/23  1308   GLUC 136 153*               [address] [facility] Meli Mcgraw

## 2023-07-08 NOTE — ASSESSMENT & PLAN NOTE
Wt Readings from Last 3 Encounters:   07/08/23 68.9 kg (152 lb)   05/23/23 68.9 kg (152 lb)   05/12/23 63.9 kg (140 lb 14 oz)     LVEF 60 to 68%, LVH noted  Monitor I/O, daily weights

## 2023-07-08 NOTE — OP NOTE
OPERATIVE REPORT  PATIENT NAME: Moises Breen    :  1965  MRN: [id number]  Pt Location:  OR ROOM 09    SURGERY DATE: 2023    Surgeon(s) and Role:     * Manfred Ríos MD - Primary     * Marien Rubinstein, MD - Assisting    Preop Diagnosis:  Pericardial effusion [I31.39]    Post-Op Diagnosis Codes:     * Pericardial effusion [I31.39]    Procedure(s):  Subxyphoid WINDOW PERICARDIAL    Specimen(s):  ID Type Source Tests Collected by Time Destination   1 :  Body Fluid Pericardial Fluid NON-GYNECOLOGIC CYTOLOGY Manfred Ríos MD 2023 1007    2 :  Tissue Pericardium TISSUE EXAM Manfred Ríos MD 2023 1014    A :  Body Fluid Pericardial Fluid ANAEROBIC CULTURE AND GRAM STAIN, BODY FLUID CULTURE AND GRAM STAIN Manfred Ríos MD 2023 1014        Estimated Blood Loss:   Minimal    Drains:  Closed/Suction Drain Pericardial Bulb 19 Fr. (Active)   Number of days: 0       Anesthesia Type:   General    Operative Indications:  Pericardial effusion []  64-year-old male with history of hypertension, CHF, and CKD who presented with acute on to have chest pain shortness of breath and was found to have a large pericardial effusion and signs of tamponade    Operative Findings:  Large serous pericardial effusion. Drained approximately 300 mL in total.  Sent for culture and cytology. Cut out 1.5 x 1.5 cm pericardial window and sent for pathology    Complications:   None    Procedure and Technique:  After obtaining informed consent from the patient, they were transported to the operating room and placed supine on the OR table. Anesthesia at this point placed an arterial line and all monitoring lines. Patient was prepped with ChloraPrep and draped in standard sterile fashion. A formal time-out was performed at this time including patient, date of birth, intended procedure, antibiotic usage as appropriate, beta-blocker usage as appropriate and plans for specimen handling.      Once fully ready to get started with surgery, general anesthesia was induced and a single lumen endotracheal tube was placed without incident. The remained hemodynamically stable during induction and with intubation. Once intubated we used 10 ml of 1% lidocaine was used for local anesthesia. A 3 cm subxiphoid incision was made. We dissected down into the midline opening up the anterior rectus sheath. We dissected down onto the xiphoid process. This was mobilized around the xiphoid and we resected the portion of the xiphoid to aid in visualization. We dissected superiorly underneath the sternum until we reached the pericardium. The pericardium was grasped with an Allis clamp and was sharply entered the pericardium with scissors. Immediately upon entry into the pericardium we obtained thin serous fluid. This was suctioned out fully. A specimen was sent for cytology as well as a 2nd specimen was sent for Gram stain and culture. In total 300 mL of pericardial fluid was removed. The patient stabilized following removal of this fluid. We then removed a 1.5 x 1.5 cm portion of the pericardium and sent this for permanent pathology A 19 [place] channel drain was placed into the pericardial space and passed through a left lateral incision. This was sutured down with a drain stitch. Once we were satisfied with hemostasis, we began to close. The fascia was closed with a running 0 Vicryl suture. Soft tissue was closed with a running 3 0 Vicryl suture. Skin was closed with a running 4 Monocryl. Surgical glue was placed over the incision. A drain sponge was placed at drain site. The drain was placed to bulb suction. All needle, instrument and sponge counts were correct at conclusion of the procedure. Wand assessment of drain sponges was also performed and showed no retained sponges. Patient awoke and was extubated without issue. They were transferred to the PACU in stable condition.      I was present for the entire procedure.     Patient Disposition:  PACU         SIGNATURE: Migdalia Martinez MD  DATE: July 8, 2023  TIME: 10:33 AM

## 2023-07-08 NOTE — ASSESSMENT & PLAN NOTE
Assessment:   · No acute concerns at this time     Plan:   · Continue home regimen:   · Buspar 15mg BID  · Mirtazapine 15mg qHS   · Sertraline 200mg daily

## 2023-07-08 NOTE — ASSESSMENT & PLAN NOTE
Assessment:  · No evidence of acute exacerbation at this time post operatively   · RRT on 7/8/23 possibly partially contributed to by acute CHF exacerbation in the setting of hypertensive emergency in the setting of known LVH and diastolic dysfunction   · Current weight: 68.9kg  · Relevant echocardiograms:  · 7/8/23 TTE: Left ventricular cavity size is normal. Wall thickness is increased. The left ventricular ejection fraction is 50%. Systolic function is low normal. Wall motion cannot be accurately assessed. Left Atrium: The atrium is mildly dilated. Pericardium: There is a moderate to large pericardial effusion circumferential to the heart. There is a reasonable likelihood of cardiac tamponade.  The evidence for tamponade includes: right ventricular compression      Plan:   · Holding diuresis at this time  · Goal to maintain more controlled BP, ideally SBP < 180  · Daily weights  · Continue home afterload reduction agents:  · Cardene 5mg/hr  · Titrate to -180  · Continue home oral antihypertensives:   · Amlodipine 10mg daily   · Doxazosin 8mg BID  · Eplerenone 50mg daily   · Hydralazine 100mg TID  · PRN antihypertensives:   · Labetalol 10mg q6h PRN  · Hydralazine 10mg q6h PRN  · Cardiac, low Na diet

## 2023-07-08 NOTE — ASSESSMENT & PLAN NOTE
Assessment:   · Patient reported 4 bloody bowel movements on presentation to the ED on 7/8/23  · He states he only noticed blood the day prior, but given his iron supplementation, may have also been more chronic  · Prior humaira-en-Y gives additional high risk, particularly in the setting of uncontrolled hypertension     Plan:   · Protonix 80mg IV x1  · Protonix 40mg IV q12h   · Follow hemoglobin  · Make NPO with GI consult as indicated   · Monitor bowel movements

## 2023-07-08 NOTE — ASSESSMENT & PLAN NOTE
Assessment:   · Patient with acute-onset epigastric pain presented to THE [facility] on 7/8/23 and was found on CT to have a pericardiac effusion   · 7/8/23 TTE: There is a moderate to large pericardial effusion circumferential to the heart. There is a reasonable likelihood of cardiac tamponade. The evidence for tamponade includes: right ventricular compression.   · Prior TTE from May 2023 with trivial pericardial effusion   · Etiology possibly related to uncontrolled hypertension, but workup on going  · POD #0 s/p pericardial window and drain (7/8/23)     Plan:   · Thoracic surgery following, appreciate recommendation  · Monitor pericardial drain output amount and character  · Follow up cytology   · Analgesia:  · Tylenol 650mg q6h PRN  · Robaxin 500mg q6h PRN  · Oxycodone 2.5-5mg q4h PRN  · Dilaudid 0.2mg q3 PRN

## 2023-07-08 NOTE — ANESTHESIA PROCEDURE NOTES
Arterial Line Insertion    Performed by: Tanvir Tan MD  Authorized by: Tanvir Tan MD  Consent: The procedure was performed in an emergent situation. Patient understanding: patient states understanding of the procedure being performed  Patient consent: the patient's understanding of the procedure matches consent given  Procedure consent: procedure consent matches procedure scheduled  Relevant documents: relevant documents present and verified  Test results: test results available and properly labeled  Site marked: the operative site was marked  Radiology Images: Radiology Images displayed and confirmed. If images not available, report reviewed  Patient identity confirmed: verbally with patient, arm band and provided demographic data  Time out: Immediately prior to procedure a "time out" was called to verify the correct patient, procedure, equipment, support staff and site/side marked as required. Preparation: Patient was prepped and draped in the usual sterile fashion.   Indications: hemodynamic monitoring  Orientation:  Left  Location: radial arterylidocaine (XYLOCAINE) 2% - Infiltration, 2 injections - Left Arm   5 mL - 7/8/2023 9:50:00 AM  Procedure Details:  Needle gauge: 20  Seldinger technique: Seldinger technique used  Number of attempts: 2    Post-procedure:  Post-procedure: dressing applied  Waveform: good waveform  Post-procedure CNS: normal  Patient tolerance: patient tolerated the procedure well with no immediate complications

## 2023-07-08 NOTE — ASSESSMENT & PLAN NOTE
Assessment:   · No recent PFT's for review  · Patient states his breathing has improved overall since his gastric bypass     Plan:   · Symbicort BID  · Fluticasone daily   · Continue pulmonary hygiene. Incentive spirometer q1h while awake, encourage coughing and deep breathing. Upright positioning  · Suction as needed and closely monitor secretions. Maintain HOB >30 degrees.  Q4h oral care with chlorhexidine BID  · Maintain SpO2 > 90%

## 2023-07-08 NOTE — ANESTHESIA PREPROCEDURE EVALUATION
Procedure:  WINDOW PERICARDIAL (Chest)    Relevant Problems   CARDIO   (+) CHF (congestive heart failure) (HCC)   (+) Essential hypertension   (+) Hypertensive emergency      ENDO   (+) Type 2 diabetes mellitus (HCC)      /RENAL   (+) Acute renal failure superimposed on stage 3 chronic kidney disease (HCC)   (+) CKD stage 3 secondary to diabetes ([address])   (+) Hypertensive kidney disease with stage 3 chronic kidney disease (HCC)   (+) Renal cyst      HEMATOLOGY   (+) Anemia      NEURO/PSYCH   (+) Seizure (HCC)      PULMONARY   (+) COPD (chronic obstructive pulmonary disease) (HCC)   (+) AGUS (obstructive sleep apnea)      Other   (+) Splenic lesion        Physical Exam    Airway    Mallampati score: II         Dental   No notable dental hx     Cardiovascular      Pulmonary      Other Findings        Anesthesia Plan  ASA Score- 4 Emergent    Anesthesia Type- general with ASA Monitors. Additional Monitors: arterial line. Airway Plan: ETT. Comment: I, Dr. Chanell Louis, the attending physician, have personally seen and evaluated the patient prior to anesthetic care. I have reviewed the pre-anesthetic record, and other medical records if appropriate to the anesthetic care. If a CRNA is involved in the case, I have reviewed the CRNA assessment, if present, and agree. The patient is in a suitable condition to proceed with my formulated anesthetic plan. .       Plan Factors-    Chart reviewed. Induction- intravenous. Postoperative Plan-     Informed Consent- Anesthetic plan and risks discussed with patient. I personally reviewed this patient with the CRNA. Discussed and agreed on the Anesthesia Plan with the CRNA. [facility]

## 2023-07-08 NOTE — ED NOTES
Patient states pain level has improved. Patient states that he is feeling short of breath at times. ED provider aware.       Haydee Almaguer RN  07/08/23 0290

## 2023-07-08 NOTE — CONSULTS
Consultation Note - Thoracic Surgery  : [facility]  Christin Meigs 59 y.o. male MRN: [de-identified]  Unit/Bed#: [facility] 526-01 Encounter: [id number]        Assessment:  59y.o. year old male with cardiac tamponade    Plan:  - NPO  - Plan for emergent OR for pericardial window.   - Supportive care until taken to the OR    HPI:  Christin Meigs is a 59 y.o. male with a history of CKD, COPD, DM2, emphysema, HTN, AGUS, CHF. He presents with one day of chest pain and shortness of breath. CT AP was obtained which was concerning for pericardial tamponade. TTE was obtained which showed evidence of right ventricular collapse with large pericardial effusion. Endorsing chest pain, shortness of breath. EKG obtained which showed left axis deviation. Patient denies AC/AP use. Physical Exam:  General: Acute distress, alert and oriented  CV: Hypertensive, tachycardic, muffled heart sounds. Lungs: Tachypnea, on 5L NC. Abdomen: Soft, non-tender, non-distended. Extremities: No clubbing or cyanosis  Skin: Warm, dry        Review of Systems   Review of system negative except as above. Objective       No intake or output data in the 24 hours ending 07/08/23 0929    First Vitals:   Blood Pressure: (!) 230/135 (07/08/23 0927)  Pulse: 104 (07/08/23 0927)  Respirations: (!) 30 (07/08/23 0927)  SpO2: 100 % (07/08/23 0927)    Current Vitals:   Blood Pressure: (!) 230/135 (07/08/23 0927)  Pulse: 104 (07/08/23 0927)  Respirations: (!) 30 (07/08/23 0927)  SpO2: 100 % (07/08/23 0927)    Invasive Devices     Peripheral Intravenous Line  Duration           Peripheral IV 07/08/23 Left Antecubital <1 day    Peripheral IV 07/08/23 Right Antecubital <1 day                Imaging: I have personally reviewed pertinent reports. CT abdomen pelvis wo contrast    Result Date: 7/8/2023  Impression: 1. Moderate pericardial effusion, significantly increased from prior.  Additionally there appears to be mass effect on the right lateral ventricle. Recommend echocardiogram and correlation for cardiac tamponade. 2.  Likely mild mesenteric edema/ascites I personally discussed this study with [address] [place] [address] at 7/8/23 4:41 AM Workstation performed: [id number]       EKG, Pathology, and Other Studies: I have personally reviewed pertinent reports. Historical Information   Past Medical History:   Diagnosis Date   • Asthma    • Chronic kidney disease    • COPD (chronic obstructive pulmonary disease) (HCC)    • CPAP (continuous positive airway pressure) dependence    • Diabetes mellitus (HCC)    • Emphysema of lung (HCC)    • Gout    • History of transfusion     pt stated they had a transfusion when they had gallbladder surgery. • Hypertension    • Kidney disease     renal failure   • Leg DVT (deep venous thromboembolism), acute, bilateral ([address]) 01/2018   • Obesity (BMI 30-39. 9)    • AGUS on CPAP     setting 11   • Postgastrectomy malabsorption    • Sleep apnea    • Systolic CHF [facility])      Past Surgical History:   Procedure Laterality Date   • ADRENALECTOMY     • CHOLECYSTECTOMY     • COLONOSCOPY     • EGD     • HIATAL HERNIA REPAIR N/A 12/22/2020    Procedure: REPAIR HERNIA HIATAL LAPAROSCOPIC;  Surgeon: Nicolette Joshi MD;  [facility];  Service: Bariatrics   • INCISION AND DRAINAGE OF WOUND Left 10/17/2018    Procedure: INCISION AND DRAINAGE (I&D) GROIN;  Surgeon: Leodan Cunningham MD;  [facility];  Service: General   • IR IMAGE [address] / [address] W TUBE  8/17/2018   • IR IMAGE [address] / DRAINAGE W TUBE  7/31/2018   • JOINT REPLACEMENT Bilateral     knee   • KIDNEY SURGERY  2009    nodule removal   • LYMPH NODE DISSECTION Left 01/2018    left inguinal LN removed - benign    • OTHER SURGICAL HISTORY      kidney nodule removal   • PALATE / UVULA BIOPSY / EXCISION     • SD LAPS GSTR RSTCV PX W/BYP JASON-EN-Y LIMB <150 CM N/A 12/22/2020    Procedure: BYPASS GASTRIC  JASON-EN-Y LAPAROSCOPIC WITH INTRAOPERATIVE EGD;  Surgeon: Kevin Franco MD;  [facility];  Service: Bariatrics   • SINUS SURGERY     • TONSILLECTOMY       Social History   Social History     Substance and Sexual Activity   Alcohol Use Yes    Comment: occasionally     Social History     Substance and Sexual Activity   Drug Use No     Social History     Tobacco Use   Smoking Status Never   Smokeless Tobacco Never     Family History   Problem Relation Age of Onset   • Heart murmur Sister    • Asthma Sister    • Hypertension Sister    • Kidney disease Mother    • Cancer Father    • Bell's palsy Brother    • Kidney disease Brother    • Deep vein thrombosis Neg Hx        Meds/Allergies   all current active meds have been reviewed, current meds:   Current Facility-Administered Medications   Medication Dose Route Frequency   • hydrALAZINE (APRESOLINE) injection 5 mg  5 mg Intravenous Q6H PRN   • LORazepam (ATIVAN) injection 0.5 mg  0.5 mg Intravenous Once   • morphine injection 2 mg  2 mg Intravenous Q4H PRN    and PTA meds:   Prior to Admission Medications   Prescriptions Last Dose Informant Patient Reported? Taking?    Calcium 600 MG tablet  Self Yes No   Sig: Take 1,200 mg by mouth   Cholecalciferol (VITAMIN D-3) 1000 units CAPS  Self Yes No   Sig: Take 2,000 Units by mouth daily   Multiple Vitamins-Minerals (multivitamin with minerals) tablet  Self Yes No   Sig: Take 1 tablet by mouth daily   Omega-3 Fatty Acids (FISH OIL) 1200 MG CAPS  Self Yes No   Sig: Take 1,200 mg by mouth daily at bedtime   Patient not taking: Reported on 2023   Pulmicort Flexhaler 90 MCG/ACT inhaler  Self Yes No   Si puffs 2 (two) times a day   albuterol (PROVENTIL HFA,VENTOLIN HFA) 90 mcg/act inhaler  Self Yes No   amLODIPine (NORVASC) 10 mg tablet  Self Yes No   Sig: Take 10 mg by mouth   atorvastatin (LIPITOR) 40 mg tablet  Self Yes No   Sig: Take 40 mg by mouth daily at bedtime   busPIRone (BUSPAR) 15 mg tablet  Self Yes No   Sig: TAKE ONE TABLET BY MOUTH TWICE A DAY FOR PTSD   doxazosin (CARDURA) 8 MG tablet  Self Yes No   Sig: Take 8 mg by mouth 2 (two) times a day   eplerenone (INSPRA) 50 MG tablet  Self Yes No   Sig: Take 50 mg by mouth in the morning   ferrous sulfate 325 (65 Fe) mg tablet  Self Yes No   Sig: Take 325 mg by mouth 2 (two) times a day     glycopyrrolate-formoterol (BEVESPI AEROSPHERE) 9-4.8 MCG/ACT inhaler  Self Yes No   Sig: Inhale 2 puffs daily after breakfast   hydrALAZINE (APRESOLINE) 100 MG tablet   No No   Sig: Take 1 tablet (100 mg total) by mouth 3 (three) times a day   labetalol (NORMODYNE) 300 mg tablet  Self No No   Sig: Take 1 tablet (300 mg total) by mouth every 12 (twelve) hours   levETIRAcetam (KEPPRA) 750 mg tablet  Self No No   Sig: Take 1 tablet (750 mg total) by mouth every 12 (twelve) hours   mirtazapine (REMERON) 15 mg tablet  Self Yes No   Sig: 15 mg   sertraline (ZOLOFT) 100 mg tablet  Self Yes No   Sig: Take 2 tablets by mouth daily   umeclidinium-vilanterol (ANORO ELLIPTA) 62.5-25 MCG/INH inhaler  Self Yes No   Sig: Inhale 1 puff daily      Facility-Administered Medications: None     Allergies   Allergen Reactions   • Clonidine Anaphylaxis   • Lisinopril Anaphylaxis   • Nifedipine Anaphylaxis   • Spironolactone Anaphylaxis, Other (See Comments) and Shortness Of Breath   • Buspirone      Headaches,    • Enoxaparin      Injection site "bump" per Dr. Aurea Street   • Minoxidil      Retains fluid around heart       Lab Results: I have personally reviewed pertinent lab results.   , CBC:   Lab Results   Component Value Date    WBC 13.77 (H) 07/08/2023    HGB 7.8 (L) 07/08/2023    HCT 23.2 (L) 07/08/2023    MCV 84 07/08/2023     07/08/2023    RBC 2.78 (L) 07/08/2023    MCH 28.1 07/08/2023    MCHC 33.6 07/08/2023    RDW 14.1 07/08/2023    MPV 9.5 07/08/2023    NRBC 0 07/08/2023   , CMP:   Lab Results   Component Value Date    SODIUM 139 07/08/2023    K 4.2 07/08/2023     07/08/2023    CO2 21 07/08/2023    BUN 63 (H) 07/08/2023    CREATININE 1.59 (H) 07/08/2023    CALCIUM 9.7 07/08/2023    AST 52 (H) 07/08/2023    ALT 64 (H) 07/08/2023    ALKPHOS 58 07/08/2023    EGFR 46 07/08/2023       Counseling / Coordination of Care  Total floor / unit time spent today 25 minutes. Greater than 50% of total time was spent with the patient and / or family counseling and / or coordination of care.     Chandler Aviles MD  7/8/2023 9:29 AM

## 2023-07-08 NOTE — H&P
[facility]  H&P  Name: Chayo Lees 59 y.o. male I MRN: [de-identified]  Unit/Bed#: [facility] 526-01 I Date of Admission: 7/8/2023   Date of Service: 7/8/2023 I Hospital Day: 0      Assessment/Plan   * Cardiac tamponade  Assessment & Plan  [email or web address] with symptoms of chest pain shortness of breath epigastric pain  He was noted to have pericardial effusion with concerns for tamponade  He was transferred to [address] [place] [address] for thoracic surgery evaluation  Thoracic surgery at bedside  Patient to be transferred to the OR for operative intervention with thoracic surgery  Thoracic surgery following        Hypertensive urgency  Assessment & Plan  Hypertensive urgency present on admission  Postoperatively upgraded to level 1 stepdown, IV nicardipine  Monitor blood pressures closely    Meningioma [facility])  Assessment & Plan  Outpatient follow-up    Type 2 diabetes mellitus [facility])  Assessment & Plan  Lab Results   Component Value Date    HGBA1C 5.2 05/12/2023       Recent Labs     07/08/23  1101   POCGLU 127       Blood Sugar Average: Last 72 hrs:     Monitor Accu-Cheks  Avoid hypoglycemia    Anemia  Assessment & Plan  Likely multifactorial  Follow-up on iron studies  Monitor hemoglobin    CKD stage 3 secondary to diabetes [facility])  Assessment & Plan  Monitor kidney function closely  Avoid nephrotoxins  Monitor postvoid residuals    Chronic diastolic CHF (congestive heart failure) (HCC)  Assessment & Plan  Wt Readings from Last 3 Encounters:   07/08/23 68.9 kg (152 lb)   05/23/23 68.9 kg (152 lb)   05/12/23 63.9 kg (140 lb 14 oz)     LVEF 60 to 68%, LVH noted  Monitor I/O, daily weights          COPD (chronic obstructive pulmonary disease) ([address])  Assessment & Plan  Not in exacerbation presently  Continue respiratory treatments and supportive cares    Essential hypertension  Assessment & Plan  Blood pressures presently uncontrolled with hypertensive urgency noted  Postoperatively patient ambulating to level 1 stepdown for close monitoring possibly IV nicardipine administration  Monitor blood pressures         VTE Pharmacologic Prophylaxis: VTE Score: 1 Low Risk (Score 0-2) - Encourage Ambulation. Code Status: Level 1 - Full Code     Discussion with family: Cussed with patient, updated spouse Maraim Lester, questions answered. Anticipated Length of Stay: Patient will be admitted on an inpatient basis with an anticipated length of stay of greater than 2 midnights secondary to Pericardial effusion cardiac tamponade for operative management as outlined. Chief Complaint:     Transferred from [address] [place] [address] ED to [facility] for management of pericardial effusion/tamponade    History of Present Illness:  Ken Barahona is a 59 y.o. male with a PMH of hypertension, chronic diastolic congestive heart failure, chronic kidney disease, history of diabetes, meningioma who is transferred from [facility] to [address] [place] [address] for management of pericardial effusion/tamponade. Patient initially presented to [address] [place] [address] with symptoms of shortness of breath epigastric pain feeling unwell. History is obtained from the patient as well as his spouse and review of chart. Presently patient is dyspneic in distress reports epigastric and chest discomfort. Immediately on his arrival to the floor I was at the bedside. "Can I have something to drink I am thirsty". RN at bedside. Given his distress hypertensive urgency pericardial tamponade rapid response was called. Kindly review the rapid response sheet for details. Thoracic surgery at bedside. Arrangements underway for transferring him to the OR for operative management of pericardial tamponade.     History chart labs medications reviewed  Prior hospitalization reviewed in epic    Review of Systems:  Review of Systems   All other systems reviewed and are negative. Past Medical and Surgical History:   Past Medical History:   Diagnosis Date   • Asthma    • Chronic kidney disease    • COPD (chronic obstructive pulmonary disease) (HCC)    • CPAP (continuous positive airway pressure) dependence    • Diabetes mellitus ([address])    • Emphysema of lung (HCC)    • Gout    • History of transfusion     pt stated they had a transfusion when they had gallbladder surgery. • Hypertension    • Kidney disease     renal failure   • Leg DVT (deep venous thromboembolism), acute, bilateral ([address]) 01/2018   • Obesity (BMI 30-39. 9)    • AGUS on CPAP     setting 11   • Postgastrectomy malabsorption    • Sleep apnea    • Systolic CHF [facility])        Past Surgical History:   Procedure Laterality Date   • ADRENALECTOMY     • CHOLECYSTECTOMY     • COLONOSCOPY     • EGD     • HIATAL HERNIA REPAIR N/A 12/22/2020    Procedure: REPAIR HERNIA HIATAL LAPAROSCOPIC;  Surgeon: Hima Stewart MD;  [facility];  Service: Bariatrics   • INCISION AND DRAINAGE OF WOUND Left 10/17/2018    Procedure: INCISION AND DRAINAGE (I&D) GROIN;  Surgeon: Feliz Johns MD;  [facility];  Service: General   • IR IMAGE [address] / [facility]  8/17/2018   • IR IMAGE [address] / DRAINAGE W TUBE  7/31/2018   • JOINT REPLACEMENT Bilateral     knee   • KIDNEY SURGERY  2009    nodule removal   • LYMPH NODE DISSECTION Left 01/2018    left inguinal LN removed - benign    • OTHER SURGICAL HISTORY      kidney nodule removal   • PALATE / UVULA BIOPSY / EXCISION     • MO LAPS GSTR RSTCV PX W/BYP JASON-EN-Y LIMB <150 CM N/A 12/22/2020    Procedure: BYPASS GASTRIC  JASON-EN-Y LAPAROSCOPIC WITH INTRAOPERATIVE EGD;  Surgeon: Hima Stewart MD;  [facility];  Service: Bariatrics   • SINUS SURGERY     • TONSILLECTOMY         Meds/Allergies:  Prior to Admission medications    Medication Sig Start Date End Date Taking?  Authorizing Provider   albuterol (PROVENTIL HFA,VENTOLIN HFA) 90 mcg/act inhaler     Historical Provider, MD   amLODIPine (NORVASC) 10 mg tablet Take 10 mg by mouth 4/15/20   Historical Provider, MD   atorvastatin (LIPITOR) 40 mg tablet Take 40 mg by mouth daily at bedtime 7/21/22   Historical Provider, MD   busPIRone (BUSPAR) 15 mg tablet TAKE ONE TABLET BY MOUTH TWICE A DAY FOR PTSD    Historical Provider, MD   Calcium 600 MG tablet Take 1,200 mg by mouth    Historical Provider, MD   Cholecalciferol (VITAMIN D-3) 1000 units CAPS Take 2,000 Units by mouth daily    Historical Provider, MD   doxazosin (CARDURA) 8 MG tablet Take 8 mg by mouth 2 (two) times a day 2/3/21   Historical Provider, MD   eplerenone (INSPRA) 50 MG tablet Take 50 mg by mouth in the morning 5/23/17   Historical Provider, MD   ferrous sulfate 325 (65 Fe) mg tablet Take 325 mg by mouth 2 (two) times a day      Historical Provider, MD   glycopyrrolate-formoterol (Cassandra Raddle) 9-4.8 MCG/ACT inhaler Inhale 2 puffs daily after breakfast    Historical Provider, MD   hydrALAZINE (APRESOLINE) 100 MG tablet Take 1 tablet (100 mg total) by mouth 3 (three) times a day 5/23/23   MARBIN Abdi   labetalol (NORMODYNE) 300 mg tablet Take 1 tablet (300 mg total) by mouth every 12 (twelve) hours 5/14/23 6/13/23  Addison Gilford, MD   levETIRAcetam (KEPPRA) 750 mg tablet Take 1 tablet (750 mg total) by mouth every 12 (twelve) hours 5/14/23 6/13/23  Addison Gilford, MD   mirtazapine (REMERON) 15 mg tablet 15 mg 9/1/22   Historical Provider, MD   Multiple Vitamins-Minerals (multivitamin with minerals) tablet Take 1 tablet by mouth daily    Historical Provider, MD   Omega-3 Fatty Acids (FISH OIL) 1200 MG CAPS Take 1,200 mg by mouth daily at bedtime  Patient not taking: Reported on 5/23/2023    Historical Provider, MD   Pulmicort Flexhaler 90 MCG/ACT inhaler 2 puffs 2 (two) times a day 3/9/21   Historical Provider, MD   sertraline (ZOLOFT) 100 mg tablet Take 2 tablets by mouth daily 6/15/22 Historical Provider, MD   umeclidinium-vilanterol (ANORO ELLIPTA) 62.5-25 MCG/INH inhaler Inhale 1 puff daily    Historical Provider, MD     I have reviewed home medications using recent Epic encounter. Allergies:    Allergies   Allergen Reactions   • Clonidine Anaphylaxis   • Lisinopril Anaphylaxis   • Nifedipine Anaphylaxis   • Spironolactone Anaphylaxis, Other (See Comments) and Shortness Of Breath   • Buspirone      Headaches,    • Enoxaparin      Injection site "bump" per Dr. Joyce Rounds   • Minoxidil      Retains fluid around heart       Social History:  Marital Status: /Civil Union   Occupation:   Patient Pre-hospital Living Situation: Home  Patient Pre-hospital Level of Mobility: walks  Patient Pre-hospital Diet Restrictions: no  Substance Use History:   Social History     Substance and Sexual Activity   Alcohol Use Yes    Comment: occasionally     Social History     Tobacco Use   Smoking Status Never   Smokeless Tobacco Never     Social History     Substance and Sexual Activity   Drug Use No       Family History:  Family History   Problem Relation Age of Onset   • Heart murmur Sister    • Asthma Sister    • Hypertension Sister    • Kidney disease Mother    • Cancer Father    • Bell's palsy Brother    • Kidney disease Brother    • Deep vein thrombosis Neg Hx        Physical Exam:     Vitals:   Blood Pressure: 165/93 (07/08/23 1200)  Pulse: 86 (07/08/23 1200)  Temperature: 98 °F (36.7 °C) (07/08/23 1200)  Respirations: 17 (07/08/23 1200)  SpO2: 100 % (07/08/23 1200)    Physical Exam       Patient is dyspneic  Barely able to complete the sentences  Tachypnea noted  Neck supple  Lungs placement is  Diminished breath sounds bilaterally  Vesicular breath sounds  Heart sounds S1 and S2 noted heart sounds are distant  Tachycardia noted  Abdomen soft nontender  Awake obey simple commands  No pedal edema  No rash    Additional Data:     Lab Results:  Results from last 7 days   Lab Units 07/08/23  0137   WBC Thousand/uL 13.77*   HEMOGLOBIN g/dL 7.8*   HEMATOCRIT % 23.2*   PLATELETS Thousands/uL 342   NEUTROS PCT % 84*   LYMPHS PCT % 12*   MONOS PCT % 4   EOS PCT % 0     Results from last 7 days   Lab Units 07/08/23  0137   SODIUM mmol/L 139   POTASSIUM mmol/L 4.2   CHLORIDE mmol/L 106   CO2 mmol/L 21   BUN mg/dL 63*   CREATININE mg/dL 1.59*   ANION GAP mmol/L 12   CALCIUM mg/dL 9.7   ALBUMIN g/dL 4.1   TOTAL BILIRUBIN mg/dL 1.21*   ALK PHOS U/L 58   ALT U/L 64*   AST U/L 52*   GLUCOSE RANDOM mg/dL 136         Results from last 7 days   Lab Units 07/08/23  1101   POC GLUCOSE mg/dl 127         Results from last 7 days   Lab Units 07/08/23  0415 07/08/23  0137   LACTIC ACID mmol/L 2.0 3.3*       Lines/Drains:  Invasive Devices     Peripheral Intravenous Line  Duration           Peripheral IV 07/08/23 Left Antecubital <1 day    Peripheral IV 07/08/23 Right Antecubital <1 day          Arterial Line  Duration           Arterial Line 07/08/23 Left Radial <1 day          Drain  Duration           Closed/Suction Drain Pericardial Bulb 19 Fr. <1 day                    Imaging: Reviewed radiology reports from this admission including: chest CT scan and ECHO  XR chest portable    (Results Pending)       EKG and Other Studies Reviewed on Admission:   · EKG: LVH, sinus rhythm. ** Please Note: This note has been constructed using a voice recognition system.  **

## 2023-07-08 NOTE — ED PROVIDER NOTES
EM resident responded to rapid response  On arrival patient was maintaining his airway, no emergent intervention required.    Patient taken to OR promptly after evaluation     Graeme Lobato MD  07/08/23 9305

## 2023-07-08 NOTE — ED NOTES
Transfer information     Going to [facility] P5 [id number]   LF [id number]  Accepting Noel  Report Pete Small RN  07/08/23 6959

## 2023-07-08 NOTE — EMTALA/ACUTE CARE TRANSFER
[address] [facility] [address]  Dept: [phone]      EMTALA TRANSFER CONSENT    NAME Moises Breen                                         1965                              MRN [id number]    I have been informed of my rights regarding examination, treatment, and transfer   by Dr. Ferguson Standing*    Benefits:      Risks:        Consent for Transfer:  I acknowledge that my medical condition has been evaluated and explained to me by the emergency department physician or other qualified medical person and/or my attending physician, who has recommended that I be transferred to the service of    at  . The above potential benefits of such transfer, the potential risks associated with such transfer, and the probable risks of not being transferred have been explained to me, and I fully understand them. The doctor has explained that, in my case, the benefits of transfer outweigh the risks. I agree to be transferred. I authorize the performance of emergency medical procedures and treatments upon me in both transit and upon arrival at the receiving facility. Additionally, I authorize the release of any and all medical records to the receiving facility and request they be transported with me, if possible. I understand that the safest mode of transportation during a medical emergency is an ambulance and that the Hospital advocates the use of this mode of transport. Risks of traveling to the receiving facility by car, including absence of medical control, life sustaining equipment, such as oxygen, and medical personnel has been explained to me and I fully understand them. (ANA CORRECT BOX BELOW)  [  ]  I consent to the stated transfer and to be transported by ambulance/helicopter. [  ]  I consent to the stated transfer, but refuse transportation by ambulance and accept full responsibility for my transportation by car.   I understand the risks of non-ambulance transfers and I exonerate the Hospital and its staff from any deterioration in my condition that results from this refusal.    X___________________________________________    DATE  23  TIME________  Signature of patient or legally responsible individual signing on patient behalf           RELATIONSHIP TO PATIENT_________________________          Provider Certification    NAME Jaime Kingsley                                         1965                              MRN [id number]    A medical screening exam was performed on the above named patient. Based on the examination:    Condition Necessitating Transfer The encounter diagnosis was Pericardial tamponade. Patient Condition:      Reason for Transfer:      Transfer Requirements: Facility     · Space available and qualified personnel available for treatment as acknowledged by    · Agreed to accept transfer and to provide appropriate medical treatment as acknowledged by          · Appropriate medical records of the examination and treatment of the patient are provided at the time of transfer   [address] _______  · Transfer will be performed by qualified personnel from    and appropriate transfer equipment as required, including the use of necessary and appropriate life support measures.     Provider Certification: I have examined the patient and explained the following risks and benefits of being transferred/refusing transfer to the patient/family:         Based on these reasonable risks and benefits to the patient and/or the unborn child(niin), and based upon the information available at the time of the patient’s examination, I certify that the medical benefits reasonably to be expected from the provision of appropriate medical treatments at another medical facility outweigh the increasing risks, if any, to the individual’s medical condition, and in the case of labor to the unborn child, from effecting the transfer.     X____________________________________________ DATE 07/08/23        TIME_______      ORIGINAL - SEND TO MEDICAL RECORDS   COPY - SEND WITH PATIENT DURING TRANSFER

## 2023-07-08 NOTE — ED NOTES
ED provider aware that patient's blood pressure unchanged after Labetalol administration.       Allegra Limon RN  07/08/23 0688

## 2023-07-08 NOTE — ASSESSMENT & PLAN NOTE
Patient interested presented to [address] [place] [address] ED with symptoms of chest pain shortness of breath epigastric pain  He was noted to have pericardial effusion with concerns for tamponade  He was transferred to [address] [place] [address] for thoracic surgery evaluation  Thoracic surgery at bedside  Patient to be transferred to the OR for operative intervention with thoracic surgery  Thoracic surgery following

## 2023-07-08 NOTE — ED CARE HANDOFF
Emergency Department Sign Out Note        Sign out and transfer of care from Dr. Winifred Clarke. See Separate Emergency Department note. The patient, Christin Meigs, was evaluated by the previous provider for abdominal pain, SOB. CT showed pericardial effusion. Cardiology consulted overnight, echo pending at time of sign out. Dr. Joseph Pastor, interventional cardiologist contacted me that echo shows cardiac tamponade and will need to transfer to [facility] for thoracic surgery, pericardial window. Discussed with thoracic surgery Dr. José Miguel Sanchez, will send as priority 1, [facility] has accepted for stepdown. CriticalCare Time    Date/Time: 7/8/2023 11:05 AM    Performed by: Britany Dumont MD  Authorized by: Britany Dumont MD    Critical care provider statement:     Critical care time (minutes):  38    Critical care was necessary to treat or prevent imminent or life-threatening deterioration of the following conditions: cardiac tamponade requiring frequent reassessments prior to transfer. MDM        Disposition  Final diagnoses:   Pericardial tamponade     Time reflects when diagnosis was documented in both MDM as applicable and the Disposition within this note     Time User Action Codes Description Comment    7/8/2023  7:32 AM Thompson Nick Add [I31.4] Pericardial tamponade       ED Disposition     ED Disposition   Transfer to Another Facility-In Network    Condition   --    Date/Time   Sat Jul 8, 2023  7:32 AM    Comment   Christin Meigs should be transferred out to [facility].            MD Documentation    Kip Scales Most Recent Value   Patient Condition The patient has been stabilized such that within reasonable medical probability, no material deterioration of the patient condition or the condition of the unborn child(nini) is likely to result from the transfer   Benefits of Transfer Specialized equipment and/or services available at the receiving facility (Include comment)________________________ Risks of Transfer Potential for delay in receiving treatment   Accepting Physician [address] [facility] Name, [address]   Sending MD [facility]   Provider Certification General risk, such as traffic hazards, adverse weather conditions, rough terrain or turbulence, possible failure of equipment (including vehicle or aircraft), or consequences of actions of persons outside the control of the transport personnel      RN Documentation    [address] Name, [address] [facility]      Follow-up Information    None       Discharge Medication List as of 7/8/2023  8:43 AM      CONTINUE these medications which have NOT CHANGED    Details   albuterol (PROVENTIL HFA,VENTOLIN HFA) 90 mcg/act inhaler Historical Med      amLODIPine (NORVASC) 10 mg tablet Take 10 mg by mouth, Starting Wed 4/15/2020, Historical Med      atorvastatin (LIPITOR) 40 mg tablet Take 40 mg by mouth daily at bedtime, Starting Thu 7/21/2022, Historical Med      busPIRone (BUSPAR) 15 mg tablet TAKE ONE TABLET BY MOUTH TWICE A DAY FOR PTSD, Historical Med      Calcium 600 MG tablet Take 1,200 mg by mouth, Historical Med      Cholecalciferol (VITAMIN D-3) 1000 units CAPS Take 2,000 Units by mouth daily, Historical Med      doxazosin (CARDURA) 8 MG tablet Take 8 mg by mouth 2 (two) times a day, Starting Wed 2/3/2021, Historical Med      eplerenone (INSPRA) 50 MG tablet Take 50 mg by mouth in the morning, Starting Tue 5/23/2017, Historical Med      ferrous sulfate 325 (65 Fe) mg tablet Take 325 mg by mouth 2 (two) times a day  , Historical Med      glycopyrrolate-formoterol (BEVESPI AEROSPHERE) 9-4.8 MCG/ACT inhaler Inhale 2 puffs daily after breakfast, Historical Med      hydrALAZINE (APRESOLINE) 100 MG tablet Take 1 tablet (100 mg total) by mouth 3 (three) times a day, Starting Tue 5/23/2023, Normal      labetalol (NORMODYNE) 300 mg tablet Take 1 tablet (300 mg total) by mouth every 12 (twelve) hours, Starting Sun 5/14/2023, Until Tue 6/13/2023, Normal      levETIRAcetam (KEPPRA) 750 mg tablet Take 1 tablet (750 mg total) by mouth every 12 (twelve) hours, Starting Sun 5/14/2023, Until Tue 6/13/2023, Normal      mirtazapine (REMERON) 15 mg tablet 15 mg, Starting Thu 9/1/2022, Historical Med      Multiple Vitamins-Minerals (multivitamin with minerals) tablet Take 1 tablet by mouth daily, Historical Med      Omega-3 Fatty Acids (FISH OIL) 1200 MG CAPS Take 1,200 mg by mouth daily at bedtime, Historical Med      Pulmicort Flexhaler 90 MCG/ACT inhaler 2 puffs 2 (two) times a day, Starting Tue 3/9/2021, Historical Med      sertraline (ZOLOFT) 100 mg tablet Take 2 tablets by mouth daily, Starting Wed 6/15/2022, Historical Med      umeclidinium-vilanterol (ANORO ELLIPTA) 62.5-25 MCG/INH inhaler Inhale 1 puff daily, Historical Med           No discharge procedures on file.        ED Provider  Electronically Signed by     Reyna Noguera MD  07/08/23 5896

## 2023-07-08 NOTE — RAPID RESPONSE
Rapid Response Note  Nayeli Lima 59 y.o. male MRN: [de-identified]  Unit/Bed#: [facility] 526-01 Encounter: [id number]    Rapid Response Notification(s):   Response called date/time:  7/8/2023 9:25 AM  Response team arrival date/time:  7/8/2023 9:25 AM  Response end date/time:  7/8/2023 9:38 AM  Level of care:  Stepdown 2  Rapid response location:  Stepdown unit  Primary reason for rapid response call:  Acute change in BP and acute change in RR    Rapid Response Intervention(s):   Airway:  None  Breathing:  None  Circulation:  Electrocardiogram  Fluids administered:  Isolyte/plasmalyte  Medications administered:  None       Assessment:   · Cardiac tamponade    Plan:   · Emergent transfer to OR for pericardial window and drain placement     Rapid Response Outcome:   Primary service notified of transfer: Yes    Comments:  Primary team at bedside. Transfer to OR with Thoracic surgery       Background/Situation:   Nayeli Lima is a 59 y.o. male who was transferred from [facility] for cardiac tamponade. Upon arrival, patient in respiratory distress with SBP in the 230s. Rapid response called. Thoracic surgery team at bedside. Cardiology called to bedside incase unable to go to OR emergently. Review of Systems   Respiratory: Positive for shortness of breath. Cardiovascular: Positive for chest pain. Objective:   Vitals:    07/08/23 0927   BP: (!) 230/135   Pulse: 104   Resp: (!) 30   SpO2: 100%     Physical Exam  Constitutional:       General: He is in acute distress. HENT:      Head: Atraumatic. Mouth/Throat:      Mouth: Mucous membranes are moist.   Cardiovascular:      Rate and Rhythm: Regular rhythm. Tachycardia present. Pulmonary:      Effort: Tachypnea and respiratory distress present. Abdominal:      Palpations: Abdomen is soft. Musculoskeletal:      Cervical back: Neck supple. Right lower leg: No edema. Left lower leg: No edema.          Portions of the record may have been created with voice recognition software. Occasional wrong word or "sound a like" substitutions may have occurred due to the inherent limitations of voice recognition software. Read the chart carefully and recognize, using context, where substitutions have occurred.     Lena Mohan PA-C

## 2023-07-08 NOTE — ED NOTES
Report given to Elisabeth biswas RN at [facility]. Report specifics including PMH, current presentation, most recent vitals, and recommendations/assessments. Pt leaving with [facility] at this time, denies pain, no further needs expressed at this time.        Azucena Mirza RN  07/08/23 0612

## 2023-07-08 NOTE — ANESTHESIA POSTPROCEDURE EVALUATION
Post-Op Assessment Note    CV Status:  Stable    Pain management: adequate     Mental Status:  Alert   Hydration Status:  Stable   PONV Controlled:  None   Airway Patency:  Patent      Post Op Vitals Reviewed: Yes      Staff: Anesthesiologist         No notable events documented.     BP      Temp      Pulse     Resp      SpO2

## 2023-07-08 NOTE — ASSESSMENT & PLAN NOTE
Assessment:  · Concern for acute on chronic anemia possibly from GI source  · Likely also has some degree of malabsorption and possible nutrient deficiency as a result of gastric bypass   · Baseline hemoglobin: 12.9 - 12.6  · Initial hemoglobin: 7.8 (7/8/23)  · Last hemoglobin: 7.6 (7/8/23)    Plan:   · Transfuse for hemoglobin <7.0  · Check hemoglobin later tonight  · Monitor for signs of bleeding   · Ferrous sulfate 325mg BID  · CBC in AM

## 2023-07-08 NOTE — ASSESSMENT & PLAN NOTE
Hypertensive urgency present on admission  Postoperatively upgraded to level 1 stepdown, IV nicardipine  Monitor blood pressures closely

## 2023-07-08 NOTE — ED PROVIDER NOTES
History  Chief Complaint   Patient presents with   • Abdominal Pain     Pt c/o lower abd pain all day +vomiting. Pt also reports 4 bloody BMs today       History provided by:  Patient and caregiver   used: No    Abdominal Pain  Pain location:  Generalized  Pain radiates to:  Does not radiate  Onset quality:  Gradual  Timing:  Constant  Progression:  Worsening  Chronicity:  New  Context: not diet changes, not recent illness, not sick contacts, not suspicious food intake and not trauma    Relieved by:  Nothing  Worsened by:  Nothing  Ineffective treatments:  None tried  Associated symptoms: fatigue    Associated symptoms: no constipation, no cough, no fever, no hematemesis, no hematochezia and no hematuria        Prior to Admission Medications   Prescriptions Last Dose Informant Patient Reported? Taking?    Calcium 600 MG tablet  Self Yes No   Sig: Take 1,200 mg by mouth   Cholecalciferol (VITAMIN D-3) 1000 units CAPS  Self Yes No   Sig: Take 2,000 Units by mouth daily   Multiple Vitamins-Minerals (multivitamin with minerals) tablet  Self Yes No   Sig: Take 1 tablet by mouth daily   Omega-3 Fatty Acids (FISH OIL) 1200 MG CAPS  Self Yes No   Sig: Take 1,200 mg by mouth daily at bedtime   Patient not taking: Reported on 2023   Pulmicort Flexhaler 90 MCG/ACT inhaler  Self Yes No   Si puffs 2 (two) times a day   albuterol (PROVENTIL HFA,VENTOLIN HFA) 90 mcg/act inhaler  Self Yes No   amLODIPine (NORVASC) 10 mg tablet  Self Yes No   Sig: Take 10 mg by mouth   atorvastatin (LIPITOR) 40 mg tablet  Self Yes No   Sig: Take 40 mg by mouth daily at bedtime   busPIRone (BUSPAR) 15 mg tablet  Self Yes No   Sig: TAKE ONE TABLET BY MOUTH TWICE A DAY FOR PTSD   doxazosin (CARDURA) 8 MG tablet  Self Yes No   Sig: Take 8 mg by mouth 2 (two) times a day   eplerenone (INSPRA) 50 MG tablet  Self Yes No   Sig: Take 50 mg by mouth in the morning   ferrous sulfate 325 (65 Fe) mg tablet  Self Yes No   Sig: Take 325 mg by mouth 2 (two) times a day     glycopyrrolate-formoterol (BEVESPI AEROSPHERE) 9-4.8 MCG/ACT inhaler  Self Yes No   Sig: Inhale 2 puffs daily after breakfast   hydrALAZINE (APRESOLINE) 100 MG tablet   No No   Sig: Take 1 tablet (100 mg total) by mouth 3 (three) times a day   labetalol (NORMODYNE) 300 mg tablet  Self No No   Sig: Take 1 tablet (300 mg total) by mouth every 12 (twelve) hours   levETIRAcetam (KEPPRA) 750 mg tablet  Self No No   Sig: Take 1 tablet (750 mg total) by mouth every 12 (twelve) hours   mirtazapine (REMERON) 15 mg tablet  Self Yes No   Sig: 15 mg   sertraline (ZOLOFT) 100 mg tablet  Self Yes No   Sig: Take 2 tablets by mouth daily   umeclidinium-vilanterol (ANORO ELLIPTA) 62.5-25 MCG/INH inhaler  Self Yes No   Sig: Inhale 1 puff daily      Facility-Administered Medications: None       Past Medical History:   Diagnosis Date   • Asthma    • Chronic kidney disease    • COPD (chronic obstructive pulmonary disease) (HCC)    • CPAP (continuous positive airway pressure) dependence    • Diabetes mellitus (HCC)    • Emphysema of lung (HCC)    • Gout    • History of transfusion     pt stated they had a transfusion when they had gallbladder surgery. • Hypertension    • Kidney disease     renal failure   • Leg DVT (deep venous thromboembolism), acute, bilateral ([address]) 01/2018   • Obesity (BMI 30-39. 9)    • AGUS on CPAP     setting 11   • Postgastrectomy malabsorption    • Sleep apnea    • Systolic CHF [facility])        Past Surgical History:   Procedure Laterality Date   • ADRENALECTOMY     • CHOLECYSTECTOMY     • COLONOSCOPY     • EGD     • HIATAL HERNIA REPAIR N/A 12/22/2020    Procedure: REPAIR HERNIA HIATAL LAPAROSCOPIC;  Surgeon: Maria L Durham MD;  Location: [facility];  Service: Bariatrics   • INCISION AND DRAINAGE OF WOUND Left 10/17/2018    Procedure: INCISION AND DRAINAGE (I&D) GROIN;  Surgeon: Garrison Paul MD;  Location: [facility];  Service: General   • IR IMAGE Timur Stagers / DRAINAGE W TUBE  8/17/2018   • IR IMAGE GUIDED ASPIRATION / DRAINAGE W TUBE  7/31/2018   • JOINT REPLACEMENT Bilateral     knee   • KIDNEY SURGERY  2009    nodule removal   • LYMPH NODE DISSECTION Left 01/2018    left inguinal LN removed - benign    • OTHER SURGICAL HISTORY      kidney nodule removal   • PALATE / UVULA BIOPSY / EXCISION     • NH LAPS GSTR RSTCV PX W/BYP JASON-EN-Y LIMB <150 CM N/A 12/22/2020    Procedure: BYPASS GASTRIC  JASON-EN-Y LAPAROSCOPIC WITH INTRAOPERATIVE EGD;  Surgeon: Shira Cast MD;  Location: [facility] OR;  Service: Bariatrics   • SINUS SURGERY     • TONSILLECTOMY         Family History   Problem Relation Age of Onset   • Heart murmur Sister    • Asthma Sister    • Hypertension Sister    • Kidney disease Mother    • Cancer Father    • Bell's palsy Brother    • Kidney disease Brother    • Deep vein thrombosis Neg Hx      I have reviewed and agree with the history as documented. E-Cigarette/Vaping   • E-Cigarette Use Never User      E-Cigarette/Vaping Substances   • Nicotine No    • THC No    • CBD No    • Flavoring No    • Other No    • Unknown No      Social History     Tobacco Use   • Smoking status: Never   • Smokeless tobacco: Never   Vaping Use   • Vaping Use: Never used   Substance Use Topics   • Alcohol use: Yes     Comment: occasionally   • Drug use: No       Review of Systems   Constitutional: Positive for fatigue. Negative for fever. Respiratory: Negative for cough. Gastrointestinal: Positive for abdominal pain. Negative for constipation, hematemesis and hematochezia. Genitourinary: Negative for hematuria. Musculoskeletal: Negative for back pain. Neurological: Positive for light-headedness. All other systems reviewed and are negative. Physical Exam  Physical Exam  Vitals and nursing note reviewed. Constitutional:       General: He is not in acute distress. Appearance: He is well-developed. He is obese. He is not toxic-appearing.    HENT:      Head: Normocephalic and atraumatic. Eyes:      General: No scleral icterus. Conjunctiva/sclera: Conjunctivae normal.   Cardiovascular:      Rate and Rhythm: Normal rate and regular rhythm. Heart sounds: No murmur heard. Pulmonary:      Effort: Pulmonary effort is normal. No respiratory distress. Breath sounds: Normal breath sounds. Abdominal:      General: Bowel sounds are normal.      Palpations: Abdomen is soft. Tenderness: There is no abdominal tenderness. Musculoskeletal:         General: No swelling. Cervical back: Neck supple. Skin:     General: Skin is warm and dry. Capillary Refill: Capillary refill takes less than 2 seconds. Neurological:      Mental Status: He is alert.    Psychiatric:         Mood and Affect: Mood normal.         Vital Signs  ED Triage Vitals   Temperature Pulse Respirations Blood Pressure SpO2   07/08/23 0059 07/08/23 0059 07/08/23 0059 07/08/23 0059 07/08/23 0059   (!) 97.4 °F (36.3 °C) (!) 106 20 (!) 220/122 100 %      Temp Source Heart Rate Source Patient Position - Orthostatic VS BP Location FiO2 (%)   07/08/23 0059 07/08/23 0059 07/08/23 0059 07/08/23 0059 --   Oral Monitor Sitting Left arm       Pain Score       07/08/23 0143       10 - Worst Possible Pain           Vitals:    07/08/23 0600 07/08/23 0637 07/08/23 0730 07/08/23 0800   BP: (!) 233/128 (!) 235/128 (!) 220/130 (!) 228/127   Pulse: 88 94 103 99   Patient Position - Orthostatic VS: Sitting  Lying Sitting         Visual Acuity      ED Medications  Medications   sodium chloride 0.9 % bolus 1,000 mL (0 mL Intravenous Stopped 7/8/23 0242)   morphine injection 4 mg (4 mg Intravenous Given 7/8/23 0143)   ondansetron (ZOFRAN) injection 4 mg (4 mg Intravenous Given 7/8/23 0133)   sodium chloride 0.9 % bolus 1,000 mL (0 mL Intravenous Stopped 7/8/23 0419)   piperacillin-tazobactam (ZOSYN) 3.375 g in sodium chloride 0.9 % 100 mL IVPB (0 g Intravenous Stopped 7/8/23 2181)   iohexol (OMNIPAQUE) 240 MG/ML solution 50 mL (50 mL Oral Given 7/8/23 0352)   labetalol (NORMODYNE) injection 10 mg (10 mg Intravenous Given 7/8/23 4955)       Diagnostic Studies  Results Reviewed     Procedure Component Value Units Date/Time    Fingerstick Glucose (POCT) [[id number]]  (Abnormal) Collected: 07/08/23 1747    Lab Status: Final result Updated: 07/08/23 1822     POC Glucose 207 mg/dl     Blood culture #1 [[id number]] Collected: 07/08/23 0145    Lab Status: Preliminary result Specimen: Blood from Arm, Right Updated: 07/08/23 1301     Blood Culture Received in Microbiology Lab. Culture in Progress. Blood culture #2 [[id number]] Collected: 07/08/23 0142    Lab Status: Preliminary result Specimen: Blood from Arm, Left Updated: 07/08/23 1301     Blood Culture Received in Microbiology Lab. Culture in Progress. Fingerstick Glucose (POCT) [[id number]]  (Normal) Collected: 07/08/23 1101    Lab Status: Final result Updated: 07/08/23 1104     POC Glucose 127 mg/dl     HS Troponin 0hr (reflex protocol) [[id number]]  (Abnormal) Collected: 07/08/23 0800    Lab Status: Final result Specimen: Blood from Arm, Right Updated: 07/08/23 0911     hs TnI 0hr 172 ng/L     B-Type Natriuretic Peptide(BNP) [[id number]]  (Abnormal) Collected: 07/08/23 0800    Lab Status: Final result Specimen: Blood from Arm, Right Updated: 07/08/23 0837      pg/mL     Lactic acid 2 Hours [[id number]]  (Normal) Collected: 07/08/23 0415    Lab Status: Final result Specimen: Blood from Arm, Left Updated: 07/08/23 0443     LACTIC ACID 2.0 mmol/L     Narrative:      Result may be elevated if tourniquet was used during collection.     Blood gas, venous [[id number]]  (Abnormal) Collected: 07/08/23 0415    Lab Status: Final result Specimen: Blood from Arm, Left Updated: 07/08/23 0423     pH, Gold 7.525     pCO2, Gold 21.6 mm Hg      pO2, Gold 71.3 mm Hg      HCO3, Gold 17.4 mmol/L      Base Excess, Gold -4.6 mmol/L      O2 Content, Gold 9.4 ml/dL      O2 HGB, VENOUS 90.6 % Lactic acid, plasma (w/reflex if result > 2.0) [[id number]]  (Abnormal) Collected: 07/08/23 0137    Lab Status: Final result Specimen: Blood from Arm, Left Updated: 07/08/23 0240     LACTIC ACID 3.3 mmol/L     Narrative:      Result may be elevated if tourniquet was used during collection.     Lipase [[id number]]  (Normal) Collected: 07/08/23 0137    Lab Status: Final result Specimen: Blood from Arm, Left Updated: 07/08/23 0232     Lipase 19 u/L     Comprehensive metabolic panel [[id number]]  (Abnormal) Collected: 07/08/23 0137    Lab Status: Final result Specimen: Blood from Arm, Left Updated: 07/08/23 0232     Sodium 139 mmol/L      Potassium 4.2 mmol/L      Chloride 106 mmol/L      CO2 21 mmol/L      ANION GAP 12 mmol/L      BUN 63 mg/dL      Creatinine 1.59 mg/dL      Glucose 136 mg/dL      Calcium 9.7 mg/dL      AST 52 U/L      ALT 64 U/L      Alkaline Phosphatase 58 U/L      Total Protein 7.2 g/dL      Albumin 4.1 g/dL      Total Bilirubin 1.21 mg/dL      eGFR 46 ml/min/1.73sq m     Narrative:      [facility] guidelines for Chronic Kid[facility] Disease (CKD):   •  Stage 1 with normal or high GFR (GFR > 90 mL/min/1.73 square meters)  •  Stage 2 Mild CKD (GFR = 60-89 mL/min/1.73 square meters)  •  Stage 3A Moderate CKD (GFR = 45-59 mL/min/1.73 square meters)  •  Stage 3B Moderate CKD (GFR = 30-44 mL/min/1.73 square meters)  •  Stage 4 Severe CKD (GFR = 15-29 mL/min/1.73 square meters)  •  Stage 5 End Stage CKD (GFR <15 mL/min/1.73 square meters)  Note: GFR calculation is accurate only with a steady state creatinine    CBC and differential [[id number]]  (Abnormal) Collected: 07/08/23 0137    Lab Status: Final result Specimen: Blood from Arm, Left Updated: 07/08/23 0150     WBC 13.77 Thousand/uL      RBC 2.78 Million/uL      Hemoglobin 7.8 g/dL      Hematocrit 23.2 %      MCV 84 fL      MCH 28.1 pg      MCHC 33.6 g/dL      RDW 14.1 %      MPV 9.5 fL      Platelets 884 Thousands/uL      nRBC 0 /100 WBCs      Neutrophils Relative 84 %      Immat GRANS % 0 %      Lymphocytes Relative 12 %      Monocytes Relative 4 %      Eosinophils Relative 0 %      Basophils Relative 0 %      Neutrophils Absolute 11.57 Thousands/µL      Immature Grans Absolute 0.06 Thousand/uL      Lymphocytes Absolute 1.62 Thousands/µL      Monocytes Absolute 0.50 Thousand/µL      Eosinophils Absolute 0.00 Thousand/µL      Basophils Absolute 0.02 Thousands/µL                  CT abdomen pelvis wo contrast   Final Result by Maxwell Arguello MD (07/08 3836)      1. Moderate pericardial effusion, significantly increased from prior. Additionally there appears to be mass effect on the right lateral ventricle. Recommend echocardiogram and correlation for cardiac tamponade. 2.  Likely mild mesenteric edema/ascites      I personally discussed this study with Isreal Myers at 7/8/23 4:41 AM      Workstation performed: [id number]                    Procedures  Procedures         ED Course  ED Course as of 07/09/23 0233   Sat Jul 08, 2023   1261 CT results discussed with cardiologist on-call. He is recommending we keep patient in the emergency department for a stat echo to assess for tamponade physiology. SBIRT 20yo+    Flowsheet Row Most Recent Value   Initial Alcohol Screen: US AUDIT-C     1. How often do you have a drink containing alcohol? 0 Filed at: 07/08/2023 0107   2. How many drinks containing alcohol do you have on a typical day you are drinking? 0 Filed at: 07/08/2023 0107   3a. Male UNDER 65: How often do you have five or more drinks on one occasion? 0 Filed at: 07/08/2023 0107   Audit-C Score 0 Filed at: 07/08/2023 0107   DARRIN: How many times in the past year have you. .. Used an illegal drug or used a prescription medication for non-medical reasons?  Never Filed at: 07/08/2023 0107                    Medical Decision Making  59-year-old male presents to the emergency department for abdominal pain and shortness of breath. Labs and imaging reviewed. Patient CT scan reveals that he has a moderate-sized pleural effusion with possible tamponade. He is also has some new intra-abdominal ascites of yet to be determined etiology. Cardiology has been consulted and ordered a stat bedside echo which was being performed. Cards has agreed to come and see patient in the emergency department. Case endorsed to oncoming ED physician pending ultrasound results and cards recommendations. Amount and/or Complexity of Data Reviewed  External Data Reviewed: labs. Labs: ordered. Radiology: ordered. ECG/medicine tests: ordered and independent interpretation performed. Risk  Prescription drug management. Disposition  Final diagnoses:   Pericardial tamponade     Time reflects when diagnosis was documented in both MDM as applicable and the Disposition within this note     Time User Action Codes Description Comment    7/8/2023  7:32 AM José Miguel Stallings Add [I31.4] Pericardial tamponade       ED Disposition     ED Disposition   Transfer to Another Facility-In Network    Condition   --    Date/Time   Sat Jul 8, 2023  7:32 AM    Comment   Steven Hernandez should be transferred out to hospitals.            MD Documentation    Clement Hudson Most Recent Value   Patient Condition The patient has been stabilized such that within reasonable medical probability, no material deterioration of the patient condition or the condition of the unborn child(nini) is likely to result from the transfer   Benefits of Transfer Specialized equipment and/or services available at the receiving facility (Include comment)________________________   Risks of Transfer Potential for delay in receiving treatment   Accepting Physician [address] [facility] Name, [address]   Sending MD [facility]   Provider Certification General risk, such as traffic hazards, adverse weather conditions, rough terrain or turbulence, possible failure of equipment (including vehicle or aircraft), or consequences of actions of persons outside the control of the transport personnel      RN Documentation    Flowsheet Row Most [facility] Name, [address] [facility]      Follow-up Information    None         Discharge Medication List as of 7/8/2023  8:43 AM      CONTINUE these medications which have NOT CHANGED    Details   albuterol (PROVENTIL HFA,VENTOLIN HFA) 90 mcg/act inhaler Historical Med      amLODIPine (NORVASC) 10 mg tablet Take 10 mg by mouth, Starting Wed 4/15/2020, Historical Med      atorvastatin (LIPITOR) 40 mg tablet Take 40 mg by mouth daily at bedtime, Starting Thu 7/21/2022, Historical Med      busPIRone (BUSPAR) 15 mg tablet TAKE ONE TABLET BY MOUTH TWICE A DAY FOR PTSD, Historical Med      Calcium 600 MG tablet Take 1,200 mg by mouth, Historical Med      Cholecalciferol (VITAMIN D-3) 1000 units CAPS Take 2,000 Units by mouth daily, Historical Med      doxazosin (CARDURA) 8 MG tablet Take 8 mg by mouth 2 (two) times a day, Starting Wed 2/3/2021, Historical Med      eplerenone (INSPRA) 50 MG tablet Take 50 mg by mouth in the morning, Starting Tue 5/23/2017, Historical Med      ferrous sulfate 325 (65 Fe) mg tablet Take 325 mg by mouth 2 (two) times a day  , Historical Med      glycopyrrolate-formoterol (BEVESPI AEROSPHERE) 9-4.8 MCG/ACT inhaler Inhale 2 puffs daily after breakfast, Historical Med      hydrALAZINE (APRESOLINE) 100 MG tablet Take 1 tablet (100 mg total) by mouth 3 (three) times a day, Starting Tue 5/23/2023, Normal      labetalol (NORMODYNE) 300 mg tablet Take 1 tablet (300 mg total) by mouth every 12 (twelve) hours, Starting Sun 5/14/2023, Until Tue 6/13/2023, Normal      levETIRAcetam (KEPPRA) 750 mg tablet Take 1 tablet (750 mg total) by mouth every 12 (twelve) hours, Starting Sun 5/14/2023, Until Tue 6/13/2023, Normal      mirtazapine (REMERON) 15 mg tablet 15 mg, Starting Thu 9/1/2022, Historical Med      Multiple Vitamins-Minerals (multivitamin with minerals) tablet Take 1 tablet by mouth daily, Historical Med      Omega-3 Fatty Acids (FISH OIL) 1200 MG CAPS Take 1,200 mg by mouth daily at bedtime, Historical Med      Pulmicort Flexhaler 90 MCG/ACT inhaler 2 puffs 2 (two) times a day, Starting Tue 3/9/2021, Historical Med      sertraline (ZOLOFT) 100 mg tablet Take 2 tablets by mouth daily, Starting Wed 6/15/2022, Historical Med      umeclidinium-vilanterol (ANORO ELLIPTA) 62.5-25 MCG/INH inhaler Inhale 1 puff daily, Historical Med             No discharge procedures on file.     PDMP Review       Value Time User    PDMP Reviewed  Yes 12/24/2020  8:11 AM Maya Yousif PA-C          ED Provider  Electronically Signed by           Timothy Santana MD  07/09/23 4915

## 2023-07-09 ENCOUNTER — APPOINTMENT (INPATIENT)
Dept: NON INVASIVE DIAGNOSTICS | Facility: HOSPITAL | Age: 58
DRG: 271 | End: 2023-07-09
Payer: COMMERCIAL

## 2023-07-09 LAB
ABO GROUP BLD BPU: NORMAL
ALBUMIN SERPL BCP-MCNC: 3 G/DL (ref 3.5–5)
ALP SERPL-CCNC: 54 U/L (ref 46–116)
ALT SERPL W P-5'-P-CCNC: 50 U/L (ref 12–78)
ANION GAP SERPL CALCULATED.3IONS-SCNC: 3 MMOL/L
AST SERPL W P-5'-P-CCNC: 32 U/L (ref 5–45)
ATRIAL RATE: 98 BPM
BASOPHILS # BLD AUTO: 0.03 THOUSANDS/ÂΜL (ref 0–0.1)
BASOPHILS NFR BLD AUTO: 0 % (ref 0–1)
BILIRUB SERPL-MCNC: 0.93 MG/DL (ref 0.2–1)
BPU ID: NORMAL
BUN SERPL-MCNC: 49 MG/DL (ref 5–25)
CALCIUM ALBUM COR SERPL-MCNC: 9.4 MG/DL (ref 8.3–10.1)
CALCIUM SERPL-MCNC: 8.6 MG/DL (ref 8.3–10.1)
CHLORIDE SERPL-SCNC: 113 MMOL/L (ref 96–108)
CO2 SERPL-SCNC: 25 MMOL/L (ref 21–32)
CREAT SERPL-MCNC: 1.92 MG/DL (ref 0.6–1.3)
CROSSMATCH: NORMAL
EOSINOPHIL # BLD AUTO: 0 THOUSAND/ÂΜL (ref 0–0.61)
EOSINOPHIL NFR BLD AUTO: 0 % (ref 0–6)
ERYTHROCYTE [DISTWIDTH] IN BLOOD BY AUTOMATED COUNT: 14.7 % (ref 11.6–15.1)
FERRITIN SERPL-MCNC: 406 NG/ML (ref 24–336)
GFR SERPL CREATININE-BSD FRML MDRD: 37 ML/MIN/1.73SQ M
GLUCOSE SERPL-MCNC: 132 MG/DL (ref 65–140)
HCT VFR BLD AUTO: 20.9 % (ref 36.5–49.3)
HGB BLD-MCNC: 6.6 G/DL (ref 12–17)
HGB BLD-MCNC: 7 G/DL (ref 12–17)
HGB BLD-MCNC: 7.1 G/DL (ref 12–17)
HGB BLD-MCNC: 7.7 G/DL (ref 12–17)
IMM GRANULOCYTES # BLD AUTO: 0.23 THOUSAND/UL (ref 0–0.2)
IMM GRANULOCYTES NFR BLD AUTO: 1 % (ref 0–2)
IRON SATN MFR SERPL: 8 % (ref 20–50)
IRON SERPL-MCNC: 13 UG/DL (ref 65–175)
LYMPHOCYTES # BLD AUTO: 1.01 THOUSANDS/ÂΜL (ref 0.6–4.47)
LYMPHOCYTES NFR BLD AUTO: 4 % (ref 14–44)
MAGNESIUM SERPL-MCNC: 2.4 MG/DL (ref 1.6–2.6)
MCH RBC QN AUTO: 29.1 PG (ref 26.8–34.3)
MCHC RBC AUTO-ENTMCNC: 34 G/DL (ref 31.4–37.4)
MCV RBC AUTO: 86 FL (ref 82–98)
MONOCYTES # BLD AUTO: 2.8 THOUSAND/ÂΜL (ref 0.17–1.22)
MONOCYTES NFR BLD AUTO: 12 % (ref 4–12)
NEUTROPHILS # BLD AUTO: 19.81 THOUSANDS/ÂΜL (ref 1.85–7.62)
NEUTS SEG NFR BLD AUTO: 83 % (ref 43–75)
NRBC BLD AUTO-RTO: 0 /100 WBCS
P AXIS: 76 DEGREES
PHOSPHATE SERPL-MCNC: 4.4 MG/DL (ref 2.3–4.1)
PLATELET # BLD AUTO: 268 THOUSANDS/UL (ref 149–390)
PMV BLD AUTO: 9.3 FL (ref 8.9–12.7)
POTASSIUM SERPL-SCNC: 3.6 MMOL/L (ref 3.5–5.3)
PR INTERVAL: 126 MS
PROCALCITONIN SERPL-MCNC: 0.46 NG/ML
PROT SERPL-MCNC: 5.8 G/DL (ref 6.4–8.4)
QRS AXIS: -50 DEGREES
QRSD INTERVAL: 90 MS
QT INTERVAL: 392 MS
QTC INTERVAL: 500 MS
RBC # BLD AUTO: 2.44 MILLION/UL (ref 3.88–5.62)
SODIUM SERPL-SCNC: 141 MMOL/L (ref 135–147)
T WAVE AXIS: 84 DEGREES
TIBC SERPL-MCNC: 154 UG/DL (ref 250–450)
UNIT DISPENSE STATUS: NORMAL
UNIT PRODUCT CODE: NORMAL
UNIT PRODUCT VOLUME: 350 ML
UNIT RH: NORMAL
VENTRICULAR RATE: 98 BPM
WBC # BLD AUTO: 23.88 THOUSAND/UL (ref 4.31–10.16)

## 2023-07-09 PROCEDURE — 82728 ASSAY OF FERRITIN: CPT | Performed by: ANESTHESIOLOGY

## 2023-07-09 PROCEDURE — 85018 HEMOGLOBIN: CPT | Performed by: NURSE PRACTITIONER

## 2023-07-09 PROCEDURE — 99222 1ST HOSP IP/OBS MODERATE 55: CPT | Performed by: INTERNAL MEDICINE

## 2023-07-09 PROCEDURE — 83550 IRON BINDING TEST: CPT | Performed by: ANESTHESIOLOGY

## 2023-07-09 PROCEDURE — 80053 COMPREHEN METABOLIC PANEL: CPT | Performed by: ANESTHESIOLOGY

## 2023-07-09 PROCEDURE — 84100 ASSAY OF PHOSPHORUS: CPT | Performed by: ANESTHESIOLOGY

## 2023-07-09 PROCEDURE — C9113 INJ PANTOPRAZOLE SODIUM, VIA: HCPCS | Performed by: NURSE PRACTITIONER

## 2023-07-09 PROCEDURE — 84166 PROTEIN E-PHORESIS/URINE/CSF: CPT | Performed by: INTERNAL MEDICINE

## 2023-07-09 PROCEDURE — 85025 COMPLETE CBC W/AUTO DIFF WBC: CPT | Performed by: ANESTHESIOLOGY

## 2023-07-09 PROCEDURE — 87040 BLOOD CULTURE FOR BACTERIA: CPT | Performed by: NURSE PRACTITIONER

## 2023-07-09 PROCEDURE — 83735 ASSAY OF MAGNESIUM: CPT | Performed by: ANESTHESIOLOGY

## 2023-07-09 PROCEDURE — 93010 ELECTROCARDIOGRAM REPORT: CPT | Performed by: INTERNAL MEDICINE

## 2023-07-09 PROCEDURE — 84145 PROCALCITONIN (PCT): CPT | Performed by: NURSE PRACTITIONER

## 2023-07-09 PROCEDURE — 93005 ELECTROCARDIOGRAM TRACING: CPT

## 2023-07-09 PROCEDURE — 99233 SBSQ HOSP IP/OBS HIGH 50: CPT | Performed by: ANESTHESIOLOGY

## 2023-07-09 PROCEDURE — 99223 1ST HOSP IP/OBS HIGH 75: CPT | Performed by: INTERNAL MEDICINE

## 2023-07-09 PROCEDURE — 99024 POSTOP FOLLOW-UP VISIT: CPT | Performed by: THORACIC SURGERY (CARDIOTHORACIC VASCULAR SURGERY)

## 2023-07-09 PROCEDURE — 83540 ASSAY OF IRON: CPT | Performed by: ANESTHESIOLOGY

## 2023-07-09 RX ORDER — POTASSIUM CHLORIDE 20MEQ/15ML
40 LIQUID (ML) ORAL ONCE
Status: COMPLETED | OUTPATIENT
Start: 2023-07-09 | End: 2023-07-09

## 2023-07-09 RX ORDER — LABETALOL 200 MG/1
200 TABLET, FILM COATED ORAL EVERY 12 HOURS SCHEDULED
Status: DISCONTINUED | OUTPATIENT
Start: 2023-07-09 | End: 2023-07-13

## 2023-07-09 RX ORDER — ALBUMIN, HUMAN INJ 5% 5 %
12.5 SOLUTION INTRAVENOUS ONCE
Status: COMPLETED | OUTPATIENT
Start: 2023-07-09 | End: 2023-07-09

## 2023-07-09 RX ORDER — COLCHICINE 0.6 MG/1
0.6 TABLET ORAL DAILY
Status: DISCONTINUED | OUTPATIENT
Start: 2023-07-10 | End: 2023-07-15 | Stop reason: HOSPADM

## 2023-07-09 RX ORDER — COLCHICINE 0.6 MG/1
0.6 TABLET ORAL 2 TIMES DAILY
Status: DISCONTINUED | OUTPATIENT
Start: 2023-07-09 | End: 2023-07-09

## 2023-07-09 RX ADMIN — MIRTAZAPINE 15 MG: 15 TABLET, FILM COATED ORAL at 21:08

## 2023-07-09 RX ADMIN — BUDESONIDE AND FORMOTEROL FUMARATE DIHYDRATE 2 PUFF: 160; 4.5 AEROSOL RESPIRATORY (INHALATION) at 17:05

## 2023-07-09 RX ADMIN — SERTRALINE HYDROCHLORIDE 200 MG: 100 TABLET ORAL at 08:30

## 2023-07-09 RX ADMIN — ONDANSETRON 4 MG: 2 INJECTION INTRAMUSCULAR; INTRAVENOUS at 09:06

## 2023-07-09 RX ADMIN — HYDRALAZINE HYDROCHLORIDE 100 MG: 50 TABLET, FILM COATED ORAL at 08:30

## 2023-07-09 RX ADMIN — DOXAZOSIN 8 MG: 4 TABLET ORAL at 08:29

## 2023-07-09 RX ADMIN — HYDRALAZINE HYDROCHLORIDE 100 MG: 50 TABLET, FILM COATED ORAL at 21:07

## 2023-07-09 RX ADMIN — ALBUMIN (HUMAN) 12.5 G: 12.5 INJECTION, SOLUTION INTRAVENOUS at 09:25

## 2023-07-09 RX ADMIN — BUSPIRONE HYDROCHLORIDE 15 MG: 10 TABLET ORAL at 17:05

## 2023-07-09 RX ADMIN — BUSPIRONE HYDROCHLORIDE 15 MG: 10 TABLET ORAL at 08:29

## 2023-07-09 RX ADMIN — PIPERACILLIN SODIUM AND TAZOBACTAM SODIUM 3.38 G: 36; 4.5 INJECTION, POWDER, LYOPHILIZED, FOR SOLUTION INTRAVENOUS at 11:47

## 2023-07-09 RX ADMIN — Medication 1 TABLET: at 08:29

## 2023-07-09 RX ADMIN — POTASSIUM CHLORIDE 40 MEQ: 1.5 SOLUTION ORAL at 06:22

## 2023-07-09 RX ADMIN — PANTOPRAZOLE SODIUM 40 MG: 40 INJECTION, POWDER, FOR SOLUTION INTRAVENOUS at 08:46

## 2023-07-09 RX ADMIN — PIPERACILLIN SODIUM AND TAZOBACTAM SODIUM 3.38 G: 36; 4.5 INJECTION, POWDER, LYOPHILIZED, FOR SOLUTION INTRAVENOUS at 21:17

## 2023-07-09 RX ADMIN — Medication 2000 UNITS: at 08:30

## 2023-07-09 RX ADMIN — AMLODIPINE BESYLATE 10 MG: 10 TABLET ORAL at 08:30

## 2023-07-09 RX ADMIN — HYDRALAZINE HYDROCHLORIDE 100 MG: 50 TABLET, FILM COATED ORAL at 15:58

## 2023-07-09 RX ADMIN — PANTOPRAZOLE SODIUM 40 MG: 40 INJECTION, POWDER, FOR SOLUTION INTRAVENOUS at 21:09

## 2023-07-09 RX ADMIN — Medication 2 TABLET: at 15:58

## 2023-07-09 RX ADMIN — COLCHICINE 0.6 MG: 0.6 TABLET ORAL at 10:03

## 2023-07-09 RX ADMIN — DOXAZOSIN 8 MG: 4 TABLET ORAL at 17:05

## 2023-07-09 RX ADMIN — PIPERACILLIN SODIUM AND TAZOBACTAM SODIUM 3.38 G: 36; 4.5 INJECTION, POWDER, LYOPHILIZED, FOR SOLUTION INTRAVENOUS at 15:57

## 2023-07-09 RX ADMIN — ATORVASTATIN CALCIUM 40 MG: 40 TABLET, FILM COATED ORAL at 21:08

## 2023-07-09 RX ADMIN — EPLERENONE 50 MG: 25 TABLET, FILM COATED ORAL at 08:35

## 2023-07-09 RX ADMIN — LABETALOL HYDROCHLORIDE 200 MG: 200 TABLET, FILM COATED ORAL at 21:08

## 2023-07-09 NOTE — ASSESSMENT & PLAN NOTE
Assessment:   · Patient with acute-onset epigastric pain presented to [facility] on 7/8/23 and was found on CT to have a pericardiac effusion   · 7/8/23 TTE: There is a moderate to large pericardial effusion circumferential to the heart. There is a reasonable likelihood of cardiac tamponade. The evidence for tamponade includes: right ventricular compression.   · Prior TTE from May 2023 with trivial pericardial effusion   · Etiology possibly related to uncontrolled hypertension, but workup on going  · POD #1 s/p pericardial window and drain (7/8/23)     Plan:   · Thoracic surgery following, appreciate recommendation  · Monitor pericardial drain output amount and character  · Start colchicine 0.6mg BID with consideration for steroids depending on pain   · Follow up cytology and cultures    · Analgesia:  · Tylenol 650mg q6h PRN  · Robaxin 500mg q6h PRN  · Oxycodone 2.5-5mg q4h PRN  · Dilaudid 0.2mg q3 PRN

## 2023-07-09 NOTE — QUICK NOTE
Post-Op Check    Assessment: 59 y.o. M s/p subxiphoid pericardial window    Plan:  Maintain pericardial drain, monitor output  Maintain arterial line  Rest of care per primary    Subjective:  No acute events since surgery, tolerating cardiac diet with some nausea but no vomiting, denies chest pain/shortness of breath    Vitals:    07/08/23 1907   BP: 122/70   Pulse: 96   Resp: 20   Temp: 98.3 °F (36.8 °C)   SpO2:        PE:  Gen: NAD, AAOx3  CV: RRR, ANNE MARIE serosanguinous, Incision c/d/i  Pulm: no resp distress  Abd: Soft, non-distended, non-tender    Render MD [place]  General Surgery, PGY4

## 2023-07-09 NOTE — PROGRESS NOTES
[address] [facility]  Progress Note  Name: Richard Hunter  MRN: [de-identified]  Unit/Bed#: PPHP 526-01 I Date of Admission: 7/8/2023   Date of Service: 7/9/2023 I Hospital Day: 1    Assessment/Plan   * Cardiac tamponade  Assessment & Plan  Assessment:   · Patient with acute-onset epigastric pain presented to [facility] on 7/8/23 and was found on CT to have a pericardiac effusion   · 7/8/23 TTE: There is a moderate to large pericardial effusion circumferential to the heart. There is a reasonable likelihood of cardiac tamponade. The evidence for tamponade includes: right ventricular compression.   · Prior TTE from May 2023 with trivial pericardial effusion   · Etiology possibly related to uncontrolled hypertension, but workup on going  · POD #1 s/p pericardial window and drain (7/8/23)     Plan:   · Thoracic surgery following, appreciate recommendation  · Monitor pericardial drain output amount and character  · Start colchicine 0.6mg BID with consideration for steroids depending on pain   · Follow up cytology and cultures    · Analgesia:  · Tylenol 650mg q6h PRN  · Robaxin 500mg q6h PRN  · Oxycodone 2.5-5mg q4h PRN  · Dilaudid 0.2mg q3 PRN        Hematochezia  Assessment & Plan  Assessment:   · Patient reported 4 bloody bowel movements on presentation to the ED on 7/8/23  · He states he only noticed blood the day prior, but given his iron supplementation, may have also been more chronic  · Prior humaira-en-Y gives additional high risk, particularly in the setting of uncontrolled hypertension     Plan:   · Protonix 40mg IV q12h   · S/p 80mg IV loading dose (7/8/23)   · Consult GI   · Follow hemoglobin  · Monitor bowel movements     Hypertensive urgency  Assessment & Plan  Assessment:   · Patient with known history of long-standing severe hypertension   · S/p adrenalectomy at [facility] in 2012 which did not markedly impact his hypertension   · States he is compliant with his outpatient regimen  · Cardene weaned to off    Plan:   · Continue home oral antihypertensives:   · Amlodipine 10mg daily   · Doxazosin 8mg BID  · Eplerenone 50mg daily   · Hydralazine 100mg TID  · PRN antihypertensives:   · Labetalol 10mg q6h PRN  · Hydralazine 10mg q6h PRN  · Renal artery duplex ordered and pending   · Discontinue arterial line     CKD stage 3 secondary to diabetes [facility])  Assessment & Plan  Assessment:   · Likely due to hypertensive nephrosclerosis   · Baseline creatinine: 1.61 - 1.81  · Initial creatinine: 1.59 (7/8/23)   · Last creatinine: 1.92 (7/9/23)     Plan:   · Strict q4h I/O monitoring  · Monitor without keller catheter for now  · Continue to follow renal function tests  · No need for inpatient nephrology consultation at this time     COPD (chronic obstructive pulmonary disease) ([address])  Assessment & Plan  Assessment:   · No recent PFT's for review  · Patient states his breathing has improved overall since his gastric bypass     Plan:   · Symbicort BID  · Fluticasone daily   · Continue pulmonary hygiene. Incentive spirometer q1h while awake, encourage coughing and deep breathing. Upright positioning  · Suction as needed and closely monitor secretions. Maintain HOB >30 degrees. Q4h oral care with chlorhexidine BID  · Maintain SpO2 > 90%     Anemia  Assessment & Plan  Assessment:  · Concern for acute on chronic anemia possibly from GI source.  May have UGIB due to known humaira-en-Y or accelerated slow bleeding in the setting of hypertension   · Likely also has some degree of malabsorption and possible nutrient deficiency as a result of gastric bypass   · Baseline hemoglobin: 12.9 - 12.6  · Initial hemoglobin: 7.8 (7/8/23)  · Last hemoglobin: 7.7 (7/9/23)  · 7/8/23 1u PRBC    Plan:   · Transfuse for hemoglobin <7.0  · Serial hemoglobin   · Monitor for sources of bleeding   · CBC in AM      Chronic diastolic CHF (congestive heart failure) ([address])  Assessment & Plan  Assessment:  · No evidence of acute exacerbation at this time post operatively   · RRT on 7/8/23 possibly partially contributed to by acute CHF exacerbation in the setting of hypertensive emergency in the setting of known LVH and diastolic dysfunction   · Current weight: 68.9kg  · Relevant echocardiograms:  · 7/8/23 TTE: Left ventricular cavity size is normal. Wall thickness is increased. The left ventricular ejection fraction is 50%. Systolic function is low normal. Wall motion cannot be accurately assessed. Left Atrium: The atrium is mildly dilated. Pericardium: There is a moderate to large pericardial effusion circumferential to the heart. There is a reasonable likelihood of cardiac tamponade.  The evidence for tamponade includes: right ventricular compression      Plan:   · Holding diuresis at this time  · Goal to maintain more controlled BP, ideally SBP < 180  · Daily weights  · Continue home afterload reduction agents:   · Amlodipine 10mg daily   · Doxazosin 8mg BID  · Eplerenone 50mg daily   · Hydralazine 100mg TID  · PRN antihypertensives:   · Labetalol 10mg q6h PRN  · Hydralazine 10mg q6h PRN  · Cardiac, low Na diet when able to take PO            Depression with anxiety  Assessment & Plan  Assessment:   · No acute concerns at this time     Plan:   · Continue home regimen:   · Buspar 15mg BID  · Mirtazapine 15mg qHS   · Sertraline 200mg daily       Meningioma [facility])  Assessment & Plan  Assessment:   · First seen on imaging since 2019  · Given it's location, surgical intervention was then deferred with plan for ongoing surveillance  · Patient has prior seizures which were more likely a result of hypertensive urgency   · Relevant imaging:   · 3/29/23 Brain MRI: Left infratentorial meningioma shows slight interval enlargement compared to 12/15/2021.  Minimal mass effect on the adjacent cerebellar vermis without associated edema    Plan:   · Maintain SBP < 180  · Follow up with neurosurgery as outpatient              ICU Core Measures     A: Assess, Prevent, and Manage Pain · Has pain been assessed? Yes  · Need for changes to pain regimen? Yes   B: Both SAT/SAT  · N/A   C: Choice of Sedation · RASS Goal: 0 Alert and Calm or N/A patient not on sedation  · Need for changes to sedation or analgesia regimen? No   D: Delirium · CAM-ICU: Negative   E: Early Mobility  · Plan for early mobility? Yes   F: Family Engagement · Plan for family engagement today? Yes       Antibiotic Review: Awaiting culture results. Review of Invasive Devices:        Tessy Plan: Discontinue arterial line    Prophylaxis:  VTE Contraindicated secondary to: GI Bleed   Stress Ulcer  covered bypantoprazole (PROTONIX) injection 40 mg [[id number]]       Subjective   HPI/24hr events: 1u PRBC overnight for drop in hemoglobin. Improved hemodynamics with ability to wean off cardene infusion. Increased chest pressure this AM.      Review of Systems   Constitutional: Positive for fatigue. Respiratory: Negative for shortness of breath. Cardiovascular: Positive for chest pain (dull midsternal, worsened with inhalation ). Negative for palpitations and leg swelling. Gastrointestinal: Positive for nausea. Negative for abdominal pain. Neurological: Negative for dizziness. Objective                            Vitals I/O      Most Recent Min/Max in 24hrs   Temp 98.1 °F (36.7 °C) Temp  Min: 97.9 °F (36.6 °C)  Max: 99.1 °F (37.3 °C)   Pulse 100 Pulse  Min: 86  Max: 104   Resp 18 Resp  Min: 12  Max: 20   BP (!) 171/85 BP  Min: 116/66  Max: 173/88   O2 Sat 98 % SpO2  Min: 98 %  Max: 100 %      Intake/Output Summary (Last 24 hours) at 7/9/2023 1130  Last data filed at 7/9/2023 1123  Gross per 24 hour   Intake 1081.67 ml   Output 1420 ml   Net -338.33 ml         Diet NPO; Sips of clear liquids     Invasive Monitoring Physical exam    Physical Exam  Vitals reviewed. Constitutional:       Appearance: Normal appearance. He is diaphoretic. HENT:      Head: Normocephalic and atraumatic.    Eyes:      Conjunctiva/sclera: Conjunctivae normal.      Pupils: Pupils are equal, round, and reactive to light. Neck:      Vascular: No JVD. Cardiovascular:      Rate and Rhythm: Normal rate and regular rhythm. Pulses:           Radial pulses are 3+ on the right side and 3+ on the left side. Dorsalis pedis pulses are 3+ on the right side and 3+ on the left side. Posterior tibial pulses are 2+ on the right side and 2+ on the left side. Heart sounds: Normal heart sounds. No murmur heard. No friction rub. Comments: Pericardial drain with cloudy, serious fluid   Abdominal:      General: Abdomen is flat. Tenderness: There is no abdominal tenderness. Musculoskeletal:      Right lower leg: No edema. Left lower leg: No edema. Skin:     General: Skin is warm. Capillary Refill: Capillary refill takes less than 2 seconds. Neurological:      General: No focal deficit present. Mental Status: He is easily aroused. He is lethargic. GCS: GCS eye subscore is 4. GCS verbal subscore is 5. GCS motor subscore is 6. Motor: Motor function is intact. Diagnostic Studies      EKG: NSR   Imaging: No new imaging  I have personally reviewed pertinent reports.    and I have personally reviewed pertinent films in PACS     Medications:  Scheduled PRN   amLODIPine, 10 mg, Daily  atorvastatin, 40 mg, HS  budesonide-formoterol, 2 puff, BID  busPIRone, 15 mg, BID  calcium carbonate, 2 tablet, BID With Meals  cholecalciferol, 2,000 Units, Daily  [START ON 7/10/2023] colchicine, 0.6 mg, Daily  doxazosin, 8 mg, BID  eplerenone, 50 mg, Daily  fluticasone, 1 puff, Daily  hydrALAZINE, 100 mg, TID  mirtazapine, 15 mg, HS  multivitamin-minerals, 1 tablet, Daily  pantoprazole, 40 mg, Q12H JOANN  piperacillin-tazobactam, 3.375 g, Q6H  polyethylene glycol, 17 g, Daily  sertraline, 200 mg, Daily      acetaminophen, 650 mg, Q6H PRN  aluminum-magnesium hydroxide-simethicone, 30 mL, Q6H PRN  hydrALAZINE, 10 mg, Q6H PRN  HYDROmorphone, 0.2 mg, Q3H PRN  labetalol, 10 mg, Q6H PRN  methocarbamol, 500 mg, Q6H PRN  metoclopramide, 10 mg, Q6H PRN  ondansetron, 4 mg, Q6H PRN  oxyCODONE, 2.5 mg, Q4H PRN   Or  oxyCODONE, 5 mg, Q4H PRN       Continuous          Labs:    CBC    Recent Labs     07/08/23  1308 07/08/23 1953 07/09/23  0433 07/09/23  0932   WBC 16.14*  --  23.88*  --    HGB 7.6*   < > 7.1* 7.7*   HCT 22.6*  --  20.9*  --      --  268  --     < > = values in this interval not displayed.      BMP    Recent Labs     07/08/23 1953 07/09/23 0433   SODIUM 139 141   K 3.3* 3.6   * 113*   CO2 24 25   AGAP 5 3   BUN 43* 49*   CREATININE 1.64* 1.92*   CALCIUM 8.5 8.6       Coags    No recent results     Additional Electrolytes  Recent Labs     07/09/23 0433   MG 2.4   PHOS 4.4*          Blood Gas    No recent results  Recent Labs     07/08/23 0415   PHVEN 7.525*   [id number] 21.6*   PO2VEN 71.3*   WOP3IGG 17.4*   BEVEN -4.6    LFTs  Recent Labs     07/08/23 0137 07/09/23 0433   ALT 64* 50   AST 52* 32   ALKPHOS 58 54   ALB 4.1 3.0*   TBILI 1.21* 0.93       Infectious  Recent Labs     07/09/23  0433   PROCALCITONI 0.46*     Glucose  Recent Labs     07/08/23  0137 07/08/23  1308 07/08/23 1953 07/09/23  0433   GLUC 136 153* 149* [id number] MARIZOL Mayers

## 2023-07-09 NOTE — ASSESSMENT & PLAN NOTE
Assessment:   · Patient with known history of long-standing severe hypertension   · S/p adrenalectomy at [facility] in 2012 which did not markedly impact his hypertension   · States he is compliant with his outpatient regimen  · Cardene weaned to off    Plan:   · Continue home oral antihypertensives:   · Amlodipine 10mg daily   · Doxazosin 8mg BID  · Eplerenone 50mg daily   · Hydralazine 100mg TID  · PRN antihypertensives:   · Labetalol 10mg q6h PRN  · Hydralazine 10mg q6h PRN  · Renal artery duplex ordered and pending   · Discontinue arterial line

## 2023-07-09 NOTE — ASSESSMENT & PLAN NOTE
Assessment:   · Likely due to hypertensive nephrosclerosis   · Baseline creatinine: 1.61 - 1.81  · Initial creatinine: 1.59 (7/8/23)   · Last creatinine: 1.92 (7/9/23)     Plan:   · Strict q4h I/O monitoring  · Monitor without keller catheter for now  · Continue to follow renal function tests  · No need for inpatient nephrology consultation at this time

## 2023-07-09 NOTE — CONSULTS
Reason for Consult / Principal Problem:Cardiac tamponade. Physician Requesting Consult:  Rosemary Brandon MD    Cardiologist: None. Assessment and Plan      Current Problem List   Principal Problem:    Cardiac tamponade  Active Problems:    COPD (chronic obstructive pulmonary disease) (HCC)    Chronic diastolic CHF (congestive heart failure) (HCC)    CKD stage 3 secondary to diabetes (HCC)    Anemia    Meningioma (HCC)    Hypertensive urgency    Hematochezia    Depression with anxiety    Assessment/Plan:    1. Cardiac tamponade -patient presented originally secondary to concerns for bloody bowel movements. Was found to be acutely anemic. DEEP showed evidence of a pericardial effusion with concerns for right ventricular depression. Echocardiogram obtained which was concerning for echocardiographic features of tamponade, he developed severe shortness of breath sedating a rapid response. Status post window overnight with serous drainage. · Continue ANNE MARIE drain per thoracic surgery  · Follow-up on culture  · Would start colchicine 0.6 daily as etiology of his pericardial effusion is not yet elucidated he does endorse pericarditis symptoms. · Is also on Zosyn started at 10 AM this morning  · Repeat echocardiogram tomorrow  2. Left ventricular hypertrophy -patient with a longstanding history of left renal hypertrophy, was mistakenly labeled as hypertrophic cardiomyopathy in the chart, he does not endorse any family history of any genetic cardiac disorders, he reports that he has uncontrolled hypertension at home and he never checks it. · Given appearance of myocardium on echo with work-up for SPEP, UPEP free light chains  · Would obtain outpatient PYP scan  · Aggressive blood pressure control -started on Norvasc 10 mg this a.m. by primary team  · Also on eplerenone, hydralazine, Cardura. 3.  Chronic diastolic congestive heart failure  · Not in acute exacerbation   4.  Uncontrolled HTN -patient has a history of adrenalectomy and a previous history of uncontrolled hypertension. He was being worked up for possible secondary causes again by his nephrologist outpatient. He previously was ordered a renal artery duplex that was not obtained yet also aldosterone to levels that were not yet obtained  · Recommend repeating secondary work-up with renal artery duplex as well as aldosterone to renin level. · Follow-up repeat blood pressures with initiation of Norvasc  5. Pericarditis -noted to have pericarditis symptoms as above this morning given unknown etiology of pericardial effusion with start colchicine  · Start colchicine 0.6 daily as above -renally dose  ·       Subjective     CC: Hypertensive urgency cardiac tamponade    HPI: This is a 78-year-old male with a past medical history significant for diastolic congestive heart failure, hypertension, questionable history of hypertrophic cardiomyopathy -it appears this was inappropriate put in the chart patient's had obstructive outflow tract secondary to hypertension her previous cardiologist, COPD, CKD, DVT, who does not have an outpatient cardiologist, who presented to [facility] yesterday secondary to bloody bowel movements. On presentation to the emergency department patient was noted to be hypertensive, tachycardic, he had a mildly elevated BNP and troponin. Had a lactic acidosis. Elevated creatinine 1.59. Leukocytosis. Hemoglobin is 7.8. CT abdomen pelvis noncontrast showed moderate pericardial effusion. He had a echocardiogram performed which was notable for moderate to large pericardial effusion circumferential to the heart. The reasonable likelihood of cardiac tamponade with right ventricular collapse. The patient's EKG was notable for LVH, sinus rhythm. Left axis deviation. His previous EKG performed in May was notable for left anterior fascicular block, left axis deviation. Left atrial enlargement.   He went to the operating room yesterday where he had emergent subxiphoid window performed. He also had a rapid response yesterday higher to the OR where he was in respiratory distress with SBP in the 230s. Approximately 300 mL was drained total.  His hemoglobin went down to 6.4 yesterday. And he received 1 unit packed red blood cells. Currently his medications are atorvastatin, eplerenone, hydralazine, and Norvasc. Patient was seen at bedside. He reports current chest pain described as pleuritic in nature. No dizziness, palpitations, shortness of breath, syncope or presyncopal symptoms. EKG obtained this morning which was notable for ST depression in V6, otherwise no acute changes. He does have a leukocytosis with 190 cc of milky fluid draining from his ANNE MARIE drain this morning. TREADMILL STRESS  No results found for this or any previous visit.     ----------------------------------------------------------------------------------------------  NUCLEAR STRESS TEST: No results found for this or any previous visit.     No results found for this or any previous visit.      --------------------------------------------------------------------------------  CATH:  No results found for this or any previous visit.    --------------------------------------------------------------------------------  ECHO:   Results for orders placed during the hospital encounter of 18    Echo complete with contrast if indicated    Narrative  Soledad Gonzalez  [place], [place] [address] [phone]    Transthoracic Echocardiogram  2D, M-mode, Doppler, and Color Doppler    Study date:  2018    Patient: Ken Barahona  MR number: [de-identified]  Account number: [de-identified]  : 1965  Age: 45 years  Gender: Male  Status: Outpatient  Location: Emergency room  Height: 68 in  Weight: 225 lb  BP: 162/ 88 mmHg    Indications: Pericarditis- Pericardial Effusion    Diagnoses: I32. - Pericarditis in diseases classified elsewhere    Sonographer:  Jaqui Henry, [place]  Interpreting Physician:  Sarah Portillo MD  Referring Physician:  Sarah Portillo MD    SUMMARY    LEFT VENTRICLE:  Systolic function was normal. Ejection fraction was estimated to be 60 %. There were no regional wall motion abnormalities. Wall thickness was increased. TRICUSPID VALVE:  There was trace regurgitation. PERICARDIUM:  A moderate pericardial effusion was identified. HISTORY: PRIOR HISTORY: Essential Hypertension, Leg Deep Vein Thrombosis, Systolic Congestive Heart Failure, Chronic Obstructive Pulmonary Disease    PROCEDURE: The procedure was performed in the emergency room. This was a stat study. The transthoracic approach was used. The study included complete 2D imaging, M-mode, complete spectral Doppler, and color Doppler. The heart rate was 81  bpm, at the start of the study. Images were obtained from the parasternal, apical, subcostal, and suprasternal notch acoustic windows. Image quality was adequate. LEFT VENTRICLE: Size was normal. Systolic function was normal. Ejection fraction was estimated to be 60 %. There were no regional wall motion abnormalities. Wall thickness was increased. No evidence of apical thrombus. DOPPLER: Left  ventricular diastolic function parameters were normal.    RIGHT VENTRICLE: The size was normal. Systolic function was normal. Wall thickness was normal.    LEFT ATRIUM: Size was normal.    RIGHT ATRIUM: Size was normal.    MITRAL VALVE: Valve structure was normal. There was normal leaflet separation. DOPPLER: The transmitral velocity was within the normal range. There was no evidence for stenosis. There was no significant regurgitation. AORTIC VALVE: The valve was trileaflet. Leaflets exhibited normal thickness and normal cuspal separation. DOPPLER: Transaortic velocity was within the normal range. There was no evidence for stenosis. There was no significant  regurgitation.     TRICUSPID VALVE: The valve structure was normal. There was normal leaflet separation. DOPPLER: The transtricuspid velocity was within the normal range. There was no evidence for stenosis. There was trace regurgitation. PULMONIC VALVE: Leaflets exhibited normal thickness, no calcification, and normal cuspal separation. DOPPLER: The transpulmonic velocity was within the normal range. There was no significant regurgitation. PERICARDIUM: A moderate pericardial effusion was identified. There was no chamber collapse. Features were not consistent with tamponade physiology. The pericardium was normal in appearance. AORTA: The root exhibited normal size. SYSTEMIC VEINS: IVC: The inferior vena cava was normal in size. SYSTEM MEASUREMENT TABLES    2D  %FS: 30.8 %  Ao Diam: 3.4 cm  EDV(Teich): 147.5 ml  EF(Teich): 57.8 %  ESV(Teich): 62.2 ml  IVSd: 1.7 cm  LA Area: 24.5 cm2  LA Diam: 3.7 cm  LVEDV MOD A4C: 226.9 ml  LVEF MOD A4C: 60.9 %  LVESV MOD A4C: 88.8 ml  LVIDd: 5.5 cm  LVIDs: 3.8 cm  LVLd A4C: 10.5 cm  LVLs A4C: 9.3 cm  LVOT Diam: 2.2 cm  LVPWd: 1.7 cm  RA Area: 30.7 cm2  RVIDd: 2.9 cm  SV MOD A4C: 138.1 ml  SV(Teich): 85.3 ml    CW  AV Env. Ti: 334.9 ms  AV VTI: 37 cm  AV Vmax: 1.9 m/s  AV Vmean: 1.1 m/s  AV maxP.9 mmHg  AV meanP mmHg    MM  TAPSE: 3.2 cm    PW  CHELSEA (VTI): 2.7 cm2  CHELSEA Vmax: 2.6 cm2  E': 0.1 m/s  E/E': 13  LVOT Env. Ti: 350.1 ms  LVOT VTI: 26.9 cm  LVOT Vmax: 1.4 m/s  LVOT Vmean: 0.8 m/s  LVOT maxP.4 mmHg  LVOT meanPG: 3 mmHg  LVSV Dopp: 98.2 ml  MV A Smith: 0.6 m/s  MV Dec Cabell: 3.5 m/s2  MV DecT: 212.7 ms  MV E Smith: 0.7 m/s  MV E/A Ratio: 1.2  MV PHT: 61.7 ms  MVA By PHT: 3.6 cm2    [facility] Accredited [facility]ch[facility]    Prepared and electronically signed by    Newton Rizo MD  Signed 2018 17:15:03    No results found for this or any previous visit.    --------------------------------------------------------------------------------  HOLTER  No results found for this or any previous visit.    --------------------------------------------------------------------------------  CAROTIDS  No results found for this or any previous visit. Family History:   Family History   Problem Relation Age of Onset   • Heart murmur Sister    • Asthma Sister    • Hypertension Sister    • Kidney disease Mother    • Cancer Father    • Bell's palsy Brother    • Kidney disease Brother    • Deep vein thrombosis Neg Hx      Historical Information   Past Medical History:   Diagnosis Date   • Asthma    • Chronic kidney disease    • COPD (chronic obstructive pulmonary disease) [facility])    • CPAP (continuous positive airway pressure) dependence    • Diabetes mellitus ([address])    • Emphysema of lung (HCC)    • Gout    • History of transfusion     pt stated they had a transfusion when they had gallbladder surgery. • Hypertension    • Kidney disease     renal failure   • Leg DVT (deep venous thromboembolism), acute, bilateral ([address]) 01/2018   • Obesity (BMI 30-39. 9)    • AGUS on CPAP     setting 11   • Postgastrectomy malabsorption    • Sleep apnea    • Systolic CHF [facility])      Past Surgical History:   Procedure Laterality Date   • ADRENALECTOMY     • CHOLECYSTECTOMY     • COLONOSCOPY     • EGD     • HIATAL HERNIA REPAIR N/A 12/22/2020    Procedure: REPAIR HERNIA HIATAL LAPAROSCOPIC;  Surgeon: Gurmeet Manzo MD;  Location: [facility];  Service: Bariatrics   • INCISION AND DRAINAGE OF WOUND Left 10/17/2018    Procedure: INCISION AND DRAINAGE (I&D) GROIN;  Surgeon: Fabian Garcia MD;  Location: [facility];  Service: General   • IR IMAGE [address] / [id number] W TUBE  8/17/2018   • IR IMAGE [address] / DRAINAGE W TUBE  7/31/2018   • JOINT REPLACEMENT Bilateral     knee   • KIDNEY SURGERY  2009    nodule removal   • LYMPH NODE DISSECTION Left 01/2018    left inguinal LN removed - benign    • OTHER SURGICAL HISTORY      kidney nodule removal   • PALATE / UVULA BIOPSY / EXCISION     • IA LAPS GSTR RSTCV PX W/BYP JASON-EN-Y LIMB <150 CM N/A 12/22/2020    Procedure: BYPASS GASTRIC  JASON-EN-Y LAPAROSCOPIC WITH INTRAOPERATIVE EGD;  Surgeon: Kiesha Cornejo MD;  Location: [facility];  Service: Bariatrics   • SINUS SURGERY     • TONSILLECTOMY       Social History   Social History     Substance and Sexual Activity   Alcohol Use Yes    Comment: occasionally     Social History     Substance and Sexual Activity   Drug Use No     Social History     Tobacco Use   Smoking Status Never   Smokeless Tobacco Never     Family History:   Family History   Problem Relation Age of Onset   • Heart murmur Sister    • Asthma Sister    • Hypertension Sister    • Kidney disease Mother    • Cancer Father    • Bell's palsy Brother    • Kidney disease Brother    • Deep vein thrombosis Neg Hx        Review of Systems:  Review of Systems   Constitutional: Negative for fever. Respiratory: Negative for shortness of breath. Cardiovascular: Positive for chest pain. Gastrointestinal: Positive for blood in stool. Neurological: Negative for dizziness.            Scheduled Meds:  Current Facility-Administered Medications   Medication Dose Route Frequency Provider Last Rate   • acetaminophen  650 mg Oral Q6H PRN MARBIN Sorto     • aluminum-magnesium hydroxide-simethicone  30 mL Oral Q6H PRN MARBIN Sorto     • amLODIPine  10 mg Oral Daily MARBIN Sorto     • atorvastatin  40 mg Oral HS MARBIN Sorto     • budesonide-formoterol  2 puff Inhalation BID MARBIN Sorto     • busPIRone  15 mg Oral BID MARBIN Sorto     • calcium carbonate  2 tablet Oral BID With Meals MARBIN Sorto     • cholecalciferol  2,000 Units Oral Daily MARBIN Sorto     • colchicine  0.6 mg Oral BID MARBIN Sorto     • doxazosin  8 mg Oral BID MARBIN Sorto     • eplerenone  50 mg Oral Daily MARBIN Sorto     • fluticasone  1 puff Inhalation Daily MARBIN Win     • hydrALAZINE  10 mg Intravenous Q6H PRN MARBIN Win     • hydrALAZINE  100 mg Oral TID MARBIN Win     • HYDROmorphone  0.2 mg Intravenous Q3H PRN MARBIN Win     • labetalol  10 mg Intravenous Q6H PRN SVEN WinNP     • methocarbamol  500 mg Oral Q6H PRN MARBIN Win     • metoclopramide  10 mg Intravenous Q6H PRN MARBIN Win     • mirtazapine  15 mg Oral HS MARBIN Win     • multivitamin-minerals  1 tablet Oral Daily MARBIN Win     • ondansetron  4 mg Intravenous Q6H PRN MARBIN Win     • oxyCODONE  2.5 mg Oral Q4H PRN MARBIN Win      Or   • oxyCODONE  5 mg Oral Q4H PRN MARBIN Win     • pantoprazole  40 mg Intravenous Q12H [facility] MARBIN Win     • piperacillin-tazobactam  3.375 g Intravenous Q6H MARBIN Sagastume     • polyethylene glycol  17 g Oral Daily MARBIN Win     • sertraline  200 mg Oral Daily MARBIN Sagastume       Continuous Infusions:   PRN Meds:.•  acetaminophen  •  aluminum-magnesium hydroxide-simethicone  •  hydrALAZINE  •  HYDROmorphone  •  labetalol  •  methocarbamol  •  metoclopramide  •  ondansetron  •  oxyCODONE **OR** oxyCODONE  all current active meds have been reviewed    Allergies   Allergen Reactions   • Clonidine Anaphylaxis   • Lisinopril Anaphylaxis   • Nifedipine Anaphylaxis   • Spironolactone Anaphylaxis, Other (See Comments) and Shortness Of Breath   • Buspirone      Headaches,    • Enoxaparin      Injection site "bump" per Dr. Laura Ferrari   • Minoxidil      Retains fluid around heart       Objective   Vitals: Temp (24hrs), Av.4 °F (36.9 °C), Min:97.6 °F (36.4 °C), Max:99.1 °F (37.3 °C)  Current: Temperature: 97.9 °F (36.6 °C)  Patient Vitals for the past 24 hrs:   BP Temp Temp src Pulse Resp SpO2   23 0745 (!) 173/88 97.9 °F (36.6 °C) -- 104 20 98 %   07/09/23 0251 152/75 99.1 °F (37.3 °C) Oral 99 18 99 %   07/09/23 0005 134/63 98.7 °F (37.1 °C) Oral 89 18 99 %   07/08/23 2344 130/60 98.6 °F (37 °C) -- 89 18 --   07/08/23 2232 138/76 -- -- -- -- --   07/08/23 1907 122/70 98.3 °F (36.8 °C) Oral 96 20 --   07/08/23 1800 116/66 -- -- 101 -- --   07/08/23 1730 126/68 -- -- 97 -- --   07/08/23 1700 135/75 -- -- 92 -- --   07/08/23 1600 136/65 -- -- 102 -- --   07/08/23 1530 133/68 -- -- -- -- --   07/08/23 1500 143/78 -- -- 99 18 98 %   07/08/23 1400 139/76 98.1 °F (36.7 °C) Oral 87 19 --   07/08/23 1345 148/81 -- -- 87 18 --   07/08/23 1330 150/84 -- -- 87 19 --   07/08/23 1315 155/89 -- -- 87 18 --   07/08/23 1300 154/87 -- -- 94 19 99 %   07/08/23 1255 -- 99.1 °F (37.3 °C) Oral -- -- --   07/08/23 1245 156/93 -- -- 100 12 100 %   07/08/23 1230 160/96 -- -- 94 16 100 %   07/08/23 1215 168/94 -- -- 88 14 100 %   07/08/23 1200 165/93 98 °F (36.7 °C) -- 86 17 100 %   07/08/23 1145 157/87 -- -- 86 16 99 %   07/08/23 1130 158/78 -- -- 86 13 99 %   07/08/23 1115 170/92 -- -- 86 13 99 %   07/08/23 1100 (!) 177/96 -- -- 88 18 100 %   07/08/23 1048 (!) 177/96 97.6 °F (36.4 °C) -- 92 16 100 %    There is no height or weight on file to calculate BMI. Orthostatic Blood Pressures    Flowsheet Row Most Recent Value   Blood Pressure 173/88 filed at 07/09/2023 0745              Invasive Devices     Peripheral Intravenous Line  Duration           Peripheral IV 07/08/23 Left Antecubital 1 day    Peripheral IV 07/08/23 Dorsal (posterior); Left;Proximal Forearm <1 day          Arterial Line  Duration           Arterial Line 07/08/23 Left Radial 1 day          Drain  Duration           Closed/Suction Drain Pericardial Bulb 19 Fr. <1 day                Physical Exam:  Physical Exam  Constitutional:       Appearance: Normal appearance. Cardiovascular:      Rate and Rhythm: Normal rate and regular rhythm. Heart sounds: Murmur heard. Comments: 3 out of 6 systolic murmur best heard at the left upper sternal border  Pulmonary:      Effort: Pulmonary effort is normal. No respiratory distress. Breath sounds: Normal breath sounds. Abdominal:      General: There is no distension. Musculoskeletal:      Right lower leg: No edema. Left lower leg: No edema. Neurological:      Mental Status: He is alert.              Lab Results:   Results from last 7 days   Lab Units 07/09/23  0932 07/09/23 0433 07/08/23 1953 07/08/23 1308 07/08/23  0137   WBC Thousand/uL  --  23.88*  --  16.14* 13.77*   HEMOGLOBIN g/dL 7.7* 7.1* 6.4* 7.6* 7.8*   HEMATOCRIT %  --  20.9*  --  22.6* 23.2*   PLATELETS Thousands/uL  --  268  --  334 342   NEUTROS PCT %  --  83*  --   --  84*   MONOS PCT %  --  12  --   --  4   EOS PCT %  --  0  --   --  0      Results from last 7 days   Lab Units 07/09/23 0433 07/08/23 1953 07/08/23 1308 07/08/23  0137   SODIUM mmol/L 141 139 141 139   POTASSIUM mmol/L 3.6 3.3* 2.8* 4.2   CHLORIDE mmol/L 113* 110* 110* 106   CO2 mmol/L 25 24 25 21   BUN mg/dL 49* 43* 43* 63*   CREATININE mg/dL 1.92* 1.64* 1.41* 1.59*   CALCIUM mg/dL 8.6 8.5 8.8 9.7   ALK PHOS U/L 54  --   --  58   ALT U/L 50  --   --  64*   AST U/L 32  --   --  52*   MAGNESIUM mg/dL 2.4  --   --   --    PHOSPHORUS mg/dL 4.4*  --   --   --    EGFR ml/min/1.73sq m 37 44 53 46         Results from last 7 days   Lab Units 07/08/23  1308   LACTIC ACID mmol/L 0.8         No results found for: "PHART", "EPE6VPE", "PO2ART", "OIC2HAR", "O3VGWUKQ", "BEART", "SOURCE"  No components found for: "HIV1X2"  Lab Results   Component Value Date    HEPBIGM Non-reactive 04/15/2021    HEPBCAB Non-reactive 04/15/2021    HEPCAB Non-reactive 04/15/2021     Lab Results   Component Value Date    SPEP See Comment 04/15/2021    UPEP See Comment 04/15/2021      Lab Results   Component Value Date    HGBA1C 5.2 05/12/2023    HGBA1C 5.4 09/27/2022    HGBA1C 5.5 03/22/2022     No results found for: "CHOL" Lab Results   Component Value Date    HDL 37 (L) 07/24/2020    HDL 61 (H) 02/06/2018      Lab Results   Component Value Date    LDLCALC 66 07/24/2020    LDLCALC 45 02/06/2018      Lab Results   Component Value Date    TRIG 113 07/24/2020    TRIG 22 02/06/2018     No components found for: "PROCAL"  Results from last 7 days   Lab Units 07/08/23  0800   BNP pg/mL 538*         Imaging: I have personally reviewed pertinent reports.

## 2023-07-09 NOTE — ASSESSMENT & PLAN NOTE
Assessment:  · Concern for acute on chronic anemia possibly from GI source.  May have UGIB due to known humaira-en-Y or accelerated slow bleeding in the setting of hypertension   · Likely also has some degree of malabsorption and possible nutrient deficiency as a result of gastric bypass   · Baseline hemoglobin: 12.9 - 12.6  · Initial hemoglobin: 7.8 (7/8/23)  · Last hemoglobin: 7.7 (7/9/23)  · 7/8/23 1u PRBC    Plan:   · Transfuse for hemoglobin <7.0  · Serial hemoglobin   · Monitor for sources of bleeding   · CBC in AM

## 2023-07-09 NOTE — CONSULTS
CONSULT: GASTROENTEROLOGY          Inpatient consult to gastroenterology     Performed by  Elia Veras MD   Authorized by MARBIN Arnold             PATIENT INFORMATION      Isidra Erwin 59 y.o. male MRN: [de-identified]  Unit/Bed#: [facility] 526-01 Encounter: [id number]  PCP: Kevin Langley DO  Date of Admission:  7/8/2023  Date of Consultation: 07/09/23  Requesting Physician: Corie Deluca MD    ASSESSMENTS & PLAN   Isidra Erwin is a 59 y.o. old male admitted with cardiac tamponade s/p pericardial window and indwelling drain 7/8/23, who is now noted to have acute anemia with blood in stool. Gastroenterology team has been consulted for assistance with management of acute anemia, melena, BRBPR.    1. Melena, BRBPR  2. Acute anemia  Baseline Hb around 12, presented with Hb 7.8, which further dropped to 6.4 s/p 1 prbc transfusion; improved to 7.7. Now dropping again to 7.0. With ongoing melena mixed with bright red blood in stool. No hematemesis. Hx of RnY gastric bypass. No blood thinners at present. BUN/Cr elevated at present. Last EGD in 2020 prior to bypass and appeared normal except gastritis. Last colonoscopy in 2018, showing diverticulosis, no polyps. -- he will be at higher than avg cardiac risk for procedure given ongoing presentation  -- continue IV PPI, prefer GTT over BID  -- active type and screen  -- please inform on call GI team for any hemodynamic compromise, increasing transfusion requirement as it may warrant urgent endoscopy  -- for now will attempt to manage conservatively given high risk status, but will consider endoscopy if symptoms continue  -- empiric NPO at midnight, will discuss with anesthesia tomorrow  -- no objections to DVT PPx    3. Pericarditis  4. Pericardial effusion with tamponade   s/p pericardial window with in-situ pericardial train    GI will follow.      HISTORY OF PRESENT ILLNESS      Isidra Erwin is a 59 y.o. male who is originally admitted for uncontrolled HTN on 7/8/2023. GI team is consulted for melena. 60 yo M with hx including but not limited to RnY gastric bypass, CKD, HTN, COPD, PSA, who presented initially to  [facility] on 7/8/23 with epigastric pain, SOB. Initial work up showing pericardial effusion with mass effect on RV. Stat ECHO showing similar findings and subsequently patient was transferred to [facility] for thoracic surgery evaluation. 7/8/23 he had RRT activated for severe HTN, respiratory distress and suspected cardiac tamponade. He was emergently taken to OR by thoracic surgery for pericardial window with output of 300 cc seorsanguinous fluid and placement of pericardial drain. Currently receiving further care as per cardiology, icu and thoracic team. Noted to have melena now with acute drop in Hb. On my encounter denies any complaints but does report black tarry stool. REVIEW OF SYSTEMS     A thorough 12-point review of systems has been conducted. Pertinent positives and negatives are mentioned in the history of present illness. PAST MEDICAL & SURGICAL HISTORY      Past Medical History:   Diagnosis Date   • Asthma    • Chronic kidney disease    • COPD (chronic obstructive pulmonary disease) (HCC)    • CPAP (continuous positive airway pressure) dependence    • Diabetes mellitus ([address])    • Emphysema of lung (HCC)    • Gout    • History of transfusion     pt stated they had a transfusion when they had gallbladder surgery. • Hypertension    • Kidney disease     renal failure   • Leg DVT (deep venous thromboembolism), acute, bilateral ([address]) 01/2018   • Obesity (BMI 30-39. 9)    • AGUS on CPAP     setting 11   • Postgastrectomy malabsorption    • Sleep apnea    • Systolic CHF [facility])        Past Surgical History:   Procedure Laterality Date   • ADRENALECTOMY     • CHOLECYSTECTOMY     • COLONOSCOPY     • EGD     • HIATAL HERNIA REPAIR N/A 12/22/2020    Procedure: REPAIR HERNIA HIATAL LAPAROSCOPIC;  Surgeon: June Harper MD;  Location: [facility];  Service: Bariatrics   • INCISION AND DRAINAGE OF WOUND Left 10/17/2018    Procedure: INCISION AND DRAINAGE (I&D) GROIN;  Surgeon: Angelo Ocampo MD;  Location: [facility];  Service: General   • IR IMAGE [facility] within [facility] / [address]  8/17/2018   • IR IMAGE GUIDED ASPIRATION / DRAINAGE W TUBE  7/31/2018   • JOINT REPLACEMENT Bilateral     knee   • KIDNEY SURGERY  2009    nodule removal   • LYMPH NODE DISSECTION Left 01/2018    left inguinal LN removed - benign    • OTHER SURGICAL HISTORY      kidney nodule removal   • PALATE / UVULA BIOPSY / EXCISION     • OH LAPS GSTR RSTCV PX W/BYP JASON-EN-Y LIMB <150 CM N/A 12/22/2020    Procedure: BYPASS GASTRIC  JASON-EN-Y LAPAROSCOPIC WITH INTRAOPERATIVE EGD;  Surgeon: Kd Laird MD;  Location: [facility];  Service: Bariatrics   • SINUS SURGERY     • TONSILLECTOMY         MEDICATIONS & ALLERGIES       Medications:   Prior to Admission medications    Medication Sig Start Date End Date Taking?  Authorizing Provider   albuterol (PROVENTIL HFA,VENTOLIN HFA) 90 mcg/act inhaler     Historical Provider, MD   amLODIPine (NORVASC) 10 mg tablet Take 10 mg by mouth 4/15/20   Historical Provider, MD   atorvastatin (LIPITOR) 40 mg tablet Take 40 mg by mouth daily at bedtime 7/21/22   Historical Provider, MD   busPIRone (BUSPAR) 15 mg tablet TAKE ONE TABLET BY MOUTH TWICE A DAY FOR PTSD    Historical Provider, MD   Calcium 600 MG tablet Take 1,200 mg by mouth    Historical Provider, MD   Cholecalciferol (VITAMIN D-3) 1000 units CAPS Take 2,000 Units by mouth daily    Historical Provider, MD   doxazosin (CARDURA) 8 MG tablet Take 8 mg by mouth 2 (two) times a day 2/3/21   Historical Provider, MD   eplerenone (INSPRA) 50 MG tablet Take 50 mg by mouth in the morning 5/23/17   Historical Provider, MD   ferrous sulfate 325 (65 Fe) mg tablet Take 325 mg by mouth 2 (two) times a day      Historical Provider, MD   glycopyrrolate-formoterol (Gris Gibson) 9-4.8 MCG/ACT inhaler Inhale 2 puffs daily after breakfast    Historical Provider, MD   hydrALAZINE (APRESOLINE) 100 MG tablet Take 1 tablet (100 mg total) by mouth 3 (three) times a day 5/23/23   MARBIN Abdi   labetalol (NORMODYNE) 300 mg tablet Take 1 tablet (300 mg total) by mouth every 12 (twelve) hours 5/14/23 6/13/23  Leopold Spire, MD   levETIRAcetam (KEPPRA) 750 mg tablet Take 1 tablet (750 mg total) by mouth every 12 (twelve) hours 5/14/23 6/13/23  Leopold Spire, MD   mirtazapine (REMERON) 15 mg tablet 15 mg 9/1/22   Historical Provider, MD   Multiple Vitamins-Minerals (multivitamin with minerals) tablet Take 1 tablet by mouth daily    Historical Provider, MD   Omega-3 Fatty Acids (FISH OIL) 1200 MG CAPS Take 1,200 mg by mouth daily at bedtime  Patient not taking: Reported on 5/23/2023    Historical Provider, MD   Pulmicort Flexhaler 90 MCG/ACT inhaler 2 puffs 2 (two) times a day 3/9/21   Historical Provider, MD   sertraline (ZOLOFT) 100 mg tablet Take 2 tablets by mouth daily 6/15/22   Historical Provider, MD   umeclidinium-vilanterol (ANORO ELLIPTA) 62.5-25 MCG/INH inhaler Inhale 1 puff daily    Historical Provider, MD       Allergies: Allergies   Allergen Reactions   • Clonidine Anaphylaxis   • Lisinopril Anaphylaxis   • Nifedipine Anaphylaxis   • Spironolactone Anaphylaxis, Other (See Comments) and Shortness Of Breath   • Buspirone      Headaches,    • Enoxaparin      Injection site "bump" per Dr. Suzan Jones   • Minoxidil      Retains fluid around heart       SOCIAL HISTORY      Substance Use History:   Social History     Substance and Sexual Activity   Alcohol Use Yes    Comment: occasionally     Social History     Tobacco Use   Smoking Status Never   Smokeless Tobacco Never     Social History     Substance and Sexual Activity   Drug Use No       FAMILY HISTORY      As in the HPI.      PHYSICAL EXAM     Vitals:   Blood Pressure: 166/92 (07/09/23 1546)  Pulse: (!) 106 (07/09/23 1515)  Temperature: 98.1 °F (36.7 °C) (07/09/23 1112)  Temp Source: Oral (07/09/23 1112)  Respirations: 18 (07/09/23 1112)  SpO2: 98 % (07/09/23 0745)    Physical Exam:   GENERAL: NAD  HEENT:  Atraumatic  CARDIAC:  RRR on palpation  PULMONARY:  non-labored breathing  ABDOMEN: no guarding or rigidity  RECTAL:  deferred  NEUROLOGIC:  Alert/oriented x3. EXTREMITIES: chronic changes  SKIN:  chronic changes    ADDITIONAL DATA     Lab Results:     Results from last 7 days   Lab Units 07/09/23  0932 07/09/23 0433   WBC Thousand/uL  --  23.88*   HEMOGLOBIN g/dL 7.7* 7.1*   HEMATOCRIT %  --  20.9*   PLATELETS Thousands/uL  --  268   NEUTROS PCT %  --  83*   LYMPHS PCT %  --  4*   MONOS PCT %  --  12   EOS PCT %  --  0     Results from last 7 days   Lab Units 07/09/23 0433   POTASSIUM mmol/L 3.6   CHLORIDE mmol/L 113*   CO2 mmol/L 25   BUN mg/dL 49*   CREATININE mg/dL 1.92*   CALCIUM mg/dL 8.6   ALK PHOS U/L 54   ALT U/L 50   AST U/L 32           Imaging:    XR chest portable    Result Date: 7/8/2023  Narrative: CHEST INDICATION:   Post op pericardial window. COMPARISON: CXR 5/10/2023, abdomen CT 7/8/2023, chest CT 5/10/2023. EXAM PERFORMED/VIEWS:  XR CHEST PORTABLE. FINDINGS: Normal heart size with pericardial drain. Lungs clear. No effusion or pneumothorax. Upper abdomen normal. Bones normal for age. Impression: No acute cardiopulmonary disease. Pericardial drain. Workstation performed: [id number]     Echo complete w/ contrast if indicated    Result Date: 7/8/2023  Narrative: •  Left Ventricle: Left ventricular cavity size is normal. Wall thickness is increased. The left ventricular ejection fraction is 50%. Systolic function is low normal. Wall motion cannot be accurately assessed. •  Left Atrium: The atrium is mildly dilated. •  Pericardium: There is a moderate to large pericardial effusion circumferential to the heart. There is a reasonable likelihood of cardiac tamponade.  The evidence for tamponade includes: right ventricular compression. Clinical correlation recommended. •  Echocardiogram findings were personally discussed with ED physician, Tony Cordova. CT abdomen pelvis wo contrast    Result Date: 7/8/2023  Narrative: CT ABDOMEN AND PELVIS WITHOUT IV CONTRAST INDICATION:   Abdominal pain, acute, nonlocalized abdominal pain. Vomiting, bloody bowel movements COMPARISON: CT 5/10/2023, 8/26/2021 TECHNIQUE:  CT examination of the abdomen and pelvis was performed without intravenous contrast. Multiplanar 2D reformatted images were created from the source data. This examination, like all CT scans performed in the [facility], was performed utilizing techniques to minimize radiation dose exposure, including the use of iterative reconstruction and automated exposure control. Radiation dose length product (DLP) for this visit:  554 mGy-cm Enteric contrast was administered. FINDINGS: Evaluation of intra-abdominal organs limited by lack of IV contrast given patient's paucity of intra-abdominal fat ABDOMEN LOWER CHEST: 7 mm right lower lobe nodule is stable Moderate pericardial effusion with largest pocket measuring about 3.5 cm anteriorly as noted on series 602/54, this is significantly increased when compared to the prior study. There is adjacent mass effect on the right lateral ventricular wall as noted on series 2/2 LIVER/BILIARY TREE:  Unremarkable. GALLBLADDER: Postcholecystectomy SPLEEN:  Unremarkable. PANCREAS: Not well visualized ADRENAL GLANDS:  Unremarkable. KIDNEYS/URETERS:  Unremarkable. No hydronephrosis. STOMACH AND BOWEL:  Unremarkable. APPENDIX:  No findings to suggest appendicitis. ABDOMINOPELVIC CAVITY: In comparison to the most recent prior studies unenhanced phase, there is likely currently mild mesenteric edema as the bowel wall boundaries are more difficult to visualize VESSELS: Not well visualized PELVIS REPRODUCTIVE ORGANS:  Unremarkable for patient's age.  URINARY BLADDER: Unremarkable. ABDOMINAL WALL/INGUINAL REGIONS:  Unremarkable. OSSEOUS STRUCTURES:  No acute fracture or destructive osseous lesion. Impression: 1. Moderate pericardial effusion, significantly increased from prior. Additionally there appears to be mass effect on the right lateral ventricle. Recommend echocardiogram and correlation for cardiac tamponade. 2.  Likely mild mesenteric edema/ascites I personally discussed this study with [address] [place] [address] at 7/8/23 4:41 AM Workstation performed: [id number]       EKG, Pathology, and Other Studies Reviewed on Admission:   · EKG: Reviewed    Counseling / Coordination of Care Time: 30 total mins spent n consult. Greater than 50% of total time spent on patient counseling and coordination of care. Arnoldo Parker MD   PGY-4, Department of Gastroenterology. ...............................................................................................................................................  ** Please Note: This note is constructed using a voice recognition dictation system.  **

## 2023-07-09 NOTE — QUICK NOTE
Vasc U/SDoppler ordered - unable to do test per doppler tech until the drain is removed. Will have to be re-ordered later in hospitalization per tech.

## 2023-07-09 NOTE — ASSESSMENT & PLAN NOTE
Assessment:  · No evidence of acute exacerbation at this time post operatively   · RRT on 7/8/23 possibly partially contributed to by acute CHF exacerbation in the setting of hypertensive emergency in the setting of known LVH and diastolic dysfunction   · Current weight: 68.9kg  · Relevant echocardiograms:  · 7/8/23 TTE: Left ventricular cavity size is normal. Wall thickness is increased. The left ventricular ejection fraction is 50%. Systolic function is low normal. Wall motion cannot be accurately assessed. Left Atrium: The atrium is mildly dilated. Pericardium: There is a moderate to large pericardial effusion circumferential to the heart. There is a reasonable likelihood of cardiac tamponade.  The evidence for tamponade includes: right ventricular compression      Plan:   · Holding diuresis at this time  · Goal to maintain more controlled BP, ideally SBP < 180  · Daily weights  · Continue home afterload reduction agents:   · Amlodipine 10mg daily   · Doxazosin 8mg BID  · Eplerenone 50mg daily   · Hydralazine 100mg TID  · PRN antihypertensives:   · Labetalol 10mg q6h PRN  · Hydralazine 10mg q6h PRN  · Cardiac, low Na diet when able to take PO

## 2023-07-09 NOTE — ASSESSMENT & PLAN NOTE
Assessment:   · Patient reported 4 bloody bowel movements on presentation to the ED on 7/8/23  · He states he only noticed blood the day prior, but given his iron supplementation, may have also been more chronic  · Prior humaira-en-Y gives additional high risk, particularly in the setting of uncontrolled hypertension     Plan:   · Protonix 40mg IV q12h   · S/p 80mg IV loading dose (7/8/23)   · Consult GI   · Follow hemoglobin  · Monitor bowel movements

## 2023-07-09 NOTE — PROGRESS NOTES
Progress Note - Thoracic Surgery   Belia Hathaway 59 y.o. male MRN: [de-identified]  Unit/Bed#: [facility] 526-01 Encounter: [id number]    Assessment:  61yo M with pericardial effusion s/p subxyphoid pericardial window 7/8  1u pRBC for hgb 6.4 overnight    AVSS on 2L NC  Pericardial drain 145 cc SS output   cc    Creatinine 1.92 (1.64)  hgb 7.1 (6.4)    Plan:  - maintain pericardial drain, monitor output and character  - remainder of care per primary team    Subjective/Objective   Subjective:   No acute events overnight. Denies SOB or CP. Not having any pain. Objective:     Blood pressure 134/63, pulse 89, temperature 98.7 °F (37.1 °C), temperature source Oral, resp. rate 18, SpO2 99 %. ,There is no height or weight on file to calculate BMI. Intake/Output Summary (Last 24 hours) at 7/9/2023 0020  Last data filed at 7/8/2023 1902  Gross per 24 hour   Intake 420 ml   Output 570 ml   Net -150 ml       Invasive Devices     Peripheral Intravenous Line  Duration           Peripheral IV 07/08/23 Dorsal (posterior); Left;Proximal Forearm <1 day    Peripheral IV 07/08/23 Left Antecubital <1 day          Arterial Line  Duration           Arterial Line 07/08/23 Left Radial <1 day          Drain  Duration           Closed/Suction Drain Pericardial Bulb 19 Fr. <1 day                Physical Exam:  General: No acute distress  Neuro: alert and oriented  HEENT: moist mucous membranes  CV: Well perfused, regular rate and rhythm  Lungs: Normal work of breathing, no increased respiratory effort  Abdomen: Soft, non-tender, non-distended.  Pericardial drain in place with serous output  Extremities: No edema, clubbing or cyanosis  Skin: Warm, dry      Melissa Garcia MD  General Surgery PGY2

## 2023-07-10 ENCOUNTER — APPOINTMENT (INPATIENT)
Dept: GASTROENTEROLOGY | Facility: HOSPITAL | Age: 58
DRG: 271 | End: 2023-07-10
Payer: COMMERCIAL

## 2023-07-10 ENCOUNTER — ANESTHESIA EVENT (INPATIENT)
Dept: GASTROENTEROLOGY | Facility: HOSPITAL | Age: 58
End: 2023-07-10
Payer: COMMERCIAL

## 2023-07-10 ENCOUNTER — ANESTHESIA (INPATIENT)
Dept: GASTROENTEROLOGY | Facility: HOSPITAL | Age: 58
End: 2023-07-10
Payer: COMMERCIAL

## 2023-07-10 LAB
ABO GROUP BLD BPU: NORMAL
ANA SER QL IA: NEGATIVE
ANION GAP SERPL CALCULATED.3IONS-SCNC: 4 MMOL/L
BASE EX.OXY STD BLDV CALC-SCNC: 95.2 % (ref 60–80)
BASE EXCESS BLDV CALC-SCNC: -0.2 MMOL/L
BASOPHILS # BLD AUTO: 0.04 THOUSANDS/ÂΜL (ref 0–0.1)
BASOPHILS NFR BLD AUTO: 0 % (ref 0–1)
BPU ID: NORMAL
BUN SERPL-MCNC: 52 MG/DL (ref 5–25)
CALCIUM SERPL-MCNC: 8.5 MG/DL (ref 8.3–10.1)
CHLORIDE SERPL-SCNC: 115 MMOL/L (ref 96–108)
CO2 SERPL-SCNC: 25 MMOL/L (ref 21–32)
CREAT SERPL-MCNC: 2.16 MG/DL (ref 0.6–1.3)
CROSSMATCH: NORMAL
CRP SERPL QL: 105 MG/L
EOSINOPHIL # BLD AUTO: 0.03 THOUSAND/ÂΜL (ref 0–0.61)
EOSINOPHIL NFR BLD AUTO: 0 % (ref 0–6)
ERYTHROCYTE [DISTWIDTH] IN BLOOD BY AUTOMATED COUNT: 15 % (ref 11.6–15.1)
GFR SERPL CREATININE-BSD FRML MDRD: 32 ML/MIN/1.73SQ M
GLUCOSE SERPL-MCNC: 111 MG/DL (ref 65–140)
HCO3 BLDV-SCNC: 20 MMOL/L (ref 24–30)
HCT VFR BLD AUTO: 22.3 % (ref 36.5–49.3)
HGB BLD-MCNC: 7.3 G/DL (ref 12–17)
HGB BLD-MCNC: 8.5 G/DL (ref 12–17)
IMM GRANULOCYTES # BLD AUTO: 0.24 THOUSAND/UL (ref 0–0.2)
IMM GRANULOCYTES NFR BLD AUTO: 1 % (ref 0–2)
LYMPHOCYTES # BLD AUTO: 1.5 THOUSANDS/ÂΜL (ref 0.6–4.47)
LYMPHOCYTES NFR BLD AUTO: 8 % (ref 14–44)
MCH RBC QN AUTO: 29 PG (ref 26.8–34.3)
MCHC RBC AUTO-ENTMCNC: 32.7 G/DL (ref 31.4–37.4)
MCV RBC AUTO: 89 FL (ref 82–98)
MONOCYTES # BLD AUTO: 2.27 THOUSAND/ÂΜL (ref 0.17–1.22)
MONOCYTES NFR BLD AUTO: 13 % (ref 4–12)
NEUTROPHILS # BLD AUTO: 14.05 THOUSANDS/ÂΜL (ref 1.85–7.62)
NEUTS SEG NFR BLD AUTO: 78 % (ref 43–75)
NON VENT ROOM AIR: 21 %
NRBC BLD AUTO-RTO: 0 /100 WBCS
O2 CT BLDV-SCNC: 11.6 ML/DL
PCO2 BLDV: 19.4 MM HG (ref 42–50)
PH BLDV: 7.63 [PH] (ref 7.3–7.4)
PLATELET # BLD AUTO: 265 THOUSANDS/UL (ref 149–390)
PMV BLD AUTO: 9.4 FL (ref 8.9–12.7)
PO2 BLDV: 75.6 MM HG (ref 35–45)
POTASSIUM SERPL-SCNC: 3.6 MMOL/L (ref 3.5–5.3)
RBC # BLD AUTO: 2.52 MILLION/UL (ref 3.88–5.62)
SODIUM SERPL-SCNC: 144 MMOL/L (ref 135–147)
TSH SERPL DL<=0.05 MIU/L-ACNC: 0.89 UIU/ML (ref 0.45–4.5)
UNIT DISPENSE STATUS: NORMAL
UNIT PRODUCT CODE: NORMAL
UNIT PRODUCT VOLUME: 350 ML
UNIT RH: NORMAL
WBC # BLD AUTO: 18.13 THOUSAND/UL (ref 4.31–10.16)

## 2023-07-10 PROCEDURE — 99233 SBSQ HOSP IP/OBS HIGH 50: CPT | Performed by: INTERNAL MEDICINE

## 2023-07-10 PROCEDURE — 84443 ASSAY THYROID STIM HORMONE: CPT | Performed by: INTERNAL MEDICINE

## 2023-07-10 PROCEDURE — 80048 BASIC METABOLIC PNL TOTAL CA: CPT | Performed by: NURSE PRACTITIONER

## 2023-07-10 PROCEDURE — C9113 INJ PANTOPRAZOLE SODIUM, VIA: HCPCS | Performed by: NURSE PRACTITIONER

## 2023-07-10 PROCEDURE — NC001 PR NO CHARGE: Performed by: INTERNAL MEDICINE

## 2023-07-10 PROCEDURE — 43235 EGD DIAGNOSTIC BRUSH WASH: CPT | Performed by: INTERNAL MEDICINE

## 2023-07-10 PROCEDURE — 97167 OT EVAL HIGH COMPLEX 60 MIN: CPT

## 2023-07-10 PROCEDURE — 86038 ANTINUCLEAR ANTIBODIES: CPT | Performed by: INTERNAL MEDICINE

## 2023-07-10 PROCEDURE — 85018 HEMOGLOBIN: CPT | Performed by: PHYSICIAN ASSISTANT

## 2023-07-10 PROCEDURE — 84165 PROTEIN E-PHORESIS SERUM: CPT | Performed by: INTERNAL MEDICINE

## 2023-07-10 PROCEDURE — 97163 PT EVAL HIGH COMPLEX 45 MIN: CPT

## 2023-07-10 PROCEDURE — 86140 C-REACTIVE PROTEIN: CPT | Performed by: INTERNAL MEDICINE

## 2023-07-10 PROCEDURE — 85025 COMPLETE CBC W/AUTO DIFF WBC: CPT | Performed by: NURSE PRACTITIONER

## 2023-07-10 PROCEDURE — 0DJ08ZZ INSPECTION OF UPPER INTESTINAL TRACT, VIA NATURAL OR ARTIFICIAL OPENING ENDOSCOPIC: ICD-10-PCS | Performed by: INTERNAL MEDICINE

## 2023-07-10 PROCEDURE — 83521 IG LIGHT CHAINS FREE EACH: CPT | Performed by: INTERNAL MEDICINE

## 2023-07-10 PROCEDURE — 82805 BLOOD GASES W/O2 SATURATION: CPT | Performed by: INTERNAL MEDICINE

## 2023-07-10 PROCEDURE — 99232 SBSQ HOSP IP/OBS MODERATE 35: CPT | Performed by: INTERNAL MEDICINE

## 2023-07-10 RX ORDER — LIDOCAINE HYDROCHLORIDE 10 MG/ML
INJECTION, SOLUTION EPIDURAL; INFILTRATION; INTRACAUDAL; PERINEURAL AS NEEDED
Status: DISCONTINUED | OUTPATIENT
Start: 2023-07-10 | End: 2023-07-10

## 2023-07-10 RX ORDER — SODIUM CHLORIDE 9 MG/ML
INJECTION, SOLUTION INTRAVENOUS CONTINUOUS PRN
Status: DISCONTINUED | OUTPATIENT
Start: 2023-07-10 | End: 2023-07-10

## 2023-07-10 RX ORDER — PROPOFOL 10 MG/ML
INJECTION, EMULSION INTRAVENOUS CONTINUOUS PRN
Status: DISCONTINUED | OUTPATIENT
Start: 2023-07-10 | End: 2023-07-10

## 2023-07-10 RX ORDER — PROPOFOL 10 MG/ML
INJECTION, EMULSION INTRAVENOUS AS NEEDED
Status: DISCONTINUED | OUTPATIENT
Start: 2023-07-10 | End: 2023-07-10

## 2023-07-10 RX ADMIN — HYDRALAZINE HYDROCHLORIDE 100 MG: 50 TABLET, FILM COATED ORAL at 21:05

## 2023-07-10 RX ADMIN — DOXAZOSIN 8 MG: 4 TABLET ORAL at 18:04

## 2023-07-10 RX ADMIN — POLYETHYLENE GLYCOL 3350, SODIUM SULFATE ANHYDROUS, SODIUM BICARBONATE, SODIUM CHLORIDE, POTASSIUM CHLORIDE 4000 ML: 236; 22.74; 6.74; 5.86; 2.97 POWDER, FOR SOLUTION ORAL at 16:31

## 2023-07-10 RX ADMIN — BUDESONIDE AND FORMOTEROL FUMARATE DIHYDRATE 2 PUFF: 160; 4.5 AEROSOL RESPIRATORY (INHALATION) at 18:04

## 2023-07-10 RX ADMIN — PIPERACILLIN SODIUM AND TAZOBACTAM SODIUM 3.38 G: 36; 4.5 INJECTION, POWDER, LYOPHILIZED, FOR SOLUTION INTRAVENOUS at 16:35

## 2023-07-10 RX ADMIN — ATORVASTATIN CALCIUM 40 MG: 40 TABLET, FILM COATED ORAL at 21:05

## 2023-07-10 RX ADMIN — BUSPIRONE HYDROCHLORIDE 15 MG: 10 TABLET ORAL at 08:06

## 2023-07-10 RX ADMIN — FLUTICASONE FUROATE 1 PUFF: 100 POWDER RESPIRATORY (INHALATION) at 08:21

## 2023-07-10 RX ADMIN — Medication 2 TABLET: at 16:31

## 2023-07-10 RX ADMIN — HYDRALAZINE HYDROCHLORIDE 100 MG: 50 TABLET, FILM COATED ORAL at 16:31

## 2023-07-10 RX ADMIN — COLCHICINE 0.6 MG: 0.6 TABLET ORAL at 08:06

## 2023-07-10 RX ADMIN — AMLODIPINE BESYLATE 10 MG: 10 TABLET ORAL at 08:06

## 2023-07-10 RX ADMIN — LABETALOL HYDROCHLORIDE 200 MG: 200 TABLET, FILM COATED ORAL at 08:06

## 2023-07-10 RX ADMIN — PANTOPRAZOLE SODIUM 40 MG: 40 INJECTION, POWDER, FOR SOLUTION INTRAVENOUS at 21:04

## 2023-07-10 RX ADMIN — HYDRALAZINE HYDROCHLORIDE 100 MG: 50 TABLET, FILM COATED ORAL at 08:05

## 2023-07-10 RX ADMIN — LABETALOL HYDROCHLORIDE 200 MG: 200 TABLET, FILM COATED ORAL at 21:05

## 2023-07-10 RX ADMIN — PROPOFOL 100 MG: 10 INJECTION, EMULSION INTRAVENOUS at 15:07

## 2023-07-10 RX ADMIN — EPLERENONE 50 MG: 25 TABLET, FILM COATED ORAL at 08:21

## 2023-07-10 RX ADMIN — SERTRALINE HYDROCHLORIDE 200 MG: 100 TABLET ORAL at 08:06

## 2023-07-10 RX ADMIN — PIPERACILLIN SODIUM AND TAZOBACTAM SODIUM 3.38 G: 36; 4.5 INJECTION, POWDER, LYOPHILIZED, FOR SOLUTION INTRAVENOUS at 21:06

## 2023-07-10 RX ADMIN — ONDANSETRON 4 MG: 2 INJECTION INTRAMUSCULAR; INTRAVENOUS at 08:52

## 2023-07-10 RX ADMIN — PIPERACILLIN SODIUM AND TAZOBACTAM SODIUM 3.38 G: 36; 4.5 INJECTION, POWDER, LYOPHILIZED, FOR SOLUTION INTRAVENOUS at 10:20

## 2023-07-10 RX ADMIN — LIDOCAINE HYDROCHLORIDE 50 MG: 10 INJECTION, SOLUTION EPIDURAL; INFILTRATION; INTRACAUDAL; PERINEURAL at 15:07

## 2023-07-10 RX ADMIN — PROPOFOL 120 MCG/KG/MIN: 10 INJECTION, EMULSION INTRAVENOUS at 15:07

## 2023-07-10 RX ADMIN — BUDESONIDE AND FORMOTEROL FUMARATE DIHYDRATE 2 PUFF: 160; 4.5 AEROSOL RESPIRATORY (INHALATION) at 08:21

## 2023-07-10 RX ADMIN — DOXAZOSIN 8 MG: 4 TABLET ORAL at 08:05

## 2023-07-10 RX ADMIN — BUSPIRONE HYDROCHLORIDE 15 MG: 10 TABLET ORAL at 18:04

## 2023-07-10 RX ADMIN — MIRTAZAPINE 15 MG: 15 TABLET, FILM COATED ORAL at 21:04

## 2023-07-10 RX ADMIN — SODIUM CHLORIDE: 0.9 INJECTION, SOLUTION INTRAVENOUS at 15:06

## 2023-07-10 RX ADMIN — PROPOFOL 30 MG: 10 INJECTION, EMULSION INTRAVENOUS at 15:10

## 2023-07-10 RX ADMIN — PIPERACILLIN SODIUM AND TAZOBACTAM SODIUM 3.38 G: 36; 4.5 INJECTION, POWDER, LYOPHILIZED, FOR SOLUTION INTRAVENOUS at 03:57

## 2023-07-10 NOTE — OCCUPATIONAL THERAPY NOTE
Occupational Therapy Evaluation     Patient Name: Markos Pitts  CAQYG'V Date: 7/10/2023  Problem List  Principal Problem:    Cardiac tamponade  Active Problems:    COPD (chronic obstructive pulmonary disease) (HCC)    Chronic diastolic CHF (congestive heart failure) (HCC)    CKD stage 3 secondary to diabetes ([address])    Anemia    Meningioma (HCC)    Hypertensive urgency    Hematochezia    Depression with anxiety    Past Medical History  Past Medical History:   Diagnosis Date    Asthma     Chronic kidney disease     COPD (chronic obstructive pulmonary disease) (HCC)     CPAP (continuous positive airway pressure) dependence     Diabetes mellitus ([address])     Emphysema of lung (HCC)     Gout     History of transfusion     pt stated they had a transfusion when they had gallbladder surgery.     Hypertension     Kidney disease     renal failure    Leg DVT (deep venous thromboembolism), acute, bilateral ([address]) 01/2018    Obesity (BMI 30-39.9)     AGUS on CPAP     setting 11    Postgastrectomy malabsorption     Sleep apnea     Systolic CHF [facility])      Past Surgical History  Past Surgical History:   Procedure Laterality Date    ADRENALECTOMY      CHOLECYSTECTOMY      COLONOSCOPY      EGD      HIATAL HERNIA REPAIR N/A 12/22/2020    Procedure: REPAIR HERNIA HIATAL LAPAROSCOPIC;  Surgeon: Holland Resendez MD;  [facility]: [facility];  Service: [facility] OF WOUND Left 10/17/2018    Procedure: INCISION AND DRAINAGE (I&D) GROIN;  Surgeon: Arsh Mendoza MD;  Location: [facility];  Service: General    IR [id number] / DRAINAGE W TUBE  8/17/2018    IR IMAGE GUIDED ASPIRATION / DRAINAGE W TUBE  7/31/2018    JOINT REPLACEMENT Bilateral     knee    KIDNEY SURGERY  2009    nodule removal    LYMPH NODE DISSECTION Left 01/2018    left inguinal LN removed - benign     OTHER SURGICAL HISTORY      kidney nodule removal    PALATE / UVULA BIOPSY / EXCISION      PERICARDIAL WINDOW N/A 7/8/2023    Procedure: WINDOW PERICARDIAL;  Surgeon: Kelli Connell MD;  Location: [facility];  Service: Thoracic    NH LAPS GSTR RSTCV PX W/BYP JASON-EN-Y LIMB <150 CM N/A 12/22/2020    Procedure: BYPASS GASTRIC  JASON-EN-Y LAPAROSCOPIC WITH INTRAOPERATIVE EGD;  Surgeon: Kd Laird MD;  Location: [facility];  Service: [address] [place]           07/10/23 0839   OT Last Visit   OT Visit Date 07/10/23   Note Type   Note type Evaluation   Pain Assessment   Pain Assessment Tool 0-10   Pain Score 8   Pain Location/Orientation Orientation: Mid;Location: Chest   Patient's Stated Pain Goal No pain   Hospital Pain Intervention(s) Repositioned; Ambulation/increased activity; Emotional support   Restrictions/Precautions   Weight Bearing Precautions Per Order No   Other Precautions Chair Alarm; Bed Alarm;Multiple lines;Telemetry; Fall Risk   Home Living   Type of [address] [place] [address] Two level;Bed/bath upstairs;Stairs to enter with rails  (6 juan)   Bathroom Shower/Tub Walk-in shower   Bathroom Toilet Standard   Bathroom Equipment Shower chair;Grab bars in [address]  (not using)   Prior Function   Level of [place] Independent with functional mobility; Independent with ADLs; Independent with IADLS   Lives With Spouse   Receives Help From Family   IADLs Independent with driving; Independent with meal prep; Independent with medication management   Falls in the last 6 months 1 to 4  (1)   Vocational On disability   Lifestyle   Autonomy pta, pt was I W ADL/IADL, no AD mobility. +    Reciprocal Relationships supportive spouse who can provide assistance as needed. Service to Others on disability   Intrinsic Gratification spending time w family.    Subjective   Subjective "I'm ok beside the pain"   ADL   Where Assessed Edge of bed   Eating Assistance 6  Modified independent   Grooming Assistance 5  Supervision/Setup   [address] Supervision/Setup   LB Bathing Assistance 4  Minimal Assistance   UB Dressing Assistance 5  Supervision/Setup   LB Dressing Assistance 4  Minimal Assistance   Toileting Assistance  4  Minimal Assistance   Functional Assistance 4  Minimal Assistance   Bed Mobility   Supine to Sit 4  Minimal assistance   Additional items Assist x 1; Increased time required;Verbal cues   Additional Comments found in bed, left in chair w all needs in reach and alarm on   Transfers   Sit to Stand 4  Minimal assistance   Additional items Assist x 1; Increased time required;Verbal cues   Stand to Sit 4  Minimal assistance   Additional items Assist x 1; Increased time required;Verbal cues   Additional Comments c rw, vc for hand placement and inc overall safety awareness. Functional Mobility   Functional Mobility 4  Minimal assistance   Additional Comments ax1, EOB>doorway>chair. Additional items Rolling walker   Balance   Static Sitting Good   Dynamic Sitting Fair +   Static Standing Fair   Dynamic Standing Fair -   Ambulatory Poor +   Activity Tolerance   Activity Tolerance Patient limited by fatigue   Medical Staff Made Aware DPT Reena 2' pts med complexity, comorbidities and regression from baseline. Nurse Made Aware ok per RN   RUE Assessment   RUE Assessment WFL   LUE Assessment   LUE Assessment WFL   Hand Function   Gross Motor Coordination Functional   Fine Motor Coordination Functional   Psychosocial   Psychosocial (WDL) WDL   Cognition   Overall Cognitive Status WFL   Arousal/Participation Responsive; Alert; Cooperative   Attention Within functional limits   Orientation Level Oriented X4   Memory Within functional limits   Following Commands Follows one step commands without difficulty   Comments pt pleasant and cooperative, overall fair safety awareness and insight to condition t/o session. Assessment   Limitation Decreased ADL status; Decreased endurance;Decreased self-care trans;Decreased high-level ADLs   Prognosis Good Assessment Pt is a 59 y.o. male admitted 7/8/23 to [facility] w SOB and chest pain, was noted to have pericardial effusion w/ concerns for tamponade, transferred to [facility] for thoracic sx eval. Upon arrival, he was a rapid response and was urgently taken to OR for subxiphoid pericardial window- underwent 7/8/23, is POD #2 w active OT eval and treat orders. PMH includes  has a past medical history of Asthma, Chronic kidney disease, COPD (chronic obstructive pulmonary disease) (HCC), CPAP (continuous positive airway pressure) dependence, Diabetes mellitus ([address]), Emphysema of lung ([address]), Gout, History of transfusion, Hypertension, Kidney disease, Leg DVT (deep venous thromboembolism), acute, bilateral ([address]), Obesity (BMI 30-39.9), GAUS on CPAP, Postgastrectomy malabsorption, Sleep apnea, and Systolic CHF ([address]). Pt lives w spouse in a 2sh with 6 RAFIA, full flight to bed/bath which includes walk in showr with shower chair and grab bars, standard toilet. Pta, pt was independent w/ ADL/IADL and functional mobility, was  driving and was not using any DME at baseline. Currently, pt is Supervision for UB ADL, Min Ax1 for LB ADL, and completed transfers/FM w Min Ax1. Pt is limited at this time 2* decreased endurance/activtiy tolerance, decreased cognition, decreased ADL/High-level ADL status, decreased self-care trans, decreased safety awareness, limited home support and is a fall risk. This impacts pt's ability to complete UB and LB dressing and bathing, toileting, transfers, functional mobility, community mobility, home and health maintenance, and safe engagement in typical daily routine. The patient's raw score on the AM-PAC Daily Activity inpatient short form is 20, standardized score is 42.03, greater than 39.4. Patients at this level are likely to benefit from discharge to home. Please refer to the recommendation of the Occupational Therapist for safe discharge planning.  From OT standpoint, pt should D/C to home w home OT when medically stable. Pt will benefit from continued acute OT services 2-3 x/wk for 10-14 days to meet goals. Goals   Patient Goals have more energy   LTG Time Frame 10-14   Long Term Goal #1 see below   Plan   Treatment Interventions ADL retraining;Functional transfer training; Endurance training;Patient/family training;Equipment evaluation/education; Compensatory technique education; Energy conservation; Activityengagement   Goal Expiration Date 07/24/23   OT Frequency 2-3x/wk   Recommendation   OT Discharge Recommendation Home with home health rehabilitation   Additional Comments  pending cont progress   AM-PAC Daily Activity Inpatient   Lower Body Dressing 2   Bathing 3   Toileting 3   Upper Body Dressing 4   Grooming 4   Eating 4   Daily Activity Raw Score 20   Daily Activity Standardized Score (Calc for Raw Score >=11) 42.03   AM-PAC Applied Cognition Inpatient   Following a Speech/Presentation 4   Understanding Ordinary Conversation 4   Taking Medications 4   Remembering Where Things Are Placed or Put Away 4   Remembering List of 4-5 Errands 4   Taking Care of Complicated Tasks 4   Applied Cognition Raw Score 24   Applied Cognition Standardized Score 62.21   Modified [place] Scale   Modified [place] Scale 4   End of Consult   Education Provided Yes   Patient Position at End of Consult Bedside chair; All needs within reach;Bed/Chair alarm activated   Nurse Communication Nurse aware of consult     Pt will complete functional mobility with Mod I using appropriate DME as needed. Pt will complete UB dressing and bathing with Mod I using appropriate DME as needed. Pt will complete LB dressing and bathing with Mod I using appropriate DME as needed. Pt will complete transfers with Mod I using appropriate DME as needed. Pt will complete toileting with Mod I using appropriate DME as needed. Pt will complete home maintenance task with Mod I using appropriate DME as needed.      Pt will utilize energy conservation techniques throughout functional activity/ADL s/p skilled education. Pt will demonstrate increased safety awareness during functional tasks/ADL's s/p skilled education. Pt will increase activity tolerance to 30 minutes in order to complete ADL's/ functional tasks, using appropriate DME as needed.        Komal Jimenez, MOT, OTR/L

## 2023-07-10 NOTE — ANESTHESIA POSTPROCEDURE EVALUATION
Post-Op Assessment Note    CV Status:  Stable  Pain Score: 0    Pain management: adequate     Mental Status:  Sleepy   Hydration Status:  Euvolemic   PONV Controlled:  Controlled   Airway Patency:  Patent      Post Op Vitals Reviewed: Yes      Staff: CRNA         There were no known notable events for this encounter.     /66 (07/10/23 1520)    Temp 100.2 °F (37.9 °C) (07/10/23 1520)    Pulse 92 (07/10/23 1520)   Resp 18 (07/10/23 1520)    SpO2 96 % (07/10/23 1520)

## 2023-07-10 NOTE — PROGRESS NOTES
Progress Note - Cardiology   Julio C Candelaria 59 y.o. male MRN: [de-identified]  Unit/Bed#: [facility] 526-01 Encounter: [id number]    Assessment:  58M with PMH of HFpEF, HTN, COPD, CKD, DVT, hx of Shamika en Y bypass surgery, who presented to [facility] for hematochezia and was found to have pleural effusion on CT and tamponade on echo now s/p emergent subxiphoid window.       #Cardiac tamponade-  Patient in tamponade on arrival now s/p emergent subxiphoid window with ANNE MARIE drain placement. With serous drainage. Unclear etiology of pericardial effusion and difficult to connect with hematochezia presentation. He did present with leukocytosis which is neutrophilic. He was started colchicine for possible pericarditis. -continue colchicine  -cultures pending  -dori, crp, tsh sent  -will need repeat TTE in the next few days. -he was started on zosyn, procalcitonin elevated- if cultures negative can likely dc. #Hematochezia  Hgb 7.4 today from baseline of 12s. Has had multiple blood transfusions. GI planning for endoscopy today    #HTN  Multiple drug allergies. BP still elevated but better controlled. On amlodipine 10mg, hydralazine 100mg TID, eplerenone 50mg daily, labetalol 200mg BID. #HFpEF  #LVH   LVH likely from HTN but with speckled myocardium. SPEP/UPEP, PYP outpatient. Euvolemic appearing. Does not require diuretic as outpatient. #respiratory alkolosis  Suspect from pericardial tamponade and anemia causing hyperventilation. #TRUDI on CKD  Cr 2.1 up from baseline of 1.6-1.7. suspect in the setting on anemia and tamponade. Subjective/Objective     Subjective: patient has abdominal pain this morning bc he took his pills without food. He is NPO for scope by GI. Denies CP or shortness of breath.      Vitals: /84   Pulse 82   Temp 98.3 °F (36.8 °C)   Resp 18   Wt 59.5 kg (131 lb 2.8 oz)   SpO2 97%   BMI 21.17 kg/m²   Vitals:    07/10/23 0550   Weight: 59.5 kg (131 lb 2.8 oz)     Orthostatic Blood Pressures    Flowsheet Row Most Recent Value   Blood Pressure 149/84 filed at 07/10/2023 0301   Patient Position - Orthostatic VS Lying filed at 07/09/2023 2229            Intake/Output Summary (Last 24 hours) at 7/10/2023 0926  Last data filed at 7/10/2023 0801  Gross per 24 hour   Intake 1085 ml   Output 1795 ml   Net -710 ml       Invasive Devices     Peripheral Intravenous Line  Duration           Peripheral IV 07/08/23 Left Antecubital 2 days    Peripheral IV 07/08/23 Dorsal (posterior); Left;Proximal Forearm 1 day          Drain  Duration           Closed/Suction Drain Pericardial Bulb 19 Fr. 1 day                Physical Exam:   Constitutional:       Appearance: uncomfortable appearing. HENT:      Head: Normocephalic and atraumatic. Neck:      Vascular: No JVD   Cardiovascular:      Rate and Rhythm: RRR, no murmurs  Pulmonary:      Effort: CTA-b, tachypneic. Abdominal:      General: Abdomen is flat. Bowel sounds are normal.      Palpations: Abdomen is soft. Musculoskeletal:     No edema  Skin:     General: Skin is warm. Neurological:      Mental Status: He is alert and oriented to person, place, and time. Psychiatric:         Behavior: Behavior normal.       Lab Results: I have personally reviewed pertinent lab results. Imaging: I have personally reviewed pertinent reports.

## 2023-07-10 NOTE — PROGRESS NOTES
Critical Care Interval Transfer Note:    Please refer to progress note from earlier today for full details. Barriers to discharge:   · Cardiology/Thoracics: Monitoring of cardiac tamponade/pericardial drain. Follow-up on cultures and cytology. Narrow antibiotics as tolerated. Remains on colchicine for ST changes. · Analgesia: Follow-up EGD and serial hemoglobins. ,  Monitor bowel movements. · Hypertensive urgency: Continue p.o. regimen. Consults: IP CONSULT TO CARDIOLOGY  IP CONSULT TO GASTROENTEROLOGY       Discharge Plan: Anticipate discharge in 48-72 hrs to home. PT Recommendations: Home with home health rehabilitation  OT Recommendations: Home with home health rehabilitation    Patient seen and evaluated by Critical Care today and deemed to be appropriate for transfer to Med Surg with Telemetry. Spoke to Dr Hayder Platt from [facility] to accept transfer. Critical care can be contacted via [employer] with any questions or concerns.

## 2023-07-10 NOTE — ASSESSMENT & PLAN NOTE
Assessment:   · Likely due to hypertensive nephrosclerosis   · Baseline creatinine: 1.61 - 1.81  · Initial creatinine: 1.59 (7/8/23)   · Last creatinine: 2.16 (7/10/23)     Plan:   · Strict q4h I/O monitoring  · Monitor without keller catheter for now  · Continue to follow renal function tests  · No need for inpatient nephrology consultation at this time

## 2023-07-10 NOTE — PROGRESS NOTES
[address] [facility]  Progress Note  Name: Nanette Huynh  MRN: [de-identified]  Unit/Bed#: PPHP 526-01 I Date of Admission: 7/8/2023   Date of Service: 7/10/2023 I Hospital Day: 2    Assessment/Plan   * Cardiac tamponade  Assessment & Plan  Assessment:   · Patient with acute-onset epigastric pain presented to THE [facility] on 7/8/23 and was found on CT to have a pericardiac effusion   · 7/8/23 TTE: There is a moderate to large pericardial effusion circumferential to the heart. There is a reasonable likelihood of cardiac tamponade. The evidence for tamponade includes: right ventricular compression.   · Prior TTE from May 2023 with trivial pericardial effusion   · Etiology possibly related to uncontrolled hypertension, but workup on going  · POD #2 s/p pericardial window and drain (7/8/23)     Plan:   · Thoracic surgery following, appreciate recommendation  · Monitor pericardial drain output amount and character  · Start colchicine 0.6mg BID with consideration for steroids depending on pain   · Follow up cytology and cultures- currently on zosyn, d/c as able     · Analgesia:  · Tylenol 650mg q6h PRN  · Robaxin 500mg q6h PRN  · Oxycodone 2.5-5mg q4h PRN  · Dilaudid 0.2mg q3 PRN        Hematochezia  Assessment & Plan  Assessment:   · Patient reported 4 bloody bowel movements on presentation to the ED on 7/8/23  · He states he only noticed blood the day prior, but given his iron supplementation, may have also been more chronic  · Prior humaira-en-Y gives additional high risk, particularly in the setting of uncontrolled hypertension     Plan:   · Protonix 40mg IV q12h   · S/p 80mg IV loading dose (7/8/23)   · Consult GI- Plan for EGD today  · Follow hemoglobin  · Monitor bowel movements     Hypertensive urgency  Assessment & Plan  Assessment:   · Patient with known history of long-standing severe hypertension   · S/p adrenalectomy at [facility] in 2012 which did not markedly impact his hypertension   · States he is compliant with his outpatient regimen  · Cardene weaned to off    Plan:   · Continue home oral antihypertensives:   · Amlodipine 10mg daily   · Doxazosin 8mg BID  · Eplerenone 50mg daily   · Hydralazine 100mg TID  · PRN antihypertensives:   · Labetalol 10mg q6h PRN  · Hydralazine 10mg q6h PRN  · Renal artery duplex ordered and pending- unable to be complete yesterday due to pericardial drain    CKD stage 3 secondary to diabetes [facility])  Assessment & Plan  Assessment:   · Likely due to hypertensive nephrosclerosis   · Baseline creatinine: 1.61 - 1.81  · Initial creatinine: 1.59 (7/8/23)   · Last creatinine: 2.16 (7/10/23)     Plan:   · Strict q4h I/O monitoring  · Monitor without keller catheter for now  · Continue to follow renal function tests  · No need for inpatient nephrology consultation at this time     COPD (chronic obstructive pulmonary disease) ([address])  Assessment & Plan  Assessment:   · No recent PFT's for review  · Patient states his breathing has improved overall since his gastric bypass     Plan:   · Symbicort BID  · Fluticasone daily   · Continue pulmonary hygiene. Incentive spirometer q1h while awake, encourage coughing and deep breathing. Upright positioning  · Suction as needed and closely monitor secretions. Maintain HOB >30 degrees. Q4h oral care with chlorhexidine BID  · Maintain SpO2 > 90%     Anemia  Assessment & Plan  Assessment:  · Concern for acute on chronic anemia possibly from GI source.  May have UGIB due to known humaira-en-Y or accelerated slow bleeding in the setting of hypertension   · Likely also has some degree of malabsorption and possible nutrient deficiency as a result of gastric bypass   · Baseline hemoglobin: 12.9 - 12.6  · Initial hemoglobin: 7.8 (7/8/23)  · Last hemoglobin: 7.7 (7/9/23)  · 7/8/23 1u PRBC    Plan:   · Transfuse for hemoglobin <7.0  · Serial hemoglobin   · Monitor for sources of bleeding   · CBC in AM      Chronic diastolic CHF (congestive heart failure) [facility])  Assessment & Plan  Assessment:  · No evidence of acute exacerbation at this time post operatively   · RRT on 7/8/23 possibly partially contributed to by acute CHF exacerbation in the setting of hypertensive emergency in the setting of known LVH and diastolic dysfunction   · Current weight: 68.9kg  · Relevant echocardiograms:  · 7/8/23 TTE: Left ventricular cavity size is normal. Wall thickness is increased. The left ventricular ejection fraction is 50%. Systolic function is low normal. Wall motion cannot be accurately assessed. Left Atrium: The atrium is mildly dilated. Pericardium: There is a moderate to large pericardial effusion circumferential to the heart. There is a reasonable likelihood of cardiac tamponade.  The evidence for tamponade includes: right ventricular compression      Plan:   · Holding diuresis at this time  · Goal to maintain more controlled BP, ideally SBP < 180  · Daily weights  · Continue home afterload reduction agents:   · Amlodipine 10mg daily   · Doxazosin 8mg BID  · Eplerenone 50mg daily   · Hydralazine 100mg TID  · PRN antihypertensives:   · Labetalol 10mg q6h PRN  · Hydralazine 10mg q6h PRN  · Cardiac, low Na diet when able to take PO            Depression with anxiety  Assessment & Plan  Assessment:   · No acute concerns at this time     Plan:   · Continue home regimen:   · Buspar 15mg BID  · Mirtazapine 15mg qHS   · Sertraline 200mg daily       Meningioma [facility])  Assessment & Plan  Assessment:   · First seen on imaging since 2019  · Given it's location, surgical intervention was then deferred with plan for ongoing surveillance  · Patient has prior seizures which were more likely a result of hypertensive urgency   · Relevant imaging:   · 3/29/23 Brain MRI: Left infratentorial meningioma shows slight interval enlargement compared to 12/15/2021.  Minimal mass effect on the adjacent cerebellar vermis without associated edema    Plan:   · Maintain SBP < 180  · Follow up with neurosurgery as outpatient            ICU Core Measures     A: Assess, Prevent, and Manage Pain · Has pain been assessed? Yes  · Need for changes to pain regimen? No   B: Both SAT/SAT  · N/A   C: Choice of Sedation · RASS Goal: 0 Alert and Calm  · Need for changes to sedation or analgesia regimen? No   D: Delirium · CAM-ICU: Negative   E: Early Mobility  · Plan for early mobility? Yes   F: Family Engagement · Plan for family engagement today? Yes     Antibiotic Review: Awaiting culture results. Review of Invasive Devices:          Prophylaxis:  VTE Contraindicated secondary to: GI Bleed   Stress Ulcer  covered bypantoprazole (PROTONIX) injection 40 mg [[id number]]     Subjective   HPI/24hr events: Patient required levo of 1-2 overnight, received 40 of Lasix this morning. Plan for DEEP today. No acute events overnight. Review of Systems   Constitutional: Negative for activity change and appetite change. Eyes: Negative for discharge and itching. Respiratory: Negative for chest tightness and shortness of breath. Genitourinary: Negative for dysuria, frequency and urgency. Musculoskeletal: Negative for arthralgias and back pain. Neurological: Negative for dizziness and facial asymmetry. All other systems reviewed and are negative. Objective                            Vitals I/O      Most Recent Min/Max in 24hrs   Temp 98.5 °F (36.9 °C) Temp  Min: 98.1 °F (36.7 °C)  Max: 99.4 °F (37.4 °C)   Pulse 82 Pulse  Min: 79  Max: 106   Resp 18 Resp  Min: 16  Max: 18   /84 BP  Min: 121/62  Max: 190/81   O2 Sat 97 % SpO2  Min: 97 %  Max: 98 %      Intake/Output Summary (Last 24 hours) at 7/10/2023 1109  Last data filed at 7/10/2023 1020  Gross per 24 hour   Intake 1085 ml   Output 1870 ml   Net -785 ml         Diet NPO; Sips with meds     Invasive Monitoring Physical exam    Physical Exam  Vitals and nursing note reviewed. Constitutional:       Appearance: Normal appearance.    HENT:      Head: Normocephalic and atraumatic. Nose: Nose normal.      Mouth/Throat:      Mouth: Mucous membranes are moist.      Pharynx: Oropharynx is clear. Eyes:      Extraocular Movements: Extraocular movements intact. Pupils: Pupils are equal, round, and reactive to light. Cardiovascular:      Rate and Rhythm: Normal rate and regular rhythm. Pulses: Normal pulses. Pulmonary:      Effort: Pulmonary effort is normal.   Abdominal:      General: Abdomen is flat. Palpations: Abdomen is soft. Skin:     Capillary Refill: Capillary refill takes less than 2 seconds. Neurological:      General: No focal deficit present. Mental Status: He is alert and oriented to person, place, and time. Diagnostic Studies    EKG: NSR  Imaging:  I have personally reviewed pertinent reports.        Medications:  Scheduled PRN   amLODIPine, 10 mg, Daily  atorvastatin, 40 mg, HS  budesonide-formoterol, 2 puff, BID  busPIRone, 15 mg, BID  calcium carbonate, 2 tablet, BID With Meals  cholecalciferol, 2,000 Units, Daily  colchicine, 0.6 mg, Daily  doxazosin, 8 mg, BID  eplerenone, 50 mg, Daily  fluticasone, 1 puff, Daily  hydrALAZINE, 100 mg, TID  labetalol, 200 mg, Q12H JOANN  mirtazapine, 15 mg, HS  multivitamin-minerals, 1 tablet, Daily  pantoprazole, 40 mg, Q12H JOANN  piperacillin-tazobactam, 3.375 g, Q6H  polyethylene glycol, 17 g, Daily  sertraline, 200 mg, Daily      acetaminophen, 650 mg, Q6H PRN  aluminum-magnesium hydroxide-simethicone, 30 mL, Q6H PRN  hydrALAZINE, 10 mg, Q6H PRN  HYDROmorphone, 0.2 mg, Q3H PRN  labetalol, 10 mg, Q6H PRN  methocarbamol, 500 mg, Q6H PRN  metoclopramide, 10 mg, Q6H PRN  ondansetron, 4 mg, Q6H PRN  oxyCODONE, 2.5 mg, Q4H PRN   Or  oxyCODONE, 5 mg, Q4H PRN       Continuous          Labs:    CBC    Recent Labs     07/09/23  0433 07/09/23  0932 07/09/23  2230 07/10/23  0442   WBC 23.88*  --   --  18.13*   HGB 7.1*   < > 6.6* 7.3*   HCT 20.9*  --   --  22.3*     --   -- 265    < > = values in this interval not displayed.      BMP    Recent Labs     07/09/23  0433 07/10/23  0442   SODIUM 141 144   K 3.6 3.6   * 115*   CO2 25 25   AGAP 3 4   BUN 49* 52*   CREATININE 1.92* 2.16*   CALCIUM 8.6 8.5       Coags    No recent results     Additional Electrolytes  Recent Labs     07/09/23  0433   MG 2.4   PHOS 4.4*          Blood Gas    No recent results  Recent Labs     07/10/23  1016   PHVEN 7.631*   WQH3LPK 19.4*   PO2VEN 75.6*   TLU9GUY 20.0*   BEVEN -0.2    LFTs  Recent Labs     07/09/23  0433   ALT 50   AST 32   ALKPHOS 54   ALB 3.0*   TBILI 0.93       Infectious  Recent Labs     07/09/23  0433   PROCALCITONI 0.46*     Glucose  Recent Labs     07/08/23  1308 07/08/23  1953 07/09/23  0433 07/10/23  0442   GLUC 153* 149* 132 111             Chepe Zhang PA-C

## 2023-07-10 NOTE — ASSESSMENT & PLAN NOTE
Assessment:   · Patient with known history of long-standing severe hypertension   · S/p adrenalectomy at [facility] in 2012 which did not markedly impact his hypertension   · States he is compliant with his outpatient regimen  · Cardene weaned to off    Plan:   · Continue home oral antihypertensives:   · Amlodipine 10mg daily   · Doxazosin 8mg BID  · Eplerenone 50mg daily   · Hydralazine 100mg TID  · PRN antihypertensives:   · Labetalol 10mg q6h PRN  · Hydralazine 10mg q6h PRN  · Renal artery duplex ordered and pending- unable to be complete yesterday due to pericardial drain

## 2023-07-10 NOTE — UTILIZATION REVIEW
NOTIFICATION OF INPATIENT ADMISSION   AUTHORIZATION REQUEST   SERVICING FACILITY:   [address] [place] [address]  Address: [address] [place] [address]  Tax ID: [id number]  NPI: [id number] ATTENDING PROVIDER:  Attending Name and NPI#: Remigio Ledesma [[id number]]  Address: [address] [place] [address]  Phone: [phone]   ADMISSION INFORMATION:  Place of Service: Inpatient [address] [facility]  Place of Service Code: 21  Inpatient Admission Date/Time: 7/8/23  9:07 AM  Discharge Date/Time: No discharge date for patient encounter. Admitting Diagnosis Code/Description:  Cardiac tamponade [I31.4]     UTILIZATION REVIEW CONTACT:  Violetta Tse Utilization   Network Utilization Review Department  Phone: [phone]  Fax: [phone]  Email: [email or web address]  Contact for approvals/pending authorizations, clinical reviews, and discharge. PHYSICIAN ADVISORY SERVICES:  Medical Necessity Denial & [employer] Review  Phone: [phone]  Fax: [phone]  Email: [email or web address]

## 2023-07-10 NOTE — ASSESSMENT & PLAN NOTE
Assessment:   · Patient reported 4 bloody bowel movements on presentation to the ED on 7/8/23  · He states he only noticed blood the day prior, but given his iron supplementation, may have also been more chronic  · Prior humaira-en-Y gives additional high risk, particularly in the setting of uncontrolled hypertension     Plan:   · Protonix 40mg IV q12h   · S/p 80mg IV loading dose (7/8/23)   · Consult GI- Plan for EGD today  · Follow hemoglobin  · Monitor bowel movements

## 2023-07-10 NOTE — ASSESSMENT & PLAN NOTE
Assessment:   · Patient with acute-onset epigastric pain presented to THE [facility] on 7/8/23 and was found on CT to have a pericardiac effusion   · 7/8/23 TTE: There is a moderate to large pericardial effusion circumferential to the heart. There is a reasonable likelihood of cardiac tamponade. The evidence for tamponade includes: right ventricular compression.   · Prior TTE from May 2023 with trivial pericardial effusion   · Etiology possibly related to uncontrolled hypertension, but workup on going  · POD #2 s/p pericardial window and drain (7/8/23)     Plan:   · Thoracic surgery following, appreciate recommendation  · Monitor pericardial drain output amount and character  · Start colchicine 0.6mg BID with consideration for steroids depending on pain   · Follow up cytology and cultures- currently on zosyn, d/c as able     · Analgesia:  · Tylenol 650mg q6h PRN  · Robaxin 500mg q6h PRN  · Oxycodone 2.5-5mg q4h PRN  · Dilaudid 0.2mg q3 PRN

## 2023-07-10 NOTE — PLAN OF CARE
Problem: OCCUPATIONAL THERAPY ADULT  Goal: Performs self-care activities at highest level of function for planned discharge setting. See evaluation for individualized goals. Description: Treatment Interventions: ADL retraining, Functional transfer training, Endurance training, Patient/family training, Equipment evaluation/education, Compensatory technique education, Energy conservation, Activityengagement          See flowsheet documentation for full assessment, interventions and recommendations. Note: Limitation: Decreased ADL status, Decreased endurance, Decreased self-care trans, Decreased high-level ADLs  Prognosis: Good  Assessment: Pt is a 59 y.o. male admitted 7/8/23 to [facility] w SOB and chest pain, was noted to have pericardial effusion w/ concerns for tamponade, transferred to \[facility]\"" for thoracic sx eval. Upon arrival, he was a rapid response and was urgently taken to OR for subxiphoid pericardial window- underwent 7/8/23, is POD #2 w active OT eval and treat orders. PMH includes  has a past medical history of Asthma, Chronic kidney disease, COPD (chronic obstructive pulmonary disease) ([facility]), CPAP (continuous positive airway pressure) dependence, Diabetes mellitus ([address]), Emphysema of lung ([address]), Gout, History of transfusion, Hypertension, Kidney disease, Leg DVT (deep venous thromboembolism), acute, bilateral ([address]), Obesity (BMI 30-39.9), AGUS on CPAP, Postgastrectomy malabsorption, Sleep apnea, and Systolic CHF ([address]). Pt lives w spouse in a 2sh with 6 RAFIA, full flight to bed/bath which includes walk in showr with shower chair and grab bars, standard toilet. Pta, pt was independent w/ ADL/IADL and functional mobility, was  driving and was not using any DME at baseline. Currently, pt is Supervision for UB ADL, Min Ax1 for LB ADL, and completed transfers/FM w Min Ax1.  Pt is limited at this time 2* decreased endurance/activtiy tolerance, decreased cognition, decreased ADL/High-level ADL status, decreased self-care trans, decreased safety awareness, limited home support and is a fall risk. This impacts pt's ability to complete UB and LB dressing and bathing, toileting, transfers, functional mobility, community mobility, home and health maintenance, and safe engagement in typical daily routine. The patient's raw score on the -PAC Daily Activity inpatient short form is 20, standardized score is 42.03, greater than 39.4. Patients at this level are likely to benefit from discharge to home. Please refer to the recommendation of the Occupational Therapist for safe discharge planning. From OT standpoint, pt should D/C to home w home OT when medically stable. Pt will benefit from continued acute OT services 2-3 x/wk for 10-14 days to meet goals.      OT Discharge Recommendation: Home with home health rehabilitation

## 2023-07-10 NOTE — PHYSICAL THERAPY NOTE
Physical Therapy Evaluation     Patient's Name: Latasha Whitman    Admitting Diagnosis  Cardiac tamponade [I31.4]    Problem List  Patient Active Problem List   Diagnosis    Pericardial effusion    Essential hypertension    Leg DVT (deep venous thromboembolism), acute, bilateral (HCC)    COPD (chronic obstructive pulmonary disease) (HCC)    CHF (congestive heart failure) (HCC)    Chronic diastolic CHF (congestive heart failure) ([address])    Pulmonary nodule, right    CKD stage 3 secondary to diabetes (HCC)    Lymphedema    Anemia    AGUS (obstructive sleep apnea)    Abscess of left groin    History of DVT (deep vein thrombosis)    Splenic lesion    Dysuria    Renal cyst    Post-traumatic male urethral stricture    Morbid obesity due to excess calories ([address])    Encounter for surgical aftercare following surgery of digestive system    Postsurgical malabsorption    Hypertensive kidney disease with stage 3 chronic kidney disease ([address])    Acute renal failure superimposed on stage 3 chronic kidney disease (HCC)    Bradycardia    Hypoglycemia    History of gastric bypass    Hypokalemia    Seizure ([address])    Hypertensive encephalopathy    Hypertensive emergency    Meningioma ([facility])    Prolonged Q-T interval on ECG    Abdominal pain    Cardiac tamponade    Hypertensive urgency    Hematochezia    Depression with anxiety       Past Medical History  Past Medical History:   Diagnosis Date    Asthma     Chronic kidney disease     COPD (chronic obstructive pulmonary disease) (HCC)     CPAP (continuous positive airway pressure) dependence     Diabetes mellitus ([address])     Emphysema of lung ([address])     Gout     History of transfusion     pt stated they had a transfusion when they had gallbladder surgery.     Hypertension     Kidney disease     renal failure    Leg DVT (deep venous thromboembolism), acute, bilateral ([address]) 01/2018    Obesity (BMI 30-39.9)     AGUS on CPAP     setting 11    Postgastrectomy malabsorption     Sleep apnea     Systolic [facility])        Past Surgical History  Past Surgical History:   Procedure Laterality Date    ADRENALECTOMY      CHOLECYSTECTOMY      COLONOSCOPY      EGD      HIATAL HERNIA REPAIR N/A 12/22/2020    Procedure: REPAIR HERNIA HIATAL LAPAROSCOPIC;  Surgeon: Regla Russell MD;  Location: [facility];  Service: [place] OF WOUND Left 10/17/2018    Procedure: INCISION AND DRAINAGE (I&D) GROIN;  Surgeon: Viri Sotelo MD;  Location: [facility];  Service: General    IR [address] / DRAINAGE W TUBE  8/17/2018    IR IMAGE GUIDED ASPIRATION / DRAINAGE W TUBE  7/31/2018    JOINT REPLACEMENT Bilateral     knee    KIDNEY SURGERY  2009    nodule removal    LYMPH NODE DISSECTION Left 01/2018    left inguinal LN removed - benign     OTHER SURGICAL HISTORY      kidney nodule removal    PALATE / Mikle Pares BIOPSY / EXCISION      PERICARDIAL WINDOW N/A 7/8/2023    Procedure: WINDOW PERICARDIAL;  Surgeon: My Tatum MD;  [facility]: [facility];  Service: Thoracic    CO LAPS GSTR RSTCV [facility] W/BYP JASON-EN-Y LIMB <150 CM N/A 12/22/2020    Procedure: BYPASS GASTRIC  JASON-EN-Y LAPAROSCOPIC WITH INTRAOPERATIVE EGD;  Surgeon: Regla Russell MD;  Location: [facility];  Service: [address] [place]          07/10/23 0828   PT Last Visit   PT Visit Date 07/10/23   Note Type   Note type Evaluation   Pain Assessment   Pain Assessment Tool 0-10   Pain Score 8   Pain Location/Orientation Location: [facility]   Hospital Pain Intervention(s) Ambulation/increased activity;Repositioned   Restrictions/Precautions   Weight Bearing Precautions Per Order No   Braces or Orthoses   (none)   Other Precautions Pain; Fall Risk;Telemetry;Multiple lines   Home Living   Type of [address] [place] [address]    Additional Comments Prior to this admission patient resided with spouse in a 2 level home ([address]).  At his baseline he is I with mobility (no use of AD), ADLS, and iADLS   Prior Function   Level of [place] Independent with ADLs; Independent with functional mobility   Lives With Spouse   IADLs Independent with driving; Independent with meal prep; Independent with medication management   Falls in the last 6 months 1 to 4  (1)   General   Additional Pertinent History 59 y.o. male admitted [facility] on 7/8/2023 with symptoms of: chest pain and SOB. Diagnosis includes cardiac tamponade. Patient is s/p cardiac window on 7/8. Family/Caregiver Present No   Cognition   Overall Cognitive Status WFL   Subjective   Subjective "I am nauseus"   RUE Assessment   RUE Assessment WFL   LUE Assessment   LUE Assessment WFL   RLE Assessment   RLE Assessment WFL   LLE Assessment   LLE Assessment WFL   Bed Mobility   Supine to Sit 4  Minimal assistance   Additional items Assist x 1; Increased time required;Verbal cues   Sit to Supine Unable to assess   Additional Comments post eval patient in chair with alarm active   Transfers   Sit to Stand 4  Minimal assistance   Additional items Assist x 1; Increased time required;Verbal cues   Stand to Sit 4  Minimal assistance   Additional items Assist x 1; Increased time required;Verbal cues   Ambulation/Elevation   Gait pattern Short stride; Excessively slow   Gait Assistance 4  Minimal assist   Additional items Assist x 1   Assistive Device Rolling walker   Distance 5 feet x 2 (limited by dizziness/nausea)   Balance   Static Sitting Good   Static Standing Fair   Ambulatory Poor +   Endurance Deficit   Endurance Deficit Yes   Endurance Deficit Description dizziness, nausea   Activity Tolerance   Activity Tolerance Other (Comment); Patient limited by pain  (nausea/lightheaded)   Medical Staff Made Aware This high complexity evaluation was performed with an occupational therapist due to the patient's co-morbidities, clinically unstable presentation, and present impairments which are a regression from the patient's baseline.    Nurse Made Aware brigette to see per RN   Assessment   Prognosis Good   Problem List Decreased endurance; Impaired balance;Decreased mobility;Pain   Assessment PT completed evaluation of 59 y.o. male admitted to [address] [place] [address] on 7/8/2023 with symptoms of: chest pain and SOB. Diagnosis includes cardiac tamponade. Patient is s/p cardiac window on 7/8. Patient's current status instabilities include ongoing pain, continuous O2/HR monitoring, nausea/dizziness, falls risk, and a regression in function from baseline. PMH is significant for COPD, CKD, and CHF. Prior to this admission patient resided with spouse in a 2 level home ([address]). At his baseline he is I with mobility (no use of AD), ADLS, and iADLS. Patient presents at time of PT evaluation functioning below baseline and currently w/ overall mobility deficits 2* to: impaired balance, decreased endurance, gait deviations, decreased activity tolerance and fall risk. During PT evaluation, patient currently is requiring min-AX1 for bed mobility, sit<-->stand transfers, and ambulation. Patient ambulated 5 feet x 2 with use of RW (limited by nausea and dizziness). I anticipate patient will progress and d/c home with home PT. Patient will continue to benefit from continued skilled PT this admission to achieve maximal function and safety.    Goals   Patient Goals to be less nauseous   LTG Expiration Date 07/24/23   Long Term Goal #1 1) Perform bed mobility mod-I to participate in frequent repositioning and improve skin integrity; 2) Perform functional transfers mod-I to promote I with toileting and OOB mobility; 3) Ambulate 200 feet mod-I with least restrictive device to participate in household and community level mobility; 4) Improve b/l LE strength by 1/2 grade in order to improve efficiency of tranfers; 5) Improve balance by 1 grade to reduce risk for falls; 6) Improve overall activity tolerance to 60 minutes in order to increase patient's ability to engage in mobility tasks; 7) Navigate 12 steps S level in order to safely navigate multiple floors at home   PT Treatment Day 0   Plan   Treatment/Interventions Functional transfer training;LE strengthening/ROM; Therapeutic exercise; Endurance training;Patient/family training;Equipment eval/education; Bed mobility;Gait training;OT;Spoke to nursing;Elevations   PT Frequency 3-5x/wk   Recommendation   PT Discharge Recommendation (S)  Home with home health rehabilitation  (pending progress)   Equipment Recommended (S)  Nina Maldonado  (may require RW for d/c; will continue to assess)   Walker Package Recommended Wheeled walker   Change/add to Bundle It? No   AM-PAC Basic Mobility Inpatient   Turning in Flat Bed Without Bedrails 3   Lying on Back to Sitting on Edge of Flat Bed Without Bedrails 3   Moving Bed to Chair 3   Standing Up From Chair Using Arms 3   Walk in Room 3   Climb 3-5 Stairs With Railing 2   Basic Mobility Inpatient Raw Score 17   Basic Mobility Standardized Score 39.67   Highest Level Of Mobility   JH-HLM Goal 5: Stand one or more mins   JH-HLM Achieved 6: Walk 10 steps or more     The patient's AM-PAC Basic Mobility Inpatient Standardized Score is less than 42.9, suggesting this patient may benefit from discharge to post-acute rehabilitation services. Please also refer to the recommendation of the Physical Therapist for safe discharge planning.     Patrick Veras, PT, DPT

## 2023-07-10 NOTE — UTILIZATION REVIEW
Initial Clinical Review    The patient was transferred to [address] [place] [address] on 07/08 from [address] [place] [address], where care began on 07/08. 0 midnights have already been surpassed with active ongoing care. Admission: Date/Time/Statement:   Admission Orders (From admission, onward)     Ordered        07/08/23 0909  Inpatient Admission  Once                      Orders Placed This Encounter   Procedures   • Inpatient Admission     Standing Status:   Standing     Number of Occurrences:   1     Order Specific Question:   Level of Care     Answer:   Level 2 Stepdown / HOT [14]     Order Specific Question:   Estimated length of stay     Answer:   More than 2 Midnights     Order Specific Question:   Certification     Answer:   I certify that inpatient services are medically necessary for this patient for a duration of greater than two midnights. See H&P and MD Progress Notes for additional information about the patient's course of treatment. ED Arrival Information     Patient not seen in ED                     No chief complaint on file. Initial Presentation: 59 y.o. male with PMH of HTN, CHF, CKD, DM, meningioma was transferred from [facility] to [facility] on 07/08 for a higher level of care. Pt initially presented to [facility] w/ SOB and chest pain x 1 day. Also reports 4 bloody BM the day prior to presentation. In the ED, CT AP was concerning for pericardial tamponade. TTE showed evidence of right ventricular collapse with large pericardial effusion. EKG showed left axis deviation. Transferred to [facility] for management of pericardial effusion/tamponade. On arrival to [facility], pt noted w/ /135, in distress, Rapid response was called. Thoracic surg evaluated pt; will transfer to the OR for op mgt. On exam, Awake obey simple commands, dyspneic, barely able to complete sentences, tachypnea, diminished breath sound bilaterally, tachycardia, heart sounds are distant.     Admitted as Inpatient for cardiac tamponade, hypertensive urgency. Plan: Thoracic surg consulted- OR for operative intervention. IV nicardipine. Monitor blood pressures closely    07/08  Thoracic Surg Consult: large pericardial effusion and signs of tamponade with hypertension:  Plan: OR now for emergent subxiphoid pericardial window. Please keep n.p.o.. will send fluid for culture and cytology. will leave drain in place     OP Note:  SURGERY DATE: 7/8/2023  Procedure(s): Subxyphoid WINDOW PERICARDIAL  Anesthesia Type: General  Operative Findings:  Large serous pericardial effusion. Drained approximately 300 mL in total.  Sent for culture and cytology. Cut out 1.5 x 1.5 cm pericardial window and sent for pathology    Critical Care Notes: Pt s/p OR for pericardial window and drain placement. He put out 300mL of serosanguinous fluid, but remained persistently hypertensive with SBP > 200. Cardene was initiated post-operatively, and he was transferred to the critical care team for ongoing management. He is on 6L NC. Monitor pericardial drain output amount and character. Follow up cytology. Pain control prn. Protonix 40mg IV q12h. Follow hemoglobin. Make NPO with GI consult . Mon BMs. Cont Cardene gtt, titrate to -180. Continue home oral antihypertensives. Antihypertensives prn. Renal artery duplex ordered and pending. Maintain arterial line for now     Date: 07/09  Day 2:   Cardiology Consult: Cardiac tamponage s/p pericardial window, L ventricular hypertrophy, uncontrolled HTN, pericarditis: Plan: Continue ANNE MARIE drain per thoracic surgery. Follow-up on culture. start colchicine 0.6 daily. Zosyn started at 10 AM this morning. Repeat echocardiogram tomorrow. work-up for SPEP, UPEP free light chains. Aggressive blood pressure control -started on Norvasc 10 mg this a.m. . Aggressive blood pressure control -started on Norvasc 10 mg this a.m. Critical Care Notes: Pt c/o substernal chest pain exacerbated by deep inspiration. Remains hypertensive. Chest is clear. Cor regular rate and rhythm without murmur and mild rub. Abdomen soft with a subxiphoid incision is clean dry and intact, pericardial drain with straw-colored thick. On 2L NC. Colchicine added and will trial steroid for pericardial inflammation. Continue PPI twice daily. Continue triple antihypertensive therapy. Advance diet as tolerated out of bed. Thoracic Surg Notes: Ptm denies CP/SOB. Pericardial drain with 235 mL of serosanguineous output since the OR. Keep pericardial drain in place at least until 7/11/2023. Will assess for removal at that time. We will remove once output less than 50 mL in 24 hours. Follow-up OR cultures and cytology to help determine etiology of pericardial effusion. GI Consult: Melena, BPRPR, acute anemia: Pt admitted w/ Hgb 7.8, baseline 12, dropped to 6.4 s/p 1 u prbc, rpt 7.7, now dropped again to 7. continue to monitor H&H and for signs of overt bleeding. He is a very high cardiac risk given recent tamponade. Will consider endoscopic evaluation pending cardiac and anesthesia clearance. continue IV PPI. empiric NPO at midnight. Date: 07/10  Day 3: Has surpassed a 2nd midnight with active treatments and services, which include:  Patient required levo of 1-2 overnight, received 40 of Lasix this morning. Plan for DEEP today. Cont to mon pericardial drain output amount and character. Cont Colchicine. Pain control prn. Cont Zosyn. F/u cytology and cxs. Cont IV protonix.  mon hgb, BMs.     ED Triage Vitals   Temperature Pulse Respirations Blood Pressure SpO2   07/08/23 1048 07/08/23 0927 07/08/23 0927 07/08/23 0927 07/08/23 0927   97.6 °F (36.4 °C) 104 (!) 30 (!) 230/135 100 %      Temp Source Heart Rate Source Patient Position - Orthostatic VS BP Location FiO2 (%)   07/08/23 1255 07/08/23 1315 07/09/23 1112 07/09/23 1112 --   Oral Monitor Lying Left arm       Pain Score       07/08/23 1115       No Pain          Wt Readings from Last 1 Encounters:   07/10/23 59.5 kg (131 lb 2.8 oz)     Additional Vital Signs:   Date/Time Temp Pulse Resp BP MAP (mmHg) Arterial Line BP MAP SpO2 Calculated FIO2 (%) - Nasal Cannula O2 Flow Rate (L/min) Nasal Cannula O2 Flow Rate (L/min) O2 Device Cardiac (WDL) Patient Position - Orthostatic VS   07/10/23 1520 100.2 °F (37.9 °C) 92 18 118/66 -- -- -- 96 % -- -- -- None (Room air) -- --   07/10/23 1418 101.5 °F (38.6 °C) Abnormal  100 20 171/100 Abnormal  -- -- -- 99 % -- -- -- None (Room air) -- --   07/10/23 10:56:42 98.5 °F (36.9 °C) -- -- -- -- -- -- -- -- -- -- -- -- --   07/10/23 07:50:12 98.3 °F (36.8 °C) -- -- -- -- -- -- -- -- -- -- -- -- --   07/10/23 0400 -- -- -- -- -- -- -- -- -- -- -- None (Room air) -- --   07/10/23 0301 98.4 °F (36.9 °C) 82 18 149/84 -- -- -- 97 % -- -- -- None (Room air) -- --   07/10/23 0030 99.4 °F (37.4 °C) 82 16 121/62 -- -- -- -- -- -- -- -- --        Date/Time Temp Pulse Resp BP MAP (mmHg) Arterial Line BP MAP SpO2 Calculated FIO2 (%) - Nasal Cannula O2 Flow Rate (L/min) Nasal Cannula O2 Flow Rate (L/min) O2 Device Cardiac (WDL) Patient Position - Orthostatic VS   07/09/23 22:29:25 99.3 °F (37.4 °C) 82 16 121/62 87 -- -- 98 % -- -- -- -- -- Lying   07/09/23 2107 -- 96 -- 149/88 -- -- -- -- -- -- -- -- -- --   07/09/23 1809 -- -- -- 147/80 -- -- -- -- -- -- -- -- -- --   07/09/23 1702 -- 103 -- 159/85 -- -- -- -- -- -- -- -- -- --   07/09/23 1634 -- 100 -- 151/87 -- -- -- -- -- -- -- -- -- --   07/09/23 1558 98.2 °F (36.8 °C) -- -- 190/81 Abnormal   -- -- -- -- -- -- -- None (Room air) -- --   BP: PO hydralazine given at 07/09/23 1558   07/09/23 1546 -- -- -- 166/92 -- -- -- -- -- -- -- -- -- --   07/09/23 1515 -- 106 Abnormal  -- 147/85 -- -- -- -- -- -- -- -- -- --   07/09/23 1401 -- 104 -- 160/81 -- -- -- -- -- -- -- -- -- --   07/09/23 1340 -- 101 -- 161/92 -- -- -- -- -- -- -- -- -- --   07/09/23 1233 -- 103 -- 160/82 -- -- -- -- -- -- -- -- -- --   07/09/23 1146 -- 102 -- 161/80 -- -- -- -- -- -- -- -- -- --   07/09/23 1123 -- -- -- -- -- -- -- -- -- -- -- None (Room air) -- --   07/09/23 11:12:44 98.1 °F (36.7 °C) 100 18 171/85 Abnormal  120 -- -- -- -- -- -- -- -- Lying   07/09/23 07:45:20 97.9 °F (36.6 °C) 104 20 173/88 Abnormal  122 -- -- 98 % 28 -- 2 L/min Nasal cannula -- --   07/09/23 02:51:25 99.1 °F (37.3 °C) 99 18 152/75 -- -- -- 99 % -- -- -- -- -- --   07/09/23 0005 98.7 °F (37.1 °C) 89 18 134/63 -- -- -- 99 % 28 -- 2 L/min Nasal cannula -- --   07/08/23 2344 98.6 °F (37 °C) 89 18 130/60 -- -- -- -- -- -- -- -- -- --   07/08/23 2232 -- -- -- 138/76 -- -- -- -- -- -- -- -- -- --   07/08/23 2000 -- -- -- -- -- -- -- -- 32 -- 3 L/min Nasal cannula -- --   07/08/23 1907 98.3 °F (36.8 °C) 96 20 122/70 90 -- -- -- -- -- -- -- -- --   07/08/23 1800 -- 101 -- 116/66 -- -- -- -- -- -- -- -- -- --   07/08/23 1730 -- 97 -- 126/68 87 -- -- -- -- -- -- -- -- --   07/08/23 1700 -- 92 -- 135/75 -- -- -- -- -- -- -- -- -- --   07/08/23 1600 -- 102 -- 136/65 -- -- -- -- -- -- -- -- -- --   07/08/23 1530 -- -- -- 133/68 94 -- -- -- -- -- -- -- -- --   07/08/23 1500 -- 99 18 143/78 104 -- -- 98 % -- -- -- -- -- --   07/08/23 1400 98.1 °F (36.7 °C) 87 19 139/76 101 -- -- -- -- -- -- -- -- --   07/08/23 1345 -- 87 18 148/81 111 -- -- -- -- -- -- -- -- --   07/08/23 1330 -- 87 19 150/84 111 -- -- -- -- -- -- -- -- --   07/08/23 1315 -- 87 18 155/89 116 -- -- -- -- -- -- -- -- --   07/08/23 1300 -- 94 19 154/87 115 -- -- 99 % 28 -- 2 L/min Nasal cannula -- --   07/08/23 1255 99.1 °F (37.3 °C) -- -- -- -- -- -- -- -- -- -- -- -- --   07/08/23 1245 -- 100 12 156/93 116 -- -- 100 % 28 -- 2 L/min Nasal cannula -- --   07/08/23 1230 -- 94 16 160/96 120 -- -- 100 % 28 -- 2 L/min Nasal cannula -- --   07/08/23 1215 -- 88 14 168/94 125 -- -- 100 % 28 -- 2 L/min Nasal cannula -- --   07/08/23 1200 98 °F (36.7 °C) 86 17 165/93 116 188/132 142 mmHg 100 % 28 -- 2 L/min Nasal cannula WDL --   07/08/23 1145 -- 86 16 157/87 112 182/162 166 mmHg 99 % 28 -- 2 L/min Nasal cannula -- --   07/08/23 1130 -- 86 13 158/78 101 -- -- 99 % 28 -- 2 L/min Nasal cannula -- --   07/08/23 1115 -- 86 13 170/92 118 -- -- 99 % 28 -- 2 L/min Nasal cannula -- --   07/08/23 1100 -- 88 18 177/96 Abnormal  128 188/102 134 mmHg 100 % -- -- -- None (Room air) -- --   07/08/23 1048 97.6 °F (36.4 °C) 92 16 177/96 Abnormal  128 -- -- 100 % -- 6 L/min -- Simple mask WDL --       Pertinent Labs/Diagnostic Test Results:   XR chest portable   Final Result by Desiree Tyler MD (07/08 1437)      No acute cardiopulmonary disease. Pericardial drain. Workstation performed: [id number]           07/08 ECHO:   •  Left Ventricle: Left ventricular cavity size is normal. Wall thickness is increased. The left ventricular ejection fraction is 50%. Systolic function is low normal. Wall motion cannot be accurately assessed. •  Left Atrium: The atrium is mildly dilated. •  Pericardium: There is a moderate to large pericardial effusion circumferential to the heart. There is a reasonable likelihood of cardiac tamponade. The evidence for tamponade includes: right ventricular compression. Clinical correlation recommended. •  Echocardiogram findings were personally discussed with ED physician, Jax Medeiros.     07/09 EKG result: Normal sinus rhythm  Left anterior fascicular block  Nonspecific ST abnormality  Prolonged QT      Results from last 7 days   Lab Units 07/10/23  1108 07/10/23  0442 07/09/23  2230 07/09/23  1522 07/09/23  0932 07/09/23  0433 07/08/23  1953 07/08/23  1308 07/08/23  0137   WBC Thousand/uL  --  18.13*  --   --   --  23.88*  --  16.14* 13.77*   HEMOGLOBIN g/dL 8.5* 7.3* 6.6* 7.0* 7.7* 7.1*   < > 7.6* 7.8*   HEMATOCRIT %  --  22.3*  --   --   --  20.9*  --  22.6* 23.2*   PLATELETS Thousands/uL  --  265  --   --   --  268  --  334 342   NEUTROS ABS Thousands/µL  --  14.05*  --   --   --  19.81*  --   --  11.57*    < > = values in this interval not displayed.          Results from last 7 days   Lab Units 07/10/23  0442 07/09/23  0433 07/08/23 1953 07/08/23  1308 07/08/23  0137   SODIUM mmol/L 144 141 139 141 139   POTASSIUM mmol/L 3.6 3.6 3.3* 2.8* 4.2   CHLORIDE mmol/L 115* 113* 110* 110* 106   CO2 mmol/L 25 25 24 25 21   ANION GAP mmol/L 4 3 5 6 12   BUN mg/dL 52* 49* 43* 43* 63*   CREATININE mg/dL 2.16* 1.92* 1.64* 1.41* 1.59*   EGFR ml/min/1.73sq m 32 37 44 53 46   CALCIUM mg/dL 8.5 8.6 8.5 8.8 9.7   MAGNESIUM mg/dL  --  2.4  --   --   --    PHOSPHORUS mg/dL  --  4.4*  --   --   --      Results from last 7 days   Lab Units 07/09/23 0433 07/08/23  0137   AST U/L 32 52*   ALT U/L 50 64*   ALK PHOS U/L 54 58   TOTAL PROTEIN g/dL 5.8* 7.2   ALBUMIN g/dL 3.0* 4.1   TOTAL BILIRUBIN mg/dL 0.93 1.21*     Results from last 7 days   Lab Units 07/08/23  1747 07/08/23  1101   POC GLUCOSE mg/dl 207* 127     Results from last 7 days   Lab Units 07/10/23  0442 07/09/23  0433 07/08/23  1953 07/08/23  1308 07/08/23  0137   GLUCOSE RANDOM mg/dL 111 132 149* 153* 136             BETA-HYDROXYBUTYRATE   Date Value Ref Range Status   07/09/2020 1.2 (H) <0.6 mmol/L Final   07/03/2020 1.5 (H) <0.6 mmol/L Final          Results from last 7 days   Lab Units 07/10/23  1016 07/08/23  0415   PH OSKAR  7.631* 7.525*   PCO2 OSKAR mm Hg 19.4* 21.6*   PO2 OSKAR mm Hg 75.6* 71.3*   HCO3 OSKAR mmol/L 20.0* 17.4*   BASE EXC OSKAR mmol/L -0.2 -4.6   O2 CONTENT OSAKR ml/dL 11.6 9.4   O2 HGB, VENOUS % 95.2* 90.6*             Results from last 7 days   Lab Units 07/08/23  0800   HS TNI 0HR ng/L 172*                 Results from last 7 days   Lab Units 07/09/23  0433   PROCALCITONIN ng/ml 0.46*     Results from last 7 days   Lab Units 07/08/23  1308 07/08/23  0415 07/08/23  0137   LACTIC ACID mmol/L 0.8 2.0 3.3*             Results from last 7 days   Lab Units 07/08/23  0800   BNP pg/mL 538*     Results from last 7 days   Lab Units 07/09/23  0433   FERRITIN ng/mL 406*   IRON SATURATION % 8*   IRON ug/dL 13*   TIBC ug/dL 154*         Results from last 7 days   Lab Units 07/10/23  0555 07/09/23  2353 07/09/23  0555   UNIT PRODUCT CODE  [id number]   UNIT NUMBER  [id number]   UNITABO  O O O   UNITRH  POS POS POS   CROSSMATCH  Compatible Compatible Compatible   UNIT DISPENSE STATUS  Presumed Trans Crossmatched Presumed Trans   UNIT PRODUCT VOL mL 350 300 350         Results from last 7 days   Lab Units 07/08/23  0137   LIPASE u/L 19     Results from last 7 days   Lab Units 07/10/23  1016   CRP mg/L 105.0*           Results from last 7 days   Lab Units 07/09/23  0950 07/09/23  0933 07/08/23  1014 07/08/23  0145 07/08/23  0142   BLOOD CULTURE  No Growth at 24 hrs. No Growth at 24 hrs.  --  No Growth at 48 hrs. No Growth at 48 hrs. GRAM STAIN RESULT   --   --  No Polys or Bacteria seen  --   --    BODY FLUID CULTURE, STERILE   --   --  No growth  --   --                    ED Treatment:   Medication Administration - No Administrations Displayed (No Start Event Found)     None        Past Medical History:   Diagnosis Date   • Asthma    • Chronic kidney disease    • COPD (chronic obstructive pulmonary disease) ([facility])    • CPAP (continuous positive airway pressure) dependence    • Diabetes mellitus ([address])    • Emphysema of lung ([facility])    • Gout    • History of transfusion     pt stated they had a transfusion when they had gallbladder surgery. • Hypertension    • Kidney disease     renal failure   • Leg DVT (deep venous thromboembolism), acute, bilateral ([address]) 01/2018   • Obesity (BMI 30-39. 9)    • AGUS on CPAP     setting 11   • Postgastrectomy malabsorption    • Sleep apnea    • Systolic CHF [facility])      Present on Admission:  • Anemia  • Cardiac tamponade  • Chronic diastolic CHF (congestive heart failure) ([facility])  • CKD stage 3 secondary to diabetes [facility])  • COPD (chronic obstructive pulmonary disease) ([facility])  • Meningioma ([facility])  • Hypertensive urgency  • Hematochezia  • Depression with anxiety      Admitting Diagnosis: Cardiac tamponade [I31.4]  Age/Sex: 59 y.o. male  Admission Orders:  PT/OT  Telemetry monitoring    Scheduled Medications:  amLODIPine, 10 mg, Oral, Daily  atorvastatin, 40 mg, Oral, HS  budesonide-formoterol, 2 puff, Inhalation, BID  busPIRone, 15 mg, Oral, BID  calcium carbonate, 2 tablet, Oral, BID With Meals  cholecalciferol, 2,000 Units, Oral, Daily  colchicine, 0.6 mg, Oral, Daily  doxazosin, 8 mg, Oral, BID  eplerenone, 50 mg, Oral, Daily  fluticasone, 1 puff, Inhalation, Daily  hydrALAZINE, 100 mg, Oral, TID  labetalol, 200 mg, Oral, Q12H JOANN  mirtazapine, 15 mg, Oral, HS  multivitamin-minerals, 1 tablet, Oral, Daily  pantoprazole, 40 mg, Intravenous, Q12H JOANN  piperacillin-tazobactam, 3.375 g, Intravenous, Q6H  polyethylene glycol, 17 g, Oral, Daily  sertraline, 200 mg, Oral, Daily      Continuous IV Infusions:  niCARdipine (CARDENE) 25 mg (STANDARD CONCENTRATION) in sodium chloride 0.9% 250 mL  Rate:  mL/hr Dose: 1-15 mg/hr  Freq: Titrated Route: IV  Last Dose: Stopped (07/08/23 1923)  Start: 07/08/23 1045 End: 07/09/23 0750     PRN Meds:  acetaminophen, 650 mg, Oral, Q6H PRN 07/08 x 1  aluminum-magnesium hydroxide-simethicone, 30 mL, Oral, Q6H PRN  hydrALAZINE, 10 mg, Intravenous, Q6H PRN  HYDROmorphone, 0.2 mg, Intravenous, Q3H PRN  labetalol, 10 mg, Intravenous, Q6H PRN  methocarbamol, 500 mg, Oral, Q6H PRN  metoclopramide, 10 mg, Intravenous, Q6H PRN 07/08 x 1  morphine injection 2 mg q4h IV prn 07/08 x 1  ondansetron, 4 mg, Intravenous, Q6H PRN 07/08 x 1, 07/09 x 1, 07/10 x 1  oxyCODONE, 2.5 mg, Oral, Q4H PRN   Or  oxyCODONE, 5 mg, Oral, Q4H PRN 07/08 x 1        IP CONSULT TO CARDIOLOGY  IP CONSULT TO GASTROENTEROLOGY    Network Utilization Review Department  ATTENTION: Please call with any questions or concerns to [phone] and carefully listen to the prompts so that you are directed to the right person.  All voicemails are confidential.  Marie Ship all requests for admission clinical reviews, approved or denied determinations and any other requests to dedicated fax number below belonging to the campus where the patient is receiving treatment.  List of dedicated fax numbers for the Facilities:  [place] DENIALS (Administrative/Medical Necessity) [phone] [address] (Maternity/NICU/Pediatrics) [phone]   [address] [place] [address] [phone]   [facility] [phone]   [address] [place] [address] [id number] [place] [address] [phone]   [address] [facility] [address] [address] [phone]

## 2023-07-10 NOTE — PLAN OF CARE
Problem: MOBILITY - ADULT  Goal: Maintain or return to baseline ADL function  Description: INTERVENTIONS:  -  Assess patient's ability to carry out ADLs; assess patient's baseline for ADL function and identify physical deficits which impact ability to perform ADLs (bathing, care of mouth/teeth, toileting, grooming, dressing, etc.)  - Assess/evaluate cause of self-care deficits   - Assess range of motion  - Assess patient's mobility; develop plan if impaired  - Assess patient's need for assistive devices and provide as appropriate  - Encourage maximum independence but intervene and supervise when necessary  - Involve family in performance of ADLs  - Assess for home care needs following discharge   - Consider OT consult to assist with ADL evaluation and planning for discharge  - Provide patient education as appropriate  Outcome: Progressing  Goal: Maintains/Returns to pre admission functional level  Description: INTERVENTIONS:  - Perform BMAT or MOVE assessment daily.   - Set and communicate daily mobility goal to care team and patient/family/caregiver. - Collaborate with rehabilitation services on mobility goals if consulted  - Perform Range of Motion  times a day. - Reposition patient every  hours.   - Dangle patient  times a day  - Stand patient  times a day  - Ambulate patient  times a day  - Out of bed to chair  times a day   - Out of bed for meals times a day  - Out of bed for toileting  - Record patient progress and toleration of activity level   Outcome: Progressing

## 2023-07-10 NOTE — CASE MANAGEMENT
Case Management Assessment    Patient name Raeann   Location [address] [place] [address] Road [address] [place] [address] [id number] MRN [id number]  : 1965 Date 7/10/2023       Current Admission Date: 2023  Current Admission Diagnosis:Cardiac tamponade   Patient Active Problem List    Diagnosis Date Noted   • Cardiac tamponade 2023   • Hypertensive urgency 2023   • Hematochezia 2023   • Depression with anxiety 2023   • Abdominal pain 2023   • Seizure ([address]) 2023   • Hypertensive encephalopathy 2023   • Hypertensive emergency 2023   • Meningioma ([address]) 2023   • Prolonged Q-T interval on ECG 2023   • Hypokalemia 2021   • Bradycardia 2021   • Hypoglycemia 2021   • History of gastric bypass 2021   • Hypertensive kidney disease with stage 3 chronic kidney disease ([address]) 2021   • Acute renal failure superimposed on stage 3 chronic kidney disease ([address]) 2021   • Encounter for surgical aftercare following surgery of digestive system 2021   • Postsurgical malabsorption 2021   • Morbid obesity due to excess calories ([address]) 2020   • Post-traumatic male urethral stricture 2020   • Renal cyst 2020   • Dysuria 2020   • History of DVT (deep vein thrombosis) 10/17/2018   • Splenic lesion 10/17/2018   • AGUS (obstructive sleep apnea) 10/16/2018   • Abscess of left groin 10/16/2018   • Anemia 2018   • Lymphedema 2018   • CKD stage 3 secondary to diabetes ([address]) 03/15/2018   • Chronic diastolic CHF (congestive heart failure) ([address]) 2018   • Pulmonary nodule, right 2018   • Pericardial effusion 2018   • Essential hypertension 2018   • Leg DVT (deep venous thromboembolism), acute, bilateral ([address]) 2018   • COPD (chronic obstructive pulmonary disease) ([address]) 2018   • CHF (congestive heart failure) ([address]) 2018      LOS (days): 2  Geometric Mean LOS (GMLOS) (days): 3.90  Days to GMLOS:1.7 OBJECTIVE:  Risk of Unplanned Readmission Score: 24.64   Current admission status: Inpatient    Preferred Pharmacy:   [facility] [address] [place]  Phone: [phone] Fax: [phone]    Primary Care Provider: Mariano Shah DO    Primary Insurance: [facility] REP    ASSESSMENT:  [place] Proxies    There are no active Health Care Proxies on file. Advance Directives  Does patient have a [address]?: No  Does patient have Advance Directives?: No  Primary Contact: pt's wife Lyndsay Gudio / phone# [phone]    Patient Information  Admitted from[de-identified] Home  Mental Status: Alert  Assessment information provided by[de-identified] Patient  Primary Caregiver: [address] of Residence: [address] [place] [address] do you live in?: [place], [address] [place] [address] entry access options.  Select all that apply.: Stairs  Number of steps to enter home.: 6  Type of Current Residence: 2 story home  Upon entering residence, is there a bedroom on the main floor (no further steps)?: No  A bedroom is located on the following floor levels of residence (select all that apply):: 2nd Floor  Upon entering residence, is there a bathroom on the main floor (no further steps)?: Yes  Number of steps to 2nd floor from main floor: One Flight  In the last 12 months, was there a time when you were not able to pay the mortgage or rent on time?: No  In the last 12 months, how many places have you lived?: 1  In the last 12 months, was there a time when you did not have a steady place to sleep or slept in a shelter (including now)?: No  Homeless/housing insecurity resource given?: N/A  Living Arrangements: Lives w/ Spouse/significant other  Is patient a ?: Yes    Activities of Daily Living Prior to Admission  Functional Status: Independent  Completes ADLs independently?: Yes  Ambulates independently?: Yes  Does patient use assisted devices?: No  Does patient currently own DME?: Yes  What DME does the patient currently own?: [employer]  Does the patient have a history of Short-Term Rehab?: No  Does patient have a history of HHC?: No    Patient Information Continued  Income Source: SSI/SSD  Does patient have prescription coverage?: Yes  Within the past 12 months, you worried that your food would run out before you got the money to buy more.: Never true  Within the past 12 months, the food you bought just didn't last and you didn't have money to get more.: Never true  Food insecurity resource given?: N/A  Does patient receive dialysis treatments?: No  Does patient have a history of substance abuse?: Currently using  Current substance use preference: Marijuana  Is patient currently in treatment for substance abuse?: No. Patient declined treatment information. Does patient have a history of Mental Health Diagnosis?: Yes (PTSD)  Is patient receiving treatment for mental health?: Yes  Has patient received inpatient treatment related to mental health in the last 2 years?: No    Means of Transportation  Means of Transport to Appts[de-identified] Drives Self  In the past 12 months, has lack of transportation kept you from medical appointments or from getting medications?: No  In the past 12 months, has lack of transportation kept you from meetings, work, or from getting things needed for daily living?: No  Was application for public transport provided?: N/A     Additional Comments: CM reviewed d/c planning process including the following: identifying help at home, patient preference for d/c planning needs, Discharge Lounge, [facility] Meds to Bed program, availability of treatment team to discuss questions or concerns patient and/or family may have regarding understanding medications and recognizing signs and symptoms once discharged. CM also encouraged patient to follow up with all recommended appointments after discharge.  Patient advised of importance for patient and family to participate in managing patient’s medical well being. Patient/caregiver received discharge checklist. Content reviewed. Patient/caregiver encouraged to participate in discharge plan of care prior to discharge home.

## 2023-07-10 NOTE — PLAN OF CARE
Problem: PHYSICAL THERAPY ADULT  Goal: Performs mobility at highest level of function for planned discharge setting. See evaluation for individualized goals. Description: Treatment/Interventions: Functional transfer training, LE strengthening/ROM, Therapeutic exercise, Endurance training, Patient/family training, Equipment eval/education, Bed mobility, Gait training, OT, Spoke to nursing, Elevations  Equipment Recommended: (S) Leo Adames (may require RW for d/c; will continue to assess)       See flowsheet documentation for full assessment, interventions and recommendations. Note: Prognosis: Good  Problem List: Decreased endurance, Impaired balance, Decreased mobility, Pain  Assessment: PT completed evaluation of 59 y.o. male admitted to [address] [place] [address] on 7/8/2023 with symptoms of: chest pain and SOB. Diagnosis includes cardiac tamponade. Patient is s/p cardiac window on 7/8. Patient's current status instabilities include ongoing pain, continuous O2/HR monitoring, nausea/dizziness, falls risk, and a regression in function from baseline. PMH is significant for COPD, CKD, and CHF. Prior to this admission patient resided with spouse in a 2 level home (6 [address]). At his baseline he is I with mobility (no use of AD), ADLS, and iADLS. Patient presents at time of PT evaluation functioning below baseline and currently w/ overall mobility deficits 2* to: impaired balance, decreased endurance, gait deviations, decreased activity tolerance and fall risk. During PT evaluation, patient currently is requiring min-AX1 for bed mobility, sit<-->stand transfers, and ambulation. Patient ambulated 5 feet x 2 with use of RW (limited by nausea and dizziness). I anticipate patient will progress and d/c home with home PT. Patient will continue to benefit from continued skilled PT this admission to achieve maximal function and safety.         PT Discharge Recommendation: (S) Home with home health rehabilitation (pending progress)    See flowsheet documentation for full assessment.

## 2023-07-10 NOTE — ANESTHESIA PREPROCEDURE EVALUATION
Procedure:  EGD    Relevant Problems   CARDIO   (+) CHF (congestive heart failure) (HCC)   (+) Essential hypertension   (+) Hypertensive emergency   (+) Hypertensive urgency      /RENAL   (+) Acute renal failure superimposed on stage 3 chronic kidney disease (HCC)   (+) CKD stage 3 secondary to diabetes (HCC)   (+) Hypertensive kidney disease with stage 3 chronic kidney disease (HCC)   (+) Renal cyst      HEMATOLOGY   (+) Anemia      NEURO/PSYCH   (+) Depression with anxiety   (+) Seizure (HCC)      PULMONARY   (+) COPD (chronic obstructive pulmonary disease) (HCC)   (+) AGUS (obstructive sleep apnea)      Cardiovascular and Mediastinum   (+) Cardiac tamponade   (+) Chronic diastolic CHF (congestive heart failure) (HCC)      Respiratory   (+) Pulmonary nodule, right      Nervous and Auditory   (+) Meningioma (HCC)      Other   (+) Hematochezia   (+) Prolonged Q-T interval on ECG   (+) Splenic lesion      POD#2 s/p pericardial window    EKG 7/9/2023:  Normal sinus rhythm  Left anterior fascicular block  Nonspecific ST abnormality  Prolonged QT  Abnormal ECG  When compared with ECG of 08-JUL-2023     CXR 7/8/2023:  Normal heart size with pericardial drain.     Lungs clear. No effusion or pneumothorax.     Upper abdomen normal. Bones normal for age. TTE 7/8/2023:  •  Left Ventricle: Left ventricular cavity size is normal. Wall thickness is increased. The left ventricular ejection fraction is 50%. Systolic function is low normal. Wall motion cannot be accurately assessed. •  Left Atrium: The atrium is mildly dilated. •  Pericardium: There is a moderate to large pericardial effusion circumferential to the heart. There is a reasonable likelihood of cardiac tamponade. The evidence for tamponade includes: right ventricular compression. Clinical correlation recommended. CTAP 7/8/2023:  1. Moderate pericardial effusion, significantly increased from prior.  Additionally there appears to be mass effect on the right lateral ventricle. Recommend echocardiogram and correlation for cardiac tamponade. 2.  Likely mild mesenteric edema/ascites          Lab Results   Component Value Date    WBC 18.13 (H) 07/10/2023    HGB 8.5 (L) 07/10/2023    HCT 22.3 (L) 07/10/2023    MCV 89 07/10/2023     07/10/2023     Lab Results   Component Value Date    SODIUM 144 07/10/2023    K 3.6 07/10/2023     (H) 07/10/2023    CO2 25 07/10/2023    BUN 52 (H) 07/10/2023    CREATININE 2.16 (H) 07/10/2023    GLUC 111 07/10/2023    CALCIUM 8.5 07/10/2023     Lab Results   Component Value Date    INR 1.17 08/26/2021    INR 2.02 (H) 07/11/2020    INR 2.39 (H) 07/10/2020    PROTIME 15.2 (H) 08/26/2021    PROTIME 23.1 (H) 07/11/2020    PROTIME 26.4 (H) 07/10/2020     Lab Results   Component Value Date    HGBA1C 5.2 05/12/2023          Physical Exam    Airway    Mallampati score: II  TM Distance: >3 FB  Neck ROM: full     Dental   No notable dental hx     Cardiovascular  Cardiovascular exam normal    Pulmonary  Pulmonary exam normal     Other Findings        Anesthesia Plan  ASA Score- 3     Anesthesia Type- IV sedation with anesthesia with ASA Monitors. Additional Monitors:   Airway Plan:           Plan Factors-    Chart reviewed. EKG reviewed. Existing labs reviewed. Patient summary reviewed. Induction- intravenous. Postoperative Plan-     Informed Consent- Anesthetic plan and risks discussed with patient. I personally reviewed this patient with the CRNA. Discussed and agreed on the Anesthesia Plan with the CRNA. Rosina Easton

## 2023-07-11 ENCOUNTER — ANESTHESIA EVENT (INPATIENT)
Dept: GASTROENTEROLOGY | Facility: HOSPITAL | Age: 58
End: 2023-07-11
Payer: COMMERCIAL

## 2023-07-11 ENCOUNTER — APPOINTMENT (INPATIENT)
Dept: GASTROENTEROLOGY | Facility: HOSPITAL | Age: 58
DRG: 271 | End: 2023-07-11
Payer: COMMERCIAL

## 2023-07-11 ENCOUNTER — ANESTHESIA (INPATIENT)
Dept: GASTROENTEROLOGY | Facility: HOSPITAL | Age: 58
End: 2023-07-11
Payer: COMMERCIAL

## 2023-07-11 PROBLEM — E87.8 ELECTROLYTE ABNORMALITY: Status: ACTIVE | Noted: 2023-07-11

## 2023-07-11 PROBLEM — N17.9 AKI (ACUTE KIDNEY INJURY) (HCC): Status: ACTIVE | Noted: 2023-07-11

## 2023-07-11 LAB
ALBUMIN SERPL ELPH-MCNC: 3.14 G/DL (ref 3.2–5.1)
ALBUMIN SERPL ELPH-MCNC: 58.2 % (ref 48–70)
ALBUMIN UR ELPH-MCNC: 100 %
ALPHA1 GLOB MFR UR ELPH: 0 %
ALPHA1 GLOB SERPL ELPH-MCNC: 0.38 G/DL (ref 0.15–0.47)
ALPHA1 GLOB SERPL ELPH-MCNC: 7.1 % (ref 1.8–7)
ALPHA2 GLOB MFR UR ELPH: 0 %
ALPHA2 GLOB SERPL ELPH-MCNC: 0.45 G/DL (ref 0.42–1.04)
ALPHA2 GLOB SERPL ELPH-MCNC: 8.4 % (ref 5.9–14.9)
ANION GAP SERPL CALCULATED.3IONS-SCNC: 6 MMOL/L
ATRIAL RATE: 97 BPM
B-GLOBULIN MFR UR ELPH: 0 %
BACTERIA SPEC ANAEROBE CULT: NO GROWTH
BACTERIA SPEC BFLD CULT: NO GROWTH
BETA GLOB ABNORMAL SERPL ELPH-MCNC: 0.3 G/DL (ref 0.31–0.57)
BETA1 GLOB SERPL ELPH-MCNC: 5.6 % (ref 4.7–7.7)
BETA2 GLOB SERPL ELPH-MCNC: 6.3 % (ref 3.1–7.9)
BETA2+GAMMA GLOB SERPL ELPH-MCNC: 0.34 G/DL (ref 0.2–0.58)
BUN SERPL-MCNC: 47 MG/DL (ref 5–25)
CALCIUM SERPL-MCNC: 8.1 MG/DL (ref 8.3–10.1)
CHLORIDE SERPL-SCNC: 111 MMOL/L (ref 96–108)
CO2 SERPL-SCNC: 25 MMOL/L (ref 21–32)
CREAT SERPL-MCNC: 2.32 MG/DL (ref 0.6–1.3)
ERYTHROCYTE [DISTWIDTH] IN BLOOD BY AUTOMATED COUNT: 15.1 % (ref 11.6–15.1)
GAMMA GLOB ABNORMAL SERPL ELPH-MCNC: 0.78 G/DL (ref 0.4–1.66)
GAMMA GLOB MFR UR ELPH: 0 %
GAMMA GLOB SERPL ELPH-MCNC: 14.4 % (ref 6.9–22.3)
GFR SERPL CREATININE-BSD FRML MDRD: 29 ML/MIN/1.73SQ M
GLUCOSE SERPL-MCNC: 143 MG/DL (ref 65–140)
GRAM STN SPEC: NORMAL
HCT VFR BLD AUTO: 23.7 % (ref 36.5–49.3)
HGB BLD-MCNC: 7.9 G/DL (ref 12–17)
IGG/ALB SER: 1.39 {RATIO} (ref 1.1–1.8)
KAPPA LC FREE SER-MCNC: 88.6 MG/L (ref 3.3–19.4)
KAPPA LC FREE/LAMBDA FREE SER: 2.31 {RATIO} (ref 0.26–1.65)
LAMBDA LC FREE SERPL-MCNC: 38.4 MG/L (ref 5.7–26.3)
MAGNESIUM SERPL-MCNC: 2.2 MG/DL (ref 1.6–2.6)
MCH RBC QN AUTO: 29.3 PG (ref 26.8–34.3)
MCHC RBC AUTO-ENTMCNC: 33.3 G/DL (ref 31.4–37.4)
MCV RBC AUTO: 88 FL (ref 82–98)
P AXIS: 82 DEGREES
PHOSPHATE SERPL-MCNC: 4 MG/DL (ref 2.3–4.1)
PLATELET # BLD AUTO: 310 THOUSANDS/UL (ref 149–390)
PMV BLD AUTO: 9.4 FL (ref 8.9–12.7)
POTASSIUM SERPL-SCNC: 2.9 MMOL/L (ref 3.5–5.3)
PR INTERVAL: 188 MS
PROT PATTERN SERPL ELPH-IMP: ABNORMAL
PROT PATTERN UR ELPH-IMP: NORMAL
PROT SERPL-MCNC: 5.4 G/DL (ref 6.4–8.2)
PROT UR-MCNC: 6 MG/DL
QRS AXIS: -59 DEGREES
QRSD INTERVAL: 94 MS
QT INTERVAL: 378 MS
QTC INTERVAL: 480 MS
RBC # BLD AUTO: 2.7 MILLION/UL (ref 3.88–5.62)
SODIUM SERPL-SCNC: 142 MMOL/L (ref 135–147)
T WAVE AXIS: 126 DEGREES
VENTRICULAR RATE: 97 BPM
WBC # BLD AUTO: 13.95 THOUSAND/UL (ref 4.31–10.16)

## 2023-07-11 PROCEDURE — C9113 INJ PANTOPRAZOLE SODIUM, VIA: HCPCS | Performed by: NURSE PRACTITIONER

## 2023-07-11 PROCEDURE — 99232 SBSQ HOSP IP/OBS MODERATE 35: CPT | Performed by: INTERNAL MEDICINE

## 2023-07-11 PROCEDURE — 99233 SBSQ HOSP IP/OBS HIGH 50: CPT | Performed by: GENERAL PRACTICE

## 2023-07-11 PROCEDURE — 84166 PROTEIN E-PHORESIS/URINE/CSF: CPT | Performed by: PATHOLOGY

## 2023-07-11 PROCEDURE — 93010 ELECTROCARDIOGRAM REPORT: CPT | Performed by: INTERNAL MEDICINE

## 2023-07-11 PROCEDURE — 0DJD8ZZ INSPECTION OF LOWER INTESTINAL TRACT, VIA NATURAL OR ARTIFICIAL OPENING ENDOSCOPIC: ICD-10-PCS | Performed by: INTERNAL MEDICINE

## 2023-07-11 PROCEDURE — 83735 ASSAY OF MAGNESIUM: CPT | Performed by: INTERNAL MEDICINE

## 2023-07-11 PROCEDURE — 45378 DIAGNOSTIC COLONOSCOPY: CPT | Performed by: INTERNAL MEDICINE

## 2023-07-11 PROCEDURE — G0121 COLON CA SCRN NOT HI RSK IND: HCPCS | Performed by: INTERNAL MEDICINE

## 2023-07-11 PROCEDURE — 80048 BASIC METABOLIC PNL TOTAL CA: CPT | Performed by: INTERNAL MEDICINE

## 2023-07-11 PROCEDURE — 85027 COMPLETE CBC AUTOMATED: CPT | Performed by: INTERNAL MEDICINE

## 2023-07-11 PROCEDURE — 84165 PROTEIN E-PHORESIS SERUM: CPT | Performed by: PATHOLOGY

## 2023-07-11 PROCEDURE — 84100 ASSAY OF PHOSPHORUS: CPT | Performed by: INTERNAL MEDICINE

## 2023-07-11 RX ORDER — POTASSIUM CHLORIDE 20 MEQ/1
40 TABLET, EXTENDED RELEASE ORAL ONCE
Status: COMPLETED | OUTPATIENT
Start: 2023-07-11 | End: 2023-07-11

## 2023-07-11 RX ORDER — POTASSIUM CHLORIDE 14.9 MG/ML
20 INJECTION INTRAVENOUS ONCE
Status: COMPLETED | OUTPATIENT
Start: 2023-07-11 | End: 2023-07-11

## 2023-07-11 RX ORDER — PROPOFOL 10 MG/ML
INJECTION, EMULSION INTRAVENOUS CONTINUOUS PRN
Status: DISCONTINUED | OUTPATIENT
Start: 2023-07-11 | End: 2023-07-11

## 2023-07-11 RX ORDER — SODIUM CHLORIDE 9 MG/ML
INJECTION, SOLUTION INTRAVENOUS CONTINUOUS PRN
Status: DISCONTINUED | OUTPATIENT
Start: 2023-07-11 | End: 2023-07-11

## 2023-07-11 RX ORDER — POTASSIUM CHLORIDE 20 MEQ/1
20 TABLET, EXTENDED RELEASE ORAL ONCE
Status: COMPLETED | OUTPATIENT
Start: 2023-07-11 | End: 2023-07-11

## 2023-07-11 RX ORDER — PROPOFOL 10 MG/ML
INJECTION, EMULSION INTRAVENOUS AS NEEDED
Status: DISCONTINUED | OUTPATIENT
Start: 2023-07-11 | End: 2023-07-11

## 2023-07-11 RX ADMIN — BUSPIRONE HYDROCHLORIDE 15 MG: 10 TABLET ORAL at 10:07

## 2023-07-11 RX ADMIN — BUSPIRONE HYDROCHLORIDE 15 MG: 10 TABLET ORAL at 17:21

## 2023-07-11 RX ADMIN — PANTOPRAZOLE SODIUM 40 MG: 40 INJECTION, POWDER, FOR SOLUTION INTRAVENOUS at 10:10

## 2023-07-11 RX ADMIN — PIPERACILLIN SODIUM AND TAZOBACTAM SODIUM 3.38 G: 36; 4.5 INJECTION, POWDER, LYOPHILIZED, FOR SOLUTION INTRAVENOUS at 10:08

## 2023-07-11 RX ADMIN — HYDRALAZINE HYDROCHLORIDE 100 MG: 50 TABLET, FILM COATED ORAL at 22:06

## 2023-07-11 RX ADMIN — Medication 2 TABLET: at 17:21

## 2023-07-11 RX ADMIN — PROPOFOL 80 MCG/KG/MIN: 10 INJECTION, EMULSION INTRAVENOUS at 11:42

## 2023-07-11 RX ADMIN — DOXAZOSIN 8 MG: 4 TABLET ORAL at 10:07

## 2023-07-11 RX ADMIN — AMLODIPINE BESYLATE 10 MG: 10 TABLET ORAL at 10:07

## 2023-07-11 RX ADMIN — POTASSIUM CHLORIDE 20 MEQ: 14.9 INJECTION, SOLUTION INTRAVENOUS at 03:58

## 2023-07-11 RX ADMIN — PROPOFOL 100 MG: 10 INJECTION, EMULSION INTRAVENOUS at 11:39

## 2023-07-11 RX ADMIN — LABETALOL HYDROCHLORIDE 200 MG: 200 TABLET, FILM COATED ORAL at 22:06

## 2023-07-11 RX ADMIN — POTASSIUM CHLORIDE 20 MEQ: 1500 TABLET, EXTENDED RELEASE ORAL at 10:07

## 2023-07-11 RX ADMIN — SODIUM CHLORIDE: 0.9 INJECTION, SOLUTION INTRAVENOUS at 11:33

## 2023-07-11 RX ADMIN — EPLERENONE 50 MG: 25 TABLET, FILM COATED ORAL at 10:10

## 2023-07-11 RX ADMIN — LABETALOL HYDROCHLORIDE 200 MG: 200 TABLET, FILM COATED ORAL at 10:07

## 2023-07-11 RX ADMIN — Medication 2 TABLET: at 10:07

## 2023-07-11 RX ADMIN — ATORVASTATIN CALCIUM 40 MG: 40 TABLET, FILM COATED ORAL at 22:04

## 2023-07-11 RX ADMIN — SERTRALINE HYDROCHLORIDE 200 MG: 100 TABLET ORAL at 10:07

## 2023-07-11 RX ADMIN — COLCHICINE 0.6 MG: 0.6 TABLET ORAL at 10:07

## 2023-07-11 RX ADMIN — Medication 1 TABLET: at 10:07

## 2023-07-11 RX ADMIN — Medication 2000 UNITS: at 10:07

## 2023-07-11 RX ADMIN — PIPERACILLIN SODIUM AND TAZOBACTAM SODIUM 3.38 G: 36; 4.5 INJECTION, POWDER, LYOPHILIZED, FOR SOLUTION INTRAVENOUS at 03:09

## 2023-07-11 RX ADMIN — HYDRALAZINE HYDROCHLORIDE 100 MG: 50 TABLET, FILM COATED ORAL at 10:07

## 2023-07-11 RX ADMIN — BUDESONIDE AND FORMOTEROL FUMARATE DIHYDRATE 2 PUFF: 160; 4.5 AEROSOL RESPIRATORY (INHALATION) at 10:10

## 2023-07-11 RX ADMIN — BUDESONIDE AND FORMOTEROL FUMARATE DIHYDRATE 2 PUFF: 160; 4.5 AEROSOL RESPIRATORY (INHALATION) at 17:22

## 2023-07-11 RX ADMIN — FLUTICASONE FUROATE 1 PUFF: 100 POWDER RESPIRATORY (INHALATION) at 10:10

## 2023-07-11 RX ADMIN — PANTOPRAZOLE SODIUM 40 MG: 40 INJECTION, POWDER, FOR SOLUTION INTRAVENOUS at 22:04

## 2023-07-11 RX ADMIN — POTASSIUM CHLORIDE 40 MEQ: 1500 TABLET, EXTENDED RELEASE ORAL at 03:58

## 2023-07-11 RX ADMIN — MIRTAZAPINE 15 MG: 15 TABLET, FILM COATED ORAL at 22:04

## 2023-07-11 RX ADMIN — DOXAZOSIN 8 MG: 4 TABLET ORAL at 17:21

## 2023-07-11 RX ADMIN — HYDRALAZINE HYDROCHLORIDE 100 MG: 50 TABLET, FILM COATED ORAL at 17:21

## 2023-07-11 NOTE — PROGRESS NOTES
[facility]  Progress Note  Name: Elba Arana  MRN: [de-identified]  [facility] Date of Admission: 7/8/2023   Date of Service: 7/11/2023 I Hospital Day: 3    Assessment/Plan   * Cardiac tamponade  Assessment & Plan  Patient presented to [address] [place] [address] ED with symptoms of chest pain shortness of breath epigastric pain  He was noted to have pericardial effusion with concerns for tamponade  He was transferred to [address] [place] [address] for thoracic surgery evaluation  S/p window 7/8 - drain in place  Thoracic surgery appreciated  Cardio appreciated  SPEP/UPEP WNL  C/w colchicine  F/u cytology  Cx neg so stop abx    Cardio planning nuclear scan to check for amyloid          Anemia  Assessment & Plan  Likely multifactorial  Iron sat low  Monitor hemoglobin  1U PRBC in ICU  EGD 7/10 and colon today did not reveal source of bleed  Consider Venofer while IP - takes iron as OP    Electrolyte abnormality  Assessment & Plan  · K < 3 - Replete    Hematochezia  Assessment & Plan  · Colon today no active bleeding    Hypertensive urgency  Assessment & Plan  Hypertensive urgency present on admission  Monitor blood pressures closely  BP acceptable    Prolonged Q-T interval on ECG  Assessment & Plan  · Replete K to 4  · Mag > 2  · Check AM EKG    Meningioma [facility])  Assessment & Plan  Outpatient follow-up    CKD stage 3 secondary to diabetes ([address])  Assessment & Plan  Estimated Creatinine Clearance: 29.2 mL/min (A) (by C-G formula based on SCr of 2.32 mg/dL (H)).   Monitor kidney function closely  Avoid nephrotoxins  Monitor postvoid residuals  Cr slightly above baseline of 2.1-2.2 - monitor    Chronic diastolic CHF (congestive heart failure) (HCC)  Assessment & Plan  Wt Readings from Last 3 Encounters:   07/11/23 61 kg (134 lb 7.7 oz)   07/08/23 68.9 kg (152 lb)   05/23/23 68.9 kg (152 lb)     LVEF 60 to 68%, LVH noted  Monitor I/O, daily weights  C/w Inspra          COPD (chronic obstructive pulmonary disease) ([facility])  Assessment & Plan  Not in exacerbation presently  Continue respiratory treatments and supportive cares           VTE Pharmacologic Prophylaxis: VTE Score: 1 Low Risk (Score 0-2) - Encourage Ambulation. Patient Centered Rounds: I performed bedside rounds with nursing staff today. Discussions with Specialists or Other Care Team Provider: cardio and GI    Education and Discussions with Family / Patient: per GI. Total Time Spent on Date of Encounter in care of patient: 45 minutes This time was spent on one or more of the following: performing physical exam; counseling and coordination of care; obtaining or reviewing history; documenting in the medical record; reviewing/ordering tests, medications or procedures; communicating with other healthcare professionals and discussing with patient's family/caregivers. Current Length of Stay: 3 day(s)  Current Patient Status: Inpatient   Certification Statement: The patient will continue to require additional inpatient hospital stay due to need for pericardial drain  Discharge Plan: Anticipate discharge in >72 hrs to home with home services. Code Status: Level 1 - Full Code    Subjective:   Having frequent stools    Objective:     Vitals:   Temp (24hrs), Av.6 °F (37.6 °C), Min:99 °F (37.2 °C), Max:100.1 °F (37.8 °C)    Temp:  [99 °F (37.2 °C)-100.1 °F (37.8 °C)] 99.3 °F (37.4 °C)  HR:  [] 82  Resp:  [16-18] 18  BP: (108-197)/() 148/87  SpO2:  [93 %-98 %] 98 %  Body mass index is 21.71 kg/m². Input and Output Summary (last 24 hours): Intake/Output Summary (Last 24 hours) at 2023 1631  Last data filed at 2023 1157  Gross per 24 hour   Intake 1143.33 ml   Output 1330 ml   Net -186.67 ml       Physical Exam:   Physical Exam  HENT:      Head: Normocephalic and atraumatic.       Nose: Nose normal.      Mouth/Throat:      Mouth: Mucous membranes are moist.   Eyes: Extraocular Movements: Extraocular movements intact. Conjunctiva/sclera: Conjunctivae normal.   Cardiovascular:      Rate and Rhythm: Normal rate and regular rhythm. Pulmonary:      Effort: Pulmonary effort is normal.      Breath sounds: Normal breath sounds. Abdominal:      General: Bowel sounds are normal.      Palpations: Abdomen is soft. Musculoskeletal:         General: Normal range of motion. Cervical back: Normal range of motion and neck supple. Right lower leg: No edema. Left lower leg: No edema. Skin:     General: Skin is warm and dry. Neurological:      Mental Status: He is alert and oriented to person, place, and time. Additional Data:     Labs:  Results from last 7 days   Lab Units 07/11/23  0305 07/10/23  1108 07/10/23  0442   WBC Thousand/uL 13.95*  --  18.13*   HEMOGLOBIN g/dL 7.9*   < > 7.3*   HEMATOCRIT % 23.7*  --  22.3*   PLATELETS Thousands/uL 310  --  265   NEUTROS PCT %  --   --  78*   LYMPHS PCT %  --   --  8*   MONOS PCT %  --   --  13*   EOS PCT %  --   --  0    < > = values in this interval not displayed. Results from last 7 days   Lab Units 07/11/23  0305 07/10/23  0442 07/09/23  0433   SODIUM mmol/L 142   < > 141   POTASSIUM mmol/L 2.9*   < > 3.6   CHLORIDE mmol/L 111*   < > 113*   CO2 mmol/L 25   < > 25   BUN mg/dL 47*   < > 49*   CREATININE mg/dL 2.32*   < > 1.92*   ANION GAP mmol/L 6   < > 3   CALCIUM mg/dL 8.1*   < > 8.6   ALBUMIN g/dL  --   --  3.0*   TOTAL BILIRUBIN mg/dL  --   --  0.93   ALK PHOS U/L  --   --  54   ALT U/L  --   --  50   AST U/L  --   --  32   GLUCOSE RANDOM mg/dL 143*   < > 132    < > = values in this interval not displayed.          Results from last 7 days   Lab Units 07/08/23  1747 07/08/23  1101   POC GLUCOSE mg/dl 207* 127         Results from last 7 days   Lab Units 07/09/23  0433 07/08/23  1308 07/08/23  0415 07/08/23  0137   LACTIC ACID mmol/L  --  0.8 2.0 3.3*   PROCALCITONIN ng/ml 0.46*  --   --   -- Lines/Drains:  Invasive Devices     Peripheral Intravenous Line  Duration           Peripheral IV 07/11/23 Left;Proximal;Ventral (anterior) Forearm <1 day          Drain  Duration           Closed/Suction Drain Pericardial Bulb 19 Fr. 3 days                  Telemetry:  Telemetry Orders (From admission, onward)             24 Hour Telemetry Monitoring  Continuous x 24 Hours (Telem)        Question:  Reason for 24 Hour Telemetry  Answer:  PCI/EP study (including pacer and ICD implementation), Cardiac surgery, MI, abnormal cardiac cath, and chest pain- rule out MI                 Telemetry Reviewed: NSR  Indication for Continued Telemetry Use: Jorden/sheeba/endocarditis             Imaging: Reviewed radiology reports from this admission including: procedure reports    Recent Cultures (last 7 days):   Results from last 7 days   Lab Units 07/09/23  0950 07/09/23  0933 07/08/23  1014 07/08/23  0145 07/08/23  0142   BLOOD CULTURE  No Growth at 48 hrs. No Growth at 48 hrs. --  No Growth at 72 hrs. No Growth at 72 hrs.    GRAM STAIN RESULT   --   --  No Polys or Bacteria seen  --   --    BODY FLUID CULTURE, STERILE   --   --  No growth  --   --        Last 24 Hours Medication List:   Current Facility-Administered Medications   Medication Dose Route Frequency Provider Last Rate   • acetaminophen  650 mg Oral Q6H PRN Harry Vieirastkendrick, CRNP     • aluminum-magnesium hydroxide-simethicone  30 mL Oral Q6H PRN Harry Vieirastkendrick, CRNP     • amLODIPine  10 mg Oral Daily Harry Vieirastkendrick, CRNP     • atorvastatin  40 mg Oral HS Ronaldo Goodpasture, CRNP     • budesonide-formoterol  2 puff Inhalation BID Ronaldo Goodpasture, CRNP     • busPIRone  15 mg Oral BID Harry Vieirastkendrick, CRNP     • calcium carbonate  2 tablet Oral BID With Meals Ronaldo Goodpasture, CRNP     • cholecalciferol  2,000 Units Oral Daily Harry Vieirastkendrick, CRNP     • colchicine  0.6 mg Oral Daily Rachael Cantu DO     • doxazosin  8 mg Oral BID MARBIN [place]     • eplerenone  50 mg Oral Daily MARBIN [place]     • fluticasone  1 puff Inhalation Daily MARBIN [place]     • hydrALAZINE  10 mg Intravenous Q6H PRN MARBIN [place]     • hydrALAZINE  100 mg Oral TID MARBIN [place]     • HYDROmorphone  0.2 mg Intravenous Q3H PRN MARBIN [place]     • labetalol  10 mg Intravenous Q6H PRN MARBIN [place]     • labetalol  200 mg Oral Q12H [address], [place]     • methocarbamol  500 mg Oral Q6H PRN MARBIN [place]     • metoclopramide  10 mg Intravenous Q6H PRN MARBIN [place]     • mirtazapine  15 mg Oral HS MARBIN [place]     • multivitamin-minerals  1 tablet Oral Daily MARBIN [place]     • ondansetron  4 mg Intravenous Q6H PRN MARBIN [place]     • oxyCODONE  2.5 mg Oral Q4H PRN MARBIN [place]      Or   • oxyCODONE  5 mg Oral Q4H PRN MARBIN [place]     • pantoprazole  40 mg Intravenous Q12H [facility] MARBIN [place]     • polyethylene glycol  17 g Oral Daily MARBIN [place]     • sertraline  200 mg Oral Daily MARBIN [place]          Today, Patient Was Seen By: Minnie Kraus DO    **Please Note: This note may have been constructed using a voice recognition system. **

## 2023-07-11 NOTE — ANESTHESIA POSTPROCEDURE EVALUATION
Post-Op Assessment Note    CV Status:  Stable    Pain management: adequate     Mental Status:  Alert and awake   Hydration Status:  Euvolemic   PONV Controlled:  Controlled   Airway Patency:  Patent      Post Op Vitals Reviewed: Yes            No notable events documented.     BP      Temp      Pulse     Resp      SpO2

## 2023-07-11 NOTE — PHYSICAL THERAPY NOTE
Physical Therapy Cancellation Note         07/11/23 1205   PT Last Visit   PT Visit Date 07/11/23   Note Type   Note Type Cancelled Session   Cancel Reasons Patient off floor/test  (colonscopy- will continue to follow.)     Mallory Gar PT, DPT

## 2023-07-11 NOTE — ANESTHESIA PREPROCEDURE EVALUATION
Procedure:  COLONOSCOPY     #HFpEF  -TTE (07/2023): EF 50, normal RV function, no significant valvular abnormalities  #CKD III  #HTN with hsitory of hypertensive emergency  #COPD  #AGUS    Relevant Problems   CARDIO   (+) CHF (congestive heart failure) (HCC)   (+) Essential hypertension   (+) Hypertensive emergency   (+) Hypertensive urgency      /RENAL   (+) TRUDI (acute kidney injury) ([address])   (+) Acute renal failure superimposed on stage 3 chronic kidney disease (HCC)   (+) CKD stage 3 secondary to diabetes ([address])   (+) Hypertensive kidney disease with stage 3 chronic kidney disease (HCC)   (+) Renal cyst      HEMATOLOGY   (+) Anemia      NEURO/PSYCH   (+) Depression with anxiety   (+) Seizure (HCC)      PULMONARY   (+) COPD (chronic obstructive pulmonary disease) (HCC)   (+) AGUS (obstructive sleep apnea)      Other   (+) Splenic lesion        Physical Exam    Airway    Mallampati score: III  TM Distance: >3 FB  Neck ROM: full     Dental   No notable dental hx     Cardiovascular  Rhythm: regular, Rate: normal,     Pulmonary  Breath sounds clear to auscultation,     Other Findings  Intercisor Distance > 3cm          Anesthesia Plan  ASA Score- 3     Anesthesia Type- IV sedation with anesthesia with ASA Monitors. Additional Monitors:   Airway Plan:     Comment: NPO appropriate. Discussed benefits/risks of monitored anesthetic care which involves providing a dynamic level of mild to deep sedation. Complications include awareness and/or airway obstruction/aspiration which may necessitate conversion to general anesthesia. All questions answered. Patient understands and wishes to proceed. .       Plan Factors-Exercise tolerance (METS): >4 METS. Chart reviewed. EKG reviewed. Existing labs reviewed. Induction-     Postoperative Plan- Plan for postoperative opioid use. Informed Consent- Anesthetic plan and risks discussed with patient. I personally reviewed this patient with the CRNA.  Discussed and agreed on the Anesthesia Plan with the CRNA. Merlene Abdi

## 2023-07-11 NOTE — ASSESSMENT & PLAN NOTE
Estimated Creatinine Clearance: 29.2 mL/min (A) (by C-G formula based on SCr of 2.32 mg/dL (H)).   Monitor kidney function closely  Avoid nephrotoxins  Monitor postvoid residuals  Cr slightly above baseline of 2.1-2.2 - monitor

## 2023-07-11 NOTE — ASSESSMENT & PLAN NOTE
Wt Readings from Last 3 Encounters:   07/11/23 61 kg (134 lb 7.7 oz)   07/08/23 68.9 kg (152 lb)   05/23/23 68.9 kg (152 lb)     LVEF 60 to 68%, LVH noted  Monitor I/O, daily weights  C/w Inspra

## 2023-07-11 NOTE — PLAN OF CARE
Problem: MOBILITY - ADULT  Goal: Maintain or return to baseline ADL function  Description: INTERVENTIONS:  -  Assess patient's ability to carry out ADLs; assess patient's baseline for ADL function and identify physical deficits which impact ability to perform ADLs (bathing, care of mouth/teeth, toileting, grooming, dressing, etc.)  - Assess/evaluate cause of self-care deficits   - Assess range of motion  - Assess patient's mobility; develop plan if impaired  - Assess patient's need for assistive devices and provide as appropriate  - Encourage maximum independence but intervene and supervise when necessary  - Involve family in performance of ADLs  - Assess for home care needs following discharge   - Consider OT consult to assist with ADL evaluation and planning for discharge  - Provide patient education as appropriate  Outcome: Progressing     Problem: PAIN - ADULT  Goal: Verbalizes/displays adequate comfort level or baseline comfort level  Description: Interventions:  - Encourage patient to monitor pain and request assistance  - Assess pain using appropriate pain scale  - Administer analgesics based on type and severity of pain and evaluate response  - Implement non-pharmacological measures as appropriate and evaluate response  - Consider cultural and social influences on pain and pain management  - Notify physician/advanced practitioner if interventions unsuccessful or patient reports new pain  Outcome: Progressing     Problem: SAFETY ADULT  Goal: Maintain or return to baseline ADL function  Description: INTERVENTIONS:  -  Assess patient's ability to carry out ADLs; assess patient's baseline for ADL function and identify physical deficits which impact ability to perform ADLs (bathing, care of mouth/teeth, toileting, grooming, dressing, etc.)  - Assess/evaluate cause of self-care deficits   - Assess range of motion  - Assess patient's mobility; develop plan if impaired  - Assess patient's need for assistive devices and provide as appropriate  - Encourage maximum independence but intervene and supervise when necessary  - Involve family in performance of ADLs  - Assess for home care needs following discharge   - Consider OT consult to assist with ADL evaluation and planning for discharge  - Provide patient education as appropriate  Outcome: Progressing

## 2023-07-11 NOTE — PROGRESS NOTES
Progress Note - Cardiology   Jayla Sinclair 59 y.o. male MRN: [de-identified]  Unit/Bed#: [facility] 526-01 Encounter: [id number]  07/11/23  4:46 PM    Impression and Plan:    25-year-old with a history of Shamika-en-Y gastric bypass, hypertension, left ventricular hypertrophy, CKD, prior DVT, chronic diastolic heart failure-not on loop diuretic at baseline,Hypertension, history of hyperaldosteronism, status post left adrenalectomy in 2012, on eplerenone, admitted with shortness of breath from anemia and pericardial effusion with tamponade physiology, underwent pericardial window placement with removal of 300 cc of serous fluid, sent for culture and cytology and pericardial biopsy done, now has drain in place, complains of chest pains mostly at the site of the pericardial drain, worsening with lying flat, mildly tachypneic also but lungs are clear to auscultation. Also had significant anemia with melena and bright red blood per rectum, with a hemoglobin yessy of 6.4 with a baseline around 12-13,     Plan:     Pericardial effusion: Status post removal of 300 cc of fluid for pericardial effusion with tamponade physiology  Continue drainage till at least less than 50 cc prior to removal of drain.   240 cc out in the last 24 hours  Await cytology and biopsy results-no results yet  He does admit to prior pericardial effusion, no clear details in this regard but had a moderate pericardial effusion in 2018, did not undergo drainage  No clear etiology for this but considering current pleuritic symptoms (could be from drain itself )and with no clear etiology, start colchicine 0.6 mg daily, avoiding NSAIDs in the setting of CKD     Left ventricular hypertrophy: Significant LVH noted on echo, moderate with measurements, has carried a label of 'hypertrophic cardiomyopathy from uncontrolled hypertension'  On reviewing echo and prior strain pattern, suggestive of amyloid-Will need to rule out cardiac amyloid-considering negative SPEP and UP, will proceed with TTR amyloid SPECT study and if this is positive, this would be diagnostic. No neuropathic symptoms  Has have intrinsic renal disease as well although this also could be from hypertension-typically TTR amyloid is not associated with renal dysfunction     Hypertension: History of allergy-anaphylaxis to clonidine, lisinopril, nifedipine, spironolactone and unspecified allergy to minoxidil hence started on  At baseline and has continued on amlodipine 10 mg, Cardura 8 mg twice daily, eplerenone 50 mg daily, hydralazine 100 mg 3 times daily, and also started on labetalol 200 mg twice daily  Now much better controlled      ===================================================================    Chief Complaint: No chief complaint on file.         Subjective/Objective     Subjective: Denies any new complaints    Objective: No distress    Patient Active Problem List   Diagnosis   • Pericardial effusion   • Essential hypertension   • Leg DVT (deep venous thromboembolism), acute, bilateral ([facility])   • COPD (chronic obstructive pulmonary disease) ([facility])   • CHF (congestive heart failure) ([facility])   • Chronic diastolic CHF (congestive heart failure) ([facility])   • Pulmonary nodule, right   • CKD stage 3 secondary to diabetes ([address])   • Lymphedema   • Anemia   • AGUS (obstructive sleep apnea)   • Abscess of left groin   • History of DVT (deep vein thrombosis)   • Splenic lesion   • Dysuria   • Renal cyst   • Post-traumatic male urethral stricture   • Morbid obesity due to excess calories ([address])   • Encounter for surgical aftercare following surgery of digestive system   • Postsurgical malabsorption   • Hypertensive kidney disease with stage 3 chronic kidney disease (HCC)   • Acute renal failure superimposed on stage 3 chronic kidney disease (HCC)   • Bradycardia   • Hypoglycemia   • History of gastric bypass   • Hypokalemia   • Seizure ([address])   • Hypertensive encephalopathy   • Hypertensive emergency   • Meningioma ([address]) • Prolonged Q-T interval on ECG   • Abdominal pain   • Cardiac tamponade   • Hypertensive urgency   • Hematochezia   • Depression with anxiety   • Electrolyte abnormality       Vitals: /87   Pulse 82   Temp 99.3 °F (37.4 °C) (Tympanic)   Resp 18   Wt 61 kg (134 lb 7.7 oz)   SpO2 98%   BMI 21.71 kg/m²     I/O this shift:  In: 323.3 [I.V.:150; IV Piggyback:173.3]  Out: -   Wt Readings from Last 3 Encounters:   07/11/23 61 kg (134 lb 7.7 oz)   07/08/23 68.9 kg (152 lb)   05/23/23 68.9 kg (152 lb)       Intake/Output Summary (Last 24 hours) at 7/11/2023 1646  Last data filed at 7/11/2023 1157  Gross per 24 hour   Intake 1043.33 ml   Output 830 ml   Net 213.33 ml     I/O last 3 completed shifts: In: 3749 [P.O.:720;  I.V.:150; Blood:405; IV Piggyback:100]  Out: 1972 [Urine:2100; Drains:240]    Invasive Devices     Peripheral Intravenous Line  Duration           Peripheral IV 07/11/23 Left;Proximal;Ventral (anterior) Forearm <1 day          Drain  Duration           Closed/Suction Drain Pericardial Bulb 19 Fr. 3 days                  Physical Exam:  GEN: Latia Cotton appears well, alert and oriented x 3, pleasant and cooperative   HEENT: pupils equal, round, and reactive to light; extraocular muscles intact  NECK: supple, no carotid bruits or JVD  HEART: regular rhythm, normal S1 and S2, no murmur, no clicks, gallops or rubs   LUNGS: clear to auscultation bilaterally; no wheezes or rhonchi, no rales  ABDOMEN/GI: normal bowel sounds, soft, no tenderness, no distention  EXTREMITIES/Musculoskeltal: peripheral pulses normal; no clubbing, cyanosis, no edema  NEURO: no focal motor findings   SKIN: normal without suspicious lesions on exposed skin              Lab Results:       Results from last 7 days   Lab Units 07/11/23  0305 07/10/23  1108 07/10/23  0442 07/09/23  0932 07/09/23  0433   WBC Thousand/uL 13.95*  --  18.13*  --  23.88*   HEMOGLOBIN g/dL 7.9* 8.5* 7.3*   < > 7.1*   HEMATOCRIT % 23.7*  --  22.3*  -- 20.9*   PLATELETS Thousands/uL 310  --  265  --  268    < > = values in this interval not displayed. Results from last 7 days   Lab Units 07/11/23  0305 07/10/23  0442 07/09/23  0433 07/08/23  1308 07/08/23  0137   POTASSIUM mmol/L 2.9* 3.6 3.6   < > 4.2   CHLORIDE mmol/L 111* 115* 113*   < > 106   CO2 mmol/L 25 25 25   < > 21   BUN mg/dL 47* 52* 49*   < > 63*   CREATININE mg/dL 2.32* 2.16* 1.92*   < > 1.59*   CALCIUM mg/dL 8.1* 8.5 8.6   < > 9.7   ALK PHOS U/L  --   --  54  --  58   ALT U/L  --   --  50  --  64*   AST U/L  --   --  32  --  52*    < > = values in this interval not displayed. Imaging: I have personally reviewed pertinent reports.     EKG/Telemtry: No events    Scheduled Meds:  Current Facility-Administered Medications   Medication Dose Route Frequency Provider Last Rate   • acetaminophen  650 mg Oral Q6H PRN Samuel Steward, SVENNP     • aluminum-magnesium hydroxide-simethicone  30 mL Oral Q6H PRN SVEN BenavidesNP     • amLODIPine  10 mg Oral Daily MARBIN Benavides     • atorvastatin  40 mg Oral HS Samuel Steward, SVENNP     • budesonide-formoterol  2 puff Inhalation BID SVEN BenavidesNP     • busPIRone  15 mg Oral BID SVEN BenavidesNP     • calcium carbonate  2 tablet Oral BID With Meals MARBIN Benavides     • cholecalciferol  2,000 Units Oral Daily Samuel Steward, SVENNP     • colchicine  0.6 mg Oral Daily Sallye Donaldo, DO     • doxazosin  8 mg Oral BID MARBIN Benavides     • eplerenone  50 mg Oral Daily MARBIN Benavides     • fluticasone  1 puff Inhalation Daily SVEN BenavidesNP     • hydrALAZINE  10 mg Intravenous Q6H PRN Samuel Steward, SVENNP     • hydrALAZINE  100 mg Oral TID MARBIN Benavides     • HYDROmorphone  0.2 mg Intravenous Q3H PRN MARBIN Benavides     • labetalol  10 mg Intravenous Q6H PRN MARBIN Benavides     • labetalol  200 mg Oral Q12H [address] Betsey Hong MD     • methocarbamol  500 mg Oral Q6H PRN Winifred Rosalinda, CRNP     • metoclopramide  10 mg Intravenous Q6H PRN Winifred Rosalinda, CRNP     • mirtazapine  15 mg Oral HS Winifred Rosalinda, CRNP     • multivitamin-minerals  1 tablet Oral Daily Winifred Rosalinda, CRNP     • ondansetron  4 mg Intravenous Q6H PRN Winifred Rosalinda, CRNP     • oxyCODONE  2.5 mg Oral Q4H PRN Winifred Rosalinda, CRNP      Or   • oxyCODONE  5 mg Oral Q4H PRN Winifred Rosalinda, CRNP     • pantoprazole  40 mg Intravenous Q12H [address] Winifred Rosalinda, CRNP     • polyethylene glycol  17 g Oral Daily Winifred Rosalinda, CRNP     • sertraline  200 mg Oral Daily Winifred Rosalinda, CRNP       Continuous Infusions: This note was completed in part utilizing [employer] direct voice recognition software. Grammatical errors, random word insertion, spelling mistakes, occasional wrong word or "sound-alike" substitutions and incomplete sentences may be an occasional consequence of the system secondary to software limitations, ambient noise and hardware issues. At the time of dictation, efforts were made to edit, clarify and /or correct errors. Please read the chart carefully and recognize, using context, where substitutions have occurred. If you have any questions or concerns about the context, text or information contained within the body of this dictation, please contact myself, the provider, for further clarification.

## 2023-07-11 NOTE — ASSESSMENT & PLAN NOTE
Likely multifactorial  Iron sat low  Monitor hemoglobin  1U PRBC in ICU  EGD 7/10 and colon today did not reveal source of bleed  Consider Venofer while IP - takes iron as OP

## 2023-07-11 NOTE — PLAN OF CARE
Problem: MOBILITY - ADULT  Goal: Maintain or return to baseline ADL function  Description: INTERVENTIONS:  -  Assess patient's ability to carry out ADLs; assess patient's baseline for ADL function and identify physical deficits which impact ability to perform ADLs (bathing, care of mouth/teeth, toileting, grooming, dressing, etc.)  - Assess/evaluate cause of self-care deficits   - Assess range of motion  - Assess patient's mobility; develop plan if impaired  - Assess patient's need for assistive devices and provide as appropriate  - Encourage maximum independence but intervene and supervise when necessary  - Involve family in performance of ADLs  - Assess for home care needs following discharge   - Consider OT consult to assist with ADL evaluation and planning for discharge  - Provide patient education as appropriate  Outcome: Progressing  Goal: Maintains/Returns to pre admission functional level  Description: INTERVENTIONS:  - Perform BMAT or MOVE assessment daily.   - Set and communicate daily mobility goal to care team and patient/family/caregiver. - Collaborate with rehabilitation services on mobility goals if consulted  - Perform Range of Motion  times a day. - Reposition patient every  hours.   - Dangle patient  times a day  - Stand patient  times a day  - Ambulate patient  times a day  - Out of bed to chair  times a day   - Out of bed for meals  times a day  - Out of bed for toileting  - Record patient progress and toleration of activity level   Outcome: Progressing     Problem: PAIN - ADULT  Goal: Verbalizes/displays adequate comfort level or baseline comfort level  Description: Interventions:  - Encourage patient to monitor pain and request assistance  - Assess pain using appropriate pain scale  - Administer analgesics based on type and severity of pain and evaluate response  - Implement non-pharmacological measures as appropriate and evaluate response  - Consider cultural and social influences on pain and pain management  - Notify physician/advanced practitioner if interventions unsuccessful or patient reports new pain  Outcome: Progressing     Problem: INFECTION - ADULT  Goal: Absence or prevention of progression during hospitalization  Description: INTERVENTIONS:  - Assess and monitor for signs and symptoms of infection  - Monitor lab/diagnostic results  - Monitor all insertion sites, i.e. indwelling lines, tubes, and drains  - Monitor endotracheal if appropriate and nasal secretions for changes in amount and color  - [place] appropriate cooling/warming therapies per order  - Administer medications as ordered  - Instruct and encourage patient and family to use good hand hygiene technique  - Identify and instruct in appropriate isolation precautions for identified infection/condition  Outcome: Progressing  Goal: Absence of fever/infection during neutropenic period  Description: INTERVENTIONS:  - Monitor WBC    Outcome: Progressing     Problem: SAFETY ADULT  Goal: Maintain or return to baseline ADL function  Description: INTERVENTIONS:  -  Assess patient's ability to carry out ADLs; assess patient's baseline for ADL function and identify physical deficits which impact ability to perform ADLs (bathing, care of mouth/teeth, toileting, grooming, dressing, etc.)  - Assess/evaluate cause of self-care deficits   - Assess range of motion  - Assess patient's mobility; develop plan if impaired  - Assess patient's need for assistive devices and provide as appropriate  - Encourage maximum independence but intervene and supervise when necessary  - Involve family in performance of ADLs  - Assess for home care needs following discharge   - Consider OT consult to assist with ADL evaluation and planning for discharge  - Provide patient education as appropriate  Outcome: Progressing  Goal: Maintains/Returns to pre admission functional level  Description: INTERVENTIONS:  - Perform BMAT or MOVE assessment daily.   - Set and communicate daily mobility goal to care team and patient/family/caregiver. - Collaborate with rehabilitation services on mobility goals if consulted  - Perform Range of Motion  times a day. - Reposition patient every hours. - Dangle patient  times a day  - Stand patient  times a day  - Ambulate patient  times a day  - Out of bed to chair  times a day   - Out of bed for meals  times a day  - Out of bed for toileting  - Record patient progress and toleration of activity level   Outcome: Progressing  Goal: Patient will remain free of falls  Description: INTERVENTIONS:  - Educate patient/family on patient safety including physical limitations  - Instruct patient to call for assistance with activity   - Consult OT/PT to assist with strengthening/mobility   - Keep Call bell within reach  - Keep bed low and locked with side rails adjusted as appropriate  - Keep care items and personal belongings within reach  - Initiate and maintain comfort rounds  - Make Fall Risk Sign visible to staff  - Offer Toileting every  Hours, in advance of need  - Initiate/Maintain alarm  - Obtain necessary fall risk management equipment  - Apply yellow socks and bracelet for high fall risk patients  - Consider moving patient to room near nurses station  Outcome: Progressing     Problem: Knowledge Deficit  Goal: Patient/family/caregiver demonstrates understanding of disease process, treatment plan, medications, and discharge instructions  Description: Complete learning assessment and assess knowledge base. Interventions:  - Provide teaching at level of understanding  - Provide teaching via preferred learning methods  Outcome: Progressing     Problem: Nutrition/Hydration-ADULT  Goal: Nutrient/Hydration intake appropriate for improving, restoring or maintaining nutritional needs  Description: Monitor and assess patient's nutrition/hydration status for malnutrition.  Collaborate with interdisciplinary team and initiate plan and interventions as ordered. Monitor patient's weight and dietary intake as ordered or per policy. Utilize nutrition screening tool and intervene as necessary. Determine patient's food preferences and provide high-protein, high-caloric foods as appropriate.      INTERVENTIONS:  - Monitor oral intake, urinary output, labs, and treatment plans  - Assess nutrition and hydration status and recommend course of action  - Evaluate amount of meals eaten  - Assist patient with eating if necessary   - Allow adequate time for meals  - Recommend/ encourage appropriate diets, oral nutritional supplements, and vitamin/mineral supplements  - Order, calculate, and assess calorie counts as needed  - Recommend, monitor, and adjust tube feedings and TPN/PPN based on assessed needs  - Assess need for intravenous fluids  - Provide specific nutrition/hydration education as appropriate  - Include patient/family/caregiver in decisions related to nutrition  Outcome: Progressing

## 2023-07-11 NOTE — ASSESSMENT & PLAN NOTE
Patient presented to [address] [place] [address] ED with symptoms of chest pain shortness of breath epigastric pain  He was noted to have pericardial effusion with concerns for tamponade  He was transferred to [address] [place] [address] for thoracic surgery evaluation  S/p window 7/8 - drain in place  Thoracic surgery appreciated  Cardio appreciated  SPEP/UPEP WNL  C/w colchicine  F/u cytology  Cx neg so stop abx    Cardio planning nuclear scan to check for amyloid VISION   No corrective lenses  Tool used: HOTV   Right eye:        10/16 (20/32)   Left eye:          10/20 (20/40)    H Plus Lens Screening: REFER    Mariana Mcgrath MA

## 2023-07-12 ENCOUNTER — APPOINTMENT (INPATIENT)
Dept: RADIOLOGY | Facility: HOSPITAL | Age: 58
DRG: 271 | End: 2023-07-12
Payer: COMMERCIAL

## 2023-07-12 LAB
ABO GROUP BLD BPU: NORMAL
ANION GAP SERPL CALCULATED.3IONS-SCNC: 4 MMOL/L
ATRIAL RATE: 84 BPM
ATRIAL RATE: 86 BPM
BPU ID: NORMAL
BUN SERPL-MCNC: 34 MG/DL (ref 5–25)
CALCIUM SERPL-MCNC: 8.9 MG/DL (ref 8.3–10.1)
CHLORIDE SERPL-SCNC: 114 MMOL/L (ref 96–108)
CO2 SERPL-SCNC: 25 MMOL/L (ref 21–32)
CREAT SERPL-MCNC: 1.9 MG/DL (ref 0.6–1.3)
CROSSMATCH: NORMAL
ERYTHROCYTE [DISTWIDTH] IN BLOOD BY AUTOMATED COUNT: 15.3 % (ref 11.6–15.1)
GFR SERPL CREATININE-BSD FRML MDRD: 37 ML/MIN/1.73SQ M
GLUCOSE SERPL-MCNC: 103 MG/DL (ref 65–140)
HCT VFR BLD AUTO: 23.6 % (ref 36.5–49.3)
HGB BLD-MCNC: 7.7 G/DL (ref 12–17)
MAGNESIUM SERPL-MCNC: 2.1 MG/DL (ref 1.6–2.6)
MCH RBC QN AUTO: 28.6 PG (ref 26.8–34.3)
MCHC RBC AUTO-ENTMCNC: 32.6 G/DL (ref 31.4–37.4)
MCV RBC AUTO: 88 FL (ref 82–98)
P AXIS: 71 DEGREES
P AXIS: 74 DEGREES
PHOSPHATE SERPL-MCNC: 3.5 MG/DL (ref 2.3–4.1)
PLATELET # BLD AUTO: 341 THOUSANDS/UL (ref 149–390)
PMV BLD AUTO: 9.2 FL (ref 8.9–12.7)
POTASSIUM SERPL-SCNC: 2.7 MMOL/L (ref 3.5–5.3)
PR INTERVAL: 130 MS
PR INTERVAL: 130 MS
QRS AXIS: -53 DEGREES
QRS AXIS: -63 DEGREES
QRSD INTERVAL: 100 MS
QRSD INTERVAL: 96 MS
QT INTERVAL: 382 MS
QT INTERVAL: 416 MS
QTC INTERVAL: 451 MS
QTC INTERVAL: 497 MS
RBC # BLD AUTO: 2.69 MILLION/UL (ref 3.88–5.62)
SODIUM SERPL-SCNC: 143 MMOL/L (ref 135–147)
T WAVE AXIS: 120 DEGREES
T WAVE AXIS: 125 DEGREES
UNIT DISPENSE STATUS: NORMAL
UNIT PRODUCT CODE: NORMAL
UNIT PRODUCT VOLUME: 300 ML
UNIT RH: NORMAL
VENTRICULAR RATE: 84 BPM
VENTRICULAR RATE: 86 BPM
WBC # BLD AUTO: 12.05 THOUSAND/UL (ref 4.31–10.16)

## 2023-07-12 PROCEDURE — 93005 ELECTROCARDIOGRAM TRACING: CPT

## 2023-07-12 PROCEDURE — 78800 RP LOCLZJ TUM 1 AREA 1 D IMG: CPT

## 2023-07-12 PROCEDURE — 88112 CYTOPATH CELL ENHANCE TECH: CPT | Performed by: PATHOLOGY

## 2023-07-12 PROCEDURE — 99233 SBSQ HOSP IP/OBS HIGH 50: CPT | Performed by: GENERAL PRACTICE

## 2023-07-12 PROCEDURE — 99232 SBSQ HOSP IP/OBS MODERATE 35: CPT | Performed by: INTERNAL MEDICINE

## 2023-07-12 PROCEDURE — 88305 TISSUE EXAM BY PATHOLOGIST: CPT | Performed by: PATHOLOGY

## 2023-07-12 PROCEDURE — 84100 ASSAY OF PHOSPHORUS: CPT | Performed by: GENERAL PRACTICE

## 2023-07-12 PROCEDURE — 80048 BASIC METABOLIC PNL TOTAL CA: CPT | Performed by: GENERAL PRACTICE

## 2023-07-12 PROCEDURE — G1004 CDSM NDSC: HCPCS

## 2023-07-12 PROCEDURE — 93010 ELECTROCARDIOGRAM REPORT: CPT | Performed by: INTERNAL MEDICINE

## 2023-07-12 PROCEDURE — C9113 INJ PANTOPRAZOLE SODIUM, VIA: HCPCS | Performed by: NURSE PRACTITIONER

## 2023-07-12 PROCEDURE — A9538 TC99M PYROPHOSPHATE: HCPCS

## 2023-07-12 PROCEDURE — 85027 COMPLETE CBC AUTOMATED: CPT | Performed by: GENERAL PRACTICE

## 2023-07-12 PROCEDURE — 78830 RP LOCLZJ TUM SPECT W/CT 1: CPT

## 2023-07-12 PROCEDURE — 83735 ASSAY OF MAGNESIUM: CPT | Performed by: GENERAL PRACTICE

## 2023-07-12 RX ORDER — POTASSIUM CHLORIDE 20 MEQ/1
40 TABLET, EXTENDED RELEASE ORAL ONCE
Status: COMPLETED | OUTPATIENT
Start: 2023-07-12 | End: 2023-07-12

## 2023-07-12 RX ORDER — LORAZEPAM 2 MG/ML
0.5 INJECTION INTRAMUSCULAR
Status: DISCONTINUED | OUTPATIENT
Start: 2023-07-12 | End: 2023-07-15 | Stop reason: HOSPADM

## 2023-07-12 RX ORDER — POTASSIUM CHLORIDE 14.9 MG/ML
20 INJECTION INTRAVENOUS
Status: COMPLETED | OUTPATIENT
Start: 2023-07-12 | End: 2023-07-13

## 2023-07-12 RX ADMIN — BUDESONIDE AND FORMOTEROL FUMARATE DIHYDRATE 2 PUFF: 160; 4.5 AEROSOL RESPIRATORY (INHALATION) at 08:06

## 2023-07-12 RX ADMIN — ONDANSETRON 4 MG: 2 INJECTION INTRAMUSCULAR; INTRAVENOUS at 09:16

## 2023-07-12 RX ADMIN — POTASSIUM CHLORIDE 20 MEQ: 14.9 INJECTION, SOLUTION INTRAVENOUS at 09:38

## 2023-07-12 RX ADMIN — BUSPIRONE HYDROCHLORIDE 15 MG: 10 TABLET ORAL at 17:43

## 2023-07-12 RX ADMIN — PANTOPRAZOLE SODIUM 40 MG: 40 INJECTION, POWDER, FOR SOLUTION INTRAVENOUS at 20:36

## 2023-07-12 RX ADMIN — BUDESONIDE AND FORMOTEROL FUMARATE DIHYDRATE 2 PUFF: 160; 4.5 AEROSOL RESPIRATORY (INHALATION) at 17:43

## 2023-07-12 RX ADMIN — Medication 2000 UNITS: at 08:02

## 2023-07-12 RX ADMIN — LABETALOL HYDROCHLORIDE 200 MG: 200 TABLET, FILM COATED ORAL at 20:36

## 2023-07-12 RX ADMIN — POTASSIUM CHLORIDE 40 MEQ: 1500 TABLET, EXTENDED RELEASE ORAL at 12:31

## 2023-07-12 RX ADMIN — DOXAZOSIN 8 MG: 4 TABLET ORAL at 08:01

## 2023-07-12 RX ADMIN — FLUTICASONE FUROATE 1 PUFF: 100 POWDER RESPIRATORY (INHALATION) at 08:06

## 2023-07-12 RX ADMIN — EPLERENONE 50 MG: 25 TABLET, FILM COATED ORAL at 08:05

## 2023-07-12 RX ADMIN — POTASSIUM CHLORIDE 20 MEQ: 14.9 INJECTION, SOLUTION INTRAVENOUS at 07:39

## 2023-07-12 RX ADMIN — MIRTAZAPINE 15 MG: 15 TABLET, FILM COATED ORAL at 21:33

## 2023-07-12 RX ADMIN — POTASSIUM CHLORIDE 40 MEQ: 1500 TABLET, EXTENDED RELEASE ORAL at 07:30

## 2023-07-12 RX ADMIN — Medication 2 TABLET: at 07:38

## 2023-07-12 RX ADMIN — LORAZEPAM 0.5 MG: 2 INJECTION INTRAMUSCULAR; INTRAVENOUS at 13:47

## 2023-07-12 RX ADMIN — PANTOPRAZOLE SODIUM 40 MG: 40 INJECTION, POWDER, FOR SOLUTION INTRAVENOUS at 08:07

## 2023-07-12 RX ADMIN — HYDRALAZINE HYDROCHLORIDE 100 MG: 50 TABLET, FILM COATED ORAL at 08:02

## 2023-07-12 RX ADMIN — IRON SUCROSE 300 MG: 20 INJECTION, SOLUTION INTRAVENOUS at 14:47

## 2023-07-12 RX ADMIN — Medication 1 TABLET: at 08:02

## 2023-07-12 RX ADMIN — AMLODIPINE BESYLATE 10 MG: 10 TABLET ORAL at 08:02

## 2023-07-12 RX ADMIN — COLCHICINE 0.6 MG: 0.6 TABLET ORAL at 08:02

## 2023-07-12 RX ADMIN — Medication 2 TABLET: at 17:42

## 2023-07-12 RX ADMIN — LABETALOL HYDROCHLORIDE 200 MG: 200 TABLET, FILM COATED ORAL at 08:02

## 2023-07-12 RX ADMIN — BUSPIRONE HYDROCHLORIDE 15 MG: 10 TABLET ORAL at 08:03

## 2023-07-12 RX ADMIN — ATORVASTATIN CALCIUM 40 MG: 40 TABLET, FILM COATED ORAL at 21:33

## 2023-07-12 RX ADMIN — DOXAZOSIN 8 MG: 4 TABLET ORAL at 17:42

## 2023-07-12 RX ADMIN — SERTRALINE HYDROCHLORIDE 200 MG: 100 TABLET ORAL at 08:01

## 2023-07-12 NOTE — CASE MANAGEMENT
Case Management Note    Patient name Latia Cotton  [facility] [address] [facility] [id number]  : 1965 Date 2023       Current Admission Date: 2023  Current Admission Diagnosis:Cardiac tamponade      LOS (days): 4  Geometric Mean LOS (GMLOS) (days): 3.90  Days to GMLOS:-0.3     OBJECTIVE:  Risk of Unplanned Readmission Score: 26.01   Current admission status: Inpatient   Primary Insurance: [facility] REP    DISCHARGE DETAILS:    Discharge planning discussed with[de-identified] Patient  Freedom of Choice: Yes  Were Treatment Team discharge recommendations reviewed with patient/caregiver?: Yes  Did patient/caregiver verbalize understanding of patient care needs?: Yes  Were patient/caregiver advised of the risks associated with not following Treatment Team discharge recommendations?: Yes    [address]         Is the patient interested in [facility] at discharge?: Yes  [facility] requested[de-identified] Nursing, Physical Therapy, Occupational [address] Name[de-identified] Residential  [facility] External Referral Reason (only applicable if external HHA name selected): Services not provided in network or near patient location  [facility] Provider[de-identified] PCP  Home Health Services Needed[de-identified] Evaluate Functional Status and Safety, Gait/ADL Training, Strengthening/Theraputic Exercises to Improve Function, Heart Failure Management  Homebound Criteria Met[de-identified] Requires the Assistance of Another Person for Safe Ambulation or to Leave the Home  Supporting Clincal Findings[de-identified] Limited Endurance    Treatment Team Recommendation: Home with [address]  Discharge Destination Plan[de-identified] [facility] Family    Additional Comments: Pt is recommended to have [address] services via a VNA for his aftercare plan. CM spoke to pt about this aftercare recommendation. Pt is agreeable w/ this recommendation. CM provided pt w/ freedom of choice for VNA referrals.  Pt requested referral to Residential VNA. CM made AIDIN referral to [facility]. CM to follow.

## 2023-07-12 NOTE — ASSESSMENT & PLAN NOTE
· Likely multifactorial  · Iron sat low  · Monitor hemoglobin  · 1U PRBC in ICU  · EGD 7/10 and colon today did not reveal source of bleed  · Give Venofer 3x/week x 3 doses

## 2023-07-12 NOTE — PROGRESS NOTES
[address] [facility]  Progress Note  Name: Nanette Huynh  MRN: [de-identified]  [facility] I Date of Admission: 7/8/2023   Date of Service: 7/12/2023 I Hospital Day: 4    Assessment/Plan   * Cardiac tamponade  Assessment & Plan  · Patient presented to [address] [place] [address] ED with symptoms of chest pain shortness of breath epigastric pain  · He was noted to have pericardial effusion with concerns for tamponade  · He was transferred to [address] [place] [address],[address] for thoracic surgery evaluation  · S/p window 7/8 - drain in place  · Thoracic surgery appreciated  · Cardio appreciated  · SPEP/UPEP WNL  · C/w colchicine  · F/u cytology  · Cx neg so stop abx  ·   · Today underwent TTR amyloid SPECT study - f/u result          Anemia  Assessment & Plan  · Likely multifactorial  · Iron sat low  · Monitor hemoglobin  · 1U PRBC in ICU  · EGD 7/10 and colon today did not reveal source of bleed  · Give Venofer 3x/week x 3 doses    Electrolyte abnormality  Assessment & Plan  · K < 3 - Replete IV and PO    Hematochezia  Assessment & Plan  · Colon 7/11 no active bleeding  · Colchicine notoriously causes GI symptoms    Hypertensive urgency  Assessment & Plan  · Hypertensive urgency present on admission  · Monitor blood pressures closely  · BP acceptable  · C/w Norvasc, Labetalol, Cardura, Isnpra, and Hydral  · Pt feels like he has symptoms w/ hydral - defer to cardio on adjustments    Prolonged Q-T interval on ECG  Assessment & Plan  · Replete K to 4  · Mag > 2  · Today EKG w/ QTc 450-497 which I suspect will improve w/ repleting K    Meningioma ([address])  Assessment & Plan  Outpatient follow-up    CKD stage 3 secondary to diabetes ([address])  Assessment & Plan  Estimated Creatinine Clearance: 34.9 mL/min (A) (by C-G formula based on SCr of 1.9 mg/dL (H)).   Monitor kidney function closely  Avoid nephrotoxins  Monitor postvoid residuals  Cr near baseline of 2.1-2.2 - monitor    Chronic diastolic CHF (congestive heart failure) (HCC)  Assessment & Plan  Wt Readings from Last 3 Encounters:   23 59.6 kg (131 lb 6.3 oz)   23 68.9 kg (152 lb)   23 68.9 kg (152 lb)     LVEF 60 to 68%, LVH noted  Monitor I/O, daily weights  C/w Inspra          COPD (chronic obstructive pulmonary disease) ([address])  Assessment & Plan  Not in exacerbation presently  Continue respiratory treatments and supportive cares           VTE Pharmacologic Prophylaxis: VTE Score: 1 Low Risk (Score 0-2) - Encourage Ambulation. Patient Centered Rounds: I performed bedside rounds with nursing staff today. Discussions with Specialists or Other Care Team Provider: not yet    Education and Discussions with Family / Patient: left VM w/ wife. Attempted to call dtr but no answer. Total Time Spent on Date of Encounter in care of patient: 45 minutes This time was spent on one or more of the following: performing physical exam; counseling and coordination of care; obtaining or reviewing history; documenting in the medical record; reviewing/ordering tests, medications or procedures; communicating with other healthcare professionals and discussing with patient's family/caregivers. Current Length of Stay: 4 day(s)  Current Patient Status: Inpatient   Certification Statement: The patient will continue to require additional inpatient hospital stay due to need for pericardial drain  Discharge Plan: Anticipate discharge in >72 hrs to home. Code Status: Level 1 - Full Code    Subjective:   Pt felt lightheaded this AM    Objective:     Vitals:   Temp (24hrs), Av.2 °F (37.3 °C), Min:98.9 °F (37.2 °C), Max:99.7 °F (37.6 °C)    Temp:  [98.9 °F (37.2 °C)-99.7 °F (37.6 °C)] 98.9 °F (37.2 °C)  HR:  [78-94] 89  Resp:  [19-20] 20  BP: (125-194)/() 137/84  SpO2:  [95 %-98 %] 95 %  Body mass index is 21.21 kg/m². Input and Output Summary (last 24 hours):      Intake/Output Summary (Last 24 hours) at 2023 1620  Last data filed at 7/12/2023 1128  Gross per 24 hour   Intake 720 ml   Output 1050 ml   Net -330 ml       Physical Exam:   Physical Exam  HENT:      Head: Normocephalic and atraumatic. Nose: Nose normal.      Mouth/Throat:      Mouth: Mucous membranes are moist.   Eyes:      Extraocular Movements: Extraocular movements intact. Conjunctiva/sclera: Conjunctivae normal.   Cardiovascular:      Rate and Rhythm: Normal rate and regular rhythm. Pulmonary:      Effort: Pulmonary effort is normal.      Breath sounds: Normal breath sounds. Abdominal:      General: Bowel sounds are normal.      Palpations: Abdomen is soft. Musculoskeletal:         General: Normal range of motion. Cervical back: Normal range of motion and neck supple. Right lower leg: No edema. Left lower leg: No edema. Skin:     General: Skin is warm and dry. Neurological:      Mental Status: He is alert and oriented to person, place, and time. Additional Data:     Labs:  Results from last 7 days   Lab Units 07/12/23  0508 07/10/23  1108 07/10/23  0442   WBC Thousand/uL 12.05*   < > 18.13*   HEMOGLOBIN g/dL 7.7*   < > 7.3*   HEMATOCRIT % 23.6*   < > 22.3*   PLATELETS Thousands/uL 341   < > 265   NEUTROS PCT %  --   --  78*   LYMPHS PCT %  --   --  8*   MONOS PCT %  --   --  13*   EOS PCT %  --   --  0    < > = values in this interval not displayed. Results from last 7 days   Lab Units 07/12/23  0508 07/10/23  0442 07/09/23  0433   SODIUM mmol/L 143   < > 141   POTASSIUM mmol/L 2.7*   < > 3.6   CHLORIDE mmol/L 114*   < > 113*   CO2 mmol/L 25   < > 25   BUN mg/dL 34*   < > 49*   CREATININE mg/dL 1.90*   < > 1.92*   ANION GAP mmol/L 4   < > 3   CALCIUM mg/dL 8.9   < > 8.6   ALBUMIN g/dL  --   --  3.0*   TOTAL BILIRUBIN mg/dL  --   --  0.93   ALK PHOS U/L  --   --  54   ALT U/L  --   --  50   AST U/L  --   --  32   GLUCOSE RANDOM mg/dL 103   < > 132    < > = values in this interval not displayed.          Results from last 7 days   Lab Units 07/08/23  1747 07/08/23  1101   POC GLUCOSE mg/dl 207* 127         Results from last 7 days   Lab Units 07/09/23  0433 07/08/23  1308 07/08/23  0415 07/08/23  0137   LACTIC ACID mmol/L  --  0.8 2.0 3.3*   PROCALCITONIN ng/ml 0.46*  --   --   --        Lines/Drains:  Invasive Devices     Peripheral Intravenous Line  Duration           Peripheral IV 07/11/23 Left;Proximal;Ventral (anterior) Forearm 1 day          Drain  Duration           Closed/Suction Drain Pericardial Bulb 19 Fr. 4 days                  Telemetry:  Telemetry Orders (From admission, onward)             24 Hour Telemetry Monitoring  Continuous x 24 Hours (Telem)        Question:  Reason for 24 Hour Telemetry  Answer:  PCI/EP study (including pacer and ICD implementation), Cardiac surgery, MI, abnormal cardiac cath, and chest pain- rule out MI                 Telemetry Reviewed: Normal Sinus Rhythm  Indication for Continued Telemetry Use: Jorden/sheeba/endocarditis             Imaging: No pertinent imaging reviewed. Recent Cultures (last 7 days):   Results from last 7 days   Lab Units 07/09/23  0950 07/09/23  0933 07/08/23  1014 07/08/23  0145 07/08/23  0142   BLOOD CULTURE  No Growth at 72 hrs. No Growth at 72 hrs.  --  No Growth After 4 Days. No Growth After 4 Days.    GRAM STAIN RESULT   --   --  No Polys or Bacteria seen  --   --    BODY FLUID CULTURE, STERILE   --   --  No growth  --   --        Last 24 Hours Medication List:   Current Facility-Administered Medications   Medication Dose Route Frequency Provider Last Rate   • acetaminophen  650 mg Oral Q6H PRN Terrial Holes, CRNP     • aluminum-magnesium hydroxide-simethicone  30 mL Oral Q6H PRN Terrial Holes, CRNP     • amLODIPine  10 mg Oral Daily Terrial Holes, CRNP     • atorvastatin  40 mg Oral HS Terrial Holes, CRNP     • budesonide-formoterol  2 puff Inhalation BID Terrial Holes, CRNP     • busPIRone  15 mg Oral BID Terrial Holes, SVENNP     • calcium carbonate  2 tablet Oral BID With Meals MARBIN Aguilar     • cholecalciferol  2,000 Units Oral Daily MARBIN Aguilar     • colchicine  0.6 mg Oral Daily Geovani Barron DO     • doxazosin  8 mg Oral BID MARBIN Aguilar     • eplerenone  50 mg Oral Daily MARBIN Aguilar     • fluticasone  1 puff Inhalation Daily MARBIN gAuilar     • hydrALAZINE  10 mg Intravenous Q6H PRN MARBIN Aguilar     • hydrALAZINE  100 mg Oral TID MARBIN Aguilar     • HYDROmorphone  0.2 mg Intravenous Q3H PRN MARBIN Aguilar     • iron sucrose  300 mg Intravenous Once per day on Mon Wed Fri Ivett Crowder DO     • labetalol  10 mg Intravenous Q6H PRN MARBIN Aguilar     • labetalol  200 mg Oral Q12H [address], [place]     • LORazepam  0.5 mg Intravenous Q30 Min PRN Ivett Crowder DO     • methocarbamol  500 mg Oral Q6H PRN MARBIN Aguilar     • metoclopramide  10 mg Intravenous Q6H PRN MARBIN Aguilar     • mirtazapine  15 mg Oral HS MARBIN Aguilar     • multivitamin-minerals  1 tablet Oral Daily MARBIN Aguilar     • ondansetron  4 mg Intravenous Q6H PRN MARBIN Aguilar     • oxyCODONE  2.5 mg Oral Q4H PRN MARBIN Aguilar      Or   • oxyCODONE  5 mg Oral Q4H PRN MARBIN Aguilar     • pantoprazole  40 mg Intravenous Q12H [address] [facility] MARBIN Aguilar     • polyethylene glycol  17 g Oral Daily MARBIN Aguilar     • sertraline  200 mg Oral Daily MARBIN Aguilar          Today, Patient Was Seen By: Ivett Crowder DO    **Please Note: This note may have been constructed using a voice recognition system. **

## 2023-07-12 NOTE — PROGRESS NOTES
-- Patient:  -- MRN: [id number]  -- Aidin Request ID: [id number]  -- Level of care reserved: [address]  -- Partner Reserved: [address] [facility], [address] [place] [address] [phone]  -- Clinical needs requested:  -- Geography searched: [address] [place] [address]  -- Start of Service:  -- Request sent: 3:11pm EDT on 7/10/2023 by Tad Ordonez  -- Partner reserved: 1:43pm EDT on 7/12/2023 by Tad Ordonez  -- Choice list shared: 1:43pm EDT on 7/12/2023 by Tad Ordonez

## 2023-07-12 NOTE — ASSESSMENT & PLAN NOTE
· Patient presented to [facility] ED with symptoms of chest pain shortness of breath epigastric pain  · He was noted to have pericardial effusion with concerns for tamponade  · He was transferred to [address] [place] [address] for thoracic surgery evaluation  · S/p window 7/8 - drain in place  · Thoracic surgery appreciated  · Cardio appreciated  · SPEP/UPEP WNL  · C/w colchicine  · F/u cytology  · Cx neg so stop abx  ·   · Today underwent TTR amyloid SPECT study - f/u result

## 2023-07-12 NOTE — PHYSICAL THERAPY NOTE
Physical Therapy Cancellation Note    The patient is leaving for nuclear medicine. Will re-attempt as able.     Eusebia Santamaria, PTA

## 2023-07-12 NOTE — ASSESSMENT & PLAN NOTE
Estimated Creatinine Clearance: 34.9 mL/min (A) (by C-G formula based on SCr of 1.9 mg/dL (H)).   Monitor kidney function closely  Avoid nephrotoxins  Monitor postvoid residuals  Cr near baseline of 2.1-2.2 - monitor

## 2023-07-12 NOTE — ASSESSMENT & PLAN NOTE
· Hypertensive urgency present on admission  · Monitor blood pressures closely  · BP acceptable  · C/w Norvasc, Labetalol, Cardura, Isnpra, and Hydral  · Pt feels like he has symptoms w/ hydral - defer to cardio on adjustments

## 2023-07-12 NOTE — PROGRESS NOTES
Progress Note - Cardiology   Casey Ames 59 y.o. male MRN: [de-identified]  Unit/Bed#: [place] 526-01 Encounter: [id number]  07/12/23  5:26 PM    Impression and Plan:    49-year-old with a history of Shamika-en-Y gastric bypass, hypertension, left ventricular hypertrophy, CKD, prior DVT, chronic diastolic heart failure-not on loop diuretic at baseline,Hypertension, history of hyperaldosteronism, status post left adrenalectomy in 2012, on eplerenone, admitted with shortness of breath from anemia and pericardial effusion with tamponade physiology, underwent pericardial window placement with removal of 300 cc of serous fluid, sent for culture and cytology and pericardial biopsy done, now has drain in place, complains of chest pains mostly at the site of the pericardial drain, worsening with lying flat, mildly tachypneic also but lungs are clear to auscultation. Also had significant anemia with melena and bright red blood per rectum, with a hemoglobin yessy of 6.4 with a baseline around 12-13,    Complains of shortness of breath but euvolemic on exam, has some chest pain, known coronary, with unchanged ECG this morning. Blood pressures have been elevated but now with better control, now feeling slightly dizzy     Plan:     Pericardial effusion: Status post removal of 300 cc of fluid for pericardial effusion with tamponade physiology  Continue drainage till at least less than 50 cc prior to removal of drain.   120 cc out in the last 24 hours, to 40 cc the day before- slowing down  Await cytology and biopsy results-no results yet  He does admit to prior pericardial effusion, no clear details in this regard but had a moderate pericardial effusion in 2018, did not undergo drainage  No clear etiology for this but considering current pleuritic symptoms (could be from drain itself )and with no clear etiology, start colchicine 0.6 mg daily, avoiding NSAIDs in the setting of CKD     Left ventricular hypertrophy: Significant LVH noted on echo, moderate with measurements, has carried a label of 'hypertrophic cardiomyopathy from uncontrolled hypertension'  On reviewing echo and prior strain pattern, suggestive of amyloid-Will need to rule out cardiac amyloid-considering negative SPEP and UPEP, will proceed with TTR amyloid SPECT study and if this is positive, this would be diagnostic. No neuropathic symptoms  Has have intrinsic renal disease as well although this also could be from hypertension-typically TTR amyloid is not associated with renal dysfunction     Hypertension: History of allergy-anaphylaxis to clonidine, lisinopril, nifedipine, spironolactone and unspecified allergy to minoxidil hence started on  At baseline and has continued on amlodipine 10 mg, Cardura 8 mg twice daily, eplerenone 50 mg daily, hydralazine 100 mg 3 times daily, and also started on labetalol 200 mg twice daily  Blood pressures are markedly elevated this morning but now better controlled    Anemia: EGD was negative, colonoscopy prep was suboptimal and to be repeated    Prolonged QT on ECG: avoid QT prolonging drugs, received Zofran as well. No Reglan. Recheck in AM      ===================================================================    Chief Complaint: No chief complaint on file.         Subjective/Objective     Subjective: Denies any new complaints    Objective: No distress    Patient Active Problem List   Diagnosis   • Pericardial effusion   • Essential hypertension   • Leg DVT (deep venous thromboembolism), acute, bilateral ([facility])   • COPD (chronic obstructive pulmonary disease) ([facility])   • CHF (congestive heart failure) ([facility])   • Chronic diastolic CHF (congestive heart failure) ([facility])   • Pulmonary nodule, right   • CKD stage 3 secondary to diabetes ([address])   • Lymphedema   • Anemia   • AGUS (obstructive sleep apnea)   • Abscess of left groin   • History of DVT (deep vein thrombosis)   • Splenic lesion   • Dysuria   • Renal cyst   • Post-traumatic male urethral stricture   • Morbid obesity due to excess calories ([address])   • Encounter for surgical aftercare following surgery of digestive system   • Postsurgical malabsorption   • Hypertensive kidney disease with stage 3 chronic kidney disease (HCC)   • Acute renal failure superimposed on stage 3 chronic kidney disease (HCC)   • Bradycardia   • Hypoglycemia   • History of gastric bypass   • Hypokalemia   • Seizure (HCC)   • Hypertensive encephalopathy   • Hypertensive emergency   • Meningioma (HCC)   • Prolonged Q-T interval on ECG   • Abdominal pain   • Cardiac tamponade   • Hypertensive urgency   • Hematochezia   • Depression with anxiety   • Electrolyte abnormality       Vitals: /84 (BP Location: Left arm)   Pulse 89   Temp 98.9 °F (37.2 °C) (Oral)   Resp 20   Wt 59.6 kg (131 lb 6.3 oz)   SpO2 95%   BMI 21.21 kg/m²     I/O this shift:  In: 240 [P.O.:240]  Out: 230 [Urine:200; Drains:30]  Wt Readings from Last 3 Encounters:   07/12/23 59.6 kg (131 lb 6.3 oz)   07/08/23 68.9 kg (152 lb)   05/23/23 68.9 kg (152 lb)       Intake/Output Summary (Last 24 hours) at 7/12/2023 1726  Last data filed at 7/12/2023 1128  Gross per 24 hour   Intake 720 ml   Output 700 ml   Net 20 ml     I/O last 3 completed shifts:   In: 1283.3 [P.O.:960; I.V.:150; IV Piggyback:173.3]  Out: 1850 [Urine:1650; Drains:200]    Invasive Devices     Peripheral Intravenous Line  Duration           Peripheral IV 07/11/23 Left;Proximal;Ventral (anterior) Forearm 1 day          Drain  Duration           Closed/Suction Drain Pericardial Bulb 19 Fr. 4 days                  Physical Exam:  GEN: Casey Ames appears well, alert and oriented x 3, pleasant and cooperative   HEENT: pupils equal, round, and reactive to light; extraocular muscles intact  NECK: supple, no carotid bruits or JVD  HEART: regular rhythm, normal S1 and S2, no murmur, no clicks, gallops or rubs   LUNGS: clear to auscultation bilaterally; no wheezes or rhonchi, no rales  ABDOMEN/GI: normal bowel sounds, soft, no tenderness, no distention  EXTREMITIES/Musculoskeltal: peripheral pulses normal; no clubbing, cyanosis, no edema  NEURO: no focal motor findings   SKIN: normal without suspicious lesions on exposed skin              Lab Results:       Results from last 7 days   Lab Units 07/12/23  0508 07/11/23  0305 07/10/23  1108 07/10/23  0442   WBC Thousand/uL 12.05* 13.95*  --  18.13*   HEMOGLOBIN g/dL 7.7* 7.9* 8.5* 7.3*   HEMATOCRIT % 23.6* 23.7*  --  22.3*   PLATELETS Thousands/uL 341 310  --  265         Results from last 7 days   Lab Units 07/12/23  0508 07/11/23  0305 07/10/23  0442 07/09/23  0433 07/08/23  1308 07/08/23  0137   POTASSIUM mmol/L 2.7* 2.9* 3.6 3.6   < > 4.2   CHLORIDE mmol/L 114* 111* 115* 113*   < > 106   CO2 mmol/L 25 25 25 25   < > 21   BUN mg/dL 34* 47* 52* 49*   < > 63*   CREATININE mg/dL 1.90* 2.32* 2.16* 1.92*   < > 1.59*   CALCIUM mg/dL 8.9 8.1* 8.5 8.6   < > 9.7   ALK PHOS U/L  --   --   --  54  --  58   ALT U/L  --   --   --  50  --  64*   AST U/L  --   --   --  32  --  52*    < > = values in this interval not displayed. Imaging: I have personally reviewed pertinent reports.     EKG/Telemtry: No events    Scheduled Meds:  Current Facility-Administered Medications   Medication Dose Route Frequency Provider Last Rate   • acetaminophen  650 mg Oral Q6H PRN Bob Bellow, CRNP     • aluminum-magnesium hydroxide-simethicone  30 mL Oral Q6H PRN Bob Bellow, CRNP     • amLODIPine  10 mg Oral Daily Bob Bellow, CRNP     • atorvastatin  40 mg Oral HS Bob Bellow, CRNP     • budesonide-formoterol  2 puff Inhalation BID Bob Bellow, CRNP     • busPIRone  15 mg Oral BID Bob Bellow, CRNP     • calcium carbonate  2 tablet Oral BID With Meals MARBIN Yanez     • cholecalciferol  2,000 Units Oral Daily MARBIN Yanez     • colchicine  0.6 mg Oral Daily Lawrence Velarde DO     • doxazosin  8 mg Oral BID Bob Bellow, CRNP     • eplerenone  50 mg Oral Daily Bob Bellow, CRNP     • fluticasone  1 puff Inhalation Daily Bob Bellow, CRNP     • hydrALAZINE  10 mg Intravenous Q6H PRN Bob Bellow, CRNP     • hydrALAZINE  100 mg Oral TID Bob Bellow, CRNP     • HYDROmorphone  0.2 mg Intravenous Q3H PRN Bob Bellow, CRNP     • iron sucrose  300 mg Intravenous Once per day on Mon Wed Fri Lenore Arenas DO     • labetalol  10 mg Intravenous Q6H PRN Bob Bellow, CRNP     • labetalol  200 mg Oral Q12H [address], [place]     • LORazepam  0.5 mg Intravenous Q30 Min PRN Lenore Arenas DO     • methocarbamol  500 mg Oral Q6H PRN Bob Bellow, CRNP     • metoclopramide  10 mg Intravenous Q6H PRN Bob Bellow, CRNP     • mirtazapine  15 mg Oral HS Bob Bellow, CRNP     • multivitamin-minerals  1 tablet Oral Daily Bob Bellow, CRNP     • ondansetron  4 mg Intravenous Q6H PRN Bob Bellow, CRNP     • oxyCODONE  2.5 mg Oral Q4H PRN Bob Bellow, CRNP      Or   • oxyCODONE  5 mg Oral Q4H PRN Bob Bellow, CRNP     • pantoprazole  40 mg Intravenous Q12H [address] Bob Bellow, CRNP     • polyethylene glycol  17 g Oral Daily Bob Bellow, CRNP     • sertraline  200 mg Oral Daily Bob Bellow, CRNP       Continuous Infusions: This note was completed in part utilizing Helpr direct voice recognition software. Grammatical errors, random word insertion, spelling mistakes, occasional wrong word or "sound-alike" substitutions and incomplete sentences may be an occasional consequence of the system secondary to software limitations, ambient noise and hardware issues. At the time of dictation, efforts were made to edit, clarify and /or correct errors. Please read the chart carefully and recognize, using context, where substitutions have occurred.   If you have any questions or concerns about the context, text or information contained within the body of this dictation, please contact myself, the provider, for further clarification.

## 2023-07-12 NOTE — ASSESSMENT & PLAN NOTE
Wt Readings from Last 3 Encounters:   07/12/23 59.6 kg (131 lb 6.3 oz)   07/08/23 68.9 kg (152 lb)   05/23/23 68.9 kg (152 lb)     LVEF 60 to 68%, LVH noted  Monitor I/O, daily weights  C/w Inspra

## 2023-07-13 PROBLEM — E44.0 MODERATE PROTEIN-CALORIE MALNUTRITION (HCC): Status: ACTIVE | Noted: 2023-07-13

## 2023-07-13 LAB
ANION GAP SERPL CALCULATED.3IONS-SCNC: 5 MMOL/L
BACTERIA BLD CULT: NORMAL
BACTERIA BLD CULT: NORMAL
BUN SERPL-MCNC: 30 MG/DL (ref 5–25)
CALCIUM SERPL-MCNC: 8.5 MG/DL (ref 8.3–10.1)
CHLORIDE SERPL-SCNC: 111 MMOL/L (ref 96–108)
CO2 SERPL-SCNC: 25 MMOL/L (ref 21–32)
CORTIS SERPL-MCNC: 16.3 UG/DL
CREAT SERPL-MCNC: 2.09 MG/DL (ref 0.6–1.3)
ERYTHROCYTE [DISTWIDTH] IN BLOOD BY AUTOMATED COUNT: 15 % (ref 11.6–15.1)
GFR SERPL CREATININE-BSD FRML MDRD: 33 ML/MIN/1.73SQ M
GLUCOSE SERPL-MCNC: 91 MG/DL (ref 65–140)
HCT VFR BLD AUTO: 23.6 % (ref 36.5–49.3)
HGB BLD-MCNC: 7.7 G/DL (ref 12–17)
MAGNESIUM SERPL-MCNC: 2.2 MG/DL (ref 1.6–2.6)
MCH RBC QN AUTO: 29.2 PG (ref 26.8–34.3)
MCHC RBC AUTO-ENTMCNC: 32.6 G/DL (ref 31.4–37.4)
MCV RBC AUTO: 89 FL (ref 82–98)
PHOSPHATE SERPL-MCNC: 3.7 MG/DL (ref 2.3–4.1)
PLATELET # BLD AUTO: 371 THOUSANDS/UL (ref 149–390)
PMV BLD AUTO: 9.3 FL (ref 8.9–12.7)
POTASSIUM SERPL-SCNC: 3.4 MMOL/L (ref 3.5–5.3)
RBC # BLD AUTO: 2.64 MILLION/UL (ref 3.88–5.62)
SODIUM SERPL-SCNC: 141 MMOL/L (ref 135–147)
WBC # BLD AUTO: 9.79 THOUSAND/UL (ref 4.31–10.16)

## 2023-07-13 PROCEDURE — 88342 IMHCHEM/IMCYTCHM 1ST ANTB: CPT | Performed by: PATHOLOGY

## 2023-07-13 PROCEDURE — 88341 IMHCHEM/IMCYTCHM EA ADD ANTB: CPT | Performed by: PATHOLOGY

## 2023-07-13 PROCEDURE — 93005 ELECTROCARDIOGRAM TRACING: CPT

## 2023-07-13 PROCEDURE — C9113 INJ PANTOPRAZOLE SODIUM, VIA: HCPCS | Performed by: NURSE PRACTITIONER

## 2023-07-13 PROCEDURE — 84100 ASSAY OF PHOSPHORUS: CPT | Performed by: GENERAL PRACTICE

## 2023-07-13 PROCEDURE — 82533 TOTAL CORTISOL: CPT | Performed by: STUDENT IN AN ORGANIZED HEALTH CARE EDUCATION/TRAINING PROGRAM

## 2023-07-13 PROCEDURE — 99233 SBSQ HOSP IP/OBS HIGH 50: CPT | Performed by: GENERAL PRACTICE

## 2023-07-13 PROCEDURE — 97535 SELF CARE MNGMENT TRAINING: CPT

## 2023-07-13 PROCEDURE — 85027 COMPLETE CBC AUTOMATED: CPT | Performed by: GENERAL PRACTICE

## 2023-07-13 PROCEDURE — 80048 BASIC METABOLIC PNL TOTAL CA: CPT | Performed by: GENERAL PRACTICE

## 2023-07-13 PROCEDURE — 88305 TISSUE EXAM BY PATHOLOGIST: CPT | Performed by: PATHOLOGY

## 2023-07-13 PROCEDURE — 97112 NEUROMUSCULAR REEDUCATION: CPT

## 2023-07-13 PROCEDURE — 99232 SBSQ HOSP IP/OBS MODERATE 35: CPT | Performed by: INTERNAL MEDICINE

## 2023-07-13 PROCEDURE — 83735 ASSAY OF MAGNESIUM: CPT | Performed by: GENERAL PRACTICE

## 2023-07-13 RX ORDER — POTASSIUM CHLORIDE 14.9 MG/ML
20 INJECTION INTRAVENOUS
Status: COMPLETED | OUTPATIENT
Start: 2023-07-13 | End: 2023-07-14

## 2023-07-13 RX ORDER — LABETALOL 200 MG/1
400 TABLET, FILM COATED ORAL EVERY 12 HOURS SCHEDULED
Status: DISCONTINUED | OUTPATIENT
Start: 2023-07-13 | End: 2023-07-15 | Stop reason: HOSPADM

## 2023-07-13 RX ORDER — POTASSIUM CHLORIDE 29.8 MG/ML
40 INJECTION INTRAVENOUS ONCE
Status: DISCONTINUED | OUTPATIENT
Start: 2023-07-13 | End: 2023-07-13

## 2023-07-13 RX ADMIN — Medication 2000 UNITS: at 08:01

## 2023-07-13 RX ADMIN — Medication 2 TABLET: at 17:47

## 2023-07-13 RX ADMIN — LABETALOL HYDROCHLORIDE 400 MG: 200 TABLET, FILM COATED ORAL at 21:35

## 2023-07-13 RX ADMIN — ATORVASTATIN CALCIUM 40 MG: 40 TABLET, FILM COATED ORAL at 21:34

## 2023-07-13 RX ADMIN — AMLODIPINE BESYLATE 10 MG: 10 TABLET ORAL at 08:01

## 2023-07-13 RX ADMIN — Medication 1 TABLET: at 08:02

## 2023-07-13 RX ADMIN — COLCHICINE 0.6 MG: 0.6 TABLET ORAL at 08:02

## 2023-07-13 RX ADMIN — MIRTAZAPINE 15 MG: 15 TABLET, FILM COATED ORAL at 21:34

## 2023-07-13 RX ADMIN — BUSPIRONE HYDROCHLORIDE 15 MG: 10 TABLET ORAL at 17:47

## 2023-07-13 RX ADMIN — DOXAZOSIN 8 MG: 4 TABLET ORAL at 08:01

## 2023-07-13 RX ADMIN — Medication 2 TABLET: at 08:01

## 2023-07-13 RX ADMIN — SERTRALINE HYDROCHLORIDE 200 MG: 100 TABLET ORAL at 08:01

## 2023-07-13 RX ADMIN — PANTOPRAZOLE SODIUM 40 MG: 40 INJECTION, POWDER, FOR SOLUTION INTRAVENOUS at 08:02

## 2023-07-13 RX ADMIN — LABETALOL HYDROCHLORIDE 200 MG: 200 TABLET, FILM COATED ORAL at 08:02

## 2023-07-13 RX ADMIN — BUDESONIDE AND FORMOTEROL FUMARATE DIHYDRATE 2 PUFF: 160; 4.5 AEROSOL RESPIRATORY (INHALATION) at 08:01

## 2023-07-13 RX ADMIN — BUSPIRONE HYDROCHLORIDE 15 MG: 10 TABLET ORAL at 08:01

## 2023-07-13 RX ADMIN — DOXAZOSIN 8 MG: 4 TABLET ORAL at 17:47

## 2023-07-13 RX ADMIN — PANTOPRAZOLE SODIUM 40 MG: 40 INJECTION, POWDER, FOR SOLUTION INTRAVENOUS at 21:34

## 2023-07-13 RX ADMIN — EPLERENONE 50 MG: 25 TABLET, FILM COATED ORAL at 08:01

## 2023-07-13 RX ADMIN — POTASSIUM CHLORIDE 20 MEQ: 14.9 INJECTION, SOLUTION INTRAVENOUS at 13:34

## 2023-07-13 RX ADMIN — FLUTICASONE FUROATE 1 PUFF: 100 POWDER RESPIRATORY (INHALATION) at 08:01

## 2023-07-13 RX ADMIN — BUDESONIDE AND FORMOTEROL FUMARATE DIHYDRATE 2 PUFF: 160; 4.5 AEROSOL RESPIRATORY (INHALATION) at 17:45

## 2023-07-13 RX ADMIN — POTASSIUM CHLORIDE 20 MEQ: 14.9 INJECTION, SOLUTION INTRAVENOUS at 11:29

## 2023-07-13 NOTE — PROGRESS NOTES
Progress Note - Cardiology   Clemmie Para 59 y.o. male MRN: [de-identified]  Unit/Bed#: [facility] 526-01 Encounter: [id number]  07/13/23  1:09 PM    Impression and Plan:    58-year-old with a history of Shamika-en-Y gastric bypass, hypertension, left ventricular hypertrophy, CKD, prior DVT, chronic diastolic heart failure-not on loop diuretic at baseline,Hypertension, history of hyperaldosteronism, status post left adrenalectomy in 2012, on eplerenone, admitted with shortness of breath from anemia and pericardial effusion with tamponade physiology, underwent pericardial window placement with removal of 300 cc of serous fluid, sent for culture and cytology and pericardial biopsy done,     He is mildly tachypneic also but lungs have been clear to auscultation. Also had significant anemia with melena and bright red blood per rectum, with a hemoglobin yessy of 6.4 with a baseline around 12-13, EGD was negative, colonoscopy was a poor prep. Pericardial drain was removed today. Overall looks and feels much better today.     Plan:     Pericardial effusion: Status post removal of 300 cc of fluid for pericardial effusion with tamponade physiology  Pericardial drain removed today, drainage has progressively decreased over the last few days. Recheck limited echo tomorrow to reassess for reaccumulation post drain removal    Pericardial fluid cytology-poor fixation, hence only degenerative cells,  Pericardial fluid biopsy still awaited.     He does admit to prior pericardial effusion, no clear details in this regard but had a moderate pericardial effusion in 2018, did not undergo drainage    Continue colchicine 0.6 mg daily, avoiding NSAIDs in the setting of CKD    Outpatient rheumatology evaluation     Left ventricular hypertrophy: Significant LVH noted on echo, moderate with measurements, has carried a label of 'hypertrophic cardiomyopathy from uncontrolled hypertension'  On reviewing echo and prior strain pattern, suggestive of amyloid-Will need to rule out cardiac amyloid-considering negative SPEP and UPEP,   performed amyloid SPECT study -negative on my review     No neuropathic symptoms  Has have intrinsic renal disease as well although this also could be from hypertension-typically TTR amyloid is not associated with renal dysfunction  If negative, will consider cardiac MRI tomorrow  To rule out any other infiltrative disease     Resistant hypertension: History of allergy-anaphylaxis to clonidine, lisinopril, nifedipine, spironolactone and unspecified allergy to minoxidil hence started on  At baseline and has continued on amlodipine 10 mg, Cardura 8 mg twice daily, eplerenone 50 mg daily, hydralazine 100 mg 3 times daily, and also started on labetalol 200 mg twice daily  He strongly felt that hydralazine was making him foggy, fatigued and does not want to take it.   will discontinue hydralazine entirely  Since he needs to be evaluated for hyperaldosteronism again-has periodically been hypokalemic, also with marked hypotension needing multiple medications despite having had over 100 pounds of weight loss post gastric bypass surgery and with previous history of hyperaldosteronism-status post left adrenalectomy, will discontinue eplerenone as well and check aldosterone/renin ratio in about 2 to 3 weeks  A.m. cortisol level    Increase labetalol to 400 twice daily, can increase this further as necessary-now with discontinuation of eplerenone and hydralazine    Blood pressures are markedly elevated this morning but now better controlled    Anemia: EGD was negative, colonoscopy prep was suboptimal and to be repeated    Prolonged QT on ECG: Today around 470 ms, only mildly prolonged. Avoid any QT prolonging drugs, has received Zofran as well. No Reglan.   ===================================================================    Chief Complaint: No chief complaint on file.         Subjective/Objective     Subjective: Denies any new complaints    Objective: No distress    Patient Active Problem List   Diagnosis   • Pericardial effusion   • Essential hypertension   • Leg DVT (deep venous thromboembolism), acute, bilateral (HCC)   • COPD (chronic obstructive pulmonary disease) (HCC)   • CHF (congestive heart failure) (HCC)   • Chronic diastolic CHF (congestive heart failure) ([address])   • Pulmonary nodule, right   • CKD stage 3 secondary to diabetes ([address])   • Lymphedema   • Anemia   • AGUS (obstructive sleep apnea)   • Abscess of left groin   • History of DVT (deep vein thrombosis)   • Splenic lesion   • Dysuria   • Renal cyst   • Post-traumatic male urethral stricture   • Morbid obesity due to excess calories ([address])   • Encounter for surgical aftercare following surgery of digestive system   • Postsurgical malabsorption   • Hypertensive kidney disease with stage 3 chronic kidney disease ([address])   • Acute renal failure superimposed on stage 3 chronic kidney disease (HCC)   • Bradycardia   • Hypoglycemia   • History of gastric bypass   • Hypokalemia   • Seizure ([address])   • Hypertensive encephalopathy   • Hypertensive emergency   • Meningioma ([facility])   • Prolonged Q-T interval on ECG   • Abdominal pain   • Cardiac tamponade   • Hypertensive urgency   • Hematochezia   • Depression with anxiety   • Electrolyte abnormality   • Moderate protein-calorie malnutrition (HCC)       Vitals: /96   Pulse 89   Temp 97.9 °F (36.6 °C) (Oral)   Resp 19   Wt 60.2 kg (132 lb 11.5 oz)   SpO2 99%   BMI 21.42 kg/m²     I/O this shift: In: 5 [P.O.:420]  Out: 670 [Urine:650; Drains:20]  Wt Readings from Last 3 Encounters:   07/13/23 60.2 kg (132 lb 11.5 oz)   07/08/23 68.9 kg (152 lb)   05/23/23 68.9 kg (152 lb)       Intake/Output Summary (Last 24 hours) at 7/13/2023 1309  Last data filed at 7/13/2023 1146  Gross per 24 hour   Intake 900 ml   Output 1370 ml   Net -470 ml     I/O last 3 completed shifts:   In: 1200 [P.O.:1200]  Out: 1400 [Urine:1250; Drains:150]    Invasive Devices     Peripheral Intravenous Line  Duration           Peripheral IV 07/11/23 Left;Proximal;Ventral (anterior) Forearm 2 days          Drain  Duration           Closed/Suction Drain Pericardial Bulb 19 Fr. 5 days                  Physical Exam:  GEN: Pedro Rod appears well, alert and oriented x 3, pleasant and cooperative   HEENT: pupils equal, round, and reactive to light; extraocular muscles intact  NECK: supple, no carotid bruits or JVD  HEART: regular rhythm, normal S1 and S2, no murmur, no clicks, gallops or rubs, drain removed   LUNGS: clear to auscultation bilaterally; no wheezes or rhonchi, no rales  ABDOMEN/GI: normal bowel sounds, soft, no tenderness, no distention  EXTREMITIES/Musculoskeltal: peripheral pulses normal; no clubbing, cyanosis, no edema  NEURO: no focal motor findings   SKIN: normal without suspicious lesions on exposed skin              Lab Results:       Results from last 7 days   Lab Units 07/13/23  0506 07/12/23  0508 07/11/23  0305   WBC Thousand/uL 9.79 12.05* 13.95*   HEMOGLOBIN g/dL 7.7* 7.7* 7.9*   HEMATOCRIT % 23.6* 23.6* 23.7*   PLATELETS Thousands/uL 371 341 310         Results from last 7 days   Lab Units 07/13/23  0506 07/12/23  0508 07/11/23  0305 07/10/23  0442 07/09/23  0433 07/08/23  1308 07/08/23  0137   POTASSIUM mmol/L 3.4* 2.7* 2.9*   < > 3.6   < > 4.2   CHLORIDE mmol/L 111* 114* 111*   < > 113*   < > 106   CO2 mmol/L 25 25 25   < > 25   < > 21   BUN mg/dL 30* 34* 47*   < > 49*   < > 63*   CREATININE mg/dL 2.09* 1.90* 2.32*   < > 1.92*   < > 1.59*   CALCIUM mg/dL 8.5 8.9 8.1*   < > 8.6   < > 9.7   ALK PHOS U/L  --   --   --   --  54  --  58   ALT U/L  --   --   --   --  50  --  64*   AST U/L  --   --   --   --  32  --  52*    < > = values in this interval not displayed. Imaging: I have personally reviewed pertinent reports.     EKG/Telemtry: No events    Scheduled Meds:  Current Facility-Administered Medications   Medication Dose Route Frequency Provider Last Rate   • acetaminophen  650 mg Oral Q6H PRN Marlinda Console, CRNP     • aluminum-magnesium hydroxide-simethicone  30 mL Oral Q6H PRN Marlinda Console, CRNP     • amLODIPine  10 mg Oral Daily Marlinda Console, CRNP     • atorvastatin  40 mg Oral HS Marlinda Console, CRNP     • budesonide-formoterol  2 puff Inhalation BID Marlinda Console, CRNP     • busPIRone  15 mg Oral BID Marlinda Console, CRNP     • calcium carbonate  2 tablet Oral BID With Meals Marlinda Console, CRNP     • cholecalciferol  2,000 Units Oral Daily Marlinda Console, CRNP     • colchicine  0.6 mg Oral Daily [facility], DO     • doxazosin  8 mg Oral BID Marlinda Console, CRNP     • fluticasone  1 puff Inhalation Daily Marlinda Console, CRNP     • hydrALAZINE  10 mg Intravenous Q6H PRN Marlinda Console, CRNP     • HYDROmorphone  0.2 mg Intravenous Q3H PRN Marlinda Console, CRNP     • iron sucrose  300 mg Intravenous Once per day on Mon Wed Fri Rathdrum Reins, DO     • labetalol  10 mg Intravenous Q6H PRN Marlinda Console, CRNP     • labetalol  400 mg Oral Q12H [address]  Jenna Berry MD     • LORazepam  0.5 mg Intravenous Q30 Min PRN Yoel Reins, DO     • methocarbamol  500 mg Oral Q6H PRN Marlinda Console, CRNP     • mirtazapine  15 mg Oral HS Marlinda Console, CRNP     • multivitamin-minerals  1 tablet Oral Daily Marlinda Console, CRNP     • oxyCODONE  2.5 mg Oral Q4H PRN Marlinda Console, CRNP      Or   • oxyCODONE  5 mg Oral Q4H PRN Marlinda Console, CRNP     • pantoprazole  40 mg Intravenous Q12H [address] Marlinda Console, CRNP     • polyethylene glycol  17 g Oral Daily Marlinda Console, CRNP     • potassium chloride  20 mEq Intravenous Q2H Rathdrum Reins, DO 20 mEq (07/13/23 1129)   • sertraline  200 mg Oral Daily Marlinda Console, CRNP     • trimethobenzamide  200 mg Intramuscular Once Jenna Berry MD     • trimethobenzamide  200 mg Intramuscular Q6H PRN Darius Francis MD       Continuous Infusions: This note was completed in part utilizing [employer] direct voice recognition software. Grammatical errors, random word insertion, spelling mistakes, occasional wrong word or "sound-alike" substitutions and incomplete sentences may be an occasional consequence of the system secondary to software limitations, ambient noise and hardware issues. At the time of dictation, efforts were made to edit, clarify and /or correct errors. Please read the chart carefully and recognize, using context, where substitutions have occurred. If you have any questions or concerns about the context, text or information contained within the body of this dictation, please contact myself, the provider, for further clarification.

## 2023-07-13 NOTE — OCCUPATIONAL THERAPY NOTE
Occupational Therapy Progress Note     Patient Name: Jah Ponce  [id number] Date: 7/13/2023  Problem List  Principal Problem:    Cardiac tamponade  Active Problems:    COPD (chronic obstructive pulmonary disease) (HCC)    Chronic diastolic CHF (congestive heart failure) (HCC)    CKD stage 3 secondary to diabetes (HCC)    Anemia    Meningioma (HCC)    Prolonged Q-T interval on ECG    Hypertensive urgency    Hematochezia    Electrolyte abnormality    Moderate protein-calorie malnutrition ([address])          07/13/23 1155   OT Last Visit   OT Visit Date 07/13/23   Note Type   Note Type Treatment   Pain Assessment   Pain Assessment Tool FLACC   Pain Rating: FLACC (Rest) - Face 1   Pain Rating: FLACC (Rest) - Legs 0   Pain Rating: FLACC (Rest) - Activity 0   Pain Rating: FLACC (Rest) - Cry 0   Pain Rating: FLACC (Rest) - Consolability 0   Score: FLACC (Rest) 1   Pain Rating: FLACC (Activity) - Face 1   Pain Rating: FLACC (Activity) - Legs 0   Pain Rating: FLACC (Activity) - Activity 0   Pain Rating: FLACC (Activity) - Cry 0   Pain Rating: FLACC (Activity) - Consolability 0   Score: FLACC (Activity) 1   Restrictions/Precautions   Other Precautions Multiple lines;Telemetry   ADL   Where Assessed   (standard toilet)   Toileting Assistance  5  Supervision/Setup   Toileting Deficit Perineal hygiene   Toileting Comments supervision for perineal hygiene at this time, on standard toilet. Bed Mobility   Supine to Sit 5  Supervision   Additional items Increased time required   Sit to Supine 5  Supervision   Additional items Increased time required   Additional Comments found supine in bed, left in bed per pts request- reports that he feels lightheaded - /96   Transfers   Sit to Stand 4  Minimal assistance   Additional items Assist x 1; Increased time required;Verbal cues   Stand to Sit 5  Supervision   Additional items Increased time required   Toilet transfer 4  Minimal assistance   Additional items Assist x 1; Increased time required;Standard toilet   Additional Comments CGA for toilet transfers   Functional Mobility   Functional Mobility 4  Minimal assistance   Additional Comments CGA, EOB<>bath   Additional items Rolling walker   Toilet Transfers   Toilet Transfer From Rolling walker   Toilet Transfer Type To and from   Toilet Transfer to Standard toilet   Toilet Transfer Technique Ambulating   Toilet Transfers Contact guard   Cognition   Overall Cognitive Status WFL   Arousal/Participation Responsive; Cooperative; Alert   Attention Within functional limits   Orientation Level Oriented X4   Memory Within functional limits   Following Commands Follows one step commands without difficulty   Comments pt pleasant and cooperative, overall fair safety awareness and insight to condition t/o session   Activity Tolerance   Activity Tolerance Patient limited by fatigue   Assessment   Assessment Pt seen on this date for OT session focusing on ADL retraining, body mechanics, transfer retraining, increasing activity tolerance/endurance to increase ability to participate in ADL/functional tasks. Pt was found in bed and was left in bed w/ all needs within reach - attempted to sit in chair, but reports that he does not feel well and would prefer to lay in bed. STS completed w CGA, LB dressing also completed w CGA. Toilet transfer completed w CGA, toileting tasks completed with S. Reports shivering/lightheaded- /96. Pt w/ improvements in activity tolerance, transfer ability, ADL task completion, however is still limited 2* decreased ADL/High-level ADL status, decreased activity tolerance/endurance,  decreased self-care trans, decreased safety awareness and insight to condition. The patient's raw score on the AM-PAC Daily Activity Inpatient Short Form is 20. A raw score of greater than or equal to 19 suggests the patient may benefit from discharge to home. Please refer to the recommendation of the Occupational Therapist for safe discharge planning. Recommending pt D/C to STR when medically stable. Pt will continue to benefit from acute OT services to meet goals. Plan   Treatment Interventions ADL retraining;Functional transfer training; Endurance training;Energy conservation; Activityengagement   Goal Expiration Date 07/24/23   OT Treatment Day 1   OT Frequency 2-3x/wk   Recommendation   OT Discharge Recommendation Home with home health rehabilitation   AM-PAC Daily Activity Inpatient   Lower Body Dressing 2   Bathing 3   Toileting 3   Upper Body Dressing 4   Grooming 4   Eating 4   Daily Activity Raw Score 20   Daily Activity Standardized Score (Calc for Raw Score >=11) 42.03   AM-PAC Applied Cognition Inpatient   Following a Speech/Presentation 4   Understanding Ordinary Conversation 4   Taking Medications 4   Remembering Where Things Are Placed or Put Away 4   Remembering List of 4-5 Errands 4   Taking Care of Complicated Tasks 4   Applied Cognition Raw Score 24   Applied Cognition Standardized Score 62.21   Modified Daphnie Scale   Modified Daphnie Scale 3   End of Consult   Education Provided Yes   Patient Position at End of Consult All needs within reach; Supine   Nurse Communication Nurse aware of consult       Darell Cogan, MOT, OTR/L

## 2023-07-13 NOTE — PROGRESS NOTES
[facility]  Progress Note  Name: William Charles  MRN: [de-identified]  Unit/Bed#: PPHP 526-01 I Date of Admission: 7/8/2023   Date of Service: 7/13/2023 I Hospital Day: 5    Assessment/Plan   * Cardiac tamponade  Assessment & Plan  · Patient presented to [address] [place] [address] ED with symptoms of chest pain shortness of breath epigastric pain  · He was noted to have pericardial effusion with concerns for tamponade  · He was transferred to [address] [place] [address] for thoracic surgery evaluation  · S/p window 7/8 - drain removed today  · Thoracic surgery appreciated  · Cardio appreciated  · SPEP/UPEP WNL  · C/w colchicine  · Fluid cytology non diagnostic  · Pericardial biopsy pending  · Cx neg so stopped abx  ·  - refer to rheum at d/c  · 7/12 underwent TTR amyloid SPECT study - f/u result  · Tomorrow limited echo to check for reaccumulation of pericardial fluid          Anemia  Assessment & Plan  · Likely multifactorial  · Iron sat low  · Monitor hemoglobin  · 1U PRBC in ICU  · EGD 7/10 and colon today did not reveal source of bleed  · Give Venofer 3x/week x 3 doses while IP    Moderate protein-calorie malnutrition ([address])  Assessment & Plan  Malnutrition Findings:   Adult Malnutrition type: Acute illness  Adult Degree of Malnutrition: Malnutrition of moderate degree  Malnutrition Characteristics: Fat loss, Muscle loss, Weight loss, Inadequate energy                  360 Statement: Moderate protein energy malnutrition is related to physiological causes as evidenced by mild muscle/fat wasting, decreased po intakes over the last 2 months and weight loss of 11#/7.6% x2m. Treated with addition of supplement and diet. BMI Findings: Body mass index is 21.42 kg/m².        Electrolyte abnormality  Assessment & Plan  · K was < 3 but now improving - replete IV per pt preference    Hematochezia  Assessment & Plan  · Colon 7/11 no active bleeding  · Colchicine notoriously causes GI symptoms    Hypertensive urgency  Assessment & Plan  · Hypertensive urgency present on admission  · Monitor blood pressures closely  · BP acceptable  · C/w Norvasc and Cardura   · Pt feels like he has symptoms w/ hydral and Inspra on hold in setting of possible hyperaldosteonism - these are now stopped  · Labetalol increased  · Needs renin/aldosterone checked 2-3 weeks after d/c (needs to be off Inspra)  · Random Cortisol WNL at 16    Prolonged Q-T interval on ECG  Assessment & Plan  · Replete K to 4  · Mag > 2  · Today EKG w/ QTc 477 which I suspect will improve w/ repleting K    Meningioma ([facility])  Assessment & Plan  Outpatient follow-up    CKD stage 3 secondary to diabetes [facility])  Assessment & Plan  Estimated Creatinine Clearance: 32 mL/min (A) (by C-G formula based on SCr of 2.09 mg/dL (H)). Monitor kidney function closely  Avoid nephrotoxins  Monitor postvoid residuals  Cr near baseline of 2.1-2.2 - monitor    Chronic diastolic CHF (congestive heart failure) ([facility])  Assessment & Plan  Wt Readings from Last 3 Encounters:   07/13/23 60.2 kg (132 lb 11.5 oz)   07/08/23 68.9 kg (152 lb)   05/23/23 68.9 kg (152 lb)     LVEF 60 to 68%, LVH noted  Monitor I/O, daily weights  Inspra now on hold        COPD (chronic obstructive pulmonary disease) ([facility])  Assessment & Plan  Not in exacerbation presently  Continue respiratory treatments and supportive cares           VTE Pharmacologic Prophylaxis: VTE Score: 1 Low Risk (Score 0-2) - Encourage Ambulation. Patient Centered Rounds: I performed bedside rounds with nursing staff today. Discussions with Specialists or Other Care Team Provider: cardio    Education and Discussions with Family / Patient: Attempted to update  (wife) via phone. Left voicemail.      Total Time Spent on Date of Encounter in care of patient: 45 minutes This time was spent on one or more of the following: performing physical exam; counseling and coordination of care; obtaining or reviewing history; documenting in the medical record; reviewing/ordering tests, medications or procedures; communicating with other healthcare professionals and discussing with patient's family/caregivers. Current Length of Stay: 5 day(s)  Current Patient Status: Inpatient   Certification Statement: The patient will continue to require additional inpatient hospital stay due to need to monitor pericaridal fluid  Discharge Plan: Anticipate discharge in 24-48 hrs to home with home services. Code Status: Level 1 - Full Code    Subjective:   Feels nauseous    Objective:     Vitals:   Temp (24hrs), Av.2 °F (37.3 °C), Min:97.9 °F (36.6 °C), Max:100.2 °F (37.9 °C)    Temp:  [97.9 °F (36.6 °C)-100.2 °F (37.9 °C)] 98.9 °F (37.2 °C)  HR:  [72-89] 75  Resp:  [18-19] 18  BP: (124-179)/() 124/77  SpO2:  [95 %-99 %] 99 %  Body mass index is 21.42 kg/m². Input and Output Summary (last 24 hours): Intake/Output Summary (Last 24 hours) at 2023 1600  Last data filed at 2023 1334  Gross per 24 hour   Intake 900 ml   Output 1690 ml   Net -790 ml       Physical Exam:   Physical Exam  HENT:      Head: Normocephalic and atraumatic. Nose: Nose normal.      Mouth/Throat:      Mouth: Mucous membranes are moist.   Eyes:      Extraocular Movements: Extraocular movements intact. Conjunctiva/sclera: Conjunctivae normal.   Cardiovascular:      Rate and Rhythm: Normal rate and regular rhythm. Pulmonary:      Effort: Pulmonary effort is normal.      Breath sounds: Normal breath sounds. Abdominal:      General: Bowel sounds are normal. There is no distension. Palpations: Abdomen is soft. Tenderness: There is no abdominal tenderness. Musculoskeletal:         General: Normal range of motion. Cervical back: Normal range of motion and neck supple. Right lower leg: No edema. Left lower leg: No edema. Skin:     General: Skin is warm and dry.    Neurological:      Mental Status: He is alert and oriented to person, place, and time. Additional Data:     Labs:  Results from last 7 days   Lab Units 07/13/23  0506 07/10/23  1108 07/10/23  0442   WBC Thousand/uL 9.79   < > 18.13*   HEMOGLOBIN g/dL 7.7*   < > 7.3*   HEMATOCRIT % 23.6*   < > 22.3*   PLATELETS Thousands/uL 371   < > 265   NEUTROS PCT %  --   --  78*   LYMPHS PCT %  --   --  8*   MONOS PCT %  --   --  13*   EOS PCT %  --   --  0    < > = values in this interval not displayed. Results from last 7 days   Lab Units 07/13/23  0506 07/10/23  0442 07/09/23  0433   SODIUM mmol/L 141   < > 141   POTASSIUM mmol/L 3.4*   < > 3.6   CHLORIDE mmol/L 111*   < > 113*   CO2 mmol/L 25   < > 25   BUN mg/dL 30*   < > 49*   CREATININE mg/dL 2.09*   < > 1.92*   ANION GAP mmol/L 5   < > 3   CALCIUM mg/dL 8.5   < > 8.6   ALBUMIN g/dL  --   --  3.0*   TOTAL BILIRUBIN mg/dL  --   --  0.93   ALK PHOS U/L  --   --  54   ALT U/L  --   --  50   AST U/L  --   --  32   GLUCOSE RANDOM mg/dL 91   < > 132    < > = values in this interval not displayed.          Results from last 7 days   Lab Units 07/08/23  1747 07/08/23  1101   POC GLUCOSE mg/dl 207* 127         Results from last 7 days   Lab Units 07/09/23  0433 07/08/23  1308 07/08/23  0415 07/08/23  0137   LACTIC ACID mmol/L  --  0.8 2.0 3.3*   PROCALCITONIN ng/ml 0.46*  --   --   --        Lines/Drains:  Invasive Devices     Peripheral Intravenous Line  Duration           Peripheral IV 07/11/23 Left;Proximal;Ventral (anterior) Forearm 2 days          Drain  Duration           Closed/Suction Drain Pericardial Bulb 19 Fr. 5 days                  Telemetry:  Telemetry Orders (From admission, onward)             24 Hour Telemetry Monitoring  Continuous x 24 Hours (Telem)        Question:  Reason for 24 Hour Telemetry  Answer:  PCI/EP study (including pacer and ICD implementation), Cardiac surgery, MI, abnormal cardiac cath, and chest pain- rule out MI                 Telemetry Reviewed: Normal Sinus Rhythm  Indication for Continued Telemetry Use: Jorden/sheeba/endocarditis             Imaging: No pertinent imaging reviewed. Recent Cultures (last 7 days):   Results from last 7 days   Lab Units 07/09/23  0950 07/09/23  0933 07/08/23  1014 07/08/23  0145 07/08/23  0142   BLOOD CULTURE  No Growth After 4 Days. No Growth After 4 Days. --  No Growth After 5 Days. No Growth After 5 Days.    GRAM STAIN RESULT   --   --  No Polys or Bacteria seen  --   --    BODY FLUID CULTURE, STERILE   --   --  No growth  --   --        Last 24 Hours Medication List:   Current Facility-Administered Medications   Medication Dose Route Frequency Provider Last Rate   • acetaminophen  650 mg Oral Q6H PRN Terrial Holes, CRNP     • aluminum-magnesium hydroxide-simethicone  30 mL Oral Q6H PRN Terrial Holes, CRNP     • amLODIPine  10 mg Oral Daily Terrial Holes, CRNP     • atorvastatin  40 mg Oral HS Terrial Holes, CRNP     • budesonide-formoterol  2 puff Inhalation BID Terrial Holes, CRNP     • busPIRone  15 mg Oral BID Terrial Holes, CRNP     • calcium carbonate  2 tablet Oral BID With Meals Terrial Holes, CRNP     • cholecalciferol  2,000 Units Oral Daily Terrial Holes, CRNP     • colchicine  0.6 mg Oral Daily Royanne Vidal, DO     • doxazosin  8 mg Oral BID Terrial Holes, CRNP     • fluticasone  1 puff Inhalation Daily Terrial Holes, CRNP     • hydrALAZINE  10 mg Intravenous Q6H PRN Terrial Holes, CRNP     • HYDROmorphone  0.2 mg Intravenous Q3H PRN Terrial Holes, CRNP     • iron sucrose  300 mg Intravenous Once per day on Mon Wed Fri Maury Schultz, DO     • labetalol  10 mg Intravenous Q6H PRN Terrial Holes, CRNP     • labetalol  400 mg Oral Q12H [address] Mckenzie Antonio MD     • LORazepam  0.5 mg Intravenous Q30 Min PRN Maury Schultz, DO     • methocarbamol  500 mg Oral Q6H PRN Terrial Holes, CRNP     • mirtazapine  15 mg Oral HS Darryl Wang MARBIN James     • multivitamin-minerals  1 tablet Oral Daily MARBIN Fernandez     • oxyCODONE  2.5 mg Oral Q4H PRN MARBIN Fernandez      Or   • oxyCODONE  5 mg Oral Q4H PRN MARBIN Fernandez     • pantoprazole  40 mg Intravenous Q12H [address] MARBIN Fernandez     • polyethylene glycol  17 g Oral Daily MARBIN Fernandez     • sertraline  200 mg Oral Daily MARBIN Fernandez     • trimethobenzamide  200 mg Intramuscular Once Yuniel Hernandez MD     • trimethobenzamide  200 mg Intramuscular Q6H PRN Yuniel Hernandez MD          Today, Patient Was Seen By: iLya Walsh DO    **Please Note: This note may have been constructed using a voice recognition system. **

## 2023-07-13 NOTE — ASSESSMENT & PLAN NOTE
Wt Readings from Last 3 Encounters:   07/13/23 60.2 kg (132 lb 11.5 oz)   07/08/23 68.9 kg (152 lb)   05/23/23 68.9 kg (152 lb)     LVEF 60 to 68%, LVH noted  Monitor I/O, daily weights  Inspra now on hold

## 2023-07-13 NOTE — ASSESSMENT & PLAN NOTE
Estimated Creatinine Clearance: 32 mL/min (A) (by C-G formula based on SCr of 2.09 mg/dL (H)).   Monitor kidney function closely  Avoid nephrotoxins  Monitor postvoid residuals  Cr near baseline of 2.1-2.2 - monitor

## 2023-07-13 NOTE — ASSESSMENT & PLAN NOTE
Malnutrition Findings:   Adult Malnutrition type: Acute illness  Adult Degree of Malnutrition: Malnutrition of moderate degree  Malnutrition Characteristics: Fat loss, Muscle loss, Weight loss, Inadequate energy                  360 Statement: Moderate protein energy malnutrition is related to physiological causes as evidenced by mild muscle/fat wasting, decreased po intakes over the last 2 months and weight loss of 11#/7.6% x2m. Treated with addition of supplement and diet. BMI Findings: Body mass index is 21.42 kg/m².

## 2023-07-13 NOTE — PLAN OF CARE
Problem: OCCUPATIONAL THERAPY ADULT  Goal: Performs self-care activities at highest level of function for planned discharge setting. See evaluation for individualized goals. Description: Treatment Interventions: ADL retraining, Functional transfer training, Endurance training, Patient/family training, Equipment evaluation/education, Compensatory technique education, Energy conservation, Activityengagement          See flowsheet documentation for full assessment, interventions and recommendations. Outcome: Progressing  Note: Limitation: Decreased ADL status, Decreased endurance, Decreased self-care trans, Decreased high-level ADLs  Prognosis: Good  Assessment: Pt seen on this date for OT session focusing on ADL retraining, body mechanics, transfer retraining, increasing activity tolerance/endurance to increase ability to participate in ADL/functional tasks. Pt was found in bed and was left in bed w/ all needs within reach - attempted to sit in chair, but reports that he does not feel well and would prefer to lay in bed. STS completed w CGA, LB dressing also completed w CGA. Toilet transfer completed w CGA, toileting tasks completed with S. Reports shivering/lightheaded- /96. Pt w/ improvements in activity tolerance, transfer ability, ADL task completion, however is still limited 2* decreased ADL/High-level ADL status, decreased activity tolerance/endurance,  decreased self-care trans, decreased safety awareness and insight to condition. The patient's raw score on the AM-PAC Daily Activity Inpatient Short Form is 20. A raw score of greater than or equal to 19 suggests the patient may benefit from discharge to home. Please refer to the recommendation of the Occupational Therapist for safe discharge planning. Recommending pt D/C to STR when medically stable. Pt will continue to benefit from acute OT services to meet goals.      OT Discharge Recommendation: Home with home health rehabilitation

## 2023-07-13 NOTE — PLAN OF CARE
Problem: PHYSICAL THERAPY ADULT  Goal: Performs mobility at highest level of function for planned discharge setting. See evaluation for individualized goals. Description: Treatment/Interventions: Functional transfer training, LE strengthening/ROM, Therapeutic exercise, Endurance training, Patient/family training, Equipment eval/education, Bed mobility, Gait training, OT, Spoke to nursing, Elevations  Equipment Recommended: (S) Michelle Cleary (may require RW for d/c; will continue to assess)       See flowsheet documentation for full assessment, interventions and recommendations. Outcome: Progressing  Note: Prognosis: Good  Problem List: Decreased strength, Decreased endurance, Impaired balance, Decreased mobility  Assessment: PT completed evaluation of 59 y.o. male [facility]8/[facility]23 with symptoms of: chest pain and SOB. Diagnosis includes cardiac tamponade. Patient is s/p cardiac window on 7/8. Patient's current status instabilities include ongoing pain, continuous O2/HR monitoring, nausea/dizziness, falls risk, and a regression in function from baseline. PMH is significant for COPD, CKD, and CHF. Prior to this admission patient resided with spouse in a 2 level home (6 [address]). At his baseline he is I with mobility (no use of AD), ADLS, and iADLS. Patient presents at time of PT evaluation functioning below baseline and currently w/ overall mobility deficits 2* to: impaired balance, decreased endurance, gait deviations, decreased activity tolerance and fall risk. During PT evaluation, patient currently is requiring min-AX1 for bed mobility, sit<-->stand transfers, and ambulation. Patient ambulated 5 feet x 2 with use of RW (limited by nausea and dizziness). I anticipate patient will progress and d/c home with home PT. Patient will continue to benefit from continued skilled PT this admission to achieve maximal function and safety.         PT Discharge Recommendation: Home with home health rehabilitation    See flowsheet documentation for full assessment.

## 2023-07-13 NOTE — ASSESSMENT & PLAN NOTE
· Hypertensive urgency present on admission  · Monitor blood pressures closely  · BP acceptable  · C/w Norvasc and Cardura   · Pt feels like he has symptoms w/ hydral and Inspra on hold in setting of possible hyperaldosteonism - these are now stopped  · Labetalol increased  · Needs renin/aldosterone checked 2-3 weeks after d/c (needs to be off Inspra)  · Random Cortisol WNL at 16

## 2023-07-13 NOTE — ASSESSMENT & PLAN NOTE
· Likely multifactorial  · Iron sat low  · Monitor hemoglobin  · 1U PRBC in ICU  · EGD 7/10 and colon today did not reveal source of bleed  · Give Venofer 3x/week x 3 doses while IP

## 2023-07-13 NOTE — ASSESSMENT & PLAN NOTE
· [facility] with symptoms of chest pain shortness of breath epigastric pain  · He was noted to have pericardial effusion with concerns for tamponade  · He was transferred to [address] [place] [address] for thoracic surgery evaluation  · S/p window 7/8 - drain removed today  · Thoracic surgery appreciated  · Cardio appreciated  · SPEP/UPEP WNL  · C/w colchicine  · Fluid cytology non diagnostic  · Pericardial biopsy pending  · Cx neg so stopped abx  ·  - refer to rheum at d/c  · 7/12 underwent TTR amyloid SPECT study - f/u result  · Tomorrow limited echo to check for reaccumulation of pericardial fluid

## 2023-07-13 NOTE — PHYSICAL THERAPY NOTE
PT Treatment       07/13/23 1154   PT Last Visit   PT Visit Date 07/13/23   Note Type   Note Type Treatment   Pain Assessment   Pain Assessment Tool FLACC   Pain Rating: FLACC (Rest) - Face 1   Pain Rating: FLACC (Rest) - Legs 0   Pain Rating: FLACC (Rest) - Activity 0   Pain Rating: FLACC (Rest) - Cry 0   Pain Rating: FLACC (Rest) - Consolability 0   Score: FLACC (Rest) 1   Pain Rating: FLACC (Activity) - Face 1   Pain Rating: FLACC (Activity) - Legs 0   Pain Rating: FLACC (Activity) - Activity 0   Pain Rating: FLACC (Activity) - Cry 0   Pain Rating: FLACC (Activity) - Consolability 0   Score: FLACC (Activity) 1   Restrictions/Precautions   Other Precautions Multiple lines;Telemetry   General   Chart Reviewed Yes   Response to Previous Treatment Patient with no complaints from previous session. Family/Caregiver Present No   Cognition   Overall Cognitive Status WFL   Arousal/Participation Alert   Subjective   Subjective "I just don't feel like I can do it right now"- referring to walking   Bed Mobility   Supine to Sit 5  Supervision   Additional items Increased time required;HOB elevated   Sit to Supine 5  Supervision   Additional items Increased time required;HOB elevated   Additional Comments boost in bed AX2; at end of treatment patient supine with physicians present   Transfers   Sit to Stand 4  Minimal assistance   Additional items Assist x 1; Increased time required;Verbal cues   Stand to Sit 5  Supervision   Additional items Increased time required;Verbal cues   Toilet transfer 4  Minimal assistance   Additional items Assist x 1; Increased time required;Verbal cues   Additional Comments CG assist for 3 sit<-->stand transfers w/ increased time; patient expressing lightheadedness and fatigue   Ambulation/Elevation   Gait pattern Excessively slow;Decreased foot clearance; Short stride   Gait Assistance 4  Minimal assist   Additional items Assist x 1   Assistive Device Rolling walker   Distance 10 feet (bed-->bathroom-->cahir-->bed)   Ambulation/Elevation Additional Comments forward flexed, CASTILLO   Balance   Static Sitting Fair   Static Standing Fair -   Ambulatory Fair -   Endurance Deficit   Endurance Deficit Yes   Endurance Deficit Description expressed lightheadedness throughout sessions (/96). drain recenlty removed. patient with CASTILLO (O2 sats 100% on RA). expressed fatigue and reports 16 lb weight loss this admission. Activity Tolerance   Activity Tolerance Patient limited by fatigue   Medical Staff Made Aware brigette to see per STEFANIE Herrera with occupational therapy as performed today. This patient's participation in therapy services was limited by decreased tolerance for activity and current medical status. For these reasons, co-treatment was performed so that patient could optimally participate in therapy services and maximize their rehabilitation during treatment time. PT and OT disciplines addressed separate goals of patient's individualized care plan. Exercises   Neuro re-ed bed mobility, 3 sit<-->stand transfers   Assessment   Prognosis Good   Problem List Decreased strength;Decreased endurance; Impaired balance;Decreased mobility   Goals   Patient Goals to rest   LTG Expiration Date 07/24/23   PT Treatment Day 1   Plan   Treatment/Interventions Functional transfer training;LE strengthening/ROM; Elevations; Therapeutic exercise; Endurance training;Patient/family training;Equipment eval/education; Bed mobility;Gait training;OT;Spoke to nursing   Progress Slow progress, decreased activity tolerance   PT Frequency 3-5x/wk   Recommendation   PT Discharge Recommendation Home with home health rehabilitation   Equipment Recommended Gaines Kalyn)  Leo Adames  (still recommend RW for d/c home)   [address] walker   Change/add to [place]?  No   AM-PAC Basic Mobility Inpatient   Turning in Flat Bed Without Bedrails 3   Lying on Back to Sitting on Edge of Flat Bed Without Bedrails 3   Moving Bed to Chair 3   Standing Up From Chair Using Arms 3   Walk in Room 3   Climb 3-5 Stairs With Railing 2   Basic Mobility Inpatient Raw Score 17   Basic Mobility Standardized Score 39.67   Highest Level Of Mobility   -HLM Goal 5: Stand one or more mins   JH-HLM Achieved 6: Walk 10 steps or more     The patient's AM-PAC Basic Mobility Inpatient Standardized Score is less than 42.9, suggesting this patient may benefit from discharge to post-acute rehabilitation services. Please also refer to the recommendation of the Physical Therapist for safe discharge planning.     MEHRAN PIZANO PT, DPT

## 2023-07-13 NOTE — MALNUTRITION/BMI
This medical record reflects one or more clinical indicators suggestive of malnutrition and/or morbid obesity. Malnutrition Findings:   Adult Malnutrition type: Acute illness  Adult Degree of Malnutrition: Malnutrition of moderate degree  Malnutrition Characteristics: Fat loss, Muscle loss, Weight loss, Inadequate energy                  360 Statement: Moderate protein energy malnutrition is related to physiological causes as evidenced by mild muscle/fat wasting, decreased po intakes over the last 2 months and weight loss of 11#/7.6% x2m. Treated with addition of supplement and diet. BMI Findings: Body mass index is 21.42 kg/m². See Nutrition note dated 7/13/23 for additional details. Completed nutrition assessment is viewable in the nutrition documentation.

## 2023-07-14 ENCOUNTER — APPOINTMENT (INPATIENT)
Dept: NON INVASIVE DIAGNOSTICS | Facility: HOSPITAL | Age: 58
DRG: 271 | End: 2023-07-14
Payer: COMMERCIAL

## 2023-07-14 ENCOUNTER — TELEPHONE (OUTPATIENT)
Dept: NEPHROLOGY | Facility: CLINIC | Age: 58
End: 2023-07-14

## 2023-07-14 PROBLEM — K55.1 SUPERIOR MESENTERIC ARTERY STENOSIS (HCC): Status: ACTIVE | Noted: 2023-07-14

## 2023-07-14 LAB
ANION GAP SERPL CALCULATED.3IONS-SCNC: 5 MMOL/L
ATRIAL RATE: 76 BPM
BACTERIA BLD CULT: NORMAL
BACTERIA BLD CULT: NORMAL
BUN SERPL-MCNC: 29 MG/DL (ref 5–25)
CALCIUM SERPL-MCNC: 8.4 MG/DL (ref 8.3–10.1)
CHLORIDE SERPL-SCNC: 111 MMOL/L (ref 96–108)
CO2 SERPL-SCNC: 26 MMOL/L (ref 21–32)
CREAT SERPL-MCNC: 2.06 MG/DL (ref 0.6–1.3)
ERYTHROCYTE [DISTWIDTH] IN BLOOD BY AUTOMATED COUNT: 14.8 % (ref 11.6–15.1)
GFR SERPL CREATININE-BSD FRML MDRD: 33 ML/MIN/1.73SQ M
GLUCOSE SERPL-MCNC: 88 MG/DL (ref 65–140)
HCT VFR BLD AUTO: 21.7 % (ref 36.5–49.3)
HGB BLD-MCNC: 7.1 G/DL (ref 12–17)
INTERVENTRICULAR SEPTUM IN DIASTOLE (PARASTERNAL SHORT AXIS VIEW): 1.8 CM
LEFT VENTRICULAR INTERNAL DIMENSION IN DIASTOLE: 4.6 CM (ref 3.5–6)
LEFT VENTRICULAR POSTERIOR WALL IN END DIASTOLE: 1.8 CM
MCH RBC QN AUTO: 29.1 PG (ref 26.8–34.3)
MCHC RBC AUTO-ENTMCNC: 32.7 G/DL (ref 31.4–37.4)
MCV RBC AUTO: 89 FL (ref 82–98)
P AXIS: 77 DEGREES
PLATELET # BLD AUTO: 377 THOUSANDS/UL (ref 149–390)
PMV BLD AUTO: 9.5 FL (ref 8.9–12.7)
POTASSIUM SERPL-SCNC: 3.4 MMOL/L (ref 3.5–5.3)
PR INTERVAL: 140 MS
QRS AXIS: -56 DEGREES
QRSD INTERVAL: 90 MS
QT INTERVAL: 424 MS
QTC INTERVAL: 477 MS
RA PRESSURE ESTIMATED: 3 MMHG
RBC # BLD AUTO: 2.44 MILLION/UL (ref 3.88–5.62)
SL CV LV EF: 55
SODIUM SERPL-SCNC: 142 MMOL/L (ref 135–147)
T WAVE AXIS: 124 DEGREES
VENTRICULAR RATE: 76 BPM
WBC # BLD AUTO: 9.43 THOUSAND/UL (ref 4.31–10.16)

## 2023-07-14 PROCEDURE — 97530 THERAPEUTIC ACTIVITIES: CPT

## 2023-07-14 PROCEDURE — 99222 1ST HOSP IP/OBS MODERATE 55: CPT | Performed by: PHYSICIAN ASSISTANT

## 2023-07-14 PROCEDURE — 93975 VASCULAR STUDY: CPT | Performed by: SURGERY

## 2023-07-14 PROCEDURE — 99233 SBSQ HOSP IP/OBS HIGH 50: CPT | Performed by: GENERAL PRACTICE

## 2023-07-14 PROCEDURE — C9113 INJ PANTOPRAZOLE SODIUM, VIA: HCPCS | Performed by: NURSE PRACTITIONER

## 2023-07-14 PROCEDURE — 93308 TTE F-UP OR LMTD: CPT

## 2023-07-14 PROCEDURE — 85027 COMPLETE CBC AUTOMATED: CPT | Performed by: GENERAL PRACTICE

## 2023-07-14 PROCEDURE — 93975 VASCULAR STUDY: CPT

## 2023-07-14 PROCEDURE — 97116 GAIT TRAINING THERAPY: CPT

## 2023-07-14 PROCEDURE — 93010 ELECTROCARDIOGRAM REPORT: CPT | Performed by: INTERNAL MEDICINE

## 2023-07-14 PROCEDURE — 93306 TTE W/DOPPLER COMPLETE: CPT | Performed by: INTERNAL MEDICINE

## 2023-07-14 PROCEDURE — 99232 SBSQ HOSP IP/OBS MODERATE 35: CPT | Performed by: INTERNAL MEDICINE

## 2023-07-14 PROCEDURE — 80048 BASIC METABOLIC PNL TOTAL CA: CPT | Performed by: GENERAL PRACTICE

## 2023-07-14 RX ORDER — POTASSIUM CHLORIDE 14.9 MG/ML
20 INJECTION INTRAVENOUS
Status: DISPENSED | OUTPATIENT
Start: 2023-07-14 | End: 2023-07-14

## 2023-07-14 RX ORDER — POTASSIUM CHLORIDE 14.9 MG/ML
20 INJECTION INTRAVENOUS ONCE
Status: COMPLETED | OUTPATIENT
Start: 2023-07-14 | End: 2023-07-15

## 2023-07-14 RX ADMIN — AMLODIPINE BESYLATE 10 MG: 10 TABLET ORAL at 10:12

## 2023-07-14 RX ADMIN — ALUMINUM HYDROXIDE, MAGNESIUM HYDROXIDE, AND SIMETHICONE 30 ML: 200; 200; 20 SUSPENSION ORAL at 12:39

## 2023-07-14 RX ADMIN — DOXAZOSIN 8 MG: 4 TABLET ORAL at 10:07

## 2023-07-14 RX ADMIN — COLCHICINE 0.6 MG: 0.6 TABLET ORAL at 10:06

## 2023-07-14 RX ADMIN — MIRTAZAPINE 15 MG: 15 TABLET, FILM COATED ORAL at 21:26

## 2023-07-14 RX ADMIN — FLUTICASONE FUROATE 1 PUFF: 100 POWDER RESPIRATORY (INHALATION) at 10:16

## 2023-07-14 RX ADMIN — ATORVASTATIN CALCIUM 40 MG: 40 TABLET, FILM COATED ORAL at 21:26

## 2023-07-14 RX ADMIN — POTASSIUM CHLORIDE 20 MEQ: 14.9 INJECTION, SOLUTION INTRAVENOUS at 12:43

## 2023-07-14 RX ADMIN — TRIMETHOBENZAMIDE HYDROCHLORIDE 200 MG: 100 INJECTION INTRAMUSCULAR at 12:02

## 2023-07-14 RX ADMIN — Medication 2000 UNITS: at 10:12

## 2023-07-14 RX ADMIN — BUSPIRONE HYDROCHLORIDE 15 MG: 10 TABLET ORAL at 10:12

## 2023-07-14 RX ADMIN — POTASSIUM CHLORIDE 20 MEQ: 14.9 INJECTION, SOLUTION INTRAVENOUS at 19:38

## 2023-07-14 RX ADMIN — LABETALOL HYDROCHLORIDE 400 MG: 200 TABLET, FILM COATED ORAL at 10:12

## 2023-07-14 RX ADMIN — LABETALOL HYDROCHLORIDE 400 MG: 200 TABLET, FILM COATED ORAL at 21:26

## 2023-07-14 RX ADMIN — Medication 2 TABLET: at 17:33

## 2023-07-14 RX ADMIN — PANTOPRAZOLE SODIUM 40 MG: 40 INJECTION, POWDER, FOR SOLUTION INTRAVENOUS at 21:26

## 2023-07-14 RX ADMIN — BUSPIRONE HYDROCHLORIDE 15 MG: 10 TABLET ORAL at 17:33

## 2023-07-14 RX ADMIN — SERTRALINE HYDROCHLORIDE 200 MG: 100 TABLET ORAL at 10:06

## 2023-07-14 RX ADMIN — PANTOPRAZOLE SODIUM 40 MG: 40 INJECTION, POWDER, FOR SOLUTION INTRAVENOUS at 10:08

## 2023-07-14 RX ADMIN — BUDESONIDE AND FORMOTEROL FUMARATE DIHYDRATE 2 PUFF: 160; 4.5 AEROSOL RESPIRATORY (INHALATION) at 17:38

## 2023-07-14 RX ADMIN — BUDESONIDE AND FORMOTEROL FUMARATE DIHYDRATE 2 PUFF: 160; 4.5 AEROSOL RESPIRATORY (INHALATION) at 10:16

## 2023-07-14 RX ADMIN — Medication 1 TABLET: at 10:12

## 2023-07-14 RX ADMIN — IRON SUCROSE 300 MG: 20 INJECTION, SOLUTION INTRAVENOUS at 10:05

## 2023-07-14 RX ADMIN — DOXAZOSIN 8 MG: 4 TABLET ORAL at 17:34

## 2023-07-14 RX ADMIN — POLYETHYLENE GLYCOL 3350 17 G: 17 POWDER, FOR SOLUTION ORAL at 13:22

## 2023-07-14 NOTE — PLAN OF CARE
Problem: PHYSICAL THERAPY ADULT  Goal: Performs mobility at highest level of function for planned discharge setting. See evaluation for individualized goals. Description: Treatment/Interventions: Functional transfer training, LE strengthening/ROM, Therapeutic exercise, Endurance training, Patient/family training, Equipment eval/education, Bed mobility, Gait training, OT, Spoke to nursing, Elevations  Equipment Recommended: (S) Bogdan Bradly (may require RW for d/c; will continue to assess)       See flowsheet documentation for full assessment, interventions and recommendations. Outcome: Progressing  Note: Prognosis: Good  Problem List: Decreased strength, Decreased endurance, Impaired balance, Decreased mobility  Assessment: Patient received in bed. Patient agreeable to therapy. Patient performs bed mobility with MI. Patient performs functional transfers with Supervision. Toilet transfer with Supervision. Patient performs ambulation with CGA using no AD, but utilizes UE support on IV pole, 10'x2 + 100'x2 with seated rest periods between all trials. Patient has CASTILLO, but maintains % SPo2 on room air. Patient left in bed with all needs met and call bell/personal items within reach. The patient's AM-PAC Basic Mobility Inpatient Short Form Raw Score is 17 showing further need for skilled PT services in order to improve functional mobility, decrease need for assistance, and return to PLOF. A Raw score of greater than or equal to 16 suggests the patient may benefit from discharge to home. PT continues to recommend home with HHPT on d/c. Will continue to follow as able. PT Discharge Recommendation: Home with home health rehabilitation    See flowsheet documentation for full assessment.

## 2023-07-14 NOTE — TELEPHONE ENCOUNTER
Patient called and stated that he wanted [place] to be aware that he is currently admitted in [facility].  Patient states he had a procedure that she wanted done to be done in August.

## 2023-07-14 NOTE — PROGRESS NOTES
Progress Note - Cardiology   Latia Cotton 59 y.o. male MRN: [de-identified]  Unit/Bed#: [facility] 526-01 Encounter: [id number]  07/14/23  2:11 PM    Impression and Plan:    78-year-old with a history of Shamika-en-Y gastric bypass, hypertension, left ventricular hypertrophy, CKD, prior DVT, chronic diastolic heart failure-not on loop diuretic at baseline,Hypertension, history of hyperaldosteronism, status post left adrenalectomy in 2012, on eplerenone, admitted with shortness of breath from anemia and pericardial effusion with tamponade physiology, underwent pericardial window placement with removal of 300 cc of serous fluid, sent for culture and cytology and pericardial biopsy done,     He is mildly tachypneic also but lungs have been clear to auscultation. Also had significant anemia with melena and bright red blood per rectum, with a hemoglobin yessy of 6.4 with a baseline around 12-13, EGD was negative, colonoscopy was a poor prep. Pericardial drain was removed yesterday, no further events,    Overall looks and feels much better today.     Plan:     Pericardial effusion: Status post removal during pericardiocentesis of 300 cc of fluid for pericardial effusion with tamponade physiology and subsequent  Pericardial drain removed yesterday, today's echo without any reaccumulation of free fluid    Pericardial fluid cytology-poor fixation, hence only degenerative cells,  Pericardial fluid biopsy still awaited.     He does admit to prior pericardial effusion, no clear details in this regard but had a moderate pericardial effusion in 2018, did not undergo drainage    Continue colchicine 0.6 mg daily, avoiding NSAIDs in the setting of CKD    Outpatient rheumatology evaluation     Left ventricular hypertrophy: Significant LVH noted on echo, moderate with measurements, has carried a label of 'hypertrophic cardiomyopathy from uncontrolled hypertension'  On reviewing echo and prior strain pattern, suggestive of amyloid-PYP SPECT was negative, will check a cardiac MRI to rule out any other infiltrative disease     No neuropathic symptoms  Has have intrinsic renal disease as well although this also could be from hypertension-typically TTR amyloid is not associated with renal dysfunction    Resistant hypertension: History of allergy-anaphylaxis to clonidine, lisinopril, nifedipine, spironolactone and unspecified allergy to minoxidil hence started on  At baseline and has continued on amlodipine 10 mg, Cardura 8 mg twice daily, eplerenone 50 mg daily, hydralazine 100 mg 3 times daily, and also started on labetalol 200 mg twice daily  He strongly felt that hydralazine was making him foggy, fatigued and does not want to take it.   will discontinue hydralazine entirely  Since he needs to be evaluated for hyperaldosteronism again-has periodically been hypokalemic, also with marked hypotension needing multiple medications despite having had over 100 pounds of weight loss post gastric bypass surgery and with previous history of hyperaldosteronism-status post left adrenalectomy at the [facility] further details are available,, will discontinue eplerenone as well and check aldosterone/renin ratio in about 2 to 3 weeks  A.m. cortisol level     Overall blood pressures are acceptable at this time. No further changes today. Anemia: EGD was negative, colonoscopy prep was suboptimal and to be repeated    Prolonged QT on ECG: Today around 470 ms, only mildly prolonged. Avoid any QT prolonging drugs, has received Zofran as well. No Reglan. Might  be stable for discharge tomorrow, outpatient follow-up at [facility]-patient requests to switch to [facility] cardiology  ===================================================================    Chief Complaint: No chief complaint on file.         Subjective/Objective     Subjective: Denies any new complaints    Objective: No distress    Patient Active Problem List   Diagnosis   • Pericardial effusion   • Essential hypertension   • Leg DVT (deep venous thromboembolism), acute, bilateral (HCC)   • COPD (chronic obstructive pulmonary disease) (HCC)   • CHF (congestive heart failure) (HCC)   • Chronic diastolic CHF (congestive heart failure) (HCC)   • Pulmonary nodule, right   • CKD stage 3 secondary to diabetes ([address])   • Lymphedema   • Anemia   • AGUS (obstructive sleep apnea)   • Abscess of left groin   • History of DVT (deep vein thrombosis)   • Splenic lesion   • Dysuria   • Renal cyst   • Post-traumatic male urethral stricture   • Morbid obesity due to excess calories ([address])   • Encounter for surgical aftercare following surgery of digestive system   • Postsurgical malabsorption   • Hypertensive kidney disease with stage 3 chronic kidney disease (HCC)   • Acute renal failure superimposed on stage 3 chronic kidney disease (HCC)   • Bradycardia   • Hypoglycemia   • History of gastric bypass   • Hypokalemia   • Seizure (HCC)   • Hypertensive encephalopathy   • Hypertensive emergency   • Meningioma (HCC)   • Prolonged Q-T interval on ECG   • Abdominal pain   • Cardiac tamponade   • Hypertensive urgency   • Hematochezia   • Depression with anxiety   • Electrolyte abnormality   • Moderate protein-calorie malnutrition (HCC)       Vitals: BP (!) 165/102 (BP Location: Right arm)   Pulse 66   Temp 98.4 °F (36.9 °C) (Oral)   Resp 17   Ht 5' 6" (1.676 m)   Wt 59.1 kg (130 lb 4.7 oz)   SpO2 98%   BMI 21.03 kg/m²     I/O this shift: In: 65 [P.O.:240; I.V.:40; IV Piggyback:250]  Out: -   Wt Readings from Last 3 Encounters:   07/14/23 59.1 kg (130 lb 4.7 oz)   07/08/23 68.9 kg (152 lb)   05/23/23 68.9 kg (152 lb)       Intake/Output Summary (Last 24 hours) at 7/14/2023 1411  Last data filed at 7/14/2023 1241  Gross per 24 hour   Intake 2210 ml   Output 445 ml   Net 1765 ml     I/O last 3 completed shifts:   In: 9785 [P.O.:2580]  Out: 2135 [Urine:2065; Drains:70]    Invasive Devices     Peripheral Intravenous Line  Duration           Peripheral IV 07/14/23 Left;Proximal;Ventral (anterior) Forearm <1 day                  Physical Exam:  GEN: Woody  appears well, alert and oriented x 3, pleasant and cooperative   HEENT: pupils equal, round, and reactive to light; extraocular muscles intact  NECK: supple, no carotid bruits or JVD  HEART: regular rhythm, normal S1 and S2, no murmur, no clicks, gallops or rubs, drain removed   LUNGS: clear to auscultation bilaterally; no wheezes or rhonchi, no rales  ABDOMEN/GI: normal bowel sounds, soft, no tenderness, no distention  EXTREMITIES/Musculoskeltal: peripheral pulses normal; no clubbing, cyanosis, no edema  NEURO: no focal motor findings   SKIN: normal without suspicious lesions on exposed skin              Lab Results:       Results from last 7 days   Lab Units 07/14/23  0527 07/13/23  0506 07/12/23  0508   WBC Thousand/uL 9.43 9.79 12.05*   HEMOGLOBIN g/dL 7.1* 7.7* 7.7*   HEMATOCRIT % 21.7* 23.6* 23.6*   PLATELETS Thousands/uL 377 371 341         Results from last 7 days   Lab Units 07/14/23  0527 07/13/23  0506 07/12/23  0508 07/10/23  0442 07/09/23  0433 07/08/23  1308 07/08/23  0137   POTASSIUM mmol/L 3.4* 3.4* 2.7*   < > 3.6   < > 4.2   CHLORIDE mmol/L 111* 111* 114*   < > 113*   < > 106   CO2 mmol/L 26 25 25   < > 25   < > 21   BUN mg/dL 29* 30* 34*   < > 49*   < > 63*   CREATININE mg/dL 2.06* 2.09* 1.90*   < > 1.92*   < > 1.59*   CALCIUM mg/dL 8.4 8.5 8.9   < > 8.6   < > 9.7   ALK PHOS U/L  --   --   --   --  54  --  58   ALT U/L  --   --   --   --  50  --  64*   AST U/L  --   --   --   --  32  --  52*    < > = values in this interval not displayed. Imaging: I have personally reviewed pertinent reports.     EKG/Telemtry: No events    Scheduled Meds:  Current Facility-Administered Medications   Medication Dose Route Frequency Provider Last Rate   • acetaminophen  650 mg Oral Q6H PRN MARBIN Benavides     • aluminum-magnesium hydroxide-simethicone  30 mL Oral Q6H PRN Terrial Holes, CRNP     • amLODIPine  10 mg Oral Daily Terrial Holes, CRNP     • atorvastatin  40 mg Oral HS Terrial Holes, CRNP     • budesonide-formoterol  2 puff Inhalation BID Terrial Holes, CRNP     • busPIRone  15 mg Oral BID Terrial Holes, CRNP     • calcium carbonate  2 tablet Oral BID With Meals Terrial Holes, CRNP     • cholecalciferol  2,000 Units Oral Daily Terrial Holes, CRNP     • colchicine  0.6 mg Oral Daily Royanne Sun, DO     • doxazosin  8 mg Oral BID Terrial Holes, CRNP     • fluticasone  1 puff Inhalation Daily Terrial Holes, CRNP     • hydrALAZINE  10 mg Intravenous Q6H PRN Terrial Holes, CRNP     • HYDROmorphone  0.2 mg Intravenous Q3H PRN Terrial Holes, CRNP     • iron sucrose  300 mg Intravenous Once per day on Mon Wed Fri Maury Schultz, DO Stopped (07/14/23 1241)   • labetalol  10 mg Intravenous Q6H PRN Terrial Holes, CRNP     • labetalol  400 mg Oral Q12H [address]  Mckenzie Antonio MD     • LORazepam  0.5 mg Intravenous Q30 Min PRN Maury Schultz, DO     • methocarbamol  500 mg Oral Q6H PRN Terrial Holes, CRNP     • mirtazapine  15 mg Oral HS Terrial Holes, CRNP     • multivitamin-minerals  1 tablet Oral Daily Terrial Holes, CRNP     • oxyCODONE  2.5 mg Oral Q4H PRN Terrial Holes, CRNP      Or   • oxyCODONE  5 mg Oral Q4H PRN Terrial Holes, CRNP     • pantoprazole  40 mg Intravenous Q12H [address] Terrial Holes, CRNP     • polyethylene glycol  17 g Oral Daily Terrial Holes, CRNP     • potassium chloride  20 mEq Intravenous Q2H Maury [facility] DO 20 mEq (07/14/23 1243)   • sertraline  200 mg Oral Daily Terrial Holes, CRNP     • trimethobenzamide  200 mg Intramuscular Once Mckenzie Antonio MD     • trimethobenzamide  200 mg Intramuscular Q6H PRN Mckenzie Antonio MD       Continuous Infusions:          This note was completed in part utilizing m-modal fluency direct voice recognition software. Grammatical errors, random word insertion, spelling mistakes, occasional wrong word or "sound-alike" substitutions and incomplete sentences may be an occasional consequence of the system secondary to software limitations, ambient noise and hardware issues. At the time of dictation, efforts were made to edit, clarify and /or correct errors. Please read the chart carefully and recognize, using context, where substitutions have occurred. If you have any questions or concerns about the context, text or information contained within the body of this dictation, please contact myself, the provider, for further clarification.

## 2023-07-14 NOTE — ASSESSMENT & PLAN NOTE
Wt Readings from Last 3 Encounters:   07/14/23 59 kg (130 lb)   07/08/23 68.9 kg (152 lb)   05/23/23 68.9 kg (152 lb)     LVEF 60 to 68%, LVH noted  Monitor I/O, daily weights  [facility] now on hold

## 2023-07-14 NOTE — CASE MANAGEMENT
Case Management Progress Note    Patient name Marck Bales  Location [address] [facility] [id number]  : 1965 Date 2023       LOS (days): 6  Geometric Mean LOS (GMLOS) (days): 3.90  Days to GMLOS:-2.2        OBJECTIVE:        Current admission status: Inpatient  Preferred Pharmacy:   [facility] [address] KEVIN BEDOYA [address]  Phone: [phone] Fax: [phone]    Primary Care Provider: Domi Steele DO    Primary Insurance: [facility]  Secondary Insurance:     PROGRESS NOTE: Per interdisciplinary team meeting, patient is pending a cardiac MRI and plan for DC tomorrow. Notified [facility] of DC plan via Roula Oscar.

## 2023-07-14 NOTE — TELEPHONE ENCOUNTER
As per Hari (NP) I called [facility] @ [phone] and spoke to Veda Reeder and cancel the patient Vas Renal Artery Complete Study schedule for 8/31/2023 because as per Hari (NP) the patient has this test done while he was admitted to the hospital.

## 2023-07-14 NOTE — ASSESSMENT & PLAN NOTE
· [facility] with symptoms of chest pain shortness of breath epigastric pain  · He was noted to have pericardial effusion with concerns for tamponade  · He was transferred to [address] [place] [address] for thoracic surgery evaluation  · S/p window 7/8 - drain removed today  · Thoracic surgery appreciated  · Cardio appreciated  · SPEP/UPEP WNL  · C/w colchicine  · Fluid cytology non diagnostic  · Pericardial biopsy pending  · Cx neg so stopped abx  ·  - refer to rheum at d/c  · 7/12 underwent TTR amyloid SPECT study negative  · Today limited echo only w/ trivial pericardial effusion  · Cardiac MRI ordered

## 2023-07-14 NOTE — CONSULTS
Consultation - Vascular Surgery   Pedro Rod 59 y.o. male MRN: [de-identified]  Unit/Bed#: [facility] 526-01 Encounter: [id number]      Assessment:  Pedro Rod is a 59 y.o. male with Abdominal pain and incidental finding of SMA stenosis. Patient initially admitted for Cardiac Tamponade, resolved. Vascular consulted for evaluation of abdominal pain. PMHx significant for CKD3, chronic diastolic CHF, s/p humaira-en-Y gastric bypass, COPD, PSA, and uncontrolled hypertension       Plan:  • Patient with incidental finding of SMA stenosis however compared to CTA performed in May of this year, no significant stenosis was noted. Of note, US appears to be unreliable secondary to severe attenuation and bowel gas. Patient does not illicit signs of SMA stenosis or acute mesenteric ischemia. No immediate vascular intervention at this time. Will discuss with attending regarding need for further studies. • Rest of care per primary team, please wait for attending attestation for final recommendations. _______________________________________________________________  Physician Requesting Consult: Atilio Watts DO    Additional consultants: Cardiolgy and SLIM    Reason for Consult / Principal Problem: Abdominal pain. HPI: Pedro Rod is a 59y.o. year old male who was admitted on 7/8/2023 with complaints of chest pain and shortness of breath was found to have cardiac tamponade and underwent a pericardial window. Symptoms have now resolved however patient now complains of abdominal pain which has been ongoing for the past 2 years. Patient states that he underwent a gastric bypass 2 years ago and since then whenever he takes medications he has cramping and abdominal pain to his lower abdomen which also is, accompanied by nausea and vomiting. He states that he has no fever with eating. He denies any pain after eating or prior to eating.   He states that his abdominal pain is only present when he takes his medications in the morning. He does report a weight loss after his bypass surgery which he states has been more than expected. He denies any fevers chills or shortness of breath at this time. Historical Information   Past Medical History:   Diagnosis Date   • Asthma    • Chronic kidney disease    • COPD (chronic obstructive pulmonary disease) (HCC)    • CPAP (continuous positive airway pressure) dependence    • Diabetes mellitus (HCC)    • Emphysema of lung (HCC)    • Gout    • History of transfusion     pt stated they had a transfusion when they had gallbladder surgery. • Hypertension    • Kidney disease     renal failure   • Leg DVT (deep venous thromboembolism), acute, bilateral ([address]) 01/2018   • Obesity (BMI 30-39. 9)    • AGUS on CPAP     setting 11   • Postgastrectomy malabsorption    • Sleep apnea    • Systolic CHF [facility])      Past Surgical History:   Procedure Laterality Date   • ADRENALECTOMY     • CHOLECYSTECTOMY     • COLONOSCOPY     • EGD     • HIATAL HERNIA REPAIR N/A 12/22/2020    Procedure: REPAIR HERNIA HIATAL LAPAROSCOPIC;  Surgeon: Jason Perkins MD;  Location: [facility];  Service: Bariatrics   • INCISION AND DRAINAGE OF WOUND Left 10/17/2018    Procedure: INCISION AND DRAINAGE (I&D) GROIN;  Surgeon: Héctor Pritchett MD;  Location: [facility];  Service: General   • IR IMAGE [address] / [id number] W TUBE  8/17/2018   • IR IMAGE [address] / DRAINAGE W TUBE  7/31/2018   • JOINT REPLACEMENT Bilateral     knee   • KIDNEY SURGERY  2009    nodule removal   • LYMPH NODE DISSECTION Left 01/2018    left inguinal LN removed - benign    • OTHER SURGICAL HISTORY      kidney nodule removal   • PALATE / Trygve Rakers BIOPSY / EXCISION     • PERICARDIAL WINDOW N/A 7/8/2023    Procedure: WINDOW PERICARDIAL;  Surgeon: Shilpa Wilson MD;  Location: [facility];  Service: Thoracic   • [place] LAPS GSTR RSTCV [address] [place] [address] W/BYP JASON-EN-Y LIMB <150 CM N/A 12/22/2020    Procedure: BYPASS GASTRIC  JASON-EN-Y LAPAROSCOPIC WITH INTRAOPERATIVE EGD;  Surgeon: Shira Cast MD;  Location: [facility];  Service: Bariatrics   • SINUS SURGERY     • TONSILLECTOMY       Social History   Social History     Substance and Sexual Activity   Alcohol Use Yes    Comment: occasionally     Social History     Substance and Sexual Activity   Drug Use No     Social History     Tobacco Use   Smoking Status Never   Smokeless Tobacco Never     Family History: non-contributory}    Meds/Allergies     Home meds:   Prior to Admission medications    Medication Sig Start Date End Date Taking?  Authorizing Provider   albuterol (PROVENTIL HFA,VENTOLIN HFA) 90 mcg/act inhaler     Historical Provider, MD   amLODIPine (NORVASC) 10 mg tablet Take 10 mg by mouth 4/15/20   Historical Provider, MD   atorvastatin (LIPITOR) 40 mg tablet Take 40 mg by mouth daily at bedtime 7/21/22   Historical Provider, MD   busPIRone (BUSPAR) 15 mg tablet TAKE ONE TABLET BY MOUTH TWICE A DAY FOR PTSD    Historical Provider, MD   Calcium 600 MG tablet Take 1,200 mg by mouth    Historical Provider, MD   Cholecalciferol (VITAMIN D-3) 1000 units CAPS Take 2,000 Units by mouth daily    Historical Provider, MD   doxazosin (CARDURA) 8 MG tablet Take 8 mg by mouth 2 (two) times a day 2/3/21   Historical Provider, MD   eplerenone (INSPRA) 50 MG tablet Take 50 mg by mouth in the morning 5/23/17   Historical Provider, MD   ferrous sulfate 325 (65 Fe) mg tablet Take 325 mg by mouth 2 (two) times a day      Historical Provider, MD   glycopyrrolate-formoterol (Arbutus Guile) 9-4.8 MCG/ACT inhaler Inhale 2 puffs daily after breakfast    Historical Provider, MD   hydrALAZINE (APRESOLINE) 100 MG tablet Take 1 tablet (100 mg total) by mouth 3 (three) times a day 5/23/23   MARBIN Abdi   labetalol (NORMODYNE) 300 mg tablet Take 1 tablet (300 mg total) by mouth every 12 (twelve) hours 5/14/23 6/13/23  Shawnee Dodson MD   levETIRAcetam (KEPPRA) 750 mg tablet Take 1 tablet (750 mg total) by mouth every 12 (twelve) hours 5/14/23 6/13/23  Naomy Busby MD   mirtazapine (REMERON) 15 mg tablet 15 mg 9/1/22   Historical Provider, MD   Multiple Vitamins-Minerals (multivitamin with minerals) tablet Take 1 tablet by mouth daily    Historical Provider, MD   Omega-3 Fatty Acids (FISH OIL) 1200 MG CAPS Take 1,200 mg by mouth daily at bedtime  Patient not taking: Reported on 5/23/2023    Historical Provider, MD   Pulmicort Flexhaler 90 MCG/ACT inhaler 2 puffs 2 (two) times a day 3/9/21   Historical Provider, MD   sertraline (ZOLOFT) 100 mg tablet Take 2 tablets by mouth daily 6/15/22   Historical Provider, MD   umeclidinium-vilanterol (ANORO ELLIPTA) 62.5-25 MCG/INH inhaler Inhale 1 puff daily    Historical Provider, MD     Scheduled Meds:  Current Facility-Administered Medications   Medication Dose Route Frequency Provider Last Rate   • acetaminophen  650 mg Oral Q6H PRN MARBIN Alba     • aluminum-magnesium hydroxide-simethicone  30 mL Oral Q6H PRN MARBIN Alba     • amLODIPine  10 mg Oral Daily MARBIN Alba     • atorvastatin  40 mg Oral HS MARBIN Alba     • budesonide-formoterol  2 puff Inhalation BID MARBIN Alba     • busPIRone  15 mg Oral BID MARBIN Alba     • calcium carbonate  2 tablet Oral BID With Meals MARBIN Alba     • cholecalciferol  2,000 Units Oral Daily MARBIN Alba     • colchicine  0.6 mg Oral Daily Gabo Laboy DO     • doxazosin  8 mg Oral BID MARBIN Alba     • fluticasone  1 puff Inhalation Daily MARBIN Alba     • hydrALAZINE  10 mg Intravenous Q6H PRN MARBIN Alba     • HYDROmorphone  0.2 mg Intravenous Q3H PRN MARBIN Alba     • iron sucrose  300 mg Intravenous Once per day on Mon Wed Fri Ariela Navarro DO Stopped (07/14/23 1241)   • labetalol  10 mg Intravenous Q6H PRN MARBIN Alba • labetalol  400 mg Oral Q12H [facility] Tai Coreas MD     • LORazepam  0.5 mg Intravenous Q30 Min PRN Sujatha Stevenson, DO     • methocarbamol  500 mg Oral Q6H PRN MARBIN Zabala     • mirtazapine  15 mg Oral HS MARBIN Zabala     • multivitamin-minerals  1 tablet Oral Daily MARBIN Zabala     • oxyCODONE  2.5 mg Oral Q4H PRN MARBIN Zabala      Or   • oxyCODONE  5 mg Oral Q4H PRN MARBIN Zabala     • pantoprazole  40 mg Intravenous Q12H [facility] MARBIN Zabala     • polyethylene glycol  17 g Oral Daily MARBIN Zabala     • sertraline  200 mg Oral Daily MARBIN Zabala     • trimethobenzamide  200 mg Intramuscular Once aTi Coreas MD     • trimethobenzamide  200 mg Intramuscular Q6H PRN Tai Coreas MD       Continuous Infusions:   PRN Meds:  •  acetaminophen  •  aluminum-magnesium hydroxide-simethicone  •  hydrALAZINE  •  HYDROmorphone  •  labetalol  •  LORazepam  •  methocarbamol  •  oxyCODONE **OR** oxyCODONE  •  trimethobenzamide    ALLERGIES:   Allergies   Allergen Reactions   • Clonidine Anaphylaxis   • Lisinopril Anaphylaxis   • Nifedipine Anaphylaxis   • Spironolactone Anaphylaxis, Other (See Comments) and Shortness Of Breath   • Buspirone      Headaches,    • Enoxaparin      Injection site "bump" per Dr. Taco Ruiz   • Minoxidil      Retains fluid around heart       Review of Systems:  General: negative  Cardiovascular: no chest pain or dyspnea on exertion  Respiratory: no cough, shortness of breath, or wheezing  Gastrointestinal: negative  Genitourinary: no dysuria, trouble voiding, or hematuria  Musculoskeletal: negative  Neurological: no TIA or stroke symptoms  Hematological and Lymphatic: negative  Dermatological : negative  Psychological: negative  Ophthalmic: negative  ENT: negative      Objective   Vitals:  Blood pressure 135/76, pulse 78, temperature 100.2 °F (37.9 °C), resp.  rate 17, height 5' 6" (1.676 m), weight 59 kg (130 lb), SpO2 95 %. Body mass index is 20.98 kg/m². SpO2: SpO2: 95 %, SpO2 Activity: SpO2 Activity: At Rest    I/Os:   I/O       07/12 0701  07/13 0700 07/13 0701  07/14 0700 07/14 0701  07/15 0700    P. O. 720 2100 600    I.V. (mL/kg)   40 (0.7)    NG/GT       IV Piggyback   250    Total Intake(mL/kg) 720 (12) 2100 (35.5) 890 (15.1)    Urine (mL/kg/hr) 850 (0.6) 1415 (1)     Drains 80 20     Stool       Total Output 930 1435     Net -210 +665 +890                  Intake/Output Summary (Last 24 hours) at 7/14/2023 1803  Last data filed at 7/14/2023 1733  Gross per 24 hour   Intake 2330 ml   Output 325 ml   Net 2005 ml        Invasive Lines/Tubes:  Invasive Devices     Peripheral Intravenous Line  Duration           Peripheral IV 07/14/23 Left;Proximal;Ventral (anterior) Forearm <1 day                Physical Exam  General appearance: alert, appears stated age and cooperative  Head: Normocephalic, without obvious abnormality, atraumatic  Eyes: conjunctivae/corneas clear, EOM's intact. Throat: lips, mucosa, and tongue normal; teeth and gums normal  Neck: no adenopathy, no carotid bruit and no JVD  Lungs: clear to auscultation bilaterally, Non-labored breathing   Chest wall: no tenderness  Heart: regular rate and rhythm  Abdomen: soft, non-tender; bowel sounds normal; no masses,  no organomegaly  Extremities: extremities normal, atraumatic, no cyanosis or edema  Skin: Skin color, texture, turgor normal. No rashes or lesions  Neurologic: Grossly normal    Vascular Exam:    Focused evaluation of the patient's abdomen revealed no obvious deformity on observation. Palpation revealed no tenderness to palpation in all 4 quadrants with light and deep palpation. Patient's abdominal aorta appeared to be of normal caliber. On auscultation patient had positive bowel sounds in all 4 quadrants without any abdominal bruit appreciated.   Gross sensation appear to be adequate bilaterally of the upper and lower extremities. Motor examination revealed adequate strength which is symmetric of the bilateral upper and lower extremities.     Pulse exam:  Right: Palpable radial pulse, palpable brachial pulse, palpable femoral pulse, palpable DP/PT    Left: Palpable radial pulse, palpable brachial pulse, palpable femoral pulse, palpable DP/PT        Invasive Lines/Tubes:  Invasive Devices     Peripheral Intravenous Line  Duration           Peripheral IV 07/14/23 Left;Proximal;Ventral (anterior) Forearm <1 day                Lab Results and Cultures:   COVID:   Last COVID19 Screening Values     None         CBC:   Results from last 7 days   Lab Units 07/14/23  0527 07/13/23  0506 07/12/23  0508 07/11/23  0305 07/10/23  1108 07/10/23  0442   WBC Thousand/uL 9.43 9.79 12.05* 13.95*  --  18.13*   HEMOGLOBIN g/dL 7.1* 7.7* 7.7* 7.9* 8.5* 7.3*   HEMATOCRIT % 21.7* 23.6* 23.6* 23.7*  --  22.3*   PLATELETS Thousands/uL 377 371 341 310  --  265     BMP/CMP:  Results from last 7 days   Lab Units 07/14/23  0527 07/13/23  0506 07/12/23  0508 07/11/23  0305 07/10/23  0442   POTASSIUM mmol/L 3.4* 3.4* 2.7* 2.9* 3.6   CHLORIDE mmol/L 111* 111* 114* 111* 115*   CO2 mmol/L 26 25 25 25 25   BUN mg/dL 29* 30* 34* 47* 52*   CREATININE mg/dL 2.06* 2.09* 1.90* 2.32* 2.16*   CALCIUM mg/dL 8.4 8.5 8.9 8.1* 8.5     Coags:     Lipid panel:     HgbA1c:   Lab Results   Component Value Date    HGBA1C 5.2 05/12/2023    HGBA1C 5.4 09/27/2022    HGBA1C 5.5 03/22/2022    HGBA1C 5.5 09/03/2021    HGBA1C 5.4 08/26/2021       Urinalysis:   Lab Results   Component Value Date    COLORU Light Yellow 05/10/2023    CLARITYU Clear 05/10/2023    SPECGRAV 1.014 05/10/2023    PHUR 7.5 05/10/2023    PHUR 8.0 10/16/2018    LEUKOCYTESUR Negative 05/10/2023    NITRITE Negative 05/10/2023    GLUCOSEU Negative 05/10/2023    KETONESU Negative 05/10/2023    BILIRUBINUR Negative 05/10/2023    BLOODU Moderate (A) 05/10/2023   ,   Urine Culture:   Lab Results   Component Value Date URINECX 7703-1765 cfu/ml 07/09/2020     Wound Culure:   Lab Results   Component Value Date    WOUNDCULT 2+ Growth of Staphylococcus lugdunensis (A) 10/17/2018     Blood Culture:   Lab Results   Component Value Date    BLOODCX No Growth After 5 Days. 07/09/2023         Imaging Studies: I have personally reviewed pertinent reports. EKG, Pathology, and Other Studies: I have personally reviewed pertinent reports. VTE Prophylaxis: Heparin  CTA dissection protocol chest/abdomen/pelvis    Result Date: 5/10/2023  Impression 1. No evidence of aortic dissection. 2.  Scattered mild atelectatic changes. New somewhat nodular opacity in the right lower lobe posteromedially appears flat on the coronal reformats likely discoid atelectasis. Improving peripheral tubular opacity in the right lower lobe posteriorly probably related to chronically impacted bronchioles. 3. Stable cardiomegaly. Stable small pericardial effusion. 4.  Mild pelvic ascites, previously present. Workstation performed: [id number]      No CTA results available for this patient. Code Status: Level 1 - Full Code  Advance Directive and Living Will:      Power of :    POLST:      Counseling / Coordination of Care  Counseling/Coordination of Care: Total floor / unit time spent today 25 minutes. Greater than 50% of total time was spent with the patient and / or family counseling and / or coordination of care.  A description of the counseling / coordination of care: Diagnosis       Naomi Waller PA-C  7/14/2023

## 2023-07-14 NOTE — ASSESSMENT & PLAN NOTE
· Likely multifactorial  · Iron sat low  · Monitor hemoglobin  · 1U PRBC in ICU  · EGD 7/10 and colon today did not reveal source of bleed  · Give Venofer 3x/week x 3 doses while IP  · F/u B12 and folate  · FLC ratio 2.31 but SPEP WNL  · Needs HemOnc referral at d/c

## 2023-07-14 NOTE — PLAN OF CARE
Problem: MOBILITY - ADULT  Goal: Maintain or return to baseline ADL function  Description: INTERVENTIONS:  -  Assess patient's ability to carry out ADLs; assess patient's baseline for ADL function and identify physical deficits which impact ability to perform ADLs (bathing, care of mouth/teeth, toileting, grooming, dressing, etc.)  - Assess/evaluate cause of self-care deficits   - Assess range of motion  - Assess patient's mobility; develop plan if impaired  - Assess patient's need for assistive devices and provide as appropriate  - Encourage maximum independence but intervene and supervise when necessary  - Involve family in performance of ADLs  - Assess for home care needs following discharge   - Consider OT consult to assist with ADL evaluation and planning for discharge  - Provide patient education as appropriate  Outcome: Progressing  Goal: Maintains/Returns to pre admission functional level  Description: INTERVENTIONS:  - Perform BMAT or MOVE assessment daily.   - Set and communicate daily mobility goal to care team and patient/family/caregiver. - Collaborate with rehabilitation services on mobility goals if consulted  - Perform Range of Motion 3 times a day. - Reposition patient every 3 hours.   - Dangle patient 3 times a day  - Stand patient 3 times a day  - Ambulate patient 3 times a day  - Out of bed to chair 3 times a day   - Out of bed for meals 3 times a day  - Out of bed for toileting  - Record patient progress and toleration of activity level   Outcome: Progressing     Problem: PAIN - ADULT  Goal: Verbalizes/displays adequate comfort level or baseline comfort level  Description: Interventions:  - Encourage patient to monitor pain and request assistance  - Assess pain using appropriate pain scale  - Administer analgesics based on type and severity of pain and evaluate response  - Implement non-pharmacological measures as appropriate and evaluate response  - Consider cultural and social influences on pain and pain management  - Notify physician/advanced practitioner if interventions unsuccessful or patient reports new pain  Outcome: Progressing     Problem: INFECTION - ADULT  Goal: Absence or prevention of progression during hospitalization  Description: INTERVENTIONS:  - Assess and monitor for signs and symptoms of infection  - Monitor lab/diagnostic results  - Monitor all insertion sites, i.e. indwelling lines, tubes, and drains  - Monitor endotracheal if appropriate and nasal secretions for changes in amount and color  - [place] appropriate cooling/warming therapies per order  - Administer medications as ordered  - Instruct and encourage patient and family to use good hand hygiene technique  - Identify and instruct in appropriate isolation precautions for identified infection/condition  Outcome: Progressing  Goal: Absence of fever/infection during neutropenic period  Description: INTERVENTIONS:  - Monitor WBC    Outcome: Progressing     Problem: SAFETY ADULT  Goal: Maintain or return to baseline ADL function  Description: INTERVENTIONS:  -  Assess patient's ability to carry out ADLs; assess patient's baseline for ADL function and identify physical deficits which impact ability to perform ADLs (bathing, care of mouth/teeth, toileting, grooming, dressing, etc.)  - Assess/evaluate cause of self-care deficits   - Assess range of motion  - Assess patient's mobility; develop plan if impaired  - Assess patient's need for assistive devices and provide as appropriate  - Encourage maximum independence but intervene and supervise when necessary  - Involve family in performance of ADLs  - Assess for home care needs following discharge   - Consider OT consult to assist with ADL evaluation and planning for discharge  - Provide patient education as appropriate  Outcome: Progressing  Goal: Maintains/Returns to pre admission functional level  Description: INTERVENTIONS:  - Perform BMAT or MOVE assessment daily.   - Set and communicate daily mobility goal to care team and patient/family/caregiver. - Collaborate with rehabilitation services on mobility goals if consulted  - Perform Range of Motion 3 times a day. - Reposition patient every 3 hours.   - Dangle patient 3 times a day  - Stand patient 3 times a day  - Ambulate patient 3 times a day  - Out of bed to chair 3 times a day   - Out of bed for meals 3 times a day  - Out of bed for toileting  - Record patient progress and toleration of activity level   Outcome: Progressing  Goal: Patient will remain free of falls  Description: INTERVENTIONS:  - Educate patient/family on patient safety including physical limitations  - Instruct patient to call for assistance with activity   - Consult OT/PT to assist with strengthening/mobility   - Keep Call bell within reach  - Keep bed low and locked with side rails adjusted as appropriate  - Keep care items and personal belongings within reach  - Initiate and maintain comfort rounds  - Make Fall Risk Sign visible to staff  - Apply yellow socks and bracelet for high fall risk patients  - Consider moving patient to room near nurses station  Outcome: Progressing     Problem: DISCHARGE PLANNING  Goal: Discharge to home or other facility with appropriate resources  Description: INTERVENTIONS:  - Identify barriers to discharge w/patient and caregiver  - Arrange for needed discharge resources and transportation as appropriate  - Identify discharge learning needs (meds, wound care, etc.)  - Arrange for interpretive services to assist at discharge as needed  - Refer to Case Management Department for coordinating discharge planning if the patient needs post-hospital services based on physician/advanced practitioner order or complex needs related to functional status, cognitive ability, or social support system  Outcome: Progressing     Problem: Knowledge Deficit  Goal: Patient/family/caregiver demonstrates understanding of disease process, treatment plan, medications, and discharge instructions  Description: Complete learning assessment and assess knowledge base. Interventions:  - Provide teaching at level of understanding  - Provide teaching via preferred learning methods  Outcome: Progressing     Problem: Nutrition/Hydration-ADULT  Goal: Nutrient/Hydration intake appropriate for improving, restoring or maintaining nutritional needs  Description: Monitor and assess patient's nutrition/hydration status for malnutrition. Collaborate with interdisciplinary team and initiate plan and interventions as ordered. Monitor patient's weight and dietary intake as ordered or per policy. Utilize nutrition screening tool and intervene as necessary. Determine patient's food preferences and provide high-protein, high-caloric foods as appropriate.      INTERVENTIONS:  - Monitor oral intake, urinary output, labs, and treatment plans  - Assess nutrition and hydration status and recommend course of action  - Evaluate amount of meals eaten  - Assist patient with eating if necessary   - Allow adequate time for meals  - Recommend/ encourage appropriate diets, oral nutritional supplements, and vitamin/mineral supplements  - Order, calculate, and assess calorie counts as needed  - Recommend, monitor, and adjust tube feedings and TPN/PPN based on assessed needs  - Assess need for intravenous fluids  - Provide specific nutrition/hydration education as appropriate  - Include patient/family/caregiver in decisions related to nutrition  Outcome: Progressing     Problem: CARDIOVASCULAR - ADULT  Goal: Maintains optimal cardiac output and hemodynamic stability  Description: INTERVENTIONS:  - Monitor I/O, vital signs and rhythm  - Monitor for S/S and trends of decreased cardiac output  - Administer and titrate ordered vasoactive medications to optimize hemodynamic stability  - Assess quality of pulses, skin color and temperature  - Assess for signs of decreased coronary artery perfusion  - Instruct patient to report change in severity of symptoms  Outcome: Progressing  Goal: Absence of cardiac dysrhythmias or at baseline rhythm  Description: INTERVENTIONS:  - Continuous cardiac monitoring, vital signs, obtain 12 lead EKG if ordered  - Administer antiarrhythmic and heart rate control medications as ordered  - Monitor electrolytes and administer replacement therapy as ordered  Outcome: Progressing

## 2023-07-14 NOTE — ASSESSMENT & PLAN NOTE
Estimated Creatinine Clearance: 31.8 mL/min (A) (by C-G formula based on SCr of 2.06 mg/dL (H)).   Monitor kidney function closely  Avoid nephrotoxins  Monitor postvoid residuals  Cr near baseline of 2.1-2.2 - monitor

## 2023-07-14 NOTE — PROGRESS NOTES
[facility] [facility]  Progress Note  Name: Farzana Durand  MRN: [de-identified]  Unit/Bed#: [facility] 526-01 I Date of Admission: 7/8/2023   Date of Service: 7/14/2023 I Hospital Day: 6    Assessment/Plan   * Cardiac tamponade  Assessment & Plan  · Patient presented to [facility] ED with symptoms of chest pain shortness of breath epigastric pain  · He was noted to have pericardial effusion with concerns for tamponade  · He was transferred to [address] [place] [address] for thoracic surgery evaluation  · S/p window 7/8 - drain removed today  · Thoracic surgery appreciated  · Cardio appreciated  · SPEP/UPEP WNL  · C/w colchicine  · Fluid cytology non diagnostic  · Pericardial biopsy pending  · Cx neg so stopped abx  ·  - refer to rheum at d/c  · 7/12 underwent TTR amyloid SPECT study negative  · Today limited echo only w/ trivial pericardial effusion  · Cardiac MRI ordered          Anemia  Assessment & Plan  · Likely multifactorial  · Iron sat low  · Monitor hemoglobin  · 1U PRBC in ICU  · EGD 7/10 and colon today did not reveal source of bleed  · Give Venofer 3x/week x 3 doses while IP  · F/u B12 and folate  · FLC ratio 2.31 but SPEP WNL  · Needs HemOnc referral at d/c    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[address])  Assessment & Plan  · Noted on renal doppler  · C/w Lipitor  · Consult vascular    Electrolyte abnormality  Assessment & Plan  · K was < 3 but now improving - replete IV per pt preference    Hematochezia  Assessment & Plan  · Colon 7/11 no active bleeding  · Colchicine notoriously causes GI symptoms    Hypertensive urgency  Assessment & Plan  · Hypertensive urgency present on admission  · Monitor blood pressures closely  · BP acceptable  · C/w Norvasc and Cardura   · Pt feels like he has symptoms w/ hydral and Inspra on hold in setting of possible hyperaldosteonism - these are now stopped  · Labetalol increased  · Needs renin/aldosterone checked 2-3 weeks after d/c (needs to be off Inspra)  · Random Cortisol WNL at 16    Prolonged Q-T interval on ECG  Assessment & Plan  · Replete K to 4  · Mag > 2  · Most recent EKG w/ QTc 477 which I suspect will improve w/ repleting K    Meningioma [facility])  Assessment & Plan  Outpatient follow-up w/ Nsurg 8/15    CKD stage 3 secondary to diabetes [facility])  Assessment & Plan  Estimated Creatinine Clearance: 31.8 mL/min (A) (by C-G formula based on SCr of 2.06 mg/dL (H)). Monitor kidney function closely  Avoid nephrotoxins  Monitor postvoid residuals  Cr near baseline of 2.1-2.2 - monitor    Chronic diastolic CHF (congestive heart failure) ([facility])  Assessment & Plan  Wt Readings from Last 3 Encounters:   07/14/23 59 kg (130 lb)   07/08/23 68.9 kg (152 lb)   05/23/23 68.9 kg (152 lb)     LVEF 60 to 68%, LVH noted  Monitor I/O, daily weights  Inspra now on hold        COPD (chronic obstructive pulmonary disease) ([facility])  Assessment & Plan  Not in exacerbation presently  Continue respiratory treatments and supportive cares           VTE Pharmacologic Prophylaxis: VTE Score: 1 Low Risk (Score 0-2) - Encourage Ambulation. Patient Centered Rounds: I performed bedside rounds with nursing staff today. Discussions with Specialists or Other Care Team Provider: cardio and vascular    Education and Discussions with Family / Patient: Attempted to update  (wife) via phone. Left voicemail. Total Time Spent on Date of Encounter in care of patient: 45 minutes This time was spent on one or more of the following: performing physical exam; counseling and coordination of care; obtaining or reviewing history; documenting in the medical record; reviewing/ordering tests, medications or procedures; communicating with other healthcare professionals and discussing with patient's family/caregivers.     Current Length of Stay: 6 day(s)  Current Patient Status: Inpatient   Certification Statement: The patient will continue to require additional inpatient hospital stay due to need for cardiac MRI  Discharge Plan: Anticipate discharge in 24-48 hrs to home with home services. Code Status: Level 1 - Full Code    Subjective:   Abd pain and nausea w/ pills    Objective:     Vitals:   Temp (24hrs), Av.1 °F (37.3 °C), Min:97.7 °F (36.5 °C), Max:100.4 °F (38 °C)    Temp:  [97.7 °F (36.5 °C)-100.4 °F (38 °C)] 100.2 °F (37.9 °C)  HR:  [66-85] 76  Resp:  [16-18] 17  BP: (133-170)/() 164/97  SpO2:  [97 %-99 %] 97 %  Body mass index is 20.98 kg/m². Input and Output Summary (last 24 hours): Intake/Output Summary (Last 24 hours) at 2023 1617  Last data filed at 2023 1241  Gross per 24 hour   Intake 2210 ml   Output 445 ml   Net 1765 ml       Physical Exam:   Physical Exam  HENT:      Head: Normocephalic and atraumatic. Nose: Nose normal.      Mouth/Throat:      Mouth: Mucous membranes are moist.   Eyes:      Extraocular Movements: Extraocular movements intact. Conjunctiva/sclera: Conjunctivae normal.   Cardiovascular:      Rate and Rhythm: Normal rate and regular rhythm. Pulmonary:      Effort: Pulmonary effort is normal.      Breath sounds: Normal breath sounds. Abdominal:      Palpations: Abdomen is soft. Tenderness: There is abdominal tenderness. Musculoskeletal:         General: Normal range of motion. Cervical back: Normal range of motion and neck supple. Right lower leg: No edema. Left lower leg: No edema. Skin:     General: Skin is warm and dry. Neurological:      Mental Status: He is alert and oriented to person, place, and time.          Additional Data:     Labs:  Results from last 7 days   Lab Units 23  0527 07/10/23  1108 07/10/23  0442   WBC Thousand/uL 9.43   < > 18.13*   HEMOGLOBIN g/dL 7.1*   < > 7.3*   HEMATOCRIT % 21.7*   < > 22.3*   PLATELETS Thousands/uL 377   < > 265   NEUTROS PCT %  --   --  78*   LYMPHS PCT %  --   --  8*   MONOS PCT %  --   --  13*   EOS PCT %  --   --  0    < > = values in this interval not displayed. Results from last 7 days   Lab Units 07/14/23  0527 07/10/23  0442 07/09/23  0433   SODIUM mmol/L 142   < > 141   POTASSIUM mmol/L 3.4*   < > 3.6   CHLORIDE mmol/L 111*   < > 113*   CO2 mmol/L 26   < > 25   BUN mg/dL 29*   < > 49*   CREATININE mg/dL 2.06*   < > 1.92*   ANION GAP mmol/L 5   < > 3   CALCIUM mg/dL 8.4   < > 8.6   ALBUMIN g/dL  --   --  3.0*   TOTAL BILIRUBIN mg/dL  --   --  0.93   ALK PHOS U/L  --   --  54   ALT U/L  --   --  50   AST U/L  --   --  32   GLUCOSE RANDOM mg/dL 88   < > 132    < > = values in this interval not displayed. Results from last 7 days   Lab Units 07/08/23  1747 07/08/23  1101   POC GLUCOSE mg/dl 207* 127         Results from last 7 days   Lab Units 07/09/23  0433 07/08/23  1308 07/08/23  0415 07/08/23  0137   LACTIC ACID mmol/L  --  0.8 2.0 3.3*   PROCALCITONIN ng/ml 0.46*  --   --   --        Lines/Drains:  Invasive Devices     Peripheral Intravenous Line  Duration           Peripheral IV 07/14/23 Left;Proximal;Ventral (anterior) Forearm <1 day                  Telemetry:  Telemetry Orders (From admission, onward)             24 Hour Telemetry Monitoring  Continuous x 24 Hours (Telem)        Question:  Reason for 24 Hour Telemetry  Answer:  PCI/EP study (including pacer and ICD implementation), Cardiac surgery, MI, abnormal cardiac cath, and chest pain- rule out MI                 Telemetry Reviewed: Normal Sinus Rhythm  Indication for Continued Telemetry Use: Jorden/sheeba/endocarditis             Imaging: Reviewed radiology reports from this admission including: ultrasound(s)    Recent Cultures (last 7 days):   Results from last 7 days   Lab Units 07/09/23  0950 07/09/23  0933 07/08/23  1014 07/08/23  0145 07/08/23  0142   BLOOD CULTURE  No Growth After 5 Days. No Growth After 5 Days. --  No Growth After 5 Days. No Growth After 5 Days.    GRAM STAIN RESULT   --   --  No Polys or Bacteria seen  --   --    BODY FLUID CULTURE, STERILE   --   --  No growth  --   --        Last 24 Hours Medication List:   Current Facility-Administered Medications   Medication Dose Route Frequency Provider Last Rate   • acetaminophen  650 mg Oral Q6H PRN MARBIN Fernandez     • aluminum-magnesium hydroxide-simethicone  30 mL Oral Q6H PRN Glen An, SVENNP     • amLODIPine  10 mg Oral Daily SVEN FernandezNP     • atorvastatin  40 mg Oral HS SVEN FernandezNP     • budesonide-formoterol  2 puff Inhalation BID SVEN FernandezNP     • busPIRone  15 mg Oral BID Glen An CRNP     • calcium carbonate  2 tablet Oral BID With Meals SVEN FernandezNP     • cholecalciferol  2,000 Units Oral Daily SVEN FernandezNP     • colchicine  0.6 mg Oral Daily Tae Olivarez, DO     • doxazosin  8 mg Oral BID SVEN FernandezNP     • fluticasone  1 puff Inhalation Daily SVEN FernandezNP     • hydrALAZINE  10 mg Intravenous Q6H PRN SVEN FernandezNP     • HYDROmorphone  0.2 mg Intravenous Q3H PRN SVEN FernandezNP     • iron sucrose  300 mg Intravenous Once per day on Mon Wed Fri Liya Walsh DO Stopped (07/14/23 1241)   • labetalol  10 mg Intravenous Q6H PRN MARBIN Fernandez     • labetalol  400 mg Oral Q12H [address] Yuniel Hernandez MD     • LORazepam  0.5 mg Intravenous Q30 Min PRN Liya Walsh DO     • methocarbamol  500 mg Oral Q6H PRN SVEN FernandezNP     • mirtazapine  15 mg Oral HS SVEN FernandezNP     • multivitamin-minerals  1 tablet Oral Daily MARBIN Fernandez     • oxyCODONE  2.5 mg Oral Q4H PRN MARBIN Fernandez      Or   • oxyCODONE  5 mg Oral Q4H PRN SVEN FernandezNP     • pantoprazole  40 mg Intravenous Q12H [address]  MARBIN Fernandez     • polyethylene glycol  17 g Oral Daily MARBIN Fernandez     • sertraline  200 mg Oral Daily MARBIN Fernandez     • trimethobenzamide  200 mg Intramuscular Once Angy Ridley MD     • trimethobenzamide  200 mg Intramuscular Q6H PRN Angy Ridley MD          Today, Patient Was Seen By: Noah Hays DO    **Please Note: This note may have been constructed using a voice recognition system. **

## 2023-07-14 NOTE — ASSESSMENT & PLAN NOTE
· Replete K to 4  · Mag > 2  · Most recent EKG w/ QTc 477 which I suspect will improve w/ repleting K

## 2023-07-14 NOTE — PHYSICAL THERAPY NOTE
PHYSICAL THERAPY NOTE          Patient Name: Vipul Prieto  CHMSX'S Date: 7/14/2023 07/14/23 1415   PT Last Visit   PT Visit Date 07/14/23   Note Type   Note Type Treatment   Pain Assessment   Pain Assessment Tool 0-10   Pain Score No Pain   Restrictions/Precautions   Weight Bearing Precautions Per Order No   Other Precautions Multiple lines;Telemetry   General   Chart Reviewed Yes   Family/Caregiver Present No   Cognition   Overall Cognitive Status WFL   Arousal/Participation Responsive; Cooperative; Alert   Attention Within functional limits   Orientation Level Oriented X4   Memory Within functional limits   Following Commands Follows one step commands without difficulty   Comments Patient pleasant and cooperative. Subjective   Subjective Patient agreeable to therapy, but notes some abdominal discomfort due to taking his miralax. Bed Mobility   Supine to Sit 6  Modified independent   Sit to Supine 6  Modified independent   Additional Comments in bed on arrival, and returns to bed following activity   Transfers   Sit to Stand 5  Supervision   Additional items Increased time required   Stand to Sit 5  Supervision   Additional items Increased time required   Toilet transfer 5  Supervision   Additional items Increased time required;Standard toilet   Additional Comments Supervision   Ambulation/Elevation   Gait pattern Forward Flexion; Shuffling; Short stride; Excessively slow;Decreased foot clearance   Gait Assistance   (CGA)   Additional items Assist x 1;Verbal cues   Assistive Device   (IV pole)   Distance 10'x2 + 100'x2   Balance   Static Sitting Fair +   Dynamic Sitting Fair +   Static Standing Fair -   Dynamic Standing Fair -   Ambulatory Fair -   Endurance Deficit   Endurance Deficit Yes   Endurance Deficit Description patient with CASTILLO (O2 sats 100% on RA)   Activity Tolerance   Activity Tolerance Patient limited by fatigue Medical Staff Made Aware SPT Cheri   Nurse Made Aware RN cleared   Assessment   Prognosis Good   Problem List Decreased strength;Decreased endurance; Impaired balance;Decreased mobility   Assessment Patient received in bed. Patient agreeable to therapy. Patient performs bed mobility with MI. Patient performs functional transfers with Supervision. Toilet transfer with Supervision. Patient performs ambulation with CGA using no AD, but utilizes UE support on IV pole, 10'x2 + 100'x2 with seated rest periods between all trials. Patient has CSATILLO, but maintains % SPo2 on room air. Patient left in bed with all needs met and call bell/personal items within reach. [facility] Raw Score is 17 showing further need for skilled PT services in order to improve functional mobility, decrease need for assistance, and return to PLOF. A Raw score of greater than or equal to 16 suggests the patient may benefit from discharge to home. PT continues to recommend home with HHPT on d/c. Will continue to follow as able. Goals   Patient Goals to rest   PT Treatment Day 2   Plan   Treatment/Interventions ADL retraining;Functional transfer training;LE strengthening/ROM; Elevations; Therapeutic exercise; Endurance training;Patient/family training;Equipment eval/education;Gait training;Bed mobility;Spoke to nursing;Spoke to case management;OT   Progress Progressing toward goals   PT Frequency 3-5x/wk   Recommendation   PT Discharge Recommendation Home with home health rehabilitation   Equipment Recommended [address]   [facility]   Turning in Flat Bed Without Bedrails 3   Lying on Back to Sitting on Edge of Flat Bed Without Bedrails 3   Moving Bed to Chair 3   Standing Up From Chair Using Arms 3   Walk in Room 3   Climb 3-5 Stairs With Railing 2   Basic Mobility Inpatient Raw Score 17   Basic Mobility Standardized Score 39.67   Highest Level Of Mobility   JH-HLM Goal 5: Stand one or more mins   JH-HLM Achieved 7: Walk 25 feet or more   End of Consult   Patient Position at End of Consult Supine; All needs within reach     Shira Jones PT, DPT

## 2023-07-15 ENCOUNTER — APPOINTMENT (OUTPATIENT)
Dept: RADIOLOGY | Facility: HOSPITAL | Age: 58
DRG: 271 | End: 2023-07-15
Payer: COMMERCIAL

## 2023-07-15 VITALS
HEIGHT: 66 IN | HEART RATE: 81 BPM | SYSTOLIC BLOOD PRESSURE: 152 MMHG | DIASTOLIC BLOOD PRESSURE: 95 MMHG | OXYGEN SATURATION: 98 % | RESPIRATION RATE: 16 BRPM | TEMPERATURE: 98.7 F | BODY MASS INDEX: 20.87 KG/M2 | WEIGHT: 129.85 LBS

## 2023-07-15 PROBLEM — I16.0 HYPERTENSIVE URGENCY: Status: RESOLVED | Noted: 2023-07-08 | Resolved: 2023-07-15

## 2023-07-15 PROBLEM — I31.4 CARDIAC TAMPONADE: Status: RESOLVED | Noted: 2023-07-08 | Resolved: 2023-07-15

## 2023-07-15 PROBLEM — K21.9 GERD (GASTROESOPHAGEAL REFLUX DISEASE): Status: ACTIVE | Noted: 2023-07-15

## 2023-07-15 LAB
ANION GAP SERPL CALCULATED.3IONS-SCNC: 3 MMOL/L
BUN SERPL-MCNC: 28 MG/DL (ref 5–25)
CALCIUM SERPL-MCNC: 8.9 MG/DL (ref 8.3–10.1)
CHLORIDE SERPL-SCNC: 114 MMOL/L (ref 96–108)
CO2 SERPL-SCNC: 25 MMOL/L (ref 21–32)
CREAT SERPL-MCNC: 1.99 MG/DL (ref 0.6–1.3)
ERYTHROCYTE [DISTWIDTH] IN BLOOD BY AUTOMATED COUNT: 15.1 % (ref 11.6–15.1)
FOLATE SERPL-MCNC: 20.4 NG/ML
GFR SERPL CREATININE-BSD FRML MDRD: 35 ML/MIN/1.73SQ M
GLUCOSE SERPL-MCNC: 88 MG/DL (ref 65–140)
HCT VFR BLD AUTO: 23.2 % (ref 36.5–49.3)
HGB BLD-MCNC: 7.5 G/DL (ref 12–17)
MCH RBC QN AUTO: 29 PG (ref 26.8–34.3)
MCHC RBC AUTO-ENTMCNC: 32.3 G/DL (ref 31.4–37.4)
MCV RBC AUTO: 90 FL (ref 82–98)
PLATELET # BLD AUTO: 394 THOUSANDS/UL (ref 149–390)
PMV BLD AUTO: 9.1 FL (ref 8.9–12.7)
POTASSIUM SERPL-SCNC: 3.8 MMOL/L (ref 3.5–5.3)
RBC # BLD AUTO: 2.59 MILLION/UL (ref 3.88–5.62)
SODIUM SERPL-SCNC: 142 MMOL/L (ref 135–147)
VIT B12 SERPL-MCNC: 638 PG/ML (ref 180–914)
WBC # BLD AUTO: 9.47 THOUSAND/UL (ref 4.31–10.16)

## 2023-07-15 PROCEDURE — 85027 COMPLETE CBC AUTOMATED: CPT | Performed by: GENERAL PRACTICE

## 2023-07-15 PROCEDURE — 75561 CARDIAC MRI FOR MORPH W/DYE: CPT

## 2023-07-15 PROCEDURE — A9585 GADOBUTROL INJECTION: HCPCS | Performed by: INTERNAL MEDICINE

## 2023-07-15 PROCEDURE — G1004 CDSM NDSC: HCPCS

## 2023-07-15 PROCEDURE — 99232 SBSQ HOSP IP/OBS MODERATE 35: CPT | Performed by: INTERNAL MEDICINE

## 2023-07-15 PROCEDURE — C9113 INJ PANTOPRAZOLE SODIUM, VIA: HCPCS | Performed by: NURSE PRACTITIONER

## 2023-07-15 PROCEDURE — 80048 BASIC METABOLIC PNL TOTAL CA: CPT | Performed by: GENERAL PRACTICE

## 2023-07-15 PROCEDURE — 82746 ASSAY OF FOLIC ACID SERUM: CPT | Performed by: GENERAL PRACTICE

## 2023-07-15 PROCEDURE — 99239 HOSP IP/OBS DSCHRG MGMT >30: CPT | Performed by: GENERAL PRACTICE

## 2023-07-15 PROCEDURE — 82607 VITAMIN B-12: CPT | Performed by: GENERAL PRACTICE

## 2023-07-15 RX ORDER — POLYETHYLENE GLYCOL 3350 17 G/17G
17 POWDER, FOR SOLUTION ORAL DAILY
Qty: 30 EACH | Refills: 0 | Status: SHIPPED | OUTPATIENT
Start: 2023-07-16

## 2023-07-15 RX ORDER — BUDESONIDE AND FORMOTEROL FUMARATE DIHYDRATE 160; 4.5 UG/1; UG/1
2 AEROSOL RESPIRATORY (INHALATION) 2 TIMES DAILY
Qty: 10.2 G | Refills: 0 | Status: SHIPPED | OUTPATIENT
Start: 2023-07-15 | End: 2023-07-17

## 2023-07-15 RX ORDER — LABETALOL 200 MG/1
400 TABLET, FILM COATED ORAL EVERY 12 HOURS SCHEDULED
Qty: 120 TABLET | Refills: 0 | Status: SHIPPED | OUTPATIENT
Start: 2023-07-15

## 2023-07-15 RX ORDER — COLCHICINE 0.6 MG/1
0.6 TABLET ORAL DAILY
Qty: 30 TABLET | Refills: 0 | Status: SHIPPED | OUTPATIENT
Start: 2023-07-16

## 2023-07-15 RX ORDER — MAGNESIUM HYDROXIDE/ALUMINUM HYDROXICE/SIMETHICONE 120; 1200; 1200 MG/30ML; MG/30ML; MG/30ML
30 SUSPENSION ORAL EVERY 6 HOURS PRN
Qty: 769 ML | Refills: 0 | Status: SHIPPED | OUTPATIENT
Start: 2023-07-15

## 2023-07-15 RX ORDER — FAMOTIDINE 20 MG/1
20 TABLET, FILM COATED ORAL
Qty: 30 TABLET | Refills: 0 | Status: SHIPPED | OUTPATIENT
Start: 2023-07-15

## 2023-07-15 RX ORDER — POTASSIUM CHLORIDE 14.9 MG/ML
20 INJECTION INTRAVENOUS ONCE
Status: COMPLETED | OUTPATIENT
Start: 2023-07-15 | End: 2023-07-15

## 2023-07-15 RX ORDER — FAMOTIDINE 20 MG/1
20 TABLET, FILM COATED ORAL
Status: DISCONTINUED | OUTPATIENT
Start: 2023-07-15 | End: 2023-07-15 | Stop reason: HOSPADM

## 2023-07-15 RX ORDER — ONDANSETRON 4 MG/1
4 TABLET, FILM COATED ORAL EVERY 12 HOURS PRN
Qty: 10 TABLET | Refills: 0 | Status: SHIPPED | OUTPATIENT
Start: 2023-07-15

## 2023-07-15 RX ORDER — PANTOPRAZOLE SODIUM 40 MG/1
40 TABLET, DELAYED RELEASE ORAL
Qty: 60 TABLET | Refills: 0 | Status: SHIPPED | OUTPATIENT
Start: 2023-07-15

## 2023-07-15 RX ORDER — POTASSIUM CHLORIDE 750 MG/1
10 TABLET, FILM COATED, EXTENDED RELEASE ORAL DAILY
Qty: 30 TABLET | Refills: 0 | Status: SHIPPED | OUTPATIENT
Start: 2023-07-15

## 2023-07-15 RX ADMIN — Medication 1 TABLET: at 11:53

## 2023-07-15 RX ADMIN — BUDESONIDE AND FORMOTEROL FUMARATE DIHYDRATE 2 PUFF: 160; 4.5 AEROSOL RESPIRATORY (INHALATION) at 08:58

## 2023-07-15 RX ADMIN — AMLODIPINE BESYLATE 10 MG: 10 TABLET ORAL at 08:53

## 2023-07-15 RX ADMIN — PANTOPRAZOLE SODIUM 40 MG: 40 INJECTION, POWDER, FOR SOLUTION INTRAVENOUS at 08:57

## 2023-07-15 RX ADMIN — COLCHICINE 0.6 MG: 0.6 TABLET ORAL at 08:53

## 2023-07-15 RX ADMIN — GADOBUTROL 10 ML: 604.72 INJECTION INTRAVENOUS at 11:13

## 2023-07-15 RX ADMIN — DOXAZOSIN 8 MG: 4 TABLET ORAL at 08:52

## 2023-07-15 RX ADMIN — SERTRALINE HYDROCHLORIDE 200 MG: 100 TABLET ORAL at 08:53

## 2023-07-15 RX ADMIN — Medication 2000 UNITS: at 11:53

## 2023-07-15 RX ADMIN — LORAZEPAM 0.5 MG: 2 INJECTION INTRAMUSCULAR; INTRAVENOUS at 10:13

## 2023-07-15 RX ADMIN — ALUMINUM HYDROXIDE, MAGNESIUM HYDROXIDE, AND SIMETHICONE 30 ML: 200; 200; 20 SUSPENSION ORAL at 11:53

## 2023-07-15 RX ADMIN — LABETALOL HYDROCHLORIDE 400 MG: 200 TABLET, FILM COATED ORAL at 08:52

## 2023-07-15 RX ADMIN — POTASSIUM CHLORIDE 20 MEQ: 14.9 INJECTION, SOLUTION INTRAVENOUS at 10:00

## 2023-07-15 RX ADMIN — BUSPIRONE HYDROCHLORIDE 15 MG: 10 TABLET ORAL at 08:53

## 2023-07-15 RX ADMIN — Medication 2 TABLET: at 11:53

## 2023-07-15 RX ADMIN — FLUTICASONE FUROATE 1 PUFF: 100 POWDER RESPIRATORY (INHALATION) at 08:58

## 2023-07-15 NOTE — ASSESSMENT & PLAN NOTE
Wt Readings from Last 3 Encounters:   07/15/23 58.9 kg (129 lb 13.6 oz)   07/08/23 68.9 kg (152 lb)   05/23/23 68.9 kg (152 lb)     LVEF 60 to 68%, LVH noted  Monitor I/O, daily weights  Inspra now on hold

## 2023-07-15 NOTE — ASSESSMENT & PLAN NOTE
· Noted on renal doppler  · C/w Lipitor  · Vascular appreciated - recent CTA now stenosis, so likely doppler false positive due to bowel gas

## 2023-07-15 NOTE — CASE MANAGEMENT
Case Management Discharge Planning Note    Patient name Nayeli Lima  Location [address] [place] [address] [facility] [id number] MRN [id number]  : 1965 Date 7/15/2023       Current Admission Date: 2023  Current Admission Diagnosis:Anemia   Patient Active Problem List    Diagnosis Date Noted   • 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[address]) 2023   • Moderate protein-calorie malnutrition ([address]) 2023   • Electrolyte abnormality 2023   • Hematochezia 2023   • Depression with anxiety 2023   • Abdominal pain 2023   • Seizure ([address]) 2023   • Hypertensive encephalopathy 2023   • Hypertensive emergency 2023   • Meningioma ([address]) 2023   • Prolonged Q-T interval on ECG 2023   • Hypokalemia 2021   • Bradycardia 2021   • Hypoglycemia 2021   • History of gastric bypass 2021   • Hypertensive kidney disease with stage 3 chronic kidney disease ([address]) 2021   • Acute renal failure superimposed on stage 3 chronic kidney disease ([address]) 2021   • Encounter for surgical aftercare following surgery of digestive system 2021   • Postsurgical malabsorption 2021   • Morbid obesity due to excess calories ([address]) 2020   • Post-traumatic male urethral stricture 2020   • Renal cyst 2020   • Dysuria 2020   • History of DVT (deep vein thrombosis) 10/17/2018   • Splenic lesion 10/17/2018   • AGUS (obstructive sleep apnea) 10/16/2018   • Abscess of left groin 10/16/2018   • Anemia 2018   • Lymphedema 2018   • CKD stage 3 secondary to diabetes ([address]) 03/15/2018   • Chronic diastolic CHF (congestive heart failure) ([address]) 2018   • Pulmonary nodule, right 2018   • Pericardial effusion 2018   • Essential hypertension 2018   • Leg DVT (deep venous thromboembolism), acute, bilateral ([address]) 2018   • COPD (chronic obstructive pulmonary disease) ([address]) 2018   • CHF (congestive heart failure) ([address]) 2018 [FreeTextEntry3] : Date of Service: 07/27/2022 \par \par Account: [id number]\par \par Patient: SANTOS HUMPHREYS \par \par YOB: 1946\par \par Age: 75 year\par \par Surgeon: Anita Diaz M.D.\par \par Pre-Operative Diagnosis:  Lumbosacral Radiculitis (M54.17)\par \par Post Operative Diagnosis: Lumbosacral Radiculitis (M54.17)\par \par Procedure: Interlaminar lumbar epidural steroid injection (L4-5) under fluoroscopic guidance\par \par Anesthesia:           MAC\par \par \par This procedure was carried out using fluoroscopic guidance.  The risks and benefits of the procedure were discussed extensively with the patient.  The consent of the patient was obtained and the following procedure was performed.\par \par The patient was placed in the prone position.  The lumbar area was prepped and draped in a sterile fashion.  Under AP view with slight cephalad-caudad angulation, the L4-5 interspace was identified and marked.  Using sterile technique the superficial skin was anesthetized with 1% Lidocaine without epinephrine.  A 20 gauge Tuohoy needle was advanced into the epidural space under fluoroscopy using eamie-ujfwwvazv-bewex technique and using loss of resistance at the L4-5 level.  After negative aspiration for heme or CSF, an epidurogram was obtained using 3 cc Omnipaque contrast confirming epidural placement of the needle. \par \par Lumbar epidurogram showed no intrathecal or intravascular spread and showed adequate bilateral epidural spread from L1 to S1 levels.\par \par After this, 5 cc of preservative free normal saline and 80 mg of Kenalog were injected into the epidural space.\par \par The needle was subsequently removed.  Anesthesia personnel were present throughout the procedure.\par \par The patient tolerated the procedure well and was instructed to contact me immediately if there were any problems.\par \par Anita Diaz M.D.\par   LOS (days): 7  Geometric Mean LOS (GMLOS) (days): 3.90  Days to GMLOS:-3.2     OBJECTIVE:  Risk of Unplanned Readmission Score: 27.7         Current admission status: Inpatient   Preferred Pharmacy:   [facility] [address] KEVIN BEDOYA [address] [facility] [place] [address] [address] [place]  Phone: [phone] Fax: [phone]    Primary Care Provider: Kp Hernandez DO    Primary Insurance: Ty [employer] [facility] REP  Secondary Insurance:     DISCHARGE DETAILS:    Discharge planning discussed with[de-identified] Pt  Freedom of Choice: Yes  Comments - Freedom of Choice: [facility]  CM contacted family/caregiver?: No- see comments (pt makes own decisions)                            Other Referral/Resources/Interventions Provided:  Interventions: HHC  Referral Comments: [facility], notified of d/c home today. Treatment Team Recommendation: Home with [facility]  Discharge Destination Plan[de-identified] Home with [facility] at Discharge : Family           ETA of Transport (Date): 07/15/23                 IMM Given (Date):: 07/15/23 (placed in MR bin for filing)  IMM Given to[de-identified] Patient     Additional Comments: CM reviewed plan for d/c with pt, agreeable to home with [facility] provided through [facility]. No additional concerns or questions for CM at this time.

## 2023-07-15 NOTE — DISCHARGE INSTR - AVS FIRST PAGE
PCP should check CBC-D and BMP in about 2-3 weeks.     Talk to cardiology about getting a renin/aldosterone ratio checked in 2-3 weeks    QTC only mildly prolonged so ok to take Zofran if cannot get Tigan

## 2023-07-15 NOTE — ASSESSMENT & PLAN NOTE
· Replete K to 4  · Mag > 2  · Most recent EKG w/ QTc 477 which I suspect will improve w/ repleting K  · D/w Dr. Catalino Macias - ok to take Zofran sparingly prn

## 2023-07-15 NOTE — DISCHARGE SUMMARY
[address] [facility]  Discharge- Nayeli Lima 1965, 59 y.o. male MRN: [id number]  Unit/Bed#: [facility] 526-01 Encounter: [id number]  Primary Care Provider: Paolo Parry DO   Date and time admitted to hospital: 7/8/2023  9:07 AM    * Cardiac tamponade-resolved as of 7/15/2023  Assessment & Plan  · Patient presented to [facility] ED with symptoms of chest pain shortness of breath epigastric pain  · He was noted to have pericardial effusion with concerns for tamponade  · He was transferred to [address] [place] [address] for thoracic surgery evaluation  · S/p window 7/8 - drain removed 7/13  · Thoracic surgery appreciated  · Cardio appreciated  · SPEP/UPEP WNL  · C/w colchicine for 2 months  · Fluid cytology non diagnostic  · Pericardial biopsy no malignancy  · Cx neg so stopped abx  ·  - refer to rheum at d/c  · 7/12 underwent TTR amyloid SPECT study negative  · 7/14 limited echo only w/ trivial pericardial effusion  · Cardiac MRI completed - will f/u OP w/ cardio to discuss read          Anemia  Assessment & Plan  · Likely multifactorial  · Iron sat low  · Monitor hemoglobin  · 1U PRBC in ICU  · EGD 7/10 and colon 7/11 did not reveal source of bleed  · Given Venofer while IP  · F/u B12 and folate  · FLC ratio 2.31 but SPEP WNL  · HemOnc referral at d/c    GERD (gastroesophageal reflux disease)  Assessment & Plan  · PPI bid  · Pepcid qhs  · Maalox prn  · GI appreciated    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[address])  Assessment & Plan  · Noted on renal doppler  · C/w Lipitor  · Vascular appreciated - recent CTA now stenosis, so likely doppler false positive due to bowel gas    Moderate protein-calorie malnutrition ([address])  Assessment & Plan  Malnutrition Findings:   Adult Malnutrition type: Acute illness  Adult Degree of Malnutrition: Malnutrition of moderate degree  Malnutrition Characteristics: Fat loss, Muscle loss, Weight loss, Inadequate energy 360 Statement: Moderate protein energy malnutrition is related to physiological causes as evidenced by mild muscle/fat wasting, decreased po intakes over the last 2 months and weight loss of 11#/7.6% x2m. Treated with addition of supplement and diet. BMI Findings: Body mass index is 20.96 kg/m². Electrolyte abnormality  Assessment & Plan  · K was < 3 but now improving - repleted IV per pt preference  · D/c on Kdur    Hematochezia  Assessment & Plan  · Colon 7/11 no active bleeding    Prolonged Q-T interval on ECG  Assessment & Plan  · Replete K to 4  · Mag > 2  · Most recent EKG w/ QTc 477 which I suspect will improve w/ repleting K  · D/w Dr. Meir wang to take Zofran sparingly prn    Meningioma [facility])  Assessment & Plan  Outpatient follow-up w/ Nsurg 8/15    CKD stage 3 secondary to diabetes [facility])  Assessment & Plan  Estimated Creatinine Clearance: 32.9 mL/min (A) (by C-G formula based on SCr of 1.99 mg/dL (H)).   Monitor kidney function closely  Avoid nephrotoxins  Monitor postvoid residuals  Cr near baseline of 2.1-2.2 - monitor    Chronic diastolic CHF (congestive heart failure) (HCC)  Assessment & Plan  Wt Readings from Last 3 Encounters:   07/15/23 58.9 kg (129 lb 13.6 oz)   07/08/23 68.9 kg (152 lb)   05/23/23 68.9 kg (152 lb)     LVEF 60 to 68%, LVH noted  Monitor I/O, daily weights  Inspra now on hold        COPD (chronic obstructive pulmonary disease) ([address])  Assessment & Plan  Not in exacerbation presently  Continue respiratory treatments and supportive cares  D/c w/ inh    Hypertensive urgency-resolved as of 7/15/2023  Assessment & Plan  · Hypertensive urgency present on admission  · Monitor blood pressures closely  · BP acceptable  · C/w Norvasc and Cardura   · Pt feels like he has symptoms w/ hydral and Inspra on hold in setting of possible hyperaldosteonism - these are now stopped  · Labetalol increased  · Needs renin/aldosterone checked 2-3 weeks after d/c (needs to be off Inspra)  · Random Cortisol WNL at 16    Medical Problems     Resolved Problems  Date Reviewed: 7/15/2023          Resolved    * (Principal) Cardiac tamponade 7/15/2023     Resolved by  Cyndi Bruce DO    Hypertensive urgency 7/15/2023     Resolved by  Cyndi Bruce DO        Discharging Physician / Practitioner: Cyndi Bruce DO  PCP: Joanna Shankar DO  Admission Date:   Admission Orders (From admission, onward)     Ordered        07/08/23 0909  Inpatient Admission  Once                      Discharge Date: 07/15/23    Consultations During Hospital Stay:  · Thoracic  · Critical Care  · Cardio  · GI  · Vascular      Procedures Performed:   · Pericardial window w/ drain 7/8 - drain removed 7/13  · EGD 7/10  · Colon 7/11    Significant Findings / Test Results:   · See above    Incidental Findings:   · none       Test Results Pending at Discharge (will require follow up): · Cardiac MRI read - this will be followed by cardio OP       Outpatient Tests Requested:  · PCP should check CBC-D and BMP in about 2 weeks  · Will need aldosterone/renin ratio checked in about  · Needs colonoscopy in 6 months    Complications:  none    Reason for Admission: cardiac tamponade    Hospital Course:   Sharon Murguia is a 59 y.o. male patient who originally presented to the hospital on 7/8/2023 due to cardiac tamponade. Patient went for pericardial window and then was transferred to the ICU. Please see ICU notes. Uncertain of etiology of his tamponade. Pericardial fluid and pericardial biopsy showed no malignancy. Will take 2 months colchicine. Patient also with hematochezia. EGD and colonoscopy unremarkable. His hemoglobin stabilized. To work-up the etiology of his pericardial tamponade, he was referred to rheumatology as his CRP was elevated. He was also referred to hematology as he had abnormal free light chain ratio and is also anemic.     For his nausea, I was going to discharge the patient with Tigan, but the pharmacy was out of it. I spoke to Dr. Cherylene Raman, who is okay with the patient taking Zofran sparingly. His QTc was only very mildly long and that was likely in the setting of hypokalemia. I sent a prescription of Zofran to his pharmacy. Please see above list of diagnoses and related plan for additional information. Condition at Discharge: stable    Discharge Day Visit / Exam:   Subjective:  Still w/ reflux  Vitals: Blood Pressure: 152/95 (07/15/23 1200)  Pulse: 81 (07/15/23 1200)  Temperature: 98.7 °F (37.1 °C) (07/15/23 0701)  Temp Source: Oral (07/15/23 0701)  Respirations: 16 (07/15/23 1200)  Height: 5' 6" (167.6 cm) (07/14/23 1005)  Weight - Scale: 58.9 kg (129 lb 13.6 oz) (07/15/23 0532)  SpO2: 98 % (07/15/23 1200)  Exam:   Physical Exam  HENT:      Head: Normocephalic and atraumatic. Nose: Nose normal.      Mouth/Throat:      Mouth: Mucous membranes are moist.   Eyes:      Extraocular Movements: Extraocular movements intact. Conjunctiva/sclera: Conjunctivae normal.   Cardiovascular:      Rate and Rhythm: Normal rate and regular rhythm. Pulmonary:      Effort: Pulmonary effort is normal.      Breath sounds: Normal breath sounds. Abdominal:      General: Bowel sounds are normal. There is no distension. Palpations: Abdomen is soft. Tenderness: There is no abdominal tenderness. Musculoskeletal:         General: Normal range of motion. Cervical back: Normal range of motion and neck supple. Right lower leg: No edema. Left lower leg: No edema. Skin:     General: Skin is warm and dry. Neurological:      Mental Status: He is alert and oriented to person, place, and time. Discussion with Family: Updated  (wife) via phone. Discharge instructions/Information to patient and family:   See after visit summary for information provided to patient and family.       Provisions for Follow-Up Care:  See after visit summary for information related to follow-up care and any pertinent home health orders. Disposition:   Home with VNA Services (Reminder: Complete face to face encounter)    Planned Readmission: no     Discharge Statement:  I spent 45 minutes discharging the patient. This time was spent on the day of discharge. I had direct contact with the patient on the day of discharge. Greater than 50% of the total time was spent examining patient, answering all patient questions, arranging and discussing plan of care with patient as well as directly providing post-discharge instructions. Additional time then spent on discharge activities. Discharge Medications:  See after visit summary for reconciled discharge medications provided to patient and/or family.       **Please Note: This note may have been constructed using a voice recognition system**

## 2023-07-15 NOTE — ASSESSMENT & PLAN NOTE
Malnutrition Findings:   Adult Malnutrition type: Acute illness  Adult Degree of Malnutrition: Malnutrition of moderate degree  Malnutrition Characteristics: Fat loss, Muscle loss, Weight loss, Inadequate energy                  360 Statement: Moderate protein energy malnutrition is related to physiological causes as evidenced by mild muscle/fat wasting, decreased po intakes over the last 2 months and weight loss of 11#/7.6% x2m. Treated with addition of supplement and diet. BMI Findings: Body mass index is 20.96 kg/m².

## 2023-07-15 NOTE — PROGRESS NOTES
Progress Note - Cardiology   Latasha Whitman 59 y.o. male MRN: [de-identified]  Unit/Bed#: [facility] 526-01 Encounter: [id number]  07/15/23  11:31 AM    Impression and Plan:    24-year-old with a history of Shamika-en-Y gastric bypass, hypertension, left ventricular hypertrophy, CKD, prior DVT, chronic diastolic heart failure-not on loop diuretic at baseline,Hypertension, history of hyperaldosteronism, status post left adrenalectomy in 2012, on eplerenone, admitted with shortness of breath from anemia and pericardial effusion with tamponade physiology, underwent pericardial window placement with removal of 300 cc of serous fluid, sent for culture and cytology and pericardial biopsy done,     He is mildly tachypneic also but lungs have been clear to auscultation. Also had significant anemia with melena and bright red blood per rectum, with a hemoglobin yessy of 6.4 with a baseline around 12-13, EGD was negative, colonoscopy was a poor prep. Pericardial drain was removed on 7/13/2023, no further events,    Overall looks and feels good     Plan:     Pericardial effusion: Status post removal during pericardiocentesis of 300 cc of fluid for pericardial effusion with tamponade physiology and subsequent drainage daily  Pericardial drain removed on 7/13, on 7/14-echo without any reaccumulation of free fluid    Pericardial fluid cytology-poor fixation, hence only degenerative cells,  Pericardial fluid biopsy still awaited.     He does admit to prior pericardial effusion, no clear details in this regard but had a moderate pericardial effusion in 2018, did not undergo drainage    Continue colchicine 0.6 mg daily for 2 months, avoiding NSAIDs in the setting of CKD    Outpatient rheumatology evaluation     Left ventricular hypertrophy: Significant LVH noted on echo, moderate with measurements, has carried a label of 'hypertrophic cardiomyopathy from uncontrolled hypertension'  On reviewing echo and prior strain pattern, suggestive of amyloid-PYP SPECT was negative, hence checking cardiac MRI to rule out any other infiltrative disease     No neuropathic symptoms  Has have intrinsic renal disease as well although this also could be from hypertension-typically TTR amyloid is not associated with renal dysfunction    Resistant hypertension: History of allergy-anaphylaxis to clonidine, lisinopril, nifedipine, spironolactone and unspecified allergy to minoxidil hence started on  At baseline and has continued on amlodipine 10 mg, Cardura 8 mg twice daily, eplerenone 50 mg daily, hydralazine 100 mg 3 times daily, and also started on labetalol 200 mg twice daily  He strongly felt that hydralazine was making him foggy, fatigued and does not want to take it.   wi hence discontinued hydralazine entirely  Since he needs to be evaluated for hyperaldosteronism again-has periodically been hypokalemic, also with marked hyper needing multiple medications despite having had over 100 pounds of weight loss post gastric bypass surgery and with previous history of hyperaldosteronism-status post left adrenalectomy at the [facility] further details are available,, discontinued eplerenone as well and check aldosterone/renin ratio in about 2 to 3 weeks after discharge  Normal a.m. cortisol level     Overall blood pressures are acceptable at this time. No further changes     Anemia: EGD was negative, colonoscopy prep was suboptimal and to be repeated    Prolonged QT on ECG: Today around 470 ms, only mildly prolonged. Watch closely if using any QT prolonging drugs  Stable for discharge from a cardiac standpoint  ===================================================================    Chief Complaint: No chief complaint on file.         Subjective/Objective     Subjective: Denies any new complaints    Objective: No distress    Patient Active Problem List   Diagnosis   • Pericardial effusion   • Essential hypertension   • Leg DVT (deep venous thromboembolism), acute, bilateral ([address])   • COPD (chronic obstructive pulmonary disease) (HCC)   • CHF (congestive heart failure) (HCC)   • Chronic diastolic CHF (congestive heart failure) (HCC)   • Pulmonary nodule, right   • CKD stage 3 secondary to diabetes ([address])   • Lymphedema   • Anemia   • AGUS (obstructive sleep apnea)   • Abscess of left groin   • History of DVT (deep vein thrombosis)   • Splenic lesion   • Dysuria   • Renal cyst   • Post-traumatic male urethral stricture   • Morbid obesity due to excess calories ([address])   • Encounter for surgical aftercare following surgery of digestive system   • Postsurgical malabsorption   • Hypertensive kidney disease with stage 3 chronic kidney disease (HCC)   • Acute renal failure superimposed on stage 3 chronic kidney disease (HCC)   • Bradycardia   • Hypoglycemia   • History of gastric bypass   • Hypokalemia   • Seizure (HCC)   • Hypertensive encephalopathy   • Hypertensive emergency   • Meningioma (HCC)   • Prolonged Q-T interval on ECG   • Abdominal pain   • Cardiac tamponade   • Hypertensive urgency   • Hematochezia   • Depression with anxiety   • Electrolyte abnormality   • Moderate protein-calorie malnutrition (HCC)   • 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HCC)       Vitals: /96 (BP Location: Right arm)   Pulse 68   Temp 98.7 °F (37.1 °C) (Oral)   Resp 17   Ht 5' 6" (1.676 m)   Wt 58.9 kg (129 lb 13.6 oz)   SpO2 96%   BMI 20.96 kg/m²     I/O this shift:  In: 118 [P.O.:118]  Out: -   Wt Readings from Last 3 Encounters:   07/15/23 58.9 kg (129 lb 13.6 oz)   07/08/23 68.9 kg (152 lb)   05/23/23 68.9 kg (152 lb)       Intake/Output Summary (Last 24 hours) at 7/15/2023 1131  Last data filed at 7/15/2023 0835  Gross per 24 hour   Intake 768 ml   Output --   Net 768 ml     I/O last 3 completed shifts:   In: 5175 [P.O.:2040; I.V.:40; IV Piggyback:250]  Out: 325 [Urine:325]    Invasive Devices     Peripheral Intravenous Line  Duration           Peripheral IV 07/14/23 Left;Proximal;Ventral (anterior) Forearm <1 day                  Physical Exam:  GEN: Latia Odom appears well, alert and oriented x 3, pleasant and cooperative   HEENT: pupils equal, round, and reactive to light; extraocular muscles intact  NECK: supple, no carotid bruits or JVD  HEART: regular rhythm, normal S1 and S2, no murmur, no clicks, gallops or rubs, drain removed   LUNGS: clear to auscultation bilaterally; no wheezes or rhonchi, no rales  ABDOMEN/GI: normal bowel sounds, soft, no tenderness, no distention  EXTREMITIES/Musculoskeltal: peripheral pulses normal; no clubbing, cyanosis, no edema  NEURO: no focal motor findings   SKIN: normal without suspicious lesions on exposed skin              Lab Results:       Results from last 7 days   Lab Units 07/15/23  0722 07/14/23  0527 07/13/23  0506   WBC Thousand/uL 9.47 9.43 9.79   HEMOGLOBIN g/dL 7.5* 7.1* 7.7*   HEMATOCRIT % 23.2* 21.7* 23.6*   PLATELETS Thousands/uL 394* 377 371         Results from last 7 days   Lab Units 07/15/23  0700 07/14/23  0527 07/13/23  0506 07/10/23  0442 07/09/23  0433   POTASSIUM mmol/L 3.8 3.4* 3.4*   < > 3.6   CHLORIDE mmol/L 114* 111* 111*   < > 113*   CO2 mmol/L 25 26 25   < > 25   BUN mg/dL 28* 29* 30*   < > 49*   CREATININE mg/dL 1.99* 2.06* 2.09*   < > 1.92*   CALCIUM mg/dL 8.9 8.4 8.5   < > 8.6   ALK PHOS U/L  --   --   --   --  54   ALT U/L  --   --   --   --  50   AST U/L  --   --   --   --  32    < > = values in this interval not displayed. Imaging: I have personally reviewed pertinent reports.     EKG/Telemtry: No events    Scheduled Meds:  Current Facility-Administered Medications   Medication Dose Route Frequency Provider Last Rate   • acetaminophen  650 mg Oral Q6H PRN MARBIN Delong     • aluminum-magnesium hydroxide-simethicone  30 mL Oral Q6H PRN MARBIN Delong     • amLODIPine  10 mg Oral Daily MARBIN Delong     • atorvastatin  40 mg Oral HS Laxmi Marin MARBIN James     • budesonide-formoterol  2 puff Inhalation BID Ora Portal, CRNP     • busPIRone  15 mg Oral BID Ora Portal, CRNP     • calcium carbonate  2 tablet Oral BID With Meals Ora Portal, CRNP     • cholecalciferol  2,000 Units Oral Daily Ora Portal, CRNP     • colchicine  0.6 mg Oral Daily Gleen Smoker, DO     • doxazosin  8 mg Oral BID Ora Portal, CRNP     • famotidine  20 mg Oral HS Shanon Common, DO     • fluticasone  1 puff Inhalation Daily Ora Portal, CRNP     • hydrALAZINE  10 mg Intravenous Q6H PRN Ora Portal, CRNP     • HYDROmorphone  0.2 mg Intravenous Q3H PRN Ora Portal, CRNP     • iron sucrose  300 mg Intravenous Once per day on Mon Wed Fri Shanon Common, DO Stopped (07/14/23 1241)   • labetalol  10 mg Intravenous Q6H PRN Ora Portal, CRNP     • labetalol  400 mg Oral Q12H [facility] Yeyo Ceron MD     • LORazepam  0.5 mg Intravenous Q30 Min PRN Shanon Common, DO     • methocarbamol  500 mg Oral Q6H PRN Ora Portal, CRNP     • mirtazapine  15 mg Oral HS Ora Portal, CRNP     • multivitamin-minerals  1 tablet Oral Daily Ora Portal, CRNP     • oxyCODONE  2.5 mg Oral Q4H PRN Ora Portal, CRNP      Or   • oxyCODONE  5 mg Oral Q4H PRN Ora Portal, CRNP     • pantoprazole  40 mg Intravenous Q12H [facility] Ora Portal, CRNP     • polyethylene glycol  17 g Oral Daily Ora Portal, CRNP     • potassium chloride  20 mEq Intravenous Once Shanon Common, DO 20 mEq (07/15/23 1000)   • sertraline  200 mg Oral Daily Ora Portal, CRNP     • trimethobenzamide  200 mg Intramuscular Once Yeyo Ceron MD     • trimethobenzamide  200 mg Intramuscular Q6H PRN Yeyo Ceron MD       Continuous Infusions: This note was completed in part utilizing [employer] fluency direct voice recognition software.    Grammatical errors, random word insertion, spelling mistakes, occasional wrong word or "sound-alike" substitutions and incomplete sentences may be an occasional consequence of the system secondary to software limitations, ambient noise and hardware issues. At the time of dictation, efforts were made to edit, clarify and /or correct errors. Please read the chart carefully and recognize, using context, where substitutions have occurred. If you have any questions or concerns about the context, text or information contained within the body of this dictation, please contact myself, the provider, for further clarification.

## 2023-07-15 NOTE — ASSESSMENT & PLAN NOTE
· Likely multifactorial  · Iron sat low  · Monitor hemoglobin  · 1U PRBC in ICU  · EGD 7/10 and colon 7/11 did not reveal source of bleed  · Given Venofer while IP  · F/u B12 and folate  · FLC ratio 2.31 but SPEP WNL  · HemOnc referral at d/c

## 2023-07-15 NOTE — ASSESSMENT & PLAN NOTE
Estimated Creatinine Clearance: 32.9 mL/min (A) (by C-G formula based on SCr of 1.99 mg/dL (H)).   Monitor kidney function closely  Avoid nephrotoxins  Monitor postvoid residuals  Cr near baseline of 2.1-2.2 - monitor

## 2023-07-15 NOTE — ASSESSMENT & PLAN NOTE
· Patient presented to [facility] ED with symptoms of chest pain shortness of breath epigastric pain  · He was noted to have pericardial effusion with concerns for tamponade  · He was transferred to [address] [place] [address] for thoracic surgery evaluation  · S/p window 7/8 - drain removed 7/13  · Thoracic surgery appreciated  · Cardio appreciated  · SPEP/UPEP WNL  · C/w colchicine for 2 months  · Fluid cytology non diagnostic  · Pericardial biopsy no malignancy  · Cx neg so stopped abx  ·  - refer to rheum at d/c  · 7/12 underwent TTR amyloid SPECT study negative  · 7/14 limited echo only w/ trivial pericardial effusion  · Cardiac MRI completed - will f/u OP w/ cardio to discuss read

## 2023-07-17 ENCOUNTER — TELEPHONE (OUTPATIENT)
Dept: HEMATOLOGY ONCOLOGY | Facility: CLINIC | Age: 58
End: 2023-07-17

## 2023-07-17 RX ORDER — FLUTICASONE FUROATE AND VILANTEROL 100; 25 UG/1; UG/1
1 POWDER RESPIRATORY (INHALATION) DAILY
Qty: 60 BLISTER | Refills: 0 | Status: SHIPPED | OUTPATIENT
Start: 2023-07-17

## 2023-07-17 NOTE — UTILIZATION REVIEW
NOTIFICATION OF ADMISSION DISCHARGE   This is a Notification of Discharge from [address] [place] [address]. Please be advised that this patient has been discharge from our facility. Below you will find the admission and discharge date and time including the patient’s disposition. UTILIZATION REVIEW CONTACT:  Pricilla Roman  Utilization   Network Utilization Review Department  Phone: [phone] x carefully listen to the prompts. All voicemails are confidential.  Email: [email or web address]     ADMISSION INFORMATION  PRESENTATION DATE: 7/8/2023  9:07 AM  OBERVATION ADMISSION DATE:   INPATIENT ADMISSION DATE: 7/8/23  9:07 AM   DISCHARGE DATE: 7/15/2023  4:14 PM   DISPOSITION:Home with Home Health Care    IMPORTANT INFORMATION:  Send all requests for admission clinical reviews, approved or denied determinations and any other requests to dedicated fax number below belonging to the campus where the patient is receiving treatment.  List of dedicated fax numbers:  [place] DENIALS (Administrative/Medical Necessity) [phone] [facility] [phone]   [facility] [phone]   [facility] [phone] [address] [facility] [phone] [address] [facility] [phone]   [facility] [phone]   [address] [place] [address] [address] [place] [address] [phone]   [address] [place] [address] [phone] [address] [facility] [phone] 27[address] [facility] [address] [facility] [phone]

## 2023-07-17 NOTE — TELEPHONE ENCOUNTER
I called Radha Merrill in response to a referral that was received for patient to establish care with Hematology. Outreach was made to schedule a consultation. .    Patient's phone was busy and a VM was not left. Another attempt will be made to contact patient.

## 2023-07-18 ENCOUNTER — TELEPHONE (OUTPATIENT)
Dept: HEMATOLOGY ONCOLOGY | Facility: CLINIC | Age: 58
End: 2023-07-18

## 2023-07-18 NOTE — TELEPHONE ENCOUNTER
I called Luis Alberto Lozano in response to a referral that was received for patient to establish care with Hematology.      Outreach was made to schedule a consultation. .     Patient's phone was busy and a VM was not left. Another attempt will be made to contact patient.

## 2023-07-19 ENCOUNTER — TELEPHONE (OUTPATIENT)
Dept: OBGYN CLINIC | Facility: HOSPITAL | Age: 58
End: 2023-07-19

## 2023-07-19 ENCOUNTER — TELEPHONE (OUTPATIENT)
Dept: HEMATOLOGY ONCOLOGY | Facility: CLINIC | Age: 58
End: 2023-07-19

## 2023-07-19 NOTE — TELEPHONE ENCOUNTER
I called Lissa Fowler in response to a referral that was received for patient to establish care with Hematology. Outreach was made to schedule a consultation. .    I left a voicemail explaining the reason for my call and advised patient to call [facility] at [phone]. This is the third attempt to schedule patient unsuccessfully. The referral has been closed, a [employer] message has been sent to patient (if applicable) and a letter has been sent to the address on file.

## 2023-07-19 NOTE — TELEPHONE ENCOUNTER
Caller: patient    Doctor: Rheumatology    Reason for call: I called patient from referral. Patient is being referred after a hospital stay. Patient is currently scheduled with Gracia Vaca on 2/1/24 but he thought they wanted him seen sooner than that. Please review & let patient know if he can have a sooner appt.        Call back#: [phone]

## 2023-08-08 ENCOUNTER — TELEPHONE (OUTPATIENT)
Dept: UROLOGY | Facility: AMBULATORY SURGERY CENTER | Age: 58
End: 2023-08-08

## 2023-08-08 NOTE — TELEPHONE ENCOUNTER
Patient called with complaint of episodes off and on revealing gross hematuria. History of urethral stricture. He was having difficulty and complaint of dysuria and frequency of urination at times. Pain up the penile shaft of a level of 7 with voiding but subsides after. He does occasionally get pain in abdomen as well. I scheduled him with Keely Rascon PA-C on 8/14/2023 in [facility] but did review ER precautions with him as well in the event he becomes symptomatically worse between now and then. I informed him I will let our clinical staff know of this conversation. He verbalized understanding.

## 2023-08-11 ENCOUNTER — HOSPITAL ENCOUNTER (EMERGENCY)
Facility: HOSPITAL | Age: 58
Discharge: HOME | End: 2023-08-11
Attending: EMERGENCY MEDICINE
Payer: COMMERCIAL

## 2023-08-11 ENCOUNTER — APPOINTMENT (EMERGENCY)
Dept: ULTRASOUND IMAGING | Facility: HOSPITAL | Age: 58
End: 2023-08-11
Payer: COMMERCIAL

## 2023-08-11 VITALS
RESPIRATION RATE: 18 BRPM | DIASTOLIC BLOOD PRESSURE: 94 MMHG | HEART RATE: 70 BPM | OXYGEN SATURATION: 97 % | SYSTOLIC BLOOD PRESSURE: 164 MMHG | TEMPERATURE: 99.5 F

## 2023-08-11 DIAGNOSIS — N45.3 ORCHITIS AND EPIDIDYMITIS: ICD-10-CM

## 2023-08-11 DIAGNOSIS — N39.0 UTI (URINARY TRACT INFECTION): Primary | ICD-10-CM

## 2023-08-11 LAB
ANION GAP SERPL CALCULATED.3IONS-SCNC: 8 MMOL/L
BACTERIA UR QL AUTO: ABNORMAL /HPF
BASOPHILS # BLD AUTO: 0.07 THOUSANDS/ÂΜL (ref 0–0.1)
BASOPHILS NFR BLD AUTO: 0 % (ref 0–1)
BILIRUB UR QL STRIP: NEGATIVE
BUN SERPL-MCNC: 48 MG/DL (ref 5–25)
CALCIUM SERPL-MCNC: 8.8 MG/DL (ref 8.4–10.2)
CHLORIDE SERPL-SCNC: 104 MMOL/L (ref 96–108)
CLARITY UR: ABNORMAL
CO2 SERPL-SCNC: 24 MMOL/L (ref 21–32)
COLOR UR: YELLOW
CREAT SERPL-MCNC: 1.6 MG/DL (ref 0.6–1.3)
EOSINOPHIL # BLD AUTO: 0.15 THOUSAND/ÂΜL (ref 0–0.61)
EOSINOPHIL NFR BLD AUTO: 1 % (ref 0–6)
ERYTHROCYTE [DISTWIDTH] IN BLOOD BY AUTOMATED COUNT: 15.2 % (ref 11.6–15.1)
GFR SERPL CREATININE-BSD FRML MDRD: 46 ML/MIN/1.73SQ M
GLUCOSE SERPL-MCNC: 225 MG/DL (ref 65–140)
GLUCOSE UR STRIP-MCNC: NEGATIVE MG/DL
HCT VFR BLD AUTO: 28.9 % (ref 36.5–49.3)
HGB BLD-MCNC: 9.4 G/DL (ref 12–17)
HGB UR QL STRIP.AUTO: ABNORMAL
IMM GRANULOCYTES # BLD AUTO: 0.12 THOUSAND/UL (ref 0–0.2)
IMM GRANULOCYTES NFR BLD AUTO: 1 % (ref 0–2)
KETONES UR STRIP-MCNC: NEGATIVE MG/DL
LEUKOCYTE ESTERASE UR QL STRIP: ABNORMAL
LYMPHOCYTES # BLD AUTO: 1.23 THOUSANDS/ÂΜL (ref 0.6–4.47)
LYMPHOCYTES NFR BLD AUTO: 8 % (ref 14–44)
MCH RBC QN AUTO: 28.9 PG (ref 26.8–34.3)
MCHC RBC AUTO-ENTMCNC: 32.5 G/DL (ref 31.4–37.4)
MCV RBC AUTO: 89 FL (ref 82–98)
MONOCYTES # BLD AUTO: 1.29 THOUSAND/ÂΜL (ref 0.17–1.22)
MONOCYTES NFR BLD AUTO: 8 % (ref 4–12)
MUCOUS THREADS UR QL AUTO: ABNORMAL
NEUTROPHILS # BLD AUTO: 13.62 THOUSANDS/ÂΜL (ref 1.85–7.62)
NEUTS SEG NFR BLD AUTO: 82 % (ref 43–75)
NITRITE UR QL STRIP: NEGATIVE
NON-SQ EPI CELLS URNS QL MICRO: ABNORMAL /HPF
NRBC BLD AUTO-RTO: 0 /100 WBCS
PH UR STRIP.AUTO: 6 [PH]
PLATELET # BLD AUTO: 500 THOUSANDS/UL (ref 149–390)
PMV BLD AUTO: 8.9 FL (ref 8.9–12.7)
POTASSIUM SERPL-SCNC: 2.7 MMOL/L (ref 3.5–5.3)
PROT UR STRIP-MCNC: ABNORMAL MG/DL
RBC # BLD AUTO: 3.25 MILLION/UL (ref 3.88–5.62)
RBC #/AREA URNS AUTO: ABNORMAL /HPF
SODIUM SERPL-SCNC: 136 MMOL/L (ref 135–147)
SP GR UR STRIP.AUTO: 1.02 (ref 1–1.03)
UROBILINOGEN UR STRIP-ACNC: <2 MG/DL
WBC # BLD AUTO: 16.48 THOUSAND/UL (ref 4.31–10.16)
WBC #/AREA URNS AUTO: ABNORMAL /HPF
WBC CLUMPS # UR AUTO: PRESENT /UL

## 2023-08-11 PROCEDURE — 76870 US EXAM SCROTUM: CPT

## 2023-08-11 PROCEDURE — 85025 COMPLETE CBC W/AUTO DIFF WBC: CPT | Performed by: EMERGENCY MEDICINE

## 2023-08-11 PROCEDURE — 96375 TX/PRO/DX INJ NEW DRUG ADDON: CPT

## 2023-08-11 PROCEDURE — 36415 COLL VENOUS BLD VENIPUNCTURE: CPT | Performed by: EMERGENCY MEDICINE

## 2023-08-11 PROCEDURE — 99285 EMERGENCY DEPT VISIT HI MDM: CPT | Performed by: EMERGENCY MEDICINE

## 2023-08-11 PROCEDURE — 87186 SC STD MICRODIL/AGAR DIL: CPT

## 2023-08-11 PROCEDURE — 99285 EMERGENCY DEPT VISIT HI MDM: CPT

## 2023-08-11 PROCEDURE — 81001 URINALYSIS AUTO W/SCOPE: CPT

## 2023-08-11 PROCEDURE — 80048 BASIC METABOLIC PNL TOTAL CA: CPT | Performed by: EMERGENCY MEDICINE

## 2023-08-11 PROCEDURE — 87077 CULTURE AEROBIC IDENTIFY: CPT

## 2023-08-11 PROCEDURE — NC001 PR NO CHARGE: Performed by: EMERGENCY MEDICINE

## 2023-08-11 PROCEDURE — 96365 THER/PROPH/DIAG IV INF INIT: CPT

## 2023-08-11 PROCEDURE — 87086 URINE CULTURE/COLONY COUNT: CPT

## 2023-08-11 RX ORDER — OXYCODONE HYDROCHLORIDE 5 MG/1
5 TABLET ORAL EVERY 4 HOURS PRN
Qty: 15 TABLET | Refills: 0 | Status: SHIPPED | OUTPATIENT
Start: 2023-08-11 | End: 2023-08-21

## 2023-08-11 RX ORDER — CIPROFLOXACIN 500 MG/1
500 TABLET, FILM COATED ORAL 2 TIMES DAILY
Qty: 20 TABLET | Refills: 0 | Status: SHIPPED | OUTPATIENT
Start: 2023-08-11 | End: 2023-08-21

## 2023-08-11 RX ORDER — CEFTRIAXONE 1 G/50ML
1000 INJECTION, SOLUTION INTRAVENOUS ONCE
Status: COMPLETED | OUTPATIENT
Start: 2023-08-11 | End: 2023-08-11

## 2023-08-11 RX ORDER — MORPHINE SULFATE 4 MG/ML
4 INJECTION, SOLUTION INTRAMUSCULAR; INTRAVENOUS ONCE
Status: COMPLETED | OUTPATIENT
Start: 2023-08-11 | End: 2023-08-11

## 2023-08-11 RX ORDER — IBUPROFEN 600 MG/1
600 TABLET ORAL EVERY 6 HOURS PRN
Qty: 30 TABLET | Refills: 0 | Status: SHIPPED | OUTPATIENT
Start: 2023-08-11

## 2023-08-11 RX ORDER — POTASSIUM CHLORIDE 20 MEQ/1
40 TABLET, EXTENDED RELEASE ORAL ONCE
Status: COMPLETED | OUTPATIENT
Start: 2023-08-11 | End: 2023-08-11

## 2023-08-11 RX ADMIN — POTASSIUM CHLORIDE 40 MEQ: 1500 TABLET, EXTENDED RELEASE ORAL at 14:44

## 2023-08-11 RX ADMIN — CEFTRIAXONE 1000 MG: 1 INJECTION, SOLUTION INTRAVENOUS at 14:42

## 2023-08-11 RX ADMIN — MORPHINE SULFATE 4 MG: 4 INJECTION INTRAVENOUS at 14:38

## 2023-08-11 NOTE — ED PROVIDER NOTES
History  Chief Complaint   Patient presents with   • Blood in Urine     Pt reports episodes of blood in urine for approximately two weeks, states there is burning and pain with urination as well as scrotal swelling. Hx pericardial window. This is a 40-year-old male who presents emergency department with episodes of intermittent hematuria for the last 2 weeks. He also feels some scrotal swelling and some feeling of incomplete bladder emptying. He does have urgency and at times urinary incontinence. No back pain. No nausea no vomiting no fever no chills. History provided by:  Patient and spouse   used: No    Blood in Urine  The current episode started 1 to 4 weeks ago. Pertinent negatives include no abdominal pain, chills, dysuria, fever or vomiting. Prior to Admission Medications   Prescriptions Last Dose Informant Patient Reported? Taking? Calcium 600 MG tablet  Self Yes No   Sig: Take 1,200 mg by mouth   Cholecalciferol (VITAMIN D-3) 1000 units CAPS  Self Yes No   Sig: Take 2,000 Units by mouth daily   Fluticasone Furoate-Vilanterol (Breo Ellipta) 100-25 mcg/actuation inhaler   No No   Sig: Inhale 1 puff daily Rinse mouth after use.    Multiple Vitamins-Minerals (multivitamin with minerals) tablet  Self Yes No   Sig: Take 1 tablet by mouth daily   albuterol (PROVENTIL HFA,VENTOLIN HFA) 90 mcg/act inhaler  Self Yes No   aluminum-magnesium hydroxide-simethicone (MAALOX) 9100-9105-198 mg/30 mL suspension   No No   Sig: Take 30 mL by mouth every 6 (six) hours as needed for indigestion or heartburn   amLODIPine (NORVASC) 10 mg tablet  Self Yes No   Sig: Take 10 mg by mouth   atorvastatin (LIPITOR) 40 mg tablet  Self Yes No   Sig: Take 40 mg by mouth daily at bedtime   busPIRone (BUSPAR) 15 mg tablet  Self Yes No   Sig: TAKE ONE TABLET BY MOUTH TWICE A DAY FOR PTSD   colchicine (COLCRYS) 0.6 mg tablet   No No   Sig: Take 1 tablet (0.6 mg total) by mouth daily Do not start before July 16, 2023. doxazosin (CARDURA) 8 MG tablet  Self Yes No   Sig: Take 8 mg by mouth 2 (two) times a day   famotidine (PEPCID) 20 mg tablet   No No   Sig: Take 1 tablet (20 mg total) by mouth daily at bedtime   ferrous sulfate 325 (65 Fe) mg tablet  Self Yes No   Sig: Take 325 mg by mouth 2 (two) times a day     labetalol (NORMODYNE) 200 mg tablet   No No   Sig: Take 2 tablets (400 mg total) by mouth every 12 (twelve) hours   mirtazapine (REMERON) 15 mg tablet  Self Yes No   Sig: 15 mg   ondansetron (Zofran) 4 mg tablet   No No   Sig: Take 1 tablet (4 mg total) by mouth every 12 (twelve) hours as needed for nausea or vomiting   pantoprazole (PROTONIX) 40 mg tablet   No No   Sig: Take 1 tablet (40 mg total) by mouth 2 (two) times a day before meals   polyethylene glycol (MIRALAX) 17 g packet   No No   Sig: Take 17 g by mouth daily Do not start before July 16, 2023. potassium chloride (Klor-Con) 10 mEq tablet   No No   Sig: Take 1 tablet (10 mEq total) by mouth in the morning   sertraline (ZOLOFT) 100 mg tablet  Self Yes No   Sig: Take 2 tablets by mouth daily   trimethobenzamide (Tigan) 300 mg capsule   No No   Sig: Take 1 capsule (300 mg total) by mouth 3 (three) times a day as needed for nausea      Facility-Administered Medications: None       Past Medical History:   Diagnosis Date   • Asthma    • Chronic kidney disease    • COPD (chronic obstructive pulmonary disease) (HCC)    • CPAP (continuous positive airway pressure) dependence    • Diabetes mellitus (HCC)    • Emphysema of lung (HCC)    • Gout    • History of transfusion     pt stated they had a transfusion when they had gallbladder surgery. • Hypertension    • Kidney disease     renal failure   • Leg DVT (deep venous thromboembolism), acute, bilateral ([address]) 01/2018   • Obesity (BMI 30-39. 9)    • AGUS on CPAP     setting 11   • Postgastrectomy malabsorption    • Sleep apnea    • Systolic CHF [facility])        Past Surgical History:   Procedure Laterality Date   • ADRENALECTOMY     • CHOLECYSTECTOMY     • COLONOSCOPY     • EGD     • HIATAL HERNIA REPAIR N/A 12/22/2020    Procedure: REPAIR HERNIA HIATAL LAPAROSCOPIC;  Surgeon: Cecile Mitchell MD;  Location: [facility];  Service: Bariatrics   • INCISION AND DRAINAGE OF WOUND Left 10/17/2018    Procedure: INCISION AND DRAINAGE (I&D) GROIN;  Surgeon: Ree Orozco MD;  Location: [facility];  Service: General   • IR IMAGE [address] / [address]  8/17/2018   • IR IMAGE [address] / DRAINAGE W TUBE  7/31/2018   • JOINT REPLACEMENT Bilateral     knee   • KIDNEY SURGERY  2009    nodule removal   • LYMPH NODE DISSECTION Left 01/2018    left inguinal LN removed - benign    • OTHER SURGICAL HISTORY      kidney nodule removal   • PALATE / Matt Lukas BIOPSY / EXCISION     • PERICARDIAL WINDOW N/A 7/8/2023    Procedure: WINDOW PERICARDIAL;  Surgeon: Manfred Ríos MD;  [facility]: [facility];  Service: Thoracic   • RI LAPS GSTR RSTCV PX W/BYP JASON-EN-Y LIMB <150 CM N/A 12/22/2020    Procedure: BYPASS GASTRIC  JASON-EN-Y LAPAROSCOPIC WITH INTRAOPERATIVE EGD;  Surgeon: Cecile Mitchell MD;  Location: [facility];  Service: Bariatrics   • SINUS SURGERY     • TONSILLECTOMY         Family History   Problem Relation Age of Onset   • Heart murmur Sister    • Asthma Sister    • Hypertension Sister    • Kidney disease Mother    • Cancer Father    • Bell's palsy Brother    • Kidney disease Brother    • Deep vein thrombosis Neg Hx      I have reviewed and agree with the history as documented.     E-Cigarette/Vaping   • E-Cigarette Use Never User      E-Cigarette/Vaping Substances   • Nicotine No    • THC No    • CBD No    • Flavoring No    • Other No    • Unknown No      Social History     Tobacco Use   • Smoking status: Never   • Smokeless tobacco: Never   Vaping Use   • Vaping Use: Never used   Substance Use Topics   • Alcohol use: Yes     Comment: occasionally   • Drug use: No       Review of Systems   Constitutional: Negative for chills and fever. HENT: Negative for ear pain and sore throat. Eyes: Negative for pain and visual disturbance. Respiratory: Negative for cough and shortness of breath. Cardiovascular: Negative for chest pain and palpitations. Gastrointestinal: Negative for abdominal pain and vomiting. Genitourinary: Positive for hematuria, scrotal swelling and testicular pain. Negative for dysuria and penile discharge. Musculoskeletal: Negative for arthralgias and back pain. Skin: Negative for color change and rash. Neurological: Negative for seizures and syncope. All other systems reviewed and are negative. Physical Exam  Physical Exam  Vitals and nursing note reviewed. Constitutional:       General: He is not in acute distress. Appearance: Normal appearance. He is well-developed. HENT:      Head: Normocephalic and atraumatic. Right Ear: External ear normal.      Left Ear: External ear normal.      Nose: Nose normal.   Eyes:      Conjunctiva/sclera: Conjunctivae normal.      Pupils: Pupils are equal, round, and reactive to light. Cardiovascular:      Rate and Rhythm: Normal rate and regular rhythm. Pulses: Normal pulses. Heart sounds: Normal heart sounds. No murmur heard. Pulmonary:      Effort: Pulmonary effort is normal. No respiratory distress. Breath sounds: Normal breath sounds. Abdominal:      General: Abdomen is flat. Palpations: Abdomen is soft. Tenderness: There is no abdominal tenderness. Genitourinary:     Comments: Very enlarged tender right testicle. Regional lymphadenopathy in bilateral lower inguinal regions. No masses no hernia. Musculoskeletal:         General: No swelling. Cervical back: Neck supple. Skin:     General: Skin is warm and dry. Capillary Refill: Capillary refill takes less than 2 seconds. Neurological:      General: No focal deficit present.       Mental Status: He is alert and oriented to person, place, and time. Psychiatric:         Mood and Affect: Mood normal.         Thought Content:  Thought content normal.         Judgment: Judgment normal.         Vital Signs  ED Triage Vitals   Temperature Pulse Respirations Blood Pressure SpO2   08/11/23 1345 08/11/23 1345 08/11/23 1345 08/11/23 1345 08/11/23 1345   99.5 °F (37.5 °C) 72 20 (!) 182/105 98 %      Temp Source Heart Rate Source Patient Position - Orthostatic VS BP Location FiO2 (%)   08/11/23 1345 08/11/23 1345 08/11/23 1345 08/11/23 1345 --   Temporal Monitor Sitting Right arm       Pain Score       08/11/23 1438       10 - Worst Possible Pain           Vitals:    08/11/23 1345   BP: (!) 182/105   Pulse: 72   Patient Position - Orthostatic VS: Sitting         Visual Acuity      ED Medications  Medications   cefTRIAXone (ROCEPHIN) IVPB (premix in dextrose) 1,000 mg 50 mL (1,000 mg Intravenous New Bag 8/11/23 1442)   morphine injection 4 mg (4 mg Intravenous Given 8/11/23 1438)   potassium chloride (K-DUR,KLOR-CON) CR tablet 40 mEq (40 mEq Oral Given 8/11/23 1444)       Diagnostic Studies  Results Reviewed     Procedure Component Value Units Date/Time    Basic metabolic panel [[id number]]  (Abnormal) Collected: 08/11/23 1402    Lab Status: Final result Specimen: Blood from Arm, Right Updated: 08/11/23 1430     Sodium 136 mmol/L      Potassium 2.7 mmol/L      Chloride 104 mmol/L      CO2 24 mmol/L      ANION GAP 8 mmol/L      BUN 48 mg/dL      Creatinine 1.60 mg/dL      Glucose 225 mg/dL      Calcium 8.8 mg/dL      eGFR 46 ml/min/1.73sq m     Narrative:      [place] guidelines for Chronic Kidney Disease (CKD):   •  Stage 1 with normal or high GFR (GFR > 90 mL/min/1.73 square meters)  •  Stage 2 Mild CKD (GFR = 60-89 mL/min/1.73 square meters)  •  Stage 3A Moderate CKD (GFR = 45-59 mL/min/1.73 square meters)  •  Stage 3B Moderate CKD (GFR = 30-44 mL/min/1.73 square meters)  •  Stage 4 Severe CKD (GFR = 15-29 mL/min/1.73 square meters)  •  Stage 5 End Stage CKD (GFR <15 mL/min/1.73 square meters)  Note: GFR calculation is accurate only with a steady state creatinine    Urine Microscopic [[id number]]  (Abnormal) Collected: 08/11/23 1346    Lab Status: Final result Specimen: Urine, Clean Catch Updated: 08/11/23 1417     RBC, UA 2-4 /hpf      WBC, UA Innumerable /hpf      Epithelial Cells Occasional /hpf      Bacteria, UA Innumerable /hpf      MUCUS THREADS Occasional     WBC Clumps Present    Urine culture [[id number]] Collected: 08/11/23 1346    Lab Status:  In process Specimen: Urine, Clean Catch Updated: 08/11/23 1417    CBC and differential [[id number]]  (Abnormal) Collected: 08/11/23 1402    Lab Status: Final result Specimen: Blood from Arm, Right Updated: 08/11/23 1409     WBC 16.48 Thousand/uL      RBC 3.25 Million/uL      Hemoglobin 9.4 g/dL      Hematocrit 28.9 %      MCV 89 fL      MCH 28.9 pg      MCHC 32.5 g/dL      RDW 15.2 %      MPV 8.9 fL      Platelets 282 Thousands/uL      nRBC 0 /100 WBCs      Neutrophils Relative 82 %      Immat GRANS % 1 %      Lymphocytes Relative 8 %      Monocytes Relative 8 %      Eosinophils Relative 1 %      Basophils Relative 0 %      Neutrophils Absolute 13.62 Thousands/µL      Immature Grans Absolute 0.12 Thousand/uL      Lymphocytes Absolute 1.23 Thousands/µL      Monocytes Absolute 1.29 Thousand/µL      Eosinophils Absolute 0.15 Thousand/µL      Basophils Absolute 0.07 Thousands/µL     UA w Reflex to Microscopic w Reflex to Culture [[id number]]  (Abnormal) Collected: 08/11/23 1346    Lab Status: Final result Specimen: Urine, Clean Catch Updated: 08/11/23 1400     Color, UA Yellow     Clarity, UA Turbid     Specific Gravity, UA 1.018     pH, UA 6.0     Leukocytes, UA Large     Nitrite, UA Negative     Protein,  (2+) mg/dl      Glucose, UA Negative mg/dl      Ketones, UA Negative mg/dl      Urobilinogen, UA <2.0 mg/dl      Bilirubin, UA Negative     Occult Blood, UA Small    UA w Reflex to Microscopic w Reflex to Culture [[id number]]     Lab Status: No result Specimen: Urine, Clean Catch                  US scrotum and testicles   Final Result by Nicole Oakley MD (08/11 1176)      1. Findings of right worse than left epididymoorchitis. 2.  Moderate-to-large right hydrocele. The study was marked in [facility] for immediate notification. Workstation performed: [id number]                    Procedures  Procedures         ED Course  ED Course as of 08/11/23 1506   Fri Aug 11, 2023   1407 Leukocytes, UA(!): Large   1407 Blood, UA(!): Small                               SBIRT 20yo+    Flowsheet Row Most Recent Value   Initial Alcohol Screen: US AUDIT-C     1. How often do you have a drink containing alcohol? 0 Filed at: 08/11/2023 1339   2. How many drinks containing alcohol do you have on a typical day you are drinking? 0 Filed at: 08/11/2023 1339   3a. Male UNDER 65: How often do you have five or more drinks on one occasion? 0 Filed at: 08/11/2023 1339   3b. FEMALE Any Age, or MALE 65+: How often do you have 4 or more drinks on one occassion? 0 Filed at: 08/11/2023 1339   Audit-C Score 0 Filed at: 08/11/2023 1339   DARRIN: How many times in the past year have you. .. Used an illegal drug or used a prescription medication for non-medical reasons? Never Filed at: 08/11/2023 1339                    Medical Decision Making  Differential diagnose includes but not limited to: UTI, orchitis, epididymitis, epididymal orchitis, torsion, mass, incarcerated hernia    Amount and/or Complexity of Data Reviewed  Labs: ordered. Decision-making details documented in ED Course. Radiology: ordered. Risk  Prescription drug management.           Disposition  Final diagnoses:   UTI (urinary tract infection)   Orchitis and epididymitis     Time reflects when diagnosis was documented in both MDM as applicable and the Disposition within this note     Time User Action Codes Description Comment 8/11/2023  3:03 PM Mynor Wright [N39.0] UTI (urinary tract infection)     8/11/2023  3:03 PM Mynor Wright [N45.3] Orchitis and epididymitis       ED Disposition     None      Follow-up Information    None         Patient's Medications   Discharge Prescriptions    CIPROFLOXACIN (CIPRO) 500 MG TABLET    Take 1 tablet (500 mg total) by mouth 2 (two) times a day for 10 days       Start Date: 8/11/2023 End Date: 8/21/2023       Order Dose: 500 mg       Quantity: 20 tablet    Refills: 0    IBUPROFEN (MOTRIN) 600 MG TABLET    Take 1 tablet (600 mg total) by mouth every 6 (six) hours as needed for moderate pain       Start Date: 8/11/2023 End Date: --       Order Dose: 600 mg       Quantity: 30 tablet    Refills: 0    OXYCODONE (ROXICODONE) 5 IMMEDIATE RELEASE TABLET    Take 1 tablet (5 mg total) by mouth every 4 (four) hours as needed for moderate pain for up to 10 days Max Daily Amount: 30 mg       Start Date: 8/11/2023 End Date: 8/21/2023       Order Dose: 5 mg       Quantity: 15 tablet    Refills: 0       No discharge procedures on file.     PDMP Review       Value Time User    PDMP Reviewed  Yes 12/24/2020  8:11 AM Deborah Blackwell PA-C          ED Provider  Electronically Signed by           Jessica Peralta MD  08/11/23 7396

## 2023-08-11 NOTE — ED NOTES
Alert oriented x4 iin no acute distress pt discharged to home with written instructions no c/o offered     Denise Franks RN  08/11/23 3679

## 2023-08-13 LAB — BACTERIA UR CULT: ABNORMAL

## 2023-08-14 ENCOUNTER — TELEPHONE (OUTPATIENT)
Dept: NEUROSURGERY | Facility: CLINIC | Age: 58
End: 2023-08-14

## 2023-08-14 ENCOUNTER — APPOINTMENT (OUTPATIENT)
Dept: LAB | Facility: HOSPITAL | Age: 58
End: 2023-08-14
Payer: COMMERCIAL

## 2023-08-14 ENCOUNTER — CONSULT (OUTPATIENT)
Dept: HEMATOLOGY ONCOLOGY | Facility: CLINIC | Age: 58
End: 2023-08-14
Payer: COMMERCIAL

## 2023-08-14 VITALS
BODY MASS INDEX: 23.95 KG/M2 | HEART RATE: 69 BPM | WEIGHT: 149 LBS | HEIGHT: 66 IN | SYSTOLIC BLOOD PRESSURE: 132 MMHG | TEMPERATURE: 98.6 F | OXYGEN SATURATION: 99 % | RESPIRATION RATE: 16 BRPM | DIASTOLIC BLOOD PRESSURE: 80 MMHG

## 2023-08-14 DIAGNOSIS — D64.9 ANEMIA, UNSPECIFIED TYPE: ICD-10-CM

## 2023-08-14 LAB
BACTERIA UR QL AUTO: ABNORMAL /HPF
BILIRUB UR QL STRIP: NEGATIVE
CLARITY UR: CLEAR
COLOR UR: YELLOW
CREAT UR-MCNC: 173 MG/DL
GLUCOSE UR STRIP-MCNC: NEGATIVE MG/DL
HGB UR QL STRIP.AUTO: NEGATIVE
KETONES UR STRIP-MCNC: NEGATIVE MG/DL
LEUKOCYTE ESTERASE UR QL STRIP: NEGATIVE
MICROALBUMIN UR-MCNC: 234 MG/L (ref 0–20)
MICROALBUMIN/CREAT 24H UR: 135 MG/G CREATININE (ref 0–30)
NITRITE UR QL STRIP: NEGATIVE
NON-SQ EPI CELLS URNS QL MICRO: ABNORMAL /HPF
PH UR STRIP.AUTO: 6 [PH]
PROT UR STRIP-MCNC: ABNORMAL MG/DL
RBC #/AREA URNS AUTO: ABNORMAL /HPF
SP GR UR STRIP.AUTO: 1.02 (ref 1–1.03)
UROBILINOGEN UR STRIP-ACNC: <2 MG/DL
WBC #/AREA URNS AUTO: ABNORMAL /HPF

## 2023-08-14 PROCEDURE — 82043 UR ALBUMIN QUANTITATIVE: CPT

## 2023-08-14 PROCEDURE — 82570 ASSAY OF URINE CREATININE: CPT

## 2023-08-14 PROCEDURE — 81001 URINALYSIS AUTO W/SCOPE: CPT

## 2023-08-14 PROCEDURE — 99205 OFFICE O/P NEW HI 60 MIN: CPT | Performed by: INTERNAL MEDICINE

## 2023-08-14 NOTE — PROGRESS NOTES
[address] [facility]ATOLOGY ONCOLOGY SPECIALISTS [facility]  [address] [facility] [id number]    Ken Barahona  1965      PRIMARY HEMATOLOGIC/ONCOLOGIC DIAGNOSIS:  1. Anemia    SECONDARY DIAGNOSIS:  1. Gastric bypass surgery  2. Meningioma  3. DVT, not on anticoagulation  4. CKD  5. Hyperaldosteronism s/p adrenalectomy    HISTORY OF PRESENT ILLNESS:  Ken Barahona is a 63yo male who presents for evaluation of anemia. Component Ref Range & Units 8/11/23  2:02 PM 7/15/23  7:22 AM 7/14/23  5:27 AM 7/13/23  5:06 AM 7/12/23  5:08 AM 7/11/23  3:05 AM 7/10/23 11:08 AM   WBC 4.31 - 10.16 Thousand/uL 16.48 High   9.47  9.43  9.79  12.05 High   13.95 High      RBC 3.88 - 5.62 Million/uL 3.25 Low   2.59 Low   2.44 Low   2.64 Low   2.69 Low   2.70 Low      Hemoglobin 12.0 - 17.0 g/dL 9.4 Low   7.5 Low   7.1 Low   7.7 Low   7.7 Low   7.9 Low   8.5 Low     Hematocrit 36.5 - 49.3 % 28.9 Low   23.2 Low   21.7 Low   23.6 Low   23.6 Low   23.7 Low      MCV 82 - 98 fL 89  90  89  89  88  88     MCH 26.8 - 34.3 pg 28.9  29.0  29.1  29.2  28.6  29.3     MCHC 31.4 - 37.4 g/dL 32.5  32.3  32.7  32.6  32.6  33.3     RDW 11.6 - 15.1 % 15.2 High   15.1  14.8  15.0  15.3 High   15.1     MPV 8.9 - 12.7 fL 8.9  9.1  9.5  9.3  9.2  9.4     Platelets 811 - 693 Thousands/uL                  Ref Range & Units 8/1/23 10:47 AM   Iron 45 - 176 ug/dL 11 Low          Ref Range & Units 8/1/23 10:47 AM   Ferritin 30 - 400 ng/mL 651 High       Vitamin B-12 180 - 914 pg/mL 638  726 R      Folate >5.9 ng/mL 20.4  16.9 R      The patient was evaluated in the ED on 5/10/23 with new onset seizures. He was treated for uncontrolled HTN and it was thought the presentation was due to hypertensive encephalopathy. MRI brain revealed extra-axial dural based lesion along the medial left tentorium with rim calcifications on CT compatible with a meningioma, mildly increased in size compared to the prior CT study of 8/26/2021. He was evaluated by neurosurgery with plan for outpatient follow-up. He was started on Keppra. On 7/8/23 the patient presented to the ED with abdominal pain and SOB. CT A/P showed moderate pericardial effusion, significantly increased from prior. Mass effect on the right lateral ventricle was noted. Mild mesenteric edema/ascites were present. ECHO showed a moderate to large pericardial effusion circumferential to the heart w/ a reasonable likelihood of cardiac tamponade (right ventricular compression). 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 The patient underwent pericardial window placement w/ removal of 300cc of serous fluid. Cytology--material was non-viable since it was received w/ no fixative. Pericardial biopsy c/w fibrous pericardial tissue with entrapped benign appearing mesothelial elements. No malignancy identified. The patient was also anemic and had melena w/ BRBPR. EGD--the esophagus appeared normal; 2 cm hiatal hernia - GE junction 40 cm from the incisors, diaphragmatic impression 42 cm from the incisors; the gastric pouch, gastrojejunal anastomosis and jejunum appeared normal. Colonoscopy--poor prep; multiple medium, scattered diverticula of moderate severity in the rectosigmoid; no bleeding seen.       PAST MEDICAL,PAST SURGICAL, FAMILY AND SOCIAL HISTORY:    Patient Active Problem List   Diagnosis   • Pericardial effusion   • Essential hypertension   • Leg DVT (deep venous thromboembolism), acute, bilateral (HCC)   • COPD (chronic obstructive pulmonary disease) (HCC)   • CHF (congestive heart failure) (HCC)   • Chronic diastolic CHF (congestive heart failure) (HCC)   • Pulmonary nodule, right   • CKD stage 3 secondary to diabetes ([address])   • Lymphedema   • Anemia   • AGUS (obstructive sleep apnea)   • Abscess of left groin   • History of DVT (deep vein thrombosis)   • Splenic lesion   • Dysuria   • Renal cyst   • Post-traumatic male urethral stricture   • Morbid obesity due to excess calories [facility])   • Encounter for surgical aftercare following surgery of digestive system   • Postsurgical malabsorption   • Hypertensive kidney disease with stage 3 chronic kidney disease (HCC)   • Acute renal failure superimposed on stage 3 chronic kidney disease (HCC)   • Bradycardia   • Hypoglycemia   • History of gastric bypass   • Hypokalemia   • Seizure (HCC)   • Hypertensive encephalopathy   • Hypertensive emergency   • Meningioma (HCC)   • Prolonged Q-T interval on ECG   • Abdominal pain   • Hematochezia   • Depression with anxiety   • Electrolyte abnormality   • Moderate protein-calorie malnutrition (HCC)   • 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HCC)   • GERD (gastroesophageal reflux disease)     Past Medical History:   Diagnosis Date   • Asthma    • Chronic kidney disease    • COPD (chronic obstructive pulmonary disease) (HCC)    • CPAP (continuous positive airway pressure) dependence    • Diabetes mellitus ([address])    • Emphysema of lung (HCC)    • Gout    • History of transfusion     pt stated they had a transfusion when they had gallbladder surgery. • Hypertension    • Kidney disease     renal failure   • Leg DVT (deep venous thromboembolism), acute, bilateral ([address]) 01/2018   • Obesity (BMI 30-39. 9)    • AGUS on CPAP     setting 11   • Postgastrectomy malabsorption    • Sleep apnea    • Systolic CHF [facility])      Past Surgical History:   Procedure Laterality Date   • ADRENALECTOMY     • CHOLECYSTECTOMY     • COLONOSCOPY     • EGD     • HIATAL HERNIA REPAIR N/A 12/22/2020    Procedure: REPAIR HERNIA HIATAL LAPAROSCOPIC;  Surgeon: Regla Russell MD;  Location: [facility];  Service: Bariatrics   • INCISION AND DRAINAGE OF WOUND Left 10/17/2018    Procedure: INCISION AND DRAINAGE (I&D) GROIN;  Surgeon: Viri Sotelo MD;  Location: [facility];  Service: General   • IR IMAGE [facility]  8/17/2018   • IR IMAGE GUIDED ASPIRATION / DRAINAGE W TUBE  7/31/2018   • JOINT REPLACEMENT Bilateral     knee   • KIDNEY SURGERY  2009    nodule removal   • LYMPH NODE DISSECTION Left 01/2018    left inguinal LN removed - benign    • OTHER SURGICAL HISTORY      kidney nodule removal   • PALATE / Cesar Foster BIOPSY / EXCISION     • PERICARDIAL WINDOW N/A 7/8/2023    Procedure: WINDOW PERICARDIAL;  Surgeon: Adina Pierre MD;  Location:  MAIN OR;  Service: Thoracic   • NM LAPS GSTR RSTCV PX W/BYP JASON-EN-Y LIMB <150 CM N/A 12/22/2020    Procedure: BYPASS GASTRIC  JASON-EN-Y LAPAROSCOPIC WITH INTRAOPERATIVE EGD;  Surgeon: Manuel Mohan MD;  Location: [facility];  Service: Bariatrics   • SINUS SURGERY     • TONSILLECTOMY       Family History   Problem Relation Age of Onset   • Heart murmur Sister    • Asthma Sister    • Hypertension Sister    • Kidney disease Mother    • Cancer Father    • Bell's palsy Brother    • Kidney disease Brother    • Deep vein thrombosis Neg Hx      Social History     Socioeconomic History   • Marital status: /Civil Union     Spouse name: Not on file   • Number of children: Not on file   • Years of education: Not on file   • Highest education level: Not on file   Occupational History   • Not on file   Tobacco Use   • Smoking status: Never   • Smokeless tobacco: Never   Vaping Use   • Vaping Use: Never used   Substance and Sexual Activity   • Alcohol use: Yes     Comment: occasionally   • Drug use: No   • Sexual activity: Never   Other Topics Concern   • Not on file   Social History Narrative   • Not on file     Social Determinants of Health     Financial Resource Strain: Not on file   Food Insecurity: No Food Insecurity (7/10/2023)    Hunger Vital Sign    • Worried About Running Out of Food in the Last Year: Never true    • Ran Out of Food in the Last Year: Never true   Transportation Needs: No Transportation Needs (7/10/2023)    PRAPARE - Transportation    • Lack of Transportation (Medical): No    • Lack of Transportation (Non-Medical):  No   Physical Activity: Not on file   Stress: Not on file   Social Connections: Not on file   Intimate Partner Violence: Not on file   Housing Stability: Low Risk  (7/10/2023)    Housing Stability Vital Sign    • Unable to Pay for Housing in the Last Year: No    • Number of Places Lived in the Last Year: 1    • Unstable Housing in the Last Year: No       Current Outpatient Medications:   •  albuterol (PROVENTIL HFA,VENTOLIN HFA) 90 mcg/act inhaler, , Disp: , Rfl:   •  aluminum-magnesium hydroxide-simethicone (MAALOX) 1466-5656-888 mg/30 mL suspension, Take 30 mL by mouth every 6 (six) hours as needed for indigestion or heartburn, Disp: 769 mL, Rfl: 0  •  amLODIPine (NORVASC) 10 mg tablet, Take 10 mg by mouth, Disp: , Rfl:   •  atorvastatin (LIPITOR) 40 mg tablet, Take 40 mg by mouth daily at bedtime, Disp: , Rfl:   •  busPIRone (BUSPAR) 15 mg tablet, TAKE ONE TABLET BY MOUTH TWICE A DAY FOR PTSD, Disp: , Rfl:   •  Calcium 600 MG tablet, Take 1,200 mg by mouth, Disp: , Rfl:   •  Cholecalciferol (VITAMIN D-3) 1000 units CAPS, Take 2,000 Units by mouth daily, Disp: , Rfl:   •  ciprofloxacin (CIPRO) 500 mg tablet, Take 1 tablet (500 mg total) by mouth 2 (two) times a day for 10 days, Disp: 20 tablet, Rfl: 0  •  colchicine (COLCRYS) 0.6 mg tablet, Take 1 tablet (0.6 mg total) by mouth daily Do not start before July 16, 2023., Disp: 30 tablet, Rfl: 0  •  doxazosin (CARDURA) 8 MG tablet, Take 8 mg by mouth 2 (two) times a day, Disp: , Rfl:   •  famotidine (PEPCID) 20 mg tablet, Take 1 tablet (20 mg total) by mouth daily at bedtime, Disp: 30 tablet, Rfl: 0  •  ferrous sulfate 325 (65 Fe) mg tablet, Take 325 mg by mouth 2 (two) times a day  , Disp: , Rfl:   •  Fluticasone Furoate-Vilanterol (Breo Ellipta) 100-25 mcg/actuation inhaler, Inhale 1 puff daily Rinse mouth after use., Disp: 60 blister, Rfl: 0  •  ibuprofen (MOTRIN) 600 mg tablet, Take 1 tablet (600 mg total) by mouth every 6 (six) hours as needed for moderate pain, Disp: 30 tablet, Rfl: 0  •  labetalol (NORMODYNE) 200 mg tablet, Take 2 tablets (400 mg total) by mouth every 12 (twelve) hours, Disp: 120 tablet, Rfl: 0  •  mirtazapine (REMERON) 15 mg tablet, 15 mg, Disp: , Rfl:   •  Multiple Vitamins-Minerals (multivitamin with minerals) tablet, Take 1 tablet by mouth daily, Disp: , Rfl:   •  ondansetron (Zofran) 4 mg tablet, Take 1 tablet (4 mg total) by mouth every 12 (twelve) hours as needed for nausea or vomiting, Disp: 10 tablet, Rfl: 0  •  oxyCODONE (Roxicodone) 5 immediate release tablet, Take 1 tablet (5 mg total) by mouth every 4 (four) hours as needed for moderate pain for up to 10 days Max Daily Amount: 30 mg, Disp: 15 tablet, Rfl: 0  •  pantoprazole (PROTONIX) 40 mg tablet, Take 1 tablet (40 mg total) by mouth 2 (two) times a day before meals, Disp: 60 tablet, Rfl: 0  •  polyethylene glycol (MIRALAX) 17 g packet, Take 17 g by mouth daily Do not start before July 16, 2023., Disp: 30 each, Rfl: 0  •  potassium chloride (Klor-Con) 10 mEq tablet, Take 1 tablet (10 mEq total) by mouth in the morning, Disp: 30 tablet, Rfl: 0  •  sertraline (ZOLOFT) 100 mg tablet, Take 2 tablets by mouth daily, Disp: , Rfl:   •  trimethobenzamide (Tigan) 300 mg capsule, Take 1 capsule (300 mg total) by mouth 3 (three) times a day as needed for nausea, Disp: 90 capsule, Rfl: 0  Allergies   Allergen Reactions   • Clonidine Anaphylaxis   • Lisinopril Anaphylaxis   • Nifedipine Anaphylaxis   • Spironolactone Anaphylaxis, Other (See Comments) and Shortness Of Breath   • Buspirone      Headaches,    • Enoxaparin      Injection site "bump" per Dr. Santiago Pouch   • Minoxidil      Retains fluid around heart     Vitals:    08/14/23 0920   Resp: 16       ROS:  CONSTITUTIONAL:  No fever. No chills. No dizziness. No weakness. EYES:  No pain, erythema, or discharge. No blurring of vision. ENT:  No sore throat, URI symptoms. No epistaxis. No tinnitus. CARDIOVASCULAR:  No chest pain. No palpitations.  No lower extremity edema. RESPIRATORY:  No shortness of breath, cough, pain with respiration, pleuritic chest pain. No hemoptysis. No dyspnea. No paroxysmal nocturnal dyspnea. GASTROINTESTINAL:  Normal appetite. No nausea, vomiting, diarrhea. No pain. No bloating. No melena. GENITOURINARY:  No frequency, urgency, nocturia. No hematuria or dysuria. MUSCULOSKELETAL:  No arthralgias or myalgias. INTEGUMENTARY:  No swelling. No bruising. No contusions. No abrasions. No lymphangitis. NEUROLOGIC:  No headache. No neck pain. No numbness or tingling of the extremities. No weakness. PSYCHIATRIC:  No confusion. ENDOCRINE:  No fatigue. No weakness. No history of thyroid, diabetes or adrenal problems. HEMATOLOGICAL:  No bleeding. No petechiae. No bruising.     PHYSICAL EXAM:    GENERAL: AAO x 3  HEENT: AT,NC  CVS: S1S2 RRR  LUNGS: CTA b/l  ABD: NT,ND, +BS  EXTR: no edema  NEURO: CN II-XII grossly intact    LABS:  I have reviewed pertinent labs:  CBC:   Lab Results   Component Value Date    WBC 16.48 (H) 08/11/2023    RBC 3.25 (L) 08/11/2023    HGB 9.4 (L) 08/11/2023    HCT 28.9 (L) 08/11/2023    MCV 89 08/11/2023     (H) 08/11/2023    MCH 28.9 08/11/2023    MCHC 32.5 08/11/2023    RDW 15.2 (H) 08/11/2023    MPV 8.9 08/11/2023    NEUTROABS 13.62 (H) 08/11/2023     CMP:   Lab Results   Component Value Date    SODIUM 136 08/11/2023    K 2.7 (LL) 08/11/2023     08/11/2023    CO2 24 08/11/2023    AGAP 8 08/11/2023    BUN 48 (H) 08/11/2023    CREATININE 1.60 (H) 08/11/2023    GLUC 225 (H) 08/11/2023    GLUF 74 08/27/2021    CALCIUM 8.8 08/11/2023    AST 32 07/09/2023    ALT 50 07/09/2023    ALKPHOS 54 07/09/2023    TP 5.4 (L) 07/10/2023    ALB 3.0 (L) 07/09/2023    TBILI 0.93 07/09/2023    EGFR 46 08/11/2023     Liver Enzymes:   Lab Results   Component Value Date    AST 32 07/09/2023    ALT 50 07/09/2023    ALKPHOS 54 07/09/2023    TP 5.4 (L) 07/10/2023    ALB 3.0 (L) 07/09/2023    TBILI 0.93 07/09/2023    BILIDIR 0.25 (H) 08/26/2021     Vitamin B12   Lab Results   Component Value Date    UACSBWDS49 638 07/15/2023     Iron Study   Lab Results   Component Value Date    FERRITIN 406 (H) 07/09/2023    CONCFE 8 (L) 07/09/2023    TIBC 154 (L) 07/09/2023    IRON 13 (L) 07/09/2023     Folate   Lab Results   Component Value Date    FOLATE 20.4 07/15/2023     Magnesium   Lab Results   Component Value Date    MG 2.2 07/13/2023     Phosphorus   Lab Results   Component Value Date    PHOS 3.7 07/13/2023     Coagulation Panel   Lab Results   Component Value Date    PROTIME 15.2 (H) 08/26/2021    INR 1.17 08/26/2021    PTT 33 08/26/2021       IMAGING:  US scrotum and testicles    Result Date: 8/11/2023  Narrative: SCROTAL ULTRASOUND INDICATION:    R testicle swelling and pain. COMPARISON: None TECHNIQUE:   Ultrasound the scrotal contents was performed with a high frequency linear transducer utilizing volumetric sweep imaging as well as standard still image techniques. Imaging performed in longitudinal and transverse orientation. Color and spectral Doppler evaluation also performed bilaterally. FINDINGS: TESTES: Testes are symmetric and normal in size. RIGHT testis = 3.1 x 2.2 x 2.2 cm. Volume 7.7 mL. Normal contour with homogeneous smooth echotexture. No intratesticular mass lesion or calcifications. LEFT testis = 2.5 x 1.7 x 2.0 cm. Volume 4.4 mL. Normal contour with homogeneous smooth echotexture. No intratesticular mass lesion or calcifications. Doppler flow within both testes is present, with hyperemia of the right greater than left testis. EPIDIDYMIDES: Enlarged, right worse than left. The right epididymis measures 5.4 x 2.6 x 3.1 cm, the left measures 1.7 x 0.8 x 0.6 cm. Doppler ultrasound demonstrates hyperemia in the right worse than left epididymis. No epididymal lesions. Both spermatic cords are thickened and hyperemic. HYDROCELE: Moderate-to-large right hydrocele. VARICOCELE:  None present.  SCROTUM: Diffuse scrotal thickening measuring 6 mm on the right and 4 mm on the left. No evidence for extratesticular mass or hernia demonstrated. Impression: 1. Findings of right worse than left epididymoorchitis. 2.  Moderate-to-large right hydrocele. The study was marked in [facility] for immediate notification. Workstation performed: [id number]     MRI cardiac  w wo contrast    Result Date: 7/19/2023  Narrative: Cardiac MRI with and without contrast INDICATION: Cardiac screening, increased risk of structural heart disease Hypertensive heart disease suspected Cardiac amyloidosis suspected, further testing rule out cardiac infiltrative disease. Technique: 1. 3 plane SSFP localizers. 2. SSFP cine imaging in long and short axis plane. 3. T2 weighted DIR FSE in short axis plane. 4. 10 ml gadobutrol power injected. 5. 2D inversion recovery FGRE for delayed myocardial enhancement. 6. The patient tolerated the procedure well without complication. Measurements: Danish-septal wall 16 mm Postero-lateral wall 16 mm Left ventricular end-diastolic dimension 47 mm Left ventricular end-systolic dimension 40 mm Ejection fraction approximately 55% by visual estimate, as artifact from ectopy and patient motion renders postprocessing software unreliable. Left atrium 26 mm Aortic Root 33 mm Findings: 1. Normal right ventricular size and systolic function, noting limited estimation of function due to artifact from patient motion. Severe concentric left ventricular hypertrophy. 2. Normal right ventricular size and systolic function. 3. The aortic, mitral, and tricuspid valves open without restriction. There is no significant valvular regurgitation, however cine MRI is inaccurate in the qualitative assessment of valvular regurgitation. 4. The left atrium is normal in size. The aortic root is normal in size. 5. There is no evidence of myocardial edema.  6. Delayed post-gadolinium imaging demonstrates patchy intramyocardial hyperenhancement in the basal inferolateral wall and the mid anterolateral wall. Abnormal gadolinium kinetics with the blood pool and myocardium nulling nearly simultaneously. Impression: Impression: 1. Normal right ventricular size and systolic function, noting limited estimation of function due to artifact from patient motion. Severe concentric left ventricular hypertrophy. 2. Normal right ventricular size and systolic function. 3. Normal bilateral atria. No valvular abnormalities. 4. Abnormal gadolinium kinetics with the blood pool and myocardium nulling nearly simultaneously, raising the possibility of cardiac amyloidosis. However, the patchy intramyocardial fibrosis in the basal inferolateral and mid anterolateral walls is not typical for amyloidosis and suggests cardiac sarcoidosis or sequela of myocarditis. Workstation performed: [id number]     I reviewed the above laboratory and imaging data. ASSESSMENT/PLAN:  1. Anemia. Likely multifactorial. Patient with history of gastric bypass and recent blood loss/melena/BRBPR. Continue follow-up with GI -- may need repeat colonoscopy and capsule endoscopy. S/p recent PRBC transfusion. Most recent Hgb of 10.4. B12 638, folate 20.4, ferritin 651, iron 11. TSH 0.885, HUI negative. SPEP--no monoclonal band. Obtain comprehensive anemia work-up. Follow-up in 2 weeks.

## 2023-08-14 NOTE — TELEPHONE ENCOUNTER
Patient called and requested to cancel f/u appt w [facility]  (patient needs to schedule MRI Brain- sent script to patient via [facility] to schedule w [facility]- patient to call our office back to schedule F/u w MRI Brain with [facility]) General

## 2023-08-14 NOTE — TELEPHONE ENCOUNTER
3 MONTH HOSP F/U W/MRI BRAIN SHCED 8/15@ [facility]     LEFT DETAIL [facility] APPT INFO AND TO CONF PATIENT HAS COMPLETED IMAGING AT [facility] AS STATED IN NOTES

## 2023-08-14 NOTE — PROGRESS NOTES
8/15/2023      Chief Complaint   Patient presents with   • Follow-up   • Urinary Incontinence   • Urinary Urgency         Assessment and Plan    59 y.o. male     1. Acute cystitis  2. History of urethral stricture disease  3. Epididymoorchitis, resolved   - Uroflow today unable to be preformed as patient voided prior to visit  - PVR today 46 mL  - Discussed recommendations for cystoscopy and uroflow as patient is currently having split urinary stream  - Discussed conservative measures for incomplete bladder emptying including frequent urination, double voiding, positional changes with urination, increased hydration with water, avoidance of bladder irritants, avoidance of constipation  - Continue antibiotics  - ER precautions in the meantime    4. Routine prostate cancer screening  - PSA prior to next visit after resolution of acute infection  - Call with any questions or concerns in the meantime  - All questions answered; patient understands and agrees with plan       History of Present Illness  Markos Pitts is a 59 y.o. male patient with history of urethral stricture disease here for evaluation of gross hematuria. Patient has prior history of urethral stricture disease with previous dilation. He was last seen by our urology department in November 2020 with recommendations to return in 6 months for uroflow and cystoscopic evaluation. Unfortunately patient was lost to follow-up. Patient does have multiple medical comorbidities. Patient previously discussed with MD possible repeat stricture dilation versus reconstructive urology referral for consideration of urethroplasty depending on results of cystoscopy. Patient did have recent ultrasound of scrotum and testicles (8/11/2023) showing findings of right worse than left epididymoorchitis as well as moderate to large right-sided hydrocele. Urine testing at that time was also positive for infection.   Patient is currently being treated with a course of antibiotics. Patient only notes gross hematuria in the presence of infection. Denies gross hematuria in absence of infection. States antibiotics have improved symptoms. Currently still having split urinary stream.     Review of Systems   Constitutional: Negative for activity change, appetite change, chills and fever. HENT: Negative for congestion and trouble swallowing. Respiratory: Negative for cough and shortness of breath. Cardiovascular: Negative for chest pain, palpitations and leg swelling. Gastrointestinal: Negative for abdominal pain, constipation, diarrhea, nausea and vomiting. Genitourinary: Positive for difficulty urinating and hematuria. Negative for dysuria, flank pain, frequency and urgency. Musculoskeletal: Negative for back pain and gait problem. Skin: Negative for wound. Allergic/Immunologic: Negative for immunocompromised state. Neurological: Negative for dizziness and syncope. Hematological: Does not bruise/bleed easily. Psychiatric/Behavioral: Negative for confusion. All other systems reviewed and are negative. Vitals  Vitals:    08/15/23 1439   BP: 144/92   Pulse: 57   SpO2: 98%   Weight: 65.3 kg (144 lb)   Height: 5' 6" (1.676 m)       Physical Exam  Constitutional:       General: He is not in acute distress. Appearance: Normal appearance. He is not ill-appearing, toxic-appearing or diaphoretic. HENT:      Head: Normocephalic. Nose: No congestion. Eyes:      General: No scleral icterus. Right eye: No discharge. Left eye: No discharge. Conjunctiva/sclera: Conjunctivae normal.      Pupils: Pupils are equal, round, and reactive to light. Pulmonary:      Effort: Pulmonary effort is normal.   Musculoskeletal:      Cervical back: Normal range of motion. Skin:     General: Skin is warm and dry. Coloration: Skin is not jaundiced or pale. Findings: No bruising, erythema, lesion or rash.    Neurological:      General: No focal deficit present. Mental Status: He is alert and oriented to person, place, and time. Mental status is at baseline. Gait: Gait normal.   Psychiatric:         Mood and Affect: Mood normal.         Behavior: Behavior normal.         Thought Content: Thought content normal.         Judgment: Judgment normal.           Past History  Past Medical History:   Diagnosis Date   • Asthma    • Chronic kidney disease    • COPD (chronic obstructive pulmonary disease) ([facility])    • CPAP (continuous positive airway pressure) dependence    • Diabetes mellitus (HCC)    • Emphysema of lung (HCC)    • Gout    • History of transfusion     pt stated they had a transfusion when they had gallbladder surgery. • Hypertension    • Kidney disease     renal failure   • Leg DVT (deep venous thromboembolism), acute, bilateral ([address]) 01/2018   • Obesity (BMI 30-39. 9)    • AGUS on CPAP     setting 11   • Postgastrectomy malabsorption    • Sleep apnea    • Systolic CHF ([facility])      Social History     Socioeconomic History   • Marital status: /Civil Union     Spouse name: None   • Number of children: None   • Years of education: None   • Highest education level: None   Occupational History   • None   Tobacco Use   • Smoking status: Never     Passive exposure: Past   • Smokeless tobacco: Never   Vaping Use   • Vaping Use: Never used   Substance and Sexual Activity   • Alcohol use: Yes     Comment: occasionally   • Drug use: Yes     Types: Marijuana     Comment: Card   • Sexual activity: Not Currently     Partners: Female   Other Topics Concern   • None   Social History Narrative   • None     Social Determinants of Health     Financial Resource Strain: Not on file   Food Insecurity: No Food Insecurity (7/10/2023)    Hunger Vital Sign    • Worried About Running Out of Food in the Last Year: Never true    • Ran Out of Food in the Last Year: Never true   Transportation Needs: No Transportation Needs (7/10/2023)    PRAPARE - Transportation    • Lack of Transportation (Medical): No    • Lack of Transportation (Non-Medical):  No   Physical Activity: Not on file   Stress: Not on file   Social Connections: Not on file   Intimate Partner Violence: Not on file   Housing Stability: Low Risk  (7/10/2023)    Housing Stability Vital Sign    • Unable to Pay for Housing in the Last Year: No    • Number of Places Lived in the Last Year: 1    • Unstable Housing in the Last Year: No     Social History     Tobacco Use   Smoking Status Never   • Passive exposure: Past   Smokeless Tobacco Never     Family History   Problem Relation Age of Onset   • Cancer Father    • Kidney disease Mother    • Heart murmur Sister    • Asthma Sister    • Hypertension Sister    • Heart disease Brother    • Bell's palsy Brother    • Hypertension Brother    • Deep vein thrombosis Neg Hx        The following portions of the patient's history were reviewed and updated as appropriate: allergies, current medications, past medical history, past social history, past surgical history and problem list.    Results  Recent Results (from the past 1 hour(s))   POCT Measure PVR    Collection Time: 08/15/23  2:44 PM   Result Value Ref Range    POST-VOID RESIDUAL VOLUME, ML POC 43 mL   ]  No results found for: "PSA"  Lab Results   Component Value Date    CALCIUM 8.8 08/11/2023    K 2.7 (LL) 08/11/2023    CO2 24 08/11/2023     08/11/2023    BUN 48 (H) 08/11/2023    CREATININE 1.60 (H) 08/11/2023     Lab Results   Component Value Date    WBC 16.48 (H) 08/11/2023    HGB 9.4 (L) 08/11/2023    HCT 28.9 (L) 08/11/2023    MCV 89 08/11/2023     (H) 08/11/2023       Maikel Lopez PA-C

## 2023-08-15 ENCOUNTER — OFFICE VISIT (OUTPATIENT)
Dept: UROLOGY | Facility: CLINIC | Age: 58
End: 2023-08-15
Payer: COMMERCIAL

## 2023-08-15 VITALS
BODY MASS INDEX: 23.14 KG/M2 | HEART RATE: 57 BPM | OXYGEN SATURATION: 98 % | DIASTOLIC BLOOD PRESSURE: 92 MMHG | WEIGHT: 144 LBS | HEIGHT: 66 IN | SYSTOLIC BLOOD PRESSURE: 144 MMHG

## 2023-08-15 DIAGNOSIS — Z12.5 SCREENING FOR PROSTATE CANCER: ICD-10-CM

## 2023-08-15 DIAGNOSIS — R31.0 GROSS HEMATURIA: Primary | ICD-10-CM

## 2023-08-15 LAB — POST-VOID RESIDUAL VOLUME, ML POC: 43 ML

## 2023-08-15 PROCEDURE — 51798 US URINE CAPACITY MEASURE: CPT | Performed by: PHYSICIAN ASSISTANT

## 2023-08-15 PROCEDURE — 99213 OFFICE O/P EST LOW 20 MIN: CPT | Performed by: PHYSICIAN ASSISTANT

## 2023-08-28 ENCOUNTER — OFFICE VISIT (OUTPATIENT)
Dept: CARDIOLOGY CLINIC | Facility: CLINIC | Age: 58
End: 2023-08-28
Payer: COMMERCIAL

## 2023-08-28 ENCOUNTER — APPOINTMENT (OUTPATIENT)
Dept: LAB | Facility: HOSPITAL | Age: 58
End: 2023-08-28
Payer: COMMERCIAL

## 2023-08-28 VITALS
WEIGHT: 147 LBS | SYSTOLIC BLOOD PRESSURE: 140 MMHG | BODY MASS INDEX: 22.28 KG/M2 | OXYGEN SATURATION: 98 % | DIASTOLIC BLOOD PRESSURE: 98 MMHG | HEIGHT: 68 IN | RESPIRATION RATE: 18 BRPM | HEART RATE: 67 BPM

## 2023-08-28 DIAGNOSIS — Z12.5 SCREENING FOR PROSTATE CANCER: ICD-10-CM

## 2023-08-28 DIAGNOSIS — E26.9 HYPERALDOSTERONISM (HCC): ICD-10-CM

## 2023-08-28 DIAGNOSIS — I31.39 PERICARDIAL EFFUSION: ICD-10-CM

## 2023-08-28 DIAGNOSIS — I51.7 LVH (LEFT VENTRICULAR HYPERTROPHY): Primary | ICD-10-CM

## 2023-08-28 DIAGNOSIS — D64.9 ANEMIA, UNSPECIFIED TYPE: Primary | ICD-10-CM

## 2023-08-28 LAB
ALBUMIN SERPL BCP-MCNC: 3.3 G/DL (ref 3.5–5)
ALP SERPL-CCNC: 83 U/L (ref 34–104)
ALT SERPL W P-5'-P-CCNC: 44 U/L (ref 7–52)
ANION GAP SERPL CALCULATED.3IONS-SCNC: 7 MMOL/L
AST SERPL W P-5'-P-CCNC: 46 U/L (ref 13–39)
BASOPHILS # BLD AUTO: 0.09 THOUSANDS/ÂΜL (ref 0–0.1)
BASOPHILS NFR BLD AUTO: 1 % (ref 0–1)
BILIRUB DIRECT SERPL-MCNC: 0.18 MG/DL (ref 0–0.2)
BILIRUB SERPL-MCNC: 0.38 MG/DL (ref 0.2–1)
BUN SERPL-MCNC: 34 MG/DL (ref 5–25)
CALCIUM ALBUM COR SERPL-MCNC: 9.5 MG/DL (ref 8.3–10.1)
CALCIUM SERPL-MCNC: 8.9 MG/DL (ref 8.4–10.2)
CHLORIDE SERPL-SCNC: 105 MMOL/L (ref 96–108)
CO2 SERPL-SCNC: 28 MMOL/L (ref 21–32)
CREAT SERPL-MCNC: 1.92 MG/DL (ref 0.6–1.3)
CRP SERPL QL: 85 MG/L
DAT POLY-SP REAG RBC QL: NEGATIVE
EOSINOPHIL # BLD AUTO: 0.48 THOUSAND/ÂΜL (ref 0–0.61)
EOSINOPHIL NFR BLD AUTO: 5 % (ref 0–6)
ERYTHROCYTE [DISTWIDTH] IN BLOOD BY AUTOMATED COUNT: 16.2 % (ref 11.6–15.1)
ERYTHROCYTE [SEDIMENTATION RATE] IN BLOOD: 111 MM/HOUR (ref 0–19)
FERRITIN SERPL-MCNC: 606 NG/ML (ref 24–336)
FOLATE SERPL-MCNC: 18.2 NG/ML
GFR SERPL CREATININE-BSD FRML MDRD: 37 ML/MIN/1.73SQ M
GLUCOSE P FAST SERPL-MCNC: 95 MG/DL (ref 65–99)
HCT VFR BLD AUTO: 27.1 % (ref 36.5–49.3)
HELMET CELLS BLD QL SMEAR: PRESENT
HGB BLD-MCNC: 8.8 G/DL (ref 12–17)
HYPERCHROMIA BLD QL SMEAR: PRESENT
IMM GRANULOCYTES # BLD AUTO: 0.05 THOUSAND/UL (ref 0–0.2)
IMM GRANULOCYTES NFR BLD AUTO: 1 % (ref 0–2)
LDH SERPL-CCNC: 149 U/L (ref 140–271)
LYMPHOCYTES # BLD AUTO: 1.34 THOUSANDS/ÂΜL (ref 0.6–4.47)
LYMPHOCYTES NFR BLD AUTO: 13 % (ref 14–44)
MCH RBC QN AUTO: 27.8 PG (ref 26.8–34.3)
MCHC RBC AUTO-ENTMCNC: 32.5 G/DL (ref 31.4–37.4)
MCV RBC AUTO: 86 FL (ref 82–98)
MONOCYTES # BLD AUTO: 0.79 THOUSAND/ÂΜL (ref 0.17–1.22)
MONOCYTES NFR BLD AUTO: 7 % (ref 4–12)
NEUTROPHILS # BLD AUTO: 7.86 THOUSANDS/ÂΜL (ref 1.85–7.62)
NEUTS SEG NFR BLD AUTO: 73 % (ref 43–75)
NRBC BLD AUTO-RTO: 0 /100 WBCS
PLATELET # BLD AUTO: 691 THOUSANDS/UL (ref 149–390)
PLATELET BLD QL SMEAR: ABNORMAL
PMV BLD AUTO: 9.2 FL (ref 8.9–12.7)
POIKILOCYTOSIS BLD QL SMEAR: PRESENT
POTASSIUM SERPL-SCNC: 3.3 MMOL/L (ref 3.5–5.3)
PROT 24H UR-MCNC: 995.2 MG/24 HRS
PROT SERPL-MCNC: 7.5 G/DL (ref 6.4–8.4)
PSA SERPL-MCNC: 7.96 NG/ML (ref 0–4)
RBC # BLD AUTO: 3.16 MILLION/UL (ref 3.88–5.62)
RBC MORPH BLD: PRESENT
RETICS # AUTO: NORMAL 10*3/UL (ref 14356–105094)
RETICS # CALC: 0.72 % (ref 0.37–1.87)
SCHISTOCYTES BLD QL SMEAR: PRESENT
SODIUM SERPL-SCNC: 140 MMOL/L (ref 135–147)
SPECIMEN VOL UR: 1600 ML
TSH SERPL DL<=0.05 MIU/L-ACNC: 3.23 UIU/ML (ref 0.45–4.5)
VIT B12 SERPL-MCNC: 491 PG/ML (ref 180–914)
WBC # BLD AUTO: 10.61 THOUSAND/UL (ref 4.31–10.16)

## 2023-08-28 PROCEDURE — 83615 LACTATE (LD) (LDH) ENZYME: CPT

## 2023-08-28 PROCEDURE — 83521 IG LIGHT CHAINS FREE EACH: CPT

## 2023-08-28 PROCEDURE — 86140 C-REACTIVE PROTEIN: CPT

## 2023-08-28 PROCEDURE — 85025 COMPLETE CBC W/AUTO DIFF WBC: CPT

## 2023-08-28 PROCEDURE — 83550 IRON BINDING TEST: CPT

## 2023-08-28 PROCEDURE — 99214 OFFICE O/P EST MOD 30 MIN: CPT | Performed by: INTERNAL MEDICINE

## 2023-08-28 PROCEDURE — 93000 ELECTROCARDIOGRAM COMPLETE: CPT | Performed by: INTERNAL MEDICINE

## 2023-08-28 PROCEDURE — 83520 IMMUNOASSAY QUANT NOS NONAB: CPT

## 2023-08-28 PROCEDURE — 86880 COOMBS TEST DIRECT: CPT

## 2023-08-28 PROCEDURE — 82248 BILIRUBIN DIRECT: CPT

## 2023-08-28 PROCEDURE — 84153 ASSAY OF PSA TOTAL: CPT

## 2023-08-28 PROCEDURE — 36415 COLL VENOUS BLD VENIPUNCTURE: CPT

## 2023-08-28 PROCEDURE — 85652 RBC SED RATE AUTOMATED: CPT

## 2023-08-28 PROCEDURE — 83540 ASSAY OF IRON: CPT

## 2023-08-28 PROCEDURE — 84443 ASSAY THYROID STIM HORMONE: CPT

## 2023-08-28 PROCEDURE — 82784 ASSAY IGA/IGD/IGG/IGM EACH: CPT

## 2023-08-28 PROCEDURE — 82607 VITAMIN B-12: CPT

## 2023-08-28 PROCEDURE — 85045 AUTOMATED RETICULOCYTE COUNT: CPT

## 2023-08-28 PROCEDURE — 88184 FLOWCYTOMETRY/ TC 1 MARKER: CPT

## 2023-08-28 PROCEDURE — 84156 ASSAY OF PROTEIN URINE: CPT | Performed by: INTERNAL MEDICINE

## 2023-08-28 PROCEDURE — 83010 ASSAY OF HAPTOGLOBIN QUANT: CPT

## 2023-08-28 PROCEDURE — 82746 ASSAY OF FOLIC ACID SERUM: CPT

## 2023-08-28 PROCEDURE — 84165 PROTEIN E-PHORESIS SERUM: CPT

## 2023-08-28 PROCEDURE — 88185 FLOWCYTOMETRY/TC ADD-ON: CPT

## 2023-08-28 PROCEDURE — 82668 ASSAY OF ERYTHROPOIETIN: CPT

## 2023-08-28 PROCEDURE — 84166 PROTEIN E-PHORESIS/URINE/CSF: CPT

## 2023-08-28 PROCEDURE — 82728 ASSAY OF FERRITIN: CPT

## 2023-08-28 PROCEDURE — 80053 COMPREHEN METABOLIC PANEL: CPT

## 2023-08-28 RX ORDER — COLCHICINE 0.6 MG/1
0.6 TABLET ORAL DAILY
Qty: 60 TABLET | Refills: 0 | Status: SHIPPED | OUTPATIENT
Start: 2023-08-28

## 2023-08-28 RX ORDER — ARIPIPRAZOLE 5 MG/1
5 TABLET ORAL DAILY
COMMUNITY
Start: 2023-08-22

## 2023-08-28 NOTE — PROGRESS NOTES
[facility] Cardiology   Office Consultation    Bala Vazquez 59 y.o. male MRN: [de-identified]    08/28/23          Assessment:  1. Pericardial effusion status post pericardial window  2. Hypertension/ LVH  3. Hyperaldosteronism status post left adrenalectomy 2012  4. History of Shamika-en-Y gastric bypass  5. CKD  6. Hx of DVT    Plan:  · Repeat BMP and parveen/renin ratio pending with eplerenone held. · Will also refer to endocrinology to establish care   · Cont colchicine for 2 more months. · Cont labetalol and norvasc  · Cont atorvastatin   Ambulatory blood pressure monitoring and maintaining a low sodium diet was advised. · He was advised to notify us with the onset of cardiac symptoms. Follow up: 3 months or sooner as needed    1. LVH (left ventricular hypertrophy)  POCT ECG    Protein, urine, 24 hour    Protein, urine, 24 hour      2. Hyperaldosteronism ([address])  Ambulatory referral to Endocrinology    Protein, urine, 24 hour    Protein, urine, 24 hour      3. Pericardial effusion  colchicine (COLCRYS) 0.6 mg tablet    Protein, urine, 24 hour    Protein, urine, 24 hour          HPI: Bala Vazquez is a 59y.o. year old male who presents as a transfer of care. Past cardiac history:  · He has a longstanding history of hypertension in the setting of hyperaldosteronism. He is status post left adrenalectomy in 2012 at [facility]. · He presented to [facility] on 7/8/23 with shortness of breath and was noted to have a pericardial effusion with tamponade physiology. He underwent pericardial window and subsequent biopsy revealed fibrous pericardial tissue with no evidence of malignancy. · Given history of LVH she was evaluated with a PYP scan that revealed no cardiac activity. It was not suggestive of amyloidosis. He underwent cardiac MRI that revealed severe concentric left ventricular hypertrophy.   There is patchy intramyocardial fibrosis of the inferolateral and mid anterolateral wall not typical for amyloidosis and was suggestive of sarcoidosis or myocarditis. · Renal artery duplex revealed no significant arterial occlusive disease. There was possible mesenteric artery stenosis. He was evaluated by vascular surgery. CTA of the abdomen and pelvis was reviewed that revealed almost no atherosclerosis. The velocities were considered a false elevation. He has been doing reasonably well from a cardiac standpoint since his hospitalization. He established care with hematology for evaluation for anemia. His GI evaluation was unrevealing. Eplerenone has been held in anticipation of repeat aldosterone renin ratio. He denies any significant cardiac symptoms.     ECG personally reviewed: NSR; LA enlargement, Left axis deviation, LVH with repolarization abnormality     Family History: parents and siblings with hypertension     Social history: currently vaping medical THC for anxiety/depression/PTSD      Allergies   Allergen Reactions   • Clonidine Anaphylaxis   • Lisinopril Anaphylaxis   • Nifedipine Anaphylaxis   • Spironolactone Anaphylaxis, Other (See Comments) and Shortness Of Breath   • Buspirone      Headaches,    • Enoxaparin      Injection site "bump" per Dr. Any Call   • Hydralazine Other (See Comments)     Fluid around the heart  Lose for Appetite       • Minoxidil      Retains fluid around heart         Current Outpatient Medications:   •  amLODIPine (NORVASC) 10 mg tablet, Take 10 mg by mouth, Disp: , Rfl:   •  ARIPiprazole (ABILIFY) 5 mg tablet, Take 5 mg by mouth daily, Disp: , Rfl:   •  atorvastatin (LIPITOR) 40 mg tablet, Take 40 mg by mouth daily at bedtime, Disp: , Rfl:   •  Calcium 600 MG tablet, Take 1,200 mg by mouth, Disp: , Rfl:   •  Cholecalciferol (VITAMIN D-3) 1000 units CAPS, Take 2,000 Units by mouth daily, Disp: , Rfl:   •  colchicine (COLCRYS) 0.6 mg tablet, Take 1 tablet (0.6 mg total) by mouth daily, Disp: 60 tablet, Rfl: 0  •  doxazosin (CARDURA) 8 MG tablet, Take 8 mg by mouth 2 (two) times a day, Disp: , Rfl:   •  famotidine (PEPCID) 20 mg tablet, Take 1 tablet (20 mg total) by mouth daily at bedtime, Disp: 30 tablet, Rfl: 0  •  ferrous sulfate 325 (65 Fe) mg tablet, Take 325 mg by mouth 2 (two) times a day  , Disp: , Rfl:   •  labetalol (NORMODYNE) 200 mg tablet, Take 2 tablets (400 mg total) by mouth every 12 (twelve) hours (Patient taking differently: Take 300 mg by mouth every 12 (twelve) hours), Disp: 120 tablet, Rfl: 0  •  mirtazapine (REMERON) 15 mg tablet, 15 mg, Disp: , Rfl:   •  Multiple Vitamins-Minerals (multivitamin with minerals) tablet, Take 1 tablet by mouth daily, Disp: , Rfl:   •  pantoprazole (PROTONIX) 40 mg tablet, Take 1 tablet (40 mg total) by mouth 2 (two) times a day before meals, Disp: 60 tablet, Rfl: 0  •  potassium chloride (Klor-Con) 10 mEq tablet, Take 1 tablet (10 mEq total) by mouth in the morning, Disp: 30 tablet, Rfl: 0  •  sertraline (ZOLOFT) 100 mg tablet, Take 2 tablets by mouth daily, Disp: , Rfl:   •  albuterol (PROVENTIL HFA,VENTOLIN HFA) 90 mcg/act inhaler, , Disp: , Rfl:     Past Medical History:   Diagnosis Date   • Asthma    • Chronic kidney disease    • COPD (chronic obstructive pulmonary disease) (HCC)    • CPAP (continuous positive airway pressure) dependence    • Diabetes mellitus (HCC)    • Emphysema of lung (HCC)    • Gout    • History of transfusion     pt stated they had a transfusion when they had gallbladder surgery. • Hypertension    • Kidney disease     renal failure   • Leg DVT (deep venous thromboembolism), acute, bilateral ([address]) 01/2018   • Obesity (BMI 30-39. 9)    • AGUS on CPAP     setting 11   • Postgastrectomy malabsorption    • Sleep apnea    • Systolic CHF (HCC)        Family History   Problem Relation Age of Onset   • Cancer Father    • Kidney disease Mother    • Heart murmur Sister    • Asthma Sister    • Hypertension Sister    • Heart disease Brother    • Bell's palsy Brother    • Hypertension Brother    • Deep vein thrombosis Neg Hx Past Surgical History:   Procedure Laterality Date   • ADRENALECTOMY     • CHOLECYSTECTOMY     • COLONOSCOPY     • EGD     • HIATAL HERNIA REPAIR N/A 12/22/2020    Procedure: REPAIR HERNIA HIATAL LAPAROSCOPIC;  Surgeon: Leann Boast, MD;  Location: [facility];  Service: Bariatrics   • INCISION AND DRAINAGE OF WOUND Left 10/17/2018    Procedure: INCISION AND DRAINAGE (I&D) GROIN;  Surgeon: Jodi Morel MD;  Location: [facility];  Service: General   • IR IMAGE [address] / [address]  8/17/2018   • IR IMAGE [address] / DRAINAGE W TUBE  7/31/2018   • JOINT REPLACEMENT Bilateral     knee   • KIDNEY SURGERY  2009    nodule removal   • LYMPH NODE DISSECTION Left 01/2018    left inguinal LN removed - benign    • OTHER SURGICAL HISTORY      kidney nodule removal   • PALATE / Brock Goodpasture BIOPSY / EXCISION     • PERICARDIAL WINDOW N/A 7/8/2023    Procedure: WINDOW PERICARDIAL;  Surgeon: Kylie Grimaldo MD;  Location:  MAIN OR;  Service: Thoracic   • NE LAPS GSTR RSTCV [address] [place] [address] W/BYP JASON-EN-Y LIMB <150 CM N/A 12/22/2020    Procedure: BYPASS GASTRIC  JASON-EN-Y LAPAROSCOPIC WITH INTRAOPERATIVE EGD;  Surgeon: Leann Boast, MD;  Location: [facility];  Service: Bariatrics   • SINUS SURGERY     • TONSILLECTOMY         Social History     Socioeconomic History   • Marital status: /Civil Union     Spouse name: Not on file   • Number of children: Not on file   • Years of education: Not on file   • Highest education level: Not on file   Occupational History   • Not on file   Tobacco Use   • Smoking status: Never     Passive exposure: Past   • Smokeless tobacco: Never   Vaping Use   • Vaping Use: Never used   Substance and Sexual Activity   • Alcohol use: Yes     Comment: occasionally   • Drug use: Yes     Types: Marijuana     Comment: Card   • Sexual activity: Not Currently     Partners: Female   Other Topics Concern   • Not on file   Social History Narrative   • Not on file     Social Determinants of Health     Financial Resource Strain: Not on file   Food Insecurity: No Food Insecurity (7/10/2023)    Hunger Vital Sign    • Worried About Running Out of Food in the Last Year: Never true    • Ran Out of Food in the Last Year: Never true   Transportation Needs: No Transportation Needs (7/10/2023)    PRAPARE - Transportation    • Lack of Transportation (Medical): No    • Lack of Transportation (Non-Medical): No   Physical Activity: Not on file   Stress: Not on file   Social Connections: Not on file   Intimate Partner Violence: Not on file   Housing Stability: Low Risk  (7/10/2023)    Housing Stability Vital Sign    • Unable to Pay for Housing in the Last Year: No    • Number of Places Lived in the Last Year: 1    • Unstable Housing in the Last Year: No       Review of Systems   Constitutional: Negative for diaphoresis, weight gain and weight loss. HENT: Negative for congestion. Cardiovascular: Negative for chest pain, dyspnea on exertion, irregular heartbeat, leg swelling, near-syncope, orthopnea, palpitations, paroxysmal nocturnal dyspnea and syncope. Respiratory: Negative for shortness of breath, sleep disturbances due to breathing and snoring. Hematologic/Lymphatic: Does not bruise/bleed easily. Skin: Negative for rash. Musculoskeletal: Negative for myalgias. Gastrointestinal: Negative for nausea and vomiting. Neurological: Negative for excessive daytime sleepiness and light-headedness. Psychiatric/Behavioral: The patient is not nervous/anxious. Vitals: /98 (BP Location: Left arm, Patient Position: Sitting, Cuff Size: Standard)   Pulse 67   Resp 18   Ht 5' 8" (1.727 m)   Wt 66.7 kg (147 lb)   SpO2 98%   BMI 22.35 kg/m²       Physical Exam:     GEN: Alert and oriented x 3, in no acute distress. Well appearing and well nourished. HEENT: Sclera anicteric, conjunctivae pink, mucous membranes moist. Oropharynx clear. NECK: Supple, no carotid bruits, no significant JVD. Trachea midline, no thyromegaly. HEART: Regular rhythm, normal S1 and S2, no murmurs, clicks, gallops or rubs. PMI nondisplaced, no thrills. LUNGS: Clear to auscultation bilaterally; no wheezes, rales, or rhonchi. No increased work of breathing or signs of respiratory distress. ABDOMEN: Soft, nontender, nondistended, normoactive bowel sounds. EXTREMITIES: Skin warm and well perfused, no clubbing, cyanosis, or edema. NEURO: No focal findings. Normal speech. Mood and affect normal.   SKIN: Normal without suspicious lesions on exposed skin.

## 2023-08-29 ENCOUNTER — TELEPHONE (OUTPATIENT)
Dept: UROLOGY | Facility: CLINIC | Age: 58
End: 2023-08-29

## 2023-08-29 ENCOUNTER — TELEPHONE (OUTPATIENT)
Dept: OTHER | Facility: OTHER | Age: 58
End: 2023-08-29

## 2023-08-29 ENCOUNTER — TELEPHONE (OUTPATIENT)
Dept: ENDOCRINOLOGY | Facility: CLINIC | Age: 58
End: 2023-08-29

## 2023-08-29 DIAGNOSIS — R97.20 ELEVATED PSA: Primary | ICD-10-CM

## 2023-08-29 LAB
ALBUMIN SERPL ELPH-MCNC: 2.81 G/DL (ref 3.2–5.1)
ALBUMIN SERPL ELPH-MCNC: 43.2 % (ref 48–70)
ALPHA1 GLOB SERPL ELPH-MCNC: 0.54 G/DL (ref 0.15–0.47)
ALPHA1 GLOB SERPL ELPH-MCNC: 8.3 % (ref 1.8–7)
ALPHA2 GLOB SERPL ELPH-MCNC: 0.68 G/DL (ref 0.42–1.04)
ALPHA2 GLOB SERPL ELPH-MCNC: 10.5 % (ref 5.9–14.9)
BETA GLOB ABNORMAL SERPL ELPH-MCNC: 0.41 G/DL (ref 0.31–0.57)
BETA1 GLOB SERPL ELPH-MCNC: 6.3 % (ref 4.7–7.7)
BETA2 GLOB SERPL ELPH-MCNC: 8.3 % (ref 3.1–7.9)
BETA2+GAMMA GLOB SERPL ELPH-MCNC: 0.54 G/DL (ref 0.2–0.58)
EPO SERPL-ACNC: 18.9 MIU/ML (ref 2.6–18.5)
GAMMA GLOB ABNORMAL SERPL ELPH-MCNC: 1.52 G/DL (ref 0.4–1.66)
GAMMA GLOB SERPL ELPH-MCNC: 23.4 % (ref 6.9–22.3)
HAPTOGLOB SERPL-MCNC: 219 MG/DL (ref 29–370)
IGA SERPL-MCNC: 496 MG/DL (ref 66–433)
IGG SERPL-MCNC: 1682 MG/DL (ref 635–1741)
IGG/ALB SER: 0.76 {RATIO} (ref 1.1–1.8)
IGM SERPL-MCNC: 64 MG/DL (ref 45–281)
IRON SATN MFR SERPL: 8 % (ref 15–50)
IRON SERPL-MCNC: 15 UG/DL (ref 50–212)
KAPPA LC FREE SER-MCNC: 159.9 MG/L (ref 3.3–19.4)
KAPPA LC FREE/LAMBDA FREE SER: 1.72 {RATIO} (ref 0.26–1.65)
LAMBDA LC FREE SERPL-MCNC: 92.9 MG/L (ref 5.7–26.3)
PROT PATTERN SERPL ELPH-IMP: ABNORMAL
PROT SERPL-MCNC: 6.5 G/DL (ref 6.4–8.4)
TIBC SERPL-MCNC: 178 UG/DL (ref 250–450)
UIBC SERPL-MCNC: 163 UG/DL (ref 155–355)

## 2023-08-29 PROCEDURE — 84165 PROTEIN E-PHORESIS SERUM: CPT | Performed by: PATHOLOGY

## 2023-08-29 NOTE — TELEPHONE ENCOUNTER
----- Message from Jessica Ceballos PA-C sent at 8/29/2023  7:47 AM EDT -----  Please let patient know PSA is elevated. Please ask if patient is feeling better from his previous infection. If he is feeling better, please let patient know I recommend prostate MRI given elevated PSA.  Thank you

## 2023-08-29 NOTE — TELEPHONE ENCOUNTER
Patient is calling regarding cancelling an appointment.     Date/Time:08/30 @08:20    Patient was rescheduled: YES [] NO [x]    Patient requesting call back to reschedule: YES [x] NO []

## 2023-08-29 NOTE — TELEPHONE ENCOUNTER
Received referral for [facility]. Left voicemail for patient to contact office to schedule Consult/New Patient Appointment.

## 2023-08-29 NOTE — TELEPHONE ENCOUNTER
Patient felling a little better, advised Anastasiya's recommendations, patient will proceed with MRI. Please place order. Message with CS number left for patient on VM.

## 2023-08-30 ENCOUNTER — TELEPHONE (OUTPATIENT)
Dept: HEMATOLOGY ONCOLOGY | Facility: CLINIC | Age: 58
End: 2023-08-30

## 2023-08-30 LAB
KAPPA LC UR-MCNC: 517.13 MG/L (ref 1.17–86.46)
KAPPA LC/LAMBDA UR: 5.85 {RATIO} (ref 1.83–14.26)
LAMBDA LC UR-MCNC: 88.33 MG/L (ref 0.27–15.21)
STFR SERPL-SCNC: 26.3 NMOL/L (ref 12.2–27.3)

## 2023-08-31 ENCOUNTER — TELEPHONE (OUTPATIENT)
Age: 58
End: 2023-08-31

## 2023-08-31 ENCOUNTER — TELEPHONE (OUTPATIENT)
Dept: NEPHROLOGY | Facility: CLINIC | Age: 58
End: 2023-08-31

## 2023-08-31 LAB — SCAN RESULT: NORMAL

## 2023-08-31 NOTE — TELEPHONE ENCOUNTER
I called patient to confirm appointment for tomorrow. Patient also has a $35.00 that he is unable to collect due to patient is going thru hard time at this time. I explained to patient that is Yolandafranklin Aime has a copayment and that is patients responsibility and that is an agreement between him and the insurance.  Patient is aware for his next appointment he has a copayment

## 2023-09-05 ENCOUNTER — TELEPHONE (OUTPATIENT)
Dept: NEUROSURGERY | Facility: CLINIC | Age: 58
End: 2023-09-05

## 2023-09-05 NOTE — TELEPHONE ENCOUNTER
Received call from pt insurance shyam asking to fax script for his mri to [facility] - I advised back in may it was faxed three per [facility] notes - also pt schedueld for [facility] 8/31/23 no showed and auth was obtained for them ? I called pt lm to call us back clarifying where is he going because his insurance auth was done for [facility] which is where he scheduled back in aug and then no showed ?  _BA

## 2023-09-06 LAB — MISCELLANEOUS LAB TEST RESULT: NORMAL

## 2023-10-17 DIAGNOSIS — I10 PRIMARY HYPERTENSION: Primary | ICD-10-CM

## 2023-10-17 RX ORDER — DOXAZOSIN 8 MG/1
8 TABLET ORAL 2 TIMES DAILY
Qty: 90 TABLET | Refills: 6 | Status: SHIPPED | OUTPATIENT
Start: 2023-10-17

## 2023-11-10 ENCOUNTER — RA CDI HCC (OUTPATIENT)
Dept: OTHER | Facility: HOSPITAL | Age: 58
End: 2023-11-10

## 2023-12-06 ENCOUNTER — TELEPHONE (OUTPATIENT)
Age: 58
End: 2023-12-06

## 2023-12-06 NOTE — TELEPHONE ENCOUNTER
Patient called to ask that we fax a copy of his MRI order to [facility] at fax number [phone]. I explain to him that we will need the images from them and he said he will take care to bring those to us.

## 2023-12-06 NOTE — TELEPHONE ENCOUNTER
Faxed to [facility] and requested they upload the images to [facility] once completed

## 2023-12-24 ENCOUNTER — APPOINTMENT (OUTPATIENT)
Dept: RADIOLOGY | Facility: CLINIC | Age: 58
DRG: 177 | End: 2023-12-24
Payer: COMMERCIAL

## 2023-12-24 ENCOUNTER — OFFICE VISIT (OUTPATIENT)
Dept: URGENT CARE | Facility: CLINIC | Age: 58
End: 2023-12-24
Payer: COMMERCIAL

## 2023-12-24 ENCOUNTER — HOSPITAL ENCOUNTER (INPATIENT)
Facility: HOSPITAL | Age: 58
LOS: 5 days | Discharge: LEFT BEFORE DISCHARGE | DRG: 177 | End: 2023-12-29
Attending: EMERGENCY MEDICINE | Admitting: ANESTHESIOLOGY
Payer: COMMERCIAL

## 2023-12-24 VITALS
DIASTOLIC BLOOD PRESSURE: 122 MMHG | TEMPERATURE: 99 F | SYSTOLIC BLOOD PRESSURE: 246 MMHG | OXYGEN SATURATION: 98 % | HEART RATE: 72 BPM | RESPIRATION RATE: 18 BRPM

## 2023-12-24 DIAGNOSIS — R11.2 NAUSEA AND VOMITING, UNSPECIFIED VOMITING TYPE: ICD-10-CM

## 2023-12-24 DIAGNOSIS — U07.1 COVID-19: ICD-10-CM

## 2023-12-24 DIAGNOSIS — I10 ESSENTIAL HYPERTENSION: ICD-10-CM

## 2023-12-24 DIAGNOSIS — I16.9 HYPERTENSIVE CRISIS: Primary | ICD-10-CM

## 2023-12-24 DIAGNOSIS — K55.1 SUPERIOR MESENTERIC ARTERY STENOSIS (HCC): ICD-10-CM

## 2023-12-24 DIAGNOSIS — I16.1 HYPERTENSIVE EMERGENCY: Primary | ICD-10-CM

## 2023-12-24 DIAGNOSIS — I31.39 PERICARDIAL EFFUSION: ICD-10-CM

## 2023-12-24 DIAGNOSIS — R06.01 ORTHOPNEA: ICD-10-CM

## 2023-12-24 PROBLEM — G47.33 OSA (OBSTRUCTIVE SLEEP APNEA): Status: RESOLVED | Noted: 2018-10-16 | Resolved: 2023-12-24

## 2023-12-24 PROBLEM — N18.9 CKD (CHRONIC KIDNEY DISEASE): Status: ACTIVE | Noted: 2018-03-15

## 2023-12-24 PROBLEM — N18.32 STAGE 3B CHRONIC KIDNEY DISEASE (CKD) (HCC): Status: ACTIVE | Noted: 2018-03-15

## 2023-12-24 PROBLEM — N18.9 ACUTE KIDNEY INJURY SUPERIMPOSED ON CKD: Status: ACTIVE | Noted: 2021-04-16

## 2023-12-24 PROBLEM — I82.403 LEG DVT (DEEP VENOUS THROMBOEMBOLISM), ACUTE, BILATERAL (HCC): Status: RESOLVED | Noted: 2018-01-22 | Resolved: 2023-12-24

## 2023-12-24 PROBLEM — L02.214 ABSCESS OF LEFT GROIN: Status: RESOLVED | Noted: 2018-10-16 | Resolved: 2023-12-24

## 2023-12-24 LAB
2HR DELTA HS TROPONIN: 14 NG/L
4HR DELTA HS TROPONIN: 31 NG/L
ALBUMIN SERPL BCP-MCNC: 3.9 G/DL (ref 3.5–5)
ALP SERPL-CCNC: 77 U/L (ref 34–104)
ALT SERPL W P-5'-P-CCNC: 30 U/L (ref 7–52)
ANION GAP SERPL CALCULATED.3IONS-SCNC: 9 MMOL/L
ANION GAP SERPL CALCULATED.3IONS-SCNC: 9 MMOL/L
APTT PPP: 31 SECONDS (ref 23–37)
AST SERPL W P-5'-P-CCNC: 29 U/L (ref 13–39)
BASOPHILS # BLD AUTO: 0.05 THOUSANDS/ÂΜL (ref 0–0.1)
BASOPHILS NFR BLD AUTO: 1 % (ref 0–1)
BILIRUB SERPL-MCNC: 1.02 MG/DL (ref 0.2–1)
BNP SERPL-MCNC: 4663 PG/ML (ref 0–100)
BUN SERPL-MCNC: 38 MG/DL (ref 5–25)
BUN SERPL-MCNC: 39 MG/DL (ref 5–25)
CALCIUM SERPL-MCNC: 8.8 MG/DL (ref 8.4–10.2)
CALCIUM SERPL-MCNC: 9.2 MG/DL (ref 8.4–10.2)
CARDIAC TROPONIN I PNL SERPL HS: 164 NG/L
CARDIAC TROPONIN I PNL SERPL HS: 178 NG/L
CARDIAC TROPONIN I PNL SERPL HS: 195 NG/L
CARDIAC TROPONIN I PNL SERPL HS: 206 NG/L (ref 8–18)
CHLORIDE SERPL-SCNC: 106 MMOL/L (ref 96–108)
CHLORIDE SERPL-SCNC: 106 MMOL/L (ref 96–108)
CO2 SERPL-SCNC: 23 MMOL/L (ref 21–32)
CO2 SERPL-SCNC: 23 MMOL/L (ref 21–32)
CREAT SERPL-MCNC: 2.86 MG/DL (ref 0.6–1.3)
CREAT SERPL-MCNC: 2.97 MG/DL (ref 0.6–1.3)
EOSINOPHIL # BLD AUTO: 0.09 THOUSAND/ÂΜL (ref 0–0.61)
EOSINOPHIL NFR BLD AUTO: 2 % (ref 0–6)
ERYTHROCYTE [DISTWIDTH] IN BLOOD BY AUTOMATED COUNT: 18.4 % (ref 11.6–15.1)
FLUAV RNA RESP QL NAA+PROBE: NEGATIVE
FLUBV RNA RESP QL NAA+PROBE: NEGATIVE
GFR SERPL CREATININE-BSD FRML MDRD: 21 ML/MIN/1.73SQ M
GFR SERPL CREATININE-BSD FRML MDRD: 22 ML/MIN/1.73SQ M
GLUCOSE SERPL-MCNC: 159 MG/DL (ref 65–140)
GLUCOSE SERPL-MCNC: 99 MG/DL (ref 65–140)
HCT VFR BLD AUTO: 35.4 % (ref 36.5–49.3)
HGB BLD-MCNC: 11.7 G/DL (ref 12–17)
IMM GRANULOCYTES # BLD AUTO: 0.02 THOUSAND/UL (ref 0–0.2)
IMM GRANULOCYTES NFR BLD AUTO: 1 % (ref 0–2)
INR PPP: 1.15 (ref 0.84–1.19)
LACTATE SERPL-SCNC: 1.6 MMOL/L (ref 0.5–2)
LYMPHOCYTES # BLD AUTO: 0.5 THOUSANDS/ÂΜL (ref 0.6–4.47)
LYMPHOCYTES NFR BLD AUTO: 12 % (ref 14–44)
MCH RBC QN AUTO: 26.7 PG (ref 26.8–34.3)
MCHC RBC AUTO-ENTMCNC: 33.1 G/DL (ref 31.4–37.4)
MCV RBC AUTO: 81 FL (ref 82–98)
MONOCYTES # BLD AUTO: 0.72 THOUSAND/ÂΜL (ref 0.17–1.22)
MONOCYTES NFR BLD AUTO: 18 % (ref 4–12)
NEUTROPHILS # BLD AUTO: 2.66 THOUSANDS/ÂΜL (ref 1.85–7.62)
NEUTS SEG NFR BLD AUTO: 66 % (ref 43–75)
NRBC BLD AUTO-RTO: 0 /100 WBCS
PLATELET # BLD AUTO: 199 THOUSANDS/UL (ref 149–390)
PMV BLD AUTO: 10 FL (ref 8.9–12.7)
POTASSIUM SERPL-SCNC: 3 MMOL/L (ref 3.5–5.3)
POTASSIUM SERPL-SCNC: 3.4 MMOL/L (ref 3.5–5.3)
PROCALCITONIN SERPL-MCNC: 0.12 NG/ML
PROT SERPL-MCNC: 7 G/DL (ref 6.4–8.4)
PROTHROMBIN TIME: 15.4 SECONDS (ref 11.6–14.5)
RBC # BLD AUTO: 4.38 MILLION/UL (ref 3.88–5.62)
RSV RNA RESP QL NAA+PROBE: NEGATIVE
SARS-COV-2 RNA RESP QL NAA+PROBE: POSITIVE
SODIUM SERPL-SCNC: 138 MMOL/L (ref 135–147)
SODIUM SERPL-SCNC: 138 MMOL/L (ref 135–147)
WBC # BLD AUTO: 4.04 THOUSAND/UL (ref 4.31–10.16)

## 2023-12-24 PROCEDURE — 96375 TX/PRO/DX INJ NEW DRUG ADDON: CPT

## 2023-12-24 PROCEDURE — 99223 1ST HOSP IP/OBS HIGH 75: CPT | Performed by: ANESTHESIOLOGY

## 2023-12-24 PROCEDURE — 80048 BASIC METABOLIC PNL TOTAL CA: CPT | Performed by: NURSE PRACTITIONER

## 2023-12-24 PROCEDURE — 85025 COMPLETE CBC W/AUTO DIFF WBC: CPT | Performed by: EMERGENCY MEDICINE

## 2023-12-24 PROCEDURE — S9088 SERVICES PROVIDED IN URGENT: HCPCS | Performed by: PHYSICIAN ASSISTANT

## 2023-12-24 PROCEDURE — 99285 EMERGENCY DEPT VISIT HI MDM: CPT

## 2023-12-24 PROCEDURE — 80053 COMPREHEN METABOLIC PANEL: CPT | Performed by: EMERGENCY MEDICINE

## 2023-12-24 PROCEDURE — 99291 CRITICAL CARE FIRST HOUR: CPT | Performed by: EMERGENCY MEDICINE

## 2023-12-24 PROCEDURE — 84484 ASSAY OF TROPONIN QUANT: CPT | Performed by: NURSE PRACTITIONER

## 2023-12-24 PROCEDURE — 36415 COLL VENOUS BLD VENIPUNCTURE: CPT | Performed by: EMERGENCY MEDICINE

## 2023-12-24 PROCEDURE — 71046 X-RAY EXAM CHEST 2 VIEWS: CPT

## 2023-12-24 PROCEDURE — 84484 ASSAY OF TROPONIN QUANT: CPT | Performed by: EMERGENCY MEDICINE

## 2023-12-24 PROCEDURE — 96374 THER/PROPH/DIAG INJ IV PUSH: CPT

## 2023-12-24 PROCEDURE — 85730 THROMBOPLASTIN TIME PARTIAL: CPT | Performed by: EMERGENCY MEDICINE

## 2023-12-24 PROCEDURE — 99205 OFFICE O/P NEW HI 60 MIN: CPT | Performed by: PHYSICIAN ASSISTANT

## 2023-12-24 PROCEDURE — 84145 PROCALCITONIN (PCT): CPT | Performed by: EMERGENCY MEDICINE

## 2023-12-24 PROCEDURE — 0241U HB NFCT DS VIR RESP RNA 4 TRGT: CPT | Performed by: EMERGENCY MEDICINE

## 2023-12-24 PROCEDURE — 83880 ASSAY OF NATRIURETIC PEPTIDE: CPT | Performed by: EMERGENCY MEDICINE

## 2023-12-24 PROCEDURE — 93005 ELECTROCARDIOGRAM TRACING: CPT | Performed by: PHYSICIAN ASSISTANT

## 2023-12-24 PROCEDURE — 96376 TX/PRO/DX INJ SAME DRUG ADON: CPT

## 2023-12-24 PROCEDURE — 85610 PROTHROMBIN TIME: CPT | Performed by: EMERGENCY MEDICINE

## 2023-12-24 PROCEDURE — 87040 BLOOD CULTURE FOR BACTERIA: CPT | Performed by: EMERGENCY MEDICINE

## 2023-12-24 PROCEDURE — 83605 ASSAY OF LACTIC ACID: CPT | Performed by: EMERGENCY MEDICINE

## 2023-12-24 RX ORDER — LABETALOL 300 MG/1
300 TABLET, FILM COATED ORAL 2 TIMES DAILY
COMMUNITY
Start: 2023-10-25 | End: 2023-12-29

## 2023-12-24 RX ORDER — LABETALOL HYDROCHLORIDE 5 MG/ML
20 INJECTION, SOLUTION INTRAVENOUS ONCE
Status: COMPLETED | OUTPATIENT
Start: 2023-12-24 | End: 2023-12-24

## 2023-12-24 RX ORDER — ONDANSETRON 2 MG/ML
4 INJECTION INTRAMUSCULAR; INTRAVENOUS EVERY 6 HOURS PRN
Status: DISCONTINUED | OUTPATIENT
Start: 2023-12-24 | End: 2023-12-29 | Stop reason: HOSPADM

## 2023-12-24 RX ORDER — LABETALOL HYDROCHLORIDE 5 MG/ML
40 INJECTION, SOLUTION INTRAVENOUS ONCE
Status: COMPLETED | OUTPATIENT
Start: 2023-12-24 | End: 2023-12-24

## 2023-12-24 RX ORDER — HEPARIN SODIUM 5000 [USP'U]/ML
5000 INJECTION, SOLUTION INTRAVENOUS; SUBCUTANEOUS EVERY 8 HOURS SCHEDULED
Status: DISCONTINUED | OUTPATIENT
Start: 2023-12-24 | End: 2023-12-29 | Stop reason: HOSPADM

## 2023-12-24 RX ORDER — LABETALOL HYDROCHLORIDE 5 MG/ML
20 INJECTION, SOLUTION INTRAVENOUS EVERY 4 HOURS PRN
Status: DISCONTINUED | OUTPATIENT
Start: 2023-12-24 | End: 2023-12-29 | Stop reason: HOSPADM

## 2023-12-24 RX ORDER — POLYETHYLENE GLYCOL 3350 17 G/17G
17 POWDER, FOR SOLUTION ORAL DAILY
Status: DISCONTINUED | OUTPATIENT
Start: 2023-12-25 | End: 2023-12-29 | Stop reason: HOSPADM

## 2023-12-24 RX ORDER — OLANZAPINE 2.5 MG/1
5 TABLET, FILM COATED ORAL
Status: DISCONTINUED | OUTPATIENT
Start: 2023-12-24 | End: 2023-12-29 | Stop reason: HOSPADM

## 2023-12-24 RX ORDER — LABETALOL HYDROCHLORIDE 5 MG/ML
10 INJECTION, SOLUTION INTRAVENOUS EVERY 4 HOURS PRN
Status: DISCONTINUED | OUTPATIENT
Start: 2023-12-24 | End: 2023-12-24

## 2023-12-24 RX ORDER — DOXAZOSIN MESYLATE 4 MG/1
8 TABLET ORAL ONCE
Status: COMPLETED | OUTPATIENT
Start: 2023-12-24 | End: 2023-12-24

## 2023-12-24 RX ORDER — NITROGLYCERIN 20 MG/100ML
5-200 INJECTION INTRAVENOUS
Status: DISCONTINUED | OUTPATIENT
Start: 2023-12-24 | End: 2023-12-25

## 2023-12-24 RX ORDER — CHLORHEXIDINE GLUCONATE ORAL RINSE 1.2 MG/ML
15 SOLUTION DENTAL EVERY 12 HOURS SCHEDULED
Status: DISCONTINUED | OUTPATIENT
Start: 2023-12-24 | End: 2023-12-29 | Stop reason: HOSPADM

## 2023-12-24 RX ORDER — FAMOTIDINE 20 MG/1
20 TABLET, FILM COATED ORAL DAILY
COMMUNITY
End: 2023-12-29

## 2023-12-24 RX ORDER — PANTOPRAZOLE SODIUM 40 MG/1
40 TABLET, DELAYED RELEASE ORAL DAILY
COMMUNITY

## 2023-12-24 RX ORDER — FERROUS SULFATE 325(65) MG
325 TABLET ORAL 2 TIMES DAILY
Status: DISCONTINUED | OUTPATIENT
Start: 2023-12-24 | End: 2023-12-29 | Stop reason: HOSPADM

## 2023-12-24 RX ORDER — ARIPIPRAZOLE 5 MG/1
5 TABLET ORAL DAILY
Status: DISCONTINUED | OUTPATIENT
Start: 2023-12-25 | End: 2023-12-29 | Stop reason: HOSPADM

## 2023-12-24 RX ORDER — SUCRALFATE 1 G/1
1 TABLET ORAL 2 TIMES DAILY
COMMUNITY
Start: 2023-12-18

## 2023-12-24 RX ORDER — DOXAZOSIN MESYLATE 4 MG/1
8 TABLET ORAL 2 TIMES DAILY
Status: DISCONTINUED | OUTPATIENT
Start: 2023-12-24 | End: 2023-12-29 | Stop reason: HOSPADM

## 2023-12-24 RX ORDER — HYDRALAZINE HYDROCHLORIDE 20 MG/ML
10 INJECTION INTRAMUSCULAR; INTRAVENOUS ONCE
Status: COMPLETED | OUTPATIENT
Start: 2023-12-24 | End: 2023-12-24

## 2023-12-24 RX ORDER — LABETALOL HYDROCHLORIDE 5 MG/ML
80 INJECTION, SOLUTION INTRAVENOUS ONCE
Status: COMPLETED | OUTPATIENT
Start: 2023-12-24 | End: 2023-12-24

## 2023-12-24 RX ORDER — SUCRALFATE 1 G/1
1 TABLET ORAL 2 TIMES DAILY
Status: DISCONTINUED | OUTPATIENT
Start: 2023-12-24 | End: 2023-12-29 | Stop reason: HOSPADM

## 2023-12-24 RX ORDER — OLANZAPINE 2.5 MG/1
5 TABLET, FILM COATED ORAL
COMMUNITY
Start: 2023-11-07

## 2023-12-24 RX ORDER — HYDRALAZINE HYDROCHLORIDE 20 MG/ML
10 INJECTION INTRAMUSCULAR; INTRAVENOUS EVERY 4 HOURS PRN
Status: DISCONTINUED | OUTPATIENT
Start: 2023-12-24 | End: 2023-12-25

## 2023-12-24 RX ORDER — HYDRALAZINE HYDROCHLORIDE 20 MG/ML
5 INJECTION INTRAMUSCULAR; INTRAVENOUS ONCE
Status: DISCONTINUED | OUTPATIENT
Start: 2023-12-24 | End: 2023-12-24

## 2023-12-24 RX ORDER — ATORVASTATIN CALCIUM 40 MG/1
40 TABLET, FILM COATED ORAL
Status: DISCONTINUED | OUTPATIENT
Start: 2023-12-24 | End: 2023-12-29 | Stop reason: HOSPADM

## 2023-12-24 RX ORDER — AMLODIPINE BESYLATE 10 MG/1
10 TABLET ORAL DAILY
Status: DISCONTINUED | OUTPATIENT
Start: 2023-12-24 | End: 2023-12-29 | Stop reason: HOSPADM

## 2023-12-24 RX ORDER — ACETAMINOPHEN 325 MG/1
650 TABLET ORAL EVERY 6 HOURS PRN
Status: DISCONTINUED | OUTPATIENT
Start: 2023-12-24 | End: 2023-12-29 | Stop reason: HOSPADM

## 2023-12-24 RX ORDER — COLCHICINE 0.6 MG/1
0.6 TABLET ORAL DAILY
Status: DISCONTINUED | OUTPATIENT
Start: 2023-12-25 | End: 2023-12-24

## 2023-12-24 RX ORDER — POLYETHYLENE GLYCOL 3350 17 G/17G
17 POWDER, FOR SOLUTION ORAL DAILY
COMMUNITY

## 2023-12-24 RX ORDER — PANTOPRAZOLE SODIUM 40 MG/1
40 TABLET, DELAYED RELEASE ORAL DAILY
Status: DISCONTINUED | OUTPATIENT
Start: 2023-12-25 | End: 2023-12-29 | Stop reason: HOSPADM

## 2023-12-24 RX ADMIN — ATORVASTATIN CALCIUM 40 MG: 40 TABLET, FILM COATED ORAL at 22:17

## 2023-12-24 RX ADMIN — LABETALOL HYDROCHLORIDE 80 MG: 5 INJECTION, SOLUTION INTRAVENOUS at 13:06

## 2023-12-24 RX ADMIN — ONDANSETRON 4 MG: 2 INJECTION INTRAMUSCULAR; INTRAVENOUS at 14:52

## 2023-12-24 RX ADMIN — HEPARIN SODIUM 5000 UNITS: 5000 INJECTION INTRAVENOUS; SUBCUTANEOUS at 17:13

## 2023-12-24 RX ADMIN — DOXAZOSIN 8 MG: 4 TABLET ORAL at 17:12

## 2023-12-24 RX ADMIN — LABETALOL HYDROCHLORIDE 80 MG: 5 INJECTION, SOLUTION INTRAVENOUS at 14:47

## 2023-12-24 RX ADMIN — AMLODIPINE BESYLATE 10 MG: 10 TABLET ORAL at 14:46

## 2023-12-24 RX ADMIN — LABETALOL HYDROCHLORIDE 20 MG: 5 INJECTION, SOLUTION INTRAVENOUS at 12:01

## 2023-12-24 RX ADMIN — LABETALOL HYDROCHLORIDE 300 MG: 200 TABLET, FILM COATED ORAL at 17:11

## 2023-12-24 RX ADMIN — FERROUS SULFATE TAB 325 MG (65 MG ELEMENTAL FE) 325 MG: 325 (65 FE) TAB at 17:12

## 2023-12-24 RX ADMIN — HEPARIN SODIUM 5000 UNITS: 5000 INJECTION INTRAVENOUS; SUBCUTANEOUS at 22:17

## 2023-12-24 RX ADMIN — SUCRALFATE 1 G: 1 TABLET ORAL at 17:12

## 2023-12-24 RX ADMIN — LABETALOL HYDROCHLORIDE 40 MG: 5 INJECTION, SOLUTION INTRAVENOUS at 12:18

## 2023-12-24 RX ADMIN — DOXAZOSIN 8 MG: 4 TABLET ORAL at 14:46

## 2023-12-24 RX ADMIN — HYDRALAZINE HYDROCHLORIDE 10 MG: 20 INJECTION INTRAMUSCULAR; INTRAVENOUS at 16:09

## 2023-12-24 RX ADMIN — ACETAMINOPHEN 650 MG: 325 TABLET, FILM COATED ORAL at 17:11

## 2023-12-24 RX ADMIN — CHLORHEXIDINE GLUCONATE 0.12% ORAL RINSE 15 ML: 1.2 LIQUID ORAL at 22:17

## 2023-12-24 RX ADMIN — OLANZAPINE 5 MG: 2.5 TABLET, FILM COATED ORAL at 22:17

## 2023-12-24 RX ADMIN — LABETALOL HYDROCHLORIDE 20 MG: 5 INJECTION, SOLUTION INTRAVENOUS at 22:17

## 2023-12-24 NOTE — ASSESSMENT & PLAN NOTE
No evidence of acute exacerbation at this time post operatively   Relevant echocardiograms:  7/8/23 TTE: Left ventricular cavity size is normal. Wall thickness is increased. The left ventricular ejection fraction is 50%. Systolic function is low normal. Wall motion cannot be accurately assessed. Left Atrium: The atrium is mildly dilated. Pericardium: There is a moderate to large pericardial effusion circumferential to the heart. There is a reasonable likelihood of cardiac tamponade. The evidence for tamponade includes: right ventricular compression  7/14/23 TTE: LV cavity size is normal. Wall thickness is severely increased. Severe concentric hypertrophy. LVEF 55%. Systolic function is normal. Wall motion is normal. There is a prominent myocardial speckle pattern. Atrial Septum: The septum bows into the right atrium, suggesting increased left atrial pressure. Pericardium: There is a trivial pericardial effusion posterior to the heart. There is no echocardiographic evidence of tamponade.        Plan:   Holding diuresis at this time  Goal to maintain more controlled BP, ideally SBP < 180 eventually following crisis   Daily weights  Continue home oral antihypertensives:   Amlodipine 10mg daily   Doxazosin 8mg BID  Labetalol 300mg BID  PRN antihypertensives:   Labetalol 10mg q4h PRN  Hydralazine 10mg q4h PRN  Cardiac, low Na diet

## 2023-12-24 NOTE — ASSESSMENT & PLAN NOTE
Likely due to missing multiple doses of oral regimen at home   Patient with known history of long-standing severe hypertension   S/p adrenalectomy at [facility] in 2012 which did not markedly impact his hypertension   7/14/23 Renal doppler: WNL bilaterally   12/24/23: In ED received IV labetalol 20mg, 40mg, 80mg, home 8mg cardura, labetalol 300mg PO, and 10mg IV hydralazine with negligible change     Plan:  Goal -180  Can increase parameters to 200-180 if patient becomes symptomatic with drop  Continue home antihypertensive medications:  Amlodipine 10mg daily   Doxazosin 8mg BID  Labetalol 300mg BID  PRN medications:   Labetalol 10mg q4h PRN  Hydralazine 10mg q4h PRN  Will initiate nitroglycerine infusion if BP remains refractory to evening doses of medications   Avoiding nifedipine due to reported allergy of anaphylaxis, which the patient confirmed

## 2023-12-24 NOTE — ASSESSMENT & PLAN NOTE
Related to recent GI bleed with gradual improvement   Baseline hemoglobin: 7.1 - 9.4  Initial hemoglobin: 11.4 (12/24/23)      Plan:   Transfuse as indicated in the setting of hemodynamic instability or signs of active bleeding  Iron supplementation BID  CBC in AM

## 2023-12-24 NOTE — ASSESSMENT & PLAN NOTE
Incidental positive test  Patient remains on room air    Plan:   Continue to monitor and provide supportive care

## 2023-12-24 NOTE — ASSESSMENT & PLAN NOTE
No recent PFT's for review  Patient states his breathing has improved overall since his gastric bypass      Plan:   Albuterol PRN   Continue pulmonary hygiene. Incentive spirometer q1h while awake, encourage coughing and deep breathing. Upright positioning  Suction as needed and closely monitor secretions. Maintain HOB >30 degrees. Q4h oral care with chlorhexidine BID  Maintain SpO2 > 90%

## 2023-12-24 NOTE — ASSESSMENT & PLAN NOTE
No acute concerns at this time      Plan:   Continue home regimen:   Abilify 5mg daily   Zyprexa 5mg qHS  Sertraline 200mg daily

## 2023-12-24 NOTE — ASSESSMENT & PLAN NOTE
Secondary to hypertensive emergency   Baseline creatinine: 1.41 - 1.90  Initial creatinine: 2.97 (12/24/23)     Plan:   Supportive care with BP management   Strict q4h I/O monitoring  Monitor without keller catheter   Continue to follow renal function tests

## 2023-12-24 NOTE — ASSESSMENT & PLAN NOTE
Patient with acute-onset epigastric pain presented to [facility] on 7/8/23 and was found on CT to have a pericardiac effusion   Etiology possibly related to uncontrolled hypertension  s/p pericardial window and drain (7/8/23)      Plan:   No indication for new TTE   No longer on colchicine

## 2023-12-24 NOTE — PLAN OF CARE
Problem: Potential for Falls  Goal: Patient will remain free of falls  Description: INTERVENTIONS:  - Educate patient/family on patient safety including physical limitations  - Instruct patient to call for assistance with activity   - Consult OT/PT to assist with strengthening/mobility   - Keep Call bell within reach  - Keep bed low and locked with side rails adjusted as appropriate  - Keep care items and personal belongings within reach  - Initiate and maintain comfort rounds  - Make Fall Risk Sign visible to staff  - Offer Toileting every 3 Hours, in advance of need  - Initiate/Maintain bed/chair alarm  - Obtain necessary fall risk management equipment:   - Apply yellow socks and bracelet for high fall risk patients  - Consider moving patient to room near nurses station  Outcome: Progressing     Problem: PAIN - ADULT  Goal: Verbalizes/displays adequate comfort level or baseline comfort level  Description: Interventions:  - Encourage patient to monitor pain and request assistance  - Assess pain using appropriate pain scale  - Administer analgesics based on type and severity of pain and evaluate response  - Implement non-pharmacological measures as appropriate and evaluate response  - Consider cultural and social influences on pain and pain management  - Notify physician/advanced practitioner if interventions unsuccessful or patient reports new pain  Outcome: Progressing     Problem: INFECTION - ADULT  Goal: Absence or prevention of progression during hospitalization  Description: INTERVENTIONS:  - Assess and monitor for signs and symptoms of infection  - Monitor lab/diagnostic results  - Monitor all insertion sites, i.e. indwelling lines, tubes, and drains  - Monitor endotracheal if appropriate and nasal secretions for changes in amount and color  - [place] appropriate cooling/warming therapies per order  - Administer medications as ordered  - Instruct and encourage patient and family to use good hand hygiene  technique  - Identify and instruct in appropriate isolation precautions for identified infection/condition  Outcome: Progressing     Problem: SAFETY ADULT  Goal: Patient will remain free of falls  Description: INTERVENTIONS:  - Educate patient/family on patient safety including physical limitations  - Instruct patient to call for assistance with activity   - Consult OT/PT to assist with strengthening/mobility   - Keep Call bell within reach  - Keep bed low and locked with side rails adjusted as appropriate  - Keep care items and personal belongings within reach  - Initiate and maintain comfort rounds  - Make Fall Risk Sign visible to staff  - Offer Toileting every 3 Hours, in advance of need  - Initiate/Maintain bed/chair alarm  - Obtain necessary fall risk management equipment:   - Apply yellow socks and bracelet for high fall risk patients  - Consider moving patient to room near nurses station  Outcome: Progressing  Goal: Maintain or return to baseline ADL function  Description: INTERVENTIONS:  -  Assess patient's ability to carry out ADLs; assess patient's baseline for ADL function and identify physical deficits which impact ability to perform ADLs (bathing, care of mouth/teeth, toileting, grooming, dressing, etc.)  - Assess/evaluate cause of self-care deficits   - Assess range of motion  - Assess patient's mobility; develop plan if impaired  - Assess patient's need for assistive devices and provide as appropriate  - Encourage maximum independence but intervene and supervise when necessary  - Involve family in performance of ADLs  - Assess for home care needs following discharge   - Consider OT consult to assist with ADL evaluation and planning for discharge  - Provide patient education as appropriate  Outcome: Progressing  Goal: Maintains/Returns to pre admission functional level  Description: INTERVENTIONS:  - Perform AM-PAC 6 Click Basic Mobility/ Daily Activity assessment daily.  - Set and communicate daily  mobility goal to care team and patient/family/caregiver.   - Collaborate with rehabilitation services on mobility goals if consulted  - Reposition patient every 2 hours.  - Dangle patient 3 times a day  - Stand patient 3 times a day  - Ambulate patient 3 times a day  - Out of bed to chair 3 times a day   - Out of bed for meals 3 times a day  - Out of bed for toileting  - Record patient progress and toleration of activity level   Outcome: Progressing     Problem: DISCHARGE PLANNING  Goal: Discharge to home or other facility with appropriate resources  Description: INTERVENTIONS:  - Identify barriers to discharge w/patient and caregiver  - Arrange for needed discharge resources and transportation as appropriate  - Identify discharge learning needs (meds, wound care, etc.)  - Arrange for interpretive services to assist at discharge as needed  - Refer to Case Management Department for coordinating discharge planning if the patient needs post-hospital services based on physician/advanced practitioner order or complex needs related to functional status, cognitive ability, or social support system  Outcome: Progressing     Problem: Knowledge Deficit  Goal: Patient/family/caregiver demonstrates understanding of disease process, treatment plan, medications, and discharge instructions  Description: Complete learning assessment and assess knowledge base.  Interventions:  - Provide teaching at level of understanding  - Provide teaching via preferred learning methods  Outcome: Progressing     Problem: CARDIOVASCULAR - ADULT  Goal: Maintains optimal cardiac output and hemodynamic stability  Description: INTERVENTIONS:  - Monitor I/O, vital signs and rhythm  - Monitor for S/S and trends of decreased cardiac output  - Administer and titrate ordered vasoactive medications to optimize hemodynamic stability  - Assess quality of pulses, skin color and temperature  - Assess for signs of decreased coronary artery perfusion  - Instruct  patient to report change in severity of symptoms  Outcome: Progressing  Goal: Absence of cardiac dysrhythmias or at baseline rhythm  Description: INTERVENTIONS:  - Continuous cardiac monitoring, vital signs, obtain 12 lead EKG if ordered  - Administer antiarrhythmic and heart rate control medications as ordered  - Monitor electrolytes and administer replacement therapy as ordered  Outcome: Progressing

## 2023-12-24 NOTE — ED PROVIDER NOTES
History  Chief Complaint   Patient presents with    Flu Symptoms     C/o body aches, sob and headaches.      HPI patient is a 58-year-old male, history of congestive heart failure, pericardial tamponade requiring a tube according to the patient.  History of severe hypertension in the past.  Patient was referred to the emergency department apparently with cough congestion short of breath headaches concern for viral infection but also hypertensive.  Patient denies any chest pain.  There is no acute shortness of breath.  He denies any vomiting.  Past medical history COPD kidney disease DVT  Family history noncontributory  Social history, non-smoker no history of drug abuse    Prior to Admission Medications   Prescriptions Last Dose Informant Patient Reported? Taking?   ARIPiprazole (ABILIFY) 5 mg tablet  Self Yes No   Sig: Take 5 mg by mouth daily   Calcium 600 MG tablet  Self Yes No   Sig: Take 1,200 mg by mouth   Cholecalciferol (VITAMIN D-3) 1000 units CAPS  Self Yes No   Sig: Take 2,000 Units by mouth daily   Multiple Vitamins-Minerals (multivitamin with minerals) tablet  Self Yes No   Sig: Take 1 tablet by mouth daily   OLANZapine (ZyPREXA) 2.5 mg tablet   Yes No   Sig: Take 5 mg by mouth daily at bedtime   albuterol (PROVENTIL HFA,VENTOLIN HFA) 90 mcg/act inhaler  Self Yes No   amLODIPine (NORVASC) 10 mg tablet  Self Yes No   Sig: Take 10 mg by mouth   atorvastatin (LIPITOR) 40 mg tablet  Self Yes No   Sig: Take 40 mg by mouth daily at bedtime   colchicine (COLCRYS) 0.6 mg tablet   No No   Sig: Take 1 tablet (0.6 mg total) by mouth daily   doxazosin (CARDURA) 8 MG tablet   No No   Sig: Take 1 tablet (8 mg total) by mouth 2 (two) times a day   famotidine (PEPCID) 20 mg tablet   Yes Yes   Sig: Take 20 mg by mouth daily   ferrous sulfate 325 (65 Fe) mg tablet  Self Yes No   Sig: Take 325 mg by mouth 2 (two) times a day     labetalol (NORMODYNE) 200 mg tablet Not Taking Self No No   Sig: Take 2 tablets (400 mg total)  by mouth every 12 (twelve) hours   Patient not taking: Reported on 12/24/2023   labetalol (NORMODYNE) 300 mg tablet   Yes No   Sig: Take 300 mg by mouth 2 (two) times a day Morning and before bedtime   pantoprazole (PROTONIX) 40 mg tablet   Yes Yes   Sig: Take 40 mg by mouth daily   polyethylene glycol (MIRALAX) 17 g packet   Yes Yes   Sig: Take 17 g by mouth daily   potassium chloride (Klor-Con) 10 mEq tablet  Self No No   Sig: Take 1 tablet (10 mEq total) by mouth in the morning   sertraline (ZOLOFT) 100 mg tablet  Self Yes No   Sig: Take 2 tablets by mouth daily   sucralfate (CARAFATE) 1 g tablet   Yes No   Sig: Take 1 g by mouth 2 (two) times a day      Facility-Administered Medications: None       Past Medical History:   Diagnosis Date    Asthma     Chronic kidney disease     COPD (chronic obstructive pulmonary disease) (HCC)     CPAP (continuous positive airway pressure) dependence     NO LONGER NEEDS D/T BARIATRIC SURGERY.    Diabetes mellitus (HCC)     Emphysema of lung (HCC)     Gout     History of transfusion     pt stated they had a transfusion when they had gallbladder surgery.    Hypertension     Kidney disease     renal failure    Leg DVT (deep venous thromboembolism), acute, bilateral (HCC) 01/2018    Obesity (BMI 30-39.9)     AGUS on CPAP     setting 11    Postgastrectomy malabsorption     Sleep apnea     Systolic CHF (HCC)        Past Surgical History:   Procedure Laterality Date    ADRENALECTOMY      CHOLECYSTECTOMY      COLONOSCOPY      EGD      HIATAL HERNIA REPAIR N/A 12/22/2020    Procedure: REPAIR HERNIA HIATAL LAPAROSCOPIC;  Surgeon: Shane Francis MD;  Location: [facility];  Service: Bariatrics    INCISION AND DRAINAGE OF WOUND Left 10/17/2018    Procedure: INCISION AND DRAINAGE (I&D) GROIN;  Surgeon: Rafy Pascual MD;  Location: [facility];  Service: General    IR IMAGE GUIDED ASPIRATION / DRAINAGE W TUBE  8/17/2018    IR IMAGE GUIDED ASPIRATION / DRAINAGE W TUBE  7/31/2018    JOINT  REPLACEMENT Bilateral     knee    KIDNEY SURGERY  2009    nodule removal    LYMPH NODE DISSECTION Left 01/2018    left inguinal LN removed - benign     OTHER SURGICAL HISTORY      kidney nodule removal    PALATE / UVULA BIOPSY / EXCISION      PERICARDIAL WINDOW N/A 7/8/2023    Procedure: WINDOW PERICARDIAL;  Surgeon: Alonso Logan MD;  Location: [facility];  Service: Thoracic    PA LAPS GSTR RSTCV PX W/BYP JASON-EN-Y LIMB <150 CM N/A 12/22/2020    Procedure: BYPASS GASTRIC  JASON-EN-Y LAPAROSCOPIC WITH INTRAOPERATIVE EGD;  Surgeon: Shane Francis MD;  Location: [facility];  Service: Bariatrics    SINUS SURGERY      TONSILLECTOMY         Family History   Problem Relation Age of Onset    Cancer Father     Kidney disease Mother     Heart murmur Sister     Asthma Sister     Hypertension Sister     Heart disease Brother     Bell's palsy Brother     Hypertension Brother     Deep vein thrombosis Neg Hx      I have reviewed and agree with the history as documented.    E-Cigarette/Vaping    E-Cigarette Use Never User      E-Cigarette/Vaping Substances    Nicotine No     THC No     CBD No     Flavoring No     Other No     Unknown No      Social History     Tobacco Use    Smoking status: Never     Passive exposure: Past    Smokeless tobacco: Never   Vaping Use    Vaping status: Never Used   Substance Use Topics    Alcohol use: Yes     Comment: occasionally    Drug use: Yes     Types: Marijuana     Comment: Card       Review of Systems   Constitutional:  Positive for chills and fever. Negative for diaphoresis and fatigue.   HENT:  Positive for congestion. Negative for ear pain, nosebleeds and sore throat.    Eyes:  Negative for photophobia, pain, discharge and visual disturbance.   Respiratory:  Negative for cough, choking, chest tightness, shortness of breath and wheezing.    Cardiovascular:  Negative for chest pain and palpitations.   Gastrointestinal:  Negative for abdominal distention, abdominal pain, diarrhea and  vomiting.   Genitourinary:  Negative for dysuria, flank pain and frequency.   Musculoskeletal:  Negative for back pain, gait problem and joint swelling.   Skin:  Negative for color change and rash.   Neurological:  Negative for dizziness, syncope and headaches.   Psychiatric/Behavioral:  Negative for behavioral problems and confusion. The patient is not nervous/anxious.    All other systems reviewed and are negative.      Physical Exam  Physical Exam  Vitals and nursing note reviewed.   Constitutional:       Appearance: He is well-developed.   HENT:      Head: Normocephalic.      Right Ear: External ear normal.      Left Ear: External ear normal.      Nose: Nose normal.      Mouth/Throat:      Mouth: Mucous membranes are moist.      Pharynx: Oropharynx is clear.   Eyes:      General: Lids are normal.      Extraocular Movements: Extraocular movements intact.      Pupils: Pupils are equal, round, and reactive to light.   Cardiovascular:      Rate and Rhythm: Normal rate and regular rhythm.      Pulses: Normal pulses.      Heart sounds: Normal heart sounds.   Pulmonary:      Effort: Pulmonary effort is normal. No respiratory distress.      Breath sounds: Normal breath sounds.   Abdominal:      General: Abdomen is flat. Bowel sounds are normal.      Tenderness: There is no abdominal tenderness.   Musculoskeletal:         General: No deformity. Normal range of motion.      Cervical back: Normal range of motion and neck supple.   Skin:     General: Skin is warm and dry.   Neurological:      Mental Status: He is alert and oriented to person, place, and time.   Psychiatric:         Mood and Affect: Mood normal.         Vital Signs  ED Triage Vitals   Temperature Pulse Respirations Blood Pressure SpO2   12/24/23 1033 12/24/23 1033 12/24/23 1033 12/24/23 1033 12/24/23 1033   98.8 °F (37.1 °C) 84 20 (!) 244/142 99 %      Temp Source Heart Rate Source Patient Position - Orthostatic VS BP Location FiO2 (%)   12/24/23 1033  12/24/23 1315 12/24/23 1033 12/24/23 1033 --   Oral Monitor Sitting Left arm       Pain Score       12/24/23 1700       4           Vitals:    12/24/23 1609 12/24/23 1615 12/24/23 1700 12/24/23 1800   BP: (!) 220/133 (!) 210/126 (!) 210/126 (!) 212/130   Pulse:  67 70 74   Patient Position - Orthostatic VS:   Lying Lying         Visual Acuity      ED Medications  Medications   amLODIPine (NORVASC) tablet 10 mg (10 mg Oral Given 12/24/23 1446)   ondansetron (ZOFRAN) injection 4 mg (4 mg Intravenous Given 12/24/23 1452)   chlorhexidine (PERIDEX) 0.12 % oral rinse 15 mL (has no administration in time range)   hydrALAZINE (APRESOLINE) injection 10 mg (has no administration in time range)   labetalol (NORMODYNE) injection 10 mg (has no administration in time range)   heparin (porcine) subcutaneous injection 5,000 Units (5,000 Units Subcutaneous Given 12/24/23 1713)   ARIPiprazole (ABILIFY) tablet 5 mg (has no administration in time range)   atorvastatin (LIPITOR) tablet 40 mg (has no administration in time range)   doxazosin (CARDURA) tablet 8 mg (8 mg Oral Given 12/24/23 1712)   ferrous sulfate tablet 325 mg (325 mg Oral Given 12/24/23 1712)   labetalol (NORMODYNE) tablet 300 mg (300 mg Oral Given 12/24/23 1711)   OLANZapine (ZyPREXA) tablet 5 mg (has no administration in time range)   sertraline (ZOLOFT) tablet 200 mg (has no administration in time range)   sucralfate (CARAFATE) tablet 1 g (1 g Oral Given 12/24/23 1712)   nitroGLYcerin (TRIDIL) 50 mg in 250 mL infusion (premix) (has no administration in time range)   acetaminophen (TYLENOL) tablet 650 mg (650 mg Oral Given 12/24/23 1711)   pantoprazole (PROTONIX) EC tablet 40 mg (has no administration in time range)   polyethylene glycol (MIRALAX) packet 17 g (has no administration in time range)   labetalol (NORMODYNE) injection 20 mg (20 mg Intravenous Given 12/24/23 1201)   labetalol (NORMODYNE) injection 40 mg (40 mg Intravenous Given 12/24/23 1218)   labetalol  (NORMODYNE) injection 80 mg (80 mg Intravenous Given 12/24/23 1306)   hydrALAZINE (APRESOLINE) injection 10 mg (10 mg Intravenous Given 12/24/23 1609)   labetalol (NORMODYNE) injection 80 mg (80 mg Intravenous Given 12/24/23 1447)   doxazosin (CARDURA) tablet 8 mg (8 mg Oral Given 12/24/23 1446)       Diagnostic Studies  Results Reviewed       Procedure Component Value Units Date/Time    Platelet count [[id number]]     Lab Status: No result Specimen: Blood     HS Troponin I 4hr [[id number]]  (Abnormal) Collected: 12/24/23 1456    Lab Status: Final result Specimen: Blood from Arm, Left Updated: 12/24/23 1527     hs TnI 4hr 195 ng/L      Delta 4hr hsTnI 31 ng/L     HS Troponin I 2hr [[id number]]  (Abnormal) Collected: 12/24/23 1308    Lab Status: Final result Specimen: Blood from Arm, Right Updated: 12/24/23 1342     hs TnI 2hr 178 ng/L      Delta 2hr hsTnI 14 ng/L     FLU/RSV/COVID - if FLU/RSV clinically relevant [[id number]]  (Abnormal) Collected: 12/24/23 1056    Lab Status: Final result Specimen: Nares from Nose Updated: 12/24/23 1200     SARS-CoV-2 Positive     INFLUENZA A PCR Negative     INFLUENZA B PCR Negative     RSV PCR Negative    Narrative:      FOR PEDIATRIC PATIENTS - copy/paste COVID Guidelines URL to browser: [email or web address]    SARS-CoV-2 assay is a Nucleic Acid Amplification assay intended for the  qualitative detection of nucleic acid from SARS-CoV-2 in nasopharyngeal  swabs. Results are for the presumptive identification of SARS-CoV-2 RNA.    Positive results are indicative of infection with SARS-CoV-2, the virus  causing COVID-19, but do not rule out bacterial infection or co-infection  with other viruses. Laboratories within the [place] and its  territories are required to report all positive results to the appropriate  public health authorities. Negative results do not preclude SARS-CoV-2  infection and should not be used as the sole  basis for treatment or other  patient management decisions. Negative results must be combined with  clinical observations, patient history, and epidemiological information.  This test has not been FDA cleared or approved.    This test has been authorized by FDA under an Emergency Use Authorization  (EUA). This test is only authorized for the duration of time the  declaration that circumstances exist justifying the authorization of the  emergency use of an in vitro diagnostic tests for detection of SARS-CoV-2  virus and/or diagnosis of COVID-19 infection under section 564(b)(1) of  the Act, 21 U.S.C. 360bbb-3(b)(1), unless the authorization is terminated  or revoked sooner. The test has been validated but independent review by FDA  and CLIA is pending.    Test performed using [employer] GeneCameramapert: This RT-PCR assay targets N2,  a region unique to SARS-CoV-2. A conserved region in the E-gene was chosen  for pan-Sarbecovirus detection which includes SARS-CoV-2.    According to CMS-2020-01-R, this platform meets the definition of high-throughput technology.    Procalcitonin [[id number]]  (Normal) Collected: 12/24/23 1056    Lab Status: Final result Specimen: Blood from Arm, Right Updated: 12/24/23 1150     Procalcitonin 0.12 ng/ml     B-Type Natriuretic Peptide(BNP) [[id number]]  (Abnormal) Collected: 12/24/23 1056    Lab Status: Final result Specimen: Blood from Arm, Right Updated: 12/24/23 1147     BNP 4,663 pg/mL     HS Troponin 0hr (reflex protocol) [[id number]]  (Abnormal) Collected: 12/24/23 1056    Lab Status: Final result Specimen: Blood from Arm, Right Updated: 12/24/23 1147     hs TnI 0hr 164 ng/L     Protime-INR [[id number]]  (Abnormal) Collected: 12/24/23 1056    Lab Status: Final result Specimen: Blood from Arm, Right Updated: 12/24/23 1143     Protime 15.4 seconds      INR 1.15    APTT [[id number]]  (Normal) Collected: 12/24/23 1056    Lab Status: Final result Specimen: Blood from Arm, Right Updated: 12/24/23 1143      PTT 31 seconds     Comprehensive metabolic panel [[id number]]  (Abnormal) Collected: 12/24/23 1056    Lab Status: Final result Specimen: Blood from Arm, Right Updated: 12/24/23 1135     Sodium 138 mmol/L      Potassium 3.4 mmol/L      Chloride 106 mmol/L      CO2 23 mmol/L      ANION GAP 9 mmol/L      BUN 38 mg/dL      Creatinine 2.97 mg/dL      Glucose 159 mg/dL      Calcium 9.2 mg/dL      AST 29 U/L      ALT 30 U/L      Alkaline Phosphatase 77 U/L      Total Protein 7.0 g/dL      Albumin 3.9 g/dL      Total Bilirubin 1.02 mg/dL      eGFR 21 ml/min/1.73sq m     Narrative:      National Kidney Disease Foundation guidelines for Chronic Kidney Disease (CKD):     Stage 1 with normal or high GFR (GFR > 90 mL/min/1.73 square meters)    Stage 2 Mild CKD (GFR = 60-89 mL/min/1.73 square meters)    Stage 3A Moderate CKD (GFR = 45-59 mL/min/1.73 square meters)    Stage 3B Moderate CKD (GFR = 30-44 mL/min/1.73 square meters)    Stage 4 Severe CKD (GFR = 15-29 mL/min/1.73 square meters)    Stage 5 End Stage CKD (GFR <15 mL/min/1.73 square meters)  Note: GFR calculation is accurate only with a steady state creatinine    Lactic acid [[id number]]  (Normal) Collected: 12/24/23 1056    Lab Status: Final result Specimen: Blood from Arm, Right Updated: 12/24/23 1135     LACTIC ACID 1.6 mmol/L     Narrative:      Result may be elevated if tourniquet was used during collection.    CBC and differential [[id number]]  (Abnormal) Collected: 12/24/23 1056    Lab Status: Final result Specimen: Blood from Arm, Right Updated: 12/24/23 1116     WBC 4.04 Thousand/uL      RBC 4.38 Million/uL      Hemoglobin 11.7 g/dL      Hematocrit 35.4 %      MCV 81 fL      MCH 26.7 pg      MCHC 33.1 g/dL      RDW 18.4 %      MPV 10.0 fL      Platelets 199 Thousands/uL      nRBC 0 /100 WBCs      Neutrophils Relative 66 %      Immat GRANS % 1 %      Lymphocytes Relative 12 %      Monocytes Relative 18 %      Eosinophils Relative 2 %      Basophils Relative 1 %       Neutrophils Absolute 2.66 Thousands/µL      Immature Grans Absolute 0.02 Thousand/uL      Lymphocytes Absolute 0.50 Thousands/µL      Monocytes Absolute 0.72 Thousand/µL      Eosinophils Absolute 0.09 Thousand/µL      Basophils Absolute 0.05 Thousands/µL     Blood culture #2 [[id number]] Collected: 12/24/23 1056    Lab Status: In process Specimen: Blood from Arm, Right Updated: 12/24/23 1113    Blood culture #1 [[id number]] Collected: 12/24/23 1109    Lab Status: In process Specimen: Blood from Arm, Left Updated: 12/24/23 1112                   No orders to display              Procedures  ECG 12 Lead Documentation Only    Date/Time: 12/24/2023 6:42 PM    Performed by: aRs Koch MD  Authorized by: Ras Koch MD    Indications / Diagnosis:  Pretension  ECG reviewed by me, the ED Provider: yes    Patient location:  ED  Previous ECG:     Previous ECG:  Unavailable  Interpretation:     Interpretation: normal    Rate:     ECG rate:  80    ECG rate assessment: normal    Rhythm:     Rhythm: sinus rhythm    Comments:      Normal sinus rhythm, no ST elevations, nonspecific ST-T wave changes ST depressions in the lateral leads  CriticalCare Time    Date/Time: 12/24/2023 6:46 PM    Performed by: Ras Koch MD  Authorized by: Ras Koch MD    Critical care provider statement:     Critical care time (minutes):  120    Critical care start time:  12/24/2023 10:46 AM    Critical care end time:  12/24/2023 12:46 AM    Critical care time was exclusive of:  Separately billable procedures and treating other patients and teaching time    Critical care was necessary to treat or prevent imminent or life-threatening deterioration of the following conditions:  Metabolic crisis    Critical care was time spent personally by me on the following activities:  Obtaining history from patient or surrogate, development of treatment plan with patient or surrogate, discussions with consultants, evaluation of patient's response to  treatment, examination of patient, ordering and review of laboratory studies, ordering and performing treatments and interventions and ordering and review of radiographic studies    I assumed direction of critical care for this patient from another provider in my specialty: no             ED Course           Chest x-ray shows a questionable infiltrate left lower lobe, hidden by the heart, normal cardiac size, reviewed the old chest x-ray forearm July when the patient had pericardial tamponade there is no sign of enlarged mediastinum or cardiomegaly at this time.  Interpreted by me appositional .        I spoke with the intensive care attending, patient is persistently hypertensive despite multiple doses of Normodyne.  Recommendation for hydralazine from the ICU.  Attempted to give hydralazine but the patient reports he is allergic and refused.  Patient allergic to multiple other medications.  Patient given a total of 220 mg of nadolol with minimal effect on his blood pressure.  Added patient's normal Cardura at 8 mg.      Diagnostic testing shows positive COVID testing, negative influenza negative RSV  Patient's troponin was positive patient has no chest pain possibly related to cardiac strain due to his hypertension, initial cardiac troponin was 160 4 repeat was 178, 4-hour troponin was 195 cardiac troponin delta was 31.  Given the patient has no chest pain most consistent with cardiac strain  Lecture lites showed a BUN of 38 a creatinine 2.9 patient's creatinine runs around 3, no real change,  Lactate was normal at 1.6 no sign of severe sepsis  White count was low at 4.0 patient has COVID possibly related to his viral infection  Hemoglobin was 11.7 no sign of anemia.        SBIRT 20yo+      Flowsheet Row Most Recent Value   Initial Alcohol Screen: US AUDIT-C     1. How often do you have a drink containing alcohol? 0 Filed at: 12/24/2023 1034   2. How many drinks containing alcohol do you have on a  typical day you are drinking?  0 Filed at: 12/24/2023 1034   3a. Male UNDER 65: How often do you have five or more drinks on one occasion? 0 Filed at: 12/24/2023 1034   3b. FEMALE Any Age, or MALE 65+: How often do you have 4 or more drinks on one occassion? 0 Filed at: 12/24/2023 1034   Audit-C Score 0 Filed at: 12/24/2023 1034   DARRIN: How many times in the past year have you...    Used an illegal drug or used a prescription medication for non-medical reasons? Never Filed at: 12/24/2023 1034                      Medical Decision Making  Medical decision making 58-year-old male presents emergency department, history of recent body aches and flu type symptoms, patient tested positive for COVID here.  Patient has significant hypertension here was treated with a large dose of labetalol with still persistent hypertension, apparently allergic to hydralazine, spoke with critical care they recommended hydralazine eventually The patient was convinced to take the hydralazine.  Patient remained hypertensive and was excepted by the critical care staff for IV drip for his hypertension.  Discussed The patient agrees to admission.    Amount and/or Complexity of Data Reviewed  Labs: ordered.    Risk  Prescription drug management.  Decision regarding hospitalization.             Disposition  Final diagnoses:   Hypertensive emergency   COVID-19     Time reflects when diagnosis was documented in both MDM as applicable and the Disposition within this note       Time User Action Codes Description Comment    12/24/2023 12:10 PM Ras Koch Add [I16.1] Hypertensive emergency     12/24/2023  2:45 PM Ras Koch Add [U07.1] COVID-19           ED Disposition       ED Disposition   Admit    Condition   Stable    Date/Time   Sun Dec 24, 2023 1608    Comment   Case was discussed with ICU service and the patient's admission status was agreed to be Admission Status: inpatient status to the service of Dr. Rowley .               Follow-up  Information    None         Current Discharge Medication List        CONTINUE these medications which have NOT CHANGED    Details   famotidine (PEPCID) 20 mg tablet Take 20 mg by mouth daily      pantoprazole (PROTONIX) 40 mg tablet Take 40 mg by mouth daily      polyethylene glycol (MIRALAX) 17 g packet Take 17 g by mouth daily      albuterol (PROVENTIL HFA,VENTOLIN HFA) 90 mcg/act inhaler     Comments: <!--EPICS-->Substitution to a formulary equivalent within the same pharmaceutical class is authorized.<!--EPICE-->      amLODIPine (NORVASC) 10 mg tablet Take 10 mg by mouth      ARIPiprazole (ABILIFY) 5 mg tablet Take 5 mg by mouth daily      atorvastatin (LIPITOR) 40 mg tablet Take 40 mg by mouth daily at bedtime      Calcium 600 MG tablet Take 1,200 mg by mouth      Cholecalciferol (VITAMIN D-3) 1000 units CAPS Take 2,000 Units by mouth daily      colchicine (COLCRYS) 0.6 mg tablet Take 1 tablet (0.6 mg total) by mouth daily  Qty: 60 tablet, Refills: 0    Associated Diagnoses: Pericardial effusion      doxazosin (CARDURA) 8 MG tablet Take 1 tablet (8 mg total) by mouth 2 (two) times a day  Qty: 90 tablet, Refills: 6    Associated Diagnoses: Primary hypertension      ferrous sulfate 325 (65 Fe) mg tablet Take 325 mg by mouth 2 (two) times a day        !! labetalol (NORMODYNE) 200 mg tablet Take 2 tablets (400 mg total) by mouth every 12 (twelve) hours  Qty: 120 tablet, Refills: 0    Associated Diagnoses: Hypertensive urgency      !! labetalol (NORMODYNE) 300 mg tablet Take 300 mg by mouth 2 (two) times a day Morning and before bedtime      Multiple Vitamins-Minerals (multivitamin with minerals) tablet Take 1 tablet by mouth daily      OLANZapine (ZyPREXA) 2.5 mg tablet Take 5 mg by mouth daily at bedtime      potassium chloride (Klor-Con) 10 mEq tablet Take 1 tablet (10 mEq total) by mouth in the morning  Qty: 30 tablet, Refills: 0    Associated Diagnoses: Electrolyte abnormality      sertraline (ZOLOFT) 100 mg  tablet Take 2 tablets by mouth daily      sucralfate (CARAFATE) 1 g tablet Take 1 g by mouth 2 (two) times a day       !! - Potential duplicate medications found. Please discuss with provider.          No discharge procedures on file.    PDMP Review         Value Time User    PDMP Reviewed  Yes 12/24/2020  8:11 AM Desi Katz PA-C            ED Provider  Electronically Signed by             Ras Koch MD  12/24/23 8703

## 2023-12-24 NOTE — H&P
[facility]  H&P  Name: Genna Liu 58 y.o. male I MRN: [id number]  Unit/Bed#:  I Date of Admission: 12/24/2023   Date of Service: 12/24/2023 I Hospital Day: 0      Assessment/Plan   * Hypertensive emergency  Assessment & Plan  Likely due to missing multiple doses of oral regimen at home   Patient with known history of long-standing severe hypertension   S/p adrenalectomy at [facility] in 2012 which did not markedly impact his hypertension   7/14/23 Renal doppler: WNL bilaterally   12/24/23: In ED received IV labetalol 20mg, 40mg, 80mg, home 8mg cardura, labetalol 300mg PO, and 10mg IV hydralazine with negligible change     Plan:  Goal -180  Can increase parameters to 200-180 if patient becomes symptomatic with drop  Continue home antihypertensive medications:  Amlodipine 10mg daily   Doxazosin 8mg BID  Labetalol 300mg BID  PRN medications:   Labetalol 10mg q4h PRN  Hydralazine 10mg q4h PRN  Will initiate nitroglycerine infusion if BP remains refractory to evening doses of medications   Avoiding nifedipine due to reported allergy of anaphylaxis, which the patient confirmed       COPD (chronic obstructive pulmonary disease) (HCC)  Assessment & Plan  No recent PFT's for review  Patient states his breathing has improved overall since his gastric bypass      Plan:   Albuterol PRN   Continue pulmonary hygiene. Incentive spirometer q1h while awake, encourage coughing and deep breathing. Upright positioning  Suction as needed and closely monitor secretions. Maintain HOB >30 degrees. Q4h oral care with chlorhexidine BID  Maintain SpO2 > 90%     Anemia  Assessment & Plan  Related to recent GI bleed with gradual improvement   Baseline hemoglobin: 7.1 - 9.4  Initial hemoglobin: 11.4 (12/24/23)      Plan:   Transfuse as indicated in the setting of hemodynamic instability or signs of active bleeding  Iron supplementation BID  CBC in AM      Chronic diastolic CHF (congestive heart failure)  (Formerly [facility])  Assessment & Plan  No evidence of acute exacerbation at this time post operatively   Relevant echocardiograms:  7/8/23 TTE: Left ventricular cavity size is normal. Wall thickness is increased. The left ventricular ejection fraction is 50%. Systolic function is low normal. Wall motion cannot be accurately assessed. Left Atrium: The atrium is mildly dilated. Pericardium: There is a moderate to large pericardial effusion circumferential to the heart. There is a reasonable likelihood of cardiac tamponade. The evidence for tamponade includes: right ventricular compression  7/14/23 TTE: LV cavity size is normal. Wall thickness is severely increased. Severe concentric hypertrophy. LVEF 55%. Systolic function is normal. Wall motion is normal. There is a prominent myocardial speckle pattern. Atrial Septum: The septum bows into the right atrium, suggesting increased left atrial pressure. Pericardium: There is a trivial pericardial effusion posterior to the heart. There is no echocardiographic evidence of tamponade.        Plan:   Holding diuresis at this time  Goal to maintain more controlled BP, ideally SBP < 180 eventually following crisis   Daily weights  Continue home oral antihypertensives:   Amlodipine 10mg daily   Doxazosin 8mg BID  Labetalol 300mg BID  PRN antihypertensives:   Labetalol 10mg q4h PRN  Hydralazine 10mg q4h PRN  Cardiac, low Na diet       Depression with anxiety  Assessment & Plan  No acute concerns at this time      Plan:   Continue home regimen:   Abilify 5mg daily   Zyprexa 5mg qHS  Sertraline 200mg daily     Pericardial effusion  Assessment & Plan  Patient with acute-onset epigastric pain presented to [facility] on 7/8/23 and was found on CT to have a pericardiac effusion   Etiology possibly related to uncontrolled hypertension  s/p pericardial window and drain (7/8/23)      Plan:   No indication for new TTE   No longer on colchicine     COVID-19  Assessment & Plan  Incidental positive test  Patient  remains on room air    Plan:   Continue to monitor and provide supportive care    GERD (gastroesophageal reflux disease)  Assessment & Plan  Follows with GI   No new symptoms    Plan:  Carfate 1g TID   Protonix PO 40mg daily     Acute kidney injury superimposed on CKD   Assessment & Plan  Secondary to hypertensive emergency   Baseline creatinine: 1.41 - 1.90  Initial creatinine: 2.97 (12/24/23)     Plan:   Supportive care with BP management   Strict q4h I/O monitoring  Monitor without keller catheter   Continue to follow renal function tests      Stage 3b chronic kidney disease (CKD) (HCC)  Assessment & Plan  See plan above for TRUDI             History of Present Illness     HPI: Genna Liu is a 58 y.o. male with chronic diastolic CHF, hypertension, CKD stage 3, COPD, recent pericardial effusion s/p pericardial drain (July 2023), and a prior history of diabetes and AGUS which had since resolved s/p gastric bypass surgery in 2020 presented in the morning of 12/24/23 to urgent care with nausea, vomiting, orthopnea, and shortness of breath. He was noted to have a BP of 246/122 and was sent to the ED. He admitted to missing both his PM medications the prior day as well as his AM doses of his anti-hypotensives do to feeling unwell. He was given multiple, escalating doses of IV labetalol and hydralazine as well as his oral regimen, but his SBP remained > 200. He was also incidentally found to be COVID positive for which he was otherwise asymptomatic. Given his likely need for an infusion of a vasodilator, he was admitted to step down 1.     History obtained from chart review and the patient.    Review of Systems   Constitutional:  Positive for diaphoresis and fatigue.   HENT: Negative.     Eyes: Negative.    Respiratory:  Positive for cough and shortness of breath. Negative for wheezing.    Cardiovascular:  Negative for chest pain, palpitations and leg swelling.   Gastrointestinal:  Positive for nausea and vomiting.  Negative for abdominal distention and diarrhea.   Endocrine: Negative.    Genitourinary: Negative.    Musculoskeletal: Negative.    Skin: Negative.    Allergic/Immunologic: Negative.    Neurological:  Positive for dizziness, weakness and light-headedness.   Hematological: Negative.    Psychiatric/Behavioral: Negative.     All other systems reviewed and are negative.      Disposition: Stepdown Level 1  Historical Information   Past Medical History:  No date: Asthma  No date: Chronic kidney disease  No date: COPD (chronic obstructive pulmonary disease) (HCC)  No date: CPAP (continuous positive airway pressure) dependence      Comment:  NO LONGER NEEDS D/T BARIATRIC SURGERY.  No date: Diabetes mellitus (HCC)  No date: Emphysema of lung (HCC)  No date: Gout  No date: History of transfusion      Comment:  pt stated they had a transfusion when they had                gallbladder surgery.  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  No date: Sleep apnea  No date: Systolic CHF (HCC) Past Surgical History:  No date: ADRENALECTOMY  No date: CHOLECYSTECTOMY  No date: COLONOSCOPY  No date: EGD  12/22/2020: HIATAL HERNIA REPAIR; N/A      Comment:  Procedure: REPAIR HERNIA HIATAL LAPAROSCOPIC;  Surgeon:                Shane Francis MD;  [facility];  Service:                Bariatrics  10/17/2018: INCISION AND DRAINAGE OF WOUND; Left      Comment:  Procedure: INCISION AND DRAINAGE (I&D) GROIN;  Surgeon:                Rafy Pascual MD;  [facility];  Service:                General  8/17/2018: IR IMAGE GUIDED ASPIRATION / DRAINAGE W TUBE  7/31/2018: IR IMAGE GUIDED ASPIRATION / DRAINAGE W TUBE  No date: JOINT REPLACEMENT; Bilateral      Comment:  knee  2009: KIDNEY SURGERY       "Comment:  nodule removal  01/2018: LYMPH NODE DISSECTION; Left      Comment:  left inguinal LN removed - benign   No date: OTHER SURGICAL HISTORY      Comment:  kidney nodule removal  No date: PALATE / UVULA BIOPSY / EXCISION  7/8/2023: PERICARDIAL WINDOW; N/A      Comment:  Procedure: WINDOW PERICARDIAL;  Surgeon: Alonso Logan MD;  [facility];  Service: Thoracic  12/22/2020: [place] LAPS GSTR RSTCV PX W/BYP JASON-EN-Y LIMB <150 CM; N/A      Comment:  Procedure: BYPASS GASTRIC  JASON-EN-Y LAPAROSCOPIC WITH                INTRAOPERATIVE EGD;  Surgeon: Shane Francis MD;                 Location: [facility];  Service: Bariatrics  No date: SINUS SURGERY  No date: TONSILLECTOMY   Current Outpatient Medications   Medication Instructions    albuterol (PROVENTIL HFA,VENTOLIN HFA) 90 mcg/act inhaler     amLODIPine (NORVASC) 10 mg, Oral    ARIPiprazole (ABILIFY) 5 mg, Oral, Daily    atorvastatin (LIPITOR) 40 mg, Oral, Daily at bedtime    Calcium 1,200 mg, Oral    colchicine (COLCRYS) 0.6 mg, Oral, Daily    doxazosin (CARDURA) 8 mg, Oral, 2 times daily    ferrous sulfate 325 mg, Oral, 2 times daily    labetalol (NORMODYNE) 400 mg, Oral, Every 12 hours scheduled    labetalol (NORMODYNE) 300 mg, Oral, 2 times daily, Morning and before bedtime    Multiple Vitamins-Minerals (multivitamin with minerals) tablet 1 tablet, Oral, Daily    OLANZapine (ZYPREXA) 5 mg    potassium chloride (Klor-Con) 10 mEq tablet 10 mEq, Oral, Daily    sertraline (ZOLOFT) 100 mg tablet 2 tablets, Oral, Daily    sucralfate (CARAFATE) 1 g, Oral, 2 times daily    Vitamin D-3 2,000 Units, Oral, Daily    Allergies   Allergen Reactions    Clonidine Anaphylaxis    Lisinopril Anaphylaxis    Nifedipine Anaphylaxis    Spironolactone Anaphylaxis, Other (See Comments) and Shortness Of Breath    Buspirone      Headaches,     Enoxaparin      Injection site \"bump\" per Dr. Francis    Hydralazine Other (See Comments)     Fluid around the " heart  Lose for Appetite        Minoxidil      Retains fluid around heart      Social History     Tobacco Use    Smoking status: Never     Passive exposure: Past    Smokeless tobacco: Never   Vaping Use    Vaping status: Never Used   Substance Use Topics    Alcohol use: Yes     Comment: occasionally    Drug use: Yes     Types: Marijuana     Comment: Card    Family History   Problem Relation Age of Onset    Cancer Father     Kidney disease Mother     Heart murmur Sister     Asthma Sister     Hypertension Sister     Heart disease Brother     Bell's palsy Brother     Hypertension Brother     Deep vein thrombosis Neg Hx           Objective                            Vitals I/O      Most Recent Min/Max in 24hrs   Temp 100.3 °F (37.9 °C) Temp  Min: 98.8 °F (37.1 °C)  Max: 100.3 °F (37.9 °C)   Pulse 70 Pulse  Min: 65  Max: 84   Resp 20 Resp  Min: 14  Max: 26   BP (!) 210/126 BP  Min: 210/126  Max: 253/160   O2 Sat 97 % SpO2  Min: 97 %  Max: 100 %    No intake or output data in the 24 hours ending 12/24/23 1706    Diet Cardiovascular; Cardiac; Sodium 2 GM    Invasive Monitoring           Physical Exam   Physical Exam  Vitals and nursing note reviewed.   Eyes:      Conjunctiva/sclera: Conjunctivae normal.      Pupils: Pupils are equal, round, and reactive to light.   Skin:     General: Skin is warm and dry.      Capillary Refill: Capillary refill takes less than 2 seconds.   HENT:      Head: Normocephalic and atraumatic.      Mouth/Throat:      Lips: Pink.      Mouth: Mucous membranes are moist.   Cardiovascular:      Rate and Rhythm: Normal rate and regular rhythm.      Pulses:           Radial pulses are 3+ on the right side and 3+ on the left side.        Dorsalis pedis pulses are 2+ on the right side and 2+ on the left side.        Posterior tibial pulses are 2+ on the right side and 2+ on the left side.      Heart sounds: Normal heart sounds.   Musculoskeletal:      Right lower leg: No edema.      Left lower leg: No  edema.   Abdominal: General: There is no distension.      Palpations: Abdomen is soft.      Tenderness: There is no abdominal tenderness.   Constitutional:       General: He is awake. He is not in acute distress.     Appearance: He is well-developed and well-nourished.   Pulmonary:      Effort: Tachypnea present.      Breath sounds: Normal breath sounds.   Psychiatric:         Behavior: Behavior is cooperative.   Neurological:      General: No focal deficit present.      Mental Status: He is alert.      GCS: GCS eye subscore is 4. GCS verbal subscore is 5. GCS motor subscore is 6.            Diagnostic Studies      EKG: NSR   Imaging: CXR: Lungs CTA I have personally reviewed pertinent reports.   and I have personally reviewed pertinent films in PACS     Medications:  Scheduled PRN   amLODIPine, 10 mg, Daily  [START ON 12/25/2023] ARIPiprazole, 5 mg, Daily  atorvastatin, 40 mg, HS  chlorhexidine, 15 mL, Q12H JOANN  [START ON 12/25/2023] colchicine, 0.6 mg, Daily  doxazosin, 8 mg, BID  ferrous sulfate, 325 mg, BID  heparin (porcine), 5,000 Units, Q8H JOANN  labetalol, 300 mg, BID  OLANZapine, 5 mg, HS  [START ON 12/25/2023] sertraline, 200 mg, Daily  sucralfate, 1 g, BID      acetaminophen, 650 mg, Q6H PRN  hydrALAZINE, 10 mg, Q4H PRN  labetalol, 10 mg, Q4H PRN  ondansetron, 4 mg, Q6H PRN       Continuous    nitroGLYcerin, 5-200 mcg/min         Labs:    CBC    Recent Labs     12/24/23  1056   WBC 4.04*   HGB 11.7*   HCT 35.4*        BMP    Recent Labs     12/24/23  1056   SODIUM 138   K 3.4*      CO2 23   AGAP 9   BUN 38*   CREATININE 2.97*   CALCIUM 9.2       Coags    Recent Labs     12/24/23  1056   INR 1.15   PTT 31        Additional Electrolytes  No recent results       Blood Gas    No recent results  No recent results LFTs  Recent Labs     12/24/23  1056   ALT 30   AST 29   ALKPHOS 77   ALB 3.9   TBILI 1.02*       Infectious  Recent Labs     12/24/23  1056   PROCALCITONI 0.12     Glucose  Recent Labs      12/24/23  1056   GLUC 159*               Anticipated Length of Stay is > 2 midnights  MARBIN Sagastume

## 2023-12-24 NOTE — PROGRESS NOTES
[facility]        NAME: Genna Liu is a 58 y.o. male  : 1965    MRN: [id number]  DATE: 2023  TIME: 9:58 AM      Assessment and Plan     Hypertensive crisis [I16.9]  1. Hypertensive crisis  Transfer to other facility      2. Orthopnea  XR chest pa & lateral    Transfer to other facility      3. Nausea and vomiting, unspecified vomiting type          Note:   BP in office 246/122 with new SOB/orthopnea. Possibly CHF exacerbation/worsening. Unable to tolerate PO meds due to vomiting, so sent patient to ED for evaluation.   CXR looks negative for acute cardiopulmonary disease, but cardiomegaly present, which is stable.   Pulsatile mass felt in LUQ, patient states is his normal and is from a drain in his heart because of the fluid build-up, but I am unsure if this is something more sinister such as an aneurysm.     Patient Instructions   There are no Patient Instructions on file for this visit.     Follow up with PCP in 3-5 days.  Go to ER if symptoms worsen.    Chief Complaint     Chief Complaint   Patient presents with    Headache     Patient reported two days ago he developed a headache, neck pain, some shortness of breath all the time (Orthopnea) and some chest tightness with anxious. Patient denied chest pain, back pain or anything of the like. Patient reported that he takes Aleve, but the headache returns quickly after. Patient has been having ongoing BP issues under supervision of primary because he gastric bypass and has a hard time tolerating PO medications. Patient also reports dizziness/lightheadness.          History of Present Illness     Patient presents with SOB, orthopnea, neck pain, headache, nausea, vomiting, and diarrhea x 2 days. Took BP meds last night but vomited shortly after. Did not take any today. Has history of gastric bypass with difficulty tolerating PO meds/malabsorption issues. Took aleve last night with temporary relief of headache. Also has history of  meningioma.     Headache      Review of Systems     Review of Systems   Constitutional:  Negative for chills, fatigue and fever.   HENT:  Negative for congestion, ear pain, postnasal drip, rhinorrhea, sinus pressure, sinus pain, sneezing and sore throat.    Eyes:  Negative for pain and visual disturbance.   Respiratory:  Positive for chest tightness and shortness of breath. Negative for cough.    Cardiovascular:  Negative for chest pain, palpitations and leg swelling.   Gastrointestinal:  Positive for abdominal pain, diarrhea, nausea and vomiting.   Genitourinary:  Negative for dysuria and hematuria.   Musculoskeletal:  Negative for arthralgias, back pain and myalgias.   Skin:  Negative for rash.   Neurological:  Positive for weakness and headaches. Negative for dizziness, seizures, syncope and numbness.   All other systems reviewed and are negative.        Current Medications       Current Outpatient Medications:     albuterol (PROVENTIL HFA,VENTOLIN HFA) 90 mcg/act inhaler, , Disp: , Rfl:     amLODIPine (NORVASC) 10 mg tablet, Take 10 mg by mouth, Disp: , Rfl:     ARIPiprazole (ABILIFY) 5 mg tablet, Take 5 mg by mouth daily, Disp: , Rfl:     atorvastatin (LIPITOR) 40 mg tablet, Take 40 mg by mouth daily at bedtime, Disp: , Rfl:     Calcium 600 MG tablet, Take 1,200 mg by mouth, Disp: , Rfl:     Cholecalciferol (VITAMIN D-3) 1000 units CAPS, Take 2,000 Units by mouth daily, Disp: , Rfl:     colchicine (COLCRYS) 0.6 mg tablet, Take 1 tablet (0.6 mg total) by mouth daily, Disp: 60 tablet, Rfl: 0    doxazosin (CARDURA) 8 MG tablet, Take 1 tablet (8 mg total) by mouth 2 (two) times a day, Disp: 90 tablet, Rfl: 6    ferrous sulfate 325 (65 Fe) mg tablet, Take 325 mg by mouth 2 (two) times a day  , Disp: , Rfl:     labetalol (NORMODYNE) 200 mg tablet, Take 2 tablets (400 mg total) by mouth every 12 (twelve) hours (Patient taking differently: Take 300 mg by mouth every 12 (twelve) hours), Disp: 120 tablet, Rfl: 0     labetalol (NORMODYNE) 300 mg tablet, Take 300 mg by mouth 2 (two) times a day Morning and before bedtime, Disp: , Rfl:     Multiple Vitamins-Minerals (multivitamin with minerals) tablet, Take 1 tablet by mouth daily, Disp: , Rfl:     OLANZapine (ZyPREXA) 2.5 mg tablet, 5 mg, Disp: , Rfl:     potassium chloride (Klor-Con) 10 mEq tablet, Take 1 tablet (10 mEq total) by mouth in the morning, Disp: 30 tablet, Rfl: 0    sertraline (ZOLOFT) 100 mg tablet, Take 2 tablets by mouth daily, Disp: , Rfl:     sucralfate (CARAFATE) 1 g tablet, Take 1 g by mouth 2 (two) times a day, Disp: , Rfl:     Current Allergies     Allergies as of 12/24/2023 - Reviewed 12/24/2023   Allergen Reaction Noted    Clonidine Anaphylaxis 01/22/2018    Lisinopril Anaphylaxis 01/22/2018    Nifedipine Anaphylaxis 01/22/2018    Spironolactone Anaphylaxis, Other (See Comments), and Shortness Of Breath 01/22/2018    Buspirone  12/17/2019    Enoxaparin  10/07/2019    Hydralazine Other (See Comments) 08/28/2023    Minoxidil  10/28/2019              The following portions of the patient's history were reviewed and updated as appropriate: allergies, current medications, past family history, past medical history, past social history, past surgical history, and problem list.     Past Medical History:   Diagnosis Date    Asthma     Chronic kidney disease     COPD (chronic obstructive pulmonary disease) (HCC)     CPAP (continuous positive airway pressure) dependence     NO LONGER NEEDS D/T BARIATRIC SURGERY.    Diabetes mellitus (HCC)     Emphysema of lung (HCC)     Gout     History of transfusion     pt stated they had a transfusion when they had gallbladder surgery.    Hypertension     Kidney disease     renal failure    Leg DVT (deep venous thromboembolism), acute, bilateral (HCC) 01/2018    Obesity (BMI 30-39.9)     AGUS on CPAP     setting 11    Postgastrectomy malabsorption     Sleep apnea     Systolic CHF (HCC)        Past Surgical History:   Procedure  Laterality Date    ADRENALECTOMY      CHOLECYSTECTOMY      COLONOSCOPY      EGD      HIATAL HERNIA REPAIR N/A 12/22/2020    Procedure: REPAIR HERNIA HIATAL LAPAROSCOPIC;  Surgeon: Shane Francis MD;  [facility];  Service: Bariatrics    INCISION AND DRAINAGE OF WOUND Left 10/17/2018    Procedure: INCISION AND DRAINAGE (I&D) GROIN;  Surgeon: Rafy Pascual MD;  [facility];  Service: General    IR IMAGE GUIDED ASPIRATION / DRAINAGE W TUBE  8/17/2018    IR IMAGE GUIDED ASPIRATION / DRAINAGE W TUBE  7/31/2018    JOINT REPLACEMENT Bilateral     knee    KIDNEY SURGERY  2009    nodule removal    LYMPH NODE DISSECTION Left 01/2018    left inguinal LN removed - benign     OTHER SURGICAL HISTORY      kidney nodule removal    PALATE / UVULA BIOPSY / EXCISION      PERICARDIAL WINDOW N/A 7/8/2023    Procedure: WINDOW PERICARDIAL;  Surgeon: Alonso Logan MD;  [facility]: [facility];  Service: Thoracic    [place] LAPS GSTR RSTCV PX W/BYP JASON-EN-Y LIMB <150 CM N/A 12/22/2020    Procedure: BYPASS GASTRIC  JASON-EN-Y LAPAROSCOPIC WITH INTRAOPERATIVE EGD;  Surgeon: Shane Francis MD;  [facility];  Service: Bariatrics    SINUS SURGERY      TONSILLECTOMY         Family History   Problem Relation Age of Onset    Cancer Father     Kidney disease Mother     Heart murmur Sister     Asthma Sister     Hypertension Sister     Heart disease Brother     Bell's palsy Brother     Hypertension Brother     Deep vein thrombosis Neg Hx          Medications have been verified.        Objective     BP (!) 246/122   Pulse 72   Temp 99 °F (37.2 °C) (Tympanic)   Resp 18   SpO2 98%   No LMP for male patient.         Physical Exam     Physical Exam  Vitals and nursing note reviewed.   Constitutional:       Appearance: Normal appearance. He is normal weight.   HENT:      Head: Normocephalic and atraumatic.      Nose: Nose normal.      Mouth/Throat:      Mouth: Mucous membranes are moist.      Pharynx: Oropharynx is  clear.   Eyes:      Extraocular Movements: Extraocular movements intact.      Conjunctiva/sclera: Conjunctivae normal.      Pupils: Pupils are equal, round, and reactive to light.   Neck:      Comments: Mild tenderness of superior aspect of sternocleidomastoid of right side. Normal ROM, no rigidity. Negative Kernigs/Brudzinski signs.   Cardiovascular:      Rate and Rhythm: Normal rate and regular rhythm.      Heart sounds: Normal heart sounds.   Pulmonary:      Effort: Pulmonary effort is normal.      Breath sounds: Normal breath sounds.   Abdominal:      General: Abdomen is flat. Bowel sounds are normal.      Palpations: Abdomen is soft.      Tenderness: There is abdominal tenderness. There is no right CVA tenderness or left CVA tenderness.      Comments: Generalized abdominal tenderness. Pulsatile mass on LUQ area   Musculoskeletal:      Cervical back: Normal range of motion and neck supple. Tenderness present.   Skin:     General: Skin is warm and dry.   Neurological:      General: No focal deficit present.      Mental Status: He is alert and oriented to person, place, and time.   Psychiatric:         Mood and Affect: Mood normal.         Behavior: Behavior normal.

## 2023-12-25 LAB
ALBUMIN SERPL BCP-MCNC: 3.6 G/DL (ref 3.5–5)
ALP SERPL-CCNC: 76 U/L (ref 34–104)
ALT SERPL W P-5'-P-CCNC: 21 U/L (ref 7–52)
ANION GAP SERPL CALCULATED.3IONS-SCNC: 9 MMOL/L
AST SERPL W P-5'-P-CCNC: 20 U/L (ref 13–39)
BASOPHILS # BLD AUTO: 0.05 THOUSANDS/ÂΜL (ref 0–0.1)
BASOPHILS NFR BLD AUTO: 1 % (ref 0–1)
BILIRUB SERPL-MCNC: 0.8 MG/DL (ref 0.2–1)
BUN SERPL-MCNC: 38 MG/DL (ref 5–25)
CA-I BLD-SCNC: 1.12 MMOL/L (ref 1.12–1.32)
CALCIUM SERPL-MCNC: 9 MG/DL (ref 8.4–10.2)
CHLORIDE SERPL-SCNC: 108 MMOL/L (ref 96–108)
CO2 SERPL-SCNC: 21 MMOL/L (ref 21–32)
CREAT SERPL-MCNC: 2.8 MG/DL (ref 0.6–1.3)
EOSINOPHIL # BLD AUTO: 0.06 THOUSAND/ÂΜL (ref 0–0.61)
EOSINOPHIL NFR BLD AUTO: 1 % (ref 0–6)
ERYTHROCYTE [DISTWIDTH] IN BLOOD BY AUTOMATED COUNT: 18.4 % (ref 11.6–15.1)
GFR SERPL CREATININE-BSD FRML MDRD: 23 ML/MIN/1.73SQ M
GLUCOSE SERPL-MCNC: 92 MG/DL (ref 65–140)
HCT VFR BLD AUTO: 38.3 % (ref 36.5–49.3)
HGB BLD-MCNC: 12.7 G/DL (ref 12–17)
IMM GRANULOCYTES # BLD AUTO: 0.02 THOUSAND/UL (ref 0–0.2)
IMM GRANULOCYTES NFR BLD AUTO: 0 % (ref 0–2)
LYMPHOCYTES # BLD AUTO: 0.96 THOUSANDS/ÂΜL (ref 0.6–4.47)
LYMPHOCYTES NFR BLD AUTO: 17 % (ref 14–44)
MAGNESIUM SERPL-MCNC: 1.7 MG/DL (ref 1.9–2.7)
MCH RBC QN AUTO: 26.5 PG (ref 26.8–34.3)
MCHC RBC AUTO-ENTMCNC: 33.2 G/DL (ref 31.4–37.4)
MCV RBC AUTO: 80 FL (ref 82–98)
MONOCYTES # BLD AUTO: 1.02 THOUSAND/ÂΜL (ref 0.17–1.22)
MONOCYTES NFR BLD AUTO: 18 % (ref 4–12)
NEUTROPHILS # BLD AUTO: 3.68 THOUSANDS/ÂΜL (ref 1.85–7.62)
NEUTS SEG NFR BLD AUTO: 63 % (ref 43–75)
NRBC BLD AUTO-RTO: 0 /100 WBCS
PHOSPHATE SERPL-MCNC: 3.7 MG/DL (ref 2.3–4.1)
PLATELET # BLD AUTO: 200 THOUSANDS/UL (ref 149–390)
PMV BLD AUTO: 9.7 FL (ref 8.9–12.7)
POTASSIUM SERPL-SCNC: 3.4 MMOL/L (ref 3.5–5.3)
PROT SERPL-MCNC: 6.7 G/DL (ref 6.4–8.4)
RBC # BLD AUTO: 4.79 MILLION/UL (ref 3.88–5.62)
SODIUM SERPL-SCNC: 138 MMOL/L (ref 135–147)
WBC # BLD AUTO: 5.79 THOUSAND/UL (ref 4.31–10.16)

## 2023-12-25 PROCEDURE — 82330 ASSAY OF CALCIUM: CPT | Performed by: NURSE PRACTITIONER

## 2023-12-25 PROCEDURE — 93005 ELECTROCARDIOGRAM TRACING: CPT

## 2023-12-25 PROCEDURE — 83735 ASSAY OF MAGNESIUM: CPT | Performed by: NURSE PRACTITIONER

## 2023-12-25 PROCEDURE — 84100 ASSAY OF PHOSPHORUS: CPT | Performed by: NURSE PRACTITIONER

## 2023-12-25 PROCEDURE — 85025 COMPLETE CBC W/AUTO DIFF WBC: CPT | Performed by: NURSE PRACTITIONER

## 2023-12-25 PROCEDURE — 80053 COMPREHEN METABOLIC PANEL: CPT | Performed by: NURSE PRACTITIONER

## 2023-12-25 PROCEDURE — 99233 SBSQ HOSP IP/OBS HIGH 50: CPT | Performed by: ANESTHESIOLOGY

## 2023-12-25 RX ORDER — HYDRALAZINE HYDROCHLORIDE 20 MG/ML
20 INJECTION INTRAMUSCULAR; INTRAVENOUS EVERY 4 HOURS PRN
Status: DISCONTINUED | OUTPATIENT
Start: 2023-12-25 | End: 2023-12-29 | Stop reason: HOSPADM

## 2023-12-25 RX ORDER — LABETALOL 200 MG/1
400 TABLET, FILM COATED ORAL 2 TIMES DAILY
Status: DISCONTINUED | OUTPATIENT
Start: 2023-12-25 | End: 2023-12-27

## 2023-12-25 RX ORDER — POTASSIUM CHLORIDE 20 MEQ/1
40 TABLET, EXTENDED RELEASE ORAL ONCE
Status: COMPLETED | OUTPATIENT
Start: 2023-12-25 | End: 2023-12-25

## 2023-12-25 RX ORDER — MAGNESIUM SULFATE HEPTAHYDRATE 40 MG/ML
4 INJECTION, SOLUTION INTRAVENOUS ONCE
Status: COMPLETED | OUTPATIENT
Start: 2023-12-25 | End: 2023-12-25

## 2023-12-25 RX ORDER — LABETALOL HYDROCHLORIDE 5 MG/ML
20 INJECTION, SOLUTION INTRAVENOUS ONCE
Status: COMPLETED | OUTPATIENT
Start: 2023-12-25 | End: 2023-12-25

## 2023-12-25 RX ORDER — METOCLOPRAMIDE HYDROCHLORIDE 5 MG/ML
5 INJECTION INTRAMUSCULAR; INTRAVENOUS ONCE
Status: COMPLETED | OUTPATIENT
Start: 2023-12-25 | End: 2023-12-25

## 2023-12-25 RX ORDER — HYDRALAZINE HYDROCHLORIDE 20 MG/ML
20 INJECTION INTRAMUSCULAR; INTRAVENOUS ONCE
Status: COMPLETED | OUTPATIENT
Start: 2023-12-25 | End: 2023-12-25

## 2023-12-25 RX ADMIN — SUCRALFATE 1 G: 1 TABLET ORAL at 08:03

## 2023-12-25 RX ADMIN — LABETALOL HYDROCHLORIDE 400 MG: 200 TABLET, FILM COATED ORAL at 08:03

## 2023-12-25 RX ADMIN — ONDANSETRON 4 MG: 2 INJECTION INTRAMUSCULAR; INTRAVENOUS at 09:58

## 2023-12-25 RX ADMIN — HYDRALAZINE HYDROCHLORIDE 10 MG: 20 INJECTION INTRAMUSCULAR; INTRAVENOUS at 21:23

## 2023-12-25 RX ADMIN — HEPARIN SODIUM 5000 UNITS: 5000 INJECTION INTRAVENOUS; SUBCUTANEOUS at 21:23

## 2023-12-25 RX ADMIN — AMLODIPINE BESYLATE 10 MG: 10 TABLET ORAL at 08:03

## 2023-12-25 RX ADMIN — ACETAMINOPHEN 650 MG: 325 TABLET, FILM COATED ORAL at 05:35

## 2023-12-25 RX ADMIN — LABETALOL HYDROCHLORIDE 20 MG: 5 INJECTION, SOLUTION INTRAVENOUS at 20:07

## 2023-12-25 RX ADMIN — POLYETHYLENE GLYCOL 3350 17 G: 17 POWDER, FOR SOLUTION ORAL at 08:04

## 2023-12-25 RX ADMIN — ARIPIPRAZOLE 5 MG: 5 TABLET ORAL at 08:03

## 2023-12-25 RX ADMIN — DOXAZOSIN 8 MG: 4 TABLET ORAL at 08:04

## 2023-12-25 RX ADMIN — ATORVASTATIN CALCIUM 40 MG: 40 TABLET, FILM COATED ORAL at 21:23

## 2023-12-25 RX ADMIN — POTASSIUM CHLORIDE 40 MEQ: 1500 TABLET, EXTENDED RELEASE ORAL at 06:46

## 2023-12-25 RX ADMIN — LABETALOL HYDROCHLORIDE 20 MG: 5 INJECTION, SOLUTION INTRAVENOUS at 02:39

## 2023-12-25 RX ADMIN — CHLORHEXIDINE GLUCONATE 0.12% ORAL RINSE 15 ML: 1.2 LIQUID ORAL at 20:07

## 2023-12-25 RX ADMIN — POTASSIUM CHLORIDE 40 MEQ: 1500 TABLET, EXTENDED RELEASE ORAL at 00:35

## 2023-12-25 RX ADMIN — OLANZAPINE 5 MG: 2.5 TABLET, FILM COATED ORAL at 21:23

## 2023-12-25 RX ADMIN — MAGNESIUM SULFATE HEPTAHYDRATE 4 G: 40 INJECTION, SOLUTION INTRAVENOUS at 06:45

## 2023-12-25 RX ADMIN — LABETALOL HYDROCHLORIDE 20 MG: 5 INJECTION, SOLUTION INTRAVENOUS at 22:51

## 2023-12-25 RX ADMIN — ONDANSETRON 4 MG: 2 INJECTION INTRAMUSCULAR; INTRAVENOUS at 21:23

## 2023-12-25 RX ADMIN — SUCRALFATE 1 G: 1 TABLET ORAL at 17:00

## 2023-12-25 RX ADMIN — HEPARIN SODIUM 5000 UNITS: 5000 INJECTION INTRAVENOUS; SUBCUTANEOUS at 05:28

## 2023-12-25 RX ADMIN — LABETALOL HYDROCHLORIDE 20 MG: 5 INJECTION, SOLUTION INTRAVENOUS at 11:37

## 2023-12-25 RX ADMIN — PANTOPRAZOLE SODIUM 40 MG: 40 TABLET, DELAYED RELEASE ORAL at 08:03

## 2023-12-25 RX ADMIN — HYDRALAZINE HYDROCHLORIDE 10 MG: 20 INJECTION INTRAMUSCULAR; INTRAVENOUS at 15:15

## 2023-12-25 RX ADMIN — ONDANSETRON 4 MG: 2 INJECTION INTRAMUSCULAR; INTRAVENOUS at 17:00

## 2023-12-25 RX ADMIN — HEPARIN SODIUM 5000 UNITS: 5000 INJECTION INTRAVENOUS; SUBCUTANEOUS at 14:30

## 2023-12-25 RX ADMIN — LABETALOL HYDROCHLORIDE 400 MG: 200 TABLET, FILM COATED ORAL at 16:27

## 2023-12-25 RX ADMIN — SERTRALINE HYDROCHLORIDE 200 MG: 50 TABLET ORAL at 08:03

## 2023-12-25 RX ADMIN — HYDRALAZINE HYDROCHLORIDE 10 MG: 20 INJECTION INTRAMUSCULAR; INTRAVENOUS at 00:42

## 2023-12-25 RX ADMIN — HYDRALAZINE HYDROCHLORIDE 20 MG: 20 INJECTION INTRAMUSCULAR; INTRAVENOUS at 23:35

## 2023-12-25 RX ADMIN — METOCLOPRAMIDE 5 MG: 5 INJECTION, SOLUTION INTRAMUSCULAR; INTRAVENOUS at 11:13

## 2023-12-25 RX ADMIN — DOXAZOSIN 8 MG: 4 TABLET ORAL at 16:27

## 2023-12-25 RX ADMIN — CHLORHEXIDINE GLUCONATE 0.12% ORAL RINSE 15 ML: 1.2 LIQUID ORAL at 08:03

## 2023-12-25 RX ADMIN — HYDRALAZINE HYDROCHLORIDE 10 MG: 20 INJECTION INTRAMUSCULAR; INTRAVENOUS at 05:28

## 2023-12-25 RX ADMIN — FERROUS SULFATE TAB 325 MG (65 MG ELEMENTAL FE) 325 MG: 325 (65 FE) TAB at 08:03

## 2023-12-25 NOTE — PLAN OF CARE
Problem: Potential for Falls  Goal: Patient will remain free of falls  Description: INTERVENTIONS:  - Educate patient/family on patient safety including physical limitations  - Instruct patient to call for assistance with activity   - Consult OT/PT to assist with strengthening/mobility   - Keep Call bell within reach  - Keep bed low and locked with side rails adjusted as appropriate  - Keep care items and personal belongings within reach  - Initiate and maintain comfort rounds  - Make Fall Risk Sign visible to staff  - Offer Toileting every 3 Hours, in advance of need  - Initiate/Maintain bed/chair alarm  - Obtain necessary fall risk management equipment:   - Apply yellow socks and bracelet for high fall risk patients  - Consider moving patient to room near nurses station  Outcome: Progressing     Problem: PAIN - ADULT  Goal: Verbalizes/displays adequate comfort level or baseline comfort level  Description: Interventions:  - Encourage patient to monitor pain and request assistance  - Assess pain using appropriate pain scale  - Administer analgesics based on type and severity of pain and evaluate response  - Implement non-pharmacological measures as appropriate and evaluate response  - Consider cultural and social influences on pain and pain management  - Notify physician/advanced practitioner if interventions unsuccessful or patient reports new pain  Outcome: Progressing     Problem: INFECTION - ADULT  Goal: Absence or prevention of progression during hospitalization  Description: INTERVENTIONS:  - Assess and monitor for signs and symptoms of infection  - Monitor lab/diagnostic results  - Monitor all insertion sites, i.e. indwelling lines, tubes, and drains  - Monitor endotracheal if appropriate and nasal secretions for changes in amount and color  - [place] appropriate cooling/warming therapies per order  - Administer medications as ordered  - Instruct and encourage patient and family to use good hand hygiene  technique  - Identify and instruct in appropriate isolation precautions for identified infection/condition  Outcome: Progressing     Problem: SAFETY ADULT  Goal: Patient will remain free of falls  Description: INTERVENTIONS:  - Educate patient/family on patient safety including physical limitations  - Instruct patient to call for assistance with activity   - Consult OT/PT to assist with strengthening/mobility   - Keep Call bell within reach  - Keep bed low and locked with side rails adjusted as appropriate  - Keep care items and personal belongings within reach  - Initiate and maintain comfort rounds  - Make Fall Risk Sign visible to staff  - Offer Toileting every 3 Hours, in advance of need  - Initiate/Maintain bed/chair alarm  - Obtain necessary fall risk management equipment:   - Apply yellow socks and bracelet for high fall risk patients  - Consider moving patient to room near nurses station  Outcome: Progressing  Goal: Maintain or return to baseline ADL function  Description: INTERVENTIONS:  -  Assess patient's ability to carry out ADLs; assess patient's baseline for ADL function and identify physical deficits which impact ability to perform ADLs (bathing, care of mouth/teeth, toileting, grooming, dressing, etc.)  - Assess/evaluate cause of self-care deficits   - Assess range of motion  - Assess patient's mobility; develop plan if impaired  - Assess patient's need for assistive devices and provide as appropriate  - Encourage maximum independence but intervene and supervise when necessary  - Involve family in performance of ADLs  - Assess for home care needs following discharge   - Consider OT consult to assist with ADL evaluation and planning for discharge  - Provide patient education as appropriate  Outcome: Progressing  Goal: Maintains/Returns to pre admission functional level  Description: INTERVENTIONS:  - Perform AM-PAC 6 Click Basic Mobility/ Daily Activity assessment daily.  - Set and communicate daily  mobility goal to care team and patient/family/caregiver.   - Collaborate with rehabilitation services on mobility goals if consulted  - Reposition patient every 2 hours.  - Dangle patient 3 times a day  - Stand patient 3 times a day  - Ambulate patient 3 times a day  - Out of bed to chair 3 times a day   - Out of bed for meals 3 times a day  - Out of bed for toileting  - Record patient progress and toleration of activity level   Outcome: Progressing     Problem: DISCHARGE PLANNING  Goal: Discharge to home or other facility with appropriate resources  Description: INTERVENTIONS:  - Identify barriers to discharge w/patient and caregiver  - Arrange for needed discharge resources and transportation as appropriate  - Identify discharge learning needs (meds, wound care, etc.)  - Arrange for interpretive services to assist at discharge as needed  - Refer to Case Management Department for coordinating discharge planning if the patient needs post-hospital services based on physician/advanced practitioner order or complex needs related to functional status, cognitive ability, or social support system  Outcome: Progressing     Problem: Knowledge Deficit  Goal: Patient/family/caregiver demonstrates understanding of disease process, treatment plan, medications, and discharge instructions  Description: Complete learning assessment and assess knowledge base.  Interventions:  - Provide teaching at level of understanding  - Provide teaching via preferred learning methods  Outcome: Progressing     Problem: CARDIOVASCULAR - ADULT  Goal: Maintains optimal cardiac output and hemodynamic stability  Description: INTERVENTIONS:  - Monitor I/O, vital signs and rhythm  - Monitor for S/S and trends of decreased cardiac output  - Administer and titrate ordered vasoactive medications to optimize hemodynamic stability  - Assess quality of pulses, skin color and temperature  - Assess for signs of decreased coronary artery perfusion  - Instruct  patient to report change in severity of symptoms  Outcome: Progressing  Goal: Absence of cardiac dysrhythmias or at baseline rhythm  Description: INTERVENTIONS:  - Continuous cardiac monitoring, vital signs, obtain 12 lead EKG if ordered  - Administer antiarrhythmic and heart rate control medications as ordered  - Monitor electrolytes and administer replacement therapy as ordered  Outcome: Progressing

## 2023-12-25 NOTE — ASSESSMENT & PLAN NOTE
Likely due to missing multiple doses of oral regimen at home   Patient with known history of long-standing severe hypertension   S/p adrenalectomy at [facility] in 2012 which did not markedly impact his hypertension   7/14/23 Renal doppler: WNL bilaterally   12/24/23: In ED received IV labetalol 20mg, 40mg, 80mg, home 8mg cardura, labetalol 300mg PO, and 10mg IV hydralazine with negligible change     Plan:  Goal -180  Can increase parameters to 180-200 if patient becomes symptomatic with drop  Continue home antihypertensive medications:  Amlodipine 10mg daily   Doxazosin 8mg BID  Labetalol 300mg BID  PRN medications:   Labetalol 10mg q4h PRN  Hydralazine 10mg q4h PRN  Will initiate nitroglycerine infusion if BP remains refractory to evening doses of medications   Avoiding nifedipine due to reported allergy of anaphylaxis, which the patient confirmed

## 2023-12-25 NOTE — ASSESSMENT & PLAN NOTE
Patient with acute-onset epigastric pain presented to [facility] on 7/8/23 and was found on CT to have a pericardiac effusion   Etiology possibly related to uncontrolled hypertension  s/p pericardial window and drain (7/8/23)      Plan:   No indication for new TTE   No longer on colchicine

## 2023-12-25 NOTE — PROGRESS NOTES
[facility]  Progress Note  Name: Genna Liu I  MRN: [id number]  Unit/Bed#:  I Date of Admission: 12/24/2023   Date of Service: 12/25/2023 I Hospital Day: 1    Assessment/Plan   * Hypertensive emergency  Assessment & Plan  Likely due to missing multiple doses of oral regimen at home   Patient with known history of long-standing severe hypertension   S/p adrenalectomy at [facility] in 2012 which did not markedly impact his hypertension   7/14/23 Renal doppler: WNL bilaterally   12/24/23: In ED received IV labetalol 20mg, 40mg, 80mg, home 8mg cardura, labetalol 300mg PO, and 10mg IV hydralazine with negligible change     Plan:  Goal -180  Can increase parameters to 180-200 if patient becomes symptomatic with drop  Continue home antihypertensive medications:  Amlodipine 10mg daily   Doxazosin 8mg BID  Labetalol 300mg BID  PRN medications:   Labetalol 10mg q4h PRN  Hydralazine 10mg q4h PRN  Will initiate nitroglycerine infusion if BP remains refractory to evening doses of medications   Avoiding nifedipine due to reported allergy of anaphylaxis, which the patient confirmed       COPD (chronic obstructive pulmonary disease) (HCC)  Assessment & Plan  No recent PFT's for review  Patient states his breathing has improved overall since his gastric bypass      Plan:   Albuterol PRN   Continue pulmonary hygiene. Incentive spirometer q1h while awake, encourage coughing and deep breathing. Upright positioning  Suction as needed and closely monitor secretions. Maintain HOB >30 degrees. Q4h oral care with chlorhexidine BID  Maintain SpO2 > 90%     Anemia  Assessment & Plan  Related to recent GI bleed with gradual improvement   Baseline hemoglobin: 7.1 - 9.4  Initial hemoglobin: 11.4 (12/24/23)      Plan:   Transfuse as indicated in the setting of hemodynamic instability or signs of active bleeding  Iron supplementation BID  CBC in AM    Chronic diastolic CHF (congestive heart failure)  ([facility])  Assessment & Plan  No evidence of acute exacerbation at this time post operatively   Relevant echocardiograms:  7/8/23 TTE: Left ventricular cavity size is normal. Wall thickness is increased. The left ventricular ejection fraction is 50%. Systolic function is low normal. Wall motion cannot be accurately assessed. Left Atrium: The atrium is mildly dilated. Pericardium: There is a moderate to large pericardial effusion circumferential to the heart. There is a reasonable likelihood of cardiac tamponade. The evidence for tamponade includes: right ventricular compression  7/14/23 TTE: LV cavity size is normal. Wall thickness is severely increased. Severe concentric hypertrophy. LVEF 55%. Systolic function is normal. Wall motion is normal. There is a prominent myocardial speckle pattern. Atrial Septum: The septum bows into the right atrium, suggesting increased left atrial pressure. Pericardium: There is a trivial pericardial effusion posterior to the heart. There is no echocardiographic evidence of tamponade.        Plan:   Holding diuresis at this time  Goal to maintain more controlled BP, ideally SBP < 180 eventually following crisis   Daily weights  Continue home oral antihypertensives:   Amlodipine 10mg daily   Doxazosin 8mg BID  Labetalol 300mg BID  PRN antihypertensives:   Labetalol 10mg q4h PRN  Hydralazine 10mg q4h PRN  Cardiac, low Na diet     Depression with anxiety  Assessment & Plan  No acute concerns at this time      Plan:   Continue home regimen:   Abilify 5mg daily   Zyprexa 5mg qHS  Sertraline 200mg daily     Pericardial effusion  Assessment & Plan  Patient with acute-onset epigastric pain presented to [facility] on 7/8/23 and was found on CT to have a pericardiac effusion   Etiology possibly related to uncontrolled hypertension  s/p pericardial window and drain (7/8/23)      Plan:   No indication for new TTE   No longer on colchicine     COVID-19  Assessment & Plan  Incidental positive test  Patient  remains on room air    Plan:   Continue to monitor and provide supportive care    GERD (gastroesophageal reflux disease)  Assessment & Plan  Follows with GI   No new symptoms    Plan:  Carfate 1g TID   Protonix PO 40mg daily     Acute kidney injury superimposed on CKD   Assessment & Plan  Secondary to hypertensive emergency   Baseline creatinine: 1.41 - 1.90  Initial creatinine: 2.97 (12/24/23)     Plan:   Supportive care with BP management   Strict q4h I/O monitoring  Monitor without keller catheter   Continue to follow renal function tests    Stage 3b chronic kidney disease (CKD) (HCC)  Assessment & Plan  See plan above for TRUDI             Disposition: Stepdown Level 1    ICU Core Measures     A: Assess, Prevent, and Manage Pain Has pain been assessed? Yes  Need for changes to pain regimen? No   B: Both SAT/SAT  N/A   C: Choice of Sedation RASS Goal: N/A patient not on sedation  Need for changes to sedation or analgesia regimen? NA   D: Delirium CAM-ICU: Negative   E: Early Mobility  Plan for early mobility? Yes   F: Family Engagement Plan for family engagement today? Yes         Prophylaxis:  VTE VTE covered by:  heparin (porcine), Subcutaneous, 5,000 Units at 12/24/23 2217       Stress Ulcer  covered bypantoprazole (PROTONIX) EC tablet 40 mg [[id number]]         Significant 24hr Events     24hr events: Patient admitted to the SDU due to hypertensive emergency. BP improved following administration of patient's home medications which he has been unable to take due to feeling unwell the last few days at home. He has required two doses of PRN Labetalol and one dose of PRN hydralazine overnight. Nitroglycerine gtt not started.     Subjective   Review of Systems   All other systems reviewed and are negative.     Objective                            Vitals I/O      Most Recent Min/Max in 24hrs   Temp (!) 97.4 °F (36.3 °C) Temp  Min: 97.4 °F (36.3 °C)  Max: 100.3 °F (37.9 °C)   Pulse 57 Pulse  Min: 57  Max: 84   Resp 12  Resp  Min: 12  Max: 30   BP (!) 178/119 BP  Min: 167/99  Max: 253/160   O2 Sat 96 % SpO2  Min: 94 %  Max: 100 %      Intake/Output Summary (Last 24 hours) at 12/25/2023 0512  Last data filed at 12/24/2023 2000  Gross per 24 hour   Intake 598 ml   Output 200 ml   Net 398 ml       Diet Cardiovascular; Cardiac; Sodium 2 GM    Invasive Monitoring           Physical Exam   Physical Exam  Vitals reviewed.   Eyes:      Conjunctiva/sclera: Conjunctivae normal.   Skin:     General: Skin is warm and dry.   HENT:      Head: Normocephalic and atraumatic.      Mouth/Throat:      Mouth: Mucous membranes are moist.   Cardiovascular:      Rate and Rhythm: Normal rate and regular rhythm.      Pulses: Normal pulses.      Heart sounds: Normal heart sounds.   Musculoskeletal:         General: Normal range of motion.      Right lower leg: No edema.      Left lower leg: No edema.   Abdominal: General: There is no distension.      Palpations: Abdomen is soft.   Constitutional:       Appearance: He is well-developed and normal weight. He is not ill-appearing.   Pulmonary:      Effort: Pulmonary effort is normal.      Breath sounds: Normal breath sounds.   Psychiatric:         Behavior: Behavior is cooperative.   Neurological:      General: No focal deficit present.      Mental Status: He is alert and oriented to person, place and time. Mental status is at baseline.            Diagnostic Studies      EKG: Sinus rhythm on telemetry  Imaging:  I have personally reviewed pertinent reports.       Medications:  Scheduled PRN   amLODIPine, 10 mg, Daily  ARIPiprazole, 5 mg, Daily  atorvastatin, 40 mg, HS  chlorhexidine, 15 mL, Q12H JOANN  doxazosin, 8 mg, BID  ferrous sulfate, 325 mg, BID  heparin (porcine), 5,000 Units, Q8H JOANN  labetalol, 300 mg, BID  OLANZapine, 5 mg, HS  pantoprazole, 40 mg, Daily  polyethylene glycol, 17 g, Daily  sertraline, 200 mg, Daily  sucralfate, 1 g, BID      acetaminophen, 650 mg, Q6H PRN  hydrALAZINE, 10 mg, Q4H  PRN  labetalol, 20 mg, Q4H PRN  ondansetron, 4 mg, Q6H PRN       Continuous    nitroGLYcerin, 5-200 mcg/min         Labs:    CBC    Recent Labs     12/24/23  1056   WBC 4.04*   HGB 11.7*   HCT 35.4*        BMP    Recent Labs     12/24/23  1056 12/24/23  2201   SODIUM 138 138   K 3.4* 3.0*    106   CO2 23 23   AGAP 9 9   BUN 38* 39*   CREATININE 2.97* 2.86*   CALCIUM 9.2 8.8       Coags    Recent Labs     12/24/23  1056   INR 1.15   PTT 31        Additional Electrolytes  No recent results       Blood Gas    No recent results  No recent results LFTs  Recent Labs     12/24/23  1056   ALT 30   AST 29   ALKPHOS 77   ALB 3.9   TBILI 1.02*       Infectious  Recent Labs     12/24/23  1056   PROCALCITONI 0.12     Glucose  Recent Labs     12/24/23  1056 12/24/23  2201   GLUC 159* 99             No critical care time.    MARBIN Henley

## 2023-12-26 LAB
ANION GAP SERPL CALCULATED.3IONS-SCNC: 10 MMOL/L
ANION GAP SERPL CALCULATED.3IONS-SCNC: 9 MMOL/L
ATRIAL RATE: 85 BPM
BUN SERPL-MCNC: 38 MG/DL (ref 5–25)
BUN SERPL-MCNC: 39 MG/DL (ref 5–25)
CALCIUM SERPL-MCNC: 8.9 MG/DL (ref 8.4–10.2)
CALCIUM SERPL-MCNC: 9.2 MG/DL (ref 8.4–10.2)
CHLORIDE SERPL-SCNC: 104 MMOL/L (ref 96–108)
CHLORIDE SERPL-SCNC: 105 MMOL/L (ref 96–108)
CO2 SERPL-SCNC: 22 MMOL/L (ref 21–32)
CO2 SERPL-SCNC: 23 MMOL/L (ref 21–32)
CREAT SERPL-MCNC: 2.96 MG/DL (ref 0.6–1.3)
CREAT SERPL-MCNC: 3.05 MG/DL (ref 0.6–1.3)
ERYTHROCYTE [DISTWIDTH] IN BLOOD BY AUTOMATED COUNT: 18.4 % (ref 11.6–15.1)
GFR SERPL CREATININE-BSD FRML MDRD: 21 ML/MIN/1.73SQ M
GFR SERPL CREATININE-BSD FRML MDRD: 21 ML/MIN/1.73SQ M
GLUCOSE SERPL-MCNC: 165 MG/DL (ref 65–140)
GLUCOSE SERPL-MCNC: 197 MG/DL (ref 65–140)
HCT VFR BLD AUTO: 34.2 % (ref 36.5–49.3)
HGB BLD-MCNC: 11.2 G/DL (ref 12–17)
MAGNESIUM SERPL-MCNC: 2.2 MG/DL (ref 1.9–2.7)
MCH RBC QN AUTO: 26.2 PG (ref 26.8–34.3)
MCHC RBC AUTO-ENTMCNC: 32.7 G/DL (ref 31.4–37.4)
MCV RBC AUTO: 80 FL (ref 82–98)
P AXIS: 88 DEGREES
PLATELET # BLD AUTO: 184 THOUSANDS/UL (ref 149–390)
PMV BLD AUTO: 9.4 FL (ref 8.9–12.7)
POTASSIUM SERPL-SCNC: 3.1 MMOL/L (ref 3.5–5.3)
POTASSIUM SERPL-SCNC: 3.4 MMOL/L (ref 3.5–5.3)
PR INTERVAL: 136 MS
QRS AXIS: -80 DEGREES
QRSD INTERVAL: 92 MS
QT INTERVAL: 428 MS
QTC INTERVAL: 509 MS
RBC # BLD AUTO: 4.27 MILLION/UL (ref 3.88–5.62)
SODIUM SERPL-SCNC: 136 MMOL/L (ref 135–147)
SODIUM SERPL-SCNC: 137 MMOL/L (ref 135–147)
T WAVE AXIS: 149 DEGREES
VENTRICULAR RATE: 85 BPM
WBC # BLD AUTO: 3.89 THOUSAND/UL (ref 4.31–10.16)

## 2023-12-26 PROCEDURE — 80048 BASIC METABOLIC PNL TOTAL CA: CPT | Performed by: NURSE PRACTITIONER

## 2023-12-26 PROCEDURE — 83735 ASSAY OF MAGNESIUM: CPT | Performed by: NURSE PRACTITIONER

## 2023-12-26 PROCEDURE — 99223 1ST HOSP IP/OBS HIGH 75: CPT | Performed by: ANESTHESIOLOGY

## 2023-12-26 PROCEDURE — 93005 ELECTROCARDIOGRAM TRACING: CPT

## 2023-12-26 PROCEDURE — 85027 COMPLETE CBC AUTOMATED: CPT | Performed by: NURSE PRACTITIONER

## 2023-12-26 RX ORDER — ISOSORBIDE DINITRATE 10 MG/1
20 TABLET ORAL
Status: DISCONTINUED | OUTPATIENT
Start: 2023-12-26 | End: 2023-12-27

## 2023-12-26 RX ORDER — POTASSIUM CHLORIDE 14.9 MG/ML
20 INJECTION INTRAVENOUS ONCE
Qty: 100 ML | Refills: 0 | Status: COMPLETED | OUTPATIENT
Start: 2023-12-26 | End: 2023-12-26

## 2023-12-26 RX ORDER — POTASSIUM CHLORIDE 20 MEQ/1
40 TABLET, EXTENDED RELEASE ORAL ONCE
Status: COMPLETED | OUTPATIENT
Start: 2023-12-26 | End: 2023-12-26

## 2023-12-26 RX ORDER — NITROGLYCERIN 20 MG/100ML
5-200 INJECTION INTRAVENOUS
Status: DISCONTINUED | OUTPATIENT
Start: 2023-12-26 | End: 2023-12-26

## 2023-12-26 RX ADMIN — AMLODIPINE BESYLATE 10 MG: 10 TABLET ORAL at 08:24

## 2023-12-26 RX ADMIN — DOXAZOSIN 8 MG: 4 TABLET ORAL at 08:28

## 2023-12-26 RX ADMIN — LABETALOL HYDROCHLORIDE 20 MG: 5 INJECTION, SOLUTION INTRAVENOUS at 22:12

## 2023-12-26 RX ADMIN — SUCRALFATE 1 G: 1 TABLET ORAL at 18:22

## 2023-12-26 RX ADMIN — FERROUS SULFATE TAB 325 MG (65 MG ELEMENTAL FE) 325 MG: 325 (65 FE) TAB at 08:24

## 2023-12-26 RX ADMIN — LABETALOL HYDROCHLORIDE 20 MG: 5 INJECTION, SOLUTION INTRAVENOUS at 03:27

## 2023-12-26 RX ADMIN — ISOSORBIDE DINITRATE 20 MG: 10 TABLET ORAL at 12:30

## 2023-12-26 RX ADMIN — SODIUM CHLORIDE 2.5 MG/HR: 0.9 INJECTION, SOLUTION INTRAVENOUS at 12:48

## 2023-12-26 RX ADMIN — CHLORHEXIDINE GLUCONATE 0.12% ORAL RINSE 15 ML: 1.2 LIQUID ORAL at 08:24

## 2023-12-26 RX ADMIN — ARIPIPRAZOLE 5 MG: 5 TABLET ORAL at 08:24

## 2023-12-26 RX ADMIN — NITROGLYCERIN 10 MCG/MIN: 20 INJECTION INTRAVENOUS at 00:43

## 2023-12-26 RX ADMIN — ONDANSETRON 4 MG: 2 INJECTION INTRAMUSCULAR; INTRAVENOUS at 19:04

## 2023-12-26 RX ADMIN — SUCRALFATE 1 G: 1 TABLET ORAL at 08:24

## 2023-12-26 RX ADMIN — DOXAZOSIN 8 MG: 4 TABLET ORAL at 18:19

## 2023-12-26 RX ADMIN — SODIUM CHLORIDE 5 MG/HR: 0.9 INJECTION, SOLUTION INTRAVENOUS at 06:37

## 2023-12-26 RX ADMIN — HEPARIN SODIUM 5000 UNITS: 5000 INJECTION INTRAVENOUS; SUBCUTANEOUS at 05:47

## 2023-12-26 RX ADMIN — POTASSIUM CHLORIDE 40 MEQ: 1500 TABLET, EXTENDED RELEASE ORAL at 12:29

## 2023-12-26 RX ADMIN — HYDRALAZINE HYDROCHLORIDE 20 MG: 20 INJECTION INTRAMUSCULAR; INTRAVENOUS at 04:01

## 2023-12-26 RX ADMIN — FERROUS SULFATE TAB 325 MG (65 MG ELEMENTAL FE) 325 MG: 325 (65 FE) TAB at 18:22

## 2023-12-26 RX ADMIN — SERTRALINE HYDROCHLORIDE 200 MG: 50 TABLET ORAL at 08:24

## 2023-12-26 RX ADMIN — NITROGLYCERIN 190 MCG/MIN: 20 INJECTION INTRAVENOUS at 06:40

## 2023-12-26 RX ADMIN — ATORVASTATIN CALCIUM 40 MG: 40 TABLET, FILM COATED ORAL at 21:08

## 2023-12-26 RX ADMIN — PANTOPRAZOLE SODIUM 40 MG: 40 TABLET, DELAYED RELEASE ORAL at 08:27

## 2023-12-26 RX ADMIN — ISOSORBIDE DINITRATE 20 MG: 10 TABLET ORAL at 18:22

## 2023-12-26 RX ADMIN — LABETALOL HYDROCHLORIDE 400 MG: 200 TABLET, FILM COATED ORAL at 18:22

## 2023-12-26 RX ADMIN — POTASSIUM CHLORIDE 40 MEQ: 1500 TABLET, EXTENDED RELEASE ORAL at 08:31

## 2023-12-26 RX ADMIN — POTASSIUM CHLORIDE 20 MEQ: 14.9 INJECTION, SOLUTION INTRAVENOUS at 08:26

## 2023-12-26 RX ADMIN — LABETALOL HYDROCHLORIDE 20 MG: 5 INJECTION, SOLUTION INTRAVENOUS at 18:26

## 2023-12-26 RX ADMIN — CHLORHEXIDINE GLUCONATE 0.12% ORAL RINSE 15 ML: 1.2 LIQUID ORAL at 21:10

## 2023-12-26 RX ADMIN — LABETALOL HYDROCHLORIDE 400 MG: 200 TABLET, FILM COATED ORAL at 08:24

## 2023-12-26 RX ADMIN — HEPARIN SODIUM 5000 UNITS: 5000 INJECTION INTRAVENOUS; SUBCUTANEOUS at 18:22

## 2023-12-26 RX ADMIN — OLANZAPINE 5 MG: 2.5 TABLET, FILM COATED ORAL at 21:08

## 2023-12-26 NOTE — CASE MANAGEMENT
Case Management Assessment    Patient name Genna Liu  Location / MRN [id number]  : 1965 Date 2023       Current Admission Date: 2023  Current Admission Diagnosis:Hypertensive emergency   Patient Active Problem List    Diagnosis Date Noted    COVID-19 2023    GERD (gastroesophageal reflux disease) 07/15/2023    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HCC) 2023    Moderate protein-calorie malnutrition (HCC) 2023    Electrolyte abnormality 2023    Hematochezia 2023    Depression with anxiety 2023    Abdominal pain 2023    Seizure (HCC) 2023    Hypertensive encephalopathy 2023    Hypertensive emergency 2023    Meningioma (HCC) 2023    Prolonged Q-T interval on ECG 2023    Hypokalemia 2021    Bradycardia 2021    Hypoglycemia 2021    History of gastric bypass 2021    Hypertensive kidney disease with stage 3 chronic kidney disease (HCC) 2021    Acute kidney injury superimposed on CKD  2021    Encounter for surgical aftercare following surgery of digestive system 2021    Postsurgical malabsorption 2021    Morbid obesity due to excess calories ([facility]) 2020    Post-traumatic male urethral stricture 2020    Renal cyst 2020    Dysuria 2020    History of DVT (deep vein thrombosis) 10/17/2018    Splenic lesion 10/17/2018    Anemia 2018    Lymphedema 2018    Stage 3b chronic kidney disease (CKD) ([facility]) 03/15/2018    Chronic diastolic CHF (congestive heart failure) ([facility]) 2018    Pulmonary nodule, right 2018    Pericardial effusion 2018    Essential hypertension 2018    COPD (chronic obstructive pulmonary disease) ([facility]) 2018    CHF (congestive heart failure) ([facility]) 2018      LOS (days): 2  Geometric Mean LOS (GMLOS) (days):   Days to GMLOS:     OBJECTIVE:    Risk of Unplanned Readmission Score: 32.06          Current admission status: Inpatient       Preferred Pharmacy:   [id number] - [facility] [place] [place] - [address]  [address]  [facility] [place] [address]  Phone: [phone] Fax: [phone]    Primary Care Provider: Ugo Marina DO    Primary Insurance: [facility] REP  Secondary Insurance:     ASSESSMENT:  Active Health Care Proxies    There are no active Health Care Proxies on file.       Advance Directives  Does patient have a Health Care POA?: No  Was patient offered paperwork?: Yes (declined)  Does patient currently have a Health Care decision maker?: Yes, please see Health Care Proxy section  Does patient have Advance Directives?: No  Was patient offered paperwork?: Yes (declined)  Primary Contact: wife Kinsey         Readmission Root Cause  30 Day Readmission: No    Patient Information  Admitted from:: Home  Mental Status: Alert  During Assessment patient was accompanied by: Not accompanied during assessment  Assessment information provided by:: Patient, Spouse  Primary Caregiver: Self  Support Systems: Spouse/significant other, Children  County of Residence: [place]  What city do you live in?: [place]  Home entry access options. Select all that apply.: Stairs  Number of steps to enter home.: 4  Do the steps have railings?: Yes  Type of Current Residence: 2 story home  Upon entering residence, is there a bedroom on the main floor (no further steps)?: No  A bedroom is located on the following floor levels of residence (select all that apply):: 2nd Floor  Upon entering residence, is there a bathroom on the main floor (no further steps)?: No  Indicate which floors of current residence have a bathroom (select all the apply):: 2nd Floor  Number of steps to 2nd floor from main floor: One Flight  Living Arrangements: Lives w/ Spouse/significant other (Pt lives with his wife Kinsey and 5 children ages 24 to 17)  Is patient a ?: Yes  [facility] with [facility]: Yes  Is patient  service connected?: Yes    Activities of Daily Living Prior to Admission  Functional Status: Independent  Completes ADLs independently?: Yes  Ambulates independently?: Yes  Does patient use assisted devices?: No  Does patient currently own DME?: No  Does patient have a history of Outpatient Therapy (PT/OT)?: No  Does the patient have a history of Short-Term Rehab?: No  Does patient have a history of HHC?: No  Does patient currently have HHC?: No         Patient Information Continued  Income Source: SSI/SSD  Does patient have prescription coverage?: Yes  Does patient receive dialysis treatments?: No  Does patient have a history of substance abuse?: No  Does patient have a history of Mental Health Diagnosis?: Yes  Is patient receiving treatment for mental health?: Yes ([facility]  Has patient received inpatient treatment related to mental health in the last 2 years?: No    PHQ 2/9 Screening   Reviewed PHQ 2/9 Depression Screening Score?: No    Means of Transportation  Means of Transport to Appts:: Drives Self      Housing Stability: Low Risk  (7/10/2023)    Housing Stability Vital Sign     Unable to Pay for Housing in the Last Year: No     Number of Places Lived in the Last Year: 1     Unstable Housing in the Last Year: No   Food Insecurity: No Food Insecurity (7/10/2023)    Hunger Vital Sign     Worried About Running Out of Food in the Last Year: Never true     Ran Out of Food in the Last Year: Never true   Transportation Needs: No Transportation Needs (7/10/2023)    PRAPARE - Transportation     Lack of Transportation (Medical): No     Lack of Transportation (Non-Medical): No   Utilities: Not on file

## 2023-12-26 NOTE — ASSESSMENT & PLAN NOTE
Patient with acute-onset epigastric pain presented to [facility] on 7/8/23 and was found on CT to have a pericardiac effusion   Etiology possibly related to uncontrolled hypertension  s/p pericardial window and drain (7/8/23)      Plan:   No indication for new TTE   No longer on colchicine

## 2023-12-26 NOTE — UTILIZATION REVIEW
Initial Clinical Review    Admission: Date/Time/Statement:   Admission Orders (From admission, onward)       Ordered        12/24/23 1609  INPATIENT ADMISSION  Once                          Orders Placed This Encounter   Procedures    INPATIENT ADMISSION     Standing Status:   Standing     Number of Occurrences:   1     Order Specific Question:   Level of Care     Answer:   Level 1 Stepdown [13]     Order Specific Question:   Estimated length of stay     Answer:   More than 2 Midnights     Order Specific Question:   Certification     Answer:   I certify that inpatient services are medically necessary for this patient for a duration of greater than two midnights. See H&P and MD Progress Notes for additional information about the patient's course of treatment.     ED Arrival Information       Expected   12/24/2023     Arrival   12/24/2023 10:16    Acuity   Emergent              Means of arrival   Walk-In    Escorted by   Family Member    Service   Critical Care/ICU    Admission type   Emergency              Arrival complaint   Hypertensive crisis 246/122             Chief Complaint   Patient presents with    Flu Symptoms     C/o body aches, sob and headaches.        Initial Presentation: 58 y.o. male to the ED from urgent care with complaints of body aches, shortness of breath, headaches. Admitted to CRITICAL CARE step down for hypertensive emergency, COPD, anemia. H/o chronic diastolic CHF, hypertension, CKD stage 3, COPD, recent pericardial effusion s/p pericardial drain (July 2023).ON arrival, he is very  hypertensive.   IN the ED given IV labetalol 20mg, 40mg, 80mg, home 8mg cardura, labetalol 300mg PO, and 10mg IV hydralazine. Start IV nitroglycerine drip if bp remains uncontrolled.  Allergy to nifedipine. Hold diuresis at this time. LUngs clear.   Date: 12/25   Day 2: Hypertensive emergency like due to missing multiple doses of meds at home.  Continue home antihypertensives. Has requiring 2 doses iv bp meds.    Bp improving.   Date: 12/26    Day 3: Has surpassed a 2nd midnight with active treatments and services, which include continue to monitor bp closely.    ED Triage Vitals   Temperature Pulse Respirations Blood Pressure SpO2   12/24/23 1033 12/24/23 1033 12/24/23 1033 12/24/23 1033 12/24/23 1033   98.8 °F (37.1 °C) 84 20 (!) 244/142 99 %      Temp Source Heart Rate Source Patient Position - Orthostatic VS BP Location FiO2 (%)   12/24/23 1033 12/24/23 1315 12/24/23 1033 12/24/23 1033 --   Oral Monitor Sitting Left arm       Pain Score       12/24/23 1700       4          Wt Readings from Last 1 Encounters:   12/26/23 67.8 kg (149 lb 7.6 oz)     Additional Vital Signs: ital Signs (last 2 days)    Date/Time Temp Pulse Resp BP MAP (mmHg) SpO2 O2 Device Patient Position - Orthostatic VS   12/25/23 2335 -- -- -- 208/117 Abnormal  -- -- -- --   12/25/23 2300 98.9 °F (37.2 °C) 80 29 Abnormal  207/113 Abnormal  153 95 % None (Room air) Lying   12/25/23 2123 -- 81 20 244/137 Abnormal  181 98 % -- --   12/25/23 2007 -- 85 34 Abnormal  246/142 Abnormal  187 98 % -- --   12/25/23 1910 97.6 °F (36.4 °C) 74 18 214/123 Abnormal  162 97 % None (Room air) Lying   12/25/23 1619 -- 68 -- 170/101 Abnormal  130 -- -- --   12/25/23 1511 97.7 °F (36.5 °C) 63 16 185/111 Abnormal  142 96 % None (Room air) Lying   12/25/23 1223 -- 59 -- 155/90 117 -- -- Lying   12/25/23 1137 97.5 °F (36.4 °C) 68 18 195/115 Abnormal  149 96 % None (Room air) Lying   12/25/23 0745 -- 63 -- 175/117 Abnormal  139 -- -- Sitting   12/25/23 0700 98 °F (36.7 °C) 63 23 Abnormal  175/117 Abnormal  139 97 % None (Room air) --   12/25/23 0528 -- -- -- 206/128 Abnormal  161 -- -- --   12/25/23 0500 -- 75 37 Abnormal  217/128 Abnormal  164 95 % -- --   12/25/23 0400 -- 59 17 175/108 Abnormal  136 96 % -- --   12/25/23 0300 97.4 °F (36.3 °C) Abnormal  57 12 178/119 Abnormal  143 96 % None (Room air) --   12/25/23 0239 -- -- -- 197/109 Abnormal  146 -- -- --   12/25/23 0200 --  64 19 175/104 Abnormal  134 96 % -- --   12/25/23 0100 -- 67 22 185/101 Abnormal  136 96 % -- --   12/25/23 0042 -- -- -- 193/113 Abnormal  147 -- -- --   12/25/23 0000 -- 58 28 Abnormal  180/115 Abnormal  142 96 % -- --   12/24/23 2300 99 °F (37.2 °C) 59 28 Abnormal  193/113 Abnormal  146 96 % None (Room air) --   12/24/23 2217 -- -- -- 185/116 Abnormal  146 -- -- --   12/24/23 2200 -- 60 27 Abnormal  180/111 Abnormal  139 95 % -- --   12/24/23 2100 -- 65 27 Abnormal  171/108 Abnormal  134 96 % -- --   12/24/23 2000 -- 63 30 Abnormal  177/103 Abnormal  134 94 % -- --   12/24/23 1800 -- 74 18 212/130 Abnormal  160 96 % None (Room air) Lying   12/24/23 1700 100.3 °F (37.9 °C) 70 20 210/126 Abnormal  163 97 % None (Room air) Lying   12/24/23 1615 -- 67 22 210/126 Abnormal  162 98 % -- --   12/24/23 1515 -- 67 22 230/135 Abnormal  175 99 % -- --   12/24/23 1500 -- 68 19 233/146 Abnormal  182 99 % -- --   12/24/23 1446 -- -- -- 237/147 Abnormal  -- -- -- --   12/24/23 1430 -- 69 22 234/140 Abnormal  180 98 % -- --   12/24/23 1415 -- 69 22 236/145 Abnormal  185 99 % -- --   12/24/23 1400 -- 67 20 244/149 Abnormal  189 98 % -- --   12/24/23 1330 -- 69 22 238/158 Abnormal  193 98 % -- --   12/24/23 1315 -- 71 22 243/155 Abnormal  192 100 % None (Room air) Lying   12/24/23 1300 -- -- -- 253/160 Abnormal  200 -- -- --   12/24/23 1245 -- -- -- 236/157 Abnormal  190 -- -- --   12/24/23 1230 -- 68 22 224/143 Abnormal  177 99 % -- --   12/24/23 1215 -- 70 18 234/151 Abnormal  186 98 % -- --   12/24/23 1200 -- 72 14 236/157 Abnormal  191 97 % -- --   12/24/23 1145 -- 73 14 240/149 Abnormal  188 97 % -- --   12/24/23 1130 -- 73 26 Abnormal  232/153 Abnormal  186 98 % -- --   12/24/23 1115 -- 75 22 242/150 Abnormal  184 98 % -- --   12/24/23 1033 98.8 °F (37.1 °C) 84 20 244/142 Abnormal  183 99 % None (Room air) Sittin     Pertinent Labs/Diagnostic Test Results:   No orders to display     Results from last 7 days   Lab Units  12/24/23  1056   SARS-COV-2  Positive*     Results from last 7 days   Lab Units 12/25/23  0534 12/24/23  1056   WBC Thousand/uL 5.79 4.04*   HEMOGLOBIN g/dL 12.7 11.7*   HEMATOCRIT % 38.3 35.4*   PLATELETS Thousands/uL 200 199   NEUTROS ABS Thousands/µL 3.68 2.66         Results from last 7 days   Lab Units 12/25/23  0534 12/24/23  2201 12/24/23  1056   SODIUM mmol/L 138 138 138   POTASSIUM mmol/L 3.4* 3.0* 3.4*   CHLORIDE mmol/L 108 106 106   CO2 mmol/L 21 23 23   ANION GAP mmol/L 9 9 9   BUN mg/dL 38* 39* 38*   CREATININE mg/dL 2.80* 2.86* 2.97*   EGFR ml/min/1.73sq m 23 22 21   CALCIUM mg/dL 9.0 8.8 9.2   CALCIUM, IONIZED mmol/L 1.12  --   --    MAGNESIUM mg/dL 1.7*  --   --    PHOSPHORUS mg/dL 3.7  --   --      Results from last 7 days   Lab Units 12/25/23  0534 12/24/23  1056   AST U/L 20 29   ALT U/L 21 30   ALK PHOS U/L 76 77   TOTAL PROTEIN g/dL 6.7 7.0   ALBUMIN g/dL 3.6 3.9   TOTAL BILIRUBIN mg/dL 0.80 1.02*     Results from last 7 days   Lab Units 12/26/23  0525 12/25/23  0534 12/24/23  2201 12/24/23  1056   GLUCOSE RANDOM mg/dL 165* 92 99 159*     BETA-HYDROXYBUTYRATE   Date Value Ref Range Status   07/09/2020 1.2 (H) <0.6 mmol/L Final   07/03/2020 1.5 (H) <0.6 mmol/L Final     Results from last 7 days   Lab Units 12/24/23  1456 12/24/23  1308 12/24/23  1056   HS TNI 0HR ng/L  --   --  164*   HS TNI 2HR ng/L  --  178*  --    HSTNI D2 ng/L  --  14  --    HS TNI 4HR ng/L 195*  --   --    HSTNI D4 ng/L 31*  --   --          Results from last 7 days   Lab Units 12/24/23  1056   PROTIME seconds 15.4*   INR  1.15   PTT seconds 31     Results from last 7 days   Lab Units 12/24/23  1056   PROCALCITONIN ng/ml 0.12     Results from last 7 days   Lab Units 12/24/23  1056   LACTIC ACID mmol/L 1.6     Results from last 7 days   Lab Units 12/24/23  1056   BNP pg/mL 4,663*       Results from last 7 days   Lab Units 12/24/23  1056   INFLUENZA A PCR  Negative   INFLUENZA B PCR  Negative   RSV PCR  Negative     Results  from last 7 days   Lab Units 12/24/23  1109 12/24/23  1056   BLOOD CULTURE  No Growth at 24 hrs. No Growth at 24 hrs.     ED Treatment:   Medication Administration from 12/24/2023 0948 to 12/24/2023 1651         Date/Time Order Dose Route Action Comments     12/24/2023 1201 EST labetalol (NORMODYNE) injection 20 mg 20 mg Intravenous Given --     12/24/2023 1218 EST labetalol (NORMODYNE) injection 40 mg 40 mg Intravenous Given --     12/24/2023 1306 EST labetalol (NORMODYNE) injection 80 mg 80 mg Intravenous Given --     12/24/2023 1609 EST hydrALAZINE (APRESOLINE) injection 10 mg 10 mg Intravenous Given pt changed mind on taking it     12/24/2023 1447 EST labetalol (NORMODYNE) injection 80 mg 80 mg Intravenous Given --     12/24/2023 1446 EST doxazosin (CARDURA) tablet 8 mg 8 mg Oral Given --     12/24/2023 1446 EST amLODIPine (NORVASC) tablet 10 mg 10 mg Oral Given --     12/24/2023 1452 EST ondansetron (ZOFRAN) injection 4 mg 4 mg Intravenous Given --          Past Medical History:   Diagnosis Date    Asthma     Chronic kidney disease     COPD (chronic obstructive pulmonary disease) (HCC)     CPAP (continuous positive airway pressure) dependence     NO LONGER NEEDS D/T BARIATRIC SURGERY.    Diabetes mellitus (HCC)     Emphysema of lung (HCC)     Gout     History of transfusion     pt stated they had a transfusion when they had gallbladder surgery.    Hypertension     Kidney disease     renal failure    Leg DVT (deep venous thromboembolism), acute, bilateral (HCC) 01/2018    Obesity (BMI 30-39.9)     AGUS on CPAP     setting 11    Postgastrectomy malabsorption     Sleep apnea     Systolic CHF (HCC)          Admitting Diagnosis: Flu-like symptoms [R68.89]  Hypertensive emergency [I16.1]  COVID-19 [U07.1]  Age/Sex: 58 y.o. male  Admission Orders:  Scheduled Medications:  amLODIPine, 10 mg, Oral, Daily  ARIPiprazole, 5 mg, Oral, Daily  atorvastatin, 40 mg, Oral, HS  chlorhexidine, 15 mL, Mouth/Throat, Q12H  JOANN  doxazosin, 8 mg, Oral, BID  ferrous sulfate, 325 mg, Oral, BID  heparin (porcine), 5,000 Units, Subcutaneous, Q8H JOANN  labetalol, 400 mg, Oral, BID  OLANZapine, 5 mg, Oral, HS  pantoprazole, 40 mg, Oral, Daily  polyethylene glycol, 17 g, Oral, Daily  potassium chloride, 40 mEq, Oral, Once  sertraline, 200 mg, Oral, Daily  sucralfate, 1 g, Oral, BID      Continuous IV Infusions:  niCARdipine, 1-15 mg/hr, Intravenous, Titrated  nitroGLYcerin, 5-200 mcg/min, Intravenous, Titrated      PRN Meds:  acetaminophen, 650 mg, Oral, Q6H PRN  hydrALAZINE, 20 mg, Intravenous, Q4H PRN  labetalol, 20 mg, Intravenous, Q4H PRN  ondansetron, 4 mg, Intravenous, Q6H PRN        IP CONSULT TO CASE MANAGEMENT    Network Utilization Review Department  ATTENTION: Please call with any questions or concerns to [phone] and carefully listen to the prompts so that you are directed to the right person. All voicemails are confidential.   For Discharge needs, contact Care Management DC Support Team at [phone] opt. [phone]  Send all requests for admission clinical reviews, approved or denied determinations and any other requests to dedicated fax number below belonging to the campus where the patient is receiving treatment. List of dedicated fax numbers for the Facilities:  FACILITY NAME UR FAX NUMBER   ADMISSION DENIALS (Administrative/Medical Necessity) [phone]   DISCHARGE SUPPORT TEAM (NETWORK) [phone]   PARENT CHILD HEALTH (Maternity/NICU/Pediatrics) [phone]   [facility] [phone]   [facility] [phone]   [facility] [phone]   [facility] [phone]   [facility] [phone]   [facility] [phone]   [facility] [phone]   [facility] [phone]   [facility]  [phone]   [facility] [phone]   [facility] [phone]

## 2023-12-26 NOTE — CONSULTS
[facility]  Consult  Name: Genna Liu 58 y.o. male I MRN: [id number]  Unit/Bed#:  I Date of Admission: 12/24/2023   Date of Service: 12/26/2023 I Hospital Day: 2    Consults    Assessment/Plan   * Hypertensive emergency  Assessment & Plan  Likely due to missing multiple doses of oral regimen at home   Patient with known history of long-standing severe hypertension   S/p adrenalectomy at [facility] in 2012 which did not markedly impact his hypertension   7/14/23 Renal doppler: WNL bilaterally   12/24/23: In ED received IV labetalol 20mg, 40mg, 80mg, home 8mg cardura, labetalol 300mg PO, and 10mg IV hydralazine with negligible change     Plan:  Goal -180  Can increase parameters to 180-200 if patient becomes symptomatic with drop  Continue home antihypertensive medications:  Amlodipine 10mg daily   Doxazosin 8mg BID  Labetalol 300mg BID, now increased to 400 mg BID  PRN medications:   Labetalol 20mg q4h PRN  Hydralazine 20mg q4h PRN  Given an extra dose of Labetalol 20 mg IV and Hydralazine 20 mg IV which were ineffective to reduce SBP <200  Transferred back to SDU for initiation of nitroglycerine infusion due to persistent hypertension despite above interventions      COPD (chronic obstructive pulmonary disease) (HCC)  Assessment & Plan  No recent PFT's for review  Patient states his breathing has improved overall since his gastric bypass      Plan:   Albuterol PRN   Continue pulmonary hygiene. Incentive spirometer q1h while awake, encourage coughing and deep breathing. Upright positioning  Suction as needed and closely monitor secretions. Maintain HOB >30 degrees. Q4h oral care with chlorhexidine BID  Maintain SpO2 > 90%     Anemia  Assessment & Plan  Related to recent GI bleed with gradual improvement   Baseline hemoglobin: 7.1 - 9.4  Initial hemoglobin: 11.4 (12/24/23)      Plan:   Transfuse as indicated in the setting of hemodynamic instability or signs of active  bleeding  Iron supplementation BID  CBC in AM    Chronic diastolic CHF (congestive heart failure) (HCC)  Assessment & Plan  No evidence of acute exacerbation at this time post operatively   Relevant echocardiograms:  7/8/23 TTE: Left ventricular cavity size is normal. Wall thickness is increased. The left ventricular ejection fraction is 50%. Systolic function is low normal. Wall motion cannot be accurately assessed. Left Atrium: The atrium is mildly dilated. Pericardium: There is a moderate to large pericardial effusion circumferential to the heart. There is a reasonable likelihood of cardiac tamponade. The evidence for tamponade includes: right ventricular compression  7/14/23 TTE: LV cavity size is normal. Wall thickness is severely increased. Severe concentric hypertrophy. LVEF 55%. Systolic function is normal. Wall motion is normal. There is a prominent myocardial speckle pattern. Atrial Septum: The septum bows into the right atrium, suggesting increased left atrial pressure. Pericardium: There is a trivial pericardial effusion posterior to the heart. There is no echocardiographic evidence of tamponade.        Plan:   Holding diuresis at this time  Goal to maintain more controlled BP, ideally SBP < 180 eventually following crisis   Daily weights  Continue home oral antihypertensives:   Amlodipine 10mg daily   Doxazosin 8mg BID  Labetalol 300mg BID, now increased to 400 mg BID  PRN antihypertensives:   Labetalol 20mg q4h PRN  Hydralazine 20mg q4h PRN  Cardiac, low Na diet     Depression with anxiety  Assessment & Plan  No acute concerns at this time      Plan:   Continue home regimen:   Abilify 5mg daily   Zyprexa 5mg qHS  Sertraline 200mg daily     Pericardial effusion  Assessment & Plan  Patient with acute-onset epigastric pain presented to [facility] on 7/8/23 and was found on CT to have a pericardiac effusion   Etiology possibly related to uncontrolled hypertension  s/p pericardial window and drain (7/8/23)       Plan:   No indication for new TTE   No longer on colchicine     COVID-19  Assessment & Plan  Incidental positive test  Patient remains on room air    Plan:   Continue to monitor and provide supportive care    GERD (gastroesophageal reflux disease)  Assessment & Plan  Follows with GI   No new symptoms    Plan:  Carfate 1g TID   Protonix PO 40mg daily     Acute kidney injury superimposed on CKD   Assessment & Plan  Secondary to hypertensive emergency   Baseline creatinine: 1.41 - 1.90  Initial creatinine: 2.97 (12/24/23)   Most recent creatinine: 2.80 (12/25/23)    Plan:   Supportive care with BP management   Strict q4h I/O monitoring  Monitor without keller catheter   Continue to follow renal function tests    Stage 3b chronic kidney disease (CKD) (HCC)  Assessment & Plan  See plan above for TRUDI           History of Present Illness     HPI: Genna Liu is a 58 y.o. with PMH chronic diastolic CHF, hypertension, CKD stage 3, COPD, recent pericardial effusion s/p pericardial drain (July 2023) and prior history of type 2 diabetes and AGUS who presented to the ED on 12/24/2023 with hypertensive emergency which had initially improved following administration of patient's home medications. Unfortunately patient's blood pressure has worsened and he is now being transferred back to critical care's service for persistent SBP >200 despite interventions. A nitroglycerine infusion is being initiated and will be titrated to a goal of SBP <180.    History obtained from chart review and the patient.  Review of Systems   Cardiovascular:         Chest heaviness, EKG without ischemic changes   Gastrointestinal:  Positive for nausea. Negative for vomiting.   Neurological:  Negative for headaches.   All other systems reviewed and are negative.    Disposition: Stepdown Level 2   Historical Information   Past Medical History:  No date: Asthma  No date: Chronic kidney disease  No date: COPD (chronic obstructive pulmonary disease) (HCC)  No  date: CPAP (continuous positive airway pressure) dependence      Comment:  NO LONGER NEEDS D/T BARIATRIC SURGERY.  No date: Diabetes mellitus (HCC)  No date: Emphysema of lung (HCC)  No date: Gout  No date: History of transfusion      Comment:  pt stated they had a transfusion when they had                gallbladder surgery.  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  No date: Sleep apnea  No date: Systolic CHF (HCC) Past Surgical History:  No date: ADRENALECTOMY  No date: CHOLECYSTECTOMY  No date: COLONOSCOPY  No date: EGD  12/22/2020: HIATAL HERNIA REPAIR; N/A      Comment:  Procedure: REPAIR HERNIA HIATAL LAPAROSCOPIC;  Surgeon:                Shane Francis MD;  Location: [facility];  Service:                Bariatrics  10/17/2018: INCISION AND DRAINAGE OF WOUND; Left      Comment:  Procedure: INCISION AND DRAINAGE (I&D) GROIN;  Surgeon:                Rafy Pascual MD;  Location: [facility];  Service:                General  8/17/2018: IR IMAGE GUIDED ASPIRATION / DRAINAGE W TUBE  7/31/2018: IR IMAGE GUIDED ASPIRATION / DRAINAGE W TUBE  No date: JOINT REPLACEMENT; Bilateral      Comment:  knee  2009: KIDNEY SURGERY      Comment:  nodule removal  01/2018: LYMPH NODE DISSECTION; Left      Comment:  left inguinal LN removed - benign   No date: OTHER SURGICAL HISTORY      Comment:  kidney nodule removal  No date: PALATE / UVULA BIOPSY / EXCISION  7/8/2023: PERICARDIAL WINDOW; N/A      Comment:  Procedure: WINDOW PERICARDIAL;  Surgeon: Alonso Logan MD;  Location: [facility];  Service: Thoracic  12/22/2020: TX LAPS GSTR RSTCV PX W/BYP JASON-EN-Y LIMB <150 CM; N/A      Comment:  Procedure: BYPASS GASTRIC  JASON-EN-Y LAPAROSCOPIC WITH                INTRAOPERATIVE EGD;   "Surgeon: Shane Francis MD;                 Location: [facility];  Service: Bariatrics  No date: SINUS SURGERY  No date: TONSILLECTOMY   Current Outpatient Medications   Medication Instructions    albuterol (PROVENTIL HFA,VENTOLIN HFA) 90 mcg/act inhaler     amLODIPine (NORVASC) 10 mg, Oral    ARIPiprazole (ABILIFY) 5 mg, Oral, Daily    atorvastatin (LIPITOR) 40 mg, Oral, Daily at bedtime    Calcium 1,200 mg, Oral    colchicine (COLCRYS) 0.6 mg, Oral, Daily    doxazosin (CARDURA) 8 mg, Oral, 2 times daily    famotidine (PEPCID) 20 mg, Oral, Daily    ferrous sulfate 325 mg, Oral, 2 times daily    labetalol (NORMODYNE) 400 mg, Oral, Every 12 hours scheduled    labetalol (NORMODYNE) 300 mg, Oral, 2 times daily, Morning and before bedtime    Multiple Vitamins-Minerals (multivitamin with minerals) tablet 1 tablet, Oral, Daily    OLANZapine (ZYPREXA) 5 mg, Oral, Daily at bedtime    pantoprazole (PROTONIX) 40 mg, Oral, Daily    polyethylene glycol (MIRALAX) 17 g, Oral, Daily    potassium chloride (Klor-Con) 10 mEq tablet 10 mEq, Oral, Daily    sertraline (ZOLOFT) 100 mg tablet 2 tablets, Oral, Daily    sucralfate (CARAFATE) 1 g, Oral, 2 times daily    Vitamin D-3 2,000 Units, Oral, Daily    Allergies   Allergen Reactions    Clonidine Anaphylaxis    Lisinopril Anaphylaxis    Nifedipine Anaphylaxis    Spironolactone Anaphylaxis, Other (See Comments) and Shortness Of Breath    Buspirone      Headaches,     Enoxaparin      Injection site \"bump\" per Dr. Francis    Hydralazine Other (See Comments)     Fluid around the heart  Lose for Appetite        Minoxidil      Retains fluid around heart      Social History     Tobacco Use    Smoking status: Never     Passive exposure: Past    Smokeless tobacco: Never   Vaping Use    Vaping status: Never Used   Substance Use Topics    Alcohol use: Yes     Comment: occasionally    Drug use: Yes     Types: Marijuana     Comment: Card    Family History   Problem Relation Age of Onset    " Cancer Father     Kidney disease Mother     Heart murmur Sister     Asthma Sister     Hypertension Sister     Heart disease Brother     Bell's palsy Brother     Hypertension Brother     Deep vein thrombosis Neg Hx         Objective                            Vitals I/O      Most Recent Min/Max in 24hrs   Temp 99.3 °F (37.4 °C) Temp  Min: 97.5 °F (36.4 °C)  Max: 99.3 °F (37.4 °C)   Pulse 86 Pulse  Min: 59  Max: 96   Resp 20 Resp  Min: 16  Max: 37   BP (!) 226/125 BP  Min: 155/90  Max: 246/142   O2 Sat 96 % SpO2  Min: 93 %  Max: 98 %      Intake/Output Summary (Last 24 hours) at 12/26/2023 0321  Last data filed at 12/25/2023 2332  Gross per 24 hour   Intake 572 ml   Output 2275 ml   Net -1703 ml       Diet Cardiovascular; Cardiac; Sodium 2 GM    Invasive Monitoring           Physical Exam   Physical Exam  Vitals reviewed.   Eyes:      Conjunctiva/sclera: Conjunctivae normal.   Skin:     General: Skin is warm and dry.   HENT:      Head: Normocephalic and atraumatic.      Mouth/Throat:      Mouth: Mucous membranes are moist.   Cardiovascular:      Rate and Rhythm: Normal rate and regular rhythm.      Pulses: Normal pulses.      Heart sounds: Normal heart sounds.   Musculoskeletal:         General: Normal range of motion.      Right lower leg: No edema.      Left lower leg: No edema.   Abdominal: General: There is no distension.   Constitutional:       Appearance: Normal appearance.   Pulmonary:      Effort: Pulmonary effort is normal.      Breath sounds: Normal breath sounds.   Neurological:      General: No focal deficit present.      Mental Status: He is alert and oriented to person, place and time. Mental status is at baseline.            Diagnostic Studies      EKG: NSR  Imaging:  I have personally reviewed pertinent reports.       Medications:  Scheduled PRN   amLODIPine, 10 mg, Daily  ARIPiprazole, 5 mg, Daily  atorvastatin, 40 mg, HS  chlorhexidine, 15 mL, Q12H JOANN  doxazosin, 8 mg, BID  ferrous sulfate, 325 mg,  BID  heparin (porcine), 5,000 Units, Q8H JOANN  labetalol, 400 mg, BID  OLANZapine, 5 mg, HS  pantoprazole, 40 mg, Daily  polyethylene glycol, 17 g, Daily  sertraline, 200 mg, Daily  sucralfate, 1 g, BID      acetaminophen, 650 mg, Q6H PRN  hydrALAZINE, 20 mg, Q4H PRN  labetalol, 20 mg, Q4H PRN  ondansetron, 4 mg, Q6H PRN       Continuous    nitroGLYcerin, 5-200 mcg/min, Last Rate: 120 mcg/min (12/26/23 0320)         Labs:    CBC    Recent Labs     12/24/23  1056 12/25/23  0534   WBC 4.04* 5.79   HGB 11.7* 12.7   HCT 35.4* 38.3    200     BMP    Recent Labs     12/24/23  2201 12/25/23  0534   SODIUM 138 138   K 3.0* 3.4*    108   CO2 23 21   AGAP 9 9   BUN 39* 38*   CREATININE 2.86* 2.80*   CALCIUM 8.8 9.0       Coags    Recent Labs     12/24/23  1056   INR 1.15   PTT 31        Additional Electrolytes  Recent Labs     12/25/23  0534   MG 1.7*   PHOS 3.7   CAIONIZED 1.12          Blood Gas    No recent results  No recent results LFTs  Recent Labs     12/24/23  1056 12/25/23  0534   ALT 30 21   AST 29 20   ALKPHOS 77 76   ALB 3.9 3.6   TBILI 1.02* 0.80       Infectious  Recent Labs     12/24/23  1056   PROCALCITONI 0.12     Glucose  Recent Labs     12/24/23  1056 12/24/23  2201 12/25/23  0534   GLUC 159* 99 92             Critical Care Time Delivered : Upon my evaluation, this patient had a high probability of imminent or life-threatening deterioration due to hypertensive emergency, which required my direct attention, intervention, and personal management.  I have personally provided 15 minutes of critical care time, exclusive of procedures, teaching, family meetings, and any prior time recorded by providers other than myself.   MARBIN Henley

## 2023-12-26 NOTE — ASSESSMENT & PLAN NOTE
Likely due to missing multiple doses of oral regimen at home   Patient with known history of long-standing severe hypertension   S/p adrenalectomy at [facility] in 2012 which did not markedly impact his hypertension   7/14/23 Renal doppler: WNL bilaterally   12/24/23: In ED received IV labetalol 20mg, 40mg, 80mg, home 8mg cardura, labetalol 300mg PO, and 10mg IV hydralazine with negligible change     Plan:  Goal -180  Can increase parameters to 180-200 if patient becomes symptomatic with drop  Continue home antihypertensive medications:  Amlodipine 10mg daily   Doxazosin 8mg BID  Labetalol 300mg BID, now increased to 400 mg BID  PRN medications:   Labetalol 20mg q4h PRN  Hydralazine 20mg q4h PRN  Given an extra dose of Labetalol 20 mg IV and Hydralazine 20 mg IV which were ineffective to reduce SBP <200  Transferred back to SDU for initiation of nitroglycerine infusion due to persistent hypertension despite above interventions

## 2023-12-26 NOTE — UTILIZATION REVIEW
NOTIFICATION OF INPATIENT ADMISSION   AUTHORIZATION REQUEST   SERVICING FACILITY:   [place] [address]  Tax ID: [id number]  NPI: [id number] ATTENDING PROVIDER:  Attending Name and NPI#: Demetrius Green Do [[id number]]  Address: [address] [place] [address]  Phone: [phone]     ADMISSION INFORMATION:  Place of Service: Inpatient [facility]  Place of Service Code: 21  Inpatient Admission Date/Time: 12/24/23  4:09 PM  Discharge Date/Time: No discharge date for patient encounter.  Admitting Diagnosis Code/Description:  Flu-like symptoms [R68.89]  Hypertensive emergency [I16.1]  COVID-19 [U07.1]     UTILIZATION REVIEW CONTACT:  Andreea Morgan Utilization   Network Utilization Review Department  Phone: [phone]  Fax [phone]  Email: [email or web address]  Contact for approvals/pending authorizations, clinical reviews, and discharge.     PHYSICIAN ADVISORY SERVICES:  Medical Necessity Denial & [email or web address] Review  Phone: [phone]  Fax: [phone]  Email: [email or web address]     DISCHARGE SUPPORT TEAM:  For Patients Discharge Needs & Updates  Phone: [phone] opt. 2 Fax: [phone]  Email: [email or web address]

## 2023-12-26 NOTE — QUICK NOTE
Patient's BP remains elevated overnight despite additional 20mg IV labetalol and 20mg IV hydralzine in conjunction with prior ordered scheduled and PRN antihypertensives. Patient to be transferred to CC service for nitro gtt initiation and tight BP control. Patient care assistance is appreciated.

## 2023-12-26 NOTE — CASE MANAGEMENT
Case Management Assessment & Discharge Planning Note    Patient name Genna Liu  Location / MRN [id number]  : 1965 Date 2023       Current Admission Date: 2023  Current Admission Diagnosis:Hypertensive emergency   Patient Active Problem List    Diagnosis Date Noted    COVID-19 2023    GERD (gastroesophageal reflux disease) 07/15/2023    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HCC) 2023    Moderate protein-calorie malnutrition (HCC) 2023    Electrolyte abnormality 2023    Hematochezia 2023    Depression with anxiety 2023    Abdominal pain 2023    Seizure (HCC) 2023    Hypertensive encephalopathy 2023    Hypertensive emergency 2023    Meningioma (HCC) 2023    Prolonged Q-T interval on ECG 2023    Hypokalemia 2021    Bradycardia 2021    Hypoglycemia 2021    History of gastric bypass 2021    Hypertensive kidney disease with stage 3 chronic kidney disease (HCC) 2021    Acute kidney injury superimposed on CKD  2021    Encounter for surgical aftercare following surgery of digestive system 2021    Postsurgical malabsorption 2021    Morbid obesity due to excess calories ([facility]) 2020    Post-traumatic male urethral stricture 2020    Renal cyst 2020    Dysuria 2020    History of DVT (deep vein thrombosis) 10/17/2018    Splenic lesion 10/17/2018    Anemia 2018    Lymphedema 2018    Stage 3b chronic kidney disease (CKD) ([facility]) 03/15/2018    Chronic diastolic CHF (congestive heart failure) ([facility]) 2018    Pulmonary nodule, right 2018    Pericardial effusion 2018    Essential hypertension 2018    COPD (chronic obstructive pulmonary disease) ([facility]) 2018    CHF (congestive heart failure) ([facility]) 2018      LOS (days): 2  Geometric Mean LOS (GMLOS) (days):   Days to GMLOS:     OBJECTIVE:    Risk of Unplanned  Readmission Score: 32.06         Current admission status: Inpatient       Preferred Pharmacy:   [id number] - [facility] [address] - [address]  [address]  [facility] [place] [address]  Phone: [phone] Fax: [phone]    Primary Care Provider: Ugo Marina DO    Primary Insurance: [facility] REP  Secondary Insurance:     ASSESSMENT:  Active Health Care Proxies    There are no active Health Care Proxies on file.       Advance Directives  Does patient have a Health Care POA?: No  Was patient offered paperwork?: Yes (declined)  Does patient currently have a Health Care decision maker?: Yes, please see Health Care Proxy section  Does patient have Advance Directives?: No  Was patient offered paperwork?: Yes (declined)  Primary Contact: wife Kinsey         Readmission Root Cause  30 Day Readmission: No    Patient Information  Admitted from:: Home  Mental Status: Alert  During Assessment patient was accompanied by: Not accompanied during assessment  Assessment information provided by:: Patient, Spouse  Primary Caregiver: Self  Support Systems: Spouse/significant other, Children  County of Residence: [place]  What city do you live in?: [place]  Home entry access options. Select all that apply.: Stairs  Number of steps to enter home.: 4  Do the steps have railings?: Yes  Type of Current Residence: 2 story home  Upon entering residence, is there a bedroom on the main floor (no further steps)?: No  A bedroom is located on the following floor levels of residence (select all that apply):: 2nd Floor  Upon entering residence, is there a bathroom on the main floor (no further steps)?: No  Indicate which floors of current residence have a bathroom (select all the apply):: 2nd Floor  Number of steps to 2nd floor from main floor: One Flight  Living Arrangements: Lives w/ Spouse/significant other (Pt lives with his wife Kinsey and 5 children ages 24 to 17)  Is patient a ?: Yes  Is [facility] with [facility]  ( Affairs)?: Yes  Is patient service connected?: Yes    Activities of Daily Living Prior to Admission  Functional Status: Independent  Completes ADLs independently?: Yes  Ambulates independently?: Yes  Does patient use assisted devices?: No  Does patient currently own DME?: No  Does patient have a history of Outpatient Therapy (PT/OT)?: No  Does the patient have a history of Short-Term Rehab?: No  Does patient have a history of HHC?: No  Does patient currently have HHC?: No         Patient Information Continued  Income Source: SSI/SSD  Does patient have prescription coverage?: Yes  Does patient receive dialysis treatments?: No  Does patient have a history of substance abuse?: No  Does patient have a history of Mental Health Diagnosis?: No    PHQ 2/9 Screening   Reviewed PHQ 2/9 Depression Screening Score?: No    Means of Transportation  Means of Transport to Appts:: Drives Self      Housing Stability: Low Risk  (7/10/2023)    Housing Stability Vital Sign     Unable to Pay for Housing in the Last Year: No     Number of Places Lived in the Last Year: 1     Unstable Housing in the Last Year: No   Food Insecurity: No Food Insecurity (7/10/2023)    Hunger Vital Sign     Worried About Running Out of Food in the Last Year: Never true     Ran Out of Food in the Last Year: Never true   Transportation Needs: No Transportation Needs (7/10/2023)    PRAPARE - Transportation     Lack of Transportation (Medical): No     Lack of Transportation (Non-Medical): No   Utilities: Not on file       DISCHARGE DETAILS:    Discharge planning discussed with:: wife Kinsey via phone and pt at bedside  Freedom of Choice: Yes  Comments - Freedom of Choice: No anticipated d/c needs-declining VNA services.  Has adequate family support  CM contacted family/caregiver?: Yes  Were Treatment Team discharge recommendations reviewed with patient/caregiver?: Yes  Did patient/caregiver verbalize understanding of patient care needs?: Yes  Were  patient/caregiver advised of the risks associated with not following Treatment Team discharge recommendations?: Yes    Contacts  Patient Contacts: Kinsey  Relationship to Patient:: Family  Contact Method: Phone  Phone Number: on facesheet  Reason/Outcome: Continuity of Care, Discharge Planning    Requested Home Health Care         Is the patient interested in HHC at discharge?: No    DME Referral Provided  Referral made for DME?: No    Other Referral/Resources/Interventions Provided:  Referral Comments: No anticipated d/c needs    Would you like to participate in our [facility] service program?  : No - Declined    Treatment Team Recommendation: Home  Discharge Destination Plan:: Home

## 2023-12-26 NOTE — ASSESSMENT & PLAN NOTE
Secondary to hypertensive emergency   Baseline creatinine: 1.41 - 1.90  Initial creatinine: 2.97 (12/24/23)   Most recent creatinine: 2.80 (12/25/23)    Plan:   Supportive care with BP management   Strict q4h I/O monitoring  Monitor without keller catheter   Continue to follow renal function tests

## 2023-12-26 NOTE — ASSESSMENT & PLAN NOTE
No evidence of acute exacerbation at this time post operatively   Relevant echocardiograms:  7/8/23 TTE: Left ventricular cavity size is normal. Wall thickness is increased. The left ventricular ejection fraction is 50%. Systolic function is low normal. Wall motion cannot be accurately assessed. Left Atrium: The atrium is mildly dilated. Pericardium: There is a moderate to large pericardial effusion circumferential to the heart. There is a reasonable likelihood of cardiac tamponade. The evidence for tamponade includes: right ventricular compression  7/14/23 TTE: LV cavity size is normal. Wall thickness is severely increased. Severe concentric hypertrophy. LVEF 55%. Systolic function is normal. Wall motion is normal. There is a prominent myocardial speckle pattern. Atrial Septum: The septum bows into the right atrium, suggesting increased left atrial pressure. Pericardium: There is a trivial pericardial effusion posterior to the heart. There is no echocardiographic evidence of tamponade.        Plan:   Holding diuresis at this time  Goal to maintain more controlled BP, ideally SBP < 180 eventually following crisis   Daily weights  Continue home oral antihypertensives:   Amlodipine 10mg daily   Doxazosin 8mg BID  Labetalol 300mg BID, now increased to 400 mg BID  PRN antihypertensives:   Labetalol 20mg q4h PRN  Hydralazine 20mg q4h PRN  Cardiac, low Na diet

## 2023-12-26 NOTE — ESCALATED TEAM TX
Team Meeting Note    [place] [facility] [place] MRN [id number]  : 1965 Date 2023

## 2023-12-26 NOTE — PLAN OF CARE
Problem: Potential for Falls  Goal: Patient will remain free of falls  Description: INTERVENTIONS:  - Educate patient/family on patient safety including physical limitations  - Instruct patient to call for assistance with activity   - Consult OT/PT to assist with strengthening/mobility   - Keep Call bell within reach  - Keep bed low and locked with side rails adjusted as appropriate  - Keep care items and personal belongings within reach  - Initiate and maintain comfort rounds  - Make Fall Risk Sign visible to staff  - Apply yellow socks and bracelet for high fall risk patients  - Consider moving patient to room near nurses station  Outcome: Progressing     Problem: PAIN - ADULT  Goal: Verbalizes/displays adequate comfort level or baseline comfort level  Description: Interventions:  - Encourage patient to monitor pain and request assistance  - Assess pain using appropriate pain scale  - Administer analgesics based on type and severity of pain and evaluate response  - Implement non-pharmacological measures as appropriate and evaluate response  - Consider cultural and social influences on pain and pain management  - Notify physician/advanced practitioner if interventions unsuccessful or patient reports new pain  Outcome: Progressing     Problem: INFECTION - ADULT  Goal: Absence or prevention of progression during hospitalization  Description: INTERVENTIONS:  - Assess and monitor for signs and symptoms of infection  - Monitor lab/diagnostic results  - Monitor all insertion sites, i.e. indwelling lines, tubes, and drains  - Monitor endotracheal if appropriate and nasal secretions for changes in amount and color  - [place] appropriate cooling/warming therapies per order  - Administer medications as ordered  - Instruct and encourage patient and family to use good hand hygiene technique  - Identify and instruct in appropriate isolation precautions for identified infection/condition  Outcome: Progressing     Problem:  SAFETY ADULT  Goal: Patient will remain free of falls  Description: INTERVENTIONS:  - Educate patient/family on patient safety including physical limitations  - Instruct patient to call for assistance with activity   - Consult OT/PT to assist with strengthening/mobility   - Keep Call bell within reach  - Keep bed low and locked with side rails adjusted as appropriate  - Keep care items and personal belongings within reach  - Initiate and maintain comfort rounds  - Make Fall Risk Sign visible to staff  - Apply yellow socks and bracelet for high fall risk patients  - Consider moving patient to room near nurses station  Outcome: Progressing  Goal: Maintain or return to baseline ADL function  Description: INTERVENTIONS:  -  Assess patient's ability to carry out ADLs; assess patient's baseline for ADL function and identify physical deficits which impact ability to perform ADLs (bathing, care of mouth/teeth, toileting, grooming, dressing, etc.)  - Assess/evaluate cause of self-care deficits   - Assess range of motion  - Assess patient's mobility; develop plan if impaired  - Assess patient's need for assistive devices and provide as appropriate  - Encourage maximum independence but intervene and supervise when necessary  - Involve family in performance of ADLs  - Assess for home care needs following discharge   - Consider OT consult to assist with ADL evaluation and planning for discharge  - Provide patient education as appropriate  Outcome: Progressing  Goal: Maintains/Returns to pre admission functional level  Description: INTERVENTIONS:  - Perform AM-PAC 6 Click Basic Mobility/ Daily Activity assessment daily.  - Set and communicate daily mobility goal to care team and patient/family/caregiver.   - Collaborate with rehabilitation services on mobility goals if consulted  - Out of bed for toileting  - Record patient progress and toleration of activity level   Outcome: Progressing     Problem: DISCHARGE PLANNING  Goal:  Discharge to home or other facility with appropriate resources  Description: INTERVENTIONS:  - Identify barriers to discharge w/patient and caregiver  - Arrange for needed discharge resources and transportation as appropriate  - Identify discharge learning needs (meds, wound care, etc.)  - Arrange for interpretive services to assist at discharge as needed  - Refer to Case Management Department for coordinating discharge planning if the patient needs post-hospital services based on physician/advanced practitioner order or complex needs related to functional status, cognitive ability, or social support system  Outcome: Progressing     Problem: Knowledge Deficit  Goal: Patient/family/caregiver demonstrates understanding of disease process, treatment plan, medications, and discharge instructions  Description: Complete learning assessment and assess knowledge base.  Interventions:  - Provide teaching at level of understanding  - Provide teaching via preferred learning methods  Outcome: Progressing     Problem: CARDIOVASCULAR - ADULT  Goal: Maintains optimal cardiac output and hemodynamic stability  Description: INTERVENTIONS:  - Monitor I/O, vital signs and rhythm  - Monitor for S/S and trends of decreased cardiac output  - Administer and titrate ordered vasoactive medications to optimize hemodynamic stability  - Assess quality of pulses, skin color and temperature  - Assess for signs of decreased coronary artery perfusion  - Instruct patient to report change in severity of symptoms  Outcome: Progressing  Goal: Absence of cardiac dysrhythmias or at baseline rhythm  Description: INTERVENTIONS:  - Continuous cardiac monitoring, vital signs, obtain 12 lead EKG if ordered  - Administer antiarrhythmic and heart rate control medications as ordered  - Monitor electrolytes and administer replacement therapy as ordered  Outcome: Progressing

## 2023-12-27 LAB
ANION GAP SERPL CALCULATED.3IONS-SCNC: 7 MMOL/L
ATRIAL RATE: 63 BPM
ATRIAL RATE: 66 BPM
ATRIAL RATE: 80 BPM
BASOPHILS # BLD AUTO: 0.01 THOUSANDS/ÂΜL (ref 0–0.1)
BASOPHILS NFR BLD AUTO: 0 % (ref 0–1)
BUN SERPL-MCNC: 41 MG/DL (ref 5–25)
CALCIUM SERPL-MCNC: 9.4 MG/DL (ref 8.4–10.2)
CHLORIDE SERPL-SCNC: 105 MMOL/L (ref 96–108)
CO2 SERPL-SCNC: 25 MMOL/L (ref 21–32)
CREAT SERPL-MCNC: 2.93 MG/DL (ref 0.6–1.3)
EOSINOPHIL # BLD AUTO: 0 THOUSAND/ÂΜL (ref 0–0.61)
EOSINOPHIL NFR BLD AUTO: 0 % (ref 0–6)
ERYTHROCYTE [DISTWIDTH] IN BLOOD BY AUTOMATED COUNT: 18.5 % (ref 11.6–15.1)
GFR SERPL CREATININE-BSD FRML MDRD: 22 ML/MIN/1.73SQ M
GLUCOSE SERPL-MCNC: 110 MG/DL (ref 65–140)
HCT VFR BLD AUTO: 34.7 % (ref 36.5–49.3)
HGB BLD-MCNC: 11.5 G/DL (ref 12–17)
IMM GRANULOCYTES # BLD AUTO: 0.01 THOUSAND/UL (ref 0–0.2)
IMM GRANULOCYTES NFR BLD AUTO: 0 % (ref 0–2)
LYMPHOCYTES # BLD AUTO: 1 THOUSANDS/ÂΜL (ref 0.6–4.47)
LYMPHOCYTES NFR BLD AUTO: 16 % (ref 14–44)
MAGNESIUM SERPL-MCNC: 2 MG/DL (ref 1.9–2.7)
MCH RBC QN AUTO: 26.5 PG (ref 26.8–34.3)
MCHC RBC AUTO-ENTMCNC: 33.1 G/DL (ref 31.4–37.4)
MCV RBC AUTO: 80 FL (ref 82–98)
MONOCYTES # BLD AUTO: 0.55 THOUSAND/ÂΜL (ref 0.17–1.22)
MONOCYTES NFR BLD AUTO: 9 % (ref 4–12)
NEUTROPHILS # BLD AUTO: 4.78 THOUSANDS/ÂΜL (ref 1.85–7.62)
NEUTS SEG NFR BLD AUTO: 75 % (ref 43–75)
NRBC BLD AUTO-RTO: 0 /100 WBCS
P AXIS: -19 DEGREES
P AXIS: 79 DEGREES
P AXIS: 82 DEGREES
PHOSPHATE SERPL-MCNC: 3.9 MG/DL (ref 2.3–4.1)
PLATELET # BLD AUTO: 210 THOUSANDS/UL (ref 149–390)
PMV BLD AUTO: 9.5 FL (ref 8.9–12.7)
POTASSIUM SERPL-SCNC: 3.5 MMOL/L (ref 3.5–5.3)
PR INTERVAL: 138 MS
PR INTERVAL: 144 MS
PR INTERVAL: 148 MS
QRS AXIS: -79 DEGREES
QRS AXIS: -80 DEGREES
QRS AXIS: 134 DEGREES
QRSD INTERVAL: 96 MS
QRSD INTERVAL: 96 MS
QRSD INTERVAL: 98 MS
QT INTERVAL: 428 MS
QT INTERVAL: 492 MS
QT INTERVAL: 500 MS
QTC INTERVAL: 493 MS
QTC INTERVAL: 511 MS
QTC INTERVAL: 515 MS
RBC # BLD AUTO: 4.34 MILLION/UL (ref 3.88–5.62)
SODIUM SERPL-SCNC: 137 MMOL/L (ref 135–147)
T WAVE AXIS: 179 DEGREES
T WAVE AXIS: 217 DEGREES
T WAVE AXIS: 267 DEGREES
VENTRICULAR RATE: 63 BPM
VENTRICULAR RATE: 66 BPM
VENTRICULAR RATE: 80 BPM
WBC # BLD AUTO: 6.35 THOUSAND/UL (ref 4.31–10.16)

## 2023-12-27 PROCEDURE — 80048 BASIC METABOLIC PNL TOTAL CA: CPT | Performed by: NURSE PRACTITIONER

## 2023-12-27 PROCEDURE — 84100 ASSAY OF PHOSPHORUS: CPT | Performed by: NURSE PRACTITIONER

## 2023-12-27 PROCEDURE — 85025 COMPLETE CBC W/AUTO DIFF WBC: CPT | Performed by: NURSE PRACTITIONER

## 2023-12-27 PROCEDURE — 99233 SBSQ HOSP IP/OBS HIGH 50: CPT | Performed by: ANESTHESIOLOGY

## 2023-12-27 PROCEDURE — 83735 ASSAY OF MAGNESIUM: CPT | Performed by: NURSE PRACTITIONER

## 2023-12-27 RX ORDER — LABETALOL 200 MG/1
600 TABLET, FILM COATED ORAL 2 TIMES DAILY
Status: DISCONTINUED | OUTPATIENT
Start: 2023-12-27 | End: 2023-12-28

## 2023-12-27 RX ORDER — ISOSORBIDE DINITRATE 10 MG/1
40 TABLET ORAL
Status: DISCONTINUED | OUTPATIENT
Start: 2023-12-27 | End: 2023-12-29 | Stop reason: HOSPADM

## 2023-12-27 RX ORDER — POTASSIUM CHLORIDE 20 MEQ/1
40 TABLET, EXTENDED RELEASE ORAL ONCE
Status: COMPLETED | OUTPATIENT
Start: 2023-12-27 | End: 2023-12-27

## 2023-12-27 RX ADMIN — PANTOPRAZOLE SODIUM 40 MG: 40 TABLET, DELAYED RELEASE ORAL at 08:07

## 2023-12-27 RX ADMIN — ATORVASTATIN CALCIUM 40 MG: 40 TABLET, FILM COATED ORAL at 21:31

## 2023-12-27 RX ADMIN — LABETALOL HYDROCHLORIDE 20 MG: 5 INJECTION, SOLUTION INTRAVENOUS at 20:17

## 2023-12-27 RX ADMIN — LABETALOL HYDROCHLORIDE 400 MG: 200 TABLET, FILM COATED ORAL at 08:06

## 2023-12-27 RX ADMIN — ACETAMINOPHEN 650 MG: 325 TABLET, FILM COATED ORAL at 16:35

## 2023-12-27 RX ADMIN — HEPARIN SODIUM 5000 UNITS: 5000 INJECTION INTRAVENOUS; SUBCUTANEOUS at 05:32

## 2023-12-27 RX ADMIN — LABETALOL HYDROCHLORIDE 20 MG: 5 INJECTION, SOLUTION INTRAVENOUS at 05:32

## 2023-12-27 RX ADMIN — AMLODIPINE BESYLATE 10 MG: 10 TABLET ORAL at 08:06

## 2023-12-27 RX ADMIN — CHLORHEXIDINE GLUCONATE 0.12% ORAL RINSE 15 ML: 1.2 LIQUID ORAL at 20:03

## 2023-12-27 RX ADMIN — CHLORHEXIDINE GLUCONATE 0.12% ORAL RINSE 15 ML: 1.2 LIQUID ORAL at 08:07

## 2023-12-27 RX ADMIN — SERTRALINE HYDROCHLORIDE 200 MG: 50 TABLET ORAL at 08:06

## 2023-12-27 RX ADMIN — LABETALOL HYDROCHLORIDE 600 MG: 200 TABLET, FILM COATED ORAL at 21:22

## 2023-12-27 RX ADMIN — FERROUS SULFATE TAB 325 MG (65 MG ELEMENTAL FE) 325 MG: 325 (65 FE) TAB at 08:15

## 2023-12-27 RX ADMIN — SUCRALFATE 1 G: 1 TABLET ORAL at 08:10

## 2023-12-27 RX ADMIN — ISOSORBIDE DINITRATE 20 MG: 10 TABLET ORAL at 08:15

## 2023-12-27 RX ADMIN — OLANZAPINE 5 MG: 2.5 TABLET, FILM COATED ORAL at 21:32

## 2023-12-27 RX ADMIN — HEPARIN SODIUM 5000 UNITS: 5000 INJECTION INTRAVENOUS; SUBCUTANEOUS at 00:09

## 2023-12-27 RX ADMIN — POTASSIUM CHLORIDE 40 MEQ: 1500 TABLET, EXTENDED RELEASE ORAL at 11:57

## 2023-12-27 RX ADMIN — HEPARIN SODIUM 5000 UNITS: 5000 INJECTION INTRAVENOUS; SUBCUTANEOUS at 21:33

## 2023-12-27 RX ADMIN — ISOSORBIDE DINITRATE 40 MG: 10 TABLET ORAL at 19:11

## 2023-12-27 RX ADMIN — DOXAZOSIN 8 MG: 4 TABLET ORAL at 08:08

## 2023-12-27 RX ADMIN — HEPARIN SODIUM 5000 UNITS: 5000 INJECTION INTRAVENOUS; SUBCUTANEOUS at 15:13

## 2023-12-27 RX ADMIN — ARIPIPRAZOLE 5 MG: 5 TABLET ORAL at 08:07

## 2023-12-27 RX ADMIN — ONDANSETRON 4 MG: 2 INJECTION INTRAMUSCULAR; INTRAVENOUS at 00:38

## 2023-12-27 RX ADMIN — HYDRALAZINE HYDROCHLORIDE 20 MG: 20 INJECTION INTRAMUSCULAR; INTRAVENOUS at 15:11

## 2023-12-27 RX ADMIN — SODIUM CHLORIDE 5 MG/HR: 0.9 INJECTION, SOLUTION INTRAVENOUS at 20:57

## 2023-12-27 RX ADMIN — ACETAMINOPHEN 650 MG: 325 TABLET, FILM COATED ORAL at 00:12

## 2023-12-27 RX ADMIN — DOXAZOSIN 8 MG: 4 TABLET ORAL at 21:25

## 2023-12-27 RX ADMIN — FERROUS SULFATE TAB 325 MG (65 MG ELEMENTAL FE) 325 MG: 325 (65 FE) TAB at 21:26

## 2023-12-27 RX ADMIN — ISOSORBIDE DINITRATE 40 MG: 10 TABLET ORAL at 15:11

## 2023-12-27 RX ADMIN — HYDRALAZINE HYDROCHLORIDE 20 MG: 20 INJECTION INTRAMUSCULAR; INTRAVENOUS at 08:15

## 2023-12-27 RX ADMIN — SUCRALFATE 1 G: 1 TABLET ORAL at 21:28

## 2023-12-27 NOTE — ASSESSMENT & PLAN NOTE
Patient with acute-onset epigastric pain presented to [facility] on 7/8/23 and was found on CT to have a pericardiac effusion   Etiology possibly related to uncontrolled hypertension  s/p pericardial window and drain (7/8/23)      Plan:   No indication for new TTE   No longer on colchicine

## 2023-12-27 NOTE — ASSESSMENT & PLAN NOTE
Likely due to missing multiple doses of oral regimen at home   Patient with known history of long-standing severe hypertension   S/p adrenalectomy at [facility] in 2012 which did not markedly impact his hypertension   7/14/23 Renal doppler: WNL bilaterally   12/24/23: In ED received IV labetalol 20mg, 40mg, 80mg, home 8mg cardura, labetalol 300mg PO, and 10mg IV hydralazine with negligible change     Plan:  Goal -180  Can increase parameters to 180-200 if patient becomes symptomatic with drop  Continue home antihypertensive medications:  Amlodipine 10mg daily   Doxazosin 8mg BID  Labetalol 300mg BID, now increased to 400 mg BID  Imdur 20mg TID added  PRN medications:   Labetalol 20mg q4h PRN  Hydralazine 20mg q4h PRN  Given an extra dose of Labetalol 20 mg IV and Hydralazine 20 mg IV which were ineffective to reduce SBP <200  Transferred back to SDU for initiation of nitroglycerine infusion due to persistent hypertension despite above interventions  Briefly required Cardene infusion 12/26, weaned off  Given Labetolol PRN x1 overnight with little improvement in BP

## 2023-12-27 NOTE — PLAN OF CARE
Problem: Potential for Falls  Goal: Patient will remain free of falls  Description: INTERVENTIONS:  - Educate patient/family on patient safety including physical limitations  - Instruct patient to call for assistance with activity   - Consult OT/PT to assist with strengthening/mobility   - Keep Call bell within reach  - Keep bed low and locked with side rails adjusted as appropriate  - Keep care items and personal belongings within reach  - Initiate and maintain comfort rounds  - Make Fall Risk Sign visible to staff  - Offer Toileting every 2 Hours, in advance of need  - Initiate/Maintain katerina larm  - Apply yellow socks and bracelet for high fall risk patients  - Consider moving patient to room near nurses station  Outcome: Progressing     Problem: PAIN - ADULT  Goal: Verbalizes/displays adequate comfort level or baseline comfort level  Description: Interventions:  - Encourage patient to monitor pain and request assistance  - Assess pain using appropriate pain scale  - Administer analgesics based on type and severity of pain and evaluate response  - Implement non-pharmacological measures as appropriate and evaluate response  - Consider cultural and social influences on pain and pain management  - Notify physician/advanced practitioner if interventions unsuccessful or patient reports new pain  Outcome: Progressing     Problem: INFECTION - ADULT  Goal: Absence or prevention of progression during hospitalization  Description: INTERVENTIONS:  - Assess and monitor for signs and symptoms of infection  - Monitor lab/diagnostic results  - Monitor all insertion sites, i.e. indwelling lines, tubes, and drains  - Monitor endotracheal if appropriate and nasal secretions for changes in amount and color  - [place] appropriate cooling/warming therapies per order  - Administer medications as ordered  - Instruct and encourage patient and family to use good hand hygiene technique  - Identify and instruct in appropriate isolation  precautions for identified infection/condition  Outcome: Progressing     Problem: SAFETY ADULT  Goal: Patient will remain free of falls  Description: INTERVENTIONS:  - Educate patient/family on patient safety including physical limitations  - Instruct patient to call for assistance with activity   - Consult OT/PT to assist with strengthening/mobility   - Keep Call bell within reach  - Keep bed low and locked with side rails adjusted as appropriate  - Keep care items and personal belongings within reach  - Initiate and maintain comfort rounds  - Make Fall Risk Sign visible to staff  - Offer Toileting every 2 Hours, in advance of need  - Initiate/Maintain bed alarm  - Apply yellow socks and bracelet for high fall risk patients  - Consider moving patient to room near nurses station  Outcome: Progressing

## 2023-12-27 NOTE — PROGRESS NOTES
[facility]  Progress Note  Name: Genna Liu I  MRN: [id number]  Unit/Bed#:  I Date of Admission: 12/24/2023   Date of Service: 12/27/2023 I Hospital Day: 3    Assessment/Plan   * Hypertensive emergency  Assessment & Plan  Likely due to missing multiple doses of oral regimen at home   Patient with known history of long-standing severe hypertension   S/p adrenalectomy at [facility] in 2012 which did not markedly impact his hypertension   7/14/23 Renal doppler: WNL bilaterally   12/24/23: In ED received IV labetalol 20mg, 40mg, 80mg, home 8mg cardura, labetalol 300mg PO, and 10mg IV hydralazine with negligible change     Plan:  Goal -180  Can increase parameters to 180-200 if patient becomes symptomatic with drop  Continue home antihypertensive medications:  Amlodipine 10mg daily   Doxazosin 8mg BID  Labetalol 300mg BID, now increased to 400 mg BID  Imdur 20mg TID added  PRN medications:   Labetalol 20mg q4h PRN  Hydralazine 20mg q4h PRN  Given an extra dose of Labetalol 20 mg IV and Hydralazine 20 mg IV which were ineffective to reduce SBP <200  Transferred back to SDU for initiation of nitroglycerine infusion due to persistent hypertension despite above interventions  Briefly required Cardene infusion 12/26, weaned off  Given Labetolol PRN x1 overnight with little improvement in BP      COPD (chronic obstructive pulmonary disease) (HCC)  Assessment & Plan  No recent PFT's for review  Patient states his breathing has improved overall since his gastric bypass      Plan:   Albuterol PRN   Continue pulmonary hygiene. Incentive spirometer q1h while awake, encourage coughing and deep breathing. Upright positioning  Suction as needed and closely monitor secretions. Maintain HOB >30 degrees. Q4h oral care with chlorhexidine BID  Maintain SpO2 > 90%     Anemia  Assessment & Plan  Related to recent GI bleed with gradual improvement   Baseline hemoglobin: 7.1 - 9.4  Initial hemoglobin: 11.4  (12/24/23)      Plan:   Transfuse as indicated in the setting of hemodynamic instability or signs of active bleeding  Iron supplementation BID  CBC daily    Chronic diastolic CHF (congestive heart failure) (HCC)  Assessment & Plan  No evidence of acute exacerbation at this time post operatively   Relevant echocardiograms:  7/8/23 TTE: Left ventricular cavity size is normal. Wall thickness is increased. The left ventricular ejection fraction is 50%. Systolic function is low normal. Wall motion cannot be accurately assessed. Left Atrium: The atrium is mildly dilated. Pericardium: There is a moderate to large pericardial effusion circumferential to the heart. There is a reasonable likelihood of cardiac tamponade. The evidence for tamponade includes: right ventricular compression  7/14/23 TTE: LV cavity size is normal. Wall thickness is severely increased. Severe concentric hypertrophy. LVEF 55%. Systolic function is normal. Wall motion is normal. There is a prominent myocardial speckle pattern. Atrial Septum: The septum bows into the right atrium, suggesting increased left atrial pressure. Pericardium: There is a trivial pericardial effusion posterior to the heart. There is no echocardiographic evidence of tamponade.        Plan:   Holding diuresis at this time  Goal to maintain more controlled BP, ideally SBP < 180   Daily weights  Continue home oral antihypertensives:   Amlodipine 10mg daily   Doxazosin 8mg BID  Labetalol 300mg BID, now increased to 400 mg BID  Imdur 20mg TID added   PRN antihypertensives:   Labetalol 20mg q4h PRN, required x2 overnight  Hydralazine 20mg q4h PRN  Cardiac, low Na diet     Depression with anxiety  Assessment & Plan  No acute concerns at this time      Plan:   Continue home regimen:   Abilify 5mg daily   Zyprexa 5mg qHS  Sertraline 200mg daily     Pericardial effusion  Assessment & Plan  Patient with acute-onset epigastric pain presented to [facility] on 7/8/23 and was found on CT to have a  pericardiac effusion   Etiology possibly related to uncontrolled hypertension  s/p pericardial window and drain (7/8/23)      Plan:   No indication for new TTE   No longer on colchicine     COVID-19  Assessment & Plan  Incidental positive test  Patient remains on room air    Plan:   Continue to monitor and provide supportive care    GERD (gastroesophageal reflux disease)  Assessment & Plan  Follows with GI   No new symptoms    Plan:  Carfate 1g TID   Protonix PO 40mg daily     Acute kidney injury superimposed on CKD   Assessment & Plan  Secondary to hypertensive emergency   Baseline creatinine: 1.41 - 1.90  Initial creatinine: 2.97 (12/24/23)   Most recent creatinine: 2.80 (12/25/23)    Plan:   Supportive care with BP management   Strict q4h I/O monitoring  Monitor without keller catheter   Continue to follow renal function tests    Stage 3b chronic kidney disease (CKD) (HCC)  Assessment & Plan  See plan above for TRUDI             Disposition: Stepdown Level 1    ICU Core Measures     A: Assess, Prevent, and Manage Pain Has pain been assessed? NA  Need for changes to pain regimen? NA   B: Both SAT/SAT  N/A   C: Choice of Sedation RASS Goal: N/A patient not on sedation  Need for changes to sedation or analgesia regimen? NA   D: Delirium CAM-ICU: Negative   E: Early Mobility  Plan for early mobility? Yes   F: Family Engagement Plan for family engagement today? Yes         Prophylaxis:  VTE VTE covered by:  heparin (porcine), Subcutaneous, 5,000 Units at 12/27/23 0532       Stress Ulcer  covered bypantoprazole (PROTONIX) EC tablet 40 mg [[id number]]         Significant 24hr Events     24hr events: Imdur added yesterday, remained hypertensive overnight requiring Labetolol x2 with no improvement in hypertension. Otherwise no acute events.      Subjective   Review of Systems   Constitutional:  Negative for fatigue.   HENT: Negative.     Respiratory:  Negative for cough, chest tightness and shortness of breath.     Cardiovascular:  Negative for chest pain.   Gastrointestinal:  Negative for abdominal pain.   Genitourinary: Negative.    Musculoskeletal: Negative.    Neurological:  Positive for headaches. Negative for dizziness and light-headedness.   All other systems reviewed and are negative.     Objective                            Vitals I/O      Most Recent Min/Max in 24hrs   Temp 98.7 °F (37.1 °C) Temp  Min: 98.7 °F (37.1 °C)  Max: 98.7 °F (37.1 °C)   Pulse 75 Pulse  Min: 64  Max: 88   Resp 17 Resp  Min: 15  Max: 41   BP (!) 213/121 (labetolol given at 0530) BP  Min: 121/67  Max: 244/129   O2 Sat 100 % SpO2  Min: 97 %  Max: 100 %      Intake/Output Summary (Last 24 hours) at 12/27/2023 0735  Last data filed at 12/27/2023 0030  Gross per 24 hour   Intake 558.05 ml   Output 1075 ml   Net -516.95 ml       Diet Cardiovascular; Cardiac; Sodium 2 GM    Invasive Monitoring           Physical Exam   Physical Exam  Vitals and nursing note reviewed.   Eyes:      Pupils: Pupils are equal, round, and reactive to light.   Skin:     General: Skin is warm and dry.   HENT:      Head: Normocephalic.      Mouth/Throat:      Mouth: Mucous membranes are dry.   Cardiovascular:      Rate and Rhythm: Normal rate and regular rhythm.      Pulses: Normal pulses.      Heart sounds: Normal heart sounds.   Musculoskeletal:         General: Normal range of motion.   Abdominal:      Palpations: Abdomen is soft.   Constitutional:       Appearance: He is well-developed.   Pulmonary:      Effort: Pulmonary effort is normal.      Breath sounds: Normal breath sounds.   Neurological:      General: No focal deficit present.      Mental Status: He is alert and oriented to person, place and time. Mental status is at baseline.      Motor: Strength full and intact in all extremities.            Diagnostic Studies      EKG: NSR  Imaging:  I have personally reviewed pertinent reports.   and I have personally reviewed pertinent films in PACS      Medications:  Scheduled PRN   amLODIPine, 10 mg, Daily  ARIPiprazole, 5 mg, Daily  atorvastatin, 40 mg, HS  chlorhexidine, 15 mL, Q12H JOANN  doxazosin, 8 mg, BID  ferrous sulfate, 325 mg, BID  heparin (porcine), 5,000 Units, Q8H JOANN  isosorbide dinitrate, 20 mg, TID after meals  labetalol, 400 mg, BID  OLANZapine, 5 mg, HS  pantoprazole, 40 mg, Daily  polyethylene glycol, 17 g, Daily  sertraline, 200 mg, Daily  sucralfate, 1 g, BID      acetaminophen, 650 mg, Q6H PRN  hydrALAZINE, 20 mg, Q4H PRN  labetalol, 20 mg, Q4H PRN  ondansetron, 4 mg, Q6H PRN       Continuous    niCARdipine, 1-15 mg/hr, Last Rate: Stopped (12/26/23 1530)         Labs:    CBC    Recent Labs     12/26/23 0525 12/27/23  0527   WBC 3.89* 6.35   HGB 11.2* 11.5*   HCT 34.2* 34.7*    210     BMP    Recent Labs     12/26/23  1901 12/27/23  0527   SODIUM 136 137   K 3.4* 3.5    105   CO2 22 25   AGAP 9 7   BUN 39* 41*   CREATININE 3.05* 2.93*   CALCIUM 8.9 9.4       Coags    No recent results     Additional Electrolytes  Recent Labs     12/26/23  0525 12/27/23  0527   MG 2.2 2.0   PHOS  --  3.9          Blood Gas    No recent results  No recent results LFTs  No recent results    Infectious  No recent results  Glucose  Recent Labs     12/26/23 0525 12/26/23  1901 12/27/23  0527   GLUC 165* 197* 110               Critical Care Time Delivered : Upon my evaluation, this patient had a high probability of imminent or life-threatening deterioration due to hypertensive emergency, which required my direct attention, intervention, and personal management.  I have personally provided 25 minutes of critical care time, exclusive of procedures, teaching, family meetings, and any prior time recorded by providers other than myself.     MARBIN Shoemaker

## 2023-12-27 NOTE — ASSESSMENT & PLAN NOTE
No evidence of acute exacerbation at this time post operatively   Relevant echocardiograms:  7/8/23 TTE: Left ventricular cavity size is normal. Wall thickness is increased. The left ventricular ejection fraction is 50%. Systolic function is low normal. Wall motion cannot be accurately assessed. Left Atrium: The atrium is mildly dilated. Pericardium: There is a moderate to large pericardial effusion circumferential to the heart. There is a reasonable likelihood of cardiac tamponade. The evidence for tamponade includes: right ventricular compression  7/14/23 TTE: LV cavity size is normal. Wall thickness is severely increased. Severe concentric hypertrophy. LVEF 55%. Systolic function is normal. Wall motion is normal. There is a prominent myocardial speckle pattern. Atrial Septum: The septum bows into the right atrium, suggesting increased left atrial pressure. Pericardium: There is a trivial pericardial effusion posterior to the heart. There is no echocardiographic evidence of tamponade.        Plan:   Holding diuresis at this time  Goal to maintain more controlled BP, ideally SBP < 180   Daily weights  Continue home oral antihypertensives:   Amlodipine 10mg daily   Doxazosin 8mg BID  Labetalol 300mg BID, now increased to 400 mg BID  Imdur 20mg TID added   PRN antihypertensives:   Labetalol 20mg q4h PRN, required x2 overnight  Hydralazine 20mg q4h PRN  Cardiac, low Na diet

## 2023-12-27 NOTE — ASSESSMENT & PLAN NOTE
Related to recent GI bleed with gradual improvement   Baseline hemoglobin: 7.1 - 9.4  Initial hemoglobin: 11.4 (12/24/23)      Plan:   Transfuse as indicated in the setting of hemodynamic instability or signs of active bleeding  Iron supplementation BID  CBC daily

## 2023-12-28 LAB
ANION GAP SERPL CALCULATED.3IONS-SCNC: 8 MMOL/L
BACTERIA UR QL AUTO: ABNORMAL /HPF
BILIRUB UR QL STRIP: NEGATIVE
BUN SERPL-MCNC: 49 MG/DL (ref 5–25)
CA-I BLD-SCNC: 1.15 MMOL/L (ref 1.12–1.32)
CALCIUM SERPL-MCNC: 8.9 MG/DL (ref 8.4–10.2)
CHLORIDE SERPL-SCNC: 104 MMOL/L (ref 96–108)
CLARITY UR: CLEAR
CO2 SERPL-SCNC: 24 MMOL/L (ref 21–32)
COLOR UR: ABNORMAL
CREAT SERPL-MCNC: 3.16 MG/DL (ref 0.6–1.3)
CREAT UR-MCNC: 159.3 MG/DL
ERYTHROCYTE [DISTWIDTH] IN BLOOD BY AUTOMATED COUNT: 18.3 % (ref 11.6–15.1)
GFR SERPL CREATININE-BSD FRML MDRD: 20 ML/MIN/1.73SQ M
GLUCOSE SERPL-MCNC: 93 MG/DL (ref 65–140)
GLUCOSE UR STRIP-MCNC: NEGATIVE MG/DL
HCT VFR BLD AUTO: 32.2 % (ref 36.5–49.3)
HGB BLD-MCNC: 10.7 G/DL (ref 12–17)
HGB UR QL STRIP.AUTO: NEGATIVE
KETONES UR STRIP-MCNC: NEGATIVE MG/DL
LEUKOCYTE ESTERASE UR QL STRIP: NEGATIVE
MAGNESIUM SERPL-MCNC: 1.8 MG/DL (ref 1.9–2.7)
MCH RBC QN AUTO: 26.9 PG (ref 26.8–34.3)
MCHC RBC AUTO-ENTMCNC: 33.2 G/DL (ref 31.4–37.4)
MCV RBC AUTO: 81 FL (ref 82–98)
MICROALBUMIN UR-MCNC: 700.3 MG/L
MICROALBUMIN/CREAT 24H UR: 440 MG/G CREATININE (ref 0–30)
NITRITE UR QL STRIP: NEGATIVE
NON-SQ EPI CELLS URNS QL MICRO: ABNORMAL /HPF
PH UR STRIP.AUTO: 5.5 [PH]
PHOSPHATE SERPL-MCNC: 4.7 MG/DL (ref 2.3–4.1)
PLATELET # BLD AUTO: 196 THOUSANDS/UL (ref 149–390)
PMV BLD AUTO: 9.6 FL (ref 8.9–12.7)
POTASSIUM SERPL-SCNC: 3.6 MMOL/L (ref 3.5–5.3)
PROT UR STRIP-MCNC: ABNORMAL MG/DL
RBC # BLD AUTO: 3.98 MILLION/UL (ref 3.88–5.62)
RBC #/AREA URNS AUTO: ABNORMAL /HPF
SODIUM SERPL-SCNC: 136 MMOL/L (ref 135–147)
SP GR UR STRIP.AUTO: 1.02 (ref 1–1.03)
UROBILINOGEN UR STRIP-ACNC: <2 MG/DL
WBC # BLD AUTO: 5.91 THOUSAND/UL (ref 4.31–10.16)
WBC #/AREA URNS AUTO: ABNORMAL /HPF

## 2023-12-28 PROCEDURE — 84244 ASSAY OF RENIN: CPT | Performed by: INTERNAL MEDICINE

## 2023-12-28 PROCEDURE — 85027 COMPLETE CBC AUTOMATED: CPT | Performed by: NURSE PRACTITIONER

## 2023-12-28 PROCEDURE — 80048 BASIC METABOLIC PNL TOTAL CA: CPT | Performed by: NURSE PRACTITIONER

## 2023-12-28 PROCEDURE — NC001 PR NO CHARGE: Performed by: PHYSICIAN ASSISTANT

## 2023-12-28 PROCEDURE — 84100 ASSAY OF PHOSPHORUS: CPT | Performed by: NURSE PRACTITIONER

## 2023-12-28 PROCEDURE — 99233 SBSQ HOSP IP/OBS HIGH 50: CPT | Performed by: ANESTHESIOLOGY

## 2023-12-28 PROCEDURE — 99223 1ST HOSP IP/OBS HIGH 75: CPT | Performed by: INTERNAL MEDICINE

## 2023-12-28 PROCEDURE — 82570 ASSAY OF URINE CREATININE: CPT | Performed by: INTERNAL MEDICINE

## 2023-12-28 PROCEDURE — 82043 UR ALBUMIN QUANTITATIVE: CPT | Performed by: INTERNAL MEDICINE

## 2023-12-28 PROCEDURE — 82330 ASSAY OF CALCIUM: CPT | Performed by: NURSE PRACTITIONER

## 2023-12-28 PROCEDURE — 83735 ASSAY OF MAGNESIUM: CPT | Performed by: NURSE PRACTITIONER

## 2023-12-28 PROCEDURE — 81001 URINALYSIS AUTO W/SCOPE: CPT | Performed by: PHYSICIAN ASSISTANT

## 2023-12-28 RX ORDER — LABETALOL 200 MG/1
600 TABLET, FILM COATED ORAL EVERY 8 HOURS SCHEDULED
Status: DISCONTINUED | OUTPATIENT
Start: 2023-12-28 | End: 2023-12-29 | Stop reason: HOSPADM

## 2023-12-28 RX ORDER — POTASSIUM CHLORIDE 20 MEQ/1
40 TABLET, EXTENDED RELEASE ORAL ONCE
Status: COMPLETED | OUTPATIENT
Start: 2023-12-28 | End: 2023-12-28

## 2023-12-28 RX ORDER — HYDROCHLOROTHIAZIDE 12.5 MG/1
12.5 TABLET ORAL DAILY
Status: DISCONTINUED | OUTPATIENT
Start: 2023-12-28 | End: 2023-12-29 | Stop reason: HOSPADM

## 2023-12-28 RX ORDER — MAGNESIUM SULFATE HEPTAHYDRATE 40 MG/ML
2 INJECTION, SOLUTION INTRAVENOUS ONCE
Status: COMPLETED | OUTPATIENT
Start: 2023-12-28 | End: 2023-12-28

## 2023-12-28 RX ADMIN — OLANZAPINE 5 MG: 2.5 TABLET, FILM COATED ORAL at 21:13

## 2023-12-28 RX ADMIN — ISOSORBIDE DINITRATE 40 MG: 10 TABLET ORAL at 08:21

## 2023-12-28 RX ADMIN — ONDANSETRON 4 MG: 2 INJECTION INTRAMUSCULAR; INTRAVENOUS at 11:06

## 2023-12-28 RX ADMIN — ISOSORBIDE DINITRATE 40 MG: 10 TABLET ORAL at 12:12

## 2023-12-28 RX ADMIN — LABETALOL HYDROCHLORIDE 600 MG: 200 TABLET, FILM COATED ORAL at 21:12

## 2023-12-28 RX ADMIN — LABETALOL HYDROCHLORIDE 20 MG: 5 INJECTION, SOLUTION INTRAVENOUS at 04:36

## 2023-12-28 RX ADMIN — LABETALOL HYDROCHLORIDE 600 MG: 200 TABLET, FILM COATED ORAL at 06:37

## 2023-12-28 RX ADMIN — PANTOPRAZOLE SODIUM 40 MG: 40 TABLET, DELAYED RELEASE ORAL at 08:22

## 2023-12-28 RX ADMIN — HYDROCHLOROTHIAZIDE 12.5 MG: 12.5 TABLET ORAL at 13:27

## 2023-12-28 RX ADMIN — DOXAZOSIN 8 MG: 4 TABLET ORAL at 17:15

## 2023-12-28 RX ADMIN — POLYETHYLENE GLYCOL 3350 17 G: 17 POWDER, FOR SOLUTION ORAL at 08:20

## 2023-12-28 RX ADMIN — POTASSIUM CHLORIDE 40 MEQ: 1500 TABLET, EXTENDED RELEASE ORAL at 08:21

## 2023-12-28 RX ADMIN — MAGNESIUM SULFATE HEPTAHYDRATE 2 G: 40 INJECTION, SOLUTION INTRAVENOUS at 08:28

## 2023-12-28 RX ADMIN — SUCRALFATE 1 G: 1 TABLET ORAL at 17:14

## 2023-12-28 RX ADMIN — LABETALOL HYDROCHLORIDE 600 MG: 200 TABLET, FILM COATED ORAL at 13:26

## 2023-12-28 RX ADMIN — FERROUS SULFATE TAB 325 MG (65 MG ELEMENTAL FE) 325 MG: 325 (65 FE) TAB at 17:14

## 2023-12-28 RX ADMIN — ATORVASTATIN CALCIUM 40 MG: 40 TABLET, FILM COATED ORAL at 21:13

## 2023-12-28 RX ADMIN — CHLORHEXIDINE GLUCONATE 0.12% ORAL RINSE 15 ML: 1.2 LIQUID ORAL at 21:14

## 2023-12-28 RX ADMIN — DOXAZOSIN 8 MG: 4 TABLET ORAL at 08:30

## 2023-12-28 RX ADMIN — ARIPIPRAZOLE 5 MG: 5 TABLET ORAL at 08:22

## 2023-12-28 RX ADMIN — LABETALOL HYDROCHLORIDE 20 MG: 5 INJECTION, SOLUTION INTRAVENOUS at 11:08

## 2023-12-28 RX ADMIN — HEPARIN SODIUM 5000 UNITS: 5000 INJECTION INTRAVENOUS; SUBCUTANEOUS at 13:27

## 2023-12-28 RX ADMIN — SERTRALINE HYDROCHLORIDE 200 MG: 50 TABLET ORAL at 08:22

## 2023-12-28 RX ADMIN — ISOSORBIDE DINITRATE 40 MG: 10 TABLET ORAL at 17:14

## 2023-12-28 RX ADMIN — AMLODIPINE BESYLATE 10 MG: 10 TABLET ORAL at 08:18

## 2023-12-28 RX ADMIN — HEPARIN SODIUM 5000 UNITS: 5000 INJECTION INTRAVENOUS; SUBCUTANEOUS at 21:14

## 2023-12-28 RX ADMIN — HYDRALAZINE HYDROCHLORIDE 20 MG: 20 INJECTION INTRAMUSCULAR; INTRAVENOUS at 05:39

## 2023-12-28 RX ADMIN — CHLORHEXIDINE GLUCONATE 0.12% ORAL RINSE 15 ML: 1.2 LIQUID ORAL at 08:22

## 2023-12-28 RX ADMIN — FERROUS SULFATE TAB 325 MG (65 MG ELEMENTAL FE) 325 MG: 325 (65 FE) TAB at 08:22

## 2023-12-28 RX ADMIN — HEPARIN SODIUM 5000 UNITS: 5000 INJECTION INTRAVENOUS; SUBCUTANEOUS at 05:00

## 2023-12-28 RX ADMIN — SUCRALFATE 1 G: 1 TABLET ORAL at 08:21

## 2023-12-28 NOTE — ASSESSMENT & PLAN NOTE
No evidence of acute exacerbation at this time post operatively   Relevant echocardiograms:  7/8/23 TTE: Left ventricular cavity size is normal. Wall thickness is increased. The left ventricular ejection fraction is 50%. Systolic function is low normal. Wall motion cannot be accurately assessed. Left Atrium: The atrium is mildly dilated. Pericardium: There is a moderate to large pericardial effusion circumferential to the heart. There is a reasonable likelihood of cardiac tamponade. The evidence for tamponade includes: right ventricular compression  7/14/23 TTE: LV cavity size is normal. Wall thickness is severely increased. Severe concentric hypertrophy. LVEF 55%. Systolic function is normal. Wall motion is normal. There is a prominent myocardial speckle pattern. Atrial Septum: The septum bows into the right atrium, suggesting increased left atrial pressure. Pericardium: There is a trivial pericardial effusion posterior to the heart. There is no echocardiographic evidence of tamponade.        Plan:   Holding diuresis at this time  Goal to maintain more controlled BP, ideally SBP < 180   Daily weights  Continue home oral antihypertensives:   Amlodipine 10mg daily   Doxazosin 8mg BID  Labetalol 600mg BID  Imdur 20mg TID  PRN antihypertensives:   Labetalol 20mg q4h PRN  Hydralazine 20mg q4h PRN  Cardiac, low Na diet    pt c/o "rash, possible allergic reaction "

## 2023-12-28 NOTE — PROGRESS NOTES
[facility]  Progress Note  Name: Genna Liu I  MRN: [id number]  Unit/Bed#:  I Date of Admission: 12/24/2023   Date of Service: 12/28/2023 I Hospital Day: 4    Assessment/Plan   * Hypertensive emergency  Assessment & Plan  Likely due to missing multiple doses of oral regimen at home   Patient with known history of long-standing severe hypertension   S/p adrenalectomy at [facility] in 2012 which did not markedly impact his hypertension   7/14/23 Renal doppler: WNL bilaterally   12/24/23: In ED received IV labetalol 20mg, 40mg, 80mg, home 8mg cardura, labetalol 300mg PO, and 10mg IV hydralazine with negligible change     Plan:  Goal -180  Can increase parameters to 180-200 if patient becomes symptomatic with drop  Continue home antihypertensive medications:  Amlodipine 10mg daily   Doxazosin 8mg BID  Labetalol 600mg BID  Imdur 40mg TID   PRN medications:   Labetalol 20mg q4h PRN  Hydralazine 20mg q4h PRN  Briefly required Cardene infusion 12/26, weaned off        COPD (chronic obstructive pulmonary disease) ([facility])  Assessment & Plan  No recent PFT's for review  Patient states his breathing has improved overall since his gastric bypass      Plan:   Albuterol PRN   Continue pulmonary hygiene. Incentive spirometer q1h while awake, encourage coughing and deep breathing. Upright positioning  Suction as needed and closely monitor secretions. Maintain HOB >30 degrees. Q4h oral care with chlorhexidine BID  Maintain SpO2 > 90%     Anemia  Assessment & Plan  Related to recent GI bleed with gradual improvement   Baseline hemoglobin: 7.1 - 9.4  Initial hemoglobin: 11.4 (12/24/23)      Plan:   Transfuse as indicated in the setting of hemodynamic instability or signs of active bleeding  Iron supplementation BID  CBC daily    Chronic diastolic CHF (congestive heart failure) ([facility])  Assessment & Plan  No evidence of acute exacerbation at this time post operatively   Relevant echocardiograms:  7/8/23  TTE: Left ventricular cavity size is normal. Wall thickness is increased. The left ventricular ejection fraction is 50%. Systolic function is low normal. Wall motion cannot be accurately assessed. Left Atrium: The atrium is mildly dilated. Pericardium: There is a moderate to large pericardial effusion circumferential to the heart. There is a reasonable likelihood of cardiac tamponade. The evidence for tamponade includes: right ventricular compression  7/14/23 TTE: LV cavity size is normal. Wall thickness is severely increased. Severe concentric hypertrophy. LVEF 55%. Systolic function is normal. Wall motion is normal. There is a prominent myocardial speckle pattern. Atrial Septum: The septum bows into the right atrium, suggesting increased left atrial pressure. Pericardium: There is a trivial pericardial effusion posterior to the heart. There is no echocardiographic evidence of tamponade.        Plan:   Holding diuresis at this time  Goal to maintain more controlled BP, ideally SBP < 180   Daily weights  Continue home oral antihypertensives:   Amlodipine 10mg daily   Doxazosin 8mg BID  Labetalol 600mg BID  Imdur 20mg TID  PRN antihypertensives:   Labetalol 20mg q4h PRN  Hydralazine 20mg q4h PRN  Cardiac, low Na diet     Depression with anxiety  Assessment & Plan  No acute concerns at this time      Plan:   Continue home regimen:   Abilify 5mg daily   Zyprexa 5mg qHS  Sertraline 200mg daily     Pericardial effusion  Assessment & Plan  Patient with acute-onset epigastric pain presented to [facility] on 7/8/23 and was found on CT to have a pericardiac effusion   Etiology possibly related to uncontrolled hypertension  s/p pericardial window and drain (7/8/23)      Plan:   No indication for new TTE   No longer on colchicine     COVID-19  Assessment & Plan  Incidental positive test  Patient remains on room air    Plan:   Continue to monitor and provide supportive care    GERD (gastroesophageal reflux disease)  Assessment &  Plan  Follows with GI   No new symptoms    Plan:  Carfate 1g TID   Protonix PO 40mg daily     Acute kidney injury superimposed on CKD   Assessment & Plan  Secondary to hypertensive emergency   Baseline creatinine: 1.41 - 1.90  Initial creatinine: 2.97 (12/24/23)   Most recent creatinine: 2.80 (12/25/23)    Plan:   Supportive care with BP management   Strict q4h I/O monitoring  Monitor without keller catheter   Continue to follow renal function tests    Stage 3b chronic kidney disease (CKD) (HCC)  Assessment & Plan  See plan above for TRUDI             Disposition: Stepdown Level 1    ICU Core Measures     A: Assess, Prevent, and Manage Pain Has pain been assessed? Yes  Need for changes to pain regimen? NA   B: Both SAT/SAT  N/A   C: Choice of Sedation RASS Goal: N/A patient not on sedation  Need for changes to sedation or analgesia regimen? NA   D: Delirium CAM-ICU: Negative   E: Early Mobility  Plan for early mobility? Yes   F: Family Engagement Plan for family engagement today? Yes         Prophylaxis:  VTE VTE covered by:  heparin (porcine), Subcutaneous, 5,000 Units at 12/28/23 0500       Stress Ulcer  covered bypantoprazole (PROTONIX) EC tablet 40 mg [[id number]]         Significant 24hr Events     24hr events: Delay in administration of 1800 meds which lead to receiving PRN IV labetalol and hydralazine x 1. 1800 medications given closer to 2100. His BP was substantially low at 2200 when all the medication accumulated. Repeat labetalol x 1 this AM. Totaling 2 labetaolol PRN doses and one hydralazine. Cardene gtt was started prior to receiving scheduled PO doses but, quickly shut off.      Subjective   Review of Systems   Eyes:  Positive for photophobia.   Neurological:  Positive for headaches.      Objective                            Vitals I/O      Most Recent Min/Max in 24hrs   Temp 98.5 °F (36.9 °C) Temp  Min: 98.4 °F (36.9 °C)  Max: 98.5 °F (36.9 °C)   Pulse 59 Pulse  Min: 59  Max: 78   Resp 22 Resp  Min: 16   Max: 27   BP (!) 184/114 BP  Min: 105/57  Max: 228/133   O2 Sat 97 % SpO2  Min: 97 %  Max: 100 %      Intake/Output Summary (Last 24 hours) at 12/28/2023 0529  Last data filed at 12/27/2023 2237  Gross per 24 hour   Intake 527.08 ml   Output 800 ml   Net -272.92 ml       Diet Cardiovascular; Cardiac; Sodium 2 GM    Invasive Monitoring           Physical Exam   Physical Exam  Eyes:      Pupils: Pupils are equal, round, and reactive to light.   Skin:     General: Skin is warm and dry.      Capillary Refill: Capillary refill takes less than 2 seconds.   HENT:      Head: Normocephalic.      Mouth/Throat:      Mouth: Mucous membranes are moist.   Cardiovascular:      Rate and Rhythm: Normal rate and regular rhythm.   Musculoskeletal:      Right lower leg: No edema.      Left lower leg: No edema.   Abdominal:      Palpations: Abdomen is soft.   Pulmonary:      Effort: Pulmonary effort is normal.      Breath sounds: Normal breath sounds.      Comments: On RA   Neurological:      General: No focal deficit present.      Mental Status: He is alert and oriented to person, place and time.      Motor: Strength full and intact in all extremities.            Diagnostic Studies      EKG: NSR   Imaging:  I have personally reviewed pertinent films in PACS     Medications:  Scheduled PRN   amLODIPine, 10 mg, Daily  ARIPiprazole, 5 mg, Daily  atorvastatin, 40 mg, HS  chlorhexidine, 15 mL, Q12H JOANN  doxazosin, 8 mg, BID  ferrous sulfate, 325 mg, BID  heparin (porcine), 5,000 Units, Q8H JOANN  isosorbide dinitrate, 40 mg, TID after meals  labetalol, 600 mg, BID  OLANZapine, 5 mg, HS  pantoprazole, 40 mg, Daily  polyethylene glycol, 17 g, Daily  sertraline, 200 mg, Daily  sucralfate, 1 g, BID      acetaminophen, 650 mg, Q6H PRN  hydrALAZINE, 20 mg, Q4H PRN  labetalol, 20 mg, Q4H PRN  ondansetron, 4 mg, Q6H PRN       Continuous    niCARdipine, 1-15 mg/hr, Last Rate: Stopped (12/27/23 2200)         Labs:    CBC    Recent Labs      12/27/23 0527 12/28/23  0457   WBC 6.35 5.91   HGB 11.5* 10.7*   HCT 34.7* 32.2*    196     BMP    Recent Labs     12/26/23 1901 12/27/23  0527   SODIUM 136 137   K 3.4* 3.5    105   CO2 22 25   AGAP 9 7   BUN 39* 41*   CREATININE 3.05* 2.93*   CALCIUM 8.9 9.4       Coags    No recent results     Additional Electrolytes  Recent Labs     12/27/23 0527 12/28/23 0457   MG 2.0  --    PHOS 3.9  --    CAIONIZED  --  1.15          Blood Gas    No recent results  No recent results LFTs  No recent results    Infectious  No recent results  Glucose  Recent Labs     12/26/23 1901 12/27/23  0527   GLUC 197* 110               Critical Care Time Delivered : Upon my evaluation, this patient had a high probability of imminent or life-threatening deterioration due to Hypertensive emergency and TRUDI on CKD, which required my direct attention, intervention, and personal management.  I have personally provided 20 minutes of critical care time, exclusive of procedures, teaching, family meetings, and any prior time recorded by providers other than myself.     MARBIN Ross

## 2023-12-28 NOTE — PROGRESS NOTES
Critical Care Interval Transfer Note:    Please refer to progress note from earlier today for full details.     Barriers to discharge:   Uptitration of BP medications  TRUDI     Consults: IP CONSULT TO CASE MANAGEMENT  IP CONSULT TO NEPHROLOGY    Recommended to review admission imaging for incidental findings and document in discharge navigator: Chart reviewed, no known incidental findings noted at this time.      Discharge Plan: Anticipate discharge in 48 hrs to home.    PT Recommendations: Home with home health rehabilitation  OT Recommendations: Home with home health rehabilitation      Patient seen and evaluated by Critical Care today and deemed to be appropriate for transfer to Med Surg with Telemetry. Spoke to Dr. Sanchez from [facility] to accept transfer. Critical care can be contacted via [employer] with any questions or concerns.

## 2023-12-28 NOTE — ASSESSMENT & PLAN NOTE
Patient with acute-onset epigastric pain presented to [facility] on 7/8/23 and was found on CT to have a pericardiac effusion   Etiology possibly related to uncontrolled hypertension  s/p pericardial window and drain (7/8/23)      Plan:   No indication for new TTE   No longer on colchicine

## 2023-12-28 NOTE — ASSESSMENT & PLAN NOTE
Likely due to missing multiple doses of oral regimen at home   Patient with known history of long-standing severe hypertension   S/p adrenalectomy at [facility] in 2012 which did not markedly impact his hypertension   7/14/23 Renal doppler: WNL bilaterally   12/24/23: In ED received IV labetalol 20mg, 40mg, 80mg, home 8mg cardura, labetalol 300mg PO, and 10mg IV hydralazine with negligible change     Plan:  Goal -180  Can increase parameters to 180-200 if patient becomes symptomatic with drop  Continue home antihypertensive medications:  Amlodipine 10mg daily   Doxazosin 8mg BID  Labetalol 600mg Increased to TID 12/28  Imdur 40mg TID   Added HCTZ 12/28  PRN medications:   Labetalol 20mg q4h PRN  Hydralazine 20mg q4h PRN  Briefly required Cardene infusion 12/26, weaned off

## 2023-12-28 NOTE — ASSESSMENT & PLAN NOTE
No evidence of acute exacerbation at this time post operatively   Relevant echocardiograms:  7/8/23 TTE: Left ventricular cavity size is normal. Wall thickness is increased. The left ventricular ejection fraction is 50%. Systolic function is low normal. Wall motion cannot be accurately assessed. Left Atrium: The atrium is mildly dilated. Pericardium: There is a moderate to large pericardial effusion circumferential to the heart. There is a reasonable likelihood of cardiac tamponade. The evidence for tamponade includes: right ventricular compression  7/14/23 TTE: LV cavity size is normal. Wall thickness is severely increased. Severe concentric hypertrophy. LVEF 55%. Systolic function is normal. Wall motion is normal. There is a prominent myocardial speckle pattern. Atrial Septum: The septum bows into the right atrium, suggesting increased left atrial pressure. Pericardium: There is a trivial pericardial effusion posterior to the heart. There is no echocardiographic evidence of tamponade.        Plan:   Holding diuresis at this time  Goal to maintain more controlled BP, ideally SBP < 180   Daily weights  Continue home oral antihypertensives:   Amlodipine 10mg daily   Doxazosin 8mg BID  Labetalol 600mg TID  Imdur 20mg TID  HCTZ 12.5 mg QD  PRN antihypertensives:   Labetalol 20mg q4h PRN  Hydralazine 20mg q4h PRN  Cardiac, low Na diet

## 2023-12-28 NOTE — PLAN OF CARE
Problem: Potential for Falls  Goal: Patient will remain free of falls  Description: INTERVENTIONS:  - Educate patient/family on patient safety including physical limitations  - Instruct patient to call for assistance with activity   - Consult OT/PT to assist with strengthening/mobility   - Keep Call bell within reach  - Keep bed low and locked with side rails adjusted as appropriate  - Keep care items and personal belongings within reach  - Initiate and maintain comfort rounds  - Make Fall Risk Sign visible to staff  - Offer Toileting every 2 Hours, in advance of need  - Initiate/Maintain bed alarm  - Obtain necessary fall risk management equipment  - Apply yellow socks and bracelet for high fall risk patients  - Consider moving patient to room near nurses station  Outcome: Progressing     Problem: PAIN - ADULT  Goal: Verbalizes/displays adequate comfort level or baseline comfort level  Description: Interventions:  - Encourage patient to monitor pain and request assistance  - Assess pain using appropriate pain scale  - Administer analgesics based on type and severity of pain and evaluate response  - Implement non-pharmacological measures as appropriate and evaluate response  - Consider cultural and social influences on pain and pain management  - Notify physician/advanced practitioner if interventions unsuccessful or patient reports new pain  Outcome: Progressing     Problem: INFECTION - ADULT  Goal: Absence or prevention of progression during hospitalization  Description: INTERVENTIONS:  - Assess and monitor for signs and symptoms of infection  - Monitor lab/diagnostic results  - Monitor all insertion sites, i.e. indwelling lines, tubes, and drains  - Monitor endotracheal if appropriate and nasal secretions for changes in amount and color  - [place] appropriate cooling/warming therapies per order  - Administer medications as ordered  - Instruct and encourage patient and family to use good hand hygiene  technique  - Identify and instruct in appropriate isolation precautions for identified infection/condition  Outcome: Progressing     Problem: SAFETY ADULT  Goal: Patient will remain free of falls  Description: INTERVENTIONS:  - Educate patient/family on patient safety including physical limitations  - Instruct patient to call for assistance with activity   - Consult OT/PT to assist with strengthening/mobility   - Keep Call bell within reach  - Keep bed low and locked with side rails adjusted as appropriate  - Keep care items and personal belongings within reach  - Initiate and maintain comfort rounds  - Make Fall Risk Sign visible to staff  - Offer Toileting every 2 Hours, in advance of need  - Initiate/Maintain bed alarm  - Obtain necessary fall risk management equipment  - Apply yellow socks and bracelet for high fall risk patients  - Consider moving patient to room near nurses station  Outcome: Progressing  Goal: Maintain or return to baseline ADL function  Description: INTERVENTIONS:  -  Assess patient's ability to carry out ADLs; assess patient's baseline for ADL function and identify physical deficits which impact ability to perform ADLs (bathing, care of mouth/teeth, toileting, grooming, dressing, etc.)  - Assess/evaluate cause of self-care deficits   - Assess range of motion  - Assess patient's mobility; develop plan if impaired  - Assess patient's need for assistive devices and provide as appropriate  - Encourage maximum independence but intervene and supervise when necessary  - Involve family in performance of ADLs  - Assess for home care needs following discharge   - Consider OT consult to assist with ADL evaluation and planning for discharge  - Provide patient education as appropriate  Outcome: Progressing  Goal: Maintains/Returns to pre admission functional level  Description: INTERVENTIONS:  - Perform AM-PAC 6 Click Basic Mobility/ Daily Activity assessment daily.  - Set and communicate daily mobility  goal to care team and patient/family/caregiver.   - Collaborate with rehabilitation services on mobility goals if consulted  - Perform Range of Motion 3 times a day.  - Reposition patient every 2 hours.  - Dangle patient 3 times a day  - Stand patient 3 times a day  - Ambulate patient 3 times a day  - Out of bed to chair 3 times a day   - Out of bed for meals 3 times a day  - Out of bed for toileting  - Record patient progress and toleration of activity level   Outcome: Progressing     Problem: DISCHARGE PLANNING  Goal: Discharge to home or other facility with appropriate resources  Description: INTERVENTIONS:  - Identify barriers to discharge w/patient and caregiver  - Arrange for needed discharge resources and transportation as appropriate  - Identify discharge learning needs (meds, wound care, etc.)  - Arrange for interpretive services to assist at discharge as needed  - Refer to Case Management Department for coordinating discharge planning if the patient needs post-hospital services based on physician/advanced practitioner order or complex needs related to functional status, cognitive ability, or social support system  Outcome: Progressing     Problem: Knowledge Deficit  Goal: Patient/family/caregiver demonstrates understanding of disease process, treatment plan, medications, and discharge instructions  Description: Complete learning assessment and assess knowledge base.  Interventions:  - Provide teaching at level of understanding  - Provide teaching via preferred learning methods  Outcome: Progressing     Problem: CARDIOVASCULAR - ADULT  Goal: Maintains optimal cardiac output and hemodynamic stability  Description: INTERVENTIONS:  - Monitor I/O, vital signs and rhythm  - Monitor for S/S and trends of decreased cardiac output  - Administer and titrate ordered vasoactive medications to optimize hemodynamic stability  - Assess quality of pulses, skin color and temperature  - Assess for signs of decreased  coronary artery perfusion  - Instruct patient to report change in severity of symptoms  Outcome: Progressing  Goal: Absence of cardiac dysrhythmias or at baseline rhythm  Description: INTERVENTIONS:  - Continuous cardiac monitoring, vital signs, obtain 12 lead EKG if ordered  - Administer antiarrhythmic and heart rate control medications as ordered  - Monitor electrolytes and administer replacement therapy as ordered  Outcome: Progressing

## 2023-12-28 NOTE — ASSESSMENT & PLAN NOTE
Patient with acute-onset epigastric pain presented to [facility] on 7/8/23 and was found on CT to have a pericardiac effusion   Etiology possibly related to uncontrolled hypertension  s/p pericardial window and drain (7/8/23)      Plan:   No indication for new TTE   No longer on colchicine

## 2023-12-28 NOTE — ASSESSMENT & PLAN NOTE
Likely due to missing multiple doses of oral regimen at home   Patient with known history of long-standing severe hypertension   S/p adrenalectomy at [facility] in 2012 which did not markedly impact his hypertension   7/14/23 Renal doppler: WNL bilaterally   12/24/23: In ED received IV labetalol 20mg, 40mg, 80mg, home 8mg cardura, labetalol 300mg PO, and 10mg IV hydralazine with negligible change     Plan:  Goal -180  Can increase parameters to 180-200 if patient becomes symptomatic with drop  Continue home antihypertensive medications:  Amlodipine 10mg daily   Doxazosin 8mg BID  Labetalol 600mg BID  Imdur 40mg TID   PRN medications:   Labetalol 20mg q4h PRN  Hydralazine 20mg q4h PRN  Briefly required Cardene infusion 12/26, weaned off

## 2023-12-28 NOTE — ASSESSMENT & PLAN NOTE
Secondary to hypertensive emergency   Baseline creatinine: 1.41 - 1.90  Initial creatinine: 2.97 (12/24/23)   Most recent creatinine: 2.80 (12/25/23)    Plan:   Supportive care with BP management   Strict q4h I/O monitoring  Monitor without keller catheter   Continue to follow renal function tests  Nephrology following

## 2023-12-28 NOTE — CONSULTS
Consultation - Nephrology   Genna Liu 58 y.o. male MRN: [id number]  Unit/Bed#:  Encounter: [id number]    Referring PHYSICIAN: Kermit Gardiner    REASON FOR THE CONSULTATION: Acute kidney injury and uncontrolled blood pressure    DATE OF CONSULTATION: December 28, 2023    ADMISSION DIAGNOSIS: Hypertensive emergency     CHIEF COMPLAINT     Patient with history of CKD and uncontrolled blood pressure came to emergency room with shortness of breath and was found to have hypertensive urgency and admitted    HPI     Patient known to us from prior admission with acute on chronic renal failure and hypertension    Patient has history of CKD and pericardial effusion requiring window likely because of minoxidil    Patient blood pressure fluctuating likely because of multiple side effect with multiple medication    This time patient came to emergency room with shortness of breath workup revealed COVID-positive and blood pressure also high in spite of taking all medication    When I saw the patient in ICU he seems quite comfortable denies any shortness of breath does get headache does get chest pain once in a while    Feeling weak and tired    Has some nausea but no vomiting    Patient does claims he is quite compliant with medication    PAST MEDICAL HISTORY     Past Medical History:   Diagnosis Date    Asthma     Chronic kidney disease     COPD (chronic obstructive pulmonary disease) (HCC)     CPAP (continuous positive airway pressure) dependence     NO LONGER NEEDS D/T BARIATRIC SURGERY.    Diabetes mellitus (HCC)     Emphysema of lung (HCC)     Gout     History of transfusion     pt stated they had a transfusion when they had gallbladder surgery.    Hypertension     Kidney disease     renal failure    Leg DVT (deep venous thromboembolism), acute, bilateral (HCC) 01/2018    Obesity (BMI 30-39.9)     AGUS on CPAP     setting 11    Postgastrectomy malabsorption     Sleep apnea     Systolic CHF (HCC)        PAST SURGICAL  "HISTORY     Past Surgical History:   Procedure Laterality Date    ADRENALECTOMY      CHOLECYSTECTOMY      COLONOSCOPY      EGD      HIATAL HERNIA REPAIR N/A 12/22/2020    Procedure: REPAIR HERNIA HIATAL LAPAROSCOPIC;  Surgeon: Sahne Francis MD;  Location: [facility];  Service: Bariatrics    INCISION AND DRAINAGE OF WOUND Left 10/17/2018    Procedure: INCISION AND DRAINAGE (I&D) GROIN;  Surgeon: Rafy Pascual MD;  Location: [facility];  Service: General    IR IMAGE GUIDED ASPIRATION / DRAINAGE W TUBE  8/17/2018    IR IMAGE GUIDED ASPIRATION / DRAINAGE W TUBE  7/31/2018    JOINT REPLACEMENT Bilateral     knee    KIDNEY SURGERY  2009    nodule removal    LYMPH NODE DISSECTION Left 01/2018    left inguinal LN removed - benign     OTHER SURGICAL HISTORY      kidney nodule removal    PALATE / UVULA BIOPSY / EXCISION      PERICARDIAL WINDOW N/A 7/8/2023    Procedure: WINDOW PERICARDIAL;  Surgeon: Alonso Logan MD;  Location: [facility];  Service: Thoracic    AZ LAPS GSTR RSTCV PX W/BYP JASON-EN-Y LIMB <150 CM N/A 12/22/2020    Procedure: BYPASS GASTRIC  JASON-EN-Y LAPAROSCOPIC WITH INTRAOPERATIVE EGD;  Surgeon: Shane Francis MD;  Location: [facility];  Service: Bariatrics    SINUS SURGERY      TONSILLECTOMY         ALLERGIES     Allergies   Allergen Reactions    Clonidine Anaphylaxis    Lisinopril Anaphylaxis    Nifedipine Anaphylaxis     Tolerating nicardipine    Spironolactone Anaphylaxis, Other (See Comments) and Shortness Of Breath    Buspirone      Headaches,     Enoxaparin      Injection site \"bump\" per Dr. Francis    Hydralazine Other (See Comments)     Fluid around the heart  Lose for Appetite        Minoxidil      Retains fluid around heart       SOCIAL HISTORY     Social History     Substance and Sexual Activity   Alcohol Use Yes    Comment: occasionally     Social History     Substance and Sexual Activity   Drug Use Yes    Types: Marijuana    Comment: Card     Social History     Tobacco Use "   Smoking Status Never    Passive exposure: Past   Smokeless Tobacco Never       FAMILY HISTORY     Family History   Problem Relation Age of Onset    Cancer Father     Kidney disease Mother     Heart murmur Sister     Asthma Sister     Hypertension Sister     Heart disease Brother     Bell's palsy Brother     Hypertension Brother     Deep vein thrombosis Neg Hx        CURRENT MEDICATIONS       Current Facility-Administered Medications:     acetaminophen (TYLENOL) tablet 650 mg, 650 mg, Oral, Q6H PRN, MARBIN Sagastume, 650 mg at 12/27/23 1635    amLODIPine (NORVASC) tablet 10 mg, 10 mg, Oral, Daily, MARBIN Sagastume, 10 mg at 12/28/23 0818    ARIPiprazole (ABILIFY) tablet 5 mg, 5 mg, Oral, Daily, MARBIN Sagastume, 5 mg at 12/28/23 0822    atorvastatin (LIPITOR) tablet 40 mg, 40 mg, Oral, HS, MARBIN Sagastume, 40 mg at 12/27/23 2131    chlorhexidine (PERIDEX) 0.12 % oral rinse 15 mL, 15 mL, Mouth/Throat, Q12H JOANN, MARBIN Sagastume, 15 mL at 12/28/23 0822    doxazosin (CARDURA) tablet 8 mg, 8 mg, Oral, BID, MARBIN Sagastume, 8 mg at 12/28/23 0830    ferrous sulfate tablet 325 mg, 325 mg, Oral, BID, MARBIN Sagastume, 325 mg at 12/28/23 0822    heparin (porcine) subcutaneous injection 5,000 Units, 5,000 Units, Subcutaneous, Q8H JOANN, 5,000 Units at 12/28/23 0500 **AND** [CANCELED] Platelet count, , , Once, MARBIN Sagastume    hydrALAZINE (APRESOLINE) injection 20 mg, 20 mg, Intravenous, Q4H PRN, MARBIN Henley, 20 mg at 12/28/23 0539    isosorbide dinitrate (ISORDIL) tablet 40 mg, 40 mg, Oral, TID after meals, MARBIN Shoemaker, 40 mg at 12/28/23 1212    labetalol (NORMODYNE) injection 20 mg, 20 mg, Intravenous, Q4H PRN, MARBIN Sagastume, 20 mg at 12/28/23 1108    labetalol (NORMODYNE) tablet 600 mg, 600 mg, Oral, Q8H JOANN, Kermit Valadez PA-C    niCARdipine (CARDENE) 25 mg (STANDARD CONCENTRATION) in sodium chloride 0.9% 250  mL, 1-15 mg/hr, Intravenous, Titrated, MARBIN Sagastume, Stopped at 12/27/23 2200    OLANZapine (ZyPREXA) tablet 5 mg, 5 mg, Oral, HS, MARBIN Sagastume, 5 mg at 12/27/23 2132    ondansetron (ZOFRAN) injection 4 mg, 4 mg, Intravenous, Q6H PRN, MARBIN Sagastume, 4 mg at 12/28/23 1106    pantoprazole (PROTONIX) EC tablet 40 mg, 40 mg, Oral, Daily, MARBIN Sagastume, 40 mg at 12/28/23 0822    polyethylene glycol (MIRALAX) packet 17 g, 17 g, Oral, Daily, MARBIN Sagastume, 17 g at 12/28/23 0820    sertraline (ZOLOFT) tablet 200 mg, 200 mg, Oral, Daily, MARBIN Sagastume, 200 mg at 12/28/23 0822    sucralfate (CARAFATE) tablet 1 g, 1 g, Oral, BID, MARBIN Sagastume, 1 g at 12/28/23 0821    REVIEW OF SYSTEMS     Review of Systems   Constitutional:  Positive for fatigue.   HENT:  Negative for congestion.    Eyes:  Negative for photophobia and pain.   Respiratory:  Negative for chest tightness and shortness of breath.    Cardiovascular:  Negative for chest pain and palpitations.   Gastrointestinal:  Negative for abdominal distention, abdominal pain and blood in stool.   Endocrine: Negative for polydipsia.   Genitourinary:  Negative for difficulty urinating, dysuria, flank pain, hematuria and urgency.   Musculoskeletal:  Positive for arthralgias. Negative for back pain.   Skin:  Negative for rash.   Neurological:  Positive for weakness and headaches. Negative for dizziness and light-headedness.   Hematological:  Does not bruise/bleed easily.   Psychiatric/Behavioral:  Negative for behavioral problems. The patient is not nervous/anxious.        LAB RESULTS        Results from last 7 days   Lab Units 12/28/23  0457 12/27/23  0527 12/26/23  1901 12/26/23  0525 12/25/23  0534 12/24/23  2201 12/24/23  1056   WBC Thousand/uL 5.91 6.35  --  3.89* 5.79  --  4.04*   HEMOGLOBIN g/dL 10.7* 11.5*  --  11.2* 12.7  --  11.7*   HEMATOCRIT % 32.2* 34.7*  --  34.2* 38.3  --   35.4*   PLATELETS Thousands/uL 196 210  --  184 200  --  199   POTASSIUM mmol/L 3.6 3.5 3.4* 3.1* 3.4* 3.0* 3.4*   CHLORIDE mmol/L 104 105 105 104 108 106 106   CO2 mmol/L 24 25 22 23 21 23 23   BUN mg/dL 49* 41* 39* 38* 38* 39* 38*   CREATININE mg/dL 3.16* 2.93* 3.05* 2.96* 2.80* 2.86* 2.97*   EGFR ml/min/1.73sq m 20 22 21 21 23 22 21   CALCIUM mg/dL 8.9 9.4 8.9 9.2 9.0 8.8 9.2   MAGNESIUM mg/dL 1.8* 2.0  --  2.2 1.7*  --   --    PHOSPHORUS mg/dL 4.7* 3.9  --   --  3.7  --   --        I have personally reviewed the old medical records and patient's previously known baseline creatinine level is ~1.8    RADIOLOGY RESULTS     Results for orders placed during the hospital encounter of 07/08/23    XR chest portable    Narrative  CHEST    INDICATION:   Post op pericardial window.    COMPARISON: CXR 5/10/2023, abdomen CT 7/8/2023, chest CT 5/10/2023.    EXAM PERFORMED/VIEWS:  XR CHEST PORTABLE.    FINDINGS:    Normal heart size with pericardial drain.    Lungs clear. No effusion or pneumothorax.    Upper abdomen normal. Bones normal for age.    Impression  No acute cardiopulmonary disease.    Pericardial drain.      Workstation performed: [id number]    Results for orders placed in visit on 12/24/23    XR chest pa & lateral    Narrative  CHEST    INDICATION:   R06.01: Orthopnea. Patient has confirmed COVID-19.    COMPARISON: 07/08/2023    EXAM PERFORMED/VIEWS:  XR CHEST PA & LATERAL  Images: 2    FINDINGS:    Cardiomediastinal silhouette appears enlarged.    The lungs are clear.  No pneumothorax or pleural effusion.    Osseous structures appear within normal limits for patient age.    Impression  No acute cardiopulmonary disease.            Workstation performed: [id number]    Results for orders placed during the hospital encounter of 05/21/18    CT chest wo contrast    Narrative  CT CHEST WITHOUT IV CONTRAST    INDICATION:   COPD exacerbation, complicated.    COMPARISON: CTA chest dated March 15, 2018.    TECHNIQUE: CT  examination of the chest was performed without intravenous contrast.  Axial, sagittal, and coronal 2D reformatted images were created from the source data and submitted for interpretation.    Radiation dose length product (DLP) for this visit:  468 mGy-cm .  This examination, like all CT scans performed in the [facility], was performed utilizing techniques to minimize radiation dose exposure, including the use of iterative  reconstruction and automated exposure control.    FINDINGS:    LUNGS:  There is a small focal infiltrate at the posterior right upper lobe suspicious for bronchopneumonia.  There has been interval resolution of the previously described left upper lobe alveolar infiltrate.  Patchy atelectasis versus early infiltrates  are also noted at both lung bases.  There is no pleural effusion or pneumothorax.  There is no tracheal or endobronchial lesion.    PLEURA:  Unremarkable.    HEART/GREAT VESSELS:  Again noted is mild cardiomegaly with a small pericardial effusion similar in size to the prior exam.    MEDIASTINUM AND VIJAYA:  Again noted are multiple prominent to mildly enlarged mediastinal lymph nodes, likely reactive in nature.    CHEST WALL AND LOWER NECK:   Unremarkable.    VISUALIZED STRUCTURES IN THE UPPER ABDOMEN:  The gallbladder is surgically absent.    OSSEOUS STRUCTURES:  No acute fracture or destructive osseous lesion.    Impression  Small infiltrate at the posterior right upper lobe suspicious for bronchopneumonia.  There is also patchy bibasilar atelectasis versus early infiltrates.  A repeat CT chest in 6-8 weeks following treatment is recommended to assess for  improvement/resolution.    Interval resolution of a previously described left upper lobe alveolar infiltrate when compared to a CTA chest dated March 15, 2018.    Stable cardiomegaly with a small pericardial effusion.      Workstation performed: [id number]    No results found for this or any previous  visit.    Results for orders placed during the hospital encounter of 07/08/23    CT abdomen pelvis wo contrast    Narrative  CT ABDOMEN AND PELVIS WITHOUT IV CONTRAST    INDICATION:   Abdominal pain, acute, nonlocalized  abdominal pain.  Vomiting, bloody bowel movements    COMPARISON: CT 5/10/2023, 8/26/2021    TECHNIQUE:  CT examination of the abdomen and pelvis was performed without intravenous contrast. Multiplanar 2D reformatted images were created from the source data.    This examination, like all CT scans performed in the [facility], was performed utilizing techniques to minimize radiation dose exposure, including the use of iterative reconstruction and automated exposure control. Radiation dose length  product (DLP) for this visit:  554 mGy-cm    Enteric contrast was administered.    FINDINGS:  Evaluation of intra-abdominal organs limited by lack of IV contrast given patient's paucity of intra-abdominal fat    ABDOMEN    LOWER CHEST: 7 mm right lower lobe nodule is stable    Moderate pericardial effusion with largest pocket measuring about 3.5 cm anteriorly as noted on series 602/54, this is significantly increased when compared to the prior study. There is adjacent mass effect on the right lateral ventricular wall as noted  on series 2/2    LIVER/BILIARY TREE:  Unremarkable.    GALLBLADDER: Postcholecystectomy    SPLEEN:  Unremarkable.    PANCREAS: Not well visualized    ADRENAL GLANDS:  Unremarkable.    KIDNEYS/URETERS:  Unremarkable. No hydronephrosis.    STOMACH AND BOWEL:  Unremarkable.    APPENDIX:  No findings to suggest appendicitis.    ABDOMINOPELVIC CAVITY: In comparison to the most recent prior studies unenhanced phase, there is likely currently mild mesenteric edema as the bowel wall boundaries are more difficult to visualize    VESSELS: Not well visualized    PELVIS    REPRODUCTIVE ORGANS:  Unremarkable for patient's age.    URINARY BLADDER:  Unremarkable.    ABDOMINAL  WALL/INGUINAL REGIONS:  Unremarkable.    OSSEOUS STRUCTURES:  No acute fracture or destructive osseous lesion.    Impression  1.  Moderate pericardial effusion, significantly increased from prior. Additionally there appears to be mass effect on the right lateral ventricle. Recommend echocardiogram and correlation for cardiac tamponade.  2.  Likely mild mesenteric edema/ascites    I personally discussed this study with JENNIFER RIOS at 7/8/23 4:41 AM    Workstation performed: [id number]    No results found for this or any previous visit.      OBJECTIVE     Current Weight: Weight - Scale: 66.8 kg (147 lb 4.3 oz)  Vitals:    12/28/23 1200   BP: (!) 178/82   Pulse:    Resp: 18   Temp: 98.8 °F (37.1 °C)   SpO2:        Intake/Output Summary (Last 24 hours) at 12/28/2023 1215  Last data filed at 12/28/2023 0800  Gross per 24 hour   Intake 377.08 ml   Output 250 ml   Net 127.08 ml       PHYSICAL EXAMINATION     Physical Exam  Constitutional:       General: He is not in acute distress.     Appearance: He is well-developed. He is ill-appearing.   HENT:      Head: Normocephalic.      Mouth/Throat:      Mouth: Mucous membranes are moist.   Eyes:      General: No scleral icterus.     Conjunctiva/sclera: Conjunctivae normal.   Neck:      Vascular: No JVD.   Cardiovascular:      Rate and Rhythm: Normal rate.      Heart sounds: Normal heart sounds.   Pulmonary:      Effort: Pulmonary effort is normal.      Breath sounds: No wheezing.   Abdominal:      Palpations: Abdomen is soft.      Tenderness: There is no abdominal tenderness.   Musculoskeletal:         General: Normal range of motion.      Cervical back: Neck supple.   Skin:     General: Skin is warm.      Findings: No rash.   Neurological:      Mental Status: He is alert and oriented to person, place, and time.   Psychiatric:         Behavior: Behavior normal.          PLAN / RECOMMENDATIONS      Hypertensive emergency.:  Etiology unclear.  Workup with possible  "secondary cause has been negative including artery Doppler study.  Possibility of renovascular hypertension does exist and will order aldosterone renin level for now though aldosterone level may be high because of uncontrolled blood pressure    Will also add hydrochlorothiazide for now as part of the blood pressure management.  Patient is allergic to multiple medication.  Will monitor closely    Acute kidney injury: Likely because of uncontrolled blood pressure.  Will monitor closely    CKD stage III-IV: Baseline creatinine is about 1.7.  Likely because of hypertension nephrosclerosis    COVID-19 infection: On medication being monitored by ICU    Will follow with you    Thank you for the consultation to participate in patient's care. I have personally discussed my plan with the referring physician.     Brendan Edmond MD  Nephrology  12/28/2023        Portions of the record may have been created with voice recognition software. Occasional wrong word or \"sound a like\" substitutions may have occurred due to the inherent limitations of voice recognition software. Read the chart carefully and recognize, using context, where substitutions have occurred.  "

## 2023-12-28 NOTE — PLAN OF CARE
Problem: Potential for Falls  Goal: Patient will remain free of falls  Description: INTERVENTIONS:  - Educate patient/family on patient safety including physical limitations  - Instruct patient to call for assistance with activity   - Consult OT/PT to assist with strengthening/mobility   - Keep Call bell within reach  - Keep bed low and locked with side rails adjusted as appropriate  - Keep care items and personal belongings within reach  - Initiate and maintain comfort rounds  - Make Fall Risk Sign visible to staff  - Apply yellow socks and bracelet for high fall risk patients  - Consider moving patient to room near nurses station  Outcome: Progressing     Problem: PAIN - ADULT  Goal: Verbalizes/displays adequate comfort level or baseline comfort level  Description: Interventions:  - Encourage patient to monitor pain and request assistance  - Assess pain using appropriate pain scale  - Administer analgesics based on type and severity of pain and evaluate response  - Implement non-pharmacological measures as appropriate and evaluate response  - Consider cultural and social influences on pain and pain management  - Notify physician/advanced practitioner if interventions unsuccessful or patient reports new pain  Outcome: Progressing     Problem: INFECTION - ADULT  Goal: Absence or prevention of progression during hospitalization  Description: INTERVENTIONS:  - Assess and monitor for signs and symptoms of infection  - Monitor lab/diagnostic results  - Monitor all insertion sites, i.e. indwelling lines, tubes, and drains  - Monitor endotracheal if appropriate and nasal secretions for changes in amount and color  - [place] appropriate cooling/warming therapies per order  - Administer medications as ordered  - Instruct and encourage patient and family to use good hand hygiene technique  - Identify and instruct in appropriate isolation precautions for identified infection/condition  Outcome: Progressing     Problem:  SAFETY ADULT  Goal: Patient will remain free of falls  Description: INTERVENTIONS:  - Educate patient/family on patient safety including physical limitations  - Instruct patient to call for assistance with activity   - Consult OT/PT to assist with strengthening/mobility   - Keep Call bell within reach  - Keep bed low and locked with side rails adjusted as appropriate  - Keep care items and personal belongings within reach  - Initiate and maintain comfort rounds  - Make Fall Risk Sign visible to staff  - Apply yellow socks and bracelet for high fall risk patients  - Consider moving patient to room near nurses station  Outcome: Progressing  Goal: Maintain or return to baseline ADL function  Description: INTERVENTIONS:  -  Assess patient's ability to carry out ADLs; assess patient's baseline for ADL function and identify physical deficits which impact ability to perform ADLs (bathing, care of mouth/teeth, toileting, grooming, dressing, etc.)  - Assess/evaluate cause of self-care deficits   - Assess range of motion  - Assess patient's mobility; develop plan if impaired  - Assess patient's need for assistive devices and provide as appropriate  - Encourage maximum independence but intervene and supervise when necessary  - Involve family in performance of ADLs  - Assess for home care needs following discharge   - Consider OT consult to assist with ADL evaluation and planning for discharge  - Provide patient education as appropriate  Outcome: Progressing  Goal: Maintains/Returns to pre admission functional level  Description: INTERVENTIONS:  - Perform AM-PAC 6 Click Basic Mobility/ Daily Activity assessment daily.  - Set and communicate daily mobility goal to care team and patient/family/caregiver.   - Collaborate with rehabilitation services on mobility goals if consulted  - Out of bed for toileting  - Record patient progress and toleration of activity level   Outcome: Progressing     Problem: DISCHARGE PLANNING  Goal:  Discharge to home or other facility with appropriate resources  Description: INTERVENTIONS:  - Identify barriers to discharge w/patient and caregiver  - Arrange for needed discharge resources and transportation as appropriate  - Identify discharge learning needs (meds, wound care, etc.)  - Arrange for interpretive services to assist at discharge as needed  - Refer to Case Management Department for coordinating discharge planning if the patient needs post-hospital services based on physician/advanced practitioner order or complex needs related to functional status, cognitive ability, or social support system  Outcome: Progressing     Problem: Knowledge Deficit  Goal: Patient/family/caregiver demonstrates understanding of disease process, treatment plan, medications, and discharge instructions  Description: Complete learning assessment and assess knowledge base.  Interventions:  - Provide teaching at level of understanding  - Provide teaching via preferred learning methods  Outcome: Progressing     Problem: CARDIOVASCULAR - ADULT  Goal: Maintains optimal cardiac output and hemodynamic stability  Description: INTERVENTIONS:  - Monitor I/O, vital signs and rhythm  - Monitor for S/S and trends of decreased cardiac output  - Administer and titrate ordered vasoactive medications to optimize hemodynamic stability  - Assess quality of pulses, skin color and temperature  - Assess for signs of decreased coronary artery perfusion  - Instruct patient to report change in severity of symptoms  Outcome: Progressing  Goal: Absence of cardiac dysrhythmias or at baseline rhythm  Description: INTERVENTIONS:  - Continuous cardiac monitoring, vital signs, obtain 12 lead EKG if ordered  - Administer antiarrhythmic and heart rate control medications as ordered  - Monitor electrolytes and administer replacement therapy as ordered  Outcome: Progressing

## 2023-12-29 VITALS
BODY MASS INDEX: 23.11 KG/M2 | TEMPERATURE: 98.8 F | HEART RATE: 66 BPM | SYSTOLIC BLOOD PRESSURE: 153 MMHG | DIASTOLIC BLOOD PRESSURE: 96 MMHG | HEIGHT: 67 IN | RESPIRATION RATE: 18 BRPM | WEIGHT: 147.27 LBS | OXYGEN SATURATION: 99 %

## 2023-12-29 LAB
25(OH)D3 SERPL-MCNC: 44.6 NG/ML (ref 30–100)
ANION GAP SERPL CALCULATED.3IONS-SCNC: 9 MMOL/L
BASOPHILS # BLD AUTO: 0.01 THOUSANDS/ÂΜL (ref 0–0.1)
BASOPHILS NFR BLD AUTO: 0 % (ref 0–1)
BUN SERPL-MCNC: 52 MG/DL (ref 5–25)
CA-I BLD-SCNC: 1.17 MMOL/L (ref 1.12–1.32)
CALCIUM SERPL-MCNC: 9 MG/DL (ref 8.4–10.2)
CHLORIDE SERPL-SCNC: 105 MMOL/L (ref 96–108)
CO2 SERPL-SCNC: 23 MMOL/L (ref 21–32)
CREAT SERPL-MCNC: 3.46 MG/DL (ref 0.6–1.3)
CREAT UR-MCNC: 111.6 MG/DL
EOSINOPHIL # BLD AUTO: 0.03 THOUSAND/ÂΜL (ref 0–0.61)
EOSINOPHIL NFR BLD AUTO: 1 % (ref 0–6)
ERYTHROCYTE [DISTWIDTH] IN BLOOD BY AUTOMATED COUNT: 18 % (ref 11.6–15.1)
GFR SERPL CREATININE-BSD FRML MDRD: 18 ML/MIN/1.73SQ M
GLUCOSE SERPL-MCNC: 98 MG/DL (ref 65–140)
HCT VFR BLD AUTO: 33.5 % (ref 36.5–49.3)
HGB BLD-MCNC: 10.9 G/DL (ref 12–17)
IMM GRANULOCYTES # BLD AUTO: 0.03 THOUSAND/UL (ref 0–0.2)
IMM GRANULOCYTES NFR BLD AUTO: 1 % (ref 0–2)
LYMPHOCYTES # BLD AUTO: 1.31 THOUSANDS/ÂΜL (ref 0.6–4.47)
LYMPHOCYTES NFR BLD AUTO: 22 % (ref 14–44)
MAGNESIUM SERPL-MCNC: 2.3 MG/DL (ref 1.9–2.7)
MCH RBC QN AUTO: 26.4 PG (ref 26.8–34.3)
MCHC RBC AUTO-ENTMCNC: 32.5 G/DL (ref 31.4–37.4)
MCV RBC AUTO: 81 FL (ref 82–98)
MICROALBUMIN UR-MCNC: 344.7 MG/L
MICROALBUMIN/CREAT 24H UR: 309 MG/G CREATININE (ref 0–30)
MONOCYTES # BLD AUTO: 0.77 THOUSAND/ÂΜL (ref 0.17–1.22)
MONOCYTES NFR BLD AUTO: 13 % (ref 4–12)
NEUTROPHILS # BLD AUTO: 3.91 THOUSANDS/ÂΜL (ref 1.85–7.62)
NEUTS SEG NFR BLD AUTO: 63 % (ref 43–75)
NRBC BLD AUTO-RTO: 0 /100 WBCS
PHOSPHATE SERPL-MCNC: 4.1 MG/DL (ref 2.3–4.1)
PLATELET # BLD AUTO: 215 THOUSANDS/UL (ref 149–390)
PMV BLD AUTO: 9.5 FL (ref 8.9–12.7)
POTASSIUM SERPL-SCNC: 3.4 MMOL/L (ref 3.5–5.3)
PTH-INTACT SERPL-MCNC: 94.3 PG/ML (ref 12–88)
RBC # BLD AUTO: 4.13 MILLION/UL (ref 3.88–5.62)
SODIUM 24H UR-SCNC: 42 MOL/L
SODIUM SERPL-SCNC: 137 MMOL/L (ref 135–147)
UUN 24H UR-MCNC: 628 MG/DL
WBC # BLD AUTO: 6.06 THOUSAND/UL (ref 4.31–10.16)

## 2023-12-29 PROCEDURE — 84540 ASSAY OF URINE/UREA-N: CPT | Performed by: INTERNAL MEDICINE

## 2023-12-29 PROCEDURE — 82043 UR ALBUMIN QUANTITATIVE: CPT | Performed by: INTERNAL MEDICINE

## 2023-12-29 PROCEDURE — 83970 ASSAY OF PARATHORMONE: CPT | Performed by: PHYSICIAN ASSISTANT

## 2023-12-29 PROCEDURE — 99239 HOSP IP/OBS DSCHRG MGMT >30: CPT | Performed by: INTERNAL MEDICINE

## 2023-12-29 PROCEDURE — 84100 ASSAY OF PHOSPHORUS: CPT | Performed by: PHYSICIAN ASSISTANT

## 2023-12-29 PROCEDURE — 82088 ASSAY OF ALDOSTERONE: CPT | Performed by: PHYSICIAN ASSISTANT

## 2023-12-29 PROCEDURE — 82306 VITAMIN D 25 HYDROXY: CPT | Performed by: PHYSICIAN ASSISTANT

## 2023-12-29 PROCEDURE — 82330 ASSAY OF CALCIUM: CPT | Performed by: PHYSICIAN ASSISTANT

## 2023-12-29 PROCEDURE — 82570 ASSAY OF URINE CREATININE: CPT | Performed by: INTERNAL MEDICINE

## 2023-12-29 PROCEDURE — 83735 ASSAY OF MAGNESIUM: CPT | Performed by: PHYSICIAN ASSISTANT

## 2023-12-29 PROCEDURE — 84300 ASSAY OF URINE SODIUM: CPT | Performed by: INTERNAL MEDICINE

## 2023-12-29 PROCEDURE — 85025 COMPLETE CBC W/AUTO DIFF WBC: CPT | Performed by: PHYSICIAN ASSISTANT

## 2023-12-29 PROCEDURE — 99233 SBSQ HOSP IP/OBS HIGH 50: CPT | Performed by: INTERNAL MEDICINE

## 2023-12-29 PROCEDURE — 80048 BASIC METABOLIC PNL TOTAL CA: CPT | Performed by: PHYSICIAN ASSISTANT

## 2023-12-29 RX ORDER — HYDROCHLOROTHIAZIDE 12.5 MG/1
12.5 TABLET ORAL DAILY
Qty: 30 TABLET | Refills: 0 | Status: SHIPPED | OUTPATIENT
Start: 2023-12-30

## 2023-12-29 RX ORDER — ISOSORBIDE DINITRATE 40 MG/1
40 TABLET ORAL
Qty: 90 TABLET | Refills: 0 | Status: SHIPPED | OUTPATIENT
Start: 2023-12-29

## 2023-12-29 RX ORDER — LABETALOL 300 MG/1
600 TABLET, FILM COATED ORAL EVERY 8 HOURS SCHEDULED
Qty: 180 TABLET | Refills: 0 | Status: SHIPPED | OUTPATIENT
Start: 2023-12-29

## 2023-12-29 RX ADMIN — LABETALOL HYDROCHLORIDE 600 MG: 200 TABLET, FILM COATED ORAL at 05:00

## 2023-12-29 RX ADMIN — ISOSORBIDE DINITRATE 40 MG: 10 TABLET ORAL at 09:26

## 2023-12-29 RX ADMIN — DOXAZOSIN 8 MG: 4 TABLET ORAL at 09:27

## 2023-12-29 RX ADMIN — FERROUS SULFATE TAB 325 MG (65 MG ELEMENTAL FE) 325 MG: 325 (65 FE) TAB at 09:28

## 2023-12-29 RX ADMIN — HYDROCHLOROTHIAZIDE 12.5 MG: 12.5 TABLET ORAL at 09:27

## 2023-12-29 RX ADMIN — HEPARIN SODIUM 5000 UNITS: 5000 INJECTION INTRAVENOUS; SUBCUTANEOUS at 05:00

## 2023-12-29 RX ADMIN — ARIPIPRAZOLE 5 MG: 5 TABLET ORAL at 09:27

## 2023-12-29 RX ADMIN — LABETALOL HYDROCHLORIDE 20 MG: 5 INJECTION, SOLUTION INTRAVENOUS at 00:47

## 2023-12-29 RX ADMIN — PANTOPRAZOLE SODIUM 40 MG: 40 TABLET, DELAYED RELEASE ORAL at 09:28

## 2023-12-29 RX ADMIN — SERTRALINE HYDROCHLORIDE 200 MG: 50 TABLET ORAL at 09:28

## 2023-12-29 RX ADMIN — CHLORHEXIDINE GLUCONATE 0.12% ORAL RINSE 15 ML: 1.2 LIQUID ORAL at 09:28

## 2023-12-29 RX ADMIN — HYDRALAZINE HYDROCHLORIDE 20 MG: 20 INJECTION INTRAMUSCULAR; INTRAVENOUS at 03:45

## 2023-12-29 RX ADMIN — AMLODIPINE BESYLATE 10 MG: 10 TABLET ORAL at 09:27

## 2023-12-29 RX ADMIN — SUCRALFATE 1 G: 1 TABLET ORAL at 09:29

## 2023-12-29 NOTE — DISCHARGE SUMMARY
[facility]  Discharge- Genna Liu 1965, 58 y.o. male MRN: [id number]  Unit/Bed#:  Encounter: [id number]  Primary Care Provider: Ugo Mraina DO   Date and time admitted to hospital: 12/24/2023 10:34 AM    * Hypertensive emergency  Assessment & Plan  BP slowly improving however still noted to be elevated with SBP approximately 160  Will continue amlodipine, Cardura, labetalol, HCTZ , isordil on discharge    COVID-19  Assessment & Plan  Saturating adequately on room air  Continue monitor O2 sats    GERD (gastroesophageal reflux disease)  Assessment & Plan  Continue PPI and Pepcid    Depression with anxiety  Assessment & Plan  Continue Zoloft, Zyprexa, Abilify    Acute kidney injury superimposed on CKD   Assessment & Plan  Lab Results   Component Value Date    EGFR 18 12/29/2023    EGFR 20 12/28/2023    EGFR 22 12/27/2023    CREATININE 3.46 (H) 12/29/2023    CREATININE 3.16 (H) 12/28/2023    CREATININE 2.93 (H) 12/27/2023   Kidney function noted to be worsening  Possibly volume depletion and worsening hypertension  Patient is decided to leave AGAINST MEDICAL ADVICE.  Risks were discussed for worsening renal failure still decided to leave AGAINST MEDICAL ADVICE    COPD (chronic obstructive pulmonary disease) (HCC)  Assessment & Plan  Not in acute exacerbation  Continue as needed inhaler        Discharging Physician / Practitioner: Nickolas Solares MD  PCP: Ugo Marina DO  Admission Date:   Admission Orders (From admission, onward)       Ordered        12/24/23 1609  INPATIENT ADMISSION  Once                          Discharge Date: 12/29/23    Medical Problems       Resolved Problems  Date Reviewed: 12/29/2023   None         Consultations During Hospital Stay:  Nephro, icu    Procedures Performed:   none    Significant Findings / Test Results:   XR chest pa & lateral    Result Date: 12/25/2023  Impression: No acute cardiopulmonary disease. Workstation performed:  "[id number]      Incidental Findings:   none     Test Results Pending at Discharge (will require follow up):   none     Outpatient Tests Requested:  none    Complications:  none    Reason for Admission: Hypertensive emergency    Hospital Course:     Genna Liu is a 58 y.o. male patient who originally presented to the hospital on 12/24/2023 due to hypertensive emergency acute kidney injury.  He was treated with Cardene drip in the ICU initially improved.   Kidney function however noted to be worsening.  Patient is to leave AGAINST MEDICAL ADVICE.  Was counseled on the risks of worsening renal failure and potential death and still  decided to leave AGAINST MEDICAL ADVICE        Please see above list of diagnoses and related plan for additional information.     Condition at Discharge: stable     Discharge Day Visit / Exam:     Subjective: Denies chest pain, shortness of breath, cough, fevers  Vitals: Blood Pressure: 153/96 (12/29/23 0948)  Pulse: 66 (12/29/23 0948)  Temperature: 98.8 °F (37.1 °C) (12/28/23 2111)  Temp Source: Oral (12/28/23 2111)  Respirations: 18 (12/29/23 0948)  Height: 5' 7\" (170.2 cm) (12/24/23 1700)  Weight - Scale: 66.8 kg (147 lb 4.3 oz) (12/27/23 0500)  SpO2: 99 % (12/29/23 0120)  Exam:   Physical Exam  Constitutional:       General: He is not in acute distress.     Appearance: He is well-developed. He is not diaphoretic.   HENT:      Head: Normocephalic and atraumatic.      Nose: Nose normal.      Mouth/Throat:      Pharynx: No oropharyngeal exudate.   Eyes:      General: No scleral icterus.     Conjunctiva/sclera: Conjunctivae normal.   Cardiovascular:      Rate and Rhythm: Normal rate and regular rhythm.      Heart sounds: Normal heart sounds. No murmur heard.     No friction rub. No gallop.   Pulmonary:      Effort: Pulmonary effort is normal. No respiratory distress.      Breath sounds: Normal breath sounds. No wheezing or rales.   Chest:      Chest wall: No tenderness.   Abdominal:      " General: Bowel sounds are normal. There is no distension.      Palpations: Abdomen is soft.      Tenderness: There is no abdominal tenderness. There is no guarding.   Musculoskeletal:         General: No tenderness or deformity. Normal range of motion.      Cervical back: Normal range of motion and neck supple.   Skin:     General: Skin is warm and dry.      Findings: No erythema.   Neurological:      Mental Status: He is alert. Mental status is at baseline.         Discussion with Family: pt, declined family update    Discharge instructions/Information to patient and family:   See after visit summary for information provided to patient and family.      Provisions for Follow-Up Care:  See after visit summary for information related to follow-up care and any pertinent home health orders.      Disposition:     Home    For Discharges to [facility] SNF:   Not Applicable to this Patient - Not Applicable to this Patient    Planned Readmission: none     Discharge Statement:  I spent 60 minutes discharging the patient. This time was spent on the day of discharge. I had direct contact with the patient on the day of discharge. Greater than 50% of the total time was spent examining patient, answering all patient questions, arranging and discussing plan of care with patient as well as directly providing post-discharge instructions.  Additional time then spent on discharge activities.    Discharge Medications:  See after visit summary for reconciled discharge medications provided to patient and family.      ** Please Note: This note has been constructed using a voice recognition system **

## 2023-12-29 NOTE — ASSESSMENT & PLAN NOTE
BP slowly improving however still noted to be elevated with SBP approximately 160  Will continue amlodipine, Cardura, labetalol, HCTZ , isordil on discharge

## 2023-12-29 NOTE — PLAN OF CARE
Problem: Potential for Falls  Goal: Patient will remain free of falls  Description: INTERVENTIONS:  - Educate patient/family on patient safety including physical limitations  - Instruct patient to call for assistance with activity   - Consult OT/PT to assist with strengthening/mobility   - Keep Call bell within reach  - Keep bed low and locked with side rails adjusted as appropriate  - Keep care items and personal belongings within reach  - Initiate and maintain comfort rounds  - Make Fall Risk Sign visible to staff  - Apply yellow socks and bracelet for high fall risk patients  - Consider moving patient to room near nurses station  Outcome: Progressing     Problem: PAIN - ADULT  Goal: Verbalizes/displays adequate comfort level or baseline comfort level  Description: Interventions:  - Encourage patient to monitor pain and request assistance  - Assess pain using appropriate pain scale  - Administer analgesics based on type and severity of pain and evaluate response  - Implement non-pharmacological measures as appropriate and evaluate response  - Consider cultural and social influences on pain and pain management  - Notify physician/advanced practitioner if interventions unsuccessful or patient reports new pain  Outcome: Progressing     Problem: INFECTION - ADULT  Goal: Absence or prevention of progression during hospitalization  Description: INTERVENTIONS:  - Assess and monitor for signs and symptoms of infection  - Monitor lab/diagnostic results  - Monitor all insertion sites, i.e. indwelling lines, tubes, and drains  - Monitor endotracheal if appropriate and nasal secretions for changes in amount and color  - [place] appropriate cooling/warming therapies per order  - Administer medications as ordered  - Instruct and encourage patient and family to use good hand hygiene technique  - Identify and instruct in appropriate isolation precautions for identified infection/condition  Outcome: Progressing     Problem:  SAFETY ADULT  Goal: Patient will remain free of falls  Description: INTERVENTIONS:  - Educate patient/family on patient safety including physical limitations  - Instruct patient to call for assistance with activity   - Consult OT/PT to assist with strengthening/mobility   - Keep Call bell within reach  - Keep bed low and locked with side rails adjusted as appropriate  - Keep care items and personal belongings within reach  - Initiate and maintain comfort rounds  - Make Fall Risk Sign visible to staff  - Apply yellow socks and bracelet for high fall risk patients  - Consider moving patient to room near nurses station  Outcome: Progressing  Goal: Maintain or return to baseline ADL function  Description: INTERVENTIONS:  -  Assess patient's ability to carry out ADLs; assess patient's baseline for ADL function and identify physical deficits which impact ability to perform ADLs (bathing, care of mouth/teeth, toileting, grooming, dressing, etc.)  - Assess/evaluate cause of self-care deficits   - Assess range of motion  - Assess patient's mobility; develop plan if impaired  - Assess patient's need for assistive devices and provide as appropriate  - Encourage maximum independence but intervene and supervise when necessary  - Involve family in performance of ADLs  - Assess for home care needs following discharge   - Consider OT consult to assist with ADL evaluation and planning for discharge  - Provide patient education as appropriate  Outcome: Progressing  Goal: Maintains/Returns to pre admission functional level  Description: INTERVENTIONS:  - Perform AM-PAC 6 Click Basic Mobility/ Daily Activity assessment daily.  - Set and communicate daily mobility goal to care team and patient/family/caregiver.   - Collaborate with rehabilitation services on mobility goals if consulted  - Out of bed for toileting  - Record patient progress and toleration of activity level   Outcome: Progressing     Problem: DISCHARGE PLANNING  Goal:  Discharge to home or other facility with appropriate resources  Description: INTERVENTIONS:  - Identify barriers to discharge w/patient and caregiver  - Arrange for needed discharge resources and transportation as appropriate  - Identify discharge learning needs (meds, wound care, etc.)  - Arrange for interpretive services to assist at discharge as needed  - Refer to Case Management Department for coordinating discharge planning if the patient needs post-hospital services based on physician/advanced practitioner order or complex needs related to functional status, cognitive ability, or social support system  Outcome: Progressing     Problem: Knowledge Deficit  Goal: Patient/family/caregiver demonstrates understanding of disease process, treatment plan, medications, and discharge instructions  Description: Complete learning assessment and assess knowledge base.  Interventions:  - Provide teaching at level of understanding  - Provide teaching via preferred learning methods  Outcome: Progressing     Problem: CARDIOVASCULAR - ADULT  Goal: Maintains optimal cardiac output and hemodynamic stability  Description: INTERVENTIONS:  - Monitor I/O, vital signs and rhythm  - Monitor for S/S and trends of decreased cardiac output  - Administer and titrate ordered vasoactive medications to optimize hemodynamic stability  - Assess quality of pulses, skin color and temperature  - Assess for signs of decreased coronary artery perfusion  - Instruct patient to report change in severity of symptoms  Outcome: Progressing  Goal: Absence of cardiac dysrhythmias or at baseline rhythm  Description: INTERVENTIONS:  - Continuous cardiac monitoring, vital signs, obtain 12 lead EKG if ordered  - Administer antiarrhythmic and heart rate control medications as ordered  - Monitor electrolytes and administer replacement therapy as ordered  Outcome: Progressing

## 2023-12-29 NOTE — ASSESSMENT & PLAN NOTE
Lab Results   Component Value Date    EGFR 18 12/29/2023    EGFR 20 12/28/2023    EGFR 22 12/27/2023    CREATININE 3.46 (H) 12/29/2023    CREATININE 3.16 (H) 12/28/2023    CREATININE 2.93 (H) 12/27/2023   Kidney function noted to be worsening  Possibly volume depletion and worsening hypertension  Patient is decided to leave AGAINST MEDICAL ADVICE.  Risks were discussed for worsening renal failure still decided to leave AGAINST MEDICAL ADVICE

## 2023-12-29 NOTE — PROGRESS NOTES
NEPHROLOGY PROGRESS NOTE    Patient: Genna Liu               Sex: male          DOA: 12/24/2023 10:34 AM   YOB: 1965        Age: 58 y.o.         LOS:  LOS: 5 days   Encounter Date: 12/29/2023    REASON FOR THE CONSULTATION: Further management of TRUDI    HPI     This is a 58 y.o. male admitted for Hypertensive emergency     SUBJECTIVE     -Patient was seen earlier this morning.  Also found to be COVID-19 positive but currently not hypoxic.  According to patient he is tolerating his antihypertensive medication well when he takes it with food.  Patient denies nausea, vomiting, headache or dizziness today.  - Reviewed last 24 hrs events     CURRENT MEDICATIONS       Current Facility-Administered Medications:     acetaminophen (TYLENOL) tablet 650 mg, 650 mg, Oral, Q6H PRN, HAYLEE [place]-PASQUALE, 650 mg at 12/27/23 1635    amLODIPine (NORVASC) tablet 10 mg, 10 mg, Oral, Daily, HAYLEE [place]-C, 10 mg at 12/29/23 0927    ARIPiprazole (ABILIFY) tablet 5 mg, 5 mg, Oral, Daily, HAYLEE [place]-C, 5 mg at 12/29/23 0927    atorvastatin (LIPITOR) tablet 40 mg, 40 mg, Oral, HS, HAYLEE [place]-C, 40 mg at 12/28/23 2113    chlorhexidine (PERIDEX) 0.12 % oral rinse 15 mL, 15 mL, Mouth/Throat, Q12H JOANN, XOCHILT EspañaC, 15 mL at 12/29/23 0928    doxazosin (CARDURA) tablet 8 mg, 8 mg, Oral, BID, HAYLEE [place]-C, 8 mg at 12/29/23 0927    ferrous sulfate tablet 325 mg, 325 mg, Oral, BID, HAYLEE [place]-C, 325 mg at 12/29/23 0928    heparin (porcine) subcutaneous injection 5,000 Units, 5,000 Units, Subcutaneous, Q8H JOANN, 5,000 Units at 12/29/23 0500 **AND** [CANCELED] Platelet count, , , Once, MARBIN Sagastume    hydrALAZINE (APRESOLINE) injection 20 mg, 20 mg, Intravenous, Q4H PRN, HAYLEE [place]-C, 20 mg at 12/29/23 0345    hydrochlorothiazide (HYDRODIURIL) tablet 12.5 mg, 12.5 mg, Oral, Daily, Kermti Valadez PA-C, 12.5 mg at 12/29/23 0927    isosorbide dinitrate (ISORDIL) tablet 40 mg, 40 mg, Oral, TID  after meals, Kermit Valadez PA-C, 40 mg at 12/29/23 0926    labetalol (NORMODYNE) injection 20 mg, 20 mg, Intravenous, Q4H PRN, Kermit Valadez PA-C, 20 mg at 12/29/23 0047    labetalol (NORMODYNE) tablet 600 mg, 600 mg, Oral, Q8H JOANN, Kermit Valadez PA-C, 600 mg at 12/29/23 0500    OLANZapine (ZyPREXA) tablet 5 mg, 5 mg, Oral, HS, Kermit Valadez PA-C, 5 mg at 12/28/23 2113    ondansetron (ZOFRAN) injection 4 mg, 4 mg, Intravenous, Q6H PRN, Kermit Valadez PA-C, 4 mg at 12/28/23 1106    pantoprazole (PROTONIX) EC tablet 40 mg, 40 mg, Oral, Daily, Kermit Valadez PA-C, 40 mg at 12/29/23 0928    polyethylene glycol (MIRALAX) packet 17 g, 17 g, Oral, Daily, Kermit Valadez PA-C, 17 g at 12/28/23 0820    sertraline (ZOLOFT) tablet 200 mg, 200 mg, Oral, Daily, Kermit Valadez PA-C, 200 mg at 12/29/23 0928    sucralfate (CARAFATE) tablet 1 g, 1 g, Oral, BID, Kermit Valadez PA-C, 1 g at 12/29/23 0929    Current Outpatient Medications:     [START ON 12/30/2023] hydrochlorothiazide (HYDRODIURIL) 12.5 mg tablet, Take 1 tablet (12.5 mg total) by mouth daily, Disp: 30 tablet, Rfl: 0    isosorbide dinitrate (ISORDIL) 40 MG tablet, Take 1 tablet (40 mg total) by mouth 3 (three) times daily after meals, Disp: 90 tablet, Rfl: 0    labetalol (NORMODYNE) 300 mg tablet, Take 2 tablets (600 mg total) by mouth every 8 (eight) hours, Disp: 180 tablet, Rfl: 0    pantoprazole (PROTONIX) 40 mg tablet, Take 40 mg by mouth daily, Disp: , Rfl:     polyethylene glycol (MIRALAX) 17 g packet, Take 17 g by mouth daily, Disp: , Rfl:     albuterol (PROVENTIL HFA,VENTOLIN HFA) 90 mcg/act inhaler, , Disp: , Rfl:     amLODIPine (NORVASC) 10 mg tablet, Take 10 mg by mouth, Disp: , Rfl:     ARIPiprazole (ABILIFY) 5 mg tablet, Take 5 mg by mouth daily, Disp: , Rfl:     atorvastatin (LIPITOR) 40 mg tablet, Take 40 mg by mouth daily at bedtime, Disp: , Rfl:     doxazosin (CARDURA) 8 MG tablet, Take 1 tablet (8 mg total) by mouth 2 (two) times a day, Disp: 90 tablet, Rfl: 6     "ferrous sulfate 325 (65 Fe) mg tablet, Take 325 mg by mouth 2 (two) times a day  , Disp: , Rfl:     OLANZapine (ZyPREXA) 2.5 mg tablet, Take 5 mg by mouth daily at bedtime, Disp: , Rfl:     sertraline (ZOLOFT) 100 mg tablet, Take 2 tablets by mouth daily, Disp: , Rfl:     sucralfate (CARAFATE) 1 g tablet, Take 1 g by mouth 2 (two) times a day, Disp: , Rfl:     OBJECTIVE     Current Weight: Weight - Scale: 66.8 kg (147 lb 4.3 oz)  /96   Pulse 66   Temp 98.8 °F (37.1 °C) (Oral)   Resp 18   Ht 5' 7\" (1.702 m)   Wt 66.8 kg (147 lb 4.3 oz)   SpO2 99%   BMI 23.07 kg/m²   Vitals:    12/29/23 0948   BP: 153/96   Pulse: 66   Resp: 18   Temp:    SpO2:      Body mass index is 23.07 kg/m².    Intake/Output Summary (Last 24 hours) at 12/29/2023 1012  Last data filed at 12/29/2023 0445  Gross per 24 hour   Intake 80 ml   Output 540 ml   Net -460 ml       PHYSICAL EXAMINATION     Physical Exam  Constitutional:       General: He is not in acute distress.  HENT:      Right Ear: External ear normal.   Eyes:      Conjunctiva/sclera:      Right eye: No hemorrhage.  Neck:      Thyroid: No thyromegaly.   Pulmonary:      Effort: No accessory muscle usage or respiratory distress.   Abdominal:      General: There is no distension.   Skin:     Coloration: Skin is not jaundiced.   Psychiatric:         Behavior: Behavior is not combative.           LAB RESULTS     Results from last 7 days   Lab Units 12/29/23  0454 12/28/23  0457 12/27/23  0527 12/26/23  1901 12/26/23  0525 12/25/23  0534 12/24/23  2201 12/24/23  1056   WBC Thousand/uL 6.06 5.91 6.35  --  3.89* 5.79  --  4.04*   HEMOGLOBIN g/dL 10.9* 10.7* 11.5*  --  11.2* 12.7  --  11.7*   HEMATOCRIT % 33.5* 32.2* 34.7*  --  34.2* 38.3  --  35.4*   PLATELETS Thousands/uL 215 196 210  --  184 200  --  199   SODIUM mmol/L 137 136 137 136 137 138 138 138   POTASSIUM mmol/L 3.4* 3.6 3.5 3.4* 3.1* 3.4* 3.0* 3.4*   CHLORIDE mmol/L 105 104 105 105 104 108 106 106   CO2 mmol/L 23 24 25 " 22 23 21 23 23   BUN mg/dL 52* 49* 41* 39* 38* 38* 39* 38*   CREATININE mg/dL 3.46* 3.16* 2.93* 3.05* 2.96* 2.80* 2.86* 2.97*   EGFR ml/min/1.73sq m 18 20 22 21 21 23 22 21   CALCIUM mg/dL 9.0 8.9 9.4 8.9 9.2 9.0 8.8 9.2   MAGNESIUM mg/dL 2.3 1.8* 2.0  --  2.2 1.7*  --   --    PHOSPHORUS mg/dL 4.1 4.7* 3.9  --   --  3.7  --   --        I have personally reviewed the old medical records and patient's previously known baseline creatinine level is ~ 2.0    RADIOLOGY RESULTS      No orders to display       PLAN / RECOMMENDATIONS      1. TRUDI on top of CKD stage III.  Multifactorial    Upon review of old medical record from epic chart review and also from care everywhere, currently being followed by nephrologist at [facility]-Dr. Reese and previously known baseline creatinine is thought to be around 2.0.  Overnight renal function has worsened to current creatinine of 3.46.  Patient is also found to be COVID-19 positive but currently not hypoxic and would like to give patient IV fluid as a volume challenge trial to see if renal function improves.  Also discussed with patient that if renal function continue to worsen, may end up needing to start dialysis soon.  Patient told me today that he would like to go home and I have discussed at length with patient that it would not be advisable from medical standpoint to discharge him especially in the light of worsening renal function since worsening TRUDI can be life-threatening.  Patient understand life-threatening complications of worsening renal function but adamant to go home and wants to leave AMA.  Will reach out to primary team regarding patient's decision of leaving AMA.    If patient ended up staying in the hospital today, recommend initiation of normal saline at 100 mm/h and plan to recheck renal function with a.m. labs    2.  Hypertension and chronic kidney disease.  Seems to me that patient has resistant hypertension and also allergy to many medications  "including minoxidil, hydralazine, spironolactone, lisinopril, clonidine and nifedipine [According to allergy list].    According to patient he is now tolerating antihypertensive medication well when he takes with food and currently on amlodipine 10 mg p.o. daily, doxazosin 8 mg p.o. twice daily, hydrochlorothiazide 12.5 mg p.o. daily, isosorbide 40 mg p.o. 3 times daily and labetalol 800 mg p.o. 3 times daily.  Blood pressure is still not ideally controlled but since patient is tolerating medication well with the food, would recommend monitoring hypertension on current regimen today.    Overall above mentioned plan and recommendations were also d/w current [facility] physician that patient wants to leave AM.    Carleen Acosta MD  Nephrology  12/29/2023        Portions of the record may have been created with voice recognition software. Occasional wrong word or \"sound a like\" substitutions may have occurred due to the inherent limitations of voice recognition software. Read the chart carefully and recognize, using context, where substitutions have occurred.  "

## 2023-12-30 LAB
BACTERIA BLD CULT: NORMAL
BACTERIA BLD CULT: NORMAL

## 2024-01-04 LAB — ALDOST SERPL-MCNC: 4.1 NG/DL (ref 0–30)

## 2024-01-13 LAB — RENIN PLAS-CCNC: 0.25 NG/ML/HR (ref 0.17–5.38)

## 2024-01-22 NOTE — ASSESSMENT & PLAN NOTE
-s/p Shamika-En-Y Gastric Bypass and HH repair with Dr. Gamboa on 12/22/20.  Overall doing well, slowly regaining weight after his recent hospitalization for HTN and TRUDI. Discussed increasing protein and nutrient density and avoiding further weight loss. He will f/u with surgical RD and f/u with me in 6 months.    Initial: 241.5lbs  Current: 144lbs  EWL: 121%  Wilfred: 144 (current)  Current BMI is Body mass index is 23.07 kg/m².    Tolerating a regular diet-yes  Eating at least 60 grams of protein per day-yes  Following 30/60 minute rule with liquids-yes  Drinking at least 64 ounces of fluid per day-yes, but encouraged more water  Drinking carbonated beverages-no  Sufficient exercise-yes walking daily with [place]  Using NSAIDs regularly-no  Using nicotine-no  Using alcohol-no. Advised about the risks of alcohol s/p bariatric surgery and recommend avoiding all alcohol

## 2024-01-22 NOTE — ASSESSMENT & PLAN NOTE
-At risk for malabsorption of vitamins/minerals secondary to malabsorption and restriction of intake from bariatric surgery  -NOT Currently taking adequate postop bariatric surgery vitamin supplementation: Centrum Silver MVI x2, calcium citrate 500mg   -Recommend increase calcium to TID      (No vitamin A or PTH d/t CKD)    -Overdue for CBC/Metabolic panel  -Patient received education about the importance of adhering to a lifelong supplementation regimen to avoid vitamin/mineral deficiencies

## 2024-01-25 ENCOUNTER — OFFICE VISIT (OUTPATIENT)
Dept: BARIATRICS | Facility: CLINIC | Age: 59
End: 2024-01-25
Payer: COMMERCIAL

## 2024-01-25 VITALS
RESPIRATION RATE: 18 BRPM | DIASTOLIC BLOOD PRESSURE: 110 MMHG | OXYGEN SATURATION: 99 % | HEIGHT: 66 IN | HEART RATE: 77 BPM | WEIGHT: 144 LBS | SYSTOLIC BLOOD PRESSURE: 172 MMHG | BODY MASS INDEX: 23.14 KG/M2

## 2024-01-25 DIAGNOSIS — K91.2 POSTSURGICAL MALABSORPTION: ICD-10-CM

## 2024-01-25 DIAGNOSIS — I10 ESSENTIAL HYPERTENSION: ICD-10-CM

## 2024-01-25 DIAGNOSIS — Z48.815 ENCOUNTER FOR SURGICAL AFTERCARE FOLLOWING SURGERY OF DIGESTIVE SYSTEM: Primary | ICD-10-CM

## 2024-01-25 PROCEDURE — 99214 OFFICE O/P EST MOD 30 MIN: CPT | Performed by: PHYSICIAN ASSISTANT

## 2024-01-25 RX ORDER — BUSPIRONE HYDROCHLORIDE 15 MG/1
15 TABLET ORAL 2 TIMES DAILY
COMMUNITY

## 2024-01-25 RX ORDER — COLCHICINE 0.6 MG/1
0.6 TABLET ORAL DAILY
COMMUNITY

## 2024-01-25 RX ORDER — LORAZEPAM 1 MG/1
TABLET ORAL
COMMUNITY
Start: 2023-12-20

## 2024-01-25 RX ORDER — POTASSIUM CHLORIDE 750 MG/1
10 CAPSULE, EXTENDED RELEASE ORAL DAILY
COMMUNITY

## 2024-01-25 NOTE — PROGRESS NOTES
Assessment/Plan:    Encounter for surgical aftercare following surgery of digestive system  -s/p Shamika-En-Y Gastric Bypass and HH repair with Dr. Gamboa on 12/22/20.  Overall doing well, slowly regaining weight after his recent hospitalization for HTN and TRUDI. Discussed increasing protein and nutrient density and avoiding further weight loss. He will f/u with surgical RD and f/u with me in 6 months.    Initial: 241.5lbs  Current: 144lbs  EWL: 121%  Wilfred: 144 (current)  Current BMI is Body mass index is 23.07 kg/m².    Tolerating a regular diet-yes  Eating at least 60 grams of protein per day-yes  Following 30/60 minute rule with liquids-yes  Drinking at least 64 ounces of fluid per day-yes, but encouraged more water  Drinking carbonated beverages-no  Sufficient exercise-yes walking daily with [employer]  Using NSAIDs regularly-no  Using nicotine-no  Using alcohol-no. Advised about the risks of alcohol s/p bariatric surgery and recommend avoiding all alcohol    Postsurgical malabsorption  -At risk for malabsorption of vitamins/minerals secondary to malabsorption and restriction of intake from bariatric surgery  -NOT Currently taking adequate postop bariatric surgery vitamin supplementation: Centrum Silver MVI x2, calcium citrate 500mg   -Recommend increase calcium to TID      (No vitamin A or PTH d/t CKD)    -Overdue for CBC/Metabolic panel  -Patient received education about the importance of adhering to a lifelong supplementation regimen to avoid vitamin/mineral deficiencies     Essential hypertension  -on multiple antihypertensives  -continue monitoring and management with PCP and Cards     Diagnoses and all orders for this visit:    Encounter for surgical aftercare following surgery of digestive system    Postsurgical malabsorption  -     CBC and differential; Future  -     Comprehensive metabolic panel; Future  -     Folate; Future  -     Hemoglobin A1C; Future  -     Iron Panel (Includes Ferritin,  Iron Sat%, Iron, and TIBC); Future  -     Zinc; Future  -     Vitamin D 25 hydroxy; Future  -     Vitamin B12; Future  -     Vitamin B1, whole blood; Future  -     Cancel: Vitamin A; Future  -     Cancel: PTH, intact; Future  -     Lipid panel; Future    Essential hypertension    Other orders  -     LORazepam (ATIVAN) 1 mg tablet; TAKE 1 TABLET BY MOUTH ONCE DAILY AS NEEDED FOR ANXIETY (TAKE 1 TABLET PRIOR TO MRI OR CT SCAN)  -     busPIRone (BUSPAR) 15 mg tablet; Take 15 mg by mouth 2 (two) times a day Dr. Levi Brar  -     colchicine (COLCRYS) 0.6 mg tablet; Take 0.6 mg by mouth daily  -     Multiple Vitamins-Minerals (CENTRUM ADULT PO); Take by mouth  -     potassium chloride (MICRO-K) 10 MEQ CR capsule; Take 10 mEq by mouth daily One in AM. Dr. Marina          Subjective:      Patient ID: Genna Liu is a 58 y.o. male.    -s/p Shamika-En-Y Gastric Bypass and HH repair with Dr. Gamboa on 12/22/20.  Presents to the office today for routine follow up. Tolerating diet without issues; denies N/V, dysphagia, reflux. He was admitted on 12/24/23 for Hypertensive emergency and severe TRUDI but left AMA d/t he and his wife had COVID And he needed to take care of her. He is feeling well now but he did lose to 129lbs during this time. He has slowly been regaining weight and feeling stronger.    Walks daily with his doodle [employer].     Diet Recall:   B - sausage and cheese on english muffin  Snack - apple or pear or Greek yogurt  L - lunchable - only 11g protein   Snack - handful nuts  D - beef 4oz and broccoli or chicken and rice and cheese casserole    Fluids - 64oz+ water    The following portions of the patient's history were reviewed and updated as appropriate: allergies, current medications, past family history, past medical history, past social history, past surgical history and problem list.    Review of Systems   Constitutional:  Positive for unexpected weight change (slowly regaining healthy weight). Negative for  "chills and fever.   HENT:  Negative for trouble swallowing.    Respiratory:  Negative for cough and shortness of breath.    Cardiovascular:  Negative for chest pain and palpitations.   Gastrointestinal:  Negative for abdominal pain, constipation, diarrhea, nausea and vomiting.   Neurological:  Negative for dizziness.   Psychiatric/Behavioral:          Denies anxiety and depression         Objective:      BP (!) 172/110 (BP Location: Left arm, Patient Position: Sitting, Cuff Size: Adult)   Pulse 77   Resp 18   Ht 5' 6.25\" (1.683 m)   Wt 65.3 kg (144 lb)   SpO2 99%   BMI 23.07 kg/m²     Colonoscopy-Completed       Physical Exam  Vitals reviewed.   Constitutional:       General: He is not in acute distress.     Appearance: He is well-developed.   HENT:      Head: Normocephalic and atraumatic.   Eyes:      General: No scleral icterus.  Cardiovascular:      Rate and Rhythm: Normal rate and regular rhythm.   Pulmonary:      Effort: Pulmonary effort is normal. No respiratory distress.   Abdominal:      General: There is no distension.      Palpations: Abdomen is soft.   Skin:     General: Skin is warm and dry.   Neurological:      Mental Status: He is alert.   Psychiatric:         Mood and Affect: Mood normal.         Behavior: Behavior normal.           BARRIERS: none identified    GOALS:   Continued/Maintain healthy weight loss with good nutrition intakes.  Adequate hydration with at least 64oz. fluid intake.  Normal vitamin and mineral levels.  Exercise as tolerated.    Follow-up in 6 months. We kindly ask that your arrive 15 minutes before your scheduled appointment time with your provider to allow our staff to room you, get your vital signs and update your chart.    Follow diet as discussed.      Get lab work done in the next 2 weeks.  You have been given a lab slip today.  Please call the office if you need a replacement.  It is recommended to check with your insurance BEFORE getting labs done to make sure they " are covered by your policy.  Also, please check with your PCP and other providers before getting labs to avoid duplicate labs. Make sure to HOLD any multivitamins that may contain biotin and any biotin supplements FOR 5 DAYS before any labs since it can affect the results.    Follow vitamin and mineral recommendations as reviewed with you.    Call our office if you have any problems with abdominal pain especially associated with fever, chills, nausea, vomiting or any other concerns.    All  Post-bariatric surgery patients should be aware that very small quantities of any alcohol can cause impairment and it is very possible not to feel the effect. The effect can be in the system for several hours.  It is also a stomach irritant.     It is advised to AVOID alcohol, Nonsteroidal antiinflammatory drugs (NSAIDS) and nicotine of all forms . Any of these can cause stomach irritation/pain.

## 2024-01-31 ENCOUNTER — TELEPHONE (OUTPATIENT)
Dept: UROLOGY | Facility: CLINIC | Age: 59
End: 2024-01-31

## 2024-01-31 DIAGNOSIS — R97.20 ELEVATED PSA: Primary | ICD-10-CM

## 2024-01-31 NOTE — TELEPHONE ENCOUNTER
Called and spoke with pt informing pt MRI PIRADS 2. Please have patient follow up in 6 months with PSA prior to visit. Pt verbalized understanding. Made appt for pt on 7/16 with berta ibarra     ----- Message from Berta Oliveira PA-C sent at 1/31/2024  8:32 AM EST -----  MRI PIRADS 2. Please have patient follow up in 6 months with PSA prior to visit. Thanks

## 2024-02-27 ENCOUNTER — APPOINTMENT (EMERGENCY)
Dept: CT IMAGING | Facility: HOSPITAL | Age: 59
DRG: 287 | End: 2024-02-27
Payer: COMMERCIAL

## 2024-02-27 ENCOUNTER — APPOINTMENT (EMERGENCY)
Dept: RADIOLOGY | Facility: HOSPITAL | Age: 59
DRG: 287 | End: 2024-02-27
Payer: COMMERCIAL

## 2024-02-27 ENCOUNTER — HOSPITAL ENCOUNTER (INPATIENT)
Facility: HOSPITAL | Age: 59
LOS: 7 days | Discharge: HOME | DRG: 287 | End: 2024-03-06
Attending: STUDENT IN AN ORGANIZED HEALTH CARE EDUCATION/TRAINING PROGRAM | Admitting: INTERNAL MEDICINE
Payer: COMMERCIAL

## 2024-02-27 DIAGNOSIS — R79.89 ELEVATED TROPONIN: ICD-10-CM

## 2024-02-27 DIAGNOSIS — R94.39 ABNORMAL CARDIOVASCULAR STRESS TEST: ICD-10-CM

## 2024-02-27 DIAGNOSIS — I10 HYPERTENSION: Primary | ICD-10-CM

## 2024-02-27 DIAGNOSIS — I16.1 HYPERTENSIVE EMERGENCY: ICD-10-CM

## 2024-02-27 DIAGNOSIS — N18.4 STAGE 4 CHRONIC KIDNEY DISEASE (HCC): ICD-10-CM

## 2024-02-27 DIAGNOSIS — R07.9 CHEST PAIN: ICD-10-CM

## 2024-02-27 LAB
2HR DELTA HS TROPONIN: 37 NG/L
4HR DELTA HS TROPONIN: 44 NG/L
ALBUMIN SERPL BCP-MCNC: 5 G/DL (ref 3.5–5)
ALP SERPL-CCNC: 107 U/L (ref 34–104)
ALT SERPL W P-5'-P-CCNC: 89 U/L (ref 7–52)
ANION GAP SERPL CALCULATED.3IONS-SCNC: 19 MMOL/L
APTT PPP: 29 SECONDS (ref 23–37)
AST SERPL W P-5'-P-CCNC: 45 U/L (ref 13–39)
BASOPHILS # BLD AUTO: 0.02 THOUSANDS/ÂΜL (ref 0–0.1)
BASOPHILS NFR BLD AUTO: 0 % (ref 0–1)
BILIRUB SERPL-MCNC: 1.88 MG/DL (ref 0.2–1)
BNP SERPL-MCNC: 1807 PG/ML (ref 0–100)
BUN SERPL-MCNC: 53 MG/DL (ref 5–25)
CALCIUM SERPL-MCNC: 11.3 MG/DL (ref 8.4–10.2)
CARDIAC TROPONIN I PNL SERPL HS: 207 NG/L
CARDIAC TROPONIN I PNL SERPL HS: 244 NG/L
CARDIAC TROPONIN I PNL SERPL HS: 251 NG/L
CHLORIDE SERPL-SCNC: 96 MMOL/L (ref 96–108)
CO2 SERPL-SCNC: 25 MMOL/L (ref 21–32)
CREAT SERPL-MCNC: 2.44 MG/DL (ref 0.6–1.3)
EOSINOPHIL # BLD AUTO: 0 THOUSAND/ÂΜL (ref 0–0.61)
EOSINOPHIL NFR BLD AUTO: 0 % (ref 0–6)
ERYTHROCYTE [DISTWIDTH] IN BLOOD BY AUTOMATED COUNT: 14.8 % (ref 11.6–15.1)
GFR SERPL CREATININE-BSD FRML MDRD: 27 ML/MIN/1.73SQ M
GLUCOSE SERPL-MCNC: 167 MG/DL (ref 65–140)
HCT VFR BLD AUTO: 42.5 % (ref 36.5–49.3)
HGB BLD-MCNC: 14.5 G/DL (ref 12–17)
IMM GRANULOCYTES # BLD AUTO: 0.02 THOUSAND/UL (ref 0–0.2)
IMM GRANULOCYTES NFR BLD AUTO: 0 % (ref 0–2)
INR PPP: 1.11 (ref 0.84–1.19)
LIPASE SERPL-CCNC: 34 U/L (ref 11–82)
LYMPHOCYTES # BLD AUTO: 0.75 THOUSANDS/ÂΜL (ref 0.6–4.47)
LYMPHOCYTES NFR BLD AUTO: 12 % (ref 14–44)
MCH RBC QN AUTO: 27.8 PG (ref 26.8–34.3)
MCHC RBC AUTO-ENTMCNC: 34.1 G/DL (ref 31.4–37.4)
MCV RBC AUTO: 82 FL (ref 82–98)
MONOCYTES # BLD AUTO: 0.38 THOUSAND/ÂΜL (ref 0.17–1.22)
MONOCYTES NFR BLD AUTO: 6 % (ref 4–12)
NEUTROPHILS # BLD AUTO: 5.05 THOUSANDS/ÂΜL (ref 1.85–7.62)
NEUTS SEG NFR BLD AUTO: 82 % (ref 43–75)
NRBC BLD AUTO-RTO: 0 /100 WBCS
PLATELET # BLD AUTO: 393 THOUSANDS/UL (ref 149–390)
PMV BLD AUTO: 9.8 FL (ref 8.9–12.7)
POTASSIUM SERPL-SCNC: 2.5 MMOL/L (ref 3.5–5.3)
PROT SERPL-MCNC: 8.8 G/DL (ref 6.4–8.4)
PROTHROMBIN TIME: 14.9 SECONDS (ref 11.6–14.5)
RBC # BLD AUTO: 5.21 MILLION/UL (ref 3.88–5.62)
SODIUM SERPL-SCNC: 140 MMOL/L (ref 135–147)
WBC # BLD AUTO: 6.22 THOUSAND/UL (ref 4.31–10.16)

## 2024-02-27 PROCEDURE — 74174 CTA ABD&PLVS W/CONTRAST: CPT

## 2024-02-27 PROCEDURE — 96376 TX/PRO/DX INJ SAME DRUG ADON: CPT

## 2024-02-27 PROCEDURE — 84484 ASSAY OF TROPONIN QUANT: CPT | Performed by: STUDENT IN AN ORGANIZED HEALTH CARE EDUCATION/TRAINING PROGRAM

## 2024-02-27 PROCEDURE — 96375 TX/PRO/DX INJ NEW DRUG ADDON: CPT

## 2024-02-27 PROCEDURE — 99285 EMERGENCY DEPT VISIT HI MDM: CPT | Performed by: STUDENT IN AN ORGANIZED HEALTH CARE EDUCATION/TRAINING PROGRAM

## 2024-02-27 PROCEDURE — 85025 COMPLETE CBC W/AUTO DIFF WBC: CPT | Performed by: STUDENT IN AN ORGANIZED HEALTH CARE EDUCATION/TRAINING PROGRAM

## 2024-02-27 PROCEDURE — 83690 ASSAY OF LIPASE: CPT | Performed by: STUDENT IN AN ORGANIZED HEALTH CARE EDUCATION/TRAINING PROGRAM

## 2024-02-27 PROCEDURE — 85049 AUTOMATED PLATELET COUNT: CPT | Performed by: FAMILY MEDICINE

## 2024-02-27 PROCEDURE — 99285 EMERGENCY DEPT VISIT HI MDM: CPT

## 2024-02-27 PROCEDURE — 85610 PROTHROMBIN TIME: CPT | Performed by: STUDENT IN AN ORGANIZED HEALTH CARE EDUCATION/TRAINING PROGRAM

## 2024-02-27 PROCEDURE — 96366 THER/PROPH/DIAG IV INF ADDON: CPT

## 2024-02-27 PROCEDURE — 96365 THER/PROPH/DIAG IV INF INIT: CPT

## 2024-02-27 PROCEDURE — 96361 HYDRATE IV INFUSION ADD-ON: CPT

## 2024-02-27 PROCEDURE — 36415 COLL VENOUS BLD VENIPUNCTURE: CPT | Performed by: STUDENT IN AN ORGANIZED HEALTH CARE EDUCATION/TRAINING PROGRAM

## 2024-02-27 PROCEDURE — 71275 CT ANGIOGRAPHY CHEST: CPT

## 2024-02-27 PROCEDURE — 71045 X-RAY EXAM CHEST 1 VIEW: CPT

## 2024-02-27 PROCEDURE — 80053 COMPREHEN METABOLIC PANEL: CPT | Performed by: STUDENT IN AN ORGANIZED HEALTH CARE EDUCATION/TRAINING PROGRAM

## 2024-02-27 PROCEDURE — 94640 AIRWAY INHALATION TREATMENT: CPT

## 2024-02-27 PROCEDURE — 83880 ASSAY OF NATRIURETIC PEPTIDE: CPT | Performed by: STUDENT IN AN ORGANIZED HEALTH CARE EDUCATION/TRAINING PROGRAM

## 2024-02-27 PROCEDURE — 93005 ELECTROCARDIOGRAM TRACING: CPT

## 2024-02-27 PROCEDURE — 85730 THROMBOPLASTIN TIME PARTIAL: CPT | Performed by: STUDENT IN AN ORGANIZED HEALTH CARE EDUCATION/TRAINING PROGRAM

## 2024-02-27 RX ORDER — IPRATROPIUM BROMIDE AND ALBUTEROL SULFATE 2.5; .5 MG/3ML; MG/3ML
3 SOLUTION RESPIRATORY (INHALATION) ONCE
Status: COMPLETED | OUTPATIENT
Start: 2024-02-27 | End: 2024-02-27

## 2024-02-27 RX ORDER — POTASSIUM CHLORIDE 14.9 MG/ML
20 INJECTION INTRAVENOUS ONCE
Qty: 100 ML | Refills: 0 | Status: COMPLETED | OUTPATIENT
Start: 2024-02-28 | End: 2024-02-28

## 2024-02-27 RX ORDER — POTASSIUM CHLORIDE 14.9 MG/ML
20 INJECTION INTRAVENOUS ONCE
Qty: 100 ML | Refills: 0 | Status: COMPLETED | OUTPATIENT
Start: 2024-02-27 | End: 2024-02-27

## 2024-02-27 RX ORDER — NITROGLYCERIN 0.4 MG/1
0.4 TABLET SUBLINGUAL ONCE
Status: COMPLETED | OUTPATIENT
Start: 2024-02-27 | End: 2024-02-27

## 2024-02-27 RX ORDER — ONDANSETRON 2 MG/ML
4 INJECTION INTRAMUSCULAR; INTRAVENOUS ONCE
Status: COMPLETED | OUTPATIENT
Start: 2024-02-27 | End: 2024-02-27

## 2024-02-27 RX ORDER — POTASSIUM CHLORIDE 20 MEQ/1
40 TABLET, EXTENDED RELEASE ORAL ONCE
Status: COMPLETED | OUTPATIENT
Start: 2024-02-27 | End: 2024-02-27

## 2024-02-27 RX ORDER — POTASSIUM CHLORIDE 14.9 MG/ML
20 INJECTION INTRAVENOUS ONCE
Qty: 100 ML | Refills: 0 | Status: COMPLETED | OUTPATIENT
Start: 2024-02-27 | End: 2024-02-28

## 2024-02-27 RX ORDER — FENTANYL CITRATE 50 UG/ML
50 INJECTION, SOLUTION INTRAMUSCULAR; INTRAVENOUS ONCE
Status: COMPLETED | OUTPATIENT
Start: 2024-02-27 | End: 2024-02-27

## 2024-02-27 RX ADMIN — IPRATROPIUM BROMIDE AND ALBUTEROL SULFATE 3 ML: 2.5; .5 SOLUTION RESPIRATORY (INHALATION) at 19:35

## 2024-02-27 RX ADMIN — POTASSIUM CHLORIDE 20 MEQ: 14.9 INJECTION, SOLUTION INTRAVENOUS at 21:15

## 2024-02-27 RX ADMIN — FENTANYL CITRATE 50 MCG: 50 INJECTION INTRAMUSCULAR; INTRAVENOUS at 23:12

## 2024-02-27 RX ADMIN — NITROGLYCERIN 0.4 MG: 0.4 TABLET SUBLINGUAL at 22:00

## 2024-02-27 RX ADMIN — ONDANSETRON 4 MG: 2 INJECTION INTRAMUSCULAR; INTRAVENOUS at 19:35

## 2024-02-27 RX ADMIN — POTASSIUM CHLORIDE 40 MEQ: 1500 TABLET, EXTENDED RELEASE ORAL at 20:46

## 2024-02-27 RX ADMIN — NITROGLYCERIN 1 INCH: 20 OINTMENT TOPICAL at 22:16

## 2024-02-27 RX ADMIN — NITROGLYCERIN 0.4 MG: 0.4 TABLET SUBLINGUAL at 21:41

## 2024-02-27 RX ADMIN — SODIUM CHLORIDE 500 ML: 0.9 INJECTION, SOLUTION INTRAVENOUS at 23:21

## 2024-02-27 RX ADMIN — SODIUM CHLORIDE 1000 ML: 0.9 INJECTION, SOLUTION INTRAVENOUS at 19:43

## 2024-02-27 RX ADMIN — FENTANYL CITRATE 50 MCG: 50 INJECTION INTRAMUSCULAR; INTRAVENOUS at 19:36

## 2024-02-27 RX ADMIN — POTASSIUM CHLORIDE 20 MEQ: 14.9 INJECTION, SOLUTION INTRAVENOUS at 23:19

## 2024-02-27 RX ADMIN — NITROGLYCERIN 0.4 MG: 0.4 TABLET SUBLINGUAL at 21:37

## 2024-02-27 RX ADMIN — IOHEXOL 100 ML: 350 INJECTION, SOLUTION INTRAVENOUS at 22:58

## 2024-02-28 ENCOUNTER — APPOINTMENT (INPATIENT)
Dept: NON INVASIVE DIAGNOSTICS | Facility: HOSPITAL | Age: 59
DRG: 287 | End: 2024-02-28
Payer: COMMERCIAL

## 2024-02-28 ENCOUNTER — APPOINTMENT (INPATIENT)
Dept: VASCULAR ULTRASOUND | Facility: HOSPITAL | Age: 59
DRG: 287 | End: 2024-02-28
Payer: COMMERCIAL

## 2024-02-28 PROBLEM — R07.89 CHEST PRESSURE: Status: ACTIVE | Noted: 2024-02-28

## 2024-02-28 PROBLEM — E83.52 HYPERCALCEMIA: Status: ACTIVE | Noted: 2024-02-28

## 2024-02-28 PROBLEM — N18.4 STAGE 4 CHRONIC KIDNEY DISEASE (HCC): Status: ACTIVE | Noted: 2024-02-28

## 2024-02-28 PROBLEM — I16.1 HYPERTENSIVE EMERGENCY WITHOUT CONGESTIVE HEART FAILURE: Status: ACTIVE | Noted: 2024-02-28

## 2024-02-28 PROBLEM — Z91.148 NONCOMPLIANCE WITH MEDICATION REGIMEN: Status: ACTIVE | Noted: 2024-02-28

## 2024-02-28 PROBLEM — I50.32 CHRONIC HEART FAILURE WITH PRESERVED EJECTION FRACTION (HFPEF) (HCC): Status: ACTIVE | Noted: 2024-02-28

## 2024-02-28 LAB
25(OH)D3 SERPL-MCNC: 48.1 NG/ML (ref 30–100)
2HR DELTA HS TROPONIN: -1 NG/L
4HR DELTA HS TROPONIN: -4 NG/L
ANION GAP SERPL CALCULATED.3IONS-SCNC: 10 MMOL/L
ANION GAP SERPL CALCULATED.3IONS-SCNC: 13 MMOL/L
AORTIC ROOT: 3.1 CM
APICAL FOUR CHAMBER EJECTION FRACTION: 48 %
BACTERIA UR QL AUTO: ABNORMAL /HPF
BILIRUB UR QL STRIP: NEGATIVE
BSA FOR ECHO PROCEDURE: 1.74 M2
BUN SERPL-MCNC: 48 MG/DL (ref 5–25)
BUN SERPL-MCNC: 55 MG/DL (ref 5–25)
CALCIUM SERPL-MCNC: 9.4 MG/DL (ref 8.4–10.2)
CALCIUM SERPL-MCNC: 9.7 MG/DL (ref 8.4–10.2)
CARDIAC TROPONIN I PNL SERPL HS: 349 NG/L
CARDIAC TROPONIN I PNL SERPL HS: 352 NG/L
CARDIAC TROPONIN I PNL SERPL HS: 352 NG/L (ref 8–18)
CARDIAC TROPONIN I PNL SERPL HS: 353 NG/L
CHLORIDE SERPL-SCNC: 101 MMOL/L (ref 96–108)
CHLORIDE SERPL-SCNC: 99 MMOL/L (ref 96–108)
CLARITY UR: CLEAR
CO2 SERPL-SCNC: 23 MMOL/L (ref 21–32)
CO2 SERPL-SCNC: 27 MMOL/L (ref 21–32)
COLOR UR: ABNORMAL
CREAT SERPL-MCNC: 2.61 MG/DL (ref 0.6–1.3)
CREAT SERPL-MCNC: 2.81 MG/DL (ref 0.6–1.3)
CREAT UR-MCNC: 110.5 MG/DL
E WAVE DECELERATION TIME: 278 MS
E/A RATIO: 0.65
FLUAV RNA RESP QL NAA+PROBE: NEGATIVE
FLUBV RNA RESP QL NAA+PROBE: NEGATIVE
FRACTIONAL SHORTENING: 22 (ref 28–44)
GFR SERPL CREATININE-BSD FRML MDRD: 23 ML/MIN/1.73SQ M
GFR SERPL CREATININE-BSD FRML MDRD: 25 ML/MIN/1.73SQ M
GLUCOSE SERPL-MCNC: 121 MG/DL (ref 65–140)
GLUCOSE SERPL-MCNC: 158 MG/DL (ref 65–140)
GLUCOSE UR STRIP-MCNC: NEGATIVE MG/DL
HGB UR QL STRIP.AUTO: ABNORMAL
INTERVENTRICULAR SEPTUM IN DIASTOLE (PARASTERNAL SHORT AXIS VIEW): 1.3 CM
INTERVENTRICULAR SEPTUM: 1.3 CM (ref 0.6–1.1)
KETONES UR STRIP-MCNC: NEGATIVE MG/DL
LA/AORTA RATIO 2D: 1.26
LAAS-AP2: 22.3 CM2
LAAS-AP4: 24.2 CM2
LEFT ATRIUM SIZE: 3.9 CM
LEFT ATRIUM VOLUME (MOD BIPLANE): 66 ML
LEFT ATRIUM VOLUME INDEX (MOD BIPLANE): 37.9 ML/M2
LEFT INTERNAL DIMENSION IN SYSTOLE: 4.3 CM (ref 2.1–4)
LEFT VENTRICULAR INTERNAL DIMENSION IN DIASTOLE: 5.5 CM (ref 3.5–6)
LEFT VENTRICULAR POSTERIOR WALL IN END DIASTOLE: 1.3 CM
LEFT VENTRICULAR STROKE VOLUME: 67 ML
LEUKOCYTE ESTERASE UR QL STRIP: ABNORMAL
LVSV (TEICH): 67 ML
MV E'TISSUE VEL-SEP: 4 CM/S
MV PEAK A VEL: 0.8 M/S
MV PEAK E VEL: 52 CM/S
MV STENOSIS PRESSURE HALF TIME: 81 MS
MV VALVE AREA P 1/2 METHOD: 2.72
NITRITE UR QL STRIP: NEGATIVE
NON-SQ EPI CELLS URNS QL MICRO: ABNORMAL /HPF
PH UR STRIP.AUTO: 6 [PH]
PHOSPHATE SERPL-MCNC: 4.8 MG/DL (ref 2.3–4.1)
PLATELET # BLD AUTO: 393 THOUSANDS/UL (ref 149–390)
PMV BLD AUTO: 9.8 FL (ref 8.9–12.7)
POTASSIUM SERPL-SCNC: 2.8 MMOL/L (ref 3.5–5.3)
POTASSIUM SERPL-SCNC: 3.2 MMOL/L (ref 3.5–5.3)
PROT UR STRIP-MCNC: ABNORMAL MG/DL
PROT UR-MCNC: 246 MG/DL
PROT/CREAT UR: 2.23 MG/G{CREAT} (ref 0–0.1)
PTH-INTACT SERPL-MCNC: 186.4 PG/ML (ref 12–88)
RBC #/AREA URNS AUTO: ABNORMAL /HPF
RIGHT VENTRICLE ID DIMENSION: 4 CM
RSV RNA RESP QL NAA+PROBE: NEGATIVE
SARS-COV-2 RNA RESP QL NAA+PROBE: NEGATIVE
SL CV LV EF: 50
SL CV PED ECHO LEFT VENTRICLE DIASTOLIC VOLUME (MOD BIPLANE) 2D: 149 ML
SL CV PED ECHO LEFT VENTRICLE SYSTOLIC VOLUME (MOD BIPLANE) 2D: 82 ML
SODIUM 24H UR-SCNC: 43 MOL/L
SODIUM SERPL-SCNC: 136 MMOL/L (ref 135–147)
SODIUM SERPL-SCNC: 137 MMOL/L (ref 135–147)
SP GR UR STRIP.AUTO: 1.03 (ref 1–1.03)
TR MAX PG: 15 MMHG
TR PEAK VELOCITY: 2 M/S
TRICUSPID ANNULAR PLANE SYSTOLIC EXCURSION: 2.6 CM
TRICUSPID VALVE PEAK REGURGITATION VELOCITY: 1.95 M/S
UROBILINOGEN UR STRIP-ACNC: <2 MG/DL
WBC #/AREA URNS AUTO: ABNORMAL /HPF

## 2024-02-28 PROCEDURE — 84165 PROTEIN E-PHORESIS SERUM: CPT | Performed by: INTERNAL MEDICINE

## 2024-02-28 PROCEDURE — 99223 1ST HOSP IP/OBS HIGH 75: CPT | Performed by: FAMILY MEDICINE

## 2024-02-28 PROCEDURE — 84484 ASSAY OF TROPONIN QUANT: CPT | Performed by: FAMILY MEDICINE

## 2024-02-28 PROCEDURE — 93306 TTE W/DOPPLER COMPLETE: CPT | Performed by: INTERNAL MEDICINE

## 2024-02-28 PROCEDURE — 82088 ASSAY OF ALDOSTERONE: CPT | Performed by: INTERNAL MEDICINE

## 2024-02-28 PROCEDURE — 84244 ASSAY OF RENIN: CPT | Performed by: INTERNAL MEDICINE

## 2024-02-28 PROCEDURE — 84156 ASSAY OF PROTEIN URINE: CPT | Performed by: INTERNAL MEDICINE

## 2024-02-28 PROCEDURE — 82570 ASSAY OF URINE CREATININE: CPT | Performed by: INTERNAL MEDICINE

## 2024-02-28 PROCEDURE — 93306 TTE W/DOPPLER COMPLETE: CPT

## 2024-02-28 PROCEDURE — 80048 BASIC METABOLIC PNL TOTAL CA: CPT | Performed by: INTERNAL MEDICINE

## 2024-02-28 PROCEDURE — 96375 TX/PRO/DX INJ NEW DRUG ADDON: CPT

## 2024-02-28 PROCEDURE — 96366 THER/PROPH/DIAG IV INF ADDON: CPT

## 2024-02-28 PROCEDURE — 99222 1ST HOSP IP/OBS MODERATE 55: CPT | Performed by: INTERNAL MEDICINE

## 2024-02-28 PROCEDURE — 84100 ASSAY OF PHOSPHORUS: CPT | Performed by: INTERNAL MEDICINE

## 2024-02-28 PROCEDURE — 99232 SBSQ HOSP IP/OBS MODERATE 35: CPT | Performed by: INTERNAL MEDICINE

## 2024-02-28 PROCEDURE — 93005 ELECTROCARDIOGRAM TRACING: CPT

## 2024-02-28 PROCEDURE — 99223 1ST HOSP IP/OBS HIGH 75: CPT | Performed by: INTERNAL MEDICINE

## 2024-02-28 PROCEDURE — 84300 ASSAY OF URINE SODIUM: CPT | Performed by: INTERNAL MEDICINE

## 2024-02-28 PROCEDURE — 86334 IMMUNOFIX E-PHORESIS SERUM: CPT | Performed by: INTERNAL MEDICINE

## 2024-02-28 PROCEDURE — 93975 VASCULAR STUDY: CPT

## 2024-02-28 PROCEDURE — 83970 ASSAY OF PARATHORMONE: CPT | Performed by: INTERNAL MEDICINE

## 2024-02-28 PROCEDURE — 81001 URINALYSIS AUTO W/SCOPE: CPT | Performed by: INTERNAL MEDICINE

## 2024-02-28 PROCEDURE — 82306 VITAMIN D 25 HYDROXY: CPT | Performed by: INTERNAL MEDICINE

## 2024-02-28 PROCEDURE — 36415 COLL VENOUS BLD VENIPUNCTURE: CPT | Performed by: INTERNAL MEDICINE

## 2024-02-28 PROCEDURE — 0241U HB NFCT DS VIR RESP RNA 4 TRGT: CPT | Performed by: STUDENT IN AN ORGANIZED HEALTH CARE EDUCATION/TRAINING PROGRAM

## 2024-02-28 PROCEDURE — 96368 THER/DIAG CONCURRENT INF: CPT

## 2024-02-28 PROCEDURE — 93975 VASCULAR STUDY: CPT | Performed by: SURGERY

## 2024-02-28 PROCEDURE — 84484 ASSAY OF TROPONIN QUANT: CPT

## 2024-02-28 PROCEDURE — 96376 TX/PRO/DX INJ SAME DRUG ADON: CPT

## 2024-02-28 RX ORDER — ATORVASTATIN CALCIUM 40 MG/1
40 TABLET, FILM COATED ORAL
Status: DISCONTINUED | OUTPATIENT
Start: 2024-02-28 | End: 2024-03-06 | Stop reason: HOSPADM

## 2024-02-28 RX ORDER — ARIPIPRAZOLE 5 MG/1
5 TABLET ORAL DAILY
Status: DISCONTINUED | OUTPATIENT
Start: 2024-02-28 | End: 2024-03-06 | Stop reason: HOSPADM

## 2024-02-28 RX ORDER — SERTRALINE HYDROCHLORIDE 100 MG/1
200 TABLET, FILM COATED ORAL DAILY
Status: DISCONTINUED | OUTPATIENT
Start: 2024-02-28 | End: 2024-03-06 | Stop reason: HOSPADM

## 2024-02-28 RX ORDER — ASPIRIN 81 MG/1
81 TABLET, CHEWABLE ORAL DAILY
Status: DISCONTINUED | OUTPATIENT
Start: 2024-02-29 | End: 2024-03-06 | Stop reason: HOSPADM

## 2024-02-28 RX ORDER — LABETALOL HYDROCHLORIDE 5 MG/ML
10 INJECTION, SOLUTION INTRAVENOUS ONCE
Status: COMPLETED | OUTPATIENT
Start: 2024-02-28 | End: 2024-02-28

## 2024-02-28 RX ORDER — AMILORIDE HYDROCHLORIDE 5 MG/1
5 TABLET ORAL DAILY
Status: DISCONTINUED | OUTPATIENT
Start: 2024-02-28 | End: 2024-02-29

## 2024-02-28 RX ORDER — HEPARIN SODIUM 5000 [USP'U]/ML
5000 INJECTION, SOLUTION INTRAVENOUS; SUBCUTANEOUS EVERY 8 HOURS SCHEDULED
Status: DISCONTINUED | OUTPATIENT
Start: 2024-02-28 | End: 2024-03-06 | Stop reason: HOSPADM

## 2024-02-28 RX ORDER — AMLODIPINE BESYLATE 10 MG/1
10 TABLET ORAL DAILY
Status: DISCONTINUED | OUTPATIENT
Start: 2024-02-28 | End: 2024-03-06 | Stop reason: HOSPADM

## 2024-02-28 RX ORDER — LABETALOL HYDROCHLORIDE 5 MG/ML
10 INJECTION, SOLUTION INTRAVENOUS EVERY 4 HOURS PRN
Status: DISCONTINUED | OUTPATIENT
Start: 2024-02-28 | End: 2024-02-29

## 2024-02-28 RX ORDER — PANTOPRAZOLE SODIUM 40 MG/1
40 TABLET, DELAYED RELEASE ORAL DAILY
Status: DISCONTINUED | OUTPATIENT
Start: 2024-02-28 | End: 2024-03-06 | Stop reason: HOSPADM

## 2024-02-28 RX ORDER — ACETAMINOPHEN 325 MG/1
650 TABLET ORAL EVERY 4 HOURS PRN
Status: DISCONTINUED | OUTPATIENT
Start: 2024-02-28 | End: 2024-03-06 | Stop reason: HOSPADM

## 2024-02-28 RX ORDER — POTASSIUM CHLORIDE 14.9 MG/ML
20 INJECTION INTRAVENOUS
Status: COMPLETED | OUTPATIENT
Start: 2024-02-28 | End: 2024-02-29

## 2024-02-28 RX ORDER — POTASSIUM CHLORIDE 20 MEQ/1
40 TABLET, EXTENDED RELEASE ORAL ONCE
Status: COMPLETED | OUTPATIENT
Start: 2024-02-28 | End: 2024-02-28

## 2024-02-28 RX ORDER — LABETALOL HYDROCHLORIDE 5 MG/ML
20 INJECTION, SOLUTION INTRAVENOUS ONCE
Status: COMPLETED | OUTPATIENT
Start: 2024-02-28 | End: 2024-02-28

## 2024-02-28 RX ORDER — ASPIRIN 325 MG
325 TABLET ORAL ONCE
Qty: 1 TABLET | Refills: 0 | Status: COMPLETED | OUTPATIENT
Start: 2024-02-28 | End: 2024-02-28

## 2024-02-28 RX ORDER — DOXAZOSIN MESYLATE 4 MG/1
8 TABLET ORAL 2 TIMES DAILY
Status: DISCONTINUED | OUTPATIENT
Start: 2024-02-28 | End: 2024-03-06 | Stop reason: HOSPADM

## 2024-02-28 RX ORDER — HYDROMORPHONE HCL/PF 1 MG/ML
0.5 SYRINGE (ML) INJECTION
Status: DISCONTINUED | OUTPATIENT
Start: 2024-02-28 | End: 2024-03-06 | Stop reason: HOSPADM

## 2024-02-28 RX ORDER — POTASSIUM CHLORIDE 20 MEQ/1
20 TABLET, EXTENDED RELEASE ORAL DAILY
Status: DISCONTINUED | OUTPATIENT
Start: 2024-02-28 | End: 2024-02-28

## 2024-02-28 RX ORDER — ISOSORBIDE DINITRATE 10 MG/1
40 TABLET ORAL
Status: DISCONTINUED | OUTPATIENT
Start: 2024-02-28 | End: 2024-03-06 | Stop reason: HOSPADM

## 2024-02-28 RX ORDER — HYDRALAZINE HYDROCHLORIDE 20 MG/ML
20 INJECTION INTRAMUSCULAR; INTRAVENOUS ONCE
Qty: 1 ML | Refills: 0 | Status: COMPLETED | OUTPATIENT
Start: 2024-02-28 | End: 2024-02-28

## 2024-02-28 RX ORDER — OLANZAPINE 2.5 MG/1
5 TABLET, FILM COATED ORAL
Status: DISCONTINUED | OUTPATIENT
Start: 2024-02-28 | End: 2024-03-06 | Stop reason: HOSPADM

## 2024-02-28 RX ORDER — SUCRALFATE 1 G/1
1 TABLET ORAL 2 TIMES DAILY
Status: DISCONTINUED | OUTPATIENT
Start: 2024-02-28 | End: 2024-03-06 | Stop reason: HOSPADM

## 2024-02-28 RX ORDER — LORAZEPAM 1 MG/1
1 TABLET ORAL EVERY 6 HOURS PRN
Status: DISCONTINUED | OUTPATIENT
Start: 2024-02-28 | End: 2024-03-06 | Stop reason: HOSPADM

## 2024-02-28 RX ORDER — LABETALOL 200 MG/1
600 TABLET, FILM COATED ORAL EVERY 8 HOURS SCHEDULED
Status: DISCONTINUED | OUTPATIENT
Start: 2024-02-28 | End: 2024-03-06 | Stop reason: HOSPADM

## 2024-02-28 RX ADMIN — LABETALOL HYDROCHLORIDE 600 MG: 200 TABLET, FILM COATED ORAL at 06:05

## 2024-02-28 RX ADMIN — LABETALOL HYDROCHLORIDE 10 MG: 5 INJECTION, SOLUTION INTRAVENOUS at 00:30

## 2024-02-28 RX ADMIN — DOXAZOSIN 8 MG: 4 TABLET ORAL at 21:50

## 2024-02-28 RX ADMIN — AMILORIDE HYDROCLORIDE 5 MG: 5 TABLET ORAL at 15:53

## 2024-02-28 RX ADMIN — ISOSORBIDE DINITRATE 40 MG: 10 TABLET ORAL at 21:50

## 2024-02-28 RX ADMIN — DOXAZOSIN 8 MG: 4 TABLET ORAL at 06:05

## 2024-02-28 RX ADMIN — LABETALOL HYDROCHLORIDE 20 MG: 5 INJECTION, SOLUTION INTRAVENOUS at 03:12

## 2024-02-28 RX ADMIN — POTASSIUM CHLORIDE 40 MEQ: 1500 TABLET, EXTENDED RELEASE ORAL at 12:16

## 2024-02-28 RX ADMIN — SUCRALFATE 1 G: 1 TABLET ORAL at 08:35

## 2024-02-28 RX ADMIN — HEPARIN SODIUM 5000 UNITS: 5000 INJECTION INTRAVENOUS; SUBCUTANEOUS at 05:38

## 2024-02-28 RX ADMIN — POTASSIUM CHLORIDE 20 MEQ: 200 INJECTION, SOLUTION INTRAVENOUS at 16:17

## 2024-02-28 RX ADMIN — HYDRALAZINE HYDROCHLORIDE 20 MG: 20 INJECTION INTRAMUSCULAR; INTRAVENOUS at 05:38

## 2024-02-28 RX ADMIN — POTASSIUM CHLORIDE 20 MEQ: 14.9 INJECTION, SOLUTION INTRAVENOUS at 01:18

## 2024-02-28 RX ADMIN — SUCRALFATE 1 G: 1 TABLET ORAL at 19:25

## 2024-02-28 RX ADMIN — HEPARIN SODIUM 5000 UNITS: 5000 INJECTION INTRAVENOUS; SUBCUTANEOUS at 21:50

## 2024-02-28 RX ADMIN — POTASSIUM CHLORIDE 20 MEQ: 200 INJECTION, SOLUTION INTRAVENOUS at 12:18

## 2024-02-28 RX ADMIN — PANTOPRAZOLE SODIUM 40 MG: 40 TABLET, DELAYED RELEASE ORAL at 08:35

## 2024-02-28 RX ADMIN — SERTRALINE HYDROCHLORIDE 200 MG: 50 TABLET ORAL at 08:36

## 2024-02-28 RX ADMIN — SODIUM CHLORIDE 5 MG/HR: 0.9 INJECTION, SOLUTION INTRAVENOUS at 01:23

## 2024-02-28 RX ADMIN — ATORVASTATIN CALCIUM 40 MG: 40 TABLET, FILM COATED ORAL at 21:50

## 2024-02-28 RX ADMIN — ARIPIPRAZOLE 5 MG: 5 TABLET ORAL at 08:35

## 2024-02-28 RX ADMIN — ISOSORBIDE DINITRATE 40 MG: 10 TABLET ORAL at 15:53

## 2024-02-28 RX ADMIN — HEPARIN SODIUM 5000 UNITS: 5000 INJECTION INTRAVENOUS; SUBCUTANEOUS at 15:53

## 2024-02-28 RX ADMIN — ASPIRIN 325 MG ORAL TABLET 325 MG: 325 PILL ORAL at 05:38

## 2024-02-28 RX ADMIN — POTASSIUM CHLORIDE 20 MEQ: 1500 TABLET, EXTENDED RELEASE ORAL at 08:35

## 2024-02-28 RX ADMIN — LABETALOL HYDROCHLORIDE 10 MG: 5 INJECTION, SOLUTION INTRAVENOUS at 19:36

## 2024-02-28 RX ADMIN — LORAZEPAM 1 MG: 1 TABLET ORAL at 06:09

## 2024-02-28 RX ADMIN — LABETALOL HYDROCHLORIDE 600 MG: 200 TABLET, FILM COATED ORAL at 22:02

## 2024-02-28 RX ADMIN — AMLODIPINE BESYLATE 10 MG: 10 TABLET ORAL at 05:38

## 2024-02-28 RX ADMIN — ISOSORBIDE DINITRATE 40 MG: 10 TABLET ORAL at 08:48

## 2024-02-28 RX ADMIN — LABETALOL HYDROCHLORIDE 10 MG: 5 INJECTION, SOLUTION INTRAVENOUS at 12:39

## 2024-02-28 RX ADMIN — OLANZAPINE 5 MG: 2.5 TABLET, FILM COATED ORAL at 21:50

## 2024-02-28 RX ADMIN — LABETALOL HYDROCHLORIDE 600 MG: 200 TABLET, FILM COATED ORAL at 15:53

## 2024-02-28 NOTE — ASSESSMENT & PLAN NOTE
Lab Results   Component Value Date    EGFR 27 02/27/2024    EGFR 23 (L) 01/12/2024    EGFR 18 12/29/2023    CREATININE 2.44 (H) 02/27/2024    CREATININE 3.0 (H) 01/12/2024    CREATININE 3.46 (H) 12/29/2023   -History of CKD 4  - Upon presentation, at baseline  - Avoid nephrotoxic agents

## 2024-02-28 NOTE — ASSESSMENT & PLAN NOTE
"- Patient reports that he does not regularly take his medications sliding \"stomach issues\" as reason  - Denies financial constraints   -Likely precipitant factor to presentation with hypertensive emergency as he is to be maintained on 4-5 antihypertensives orally in the outpatient setting  - Extensive counseling regarding importance of medication compliance in the setting of numerous underlying medical conditions  "

## 2024-02-28 NOTE — PROGRESS NOTES
"[facility]  Progress Note  Name: Genna Liu I  MRN: [id number]  Unit/Bed#: ED 29 I Date of Admission: 2/27/2024   Date of Service: 2/28/2024 I Hospital Day: 0    Assessment/Plan   * Hypertensive emergency  Assessment & Plan  Patient presents tonight with complaints of substernal chest pressure, nonradiating, anxiety, headache, visual change  H/o CKD and with known medical noncomplaince  SBP>200 while in the ED  Started on IV cardene infusion in the ED, which was discontinued  Resume home medications: Amlodipine 10 mg, Cardura 8 mg HS, labetalol 600 mg TID, isosorbide 40mg TID  Holding hydrochlorothiazide secondary to hypokalemia  PRN IV labetolol 10mg Q4H   BP improving 158/99  Renal ultrasound without arterial occlusive disease  Nephrology following      Chest pressure  Assessment & Plan  Presents with substernal chest pain and associated shortness of breath  Troponin level: 0hr 207, 2hr 244, 4hr 251  Nonischemic ECG  Continue ASA, statin, BB therapy  Echo pending  Monitor on telemetry  Consult to Cardiology, recommendations are appreciated    Hypokalemia  Assessment & Plan  Potassium 2.5  Received 40mEq oral K, 60mEq IV K  Telemetry monitoring  Repeat BMP at noon    Hypercalcemia  Assessment & Plan  Calcium 11.3 this AM  Nephrology following  Paraproteinemia work up pending  PTH pending  Daily BMP    Noncompliance with medication regimen  Assessment & Plan  Patient reports that he does not regularly take his medications sliding \"stomach issues\" as reason  Denies financial constraints   Extensive counseling regarding importance of medication compliance in the setting of numerous underlying medical conditions    Chronic heart failure with preserved ejection fraction (HFpEF) ([facility])  Assessment & Plan  Wt Readings from Last 3 Encounters:   01/25/24 65.3 kg (144 lb)   12/27/23 66.8 kg (147 lb 4.3 oz)   08/28/23 66.7 kg (147 lb)       Currently euvolemic on exam  Echo (7/14/23): EF 55%  HCTZ " on hold due to hypokalemia  Daily weights, I&Os    Stage 4 chronic kidney disease (HCC)  Assessment & Plan  Lab Results   Component Value Date    EGFR 27 2024    EGFR 23 (L) 2024    EGFR 18 2023    CREATININE 2.44 (H) 2024    CREATININE 3.0 (H) 2024    CREATININE 3.46 (H) 2023   History of CKD 4  Upon presentation, at baseline  Avoid nephrotoxic agents    COPD (chronic obstructive pulmonary disease) ([facility])  Assessment & Plan  No acute exacerbation  Continue home inhalers             VTE Pharmacologic Prophylaxis: VTE Score: 4 Moderate Risk (Score 3-4) - Pharmacological DVT Prophylaxis Ordered: heparin.    Mobility:      HLM Goal achieved. Continue to encourage appropriate mobility.    Patient Centered Rounds: I performed bedside rounds with nursing staff today.   Discussions with Specialists or Other Care Team Provider: Nursing, Nephrology    Education and Discussions with Family / Patient: Attempted to update  (wife) via phone. Left voicemail.     Total Time Spent on Date of Encounter in care of patient: 45 mins. This time was spent on one or more of the following: performing physical exam; counseling and coordination of care; obtaining or reviewing history; documenting in the medical record; reviewing/ordering tests, medications or procedures; communicating with other healthcare professionals and discussing with patient's family/caregivers.    Current Length of Stay: 0 day(s)  Current Patient Status: Inpatient   Certification Statement: The patient will continue to require additional inpatient hospital stay due to hypertensive emergency, chest pain  Discharge Plan: Anticipate discharge in 24-48 hrs to home.    Code Status: Level 1 - Full Code    Subjective:   Patient declines any chest pain this morning.    Objective:     Vitals:   Temp (24hrs), Av.1 °F (36.7 °C), Min:97.8 °F (36.6 °C), Max:98.5 °F (36.9 °C)    Temp:  [97.8 °F (36.6 °C)-98.5 °F (36.9 °C)] 97.8  °F (36.6 °C)  HR:  [] 62  Resp:  [14-32] 20  BP: (115-257)/() 115/81  SpO2:  [95 %-100 %] 97 %  There is no height or weight on file to calculate BMI.     Input and Output Summary (last 24 hours):     Intake/Output Summary (Last 24 hours) at 2/28/2024 1056  Last data filed at 2/28/2024 0838  Gross per 24 hour   Intake 1878.33 ml   Output 750 ml   Net 1128.33 ml       Physical Exam:   Physical Exam  Vitals and nursing note reviewed.   Constitutional:       General: He is not in acute distress.     Appearance: He is well-developed.   HENT:      Head: Normocephalic and atraumatic.   Eyes:      Conjunctiva/sclera: Conjunctivae normal.   Cardiovascular:      Rate and Rhythm: Normal rate and regular rhythm.      Heart sounds: No murmur heard.  Pulmonary:      Effort: Pulmonary effort is normal. No respiratory distress.      Breath sounds: Normal breath sounds.   Abdominal:      Palpations: Abdomen is soft.      Tenderness: There is no abdominal tenderness.   Musculoskeletal:         General: No swelling.      Cervical back: Neck supple.   Skin:     General: Skin is warm and dry.      Capillary Refill: Capillary refill takes less than 2 seconds.   Neurological:      Mental Status: He is alert. Mental status is at baseline.   Psychiatric:         Mood and Affect: Mood normal.          Additional Data:     Labs:  Results from last 7 days   Lab Units 02/27/24 1945   WBC Thousand/uL 6.22   HEMOGLOBIN g/dL 14.5   HEMATOCRIT % 42.5   PLATELETS Thousands/uL 393*  393*   NEUTROS PCT % 82*   LYMPHS PCT % 12*   MONOS PCT % 6   EOS PCT % 0     Results from last 7 days   Lab Units 02/27/24 1945   SODIUM mmol/L 140   POTASSIUM mmol/L 2.5*   CHLORIDE mmol/L 96   CO2 mmol/L 25   BUN mg/dL 53*   CREATININE mg/dL 2.44*   ANION GAP mmol/L 19   CALCIUM mg/dL 11.3*   ALBUMIN g/dL 5.0   TOTAL BILIRUBIN mg/dL 1.88*   ALK PHOS U/L 107*   ALT U/L 89*   AST U/L 45*   GLUCOSE RANDOM mg/dL 167*     Results from last 7 days   Lab Units  02/27/24  2326   INR  1.11                   Lines/Drains:  Invasive Devices       Peripheral Intravenous Line  Duration             Peripheral IV 02/27/24 Right;Ventral (anterior) Forearm <1 day    Peripheral IV 02/28/24 Distal;Left;Upper;Ventral (anterior) Arm <1 day                      Telemetry:  Telemetry Orders (From admission, onward)               24 Hour Telemetry Monitoring  Continuous x 24 Hours (Telem)        Question:  Reason for 24 Hour Telemetry  Answer:  Decompensated CHF- and any one of the following: continuous diuretic infusion or total diuretic dose >200 mg daily, associated electrolyte derangement (I.e. K < 3.0), ionotropic drip (continuous infusion), hx of ventricular arrhythmia, or new EF < 35%                     Telemetry Reviewed: Normal Sinus Rhythm  Indication for Continued Telemetry Use: Acute MI/Unstable Angina/Rule out ACS             Imaging: Reviewed radiology reports from this admission including: Renal ultrasound    Recent Cultures (last 7 days):         Last 24 Hours Medication List:   Current Facility-Administered Medications   Medication Dose Route Frequency Provider Last Rate    acetaminophen  650 mg Oral Q4H PRN Shai Rueter, DO      amLODIPine  10 mg Oral Daily Shai Rueter, DO      ARIPiprazole  5 mg Oral Daily Shai Rueter, DO      [START ON 2/29/2024] aspirin  81 mg Oral Daily Shai Rueter, DO      atorvastatin  40 mg Oral HS Shai Rueter, DO      doxazosin  8 mg Oral BID Shai Rueter, DO      heparin (porcine)  5,000 Units Subcutaneous Q8H [facility] Shai Rueter, DO      HYDROmorphone  0.5 mg Intravenous Q1H PRN Shai Rueter, DO      isosorbide dinitrate  40 mg Oral TID after meals Shai Rueter, DO      labetalol  10 mg Intravenous Q4H PRN Shai Rueter, DO      labetalol  600 mg Oral Q8H JOANN Shai Rueter, DO      LORazepam  1 mg Oral Q6H PRN Shai Rueter, DO      OLANZapine  5 mg Oral HS Shai Rueter, DO      pantoprazole  40 mg Oral Daily Shai Rueter, DO      potassium  chloride  20 mEq Oral Daily Shai Rueter, DO      sertraline  200 mg Oral Daily Shai Rueter, DO      sucralfate  1 g Oral BID Shai Chaidez, DO          Today, Patient Was Seen By: Rosemary Majano PA-C    **Please Note: This note may have been constructed using a voice recognition system.**

## 2024-02-28 NOTE — ASSESSMENT & PLAN NOTE
"Patient reports that he does not regularly take his medications sliding \"stomach issues\" as reason  Denies financial constraints   Extensive counseling regarding importance of medication compliance in the setting of numerous underlying medical conditions  "

## 2024-02-28 NOTE — CONSULTS
NEPHROLOGY CONSULTATION NOTE    Patient: Genna Liu               Sex: male          DOA: 2/27/2024  6:23 PM   YOB: 1965        Age:  58 y.o.        LOS:  LOS: 0 days     REFERRING PHYSICIAN: Dr. Jones     REASON FOR THE REFERRAL / CONSULTATION: Hypertensive emergency    DATE OF CONSULTATION / SERVICE: 2/28/2024    ADMISSION DIAGNOSIS: Hypertensive emergency     CHIEF COMPLAINT     Chest pain    HPI     Patient is a 58 years old male with past medical history of resistant hypertension, chronic disease stage IV, hyperaldosteronism status post left adrenalectomy, chronic hypokalemia, diabetes mellitus type 2, obesity status post gastric bypass, COPD, left leg DVT, CHF with preserved ejection fraction who presents to our facility with chest pain.  Patient has known history of uncontrolled hypertension and has been allergic to multiple antibiotics medication.  States that he has not been taking the office blood pressure medications due to stomach being upset.  Upon arrival blood pressure was noted to be 209 mmHg systolic and elevated.  Patient received IV labetalol, IV hydralazine was also placed on Cardene drip briefly resulting in some improvement in blood pressure noted this morning.  Admits to improvement in chest pain.  Patient had followed up with Dr. Reese at [facility] nephrology however is overdue for visit with him.     Currently patient denies nausea, vomiting, headache, dizziness, abdominal pain, constipation or rash.    PAST MEDICAL HISTORY     Past Medical History:   Diagnosis Date    Asthma     Chronic kidney disease     COPD (chronic obstructive pulmonary disease) (HCC)     CPAP (continuous positive airway pressure) dependence     NO LONGER NEEDS D/T BARIATRIC SURGERY.    Diabetes mellitus (HCC)     Emphysema of lung (HCC)     Gout     History of transfusion     pt stated they had a transfusion when they had gallbladder surgery.    Hypertension     Kidney disease     renal failure  "   Leg DVT (deep venous thromboembolism), acute, bilateral (HCC) 01/2018    Obesity (BMI 30-39.9)     AGUS on CPAP     setting 11    Postgastrectomy malabsorption     Sleep apnea     Systolic CHF (HCC)        PAST SURGICAL HISTORY     Past Surgical History:   Procedure Laterality Date    ADRENALECTOMY      CHOLECYSTECTOMY      COLONOSCOPY      EGD      HIATAL HERNIA REPAIR N/A 12/22/2020    Procedure: REPAIR HERNIA HIATAL LAPAROSCOPIC;  Surgeon: Shane Francis MD;  Location: [facility];  Service: Bariatrics    INCISION AND DRAINAGE OF WOUND Left 10/17/2018    Procedure: INCISION AND DRAINAGE (I&D) GROIN;  Surgeon: Rafy Pascual MD;  Location: [facility];  Service: General    IR IMAGE GUIDED ASPIRATION / DRAINAGE W TUBE  8/17/2018    IR IMAGE GUIDED ASPIRATION / DRAINAGE W TUBE  7/31/2018    JOINT REPLACEMENT Bilateral     knee    KIDNEY SURGERY  2009    nodule removal    LYMPH NODE DISSECTION Left 01/2018    left inguinal LN removed - benign     OTHER SURGICAL HISTORY      kidney nodule removal    PALATE / UVULA BIOPSY / EXCISION      PERICARDIAL WINDOW N/A 7/8/2023    Procedure: WINDOW PERICARDIAL;  Surgeon: Alonso Logan MD;  Location: [facility];  Service: Thoracic    ME LAPS GSTR RSTCV PX W/BYP JASON-EN-Y LIMB <150 CM N/A 12/22/2020    Procedure: BYPASS GASTRIC  JASON-EN-Y LAPAROSCOPIC WITH INTRAOPERATIVE EGD;  Surgeon: Shane Francis MD;  Location: [facility];  Service: Bariatrics    SINUS SURGERY      TONSILLECTOMY         ALLERGIES     Allergies   Allergen Reactions    Clonidine Anaphylaxis    Lisinopril Anaphylaxis    Nifedipine Anaphylaxis     Tolerating nicardipine    Spironolactone Anaphylaxis, Other (See Comments) and Shortness Of Breath    Buspirone      Headaches,     Enoxaparin      Injection site \"bump\" per Dr. Francis    Hydralazine Other (See Comments)     Fluid around the heart  Lose for Appetite        Minoxidil      Retains fluid around heart       SOCIAL HISTORY     Social History "     Substance and Sexual Activity   Alcohol Use Yes    Comment: occasionally     Social History     Substance and Sexual Activity   Drug Use Yes    Types: Marijuana    Comment: Card     Social History     Tobacco Use   Smoking Status Never    Passive exposure: Past   Smokeless Tobacco Never       FAMILY HISTORY     Family History   Problem Relation Age of Onset    Cancer Father     Kidney disease Mother     Heart murmur Sister     Asthma Sister     Hypertension Sister     Heart disease Brother     Bell's palsy Brother     Hypertension Brother     Deep vein thrombosis Neg Hx        CURRENT MEDICATIONS       Current Facility-Administered Medications:     acetaminophen (TYLENOL) tablet 650 mg, 650 mg, Oral, Q4H PRN, Shai Rueter, DO    amLODIPine (NORVASC) tablet 10 mg, 10 mg, Oral, Daily, Shai Rueter, DO, 10 mg at 02/28/24 0538    ARIPiprazole (ABILIFY) tablet 5 mg, 5 mg, Oral, Daily, Shai Rueter, DO, 5 mg at 02/28/24 0835    [START ON 2/29/2024] aspirin chewable tablet 81 mg, 81 mg, Oral, Daily, Shai Rueter, DO    atorvastatin (LIPITOR) tablet 40 mg, 40 mg, Oral, HS, Shai Rueter, DO    doxazosin (CARDURA) tablet 8 mg, 8 mg, Oral, BID, Shai Rueter, DO, 8 mg at 02/28/24 0605    heparin (porcine) subcutaneous injection 5,000 Units, 5,000 Units, Subcutaneous, Q8H JOANN, 5,000 Units at 02/28/24 0538 **AND** [COMPLETED] Platelet count, , , Once, Shai Rueter, DO    HYDROmorphone (DILAUDID) injection 0.5 mg, 0.5 mg, Intravenous, Q1H PRN, Shai Rueter, DO    isosorbide dinitrate (ISORDIL) tablet 40 mg, 40 mg, Oral, TID after meals, Shai Rueter, DO, 40 mg at 02/28/24 0848    labetalol (NORMODYNE) injection 10 mg, 10 mg, Intravenous, Q4H PRN, Shai Rueter, DO    labetalol (NORMODYNE) tablet 600 mg, 600 mg, Oral, Q8H JOANN, Shai Rueter, DO, 600 mg at 02/28/24 0605    LORazepam (ATIVAN) tablet 1 mg, 1 mg, Oral, Q6H PRN, Shai Rueter, DO, 1 mg at 02/28/24 0609    OLANZapine (ZyPREXA) tablet 5 mg, 5 mg, Oral, HS, Shai Chaidez,  DO    pantoprazole (PROTONIX) EC tablet 40 mg, 40 mg, Oral, Daily, Shai Rueter, DO, 40 mg at 02/28/24 0835    potassium chloride (Klor-Con M20) CR tablet 20 mEq, 20 mEq, Oral, Daily, Shai Rueter, DO, 20 mEq at 02/28/24 0835    sertraline (ZOLOFT) tablet 200 mg, 200 mg, Oral, Daily, Shai Rueter, DO, 200 mg at 02/28/24 0836    sucralfate (CARAFATE) tablet 1 g, 1 g, Oral, BID, Shai Rueter, DO, 1 g at 02/28/24 0835    Current Outpatient Medications:     albuterol (PROVENTIL HFA,VENTOLIN HFA) 90 mcg/act inhaler, , Disp: , Rfl:     amLODIPine (NORVASC) 10 mg tablet, Take 10 mg by mouth, Disp: , Rfl:     ARIPiprazole (ABILIFY) 5 mg tablet, Take 5 mg by mouth daily, Disp: , Rfl:     atorvastatin (LIPITOR) 40 mg tablet, Take 40 mg by mouth daily at bedtime, Disp: , Rfl:     busPIRone (BUSPAR) 15 mg tablet, Take 15 mg by mouth 2 (two) times a day Dr. Levi Brar, Disp: , Rfl:     colchicine (COLCRYS) 0.6 mg tablet, Take 0.6 mg by mouth daily, Disp: , Rfl:     doxazosin (CARDURA) 8 MG tablet, Take 1 tablet (8 mg total) by mouth 2 (two) times a day, Disp: 90 tablet, Rfl: 6    ferrous sulfate 325 (65 Fe) mg tablet, Take 325 mg by mouth 2 (two) times a day  , Disp: , Rfl:     hydrochlorothiazide (HYDRODIURIL) 12.5 mg tablet, Take 1 tablet (12.5 mg total) by mouth daily (Patient not taking: Reported on 1/25/2024), Disp: 30 tablet, Rfl: 0    isosorbide dinitrate (ISORDIL) 40 MG tablet, Take 1 tablet (40 mg total) by mouth 3 (three) times daily after meals, Disp: 90 tablet, Rfl: 0    labetalol (NORMODYNE) 300 mg tablet, Take 2 tablets (600 mg total) by mouth every 8 (eight) hours, Disp: 180 tablet, Rfl: 0    LORazepam (ATIVAN) 1 mg tablet, TAKE 1 TABLET BY MOUTH ONCE DAILY AS NEEDED FOR ANXIETY (TAKE 1 TABLET PRIOR TO MRI OR CT SCAN), Disp: , Rfl:     Multiple Vitamins-Minerals (CENTRUM ADULT PO), Take by mouth, Disp: , Rfl:     OLANZapine (ZyPREXA) 2.5 mg tablet, Take 5 mg by mouth daily at bedtime, Disp: , Rfl:      pantoprazole (PROTONIX) 40 mg tablet, Take 40 mg by mouth daily, Disp: , Rfl:     polyethylene glycol (MIRALAX) 17 g packet, Take 17 g by mouth daily (Patient not taking: Reported on 1/25/2024), Disp: , Rfl:     potassium chloride (MICRO-K) 10 MEQ CR capsule, Take 10 mEq by mouth daily One in AM. Dr. Marina, Disp: , Rfl:     sertraline (ZOLOFT) 100 mg tablet, Take 2 tablets by mouth daily, Disp: , Rfl:     sucralfate (CARAFATE) 1 g tablet, Take 1 g by mouth 2 (two) times a day, Disp: , Rfl:     REVIEW OF SYSTEMS     Review of Systems   Constitutional: Negative.    HENT: Negative.     Eyes: Negative.    Respiratory: Negative.     Cardiovascular:  Positive for chest pain.   Gastrointestinal: Negative.    Endocrine: Negative.    Genitourinary: Negative.    Musculoskeletal: Negative.    Skin: Negative.    Allergic/Immunologic: Negative.    Neurological: Negative.    Hematological: Negative.    All other systems reviewed and are negative.        OBJECTIVE     Current Weight:    Vitals:    02/28/24 0945   BP:    Pulse: 65   Resp: 17   Temp:    SpO2: 96%     There is no height or weight on file to calculate BMI.    Intake/Output Summary (Last 24 hours) at 2/28/2024 1009  Last data filed at 2/28/2024 0838  Gross per 24 hour   Intake 1878.33 ml   Output 750 ml   Net 1128.33 ml       PHYSICAL EXAMINATION     Physical Exam  HENT:      Head: Normocephalic and atraumatic.   Eyes:      Pupils: Pupils are equal, round, and reactive to light.   Neck:      Vascular: No JVD.   Cardiovascular:      Rate and Rhythm: Normal rate and regular rhythm.      Heart sounds: Normal heart sounds. No murmur heard.     No friction rub.   Pulmonary:      Effort: Pulmonary effort is normal.      Breath sounds: Normal breath sounds.   Abdominal:      General: Bowel sounds are normal. There is no distension.      Palpations: Abdomen is soft.      Tenderness: There is no abdominal tenderness. There is no rebound.   Musculoskeletal:         General: No  tenderness.      Cervical back: Neck supple.   Skin:     General: Skin is dry.      Findings: No rash.   Neurological:      Mental Status: He is alert and oriented to person, place, and time.           LAB RESULTS        Results from last 7 days   Lab Units 02/27/24  1945   WBC Thousand/uL 6.22   HEMOGLOBIN g/dL 14.5   HEMATOCRIT % 42.5   PLATELETS Thousands/uL 393*  393*   POTASSIUM mmol/L 2.5*   CHLORIDE mmol/L 96   CO2 mmol/L 25   BUN mg/dL 53*   CREATININE mg/dL 2.44*   EGFR ml/min/1.73sq m 27   CALCIUM mg/dL 11.3*       I have personally reviewed the old medical records and patient's previously known baseline creatinine level is ~2.5-3.0    RADIOLOGY RESULTS     IMPRESSION:     No aortic aneurysm or dissection.     Small volume free fluid/mild mesenteric edema. Consider enteritis in the appropriate clinical setting.    PLAN / RECOMMENDATIONS      58 years old male with past medical history of resistant hypertension,'s primary hyperaldosteronism status post left adrenalectomy, COPD, left leg DVT, diabetes mellitus type 2, obesity status post gastric bypass chronic kidney disease stage IV who presents to our facility with chest pain and found to have elevated blood pressure.    1.  Hypertensive emergency: Presented with systolic blood pressure of 229 mm Hg systolic with findings of elevated troponins noted.  Received IV labetalol and hydralazine along with Cardene drip briefly and home blood pressure medication resumed resulting in improvement blood pressure noted.  Current blood pressure is 158/99.  Given significant hypokalemia, suspect persistently elevated aldosterone levels.  Unfortunately patient is allergic to spironolactone and EGFR being 30 precludes eplerenone.  Given history of hypokalemia and metabolic alkalosis, may consider use of amiloride.  Remains on amlodipine 10 mg once daily, Cardura 8 mg p.o. nightly, labetalol 2400 mg p.o. daily.  Hydrochlorothiazide is currently held given  hypokalemia.    #2 chronic kidney disease stage IV: Baseline serum creatinine ranges between 2.5-3.0.  Current serum creatinine is 2.44 and stable.    3.  Hypercalcemia: Noted calcium 11.3 and elevated.  Will obtain PTH intact.  Order paraproteinemia workup.    4.  Transaminitis: Noted mildly elevated LFTs.  Recommend obtaining right upper quadrant ultrasound to evaluate for gallbladder dysfunction.    5.  Secondary hyperparathyroidism of renal origin: Will obtain PTH intact, phosphous, 25OH vitamin D levels.    6.  Anemia due to CKD: Hemoglobin is 14.5 g/dL and acceptable.    7.  Hypokalemia: Presented with potassium of 2.5 and low.    Contributions from use of hydrochlorothiazide along with suspect underlying hyperaldosteronism causing potassium losses.  Replacement ordered.  Furthermore presence of hypercalciuria causing nocturia and potassium losses is suspected as well.    Thank you for the consultation to participate in patient's care. I have personally discussed my plan with the referring physician.     Wayne Bruno MD    2/28/2024

## 2024-02-28 NOTE — QUICK NOTE
SLIM notified of patients upward trending trops and BP. BP is trending down after medication, will continue to monitor and watch for any SLIM orders.

## 2024-02-28 NOTE — ED PROVIDER NOTES
History  Chief Complaint   Patient presents with    Chest Pain     Chest pain, SOB and vomiting that started at 8am      HPI    Patient is a 58-year-old male present emerged department with multiple concerns.  Patient describe chest discomfort which is nonreproducible.  He describes associated shortness of breath and difficulty breathing.  Patient' reports diffuse abdominal discomfort as well.  Patient is nauseous.  Patient also reports nonbloody, nonbilious emesis.  Denies any recent fevers chills or URI symptoms.  Recent history of hospitalization for pericarditis.  History includes CKD, COPD, hypertension and diabetes. Never a smoker.    Prior to Admission Medications   Prescriptions Last Dose Informant Patient Reported? Taking?   ARIPiprazole (ABILIFY) 5 mg tablet Not Taking Self Yes No   Sig: Take 5 mg by mouth daily   LORazepam (ATIVAN) 1 mg tablet   Yes No   Sig: TAKE 1 TABLET BY MOUTH ONCE DAILY AS NEEDED FOR ANXIETY (TAKE 1 TABLET PRIOR TO MRI OR CT SCAN)   Multiple Vitamins-Minerals (CENTRUM ADULT PO) Not Taking  Yes No   Sig: Take by mouth   Patient not taking: Reported on 2/29/2024   OLANZapine (ZyPREXA) 2.5 mg tablet   Yes No   Sig: Take 5 mg by mouth daily at bedtime   albuterol (PROVENTIL HFA,VENTOLIN HFA) 90 mcg/act inhaler  Self Yes No   amLODIPine (NORVASC) 10 mg tablet  Self Yes No   Sig: Take 10 mg by mouth   atorvastatin (LIPITOR) 40 mg tablet  Self Yes No   Sig: Take 40 mg by mouth daily at bedtime   busPIRone (BUSPAR) 15 mg tablet   Yes No   Sig: Take 15 mg by mouth 2 (two) times a day Dr. Levi Brar   colchicine (COLCRYS) 0.6 mg tablet   Yes No   Sig: Take 0.6 mg by mouth daily   doxazosin (CARDURA) 8 MG tablet   No No   Sig: Take 1 tablet (8 mg total) by mouth 2 (two) times a day   ferrous sulfate 325 (65 Fe) mg tablet  Self Yes No   Sig: Take 325 mg by mouth 2 (two) times a day     hydrochlorothiazide (HYDRODIURIL) 12.5 mg tablet   No No   Sig: Take 1 tablet (12.5 mg total) by mouth  daily   Patient taking differently: Take 25 mg by mouth daily   isosorbide dinitrate (ISORDIL) 40 MG tablet Not Taking  No No   Sig: Take 1 tablet (40 mg total) by mouth 3 (three) times daily after meals   labetalol (NORMODYNE) 300 mg tablet   No No   Sig: Take 2 tablets (600 mg total) by mouth every 8 (eight) hours   pantoprazole (PROTONIX) 40 mg tablet   Yes No   Sig: Take 40 mg by mouth daily   polyethylene glycol (MIRALAX) 17 g packet   Yes No   Sig: Take 17 g by mouth daily   Patient not taking: Reported on 1/25/2024   potassium chloride (MICRO-K) 10 MEQ CR capsule   Yes No   Sig: Take 10 mEq by mouth daily One in AM. Dr. Marina   sertraline (ZOLOFT) 100 mg tablet  Self Yes No   Sig: Take 2 tablets by mouth daily   sucralfate (CARAFATE) 1 g tablet   Yes No   Sig: Take 1 g by mouth 2 (two) times a day      Facility-Administered Medications: None       Past Medical History:   Diagnosis Date    Asthma     Chronic kidney disease     COPD (chronic obstructive pulmonary disease) (HCC)     CPAP (continuous positive airway pressure) dependence     NO LONGER NEEDS D/T BARIATRIC SURGERY.    Diabetes mellitus (HCC)     Emphysema of lung (HCC)     Gout     History of transfusion     pt stated they had a transfusion when they had gallbladder surgery.    Hypertension     Kidney disease     renal failure    Leg DVT (deep venous thromboembolism), acute, bilateral (HCC) 01/2018    Obesity (BMI 30-39.9)     AGUS on CPAP     setting 11    Postgastrectomy malabsorption     Sleep apnea     Systolic CHF (HCC)        Past Surgical History:   Procedure Laterality Date    ADRENALECTOMY      CARDIAC CATHETERIZATION N/A 3/5/2024    Procedure: Cardiac Left Heart Cath;  Surgeon: Servando Wiggins MD;  Location: [facility];  Service: Cardiology    CARDIAC CATHETERIZATION N/A 3/5/2024    Procedure: Cardiac Coronary Angiogram;  Surgeon: Servando Wiggins MD;  Location: [facility];  Service: Cardiology    CHOLECYSTECTOMY       COLONOSCOPY      EGD      HIATAL HERNIA REPAIR N/A 12/22/2020    Procedure: REPAIR HERNIA HIATAL LAPAROSCOPIC;  Surgeon: Shane Francis MD;  Location: [facility];  Service: Bariatrics    INCISION AND DRAINAGE OF WOUND Left 10/17/2018    Procedure: INCISION AND DRAINAGE (I&D) GROIN;  Surgeon: Rafy Pascual MD;  Location: [facility];  Service: General    IR IMAGE GUIDED ASPIRATION / DRAINAGE W TUBE  8/17/2018    IR IMAGE GUIDED ASPIRATION / DRAINAGE W TUBE  7/31/2018    JOINT REPLACEMENT Bilateral     knee    KIDNEY SURGERY  2009    nodule removal    LYMPH NODE DISSECTION Left 01/2018    left inguinal LN removed - benign     OTHER SURGICAL HISTORY      kidney nodule removal    PALATE / UVULA BIOPSY / EXCISION      PERICARDIAL WINDOW N/A 7/8/2023    Procedure: WINDOW PERICARDIAL;  Surgeon: Alonso Logan MD;  Location: [facility];  Service: Thoracic    NY LAPS GSTR RSTCV PX W/BYP JASON-EN-Y LIMB <150 CM N/A 12/22/2020    Procedure: BYPASS GASTRIC  JAOSN-EN-Y LAPAROSCOPIC WITH INTRAOPERATIVE EGD;  Surgeon: Shane Francis MD;  Location: [facility];  Service: Bariatrics    SINUS SURGERY      TONSILLECTOMY         Family History   Problem Relation Age of Onset    Cancer Father     Kidney disease Mother     Heart murmur Sister     Asthma Sister     Hypertension Sister     Heart disease Brother     Bell's palsy Brother     Hypertension Brother     Deep vein thrombosis Neg Hx      I have reviewed and agree with the history as documented.    E-Cigarette/Vaping    E-Cigarette Use Never User      E-Cigarette/Vaping Substances    Nicotine No     THC No     CBD No     Flavoring No     Other No     Unknown No      Social History     Tobacco Use    Smoking status: Never     Passive exposure: Past    Smokeless tobacco: Never   Vaping Use    Vaping status: Never Used   Substance Use Topics    Alcohol use: Yes     Comment: occasionally    Drug use: Yes     Types: Marijuana     Comment: Card       Review of Systems    Constitutional:  Negative for chills and fever.   HENT:  Negative for ear pain and sore throat.    Eyes:  Negative for pain and visual disturbance.   Respiratory:  Positive for shortness of breath. Negative for cough.    Cardiovascular:  Positive for chest pain. Negative for palpitations.   Gastrointestinal:  Positive for abdominal pain, nausea and vomiting.   Genitourinary:  Negative for dysuria and hematuria.   Musculoskeletal:  Negative for arthralgias and back pain.   Skin:  Negative for color change and rash.   Neurological:  Negative for seizures and syncope.   All other systems reviewed and are negative.      Physical Exam  Physical Exam  Vitals and nursing note reviewed.   Constitutional:       General: He is not in acute distress.     Appearance: He is well-developed.   HENT:      Head: Normocephalic and atraumatic.   Eyes:      Conjunctiva/sclera: Conjunctivae normal.   Cardiovascular:      Rate and Rhythm: Normal rate.      Heart sounds: No murmur heard.  Pulmonary:      Effort: Pulmonary effort is normal. Tachypnea present. No respiratory distress.      Breath sounds: Normal breath sounds.   Abdominal:      Palpations: Abdomen is soft.      Tenderness: There is no abdominal tenderness.   Musculoskeletal:         General: No swelling.      Cervical back: Neck supple.   Skin:     General: Skin is warm.      Capillary Refill: Capillary refill takes less than 2 seconds.      Comments: diaphoretic   Neurological:      General: No focal deficit present.      Mental Status: He is alert and oriented to person, place, and time.   Psychiatric:         Mood and Affect: Mood normal.         Vital Signs  ED Triage Vitals   Temperature Pulse Respirations Blood Pressure SpO2   02/27/24 1810 02/27/24 1810 02/27/24 1810 02/27/24 1810 02/27/24 1810   97.9 °F (36.6 °C) 95 (!) 32 (!) 209/111 98 %      Temp Source Heart Rate Source Patient Position - Orthostatic VS BP Location FiO2 (%)   02/27/24 1810 02/27/24 1810  02/27/24 1810 02/27/24 1810 --   Oral Monitor Sitting Left arm       Pain Score       02/27/24 1936       10 - Worst Possible Pain           Vitals:    03/06/24 0400 03/06/24 0524 03/06/24 0722 03/06/24 0954   BP: (!) 167/106 (!) 196/114 (!) 177/101 (!) 176/105   Pulse: 75 67 65    Patient Position - Orthostatic VS:             Visual Acuity  Visual Acuity      Flowsheet Row Most Recent Value   L Pupil Size (mm) 3   R Pupil Size (mm) 3   L Pupil Shape Round   R Pupil Shape Round            ED Medications  Medications   sodium chloride 0.9 % infusion (100 mL/hr Intravenous New Bag 3/4/24 0438)   ondansetron (ZOFRAN) injection 4 mg (4 mg Intravenous Given 2/27/24 1935)   sodium chloride 0.9 % bolus 1,000 mL (0 mL Intravenous Stopped 2/27/24 2141)   fentanyl citrate (PF) 100 MCG/2ML 50 mcg (50 mcg Intravenous Given 2/27/24 1936)   ipratropium-albuterol (DUO-NEB) 0.5-2.5 mg/3 mL inhalation solution 3 mL (3 mL Nebulization Given 2/27/24 1935)   potassium chloride (Klor-Con M20) CR tablet 40 mEq (40 mEq Oral Given 2/27/24 2046)   potassium chloride 20 mEq IVPB (premix) (0 mEq Intravenous Stopped 2/27/24 2319)     Followed by   potassium chloride 20 mEq IVPB (premix) (0 mEq Intravenous Stopped 2/28/24 0117)     Followed by   potassium chloride 20 mEq IVPB (premix) (0 mEq Intravenous Stopped 2/28/24 0355)   nitroglycerin (NITROSTAT) SL tablet 0.4 mg (0.4 mg Sublingual Given 2/27/24 2200)   nitroglycerin (NITROSTAT) SL tablet 0.4 mg (0.4 mg Sublingual Given 2/27/24 2141)   nitroglycerin (NITROSTAT) SL tablet 0.4 mg (0.4 mg Sublingual Given 2/27/24 2137)   nitroglycerin (NITRO-BID) 2 % TD ointment 1 inch (1 inch Topical Given 2/27/24 2216)   fentanyl citrate (PF) 100 MCG/2ML 50 mcg (50 mcg Intravenous Given 2/27/24 2312)   iohexol (OMNIPAQUE) 350 MG/ML injection (MULTI-DOSE) 100 mL (100 mL Intravenous Given 2/27/24 2258)   sodium chloride 0.9 % bolus 500 mL (0 mL Intravenous Stopped 2/28/24 0117)   labetalol (NORMODYNE)  injection 10 mg (10 mg Intravenous Given 2/28/24 0030)   labetalol (NORMODYNE) injection 20 mg (20 mg Intravenous Given 2/28/24 0312)   aspirin tablet 325 mg (325 mg Oral Given 2/28/24 0538)   hydrALAZINE (APRESOLINE) injection 20 mg (20 mg Intravenous Given 2/28/24 0538)   potassium chloride (Klor-Con M20) CR tablet 40 mEq (40 mEq Oral Given 2/28/24 1216)   potassium chloride 20 mEq IVPB (premix) (0 mEq Intravenous Stopped 2/29/24 0215)   potassium chloride (Klor-Con M20) CR tablet 40 mEq (40 mEq Oral Given 2/29/24 0852)   potassium chloride 20 mEq IVPB (premix) (20 mEq Intravenous New Bag 2/29/24 0854)   aminophylline injection 50 mg (50 mg Intravenous Given 2/29/24 1441)   regadenoson (LEXISCAN) injection 0.4 mg (0.4 mg Intravenous Given 2/29/24 1433)   hydrALAZINE (APRESOLINE) injection 20 mg (20 mg Intravenous Given 2/29/24 1735)   hydrALAZINE (APRESOLINE) tablet 25 mg (25 mg Oral Given 2/29/24 1749)   acetaminophen (TYLENOL) tablet 650 mg (650 mg Oral Given 2/29/24 2122)   potassium chloride 20 mEq IVPB (premix) (20 mEq Intravenous New Bag 3/1/24 0019)   magnesium sulfate 2 g/50 mL IVPB (premix) 2 g (0 g Intravenous Stopped 3/1/24 1015)   potassium chloride (Klor-Con M20) CR tablet 40 mEq (40 mEq Oral Given 3/1/24 0639)   multi-electrolyte (ISOLYTE-S PH 7.4) bolus 500 mL (0 mL Intravenous Stopped 3/1/24 1006)   potassium chloride (Klor-Con M20) CR tablet 40 mEq (40 mEq Oral Given 3/2/24 1112)   potassium chloride (Klor-Con M20) CR tablet 40 mEq (40 mEq Oral Given 3/3/24 0825)   potassium chloride (Klor-Con M20) CR tablet 40 mEq (40 mEq Oral Given 3/4/24 0917)   cefTRIAXone (ROCEPHIN) IVPB (premix in dextrose) 1,000 mg 50 mL (0 mg Intravenous Stopped 3/5/24 1115)       Diagnostic Studies  Results Reviewed       Procedure Component Value Units Date/Time    Aldosterone/Renin Ratio [[id number]]  (Abnormal) Collected: 02/28/24 1215    Lab Status: Final result Specimen: Arm, Right Updated: 03/06/24 2006     Renin  "<0.167 ng/mL/hr      Aldosterone 8.4 ng/dL      Aldos/Renin Ratio >50.3    Narrative:      Test(s) 004372-Aldosterone; 002007-Renin Activity, Plasma  was developed and its performance characteristics determined  by [facility]. It has not been cleared or approved by the Food  and Drug Administration.  Performed at:  [address]  [id number]  : Solomon Wynn MD, Phone:  [phone]    Immunofixation, Serum(Reflex Only-Do Not Order) [[id number]] Collected: 02/28/24 1104    Lab Status: Final result Specimen: Blood from Arm, Right Updated: 02/29/24 1209     Immunofixation Interpretation See Comment    Narrative:      Serum immunofixation shows no monoclonal immunoglobulins.  Reviewed by:  Kermit Mackay MD ([id number]) Electronic Signature    Protein electrophoresis, serum [[id number]] Collected: 02/28/24 1104    Lab Status: Final result Specimen: Blood from Arm, Right Updated: 02/29/24 1207     A/G Ratio 1.43     Albumin Electrophoresis 58.9 %      Albumin CONC 3.77 g/dl      Alpha 1 4.1 %      ALPHA 1 CONC 0.26 g/dL      Alpha 2 7.9 %      ALPHA 2 CONC 0.51 g/dL      Beta-1 6.2 %      BETA 1 CONC 0.40 g/dL      Beta-2 5.8 %      BETA 2 CONC 0.37 g/dL      Gamma Globulin 17.1 %      GAMMA CONC 1.09 g/dL      Total Protein 6.4 g/dL      SPEP Interpretation See Comment    Narrative:      No monoclonal bands noted. Previous immunofixation, which was completed at [facility] on 08/28/2023, identified the monoclonal gammopathy as IgG kappa.Repeat immunofixation to be performed. Reviewed by: Kermit Mackay MD **Electronic Signature\"\"    CBC [[id number]]  (Abnormal) Collected: 02/29/24 0506    Lab Status: Final result Specimen: Blood from Arm, Right Updated: 02/29/24 0524     WBC 9.51 Thousand/uL      RBC 4.09 Million/uL      Hemoglobin 11.4 g/dL      Hematocrit 34.5 %      MCV 84 fL      MCH 27.9 pg      MCHC 33.0 g/dL      RDW 15.9 %      Platelets 267 Thousands/uL      MPV " 9.9 fL     Sodium, urine, random [[id number]] Collected: 02/28/24 2231    Lab Status: Final result Specimen: Urine, Clean Catch Updated: 02/28/24 2316     Sodium, Ur 43    Protein / creatinine ratio, urine [[id number]]  (Abnormal) Collected: 02/28/24 2231    Lab Status: Final result Specimen: Urine, Clean Catch Updated: 02/28/24 2316     Creatinine, Ur 110.5 mg/dL      Protein Urine Random 246 mg/dL      Prot/Creat Ratio, Ur 2.23    Basic metabolic panel [[id number]]  (Abnormal) Collected: 02/28/24 2224    Lab Status: Final result Specimen: Blood from Arm, Left Updated: 02/28/24 2303     Sodium 137 mmol/L      Potassium 3.2 mmol/L      Chloride 101 mmol/L      CO2 23 mmol/L      ANION GAP 13 mmol/L      BUN 55 mg/dL      Creatinine 2.81 mg/dL      Glucose 121 mg/dL      Calcium 9.7 mg/dL      eGFR 23 ml/min/1.73sq m     Narrative:      National Kidney Disease Foundation guidelines for Chronic Kidney Disease (CKD):     Stage 1 with normal or high GFR (GFR > 90 mL/min/1.73 square meters)    Stage 2 Mild CKD (GFR = 60-89 mL/min/1.73 square meters)    Stage 3A Moderate CKD (GFR = 45-59 mL/min/1.73 square meters)    Stage 3B Moderate CKD (GFR = 30-44 mL/min/1.73 square meters)    Stage 4 Severe CKD (GFR = 15-29 mL/min/1.73 square meters)    Stage 5 End Stage CKD (GFR <15 mL/min/1.73 square meters)  Note: GFR calculation is accurate only with a steady state creatinine    Urinalysis with microscopic [[id number]]  (Abnormal) Collected: 02/28/24 2231    Lab Status: Final result Specimen: Urine, Clean Catch Updated: 02/28/24 2247     Color, UA Light Yellow     Clarity, UA Clear     Specific Gravity, UA 1.029     pH, UA 6.0     Leukocytes, UA Moderate     Nitrite, UA Negative     Protein,  (2+) mg/dl      Glucose, UA Negative mg/dl      Ketones, UA Negative mg/dl      Urobilinogen, UA <2.0 mg/dl      Bilirubin, UA Negative     Occult Blood, UA Large     RBC, UA 2-4 /hpf      WBC, UA Innumerable /hpf      Epithelial Cells  Occasional /hpf      Bacteria, UA None Seen /hpf     Vitamin D 25 hydroxy [[id number]]  (Normal) Collected: 02/28/24 1104    Lab Status: Final result Specimen: Blood from Arm, Right Updated: 02/28/24 1547     Vit D, 25-Hydroxy 48.1 ng/mL     PTH, intact [[id number]]  (Abnormal) Collected: 02/28/24 1104    Lab Status: Final result Specimen: Blood from Arm, Right Updated: 02/28/24 1544     .4 pg/mL     Basic metabolic panel [[id number]]  (Abnormal) Collected: 02/28/24 1104    Lab Status: Final result Specimen: Blood from Arm, Right Updated: 02/28/24 1136     Sodium 136 mmol/L      Potassium 2.8 mmol/L      Chloride 99 mmol/L      CO2 27 mmol/L      ANION GAP 10 mmol/L      BUN 48 mg/dL      Creatinine 2.61 mg/dL      Glucose 158 mg/dL      Calcium 9.4 mg/dL      eGFR 25 ml/min/1.73sq m     Narrative:      National Kidney Disease Foundation guidelines for Chronic Kidney Disease (CKD):     Stage 1 with normal or high GFR (GFR > 90 mL/min/1.73 square meters)    Stage 2 Mild CKD (GFR = 60-89 mL/min/1.73 square meters)    Stage 3A Moderate CKD (GFR = 45-59 mL/min/1.73 square meters)    Stage 3B Moderate CKD (GFR = 30-44 mL/min/1.73 square meters)    Stage 4 Severe CKD (GFR = 15-29 mL/min/1.73 square meters)    Stage 5 End Stage CKD (GFR <15 mL/min/1.73 square meters)  Note: GFR calculation is accurate only with a steady state creatinine    Phosphorus [[id number]]  (Abnormal) Collected: 02/28/24 1104    Lab Status: Final result Specimen: Blood from Arm, Right Updated: 02/28/24 1134     Phosphorus 4.8 mg/dL     Platelet count [[id number]]  (Abnormal) Collected: 02/27/24 1945    Lab Status: Final result Specimen: Blood from Arm, Right Updated: 02/28/24 0557     Platelets 393 Thousands/uL      MPV 9.8 fL     COVID/FLU/RSV [[id number]]  (Normal) Collected: 02/28/24 0028    Lab Status: Final result Specimen: Nares from Nose Updated: 02/28/24 0112     SARS-CoV-2 Negative     INFLUENZA A PCR Negative     INFLUENZA B PCR Negative      RSV PCR Negative    Narrative:      FOR PEDIATRIC PATIENTS - copy/paste COVID Guidelines URL to browser: [email or web address]    SARS-CoV-2 assay is a Nucleic Acid Amplification assay intended for the  qualitative detection of nucleic acid from SARS-CoV-2 in nasopharyngeal  swabs. Results are for the presumptive identification of SARS-CoV-2 RNA.    Positive results are indicative of infection with SARS-CoV-2, the virus  causing COVID-19, but do not rule out bacterial infection or co-infection  with other viruses. Laboratories within the [place] and its  territories are required to report all positive results to the appropriate  public health authorities. Negative results do not preclude SARS-CoV-2  infection and should not be used as the sole basis for treatment or other  patient management decisions. Negative results must be combined with  clinical observations, patient history, and epidemiological information.  This test has not been FDA cleared or approved.    This test has been authorized by FDA under an Emergency Use Authorization  (EUA). This test is only authorized for the duration of time the  declaration that circumstances exist justifying the authorization of the  emergency use of an in vitro diagnostic tests for detection of SARS-CoV-2  virus and/or diagnosis of COVID-19 infection under section 564(b)(1) of  the Act, 21 U.S.C. 360bbb-3(b)(1), unless the authorization is terminated  or revoked sooner. The test has been validated but independent review by FDA  and CLIA is pending.    Test performed using [employer] Gene[employer] DBSpert: This RT-PCR assay targets N2,  a region unique to SARS-CoV-2. A conserved region in the E-gene was chosen  for pan-Sarbecovirus detection which includes SARS-CoV-2.    According to CMS-2020-01-R, this platform meets the definition of high-throughput technology.    HS Troponin I 4hr [[id number]]  (Abnormal) Collected: 02/27/24  2326    Lab Status: Final result Specimen: Blood from Arm, Right Updated: 02/27/24 2354     hs TnI 4hr 251 ng/L      Delta 4hr hsTnI 44 ng/L     APTT [[id number]]  (Normal) Collected: 02/27/24 2326    Lab Status: Final result Specimen: Blood from Arm, Right Updated: 02/27/24 2348     PTT 29 seconds     Protime-INR [[id number]]  (Abnormal) Collected: 02/27/24 2326    Lab Status: Final result Specimen: Blood from Arm, Right Updated: 02/27/24 2348     Protime 14.9 seconds      INR 1.11    HS Troponin I 2hr [[id number]]  (Abnormal) Collected: 02/27/24 2202    Lab Status: Final result Specimen: Blood from Hand, Right Updated: 02/27/24 2235     hs TnI 2hr 244 ng/L      Delta 2hr hsTnI 37 ng/L     Comprehensive metabolic panel [[id number]]  (Abnormal) Collected: 02/27/24 1945    Lab Status: Final result Specimen: Blood from Arm, Right Updated: 02/27/24 2043     Sodium 140 mmol/L      Potassium 2.5 mmol/L      Chloride 96 mmol/L      CO2 25 mmol/L      ANION GAP 19 mmol/L      BUN 53 mg/dL      Creatinine 2.44 mg/dL      Glucose 167 mg/dL      Calcium 11.3 mg/dL      AST 45 U/L      ALT 89 U/L      Alkaline Phosphatase 107 U/L      Total Protein 8.8 g/dL      Albumin 5.0 g/dL      Total Bilirubin 1.88 mg/dL      eGFR 27 ml/min/1.73sq m     Narrative:      National Kidney Disease Foundation guidelines for Chronic Kidney Disease (CKD):     Stage 1 with normal or high GFR (GFR > 90 mL/min/1.73 square meters)    Stage 2 Mild CKD (GFR = 60-89 mL/min/1.73 square meters)    Stage 3A Moderate CKD (GFR = 45-59 mL/min/1.73 square meters)    Stage 3B Moderate CKD (GFR = 30-44 mL/min/1.73 square meters)    Stage 4 Severe CKD (GFR = 15-29 mL/min/1.73 square meters)    Stage 5 End Stage CKD (GFR <15 mL/min/1.73 square meters)  Note: GFR calculation is accurate only with a steady state creatinine    Lipase [[id number]]  (Normal) Collected: 02/27/24 1945    Lab Status: Final result Specimen: Blood from Arm, Right Updated: 02/27/24 2020      Lipase 34 u/L     HS Troponin 0hr (reflex protocol) [[id number]]  (Abnormal) Collected: 02/27/24 1945    Lab Status: Final result Specimen: Blood from Arm, Right Updated: 02/27/24 2017     hs TnI 0hr 207 ng/L     B-Type Natriuretic Peptide(BNP) [[id number]]  (Abnormal) Collected: 02/27/24 1945    Lab Status: Final result Specimen: Blood from Arm, Right Updated: 02/27/24 2017     BNP 1,807 pg/mL     CBC and differential [[id number]]  (Abnormal) Collected: 02/27/24 1945    Lab Status: Final result Specimen: Blood from Arm, Right Updated: 02/27/24 1950     WBC 6.22 Thousand/uL      RBC 5.21 Million/uL      Hemoglobin 14.5 g/dL      Hematocrit 42.5 %      MCV 82 fL      MCH 27.8 pg      MCHC 34.1 g/dL      RDW 14.8 %      MPV 9.8 fL      Platelets 393 Thousands/uL      nRBC 0 /100 WBCs      Neutrophils Relative 82 %      Immat GRANS % 0 %      Lymphocytes Relative 12 %      Monocytes Relative 6 %      Eosinophils Relative 0 %      Basophils Relative 0 %      Neutrophils Absolute 5.05 Thousands/µL      Immature Grans Absolute 0.02 Thousand/uL      Lymphocytes Absolute 0.75 Thousands/µL      Monocytes Absolute 0.38 Thousand/µL      Eosinophils Absolute 0.00 Thousand/µL      Basophils Absolute 0.02 Thousands/µL                    VAS renal artery complete   Final Result by Hansel Guevara DO (02/28 1017)      CTA dissection protocol chest/abdomen/pelvis   Final Result by Paxton Stevens DO (02/28 0019)      No aortic aneurysm or dissection.      Small volume free fluid/mild mesenteric edema. Consider enteritis in the appropriate clinical setting.               Workstation performed: [id number]         XR chest 1 view portable   Final Result by Daquan Mills MD (02/28 0906)      No radiographic evidence of acute intrathoracic process.            Workstation performed: [id number]                    Procedures  Procedures  Conscious Sedation Assessment      Flowsheet Row Classification Score   ASA Scale Assessment  2-Mild to moderate systemic disease, medically well controlled, with no functional limitation filed at 03/05/2024 1013   Mallampati Classification Class II: soft palate, uvula, fauces visible - No Difficulty filed at 03/05/2024 1013               ED Course       Bedside point-of-care ultrasound was performed to evaluate for a pericardial effusion. No significant effusion noted at this time.                                      Medical Decision Making  Differential arrhythmia, ACS, gastritis, CHF, pericarditis    Patient is a 58-year-old male present emergency department with shortness of breath, tachypnea and chest pain.  Patient appears uncomfortable at bedside examination.  Patient is also describing diffuse abdominal pain.    Initial interventions given included DuoNeb, fentanyl Zofran and fluids.  On assessment patient had improvement of chest discomfort and shortness of breath.    Given patient's overall discomfort initial workup consider cardiac etiology with abdominal labs.  Labs significant for an elevated troponin.  Patient's renal function currently improved from baseline.  Patient also has an elevated BNP without any chest x-ray findings.  Patient was hypokalemic and given supplementation.    Initial EKG with his baseline artifact did have some suggestion of ST depression and a repeat was performed.  Repeat EKG appears similar to EKG from December.    With patient's recent history of pericarditis a point-of-care ultrasound was performed at bedside.  No signs of significant pericardial fluid noted.    Patient's chest discomfort returned and given 3 rounds of nitro.  Patient's chest pain at this time radiates to his back and to his abdomen.  Patient is diffusely diaphoretic and appears uncomfortable.  With patient's low GFR radiology gave approval for imaging.  CTA dissection was performed.  Findings include small amount of mesenteric edema.    Nitro paste given with the return of his chest discomfort,  patient overall improvement of symptoms after this intervention.  Blood pressure at this time remains persistently elevated.  Patient is currently asymptomatic to the elevated blood pressure.  Labetalol was given.  Concurrently both of the patient's IV lines appear out and nonfunctional.    Case was discussed with hospitalist who wanted more control of blood pressure prior to admission.  Discussed case to night shift attending who will follow patient for admission.          EKG: rate 99, sinus rhythm with st depressions. Pvcs. Artifact present. Compared to prior 12/23.  Repeat EKG: rate 68, st/t wave abnormalities, appears similar to prior in 12/23.    Amount and/or Complexity of Data Reviewed  Labs: ordered.  Radiology: ordered.  ECG/medicine tests: ordered and independent interpretation performed.    Risk  Prescription drug management.  Decision regarding hospitalization.             Disposition  Final diagnoses:   Hypertension   Chest pain   Elevated troponin     Time reflects when diagnosis was documented in both MDM as applicable and the Disposition within this note       Time User Action Codes Description Comment    2/27/2024 11:14 PM Betzaida Sandoval Add [I10] Hypertension     2/27/2024 11:14 PM Betzaida Sandoval Add [R07.9] Chest pain     2/27/2024 11:15 PM Betzaida Sandoval Add [R79.89] Elevated troponin     2/28/2024  7:22 AM Rosemary Majano Add [I16.1] Hypertensive emergency     2/28/2024  7:22 AM Rosemary Majano Add [N18.4] Stage 4 chronic kidney disease (HCC)     3/3/2024  8:53 AM Imtiaz Solorzano Add [R94.39] Abnormal cardiovascular stress test     3/4/2024  6:33 AM Alberto Wiggins Modify [R07.9] Chest pain     3/4/2024  6:33 AM Alberto Wiggins Modify [R79.89] Elevated troponin     3/4/2024  6:33 AM Alberto Wigigns Modify [R94.39] Abnormal cardiovascular stress test           ED Disposition       ED Disposition   Admit    Condition   Stable    Date/Time   Wed Feb 28, 2024 0038    Comment   Case was discussed  with hospitalist and the patient's admission status was agreed to be Admission Status: inpatient status to the service of Dr. Mauricio .               Follow-up Information       Follow up With Specialties Details Why Contact Info    Carleen Acosta MD Nephrology Follow up in 2 week(s)  [address]  [address]  [facility] [address]  [phone]      Ugo Marina DO Internal Medicine Schedule an appointment as soon as possible for a visit in 1 week(s)  [address]  [facility] [address]  [phone]              Discharge Medication List as of 3/6/2024 11:49 AM        START taking these medications    Details   aspirin 81 mg chewable tablet Chew 1 tablet (81 mg total) daily, Starting Thu 3/7/2024, Normal      hydrALAZINE (APRESOLINE) 100 MG tablet Take 1 tablet (100 mg total) by mouth every 8 (eight) hours, Starting Wed 3/6/2024, Normal           CONTINUE these medications which have NOT CHANGED    Details   albuterol (PROVENTIL HFA,VENTOLIN HFA) 90 mcg/act inhaler Historical Med      amLODIPine (NORVASC) 10 mg tablet Take 10 mg by mouth, Starting Wed 4/15/2020, Historical Med      ARIPiprazole (ABILIFY) 5 mg tablet Take 5 mg by mouth daily, Starting Tue 8/22/2023, Historical Med      atorvastatin (LIPITOR) 40 mg tablet Take 40 mg by mouth daily at bedtime, Starting Thu 7/21/2022, Historical Med      doxazosin (CARDURA) 8 MG tablet Take 1 tablet (8 mg total) by mouth 2 (two) times a day, Starting Tue 10/17/2023, Normal      isosorbide dinitrate (ISORDIL) 40 MG tablet Take 1 tablet (40 mg total) by mouth 3 (three) times daily after meals, Starting Fri 12/29/2023, Normal      labetalol (NORMODYNE) 300 mg tablet Take 2 tablets (600 mg total) by mouth every 8 (eight) hours, Starting Fri 12/29/2023, Normal      LORazepam (ATIVAN) 1 mg tablet TAKE 1 TABLET BY MOUTH ONCE DAILY AS NEEDED FOR ANXIETY (TAKE 1 TABLET PRIOR TO MRI OR CT SCAN), Historical Med      OLANZapine (ZyPREXA) 2.5 mg tablet Take 5 mg by mouth  daily at bedtime, Starting Tue 11/7/2023, Historical Med      pantoprazole (PROTONIX) 40 mg tablet Take 40 mg by mouth daily, Historical Med      sertraline (ZOLOFT) 100 mg tablet Take 2 tablets by mouth daily, Starting Wed 6/15/2022, Historical Med      sucralfate (CARAFATE) 1 g tablet Take 1 g by mouth 2 (two) times a day, Starting Mon 12/18/2023, Historical Med           STOP taking these medications       busPIRone (BUSPAR) 15 mg tablet Comments:   Reason for Stopping:         colchicine (COLCRYS) 0.6 mg tablet Comments:   Reason for Stopping:         ferrous sulfate 325 (65 Fe) mg tablet Comments:   Reason for Stopping:         hydrochlorothiazide (HYDRODIURIL) 12.5 mg tablet Comments:   Reason for Stopping:         Multiple Vitamins-Minerals (CENTRUM ADULT PO) Comments:   Reason for Stopping:         polyethylene glycol (MIRALAX) 17 g packet Comments:   Reason for Stopping:         potassium chloride (MICRO-K) 10 MEQ CR capsule Comments:   Reason for Stopping:               No discharge procedures on file.    PDMP Review         Value Time User    PDMP Reviewed  Yes 12/24/2020  8:11 AM Desi Katz PA-C            ED Provider  Electronically Signed by             Betzaida Sandoval DO  03/09/24 0030

## 2024-02-28 NOTE — ASSESSMENT & PLAN NOTE
- Patient with complaints of substernal chest pressure upon presentation with associated shortness of breath  - Cardiac history reviewed-no discrete history of coronary artery disease  - Elevated flat troponin with nonischemic EKG change  - Likely, the result of hypoperfusion in the setting of hypertensive urgency  - However, patient with cardiac risk factors  - Chest pressure is improved with treatment of underlying hypertension    Plan:  - Continue to trend cardiac markers with EKG  - Consultation to cardiology given risk factors and elevation of troponin  - Antiplatelet initiated  - No role for systemic anticoagulation at this juncture as high index of suspicion elevation of cardiac enzymes secondary to hypertensive emergency/hypoperfusion  - Beta-blockers initiated  -Check transthoracic echocardiogram

## 2024-02-28 NOTE — ASSESSMENT & PLAN NOTE
Presents with substernal chest pain and associated shortness of breath  Troponin level: 0hr 207, 2hr 244, 4hr 251  Nonischemic ECG  Continue ASA, statin, BB therapy  Echo pending  Monitor on telemetry  Consult to Cardiology, recommendations are appreciated

## 2024-02-28 NOTE — ASSESSMENT & PLAN NOTE
Wt Readings from Last 3 Encounters:   01/25/24 65.3 kg (144 lb)   12/27/23 66.8 kg (147 lb 4.3 oz)   08/28/23 66.7 kg (147 lb)       Currently euvolemic on exam  Echo (7/14/23): EF 55%  HCTZ on hold due to hypokalemia  Daily weights, I&Os

## 2024-02-28 NOTE — QUICK NOTE
Repeat potassium 2.8 this afternoon  Will order 40mEq oral K and 20mEq IV K x 2 doses  Telemetry monitoring  Repeat BMP at 2200

## 2024-02-28 NOTE — ASSESSMENT & PLAN NOTE
- Critical hypokalemia upon presentation  - To be maintained on oral potassium in the outpatient setting however patient reports noncompliance  - Intravenous and oral repletion aggressively  - No evidence of significant cardiac arrhythmia  - Monitor on telemetry

## 2024-02-28 NOTE — ASSESSMENT & PLAN NOTE
Lab Results   Component Value Date    EGFR 27 02/27/2024    EGFR 23 (L) 01/12/2024    EGFR 18 12/29/2023    CREATININE 2.44 (H) 02/27/2024    CREATININE 3.0 (H) 01/12/2024    CREATININE 3.46 (H) 12/29/2023   History of CKD 4  Upon presentation, at baseline  Avoid nephrotoxic agents

## 2024-02-28 NOTE — ASSESSMENT & PLAN NOTE
Wt Readings from Last 3 Encounters:   01/25/24 65.3 kg (144 lb)   12/27/23 66.8 kg (147 lb 4.3 oz)   08/28/23 66.7 kg (147 lb)         -Patient presents with hypertensive emergency and chest pressure  - History heart failure preserved ejection fraction-last echocardiogram reviewed   -Is greater then 3 months ago and presentation with chest pressure/hypertensive emergency-recheck  - Patient does not appear clinically volume overloaded at this point in time  - Ensure maintenance of goal-directed medical therapy

## 2024-02-28 NOTE — ASSESSMENT & PLAN NOTE
Calcium 11.3 this AM  Nephrology following  Paraproteinemia work up pending  PTH pending  Daily BMP

## 2024-02-28 NOTE — H&P
"Replaced by [facility]  H&P  Name: Genna Liu 58 y.o. male I MRN: [id number]  Unit/Bed#: ED 29 I Date of Admission: 2/27/2024   Date of Service: 2/28/2024 I Hospital Day: 0      Assessment/Plan   * Hypertensive emergency  Assessment & Plan  - Patient presents tonight with complaints of substernal chest pressure, nonradiating, anxiety, headache, visual change  - Notably, upon presentation, systolic blood pressure greater than 200  - Patient reports noncompliance with oral medication regimen in the outpatient setting secondary to \"upset stomach\"  - Patient has not taken oral antihypertensive medication regimen in several days  - In ED, trialed on nitroglycerin given complaint of chest pressure without resolution of hypertension  - In ED, initiated on Cardene infusion with better systolic blood pressure control  - Likely, precipitating underlying cause for patient's chest pressure and elevation of cardiac enzymes (see below)  -Patient does exhibit evidence of endorgan damage with elevation of cardiac enzymes and subsequent chest pressure meeting criteria for hypertensive emergency    Plan:  - Hypertensive emergency likely precipitated by medical noncompliance with oral medication regimen in the outpatient setting which is to include amlodipine, Cardura, hydrochlorothiazide, Imdur, and labetalol  -Goal systolic blood pressure for time being 180-200  -It appears only sublingual nitroglycerin and a dose of labetalol intravenously was given in the ED prior to initiating Cardene infusion  - Discontinue Cardene infusion  - As needed intermittent intravenous labetalol/hydralazine  - Reinitiate patient's home medication regimen orally  -Renal ultrasound  - If patient remains hypertensive despite above measures, reinitiate Cardene infusion and discussed case with critical care medicine    Chronic heart failure with preserved ejection fraction (HFpEF) ([facility])  Assessment & Plan  Wt Readings from Last 3 Encounters: " "  01/25/24 65.3 kg (144 lb)   12/27/23 66.8 kg (147 lb 4.3 oz)   08/28/23 66.7 kg (147 lb)         -Patient presents with hypertensive emergency and chest pressure  - History heart failure preserved ejection fraction-last echocardiogram reviewed   -Is greater then 3 months ago and presentation with chest pressure/hypertensive emergency-recheck  - Patient does not appear clinically volume overloaded at this point in time  - Ensure maintenance of goal-directed medical therapy      Stage 4 chronic kidney disease (HCC)  Assessment & Plan  Lab Results   Component Value Date    EGFR 27 02/27/2024    EGFR 23 (L) 01/12/2024    EGFR 18 12/29/2023    CREATININE 2.44 (H) 02/27/2024    CREATININE 3.0 (H) 01/12/2024    CREATININE 3.46 (H) 12/29/2023   -History of CKD 4  - Upon presentation, at baseline  - Avoid nephrotoxic agents    Noncompliance with medication regimen  Assessment & Plan  - Patient reports that he does not regularly take his medications sliding \"stomach issues\" as reason  - Denies financial constraints   -Likely precipitant factor to presentation with hypertensive emergency as he is to be maintained on 4-5 antihypertensives orally in the outpatient setting  - Extensive counseling regarding importance of medication compliance in the setting of numerous underlying medical conditions    Chest pressure  Assessment & Plan  - Patient with complaints of substernal chest pressure upon presentation with associated shortness of breath  - Cardiac history reviewed-no discrete history of coronary artery disease  - Elevated flat troponin with nonischemic EKG change  - Likely, the result of hypoperfusion in the setting of hypertensive urgency  - However, patient with cardiac risk factors  - Chest pressure is improved with treatment of underlying hypertension    Plan:  - Continue to trend cardiac markers with EKG  - Consultation to cardiology given risk factors and elevation of troponin  - Antiplatelet initiated  - No role for " "systemic anticoagulation at this juncture as high index of suspicion elevation of cardiac enzymes secondary to hypertensive emergency/hypoperfusion  - Beta-blockers initiated  -Check transthoracic echocardiogram    Hypokalemia  Assessment & Plan  - Critical hypokalemia upon presentation  - To be maintained on oral potassium in the outpatient setting however patient reports noncompliance  - Intravenous and oral repletion aggressively  - No evidence of significant cardiac arrhythmia  - Monitor on telemetry    GERD (gastroesophageal reflux disease)  Assessment & Plan  - Chronic/stable  - PPI    Depression with anxiety  Assessment & Plan  - Chronic/stable  - No suicidal or homicidal ideation    COPD (chronic obstructive pulmonary disease) ([facility])  Assessment & Plan  - Chronic/stable  - Does not appear to be in exacerbation  - Aerosols and nebulizers as needed    VTE Prophylaxis: Heparin  / sequential compression device   Code Status: Full      Anticipated Length of Stay:  Patient will be admitted on an Inpatient basis with an anticipated length of stay of  > 2 midnights.   Justification for Hospital Stay: Hypertensive emergency    Total Time for Visit, including Counseling / Coordination of Care: 1 hour.  Greater than 50% of this total time spent on direct patient counseling and coordination of care.    Chief Complaint:   Chest pressure    History of Present Illness:    Genna Liu is a 58 y.o. male with extensive past medical history including, but not limited to, diastolic congestive heart failure, CKD stage IV, COPD, not oxygen dependent, anxiety, depression, who presents this evening with complaints of shortness of breath, chest pressure, palpitations, feeling unwell.    Patient notes that he has had \"stomach problems\" for quite some time.  For that reason, only intermittently takes his medications.  This evening/today began with substernal chest pressure with shortness of breath and associated \"uncomfortable\" " feeling.  Denies diaphoresis.  He does note nausea, abdominal pain, intermittent loose stools.    Patient reports he has not taken his oral medication regimen in greater than a week.    Most notably, upon presentation, systolic blood pressure greater than 200.  At times, greater than 240.    In the ED, patient received sublingual nitroglycerin for chest pressure however without any effect on blood pressure.    Initially, asked to observe the patient for chest pain rule out.  At that time, patient's systolic blood pressure 250.    In ED, 1 dose of labetalol 10 mg was administered prior to initiation of Cardene infusion.    At the time of my evaluation, patient systolic blood pressure in the 170s.  Chest pressure is resolving.  Less short of breath.    Patient will be admitted to the hospitalist service for further evaluation and management of hypertensive emergency  Review of Systems:    Review of Systems   Constitutional:  Positive for activity change, appetite change and fatigue.   HENT: Negative.     Eyes: Negative.    Respiratory:  Positive for chest tightness, shortness of breath and wheezing.    Cardiovascular:  Positive for chest pain and palpitations.   Gastrointestinal:  Positive for diarrhea and nausea.   Endocrine: Negative.    Genitourinary: Negative.    Musculoskeletal:  Positive for myalgias.   Neurological:  Positive for weakness and light-headedness.   Psychiatric/Behavioral:  The patient is nervous/anxious.        Past Medical and Surgical History:     Past Medical History:   Diagnosis Date    Asthma     Chronic kidney disease     COPD (chronic obstructive pulmonary disease) (HCC)     CPAP (continuous positive airway pressure) dependence     NO LONGER NEEDS D/T BARIATRIC SURGERY.    Diabetes mellitus (HCC)     Emphysema of lung (HCC)     Gout     History of transfusion     pt stated they had a transfusion when they had gallbladder surgery.    Hypertension     Kidney disease     renal failure    Leg  DVT (deep venous thromboembolism), acute, bilateral (HCC) 01/2018    Obesity (BMI 30-39.9)     AGUS on CPAP     setting 11    Postgastrectomy malabsorption     Sleep apnea     Systolic CHF (HCC)        Past Surgical History:   Procedure Laterality Date    ADRENALECTOMY      CHOLECYSTECTOMY      COLONOSCOPY      EGD      HIATAL HERNIA REPAIR N/A 12/22/2020    Procedure: REPAIR HERNIA HIATAL LAPAROSCOPIC;  Surgeon: Shane Francis MD;  Location: [facility];  Service: Bariatrics    INCISION AND DRAINAGE OF WOUND Left 10/17/2018    Procedure: INCISION AND DRAINAGE (I&D) GROIN;  Surgeon: Rafy Pascual MD;  Location: [facility];  Service: General    IR IMAGE GUIDED ASPIRATION / DRAINAGE W TUBE  8/17/2018    IR IMAGE GUIDED ASPIRATION / DRAINAGE W TUBE  7/31/2018    JOINT REPLACEMENT Bilateral     knee    KIDNEY SURGERY  2009    nodule removal    LYMPH NODE DISSECTION Left 01/2018    left inguinal LN removed - benign     OTHER SURGICAL HISTORY      kidney nodule removal    PALATE / UVULA BIOPSY / EXCISION      PERICARDIAL WINDOW N/A 7/8/2023    Procedure: WINDOW PERICARDIAL;  Surgeon: Alonso Logan MD;  Location: [facility];  Service: Thoracic    VT LAPS GSTR RSTCV PX W/BYP JASON-EN-Y LIMB <150 CM N/A 12/22/2020    Procedure: BYPASS GASTRIC  JASON-EN-Y LAPAROSCOPIC WITH INTRAOPERATIVE EGD;  Surgeon: Shane Francis MD;  Location: [facility];  Service: Bariatrics    SINUS SURGERY      TONSILLECTOMY         Meds/Allergies:    Prior to Admission medications    Medication Sig Start Date End Date Taking? Authorizing Provider   albuterol (PROVENTIL HFA,VENTOLIN HFA) 90 mcg/act inhaler     Historical Provider, MD   amLODIPine (NORVASC) 10 mg tablet Take 10 mg by mouth 4/15/20   Historical Provider, MD   ARIPiprazole (ABILIFY) 5 mg tablet Take 5 mg by mouth daily 8/22/23   Historical Provider, MD   atorvastatin (LIPITOR) 40 mg tablet Take 40 mg by mouth daily at bedtime 7/21/22   Historical Provider, MD   busPIRone  (BUSPAR) 15 mg tablet Take 15 mg by mouth 2 (two) times a day Dr. Levi Brar    Historical Provider, MD   colchicine (COLCRYS) 0.6 mg tablet Take 0.6 mg by mouth daily    Historical Provider, MD   doxazosin (CARDURA) 8 MG tablet Take 1 tablet (8 mg total) by mouth 2 (two) times a day 10/17/23   Jaylon Vasquez MD   ferrous sulfate 325 (65 Fe) mg tablet Take 325 mg by mouth 2 (two) times a day      Historical Provider, MD   hydrochlorothiazide (HYDRODIURIL) 12.5 mg tablet Take 1 tablet (12.5 mg total) by mouth daily  Patient not taking: Reported on 1/25/2024 12/30/23   Nickolas Solares MD   isosorbide dinitrate (ISORDIL) 40 MG tablet Take 1 tablet (40 mg total) by mouth 3 (three) times daily after meals 12/29/23   Nickolas Solares MD   labetalol (NORMODYNE) 300 mg tablet Take 2 tablets (600 mg total) by mouth every 8 (eight) hours 12/29/23   Nickolas Solares MD   LORazepam (ATIVAN) 1 mg tablet TAKE 1 TABLET BY MOUTH ONCE DAILY AS NEEDED FOR ANXIETY (TAKE 1 TABLET PRIOR TO MRI OR CT SCAN) 12/20/23   Historical Provider, MD   Multiple Vitamins-Minerals (CENTRUM ADULT PO) Take by mouth    Historical Provider, MD   OLANZapine (ZyPREXA) 2.5 mg tablet Take 5 mg by mouth daily at bedtime 11/7/23   Historical Provider, MD   pantoprazole (PROTONIX) 40 mg tablet Take 40 mg by mouth daily    Historical Provider, MD   polyethylene glycol (MIRALAX) 17 g packet Take 17 g by mouth daily  Patient not taking: Reported on 1/25/2024    Historical Provider, MD   potassium chloride (MICRO-K) 10 MEQ CR capsule Take 10 mEq by mouth daily One in AM. Dr. Marina    Historical Provider, MD   sertraline (ZOLOFT) 100 mg tablet Take 2 tablets by mouth daily 6/15/22   Historical Provider, MD   sucralfate (CARAFATE) 1 g tablet Take 1 g by mouth 2 (two) times a day 12/18/23   Historical Provider, MD     I have reviewed home medications with a medical source (PCP, Pharmacy, other).    Allergies:   Allergies   Allergen Reactions    Clonidine Anaphylaxis  "   Lisinopril Anaphylaxis    Nifedipine Anaphylaxis     Tolerating nicardipine    Spironolactone Anaphylaxis, Other (See Comments) and Shortness Of Breath    Buspirone      Headaches,     Enoxaparin      Injection site \"bump\" per Dr. Francis    Hydralazine Other (See Comments)     Fluid around the heart  Lose for Appetite        Minoxidil      Retains fluid around heart       Social History:     Marital Status: /Civil Union     Social History     Substance and Sexual Activity   Alcohol Use Yes    Comment: occasionally     Social History     Tobacco Use   Smoking Status Never    Passive exposure: Past   Smokeless Tobacco Never     Social History     Substance and Sexual Activity   Drug Use Yes    Types: Marijuana    Comment: Card       Family History:    non-contributory    Physical Exam:     Vitals:   Blood Pressure: (!) 210/106 (02/28/24 0445)  Pulse: 77 (02/28/24 0445)  Temperature: 97.8 °F (36.6 °C) (02/28/24 0230)  Temp Source: Oral (02/28/24 0230)  Respirations: 22 (02/28/24 0445)  SpO2: 97 % (02/28/24 0445)    Physical Exam  Constitutional:       General: He is awake. He is not in acute distress.     Appearance: He is well-groomed and underweight. He is not ill-appearing, toxic-appearing or diaphoretic.   HENT:      Right Ear: External ear normal.      Left Ear: External ear normal.      Nose: Nose normal.      Mouth/Throat:      Pharynx: Oropharynx is clear.   Eyes:      Conjunctiva/sclera: Conjunctivae normal.   Cardiovascular:      Rate and Rhythm: Normal rate and regular rhythm.      Pulses: Normal pulses.      Heart sounds: No murmur heard.  Pulmonary:      Effort: Pulmonary effort is normal.      Breath sounds: Wheezing present. No rhonchi.   Abdominal:      General: Abdomen is flat. There is no distension.   Musculoskeletal:         General: Normal range of motion.      Cervical back: Normal range of motion.   Skin:     General: Skin is warm.      Capillary Refill: Capillary refill takes less " than 2 seconds.   Neurological:      General: No focal deficit present.      Mental Status: He is alert and easily aroused. Mental status is at baseline.   Psychiatric:         Mood and Affect: Mood normal.         Thought Content: Thought content normal.         Judgment: Judgment normal.             Additional Data:     Lab Results: I have personally reviewed pertinent reports.      Results from last 7 days   Lab Units 02/27/24  1945   WBC Thousand/uL 6.22   HEMOGLOBIN g/dL 14.5   HEMATOCRIT % 42.5   PLATELETS Thousands/uL 393*   NEUTROS PCT % 82*   LYMPHS PCT % 12*   MONOS PCT % 6   EOS PCT % 0     Results from last 7 days   Lab Units 02/27/24  1945   POTASSIUM mmol/L 2.5*   CHLORIDE mmol/L 96   CO2 mmol/L 25   BUN mg/dL 53*   CREATININE mg/dL 2.44*   CALCIUM mg/dL 11.3*   ALK PHOS U/L 107*   ALT U/L 89*   AST U/L 45*     Results from last 7 days   Lab Units 02/27/24  2326   INR  1.11       Imaging: I have personally reviewed pertinent reports.      CTA dissection protocol chest/abdomen/pelvis    Result Date: 2/28/2024  Narrative: CTA - CHEST, ABDOMEN AND PELVIS - WITHOUT AND WITH IV CONTRAST INDICATION: diffuse cp, htn, sob. COMPARISON: Multiple priors most recently same day chest radiograph TECHNIQUE: CT examination of the chest, abdomen and pelvis was performed both prior to and after the administration of intravenous contrast. The noncontrast portion of this examination was performed utilizing low radiation dose technique.  Thin section angiographic arterial phase post contrast technique was used in order to evaluate for aortic dissection. 3D reformatted images and volume rendering were performed on an independent workstation. Multiplanar 2D reformatted images were created from the source data. Radiation dose length product (DLP) for this visit: 793 mGy-cm . This examination, like all CT scans performed in the [facility], was performed utilizing techniques to minimize radiation dose exposure,  including the use of iterative reconstruction and automated exposure control. IV Contrast: 100 mL of iohexol (OMNIPAQUE) Enteric Contrast: Not administered. FINDINGS: AORTA: No aortic dissection or intramural hematoma. No aortic aneurysm. No significant atherosclerotic disease. Specifically, no flow limiting atherosclerotic stenosis of aorta or major aortic branch vessel in the chest, abdomen or pelvis. CHEST LUNGS: Small residual scarring/tubular nodule in the right lower lobe with associated bronchiectasis, which has slowly improved over time dating back to at least 2018. PLEURA: Unremarkable. HEART/PULMONARY ARTERIAL TREE: Mild cardiomegaly. MEDIASTINUM AND VIJAYA: Unremarkable. CHEST WALL AND LOWER NECK: Unremarkable. ABDOMEN LIVER/BILIARY TREE: Unremarkable. GALLBLADDER: Post cholecystectomy. SPLEEN: Unremarkable. PANCREAS: Unremarkable. ADRENAL GLANDS: Unremarkable. KIDNEYS/URETERS: Simple renal cyst(s). Otherwise unremarkable kidneys. No hydronephrosis. STOMACH AND BOWEL: Gastric bypass. No evidence of bowel obstruction. Diverticulosis coli. APPENDIX: No findings to suggest appendicitis. ABDOMINOPELVIC CAVITY: Small volume pelvic free fluid is nonspecific but likely reactive. Mild diffuse mesenteric edema, similar to prior. PELVIS REPRODUCTIVE ORGANS: Enlarged prostate. URINARY BLADDER: Unremarkable. ABDOMINAL WALL/INGUINAL REGIONS: Unremarkable. BONES: No acute fracture or suspicious osseous lesion. Spinal degenerative changes.     Impression: No aortic aneurysm or dissection. Small volume free fluid/mild mesenteric edema. Consider enteritis in the appropriate clinical setting. Workstation performed: [email or web address]     MRI prostate multiparametric wo w contrast    Result Date: 1/30/2024  Narrative: STUDY: ENHANCED MRI OF THE PELVIS/PROSTATE HISTORY: Elevated PSA. PSA: 3.85 on 1/12/2024 3.93 on 9/16/2020 2.57 on 3/9/2020 4.9 on 8/21/2019 TECHNIQUE: Multiplanar, multisequence, pre-and post 12 cc intravenous Clariscan  contrast enhanced MRI of the prostate gland. COMPARISON: None. FINDINGS: PROSTATE:   Size (AP x TRV x CC): 3.2 x 5.1 x 3.7 cm = 31.6 mL   Post-biopsy hemorrhage:  None.   Central gland enlargement (BPH): Mild. Focal lesions - No dominant lesion. Heterogeneous peripheral zone. PI-RADSv2.1 Category 2 - Low (clinically significant cancer unlikely). Seminal vesicles: Unremarkable. URINARY BLADDER: Unremarkable.   LYMPH NODES: No pelvic lymphadenopathy. BONES: No suspicious osseous lesion. OTHER: Moderate amount of pelvic free fluid/ascites identified. Note: Clinically significant cancer is defined on pathology/histology as Deandra score greater than or equal to 7, and/or volume of greater than or equal to 0.5 mL, and/or extraprostatic extension. DynaCAD segmentation of the prostate and dominant nodule(s) was deferred as no dominant nodule was identified.    Impression: IMPRESSION: PI-RADS 2. Heterogeneous peripheral zone, likely sequela of prior prostatitis. No dominant lesion identified. Findings of mild BPH with calculated prostate volume of 31.6 mL Moderate amount of pelvic free fluid/ascites identified. PI-RADS v2.1 assessment categories PIRADS 1 - Very low (clinically significant cancer is highly unlikely  to be present) PIRADS 2 - Low (clinically significant cancer is unlikely to be  present) PIRADS 3 - Intermediate (the presence of clinically significant  cancer is equivocal) PIRADS 4 - High (clinically significant cancer likely present) PIRADS 5 - Very high (clinically significant cancer is highly likely  to be present) Workstation:[id number]      EKG, Pathology, and Other Studies Reviewed on Admission:   EKG:     Epic / Care Everywhere Records Reviewed: Yes     ** Please Note: This note has been constructed using a voice recognition system. **

## 2024-02-28 NOTE — ASSESSMENT & PLAN NOTE
"- Patient presents tonight with complaints of substernal chest pressure, nonradiating, anxiety, headache, visual change  - Notably, upon presentation, systolic blood pressure greater than 200  - Patient reports noncompliance with oral medication regimen in the outpatient setting secondary to \"upset stomach\"  - Patient has not taken oral antihypertensive medication regimen in several days  - In ED, trialed on nitroglycerin given complaint of chest pressure without resolution of hypertension  - In ED, initiated on Cardene infusion with better systolic blood pressure control  - Likely, precipitating underlying cause for patient's chest pressure and elevation of cardiac enzymes (see below)  -Patient does exhibit evidence of endorgan damage with elevation of cardiac enzymes and subsequent chest pressure meeting criteria for hypertensive emergency    Plan:  - Hypertensive emergency likely precipitated by medical noncompliance with oral medication regimen in the outpatient setting which is to include amlodipine, Cardura, hydrochlorothiazide, Imdur, and labetalol  -Goal systolic blood pressure for time being 180-200  -It appears only sublingual nitroglycerin and a dose of labetalol intravenously was given in the ED prior to initiating Cardene infusion  - Discontinue Cardene infusion  - As needed intermittent intravenous labetalol/hydralazine  - Reinitiate patient's home medication regimen orally  -Renal ultrasound  - If patient remains hypertensive despite above measures, reinitiate Cardene infusion and discussed case with critical care medicine  "

## 2024-02-28 NOTE — ASSESSMENT & PLAN NOTE
Patient presents tonight with complaints of substernal chest pressure, nonradiating, anxiety, headache, visual change  H/o CKD and with known medical noncomplaince  SBP>200 while in the ED  Started on IV cardene infusion in the ED, which was discontinued  Resume home medications: Amlodipine 10 mg, Cardura 8 mg HS, labetalol 600 mg TID, isosorbide 40mg TID  Holding hydrochlorothiazide secondary to hypokalemia  PRN IV labetolol 10mg Q4H   BP improving 158/99  Renal ultrasound without arterial occlusive disease  Nephrology following

## 2024-02-28 NOTE — PLAN OF CARE
Problem: SAFETY ADULT  Goal: Patient will remain free of falls  Description: INTERVENTIONS:  - Educate patient/family on patient safety including physical limitations  - Instruct patient to call for assistance with activity   - Consult OT/PT to assist with strengthening/mobility   - Keep Call bell within reach  - Keep bed low and locked with side rails adjusted as appropriate  - Keep care items and personal belongings within reach  - Initiate and maintain comfort rounds  - Make Fall Risk Sign visible to staff  - Offer Toileting every 2 Hours, in advance of need  - Initiate/Maintain bedalarm    - Apply yellow socks and bracelet for high fall risk patients  - Consider moving patient to room near nurses station  Outcome: Progressing  Goal: Maintain or return to baseline ADL function  Description: INTERVENTIONS:  -  Assess patient's ability to carry out ADLs; assess patient's baseline for ADL function and identify physical deficits which impact ability to perform ADLs (bathing, care of mouth/teeth, toileting, grooming, dressing, etc.)  - Assess/evaluate cause of self-care deficits   - Assess range of motion  - Assess patient's mobility; develop plan if impaired  - Assess patient's need for assistive devices and provide as appropriate  - Encourage maximum independence but intervene and supervise when necessary  - Involve family in performance of ADLs  - Assess for home care needs following discharge   - Consider OT consult to assist with ADL evaluation and planning for discharge  - Provide patient education as appropriate  Outcome: Progressing  Goal: Maintains/Returns to pre admission functional level  Description: INTERVENTIONS:  - Perform AM-PAC 6 Click Basic Mobility/ Daily Activity assessment daily.  - Set and communicate daily mobility goal to care team and patient/family/caregiver.   - Collaborate with rehabilitation services on mobility goals if consulted  - Out of bed for toileting  - Record patient progress  and toleration of activity level   Outcome: Progressing     Problem: DISCHARGE PLANNING  Goal: Discharge to home or other facility with appropriate resources  Description: INTERVENTIONS:  - Identify barriers to discharge w/patient and caregiver  - Arrange for needed discharge resources and transportation as appropriate  - Identify discharge learning needs (meds, wound care, etc.)  - Arrange for interpretive services to assist at discharge as needed  - Refer to Case Management Department for coordinating discharge planning if the patient needs post-hospital services based on physician/advanced practitioner order or complex needs related to functional status, cognitive ability, or social support system  Outcome: Progressing     Problem: CARDIOVASCULAR - ADULT  Goal: Maintains optimal cardiac output and hemodynamic stability  Description: INTERVENTIONS:  - Monitor I/O, vital signs and rhythm  - Monitor for S/S and trends of decreased cardiac output  - Administer and titrate ordered vasoactive medications to optimize hemodynamic stability  - Assess quality of pulses, skin color and temperature  - Assess for signs of decreased coronary artery perfusion  - Instruct patient to report change in severity of symptoms  Outcome: Progressing  Goal: Absence of cardiac dysrhythmias or at baseline rhythm  Description: INTERVENTIONS:  - Continuous cardiac monitoring, vital signs, obtain 12 lead EKG if ordered  - Administer antiarrhythmic and heart rate control medications as ordered  - Monitor electrolytes and administer replacement therapy as ordered  Outcome: Progressing

## 2024-02-28 NOTE — CONSULTS
Consultation - Cardiology   Genna Liu 58 y.o. male MRN: [id number]  Unit/Bed#: ED 29 Encounter: [id number]  02/28/24  11:41 AM    Assessment/ Plan:  Chest pain  Troponins uptrending to 251, continue to trend.  EKG with inferolateral T wave abnormality.  TTE pending.  CTA negative for evidence of dissection.  Discussed indications, risks, and benefits of further ischemic evaluation.  Patient wishes to proceed with pharmacological MPI stress test, will arrange for such tomorrow.    2.  Hypertensive urgency  BP is running elevated.  Renal artery duplex negative for evidence of JEET.  Continue amlodipine, Cardura, labetalol, Isordil, and amiloride    3.  Elevated troponin  Troponin uptrending to 251, see plan above.    4.  CKD 4  5.  Hypokalemia  6.  Left ventricular hypertrophy - cardiac MRI on 7/2023 reviewed  7.  Hx of pericardial effusion s/p pericardial window (7/2023)  8.  Hyperaldosteronism s/p left adrenalectomy (2012)  9.  Hx of medication noncompliance  10.  Hx of DVT  11.  Hx of Shamika-en-Y gastric bypass  12.  COPD        History of Present Illness   Physician Requesting Consult: Vidal Jones MD    Reason for Consult / Principal Problem: Chest pain, elevated troponin    HPI: Genna Liu is a 58 y.o. year old male with history of hypertension, CKD 4, pericardial effusion s/p pericardial window, hyperaldosteronism s/p left adrenalectomy, medication noncompliance, DVT, Shamika-en-Y gastric bypass, COPD, and hypokalemia who presents with with midsternal chest pain, SOB, and generalized weakness.  Patient found to have elevated troponins.  Inferolateral T wave abnormality noted on EKG.  Patient also noted to have significantly elevated blood pressures in the ED.  Cardiology consulted for further evaluation and management.  Endorses some noncompliance to medical regimen.  Denies diaphoresis, LE edema, or syncope.  Previously followed with Dr. Healy, however has transitioned to Dr. Iyer as primary  cardiologist.      Inpatient consult to Cardiology  Consult performed by: Imtiaz Solorzano PA-C  Consult ordered by: Shai Chaidez DO          EKG: Normal sinus rhythm; RBBB; anterior infarct, age undetermined; T wave abnormality, consider inferolateral ischemia      Review of Systems   Constitutional:  Negative for chills and fever.   HENT:  Negative for ear pain and sore throat.    Eyes:  Negative for pain and visual disturbance.   Respiratory:  Positive for shortness of breath. Negative for cough.    Cardiovascular:  Positive for chest pain. Negative for palpitations and leg swelling.   Gastrointestinal:  Negative for abdominal pain and vomiting.   Genitourinary:  Negative for dysuria and hematuria.   Musculoskeletal:  Negative for arthralgias and back pain.   Skin:  Negative for color change and rash.   Neurological:  Positive for weakness. Negative for seizures and syncope.   All other systems reviewed and are negative.       Historical Information   Past Medical History:   Diagnosis Date    Asthma     Chronic kidney disease     COPD (chronic obstructive pulmonary disease) (HCC)     CPAP (continuous positive airway pressure) dependence     NO LONGER NEEDS D/T BARIATRIC SURGERY.    Diabetes mellitus (HCC)     Emphysema of lung (HCC)     Gout     History of transfusion     pt stated they had a transfusion when they had gallbladder surgery.    Hypertension     Kidney disease     renal failure    Leg DVT (deep venous thromboembolism), acute, bilateral (HCC) 01/2018    Obesity (BMI 30-39.9)     AGUS on CPAP     setting 11    Postgastrectomy malabsorption     Sleep apnea     Systolic CHF (HCC)      Past Surgical History:   Procedure Laterality Date    ADRENALECTOMY      CHOLECYSTECTOMY      COLONOSCOPY      EGD      HIATAL HERNIA REPAIR N/A 12/22/2020    Procedure: REPAIR HERNIA HIATAL LAPAROSCOPIC;  Surgeon: Shane Francis MD;  Location: [facility];  Service: Bariatrics    INCISION AND DRAINAGE OF WOUND Left  "10/17/2018    Procedure: INCISION AND DRAINAGE (I&D) GROIN;  Surgeon: Rafy Pascual MD;  Location: [facility];  Service: General    IR IMAGE GUIDED ASPIRATION / DRAINAGE W TUBE  8/17/2018    IR IMAGE GUIDED ASPIRATION / DRAINAGE W TUBE  7/31/2018    JOINT REPLACEMENT Bilateral     knee    KIDNEY SURGERY  2009    nodule removal    LYMPH NODE DISSECTION Left 01/2018    left inguinal LN removed - benign     OTHER SURGICAL HISTORY      kidney nodule removal    PALATE / UVULA BIOPSY / EXCISION      PERICARDIAL WINDOW N/A 7/8/2023    Procedure: WINDOW PERICARDIAL;  Surgeon: Alonso Logan MD;  Location: [facility];  Service: Thoracic    MI LAPS GSTR RSTCV PX W/BYP JASON-EN-Y LIMB <150 CM N/A 12/22/2020    Procedure: BYPASS GASTRIC  JASON-EN-Y LAPAROSCOPIC WITH INTRAOPERATIVE EGD;  Surgeon: Shane Francis MD;  Location: [facility];  Service: Bariatrics    SINUS SURGERY      TONSILLECTOMY       Social History     Substance and Sexual Activity   Alcohol Use Yes    Comment: occasionally     Social History     Substance and Sexual Activity   Drug Use Yes    Types: Marijuana    Comment: Card     Social History     Tobacco Use   Smoking Status Never    Passive exposure: Past   Smokeless Tobacco Never       Family History:   Family History   Problem Relation Age of Onset    Cancer Father     Kidney disease Mother     Heart murmur Sister     Asthma Sister     Hypertension Sister     Heart disease Brother     Bell's palsy Brother     Hypertension Brother     Deep vein thrombosis Neg Hx        Meds/Allergies   all current active meds have been reviewed  Allergies   Allergen Reactions    Clonidine Anaphylaxis    Lisinopril Anaphylaxis    Nifedipine Anaphylaxis     Tolerating nicardipine    Spironolactone Anaphylaxis, Other (See Comments) and Shortness Of Breath    Buspirone      Headaches,     Enoxaparin      Injection site \"bump\" per Dr. Francis    Hydralazine Other (See Comments)     Fluid around the heart  Lose for " "Appetite        Minoxidil      Retains fluid around heart       Objective   Vitals: Blood pressure 115/81, pulse 64, temperature 97.8 °F (36.6 °C), temperature source Oral, resp. rate 20, height 5' 6.25\" (1.683 m), weight 65.3 kg (144 lb), SpO2 97%., Body mass index is 23.07 kg/m².,   Orthostatic Blood Pressures      Flowsheet Row Most Recent Value   Blood Pressure 115/81 filed at 2024 1115   Patient Position - Orthostatic VS Sitting filed at 2024 0600            Systolic (24hrs), Av , Min:115 , Max:257     Diastolic (24hrs), Av, Min:81, Max:147        Intake/Output Summary (Last 24 hours) at 2024 1141  Last data filed at 2024 0838  Gross per 24 hour   Intake 1878.33 ml   Output 750 ml   Net 1128.33 ml       Invasive Devices       Peripheral Intravenous Line  Duration             Peripheral IV 24 Right;Ventral (anterior) Forearm <1 day    Peripheral IV 24 Distal;Left;Upper;Ventral (anterior) Arm <1 day                        Physical Exam:  GEN: Alert and oriented x 3, in no acute distress.  Well appearing and well nourished.   HEENT: Sclera anicteric, conjunctivae pink, mucous membranes moist. Oropharynx clear.   NECK: Supple, no carotid bruits, no significant JVD. Trachea midline, no thyromegaly.   HEART: Regular rhythm, normal S1 and S2, no murmurs, clicks, gallops or rubs. PMI nondisplaced, no thrills.   LUNGS: Clear to auscultation bilaterally; no wheezes, rales, or rhonchi. No increased work of breathing or signs of respiratory distress.   ABDOMEN: Soft, nontender, nondistended, normoactive bowel sounds.   EXTREMITIES: Skin warm and well perfused, no clubbing, cyanosis, or edema.  NEURO: No focal findings. Normal speech. Mood and affect normal.   SKIN: Normal without suspicious lesions on exposed skin.    Lab Results:     Cardiac Profile:   Results from last 7 days   Lab Units 24  2326 24  2202 24  1945   HS TNI 0HR ng/L  --   --  207*   HS TNI 2HR " ng/L  --  244*  --    HSTNI D2 ng/L  --  37*  --    HS TNI 4HR ng/L 251*  --   --    HSTNI D4 ng/L 44*  --   --        CBC with diff:   Results from last 7 days   Lab Units 02/27/24 1945   WBC Thousand/uL 6.22   HEMOGLOBIN g/dL 14.5   HEMATOCRIT % 42.5   MCV fL 82   PLATELETS Thousands/uL 393*  393*   RBC Million/uL 5.21   MCH pg 27.8   MCHC g/dL 34.1   RDW % 14.8   MPV fL 9.8  9.8   NRBC AUTO /100 WBCs 0         CMP:   Results from last 7 days   Lab Units 02/28/24  1104 02/27/24 1945   POTASSIUM mmol/L 2.8* 2.5*   CHLORIDE mmol/L 99 96   CO2 mmol/L 27 25   BUN mg/dL 48* 53*   CREATININE mg/dL 2.61* 2.44*   CALCIUM mg/dL 9.4 11.3*   AST U/L  --  45*   ALT U/L  --  89*   ALK PHOS U/L  --  107*   EGFR ml/min/1.73sq m 25 27

## 2024-02-29 ENCOUNTER — APPOINTMENT (INPATIENT)
Dept: NUCLEAR MEDICINE | Facility: HOSPITAL | Age: 59
DRG: 287 | End: 2024-02-29
Payer: COMMERCIAL

## 2024-02-29 ENCOUNTER — APPOINTMENT (INPATIENT)
Dept: NON INVASIVE DIAGNOSTICS | Facility: HOSPITAL | Age: 59
DRG: 287 | End: 2024-02-29
Payer: COMMERCIAL

## 2024-02-29 PROBLEM — N39.0 UTI (URINARY TRACT INFECTION): Status: ACTIVE | Noted: 2024-02-29

## 2024-02-29 LAB
ALBUMIN SERPL BCP-MCNC: 3.8 G/DL (ref 3.5–5)
ALBUMIN SERPL ELPH-MCNC: 3.77 G/DL (ref 3.2–5.1)
ALBUMIN SERPL ELPH-MCNC: 58.9 % (ref 48–70)
ALP SERPL-CCNC: 78 U/L (ref 34–104)
ALPHA1 GLOB SERPL ELPH-MCNC: 0.26 G/DL (ref 0.15–0.47)
ALPHA1 GLOB SERPL ELPH-MCNC: 4.1 % (ref 1.8–7)
ALPHA2 GLOB SERPL ELPH-MCNC: 0.51 G/DL (ref 0.42–1.04)
ALPHA2 GLOB SERPL ELPH-MCNC: 7.9 % (ref 5.9–14.9)
ALT SERPL W P-5'-P-CCNC: 54 U/L (ref 7–52)
ANION GAP SERPL CALCULATED.3IONS-SCNC: 10 MMOL/L
ANION GAP SERPL CALCULATED.3IONS-SCNC: 9 MMOL/L
AST SERPL W P-5'-P-CCNC: 47 U/L (ref 13–39)
BETA GLOB ABNORMAL SERPL ELPH-MCNC: 0.4 G/DL (ref 0.31–0.57)
BETA1 GLOB SERPL ELPH-MCNC: 6.2 % (ref 4.7–7.7)
BETA2 GLOB SERPL ELPH-MCNC: 5.8 % (ref 3.1–7.9)
BETA2+GAMMA GLOB SERPL ELPH-MCNC: 0.37 G/DL (ref 0.2–0.58)
BILIRUB SERPL-MCNC: 1.02 MG/DL (ref 0.2–1)
BUN SERPL-MCNC: 57 MG/DL (ref 5–25)
BUN SERPL-MCNC: 65 MG/DL (ref 5–25)
CALCIUM SERPL-MCNC: 9 MG/DL (ref 8.4–10.2)
CALCIUM SERPL-MCNC: 9.2 MG/DL (ref 8.4–10.2)
CARDIAC TROPONIN I PNL SERPL HS: 323 NG/L (ref 8–18)
CHEST PAIN STATEMENT: NORMAL
CHLORIDE SERPL-SCNC: 102 MMOL/L (ref 96–108)
CHLORIDE SERPL-SCNC: 103 MMOL/L (ref 96–108)
CO2 SERPL-SCNC: 25 MMOL/L (ref 21–32)
CO2 SERPL-SCNC: 26 MMOL/L (ref 21–32)
CREAT SERPL-MCNC: 3.19 MG/DL (ref 0.6–1.3)
CREAT SERPL-MCNC: 3.19 MG/DL (ref 0.6–1.3)
ERYTHROCYTE [DISTWIDTH] IN BLOOD BY AUTOMATED COUNT: 15.9 % (ref 11.6–15.1)
GAMMA GLOB ABNORMAL SERPL ELPH-MCNC: 1.09 G/DL (ref 0.4–1.66)
GAMMA GLOB SERPL ELPH-MCNC: 17.1 % (ref 6.9–22.3)
GFR SERPL CREATININE-BSD FRML MDRD: 20 ML/MIN/1.73SQ M
GFR SERPL CREATININE-BSD FRML MDRD: 20 ML/MIN/1.73SQ M
GLUCOSE SERPL-MCNC: 106 MG/DL (ref 65–140)
GLUCOSE SERPL-MCNC: 113 MG/DL (ref 65–140)
HAV IGM SER QL: NORMAL
HBV CORE IGM SER QL: NORMAL
HBV SURFACE AG SER QL: NORMAL
HCT VFR BLD AUTO: 34.5 % (ref 36.5–49.3)
HCV AB SER QL: NORMAL
HGB BLD-MCNC: 11.4 G/DL (ref 12–17)
IGG/ALB SER: 1.43 {RATIO} (ref 1.1–1.8)
INTERPRETATION UR IFE-IMP: NORMAL
LACTATE SERPL-SCNC: 0.6 MMOL/L (ref 0.5–2)
MAX DIASTOLIC BP: 118 MMHG
MAX PREDICTED HEART RATE: 160 BPM
MCH RBC QN AUTO: 27.9 PG (ref 26.8–34.3)
MCHC RBC AUTO-ENTMCNC: 33 G/DL (ref 31.4–37.4)
MCV RBC AUTO: 84 FL (ref 82–98)
NUC STRESS EJECTION FRACTION: 37 %
PLATELET # BLD AUTO: 267 THOUSANDS/UL (ref 149–390)
PMV BLD AUTO: 9.9 FL (ref 8.9–12.7)
POTASSIUM SERPL-SCNC: 3 MMOL/L (ref 3.5–5.3)
POTASSIUM SERPL-SCNC: 3.1 MMOL/L (ref 3.5–5.3)
PROT PATTERN SERPL ELPH-IMP: NORMAL
PROT SERPL-MCNC: 6.4 G/DL (ref 6.4–8.2)
PROT SERPL-MCNC: 6.8 G/DL (ref 6.4–8.4)
PROTOCOL NAME: NORMAL
RATE PRESSURE PRODUCT: NORMAL
RBC # BLD AUTO: 4.09 MILLION/UL (ref 3.88–5.62)
REASON FOR TERMINATION: NORMAL
SL CV REST NUCLEAR ISOTOPE DOSE: 10.6 MCI
SL CV STRESS NUCLEAR ISOTOPE DOSE: 30.3 MCI
SL CV STRESS RECOVERY BP: NORMAL MMHG
SL CV STRESS RECOVERY HR: 73 BPM
SL CV STRESS RECOVERY O2 SAT: 99 %
SODIUM SERPL-SCNC: 137 MMOL/L (ref 135–147)
SODIUM SERPL-SCNC: 138 MMOL/L (ref 135–147)
STRESS BASELINE BP: NORMAL MMHG
STRESS BASELINE HR: 65 BPM
STRESS O2 SAT REST: 97 %
STRESS PEAK HR: 81 BPM
STRESS POST EXERCISE DUR MIN: 3 MIN
STRESS POST EXERCISE DUR SEC: 0 SEC
STRESS POST O2 SAT PEAK: 99 %
STRESS POST PEAK BP: 154 MMHG
STRESS POST PEAK HR: 81 BPM
STRESS POST PEAK SYSTOLIC BP: 187 MMHG
STRESS/REST PERFUSION RATIO: 1.09
TARGET HR FORMULA: NORMAL
TEST INDICATION: NORMAL
WBC # BLD AUTO: 9.51 THOUSAND/UL (ref 4.31–10.16)

## 2024-02-29 PROCEDURE — 82384 ASSAY THREE CATECHOLAMINES: CPT

## 2024-02-29 PROCEDURE — 80053 COMPREHEN METABOLIC PANEL: CPT | Performed by: INTERNAL MEDICINE

## 2024-02-29 PROCEDURE — 93016 CV STRESS TEST SUPVJ ONLY: CPT | Performed by: INTERNAL MEDICINE

## 2024-02-29 PROCEDURE — 99232 SBSQ HOSP IP/OBS MODERATE 35: CPT | Performed by: INTERNAL MEDICINE

## 2024-02-29 PROCEDURE — 80074 ACUTE HEPATITIS PANEL: CPT | Performed by: INTERNAL MEDICINE

## 2024-02-29 PROCEDURE — 99233 SBSQ HOSP IP/OBS HIGH 50: CPT | Performed by: INTERNAL MEDICINE

## 2024-02-29 PROCEDURE — 93018 CV STRESS TEST I&R ONLY: CPT | Performed by: INTERNAL MEDICINE

## 2024-02-29 PROCEDURE — 87040 BLOOD CULTURE FOR BACTERIA: CPT

## 2024-02-29 PROCEDURE — 78452 HT MUSCLE IMAGE SPECT MULT: CPT

## 2024-02-29 PROCEDURE — 84165 PROTEIN E-PHORESIS SERUM: CPT | Performed by: PATHOLOGY

## 2024-02-29 PROCEDURE — 80048 BASIC METABOLIC PNL TOTAL CA: CPT

## 2024-02-29 PROCEDURE — 85027 COMPLETE CBC AUTOMATED: CPT | Performed by: INTERNAL MEDICINE

## 2024-02-29 PROCEDURE — 83605 ASSAY OF LACTIC ACID: CPT

## 2024-02-29 PROCEDURE — 93017 CV STRESS TEST TRACING ONLY: CPT

## 2024-02-29 PROCEDURE — A9502 TC99M TETROFOSMIN: HCPCS

## 2024-02-29 PROCEDURE — 86334 IMMUNOFIX E-PHORESIS SERUM: CPT | Performed by: PATHOLOGY

## 2024-02-29 PROCEDURE — 99223 1ST HOSP IP/OBS HIGH 75: CPT

## 2024-02-29 PROCEDURE — 78452 HT MUSCLE IMAGE SPECT MULT: CPT | Performed by: INTERNAL MEDICINE

## 2024-02-29 PROCEDURE — 84484 ASSAY OF TROPONIN QUANT: CPT

## 2024-02-29 RX ORDER — HYDRALAZINE HYDROCHLORIDE 20 MG/ML
20 INJECTION INTRAMUSCULAR; INTRAVENOUS ONCE
Status: COMPLETED | OUTPATIENT
Start: 2024-02-29 | End: 2024-02-29

## 2024-02-29 RX ORDER — LABETALOL HYDROCHLORIDE 5 MG/ML
10 INJECTION, SOLUTION INTRAVENOUS EVERY 4 HOURS PRN
Status: DISCONTINUED | OUTPATIENT
Start: 2024-02-29 | End: 2024-03-01

## 2024-02-29 RX ORDER — HYDRALAZINE HYDROCHLORIDE 20 MG/ML
10 INJECTION INTRAMUSCULAR; INTRAVENOUS EVERY 6 HOURS PRN
Status: DISCONTINUED | OUTPATIENT
Start: 2024-02-29 | End: 2024-03-01

## 2024-02-29 RX ORDER — CHLORHEXIDINE GLUCONATE ORAL RINSE 1.2 MG/ML
15 SOLUTION DENTAL EVERY 12 HOURS SCHEDULED
Status: DISCONTINUED | OUTPATIENT
Start: 2024-02-29 | End: 2024-03-06 | Stop reason: HOSPADM

## 2024-02-29 RX ORDER — HYDRALAZINE HYDROCHLORIDE 25 MG/1
25 TABLET, FILM COATED ORAL EVERY 8 HOURS SCHEDULED
Status: DISCONTINUED | OUTPATIENT
Start: 2024-02-29 | End: 2024-02-29

## 2024-02-29 RX ORDER — CEFTRIAXONE 1 G/50ML
1000 INJECTION, SOLUTION INTRAVENOUS EVERY 24 HOURS
Status: DISCONTINUED | OUTPATIENT
Start: 2024-02-29 | End: 2024-03-04

## 2024-02-29 RX ORDER — ACETAMINOPHEN 325 MG/1
650 TABLET ORAL ONCE
Status: COMPLETED | OUTPATIENT
Start: 2024-02-29 | End: 2024-02-29

## 2024-02-29 RX ORDER — HYDRALAZINE HYDROCHLORIDE 25 MG/1
25 TABLET, FILM COATED ORAL ONCE
Status: COMPLETED | OUTPATIENT
Start: 2024-02-29 | End: 2024-02-29

## 2024-02-29 RX ORDER — HYDRALAZINE HYDROCHLORIDE 25 MG/1
50 TABLET, FILM COATED ORAL EVERY 8 HOURS SCHEDULED
Status: DISCONTINUED | OUTPATIENT
Start: 2024-02-29 | End: 2024-03-01

## 2024-02-29 RX ORDER — AMINOPHYLLINE 25 MG/ML
50 INJECTION, SOLUTION INTRAVENOUS ONCE
Status: COMPLETED | OUTPATIENT
Start: 2024-02-29 | End: 2024-02-29

## 2024-02-29 RX ORDER — POTASSIUM CHLORIDE 20 MEQ/1
40 TABLET, EXTENDED RELEASE ORAL ONCE
Status: COMPLETED | OUTPATIENT
Start: 2024-02-29 | End: 2024-02-29

## 2024-02-29 RX ORDER — POTASSIUM CHLORIDE 14.9 MG/ML
20 INJECTION INTRAVENOUS ONCE
Status: COMPLETED | OUTPATIENT
Start: 2024-02-29 | End: 2024-02-29

## 2024-02-29 RX ORDER — REGADENOSON 0.08 MG/ML
0.4 INJECTION, SOLUTION INTRAVENOUS ONCE
Status: COMPLETED | OUTPATIENT
Start: 2024-02-29 | End: 2024-02-29

## 2024-02-29 RX ORDER — ALBUTEROL SULFATE 90 UG/1
2 AEROSOL, METERED RESPIRATORY (INHALATION) EVERY 4 HOURS PRN
Status: DISCONTINUED | OUTPATIENT
Start: 2024-02-29 | End: 2024-03-06 | Stop reason: HOSPADM

## 2024-02-29 RX ADMIN — ISOSORBIDE DINITRATE 40 MG: 10 TABLET ORAL at 21:21

## 2024-02-29 RX ADMIN — ATORVASTATIN CALCIUM 40 MG: 40 TABLET, FILM COATED ORAL at 21:21

## 2024-02-29 RX ADMIN — ARIPIPRAZOLE 5 MG: 5 TABLET ORAL at 08:52

## 2024-02-29 RX ADMIN — ASPIRIN 81 MG: 81 TABLET, CHEWABLE ORAL at 08:54

## 2024-02-29 RX ADMIN — SERTRALINE HYDROCHLORIDE 200 MG: 50 TABLET ORAL at 08:54

## 2024-02-29 RX ADMIN — HYDRALAZINE HYDROCHLORIDE 50 MG: 25 TABLET, FILM COATED ORAL at 21:21

## 2024-02-29 RX ADMIN — ISOSORBIDE DINITRATE 40 MG: 10 TABLET ORAL at 16:47

## 2024-02-29 RX ADMIN — ACETAMINOPHEN 650 MG: 325 TABLET, FILM COATED ORAL at 21:22

## 2024-02-29 RX ADMIN — ISOSORBIDE DINITRATE 40 MG: 10 TABLET ORAL at 08:52

## 2024-02-29 RX ADMIN — HEPARIN SODIUM 5000 UNITS: 5000 INJECTION INTRAVENOUS; SUBCUTANEOUS at 05:55

## 2024-02-29 RX ADMIN — AMLODIPINE BESYLATE 10 MG: 10 TABLET ORAL at 09:01

## 2024-02-29 RX ADMIN — SUCRALFATE 1 G: 1 TABLET ORAL at 16:47

## 2024-02-29 RX ADMIN — LABETALOL HYDROCHLORIDE 10 MG: 5 INJECTION, SOLUTION INTRAVENOUS at 08:58

## 2024-02-29 RX ADMIN — DOXAZOSIN 8 MG: 4 TABLET ORAL at 08:52

## 2024-02-29 RX ADMIN — HEPARIN SODIUM 5000 UNITS: 5000 INJECTION INTRAVENOUS; SUBCUTANEOUS at 21:21

## 2024-02-29 RX ADMIN — REGADENOSON 0.4 MG: 0.08 INJECTION, SOLUTION INTRAVENOUS at 14:33

## 2024-02-29 RX ADMIN — PANTOPRAZOLE SODIUM 40 MG: 40 TABLET, DELAYED RELEASE ORAL at 08:54

## 2024-02-29 RX ADMIN — POTASSIUM CHLORIDE 20 MEQ: 14.9 INJECTION, SOLUTION INTRAVENOUS at 08:54

## 2024-02-29 RX ADMIN — HYDRALAZINE HYDROCHLORIDE 25 MG: 25 TABLET ORAL at 12:43

## 2024-02-29 RX ADMIN — LABETALOL HYDROCHLORIDE 600 MG: 200 TABLET, FILM COATED ORAL at 05:55

## 2024-02-29 RX ADMIN — POTASSIUM CHLORIDE 40 MEQ: 1500 TABLET, EXTENDED RELEASE ORAL at 08:52

## 2024-02-29 RX ADMIN — HYDRALAZINE HYDROCHLORIDE 20 MG: 20 INJECTION INTRAMUSCULAR; INTRAVENOUS at 17:35

## 2024-02-29 RX ADMIN — LABETALOL HYDROCHLORIDE 600 MG: 200 TABLET, FILM COATED ORAL at 21:21

## 2024-02-29 RX ADMIN — OLANZAPINE 5 MG: 2.5 TABLET, FILM COATED ORAL at 21:22

## 2024-02-29 RX ADMIN — LABETALOL HYDROCHLORIDE 10 MG: 5 INJECTION, SOLUTION INTRAVENOUS at 03:12

## 2024-02-29 RX ADMIN — HYDRALAZINE HYDROCHLORIDE 25 MG: 25 TABLET, FILM COATED ORAL at 17:49

## 2024-02-29 RX ADMIN — SUCRALFATE 1 G: 1 TABLET ORAL at 08:54

## 2024-02-29 RX ADMIN — HEPARIN SODIUM 5000 UNITS: 5000 INJECTION INTRAVENOUS; SUBCUTANEOUS at 16:47

## 2024-02-29 RX ADMIN — CEFTRIAXONE 1000 MG: 1 INJECTION, SOLUTION INTRAVENOUS at 21:21

## 2024-02-29 RX ADMIN — CHLORHEXIDINE GLUCONATE 0.12% ORAL RINSE 15 ML: 1.2 LIQUID ORAL at 21:21

## 2024-02-29 RX ADMIN — LABETALOL HYDROCHLORIDE 600 MG: 200 TABLET, FILM COATED ORAL at 16:49

## 2024-02-29 RX ADMIN — AMINOPHYLLINE 50 MG: 25 INJECTION, SOLUTION INTRAVENOUS at 14:41

## 2024-02-29 RX ADMIN — DOXAZOSIN 8 MG: 4 TABLET ORAL at 21:22

## 2024-02-29 RX ADMIN — AMILORIDE HYDROCLORIDE 5 MG: 5 TABLET ORAL at 09:02

## 2024-02-29 NOTE — UTILIZATION REVIEW
Initial Clinical Review    Admission: Date/Time/Statement:   Admission Orders (From admission, onward)       Ordered        02/28/24 0426  Inpatient Admission  Once                          Orders Placed This Encounter   Procedures    Inpatient Admission     Standing Status:   Standing     Number of Occurrences:   1     Order Specific Question:   Level of Care     Answer:   Med Surg [16]     Order Specific Question:   Estimated length of stay     Answer:   More than 2 Midnights     Order Specific Question:   Certification     Answer:   I certify that inpatient services are medically necessary for this patient for a duration of greater than two midnights. See H&P and MD Progress Notes for additional information about the patient's course of treatment.     ED Arrival Information       Expected   -    Arrival   2/27/2024 18:09    Acuity   Emergent              Means of arrival   Wheelchair    Escorted by   Family Member    Service   Hospitalist    Admission type   Emergency              Arrival complaint   Flu Symptoms             Chief Complaint   Patient presents with    Chest Pain     Chest pain, SOB and vomiting that started at 8am        Initial Presentation: 58 y.o. male to the ED from home with complaints of chest pain, shortness of breath.  Admitted to inpatient for hypertensive emergency, CHF.  Arrives tachypneic, hypertensive. H/O diastolic congestive heart failure, CKD stage IV, COPD.  IN the Ed, given nebulizer, iv zofran, nitro SL, labetalol iv, started on cardene drip. Elevated bp likely due to noncompliance as he had recently had an upset stomach.   DC cardene dirp, give iv labetolol, hydralazine as needed.  Check renal us. Restart home bp meds. Creat 2.44 on arrival.  Baseline between 2.5-3.0.   CKD stage IV.  Nephrology consult.Troponin elevated. Check ECHo, cardiology consult.   Nephrology:  potassium 2.5 . REplace and recheck.  Mildly elevated LFTs.  Check RUQ US. Continue to monitor creat closely.      Cardiology consult: Troponins uptrending.  EKG showing inferolateral T wave abnormality. ECHO pending.  CT neg for dissection.   Renal artery duplex negative for evidence of JEET.  Continue amlodipine, Cardura, labetalol, Isordil, and amiloride   Date: 2/29   Day 2: BP remains uncontrolled.  Add amiloride and hydralazine every 8 hours.  Plan for stress test. Monitor tele.     2/29 Stress test:  interpretation Summary         Resting ECG: The ECG shows normal sinus rhythm. There is left ventricular hypertrophy with strain.    Stress ECG: The ECG was not diagnostic due to pharmacological (vasodilator) stress.    Perfusion: There is a left ventricular perfusion defect present in the inferolateral location(s) that is fixed.  This defect improved but did not completely resolve on prone imaging.  Underlying CAD cannot be definitively excluded.    Perfusion Defect Conclusion: The stress/rest perfusion ratio is 1.09. There is no evidence of transient ischemic dilation (TID).    Stress Function: Left ventricular function post-stress is abnormal. Global function is moderately reduced. Stress ejection fraction is 37%. There is a defect in the inferolateral location(s). The defect has moderately reduced function.     Cardiology consult:   Consider cardene drip if bp not improved.  Given  Hydralazine IV x 2 2/29, IV dilaudid x 2 2/29, Labetalol IV x 4 2/29  Nephrology consult:   BP remains elevated. Currently on labetalol, Cardura, amlodipine and amiloride , start on hydralazine every 8 hours.  Creat elevated. Continue to hold diuretic.      2/29 UPdate:   TRANSFERRED TO CRITICAL CARe level of care.  Currently taking amlodipine, doxazosin, labetalol.  Start Cardene drip and continue to monitor bp closely.    Date: 3/1    Day 3: Has surpassed a 2nd midnight with active treatments and services, which include continues to require bp monitoring for potential medication adjustment.  Given Dose of labetalol IV .  Cardene weaned  off. SBP 140s-160s.    Additional Vital Signs: Vital Signs (last 2 days)  Vital Signs (last 2 days)    Date/Time Temp Pulse Resp BP MAP (mmHg) SpO2 Calculated FIO2 (%) - Nasal Cannula Nasal Cannula O2 Flow Rate (L/min) O2 Device Patient Position - Orthostatic VS   03/01/24 1115 98.6 °F (37 °C) 61 16 125/67 90 99 % -- -- None (Room air) Lying   03/01/24 0800 -- -- -- 180/98 Abnormal  132 -- -- -- -- Lying   03/01/24 0730 98.8 °F (37.1 °C) 67 16 196/100 Abnormal  140 100 % -- -- None (Room air) Lying   03/01/24 0700 -- 66 -- 169/83 119 100 % -- -- -- --   03/01/24 0630 -- 65 -- 141/79 105 98 % -- -- -- --   03/01/24 0600 -- 62 -- 130/73 96 99 % -- -- -- --   03/01/24 0530 -- 64 -- 174/100 Abnormal  131 100 % -- -- None (Room air) --   03/01/24 0500 -- 60 -- 187/110 Abnormal  142 98 % -- -- -- --   03/01/24 0430 -- 62 -- 192/111 Abnormal  144 99 % -- -- -- --   03/01/24 0400 -- 60 -- 176/104 Abnormal  134 99 % -- -- -- --   03/01/24 0330 -- 56 -- 159/96 122 99 % -- -- -- --   03/01/24 0300 98.8 °F (37.1 °C) 57 -- 165/99 127 98 % -- -- -- --   03/01/24 0230 -- 61 -- 189/109 Abnormal  142 99 % -- -- -- --   03/01/24 0200 -- 61 -- 144/91 113 99 % -- -- -- --   03/01/24 0130 -- 61 -- 179/85 Abnormal  115 98 % -- -- -- --   03/01/24 0100 -- 63 -- 162/102 Abnormal  126 98 % -- -- -- --   03/01/24 0030 -- 60 -- 148/92 115 98 % -- -- -- --   03/01/24 0000 -- 60 -- 135/82 103 98 % -- -- -- --   02/29/24 2330 -- 59 -- 151/89 112 96 % -- -- -- --   02/29/24 2300 -- 56 -- 125/78 97 98 % -- -- -- --   02/29/24 2230 -- 59 -- 145/88 111 98 % -- -- -- --   02/29/24 2200 -- 59 -- 138/83 105 98 % -- -- -- --     Date/Time Temp Pulse Resp BP MAP (mmHg) SpO2 Calculated FIO2 (%) - Nasal Cannula Nasal Cannula O2 Flow Rate (L/min) O2 Device Patient Position - Orthostatic VS   02/29/24 0852 -- 64 -- 220/120 Abnormal  -- -- -- -- -- Lying   02/29/24 0518 -- -- -- 215/130 Abnormal  168 -- -- -- -- --   02/29/24 0344 -- -- -- 196/124 Abnormal   154 -- -- -- -- --   02/29/24 0300 98.6 °F (37 °C) 79 20 199/128 Abnormal  157 99 % -- -- None (Room air) Lying   02/28/24 2224 98.5 °F (36.9 °C) 70 18 184/112 Abnormal  143 93 % -- -- -- Lying   02/28/24 2150 -- -- -- 236/141 Abnormal  -- -- -- -- -- --   02/28/24 2024 -- -- -- 215/117 Abnormal  157 -- -- -- -- --   02/28/24 1932 97.8 °F (36.6 °C) 73 18 207/115 Abnormal  154 95 % -- -- None (Room air) Lying   02/28/24 1719 -- -- -- 204/121 Abnormal  156 -- -- -- -- Lying   02/28/24 1700 -- -- -- -- -- -- -- -- None (Room air) --   02/28/24 1558 -- 98 16 213/132 Abnormal  -- -- -- -- -- Lying   02/28/24 1553 -- -- -- 213/132 Abnormal  -- -- -- -- -- --   02/28/24 1344 97.9 °F (36.6 °C) 97 21 181/109 Abnormal  -- 98 % -- -- None (Room air) --   02/28/24 1230 -- 69 20 206/125 Abnormal  159 98 % -- -- None (Room air) --   02/28/24 1200 -- 72 16 -- -- 96 % -- -- -- --   02/28/24 1149 -- 66 16 174/106 Abnormal  -- 97 % -- -- None (Room air) Lying   02/28/24 1115 -- 64 -- 115/81 -- -- -- -- -- --   02/28/24 1045 -- 62 20 115/81 95 97 % -- -- -- --   02/28/24 1030 -- 63 20 -- -- 98 % -- -- -- --   02/28/24 1000 -- 63 14 159/102 Abnormal  125 97 % -- -- -- --   02/28/24 0945 -- 65 17 -- -- 96 % -- -- -- --   02/28/24 0930 -- 65 20 158/99 123 96 % -- -- None (Room air) --   02/28/24 0915 -- 67 21 -- -- 98 % -- -- -- --   02/28/24 0900 -- 61 22 172/108 Abnormal  135 96 % -- -- -- --   02/28/24 0845 -- 66 15 -- -- 97 % -- -- -- --   02/28/24 0830 -- 70 21 188/112 Abnormal  144 100 % -- -- None (Room air) --   02/28/24 0815 -- 69 21 187/110 Abnormal  142 100 % 28 2 L/min Nasal cannula --   02/28/24 0800 -- 73 21 183/111 Abnormal  141 -- -- -- -- --   02/28/24 0730 -- 72 22 173/107 Abnormal  134 -- 28 2 L/min -- --   02/28/24 0700 -- 70 20 190/114 Abnormal  146 100 % 28 2 L/min Nasal cannula --   02/28/24 0600 -- 78 22 208/111 Abnormal  152 97 % -- -- None (Room air) Sitting   02/28/24 0445 -- 77 22 210/106 Abnormal  144 97 % --  -- None (Room air) Sitting   02/28/24 0315 -- 97 22 186/103 Abnormal  139 97 % -- -- None (Room air) Sitting   02/28/24 0231 -- 100 24 Abnormal  195/109 Abnormal  143 -- -- -- -- --   02/28/24 0230 97.8 °F (36.6 °C) 100 30 Abnormal  195/109 Abnormal  143 97 % -- -- None (Room air) Lying   02/28/24 0215 -- 90 29 Abnormal  172/83 Abnormal  119 95 % -- -- Nasal cannula Lying   02/28/24 0200 -- 90 21 180/96 Abnormal  131 97 % 28 2 L/min Nasal cannula Lying   02/28/24 0155 -- 87 23 Abnormal  187/113 Abnormal  144 98 % 28 2 L/min Nasal cannula Lying   02/28/24 0131 98 °F (36.7 °C) 74 22 214/129 Abnormal  -- 97 % 28 2 L/min Nasal cannula Lying   02/28/24 0116 -- 74 24 Abnormal  257/147 Abnormal  194 96 % -- -- Nasal cannula Lying   02/28/24 0028 -- 80 25 Abnormal  231/141 Abnormal  179 95 % -- -- Nasal cannula --   02/28/24 0018 -- 76 16 202/123 Abnormal  156 97 % 28 2 L/min Nasal cannula Lying   02/28/24 0016 98.2 °F (36.8 °C) 76 18 202/123 Abnormal  -- 97 % 28 2 L/min Nasal cannula Lying   02/28/24 0000 98 °F (36.7 °C) 75 20 187/113 Abnormal  144 97 % 28 2 L/min Nasal cannula --   02/27/24 2330 98.2 °F (36.8 °C) 82 21 233/138 Abnormal  179 97 % 28 2 L/min Nasal cannula Lying   02/27/24 2311 98.5 °F (36.9 °C) 77 16 141/91 112 97 % 28 2 L/min Nasal cannula Lying   02/27/24 2211 -- -- -- 221/118 Abnormal  -- -- -- -- Nasal cannula Lying   02/27/24 2207 -- 96 20 241/123 Abnormal  -- 97 % 28 2 L/min Nasal cannula Lying   02/27/24 2142 98 °F (36.7 °C) 97 20 209/125 Abnormal  -- 97 % -- -- None (Room air) Lying   02/27/24 1810 97.9 °F (36.6 °C) 95 32 Abnormal  209/111 Abnormal  156 98 % -- -- -- Sitti     Pertinent Labs/Diagnostic Test Results:   2/28 ECHO: Interpretation Summary      Left Ventricle: Left ventricular cavity size is normal. Wall thickness is severely increased. There is severe concentric hypertrophy. The left ventricular ejection fraction is 50%. Systolic function is low normal. Wall motion is normal. Diastolic  function is mildly abnormal, consistent with grade I (abnormal) relaxation.    Right Ventricle: Right ventricular cavity size is normal. Systolic function is normal.    Left Atrium: The atrium is mildly dilated.    Right Atrium: The atrium is mildly dilated.    Mitral Valve: There is mild regurgitation.    Tricuspid Valve: There is mild regurgitation.     VAS renal artery complete   Final Result by Hansel Guevara DO (02/28 1017)      CTA dissection protocol chest/abdomen/pelvis   Final Result by Paxton Stevens DO (02/28 0019)      No aortic aneurysm or dissection.      Small volume free fluid/mild mesenteric edema. Consider enteritis in the appropriate clinical setting.               Workstation performed: [id number]         XR chest 1 view portable   Final Result by Daquan Mills MD (02/28 0906)      No radiographic evidence of acute intrathoracic process.            Workstation performed: [id number]           Results from last 7 days   Lab Units 02/28/24  0028   SARS-COV-2  Negative     Results from last 7 days   Lab Units 02/29/24  0509 02/27/24 1945   WBC Thousand/uL 9.51 6.22   HEMOGLOBIN g/dL 11.4* 14.5   HEMATOCRIT % 34.5* 42.5   PLATELETS Thousands/uL 267 393*  393*   NEUTROS ABS Thousands/µL  --  5.05         Results from last 7 days   Lab Units 02/29/24  0509 02/28/24  2224 02/28/24  1104 02/27/24 1945   SODIUM mmol/L 138 137 136 140   POTASSIUM mmol/L 3.0* 3.2* 2.8* 2.5*   CHLORIDE mmol/L 102 101 99 96   CO2 mmol/L 26 23 27 25   ANION GAP mmol/L 10 13 10 19   BUN mg/dL 57* 55* 48* 53*   CREATININE mg/dL 3.19* 2.81* 2.61* 2.44*   EGFR ml/min/1.73sq m 20 23 25 27   CALCIUM mg/dL 9.0 9.7 9.4 11.3*   PHOSPHORUS mg/dL  --   --  4.8*  --      Results from last 7 days   Lab Units 02/29/24  0509 02/27/24 1945   AST U/L 47* 45*   ALT U/L 54* 89*   ALK PHOS U/L 78 107*   TOTAL PROTEIN g/dL 6.8 8.8*   ALBUMIN g/dL 3.8 5.0   TOTAL BILIRUBIN mg/dL 1.02* 1.88*         Results from last 7 days   Lab  Units 02/29/24  0509 02/28/24  2224 02/28/24  1104 02/27/24 1945   GLUCOSE RANDOM mg/dL 106 121 158* 167*     BETA-HYDROXYBUTYRATE   Date Value Ref Range Status   07/09/2020 1.2 (H) <0.6 mmol/L Final   07/03/2020 1.5 (H) <0.6 mmol/L Final     Results from last 7 days   Lab Units 02/28/24 2224 02/28/24 2024 02/28/24  1821 02/27/24 2326 02/27/24 2202 02/27/24 1945   HS TNI 0HR ng/L  --   --  353*  --   --  207*   HS TNI 2HR ng/L  --  352*  --   --  244*  --    HSTNI D2 ng/L  --  -1  --   --  37*  --    HS TNI 4HR ng/L 349*  --   --  251*  --   --    HSTNI D4 ng/L -4  --   --  44*  --   --          Results from last 7 days   Lab Units 02/27/24 2326   PROTIME seconds 14.9*   INR  1.11   PTT seconds 29     Results from last 7 days   Lab Units 02/27/24 1945   BNP pg/mL 1,807*       Results from last 7 days   Lab Units 02/27/24 1945   LIPASE u/L 34         Results from last 7 days   Lab Units 02/28/24 2231   CLARITY UA  Clear   COLOR UA  Light Yellow   SPEC GRAV UA  1.029   PH UA  6.0   GLUCOSE UA mg/dl Negative   KETONES UA mg/dl Negative   BLOOD UA  Large*   PROTEIN UA mg/dl 200 (2+)*   NITRITE UA  Negative   BILIRUBIN UA  Negative   UROBILINOGEN UA (BE) mg/dl <2.0   LEUKOCYTES UA  Moderate*   WBC UA /hpf Innumerable*   RBC UA /hpf 2-4*   BACTERIA UA /hpf None Seen   EPITHELIAL CELLS WET PREP /hpf Occasional   SODIUM UR  43   CREATININE UR mg/dL 110.5   PROTEIN UR mg/dL 246   PROT/CREAT RATIO UR  2.23*     Results from last 7 days   Lab Units 02/28/24  0028   INFLUENZA A PCR  Negative   INFLUENZA B PCR  Negative   RSV PCR  Negative       ED Treatment:   Medication Administration from 02/27/2024 1809 to 02/28/2024 1252         Date/Time Order Dose Route Action Comments     02/27/2024 1935 EST ondansetron (ZOFRAN) injection 4 mg 4 mg Intravenous Given --     02/27/2024 1943 EST sodium chloride 0.9 % bolus 1,000 mL 1,000 mL Intravenous New Bag --     02/27/2024 1936 EST fentanyl citrate (PF) 100 MCG/2ML 50 mcg 50  mcg Intravenous Given --     02/27/2024 1935 EST ipratropium-albuterol (DUO-NEB) 0.5-2.5 mg/3 mL inhalation solution 3 mL 3 mL Nebulization Given --     02/27/2024 2046 EST potassium chloride (Klor-Con M20) CR tablet 40 mEq 40 mEq Oral Given --     02/27/2024 2115 EST potassium chloride 20 mEq IVPB (premix) 20 mEq Intravenous New Bag --     02/27/2024 2319 EST potassium chloride 20 mEq IVPB (premix) 20 mEq Intravenous New Bag --     02/28/2024 0118 EST potassium chloride 20 mEq IVPB (premix) 20 mEq Intravenous New Bag --     02/27/2024 2200 EST nitroglycerin (NITROSTAT) SL tablet 0.4 mg 0.4 mg Sublingual Given --     02/27/2024 2141 EST nitroglycerin (NITROSTAT) SL tablet 0.4 mg 0.4 mg Sublingual Given --     02/27/2024 2137 EST nitroglycerin (NITROSTAT) SL tablet 0.4 mg 0.4 mg Sublingual Given --     02/27/2024 2216 EST nitroglycerin (NITRO-BID) 2 % TD ointment 1 inch 1 inch Topical Given --     02/27/2024 2312 EST fentanyl citrate (PF) 100 MCG/2ML 50 mcg 50 mcg Intravenous Given --     02/27/2024 2321 EST sodium chloride 0.9 % bolus 500 mL 500 mL Intravenous New Bag --     02/28/2024 0030 EST labetalol (NORMODYNE) injection 10 mg 10 mg Intravenous Given --     02/28/2024 0123 EST niCARdipine (CARDENE) 25 mg (STANDARD CONCENTRATION) in sodium chloride 0.9% 250 mL 5 mg/hr Intravenous New Bag --     02/28/2024 0312 EST labetalol (NORMODYNE) injection 20 mg 20 mg Intravenous Given --     02/28/2024 1239 EST labetalol (NORMODYNE) injection 10 mg 10 mg Intravenous Given /125  HR 70     02/28/2024 0538 EST amLODIPine (NORVASC) tablet 10 mg 10 mg Oral Given --     02/28/2024 0835 EST ARIPiprazole (ABILIFY) tablet 5 mg 5 mg Oral Given --     02/28/2024 0605 EST doxazosin (CARDURA) tablet 8 mg 8 mg Oral Given --     02/28/2024 0848 EST isosorbide dinitrate (ISORDIL) tablet 40 mg 40 mg Oral Given --     02/28/2024 0605 EST labetalol (NORMODYNE) tablet 600 mg 600 mg Oral Given --     02/28/2024 0609 EST LORazepam  (ATIVAN) tablet 1 mg 1 mg Oral Given --     02/28/2024 0835 EST pantoprazole (PROTONIX) EC tablet 40 mg 40 mg Oral Given --     02/28/2024 0836 EST sertraline (ZOLOFT) tablet 200 mg 200 mg Oral Given --     02/28/2024 0835 EST sucralfate (CARAFATE) tablet 1 g 1 g Oral Given --     02/28/2024 0835 EST potassium chloride (Klor-Con M20) CR tablet 20 mEq 20 mEq Oral Given --     02/28/2024 0538 EST heparin (porcine) subcutaneous injection 5,000 Units 5,000 Units Subcutaneous Given --     02/28/2024 0538 EST aspirin tablet 325 mg 325 mg Oral Given --     02/28/2024 0538 EST hydrALAZINE (APRESOLINE) injection 20 mg 20 mg Intravenous Given --     02/28/2024 1216 EST potassium chloride (Klor-Con M20) CR tablet 40 mEq 40 mEq Oral Given --     02/28/2024 1218 EST potassium chloride 20 mEq IVPB (premix) 20 mEq Intravenous New Bag --          Past Medical History:   Diagnosis Date    Asthma     Chronic kidney disease     COPD (chronic obstructive pulmonary disease) (HCC)     CPAP (continuous positive airway pressure) dependence     NO LONGER NEEDS D/T BARIATRIC SURGERY.    Diabetes mellitus (HCC)     Emphysema of lung (HCC)     Gout     History of transfusion     pt stated they had a transfusion when they had gallbladder surgery.    Hypertension     Kidney disease     renal failure    Leg DVT (deep venous thromboembolism), acute, bilateral (HCC) 01/2018    Obesity (BMI 30-39.9)     AGUS on CPAP     setting 11    Postgastrectomy malabsorption     Sleep apnea     Systolic CHF (HCC)          Admitting Diagnosis: Chest pain [R07.9]  Hypertension [I10]  Elevated troponin [R79.89]  Hypertensive emergency [I16.1]  Stage 4 chronic kidney disease (HCC) [N18.4]  Age/Sex: 58 y.o. male  Admission Orders:  Scheduled Medications:  AMILoride, 5 mg, Oral, Daily  amLODIPine, 10 mg, Oral, Daily  ARIPiprazole, 5 mg, Oral, Daily  aspirin, 81 mg, Oral, Daily  atorvastatin, 40 mg, Oral, HS  doxazosin, 8 mg, Oral, BID  heparin (porcine), 5,000 Units,  Subcutaneous, Q8H JOANN  isosorbide dinitrate, 40 mg, Oral, TID after meals  labetalol, 600 mg, Oral, Q8H JOANN  OLANZapine, 5 mg, Oral, HS  pantoprazole, 40 mg, Oral, Daily  potassium chloride, 20 mEq, Intravenous, Once  sertraline, 200 mg, Oral, Daily  sucralfate, 1 g, Oral, BID      Continuous IV Infusions:     PRN Meds:  acetaminophen, 650 mg, Oral, Q4H PRN  HYDROmorphone, 0.5 mg, Intravenous, Q1H PRN  labetalol, 10 mg, Intravenous, Q4H PRN  LORazepam, 1 mg, Oral, Q6H PRN        IP CONSULT TO CARDIOLOGY  IP CONSULT TO NEPHROLOGY    Network Utilization Review Department  ATTENTION: Please call with any questions or concerns to [phone] and carefully listen to the prompts so that you are directed to the right person. All voicemails are confidential.   For Discharge needs, contact Care Management DC Support Team at [phone] opt. [phone]  Send all requests for admission clinical reviews, approved or denied determinations and any other requests to dedicated fax number below belonging to the campus where the patient is receiving treatment. List of dedicated fax numbers for the Facilities:  FACILITY NAME UR FAX NUMBER   ADMISSION DENIALS (Administrative/Medical Necessity) [phone]   DISCHARGE SUPPORT TEAM (NETWORK) [phone]   PARENT CHILD HEALTH (Maternity/NICU/Pediatrics) [phone]   [facility] [phone]   [facility] [phone]   [facility] [phone]   [facility] [phone]   [facility] [phone]   [facility] [phone]   [facility] [phone]   [facility] [phone]   [facility] [phone]   [facility] [phone]   [facility] [phone]   [facility]  [facility] [phone]

## 2024-02-29 NOTE — PROGRESS NOTES
NEPHROLOGY PROGRESS NOTE    Patient: Genna Liu               Sex: male          DOA: 2/27/2024  6:23 PM   YOB: 1965        Age:  58 y.o.        LOS:  LOS: 1 day   2/29/2024    REASON FOR THE CONSULTATION:      Hypertensive emergency    SUBJECTIVE     Patient seen and examined next to the bedside.  He is laying in bed and denies any chest pain.  Blood pressure remains persistently elevated    CURRENT MEDICATIONS       Current Facility-Administered Medications:     acetaminophen (TYLENOL) tablet 650 mg, 650 mg, Oral, Q4H PRN, Shai Rueter, DO    AMILoride tablet 5 mg, 5 mg, Oral, Daily, Wayne Bruno MD, 5 mg at 02/29/24 0902    amLODIPine (NORVASC) tablet 10 mg, 10 mg, Oral, Daily, Shai Rueter, DO, 10 mg at 02/29/24 0901    ARIPiprazole (ABILIFY) tablet 5 mg, 5 mg, Oral, Daily, Shai Rueter, DO, 5 mg at 02/29/24 0852    aspirin chewable tablet 81 mg, 81 mg, Oral, Daily, Shai Rueter, DO, 81 mg at 02/29/24 0854    atorvastatin (LIPITOR) tablet 40 mg, 40 mg, Oral, HS, Shai Rueter, DO, 40 mg at 02/28/24 2150    doxazosin (CARDURA) tablet 8 mg, 8 mg, Oral, BID, Shai Rueter, DO, 8 mg at 02/29/24 0852    heparin (porcine) subcutaneous injection 5,000 Units, 5,000 Units, Subcutaneous, Q8H JOANN, 5,000 Units at 02/29/24 0555 **AND** [COMPLETED] Platelet count, , , Once, Shai Rueter, DO    HYDROmorphone (DILAUDID) injection 0.5 mg, 0.5 mg, Intravenous, Q1H PRN, Shai Rueter, DO    isosorbide dinitrate (ISORDIL) tablet 40 mg, 40 mg, Oral, TID after meals, Shai Rueter, DO, 40 mg at 02/29/24 0852    labetalol (NORMODYNE) injection 10 mg, 10 mg, Intravenous, Q4H PRN, Shai Rueter, DO, 10 mg at 02/29/24 0858    labetalol (NORMODYNE) tablet 600 mg, 600 mg, Oral, Q8H JOANN, Shai Rueter, DO, 600 mg at 02/29/24 0555    LORazepam (ATIVAN) tablet 1 mg, 1 mg, Oral, Q6H PRN, Shai Rueter, DO, 1 mg at 02/28/24 0609    OLANZapine (ZyPREXA) tablet 5 mg, 5 mg, Oral, HS, Shai Rueter, DO, 5 mg at 02/28/24 8107     pantoprazole (PROTONIX) EC tablet 40 mg, 40 mg, Oral, Daily, Shai Rueter, DO, 40 mg at 02/29/24 0854    sertraline (ZOLOFT) tablet 200 mg, 200 mg, Oral, Daily, Shai Rueter, DO, 200 mg at 02/29/24 0854    sucralfate (CARAFATE) tablet 1 g, 1 g, Oral, BID, Shai Rueter, DO, 1 g at 02/29/24 0854    REVIEW OF SYSTEMS     Review of Systems   Constitutional: Negative.    HENT: Negative.     Eyes: Negative.    Respiratory: Negative.     Cardiovascular: Negative.    Gastrointestinal: Negative.    Endocrine: Negative.    Genitourinary: Negative.    Musculoskeletal: Negative.    Skin: Negative.    Allergic/Immunologic: Negative.    Neurological: Negative.    Hematological: Negative.    All other systems reviewed and are negative.      OBJECTIVE     Current Weight: Weight - Scale: 62.1 kg (136 lb 14.5 oz)  Vitals:    02/29/24 0852   BP: (!) 220/120   Pulse: 64   Resp:    Temp:    SpO2:      Body mass index is 21.77 kg/m².    Intake/Output Summary (Last 24 hours) at 2/29/2024 1101  Last data filed at 2/29/2024 0518  Gross per 24 hour   Intake 720 ml   Output 300 ml   Net 420 ml       PHYSICAL EXAMINATION     Physical Exam  HENT:      Head: Normocephalic and atraumatic.   Eyes:      Pupils: Pupils are equal, round, and reactive to light.   Neck:      Vascular: No JVD.   Cardiovascular:      Rate and Rhythm: Normal rate and regular rhythm.      Heart sounds: Normal heart sounds. No murmur heard.     No friction rub.   Pulmonary:      Effort: Pulmonary effort is normal.      Breath sounds: Normal breath sounds.   Abdominal:      General: Bowel sounds are normal. There is no distension.      Palpations: Abdomen is soft.      Tenderness: There is no abdominal tenderness. There is no rebound.   Musculoskeletal:         General: No tenderness.      Cervical back: Neck supple.   Skin:     General: Skin is dry.      Findings: No rash.   Neurological:      Mental Status: He is alert and oriented to person, place, and time.           LAB  RESULTS     Results from last 7 days   Lab Units 02/29/24  0509 02/28/24  2224 02/28/24  1104 02/27/24  1945   WBC Thousand/uL 9.51  --   --  6.22   HEMOGLOBIN g/dL 11.4*  --   --  14.5   HEMATOCRIT % 34.5*  --   --  42.5   PLATELETS Thousands/uL 267  --   --  393*  393*   POTASSIUM mmol/L 3.0* 3.2* 2.8* 2.5*   CHLORIDE mmol/L 102 101 99 96   CO2 mmol/L 26 23 27 25   BUN mg/dL 57* 55* 48* 53*   CREATININE mg/dL 3.19* 2.81* 2.61* 2.44*   EGFR ml/min/1.73sq m 20 23 25 27   CALCIUM mg/dL 9.0 9.7 9.4 11.3*   PHOSPHORUS mg/dL  --   --  4.8*  --            RADIOLOGY RESULTS        IMPRESSION:     No aortic aneurysm or dissection.     Small volume free fluid/mild mesenteric edema. Consider enteritis in the appropriate clinical setting.       ASSESSMENT/PLAN     58 years old male with past medical history of resistant hypertension, hyperaldosteronism status post left adrenalectomy, chronic kidney disease stage IV, CHF with diastolic dysfunction, COPD who presented to our facility with chest pain and found to have elevated blood pressure.    1.  Hypertensive emergency: Presented with systolic blood pressure of 229 mmHg systolic with findings of elevated troponins.  Blood pressure has failed to improve despite being on 4 and hypertensive medication  These medications include labetalol, Cardura, amlodipine and amiloride  Will initiate patient on hydralazine 25 mg p.o. every 8.  Does not appear that patient has true allergy to hydralazine.    #2 chronic kidney disease stage IV: Baseline creatinine ranging between 2.5-3.0.  Current serum creatinine is 3.1 and elevated.    3.  Secondary hyperparathyroidism of renal region: PTH intact remains above target will initiate patient on calcitriol 0.25 mcg every 48 hours.    4.  Anemia due to CKD: Hemoglobin is within acceptable range.    5.  Hypokalemia: Presented with potassium of 2.5 and low.  This was in the setting of hydrochlorothiazide.  The diuretic has been held.  Current  potassium is 3.0 and improved.  Amiloride was initiated as a potassium sparing diuretic.          Wayne Bruno MD  Nephrology  2/29/2024

## 2024-02-29 NOTE — QUICK NOTE
Patient's BP remains elevate despite restarting his home medications and adding hydralazine 25mg Q8H. Cardiology recommending transfer to ICU for cardene vs nitro infusion.    Contacted the ICU attending. Agreeable to accept patient as SD1. Transfer order is placed. Will order a dose of IV hydralazine 20mg now.

## 2024-02-29 NOTE — ASSESSMENT & PLAN NOTE
Wt Readings from Last 3 Encounters:   02/29/24 62.1 kg (136 lb 14.5 oz)   01/25/24 65.3 kg (144 lb)   12/27/23 66.8 kg (147 lb 4.3 oz)       Currently euvolemic on exam  Echo: EF 55%  HCTZ on hold due to hypokalemia  Daily weights, I&Os

## 2024-02-29 NOTE — ASSESSMENT & PLAN NOTE
Home regimen consists of rescue inhaler, does not use O2 at home  No acute exacerbation  PRN albuterol

## 2024-02-29 NOTE — ASSESSMENT & PLAN NOTE
Presents with substernal chest pain and associated shortness of breath  Troponin level: 0hr 207, 2hr 244, 4hr 251  Nonischemic ECG  Continue ASA, statin, BB therapy  Echo (2/28/24): Left ventricular cavity size is normal. Wall thickness is severely increased. There is severe concentric hypertrophy. The left ventricular ejection fraction is 50%. Systolic function is low normal. Wall motion is normal. Diastolic function is mildly abnormal, consistent with grade I (abnormal) relaxation.   Monitor on telemetry  Plan for NM stress test (2/29/24)  Consult to Cardiology, recommendations are appreciated

## 2024-02-29 NOTE — CASE MANAGEMENT
Case Management Assessment & Discharge Planning Note    Patient name Genna Liu  Location /-02 MRN [id number]  : 1965 Date 2024       Current Admission Date: 2024  Current Admission Diagnosis:Hypertensive emergency   Patient Active Problem List    Diagnosis Date Noted    Chest pressure 2024    Noncompliance with medication regimen 2024    Stage 4 chronic kidney disease (HCC) 2024    Chronic heart failure with preserved ejection fraction (HFpEF) ([facility]) 2024    Hypercalcemia 2024    COVID-19 2023    GERD (gastroesophageal reflux disease) 07/15/2023    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[facility]) 2023    Moderate protein-calorie malnutrition ([facility]) 2023    Electrolyte abnormality 2023    Hematochezia 2023    Depression with anxiety 2023    Abdominal pain 2023    Seizure ([facility]) 2023    Hypertensive encephalopathy 2023    Hypertensive emergency 2023    Meningioma ([facility]) 2023    Prolonged Q-T interval on ECG 2023    Hypokalemia 2021    Bradycardia 2021    Hypoglycemia 2021    History of gastric bypass 2021    Hypertensive kidney disease with stage 3 chronic kidney disease (HCC) 2021    Acute kidney injury superimposed on CKD  2021    Encounter for surgical aftercare following surgery of digestive system 2021    Postsurgical malabsorption 2021    Morbid obesity due to excess calories ([facility]) 2020    Post-traumatic male urethral stricture 2020    Renal cyst 2020    Dysuria 2020    History of DVT (deep vein thrombosis) 10/17/2018    Splenic lesion 10/17/2018    Anemia 2018    Lymphedema 2018    Stage 3b chronic kidney disease (CKD) (HCC) 03/15/2018    Chronic diastolic CHF (congestive heart failure) ([facility]) 2018    Pulmonary nodule, right 2018    Pericardial effusion 2018    Essential hypertension  01/22/2018    COPD (chronic obstructive pulmonary disease) ([facility]) 01/22/2018    CHF (congestive heart failure) ([facility]) 01/22/2018      LOS (days): 1  Geometric Mean LOS (GMLOS) (days): 2.1  Days to GMLOS:0.7     OBJECTIVE:    Risk of Unplanned Readmission Score: 29.99         Current admission status: Inpatient       Preferred Pharmacy:   [id number] - [facility] [address] [place] - [id number]  [id number]  [facility] [place] [id number]  Phone: [phone] Fax: [phone]    Primary Care Provider: Ugo Marina DO    Primary Insurance: [facility] REP  Secondary Insurance:     ASSESSMENT:  Active Health Care Proxies    There are no active Health Care Proxies on file.                 Readmission Root Cause  30 Day Readmission: No    Patient Information  Admitted from:: Home  Mental Status: Alert  During Assessment patient was accompanied by: Not accompanied during assessment  Assessment information provided by:: Patient  Primary Caregiver: Self  Support Systems: Spouse/significant other, Children  County of Residence: [place]  What city do you live in?: [place]  Home entry access options. Select all that apply.: Stairs  Number of steps to enter home.: 4  Do the steps have railings?: Yes  Type of Current Residence: 2 story home  Upon entering residence, is there a bedroom on the main floor (no further steps)?: No  A bedroom is located on the following floor levels of residence (select all that apply):: 2nd Floor  Upon entering residence, is there a bathroom on the main floor (no further steps)?: Yes  Number of steps to 2nd floor from main floor: One Flight  Living Arrangements: Lives w/ Spouse/significant other, Lives w/ Daughter, Lives w/ Son  Is patient a ?: Yes  Is patient active with [facility]: Yes  Is patient service connected?: Yes    Activities of Daily Living Prior to Admission  Functional Status: Independent  Completes ADLs independently?: Yes  Ambulates independently?: Yes  Does  patient use assisted devices?: No  Does patient currently own DME?: Yes  What DME does the patient currently own?: Straight Cane, Walker  Does patient have a history of Outpatient Therapy (PT/OT)?: No  Does the patient have a history of Short-Term Rehab?: No  Does patient have a history of HHC?: No  Does patient currently have HHC?: No         Patient Information Continued  Income Source: Pension/alf  Does patient have prescription coverage?: Yes  Does patient receive dialysis treatments?: No  Does patient have a history of substance abuse?: No  Does patient have a history of Mental Health Diagnosis?: Yes (depression)  Is patient receiving treatment for mental health?: No. Patient declined treatment information.  Has patient received inpatient treatment related to mental health in the last 2 years?: No    PHQ 2/9 Screening   Reviewed PHQ 2/9 Depression Screening Score?: No    Means of Transportation  Means of Transport to Appts:: Drives Self      Social Determinants of Health (SDOH)      Flowsheet Row Most Recent Value   Housing Stability    In the last 12 months, was there a time when you were not able to pay the mortgage or rent on time? N   In the last 12 months, how many places have you lived? 1   In the last 12 months, was there a time when you did not have a steady place to sleep or slept in a shelter (including now)? N   Transportation Needs    In the past 12 months, has lack of transportation kept you from medical appointments or from getting medications? no   In the past 12 months, has lack of transportation kept you from meetings, work, or from getting things needed for daily living? No   Food Insecurity    Within the past 12 months, you worried that your food would run out before you got the money to buy more. Never true   Within the past 12 months, the food you bought just didn't last and you didn't have money to get more. Never true   Utilities    In the past 12 months has the electric, gas, oil,  or water company threatened to shut off services in your home? No            DISCHARGE DETAILS:    Discharge planning discussed with:: Patient at the bedside  Freedom of Choice: Yes  Comments - Freedom of Choice: FOC maintained in discussion regarding discharge planning. Patient is alert oriented and competent to make decisions. Introduced self and role, explained role of CM in arranging services such as DME, STR, HHC, and providing community resource information. Discussed current living situation and needs at discharge. Patient is IPTA. CM offered HHC, STR, DME, PCP, OP CM, community resources. Patient declined CM resources, we discussed GoodRx and similar that may be less expensive to afford his ICS inhalers. Does not use DME. Pt is aware and encouraged to seek CM for any questions or concerns. CM continues to follow.  CM contacted family/caregiver?: No- see comments  Were Treatment Team discharge recommendations reviewed with patient/caregiver?: Yes  Did patient/caregiver verbalize understanding of patient care needs?: Yes  Were patient/caregiver advised of the risks associated with not following Treatment Team discharge recommendations?: Yes    Contacts  Patient Contacts: [place]  Relationship to Patient:: Family  Phone Number: [phone] (Home Phone)  Reason/Outcome: Continuity of Care, Discharge Planning    Requested Home Health Care         Is the patient interested in HHC at discharge?: No    DME Referral Provided  Referral made for DME?: No    Other Referral/Resources/Interventions Provided:  Interventions: Declines resources  Referral Comments: CM will continue to follow for needs.    Would you like to participate in our [facility] service program?  : No - Declined       Discharge Destination Plan:: Home  Transport at Discharge : Family

## 2024-02-29 NOTE — PROGRESS NOTES
"[facility]  Progress Note  Name: Genna Liu I  MRN: [id number]  Unit/Bed#: -02 I Date of Admission: 2/27/2024   Date of Service: 2/29/2024 I Hospital Day: 1    Assessment/Plan   * Hypertensive emergency  Assessment & Plan  Patient presents tonight with complaints of substernal chest pressure, nonradiating, anxiety, headache, visual change  H/o CKD and with known medical noncomplaince  SBP>200 while in the ED  Started on IV cardene infusion in the ED, which was discontinued  Resume home medications: Amlodipine 10 mg, Cardura 8 mg HS, labetalol 600 mg TID, isosorbide 40mg TID  Holding hydrochlorothiazide secondary to hypokalemia  PRN IV labetolol 10mg Q4H   BP remains uncontrolled. Added amiloride 5mg daily and hydralazine 25mg Q8H on 2/29/24  Renal ultrasound without arterial occlusive disease  Nephrology following      Chest pressure  Assessment & Plan  Presents with substernal chest pain and associated shortness of breath  Troponin level: 0hr 207, 2hr 244, 4hr 251  Nonischemic ECG  Continue ASA, statin, BB therapy  Echo (2/28/24): Left ventricular cavity size is normal. Wall thickness is severely increased. There is severe concentric hypertrophy. The left ventricular ejection fraction is 50%. Systolic function is low normal. Wall motion is normal. Diastolic function is mildly abnormal, consistent with grade I (abnormal) relaxation.   Monitor on telemetry  Plan for NM stress test (2/29/24)  Consult to Cardiology, recommendations are appreciated    Hypokalemia  Assessment & Plan  Potassium 3.0  Received 40mEq oral K, 20mE IV K  Telemetry monitoring  Repeat BMP    Hypercalcemia  Assessment & Plan  Calcium 11.3>9.0  PTH intact  Started on calcitriol 0.25mcg Q48H  Nephrology following  Daily BMP    Noncompliance with medication regimen  Assessment & Plan  Patient reports that he does not regularly take his medications sliding \"stomach issues\" as reason  Denies financial constraints "   Extensive counseling regarding importance of medication compliance in the setting of numerous underlying medical conditions    Chronic heart failure with preserved ejection fraction (HFpEF) ([facility])  Assessment & Plan  Wt Readings from Last 3 Encounters:   02/29/24 62.1 kg (136 lb 14.5 oz)   01/25/24 65.3 kg (144 lb)   12/27/23 66.8 kg (147 lb 4.3 oz)       Currently euvolemic on exam  Echo: EF 55%  HCTZ on hold due to hypokalemia  Daily weights, I&Os    Stage 4 chronic kidney disease ([facility])  Assessment & Plan  Lab Results   Component Value Date    EGFR 20 02/29/2024    EGFR 23 02/28/2024    EGFR 25 02/28/2024    CREATININE 3.19 (H) 02/29/2024    CREATININE 2.81 (H) 02/28/2024    CREATININE 2.61 (H) 02/28/2024   History of CKD 4  Upon presentation, at baseline  Avoid nephrotoxic agents    COPD (chronic obstructive pulmonary disease) ([facility])  Assessment & Plan  No acute exacerbation  Continue home inhalers             VTE Pharmacologic Prophylaxis: VTE Score: 4 Moderate Risk (Score 3-4) - Pharmacological DVT Prophylaxis Ordered: heparin.    Mobility:   Basic Mobility Inpatient Raw Score: 18  JH-HLM Goal: 6: Walk 10 steps or more  JH-HLM Achieved: 2: Bed activities/Dependent transfer  HLM Goal achieved. Continue to encourage appropriate mobility.    Patient Centered Rounds: I performed bedside rounds with nursing staff today.   Discussions with Specialists or Other Care Team Provider: Nursing, Nephrology    Education and Discussions with Family / Patient: Attempted to update  (wife) via phone. Left voicemail.     Total Time Spent on Date of Encounter in care of patient: 45 mins. This time was spent on one or more of the following: performing physical exam; counseling and coordination of care; obtaining or reviewing history; documenting in the medical record; reviewing/ordering tests, medications or procedures; communicating with other healthcare professionals and discussing with patient's  family/caregivers.    Current Length of Stay: 1 day(s)  Current Patient Status: Inpatient   Certification Statement: The patient will continue to require additional inpatient hospital stay due to hypertensive emergency, chest pain  Discharge Plan: Anticipate discharge in 24-48 hrs to home.    Code Status: Level 1 - Full Code    Subjective:   Patient reports feeling a little better today. Denies any chest pain or SOB.    Objective:     Vitals:   Temp (24hrs), Av.2 °F (36.8 °C), Min:97.8 °F (36.6 °C), Max:98.6 °F (37 °C)    Temp:  [97.8 °F (36.6 °C)-98.6 °F (37 °C)] 98.6 °F (37 °C)  HR:  [64-98] 64  Resp:  [16-21] 20  BP: (181-236)/(109-141) 184/115  SpO2:  [93 %-99 %] 99 %  Body mass index is 21.77 kg/m².     Input and Output Summary (last 24 hours):     Intake/Output Summary (Last 24 hours) at 2024 1215  Last data filed at 2024 0930  Gross per 24 hour   Intake 720 ml   Output 525 ml   Net 195 ml       Physical Exam:   Physical Exam  Vitals and nursing note reviewed.   Constitutional:       General: He is not in acute distress.     Appearance: He is well-developed.   HENT:      Head: Normocephalic and atraumatic.   Eyes:      Conjunctiva/sclera: Conjunctivae normal.   Cardiovascular:      Rate and Rhythm: Normal rate and regular rhythm.      Heart sounds: No murmur heard.  Pulmonary:      Effort: Pulmonary effort is normal. No respiratory distress.      Breath sounds: Normal breath sounds.   Abdominal:      Palpations: Abdomen is soft.      Tenderness: There is no abdominal tenderness.   Musculoskeletal:         General: No swelling.      Cervical back: Neck supple.   Skin:     General: Skin is warm and dry.      Capillary Refill: Capillary refill takes less than 2 seconds.   Neurological:      Mental Status: He is alert. Mental status is at baseline.   Psychiatric:         Mood and Affect: Mood normal.          Additional Data:     Labs:  Results from last 7 days   Lab Units 24  9557  02/27/24  1945   WBC Thousand/uL 9.51 6.22   HEMOGLOBIN g/dL 11.4* 14.5   HEMATOCRIT % 34.5* 42.5   PLATELETS Thousands/uL 267 393*  393*   NEUTROS PCT %  --  82*   LYMPHS PCT %  --  12*   MONOS PCT %  --  6   EOS PCT %  --  0     Results from last 7 days   Lab Units 02/29/24  0509   SODIUM mmol/L 138   POTASSIUM mmol/L 3.0*   CHLORIDE mmol/L 102   CO2 mmol/L 26   BUN mg/dL 57*   CREATININE mg/dL 3.19*   ANION GAP mmol/L 10   CALCIUM mg/dL 9.0   ALBUMIN g/dL 3.8   TOTAL BILIRUBIN mg/dL 1.02*   ALK PHOS U/L 78   ALT U/L 54*   AST U/L 47*   GLUCOSE RANDOM mg/dL 106     Results from last 7 days   Lab Units 02/27/24  2326   INR  1.11                   Lines/Drains:  Invasive Devices       Peripheral Intravenous Line  Duration             Peripheral IV 02/29/24 Left;Ventral (anterior) Forearm <1 day                      Telemetry:  Telemetry Orders (From admission, onward)               24 Hour Telemetry Monitoring  Continuous x 24 Hours (Telem)        Question:  Reason for 24 Hour Telemetry  Answer:  Decompensated CHF- and any one of the following: continuous diuretic infusion or total diuretic dose >200 mg daily, associated electrolyte derangement (I.e. K < 3.0), ionotropic drip (continuous infusion), hx of ventricular arrhythmia, or new EF < 35%                     Indication for Continued Telemetry Use: Awaiting PCI/EP Study/CABG             Imaging: No pertinent imaging reviewed.    Recent Cultures (last 7 days):         Last 24 Hours Medication List:   Current Facility-Administered Medications   Medication Dose Route Frequency Provider Last Rate    acetaminophen  650 mg Oral Q4H PRN Shai Rueter, DO      AMILoride  5 mg Oral Daily Wayne Bruno MD      amLODIPine  10 mg Oral Daily Shai Rueter, DO      ARIPiprazole  5 mg Oral Daily Shai Rueter, DO      aspirin  81 mg Oral Daily Shai Rueter, DO      atorvastatin  40 mg Oral HS Shai Rueter, DO      doxazosin  8 mg Oral BID Shai Rueter, DO      heparin (porcine)   5,000 Units Subcutaneous Q8H JOANN Shai Rueter, DO      hydrALAZINE  25 mg Oral Q8H JOANN Wayne Bruno MD      HYDROmorphone  0.5 mg Intravenous Q1H PRN Shai Rueter, DO      isosorbide dinitrate  40 mg Oral TID after meals Shai Rueter, DO      labetalol  10 mg Intravenous Q4H PRN Shai Rueter, DO      labetalol  600 mg Oral Q8H JOANN Shai Rueter, DO      LORazepam  1 mg Oral Q6H PRN Shai Rueter, DO      OLANZapine  5 mg Oral HS Shai Rueter, DO      pantoprazole  40 mg Oral Daily Shai Rueter, DO      sertraline  200 mg Oral Daily Shai Rueter, DO      sucralfate  1 g Oral BID Shai Rueter, DO          Today, Patient Was Seen By: Rosemary Majano PA-C    **Please Note: This note may have been constructed using a voice recognition system.**

## 2024-02-29 NOTE — ASSESSMENT & PLAN NOTE
Lab Results   Component Value Date    EGFR 20 02/29/2024    EGFR 23 02/28/2024    EGFR 25 02/28/2024    CREATININE 3.19 (H) 02/29/2024    CREATININE 2.81 (H) 02/28/2024    CREATININE 2.61 (H) 02/28/2024   History of CKD 4  Upon presentation, at baseline  Avoid nephrotoxic agents

## 2024-02-29 NOTE — ASSESSMENT & PLAN NOTE
Patient presents tonight with complaints of substernal chest pressure, nonradiating, anxiety, headache, visual change  H/o CKD and with known medical noncomplaince  SBP>200 while in the ED  Started on IV cardene infusion in the ED, which was discontinued  Resume home medications: Amlodipine 10 mg, Cardura 8 mg HS, labetalol 600 mg TID, isosorbide 40mg TID  Holding hydrochlorothiazide secondary to hypokalemia  PRN IV labetolol 10mg Q4H   BP remains uncontrolled. Added amiloride 5mg daily and hydralazine 25mg Q8H on 2/29/24  Renal ultrasound without arterial occlusive disease  Nephrology following

## 2024-02-29 NOTE — PLAN OF CARE
Problem: SAFETY ADULT  Goal: Patient will remain free of falls  Description: INTERVENTIONS:  - Educate patient/family on patient safety including physical limitations  - Instruct patient to call for assistance with activity   - Consult OT/PT to assist with strengthening/mobility   - Keep Call bell within reach  - Keep bed low and locked with side rails adjusted as appropriate  - Keep care items and personal belongings within reach  - Initiate and maintain comfort rounds  - Make Fall Risk Sign visible to staff  - Offer Toileting every 2 Hours, in advance of need  - Initiate/Maintain bed alarm  - Obtain necessary fall risk management equipment: bed alarm  - Apply yellow socks and bracelet for high fall risk patients  - Consider moving patient to room near nurses station  Outcome: Progressing  Goal: Maintain or return to baseline ADL function  Description: INTERVENTIONS:  -  Assess patient's ability to carry out ADLs; assess patient's baseline for ADL function and identify physical deficits which impact ability to perform ADLs (bathing, care of mouth/teeth, toileting, grooming, dressing, etc.)  - Assess/evaluate cause of self-care deficits   - Assess range of motion  - Assess patient's mobility; develop plan if impaired  - Assess patient's need for assistive devices and provide as appropriate  - Encourage maximum independence but intervene and supervise when necessary  - Involve family in performance of ADLs  - Assess for home care needs following discharge   - Consider OT consult to assist with ADL evaluation and planning for discharge  - Provide patient education as appropriate  Outcome: Progressing  Goal: Maintains/Returns to pre admission functional level  Description: INTERVENTIONS:  - Perform AM-PAC 6 Click Basic Mobility/ Daily Activity assessment daily.  - Set and communicate daily mobility goal to care team and patient/family/caregiver.   - Collaborate with rehabilitation services on mobility goals if  consulted  - Perform Range of Motion 4 times a day.  - Reposition patient every 2 hours.  - Dangle patient 3 times a day  - Stand patient 3 times a day  - Ambulate patient 3 times a day  - Out of bed to chair 3 times a day   - Out of bed for meals 3 times a day  - Out of bed for toileting  - Record patient progress and toleration of activity level   Outcome: Progressing     Problem: DISCHARGE PLANNING  Goal: Discharge to home or other facility with appropriate resources  Description: INTERVENTIONS:  - Identify barriers to discharge w/patient and caregiver  - Arrange for needed discharge resources and transportation as appropriate  - Identify discharge learning needs (meds, wound care, etc.)  - Arrange for interpretive services to assist at discharge as needed  - Refer to Case Management Department for coordinating discharge planning if the patient needs post-hospital services based on physician/advanced practitioner order or complex needs related to functional status, cognitive ability, or social support system  Outcome: Progressing     Problem: CARDIOVASCULAR - ADULT  Goal: Maintains optimal cardiac output and hemodynamic stability  Description: INTERVENTIONS:  - Monitor I/O, vital signs and rhythm  - Monitor for S/S and trends of decreased cardiac output  - Administer and titrate ordered vasoactive medications to optimize hemodynamic stability  - Assess quality of pulses, skin color and temperature  - Assess for signs of decreased coronary artery perfusion  - Instruct patient to report change in severity of symptoms  Outcome: Progressing  Goal: Absence of cardiac dysrhythmias or at baseline rhythm  Description: INTERVENTIONS:  - Continuous cardiac monitoring, vital signs, obtain 12 lead EKG if ordered  - Administer antiarrhythmic and heart rate control medications as ordered  - Monitor electrolytes and administer replacement therapy as ordered  Outcome: Progressing

## 2024-02-29 NOTE — PROGRESS NOTES
"Cardiology Progress Note   Genna Liu 58 y.o. male MRN: [id number]    Unit/Bed#: -01 Encounter: [id number]      Assessment:  Chest pain  Hypertensive emergency  Elevated troponin  CKD stage IV  LVH  History of pericardial effusion status post pericardial window  Hyperaldosteronism status post left adrenalectomy  Medication noncompliance  History of DVT  History of Shamika-en-Y gastric bypass     Plan:  He had significant symptoms after infusion of regadenoson.  Underlying CAD cannot be excluded on pharmacologic MPI.  Will evaluate with a left heart catheterization when cleared by nephrology.  Nephrology currently managing antihypertensives.  Consider Cardene drip and transfer to ICU if blood pressure remains elevated.    Subjective:   Patient seen and examined.  No significant events overnight.  He had significant chest discomfort and shortness of breath following infusion of regadenoson.  His blood pressure remains elevated despite several antihypertensive medications.  He reports oliguria.    Objective:     Vitals: Blood pressure (!) 204/121, pulse 70, temperature 99 °F (37.2 °C), resp. rate 20, height 5' 6.5\" (1.689 m), weight 62.1 kg (136 lb 14.5 oz), SpO2 97%., Body mass index is 21.77 kg/m².,   Orthostatic Blood Pressures      Flowsheet Row Most Recent Value   Blood Pressure 204/121 filed at 02/29/2024 1643   Patient Position - Orthostatic VS Lying filed at 02/29/2024 1100              Intake/Output Summary (Last 24 hours) at 2/29/2024 1652  Last data filed at 2/29/2024 0930  Gross per 24 hour   Intake 1020 ml   Output 525 ml   Net 495 ml         Physical Exam:    GEN: Genna Liu ill appearing, pleasant and cooperative   HEENT: Sclera anicteric, conjunctivae pink, mucous membranes moist. Oropharynx clear.   NECK: supple, no significant JVD, Trachea midline, no thyromegaly.   HEART: regular rhythm, normal S1 and S2, no murmurs, clicks, gallops or rubs   LUNGS: clear to auscultation bilaterally; no " wheezes, rales, or rhonchi. No increased work of breathing or signs of respiratory distress.   ABDOMEN: Soft, nontender, nondistended  EXTREMITIES: Skin warm and well perfused, no clubbing, cyanosis, or edema.  NEURO: No focal findings. Normal speech. Mood and affect normal.   SKIN: Normal without suspicious lesions on exposed skin.      Medications:      Current Facility-Administered Medications:     acetaminophen (TYLENOL) tablet 650 mg, 650 mg, Oral, Q4H PRN, Shai Rueter, DO    amLODIPine (NORVASC) tablet 10 mg, 10 mg, Oral, Daily, Shai Rueter, DO, 10 mg at 02/29/24 0901    ARIPiprazole (ABILIFY) tablet 5 mg, 5 mg, Oral, Daily, Shai Rueter, DO, 5 mg at 02/29/24 0852    aspirin chewable tablet 81 mg, 81 mg, Oral, Daily, Shai Rueter, DO, 81 mg at 02/29/24 0854    atorvastatin (LIPITOR) tablet 40 mg, 40 mg, Oral, HS, Shai Rueter, DO, 40 mg at 02/28/24 2150    doxazosin (CARDURA) tablet 8 mg, 8 mg, Oral, BID, Shai Rueter, DO, 8 mg at 02/29/24 0852    heparin (porcine) subcutaneous injection 5,000 Units, 5,000 Units, Subcutaneous, Q8H JOANN, 5,000 Units at 02/29/24 1647 **AND** [COMPLETED] Platelet count, , , Once, Shai Rueter, DO    hydrALAZINE (APRESOLINE) tablet 25 mg, 25 mg, Oral, Q8H [facility], 25 mg at 02/29/24 1243    HYDROmorphone (DILAUDID) injection 0.5 mg, 0.5 mg, Intravenous, Q1H PRN, Shai Rueter, DO    isosorbide dinitrate (ISORDIL) tablet 40 mg, 40 mg, Oral, TID after meals, Shai Rueter, DO, 40 mg at 02/29/24 1647    labetalol (NORMODYNE) injection 10 mg, 10 mg, Intravenous, Q4H PRN, Shai Rueter, DO, 10 mg at 02/29/24 0858    labetalol (NORMODYNE) tablet 600 mg, 600 mg, Oral, Q8H JOANN, Shai Chaidez DO, 600 mg at 02/29/24 1649    LORazepam (ATIVAN) tablet 1 mg, 1 mg, Oral, Q6H PRN, Shai Chaidez DO, 1 mg at 02/28/24 0609    OLANZapine (ZyPREXA) tablet 5 mg, 5 mg, Oral, HS, Shai Chaidez DO, 5 mg at 02/28/24 2150    pantoprazole (PROTONIX) EC tablet 40 mg, 40 mg, Oral, Daily, Shai  Harjeeteter, DO, 40 mg at 02/29/24 0854    sertraline (ZOLOFT) tablet 200 mg, 200 mg, Oral, Daily, Shai Chaidez DO, 200 mg at 02/29/24 0854    sucralfate (CARAFATE) tablet 1 g, 1 g, Oral, BID, Shai Chaidez, DO, 1 g at 02/29/24 1647     Labs & Results:        Results from last 7 days   Lab Units 02/29/24  0509 02/27/24  1945   WBC Thousand/uL 9.51 6.22   HEMOGLOBIN g/dL 11.4* 14.5   HEMATOCRIT % 34.5* 42.5   PLATELETS Thousands/uL 267 393*  393*         Results from last 7 days   Lab Units 02/29/24  0509 02/28/24  2224 02/28/24  1104 02/27/24  1945   POTASSIUM mmol/L 3.0* 3.2* 2.8* 2.5*   CHLORIDE mmol/L 102 101 99 96   CO2 mmol/L 26 23 27 25   BUN mg/dL 57* 55* 48* 53*   CREATININE mg/dL 3.19* 2.81* 2.61* 2.44*   CALCIUM mg/dL 9.0 9.7 9.4 11.3*   ALK PHOS U/L 78  --   --  107*   ALT U/L 54*  --   --  89*   AST U/L 47*  --   --  45*     Results from last 7 days   Lab Units 02/27/24  2326   INR  1.11   PTT seconds 29

## 2024-03-01 PROBLEM — R79.89 ELEVATED TROPONIN: Status: ACTIVE | Noted: 2024-03-01

## 2024-03-01 PROBLEM — R74.01 TRANSAMINITIS: Status: ACTIVE | Noted: 2024-03-01

## 2024-03-01 PROBLEM — R07.89 CHEST PRESSURE: Status: RESOLVED | Noted: 2024-02-28 | Resolved: 2024-03-01

## 2024-03-01 PROBLEM — E83.52 HYPERCALCEMIA: Status: RESOLVED | Noted: 2024-02-28 | Resolved: 2024-03-01

## 2024-03-01 LAB
ALBUMIN SERPL BCP-MCNC: 3.7 G/DL (ref 3.5–5)
ALP SERPL-CCNC: 73 U/L (ref 34–104)
ALT SERPL W P-5'-P-CCNC: 46 U/L (ref 7–52)
ANION GAP SERPL CALCULATED.3IONS-SCNC: 10 MMOL/L
AST SERPL W P-5'-P-CCNC: 30 U/L (ref 13–39)
BILIRUB SERPL-MCNC: 0.94 MG/DL (ref 0.2–1)
BUN SERPL-MCNC: 69 MG/DL (ref 5–25)
CALCIUM SERPL-MCNC: 9.2 MG/DL (ref 8.4–10.2)
CHLORIDE SERPL-SCNC: 103 MMOL/L (ref 96–108)
CO2 SERPL-SCNC: 24 MMOL/L (ref 21–32)
CORTIS AM PEAK SERPL-MCNC: 22 UG/DL (ref 6.7–22.6)
CREAT SERPL-MCNC: 3.6 MG/DL (ref 0.6–1.3)
ERYTHROCYTE [DISTWIDTH] IN BLOOD BY AUTOMATED COUNT: 15.9 % (ref 11.6–15.1)
GFR SERPL CREATININE-BSD FRML MDRD: 17 ML/MIN/1.73SQ M
GLUCOSE SERPL-MCNC: 93 MG/DL (ref 65–140)
HCT VFR BLD AUTO: 32.9 % (ref 36.5–49.3)
HGB BLD-MCNC: 10.6 G/DL (ref 12–17)
MAGNESIUM SERPL-MCNC: 1.9 MG/DL (ref 1.9–2.7)
MCH RBC QN AUTO: 27.7 PG (ref 26.8–34.3)
MCHC RBC AUTO-ENTMCNC: 32.2 G/DL (ref 31.4–37.4)
MCV RBC AUTO: 86 FL (ref 82–98)
PHOSPHATE SERPL-MCNC: 5.1 MG/DL (ref 2.3–4.1)
PLATELET # BLD AUTO: 255 THOUSANDS/UL (ref 149–390)
PMV BLD AUTO: 10 FL (ref 8.9–12.7)
POTASSIUM SERPL-SCNC: 3.2 MMOL/L (ref 3.5–5.3)
PROCALCITONIN SERPL-MCNC: 0.15 NG/ML
PROT SERPL-MCNC: 6.4 G/DL (ref 6.4–8.4)
RBC # BLD AUTO: 3.82 MILLION/UL (ref 3.88–5.62)
SODIUM SERPL-SCNC: 137 MMOL/L (ref 135–147)
WBC # BLD AUTO: 8.41 THOUSAND/UL (ref 4.31–10.16)

## 2024-03-01 PROCEDURE — 99233 SBSQ HOSP IP/OBS HIGH 50: CPT | Performed by: STUDENT IN AN ORGANIZED HEALTH CARE EDUCATION/TRAINING PROGRAM

## 2024-03-01 PROCEDURE — 99232 SBSQ HOSP IP/OBS MODERATE 35: CPT | Performed by: INTERNAL MEDICINE

## 2024-03-01 PROCEDURE — 82533 TOTAL CORTISOL: CPT

## 2024-03-01 PROCEDURE — 87077 CULTURE AEROBIC IDENTIFY: CPT

## 2024-03-01 PROCEDURE — 87147 CULTURE TYPE IMMUNOLOGIC: CPT

## 2024-03-01 PROCEDURE — 84100 ASSAY OF PHOSPHORUS: CPT

## 2024-03-01 PROCEDURE — 84145 PROCALCITONIN (PCT): CPT

## 2024-03-01 PROCEDURE — 80053 COMPREHEN METABOLIC PANEL: CPT

## 2024-03-01 PROCEDURE — 85027 COMPLETE CBC AUTOMATED: CPT

## 2024-03-01 PROCEDURE — 87086 URINE CULTURE/COLONY COUNT: CPT

## 2024-03-01 PROCEDURE — 87186 SC STD MICRODIL/AGAR DIL: CPT

## 2024-03-01 PROCEDURE — 83735 ASSAY OF MAGNESIUM: CPT

## 2024-03-01 RX ORDER — POTASSIUM CHLORIDE 20 MEQ/1
40 TABLET, EXTENDED RELEASE ORAL ONCE
Status: COMPLETED | OUTPATIENT
Start: 2024-03-01 | End: 2024-03-01

## 2024-03-01 RX ORDER — HYDRALAZINE HYDROCHLORIDE 20 MG/ML
10 INJECTION INTRAMUSCULAR; INTRAVENOUS EVERY 6 HOURS PRN
Status: DISCONTINUED | OUTPATIENT
Start: 2024-03-01 | End: 2024-03-06 | Stop reason: HOSPADM

## 2024-03-01 RX ORDER — POTASSIUM CHLORIDE 14.9 MG/ML
20 INJECTION INTRAVENOUS ONCE
Status: COMPLETED | OUTPATIENT
Start: 2024-03-01 | End: 2024-03-01

## 2024-03-01 RX ORDER — HYDRALAZINE HYDROCHLORIDE 25 MG/1
75 TABLET, FILM COATED ORAL EVERY 8 HOURS SCHEDULED
Status: DISCONTINUED | OUTPATIENT
Start: 2024-03-01 | End: 2024-03-02

## 2024-03-01 RX ORDER — SODIUM CHLORIDE, SODIUM GLUCONATE, SODIUM ACETATE, POTASSIUM CHLORIDE, MAGNESIUM CHLORIDE, SODIUM PHOSPHATE, DIBASIC, AND POTASSIUM PHOSPHATE .53; .5; .37; .037; .03; .012; .00082 G/100ML; G/100ML; G/100ML; G/100ML; G/100ML; G/100ML; G/100ML
500 INJECTION, SOLUTION INTRAVENOUS ONCE
Status: COMPLETED | OUTPATIENT
Start: 2024-03-01 | End: 2024-03-01

## 2024-03-01 RX ORDER — LABETALOL HYDROCHLORIDE 5 MG/ML
10 INJECTION, SOLUTION INTRAVENOUS EVERY 4 HOURS PRN
Status: DISCONTINUED | OUTPATIENT
Start: 2024-03-01 | End: 2024-03-06 | Stop reason: HOSPADM

## 2024-03-01 RX ORDER — MAGNESIUM SULFATE HEPTAHYDRATE 40 MG/ML
2 INJECTION, SOLUTION INTRAVENOUS ONCE
Status: COMPLETED | OUTPATIENT
Start: 2024-03-01 | End: 2024-03-01

## 2024-03-01 RX ORDER — SODIUM CHLORIDE, SODIUM GLUCONATE, SODIUM ACETATE, POTASSIUM CHLORIDE, MAGNESIUM CHLORIDE, SODIUM PHOSPHATE, DIBASIC, AND POTASSIUM PHOSPHATE .53; .5; .37; .037; .03; .012; .00082 G/100ML; G/100ML; G/100ML; G/100ML; G/100ML; G/100ML; G/100ML
75 INJECTION, SOLUTION INTRAVENOUS CONTINUOUS
Status: DISCONTINUED | OUTPATIENT
Start: 2024-03-01 | End: 2024-03-01

## 2024-03-01 RX ADMIN — POTASSIUM CHLORIDE 40 MEQ: 1500 TABLET, EXTENDED RELEASE ORAL at 06:39

## 2024-03-01 RX ADMIN — LABETALOL HYDROCHLORIDE 600 MG: 200 TABLET, FILM COATED ORAL at 14:43

## 2024-03-01 RX ADMIN — CHLORHEXIDINE GLUCONATE 0.12% ORAL RINSE 15 ML: 1.2 LIQUID ORAL at 21:22

## 2024-03-01 RX ADMIN — ISOSORBIDE DINITRATE 40 MG: 10 TABLET ORAL at 14:43

## 2024-03-01 RX ADMIN — ACETAMINOPHEN 650 MG: 325 TABLET, FILM COATED ORAL at 21:22

## 2024-03-01 RX ADMIN — ISOSORBIDE DINITRATE 40 MG: 10 TABLET ORAL at 21:19

## 2024-03-01 RX ADMIN — SUCRALFATE 1 G: 1 TABLET ORAL at 08:03

## 2024-03-01 RX ADMIN — POTASSIUM CHLORIDE 20 MEQ: 14.9 INJECTION, SOLUTION INTRAVENOUS at 00:19

## 2024-03-01 RX ADMIN — HEPARIN SODIUM 5000 UNITS: 5000 INJECTION INTRAVENOUS; SUBCUTANEOUS at 14:43

## 2024-03-01 RX ADMIN — ISOSORBIDE DINITRATE 40 MG: 10 TABLET ORAL at 08:03

## 2024-03-01 RX ADMIN — LABETALOL HYDROCHLORIDE 600 MG: 200 TABLET, FILM COATED ORAL at 05:17

## 2024-03-01 RX ADMIN — ASPIRIN 81 MG: 81 TABLET, CHEWABLE ORAL at 08:03

## 2024-03-01 RX ADMIN — HYDRALAZINE HYDROCHLORIDE 75 MG: 25 TABLET, FILM COATED ORAL at 14:43

## 2024-03-01 RX ADMIN — DOXAZOSIN 8 MG: 4 TABLET ORAL at 21:20

## 2024-03-01 RX ADMIN — MAGNESIUM SULFATE HEPTAHYDRATE 2 G: 40 INJECTION, SOLUTION INTRAVENOUS at 06:39

## 2024-03-01 RX ADMIN — SERTRALINE HYDROCHLORIDE 200 MG: 50 TABLET ORAL at 08:03

## 2024-03-01 RX ADMIN — AMLODIPINE BESYLATE 10 MG: 10 TABLET ORAL at 08:03

## 2024-03-01 RX ADMIN — SUCRALFATE 1 G: 1 TABLET ORAL at 17:23

## 2024-03-01 RX ADMIN — ARIPIPRAZOLE 5 MG: 5 TABLET ORAL at 08:03

## 2024-03-01 RX ADMIN — HYDRALAZINE HYDROCHLORIDE 50 MG: 25 TABLET, FILM COATED ORAL at 05:17

## 2024-03-01 RX ADMIN — SODIUM CHLORIDE, SODIUM GLUCONATE, SODIUM ACETATE, POTASSIUM CHLORIDE, MAGNESIUM CHLORIDE, SODIUM PHOSPHATE, DIBASIC, AND POTASSIUM PHOSPHATE 500 ML: .53; .5; .37; .037; .03; .012; .00082 INJECTION, SOLUTION INTRAVENOUS at 08:59

## 2024-03-01 RX ADMIN — HEPARIN SODIUM 5000 UNITS: 5000 INJECTION INTRAVENOUS; SUBCUTANEOUS at 21:22

## 2024-03-01 RX ADMIN — LABETALOL HYDROCHLORIDE 600 MG: 200 TABLET, FILM COATED ORAL at 21:23

## 2024-03-01 RX ADMIN — HEPARIN SODIUM 5000 UNITS: 5000 INJECTION INTRAVENOUS; SUBCUTANEOUS at 05:17

## 2024-03-01 RX ADMIN — CEFTRIAXONE 1000 MG: 1 INJECTION, SOLUTION INTRAVENOUS at 17:23

## 2024-03-01 RX ADMIN — ATORVASTATIN CALCIUM 40 MG: 40 TABLET, FILM COATED ORAL at 21:20

## 2024-03-01 RX ADMIN — OLANZAPINE 5 MG: 2.5 TABLET, FILM COATED ORAL at 21:20

## 2024-03-01 RX ADMIN — PANTOPRAZOLE SODIUM 40 MG: 40 TABLET, DELAYED RELEASE ORAL at 08:03

## 2024-03-01 RX ADMIN — DOXAZOSIN 8 MG: 4 TABLET ORAL at 08:03

## 2024-03-01 RX ADMIN — CHLORHEXIDINE GLUCONATE 0.12% ORAL RINSE 15 ML: 1.2 LIQUID ORAL at 08:03

## 2024-03-01 NOTE — ASSESSMENT & PLAN NOTE
Elevated LFTs likely secondary to HTN emergency   See HTN emergency for medical management   Trend hepatic panel

## 2024-03-01 NOTE — ASSESSMENT & PLAN NOTE
UA positive for leukocytes  Started on Rocephin D2  No reflex to culture - will add on culture  Blood cultures pending  Monitor WBC and fever curve   Will check procal - if negative consider DC abx

## 2024-03-01 NOTE — ASSESSMENT & PLAN NOTE
Lab Results   Component Value Date    EGFR 20 02/29/2024    EGFR 20 02/29/2024    EGFR 23 02/28/2024    CREATININE 3.19 (H) 02/29/2024    CREATININE 3.19 (H) 02/29/2024    CREATININE 2.81 (H) 02/28/2024     Secondary to uncontrolled HTN. Notable decline of kidney function since 2018  Recent baseline Cr between 2.8-3.1  Notes decline in urine output to x2 daily estimating 1-2 cups of urine each time  Not in exacerbation   Continue to monitor I&Os/daily weights

## 2024-03-01 NOTE — ASSESSMENT & PLAN NOTE
Likely secondary to HTN emergency  Cardiology following  S/P stress test  Cardiac cath once cleared by nephrology per cardiology

## 2024-03-01 NOTE — CONSULTS
[facility]  ICU Admission Consult  Name: Genna Liu 58 y.o. male I MRN: [id number]  Unit/Bed#: ICU 03 I Date of Admission: 2/27/2024   Date of Service: 3/1/2024 I Hospital Day: 2    Consults    Assessment/Plan   * Hypertensive emergency  Assessment & Plan  Initially came to ED complaining of chest pain, headache, and blurred vision. Patient originally placed on Cardene drip and Dced after giving patient back home medication and was admitted under SLIM. Patient noted to have continuous HTN ranging in the 190s-230s. CC was consulted to initate cardene drip. Given IV hydralazine with adequate control of BP in ICU.  Emergency criteria given end organ damage -Troponin spill and transaminitis   Home regimen consisting of:  Amlodipine 10mg daily  Doxazosin 8mg BID  Labetalol 600mg TID  HCTZ 12.5mg daily  Isosorbide 40mg TID -patient states he does not take this as he was afraid of the side effects  Regimen as inpatient consisting of amlodipine, doxazosin, labetalol at home dose  Added was Isosorbide at home dose, and hydralazine 50mg Q8hr  HCTZ- held given hypokalemia  Can restarted HCTZ when medically appropriate  Continue medication regimen as noted above   Renal US: patent renal arteries  If continues to have HTN, can start Cardene drip  Goal SBP <160  Given refractory HTN will follow up with work up of pheochromocytoma     Chronic heart failure with preserved ejection fraction (HFpEF) ([facility])  Assessment & Plan  Wt Readings from Last 3 Encounters:   02/29/24 62.1 kg (136 lb 14.5 oz)   01/25/24 65.3 kg (144 lb)   12/27/23 66.8 kg (147 lb 4.3 oz)     7/2023 ECHO: EF 55%  2/28 ECHO: EF 50% with D1DD. Mild MR/TR  Cardiology consulted  2/29 Stress test: There is no evidence of transient ischemic dilation   Cardiac cath when cleared by nephrology     Stage 4 chronic kidney disease (HCC)  Assessment & Plan  Lab Results   Component Value Date    EGFR 20 02/29/2024    EGFR 20 02/29/2024    EGFR 23  02/28/2024    CREATININE 3.19 (H) 02/29/2024    CREATININE 3.19 (H) 02/29/2024    CREATININE 2.81 (H) 02/28/2024     Secondary to uncontrolled HTN. Notable decline of kidney function since 2018.   Recent baseline Cr between 2.8-3.1  Notes decline in urine output to x2 daily estimating 1-2 cups of urine each time  Not in exacerbation   Continue to monitor I&Os/daily weights    Elevated troponin  Assessment & Plan  Likely secondary to HTN emergency  Cardiology following  S/P stress test  Cardiac cath once cleared by nephrology per cardiology     Transaminitis  Assessment & Plan  Elevated LFTs likely secondary to HTN emergency   See HTN emergency for medical management   Trend hepatic panel     UTI (urinary tract infection)  Assessment & Plan  UA positive for leukocytes  Started on Rocephin D2  No reflex to culture - will add on culture  Blood cultures pending  Monitor WBC and fever curve   Will check procal - if negative consider DC abx    Noncompliance with medication regimen  Assessment & Plan  Per chart review states medication noncompliance given stomach issues when taking medication   Notable for Shamika En Y with adequate weight loss with continued need for hypertensive medications  Return of medication regimen on admission with continued HTN on floor requiring further intervention   See plan above    Depression with anxiety  Assessment & Plan  Continue Abilify as patient is noted to be allergic to Buspar     Hypokalemia  Assessment & Plan  Noted on admission  Cautiously replacing K given CKD  Monitor BMP  Replete as needed     COPD (chronic obstructive pulmonary disease) ([facility])  Assessment & Plan  Home regimen consists of rescue inhaler, does not use O2 at home  No acute exacerbation  PRN albuterol     Chest pressure-resolved as of 3/1/2024  Assessment & Plan  Secondary to HTN emergency   Resolved   See plan above           History of Present Illness     HPI: Genna Liu is a 58 y.o. with PMH of uncontrolled HTN,  CKD4, Shamika En Y, HFpEF, and anemia who initially presented to ED on 2/28 with chest pain, headache and blurred vision. Initially trialed on nitro paste and cardene drip which was Dced and was placed back on home medication as patient noted noncompliance with medication regimen given upset stomach and was admitted under SLIM. Patient continued to have HTN on floors and was transferred to SD1 to start Cardene drip. Patient was given hydralazine IV to bridge and BP improved with SBP 140s.     History obtained from chart review and the patient.  Review of Systems   Constitutional:  Negative for appetite change, chills and fever.   HENT: Negative.     Eyes:  Positive for visual disturbance.        Slight blurred vision but has grossly improved since admission    Respiratory:  Negative for chest tightness and shortness of breath.    Cardiovascular:  Negative for chest pain.   Gastrointestinal:  Negative for abdominal pain.   Endocrine: Negative.    Genitourinary:  Positive for decreased urine volume.   Musculoskeletal: Negative.    Allergic/Immunologic: Negative.    Neurological:  Positive for headaches. Negative for dizziness and light-headedness.   Hematological: Negative.    Psychiatric/Behavioral: Negative.       Disposition: Stepdown Level 1   Historical Information   Past Medical History:  No date: Asthma  No date: Chronic kidney disease  No date: COPD (chronic obstructive pulmonary disease) (HCC)  No date: CPAP (continuous positive airway pressure) dependence      Comment:  NO LONGER NEEDS D/T BARIATRIC SURGERY.  No date: Diabetes mellitus (HCC)  No date: Emphysema of lung (HCC)  No date: Gout  No date: History of transfusion      Comment:  pt stated they had a transfusion when they had                gallbladder surgery.  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  No date: Sleep apnea  No date: Systolic CHF (HCC) Past Surgical History:  No date: ADRENALECTOMY  No date: CHOLECYSTECTOMY  No date: COLONOSCOPY  No date: EGD  12/22/2020: HIATAL HERNIA REPAIR; N/A      Comment:  Procedure: REPAIR HERNIA HIATAL LAPAROSCOPIC;  Surgeon:                Shane Francis MD;  Location: [facility];  Service:                Bariatrics  10/17/2018: INCISION AND DRAINAGE OF WOUND; Left      Comment:  Procedure: INCISION AND DRAINAGE (I&D) GROIN;  Surgeon:                Rafy Pascual MD;  Location: [facility];  Service:                General  8/17/2018: IR IMAGE GUIDED ASPIRATION / DRAINAGE W TUBE  7/31/2018: IR IMAGE GUIDED ASPIRATION / DRAINAGE W TUBE  No date: JOINT REPLACEMENT; Bilateral      Comment:  knee  2009: KIDNEY SURGERY      Comment:  nodule removal  01/2018: LYMPH NODE DISSECTION; Left      Comment:  left inguinal LN removed - benign   No date: OTHER SURGICAL HISTORY      Comment:  kidney nodule removal  No date: PALATE / UVULA BIOPSY / EXCISION  7/8/2023: PERICARDIAL WINDOW; N/A      Comment:  Procedure: WINDOW PERICARDIAL;  Surgeon: Alonso Logan MD;  Location: [facility];  Service: Thoracic  12/22/2020: GA LAPS GSTR RSTCV PX W/BYP JASON-EN-Y LIMB <150 CM; N/A      Comment:  Procedure: BYPASS GASTRIC  JASON-EN-Y LAPAROSCOPIC WITH                INTRAOPERATIVE EGD;  Surgeon: Shane Francis MD;                 Location: [facility];  Service: Bariatrics  No date: SINUS SURGERY  No date: TONSILLECTOMY   Current Outpatient Medications   Medication Instructions    albuterol (PROVENTIL HFA,VENTOLIN HFA) 90 mcg/act inhaler     amLODIPine (NORVASC) 10 mg, Oral    ARIPiprazole (ABILIFY) 5 mg, Daily    atorvastatin (LIPITOR) 40 mg, Oral, Daily at bedtime    busPIRone (BUSPAR) 15 mg, Oral, 2 times daily, Dr. Levi Brar    colchicine (COLCRYS) 0.6 mg, Oral, Daily    doxazosin  "(CARDURA) 8 mg, Oral, 2 times daily    ferrous sulfate 325 mg, Oral, 2 times daily    hydroCHLOROthiazide 12.5 mg, Oral, Daily    isosorbide dinitrate (ISORDIL) 40 mg, Oral, 3 times daily after meals    labetalol (NORMODYNE) 600 mg, Oral, Every 8 hours scheduled    LORazepam (ATIVAN) 1 mg tablet TAKE 1 TABLET BY MOUTH ONCE DAILY AS NEEDED FOR ANXIETY (TAKE 1 TABLET PRIOR TO MRI OR CT SCAN)    Multiple Vitamins-Minerals (CENTRUM ADULT PO) Take by mouth    OLANZapine (ZYPREXA) 5 mg, Oral, Daily at bedtime    pantoprazole (PROTONIX) 40 mg, Oral, Daily    polyethylene glycol (MIRALAX) 17 g, Daily    potassium chloride (MICRO-K) 10 MEQ CR capsule 10 mEq, Oral, Daily, One in AM. Dr. Marina    sertraline (ZOLOFT) 100 mg tablet 2 tablets, Oral, Daily    sucralfate (CARAFATE) 1 g, Oral, 2 times daily    Allergies   Allergen Reactions    Clonidine Anaphylaxis    Lisinopril Anaphylaxis    Nifedipine Anaphylaxis     Tolerating nicardipine    Spironolactone Anaphylaxis, Other (See Comments) and Shortness Of Breath    Buspirone      Headaches,     Enoxaparin Other (See Comments)     Injection site \"bump\" per Dr. Francis    Hydralazine Other (See Comments)     Fluid around the heart  Lose for Appetite        Minoxidil      Retains fluid around heart      Social History     Tobacco Use    Smoking status: Never     Passive exposure: Past    Smokeless tobacco: Never   Vaping Use    Vaping status: Never Used   Substance Use Topics    Alcohol use: Yes     Comment: occasionally    Drug use: Yes     Types: Marijuana     Comment: Card    Family History   Problem Relation Age of Onset    Cancer Father     Kidney disease Mother     Heart murmur Sister     Asthma Sister     Hypertension Sister     Heart disease Brother     Bell's palsy Brother     Hypertension Brother     Deep vein thrombosis Neg Hx         Objective                            Vitals I/O      Most Recent Min/Max in 24hrs   Temp 98.7 °F (37.1 °C) Temp  Min: 98.6 °F (37 °C)  " Max: 99 °F (37.2 °C)   Pulse 63 Pulse  Min: 56  Max: 79   Resp 16 Resp  Min: 16  Max: 20   BP (!) 162/102 BP  Min: 125/78  Max: 220/120   O2 Sat 98 % SpO2  Min: 96 %  Max: 99 %      Intake/Output Summary (Last 24 hours) at 3/1/2024 0116  Last data filed at 2/29/2024 0930  Gross per 24 hour   Intake 900 ml   Output 225 ml   Net 675 ml       Diet NPO; Sips with meds    Invasive Monitoring           Physical Exam   Physical Exam  Eyes:      Extraocular Movements: Extraocular movements intact.      Pupils: Pupils are equal, round, and reactive to light.   Skin:     General: Skin is warm and dry.      Capillary Refill: Capillary refill takes less than 2 seconds.   HENT:      Head: Normocephalic.      Mouth/Throat:      Mouth: Mucous membranes are moist.   Cardiovascular:      Rate and Rhythm: Normal rate and regular rhythm.      Pulses: Normal pulses.      Heart sounds: Normal heart sounds.   Musculoskeletal:         General: Normal range of motion.   Abdominal: General: Bowel sounds are normal.      Palpations: Abdomen is soft.   Constitutional:       General: He is not in acute distress.     Appearance: He is well-developed and well-nourished. He is not ill-appearing.   Neurological:      General: No focal deficit present.      Mental Status: He is alert and oriented to person, place and time. He is calm.      Sensory: Sensation is intact.      Motor: gross motor function is at baseline for patient.            Diagnostic Studies      Tele: NSR 60s  Imaging: ECHO, Renal US, stress test  I have personally reviewed pertinent reports.       Medications:  Scheduled PRN   amLODIPine, 10 mg, Daily  ARIPiprazole, 5 mg, Daily  aspirin, 81 mg, Daily  atorvastatin, 40 mg, HS  cefTRIAXone, 1,000 mg, Q24H  chlorhexidine, 15 mL, Q12H JOANN  doxazosin, 8 mg, BID  heparin (porcine), 5,000 Units, Q8H JOANN  hydrALAZINE, 50 mg, Q8H JOANN  isosorbide dinitrate, 40 mg, TID after meals  labetalol, 600 mg, Q8H JOANN  OLANZapine, 5 mg,  HS  pantoprazole, 40 mg, Daily  potassium chloride, 20 mEq, Once  sertraline, 200 mg, Daily  sucralfate, 1 g, BID      acetaminophen, 650 mg, Q4H PRN  albuterol, 2 puff, Q4H PRN  hydrALAZINE, 10 mg, Q6H PRN  HYDROmorphone, 0.5 mg, Q1H PRN  labetalol, 10 mg, Q4H PRN  LORazepam, 1 mg, Q6H PRN       Continuous    niCARdipine, 1-15 mg/hr         Labs:    CBC    Recent Labs     02/29/24  0509   WBC 9.51   HGB 11.4*   HCT 34.5*        BMP    Recent Labs     02/29/24  0509 02/29/24  2101   SODIUM 138 137   K 3.0* 3.1*    103   CO2 26 25   AGAP 10 9   BUN 57* 65*   CREATININE 3.19* 3.19*   CALCIUM 9.0 9.2       Coags    No recent results     Additional Electrolytes  Recent Labs     02/28/24  1104   PHOS 4.8*          Blood Gas    No recent results  No recent results LFTs  Recent Labs     02/29/24  0509   ALT 54*   AST 47*   ALKPHOS 78   ALB 3.8   TBILI 1.02*       Infectious  No recent results  Glucose  Recent Labs     02/28/24  1104 02/28/24  2224 02/29/24  0509 02/29/24  2101   GLUC 158* 121 106 113               MARBIN Gipson

## 2024-03-01 NOTE — PROGRESS NOTES
NEPHROLOGY PROGRESS NOTE    Patient: Genna Liu               Sex: male          DOA: 2/27/2024  6:23 PM   YOB: 1965        Age: 58 y.o.         LOS:  LOS: 2 days   Encounter Date: 3/1/2024    REASON FOR THE CONSULTATION: Further management of TRUDI on top of chronic kidney disease    HPI     This is a 58 y.o. male admitted for Hypertensive emergency     SUBJECTIVE     - Updated patient's wife today via patient's phone.  Patient was seen earlier this morning and was n.p.o. for possible cardiac cath today.  Breathing is currently stable.  Patient denies nausea, vomiting, headache or dizziness today.  - Reviewed last 24 hrs events     CURRENT MEDICATIONS       Current Facility-Administered Medications:     acetaminophen (TYLENOL) tablet 650 mg, 650 mg, Oral, Q4H PRN, MARBIN Ray    albuterol (PROVENTIL HFA,VENTOLIN HFA) inhaler 2 puff, 2 puff, Inhalation, Q4H PRN, MARBIN Ray    amLODIPine (NORVASC) tablet 10 mg, 10 mg, Oral, Daily, MARBIN Ray, 10 mg at 03/01/24 0803    ARIPiprazole (ABILIFY) tablet 5 mg, 5 mg, Oral, Daily, MARBIN Ray, 5 mg at 03/01/24 0803    aspirin chewable tablet 81 mg, 81 mg, Oral, Daily, MARBIN Ray, 81 mg at 03/01/24 0803    atorvastatin (LIPITOR) tablet 40 mg, 40 mg, Oral, HS, MARBIN Ray, 40 mg at 02/29/24 2121    cefTRIAXone (ROCEPHIN) IVPB (premix in dextrose) 1,000 mg 50 mL, 1,000 mg, Intravenous, Q24H, MARBIN Ray, Last Rate: 100 mL/hr at 02/29/24 2121, 1,000 mg at 02/29/24 2121    chlorhexidine (PERIDEX) 0.12 % oral rinse 15 mL, 15 mL, Mouth/Throat, Q12H JOANN, MARBIN Ray, 15 mL at 03/01/24 0803    doxazosin (CARDURA) tablet 8 mg, 8 mg, Oral, BID, MARBIN Ray, 8 mg at 03/01/24 0803    heparin (porcine) subcutaneous injection 5,000 Units, 5,000 Units, Subcutaneous, Q8H JOANN, 5,000 Units at 03/01/24 0517 **AND** [COMPLETED]  "Platelet count, , , Once, Shai Chaidez, DO    hydrALAZINE (APRESOLINE) injection 10 mg, 10 mg, Intravenous, Q6H PRN, MARBIN Ray    hydrALAZINE (APRESOLINE) tablet 75 mg, 75 mg, Oral, Q8H JOANN, MARBIN Ray    HYDROmorphone (DILAUDID) injection 0.5 mg, 0.5 mg, Intravenous, Q1H PRN, MARBIN Ray    isosorbide dinitrate (ISORDIL) tablet 40 mg, 40 mg, Oral, TID after meals, MARBIN Ray, 40 mg at 03/01/24 0803    labetalol (NORMODYNE) injection 10 mg, 10 mg, Intravenous, Q4H PRN, MARIBN Ray    labetalol (NORMODYNE) tablet 600 mg, 600 mg, Oral, Q8H JOANN, MARBIN Ray, 600 mg at 03/01/24 0517    LORazepam (ATIVAN) tablet 1 mg, 1 mg, Oral, Q6H PRN, MARBIN Ray, 1 mg at 02/28/24 0609    OLANZapine (ZyPREXA) tablet 5 mg, 5 mg, Oral, HS, MARBIN Ray, 5 mg at 02/29/24 2122    pantoprazole (PROTONIX) EC tablet 40 mg, 40 mg, Oral, Daily, MARBIN Ray, 40 mg at 03/01/24 0803    sertraline (ZOLOFT) tablet 200 mg, 200 mg, Oral, Daily, MARBIN Ray, 200 mg at 03/01/24 0803    sucralfate (CARAFATE) tablet 1 g, 1 g, Oral, BID, MARBIN Ray, 1 g at 03/01/24 0803    OBJECTIVE     Current Weight: Weight - Scale: 61.1 kg (134 lb 11.2 oz)  /67 (BP Location: Right arm)   Pulse 61   Temp 98.6 °F (37 °C) (Oral)   Resp 16   Ht 5' 6.5\" (1.689 m)   Wt 61.1 kg (134 lb 11.2 oz)   SpO2 99%   BMI 21.42 kg/m²   Vitals:    03/01/24 1115   BP: 125/67   Pulse: 61   Resp: 16   Temp: 98.6 °F (37 °C)   SpO2: 99%     Body mass index is 21.42 kg/m².    Intake/Output Summary (Last 24 hours) at 3/1/2024 1122  Last data filed at 3/1/2024 1015  Gross per 24 hour   Intake 1026 ml   Output 475 ml   Net 551 ml       PHYSICAL EXAMINATION     Physical Exam  Constitutional:       General: He is not in acute distress.  HENT:      Right Ear: External ear normal.   Eyes:      " Conjunctiva/sclera:      Right eye: No hemorrhage.  Neck:      Thyroid: No thyromegaly.   Pulmonary:      Effort: No accessory muscle usage or respiratory distress.   Abdominal:      General: There is no distension.   Skin:     Coloration: Skin is not jaundiced.   Psychiatric:         Behavior: Behavior is not combative.           LAB RESULTS     Results from last 7 days   Lab Units 03/01/24  0426 02/29/24  2101 02/29/24  0509 02/28/24  2224 02/28/24  1104 02/27/24  1945   WBC Thousand/uL 8.41  --  9.51  --   --  6.22   HEMOGLOBIN g/dL 10.6*  --  11.4*  --   --  14.5   HEMATOCRIT % 32.9*  --  34.5*  --   --  42.5   PLATELETS Thousands/uL 255  --  267  --   --  393*  393*   SODIUM mmol/L 137 137 138 137 136 140   POTASSIUM mmol/L 3.2* 3.1* 3.0* 3.2* 2.8* 2.5*   CHLORIDE mmol/L 103 103 102 101 99 96   CO2 mmol/L 24 25 26 23 27 25   BUN mg/dL 69* 65* 57* 55* 48* 53*   CREATININE mg/dL 3.60* 3.19* 3.19* 2.81* 2.61* 2.44*   EGFR ml/min/1.73sq m 17 20 20 23 25 27   CALCIUM mg/dL 9.2 9.2 9.0 9.7 9.4 11.3*   MAGNESIUM mg/dL 1.9  --   --   --   --   --    PHOSPHORUS mg/dL 5.1*  --   --   --  4.8*  --        I have personally reviewed the old medical records and patient's  Creatinine seems to be around 2.5    RADIOLOGY RESULTS      VAS renal artery complete   Final Result by Hansel Guevara DO (02/28 1017)      CTA dissection protocol chest/abdomen/pelvis   Final Result by Paxton Stevens DO (02/28 0019)      No aortic aneurysm or dissection.      Small volume free fluid/mild mesenteric edema. Consider enteritis in the appropriate clinical setting.               Workstation performed: [id number]         XR chest 1 view portable   Final Result by Daquan Mills MD (02/28 0906)      No radiographic evidence of acute intrathoracic process.            Workstation performed: [id number]             PLAN / RECOMMENDATIONS      1. TRUDI on top of CKD stage IV.  Multifactorial    Upon review of old medical record from  epic chart review, previously been followed by Dr. Reese at [facility] for management of chronic kidney disease and looking at patient's overall renal function trend from epic chart review and also from care everywhere, seems to me that patient has progressive chronic kidney disease and previously known baseline creatinine was around 2.0 but now new baseline creatinine may be around 2.5    Overnight patient's renal function has worsened to current creatinine of 3.6 with a EGFR of 17.  Patient had underwent stress test earlier and cardiology team is planning to pursue cardiac cath for further evaluation.  Discussed risk of contrast-induced nephropathy in length with patient today along with patient's wife over the phone (via patient's phone) in the understand the risk of contrast-induced nephropathy and agreeable to proceed to cardiac catheter if deemed indicated by cardiology team.  Discussed with patient/family that we will recommend use of IV fluid as a part of renal optimization strategy.  I have also discussed consideration of dialysis if renal function continue to worsen patient/family is agreeable for dialysis if renal function continue to worsen.  Clinically patient is not in volume overload state and advised patient to drink more fluid.  Plan to recheck renal function with a.m. lab    2. Hypertensive crisis.  Patient seems to have resistant hypertension and pending workup for secondary causes.  Renal Doppler showed no evidence of renal artery stenosis [2/28/2024].    Patient has allergy to lot of antihypertensive medication but seems to me that been tolerating amlodipine, doxazosin, hydralazine and labetalol in last 24 hours.  Patient has been off nicardipine drip but blood pressure has been fluctuating and has not been ideal and plan to increase hydralazine to 75 mg p.o. 3 times daily today and continue amlodipine 10 mg p.o. daily, doxazosin 8 mg p.o. twice daily and labetalol 600 mg p.o. 3  "times daily today.    3.  Secondary hyperparathyroidism of renal origin.  Recent PTH level is 186-2/28/2024 which is above the goal and was started on Calcitrol 0.25 mcg every other day.  Calcium level is acceptable and recommend continuation of calcitriol at current dose and check PTH level as outpatient in around 3 months    4.  Hypokalemia.  Multifactorial, morning potassium was 3.1 and scheduled to receive oral potassium supplementation today.    Overall above mentioned plan and recommendations were also d/w the ICU and cardiology team and they agreed with my plan of monitoring renal function    Carleen Acosta MD  Nephrology  3/1/2024        Portions of the record may have been created with voice recognition software. Occasional wrong word or \"sound a like\" substitutions may have occurred due to the inherent limitations of voice recognition software. Read the chart carefully and recognize, using context, where substitutions have occurred.  "

## 2024-03-01 NOTE — ASSESSMENT & PLAN NOTE
Wt Readings from Last 3 Encounters:   02/29/24 62.1 kg (136 lb 14.5 oz)   01/25/24 65.3 kg (144 lb)   12/27/23 66.8 kg (147 lb 4.3 oz)     7/2023 ECHO: EF 55%  2/28 ECHO: EF 50% with D1DD. Mild MR/TR  Cardiology consulted  2/29 Stress test: There is no evidence of transient ischemic dilation   Cardiac cath when cleared by nephrology

## 2024-03-01 NOTE — UTILIZATION REVIEW
NOTIFICATION OF INPATIENT ADMISSION   AUTHORIZATION REQUEST   SERVICING FACILITY:   [place] [address]  Tax ID: [id number]  NPI: [id number] ATTENDING PROVIDER:  Attending Name and NPI#: Tim John Md [[id number]]  Address: [address] [place] [address]  Phone: [phone]     ADMISSION INFORMATION:  Place of Service: Inpatient [facility]  Place of Service Code: 21  Inpatient Admission Date/Time: 2/28/24  4:26 AM  Discharge Date/Time: No discharge date for patient encounter.  Admitting Diagnosis Code/Description:  Chest pain [R07.9]  Hypertension [I10]  Elevated troponin [R79.89]  Hypertensive emergency [I16.1]  Stage 4 chronic kidney disease (HCC) [N18.4]     UTILIZATION REVIEW CONTACT:  Andreea Morgan, Utilization   Network Utilization Review Department  Phone: [phone]  Fax [phone]  Email: [email or web address]  Contact for approvals/pending authorizations, clinical reviews, and discharge.     PHYSICIAN ADVISORY SERVICES:  Medical Necessity Denial & Lvyr-cy-Wcbt Review  Phone: [phone]  Fax: [phone]  Email: [email or web address]     DISCHARGE SUPPORT TEAM:  For Patients Discharge Needs & Updates  Phone: [phone] opt. 2 Fax: [phone]  Email: [email or web address]

## 2024-03-01 NOTE — ASSESSMENT & PLAN NOTE
Lab Results   Component Value Date    EGFR 20 02/29/2024    EGFR 20 02/29/2024    EGFR 23 02/28/2024    CREATININE 3.19 (H) 02/29/2024    CREATININE 3.19 (H) 02/29/2024    CREATININE 2.81 (H) 02/28/2024     Secondary to uncontrolled HTN. Notable decline of kidney function since 2018.   Recent baseline Cr between 2.8-3.1  Notes decline in urine output to x2 daily estimating 1-2 cups of urine each time  Not in exacerbation   Continue to monitor I&Os/daily weights

## 2024-03-01 NOTE — ASSESSMENT & PLAN NOTE
Per chart review states medication noncompliance given stomach issues when taking medication   Notable for Shamika En Y with adequate weight loss with continued need for hypertensive medications  Return of medication regimen on admission with continued HTN on floor requiring further intervention   See plan above

## 2024-03-01 NOTE — PLAN OF CARE
Problem: SAFETY ADULT  Goal: Patient will remain free of falls  Description: INTERVENTIONS:  - Educate patient/family on patient safety including physical limitations  - Instruct patient to call for assistance with activity   - Consult OT/PT to assist with strengthening/mobility   - Keep Call bell within reach  - Keep bed low and locked with side rails adjusted as appropriate  - Keep care items and personal belongings within reach  - Initiate and maintain comfort rounds  - Make Fall Risk Sign visible to staff  - Offer Toileting every 3 Hours, in advance of need  - Initiate/Maintain bed/chair alarm  - Obtain necessary fall risk management equipment:   - Apply yellow socks and bracelet for high fall risk patients  - Consider moving patient to room near nurses station  Outcome: Progressing  Goal: Maintain or return to baseline ADL function  Description: INTERVENTIONS:  -  Assess patient's ability to carry out ADLs; assess patient's baseline for ADL function and identify physical deficits which impact ability to perform ADLs (bathing, care of mouth/teeth, toileting, grooming, dressing, etc.)  - Assess/evaluate cause of self-care deficits   - Assess range of motion  - Assess patient's mobility; develop plan if impaired  - Assess patient's need for assistive devices and provide as appropriate  - Encourage maximum independence but intervene and supervise when necessary  - Involve family in performance of ADLs  - Assess for home care needs following discharge   - Consider OT consult to assist with ADL evaluation and planning for discharge  - Provide patient education as appropriate  Outcome: Progressing  Goal: Maintains/Returns to pre admission functional level  Description: INTERVENTIONS:  - Perform AM-PAC 6 Click Basic Mobility/ Daily Activity assessment daily.  - Set and communicate daily mobility goal to care team and patient/family/caregiver.   - Collaborate with rehabilitation services on mobility goals if  consulted  - Reposition patient every 2 hours.  - Dangle patient 3 times a day  - Stand patient 3 times a day  - Ambulate patient 3 times a day  - Out of bed to chair 3 times a day   - Out of bed for meals 3 times a day  - Out of bed for toileting  - Record patient progress and toleration of activity level   Outcome: Progressing     Problem: DISCHARGE PLANNING  Goal: Discharge to home or other facility with appropriate resources  Description: INTERVENTIONS:  - Identify barriers to discharge w/patient and caregiver  - Arrange for needed discharge resources and transportation as appropriate  - Identify discharge learning needs (meds, wound care, etc.)  - Arrange for interpretive services to assist at discharge as needed  - Refer to Case Management Department for coordinating discharge planning if the patient needs post-hospital services based on physician/advanced practitioner order or complex needs related to functional status, cognitive ability, or social support system  Outcome: Progressing     Problem: PAIN - ADULT  Goal: Verbalizes/displays adequate comfort level or baseline comfort level  Description: Interventions:  - Encourage patient to monitor pain and request assistance  - Assess pain using appropriate pain scale  - Administer analgesics based on type and severity of pain and evaluate response  - Implement non-pharmacological measures as appropriate and evaluate response  - Consider cultural and social influences on pain and pain management  - Notify physician/advanced practitioner if interventions unsuccessful or patient reports new pain  Outcome: Progressing     Problem: INFECTION - ADULT  Goal: Absence or prevention of progression during hospitalization  Description: INTERVENTIONS:  - Assess and monitor for signs and symptoms of infection  - Monitor lab/diagnostic results  - Monitor all insertion sites, i.e. indwelling lines, tubes, and drains  - Monitor endotracheal if appropriate and nasal secretions  for changes in amount and color  - Pl[place]o appropriate cooling/warming therapies per order  - Administer medications as ordered  - Instruct and encourage patient and family to use good hand hygiene technique  - Identify and instruct in appropriate isolation precautions for identified infection/condition  Outcome: Progressing  Goal: Absence of fever/infection during neutropenic period  Description: INTERVENTIONS:  - Monitor WBC    Outcome: Progressing     Problem: Knowledge Deficit  Goal: Patient/family/caregiver demonstrates understanding of disease process, treatment plan, medications, and discharge instructions  Description: Complete learning assessment and assess knowledge base.  Interventions:  - Provide teaching at level of understanding  - Provide teaching via preferred learning methods  Outcome: Progressing     Problem: CARDIOVASCULAR - ADULT  Goal: Maintains optimal cardiac output and hemodynamic stability  Description: INTERVENTIONS:  - Monitor I/O, vital signs and rhythm  - Monitor for S/S and trends of decreased cardiac output  - Administer and titrate ordered vasoactive medications to optimize hemodynamic stability  - Assess quality of pulses, skin color and temperature  - Assess for signs of decreased coronary artery perfusion  - Instruct patient to report change in severity of symptoms  Outcome: Progressing  Goal: Absence of cardiac dysrhythmias or at baseline rhythm  Description: INTERVENTIONS:  - Continuous cardiac monitoring, vital signs, obtain 12 lead EKG if ordered  - Administer antiarrhythmic and heart rate control medications as ordered  - Monitor electrolytes and administer replacement therapy as ordered  Outcome: Progressing

## 2024-03-01 NOTE — CASE MANAGEMENT
Case Management Discharge Planning Note    Patient name Genna Liu  Location ICU 03/ICU 03 MRN [id number]  : 1965 Date 3/1/2024       Current Admission Date: 2024  Current Admission Diagnosis:Hypertensive emergency   Patient Active Problem List    Diagnosis Date Noted    Transaminitis 2024    Elevated troponin 2024    UTI (urinary tract infection) 2024    Noncompliance with medication regimen 2024    Stage 4 chronic kidney disease (HCC) 2024    Chronic heart failure with preserved ejection fraction (HFpEF) ([facility]) 2024    COVID-19 2023    GERD (gastroesophageal reflux disease) 07/15/2023    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[facility]) 2023    Moderate protein-calorie malnutrition ([facility]) 2023    Electrolyte abnormality 2023    Hematochezia 2023    Depression with anxiety 2023    Abdominal pain 2023    Seizure ([facility]) 2023    Hypertensive encephalopathy 2023    Hypertensive emergency 2023    Meningioma ([facility]) 2023    Prolonged Q-T interval on ECG 2023    Hypokalemia 2021    Bradycardia 2021    Hypoglycemia 2021    History of gastric bypass 2021    Hypertensive kidney disease with stage 3 chronic kidney disease (HCC) 2021    Acute kidney injury superimposed on CKD  2021    Encounter for surgical aftercare following surgery of digestive system 2021    Postsurgical malabsorption 2021    Morbid obesity due to excess calories ([facility]) 2020    Post-traumatic male urethral stricture 2020    Renal cyst 2020    Dysuria 2020    History of DVT (deep vein thrombosis) 10/17/2018    Splenic lesion 10/17/2018    Anemia 2018    Lymphedema 2018    Stage 3b chronic kidney disease (CKD) (HCC) 03/15/2018    Chronic diastolic CHF (congestive heart failure) ([facility]) 2018    Pulmonary nodule, right 2018    Pericardial effusion  01/22/2018    Essential hypertension 01/22/2018    COPD (chronic obstructive pulmonary disease) ([facility]) 01/22/2018    CHF (congestive heart failure) ([facility]) 01/22/2018      LOS (days): 2  Geometric Mean LOS (GMLOS) (days): 2.1  Days to GMLOS:-0.3     OBJECTIVE:  Risk of Unplanned Readmission Score: 32.52         Current admission status: Inpatient   Preferred Pharmacy:   [id number] - [facility] [address] - [address]  [address]  [facility] [place] [address]  Phone: [phone] Fax: [phone]    Primary Care Provider: Ugo Marina DO    Primary Insurance: [facility] REP  Secondary Insurance:     DISCHARGE DETAILS:                                          Other Referral/Resources/Interventions Provided:  Referral Comments: Pt now in ICU for higher LOC; placed on IV cardene gtt for ongoing elevated BP. Cardiac cath planned for tomorrow.  No referrals made at this time. CM continues to be available.         Treatment Team Recommendation: Home  Discharge Destination Plan:: Home  Transport at Discharge : Family

## 2024-03-01 NOTE — PROGRESS NOTES
[facility]  Progress Note  Name: Genna Liu I  MRN: [id number]  Unit/Bed#: ICU 03 I Date of Admission: 2/27/2024   Date of Service: 3/1/2024 I Hospital Day: 2    Assessment/Plan   * Hypertensive emergency  Assessment & Plan  Initially came to ED complaining of chest pain, headache, and blurred vision. Patient originally placed on Cardene drip and Dced after giving patient back home medication and was admitted under SLIM. Patient noted to have continuous HTN ranging in the 190s-230s. CC was consulted to initate cardene drip. Given IV hydralazine with adequate control of BP in ICU.  Emergency criteria given end organ damage -Troponin spill and transaminitis   Home regimen consisting of:  Amlodipine 10mg daily  Doxazosin 8mg BID  Labetalol 600mg TID  HCTZ 12.5mg daily  Isosorbide 40mg TID -patient states he does not take this as he was afraid of the side effects  Regimen as inpatient consisting of amlodipine, doxazosin, labetalol at home dose  Added was Isosorbide at home dose, and hydralazine 50mg Q8hr  HCTZ- held given hypokalemia  Can restarted HCTZ when medically appropriate  Continue medication regimen as noted above   Renal US: patent renal arteries  If continues to have HTN, can start Cardene drip  Goal SBP <160  Given refractory HTN will follow up with work up of pheochromocytoma     Chronic heart failure with preserved ejection fraction (HFpEF) ([facility])  Assessment & Plan  Wt Readings from Last 3 Encounters:   02/29/24 62.1 kg (136 lb 14.5 oz)   01/25/24 65.3 kg (144 lb)   12/27/23 66.8 kg (147 lb 4.3 oz)     7/2023 ECHO: EF 55%  2/28 ECHO: EF 50% with D1DD. Mild MR/TR  Cardiology consulted  2/29 Stress test: There is no evidence of transient ischemic dilation   Cardiac cath when cleared by nephrology     Stage 4 chronic kidney disease (HCC)  Assessment & Plan  Lab Results   Component Value Date    EGFR 20 02/29/2024    EGFR 20 02/29/2024    EGFR 23 02/28/2024    CREATININE 3.19 (H)  02/29/2024    CREATININE 3.19 (H) 02/29/2024    CREATININE 2.81 (H) 02/28/2024     Secondary to uncontrolled HTN. Notable decline of kidney function since 2018  Recent baseline Cr between 2.8-3.1  Notes decline in urine output to x2 daily estimating 1-2 cups of urine each time  Not in exacerbation   Continue to monitor I&Os/daily weights    Elevated troponin  Assessment & Plan  Likely secondary to HTN emergency  Cardiology following  S/P stress test  Cardiac cath once cleared by nephrology per cardiology     Transaminitis  Assessment & Plan  Elevated LFTs likely secondary to HTN emergency   See HTN emergency for medical management   Trend hepatic panel     UTI (urinary tract infection)  Assessment & Plan  UA positive for leukocytes  Started on Rocephin D2  No reflex to culture - will add on culture  Blood cultures pending  Monitor WBC and fever curve   Will check procal - if negative consider DC abx    Noncompliance with medication regimen  Assessment & Plan  Per chart review states medication noncompliance given stomach issues when taking medication   Notable for Shamika En Y with adequate weight loss with continued need for hypertensive medications  Return of medication regimen on admission with continued HTN on floor requiring further intervention   See plan above    Depression with anxiety  Assessment & Plan  Continue Abilify as patient is noted to be allergic to Buspar     Hypokalemia  Assessment & Plan  Noted on admission  Cautiously replacing K given CKD  Monitor BMP  Replete as needed     COPD (chronic obstructive pulmonary disease) (HCC)  Assessment & Plan  Home regimen consists of rescue inhaler, does not use O2 at home  No acute exacerbation  PRN albuterol              Disposition: Stepdown Level 1    ICU Core Measures     A: Assess, Prevent, and Manage Pain Has pain been assessed? Yes  Need for changes to pain regimen? No   B: Both SAT/SAT  N/A   C: Choice of Sedation RASS Goal: 0 Alert and Calm  Need for  changes to sedation or analgesia regimen? NA   D: Delirium CAM-ICU: Negative   E: Early Mobility  Plan for early mobility? Yes   F: Family Engagement Plan for family engagement today? Yes       Antibiotic Review: Awaiting culture results.       Prophylaxis:  VTE VTE covered by:  heparin (porcine), Subcutaneous, 5,000 Units at 02/29/24 2121       Stress Ulcer  covered bypantoprazole (PROTONIX) EC tablet 40 mg [[id number]]         Significant 24hr Events     24hr events: No new events overnight. Patient not requiring Cardene drip. SBP maintained 140-160s.     Subjective   Review of Systems   Constitutional: Negative.    HENT: Negative.     Eyes: Negative.    Respiratory: Negative.  Negative for shortness of breath.    Cardiovascular:  Negative for chest pain.   Gastrointestinal:  Negative for abdominal pain.   Endocrine: Negative.    Genitourinary:         Oliguria   Musculoskeletal: Negative.    Allergic/Immunologic: Negative.    Neurological:  Negative for headaches.   Hematological: Negative.    Psychiatric/Behavioral: Negative.        Objective                            Vitals I/O      Most Recent Min/Max in 24hrs   Temp 98.8 °F (37.1 °C) Temp  Min: 98.7 °F (37.1 °C)  Max: 99 °F (37.2 °C)   Pulse 57 Pulse  Min: 56  Max: 70   Resp 16 Resp  Min: 16  Max: 16   /99 BP  Min: 125/78  Max: 220/120   O2 Sat 98 % SpO2  Min: 96 %  Max: 99 %      Intake/Output Summary (Last 24 hours) at 3/1/2024 0432  Last data filed at 3/1/2024 0217  Gross per 24 hour   Intake 900 ml   Output 475 ml   Net 425 ml       Diet NPO; Sips with meds    Invasive Monitoring           Physical Exam   Physical Exam  Eyes:      Extraocular Movements: Extraocular movements intact.      Pupils: Pupils are equal, round, and reactive to light.   Skin:     General: Skin is warm and dry.      Capillary Refill: Capillary refill takes less than 2 seconds.   HENT:      Head: Normocephalic.      Mouth/Throat:      Mouth: Mucous membranes are moist.    Cardiovascular:      Rate and Rhythm: Normal rate and regular rhythm.      Pulses: Normal pulses.      Heart sounds: Normal heart sounds.   Musculoskeletal:         General: Normal range of motion.   Constitutional:       Appearance: He is well-developed and well-nourished.   Pulmonary:      Effort: Pulmonary effort is normal. No respiratory distress.      Breath sounds: Normal breath sounds. No wheezing or rhonchi.   Neurological:      General: No focal deficit present.      Mental Status: He is alert and oriented to person, place and time. He is calm.      Sensory: Sensation is intact.      Motor: gross motor function is at baseline for patient. Strength full and intact in all extremities.            Diagnostic Studies      Tele: SB 50s  Imaging: No new imaging      Medications:  Scheduled PRN   amLODIPine, 10 mg, Daily  ARIPiprazole, 5 mg, Daily  aspirin, 81 mg, Daily  atorvastatin, 40 mg, HS  cefTRIAXone, 1,000 mg, Q24H  chlorhexidine, 15 mL, Q12H JOANN  doxazosin, 8 mg, BID  heparin (porcine), 5,000 Units, Q8H JOANN  hydrALAZINE, 50 mg, Q8H JOANN  isosorbide dinitrate, 40 mg, TID after meals  labetalol, 600 mg, Q8H JOANN  OLANZapine, 5 mg, HS  pantoprazole, 40 mg, Daily  sertraline, 200 mg, Daily  sucralfate, 1 g, BID      acetaminophen, 650 mg, Q4H PRN  albuterol, 2 puff, Q4H PRN  hydrALAZINE, 10 mg, Q6H PRN  HYDROmorphone, 0.5 mg, Q1H PRN  labetalol, 10 mg, Q4H PRN  LORazepam, 1 mg, Q6H PRN       Continuous          Labs:    CBC    Recent Labs     02/29/24  0509   WBC 9.51   HGB 11.4*   HCT 34.5*        BMP    Recent Labs     02/29/24  0509 02/29/24  2101   SODIUM 138 137   K 3.0* 3.1*    103   CO2 26 25   AGAP 10 9   BUN 57* 65*   CREATININE 3.19* 3.19*   CALCIUM 9.0 9.2       Coags    No recent results     Additional Electrolytes  Recent Labs     02/28/24  1104   PHOS 4.8*          Blood Gas    No recent results  No recent results LFTs  Recent Labs     02/29/24  0509   ALT 54*   AST 47*   ALKPHOS 78    ALB 3.8   TBILI 1.02*       Infectious  No recent results  Glucose  Recent Labs     02/28/24  1104 02/28/24  2224 02/29/24  0509 02/29/24  2101   GLUC 158* 121 106 113               MARBIN Gipson

## 2024-03-01 NOTE — ASSESSMENT & PLAN NOTE
Initially came to ED complaining of chest pain, headache, and blurred vision. Patient originally placed on Cardene drip and Dced after giving patient back home medication and was admitted under SLIM. Patient noted to have continuous HTN ranging in the 190s-230s. CC was consulted to initate cardene drip. Given IV hydralazine with adequate control of BP in ICU.  Emergency criteria given end organ damage -Troponin spill and transaminitis   Home regimen consisting of:  Amlodipine 10mg daily  Doxazosin 8mg BID  Labetalol 600mg TID  HCTZ 12.5mg daily  Isosorbide 40mg TID -patient states he does not take this as he was afraid of the side effects  Regimen as inpatient consisting of amlodipine, doxazosin, labetalol at home dose  Added was Isosorbide at home dose, and hydralazine 50mg Q8hr  HCTZ- held given hypokalemia  Can restarted HCTZ when medically appropriate  Continue medication regimen as noted above   Renal US: patent renal arteries  If continues to have HTN, can start Cardene drip  Goal SBP <160  Given refractory HTN will follow up with work up of pheochromocytoma

## 2024-03-01 NOTE — PROGRESS NOTES
"Cardiology Progress Note - Genna Liu 58 y.o. male MRN: [id number]    Unit/Bed#: -01 Encounter: [id number]      Assessment/Plan:  1.  Chest pain  Troponins with peak of 353.  EKG with inferolateral T wave abnormality.  TTE shows severe concentric LVH, EF 55%, G1 DD, mild biatrial dilation, mild MR/TR.  Pharmacological MPI stress test could not definitively exclude underlying CAD.  Plan for cardiac catheterization when clinically optimized, likely Monday.    2.  Hypertensive emergency  Blood pressure has been labile, however predominantly remains poorly controlled.  Management per nephrology.    3.  Elevated troponin  Troponins with peak of 353, see plan above.    4.  TRUDI on CKD 4  5.  LVH  6.  History of pericardial effusion s/p pericardial window (7/2023)  7.  Hyperaldosteronism s/p left adrenalectomy (2012)  8.  Medication noncompliance  9.  History of DVT  10.  History of Shamika-en-Y gastric bypass      Subjective:   Patient seen and examined.  No significant events overnight.  Patient resting in bed without any new complaints at this time.  All questions were answered.    Objective:     Vitals: Blood pressure 115/55, pulse 61, temperature 98 °F (36.7 °C), temperature source Oral, resp. rate 16, height 5' 6.5\" (1.689 m), weight 61.1 kg (134 lb 11.2 oz), SpO2 99%., Body mass index is 21.42 kg/m².,   Orthostatic Blood Pressures      Flowsheet Row Most Recent Value   Blood Pressure 115/55 filed at 03/01/2024 1542   Patient Position - Orthostatic VS Lying filed at 03/01/2024 1542              Intake/Output Summary (Last 24 hours) at 3/1/2024 1609  Last data filed at 3/1/2024 1436  Gross per 24 hour   Intake 1262 ml   Output 725 ml   Net 537 ml         Physical Exam:  GEN: Alert and oriented x 3, in no acute distress.  Well appearing and well nourished.   HEENT: Sclera anicteric, conjunctivae pink, mucous membranes moist. Oropharynx clear.   NECK: Supple, no carotid bruits, no significant JVD. Trachea midline, " no thyromegaly.   HEART: Regular rhythm, normal S1 and S2, no murmurs, clicks, gallops or rubs. PMI nondisplaced, no thrills.   LUNGS: Clear to auscultation bilaterally; no wheezes, rales, or rhonchi. No increased work of breathing or signs of respiratory distress.   ABDOMEN: Soft, nontender, nondistended, normoactive bowel sounds.   EXTREMITIES: Skin warm and well perfused, no clubbing, cyanosis, or edema.  NEURO: No focal findings. Normal speech. Mood and affect normal.   SKIN: Normal without suspicious lesions on exposed skin.        Medications:      Current Facility-Administered Medications:     acetaminophen (TYLENOL) tablet 650 mg, 650 mg, Oral, Q4H PRN, MARBIN Ray    albuterol (PROVENTIL HFA,VENTOLIN HFA) inhaler 2 puff, 2 puff, Inhalation, Q4H PRN, MARBIN Ray    amLODIPine (NORVASC) tablet 10 mg, 10 mg, Oral, Daily, MARBIN Ray, 10 mg at 03/01/24 0803    ARIPiprazole (ABILIFY) tablet 5 mg, 5 mg, Oral, Daily, MARBIN Ray, 5 mg at 03/01/24 0803    aspirin chewable tablet 81 mg, 81 mg, Oral, Daily, MARBIN Ray, 81 mg at 03/01/24 0803    atorvastatin (LIPITOR) tablet 40 mg, 40 mg, Oral, HS, MARBIN Ray, 40 mg at 02/29/24 2121    cefTRIAXone (ROCEPHIN) IVPB (premix in dextrose) 1,000 mg 50 mL, 1,000 mg, Intravenous, Q24H, MARBIN Ray, Last Rate: 100 mL/hr at 02/29/24 2121, 1,000 mg at 02/29/24 2121    chlorhexidine (PERIDEX) 0.12 % oral rinse 15 mL, 15 mL, Mouth/Throat, Q12H JOANN, MARBIN Ray, 15 mL at 03/01/24 0803    doxazosin (CARDURA) tablet 8 mg, 8 mg, Oral, BID, MARBIN Ray, 8 mg at 03/01/24 0803    heparin (porcine) subcutaneous injection 5,000 Units, 5,000 Units, Subcutaneous, Q8H JOANN, 5,000 Units at 03/01/24 1443 **AND** [COMPLETED] Platelet count, , , Once, Shai Chaidez,     hydrALAZINE (APRESOLINE) injection 10 mg, 10 mg, Intravenous, Q6H PRN,  MARBIN Ray    [Transfer Hold] hydrALAZINE (APRESOLINE) tablet 75 mg, 75 mg, Oral, Q8H JOANN, MARBIN Ray, 75 mg at 03/01/24 1443    HYDROmorphone (DILAUDID) injection 0.5 mg, 0.5 mg, Intravenous, Q1H PRN, MARBIN Ray    isosorbide dinitrate (ISORDIL) tablet 40 mg, 40 mg, Oral, TID after meals, MARBIN Ray, 40 mg at 03/01/24 1443    labetalol (NORMODYNE) injection 10 mg, 10 mg, Intravenous, Q4H PRN, MARBIN Ray    labetalol (NORMODYNE) tablet 600 mg, 600 mg, Oral, Q8H JOANN, MARBIN Ray, 600 mg at 03/01/24 1443    LORazepam (ATIVAN) tablet 1 mg, 1 mg, Oral, Q6H PRN, MARBIN Ray, 1 mg at 02/28/24 0609    OLANZapine (ZyPREXA) tablet 5 mg, 5 mg, Oral, HS, MARBIN Ray, 5 mg at 02/29/24 2122    pantoprazole (PROTONIX) EC tablet 40 mg, 40 mg, Oral, Daily, MARBIN Ray, 40 mg at 03/01/24 0803    sertraline (ZOLOFT) tablet 200 mg, 200 mg, Oral, Daily, MARBIN Ray, 200 mg at 03/01/24 0803    sucralfate (CARAFATE) tablet 1 g, 1 g, Oral, BID, MARBIN Ray, 1 g at 03/01/24 0803     Labs & Results:     Results from last 7 days   Lab Units 02/28/24 2224 02/28/24 2024 02/28/24  1821 02/27/24 2326 02/27/24 2202 02/27/24  1945   HS TNI 0HR ng/L  --   --  353*  --   --  207*   HS TNI 2HR ng/L  --  352*  --   --  244*  --    HSTNI D2 ng/L  --  -1  --   --  37*  --    HS TNI 4HR ng/L 349*  --   --  251*  --   --    HSTNI D4 ng/L -4  --   --  44*  --   --      Results from last 7 days   Lab Units 03/01/24  0426 02/29/24  0509 02/27/24 1945   WBC Thousand/uL 8.41 9.51 6.22   HEMOGLOBIN g/dL 10.6* 11.4* 14.5   HEMATOCRIT % 32.9* 34.5* 42.5   PLATELETS Thousands/uL 255 267 393*  393*         Results from last 7 days   Lab Units 03/01/24  0426 02/29/24  2101 02/29/24  0509 02/28/24  1104 02/27/24 1945   POTASSIUM mmol/L 3.2* 3.1* 3.0*   < > 2.5*   CHLORIDE  "mmol/L 103 103 102   < > 96   CO2 mmol/L 24 25 26   < > 25   BUN mg/dL 69* 65* 57*   < > 53*   CREATININE mg/dL 3.60* 3.19* 3.19*   < > 2.44*   CALCIUM mg/dL 9.2 9.2 9.0   < > 11.3*   ALK PHOS U/L 73  --  78  --  107*   ALT U/L 46  --  54*  --  89*   AST U/L 30  --  47*  --  45*    < > = values in this interval not displayed.     Results from last 7 days   Lab Units 24  2326   INR  1.11   PTT seconds 29     Results from last 7 days   Lab Units 24  0426   MAGNESIUM mg/dL 1.9       Vitals: Blood pressure 115/55, pulse 61, temperature 98 °F (36.7 °C), temperature source Oral, resp. rate 16, height 5' 6.5\" (1.689 m), weight 61.1 kg (134 lb 11.2 oz), SpO2 99%., Body mass index is 21.42 kg/m².,   Orthostatic Blood Pressures      Flowsheet Row Most Recent Value   Blood Pressure 115/55 filed at 2024 1542   Patient Position - Orthostatic VS Lying filed at 2024 1542            Systolic (24hrs), Av , Min:115 , Max:204     Diastolic (24hrs), Av, Min:55, Max:121        Intake/Output Summary (Last 24 hours) at 3/1/2024 1609  Last data filed at 3/1/2024 1436  Gross per 24 hour   Intake 1262 ml   Output 725 ml   Net 537 ml       Invasive Devices       Peripheral Intravenous Line  Duration             Peripheral IV 24 Left;Ventral (anterior) Forearm 1 day    Peripheral IV 24 Left Forearm <1 day                    Telemetry:  Telemetry Orders (From admission, onward)               24 Hour Telemetry Monitoring  Continuous x 24 Hours (Telem)        Question:  Reason for 24 Hour Telemetry  Answer:  PCI/EP study (including pacer and ICD implementation), Cardiac surgery, MI, abnormal cardiac cath, and chest pain- rule out MI                       BP Readings from Last 3 Encounters:   24 115/55   24 (!) 172/110   23 153/96      Wt Readings from Last 3 Encounters:   24 61.1 kg (134 lb 11.2 oz)   24 65.3 kg (144 lb)   23 66.8 kg (147 lb 4.3 oz)    "

## 2024-03-02 LAB
ANION GAP SERPL CALCULATED.3IONS-SCNC: 11 MMOL/L
ATRIAL RATE: 101 BPM
ATRIAL RATE: 65 BPM
ATRIAL RATE: 68 BPM
ATRIAL RATE: 78 BPM
ATRIAL RATE: 90 BPM
ATRIAL RATE: 99 BPM
BASOPHILS # BLD AUTO: 0.04 THOUSANDS/ÂΜL (ref 0–0.1)
BASOPHILS NFR BLD AUTO: 1 % (ref 0–1)
BUN SERPL-MCNC: 68 MG/DL (ref 5–25)
CALCIUM SERPL-MCNC: 8.8 MG/DL (ref 8.4–10.2)
CHLORIDE SERPL-SCNC: 104 MMOL/L (ref 96–108)
CO2 SERPL-SCNC: 22 MMOL/L (ref 21–32)
CREAT SERPL-MCNC: 3.49 MG/DL (ref 0.6–1.3)
EOSINOPHIL # BLD AUTO: 0.14 THOUSAND/ÂΜL (ref 0–0.61)
EOSINOPHIL NFR BLD AUTO: 2 % (ref 0–6)
ERYTHROCYTE [DISTWIDTH] IN BLOOD BY AUTOMATED COUNT: 15.4 % (ref 11.6–15.1)
GFR SERPL CREATININE-BSD FRML MDRD: 17 ML/MIN/1.73SQ M
GLUCOSE SERPL-MCNC: 88 MG/DL (ref 65–140)
HCT VFR BLD AUTO: 31.7 % (ref 36.5–49.3)
HGB BLD-MCNC: 10.5 G/DL (ref 12–17)
IMM GRANULOCYTES # BLD AUTO: 0.01 THOUSAND/UL (ref 0–0.2)
IMM GRANULOCYTES NFR BLD AUTO: 0 % (ref 0–2)
LYMPHOCYTES # BLD AUTO: 1.94 THOUSANDS/ÂΜL (ref 0.6–4.47)
LYMPHOCYTES NFR BLD AUTO: 24 % (ref 14–44)
MCH RBC QN AUTO: 28 PG (ref 26.8–34.3)
MCHC RBC AUTO-ENTMCNC: 33.1 G/DL (ref 31.4–37.4)
MCV RBC AUTO: 85 FL (ref 82–98)
MONOCYTES # BLD AUTO: 0.87 THOUSAND/ÂΜL (ref 0.17–1.22)
MONOCYTES NFR BLD AUTO: 11 % (ref 4–12)
NEUTROPHILS # BLD AUTO: 4.99 THOUSANDS/ÂΜL (ref 1.85–7.62)
NEUTS SEG NFR BLD AUTO: 62 % (ref 43–75)
NRBC BLD AUTO-RTO: 0 /100 WBCS
P AXIS: 28 DEGREES
P AXIS: 31 DEGREES
P AXIS: 31 DEGREES
P AXIS: 81 DEGREES
P AXIS: 84 DEGREES
P AXIS: 89 DEGREES
PLATELET # BLD AUTO: 233 THOUSANDS/UL (ref 149–390)
PMV BLD AUTO: 10.5 FL (ref 8.9–12.7)
POTASSIUM SERPL-SCNC: 3.1 MMOL/L (ref 3.5–5.3)
PR INTERVAL: 148 MS
PR INTERVAL: 150 MS
PR INTERVAL: 152 MS
PR INTERVAL: 154 MS
PR INTERVAL: 154 MS
PR INTERVAL: 160 MS
QRS AXIS: -67 DEGREES
QRS AXIS: -75 DEGREES
QRS AXIS: -83 DEGREES
QRS AXIS: 232 DEGREES
QRS AXIS: 244 DEGREES
QRS AXIS: 259 DEGREES
QRSD INTERVAL: 100 MS
QRSD INTERVAL: 102 MS
QRSD INTERVAL: 102 MS
QRSD INTERVAL: 110 MS
QRSD INTERVAL: 112 MS
QRSD INTERVAL: 94 MS
QT INTERVAL: 432 MS
QT INTERVAL: 432 MS
QT INTERVAL: 434 MS
QT INTERVAL: 462 MS
QT INTERVAL: 480 MS
QT INTERVAL: 582 MS
QTC INTERVAL: 480 MS
QTC INTERVAL: 494 MS
QTC INTERVAL: 510 MS
QTC INTERVAL: 554 MS
QTC INTERVAL: 560 MS
QTC INTERVAL: 711 MS
RBC # BLD AUTO: 3.75 MILLION/UL (ref 3.88–5.62)
SODIUM SERPL-SCNC: 137 MMOL/L (ref 135–147)
T WAVE AXIS: 169 DEGREES
T WAVE AXIS: 20 DEGREES
T WAVE AXIS: 216 DEGREES
T WAVE AXIS: 3 DEGREES
T WAVE AXIS: 63 DEGREES
T WAVE AXIS: 90 DEGREES
VENTRICULAR RATE: 101 BPM
VENTRICULAR RATE: 65 BPM
VENTRICULAR RATE: 68 BPM
VENTRICULAR RATE: 78 BPM
VENTRICULAR RATE: 90 BPM
VENTRICULAR RATE: 99 BPM
WBC # BLD AUTO: 7.99 THOUSAND/UL (ref 4.31–10.16)

## 2024-03-02 PROCEDURE — 93010 ELECTROCARDIOGRAM REPORT: CPT | Performed by: INTERNAL MEDICINE

## 2024-03-02 PROCEDURE — 99232 SBSQ HOSP IP/OBS MODERATE 35: CPT | Performed by: INTERNAL MEDICINE

## 2024-03-02 PROCEDURE — 83835 ASSAY OF METANEPHRINES: CPT

## 2024-03-02 PROCEDURE — 82384 ASSAY THREE CATECHOLAMINES: CPT

## 2024-03-02 PROCEDURE — 85025 COMPLETE CBC W/AUTO DIFF WBC: CPT

## 2024-03-02 PROCEDURE — 80048 BASIC METABOLIC PNL TOTAL CA: CPT

## 2024-03-02 PROCEDURE — 99233 SBSQ HOSP IP/OBS HIGH 50: CPT | Performed by: INTERNAL MEDICINE

## 2024-03-02 RX ORDER — HYDRALAZINE HYDROCHLORIDE 25 MG/1
75 TABLET, FILM COATED ORAL EVERY 8 HOURS SCHEDULED
Status: DISCONTINUED | OUTPATIENT
Start: 2024-03-02 | End: 2024-03-06

## 2024-03-02 RX ORDER — POTASSIUM CHLORIDE 20 MEQ/1
40 TABLET, EXTENDED RELEASE ORAL ONCE
Status: COMPLETED | OUTPATIENT
Start: 2024-03-02 | End: 2024-03-02

## 2024-03-02 RX ADMIN — CHLORHEXIDINE GLUCONATE 0.12% ORAL RINSE 15 ML: 1.2 LIQUID ORAL at 21:32

## 2024-03-02 RX ADMIN — LABETALOL HYDROCHLORIDE 600 MG: 200 TABLET, FILM COATED ORAL at 21:32

## 2024-03-02 RX ADMIN — ACETAMINOPHEN 650 MG: 325 TABLET, FILM COATED ORAL at 11:19

## 2024-03-02 RX ADMIN — HEPARIN SODIUM 5000 UNITS: 5000 INJECTION INTRAVENOUS; SUBCUTANEOUS at 05:06

## 2024-03-02 RX ADMIN — AMLODIPINE BESYLATE 10 MG: 10 TABLET ORAL at 08:22

## 2024-03-02 RX ADMIN — LABETALOL HYDROCHLORIDE 600 MG: 200 TABLET, FILM COATED ORAL at 05:05

## 2024-03-02 RX ADMIN — ACETAMINOPHEN 650 MG: 325 TABLET, FILM COATED ORAL at 21:30

## 2024-03-02 RX ADMIN — SUCRALFATE 1 G: 1 TABLET ORAL at 17:02

## 2024-03-02 RX ADMIN — OLANZAPINE 5 MG: 2.5 TABLET, FILM COATED ORAL at 21:31

## 2024-03-02 RX ADMIN — HYDRALAZINE HYDROCHLORIDE 75 MG: 25 TABLET ORAL at 21:31

## 2024-03-02 RX ADMIN — LABETALOL HYDROCHLORIDE 600 MG: 200 TABLET, FILM COATED ORAL at 15:42

## 2024-03-02 RX ADMIN — DOXAZOSIN 8 MG: 4 TABLET ORAL at 08:21

## 2024-03-02 RX ADMIN — CEFTRIAXONE 1000 MG: 1 INJECTION, SOLUTION INTRAVENOUS at 17:02

## 2024-03-02 RX ADMIN — ARIPIPRAZOLE 5 MG: 5 TABLET ORAL at 08:22

## 2024-03-02 RX ADMIN — HEPARIN SODIUM 5000 UNITS: 5000 INJECTION INTRAVENOUS; SUBCUTANEOUS at 21:31

## 2024-03-02 RX ADMIN — PANTOPRAZOLE SODIUM 40 MG: 40 TABLET, DELAYED RELEASE ORAL at 08:21

## 2024-03-02 RX ADMIN — HEPARIN SODIUM 5000 UNITS: 5000 INJECTION INTRAVENOUS; SUBCUTANEOUS at 15:36

## 2024-03-02 RX ADMIN — SUCRALFATE 1 G: 1 TABLET ORAL at 08:22

## 2024-03-02 RX ADMIN — ISOSORBIDE DINITRATE 40 MG: 10 TABLET ORAL at 21:30

## 2024-03-02 RX ADMIN — DOXAZOSIN 8 MG: 4 TABLET ORAL at 21:31

## 2024-03-02 RX ADMIN — ATORVASTATIN CALCIUM 40 MG: 40 TABLET, FILM COATED ORAL at 21:31

## 2024-03-02 RX ADMIN — LABETALOL HYDROCHLORIDE 10 MG: 5 INJECTION, SOLUTION INTRAVENOUS at 06:43

## 2024-03-02 RX ADMIN — CHLORHEXIDINE GLUCONATE 0.12% ORAL RINSE 15 ML: 1.2 LIQUID ORAL at 08:26

## 2024-03-02 RX ADMIN — ISOSORBIDE DINITRATE 40 MG: 10 TABLET ORAL at 08:22

## 2024-03-02 RX ADMIN — ASPIRIN 81 MG: 81 TABLET, CHEWABLE ORAL at 08:22

## 2024-03-02 RX ADMIN — SERTRALINE HYDROCHLORIDE 200 MG: 50 TABLET ORAL at 08:22

## 2024-03-02 RX ADMIN — ISOSORBIDE DINITRATE 40 MG: 10 TABLET ORAL at 15:36

## 2024-03-02 RX ADMIN — HYDRALAZINE HYDROCHLORIDE 75 MG: 25 TABLET ORAL at 08:22

## 2024-03-02 RX ADMIN — POTASSIUM CHLORIDE 40 MEQ: 1500 TABLET, EXTENDED RELEASE ORAL at 11:12

## 2024-03-02 RX ADMIN — HYDRALAZINE HYDROCHLORIDE 75 MG: 25 TABLET ORAL at 15:36

## 2024-03-02 NOTE — ASSESSMENT & PLAN NOTE
Recent Labs     03/01/24  0426 03/02/24  0508 03/03/24  0504   K 3.2* 3.1* 3.3*  3.3*   MG 1.9  --  2.4       40mEq given today  Repeat BMP

## 2024-03-02 NOTE — ASSESSMENT & PLAN NOTE
Wt Readings from Last 3 Encounters:   03/02/24 61.3 kg (135 lb 2.3 oz)   01/25/24 65.3 kg (144 lb)   12/27/23 66.8 kg (147 lb 4.3 oz)       Currently euvolemic on exam  Echo: EF 55%  HCTZ on hold due to hypokalemia  Daily weights, I&Os

## 2024-03-02 NOTE — PLAN OF CARE
Problem: DISCHARGE PLANNING  Goal: Discharge to home or other facility with appropriate resources  Description: INTERVENTIONS:  - Identify barriers to discharge w/patient and caregiver  - Arrange for needed discharge resources and transportation as appropriate  - Identify discharge learning needs (meds, wound care, etc.)  - Arrange for interpretive services to assist at discharge as needed  - Refer to Case Management Department for coordinating discharge planning if the patient needs post-hospital services based on physician/advanced practitioner order or complex needs related to functional status, cognitive ability, or social support system  Outcome: Progressing     Problem: CARDIOVASCULAR - ADULT  Goal: Maintains optimal cardiac output and hemodynamic stability  Description: INTERVENTIONS:  - Monitor I/O, vital signs and rhythm  - Monitor for S/S and trends of decreased cardiac output  - Administer and titrate ordered vasoactive medications to optimize hemodynamic stability  - Assess quality of pulses, skin color and temperature  - Assess for signs of decreased coronary artery perfusion  - Instruct patient to report change in severity of symptoms  Outcome: Progressing     Problem: PAIN - ADULT  Goal: Verbalizes/displays adequate comfort level or baseline comfort level  Description: Interventions:  - Encourage patient to monitor pain and request assistance  - Assess pain using appropriate pain scale  - Administer analgesics based on type and severity of pain and evaluate response  - Implement non-pharmacological measures as appropriate and evaluate response  - Consider cultural and social influences on pain and pain management  - Notify physician/advanced practitioner if interventions unsuccessful or patient reports new pain  Outcome: Progressing

## 2024-03-02 NOTE — ASSESSMENT & PLAN NOTE
Patient presents tonight with complaints of substernal chest pressure, nonradiating, anxiety, headache, visual change    Blood Pressure: 139/86    H/o CKD and with known medical noncomplaince  SBP>200 while in the ED  Started on IV cardene infusion in the ED, which was discontinued  Resume home medications: Amlodipine 10 mg, Cardura 8 mg HS, labetalol 600 mg TID, isosorbide 40mg TID  Holding hydrochlorothiazide secondary to hypokalemia  BP Prn meds for BP > 160  Amiloride d/c 2/29   Hydralazine PO 75mg Q8H  Renal ultrasound without arterial occlusive disease  ARR, metanephrines pending  Nephrology following

## 2024-03-02 NOTE — ASSESSMENT & PLAN NOTE
Lab Results   Component Value Date    EGFR 19 03/03/2024    EGFR 19 03/03/2024    EGFR 17 03/02/2024    CREATININE 3.30 (H) 03/03/2024    CREATININE 3.26 (H) 03/03/2024    CREATININE 3.49 (H) 03/02/2024   History of CKD 4  Upon presentation, at baseline  Avoid nephrotoxic agents  Starting IVF and mucomyst tonight per Nephro for renal optimization  Will monitor kidney function s/p cath and recommend outpatient labs to f/u- pt with borderline kidney function that may warrant dialysis s/p contrast load

## 2024-03-02 NOTE — ASSESSMENT & PLAN NOTE
Recent Labs     03/01/24  0426 03/03/24  0504   AST 30 18   ALT 46 34   ALKPHOS 73 66   TBILI 0.94 0.77     Continue to trend

## 2024-03-02 NOTE — PLAN OF CARE
Problem: SAFETY ADULT  Goal: Patient will remain free of falls  Description: INTERVENTIONS:  - Educate patient/family on patient safety including physical limitations  - Instruct patient to call for assistance with activity   - Consult OT/PT to assist with strengthening/mobility   - Keep Call bell within reach  - Keep bed low and locked with side rails adjusted as appropriate  - Keep care items and personal belongings within reach  - Initiate and maintain comfort rounds  - Make Fall Risk Sign visible to staff  - Offer Toileting every  Hours, in advance of need  - Initiate/Maintain alarm  - Obtain necessary fall risk management equipment:   - Apply yellow socks and bracelet for high fall risk patients  - Consider moving patient to room near nurses station  Outcome: Progressing  Goal: Maintain or return to baseline ADL function  Description: INTERVENTIONS:  -  Assess patient's ability to carry out ADLs; assess patient's baseline for ADL function and identify physical deficits which impact ability to perform ADLs (bathing, care of mouth/teeth, toileting, grooming, dressing, etc.)  - Assess/evaluate cause of self-care deficits   - Assess range of motion  - Assess patient's mobility; develop plan if impaired  - Assess patient's need for assistive devices and provide as appropriate  - Encourage maximum independence but intervene and supervise when necessary  - Involve family in performance of ADLs  - Assess for home care needs following discharge   - Consider OT consult to assist with ADL evaluation and planning for discharge  - Provide patient education as appropriate  Outcome: Progressing  Goal: Maintains/Returns to pre admission functional level  Description: INTERVENTIONS:  - Perform AM-PAC 6 Click Basic Mobility/ Daily Activity assessment daily.  - Set and communicate daily mobility goal to care team and patient/family/caregiver.   - Collaborate with rehabilitation services on mobility goals if consulted  - Perform  Range of Motion  times a day.  - Reposition patient every  hours.  - Dangle patient  times a day  - Stand patient  times a day  - Ambulate patient  times a day  - Out of bed to chair  times a day   - Out of bed for meals   Problem: SAFETY ADULT  Goal: Patient will remain free of falls  Description: INTERVENTIONS:  - Educate patient/family on patient safety including physical limitations  - Instruct patient to call for assistance with activity   - Consult OT/PT to assist with strengthening/mobility   - Keep Call bell within reach  - Keep bed low and locked with side rails adjusted as appropriate  - Keep care items and personal belongings within reach  - Initiate and maintain comfort rounds  - Make Fall Risk Sign visible to staff  - Offer Toileting every  Hours, in advance of need  - Initiate/Maintain alarm  - Obtain necessary fall risk management equipment:   - Apply yellow socks and bracelet for high fall risk patients  - Consider moving patient to room near nurses station  Outcome: Progressing  Goal: Maintain or return to baseline ADL function  Description: INTERVENTIONS:  -  Assess patient's ability to carry out ADLs; assess patient's baseline for ADL function and identify physical deficits which impact ability to perform ADLs (bathing, care of mouth/teeth, toileting, grooming, dressing, etc.)  - Assess/evaluate cause of self-care deficits   - Assess range of motion  - Assess patient's mobility; develop plan if impaired  - Assess patient's need for assistive devices and provide as appropriate  - Encourage maximum independence but intervene and supervise when necessary  - Involve family in performance of ADLs  - Assess for home care needs following discharge   - Consider OT consult to assist with ADL evaluation and planning for discharge  - Provide patient education as appropriate  Outcome: Progressing  Goal: Maintains/Returns to pre admission functional level  Description: INTERVENTIONS:  - Perform AM-PAC 6 Click  Basic Mobility/ Daily Activity assessment daily.  - Set and communicate daily mobility goal to care team and patient/family/caregiver.   - Collaborate with rehabilitation services on mobility goals if consulted  - Perform Range of Motion  times a day.  - Reposition patient every  hours.  - Dangle patient  times a day  - Stand patient  times a day  - Ambulate patient  times a day  - Out of bed to chair  times a day   - Out of bed for meals  times a day  - Out of bed for toileting  - Record patient progress and toleration of activity level   Outcome: Progressing    times a day  - Out of bed for toileting  - Record patient progress and toleration of activity level   Outcome: Progressing

## 2024-03-02 NOTE — PROGRESS NOTES
NEPHROLOGY PROGRESS NOTE    Patient: Genna Liu               Sex: male          DOA: 2/27/2024  6:23 PM   YOB: 1965        Age: 58 y.o.         LOS:  LOS: 3 days   Encounter Date: 3/2/2024    REASON FOR THE CONSULTATION: Further management of TRUDI    HPI     This is a 58 y.o. male admitted for Hypertensive emergency     SUBJECTIVE     -Transferred to floor yesterday and for unknown reason, hydralazine was on hold last night and did not receive last night dose of oral hydralazine.  Reviewed cardiology's note with recommendation.  Patient denies nausea, vomiting, headache or dizziness today.  - Reviewed last 24 hrs events     CURRENT MEDICATIONS       Current Facility-Administered Medications:     acetaminophen (TYLENOL) tablet 650 mg, 650 mg, Oral, Q4H PRN, MARBIN Ray, 650 mg at 03/01/24 2122    albuterol (PROVENTIL HFA,VENTOLIN HFA) inhaler 2 puff, 2 puff, Inhalation, Q4H PRN, MARBIN Ray    amLODIPine (NORVASC) tablet 10 mg, 10 mg, Oral, Daily, MARBIN Ray, 10 mg at 03/02/24 0822    ARIPiprazole (ABILIFY) tablet 5 mg, 5 mg, Oral, Daily, MARBIN Ray, 5 mg at 03/02/24 0822    aspirin chewable tablet 81 mg, 81 mg, Oral, Daily, MARBIN Ray, 81 mg at 03/02/24 0822    atorvastatin (LIPITOR) tablet 40 mg, 40 mg, Oral, HS, MARBIN Ray, 40 mg at 03/01/24 2120    cefTRIAXone (ROCEPHIN) IVPB (premix in dextrose) 1,000 mg 50 mL, 1,000 mg, Intravenous, Q24H, MARBIN Ray, Last Rate: 100 mL/hr at 03/01/24 1723, 1,000 mg at 03/01/24 1723    chlorhexidine (PERIDEX) 0.12 % oral rinse 15 mL, 15 mL, Mouth/Throat, Q12H JOANN, MARBIN Ray, 15 mL at 03/02/24 0826    doxazosin (CARDURA) tablet 8 mg, 8 mg, Oral, BID, MARBIN Ray, 8 mg at 03/02/24 0821    heparin (porcine) subcutaneous injection 5,000 Units, 5,000 Units, Subcutaneous, Q8H JOANN, 5,000 Units at 03/02/24 0506  "**AND** [COMPLETED] Platelet count, , , Once, Shai Chaidez,     hydrALAZINE (APRESOLINE) injection 10 mg, 10 mg, Intravenous, Q6H PRN, MARBIN Ray    hydrALAZINE (APRESOLINE) tablet 75 mg, 75 mg, Oral, Q8H JOANN, Carleen Acosta MD, 75 mg at 03/02/24 0822    HYDROmorphone (DILAUDID) injection 0.5 mg, 0.5 mg, Intravenous, Q1H PRN, MARBIN Ray    isosorbide dinitrate (ISORDIL) tablet 40 mg, 40 mg, Oral, TID after meals, MARBIN Ray, 40 mg at 03/02/24 0822    labetalol (NORMODYNE) injection 10 mg, 10 mg, Intravenous, Q4H PRN, SVEN RayNP, 10 mg at 03/02/24 0643    labetalol (NORMODYNE) tablet 600 mg, 600 mg, Oral, Q8H JOANN, MARBIN Ray, 600 mg at 03/02/24 0505    LORazepam (ATIVAN) tablet 1 mg, 1 mg, Oral, Q6H PRN, SVEN RayNP, 1 mg at 02/28/24 0609    OLANZapine (ZyPREXA) tablet 5 mg, 5 mg, Oral, HS, SVEN RayNP, 5 mg at 03/01/24 2120    pantoprazole (PROTONIX) EC tablet 40 mg, 40 mg, Oral, Daily, SVEN RayNP, 40 mg at 03/02/24 0821    sertraline (ZOLOFT) tablet 200 mg, 200 mg, Oral, Daily, SVEN RayNP, 200 mg at 03/02/24 0822    sucralfate (CARAFATE) tablet 1 g, 1 g, Oral, BID, SVEN RayNP, 1 g at 03/02/24 0822    OBJECTIVE     Current Weight: Weight - Scale: 61.3 kg (135 lb 2.3 oz)  BP (!) 196/118   Pulse 70   Temp 98.6 °F (37 °C)   Resp 21   Ht 5' 6.5\" (1.689 m)   Wt 61.3 kg (135 lb 2.3 oz)   SpO2 100%   BMI 21.49 kg/m²   Vitals:    03/02/24 0821   BP: (!) 196/118   Pulse: 70   Resp:    Temp:    SpO2: 100%     Body mass index is 21.49 kg/m².    Intake/Output Summary (Last 24 hours) at 3/2/2024 0912  Last data filed at 3/1/2024 1916  Gross per 24 hour   Intake 1022 ml   Output 400 ml   Net 622 ml       PHYSICAL EXAMINATION     Physical Exam  Constitutional:       General: He is not in acute distress.  HENT:      Right Ear: External ear " normal.   Eyes:      Conjunctiva/sclera:      Right eye: No hemorrhage.  Neck:      Thyroid: No thyromegaly.   Pulmonary:      Effort: No accessory muscle usage or respiratory distress.   Abdominal:      General: There is no distension.   Skin:     Coloration: Skin is not jaundiced.   Psychiatric:         Behavior: Behavior is not combative.           LAB RESULTS     Results from last 7 days   Lab Units 03/02/24  0508 03/01/24  0426 02/29/24  2101 02/29/24  0509 02/28/24  2224 02/28/24  1104 02/27/24  1945   WBC Thousand/uL 7.99 8.41  --  9.51  --   --  6.22   HEMOGLOBIN g/dL 10.5* 10.6*  --  11.4*  --   --  14.5   HEMATOCRIT % 31.7* 32.9*  --  34.5*  --   --  42.5   PLATELETS Thousands/uL 233 255  --  267  --   --  393*  393*   SODIUM mmol/L 137 137 137 138 137 136 140   POTASSIUM mmol/L 3.1* 3.2* 3.1* 3.0* 3.2* 2.8* 2.5*   CHLORIDE mmol/L 104 103 103 102 101 99 96   CO2 mmol/L 22 24 25 26 23 27 25   BUN mg/dL 68* 69* 65* 57* 55* 48* 53*   CREATININE mg/dL 3.49* 3.60* 3.19* 3.19* 2.81* 2.61* 2.44*   EGFR ml/min/1.73sq m 17 17 20 20 23 25 27   CALCIUM mg/dL 8.8 9.2 9.2 9.0 9.7 9.4 11.3*   MAGNESIUM mg/dL  --  1.9  --   --   --   --   --    PHOSPHORUS mg/dL  --  5.1*  --   --   --  4.8*  --        I have personally reviewed the old medical records and patient's new baseline creatinine seems to be fluctuating around 2.5    RADIOLOGY RESULTS      VAS renal artery complete   Final Result by Hansel Guevara DO (02/28 1017)      CTA dissection protocol chest/abdomen/pelvis   Final Result by Paxton Stevens DO (02/28 0019)      No aortic aneurysm or dissection.      Small volume free fluid/mild mesenteric edema. Consider enteritis in the appropriate clinical setting.               Workstation performed: [id number]         XR chest 1 view portable   Final Result by Daquan Mills MD (02/28 0906)      No radiographic evidence of acute intrathoracic process.            Workstation performed: [id number]              PLAN / RECOMMENDATIONS      1. TRUDI on top of CKD stage IV.  Multifactorial    Review of old medical record from chart review, previously been followed by Dr. Reese at [facility] and seems to me that patient's underlying chronic kidney disease is slowly progressive.  Previously known baseline creatinine was around 2.0 but new baseline creatinine seems to be around 2.5.    Patient was tentatively scheduled to undergo cardiac cath yesterday but due to elevated creatinine, cardiac cath was postponed.  Overnight patient's renal function has marginally improved though remained above the baseline with creatinine of 3.49.  Discussed risk of contrast-induced nephropathy with risk reduction strategy in length with patient yesterday and also again today.  Patient understand the risk of contrast nephropathy and agreeable to proceed to cardiac cath if deemed indicated by cardiology team.  From renal standpoint, recommend proceeding to cardiac cath on Monday.  As a part of renal optimization strategy, will consider use of IV fluid and oral Mucomyst.  Patient also understand that if renal function worsen after cardiac cath, may end up needing to initiate dialysis.  Recommend monitoring renal function today.  Clinically patient is not in volume overload state and advised patient to drink plenty of water to ensure staying well-hydrated.    2.  Hypertensive crisis.  Patient seems to have resistant hypertension and also been allergic to a lot of antihypertensive medications.  Renal artery Doppler done on 2/28/2024 showed no evidence of renal artery stenosis.    Blood pressure seems to be fluctuating overnight and for unknown reason when he got transferred from the ICU, he did not receive an oral dose of hydralazine last night.  Will plan to resume hydralazine 75 mg p.o. 3 times daily and continue doxazosin 8 mg p.o. twice daily, amlodipine 10 mg p.o. daily and labetalol 600 mg p.o. 3 times daily today and  "monitor hypertension.    3.  Secondary hyperparathyroidism of renal origin.  Recent PTH level was 186 [2/28/2024] which was above the goal and was started on Calcitrol 0.25 mcg every other day.  Calcium level is acceptable and continue calcitriol at current dose and recommend checking PTH level as outpatient in around 3 months    4.  Hypokalemia.  Multifactorial with current potassium of 3.1. Plan potassium chloride 40 meq p.o. x 1 dose today.  Recheck potassium level with a.m. lab.    5.  Anemia.  Multifactorial with current hemoglobin of 10.5 which is relatively stable.  No active signs of bleeding seen overnight and recommend blood transfusion if hemoglobin drops below 7.    Overall above mentioned plan and recommendations were also d/w current [facility] physician plus cardiology team and they agreed with my plan of monitor renal function and plan cardiac cath tentatively on Monday    Carleen Acosta MD  Nephrology  3/2/2024        Portions of the record may have been created with voice recognition software. Occasional wrong word or \"sound a like\" substitutions may have occurred due to the inherent limitations of voice recognition software. Read the chart carefully and recognize, using context, where substitutions have occurred.  "

## 2024-03-02 NOTE — ASSESSMENT & PLAN NOTE
Recent Labs     03/01/24  0217   URINECX >100,000 cfu/ml Enterococcus faecalis*       Recent Labs     03/01/24  0426 03/02/24  0508 03/03/24  0504   WBC 8.41 7.99 7.29     Not meeting sepsis criteria    Clx showing E-faecalis, Rocephin day 5/5 today  Monitor WBC and fever curve  No symptoms reported today

## 2024-03-02 NOTE — PROGRESS NOTES
"[facility]   Progress Note  Name: Genna Liu I  MRN: [id number]  Unit/Bed#: -01 I Date of Admission: 2/27/2024   Date of Service: 3/2/2024 I Hospital Day: 3    Elevated troponin  Assessment & Plan  Lab Results   Component Value Date    HSTNI0 353 (H) 02/28/2024    HSTNI2 352 (H) 02/28/2024    HSTNID2 -1 02/28/2024    HSTNI4 349 (H) 02/28/2024    HSTNID4 -4 02/28/2024     Cariology on board  Plan for cath on Monday  Nephro for renal optimization tomorrow    Transaminitis  Assessment & Plan  Recent Labs     02/29/24  0509 03/01/24  0426   AST 47* 30   ALT 54* 46   ALKPHOS 78 73   TBILI 1.02* 0.94     Continue to trend    UTI (urinary tract infection)  Assessment & Plan  UA positive for leukocytes    Rocephin D3; Clx showing E-faecalis; pending sensitivities  Blood cultures pending  Monitor WBC and fever curve   Will check procal - if negative consider DC abx    Chronic heart failure with preserved ejection fraction (HFpEF) ([facility])  Assessment & Plan  Wt Readings from Last 3 Encounters:   03/02/24 61.3 kg (135 lb 2.3 oz)   01/25/24 65.3 kg (144 lb)   12/27/23 66.8 kg (147 lb 4.3 oz)       Currently euvolemic on exam  Echo: EF 55%  HCTZ on hold due to hypokalemia  Daily weights, I&Os    Stage 4 chronic kidney disease (HCC)  Assessment & Plan  Lab Results   Component Value Date    EGFR 17 03/02/2024    EGFR 17 03/01/2024    EGFR 20 02/29/2024    CREATININE 3.49 (H) 03/02/2024    CREATININE 3.60 (H) 03/01/2024    CREATININE 3.19 (H) 02/29/2024   History of CKD 4  Upon presentation, at baseline  Avoid nephrotoxic agents    Noncompliance with medication regimen  Assessment & Plan  Patient reports that he does not regularly take his medications sliding \"stomach issues\" as reason  Denies financial constraints   Extensive counseling regarding importance of medication compliance in the setting of numerous underlying medical conditions    Depression with anxiety  Assessment & Plan  - " Chronic/stable  - No suicidal or homicidal ideation    Hypokalemia  Assessment & Plan  Recent Labs     02/29/24  2101 03/01/24  0426 03/02/24  0508   K 3.1* 3.2* 3.1*   MG  --  1.9  --        Replete and recheck accordingly  Repeat BMP    COPD without exacerbation (HCC)  Assessment & Plan  No acute exacerbation  Continue home inhalers    * Hypertensive emergency  Assessment & Plan  Patient presents tonight with complaints of substernal chest pressure, nonradiating, anxiety, headache, visual change    Blood Pressure: 151/93    H/o CKD and with known medical noncomplaince  SBP>200 while in the ED  Started on IV cardene infusion in the ED, which was discontinued  Resume home medications: Amlodipine 10 mg, Cardura 8 mg HS, labetalol 600 mg TID, isosorbide 40mg TID  Holding hydrochlorothiazide secondary to hypokalemia  BP Prn meds for BP > 160  Added amiloride 5mg daily and hydralazine 25mg Q8H on 2/29/24  Renal ultrasound without arterial occlusive disease  Nephrology following          VTE Pharmacologic Prophylaxis: VTE Score: 4 Moderate Risk (Score 3-4) - Pharmacological DVT Prophylaxis Ordered: heparin drip.    Mobility:   Basic Mobility Inpatient Raw Score: 21  JH-HLM Goal: 6: Walk 10 steps or more  JH-HLM Achieved: 6: Walk 10 steps or more  HLM Goal achieved. Continue to encourage appropriate mobility.    Patient Centered Rounds: I performed bedside rounds with nursing staff today.  Discussions with Specialists or Other Care Team Provider:  IP CONSULT TO CARDIOLOGY  IP CONSULT TO NEPHROLOGY  IP CONSULT TO CASE MANAGEMENT      Education and Discussions with Family / Patient: patient     Total Time Spent on Date of Encounter in care of patient: 30+ mins. This time was spent on one or more of the following: performing physical exam; counseling and coordination of care; obtaining or reviewing history; documenting in the medical record; reviewing/ordering tests, medications or procedures; communicating with other  healthcare professionals and discussing with patient's family/caregivers.    Current Length of Stay: 3 day(s)  Current Patient Status: Inpatient   Certification Statement: The patient will continue to require additional inpatient hospital stay due to plan as noted above  Discharge Plan: Anticipate discharge in 24-48 hrs to home.    Code Status: Level 1 - Full Code    Subjective:   Offers no new complaints at this time. No acute events reported overnight. Understanding of plan.  All questions answered.    Objective:     Vitals:   Temp (24hrs), Av.6 °F (37 °C), Min:98 °F (36.7 °C), Max:98.9 °F (37.2 °C)    Temp:  [98 °F (36.7 °C)-98.9 °F (37.2 °C)] 98 °F (36.7 °C)  HR:  [57-72] 62  Resp:  [18-21] 18  BP: (129-196)/() 151/93  SpO2:  [96 %-100 %] 99 %  Body mass index is 21.49 kg/m².     Input and Output Summary (last 24 hours):     Intake/Output Summary (Last 24 hours) at 3/2/2024 1848  Last data filed at 3/1/2024 1916  Gross per 24 hour   Intake --   Output 150 ml   Net -150 ml       Physical Exam:   Physical Exam  Vitals and nursing note reviewed.   Constitutional:       General: He is not in acute distress.     Appearance: He is well-developed.   HENT:      Head: Normocephalic and atraumatic.   Eyes:      Conjunctiva/sclera: Conjunctivae normal.   Cardiovascular:      Rate and Rhythm: Normal rate and regular rhythm.      Heart sounds: No murmur heard.  Pulmonary:      Effort: Pulmonary effort is normal. No respiratory distress.      Breath sounds: Normal breath sounds.   Abdominal:      Palpations: Abdomen is soft.      Tenderness: There is no abdominal tenderness.   Musculoskeletal:         General: No swelling.      Cervical back: Neck supple.   Skin:     General: Skin is warm and dry.      Capillary Refill: Capillary refill takes less than 2 seconds.   Neurological:      Mental Status: He is alert. Mental status is at baseline.   Psychiatric:         Mood and Affect: Mood normal.          Additional  Data:     Labs:  Results from last 7 days   Lab Units 24  0508   WBC Thousand/uL 7.99   HEMOGLOBIN g/dL 10.5*   HEMATOCRIT % 31.7*   PLATELETS Thousands/uL 233   NEUTROS PCT % 62   LYMPHS PCT % 24   MONOS PCT % 11   EOS PCT % 2     Results from last 7 days   Lab Units 24  0508 24  0426   SODIUM mmol/L 137 137   POTASSIUM mmol/L 3.1* 3.2*   CHLORIDE mmol/L 104 103   CO2 mmol/L 22 24   BUN mg/dL 68* 69*   CREATININE mg/dL 3.49* 3.60*   ANION GAP mmol/L 11 10   CALCIUM mg/dL 8.8 9.2   ALBUMIN g/dL  --  3.7   TOTAL BILIRUBIN mg/dL  --  0.94   ALK PHOS U/L  --  73   ALT U/L  --  46   AST U/L  --  30   GLUCOSE RANDOM mg/dL 88 93     Results from last 7 days   Lab Units 24  2326   INR  1.11             Results from last 7 days   Lab Units 24  0426 24  1846   LACTIC ACID mmol/L  --  0.6   PROCALCITONIN ng/ml 0.15  --        Lines/Drains:  Invasive Devices       Peripheral Intravenous Line  Duration             Peripheral IV 24 Left Forearm 1 day                      Telemetry:  Telemetry Orders (From admission, onward)               24 Hour Telemetry Monitoring  Continuous x 24 Hours (Telem)           Question:  Reason for 24 Hour Telemetry  Answer:  PCI/EP study (including pacer and ICD implementation), Cardiac surgery, MI, abnormal cardiac cath, and chest pain- rule out MI                     Telemetry Reviewed:   Indication for Continued Telemetry Use: Acute MI/Unstable Angina/Rule out ACS             Imaging: Reviewed radiology reports from this admission including:   XR chest 1 view portable    Result Date: 2024  Impression: No radiographic evidence of acute intrathoracic process. Workstation performed: [id number]     CTA dissection protocol chest/abdomen/pelvis    Result Date: 2024  Impression: No aortic aneurysm or dissection. Small volume free fluid/mild mesenteric edema. Consider enteritis in the appropriate clinical setting. Workstation performed: [id number]        No Chest XR results available for this patient.     Results for orders placed during the hospital encounter of 02/27/24    Echo complete w/ contrast if indicated    Interpretation Summary    Left Ventricle: Left ventricular cavity size is normal. Wall thickness is severely increased. There is severe concentric hypertrophy. The left ventricular ejection fraction is 50%. Systolic function is low normal. Wall motion is normal. Diastolic function is mildly abnormal, consistent with grade I (abnormal) relaxation.    Right Ventricle: Right ventricular cavity size is normal. Systolic function is normal.    Left Atrium: The atrium is mildly dilated.    Right Atrium: The atrium is mildly dilated.    Mitral Valve: There is mild regurgitation.    Tricuspid Valve: There is mild regurgitation.      Recent Cultures (last 7 days):   Results from last 7 days   Lab Units 03/01/24  0217 02/29/24  1836   BLOOD CULTURE   --  No Growth at 24 hrs.  No Growth at 24 hrs.   URINE CULTURE  >100,000 cfu/ml Enterococcus faecalis*  --        Last 24 Hours Medication List:   Current Facility-Administered Medications   Medication Dose Route Frequency Provider Last Rate    acetaminophen  650 mg Oral Q4H PRN MARBIN Ray      albuterol  2 puff Inhalation Q4H PRN MARBIN Ray      amLODIPine  10 mg Oral Daily MARBIN Ray      ARIPiprazole  5 mg Oral Daily MARBIN Ray      aspirin  81 mg Oral Daily MARBIN Ray      atorvastatin  40 mg Oral HS MARBIN Ray      cefTRIAXone  1,000 mg Intravenous Q24H SVEN RayNP 1,000 mg (03/02/24 1702)    chlorhexidine  15 mL Mouth/Throat Q12H [facility] MARBIN Ray      doxazosin  8 mg Oral BID MARBIN Ray      heparin (porcine)  5,000 Units Subcutaneous Q8H JOANN MARBIN Ray      hydrALAZINE  10 mg Intravenous Q6H PRN MARBIN Ray       hydrALAZINE  75 mg Oral Q8H JOANN Carleen Acosta MD      HYDROmorphone  0.5 mg Intravenous Q1H PRN MARBIN Ray      isosorbide dinitrate  40 mg Oral TID after meals Vidal Reece, MARBIN      labetalol  10 mg Intravenous Q4H PRN Vidal Reece, MARBIN      labetalol  600 mg Oral Q8H JOANN MARBIN Ray      LORazepam  1 mg Oral Q6H PRN Vidal Reece, MARBIN      OLANZapine  5 mg Oral HS MARBIN Ray      pantoprazole  40 mg Oral Daily MARBIN Ray      sertraline  200 mg Oral Daily MARBIN Ray      sucralfate  1 g Oral BID MARBIN Ray          Today, Patient Was Seen By: Zackary Arndt DO    **Please Note: This note may have been constructed using a voice recognition system.**

## 2024-03-02 NOTE — ASSESSMENT & PLAN NOTE
Lab Results   Component Value Date    HSTNI0 353 (H) 02/28/2024    HSTNI2 352 (H) 02/28/2024    HSTNID2 -1 02/28/2024    HSTNI4 349 (H) 02/28/2024    HSTNID4 -4 02/28/2024     Cariology on board  Plan for cath on Monday after abnormal stress test 2/29  Nephro for renal optimization

## 2024-03-02 NOTE — PROGRESS NOTES
"Cardiology Progress Note - Genna Liu 58 y.o. male MRN: [id number]    Unit/Bed#: -01 Encounter: [id number]      Assessment/Plan:  1.  Chest pain  Troponins with peak of 353, EKG with inferolateral T wave abnormality.  Pharmacological MPI stress test could not definitively exclude underlying CAD.  Plan for cardiac catheterization when clinically optimized and cleared by nephrology.    2.  Hypertensive emergency  Blood pressure has been labile with marked fluctuations.  Continue antihypertensives per nephrology.    3.  Elevated troponins  Troponins with peak of 353, see plan above.    4.  TRUDI on CKD 4  5.  LVH  6.  History of pericardial effusion s/p pericardial window (7/2023)  7.  Hyperaldosteronism s/p left adrenalectomy (2012)  8.  Medication noncompliance  9.  History of DVT  10.  History of Shamika-en-Y gastric bypass        Subjective:   Patient seen and examined.  No significant events overnight. Denies reoccurrence of chest pain or any new complaints at this time.  All questions were answered.     Objective:     Vitals: Blood pressure (!) 196/118, pulse 70, temperature 98.6 °F (37 °C), resp. rate 21, height 5' 6.5\" (1.689 m), weight 61.3 kg (135 lb 2.3 oz), SpO2 100%., Body mass index is 21.49 kg/m².,   Orthostatic Blood Pressures      Flowsheet Row Most Recent Value   Blood Pressure 196/118 filed at 03/02/2024 0821   Patient Position - Orthostatic VS Lying filed at 03/02/2024 0317              Intake/Output Summary (Last 24 hours) at 3/2/2024 0841  Last data filed at 3/1/2024 1916  Gross per 24 hour   Intake 1022 ml   Output 400 ml   Net 622 ml         Physical Exam:  GEN: Alert and oriented x 3, in no acute distress.  Well appearing and well nourished.   HEENT: Sclera anicteric, conjunctivae pink, mucous membranes moist. Oropharynx clear.   NECK: Supple, no carotid bruits, no significant JVD. Trachea midline, no thyromegaly.   HEART: Regular rhythm, normal S1 and S2, no murmurs, clicks, gallops or " rubs. PMI nondisplaced, no thrills.   LUNGS: Clear to auscultation bilaterally; no wheezes, rales, or rhonchi. No increased work of breathing or signs of respiratory distress.   ABDOMEN: Soft, nontender, nondistended, normoactive bowel sounds.   EXTREMITIES: Skin warm and well perfused, no clubbing, cyanosis, or edema.  NEURO: No focal findings. Normal speech. Mood and affect normal.   SKIN: Normal without suspicious lesions on exposed skin.        Medications:      Current Facility-Administered Medications:     acetaminophen (TYLENOL) tablet 650 mg, 650 mg, Oral, Q4H PRN, MARBIN Ray, 650 mg at 03/01/24 2122    albuterol (PROVENTIL HFA,VENTOLIN HFA) inhaler 2 puff, 2 puff, Inhalation, Q4H PRN, MARBIN Ray    amLODIPine (NORVASC) tablet 10 mg, 10 mg, Oral, Daily, MARBIN Ray, 10 mg at 03/02/24 0822    ARIPiprazole (ABILIFY) tablet 5 mg, 5 mg, Oral, Daily, MARBIN Ray, 5 mg at 03/02/24 0822    aspirin chewable tablet 81 mg, 81 mg, Oral, Daily, MARBIN Ray, 81 mg at 03/02/24 0822    atorvastatin (LIPITOR) tablet 40 mg, 40 mg, Oral, HS, MARBIN Ray, 40 mg at 03/01/24 2120    cefTRIAXone (ROCEPHIN) IVPB (premix in dextrose) 1,000 mg 50 mL, 1,000 mg, Intravenous, Q24H, MARBIN Ray, Last Rate: 100 mL/hr at 03/01/24 1723, 1,000 mg at 03/01/24 1723    chlorhexidine (PERIDEX) 0.12 % oral rinse 15 mL, 15 mL, Mouth/Throat, Q12H JOANN, MARBIN Ray, 15 mL at 03/02/24 0826    doxazosin (CARDURA) tablet 8 mg, 8 mg, Oral, BID, MARBIN Ray, 8 mg at 03/02/24 0821    heparin (porcine) subcutaneous injection 5,000 Units, 5,000 Units, Subcutaneous, Q8H JOANN, 5,000 Units at 03/02/24 0506 **AND** [COMPLETED] Platelet count, , , Once, Shai Chaidez,     hydrALAZINE (APRESOLINE) injection 10 mg, 10 mg, Intravenous, Q6H PRN, MARBIN Ray    hydrALAZINE (APRESOLINE) tablet 75  mg, 75 mg, Oral, Q8H JOANN, Carleen Acosta MD, 75 mg at 03/02/24 0822    HYDROmorphone (DILAUDID) injection 0.5 mg, 0.5 mg, Intravenous, Q1H PRN, MARBIN Ray    isosorbide dinitrate (ISORDIL) tablet 40 mg, 40 mg, Oral, TID after meals, MARBIN Ray, 40 mg at 03/02/24 0822    labetalol (NORMODYNE) injection 10 mg, 10 mg, Intravenous, Q4H PRN, MARBIN Ray, 10 mg at 03/02/24 0643    labetalol (NORMODYNE) tablet 600 mg, 600 mg, Oral, Q8H JOANN, MARBIN Ray, 600 mg at 03/02/24 0505    LORazepam (ATIVAN) tablet 1 mg, 1 mg, Oral, Q6H PRN, MARBIN Ray, 1 mg at 02/28/24 0609    OLANZapine (ZyPREXA) tablet 5 mg, 5 mg, Oral, HS, MARBIN Ray, 5 mg at 03/01/24 2120    pantoprazole (PROTONIX) EC tablet 40 mg, 40 mg, Oral, Daily, MARBIN Ray, 40 mg at 03/02/24 0821    sertraline (ZOLOFT) tablet 200 mg, 200 mg, Oral, Daily, MARBIN Ray, 200 mg at 03/02/24 0822    sucralfate (CARAFATE) tablet 1 g, 1 g, Oral, BID, MARBIN Ray, 1 g at 03/02/24 0822     Labs & Results:     Results from last 7 days   Lab Units 02/28/24 2224 02/28/24 2024 02/28/24 1821 02/27/24 2326 02/27/24 2202 02/27/24  1945   HS TNI 0HR ng/L  --   --  353*  --   --  207*   HS TNI 2HR ng/L  --  352*  --   --  244*  --    HSTNI D2 ng/L  --  -1  --   --  37*  --    HS TNI 4HR ng/L 349*  --   --  251*  --   --    HSTNI D4 ng/L -4  --   --  44*  --   --      Results from last 7 days   Lab Units 03/02/24  0508 03/01/24  0426 02/29/24  0509   WBC Thousand/uL 7.99 8.41 9.51   HEMOGLOBIN g/dL 10.5* 10.6* 11.4*   HEMATOCRIT % 31.7* 32.9* 34.5*   PLATELETS Thousands/uL 233 255 267         Results from last 7 days   Lab Units 03/02/24  0508 03/01/24  0426 02/29/24  2101 02/29/24  0509 02/28/24  1104 02/27/24  1945   POTASSIUM mmol/L 3.1* 3.2* 3.1* 3.0*   < > 2.5*   CHLORIDE mmol/L 104 103 103 102   < > 96   CO2 mmol/L 22 24 25  "26   < > 25   BUN mg/dL 68* 69* 65* 57*   < > 53*   CREATININE mg/dL 3.49* 3.60* 3.19* 3.19*   < > 2.44*   CALCIUM mg/dL 8.8 9.2 9.2 9.0   < > 11.3*   ALK PHOS U/L  --  73  --  78  --  107*   ALT U/L  --  46  --  54*  --  89*   AST U/L  --  30  --  47*  --  45*    < > = values in this interval not displayed.     Results from last 7 days   Lab Units 24  2326   INR  1.11   PTT seconds 29     Results from last 7 days   Lab Units 24  0426   MAGNESIUM mg/dL 1.9       Vitals: Blood pressure (!) 196/118, pulse 70, temperature 98.6 °F (37 °C), resp. rate 21, height 5' 6.5\" (1.689 m), weight 61.3 kg (135 lb 2.3 oz), SpO2 100%., Body mass index is 21.49 kg/m².,   Orthostatic Blood Pressures      Flowsheet Row Most Recent Value   Blood Pressure 196/118 filed at 2024 0821   Patient Position - Orthostatic VS Lying filed at 2024 0317            Systolic (24hrs), Av , Min:115 , Max:196     Diastolic (24hrs), Av, Min:55, Max:118        Intake/Output Summary (Last 24 hours) at 3/2/2024 0841  Last data filed at 3/1/2024 1916  Gross per 24 hour   Intake 1022 ml   Output 400 ml   Net 622 ml       Invasive Devices       Peripheral Intravenous Line  Duration             Peripheral IV 24 Left Forearm 1 day                      Telemetry:  Telemetry Orders (From admission, onward)               24 Hour Telemetry Monitoring  Continuous x 24 Hours (Telem)        Expiring   Question:  Reason for 24 Hour Telemetry  Answer:  PCI/EP study (including pacer and ICD implementation), Cardiac surgery, MI, abnormal cardiac cath, and chest pain- rule out MI                       BP Readings from Last 3 Encounters:   24 (!) 196/118   24 (!) 172/110   23 153/96      Wt Readings from Last 3 Encounters:   24 61.3 kg (135 lb 2.3 oz)   24 65.3 kg (144 lb)   23 66.8 kg (147 lb 4.3 oz)                    "

## 2024-03-03 LAB
ALBUMIN SERPL BCP-MCNC: 3.7 G/DL (ref 3.5–5)
ALP SERPL-CCNC: 66 U/L (ref 34–104)
ALT SERPL W P-5'-P-CCNC: 34 U/L (ref 7–52)
ANION GAP SERPL CALCULATED.3IONS-SCNC: 10 MMOL/L
ANION GAP SERPL CALCULATED.3IONS-SCNC: 9 MMOL/L
AST SERPL W P-5'-P-CCNC: 18 U/L (ref 13–39)
BACTERIA UR CULT: ABNORMAL
BASOPHILS # BLD AUTO: 0.05 THOUSANDS/ÂΜL (ref 0–0.1)
BASOPHILS NFR BLD AUTO: 1 % (ref 0–1)
BILIRUB SERPL-MCNC: 0.77 MG/DL (ref 0.2–1)
BUN SERPL-MCNC: 66 MG/DL (ref 5–25)
BUN SERPL-MCNC: 67 MG/DL (ref 5–25)
CALCIUM SERPL-MCNC: 9 MG/DL (ref 8.4–10.2)
CALCIUM SERPL-MCNC: 9.1 MG/DL (ref 8.4–10.2)
CHLORIDE SERPL-SCNC: 105 MMOL/L (ref 96–108)
CHLORIDE SERPL-SCNC: 105 MMOL/L (ref 96–108)
CO2 SERPL-SCNC: 21 MMOL/L (ref 21–32)
CO2 SERPL-SCNC: 22 MMOL/L (ref 21–32)
CREAT SERPL-MCNC: 3.26 MG/DL (ref 0.6–1.3)
CREAT SERPL-MCNC: 3.3 MG/DL (ref 0.6–1.3)
EOSINOPHIL # BLD AUTO: 0.27 THOUSAND/ÂΜL (ref 0–0.61)
EOSINOPHIL NFR BLD AUTO: 4 % (ref 0–6)
ERYTHROCYTE [DISTWIDTH] IN BLOOD BY AUTOMATED COUNT: 15.3 % (ref 11.6–15.1)
GFR SERPL CREATININE-BSD FRML MDRD: 19 ML/MIN/1.73SQ M
GFR SERPL CREATININE-BSD FRML MDRD: 19 ML/MIN/1.73SQ M
GLUCOSE SERPL-MCNC: 82 MG/DL (ref 65–140)
GLUCOSE SERPL-MCNC: 83 MG/DL (ref 65–140)
HCT VFR BLD AUTO: 34.4 % (ref 36.5–49.3)
HGB BLD-MCNC: 11.2 G/DL (ref 12–17)
IMM GRANULOCYTES # BLD AUTO: 0.02 THOUSAND/UL (ref 0–0.2)
IMM GRANULOCYTES NFR BLD AUTO: 0 % (ref 0–2)
LYMPHOCYTES # BLD AUTO: 1.81 THOUSANDS/ÂΜL (ref 0.6–4.47)
LYMPHOCYTES NFR BLD AUTO: 25 % (ref 14–44)
MAGNESIUM SERPL-MCNC: 2.4 MG/DL (ref 1.9–2.7)
MCH RBC QN AUTO: 27.9 PG (ref 26.8–34.3)
MCHC RBC AUTO-ENTMCNC: 32.6 G/DL (ref 31.4–37.4)
MCV RBC AUTO: 86 FL (ref 82–98)
MONOCYTES # BLD AUTO: 0.75 THOUSAND/ÂΜL (ref 0.17–1.22)
MONOCYTES NFR BLD AUTO: 10 % (ref 4–12)
NEUTROPHILS # BLD AUTO: 4.39 THOUSANDS/ÂΜL (ref 1.85–7.62)
NEUTS SEG NFR BLD AUTO: 60 % (ref 43–75)
NRBC BLD AUTO-RTO: 0 /100 WBCS
PLATELET # BLD AUTO: 243 THOUSANDS/UL (ref 149–390)
PMV BLD AUTO: 10.1 FL (ref 8.9–12.7)
POTASSIUM SERPL-SCNC: 3.3 MMOL/L (ref 3.5–5.3)
POTASSIUM SERPL-SCNC: 3.3 MMOL/L (ref 3.5–5.3)
PROT SERPL-MCNC: 6.3 G/DL (ref 6.4–8.4)
RBC # BLD AUTO: 4.02 MILLION/UL (ref 3.88–5.62)
SODIUM SERPL-SCNC: 136 MMOL/L (ref 135–147)
SODIUM SERPL-SCNC: 136 MMOL/L (ref 135–147)
WBC # BLD AUTO: 7.29 THOUSAND/UL (ref 4.31–10.16)

## 2024-03-03 PROCEDURE — 85025 COMPLETE CBC W/AUTO DIFF WBC: CPT | Performed by: INTERNAL MEDICINE

## 2024-03-03 PROCEDURE — 80048 BASIC METABOLIC PNL TOTAL CA: CPT

## 2024-03-03 PROCEDURE — 83735 ASSAY OF MAGNESIUM: CPT | Performed by: INTERNAL MEDICINE

## 2024-03-03 PROCEDURE — 99232 SBSQ HOSP IP/OBS MODERATE 35: CPT | Performed by: INTERNAL MEDICINE

## 2024-03-03 PROCEDURE — 80053 COMPREHEN METABOLIC PANEL: CPT | Performed by: INTERNAL MEDICINE

## 2024-03-03 PROCEDURE — 99233 SBSQ HOSP IP/OBS HIGH 50: CPT | Performed by: INTERNAL MEDICINE

## 2024-03-03 RX ORDER — ACETYLCYSTEINE 200 MG/ML
1200 SOLUTION ORAL; RESPIRATORY (INHALATION) 2 TIMES DAILY
Status: DISCONTINUED | OUTPATIENT
Start: 2024-03-03 | End: 2024-03-06 | Stop reason: HOSPADM

## 2024-03-03 RX ORDER — METOCLOPRAMIDE HYDROCHLORIDE 5 MG/ML
5 INJECTION INTRAMUSCULAR; INTRAVENOUS EVERY 8 HOURS PRN
Status: DISCONTINUED | OUTPATIENT
Start: 2024-03-03 | End: 2024-03-06 | Stop reason: HOSPADM

## 2024-03-03 RX ORDER — BACLOFEN 10 MG/1
5 TABLET ORAL 3 TIMES DAILY
Status: DISCONTINUED | OUTPATIENT
Start: 2024-03-03 | End: 2024-03-03

## 2024-03-03 RX ORDER — SODIUM CHLORIDE 9 MG/ML
100 INJECTION, SOLUTION INTRAVENOUS CONTINUOUS
Status: DISPENSED | OUTPATIENT
Start: 2024-03-03 | End: 2024-03-04

## 2024-03-03 RX ORDER — POTASSIUM CHLORIDE 20 MEQ/1
40 TABLET, EXTENDED RELEASE ORAL ONCE
Status: COMPLETED | OUTPATIENT
Start: 2024-03-03 | End: 2024-03-03

## 2024-03-03 RX ADMIN — SUCRALFATE 1 G: 1 TABLET ORAL at 17:30

## 2024-03-03 RX ADMIN — ISOSORBIDE DINITRATE 40 MG: 10 TABLET ORAL at 16:24

## 2024-03-03 RX ADMIN — OLANZAPINE 5 MG: 2.5 TABLET, FILM COATED ORAL at 21:01

## 2024-03-03 RX ADMIN — HYDRALAZINE HYDROCHLORIDE 75 MG: 25 TABLET ORAL at 16:24

## 2024-03-03 RX ADMIN — ISOSORBIDE DINITRATE 40 MG: 10 TABLET ORAL at 08:25

## 2024-03-03 RX ADMIN — SODIUM CHLORIDE 100 ML/HR: 0.9 INJECTION, SOLUTION INTRAVENOUS at 20:17

## 2024-03-03 RX ADMIN — ASPIRIN 81 MG: 81 TABLET, CHEWABLE ORAL at 08:25

## 2024-03-03 RX ADMIN — LABETALOL HYDROCHLORIDE 600 MG: 200 TABLET, FILM COATED ORAL at 21:02

## 2024-03-03 RX ADMIN — DOXAZOSIN 8 MG: 4 TABLET ORAL at 21:01

## 2024-03-03 RX ADMIN — LABETALOL HYDROCHLORIDE 600 MG: 200 TABLET, FILM COATED ORAL at 16:25

## 2024-03-03 RX ADMIN — HYDRALAZINE HYDROCHLORIDE 75 MG: 25 TABLET ORAL at 05:00

## 2024-03-03 RX ADMIN — CHLORHEXIDINE GLUCONATE 0.12% ORAL RINSE 15 ML: 1.2 LIQUID ORAL at 21:02

## 2024-03-03 RX ADMIN — CHLORHEXIDINE GLUCONATE 0.12% ORAL RINSE 15 ML: 1.2 LIQUID ORAL at 08:25

## 2024-03-03 RX ADMIN — LABETALOL HYDROCHLORIDE 600 MG: 200 TABLET, FILM COATED ORAL at 05:00

## 2024-03-03 RX ADMIN — HEPARIN SODIUM 5000 UNITS: 5000 INJECTION INTRAVENOUS; SUBCUTANEOUS at 21:02

## 2024-03-03 RX ADMIN — ATORVASTATIN CALCIUM 40 MG: 40 TABLET, FILM COATED ORAL at 21:01

## 2024-03-03 RX ADMIN — SUCRALFATE 1 G: 1 TABLET ORAL at 08:24

## 2024-03-03 RX ADMIN — ACETYLCYSTEINE 1200 MG: 200 SOLUTION ORAL; RESPIRATORY (INHALATION) at 17:31

## 2024-03-03 RX ADMIN — ISOSORBIDE DINITRATE 40 MG: 10 TABLET ORAL at 21:01

## 2024-03-03 RX ADMIN — POTASSIUM CHLORIDE 40 MEQ: 1500 TABLET, EXTENDED RELEASE ORAL at 08:25

## 2024-03-03 RX ADMIN — AMLODIPINE BESYLATE 10 MG: 10 TABLET ORAL at 08:24

## 2024-03-03 RX ADMIN — PANTOPRAZOLE SODIUM 40 MG: 40 TABLET, DELAYED RELEASE ORAL at 08:24

## 2024-03-03 RX ADMIN — ARIPIPRAZOLE 5 MG: 5 TABLET ORAL at 08:24

## 2024-03-03 RX ADMIN — DOXAZOSIN 8 MG: 4 TABLET ORAL at 08:25

## 2024-03-03 RX ADMIN — HEPARIN SODIUM 5000 UNITS: 5000 INJECTION INTRAVENOUS; SUBCUTANEOUS at 05:00

## 2024-03-03 RX ADMIN — CEFTRIAXONE 1000 MG: 1 INJECTION, SOLUTION INTRAVENOUS at 17:31

## 2024-03-03 RX ADMIN — SERTRALINE HYDROCHLORIDE 200 MG: 50 TABLET ORAL at 08:24

## 2024-03-03 RX ADMIN — HEPARIN SODIUM 5000 UNITS: 5000 INJECTION INTRAVENOUS; SUBCUTANEOUS at 16:24

## 2024-03-03 RX ADMIN — HYDRALAZINE HYDROCHLORIDE 75 MG: 25 TABLET ORAL at 21:00

## 2024-03-03 NOTE — PLAN OF CARE
Problem: DISCHARGE PLANNING  Goal: Discharge to home or other facility with appropriate resources  Description: INTERVENTIONS:  - Identify barriers to discharge w/patient and caregiver  - Arrange for needed discharge resources and transportation as appropriate  - Identify discharge learning needs (meds, wound care, etc.)  - Arrange for interpretive services to assist at discharge as needed  - Refer to Case Management Department for coordinating discharge planning if the patient needs post-hospital services based on physician/advanced practitioner order or complex needs related to functional status, cognitive ability, or social support system  Outcome: Progressing     Problem: CARDIOVASCULAR - ADULT  Goal: Maintains optimal cardiac output and hemodynamic stability  Description: INTERVENTIONS:  - Monitor I/O, vital signs and rhythm  - Monitor for S/S and trends of decreased cardiac output  - Administer and titrate ordered vasoactive medications to optimize hemodynamic stability  - Assess quality of pulses, skin color and temperature  - Assess for signs of decreased coronary artery perfusion  - Instruct patient to report change in severity of symptoms  Outcome: Progressing     Problem: INFECTION - ADULT  Goal: Absence or prevention of progression during hospitalization  Description: INTERVENTIONS:  - Assess and monitor for signs and symptoms of infection  - Monitor lab/diagnostic results  - Monitor all insertion sites, i.e. indwelling lines, tubes, and drains  - Monitor endotracheal if appropriate and nasal secretions for changes in amount and color  - [place] appropriate cooling/warming therapies per order  - Administer medications as ordered  - Instruct and encourage patient and family to use good hand hygiene technique  - Identify and instruct in appropriate isolation precautions for identified infection/condition  Outcome: Progressing     Problem: INFECTION - ADULT  Goal: Absence of fever/infection during  neutropenic period  Description: INTERVENTIONS:  - Monitor WBC    Outcome: Progressing

## 2024-03-03 NOTE — PROGRESS NOTES
NEPHROLOGY PROGRESS NOTE    Patient: Genna Liu               Sex: male          DOA: 2/27/2024  6:23 PM   YOB: 1965        Age: 58 y.o.         LOS:  LOS: 4 days   Encounter Date: 3/3/2024    REASON FOR THE CONSULTATION: Further management of TRUDI    HPI     This is a 58 y.o. male admitted for Hypertensive emergency     SUBJECTIVE     -Blood pressure seems to be acceptable in last 24 hours and patient has been tolerating antihypertensive medications.  Breathing was stable and also found to be nonoliguric overnight.  Patient denies nausea, vomiting, headache or dizziness today.  - Reviewed last 24 hrs events     CURRENT MEDICATIONS       Current Facility-Administered Medications:     acetaminophen (TYLENOL) tablet 650 mg, 650 mg, Oral, Q4H PRN, MARBIN Ray, 650 mg at 03/02/24 2130    acetylcysteine (MUCOMYST) 200 mg/mL oral solution 1,200 mg, 1,200 mg, Oral, BID, Carleen Acosta MD    albuterol (PROVENTIL HFA,VENTOLIN HFA) inhaler 2 puff, 2 puff, Inhalation, Q4H PRN, MARBIN Ray    amLODIPine (NORVASC) tablet 10 mg, 10 mg, Oral, Daily, MARBIN Ray, 10 mg at 03/03/24 0824    ARIPiprazole (ABILIFY) tablet 5 mg, 5 mg, Oral, Daily, MARBIN Ray, 5 mg at 03/03/24 0824    aspirin chewable tablet 81 mg, 81 mg, Oral, Daily, MARBIN Ray, 81 mg at 03/03/24 0825    atorvastatin (LIPITOR) tablet 40 mg, 40 mg, Oral, HS, MARBIN Ray, 40 mg at 03/02/24 2131    cefTRIAXone (ROCEPHIN) IVPB (premix in dextrose) 1,000 mg 50 mL, 1,000 mg, Intravenous, Q24H, MARBIN Ray, Last Rate: 100 mL/hr at 03/02/24 1702, 1,000 mg at 03/02/24 1702    chlorhexidine (PERIDEX) 0.12 % oral rinse 15 mL, 15 mL, Mouth/Throat, Q12H JOANN, MARBIN Ray, 15 mL at 03/03/24 0825    doxazosin (CARDURA) tablet 8 mg, 8 mg, Oral, BID, MARBIN Ray, 8 mg at 03/03/24 0825    heparin (porcine) subcutaneous  "injection 5,000 Units, 5,000 Units, Subcutaneous, Q8H JOANN, 5,000 Units at 03/03/24 0500 **AND** [COMPLETED] Platelet count, , , Once, Shai Chaidez,     hydrALAZINE (APRESOLINE) injection 10 mg, 10 mg, Intravenous, Q6H PRN, MARBIN Ray    hydrALAZINE (APRESOLINE) tablet 75 mg, 75 mg, Oral, Q8H JOANN, Carleen Acosta MD, 75 mg at 03/03/24 0500    HYDROmorphone (DILAUDID) injection 0.5 mg, 0.5 mg, Intravenous, Q1H PRN, MARBIN Ray    isosorbide dinitrate (ISORDIL) tablet 40 mg, 40 mg, Oral, TID after meals, MARBIN Ray, 40 mg at 03/03/24 0825    labetalol (NORMODYNE) injection 10 mg, 10 mg, Intravenous, Q4H PRN, MARBIN Ray, 10 mg at 03/02/24 0643    labetalol (NORMODYNE) tablet 600 mg, 600 mg, Oral, Q8H JOANN, MARBIN Ray, 600 mg at 03/03/24 0500    LORazepam (ATIVAN) tablet 1 mg, 1 mg, Oral, Q6H PRN, MARBIN Ray, 1 mg at 02/28/24 0609    OLANZapine (ZyPREXA) tablet 5 mg, 5 mg, Oral, HS, MARBIN Ray, 5 mg at 03/02/24 2131    pantoprazole (PROTONIX) EC tablet 40 mg, 40 mg, Oral, Daily, MARBIN Ray, 40 mg at 03/03/24 0824    sertraline (ZOLOFT) tablet 200 mg, 200 mg, Oral, Daily, MARBIN Ray, 200 mg at 03/03/24 0824    sodium chloride 0.9 % infusion, 100 mL/hr, Intravenous, Continuous, Carleen Acosta MD    sucralfate (CARAFATE) tablet 1 g, 1 g, Oral, BID, MARBIN Ray, 1 g at 03/03/24 0824    OBJECTIVE     Current Weight: Weight - Scale: 62.6 kg (138 lb 0.1 oz)  /98   Pulse 70   Temp 97.7 °F (36.5 °C)   Resp 18   Ht 5' 6.5\" (1.689 m)   Wt 62.6 kg (138 lb 0.1 oz)   SpO2 98%   BMI 21.94 kg/m²   Vitals:    03/03/24 0819   BP: 160/98   Pulse: 70   Resp:    Temp:    SpO2: 98%     Body mass index is 21.94 kg/m².    Intake/Output Summary (Last 24 hours) at 3/3/2024 0914  Last data filed at 3/3/2024 0548  Gross per 24 hour   Intake 240 ml   Output " 625 ml   Net -385 ml       PHYSICAL EXAMINATION     Physical Exam  Constitutional:       General: He is not in acute distress.  HENT:      Right Ear: External ear normal.   Eyes:      Conjunctiva/sclera:      Right eye: No hemorrhage.  Neck:      Thyroid: No thyromegaly.   Pulmonary:      Effort: No accessory muscle usage or respiratory distress.   Abdominal:      General: There is no distension.   Skin:     Coloration: Skin is not jaundiced.   Psychiatric:         Behavior: Behavior is not combative.           LAB RESULTS     Results from last 7 days   Lab Units 03/03/24  0504 03/02/24  0508 03/01/24  0426 02/29/24  2101 02/29/24  0509 02/28/24  2224 02/28/24  1104 02/27/24  1945   WBC Thousand/uL 7.29 7.99 8.41  --  9.51  --   --  6.22   HEMOGLOBIN g/dL 11.2* 10.5* 10.6*  --  11.4*  --   --  14.5   HEMATOCRIT % 34.4* 31.7* 32.9*  --  34.5*  --   --  42.5   PLATELETS Thousands/uL 243 233 255  --  267  --   --  393*  393*   SODIUM mmol/L 136  136 137 137 137 138 137 136 140   POTASSIUM mmol/L 3.3*  3.3* 3.1* 3.2* 3.1* 3.0* 3.2* 2.8* 2.5*   CHLORIDE mmol/L 105  105 104 103 103 102 101 99 96   CO2 mmol/L 21  22 22 24 25 26 23 27 25   BUN mg/dL 66*  67* 68* 69* 65* 57* 55* 48* 53*   CREATININE mg/dL 3.30*  3.26* 3.49* 3.60* 3.19* 3.19* 2.81* 2.61* 2.44*   EGFR ml/min/1.73sq m 19  19 17 17 20 20 23 25 27   CALCIUM mg/dL 9.1  9.0 8.8 9.2 9.2 9.0 9.7 9.4 11.3*   MAGNESIUM mg/dL 2.4  --  1.9  --   --   --   --   --    PHOSPHORUS mg/dL  --   --  5.1*  --   --   --  4.8*  --        I have personally reviewed the old medical records and patient's new baseline creatinine seems to be fluctuating around 2.5    RADIOLOGY RESULTS      VAS renal artery complete   Final Result by Hansel Guevara DO (02/28 1017)      CTA dissection protocol chest/abdomen/pelvis   Final Result by Paxton Stevens DO (02/28 0019)      No aortic aneurysm or dissection.      Small volume free fluid/mild mesenteric edema. Consider enteritis in  the appropriate clinical setting.               Workstation performed: [id number]         XR chest 1 view portable   Final Result by Daquan Mills MD (02/28 0906)      No radiographic evidence of acute intrathoracic process.            Workstation performed: [id number]             PLAN / RECOMMENDATIONS      1. TRUDI on top of CKD stage IV.  Multifactorial    Upon review of old medical record from [facility] chart review, patient was previously been followed by Dr. Reese at [facility] and seems to me that patient may have underlying progressive chronic kidney disease and previously known baseline creatinine was around 2.0 but new baseline creatinine is thought to be around 2.5.    Patient's renal function has been relatively stable in last 24 hours with current creatinine of 3.3 with a EGFR of 19.  Patient was found to have abnormal stress test and currently being followed by cardiology team and tentatively scheduled to undergo cardiac cath tomorrow.  Contrast-induced nephropathy risk along with risk reduction strategies been discussed with patient yesterday and also earlier today.  Patient understand the risk of contrast-induced nephropathy and willing to proceed to cardiac cath if deemed indicated by cardiology team.    As a part of renal optimization strategy, we will plan to start patient on normal saline at 100 mL/h starting tonight along with Mucomyst 1200 mg p.o. twice daily x 4 doses [first dose tonight].    2.  Hypokalemia.  Multifactorial with current potassium level of 3.3 which is below the goal.  Plan potassium chloride 40 meq p.o. x 1 dose today.    3.  Hypertensive crisis.  Patient was found to have resistant hypertension and also have allergy to multiple antihypertensive medications.  Renal artery Doppler done on 2/28/2024 showed no evidence of renal artery stenosis.    Patient has documented allergy to hydralazine but has been tolerating oral hydralazine during the hospital  "stay and in last 24 hours patient's blood pressure seems to have improved and acceptable and monitor hypertension with hydralazine 75 mg p.o. 3 times daily, doxazosin 8 mg p.o. twice daily, labetalol 600 mg p.o. 3 times daily and amlodipine 10 mg p.o. daily today.    4.  Secondary hyperparathyroidism of renal origin.  Recent PTH level was 186 [2/28/2024] which is above the goal for chronic kidney disease and was started on Calcitrol 0.25 mcg every other day.  Calcium level is currently acceptable and continue calcitriol at current dose and recommend checking PTH level as outpatient in around 3 months.    Patient understand that if renal function worsen, may end up needing to get started on dialysis but currently asymptomatic and no urgent indication for initiation of dialysis today.  Plan to recheck renal function with a.m. lab.    Overall above mentioned plan and recommendations were also d/w current [facility] physician and they agreed with my plan of use of IV fluid and Mucomyst as a part of renal optimization strategy prior to undergoing cardiac cath    Carleen Acosta MD  Nephrology  3/3/2024        Portions of the record may have been created with voice recognition software. Occasional wrong word or \"sound a like\" substitutions may have occurred due to the inherent limitations of voice recognition software. Read the chart carefully and recognize, using context, where substitutions have occurred.  "

## 2024-03-03 NOTE — PROGRESS NOTES
"[facility]   Progress Note  Name: Genna Liu I  MRN: [id number]  Unit/Bed#: -01 I Date of Admission: 2/27/2024   Date of Service: 3/3/2024 I Hospital Day: 4    Elevated troponin  Assessment & Plan  Lab Results   Component Value Date    HSTNI0 353 (H) 02/28/2024    HSTNI2 352 (H) 02/28/2024    HSTNID2 -1 02/28/2024    HSTNI4 349 (H) 02/28/2024    HSTNID4 -4 02/28/2024     Cariology on board  Plan for cath on Monday  Nephro for renal optimization tomorrow    Transaminitis  Assessment & Plan  Recent Labs     03/01/24  0426 03/03/24  0504   AST 30 18   ALT 46 34   ALKPHOS 73 66   TBILI 0.94 0.77     Continue to trend    UTI (urinary tract infection)  Assessment & Plan    Recent Labs     03/01/24  0217   URINECX >100,000 cfu/ml Enterococcus faecalis*       Recent Labs     03/01/24  0426 03/02/24  0508 03/03/24  0504   WBC 8.41 7.99 7.29     Not meeting sepsis criteria    Rocephin D4; Clx showing E-faecalis; pending sensitivities  Monitor WBC and fever curve    Chronic heart failure with preserved ejection fraction (HFpEF) (HCC)  Assessment & Plan  Wt Readings from Last 3 Encounters:   03/02/24 61.3 kg (135 lb 2.3 oz)   01/25/24 65.3 kg (144 lb)   12/27/23 66.8 kg (147 lb 4.3 oz)       Currently euvolemic on exam  Echo: EF 55%  HCTZ on hold due to hypokalemia  Daily weights, I&Os    Stage 4 chronic kidney disease (HCC)  Assessment & Plan  Lab Results   Component Value Date    EGFR 19 03/03/2024    EGFR 19 03/03/2024    EGFR 17 03/02/2024    CREATININE 3.30 (H) 03/03/2024    CREATININE 3.26 (H) 03/03/2024    CREATININE 3.49 (H) 03/02/2024   History of CKD 4  Upon presentation, at baseline  Avoid nephrotoxic agents  Starting IVF and mucomyst tonight per Nephro for renal optimization    Noncompliance with medication regimen  Assessment & Plan  Patient reports that he does not regularly take his medications sliding \"stomach issues\" as reason  Denies financial constraints   Extensive " counseling regarding importance of medication compliance in the setting of numerous underlying medical conditions    Depression with anxiety  Assessment & Plan  - Chronic/stable  - No suicidal or homicidal ideation    Hypokalemia  Assessment & Plan  Recent Labs     03/01/24  0426 03/02/24  0508 03/03/24  0504   K 3.2* 3.1* 3.3*  3.3*   MG 1.9  --  2.4       Replete and recheck accordingly  Repeat BMP    COPD without exacerbation (HCC)  Assessment & Plan  No acute exacerbation  Continue home inhalers    * Hypertensive emergency  Assessment & Plan  Patient presents tonight with complaints of substernal chest pressure, nonradiating, anxiety, headache, visual change    Blood Pressure: 151/93    H/o CKD and with known medical noncomplaince  SBP>200 while in the ED  Started on IV cardene infusion in the ED, which was discontinued  Resume home medications: Amlodipine 10 mg, Cardura 8 mg HS, labetalol 600 mg TID, isosorbide 40mg TID  Holding hydrochlorothiazide secondary to hypokalemia  BP Prn meds for BP > 160  Added amiloride 5mg daily and hydralazine 25mg Q8H on 2/29/24  Renal ultrasound without arterial occlusive disease  Nephrology following          VTE Pharmacologic Prophylaxis: VTE Score: 4 Moderate Risk (Score 3-4) - Pharmacological DVT Prophylaxis Ordered: heparin.    Mobility:   Basic Mobility Inpatient Raw Score: 21  JH-HLM Goal: 6: Walk 10 steps or more  JH-HLM Achieved: 7: Walk 25 feet or more  HLM Goal achieved. Continue to encourage appropriate mobility.    Patient Centered Rounds: I performed bedside rounds with nursing staff today.  Discussions with Specialists or Other Care Team Provider:  IP CONSULT TO CARDIOLOGY  IP CONSULT TO NEPHROLOGY  IP CONSULT TO CASE MANAGEMENT      Education and Discussions with Family / Patient: patient     Total Time Spent on Date of Encounter in care of patient: 30+ mins. This time was spent on one or more of the following: performing physical exam; counseling and  coordination of care; obtaining or reviewing history; documenting in the medical record; reviewing/ordering tests, medications or procedures; communicating with other healthcare professionals and discussing with patient's family/caregivers.    Current Length of Stay: 4 day(s)  Current Patient Status: Inpatient   Certification Statement: The patient will continue to require additional inpatient hospital stay due to plan as noted above  Discharge Plan: Anticipate discharge in 24-48 hrs to home.    Code Status: Level 1 - Full Code    Subjective:   Complains of hiccups today. No acute events reported overnight. Understanding of plan.  All questions answered. Eager for cath tomorrow.    Objective:     Vitals:   Temp (24hrs), Av.1 °F (36.7 °C), Min:97.7 °F (36.5 °C), Max:98.4 °F (36.9 °C)    Temp:  [97.7 °F (36.5 °C)-98.4 °F (36.9 °C)] 97.7 °F (36.5 °C)  HR:  [59-70] 70  Resp:  [16-18] 18  BP: (129-191)/() 160/98  SpO2:  [98 %-99 %] 98 %  Body mass index is 21.94 kg/m².     Input and Output Summary (last 24 hours):     Intake/Output Summary (Last 24 hours) at 3/3/2024 1035  Last data filed at 3/3/2024 0548  Gross per 24 hour   Intake 240 ml   Output 625 ml   Net -385 ml       Physical Exam:   Physical Exam  Vitals and nursing note reviewed.   Constitutional:       General: He is not in acute distress.     Appearance: He is well-developed.   HENT:      Head: Normocephalic and atraumatic.   Eyes:      General: No scleral icterus.     Conjunctiva/sclera: Conjunctivae normal.   Cardiovascular:      Rate and Rhythm: Normal rate and regular rhythm.      Pulses: Normal pulses.      Heart sounds: No murmur heard.     No gallop.   Pulmonary:      Effort: Pulmonary effort is normal. No respiratory distress.      Breath sounds: Normal breath sounds. No wheezing.   Abdominal:      Palpations: Abdomen is soft.      Tenderness: There is no abdominal tenderness.   Musculoskeletal:         General: No swelling.      Cervical  back: Neck supple.   Skin:     General: Skin is warm and dry.      Capillary Refill: Capillary refill takes less than 2 seconds.      Coloration: Skin is not jaundiced.   Neurological:      Mental Status: He is alert and oriented to person, place, and time. Mental status is at baseline.   Psychiatric:         Mood and Affect: Mood normal.          Additional Data:     Labs:  Results from last 7 days   Lab Units 03/03/24  0504   WBC Thousand/uL 7.29   HEMOGLOBIN g/dL 11.2*   HEMATOCRIT % 34.4*   PLATELETS Thousands/uL 243   NEUTROS PCT % 60   LYMPHS PCT % 25   MONOS PCT % 10   EOS PCT % 4     Results from last 7 days   Lab Units 03/03/24  0504   SODIUM mmol/L 136  136   POTASSIUM mmol/L 3.3*  3.3*   CHLORIDE mmol/L 105  105   CO2 mmol/L 21  22   BUN mg/dL 66*  67*   CREATININE mg/dL 3.30*  3.26*   ANION GAP mmol/L 10  9   CALCIUM mg/dL 9.1  9.0   ALBUMIN g/dL 3.7   TOTAL BILIRUBIN mg/dL 0.77   ALK PHOS U/L 66   ALT U/L 34   AST U/L 18   GLUCOSE RANDOM mg/dL 82  83     Results from last 7 days   Lab Units 02/27/24  2326   INR  1.11             Results from last 7 days   Lab Units 03/01/24  0426 02/29/24  1846   LACTIC ACID mmol/L  --  0.6   PROCALCITONIN ng/ml 0.15  --        Lines/Drains:  Invasive Devices       Peripheral Intravenous Line  Duration             Peripheral IV 03/01/24 Left Forearm 2 days                      Telemetry:  Telemetry Orders (From admission, onward)               24 Hour Telemetry Monitoring  Continuous x 24 Hours (Telem)        Question:  Reason for 24 Hour Telemetry  Answer:  PCI/EP study (including pacer and ICD implementation), Cardiac surgery, MI, abnormal cardiac cath, and chest pain- rule out MI                     Telemetry Reviewed:  Indication for Continued Telemetry Use: Acute MI/Unstable Angina/Rule out ACS             Imaging: Reviewed radiology reports from this admission including:   XR chest 1 view portable    Result Date: 2/28/2024  Impression: No radiographic  evidence of acute intrathoracic process. Workstation performed: [id number]     CTA dissection protocol chest/abdomen/pelvis    Result Date: 2/28/2024  Impression: No aortic aneurysm or dissection. Small volume free fluid/mild mesenteric edema. Consider enteritis in the appropriate clinical setting. Workstation performed: [id number]       No Chest XR results available for this patient.     Results for orders placed during the hospital encounter of 02/27/24    Echo complete w/ contrast if indicated    Interpretation Summary    Left Ventricle: Left ventricular cavity size is normal. Wall thickness is severely increased. There is severe concentric hypertrophy. The left ventricular ejection fraction is 50%. Systolic function is low normal. Wall motion is normal. Diastolic function is mildly abnormal, consistent with grade I (abnormal) relaxation.    Right Ventricle: Right ventricular cavity size is normal. Systolic function is normal.    Left Atrium: The atrium is mildly dilated.    Right Atrium: The atrium is mildly dilated.    Mitral Valve: There is mild regurgitation.    Tricuspid Valve: There is mild regurgitation.      Recent Cultures (last 7 days):   Results from last 7 days   Lab Units 03/01/24  0217 02/29/24  1836   BLOOD CULTURE   --  No Growth at 48 hrs.  No Growth at 48 hrs.   URINE CULTURE  >100,000 cfu/ml Enterococcus faecalis*  --        Last 24 Hours Medication List:   Current Facility-Administered Medications   Medication Dose Route Frequency Provider Last Rate    acetaminophen  650 mg Oral Q4H PRN MARBIN Ray      acetylcysteine  1,200 mg Oral BID Carleen Acosta MD      albuterol  2 puff Inhalation Q4H PRN MARBIN Ray      amLODIPine  10 mg Oral Daily MARBIN Ray      ARIPiprazole  5 mg Oral Daily MARBIN Ray      aspirin  81 mg Oral Daily MARBIN Ray      atorvastatin  40 mg Oral HS MARBIN Ray       baclofen  5 mg Oral TID Zackary Arndt DO      cefTRIAXone  1,000 mg Intravenous Q24H Vidal Reece, SVENNP 1,000 mg (03/02/24 1702)    chlorhexidine  15 mL Mouth/Throat Q12H JOANN Vidal Reece, SVENNP      doxazosin  8 mg Oral BID Vidal Reece, MARBIN      heparin (porcine)  5,000 Units Subcutaneous Q8H JOANN Vidal Reece, CRNP      hydrALAZINE  10 mg Intravenous Q6H PRN Vidal Reece, CRNP      hydrALAZINE  75 mg Oral Q8H JOANN Carleen Acosta MD      HYDROmorphone  0.5 mg Intravenous Q1H PRN Vidal Reece, MARBIN      isosorbide dinitrate  40 mg Oral TID after meals Vidal Reece, MARBIN      labetalol  10 mg Intravenous Q4H PRN Vidal Reece, CRNP      labetalol  600 mg Oral Q8H JOANN Vidal Reece, MARBIN      LORazepam  1 mg Oral Q6H PRN Vidal Reece, SVENNP      OLANZapine  5 mg Oral HS Vidal Reece, CRNP      pantoprazole  40 mg Oral Daily Vidal Reece, MARBIN      sertraline  200 mg Oral Daily Vidal Reece, MARBIN      sodium chloride  100 mL/hr Intravenous Continuous Carleen Acosta MD      sucralfate  1 g Oral BID MARBIN Ray          Today, Patient Was Seen By: Zackary Arndt DO    **Please Note: This note may have been constructed using a voice recognition system.**

## 2024-03-03 NOTE — PROGRESS NOTES
"Cardiology Progress Note - Genna Liu 58 y.o. male MRN: [id number]    Unit/Bed#: -01 Encounter: [id number]      Assessment/Plan:  1.  Chest pain  EKG with inferolateral T wave abnormality, troponins with peak of 353.  Pharmacological MPI stress test could not definitively exclude underlying CAD.  Plan for cardiac catheterization tomorrow.  Nephrology recommendations appreciated for renal optimization.  Discussed the indications, alternatives, risks and benefit of cardiac catheterization and possible PCI. The procedure risks, benefits, and complications (including but not limited to bleeding, infection, arrhythmia, nephrotoxicity, vessel injury, myocardial infarction, CVA, and death) were reviewed.  Patient is alert and oriented x3  and wishes to proceed. All questions answered.     2.  Hypertensive emergency  BP overall has been much better controlled.  Continue antihypertensives per nephrology.    3.  Elevated troponin  Troponins with peak of 353, see plan above.    4.  TRUDI on CKD 4  5.  LVH  6.  History of pericardial effusion s/p pericardial window (7/2023)  7.  Hypoaldosteronism s/p left adrenalectomy (2012)  8.  Medication noncompliance  9.  History of DVT  10.  History of Shamika-en-Y gastric bypass        Subjective:   Patient seen and examined.  No significant events overnight.  Patient resting in bed comfortably.  He denies any new complaints at this time.  Patient had a few questions regarding cardiac catheterization.  All questions were answered.    Objective:     Vitals: Blood pressure 160/98, pulse 70, temperature 97.7 °F (36.5 °C), resp. rate 18, height 5' 6.5\" (1.689 m), weight 62.6 kg (138 lb 0.1 oz), SpO2 98%., Body mass index is 21.94 kg/m².,   Orthostatic Blood Pressures      Flowsheet Row Most Recent Value   Blood Pressure 160/98 filed at 03/03/2024 0819   Patient Position - Orthostatic VS Lying filed at 03/02/2024 1100              Intake/Output Summary (Last 24 hours) at 3/3/2024 " 0836  Last data filed at 3/3/2024 0548  Gross per 24 hour   Intake 240 ml   Output 625 ml   Net -385 ml         Physical Exam:  GEN: Alert and oriented x 3, in no acute distress.  Well appearing and well nourished.   HEENT: Sclera anicteric, conjunctivae pink, mucous membranes moist. Oropharynx clear.   NECK: Supple, no carotid bruits, no significant JVD. Trachea midline, no thyromegaly.   HEART: Regular rhythm, normal S1 and S2, no murmurs, clicks, gallops or rubs. PMI nondisplaced, no thrills.   LUNGS: Clear to auscultation bilaterally; no wheezes, rales, or rhonchi. No increased work of breathing or signs of respiratory distress.   ABDOMEN: Soft, nontender, nondistended, normoactive bowel sounds.   EXTREMITIES: Skin warm and well perfused, no clubbing, cyanosis, or edema.  NEURO: No focal findings. Normal speech. Mood and affect normal.   SKIN: Normal without suspicious lesions on exposed skin.        Medications:      Current Facility-Administered Medications:     acetaminophen (TYLENOL) tablet 650 mg, 650 mg, Oral, Q4H PRN, MARBIN Ray, 650 mg at 03/02/24 2130    acetylcysteine (MUCOMYST) 200 mg/mL oral solution 1,200 mg, 1,200 mg, Oral, BID, Carleen Acosta MD    albuterol (PROVENTIL HFA,VENTOLIN HFA) inhaler 2 puff, 2 puff, Inhalation, Q4H PRN, MARBIN Ray    amLODIPine (NORVASC) tablet 10 mg, 10 mg, Oral, Daily, MARBIN Ray, 10 mg at 03/03/24 0824    ARIPiprazole (ABILIFY) tablet 5 mg, 5 mg, Oral, Daily, MARBIN Ray, 5 mg at 03/03/24 0824    aspirin chewable tablet 81 mg, 81 mg, Oral, Daily, MARBIN Ray, 81 mg at 03/03/24 0825    atorvastatin (LIPITOR) tablet 40 mg, 40 mg, Oral, HS, MARBIN Ray, 40 mg at 03/02/24 2131    cefTRIAXone (ROCEPHIN) IVPB (premix in dextrose) 1,000 mg 50 mL, 1,000 mg, Intravenous, Q24H, MARBIN Ray, Last Rate: 100 mL/hr at 03/02/24 1702, 1,000 mg at 03/02/24  1702    chlorhexidine (PERIDEX) 0.12 % oral rinse 15 mL, 15 mL, Mouth/Throat, Q12H JOANN, MARBIN Ray, 15 mL at 03/03/24 0825    doxazosin (CARDURA) tablet 8 mg, 8 mg, Oral, BID, SVEN RayNP, 8 mg at 03/03/24 0825    heparin (porcine) subcutaneous injection 5,000 Units, 5,000 Units, Subcutaneous, Q8H JOANN, 5,000 Units at 03/03/24 0500 **AND** [COMPLETED] Platelet count, , , Once, Shai Chaidez,     hydrALAZINE (APRESOLINE) injection 10 mg, 10 mg, Intravenous, Q6H PRN, MARBIN Ray    hydrALAZINE (APRESOLINE) tablet 75 mg, 75 mg, Oral, Q8H JOANN, Carleen Acosta MD, 75 mg at 03/03/24 0500    HYDROmorphone (DILAUDID) injection 0.5 mg, 0.5 mg, Intravenous, Q1H PRN, MARBIN Ray    isosorbide dinitrate (ISORDIL) tablet 40 mg, 40 mg, Oral, TID after meals, MARBIN Ray, 40 mg at 03/03/24 0825    labetalol (NORMODYNE) injection 10 mg, 10 mg, Intravenous, Q4H PRN, SVEN RayNP, 10 mg at 03/02/24 0643    labetalol (NORMODYNE) tablet 600 mg, 600 mg, Oral, Q8H JOANN, SVEN RayNP, 600 mg at 03/03/24 0500    LORazepam (ATIVAN) tablet 1 mg, 1 mg, Oral, Q6H PRN, MARBIN Ray, 1 mg at 02/28/24 0609    OLANZapine (ZyPREXA) tablet 5 mg, 5 mg, Oral, HS, SVEN RayNP, 5 mg at 03/02/24 2131    pantoprazole (PROTONIX) EC tablet 40 mg, 40 mg, Oral, Daily, SVEN RayNP, 40 mg at 03/03/24 0824    sertraline (ZOLOFT) tablet 200 mg, 200 mg, Oral, Daily, MARBIN Ray, 200 mg at 03/03/24 0824    sodium chloride 0.9 % infusion, 100 mL/hr, Intravenous, Continuous, Carleen Acosta MD    sucralfate (CARAFATE) tablet 1 g, 1 g, Oral, BID, MARBIN Ray, 1 g at 03/03/24 0824     Labs & Results:     Results from last 7 days   Lab Units 02/28/24 2224 02/28/24 2024 02/28/24  1821 02/27/24  2326 02/27/24  2202 02/27/24  1945   HS TNI 0HR ng/L  --   --  353*  --   --  " 207*   HS TNI 2HR ng/L  --  352*  --   --  244*  --    HSTNI D2 ng/L  --  -1  --   --  37*  --    HS TNI 4HR ng/L 349*  --   --  251*  --   --    HSTNI D4 ng/L -4  --   --  44*  --   --      Results from last 7 days   Lab Units 24  0504 24  05024  0426   WBC Thousand/uL 7.29 7.99 8.41   HEMOGLOBIN g/dL 11.2* 10.5* 10.6*   HEMATOCRIT % 34.4* 31.7* 32.9*   PLATELETS Thousands/uL 243 233 255         Results from last 7 days   Lab Units 24  0504 24  0508 24  0426 24  21024  0509   POTASSIUM mmol/L 3.3*  3.3* 3.1* 3.2*   < > 3.0*   CHLORIDE mmol/L 105  105 104 103   < > 102   CO2 mmol/L 21  22 22 24   < > 26   BUN mg/dL 66*  67* 68* 69*   < > 57*   CREATININE mg/dL 3.30*  3.26* 3.49* 3.60*   < > 3.19*   CALCIUM mg/dL 9.1  9.0 8.8 9.2   < > 9.0   ALK PHOS U/L 66  --  73  --  78   ALT U/L 34  --  46  --  54*   AST U/L 18  --  30  --  47*    < > = values in this interval not displayed.     Results from last 7 days   Lab Units 24  2326   INR  1.11   PTT seconds 29     Results from last 7 days   Lab Units 24  05024  0426   MAGNESIUM mg/dL 2.4 1.9       Vitals: Blood pressure 160/98, pulse 70, temperature 97.7 °F (36.5 °C), resp. rate 18, height 5' 6.5\" (1.689 m), weight 62.6 kg (138 lb 0.1 oz), SpO2 98%., Body mass index is 21.94 kg/m².,   Orthostatic Blood Pressures      Flowsheet Row Most Recent Value   Blood Pressure 160/98 filed at 2024 0819   Patient Position - Orthostatic VS Lying filed at 2024 1100            Systolic (24hrs), Av , Min:129 , Max:191     Diastolic (24hrs), Av, Min:74, Max:109        Intake/Output Summary (Last 24 hours) at 3/3/2024 0836  Last data filed at 3/3/2024 0548  Gross per 24 hour   Intake 240 ml   Output 625 ml   Net -385 ml       Invasive Devices       Peripheral Intravenous Line  Duration             Peripheral IV 24 Left Forearm 2 days                    Telemetry:  Telemetry Orders " (From admission, onward)               24 Hour Telemetry Monitoring  Continuous x 24 Hours (Telem)        Question:  Reason for 24 Hour Telemetry  Answer:  PCI/EP study (including pacer and ICD implementation), Cardiac surgery, MI, abnormal cardiac cath, and chest pain- rule out MI                     Telemetry Reviewed: Normal Sinus Rhythm    BP Readings from Last 3 Encounters:   03/03/24 160/98   01/25/24 (!) 172/110   12/29/23 153/96      Wt Readings from Last 3 Encounters:   03/03/24 62.6 kg (138 lb 0.1 oz)   01/25/24 65.3 kg (144 lb)   12/27/23 66.8 kg (147 lb 4.3 oz)

## 2024-03-03 NOTE — PLAN OF CARE
Problem: SAFETY ADULT  Goal: Patient will remain free of falls  Description: INTERVENTIONS:  - Educate patient/family on patient safety including physical limitations  - Instruct patient to call for assistance with activity   - Consult OT/PT to assist with strengthening/mobility   - Keep Call bell within reach  - Keep bed low and locked with side rails adjusted as appropriate  - Keep care items and personal belongings within reach  - Initiate and maintain comfort rounds  - Make Fall Risk Sign visible to staff  - Offer Toileting every  Hours, in advance of need  - Initiate/Maintain alarm  - Obtain necessary fall risk management equipment:   - Apply yellow socks and bracelet for high fall risk patients  - Consider moving patient to room near nurses station  Outcome: Progressing  Goal: Maintain or return to baseline ADL function  Description: INTERVENTIONS:  -  Assess patient's ability to carry out ADLs; assess patient's baseline for ADL function and identify physical deficits which impact ability to perform ADLs (bathing, care of mouth/teeth, toileting, grooming, dressing, etc.)  - Assess/evaluate cause of self-care deficits   - Assess range of motion  - Assess patient's mobility; develop plan if impaired  - Assess patient's need for assistive devices and provide as appropriate  - Encourage maximum independence but intervene and supervise when necessary  - Involve family in performance of ADLs  - Assess for home care needs following discharge   - Consider OT consult to assist with ADL evaluation and planning for discharge  - Provide patient education as appropriate  Outcome: Progressing  Goal: Maintains/Returns to pre admission functional level  Description: INTERVENTIONS:  - Perform AM-PAC 6 Click Basic Mobility/ Daily Activity assessment daily.  - Set and communicate daily mobility goal to care team and patient/family/caregiver.   - Collaborate with rehabilitation services on mobility goals if consulted  - Perform  Range of Motion  times a day.  - Reposition patient every  hours.  - Dangle patient  times a day  - Stand patient  times a day  - Ambulate patient  times a day  - Out of bed to chair  times a day   - Out of bed for meals  times a day  - Out of bed for toileting  - Record patient progress and toleration of activity level   Outcome: Progressing

## 2024-03-04 LAB
ANION GAP SERPL CALCULATED.3IONS-SCNC: 8 MMOL/L
BUN SERPL-MCNC: 60 MG/DL (ref 5–25)
CALCIUM SERPL-MCNC: 9.1 MG/DL (ref 8.4–10.2)
CHLORIDE SERPL-SCNC: 108 MMOL/L (ref 96–108)
CO2 SERPL-SCNC: 21 MMOL/L (ref 21–32)
CREAT SERPL-MCNC: 3.1 MG/DL (ref 0.6–1.3)
ERYTHROCYTE [DISTWIDTH] IN BLOOD BY AUTOMATED COUNT: 15.2 % (ref 11.6–15.1)
GFR SERPL CREATININE-BSD FRML MDRD: 20 ML/MIN/1.73SQ M
GLUCOSE SERPL-MCNC: 76 MG/DL (ref 65–140)
HCT VFR BLD AUTO: 34.2 % (ref 36.5–49.3)
HGB BLD-MCNC: 10.8 G/DL (ref 12–17)
MAGNESIUM SERPL-MCNC: 2.3 MG/DL (ref 1.9–2.7)
MCH RBC QN AUTO: 27.4 PG (ref 26.8–34.3)
MCHC RBC AUTO-ENTMCNC: 31.6 G/DL (ref 31.4–37.4)
MCV RBC AUTO: 87 FL (ref 82–98)
PLATELET # BLD AUTO: 249 THOUSANDS/UL (ref 149–390)
PMV BLD AUTO: 10.4 FL (ref 8.9–12.7)
POTASSIUM SERPL-SCNC: 3.3 MMOL/L (ref 3.5–5.3)
RBC # BLD AUTO: 3.94 MILLION/UL (ref 3.88–5.62)
SODIUM SERPL-SCNC: 137 MMOL/L (ref 135–147)
WBC # BLD AUTO: 6.32 THOUSAND/UL (ref 4.31–10.16)

## 2024-03-04 PROCEDURE — 85027 COMPLETE CBC AUTOMATED: CPT | Performed by: INTERNAL MEDICINE

## 2024-03-04 PROCEDURE — 80048 BASIC METABOLIC PNL TOTAL CA: CPT

## 2024-03-04 PROCEDURE — B211YZZ FLUOROSCOPY OF MULTIPLE CORONARY ARTERIES USING OTHER CONTRAST: ICD-10-PCS | Performed by: INTERNAL MEDICINE

## 2024-03-04 PROCEDURE — 4A023N7 MEASUREMENT OF CARDIAC SAMPLING AND PRESSURE, LEFT HEART, PERCUTANEOUS APPROACH: ICD-10-PCS | Performed by: INTERNAL MEDICINE

## 2024-03-04 PROCEDURE — 99232 SBSQ HOSP IP/OBS MODERATE 35: CPT | Performed by: INTERNAL MEDICINE

## 2024-03-04 PROCEDURE — 99233 SBSQ HOSP IP/OBS HIGH 50: CPT | Performed by: INTERNAL MEDICINE

## 2024-03-04 PROCEDURE — 83735 ASSAY OF MAGNESIUM: CPT | Performed by: INTERNAL MEDICINE

## 2024-03-04 RX ORDER — CEFTRIAXONE 1 G/50ML
1000 INJECTION, SOLUTION INTRAVENOUS EVERY 24 HOURS
Status: COMPLETED | OUTPATIENT
Start: 2024-03-04 | End: 2024-03-05

## 2024-03-04 RX ORDER — POTASSIUM CHLORIDE 20 MEQ/1
40 TABLET, EXTENDED RELEASE ORAL ONCE
Status: COMPLETED | OUTPATIENT
Start: 2024-03-04 | End: 2024-03-04

## 2024-03-04 RX ADMIN — ISOSORBIDE DINITRATE 40 MG: 10 TABLET ORAL at 21:50

## 2024-03-04 RX ADMIN — CEFTRIAXONE 1000 MG: 1 INJECTION, SOLUTION INTRAVENOUS at 17:12

## 2024-03-04 RX ADMIN — CHLORHEXIDINE GLUCONATE 0.12% ORAL RINSE 15 ML: 1.2 LIQUID ORAL at 08:21

## 2024-03-04 RX ADMIN — ACETYLCYSTEINE 1200 MG: 200 SOLUTION ORAL; RESPIRATORY (INHALATION) at 08:19

## 2024-03-04 RX ADMIN — ASPIRIN 81 MG: 81 TABLET, CHEWABLE ORAL at 08:19

## 2024-03-04 RX ADMIN — DOXAZOSIN 8 MG: 4 TABLET ORAL at 21:50

## 2024-03-04 RX ADMIN — SUCRALFATE 1 G: 1 TABLET ORAL at 17:08

## 2024-03-04 RX ADMIN — LABETALOL HYDROCHLORIDE 600 MG: 200 TABLET, FILM COATED ORAL at 21:56

## 2024-03-04 RX ADMIN — SERTRALINE HYDROCHLORIDE 200 MG: 50 TABLET ORAL at 08:20

## 2024-03-04 RX ADMIN — ATORVASTATIN CALCIUM 40 MG: 40 TABLET, FILM COATED ORAL at 21:52

## 2024-03-04 RX ADMIN — LABETALOL HYDROCHLORIDE 600 MG: 200 TABLET, FILM COATED ORAL at 14:12

## 2024-03-04 RX ADMIN — POTASSIUM CHLORIDE 40 MEQ: 1500 TABLET, EXTENDED RELEASE ORAL at 09:17

## 2024-03-04 RX ADMIN — PANTOPRAZOLE SODIUM 40 MG: 40 TABLET, DELAYED RELEASE ORAL at 08:19

## 2024-03-04 RX ADMIN — HEPARIN SODIUM 5000 UNITS: 5000 INJECTION INTRAVENOUS; SUBCUTANEOUS at 05:05

## 2024-03-04 RX ADMIN — HYDRALAZINE HYDROCHLORIDE 75 MG: 25 TABLET ORAL at 05:04

## 2024-03-04 RX ADMIN — AMLODIPINE BESYLATE 10 MG: 10 TABLET ORAL at 08:20

## 2024-03-04 RX ADMIN — HEPARIN SODIUM 5000 UNITS: 5000 INJECTION INTRAVENOUS; SUBCUTANEOUS at 21:52

## 2024-03-04 RX ADMIN — HYDRALAZINE HYDROCHLORIDE 75 MG: 25 TABLET ORAL at 21:52

## 2024-03-04 RX ADMIN — ISOSORBIDE DINITRATE 40 MG: 10 TABLET ORAL at 14:12

## 2024-03-04 RX ADMIN — OLANZAPINE 5 MG: 2.5 TABLET, FILM COATED ORAL at 21:52

## 2024-03-04 RX ADMIN — ISOSORBIDE DINITRATE 40 MG: 10 TABLET ORAL at 08:20

## 2024-03-04 RX ADMIN — DOXAZOSIN 8 MG: 4 TABLET ORAL at 08:17

## 2024-03-04 RX ADMIN — ARIPIPRAZOLE 5 MG: 5 TABLET ORAL at 08:19

## 2024-03-04 RX ADMIN — SUCRALFATE 1 G: 1 TABLET ORAL at 08:20

## 2024-03-04 RX ADMIN — LABETALOL HYDROCHLORIDE 600 MG: 200 TABLET, FILM COATED ORAL at 05:05

## 2024-03-04 RX ADMIN — SODIUM CHLORIDE 100 ML/HR: 0.9 INJECTION, SOLUTION INTRAVENOUS at 04:38

## 2024-03-04 RX ADMIN — ACETYLCYSTEINE 1200 MG: 200 SOLUTION ORAL; RESPIRATORY (INHALATION) at 17:08

## 2024-03-04 RX ADMIN — HYDRALAZINE HYDROCHLORIDE 75 MG: 25 TABLET ORAL at 14:12

## 2024-03-04 RX ADMIN — HEPARIN SODIUM 5000 UNITS: 5000 INJECTION INTRAVENOUS; SUBCUTANEOUS at 14:12

## 2024-03-04 NOTE — PLAN OF CARE
Problem: SAFETY ADULT  Goal: Patient will remain free of falls  Description: INTERVENTIONS:  - Educate patient/family on patient safety including physical limitations  - Instruct patient to call for assistance with activity   - Consult OT/PT to assist with strengthening/mobility   - Keep Call bell within reach  - Keep bed low and locked with side rails adjusted as appropriate  - Keep care items and personal belongings within reach  - Initiate and maintain comfort rounds  - Make Fall Risk Sign visible to staff  - Offer Toileting every 2 Hours, in advance of need  - Initiate/Maintain alarm  - Obtain necessary fall risk management equipment:   - Apply yellow socks and bracelet for high fall risk patients  - Consider moving patient to room near nurses station  Outcome: Progressing  Goal: Maintain or return to baseline ADL function  Description: INTERVENTIONS:  -  Assess patient's ability to carry out ADLs; assess patient's baseline for ADL function and identify physical deficits which impact ability to perform ADLs (bathing, care of mouth/teeth, toileting, grooming, dressing, etc.)  - Assess/evaluate cause of self-care deficits   - Assess range of motion  - Assess patient's mobility; develop plan if impaired  - Assess patient's need for assistive devices and provide as appropriate  - Encourage maximum independence but intervene and supervise when necessary  - Involve family in performance of ADLs  - Assess for home care needs following discharge   - Consider OT consult to assist with ADL evaluation and planning for discharge  - Provide patient education as appropriate  Outcome: Progressing  Goal: Maintains/Returns to pre admission functional level  Description: INTERVENTIONS:  - Perform AM-PAC 6 Click Basic Mobility/ Daily Activity assessment daily.  - Set and communicate daily mobility goal to care team and patient/family/caregiver.   - Collaborate with rehabilitation services on mobility goals if consulted  -  Perform Range of Motion 2 times a day.  - Reposition patient every 3 hours.  - Dangle patient 3 times a day  - Stand patient 3 times a day  - Ambulate patient 2 times a day  - Out of bed to chair 3 times a day   - Out of bed for meals 3 times a day  - Out of bed for toileting  - Record patient progress and toleration of activity level   Outcome: Progressing     Problem: DISCHARGE PLANNING  Goal: Discharge to home or other facility with appropriate resources  Description: INTERVENTIONS:  - Identify barriers to discharge w/patient and caregiver  - Arrange for needed discharge resources and transportation as appropriate  - Identify discharge learning needs (meds, wound care, etc.)  - Arrange for interpretive services to assist at discharge as needed  - Refer to Case Management Department for coordinating discharge planning if the patient needs post-hospital services based on physician/advanced practitioner order or complex needs related to functional status, cognitive ability, or social support system  Outcome: Progressing     Problem: CARDIOVASCULAR - ADULT  Goal: Maintains optimal cardiac output and hemodynamic stability  Description: INTERVENTIONS:  - Monitor I/O, vital signs and rhythm  - Monitor for S/S and trends of decreased cardiac output  - Administer and titrate ordered vasoactive medications to optimize hemodynamic stability  - Assess quality of pulses, skin color and temperature  - Assess for signs of decreased coronary artery perfusion  - Instruct patient to report change in severity of symptoms  Outcome: Progressing  Goal: Absence of cardiac dysrhythmias or at baseline rhythm  Description: INTERVENTIONS:  - Continuous cardiac monitoring, vital signs, obtain 12 lead EKG if ordered  - Administer antiarrhythmic and heart rate control medications as ordered  - Monitor electrolytes and administer replacement therapy as ordered  Outcome: Progressing     Problem: PAIN - ADULT  Goal: Verbalizes/displays adequate  comfort level or baseline comfort level  Description: Interventions:  - Encourage patient to monitor pain and request assistance  - Assess pain using appropriate pain scale  - Administer analgesics based on type and severity of pain and evaluate response  - Implement non-pharmacological measures as appropriate and evaluate response  - Consider cultural and social influences on pain and pain management  - Notify physician/advanced practitioner if interventions unsuccessful or patient reports new pain  Outcome: Progressing     Problem: INFECTION - ADULT  Goal: Absence or prevention of progression during hospitalization  Description: INTERVENTIONS:  - Assess and monitor for signs and symptoms of infection  - Monitor lab/diagnostic results  - Monitor all insertion sites, i.e. indwelling lines, tubes, and drains  - Monitor endotracheal if appropriate and nasal secretions for changes in amount and color  - Trilla appropriate cooling/warming therapies per order  - Administer medications as ordered  - Instruct and encourage patient and family to use good hand hygiene technique  - Identify and instruct in appropriate isolation precautions for identified infection/condition  Outcome: Progressing  Goal: Absence of fever/infection during neutropenic period  Description: INTERVENTIONS:  - Monitor WBC    Outcome: Progressing     Problem: Knowledge Deficit  Goal: Patient/family/caregiver demonstrates understanding of disease process, treatment plan, medications, and discharge instructions  Description: Complete learning assessment and assess knowledge base.  Interventions:  - Provide teaching at level of understanding  - Provide teaching via preferred learning methods  Outcome: Progressing

## 2024-03-04 NOTE — PROGRESS NOTES
[facility]  Progress Note  Name: Genna Liu I  MRN: [id number]  Unit/Bed#: -01 I Date of Admission: 2/27/2024   Date of Service: 3/4/2024 I Hospital Day: 5    Assessment/Plan   * Hypertensive emergency  Assessment & Plan  Patient presents tonight with complaints of substernal chest pressure, nonradiating, anxiety, headache, visual change    Blood Pressure: 139/86    H/o CKD and with known medical noncomplaince  SBP>200 while in the ED  Started on IV cardene infusion in the ED, which was discontinued  Resume home medications: Amlodipine 10 mg, Cardura 8 mg HS, labetalol 600 mg TID, isosorbide 40mg TID  Holding hydrochlorothiazide secondary to hypokalemia  BP Prn meds for BP > 160  Amiloride d/c 2/29   Hydralazine PO 75mg Q8H  Renal ultrasound without arterial occlusive disease  ARR, metanephrines pending  Nephrology following      Elevated troponin  Assessment & Plan  Lab Results   Component Value Date    HSTNI0 353 (H) 02/28/2024    HSTNI2 352 (H) 02/28/2024    HSTNID2 -1 02/28/2024    HSTNI4 349 (H) 02/28/2024    HSTNID4 -4 02/28/2024     Cariology on board  Plan for cath on Monday after abnormal stress test 2/29  Nephro for renal optimization    Stage 4 chronic kidney disease (HCC)  Assessment & Plan  Lab Results   Component Value Date    EGFR 19 03/03/2024    EGFR 19 03/03/2024    EGFR 17 03/02/2024    CREATININE 3.30 (H) 03/03/2024    CREATININE 3.26 (H) 03/03/2024    CREATININE 3.49 (H) 03/02/2024   History of CKD 4  Upon presentation, at baseline  Avoid nephrotoxic agents  Starting IVF and mucomyst tonight per Nephro for renal optimization  Will monitor kidney function s/p cath and recommend outpatient labs to f/u- pt with borderline kidney function that may warrant dialysis s/p contrast load    Transaminitis  Assessment & Plan  Recent Labs     03/01/24  0426 03/03/24  0504   AST 30 18   ALT 46 34   ALKPHOS 73 66   TBILI 0.94 0.77     Continue to trend    UTI (urinary tract  "infection)  Assessment & Plan    Recent Labs     03/01/24  0217   URINECX >100,000 cfu/ml Enterococcus faecalis*       Recent Labs     03/01/24  0426 03/02/24  0508 03/03/24  0504   WBC 8.41 7.99 7.29     Not meeting sepsis criteria    Clx showing E-faecalis, Rocephin day 5/5 today  Monitor WBC and fever curve  No symptoms reported today    Chronic heart failure with preserved ejection fraction (HFpEF) ([facility])  Assessment & Plan  Wt Readings from Last 3 Encounters:   03/02/24 61.3 kg (135 lb 2.3 oz)   01/25/24 65.3 kg (144 lb)   12/27/23 66.8 kg (147 lb 4.3 oz)       Currently euvolemic on exam  Echo: EF 55%  HCTZ on hold due to hypokalemia  Daily weights, I&Os    Noncompliance with medication regimen  Assessment & Plan  Patient reports that he does not regularly take his medications sliding \"stomach issues\" as reason  Denies financial constraints   Extensive counseling regarding importance of medication compliance in the setting of numerous underlying medical conditions    Depression with anxiety  Assessment & Plan  - Chronic/stable  - No suicidal or homicidal ideation  Stable on abilify, zyprexa, zoloft    Hypokalemia  Assessment & Plan  Recent Labs     03/01/24  0426 03/02/24  0508 03/03/24  0504   K 3.2* 3.1* 3.3*  3.3*   MG 1.9  --  2.4       40mEq given today  Repeat BMP    COPD without exacerbation ([facility])  Assessment & Plan  No acute exacerbation  Continue home inhalers             VTE Pharmacologic Prophylaxis: VTE Score: 4 Moderate Risk (Score 3-4) - Pharmacological DVT Prophylaxis Ordered: heparin.    Mobility:   Basic Mobility Inpatient Raw Score: 21  JH-HLM Goal: 6: Walk 10 steps or more  JH-HLM Achieved: 1: Laying in bed  HLM Goal achieved. Continue to encourage appropriate mobility.    Patient Centered Rounds: I performed bedside rounds with nursing staff today.  Discussions with Specialists or Other Care Team Provider:  IP CONSULT TO CARDIOLOGY  IP CONSULT TO NEPHROLOGY  IP CONSULT TO CASE " MANAGEMENT      Education and Discussions with Family / Patient: Discussed plan with patient and answered all questions to the best of my ability.    Current Length of Stay: 5 day(s)  Current Patient Status: Inpatient   Certification Statement: The patient will continue to require additional inpatient hospital stay due to plan as noted above  Discharge Plan: Anticipate discharge tomorrow to home.    Code Status: Level 1 - Full Code    Subjective:   Examined patient at bedside this AM. He is in excellent spirits and looks well. He denies any pain or discomfort, including CP, SOB, H/A, vision changes, numbness, dizziness, tingling. He is agreeable with plan for cardiac cath today and is eager to get it done.     Objective:     Vitals:   Temp (24hrs), Av.2 °F (36.8 °C), Min:97.8 °F (36.6 °C), Max:98.8 °F (37.1 °C)    Temp:  [97.8 °F (36.6 °C)-98.8 °F (37.1 °C)] 98.2 °F (36.8 °C)  HR:  [55-65] 62  Resp:  [16-20] 18  BP: (126-186)/() 139/86  SpO2:  [98 %-100 %] 98 %  Body mass index is 22.08 kg/m².     Input and Output Summary (last 24 hours):     Intake/Output Summary (Last 24 hours) at 3/4/2024 1226  Last data filed at 3/4/2024 0516  Gross per 24 hour   Intake 240 ml   Output 700 ml   Net -460 ml       Physical Exam:   Physical Exam  Constitutional:       General: He is not in acute distress.     Appearance: Normal appearance. He is not ill-appearing.   HENT:      Head: Normocephalic and atraumatic.      Right Ear: External ear normal.      Left Ear: External ear normal.      Nose: Nose normal.      Mouth/Throat:      Mouth: Mucous membranes are dry.      Pharynx: Oropharynx is clear.   Eyes:      General: No scleral icterus.     Extraocular Movements: Extraocular movements intact.      Conjunctiva/sclera: Conjunctivae normal.      Pupils: Pupils are equal, round, and reactive to light.   Cardiovascular:      Rate and Rhythm: Normal rate and regular rhythm.      Pulses: Normal pulses.      Heart sounds:  Normal heart sounds.   Pulmonary:      Effort: Pulmonary effort is normal. No respiratory distress.      Breath sounds: Normal breath sounds. No stridor. No wheezing, rhonchi or rales.   Abdominal:      General: Abdomen is flat. There is no distension.      Palpations: Abdomen is soft. There is no mass.      Tenderness: There is no abdominal tenderness. There is no guarding or rebound.      Hernia: No hernia is present.   Musculoskeletal:         General: No swelling or tenderness.      Cervical back: Neck supple. No rigidity or tenderness.      Right lower leg: No edema.      Left lower leg: No edema.   Skin:     General: Skin is warm and dry.      Coloration: Skin is not jaundiced or pale.   Neurological:      General: No focal deficit present.      Mental Status: He is alert and oriented to person, place, and time. Mental status is at baseline.      Cranial Nerves: No cranial nerve deficit.      Sensory: No sensory deficit.      Motor: No weakness.   Psychiatric:         Mood and Affect: Mood normal.         Behavior: Behavior normal.          Additional Data:     Labs:  Results from last 7 days   Lab Units 03/04/24  0509 03/03/24  0504   WBC Thousand/uL 6.32 7.29   HEMOGLOBIN g/dL 10.8* 11.2*   HEMATOCRIT % 34.2* 34.4*   PLATELETS Thousands/uL 249 243   NEUTROS PCT %  --  60   LYMPHS PCT %  --  25   MONOS PCT %  --  10   EOS PCT %  --  4     Results from last 7 days   Lab Units 03/04/24  0509 03/03/24  0504   SODIUM mmol/L 137 136  136   POTASSIUM mmol/L 3.3* 3.3*  3.3*   CHLORIDE mmol/L 108 105  105   CO2 mmol/L 21 21  22   BUN mg/dL 60* 66*  67*   CREATININE mg/dL 3.10* 3.30*  3.26*   ANION GAP mmol/L 8 10  9   CALCIUM mg/dL 9.1 9.1  9.0   ALBUMIN g/dL  --  3.7   TOTAL BILIRUBIN mg/dL  --  0.77   ALK PHOS U/L  --  66   ALT U/L  --  34   AST U/L  --  18   GLUCOSE RANDOM mg/dL 76 82  83     Results from last 7 days   Lab Units 02/27/24  2326   INR  1.11             Results from last 7 days   Lab Units  03/01/24  0426 02/29/24  1846   LACTIC ACID mmol/L  --  0.6   PROCALCITONIN ng/ml 0.15  --        Lines/Drains:  Invasive Devices       Peripheral Intravenous Line  Duration             Peripheral IV 03/01/24 Left Forearm 3 days                      Telemetry:  Telemetry Orders (From admission, onward)               24 Hour Telemetry Monitoring  Continuous x 24 Hours (Telem)        Expiring   Question:  Reason for 24 Hour Telemetry  Answer:  PCI/EP study (including pacer and ICD implementation), Cardiac surgery, MI, abnormal cardiac cath, and chest pain- rule out MI                     Telemetry Reviewed: Normal Sinus Rhythm  Indication for Continued Telemetry Use: Awaiting PCI/EP Study/CABG             Imaging: Reviewed radiology reports from this admission including:   XR chest 1 view portable    Result Date: 2/28/2024  Impression: No radiographic evidence of acute intrathoracic process. Workstation performed: [id number]     CTA dissection protocol chest/abdomen/pelvis    Result Date: 2/28/2024  Impression: No aortic aneurysm or dissection. Small volume free fluid/mild mesenteric edema. Consider enteritis in the appropriate clinical setting. Workstation performed: [id number]       No Chest XR results available for this patient.     Results for orders placed during the hospital encounter of 02/27/24    Echo complete w/ contrast if indicated    Interpretation Summary    Left Ventricle: Left ventricular cavity size is normal. Wall thickness is severely increased. There is severe concentric hypertrophy. The left ventricular ejection fraction is 50%. Systolic function is low normal. Wall motion is normal. Diastolic function is mildly abnormal, consistent with grade I (abnormal) relaxation.    Right Ventricle: Right ventricular cavity size is normal. Systolic function is normal.    Left Atrium: The atrium is mildly dilated.    Right Atrium: The atrium is mildly dilated.    Mitral Valve: There is mild regurgitation.     Tricuspid Valve: There is mild regurgitation.      Recent Cultures (last 7 days):   Results from last 7 days   Lab Units 03/01/24  0217 02/29/24  1836   BLOOD CULTURE   --  No Growth at 72 hrs.  No Growth at 72 hrs.   URINE CULTURE  >100,000 cfu/ml Enterococcus faecalis*  --        Last 24 Hours Medication List:   Current Facility-Administered Medications   Medication Dose Route Frequency Provider Last Rate    acetaminophen  650 mg Oral Q4H PRN Vidal Reece, SVENNP      acetylcysteine  1,200 mg Oral BID Carleen Acosta MD      albuterol  2 puff Inhalation Q4H PRN Vidal Reece, SVENNP      amLODIPine  10 mg Oral Daily Vidal Reece, CRNP      ARIPiprazole  5 mg Oral Daily Vidal Reece, CRNP      aspirin  81 mg Oral Daily Vidal Reece, CRNP      atorvastatin  40 mg Oral HS Vidal Reece, SVENNP      cefTRIAXone  1,000 mg Intravenous Q24H Zackary Arndt, DO      chlorhexidine  15 mL Mouth/Throat Q12H [facility] Vidal Reece, SVENNP      doxazosin  8 mg Oral BID MARBIN Ray      heparin (porcine)  5,000 Units Subcutaneous Q8H [facility] Vidal Reece, CRSAM      hydrALAZINE  10 mg Intravenous Q6H PRN Vidal Reece, MARBIN      hydrALAZINE  75 mg Oral Q8H [facility] Carleen Acosta MD      HYDROmorphone  0.5 mg Intravenous Q1H PRN Vidal Reece, MARBIN      isosorbide dinitrate  40 mg Oral TID after meals Vidal Reece, MARBIN      labetalol  10 mg Intravenous Q4H PRN Vidal Reece, CRNP      labetalol  600 mg Oral Q8H [facility] Vidal Reece, CRNP      LORazepam  1 mg Oral Q6H PRN Vidal Reece, SVENNP      metoclopramide  5 mg Intravenous Q8H PRN Zackary Arndt, DO      OLANZapine  5 mg Oral HS Vidal Reece, CRNP      pantoprazole  40 mg Oral Daily Vidal Reece, CRNP      sertraline  200 mg Oral Daily MARBIN Ray      sodium chloride  100 mL/hr Intravenous Continuous  Carleen Acosta  mL/hr (03/04/24 7235)    sucralfate  1 g Oral BID MARBIN Ray          Today, Patient Was Seen By: Marvin Ramsay and the attending physician, Zackary Arndt DO        **Please Note: This note may have been constructed using a voice recognition system.**

## 2024-03-04 NOTE — CASE MANAGEMENT
Case Management Discharge Planning Note    Patient name Genna Liu  Location /-01 MRN [id number]  : 1965 Date 3/4/2024       Current Admission Date: 2024  Current Admission Diagnosis:Hypertensive emergency   Patient Active Problem List    Diagnosis Date Noted    Transaminitis 2024    Elevated troponin 2024    UTI (urinary tract infection) 2024    Noncompliance with medication regimen 2024    Stage 4 chronic kidney disease (HCC) 2024    Chronic heart failure with preserved ejection fraction (HFpEF) ([facility]) 2024    COVID-19 2023    GERD (gastroesophageal reflux disease) 07/15/2023    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HCC) 2023    Moderate protein-calorie malnutrition (HCC) 2023    Electrolyte abnormality 2023    Hematochezia 2023    Depression with anxiety 2023    Abdominal pain 2023    Seizure ([facility]) 2023    Hypertensive encephalopathy 2023    Hypertensive emergency 2023    Meningioma ([facility]) 2023    Prolonged Q-T interval on ECG 2023    Hypokalemia 2021    Bradycardia 2021    Hypoglycemia 2021    History of gastric bypass 2021    Hypertensive kidney disease with stage 3 chronic kidney disease (HCC) 2021    Acute kidney injury superimposed on CKD  2021    Encounter for surgical aftercare following surgery of digestive system 2021    Postsurgical malabsorption 2021    Morbid obesity due to excess calories ([facility]) 2020    Post-traumatic male urethral stricture 2020    Renal cyst 2020    Dysuria 2020    History of DVT (deep vein thrombosis) 10/17/2018    Splenic lesion 10/17/2018    Anemia 2018    Lymphedema 2018    Stage 3b chronic kidney disease (CKD) (HCC) 03/15/2018    Chronic diastolic CHF (congestive heart failure) (HCC) 2018    Pulmonary nodule, right 2018    Pericardial effusion  01/22/2018    Essential hypertension 01/22/2018    COPD without exacerbation (HCC) 01/22/2018    CHF (congestive heart failure) (HCC) 01/22/2018      LOS (days): 5  Geometric Mean LOS (GMLOS) (days): 2.1  Days to GMLOS:-3.1     OBJECTIVE:  Risk of Unplanned Readmission Score: 29.91      Current admission status: Inpatient   Preferred Pharmacy:   [id number] - [facility] [address] - [address]  [address]  [facility] [place] [address]  Phone: [phone] Fax: [phone]    Primary Care Provider: Ugo Marina DO  Primary Insurance: [employer]  Secondary Insurance:     DISCHARGE DETAILS:  CM rounded with SLIM for patient update.  Anticipated for cardiac cath later today, with d/c home tomorrow.  No PT/OT needs or recs.  [facility] 23.  OP CM referral sent to [facility] via TT (Aimee Azevedo) due to URR score of 30.  Per Aimee, patient assigned to Roxana Jade.  No additional CM needs identified.      Other Referral/Resources/Interventions Provided:  Interventions: Outpatient   Referral Comments: OP CM referral sent to [facility] via TT (Aimee Azevedo) due to URR score.    IMM Given (Date):: 03/04/24  IMM Given to:: Patient  Additional Comments: Verbal review of IMM at bedside w/ patient.  Understanding confirmed.  No questions or concerns.  Patient copy provided.  Original to medical records.

## 2024-03-04 NOTE — PROGRESS NOTES
NEPHROLOGY PROGRESS NOTE    Patient: eGnna Liu               Sex: male          DOA: 2/27/2024  6:23 PM   YOB: 1965        Age: 58 y.o.         LOS:  LOS: 5 days   Encounter Date: 3/4/2024    REASON FOR THE CONSULTATION: Further management of TRUDI    HPI     This is a 58 y.o. male admitted for Hypertensive emergency     SUBJECTIVE     -Noticed to have elevated blood pressure in the morning blood pressure seems to have improved later on. Currently n.p.o. for possible cardiac cath today.  Breathing is stable and also found to be nonoliguric overnight.  Patient denies nausea, vomiting, headache or dizziness today.  - Reviewed last 24 hrs events     CURRENT MEDICATIONS       Current Facility-Administered Medications:     acetaminophen (TYLENOL) tablet 650 mg, 650 mg, Oral, Q4H PRN, MARBIN Ray, 650 mg at 03/02/24 2130    acetylcysteine (MUCOMYST) 200 mg/mL oral solution 1,200 mg, 1,200 mg, Oral, BID, Carleen Acosat MD, 1,200 mg at 03/04/24 0819    albuterol (PROVENTIL HFA,VENTOLIN HFA) inhaler 2 puff, 2 puff, Inhalation, Q4H PRN, MARBIN Ray    amLODIPine (NORVASC) tablet 10 mg, 10 mg, Oral, Daily, MARBIN Ray, 10 mg at 03/04/24 0820    ARIPiprazole (ABILIFY) tablet 5 mg, 5 mg, Oral, Daily, MARBIN Ray, 5 mg at 03/04/24 0819    aspirin chewable tablet 81 mg, 81 mg, Oral, Daily, MARBIN Ray, 81 mg at 03/04/24 0819    atorvastatin (LIPITOR) tablet 40 mg, 40 mg, Oral, HS, MARBIN Ray, 40 mg at 03/03/24 2101    cefTRIAXone (ROCEPHIN) IVPB (premix in dextrose) 1,000 mg 50 mL, 1,000 mg, Intravenous, Q24H, Zackary Arndt DO    chlorhexidine (PERIDEX) 0.12 % oral rinse 15 mL, 15 mL, Mouth/Throat, Q12H JOANNVidal CRNP, 15 mL at 03/04/24 0821    doxazosin (CARDURA) tablet 8 mg, 8 mg, Oral, BID, MARBIN Ray, 8 mg at 03/04/24 0817    heparin (porcine) subcutaneous injection  "5,000 Units, 5,000 Units, Subcutaneous, Q8H JOANN, 5,000 Units at 03/04/24 0505 **AND** [COMPLETED] Platelet count, , , Once, Shai Chaidez,     hydrALAZINE (APRESOLINE) injection 10 mg, 10 mg, Intravenous, Q6H PRN, MARBIN Ray    hydrALAZINE (APRESOLINE) tablet 75 mg, 75 mg, Oral, Q8H JOANN, Carleen Acosta MD, 75 mg at 03/04/24 0504    HYDROmorphone (DILAUDID) injection 0.5 mg, 0.5 mg, Intravenous, Q1H PRN, MARBIN Ray    isosorbide dinitrate (ISORDIL) tablet 40 mg, 40 mg, Oral, TID after meals, MARBIN Ray, 40 mg at 03/04/24 0820    labetalol (NORMODYNE) injection 10 mg, 10 mg, Intravenous, Q4H PRN, MARBIN Ray, 10 mg at 03/02/24 0643    labetalol (NORMODYNE) tablet 600 mg, 600 mg, Oral, Q8H JOANN, MARBIN Ray, 600 mg at 03/04/24 0505    LORazepam (ATIVAN) tablet 1 mg, 1 mg, Oral, Q6H PRN, MARBIN Ray, 1 mg at 02/28/24 0609    metoclopramide (REGLAN) injection 5 mg, 5 mg, Intravenous, Q8H PRN, Zackary Arndt DO    OLANZapine (ZyPREXA) tablet 5 mg, 5 mg, Oral, HS, MARBIN Ray, 5 mg at 03/03/24 2101    pantoprazole (PROTONIX) EC tablet 40 mg, 40 mg, Oral, Daily, MARBIN Rya, 40 mg at 03/04/24 0819    sertraline (ZOLOFT) tablet 200 mg, 200 mg, Oral, Daily, SVEN RayNP, 200 mg at 03/04/24 0820    sodium chloride 0.9 % infusion, 100 mL/hr, Intravenous, Continuous, Carleen Acosta MD, Last Rate: 100 mL/hr at 03/04/24 0438, 100 mL/hr at 03/04/24 0438    sucralfate (CARAFATE) tablet 1 g, 1 g, Oral, BID, MARBIN Ray, 1 g at 03/04/24 0820    OBJECTIVE     Current Weight: Weight - Scale: 63 kg (138 lb 14.2 oz)  /86   Pulse 62   Temp 98.2 °F (36.8 °C)   Resp 18   Ht 5' 6.5\" (1.689 m)   Wt 63 kg (138 lb 14.2 oz)   SpO2 98%   BMI 22.08 kg/m²   Vitals:    03/04/24 1058   BP: 139/86   Pulse: 62   Resp:    Temp: 98.2 °F (36.8 °C)   SpO2: 98% "     Body mass index is 22.08 kg/m².    Intake/Output Summary (Last 24 hours) at 3/4/2024 1232  Last data filed at 3/4/2024 0516  Gross per 24 hour   Intake 240 ml   Output 700 ml   Net -460 ml       PHYSICAL EXAMINATION     Physical Exam  Constitutional:       General: He is not in acute distress.  HENT:      Right Ear: External ear normal.   Eyes:      Conjunctiva/sclera:      Right eye: No hemorrhage.  Neck:      Thyroid: No thyromegaly.   Pulmonary:      Effort: No accessory muscle usage or respiratory distress.   Abdominal:      General: There is no distension.   Skin:     Coloration: Skin is not jaundiced.   Psychiatric:         Behavior: Behavior is not combative.           LAB RESULTS     Results from last 7 days   Lab Units 03/04/24  0509 03/03/24  0504 03/02/24  0508 03/01/24  0426 02/29/24  2101 02/29/24  0509 02/28/24  2224 02/28/24  1104 02/27/24  1945   WBC Thousand/uL 6.32 7.29 7.99 8.41  --  9.51  --   --  6.22   HEMOGLOBIN g/dL 10.8* 11.2* 10.5* 10.6*  --  11.4*  --   --  14.5   HEMATOCRIT % 34.2* 34.4* 31.7* 32.9*  --  34.5*  --   --  42.5   PLATELETS Thousands/uL 249 243 233 255  --  267  --   --  393*  393*   SODIUM mmol/L 137 136  136 137 137 137 138 137 136 140   POTASSIUM mmol/L 3.3* 3.3*  3.3* 3.1* 3.2* 3.1* 3.0* 3.2* 2.8* 2.5*   CHLORIDE mmol/L 108 105  105 104 103 103 102 101 99 96   CO2 mmol/L 21 21  22 22 24 25 26 23 27 25   BUN mg/dL 60* 66*  67* 68* 69* 65* 57* 55* 48* 53*   CREATININE mg/dL 3.10* 3.30*  3.26* 3.49* 3.60* 3.19* 3.19* 2.81* 2.61* 2.44*   EGFR ml/min/1.73sq m 20 19  19 17 17 20 20 23 25 27   CALCIUM mg/dL 9.1 9.1  9.0 8.8 9.2 9.2 9.0 9.7 9.4 11.3*   MAGNESIUM mg/dL 2.3 2.4  --  1.9  --   --   --   --   --    PHOSPHORUS mg/dL  --   --   --  5.1*  --   --   --  4.8*  --        I have personally reviewed the old medical records and patient's new baseline creatinine seems to be around 2.5    RADIOLOGY RESULTS      VAS renal artery complete   Final Result by Hansel  DO Paola (02/28 1017)      CTA dissection protocol chest/abdomen/pelvis   Final Result by Paxton Stevens DO (02/28 0019)      No aortic aneurysm or dissection.      Small volume free fluid/mild mesenteric edema. Consider enteritis in the appropriate clinical setting.               Workstation performed: [id number]         XR chest 1 view portable   Final Result by Daquan Mills MD (02/28 0906)      No radiographic evidence of acute intrathoracic process.            Workstation performed: [id number]             PLAN / RECOMMENDATIONS      1. TRUDI on top of CKD stage IV.  Multifactorial    Upon review of old medical record from [facility] chart review, patient has been followed by Dr. Reese at [facility] for management of chronic kidney disease which seems slowly progressive.  Previously known baseline creatinine was thought to be around 2.0 but new baseline creatinine seems to be around 2.5.    Patient was found to have abnormal stress test and scheduled to undergo cardiac cath today and contrast-induced nephropathy risk along with risk reduction strategies been discussed with patient earlier and being started on IV fluid and Mucomyst.  Patient's current creatinine is slightly better compared to earlier to 3.1 which is still above the baseline.  Patient understand that renal function can worsen and if need to be a GFR below 15, may end up needing to start dialysis.  Monitor renal function today.    2.  Hypokalemia.  Multifactorial, current potassium level is 3.3 and plan to give potassium chloride 40 meq p.o. x 1 dose today.    3.  Hypertensive crisis.  Patient has resistant hypertension and also allergic to multiple antihypertensive medications.  Renal artery Doppler done on 2/28/2024 showed no evidence of renal artery stenosis.    Seems to me that patient's blood pressure is been elevated in the morning but later on blood pressure is been improving and for time being monitor hypertension  "with hydralazine 75 mg p.o. 3 times daily, doxazosin 8 mg p.o. twice daily, labetalol with 60 mg p.o. 3 times daily and amlodipine 10 mg p.o. daily today    4.  Secondary hyperparathyroidism of renal origin.  Current PTH level is 186 [2/28/2024] which is above the goal for chronic kidney disease and was started on Calcitrol 0.25 mcg every other day.  Calcium level is currently acceptable and continue calcitriol at current dose and recommend checking PTH level as outpatient in around 3 months.    Overall above mentioned plan and recommendations were also d/w cardiology team and they agreed with my plan of IV fluid and Mucomyst as a part of renal optimization strategy    Carleen Acosta MD  Nephrology  3/4/2024        Portions of the record may have been created with voice recognition software. Occasional wrong word or \"sound a like\" substitutions may have occurred due to the inherent limitations of voice recognition software. Read the chart carefully and recognize, using context, where substitutions have occurred.  "

## 2024-03-04 NOTE — PLAN OF CARE
Problem: DISCHARGE PLANNING  Goal: Discharge to home or other facility with appropriate resources  Description: INTERVENTIONS:  - Identify barriers to discharge w/patient and caregiver  - Arrange for needed discharge resources and transportation as appropriate  - Identify discharge learning needs (meds, wound care, etc.)  - Arrange for interpretive services to assist at discharge as needed  - Refer to Case Management Department for coordinating discharge planning if the patient needs post-hospital services based on physician/advanced practitioner order or complex needs related to functional status, cognitive ability, or social support system  Outcome: Progressing     Problem: CARDIOVASCULAR - ADULT  Goal: Maintains optimal cardiac output and hemodynamic stability  Description: INTERVENTIONS:  - Monitor I/O, vital signs and rhythm  - Monitor for S/S and trends of decreased cardiac output  - Administer and titrate ordered vasoactive medications to optimize hemodynamic stability  - Assess quality of pulses, skin color and temperature  - Assess for signs of decreased coronary artery perfusion  - Instruct patient to report change in severity of symptoms  Outcome: Progressing     Problem: PAIN - ADULT  Goal: Verbalizes/displays adequate comfort level or baseline comfort level  Description: Interventions:  - Encourage patient to monitor pain and request assistance  - Assess pain using appropriate pain scale  - Administer analgesics based on type and severity of pain and evaluate response  - Implement non-pharmacological measures as appropriate and evaluate response  - Consider cultural and social influences on pain and pain management  - Notify physician/advanced practitioner if interventions unsuccessful or patient reports new pain  Outcome: Progressing     Problem: CARDIOVASCULAR - ADULT  Goal: Absence of cardiac dysrhythmias or at baseline rhythm  Description: INTERVENTIONS:  - Continuous cardiac monitoring, vital  signs, obtain 12 lead EKG if ordered  - Administer antiarrhythmic and heart rate control medications as ordered  - Monitor electrolytes and administer replacement therapy as ordered  Outcome: Progressing

## 2024-03-05 LAB
ANION GAP SERPL CALCULATED.3IONS-SCNC: 7 MMOL/L
BACTERIA BLD CULT: NORMAL
BACTERIA BLD CULT: NORMAL
BUN SERPL-MCNC: 53 MG/DL (ref 5–25)
CALCIUM SERPL-MCNC: 8.8 MG/DL (ref 8.4–10.2)
CHLORIDE SERPL-SCNC: 112 MMOL/L (ref 96–108)
CO2 SERPL-SCNC: 19 MMOL/L (ref 21–32)
CREAT SERPL-MCNC: 2.68 MG/DL (ref 0.6–1.3)
GFR SERPL CREATININE-BSD FRML MDRD: 24 ML/MIN/1.73SQ M
GLUCOSE SERPL-MCNC: 84 MG/DL (ref 65–140)
POTASSIUM SERPL-SCNC: 3.8 MMOL/L (ref 3.5–5.3)
SODIUM SERPL-SCNC: 138 MMOL/L (ref 135–147)

## 2024-03-05 PROCEDURE — 99232 SBSQ HOSP IP/OBS MODERATE 35: CPT | Performed by: INTERNAL MEDICINE

## 2024-03-05 PROCEDURE — 99152 MOD SED SAME PHYS/QHP 5/>YRS: CPT | Performed by: INTERNAL MEDICINE

## 2024-03-05 PROCEDURE — C1769 GUIDE WIRE: HCPCS | Performed by: INTERNAL MEDICINE

## 2024-03-05 PROCEDURE — 93458 L HRT ARTERY/VENTRICLE ANGIO: CPT | Performed by: INTERNAL MEDICINE

## 2024-03-05 PROCEDURE — C1894 INTRO/SHEATH, NON-LASER: HCPCS | Performed by: INTERNAL MEDICINE

## 2024-03-05 PROCEDURE — 80048 BASIC METABOLIC PNL TOTAL CA: CPT

## 2024-03-05 RX ORDER — IODIXANOL 320 MG/ML
INJECTION, SOLUTION INTRAVASCULAR CODE/TRAUMA/SEDATION MEDICATION
Status: DISCONTINUED | OUTPATIENT
Start: 2024-03-05 | End: 2024-03-05 | Stop reason: HOSPADM

## 2024-03-05 RX ORDER — SODIUM CHLORIDE 9 MG/ML
100 INJECTION, SOLUTION INTRAVENOUS CONTINUOUS
Status: DISCONTINUED | OUTPATIENT
Start: 2024-03-05 | End: 2024-03-05

## 2024-03-05 RX ORDER — MIDAZOLAM HYDROCHLORIDE 2 MG/2ML
INJECTION, SOLUTION INTRAMUSCULAR; INTRAVENOUS CODE/TRAUMA/SEDATION MEDICATION
Status: DISCONTINUED | OUTPATIENT
Start: 2024-03-05 | End: 2024-03-05 | Stop reason: HOSPADM

## 2024-03-05 RX ORDER — VERAPAMIL HCL 2.5 MG/ML
AMPUL (ML) INTRAVENOUS CODE/TRAUMA/SEDATION MEDICATION
Status: DISCONTINUED | OUTPATIENT
Start: 2024-03-05 | End: 2024-03-05 | Stop reason: HOSPADM

## 2024-03-05 RX ORDER — HEPARIN SODIUM 1000 [USP'U]/ML
INJECTION, SOLUTION INTRAVENOUS; SUBCUTANEOUS CODE/TRAUMA/SEDATION MEDICATION
Status: DISCONTINUED | OUTPATIENT
Start: 2024-03-05 | End: 2024-03-05 | Stop reason: HOSPADM

## 2024-03-05 RX ORDER — FENTANYL CITRATE 50 UG/ML
INJECTION, SOLUTION INTRAMUSCULAR; INTRAVENOUS CODE/TRAUMA/SEDATION MEDICATION
Status: DISCONTINUED | OUTPATIENT
Start: 2024-03-05 | End: 2024-03-05 | Stop reason: HOSPADM

## 2024-03-05 RX ORDER — LIDOCAINE WITH 8.4% SOD BICARB 0.9%(10ML)
SYRINGE (ML) INJECTION CODE/TRAUMA/SEDATION MEDICATION
Status: DISCONTINUED | OUTPATIENT
Start: 2024-03-05 | End: 2024-03-05 | Stop reason: HOSPADM

## 2024-03-05 RX ADMIN — LABETALOL HYDROCHLORIDE 10 MG: 5 INJECTION, SOLUTION INTRAVENOUS at 03:26

## 2024-03-05 RX ADMIN — HEPARIN SODIUM 5000 UNITS: 5000 INJECTION INTRAVENOUS; SUBCUTANEOUS at 23:05

## 2024-03-05 RX ADMIN — HYDRALAZINE HYDROCHLORIDE 75 MG: 25 TABLET ORAL at 13:40

## 2024-03-05 RX ADMIN — LABETALOL HYDROCHLORIDE 600 MG: 200 TABLET, FILM COATED ORAL at 13:40

## 2024-03-05 RX ADMIN — SERTRALINE HYDROCHLORIDE 200 MG: 50 TABLET ORAL at 08:23

## 2024-03-05 RX ADMIN — ACETYLCYSTEINE 1200 MG: 200 SOLUTION ORAL; RESPIRATORY (INHALATION) at 08:23

## 2024-03-05 RX ADMIN — SODIUM CHLORIDE 100 ML/HR: 0.9 INJECTION, SOLUTION INTRAVENOUS at 08:24

## 2024-03-05 RX ADMIN — OLANZAPINE 5 MG: 2.5 TABLET, FILM COATED ORAL at 23:05

## 2024-03-05 RX ADMIN — SODIUM CHLORIDE 100 ML/HR: 0.9 INJECTION, SOLUTION INTRAVENOUS at 12:58

## 2024-03-05 RX ADMIN — HYDRALAZINE HYDROCHLORIDE 75 MG: 25 TABLET ORAL at 05:59

## 2024-03-05 RX ADMIN — ASPIRIN 81 MG: 81 TABLET, CHEWABLE ORAL at 08:24

## 2024-03-05 RX ADMIN — HYDRALAZINE HYDROCHLORIDE 75 MG: 25 TABLET ORAL at 23:04

## 2024-03-05 RX ADMIN — SODIUM CHLORIDE 100 ML/HR: 0.9 INJECTION, SOLUTION INTRAVENOUS at 19:59

## 2024-03-05 RX ADMIN — DOXAZOSIN 8 MG: 4 TABLET ORAL at 08:24

## 2024-03-05 RX ADMIN — ACETYLCYSTEINE 1200 MG: 200 SOLUTION ORAL; RESPIRATORY (INHALATION) at 18:05

## 2024-03-05 RX ADMIN — PANTOPRAZOLE SODIUM 40 MG: 40 TABLET, DELAYED RELEASE ORAL at 08:23

## 2024-03-05 RX ADMIN — HEPARIN SODIUM 5000 UNITS: 5000 INJECTION INTRAVENOUS; SUBCUTANEOUS at 05:59

## 2024-03-05 RX ADMIN — ATORVASTATIN CALCIUM 40 MG: 40 TABLET, FILM COATED ORAL at 23:05

## 2024-03-05 RX ADMIN — ISOSORBIDE DINITRATE 40 MG: 10 TABLET ORAL at 13:40

## 2024-03-05 RX ADMIN — ISOSORBIDE DINITRATE 40 MG: 10 TABLET ORAL at 08:23

## 2024-03-05 RX ADMIN — HEPARIN SODIUM 5000 UNITS: 5000 INJECTION INTRAVENOUS; SUBCUTANEOUS at 13:46

## 2024-03-05 RX ADMIN — DOXAZOSIN 8 MG: 4 TABLET ORAL at 23:04

## 2024-03-05 RX ADMIN — ARIPIPRAZOLE 5 MG: 5 TABLET ORAL at 08:23

## 2024-03-05 RX ADMIN — AMLODIPINE BESYLATE 10 MG: 10 TABLET ORAL at 08:23

## 2024-03-05 RX ADMIN — SUCRALFATE 1 G: 1 TABLET ORAL at 18:05

## 2024-03-05 RX ADMIN — ISOSORBIDE DINITRATE 40 MG: 10 TABLET ORAL at 23:04

## 2024-03-05 RX ADMIN — LABETALOL HYDROCHLORIDE 600 MG: 200 TABLET, FILM COATED ORAL at 23:07

## 2024-03-05 NOTE — PLAN OF CARE
Problem: SAFETY ADULT  Goal: Patient will remain free of falls  Description: INTERVENTIONS:  - Educate patient/family on patient safety including physical limitations  - Instruct patient to call for assistance with activity   - Consult OT/PT to assist with strengthening/mobility   - Keep Call bell within reach  - Keep bed low and locked with side rails adjusted as appropriate  - Keep care items and personal belongings within reach  - Initiate and maintain comfort rounds  - Make Fall Risk Sign visible to staff  - Offer Toileting every 3 Hours, in advance of need  - Initiate/Maintain bed alarm  - Obtain necessary fall risk management equipment:   - Apply yellow socks and bracelet for high fall risk patients  - Consider moving patient to room near nurses station  Outcome: Progressing  Goal: Maintain or return to baseline ADL function  Description: INTERVENTIONS:  -  Assess patient's ability to carry out ADLs; assess patient's baseline for ADL function and identify physical deficits which impact ability to perform ADLs (bathing, care of mouth/teeth, toileting, grooming, dressing, etc.)  - Assess/evaluate cause of self-care deficits   - Assess range of motion  - Assess patient's mobility; develop plan if impaired  - Assess patient's need for assistive devices and provide as appropriate  - Encourage maximum independence but intervene and supervise when necessary  - Involve family in performance of ADLs  - Assess for home care needs following discharge   - Consider OT consult to assist with ADL evaluation and planning for discharge  - Provide patient education as appropriate  Outcome: Progressing  Goal: Maintains/Returns to pre admission functional level  Description: INTERVENTIONS:  - Perform AM-PAC 6 Click Basic Mobility/ Daily Activity assessment daily.  - Set and communicate daily mobility goal to care team and patient/family/caregiver.   - Collaborate with rehabilitation services on mobility goals if consulted  -  Perform Range of Motion 3 times a day.  - Reposition patient every 2 hours.  - Dangle patient 3 times a day  - Stand patient 3 times a day  - Ambulate patient 3 times a day  - Out of bed to chair 3 times a day   - Out of bed for meals 3 times a day  - Out of bed for toileting  - Record patient progress and toleration of activity level   Outcome: Progressing     Problem: DISCHARGE PLANNING  Goal: Discharge to home or other facility with appropriate resources  Description: INTERVENTIONS:  - Identify barriers to discharge w/patient and caregiver  - Arrange for needed discharge resources and transportation as appropriate  - Identify discharge learning needs (meds, wound care, etc.)  - Arrange for interpretive services to assist at discharge as needed  - Refer to Case Management Department for coordinating discharge planning if the patient needs post-hospital services based on physician/advanced practitioner order or complex needs related to functional status, cognitive ability, or social support system  Outcome: Progressing     Problem: PAIN - ADULT  Goal: Verbalizes/displays adequate comfort level or baseline comfort level  Description: Interventions:  - Encourage patient to monitor pain and request assistance  - Assess pain using appropriate pain scale  - Administer analgesics based on type and severity of pain and evaluate response  - Implement non-pharmacological measures as appropriate and evaluate response  - Consider cultural and social influences on pain and pain management  - Notify physician/advanced practitioner if interventions unsuccessful or patient reports new pain  Outcome: Progressing     Problem: INFECTION - ADULT  Goal: Absence or prevention of progression during hospitalization  Description: INTERVENTIONS:  - Assess and monitor for signs and symptoms of infection  - Monitor lab/diagnostic results  - Monitor all insertion sites, i.e. indwelling lines, tubes, and drains  - Monitor endotracheal if  appropriate and nasal secretions for changes in amount and color  - [place] appropriate cooling/warming therapies per order  - Administer medications as ordered  - Instruct and encourage patient and family to use good hand hygiene technique  - Identify and instruct in appropriate isolation precautions for identified infection/condition  Outcome: Progressing  Goal: Absence of fever/infection during neutropenic period  Description: INTERVENTIONS:  - Monitor WBC    Outcome: Progressing     Problem: Knowledge Deficit  Goal: Patient/family/caregiver demonstrates understanding of disease process, treatment plan, medications, and discharge instructions  Description: Complete learning assessment and assess knowledge base.  Interventions:  - Provide teaching at level of understanding  - Provide teaching via preferred learning methods  Outcome: Progressing     Problem: CARDIOVASCULAR - ADULT  Goal: Maintains optimal cardiac output and hemodynamic stability  Description: INTERVENTIONS:  - Monitor I/O, vital signs and rhythm  - Monitor for S/S and trends of decreased cardiac output  - Administer and titrate ordered vasoactive medications to optimize hemodynamic stability  - Assess quality of pulses, skin color and temperature  - Assess for signs of decreased coronary artery perfusion  - Instruct patient to report change in severity of symptoms  Outcome: Progressing  Goal: Absence of cardiac dysrhythmias or at baseline rhythm  Description: INTERVENTIONS:  - Continuous cardiac monitoring, vital signs, obtain 12 lead EKG if ordered  - Administer antiarrhythmic and heart rate control medications as ordered  - Monitor electrolytes and administer replacement therapy as ordered  Outcome: Progressing

## 2024-03-05 NOTE — ASSESSMENT & PLAN NOTE
BP initially noted to be significantly elevated on arrival initially required IV Cardene infusion  Has since been discontinued  Amlodipine 10 mg, Cardura 8 mg BID, labetalol 600 mg TID, isosorbide 40mg TID  Added hydralazine 75 mg every 8 hours  Blood pressure has since improved.  Counseled importance of compliance

## 2024-03-05 NOTE — ASSESSMENT & PLAN NOTE
BP initially noted to be significantly elevated on arrival initially required IV Cardene infusion  Has since been discontinued  Continue home Amlodipine 10 mg, Cardura 8 mg HS, labetalol 600 mg TID, isosorbide 40mg TID  Added hydralazine 75 mg every 8 hours  Blood pressure still elevated however improving  Continue to monitor  Nephrology on board

## 2024-03-05 NOTE — PLAN OF CARE
Problem: SAFETY ADULT  Goal: Patient will remain free of falls  Description: INTERVENTIONS:  - Educate patient/family on patient safety including physical limitations  - Instruct patient to call for assistance with activity   - Consult OT/PT to assist with strengthening/mobility   - Keep Call bell within reach  - Keep bed low and locked with side rails adjusted as appropriate  - Keep care items and personal belongings within reach  - Initiate and maintain comfort rounds  - Make Fall Risk Sign visible to staff  - Offer Toileting every 2 Hours, in advance of need  - Initiate/Maintain bed alarm  - Obtain necessary fall risk management equipment:   - Apply yellow socks and bracelet for high fall risk patients  - Consider moving patient to room near nurses station  Outcome: Progressing  Goal: Maintain or return to baseline ADL function  Description: INTERVENTIONS:  -  Assess patient's ability to carry out ADLs; assess patient's baseline for ADL function and identify physical deficits which impact ability to perform ADLs (bathing, care of mouth/teeth, toileting, grooming, dressing, etc.)  - Assess/evaluate cause of self-care deficits   - Assess range of motion  - Assess patient's mobility; develop plan if impaired  - Assess patient's need for assistive devices and provide as appropriate  - Encourage maximum independence but intervene and supervise when necessary  - Involve family in performance of ADLs  - Assess for home care needs following discharge   - Consider OT consult to assist with ADL evaluation and planning for discharge  - Provide patient education as appropriate  Outcome: Progressing  Goal: Maintains/Returns to pre admission functional level  Description: INTERVENTIONS:  - Perform AM-PAC 6 Click Basic Mobility/ Daily Activity assessment daily.  - Set and communicate daily mobility goal to care team and patient/family/caregiver.   - Collaborate with rehabilitation services on mobility goals if consulted  -  Perform Range of Motion 3 times a day.  - Reposition patient every 2 hours.  - Dangle patient 3 times a day  - Stand patient 3 times a day  - Ambulate patient 3 times a day  - Out of bed to chair 3 times a day   - Out of bed for meals 3 times a day  - Out of bed for toileting  - Record patient progress and toleration of activity level   Outcome: Progressing     Problem: DISCHARGE PLANNING  Goal: Discharge to home or other facility with appropriate resources  Description: INTERVENTIONS:  - Identify barriers to discharge w/patient and caregiver  - Arrange for needed discharge resources and transportation as appropriate  - Identify discharge learning needs (meds, wound care, etc.)  - Arrange for interpretive services to assist at discharge as needed  - Refer to Case Management Department for coordinating discharge planning if the patient needs post-hospital services based on physician/advanced practitioner order or complex needs related to functional status, cognitive ability, or social support system  Outcome: Progressing     Problem: CARDIOVASCULAR - ADULT  Goal: Maintains optimal cardiac output and hemodynamic stability  Description: INTERVENTIONS:  - Monitor I/O, vital signs and rhythm  - Monitor for S/S and trends of decreased cardiac output  - Administer and titrate ordered vasoactive medications to optimize hemodynamic stability  - Assess quality of pulses, skin color and temperature  - Assess for signs of decreased coronary artery perfusion  - Instruct patient to report change in severity of symptoms  Outcome: Progressing  Goal: Absence of cardiac dysrhythmias or at baseline rhythm  Description: INTERVENTIONS:  - Continuous cardiac monitoring, vital signs, obtain 12 lead EKG if ordered  - Administer antiarrhythmic and heart rate control medications as ordered  - Monitor electrolytes and administer replacement therapy as ordered  Outcome: Progressing     Problem: PAIN - ADULT  Goal: Verbalizes/displays adequate  comfort level or baseline comfort level  Description: Interventions:  - Encourage patient to monitor pain and request assistance  - Assess pain using appropriate pain scale  - Administer analgesics based on type and severity of pain and evaluate response  - Implement non-pharmacological measures as appropriate and evaluate response  - Consider cultural and social influences on pain and pain management  - Notify physician/advanced practitioner if interventions unsuccessful or patient reports new pain  Outcome: Progressing     Problem: INFECTION - ADULT  Goal: Absence or prevention of progression during hospitalization  Description: INTERVENTIONS:  - Assess and monitor for signs and symptoms of infection  - Monitor lab/diagnostic results  - Monitor all insertion sites, i.e. indwelling lines, tubes, and drains  - Monitor endotracheal if appropriate and nasal secretions for changes in amount and color  - [place] appropriate cooling/warming therapies per order  - Administer medications as ordered  - Instruct and encourage patient and family to use good hand hygiene technique  - Identify and instruct in appropriate isolation precautions for identified infection/condition  Outcome: Progressing  Goal: Absence of fever/infection during neutropenic period  Description: INTERVENTIONS:  - Monitor WBC    Outcome: Progressing     Problem: Knowledge Deficit  Goal: Patient/family/caregiver demonstrates understanding of disease process, treatment plan, medications, and discharge instructions  Description: Complete learning assessment and assess knowledge base.  Interventions:  - Provide teaching at level of understanding  - Provide teaching via preferred learning methods  Outcome: Progressing

## 2024-03-05 NOTE — ASSESSMENT & PLAN NOTE
Presented with elevated troponin   Plan for cardiac catheterization today  N.p.o. after midnight  Follow-up cardiology recs

## 2024-03-05 NOTE — PROGRESS NOTES
NEPHROLOGY PROGRESS NOTE    Patient: Genna Liu               Sex: male          DOA: 2/27/2024  6:23 PM   YOB: 1965        Age: 58 y.o.         LOS:  LOS: 6 days   Encounter Date: 3/5/2024    REASON FOR THE CONSULTATION: Further management of TRUDI    HPI     This is a 58 y.o. male admitted for Hypertensive emergency     SUBJECTIVE     -Patient was scheduled to undergo cardiac cath yesterday which was canceled and rescheduled for today and patient is currently NPO.  Patient denies nausea, vomiting, headache or dizziness today.  - Reviewed last 24 hrs events     CURRENT MEDICATIONS       Current Facility-Administered Medications:     acetaminophen (TYLENOL) tablet 650 mg, 650 mg, Oral, Q4H PRN, Nickolas Soalres MD, 650 mg at 03/02/24 2130    acetylcysteine (MUCOMYST) 200 mg/mL oral solution 1,200 mg, 1,200 mg, Oral, BID, Nickolas Solares MD, 1,200 mg at 03/05/24 0823    albuterol (PROVENTIL HFA,VENTOLIN HFA) inhaler 2 puff, 2 puff, Inhalation, Q4H PRN, Nickolas Solares MD    amLODIPine (NORVASC) tablet 10 mg, 10 mg, Oral, Daily, Nickolas Solares MD, 10 mg at 03/05/24 0823    ARIPiprazole (ABILIFY) tablet 5 mg, 5 mg, Oral, Daily, Nickolas Solares MD, 5 mg at 03/05/24 0823    aspirin chewable tablet 81 mg, 81 mg, Oral, Daily, Nickolas Solares MD, 81 mg at 03/05/24 0824    atorvastatin (LIPITOR) tablet 40 mg, 40 mg, Oral, HS, Nickolas Solares MD, 40 mg at 03/04/24 2152    chlorhexidine (PERIDEX) 0.12 % oral rinse 15 mL, 15 mL, Mouth/Throat, Q12H JOANN, Nickolas Solares MD, 15 mL at 03/04/24 0821    doxazosin (CARDURA) tablet 8 mg, 8 mg, Oral, BID, Nickolas Solares MD, 8 mg at 03/05/24 0824    heparin (porcine) subcutaneous injection 5,000 Units, 5,000 Units, Subcutaneous, Q8H JOANN, 5,000 Units at 03/05/24 0559 **AND** [COMPLETED] Platelet count, , , Once, Shai Chaidez,     hydrALAZINE (APRESOLINE) injection 10 mg, 10 mg, Intravenous, Q6H PRN, Nickolas Solares MD    hydrALAZINE (APRESOLINE) tablet 75 mg, 75 mg, Oral, Q8H Nickolas DAVID  "MD Beck, 75 mg at 03/05/24 0559    HYDROmorphone (DILAUDID) injection 0.5 mg, 0.5 mg, Intravenous, Q1H PRN, Nickolas Solares MD    isosorbide dinitrate (ISORDIL) tablet 40 mg, 40 mg, Oral, TID after meals, Nickolas Solares MD, 40 mg at 03/05/24 0823    labetalol (NORMODYNE) injection 10 mg, 10 mg, Intravenous, Q4H PRN, Nickolas Solares MD, 10 mg at 03/05/24 0326    labetalol (NORMODYNE) tablet 600 mg, 600 mg, Oral, Q8H JOANN, Nickolas Solares MD, 600 mg at 03/04/24 2156    LORazepam (ATIVAN) tablet 1 mg, 1 mg, Oral, Q6H PRN, Nickolas Solares MD, 1 mg at 02/28/24 0609    metoclopramide (REGLAN) injection 5 mg, 5 mg, Intravenous, Q8H PRN, Nickolas Solares MD    OLANZapine (ZyPREXA) tablet 5 mg, 5 mg, Oral, HS, Nickolas Solares MD, 5 mg at 03/04/24 2152    pantoprazole (PROTONIX) EC tablet 40 mg, 40 mg, Oral, Daily, Nickolas Solares MD, 40 mg at 03/05/24 0823    sertraline (ZOLOFT) tablet 200 mg, 200 mg, Oral, Daily, Nickolas Solares MD, 200 mg at 03/05/24 0823    sodium chloride 0.9 % infusion, 100 mL/hr, Intravenous, Continuous, Nickolas Solares MD, Last Rate: 100 mL/hr at 03/05/24 0824, 100 mL/hr at 03/05/24 0824    sucralfate (CARAFATE) tablet 1 g, 1 g, Oral, BID, Nickolas Solares MD, 1 g at 03/04/24 1708    OBJECTIVE     Current Weight: Weight - Scale: 61.3 kg (135 lb 2.3 oz)  /94   Pulse 60   Temp 97.7 °F (36.5 °C)   Resp 18   Ht 5' 6.5\" (1.689 m)   Wt 61.3 kg (135 lb 2.3 oz)   SpO2 97%   BMI 21.49 kg/m²   Vitals:    03/05/24 1110   BP: 169/94   Pulse: 60   Resp: 18   Temp: 97.7 °F (36.5 °C)   SpO2: 97%     Body mass index is 21.49 kg/m².    Intake/Output Summary (Last 24 hours) at 3/5/2024 1130  Last data filed at 3/5/2024 0904  Gross per 24 hour   Intake 0 ml   Output 800 ml   Net -800 ml       PHYSICAL EXAMINATION     Physical Exam  Constitutional:       General: He is not in acute distress.  HENT:      Right Ear: External ear normal.   Eyes:      Conjunctiva/sclera:      Right eye: No hemorrhage.  Neck:      Thyroid: No " thyromegaly.   Pulmonary:      Effort: No accessory muscle usage or respiratory distress.   Abdominal:      General: There is no distension.   Skin:     Coloration: Skin is not jaundiced.   Psychiatric:         Behavior: Behavior is not combative.           LAB RESULTS     Results from last 7 days   Lab Units 03/05/24  0600 03/04/24  0509 03/03/24  0504 03/02/24  0508 03/01/24  0426 02/29/24  2101 02/29/24  0509 02/28/24  2224 02/28/24  1104 02/27/24  1945   WBC Thousand/uL  --  6.32 7.29 7.99 8.41  --  9.51  --   --  6.22   HEMOGLOBIN g/dL  --  10.8* 11.2* 10.5* 10.6*  --  11.4*  --   --  14.5   HEMATOCRIT %  --  34.2* 34.4* 31.7* 32.9*  --  34.5*  --   --  42.5   PLATELETS Thousands/uL  --  249 243 233 255  --  267  --   --  393*  393*   SODIUM mmol/L 138 137 136  136 137 137 137 138   < > 136 140   POTASSIUM mmol/L 3.8 3.3* 3.3*  3.3* 3.1* 3.2* 3.1* 3.0*   < > 2.8* 2.5*   CHLORIDE mmol/L 112* 108 105  105 104 103 103 102   < > 99 96   CO2 mmol/L 19* 21 21  22 22 24 25 26   < > 27 25   BUN mg/dL 53* 60* 66*  67* 68* 69* 65* 57*   < > 48* 53*   CREATININE mg/dL 2.68* 3.10* 3.30*  3.26* 3.49* 3.60* 3.19* 3.19*   < > 2.61* 2.44*   EGFR ml/min/1.73sq m 24 20 19  19 17 17 20 20   < > 25 27   CALCIUM mg/dL 8.8 9.1 9.1  9.0 8.8 9.2 9.2 9.0   < > 9.4 11.3*   MAGNESIUM mg/dL  --  2.3 2.4  --  1.9  --   --   --   --   --    PHOSPHORUS mg/dL  --   --   --   --  5.1*  --   --   --  4.8*  --     < > = values in this interval not displayed.       I have personally reviewed the old medical records and patient's new baseline creatinine seems to be fluctuating around 2.5    RADIOLOGY RESULTS      VAS renal artery complete   Final Result by Hansel Guevara DO (02/28 1017)      CTA dissection protocol chest/abdomen/pelvis   Final Result by Paxton Stevens DO (02/28 0019)      No aortic aneurysm or dissection.      Small volume free fluid/mild mesenteric edema. Consider enteritis in the appropriate clinical setting.                Workstation performed: [id number]         XR chest 1 view portable   Final Result by Daquan Mills MD (02/28 0906)      No radiographic evidence of acute intrathoracic process.            Workstation performed: [id number]             PLAN / RECOMMENDATIONS      1. TRUDI on top of CKD stage IV.  Multifactorial    Upon review of old medical record from [facility] chart review, previously patient been followed by Dr. Reese at [facility] and upon review of old medical record from [facility] chart review, seems to me that patient has underlying progressive chronic kidney disease.  Previously known baseline creatinine was 2.0 but new baseline creatinine is thought to be around 2.5.    Patient was found to have abnormal stress test and was supposed to go for cardiac cath yesterday but it was postponed it till today.  Will plan to continue IV fluid and Mucomyst as a part of renal optimization strategy.  Patient understand the risk of contrast-induced nephropathy with cardiac cath and also understand that if renal function worsen, may need to start on dialysis.  Patient's current creatinine is 2.68 with EGFR of 24 which is better than before and since patient is not in clinical overload state, plan to continue IV fluid as a part of renal optimization strategy today.    2.  Hypertensive crisis.  Patient seems to have resistant hypertension and also seems to be allergic to a lot of antihypertensive medication.  Renal artery Doppler done on 2/28/2024 showed no evidence of renal artery stenosis.    Seems to me that patient is hypertensive during the morning time but blood pressure seems to be improving later on and recommend monitoring hypertension today with hydralazine 75 mg p.o. 3 times daily, doxazosin 8 mg p.o. twice daily, amlodipine 10 mg p.o. daily and labetalol 600 mg p.o. 3 times daily.    3.  Hyperparathyroidism of renal origin.  Recent PTH level was 186 [2/28/2024] which is above the goal for  "chronic kidney disease also started on Calcitrol 0.25 mcg every other day.  Calcium level is currently acceptable and plan to continue calcitriol at current dose and recommend checking PTH level in around 3 months as outpatient    Overall above mentioned plan and recommendations were also d/w current [facility] physician and they agreed with my plan of use of IV fluid and Mucomyst as a part of renal optimization strategy prior to undergoing cardiac cath    Carleen Acosta MD  Nephrology  3/5/2024        Portions of the record may have been created with voice recognition software. Occasional wrong word or \"sound a like\" substitutions may have occurred due to the inherent limitations of voice recognition software. Read the chart carefully and recognize, using context, where substitutions have occurred.  "

## 2024-03-05 NOTE — PROGRESS NOTES
[facility]  Progress Note  Name: Genna Liu I  MRN: [id number]  Unit/Bed#: -01 I Date of Admission: 2/27/2024   Date of Service: 3/5/2024 I Hospital Day: 6    Assessment/Plan   * Hypertensive emergency  Assessment & Plan  BP initially noted to be significantly elevated on arrival initially required IV Cardene infusion  Has since been discontinued  Continue home Amlodipine 10 mg, Cardura 8 mg HS, labetalol 600 mg TID, isosorbide 40mg TID  Added hydralazine 75 mg every 8 hours  Blood pressure still elevated however improving  Continue to monitor  Nephrology on board    Elevated troponin  Assessment & Plan  Presented with elevated troponin   Plan for cardiac catheterization today  N.p.o. after midnight  Follow-up cardiology recs        UTI (urinary tract infection)  Assessment & Plan  Completed antibiotic therapy with ceftriaxone    Chronic heart failure with preserved ejection fraction (HFpEF) (Formerly [facility])  Assessment & Plan  Echo within normal limits  Currently euvolemic    Stage 4 chronic kidney disease (Former[facility])  Assessment & Plan  Kidney function stable at approximately 2.7  Nephrology consulted for prevention of contrast-induced nephropathy  Continue monitor kidney function      Depression with anxiety  Assessment & Plan  Continue abilify, zyprexa, zoloft    Hypokalemia  Assessment & Plan  repleted    COPD without exacerbation ([facility])  Assessment & Plan  No acute exacerbation  Continue home inhalers           VTE Pharmacologic Prophylaxis:   Pharmacologic: Heparin  Mechanical VTE Prophylaxis in Place: Yes    Patient Centered Rounds: I have performed bedside rounds with nursing staff today.    Discussions with Specialists or Other Care Team Provider: cm, nursing    Education and Discussions with Family / Patient: pt,wife    Time Spent for Care: 30 minutes.  More than 50% of total time spent on counseling and coordination of care as described above.    Current Length of Stay: 6  day(s)    Current Patient Status: Inpatient   Certification Statement: The patient will continue to require additional inpatient hospital stay due to see below    Discharge Plan: Pending cardiac cath monitoring kidney function.  Anticipate approximate 24 hours    Code Status: Level 1 - Full Code      Subjective:   Denies chest pain, shortness of breath, cough, fevers, chills    Objective:     Vitals:   Temp (24hrs), Av.2 °F (36.8 °C), Min:97.6 °F (36.4 °C), Max:98.5 °F (36.9 °C)    Temp:  [97.6 °F (36.4 °C)-98.5 °F (36.9 °C)] 98 °F (36.7 °C)  HR:  [59-73] 66  Resp:  [18] 18  BP: (128-183)/() 183/102  SpO2:  [97 %-100 %] 98 %  Body mass index is 21.49 kg/m².     Input and Output Summary (last 24 hours):       Intake/Output Summary (Last 24 hours) at 3/5/2024 0941  Last data filed at 3/5/2024 0904  Gross per 24 hour   Intake 0 ml   Output 800 ml   Net -800 ml       Physical Exam:     Physical Exam  Constitutional:       General: He is not in acute distress.     Appearance: He is well-developed. He is not diaphoretic.   HENT:      Head: Normocephalic and atraumatic.      Nose: Nose normal.      Mouth/Throat:      Pharynx: No oropharyngeal exudate.   Eyes:      General: No scleral icterus.     Conjunctiva/sclera: Conjunctivae normal.   Cardiovascular:      Rate and Rhythm: Normal rate and regular rhythm.      Heart sounds: Normal heart sounds. No murmur heard.     No friction rub. No gallop.   Pulmonary:      Effort: Pulmonary effort is normal. No respiratory distress.      Breath sounds: Normal breath sounds. No wheezing or rales.   Chest:      Chest wall: No tenderness.   Abdominal:      General: Bowel sounds are normal. There is no distension.      Palpations: Abdomen is soft.      Tenderness: There is no abdominal tenderness. There is no guarding.   Musculoskeletal:         General: No tenderness or deformity. Normal range of motion.      Cervical back: Normal range of motion and neck supple.   Skin:      General: Skin is warm and dry.      Findings: No erythema.   Neurological:      Mental Status: He is alert. Mental status is at baseline.           Additional Data:     Labs:    Results from last 7 days   Lab Units 03/04/24  0509 03/03/24  0504   WBC Thousand/uL 6.32 7.29   HEMOGLOBIN g/dL 10.8* 11.2*   HEMATOCRIT % 34.2* 34.4*   PLATELETS Thousands/uL 249 243   NEUTROS PCT %  --  60   LYMPHS PCT %  --  25   MONOS PCT %  --  10   EOS PCT %  --  4     Results from last 7 days   Lab Units 03/05/24  0600 03/04/24  0509 03/03/24  0504   SODIUM mmol/L 138   < > 136  136   POTASSIUM mmol/L 3.8   < > 3.3*  3.3*   CHLORIDE mmol/L 112*   < > 105  105   CO2 mmol/L 19*   < > 21  22   BUN mg/dL 53*   < > 66*  67*   CREATININE mg/dL 2.68*   < > 3.30*  3.26*   ANION GAP mmol/L 7   < > 10  9   CALCIUM mg/dL 8.8   < > 9.1  9.0   ALBUMIN g/dL  --   --  3.7   TOTAL BILIRUBIN mg/dL  --   --  0.77   ALK PHOS U/L  --   --  66   ALT U/L  --   --  34   AST U/L  --   --  18   GLUCOSE RANDOM mg/dL 84   < > 82  83    < > = values in this interval not displayed.     Results from last 7 days   Lab Units 02/27/24  2326   INR  1.11             Results from last 7 days   Lab Units 03/01/24  0426 02/29/24  1846   LACTIC ACID mmol/L  --  0.6   PROCALCITONIN ng/ml 0.15  --            * I Have Reviewed All Lab Data Listed Above.  * Additional Pertinent Lab Tests Reviewed: All Labs Within Last 24 Hours Reviewed    Imaging:    Imaging Reports Reviewed Today Include: na  Imaging Personally Reviewed by Myself Includes:  na    Recent Cultures (last 7 days):     Results from last 7 days   Lab Units 03/01/24  0217 02/29/24  1836   BLOOD CULTURE   --  No Growth After 4 Days.  No Growth After 4 Days.   URINE CULTURE  >100,000 cfu/ml Enterococcus faecalis*  --        Last 24 Hours Medication List:   Current Facility-Administered Medications   Medication Dose Route Frequency Provider Last Rate    acetaminophen  650 mg Oral Q4H PRN Vidal Pepito  Chevy, CRNP      acetylcysteine  1,200 mg Oral BID Carleen Acosta MD      albuterol  2 puff Inhalation Q4H PRN Vidal Beyer Chevy, CRNP      amLODIPine  10 mg Oral Daily Vidal Lyonsick, CRNP      ARIPiprazole  5 mg Oral Daily Vidal Lyonsick, CRNP      aspirin  81 mg Oral Daily Vidal Beyer Chevy, CRNP      atorvastatin  40 mg Oral HS Vidal Lyonsick, CRNP      chlorhexidine  15 mL Mouth/Throat Q12H JOANN Vidal Reece, CRNP      doxazosin  8 mg Oral BID Vidal Lyonsick, CRNP      heparin (porcine)  5,000 Units Subcutaneous Q8H JOANN Vidal Lyonsick, CRNP      hydrALAZINE  10 mg Intravenous Q6H PRN Vidal Beyer Chevy, CRNP      hydrALAZINE  75 mg Oral Q8H [facility] Carleen Acosta MD      HYDROmorphone  0.5 mg Intravenous Q1H PRN Vidal Lyonsick, CRNP      isosorbide dinitrate  40 mg Oral TID after meals Vidal Beyer Chevy, CRNP      labetalol  10 mg Intravenous Q4H PRN Vidal Beyer Chevy, CRNP      labetalol  600 mg Oral Q8H [facility] Vidal Lyonsick, CRNP      LORazepam  1 mg Oral Q6H PRN Vidal Reece, CRNP      metoclopramide  5 mg Intravenous Q8H PRN [facility] Parameswaran, DO      OLANZapine  5 mg Oral HS Vidal Reece, CRNP      pantoprazole  40 mg Oral Daily Vidal Beyer Chevy, CRNP      sertraline  200 mg Oral Daily Vidal Beyer Chevy, CRNP      sodium chloride  100 mL/hr Intravenous Continuous Carleen Acosta  mL/hr (03/05/24 0824)    sucralfate  1 g Oral BID MARBIN Ray          Today, Patient Was Seen By: Nickolas Solares MD    ** Please Note: Dictation voice to text software may have been used in the creation of this document. **

## 2024-03-05 NOTE — ASSESSMENT & PLAN NOTE
Kidney function stable at approximately 2.7  Nephrology consulted for prevention of contrast-induced nephropathy  Continue monitor kidney function

## 2024-03-06 VITALS
WEIGHT: 139.55 LBS | OXYGEN SATURATION: 94 % | SYSTOLIC BLOOD PRESSURE: 176 MMHG | BODY MASS INDEX: 21.9 KG/M2 | DIASTOLIC BLOOD PRESSURE: 105 MMHG | HEART RATE: 65 BPM | HEIGHT: 67 IN | TEMPERATURE: 98.1 F | RESPIRATION RATE: 18 BRPM

## 2024-03-06 PROBLEM — N17.9 ACUTE RENAL FAILURE SUPERIMPOSED ON STAGE 4 CHRONIC KIDNEY DISEASE (HCC): Status: ACTIVE | Noted: 2024-02-28

## 2024-03-06 LAB
ALDOST SERPL-MCNC: 8.4 NG/DL (ref 0–30)
ALDOST/RENIN PLAS-RTO: >50.3 {RATIO} (ref 0–30)
ANION GAP SERPL CALCULATED.3IONS-SCNC: 8 MMOL/L
BASOPHILS # BLD AUTO: 0.04 THOUSANDS/ÂΜL (ref 0–0.1)
BASOPHILS NFR BLD AUTO: 1 % (ref 0–1)
BUN SERPL-MCNC: 43 MG/DL (ref 5–25)
CALCIUM SERPL-MCNC: 8.8 MG/DL (ref 8.4–10.2)
CHLORIDE SERPL-SCNC: 111 MMOL/L (ref 96–108)
CO2 SERPL-SCNC: 18 MMOL/L (ref 21–32)
CREAT SERPL-MCNC: 2.5 MG/DL (ref 0.6–1.3)
EOSINOPHIL # BLD AUTO: 0.41 THOUSAND/ÂΜL (ref 0–0.61)
EOSINOPHIL NFR BLD AUTO: 7 % (ref 0–6)
ERYTHROCYTE [DISTWIDTH] IN BLOOD BY AUTOMATED COUNT: 15 % (ref 11.6–15.1)
GFR SERPL CREATININE-BSD FRML MDRD: 26 ML/MIN/1.73SQ M
GLUCOSE SERPL-MCNC: 101 MG/DL (ref 65–140)
HCT VFR BLD AUTO: 29.9 % (ref 36.5–49.3)
HGB BLD-MCNC: 9.6 G/DL (ref 12–17)
IMM GRANULOCYTES # BLD AUTO: 0.01 THOUSAND/UL (ref 0–0.2)
IMM GRANULOCYTES NFR BLD AUTO: 0 % (ref 0–2)
LYMPHOCYTES # BLD AUTO: 1.16 THOUSANDS/ÂΜL (ref 0.6–4.47)
LYMPHOCYTES NFR BLD AUTO: 19 % (ref 14–44)
MCH RBC QN AUTO: 27.7 PG (ref 26.8–34.3)
MCHC RBC AUTO-ENTMCNC: 32.1 G/DL (ref 31.4–37.4)
MCV RBC AUTO: 86 FL (ref 82–98)
MONOCYTES # BLD AUTO: 0.56 THOUSAND/ÂΜL (ref 0.17–1.22)
MONOCYTES NFR BLD AUTO: 9 % (ref 4–12)
NEUTROPHILS # BLD AUTO: 3.96 THOUSANDS/ÂΜL (ref 1.85–7.62)
NEUTS SEG NFR BLD AUTO: 64 % (ref 43–75)
NRBC BLD AUTO-RTO: 0 /100 WBCS
PLATELET # BLD AUTO: 256 THOUSANDS/UL (ref 149–390)
PMV BLD AUTO: 10.5 FL (ref 8.9–12.7)
POTASSIUM SERPL-SCNC: 3.4 MMOL/L (ref 3.5–5.3)
RBC # BLD AUTO: 3.46 MILLION/UL (ref 3.88–5.62)
RENIN PLAS-CCNC: <0.167 NG/ML/HR (ref 0.17–5.38)
SODIUM SERPL-SCNC: 137 MMOL/L (ref 135–147)
WBC # BLD AUTO: 6.14 THOUSAND/UL (ref 4.31–10.16)

## 2024-03-06 PROCEDURE — 80048 BASIC METABOLIC PNL TOTAL CA: CPT | Performed by: INTERNAL MEDICINE

## 2024-03-06 PROCEDURE — 99232 SBSQ HOSP IP/OBS MODERATE 35: CPT | Performed by: INTERNAL MEDICINE

## 2024-03-06 PROCEDURE — 85025 COMPLETE CBC W/AUTO DIFF WBC: CPT | Performed by: INTERNAL MEDICINE

## 2024-03-06 PROCEDURE — 99239 HOSP IP/OBS DSCHRG MGMT >30: CPT | Performed by: INTERNAL MEDICINE

## 2024-03-06 RX ORDER — HYDRALAZINE HYDROCHLORIDE 100 MG/1
100 TABLET, FILM COATED ORAL EVERY 8 HOURS SCHEDULED
Qty: 90 TABLET | Refills: 0 | Status: SHIPPED | OUTPATIENT
Start: 2024-03-06

## 2024-03-06 RX ORDER — ASPIRIN 81 MG/1
81 TABLET, CHEWABLE ORAL DAILY
Qty: 30 TABLET | Refills: 0 | Status: SHIPPED | OUTPATIENT
Start: 2024-03-07

## 2024-03-06 RX ORDER — HYDRALAZINE HYDROCHLORIDE 25 MG/1
100 TABLET, FILM COATED ORAL EVERY 8 HOURS SCHEDULED
Status: DISCONTINUED | OUTPATIENT
Start: 2024-03-06 | End: 2024-03-06 | Stop reason: HOSPADM

## 2024-03-06 RX ADMIN — PANTOPRAZOLE SODIUM 40 MG: 40 TABLET, DELAYED RELEASE ORAL at 09:54

## 2024-03-06 RX ADMIN — AMLODIPINE BESYLATE 10 MG: 10 TABLET ORAL at 09:54

## 2024-03-06 RX ADMIN — CHLORHEXIDINE GLUCONATE 0.12% ORAL RINSE 15 ML: 1.2 LIQUID ORAL at 09:51

## 2024-03-06 RX ADMIN — ASPIRIN 81 MG: 81 TABLET, CHEWABLE ORAL at 09:55

## 2024-03-06 RX ADMIN — ARIPIPRAZOLE 5 MG: 5 TABLET ORAL at 09:55

## 2024-03-06 RX ADMIN — HEPARIN SODIUM 5000 UNITS: 5000 INJECTION INTRAVENOUS; SUBCUTANEOUS at 05:39

## 2024-03-06 RX ADMIN — ISOSORBIDE DINITRATE 40 MG: 10 TABLET ORAL at 09:54

## 2024-03-06 RX ADMIN — DOXAZOSIN 8 MG: 4 TABLET ORAL at 09:54

## 2024-03-06 RX ADMIN — LABETALOL HYDROCHLORIDE 10 MG: 5 INJECTION, SOLUTION INTRAVENOUS at 05:40

## 2024-03-06 RX ADMIN — HYDRALAZINE HYDROCHLORIDE 75 MG: 25 TABLET ORAL at 05:41

## 2024-03-06 RX ADMIN — SUCRALFATE 1 G: 1 TABLET ORAL at 09:55

## 2024-03-06 RX ADMIN — ACETYLCYSTEINE 1200 MG: 200 SOLUTION ORAL; RESPIRATORY (INHALATION) at 09:51

## 2024-03-06 RX ADMIN — SERTRALINE HYDROCHLORIDE 200 MG: 50 TABLET ORAL at 09:53

## 2024-03-06 NOTE — PROGRESS NOTES
NEPHROLOGY PROGRESS NOTE    Patient: Genna Liu               Sex: male          DOA: 2/27/2024  6:23 PM   YOB: 1965        Age: 58 y.o.         LOS:  LOS: 7 days   Encounter Date: 3/6/2024    REASON FOR THE CONSULTATION:     HPI     This is a 58 y.o. male admitted for Hypertensive emergency     SUBJECTIVE     -Underwent cardiac cath yesterday but did not require any intervention.  Breathing is currently stable.  Patient's blood pressure was elevated during morning but seems to be improving later on. Patient denies nausea, vomiting, headache or dizziness today.  - Reviewed last 24 hrs events     CURRENT MEDICATIONS       Current Facility-Administered Medications:     acetaminophen (TYLENOL) tablet 650 mg, 650 mg, Oral, Q4H PRN, Nickolas Solares MD, 650 mg at 03/02/24 2130    acetylcysteine (MUCOMYST) 200 mg/mL oral solution 1,200 mg, 1,200 mg, Oral, BID, Nickolas Solares MD, 1,200 mg at 03/06/24 0951    albuterol (PROVENTIL HFA,VENTOLIN HFA) inhaler 2 puff, 2 puff, Inhalation, Q4H PRN, Nickolas Solares MD    amLODIPine (NORVASC) tablet 10 mg, 10 mg, Oral, Daily, Nickolas Solares MD, 10 mg at 03/06/24 0954    ARIPiprazole (ABILIFY) tablet 5 mg, 5 mg, Oral, Daily, Nickolas Solares MD, 5 mg at 03/06/24 0955    aspirin chewable tablet 81 mg, 81 mg, Oral, Daily, Nickolas Solares MD, 81 mg at 03/06/24 0955    atorvastatin (LIPITOR) tablet 40 mg, 40 mg, Oral, HS, Nickolas Solares MD, 40 mg at 03/05/24 2305    chlorhexidine (PERIDEX) 0.12 % oral rinse 15 mL, 15 mL, Mouth/Throat, Q12H JOANN, Nickolas Solares MD, 15 mL at 03/06/24 0951    doxazosin (CARDURA) tablet 8 mg, 8 mg, Oral, BID, Nickolas Solares MD, 8 mg at 03/06/24 0954    heparin (porcine) subcutaneous injection 5,000 Units, 5,000 Units, Subcutaneous, Q8H JOANN, 5,000 Units at 03/06/24 0539 **AND** [COMPLETED] Platelet count, , , Once, Shai Chaidez,     hydrALAZINE (APRESOLINE) injection 10 mg, 10 mg, Intravenous, Q6H PRN, Nickolas Solares MD    hydrALAZINE (APRESOLINE) tablet  100 mg, 100 mg, Oral, Q8H [facility], Carleen Acosta MD    HYDROmorphone (DILAUDID) injection 0.5 mg, 0.5 mg, Intravenous, Q1H PRN, Nickolas Solares MD    isosorbide dinitrate (ISORDIL) tablet 40 mg, 40 mg, Oral, TID after meals, Nickolas Solares MD, 40 mg at 03/06/24 0954    labetalol (NORMODYNE) injection 10 mg, 10 mg, Intravenous, Q4H PRN, Nickolas Solares MD, 10 mg at 03/06/24 0540    labetalol (NORMODYNE) tablet 600 mg, 600 mg, Oral, Q8H JOANN, Nickolas Solares MD, 600 mg at 03/05/24 2307    LORazepam (ATIVAN) tablet 1 mg, 1 mg, Oral, Q6H PRN, Nickolas Solares MD, 1 mg at 02/28/24 0609    metoclopramide (REGLAN) injection 5 mg, 5 mg, Intravenous, Q8H PRN, Nickolas Solares MD    OLANZapine (ZyPREXA) tablet 5 mg, 5 mg, Oral, HS, Nickolas Solares MD, 5 mg at 03/05/24 2305    pantoprazole (PROTONIX) EC tablet 40 mg, 40 mg, Oral, Daily, Nickolas Solares MD, 40 mg at 03/06/24 0954    sertraline (ZOLOFT) tablet 200 mg, 200 mg, Oral, Daily, Nickolas Solares MD, 200 mg at 03/06/24 0953    sucralfate (CARAFATE) tablet 1 g, 1 g, Oral, BID, Nickolas Solares MD, 1 g at 03/06/24 0955    Current Outpatient Medications:     [START ON 3/7/2024] aspirin 81 mg chewable tablet, Chew 1 tablet (81 mg total) daily, Disp: 30 tablet, Rfl: 0    hydrALAZINE (APRESOLINE) 100 MG tablet, Take 1 tablet (100 mg total) by mouth every 8 (eight) hours, Disp: 90 tablet, Rfl: 0    albuterol (PROVENTIL HFA,VENTOLIN HFA) 90 mcg/act inhaler, , Disp: , Rfl:     amLODIPine (NORVASC) 10 mg tablet, Take 10 mg by mouth, Disp: , Rfl:     ARIPiprazole (ABILIFY) 5 mg tablet, Take 5 mg by mouth daily, Disp: , Rfl:     atorvastatin (LIPITOR) 40 mg tablet, Take 40 mg by mouth daily at bedtime, Disp: , Rfl:     doxazosin (CARDURA) 8 MG tablet, Take 1 tablet (8 mg total) by mouth 2 (two) times a day, Disp: 90 tablet, Rfl: 6    isosorbide dinitrate (ISORDIL) 40 MG tablet, Take 1 tablet (40 mg total) by mouth 3 (three) times daily after meals, Disp: 90 tablet, Rfl: 0    labetalol (NORMODYNE) 300 mg  "tablet, Take 2 tablets (600 mg total) by mouth every 8 (eight) hours, Disp: 180 tablet, Rfl: 0    LORazepam (ATIVAN) 1 mg tablet, TAKE 1 TABLET BY MOUTH ONCE DAILY AS NEEDED FOR ANXIETY (TAKE 1 TABLET PRIOR TO MRI OR CT SCAN), Disp: , Rfl:     OLANZapine (ZyPREXA) 2.5 mg tablet, Take 5 mg by mouth daily at bedtime, Disp: , Rfl:     pantoprazole (PROTONIX) 40 mg tablet, Take 40 mg by mouth daily, Disp: , Rfl:     sertraline (ZOLOFT) 100 mg tablet, Take 2 tablets by mouth daily, Disp: , Rfl:     sucralfate (CARAFATE) 1 g tablet, Take 1 g by mouth 2 (two) times a day, Disp: , Rfl:     OBJECTIVE     Current Weight: Weight - Scale: 63.3 kg (139 lb 8.8 oz)  BP (!) 176/105   Pulse 65   Temp 98.1 °F (36.7 °C)   Resp 18   Ht 5' 6.5\" (1.689 m)   Wt 63.3 kg (139 lb 8.8 oz)   SpO2 94%   BMI 22.19 kg/m²   Vitals:    03/06/24 0954   BP: (!) 176/105   Pulse:    Resp:    Temp:    SpO2:      Body mass index is 22.19 kg/m².    Intake/Output Summary (Last 24 hours) at 3/6/2024 1259  Last data filed at 3/6/2024 0900  Gross per 24 hour   Intake 1900 ml   Output 1025 ml   Net 875 ml       PHYSICAL EXAMINATION     Physical Exam  Constitutional:       General: He is not in acute distress.  HENT:      Right Ear: External ear normal.   Eyes:      Conjunctiva/sclera:      Right eye: No hemorrhage.  Neck:      Thyroid: No thyromegaly.   Pulmonary:      Effort: No accessory muscle usage or respiratory distress.   Abdominal:      General: There is no distension.   Skin:     Coloration: Skin is not jaundiced.   Psychiatric:         Behavior: Behavior is not combative.           LAB RESULTS     Results from last 7 days   Lab Units 03/06/24  0549 03/05/24  0600 03/04/24  0509 03/03/24  0504 03/02/24  0508 03/01/24  0426 02/29/24  2101 02/29/24  0509   WBC Thousand/uL 6.14  --  6.32 7.29 7.99 8.41  --  9.51   HEMOGLOBIN g/dL 9.6*  --  10.8* 11.2* 10.5* 10.6*  --  11.4*   HEMATOCRIT % 29.9*  --  34.2* 34.4* 31.7* 32.9*  --  34.5*   PLATELETS " Thousands/uL 256  --  249 243 233 255  --  267   SODIUM mmol/L 137 138 137 136  136 137 137 137 138   POTASSIUM mmol/L 3.4* 3.8 3.3* 3.3*  3.3* 3.1* 3.2* 3.1* 3.0*   CHLORIDE mmol/L 111* 112* 108 105  105 104 103 103 102   CO2 mmol/L 18* 19* 21 21  22 22 24 25 26   BUN mg/dL 43* 53* 60* 66*  67* 68* 69* 65* 57*   CREATININE mg/dL 2.50* 2.68* 3.10* 3.30*  3.26* 3.49* 3.60* 3.19* 3.19*   EGFR ml/min/1.73sq m 26 24 20 19  19 17 17 20 20   CALCIUM mg/dL 8.8 8.8 9.1 9.1  9.0 8.8 9.2 9.2 9.0   MAGNESIUM mg/dL  --   --  2.3 2.4  --  1.9  --   --    PHOSPHORUS mg/dL  --   --   --   --   --  5.1*  --   --        I have personally reviewed the old medical records and patient's new baseline creatinine seems to be around 2.5    RADIOLOGY RESULTS      VAS renal artery complete   Final Result by Hansel Guevara DO (02/28 1017)      CTA dissection protocol chest/abdomen/pelvis   Final Result by Paxton Stevens DO (02/28 0019)      No aortic aneurysm or dissection.      Small volume free fluid/mild mesenteric edema. Consider enteritis in the appropriate clinical setting.               Workstation performed: [id number]         XR chest 1 view portable   Final Result by Daquan Mills MD (02/28 0906)      No radiographic evidence of acute intrathoracic process.            Workstation performed: [id number]             PLAN / RECOMMENDATIONS      1. TRUDI on CKD stage IV.  Multifactorial    Upon review of old medical record from [facility] chart review, patient was previously being followed by Dr. Reese at [facility] and seems to me that patient has underlying progressive chronic kidney disease and previously known baseline creatinine was around 2.0 but new baseline creatinine seems to be around 2.5    Patient had underwent cardiac cath yesterday and as a part of renal optimization strategy, I have given patient IV fluid and Mucomyst.  Overnight patient's breathing has remained stable and also found to be  "nonoliguric and clinically not in volume overload state.  Renal function seems to have improved overnight to creatinine of 2.5 which is now back to previously known baseline creatinine.  Encourage patient to drink around 2 L of fluid on daily basis to ensure staying well-hydrated.  Plan to monitor renal function while in the hospital.    2.  Hypertensive crisis.  Patient was found to have resistant hypertension and also have allergy to lot of antihypertensive medication.  Renal 2/28/2024 showed no evidence of renal artery stenosis done on artery Doppler     Seems to me that patient is hypertensive during the morning time but blood pressure seems to be improving.  Will plan to increase hydralazine to 100 mg p.o. 3 times daily today and continue doxazosin 8 mg p.o. twice daily, labetalol 600 mg p.o. 3 times daily and amlodipine 10 mg p.o. daily today    3.  Secondary hyperparathyroidism of renal origin.  Last known PTH level is 186 [2/28/2024] which is above the goal for chronic kidney disease and was started on Calcitrol 0.25 mcg every other day.  Calcium level is currently acceptable and plan to continue calcitriol at current dose and recommend checking PTH level as outpatient in around 3 months.    Disposition: Stable from renal standpoint for discharge with current antihypertensive medication.  Recommend checking BMP in 3-5 days upon discharge and outpatient nephrology follow-up with us or primary nephrologist in around 2 weeks    Overall above mentioned plan and recommendations were also d/w current [facility] physician and they agreed with my plan of monitoring renal function if remains in the hospital overnight    Carleen Acosta MD  Nephrology  3/6/2024        Portions of the record may have been created with voice recognition software. Occasional wrong word or \"sound a like\" substitutions may have occurred due to the inherent limitations of voice recognition software. Read the chart carefully and recognize, using " context, where substitutions have occurred.

## 2024-03-06 NOTE — PLAN OF CARE
Problem: SAFETY ADULT  Goal: Patient will remain free of falls  Description: INTERVENTIONS:  - Educate patient/family on patient safety including physical limitations  - Instruct patient to call for assistance with activity   - Consult OT/PT to assist with strengthening/mobility   - Keep Call bell within reach  - Keep bed low and locked with side rails adjusted as appropriate  - Keep care items and personal belongings within reach  - Initiate and maintain comfort rounds  - Make Fall Risk Sign visible to staff  - Offer Toileting every 3 Hours, in advance of need  - Initiate/Maintain bed alarm  - Obtain necessary fall risk management equipment:   - Apply yellow socks and bracelet for high fall risk patients  - Consider moving patient to room near nurses station  Outcome: Progressing  Goal: Maintain or return to baseline ADL function  Description: INTERVENTIONS:  -  Assess patient's ability to carry out ADLs; assess patient's baseline for ADL function and identify physical deficits which impact ability to perform ADLs (bathing, care of mouth/teeth, toileting, grooming, dressing, etc.)  - Assess/evaluate cause of self-care deficits   - Assess range of motion  - Assess patient's mobility; develop plan if impaired  - Assess patient's need for assistive devices and provide as appropriate  - Encourage maximum independence but intervene and supervise when necessary  - Involve family in performance of ADLs  - Assess for home care needs following discharge   - Consider OT consult to assist with ADL evaluation and planning for discharge  - Provide patient education as appropriate  Outcome: Progressing  Goal: Maintains/Returns to pre admission functional level  Description: INTERVENTIONS:  - Perform AM-PAC 6 Click Basic Mobility/ Daily Activity assessment daily.  - Set and communicate daily mobility goal to care team and patient/family/caregiver.   - Collaborate with rehabilitation services on mobility goals if consulted  -  Perform Range of Motion 3 times a day.  - Reposition patient every 2 hours.  - Dangle patient 3 times a day  - Stand patient 3 times a day  - Ambulate patient 3 times a day  - Out of bed to chair 3 times a day   - Out of bed for meals 3 times a day  - Out of bed for toileting  - Record patient progress and toleration of activity level   Outcome: Progressing     Problem: DISCHARGE PLANNING  Goal: Discharge to home or other facility with appropriate resources  Description: INTERVENTIONS:  - Identify barriers to discharge w/patient and caregiver  - Arrange for needed discharge resources and transportation as appropriate  - Identify discharge learning needs (meds, wound care, etc.)  - Arrange for interpretive services to assist at discharge as needed  - Refer to Case Management Department for coordinating discharge planning if the patient needs post-hospital services based on physician/advanced practitioner order or complex needs related to functional status, cognitive ability, or social support system  Outcome: Progressing     Problem: PAIN - ADULT  Goal: Verbalizes/displays adequate comfort level or baseline comfort level  Description: Interventions:  - Encourage patient to monitor pain and request assistance  - Assess pain using appropriate pain scale  - Administer analgesics based on type and severity of pain and evaluate response  - Implement non-pharmacological measures as appropriate and evaluate response  - Consider cultural and social influences on pain and pain management  - Notify physician/advanced practitioner if interventions unsuccessful or patient reports new pain  Outcome: Progressing     Problem: INFECTION - ADULT  Goal: Absence or prevention of progression during hospitalization  Description: INTERVENTIONS:  - Assess and monitor for signs and symptoms of infection  - Monitor lab/diagnostic results  - Monitor all insertion sites, i.e. indwelling lines, tubes, and drains  - Monitor endotracheal if  appropriate and nasal secretions for changes in amount and color  - [place] appropriate cooling/warming therapies per order  - Administer medications as ordered  - Instruct and encourage patient and family to use good hand hygiene technique  - Identify and instruct in appropriate isolation precautions for identified infection/condition  Outcome: Progressing  Goal: Absence of fever/infection during neutropenic period  Description: INTERVENTIONS:  - Monitor WBC    Outcome: Progressing     Problem: Knowledge Deficit  Goal: Patient/family/caregiver demonstrates understanding of disease process, treatment plan, medications, and discharge instructions  Description: Complete learning assessment and assess knowledge base.  Interventions:  - Provide teaching at level of understanding  - Provide teaching via preferred learning methods  Outcome: Progressing     Problem: CARDIOVASCULAR - ADULT  Goal: Maintains optimal cardiac output and hemodynamic stability  Description: INTERVENTIONS:  - Monitor I/O, vital signs and rhythm  - Monitor for S/S and trends of decreased cardiac output  - Administer and titrate ordered vasoactive medications to optimize hemodynamic stability  - Assess quality of pulses, skin color and temperature  - Assess for signs of decreased coronary artery perfusion  - Instruct patient to report change in severity of symptoms  Outcome: Progressing  Goal: Absence of cardiac dysrhythmias or at baseline rhythm  Description: INTERVENTIONS:  - Continuous cardiac monitoring, vital signs, obtain 12 lead EKG if ordered  - Administer antiarrhythmic and heart rate control medications as ordered  - Monitor electrolytes and administer replacement therapy as ordered  Outcome: Progressing     Problem: Nutrition/Hydration-ADULT  Goal: Nutrient/Hydration intake appropriate for improving, restoring or maintaining nutritional needs  Description: Monitor and assess patient's nutrition/hydration status for malnutrition.  Collaborate with interdisciplinary team and initiate plan and interventions as ordered.  Monitor patient's weight and dietary intake as ordered or per policy. Utilize nutrition screening tool and intervene as necessary. Determine patient's food preferences and provide high-protein, high-caloric foods as appropriate.     INTERVENTIONS:  - Monitor oral intake, urinary output, labs, and treatment plans  - Assess nutrition and hydration status and recommend course of action  - Evaluate amount of meals eaten  - Assist patient with eating if necessary   - Allow adequate time for meals  - Recommend/ encourage appropriate diets, oral nutritional supplements, and vitamin/mineral supplements  - Order, calculate, and assess calorie counts as needed  - Recommend, monitor, and adjust tube feedings and TPN/PPN based on assessed needs  - Assess need for intravenous fluids  - Provide specific nutrition/hydration education as appropriate  - Include patient/family/caregiver in decisions related to nutrition  Outcome: Progressing

## 2024-03-06 NOTE — PROGRESS NOTES
"Cardiology Progress Note - Genna Liu 58 y.o. male MRN: [id number]    Unit/Bed#: -01 Encounter: [id number]      Assessment/Plan:  Nonobstructive CAD, s/p cardiac cath with acute Mrg lesion 70% stenosis very small vessel.  Continue medical management, Norvasc, aspirin, Lipitor, Cardura, hydralazine, Isordil, labetalol.     2. Hypertension, resistant, on multiple meds, mgmt per Renal.  Continue Norvasc, Cardura, hydralazine, Isordil, labetalol. Check aldosterone and metanephrines.     3. Nonischemic myocardial injury secondary to #2.  Status post cardiac catheterization above which showed nonobstructive CAD.  Continue all medications    4.  TRUDI on CKD 4, nephrology following.  Creatinine today 2.5.    5.  History of pericardial effusion status post pericardial window July 2023.    6.  History of hypoaldosteronism status post left adrenalectomy 2012    7.  Medication noncompliance, reviewed the importance of taking all medications as prescribed    Patient is stable from a cardiac standpoint for discharge.  Plan to follow-up in the office.      Subjective:   Patient seen and examined.  No significant events overnight. Im feeling good. Can I go home.     Objective:     Vitals: Blood pressure (!) 176/105, pulse 65, temperature 98.1 °F (36.7 °C), resp. rate 18, height 5' 6.5\" (1.689 m), weight 63.3 kg (139 lb 8.8 oz), SpO2 94%., Body mass index is 22.19 kg/m².,   Orthostatic Blood Pressures      Flowsheet Row Most Recent Value   Blood Pressure 176/105 filed at 03/06/2024 0954   Patient Position - Orthostatic VS Lying filed at 03/05/2024 1500              Intake/Output Summary (Last 24 hours) at 3/6/2024 1124  Last data filed at 3/6/2024 0900  Gross per 24 hour   Intake 2341.67 ml   Output 1025 ml   Net 1316.67 ml         Physical Exam:  GEN: Alert and oriented x 3, in no acute distress.  Well appearing and well nourished.   HEENT: Sclera anicteric, conjunctivae pink, mucous membranes moist. Oropharynx clear. "   NECK: Supple, no carotid bruits, no significant JVD. Trachea midline, no thyromegaly.   HEART: Regular rhythm, normal S1 and S2, no murmurs, clicks, gallops or rubs. PMI nondisplaced, no thrills.   LUNGS: Clear to auscultation bilaterally; no wheezes, rales, or rhonchi. No increased work of breathing or signs of respiratory distress.   ABDOMEN: Soft, nontender, nondistended, normoactive bowel sounds.   EXTREMITIES: Skin warm and well perfused, no clubbing, cyanosis, or edema.  NEURO: No focal findings. Normal speech. Mood and affect normal.   SKIN: Normal without suspicious lesions on exposed skin.      Medications:      Current Facility-Administered Medications:     acetaminophen (TYLENOL) tablet 650 mg, 650 mg, Oral, Q4H PRN, Nickolas Solares MD, 650 mg at 03/02/24 2130    acetylcysteine (MUCOMYST) 200 mg/mL oral solution 1,200 mg, 1,200 mg, Oral, BID, Nickolas Solares MD, 1,200 mg at 03/06/24 0951    albuterol (PROVENTIL HFA,VENTOLIN HFA) inhaler 2 puff, 2 puff, Inhalation, Q4H PRN, Nickolas Solares MD    amLODIPine (NORVASC) tablet 10 mg, 10 mg, Oral, Daily, Nickolas Solraes MD, 10 mg at 03/06/24 0954    ARIPiprazole (ABILIFY) tablet 5 mg, 5 mg, Oral, Daily, Nickolas Solares MD, 5 mg at 03/06/24 0955    aspirin chewable tablet 81 mg, 81 mg, Oral, Daily, Nickolas Solares MD, 81 mg at 03/06/24 0955    atorvastatin (LIPITOR) tablet 40 mg, 40 mg, Oral, HS, Nickolas Solares MD, 40 mg at 03/05/24 2305    chlorhexidine (PERIDEX) 0.12 % oral rinse 15 mL, 15 mL, Mouth/Throat, Q12H JOANN, Nickolas Solares MD, 15 mL at 03/06/24 0951    doxazosin (CARDURA) tablet 8 mg, 8 mg, Oral, BID, Nickolas Solares MD, 8 mg at 03/06/24 0954    heparin (porcine) subcutaneous injection 5,000 Units, 5,000 Units, Subcutaneous, Q8H JOANN, 5,000 Units at 03/06/24 0539 **AND** [COMPLETED] Platelet count, , , Once, Shai Chaidez,     hydrALAZINE (APRESOLINE) injection 10 mg, 10 mg, Intravenous, Q6H PRN, Nickolas Solares MD    hydrALAZINE (APRESOLINE) tablet 100 mg, 100 mg,  Oral, Q8H [facility], Jwedgardo Acosta MD    HYDROmorphone (DILAUDID) injection 0.5 mg, 0.5 mg, Intravenous, Q1H PRN, Nickolas Solares MD    isosorbide dinitrate (ISORDIL) tablet 40 mg, 40 mg, Oral, TID after meals, Nickolas Solares MD, 40 mg at 03/06/24 0954    labetalol (NORMODYNE) injection 10 mg, 10 mg, Intravenous, Q4H PRN, Nickolas Solares MD, 10 mg at 03/06/24 0540    labetalol (NORMODYNE) tablet 600 mg, 600 mg, Oral, Q8H JOANN, Nickolas Solares MD, 600 mg at 03/05/24 2307    LORazepam (ATIVAN) tablet 1 mg, 1 mg, Oral, Q6H PRN, Nickolas Solares MD, 1 mg at 02/28/24 0609    metoclopramide (REGLAN) injection 5 mg, 5 mg, Intravenous, Q8H PRN, Nickolas Solares MD    OLANZapine (ZyPREXA) tablet 5 mg, 5 mg, Oral, HS, Nickolas Solares MD, 5 mg at 03/05/24 2305    pantoprazole (PROTONIX) EC tablet 40 mg, 40 mg, Oral, Daily, Nickolas Solares MD, 40 mg at 03/06/24 0954    sertraline (ZOLOFT) tablet 200 mg, 200 mg, Oral, Daily, Nickolas Solares MD, 200 mg at 03/06/24 0953    sucralfate (CARAFATE) tablet 1 g, 1 g, Oral, BID, Nickolas Solares MD, 1 g at 03/06/24 0955     Labs & Results:    Results from last 7 days   Lab Units 02/28/24  2224 02/28/24  2024 02/28/24  1821   HS TNI 0HR ng/L  --   --  353*   HS TNI 2HR ng/L  --  352*  --    HS TNI 4HR ng/L 349*  --   --    HSTNI D4 ng/L -4  --   --      Results from last 7 days   Lab Units 03/06/24  0549 03/04/24  0509 03/03/24  0504   WBC Thousand/uL 6.14 6.32 7.29   HEMOGLOBIN g/dL 9.6* 10.8* 11.2*   HEMATOCRIT % 29.9* 34.2* 34.4*   PLATELETS Thousands/uL 256 249 243         Results from last 7 days   Lab Units 03/06/24  0549 03/05/24  0600 03/04/24  0509 03/03/24  0504 03/02/24  0508 03/01/24  0426 02/29/24  2101 02/29/24  0509   POTASSIUM mmol/L 3.4* 3.8 3.3* 3.3*  3.3*   < > 3.2*   < > 3.0*   CHLORIDE mmol/L 111* 112* 108 105  105   < > 103   < > 102   CO2 mmol/L 18* 19* 21 21  22   < > 24   < > 26   BUN mg/dL 43* 53* 60* 66*  67*   < > 69*   < > 57*   CREATININE mg/dL 2.50* 2.68* 3.10* 3.30*  3.26*   <  > 3.60*   < > 3.19*   CALCIUM mg/dL 8.8 8.8 9.1 9.1  9.0   < > 9.2   < > 9.0   ALK PHOS U/L  --   --   --  66  --  73  --  78   ALT U/L  --   --   --  34  --  46  --  54*   AST U/L  --   --   --  18  --  30  --  47*    < > = values in this interval not displayed.         Results from last 7 days   Lab Units 03/04/24  0509 03/03/24  0504 03/01/24  0426   MAGNESIUM mg/dL 2.3 2.4 1.9

## 2024-03-06 NOTE — UTILIZATION REVIEW
NOTIFICATION OF ADMISSION DISCHARGE   This is a Notification of Discharge from [facility]. Please be advised that this patient has been discharge from our facility. Below you will find the admission and discharge date and time including the patient’s disposition.   UTILIZATION REVIEW CONTACT:  Emmy Morgan  Utilization   Network Utilization Review Department  Phone: [phone] x carefully listen to the prompts. All voicemails are confidential.  Email: [email or web address]     ADMISSION INFORMATION  PRESENTATION DATE: 2/27/2024  6:23 PM  OBERVATION ADMISSION DATE:   INPATIENT ADMISSION DATE: 2/28/24  4:26 AM   DISCHARGE DATE: 3/6/2024 12:27 PM   DISPOSITION:Home/Self Care    Network Utilization Review Department  ATTENTION: Please call with any questions or concerns to [phone] and carefully listen to the prompts so that you are directed to the right person. All voicemails are confidential.   For Discharge needs, contact Care Management DC Support Team at [phone] opt. 2  Send all requests for admission clinical reviews, approved or denied determinations and any other requests to dedicated fax number below belonging to the campus where the patient is receiving treatment. List of dedicated fax numbers for the Facilities:  FACILITY NAME UR FAX NUMBER   ADMISSION DENIALS (Administrative/Medical Necessity) [phone]   DISCHARGE SUPPORT TEAM ([facility]) [phone]   PARENT CHILD HEALTH (Maternity/NICU/Pediatrics) [phone]   [facility] [phone]   [facility] [phone]   On license of [facility] [phone]   [facility] [phone]   [facility] [phone]   [facility] [phone]   [facility] [phone]   [facility]  [phone]   [facility] [phone]   [facility] [phone]   [facility] [phone]   [facility] [phone]

## 2024-03-06 NOTE — ASSESSMENT & PLAN NOTE
Presented with elevated troponin   Underwent cardiac catheterization on 3/5 without any acute abnormalities  Recommended medical management

## 2024-03-06 NOTE — PLAN OF CARE
Problem: SAFETY ADULT  Goal: Patient will remain free of falls  Description: INTERVENTIONS:  - Educate patient/family on patient safety including physical limitations  - Instruct patient to call for assistance with activity   - Consult OT/PT to assist with strengthening/mobility   - Keep Call bell within reach  - Keep bed low and locked with side rails adjusted as appropriate  - Keep care items and personal belongings within reach  - Initiate and maintain comfort rounds  - Make Fall Risk Sign visible to staff  - Offer Toileting every 2 Hours, in advance of need  - Initiate/Maintain alarm  - Obtain necessary fall risk management equipment:   - Apply yellow socks and bracelet for high fall risk patients  - Consider moving patient to room near nurses station  3/6/2024 1143 by Jia Sanchez  Outcome: Adequate for Discharge  3/6/2024 1036 by Jia Sanchez  Outcome: Progressing  Goal: Maintain or return to baseline ADL function  Description: INTERVENTIONS:  -  Assess patient's ability to carry out ADLs; assess patient's baseline for ADL function and identify physical deficits which impact ability to perform ADLs (bathing, care of mouth/teeth, toileting, grooming, dressing, etc.)  - Assess/evaluate cause of self-care deficits   - Assess range of motion  - Assess patient's mobility; develop plan if impaired  - Assess patient's need for assistive devices and provide as appropriate  - Encourage maximum independence but intervene and supervise when necessary  - Involve family in performance of ADLs  - Assess for home care needs following discharge   - Consider OT consult to assist with ADL evaluation and planning for discharge  - Provide patient education as appropriate  3/6/2024 1143 by Jia Sanchez  Outcome: Adequate for Discharge  3/6/2024 1036 by Jia Sanchez  Outcome: Progressing  Goal: Maintains/Returns to pre admission functional level  Description: INTERVENTIONS:  - Perform AM-PAC 6 Click Basic  Mobility/ Daily Activity assessment daily.  - Set and communicate daily mobility goal to care team and patient/family/caregiver.   - Collaborate with rehabilitation services on mobility goals if consulted  - Perform Range of Motion 3 times a day.  - Reposition patient every 2 hours.  - Dangle patient 3 times a day  - Stand patient 3 times a day  - Ambulate patient 3 times a day  - Out of bed to chair 3 times a day   - Out of bed for meals 3 times a day  - Out of bed for toileting  - Record patient progress and toleration of activity level   3/6/2024 1143 by Jia Sanchez  Outcome: Adequate for Discharge  3/6/2024 1036 by Jia Sanchez  Outcome: Progressing     Problem: DISCHARGE PLANNING  Goal: Discharge to home or other facility with appropriate resources  Description: INTERVENTIONS:  - Identify barriers to discharge w/patient and caregiver  - Arrange for needed discharge resources and transportation as appropriate  - Identify discharge learning needs (meds, wound care, etc.)  - Arrange for interpretive services to assist at discharge as needed  - Refer to Case Management Department for coordinating discharge planning if the patient needs post-hospital services based on physician/advanced practitioner order or complex needs related to functional status, cognitive ability, or social support system  3/6/2024 1143 by Jia Sanchez  Outcome: Adequate for Discharge  3/6/2024 1036 by Jia Sanchez  Outcome: Progressing     Problem: CARDIOVASCULAR - ADULT  Goal: Maintains optimal cardiac output and hemodynamic stability  Description: INTERVENTIONS:  - Monitor I/O, vital signs and rhythm  - Monitor for S/S and trends of decreased cardiac output  - Administer and titrate ordered vasoactive medications to optimize hemodynamic stability  - Assess quality of pulses, skin color and temperature  - Assess for signs of decreased coronary artery perfusion  - Instruct patient to report change in severity of  symptoms  3/6/2024 1143 by Jia Sanchez  Outcome: Adequate for Discharge  3/6/2024 1036 by Jia Sanchez  Outcome: Progressing  Goal: Absence of cardiac dysrhythmias or at baseline rhythm  Description: INTERVENTIONS:  - Continuous cardiac monitoring, vital signs, obtain 12 lead EKG if ordered  - Administer antiarrhythmic and heart rate control medications as ordered  - Monitor electrolytes and administer replacement therapy as ordered  3/6/2024 1143 by Jia Sanchez  Outcome: Adequate for Discharge  3/6/2024 1036 by Jia Sanchez  Outcome: Progressing     Problem: PAIN - ADULT  Goal: Verbalizes/displays adequate comfort level or baseline comfort level  Description: Interventions:  - Encourage patient to monitor pain and request assistance  - Assess pain using appropriate pain scale  - Administer analgesics based on type and severity of pain and evaluate response  - Implement non-pharmacological measures as appropriate and evaluate response  - Consider cultural and social influences on pain and pain management  - Notify physician/advanced practitioner if interventions unsuccessful or patient reports new pain  3/6/2024 1143 by Jia Sanchez  Outcome: Adequate for Discharge  3/6/2024 1036 by Jia Sanchez  Outcome: Progressing     Problem: INFECTION - ADULT  Goal: Absence or prevention of progression during hospitalization  Description: INTERVENTIONS:  - Assess and monitor for signs and symptoms of infection  - Monitor lab/diagnostic results  - Monitor all insertion sites, i.e. indwelling lines, tubes, and drains  - Monitor endotracheal if appropriate and nasal secretions for changes in amount and color  - [place] appropriate cooling/warming therapies per order  - Administer medications as ordered  - Instruct and encourage patient and family to use good hand hygiene technique  - Identify and instruct in appropriate isolation precautions for identified infection/condition  3/6/2024 1143  by Jia Sanchez  Outcome: Adequate for Discharge  3/6/2024 1036 by Jia Sanchez  Outcome: Progressing  Goal: Absence of fever/infection during neutropenic period  Description: INTERVENTIONS:  - Monitor WBC    3/6/2024 1143 by Jia Sanchez  Outcome: Adequate for Discharge  3/6/2024 1036 by Jia Sanchez  Outcome: Progressing     Problem: Knowledge Deficit  Goal: Patient/family/caregiver demonstrates understanding of disease process, treatment plan, medications, and discharge instructions  Description: Complete learning assessment and assess knowledge base.  Interventions:  - Provide teaching at level of understanding  - Provide teaching via preferred learning methods  3/6/2024 1143 by Jia Sanchez  Outcome: Adequate for Discharge  3/6/2024 1036 by Jia Sanchez  Outcome: Progressing     Problem: Nutrition/Hydration-ADULT  Goal: Nutrient/Hydration intake appropriate for improving, restoring or maintaining nutritional needs  Description: Monitor and assess patient's nutrition/hydration status for malnutrition. Collaborate with interdisciplinary team and initiate plan and interventions as ordered.  Monitor patient's weight and dietary intake as ordered or per policy. Utilize nutrition screening tool and intervene as necessary. Determine patient's food preferences and provide high-protein, high-caloric foods as appropriate.     INTERVENTIONS:  - Monitor oral intake, urinary output, labs, and treatment plans  - Assess nutrition and hydration status and recommend course of action  - Evaluate amount of meals eaten  - Assist patient with eating if necessary   - Allow adequate time for meals  - Recommend/ encourage appropriate diets, oral nutritional supplements, and vitamin/mineral supplements  - Order, calculate, and assess calorie counts as needed  - Recommend, monitor, and adjust tube feedings and TPN/PPN based on assessed needs  - Assess need for intravenous fluids  - Provide specific  nutrition/hydration education as appropriate  - Include patient/family/caregiver in decisions related to nutrition  3/6/2024 1143 by Jia Sanchez  Outcome: Adequate for Discharge  3/6/2024 1036 by Jia Sanchez  Outcome: Progressing

## 2024-03-06 NOTE — DISCHARGE SUMMARY
[facility]  Discharge- Genna Liu 1965, 58 y.o. male MRN: [id number]  Unit/Bed#: -01 Encounter: [id number]  Primary Care Provider: Ugo Marina DO   Date and time admitted to hospital: 2/27/2024  6:23 PM    * Hypertensive emergency  Assessment & Plan  BP initially noted to be significantly elevated on arrival initially required IV Cardene infusion  Has since been discontinued  Amlodipine 10 mg, Cardura 8 mg BID, labetalol 600 mg TID, isosorbide 40mg TID  Added hydralazine 75 mg every 8 hours  Blood pressure has since improved.  Counseled importance of compliance      Elevated troponin  Assessment & Plan  Presented with elevated troponin   Underwent cardiac catheterization on 3/5 without any acute abnormalities  Recommended medical management        UTI (urinary tract infection)  Assessment & Plan  Completed antibiotic therapy with ceftriaxone    Acute renal failure superimposed on stage 4 chronic kidney disease (HCC)  Assessment & Plan  Baseline creatinine approximately 2.5  Creatinine noted to peak approximately 5 days ago at 3.6 possibly from uncontrolled hypertension and volume depletion  Has since improved with blood pressure control and IV fluids  Appreciate nephro recommendations      Depression with anxiety  Assessment & Plan  Continue Zyprexa, Abilify, Zoloft    COPD without exacerbation (HCC)  Assessment & Plan  Not in acute exacerbation  Continue home inhalers    Discharging Physician / Practitioner: Nickolas Solares MD  PCP: Ugo Marina DO  Admission Date:   Admission Orders (From admission, onward)       Ordered        02/28/24 0426  Inpatient Admission  Once                          Discharge Date: 03/06/24    Medical Problems       Resolved Problems  Date Reviewed: 3/6/2024            Resolved    Chest pressure 3/1/2024     Resolved by  MARBIN Gipson    Hypercalcemia 3/1/2024     Resolved by  MARBIN Gipson          Consultations During  Hospital Stay:  Cardiology, nephrology  Procedures Performed:    cardiac catheterization    Significant Findings / Test Results:   XR chest 1 view portable    Result Date: 2/28/2024  Impression: No radiographic evidence of acute intrathoracic process. Workstation performed: [id number]     CTA dissection protocol chest/abdomen/pelvis    Result Date: 2/28/2024  Impression: No aortic aneurysm or dissection. Small volume free fluid/mild mesenteric edema. Consider enteritis in the appropriate clinical setting. Workstation performed: [id number]      Incidental Findings:   none     Test Results Pending at Discharge (will require follow up):   none     Outpatient Tests Requested:  none    Complications:  none    Reason for Admission: Hypertensive emergency    Hospital Course:     Genna Liu is a 58 y.o. male patient who originally presented to the hospital on 2/27/2024 with past medical history significant poorly controlled hypertension, depression, COPD initially presented with significant elevated blood pressure initially required IV Cardene infusion  Has since improved oral BP meds were continued with addition of hydralazine with improvement of blood pressure  Had a cardiac catheterization performed which ruled out any acute ischemia  Will follow-up outpatient with primary care doctor approximate 1 weekPlease see above list of diagnoses and related plan for additional information.     Condition at Discharge: stable     Discharge Day Visit / Exam:     Subjective:   Denies chest pain, shortness of breath, cough, fevers, chills    Exam:   Physical Exam  Constitutional:       General: He is not in acute distress.     Appearance: He is well-developed. He is not diaphoretic.   HENT:      Head: Normocephalic and atraumatic.      Nose: Nose normal.      Mouth/Throat:      Pharynx: No oropharyngeal exudate.   Eyes:      General: No scleral icterus.     Conjunctiva/sclera: Conjunctivae normal.   Cardiovascular:      Rate and  Rhythm: Normal rate and regular rhythm.      Heart sounds: Normal heart sounds. No murmur heard.     No friction rub. No gallop.   Pulmonary:      Effort: Pulmonary effort is normal. No respiratory distress.      Breath sounds: Normal breath sounds. No wheezing or rales.   Chest:      Chest wall: No tenderness.   Abdominal:      General: Bowel sounds are normal. There is no distension.      Palpations: Abdomen is soft.      Tenderness: There is no abdominal tenderness. There is no guarding.   Musculoskeletal:         General: No tenderness or deformity. Normal range of motion.      Cervical back: Normal range of motion and neck supple.   Skin:     General: Skin is warm and dry.      Findings: No erythema.   Neurological:      Mental Status: He is alert. Mental status is at baseline.         Discussion with Family: pt, declined family update    Discharge instructions/Information to patient and family:   See after visit summary for information provided to patient and family.      Provisions for Follow-Up Care:  See after visit summary for information related to follow-up care and any pertinent home health orders.      Disposition:     Home    For Discharges to [facility] SNF:     Not Applicable to this Patient - Not Applicable to this Patient    Planned Readmission: none     Discharge Statement:  I spent 60 minutes discharging the patient. This time was spent on the day of discharge. I had direct contact with the patient on the day of discharge. Greater than 50% of the total time was spent examining patient, answering all patient questions, arranging and discussing plan of care with patient as well as directly providing post-discharge instructions.  Additional time then spent on discharge activities.    Discharge Medications:  See after visit summary for reconciled discharge medications provided to patient and family.      ** Please Note: This note has been constructed using a voice recognition system **

## 2024-03-06 NOTE — ASSESSMENT & PLAN NOTE
Baseline creatinine approximately 2.5  Creatinine noted to peak approximately 5 days ago at 3.6 possibly from uncontrolled hypertension and volume depletion  Has since improved with blood pressure control and IV fluids  Appreciate nephro recommendations

## 2024-03-06 NOTE — PLAN OF CARE
Problem: SAFETY ADULT  Goal: Patient will remain free of falls  Description: INTERVENTIONS:  - Educate patient/family on patient safety including physical limitations  - Instruct patient to call for assistance with activity   - Consult OT/PT to assist with strengthening/mobility   - Keep Call bell within reach  - Keep bed low and locked with side rails adjusted as appropriate  - Keep care items and personal belongings within reach  - Initiate and maintain comfort rounds  - Make Fall Risk Sign visible to staff  - Offer Toileting every 2 Hours, in advance of need  - Initiate/Maintain alarm  - Obtain necessary fall risk management equipment:   - Apply yellow socks and bracelet for high fall risk patients  - Consider moving patient to room near nurses station  Outcome: Progressing  Goal: Maintain or return to baseline ADL function  Description: INTERVENTIONS:  -  Assess patient's ability to carry out ADLs; assess patient's baseline for ADL function and identify physical deficits which impact ability to perform ADLs (bathing, care of mouth/teeth, toileting, grooming, dressing, etc.)  - Assess/evaluate cause of self-care deficits   - Assess range of motion  - Assess patient's mobility; develop plan if impaired  - Assess patient's need for assistive devices and provide as appropriate  - Encourage maximum independence but intervene and supervise when necessary  - Involve family in performance of ADLs  - Assess for home care needs following discharge   - Consider OT consult to assist with ADL evaluation and planning for discharge  - Provide patient education as appropriate  Outcome: Progressing  Goal: Maintains/Returns to pre admission functional level  Description: INTERVENTIONS:  - Perform AM-PAC 6 Click Basic Mobility/ Daily Activity assessment daily.  - Set and communicate daily mobility goal to care team and patient/family/caregiver.   - Collaborate with rehabilitation services on mobility goals if consulted  -  Perform Range of Motion 3 times a day.  - Reposition patient every 2 hours.  - Dangle patient 3 times a day  - Stand patient 3 times a day  - Ambulate patient 3 times a day  - Out of bed to chair 3 times a day   - Out of bed for meals 3 times a day  - Out of bed for toileting  - Record patient progress and toleration of activity level   Outcome: Progressing     Problem: DISCHARGE PLANNING  Goal: Discharge to home or other facility with appropriate resources  Description: INTERVENTIONS:  - Identify barriers to discharge w/patient and caregiver  - Arrange for needed discharge resources and transportation as appropriate  - Identify discharge learning needs (meds, wound care, etc.)  - Arrange for interpretive services to assist at discharge as needed  - Refer to Case Management Department for coordinating discharge planning if the patient needs post-hospital services based on physician/advanced practitioner order or complex needs related to functional status, cognitive ability, or social support system  Outcome: Progressing     Problem: CARDIOVASCULAR - ADULT  Goal: Maintains optimal cardiac output and hemodynamic stability  Description: INTERVENTIONS:  - Monitor I/O, vital signs and rhythm  - Monitor for S/S and trends of decreased cardiac output  - Administer and titrate ordered vasoactive medications to optimize hemodynamic stability  - Assess quality of pulses, skin color and temperature  - Assess for signs of decreased coronary artery perfusion  - Instruct patient to report change in severity of symptoms  Outcome: Progressing  Goal: Absence of cardiac dysrhythmias or at baseline rhythm  Description: INTERVENTIONS:  - Continuous cardiac monitoring, vital signs, obtain 12 lead EKG if ordered  - Administer antiarrhythmic and heart rate control medications as ordered  - Monitor electrolytes and administer replacement therapy as ordered  Outcome: Progressing     Problem: PAIN - ADULT  Goal: Verbalizes/displays adequate  comfort level or baseline comfort level  Description: Interventions:  - Encourage patient to monitor pain and request assistance  - Assess pain using appropriate pain scale  - Administer analgesics based on type and severity of pain and evaluate response  - Implement non-pharmacological measures as appropriate and evaluate response  - Consider cultural and social influences on pain and pain management  - Notify physician/advanced practitioner if interventions unsuccessful or patient reports new pain  Outcome: Progressing     Problem: INFECTION - ADULT  Goal: Absence or prevention of progression during hospitalization  Description: INTERVENTIONS:  - Assess and monitor for signs and symptoms of infection  - Monitor lab/diagnostic results  - Monitor all insertion sites, i.e. indwelling lines, tubes, and drains  - Monitor endotracheal if appropriate and nasal secretions for changes in amount and color  - [place] appropriate cooling/warming therapies per order  - Administer medications as ordered  - Instruct and encourage patient and family to use good hand hygiene technique  - Identify and instruct in appropriate isolation precautions for identified infection/condition  Outcome: Progressing  Goal: Absence of fever/infection during neutropenic period  Description: INTERVENTIONS:  - Monitor WBC    Outcome: Progressing     Problem: Knowledge Deficit  Goal: Patient/family/caregiver demonstrates understanding of disease process, treatment plan, medications, and discharge instructions  Description: Complete learning assessment and assess knowledge base.  Interventions:  - Provide teaching at level of understanding  - Provide teaching via preferred learning methods  Outcome: Progressing     Problem: Nutrition/Hydration-ADULT  Goal: Nutrient/Hydration intake appropriate for improving, restoring or maintaining nutritional needs  Description: Monitor and assess patient's nutrition/hydration status for malnutrition. Collaborate  with interdisciplinary team and initiate plan and interventions as ordered.  Monitor patient's weight and dietary intake as ordered or per policy. Utilize nutrition screening tool and intervene as necessary. Determine patient's food preferences and provide high-protein, high-caloric foods as appropriate.     INTERVENTIONS:  - Monitor oral intake, urinary output, labs, and treatment plans  - Assess nutrition and hydration status and recommend course of action  - Evaluate amount of meals eaten  - Assist patient with eating if necessary   - Allow adequate time for meals  - Recommend/ encourage appropriate diets, oral nutritional supplements, and vitamin/mineral supplements  - Order, calculate, and assess calorie counts as needed  - Recommend, monitor, and adjust tube feedings and TPN/PPN based on assessed needs  - Assess need for intravenous fluids  - Provide specific nutrition/hydration education as appropriate  - Include patient/family/caregiver in decisions related to nutrition  Outcome: Progressing

## 2024-03-08 LAB
DOPAMINE 24H UR-MRATE: 109 PG/ML (ref 0–48)
DOPAMINE 24H UR-MRATE: 46 UG/24 HR (ref 0–510)
DOPAMINE UR-MCNC: 46 UG/L
EPINEPH 24H UR-MRATE: <3 UG/24 HR (ref 0–20)
EPINEPH PLAS-MCNC: 67 PG/ML (ref 0–62)
EPINEPH UR-MCNC: <3 UG/L
NOREPINEPH 24H UR-MRATE: 17 UG/24 HR (ref 0–135)
NOREPINEPH PLAS-MCNC: 1363 PG/ML (ref 0–874)
NOREPINEPH UR-MCNC: 17 UG/L

## 2024-03-11 LAB
METANEPH 24H UR-MRATE: 78 UG/24 HR (ref 58–276)
METANEPHS 24H UR-MCNC: 78 UG/L
NORMETANEPHRINE 24H UR-MCNC: 429 UG/L
NORMETANEPHRINE 24H UR-MRATE: 429 UG/24 HR (ref 156–729)

## 2024-03-14 ENCOUNTER — TELEPHONE (OUTPATIENT)
Dept: NEPHROLOGY | Facility: CLINIC | Age: 59
End: 2024-03-14

## 2024-03-14 DIAGNOSIS — N18.32 STAGE 3B CHRONIC KIDNEY DISEASE (HCC): Primary | ICD-10-CM

## 2024-03-19 ENCOUNTER — TELEPHONE (OUTPATIENT)
Age: 59
End: 2024-03-19

## 2024-03-19 NOTE — TELEPHONE ENCOUNTER
Called left voice message to advise patient lab orders have been faxed to shyam carrion @ [phone] / [phone]. per his request. advised to call office to confirm he has received my message.@ [phone]

## 2024-03-19 NOTE — TELEPHONE ENCOUNTER
Patient called to see if his lab orders can be switched to [facility] in [facility]. He said he isn't feeling that well and that is where he wants to go for his blood work. Please call patient back to let him know if this can be done. Thank you.

## 2024-03-20 ENCOUNTER — TELEPHONE (OUTPATIENT)
Dept: NEPHROLOGY | Facility: CLINIC | Age: 59
End: 2024-03-20

## 2024-03-20 DIAGNOSIS — N18.32 STAGE 3B CHRONIC KIDNEY DISEASE (HCC): Primary | ICD-10-CM

## 2024-03-20 DIAGNOSIS — N17.9 AKI (ACUTE KIDNEY INJURY) (HCC): Primary | ICD-10-CM

## 2024-03-20 NOTE — TELEPHONE ENCOUNTER
Patient has an appointment schedule with Dr. Acosta for 03/25/2024 @ 2:30 for a Hospital F/u. Left message on patients answering machine stating that Dr. Acosta has an appointment available for 03/22/2024 @ 11:30 and and he wanted to come in sooner to please give the office a call back to move him sooner. Dr. Acosta also ordered CKD labs.

## 2024-03-22 ENCOUNTER — TELEPHONE (OUTPATIENT)
Age: 59
End: 2024-03-22

## 2024-03-22 ENCOUNTER — TELEPHONE (OUTPATIENT)
Dept: NEPHROLOGY | Facility: CLINIC | Age: 59
End: 2024-03-22

## 2024-03-22 NOTE — TELEPHONE ENCOUNTER
I called and left a message on machine for patient confirming appointment and reminded patient to have non-fasting blood work done.  Christina Snow, .

## 2024-03-22 NOTE — TELEPHONE ENCOUNTER
Patient called to have labs faxed to [facility] at [phone]. I called the office and they are sending the labs.

## 2024-03-25 ENCOUNTER — TELEPHONE (OUTPATIENT)
Age: 59
End: 2024-03-25

## 2024-03-25 NOTE — TELEPHONE ENCOUNTER
Patient called and canceled todays appointment for his hosptial follow up stating he was not feeling well.     Patient did want to reschedule. I was unable to find an appointment within the proper time frame and do not have overbooking rights to schedule patient sooner.     Please call patient back to reschedule hospital follow up.

## 2024-03-25 NOTE — TELEPHONE ENCOUNTER
Called spoke with patient and rescheduled hospital follow up for 04/03/24@ 12:00pm  with MARBIN Walker patient verbalized understanding and is okay with it. per patient's request.

## 2024-04-01 NOTE — PROGRESS NOTES
NEPHROLOGY OFFICE NOTE    Patient: Genna Liu               Sex: male           YOB: 1965        Age:  58 y.o.       4/3/2024      BACKGROUND     I had the pleasure of seeing Genna Liu in the nephrology office today for hospital follow-up.  He has a past medical history significant for resistant hypertension, CKD stage IV, hyperaldosteronism status post left adrenalectomy, chronic hypokalemia, type 2 diabetes, obesity status post gastric bypass, COPD, prior left leg DVT, CHF with preserved ejection fraction.    He was recently hospitalized at [facility] from 2/27 to 3/6 after presenting with chest pain and found to have hypertensive emergency.  He has a history of known uncontrolled hypertension with documented allergies to antihypertensives in the past including clonidine, lisinopril, nifedipine, minoxidil, and spironolactone.  He was not taking his home blood pressure medications prior to admission due to upset stomach.  His current antihypertensive regimen includes hydralazine, doxazosin, labetalol, and amlodipine.    He has underlying CKD stage IV with baseline creatinine levels fluctuating around 2.5-3.0.  He was previously seen in our office in May 2023 and did not have follow-up.  He was previously managed by Dr. Reese from [facility].    He has underlying history of primary hyperaldosteronism and underwent a left adrenalectomy back in 2012.  He has had issues with resistant hypertension for several years.  Complicating matters, he has multiple allergies to antihypertensive medications including clonidine, lisinopril, hydralazine, spironolactone, nifedipine, and minoxidil.  Reports that his blood pressure has consistently been high since hospital discharge averaging around 170 to 180 mmHg systolic.    He does have a history of type 2 diabetes, reports that following weight loss surgery had resolution is currently not on any diabetic medications.    Most recent  labs were obtained on 3/22/2024, which we have reviewed together.     SUBJECTIVE     He currently has no complaints at this time and is feeling well. Patient denies any chest pain, shortness of breath, or swelling.    REVIEW OF SYSTEMS     Review of Systems   Constitutional:  Positive for activity change and fatigue (Very fatigued following antihypertensive medications). Negative for chills and fever.   HENT:  Negative for trouble swallowing.    Respiratory:  Negative for shortness of breath.    Cardiovascular:  Negative for leg swelling.   Gastrointestinal:  Negative for nausea and vomiting.   Genitourinary:  Negative for difficulty urinating, dysuria, frequency and hematuria.   Musculoskeletal:  Negative for back pain.   Skin:  Negative for pallor.   Neurological:  Negative for dizziness, syncope, weakness and light-headedness.   Psychiatric/Behavioral:  Negative for sleep disturbance. The patient is not nervous/anxious.        OBJECTIVE     Current Weight: Weight - Scale: 63.5 kg (140 lb)  Vitals:    04/03/24 1203   BP: (!) 180/110   Pulse: 70   Resp: 16   Temp: (!) 96.9 °F (36.1 °C)   SpO2: 99%     Body mass index is 22.26 kg/m².    CURRENT MEDICATIONS       Current Outpatient Medications:     albuterol (PROVENTIL HFA,VENTOLIN HFA) 90 mcg/act inhaler, , Disp: , Rfl:     amLODIPine (NORVASC) 10 mg tablet, Take 10 mg by mouth, Disp: , Rfl:     ARIPiprazole (ABILIFY) 5 mg tablet, Take 5 mg by mouth daily, Disp: , Rfl:     aspirin 81 mg chewable tablet, Chew 1 tablet (81 mg total) daily, Disp: 30 tablet, Rfl: 0    atorvastatin (LIPITOR) 40 mg tablet, Take 40 mg by mouth daily at bedtime, Disp: , Rfl:     doxazosin (CARDURA) 8 MG tablet, Take 1 tablet (8 mg total) by mouth 2 (two) times a day, Disp: 90 tablet, Rfl: 6    hydrALAZINE (APRESOLINE) 100 MG tablet, Take 1 tablet (100 mg total) by mouth every 8 (eight) hours, Disp: 90 tablet, Rfl: 0    hydroCHLOROthiazide 25 mg tablet, Take 25 mg by mouth daily, Disp: ,  Rfl:     isosorbide dinitrate (ISORDIL) 40 MG tablet, Take 1 tablet (40 mg total) by mouth 3 (three) times daily after meals, Disp: 90 tablet, Rfl: 0    labetalol (NORMODYNE) 300 mg tablet, Take 2 tablets (600 mg total) by mouth every 8 (eight) hours, Disp: 180 tablet, Rfl: 0    LORazepam (ATIVAN) 1 mg tablet, TAKE 1 TABLET BY MOUTH ONCE DAILY AS NEEDED FOR ANXIETY (TAKE 1 TABLET PRIOR TO MRI OR CT SCAN), Disp: , Rfl:     OLANZapine (ZyPREXA) 2.5 mg tablet, Take 5 mg by mouth daily at bedtime, Disp: , Rfl:     pantoprazole (PROTONIX) 40 mg tablet, Take 40 mg by mouth daily, Disp: , Rfl:     sertraline (ZOLOFT) 100 mg tablet, Take 2 tablets by mouth daily, Disp: , Rfl:     spironolactone (ALDACTONE) 25 mg tablet, Take 0.5 tablets (12.5 mg total) by mouth every evening, Disp: 30 tablet, Rfl: 0    sucralfate (CARAFATE) 1 g tablet, Take 1 g by mouth 2 (two) times a day, Disp: , Rfl:     PHYSICAL EXAMINATION     Physical Exam  Vitals reviewed.   Constitutional:       General: He is not in acute distress.  HENT:      Head: Normocephalic.      Mouth/Throat:      Lips: Pink.      Mouth: Mucous membranes are moist.   Eyes:      General: Lids are normal. No scleral icterus.  Cardiovascular:      Rate and Rhythm: Normal rate and regular rhythm.      Heart sounds: S1 normal and S2 normal. No murmur heard.  Pulmonary:      Effort: Pulmonary effort is normal. No accessory muscle usage or respiratory distress.      Breath sounds: Normal breath sounds.   Abdominal:      General: There is no distension.      Tenderness: There is no abdominal tenderness.   Musculoskeletal:      Cervical back: Normal range of motion and neck supple. No tenderness.      Right lower leg: No edema.      Left lower leg: No edema.   Skin:     General: Skin is warm.      Coloration: Skin is not cyanotic or jaundiced.   Neurological:      General: No focal deficit present.      Mental Status: He is alert and oriented to person, place, and time.    Psychiatric:         Attention and Perception: Attention normal.         Speech: Speech normal.         Behavior: Behavior is cooperative.           LAB RESULTS     Results from last 7 days   Lab Units 04/02/24  1236   SODIUM mmol/L 139   POTASSIUM mmol/L 2.7*   CHLORIDE mmol/L 102   CO2 mmol/L 22   BUN mg/dL 79*   CREATININE mg/dL 3.3*   EGFR mL/min 21*   CALCIUM mg/dL 8.6         RADIOLOGY RESULTS      Results for orders placed during the hospital encounter of 02/27/24    XR chest 1 view portable    Narrative  XR CHEST PORTABLE    INDICATION: cp.    COMPARISON: Two-view chest 12/24/2023    FINDINGS:    No airspace consolidation, pneumothorax, pulmonary edema, or pleural effusion. .    Normal cardiac silhouette.  Aortic calcification is present.    Degenerative changes bilateral acromioclavicular joints. Multilevel thoracic spondylosis.    Normal upper abdomen.    Impression  No radiographic evidence of acute intrathoracic process.        Workstation performed: [id number]    Results for orders placed in visit on 12/24/23    XR chest pa & lateral    Narrative  CHEST    INDICATION:   R06.01: Orthopnea. Patient has confirmed COVID-19.    COMPARISON: 07/08/2023    EXAM PERFORMED/VIEWS:  XR CHEST PA & LATERAL  Images: 2    FINDINGS:    Cardiomediastinal silhouette appears enlarged.    The lungs are clear.  No pneumothorax or pleural effusion.    Osseous structures appear within normal limits for patient age.    Impression  No acute cardiopulmonary disease.            Workstation performed: [id number]    VAS Renal Artery 2/28/2024:  CONCLUSION:     Impression  The abdominal aorta is widely patent and ectatic in caliber measuring 2.6 cm in  the proximal segment.     RIGHT RENAL:  No evidence of significant arterial occlusive disease in the main renal artery.  Patent renal vein.  Adequate parenchymal flow is noted with a renovascular resistive index of 0.60.  Renal/Aorta Ratio: 1.21. The kidney measures approximately 11.1  cm. Prior: 10.2  cm     LEFT RENAL:  The proximal renal artery is aneurysmal measuring 1.1 cm  No evidence of significant arterial occlusive disease in the main renal artery.  Patent renal vein.  Adequate parenchymal flow is noted with a renovascular resistive index of 0.61.  Renal/Aorta Ratio: 0.84. The kidney measures approximately 9.8 cm. Prior: 6.7  cm     MESENTERIC:  Celiac and superior mesenteric arteries are patent.     Compared to previous study on 7/14/2023, the left renal artery aneurysm and  aortic ectesia are the new findings. Otherwise, no significant change.    ASSESSMENT/PLAN     Chronic Kidney Disease 4:  After review of the medical record, it appears that baseline creatinine levels are fluctuating around 2.5-3.0.  Chronic kidney disease multifactorial and suspected secondary to hypertensive nephrosclerosis.    Most recent urinalysis showed the presence of proteinuria with no significant hematuria, most recent microalbumin to creatinine ratio elevated at 121.  Not a candidate for ACE/ARB therapy in the setting of angioedema allergic reaction to lisinopril in the past.    In the interim, creatinine level is elevated at 3.3 with an EGFR of 21.  Currently without uremic symptoms.  Discussed underlying CKD progression in the setting of his high blood pressure.      Will refer to kidney smart class today for CKD and dialysis modality education.  Will continue close follow-up with patient's primary nephrologist.  Advise hydration with up to 2 L of water daily and avoidance of NSAIDs.    Hypertension and CKD, Resistant Hypertension:  He has a remote history of primary hyperaldosteronism status post left adrenalectomy at the [facility] back in 2012 per patient.  He was recently hospitalized with chest pain and found to have hypertensive urgency.      He underwent renal artery Doppler on 2/28/2024, which showed no evidence of significant arterial occlusive disease in the right main renal  artery main renal artery.    He has multiple allergies to antihypertensive medications including clonidine, lisinopril, nifedipine, spironolactone, hydralazine, and minoxidil.  Currently tolerating hydralazine since most recent hospitalization.  Reports ongoing fatigue with his high medication regimen.    Reports anaphylactic reaction to lisinopril and is not a candidate for ACE/ARB therapy. He also reports an anaphylactic reaction with clonidine in the past.  Reports shortness of breath with spironolactone, unclear as patient has had similar reactions to multiple medications in the past.    His current antihypertensive regimen includes amlodipine 10 mg daily, doxazosin 8 mg twice daily, hydralazine 100 mg 3 times daily, isosorbide 40 mg 3 times daily, hydrochlorothiazide 25 mg daily, and labetalol 600 mg 3 times daily.  Blood pressure remains elevated since hospital discharge.  Currently asymptomatic.    He is already on maximum dosing of his current medication regimen with documented allergies to all other available antihypertensives that can be considered given his current stage of renal function.  For this reason, I conferred with his attending nephrologist Dr. SEFERINO Acosta, who was available to see the patient during office visit.      After discussion with the patient by Dr. SERGO Acosta including reviewing documented medication allergies and risk/benefits of trialing spironolactone, decision was made to trial low-dose spironolactone 12.5 mg once daily in the evening to see if he tolerates.  Will obtain BMP within 1 week of initiation.    Genna is aware of the risk of heart attack and stroke with persistently elevated blood pressure readings and emergency department precautions were discussed with him. Encouraged low-salt diet.    Secondary Hyperparathyroidism of Renal Origin:  Most recent PTH elevated at 126, phosphorous 4.8, calcium 8.6, and vitamin D 35.  Labs overall acceptable and will repeat prior to follow-up.   "If PTH remains elevated consider initiation of calcitriol.    Hyperuricemia:  Uric acid level 8.8.  Discussed low purine diet and printout provided at office visit today.      Recommend followed up with Dr. SERGO Acosta in 4 to 5 months.  Will repeat CKD labs and patient referred to kidney [facility].    MARBIN Preston  Nephrology  4/3/2024      Portions of the record may have been created with voice recognition software. Occasional wrong word or \"sound a like\" substitutions may have occurred due to the inherent limitations of voice recognition software. Read the chart carefully and recognize, using context, where substitutions have occurred.   "

## 2024-04-02 ENCOUNTER — TELEPHONE (OUTPATIENT)
Age: 59
End: 2024-04-02

## 2024-04-02 ENCOUNTER — TELEPHONE (OUTPATIENT)
Dept: NEPHROLOGY | Facility: CLINIC | Age: 59
End: 2024-04-02

## 2024-04-02 NOTE — TELEPHONE ENCOUNTER
Received a call from Arabella as she as just speaking to Zoraida regarding a new fax number to send the pt lab slips to - [phone]. I verified fax and sent the labs. I also verified with Zoraida at the office that nothing else was needed for the pt.

## 2024-04-02 NOTE — TELEPHONE ENCOUNTER
Desi from [facility] called asking to have an order faxed to them. I called the [place] office and transferred her to Krystian

## 2024-04-02 NOTE — TELEPHONE ENCOUNTER
----- Message from MARBIN Preston sent at 4/2/2024 10:55 AM EDT -----  This patient is scheduled for hospital follow-up tomorrow and labs are incomplete. "[facility] labs did not forward updated BMP or vitamin D levels to our office that were ordered. Can we please follow-up to see if we can get copies?  Thanks,  Celena

## 2024-04-03 ENCOUNTER — OFFICE VISIT (OUTPATIENT)
Dept: NEPHROLOGY | Facility: CLINIC | Age: 59
End: 2024-04-03

## 2024-04-03 VITALS
HEIGHT: 67 IN | HEART RATE: 70 BPM | BODY MASS INDEX: 21.97 KG/M2 | SYSTOLIC BLOOD PRESSURE: 180 MMHG | DIASTOLIC BLOOD PRESSURE: 110 MMHG | OXYGEN SATURATION: 99 % | WEIGHT: 140 LBS | RESPIRATION RATE: 16 BRPM | TEMPERATURE: 96.9 F

## 2024-04-03 DIAGNOSIS — I1A.0 RESISTANT HYPERTENSION: ICD-10-CM

## 2024-04-03 DIAGNOSIS — N25.81 SECONDARY HYPERPARATHYROIDISM OF RENAL ORIGIN (HCC): ICD-10-CM

## 2024-04-03 DIAGNOSIS — I12.9 HYPERTENSIVE KIDNEY DISEASE WITH STAGE 4 CHRONIC KIDNEY DISEASE (HCC): ICD-10-CM

## 2024-04-03 DIAGNOSIS — N18.4 HYPERTENSIVE KIDNEY DISEASE WITH STAGE 4 CHRONIC KIDNEY DISEASE (HCC): ICD-10-CM

## 2024-04-03 DIAGNOSIS — N18.4 STAGE 4 CHRONIC KIDNEY DISEASE (HCC): Primary | ICD-10-CM

## 2024-04-03 DIAGNOSIS — E66.01 MORBID OBESITY DUE TO EXCESS CALORIES (HCC): ICD-10-CM

## 2024-04-03 DIAGNOSIS — E79.0 HYPERURICEMIA: ICD-10-CM

## 2024-04-03 RX ORDER — HYDROCHLOROTHIAZIDE 25 MG/1
25 TABLET ORAL DAILY
COMMUNITY

## 2024-04-03 RX ORDER — SPIRONOLACTONE 25 MG/1
12.5 TABLET ORAL EVERY EVENING
Qty: 30 TABLET | Refills: 0 | Status: SHIPPED | OUTPATIENT
Start: 2024-04-03 | End: 2024-04-04

## 2024-04-03 RX ORDER — POTASSIUM CHLORIDE 20 MEQ/1
20 TABLET, EXTENDED RELEASE ORAL 2 TIMES DAILY
Qty: 90 TABLET | Refills: 1 | Status: CANCELLED | OUTPATIENT
Start: 2024-04-03

## 2024-04-03 NOTE — PATIENT INSTRUCTIONS
Low Purine Diet   WHAT YOU NEED TO KNOW:   A low-purine diet is a meal plan based on foods that are low in purine content. Purine is a substance that is found in foods and is produced naturally by the body. Purines are broken down by the body and changed to uric acid. The kidneys normally filter the uric acid, and it leaves the body through the urine. However, people with gout sometimes have a buildup of uric acid in the blood. This buildup of uric acid can cause swelling and pain (a gout attack). A low-purine diet may help to treat and prevent gout attacks.  DISCHARGE INSTRUCTIONS:   Foods to include:  The following foods are low in purine.  Eggs, nuts, and peanut butter    Low-fat and fat-free cheese and ice cream    Skim or 1% milk    Soup made without meat extract or broth    Vegetables that are not on the medium-purine list below    All fruit and fruit juice    Bread, pasta, rice, cakes, cornbread, and popcorn    Water, soda, tea, coffee, and cocoa    Sugar, sweets, and gelatin    Fat and oil    Foods to limit:   Medium-purine foods:     Meats:  Limit the following to 4 to 6 ounces each day.    Meat and poultry     Crab, lobster, oysters, and shrimp    Vegetables:  Limit the following vegetables to ½ cup each day.    Asparagus    Cauliflower    Spinach    Mushrooms    Green peas    Beans, peas, and lentils (limit to 1 cup each day)    Oats and oatmeal (limit to ? cup uncooked each day)    Wheat germ and bran (limit to ¼ cup each day)    High-purine foods:  Limit or avoid foods high in purine.    Anchovies, sardines, scallops, and mussels    Tuna, codfish, herring, and geoff    Wild game meats, like goose and duck    Organ meats, such as brains, heart, kidney, liver, and sweetbreads    Gravies and sauces made with meat    Yeast extracts taken in the form of a supplement    Other guidelines to follow:   Increase liquid intake.  Drink 8 to 16 (eight-ounce) cups of liquid each day. At least half of the liquid you  drink should be water. Liquid can help your body get rid of extra uric acid.     Limit or avoid alcohol.  Alcohol (especially beer) increases your risk of a gout attack. Beer contains a high amount of purine.     Maintain a healthy weight.  If you are overweight, you should lose weight slowly. Weight loss can help decrease the amount of stress on your joints. Regular exercise can help you lose weight if you are overweight, or maintain your weight if you are at a normal weight. Talk to your healthcare provider before you begin an exercise program.    © Copyright [employer] 2023 Information is for End User's use only and may not be sold, redistributed or otherwise used for commercial purposes.  The above information is an  only. It is not intended as medical advice for individual conditions or treatments. Talk to your doctor, nurse or pharmacist before following any medical regimen to see if it is safe and effective for you.

## 2024-04-04 RX ORDER — SPIRONOLACTONE 25 MG/1
TABLET ORAL
Qty: 30 TABLET | Refills: 0 | Status: SHIPPED | OUTPATIENT
Start: 2024-04-04 | End: 2024-04-04 | Stop reason: SDUPTHER

## 2024-04-04 RX ORDER — SPIRONOLACTONE 25 MG/1
TABLET ORAL
Qty: 30 TABLET | Refills: 0 | Status: SHIPPED | OUTPATIENT
Start: 2024-04-04

## 2024-04-04 NOTE — TELEPHONE ENCOUNTER
Called spoke with pavan and pharmacist neftali @ [facility] pharmacy advised per MARBIN Walker, that she had conversation with patient in the office yesterday,and advised patient that spironolactone (ALDACTONE) is not a true allergy and allergy list has been updated at the office. patient verbalized understanding and is okay with it. pharmacist neftali verbalized understanding and will refill medication for patient.

## 2024-04-04 NOTE — TELEPHONE ENCOUNTER
Transmission to pharmacy error occurred. Medication resent to pharmacy. Receipt confirmed by pharmacy. Receipt confirmed by pharmacy (4/4/2024  1:03 PM EDT)

## 2024-04-29 DIAGNOSIS — I1A.0 RESISTANT HYPERTENSION: ICD-10-CM

## 2024-04-29 DIAGNOSIS — I12.9 HYPERTENSIVE KIDNEY DISEASE WITH STAGE 4 CHRONIC KIDNEY DISEASE (HCC): ICD-10-CM

## 2024-04-29 DIAGNOSIS — N18.4 HYPERTENSIVE KIDNEY DISEASE WITH STAGE 4 CHRONIC KIDNEY DISEASE (HCC): ICD-10-CM

## 2024-04-29 PROBLEM — N39.0 UTI (URINARY TRACT INFECTION): Status: RESOLVED | Noted: 2024-02-29 | Resolved: 2024-04-29

## 2024-04-30 RX ORDER — SPIRONOLACTONE 25 MG/1
TABLET ORAL
Qty: 30 TABLET | Refills: 5 | Status: SHIPPED | OUTPATIENT
Start: 2024-04-30

## 2024-05-29 NOTE — ASSESSMENT & PLAN NOTE
Pt discharged home this afternoon. Writer reviewed discharge instructions with pt regarding medications and follow up appointments. Pt demonstrated understanding.  IV and tele monitor removed, belongings packed, transported self home.    · Colon 7/11 no active bleeding  · Colchicine notoriously causes GI symptoms

## 2024-07-15 NOTE — PROGRESS NOTES
7/16/2024      Chief Complaint   Patient presents with    Follow-up         Assessment and Plan    59 y.o. male     1. History of elevated PSA  - PSA (1/12/24) 3.85  - mpMRI (*1/30/24) PIRADS 2, 31 g prostate  - RUEL today benign   - PSA in near future and call with results    2. History of stricture s/p dilation  3. Split urinary stream  - Follow up for cystoscopy   - Call with any questions or concerns in the meantime  - All questions answered; patient understands and agrees with plan       History of Present Illness  Genna Liu is a 59 y.o. male patient with history of urethral stricture s/p dilation, elevated PSA, epididymoorchitis, cystitis  here for follow up.     Patient has prior history of urethral stricture disease with previous dilation. He was last seen in August 2023 for acute cystitis, epididymoorchitis, split urinary stream, elevated PSA. Patient underwent mpMRI which was negative PIRADS 2. Presents today for recheck. Unfortunately, no updated PSA on file for review. Patient was to have cystoscopy, however, did not have this completed. Continues with spilt urinary stream. Emptying well.     Review of Systems   Constitutional:  Negative for activity change, appetite change, chills and fever.   HENT:  Negative for congestion and trouble swallowing.    Respiratory:  Negative for cough and shortness of breath.    Cardiovascular:  Negative for chest pain, palpitations and leg swelling.   Gastrointestinal:  Negative for abdominal pain, constipation, diarrhea, nausea and vomiting.   Genitourinary:  Negative for difficulty urinating, dysuria, flank pain, frequency, hematuria and urgency.   Musculoskeletal:  Negative for back pain and gait problem.   Skin:  Negative for wound.   Allergic/Immunologic: Negative for immunocompromised state.   Neurological:  Negative for dizziness and syncope.   Hematological:  Does not bruise/bleed easily.   Psychiatric/Behavioral:  Negative for confusion.    All other systems  "reviewed and are negative.      Vitals  Vitals:    07/16/24 1001   BP: 168/100   BP Location: Left arm   Patient Position: Sitting   Cuff Size: Large   Pulse: 62   Resp: 18   Temp: 98.3 °F (36.8 °C)   SpO2: 98%   Weight: 66.7 kg (147 lb)   Height: 5' 6.5\" (1.689 m)       Physical Exam  Constitutional:       General: He is not in acute distress.     Appearance: Normal appearance. He is not ill-appearing, toxic-appearing or diaphoretic.   HENT:      Head: Normocephalic.      Nose: No congestion.   Eyes:      General: No scleral icterus.        Right eye: No discharge.         Left eye: No discharge.      Conjunctiva/sclera: Conjunctivae normal.      Pupils: Pupils are equal, round, and reactive to light.   Pulmonary:      Effort: Pulmonary effort is normal.   Musculoskeletal:      Cervical back: Normal range of motion.   Skin:     General: Skin is warm and dry.      Coloration: Skin is not jaundiced or pale.      Findings: No bruising, erythema, lesion or rash.   Neurological:      General: No focal deficit present.      Mental Status: He is alert and oriented to person, place, and time. Mental status is at baseline.      Gait: Gait normal.   Psychiatric:         Mood and Affect: Mood normal.         Behavior: Behavior normal.         Thought Content: Thought content normal.         Judgment: Judgment normal.           Past History  Past Medical History:   Diagnosis Date    Asthma     Chronic kidney disease     COPD (chronic obstructive pulmonary disease) (HCC)     CPAP (continuous positive airway pressure) dependence     NO LONGER NEEDS D/T BARIATRIC SURGERY.    Diabetes mellitus (HCC)     Emphysema of lung (HCC)     Gout     History of transfusion     pt stated they had a transfusion when they had gallbladder surgery.    Hypertension     Kidney disease     renal failure    Leg DVT (deep venous thromboembolism), acute, bilateral (HCC) 01/2018    Obesity (BMI 30-39.9)     AGUS on CPAP     setting 11    Postgastrectomy " malabsorption     Sleep apnea     Systolic CHF (HCC)      Social History     Socioeconomic History    Marital status: /Civil Union     Spouse name: None    Number of children: None    Years of education: None    Highest education level: None   Occupational History    None   Tobacco Use    Smoking status: Never     Passive exposure: Past    Smokeless tobacco: Never   Vaping Use    Vaping status: Never Used   Substance and Sexual Activity    Alcohol use: Yes     Comment: occasionally    Drug use: Yes     Types: Marijuana     Comment: Card    Sexual activity: Not Currently     Partners: Female   Other Topics Concern    None   Social History Narrative    None     Social Determinants of Health     Financial Resource Strain: Not on file   Food Insecurity: No Food Insecurity (2/29/2024)    Hunger Vital Sign     Worried About Running Out of Food in the Last Year: Never true     Ran Out of Food in the Last Year: Never true   Transportation Needs: No Transportation Needs (2/29/2024)    PRAPARE - Transportation     Lack of Transportation (Medical): No     Lack of Transportation (Non-Medical): No   Physical Activity: Not on file   Stress: Not on file   Social Connections: Unknown (6/18/2024)    Received from [employer]    Social Connections     How often do you feel lonely or isolated from those around you? (Adult - for ages 18 years and over): Not on file   Intimate Partner Violence: Not on file   Housing Stability: Low Risk  (2/29/2024)    Housing Stability Vital Sign     Unable to Pay for Housing in the Last Year: No     Number of Times Moved in the Last Year: 1     Homeless in the Last Year: No     Social History     Tobacco Use   Smoking Status Never    Passive exposure: Past   Smokeless Tobacco Never     Family History   Problem Relation Age of Onset    Cancer Father     Kidney disease Mother     Heart murmur Sister     Asthma Sister     Hypertension Sister     Heart disease Brother     Bell's palsy Brother      Hypertension Brother     Deep vein thrombosis Neg Hx        The following portions of the patient's history were reviewed and updated as appropriate: allergies, current medications, past medical history, past social history, past surgical history and problem list.    Results  No results found for this or any previous visit (from the past 1 hour(s)).]  Lab Results   Component Value Date    PSA 3.85 01/12/2024    PSA 7.96 (H) 08/28/2023    PSA 2.57 03/09/2020    PSA 2.40 11/21/2018     Lab Results   Component Value Date    CALCIUM 8.6 04/02/2024    K 2.7 (L) 04/02/2024    CO2 22 04/02/2024     04/02/2024    BUN 79 (H) 04/02/2024    CREATININE 3.3 (H) 04/02/2024     Lab Results   Component Value Date    WBC 6.14 03/06/2024    HGB 9.6 (L) 03/06/2024    HCT 29.9 (L) 03/06/2024    MCV 86 03/06/2024     03/06/2024       Anastasiya Oliveira PA-C

## 2024-07-16 ENCOUNTER — OFFICE VISIT (OUTPATIENT)
Dept: UROLOGY | Facility: CLINIC | Age: 59
End: 2024-07-16
Payer: COMMERCIAL

## 2024-07-16 VITALS
TEMPERATURE: 98.3 F | DIASTOLIC BLOOD PRESSURE: 100 MMHG | OXYGEN SATURATION: 98 % | BODY MASS INDEX: 23.07 KG/M2 | SYSTOLIC BLOOD PRESSURE: 168 MMHG | WEIGHT: 147 LBS | HEIGHT: 67 IN | HEART RATE: 62 BPM | RESPIRATION RATE: 18 BRPM

## 2024-07-16 DIAGNOSIS — R97.20 ELEVATED PSA: Primary | ICD-10-CM

## 2024-07-16 PROCEDURE — 99213 OFFICE O/P EST LOW 20 MIN: CPT | Performed by: PHYSICIAN ASSISTANT

## 2024-07-24 ENCOUNTER — HOSPITAL ENCOUNTER (INPATIENT)
Facility: HOSPITAL | Age: 59
LOS: 1 days | Discharge: HOME | DRG: 312 | End: 2024-07-26
Attending: EMERGENCY MEDICINE | Admitting: STUDENT IN AN ORGANIZED HEALTH CARE EDUCATION/TRAINING PROGRAM
Payer: COMMERCIAL

## 2024-07-24 ENCOUNTER — APPOINTMENT (EMERGENCY)
Dept: CT IMAGING | Facility: HOSPITAL | Age: 59
DRG: 312 | End: 2024-07-24
Payer: COMMERCIAL

## 2024-07-24 ENCOUNTER — APPOINTMENT (EMERGENCY)
Dept: RADIOLOGY | Facility: HOSPITAL | Age: 59
DRG: 312 | End: 2024-07-24
Payer: COMMERCIAL

## 2024-07-24 DIAGNOSIS — R07.9 CHEST PAIN, UNSPECIFIED TYPE: ICD-10-CM

## 2024-07-24 DIAGNOSIS — E87.6 HYPOKALEMIA: ICD-10-CM
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
## 2024-07-24 DIAGNOSIS — R42 DIZZINESS: ICD-10-CM

## 2024-07-24 DIAGNOSIS — I10 ESSENTIAL HYPERTENSION: ICD-10-CM

## 2024-07-24 PROBLEM — R55 PRE-SYNCOPE: Status: ACTIVE | Noted: 2024-07-24

## 2024-07-24 LAB
2HR DELTA HS TROPONIN: 15 NG/L
4HR DELTA HS TROPONIN: -13 NG/L
ALBUMIN SERPL BCG-MCNC: 3.4 G/DL (ref 3.5–5)
ALP SERPL-CCNC: 59 U/L (ref 34–104)
ALT SERPL W P-5'-P-CCNC: 32 U/L (ref 7–52)
ANION GAP SERPL CALCULATED.3IONS-SCNC: 13 MMOL/L (ref 4–13)
APTT PPP: 28 SECONDS (ref 23–37)
AST SERPL W P-5'-P-CCNC: 43 U/L (ref 13–39)
BASOPHILS # BLD AUTO: 0.03 THOUSANDS/ÂΜL (ref 0–0.1)
BASOPHILS NFR BLD AUTO: 0 % (ref 0–1)
BILIRUB SERPL-MCNC: 1.3 MG/DL (ref 0.2–1)
BNP SERPL-MCNC: 1081 PG/ML (ref 0–100)
BUN SERPL-MCNC: 61 MG/DL (ref 5–25)
CALCIUM ALBUM COR SERPL-MCNC: 9.3 MG/DL (ref 8.3–10.1)
CALCIUM SERPL-MCNC: 8.8 MG/DL (ref 8.4–10.2)
CARDIAC TROPONIN I PNL SERPL HS: 183 NG/L
CARDIAC TROPONIN I PNL SERPL HS: 196 NG/L
CARDIAC TROPONIN I PNL SERPL HS: 211 NG/L
CHLORIDE SERPL-SCNC: 101 MMOL/L (ref 96–108)
CO2 SERPL-SCNC: 22 MMOL/L (ref 21–32)
CREAT SERPL-MCNC: 3.14 MG/DL (ref 0.6–1.3)
EOSINOPHIL # BLD AUTO: 0 THOUSAND/ÂΜL (ref 0–0.61)
EOSINOPHIL NFR BLD AUTO: 0 % (ref 0–6)
ERYTHROCYTE [DISTWIDTH] IN BLOOD BY AUTOMATED COUNT: 14.9 % (ref 11.6–15.1)
FLUAV RNA RESP QL NAA+PROBE: NEGATIVE
FLUBV RNA RESP QL NAA+PROBE: NEGATIVE
GFR SERPL CREATININE-BSD FRML MDRD: 20 ML/MIN/1.73SQ M
GLUCOSE SERPL-MCNC: 130 MG/DL (ref 65–140)
GLUCOSE SERPL-MCNC: 134 MG/DL (ref 65–140)
HCT VFR BLD AUTO: 36.8 % (ref 36.5–49.3)
HGB BLD-MCNC: 11.9 G/DL (ref 12–17)
IMM GRANULOCYTES # BLD AUTO: 0.12 THOUSAND/UL (ref 0–0.2)
IMM GRANULOCYTES NFR BLD AUTO: 1 % (ref 0–2)
INR PPP: 1.11 (ref 0.84–1.19)
LYMPHOCYTES # BLD AUTO: 0.68 THOUSANDS/ÂΜL (ref 0.6–4.47)
LYMPHOCYTES NFR BLD AUTO: 4 % (ref 14–44)
MAGNESIUM SERPL-MCNC: 2.2 MG/DL (ref 1.9–2.7)
MCH RBC QN AUTO: 27.2 PG (ref 26.8–34.3)
MCHC RBC AUTO-ENTMCNC: 32.3 G/DL (ref 31.4–37.4)
MCV RBC AUTO: 84 FL (ref 82–98)
MONOCYTES # BLD AUTO: 1.44 THOUSAND/ÂΜL (ref 0.17–1.22)
MONOCYTES NFR BLD AUTO: 9 % (ref 4–12)
NEUTROPHILS # BLD AUTO: 14.65 THOUSANDS/ÂΜL (ref 1.85–7.62)
NEUTS SEG NFR BLD AUTO: 86 % (ref 43–75)
NRBC BLD AUTO-RTO: 0 /100 WBCS
PLATELET # BLD AUTO: 188 THOUSANDS/UL (ref 149–390)
PMV BLD AUTO: 11 FL (ref 8.9–12.7)
POTASSIUM SERPL-SCNC: 2.9 MMOL/L (ref 3.5–5.3)
PROT SERPL-MCNC: 6.4 G/DL (ref 6.4–8.4)
PROTHROMBIN TIME: 14.9 SECONDS (ref 11.6–14.5)
RBC # BLD AUTO: 4.37 MILLION/UL (ref 3.88–5.62)
RSV RNA RESP QL NAA+PROBE: NEGATIVE
SARS-COV-2 RNA RESP QL NAA+PROBE: NEGATIVE
SODIUM SERPL-SCNC: 136 MMOL/L (ref 135–147)
WBC # BLD AUTO: 16.92 THOUSAND/UL (ref 4.31–10.16)

## 2024-07-24 PROCEDURE — 36415 COLL VENOUS BLD VENIPUNCTURE: CPT | Performed by: EMERGENCY MEDICINE

## 2024-07-24 PROCEDURE — 85610 PROTHROMBIN TIME: CPT | Performed by: EMERGENCY MEDICINE

## 2024-07-24 PROCEDURE — 99285 EMERGENCY DEPT VISIT HI MDM: CPT | Performed by: EMERGENCY MEDICINE

## 2024-07-24 PROCEDURE — 85730 THROMBOPLASTIN TIME PARTIAL: CPT | Performed by: EMERGENCY MEDICINE

## 2024-07-24 PROCEDURE — 70450 CT HEAD/BRAIN W/O DYE: CPT

## 2024-07-24 PROCEDURE — 0241U HB NFCT DS VIR RESP RNA 4 TRGT: CPT | Performed by: EMERGENCY MEDICINE

## 2024-07-24 PROCEDURE — 83735 ASSAY OF MAGNESIUM: CPT | Performed by: EMERGENCY MEDICINE

## 2024-07-24 PROCEDURE — 93005 ELECTROCARDIOGRAM TRACING: CPT

## 2024-07-24 PROCEDURE — 82948 REAGENT STRIP/BLOOD GLUCOSE: CPT

## 2024-07-24 PROCEDURE — 84484 ASSAY OF TROPONIN QUANT: CPT | Performed by: EMERGENCY MEDICINE

## 2024-07-24 PROCEDURE — 80053 COMPREHEN METABOLIC PANEL: CPT | Performed by: EMERGENCY MEDICINE

## 2024-07-24 PROCEDURE — 71045 X-RAY EXAM CHEST 1 VIEW: CPT

## 2024-07-24 PROCEDURE — 84484 ASSAY OF TROPONIN QUANT: CPT | Performed by: INTERNAL MEDICINE

## 2024-07-24 PROCEDURE — 85025 COMPLETE CBC W/AUTO DIFF WBC: CPT | Performed by: EMERGENCY MEDICINE

## 2024-07-24 PROCEDURE — 99223 1ST HOSP IP/OBS HIGH 75: CPT | Performed by: INTERNAL MEDICINE

## 2024-07-24 PROCEDURE — 83880 ASSAY OF NATRIURETIC PEPTIDE: CPT | Performed by: EMERGENCY MEDICINE

## 2024-07-24 PROCEDURE — 96365 THER/PROPH/DIAG IV INF INIT: CPT

## 2024-07-24 PROCEDURE — 99285 EMERGENCY DEPT VISIT HI MDM: CPT

## 2024-07-24 RX ORDER — ASPIRIN 81 MG/1
81 TABLET, CHEWABLE ORAL DAILY
Status: DISCONTINUED | OUTPATIENT
Start: 2024-07-25 | End: 2024-07-26 | Stop reason: HOSPADM

## 2024-07-24 RX ORDER — POTASSIUM CHLORIDE 20 MEQ/1
40 TABLET, EXTENDED RELEASE ORAL ONCE
Status: COMPLETED | OUTPATIENT
Start: 2024-07-24 | End: 2024-07-24

## 2024-07-24 RX ORDER — DOXAZOSIN MESYLATE 4 MG/1
8 TABLET ORAL 2 TIMES DAILY
Status: DISCONTINUED | OUTPATIENT
Start: 2024-07-24 | End: 2024-07-26 | Stop reason: HOSPADM

## 2024-07-24 RX ORDER — HEPARIN SODIUM 5000 [USP'U]/ML
5000 INJECTION, SOLUTION INTRAVENOUS; SUBCUTANEOUS EVERY 8 HOURS SCHEDULED
Status: DISCONTINUED | OUTPATIENT
Start: 2024-07-25 | End: 2024-07-26 | Stop reason: HOSPADM

## 2024-07-24 RX ORDER — PANTOPRAZOLE SODIUM 40 MG/1
40 TABLET, DELAYED RELEASE ORAL DAILY
Status: DISCONTINUED | OUTPATIENT
Start: 2024-07-25 | End: 2024-07-26 | Stop reason: HOSPADM

## 2024-07-24 RX ORDER — AMLODIPINE BESYLATE 10 MG/1
10 TABLET ORAL DAILY
Status: DISCONTINUED | OUTPATIENT
Start: 2024-07-25 | End: 2024-07-26 | Stop reason: HOSPADM

## 2024-07-24 RX ORDER — HYDRALAZINE HYDROCHLORIDE 25 MG/1
100 TABLET, FILM COATED ORAL EVERY 8 HOURS SCHEDULED
Status: DISCONTINUED | OUTPATIENT
Start: 2024-07-24 | End: 2024-07-26 | Stop reason: HOSPADM

## 2024-07-24 RX ORDER — ATORVASTATIN CALCIUM 40 MG/1
40 TABLET, FILM COATED ORAL
Status: DISCONTINUED | OUTPATIENT
Start: 2024-07-24 | End: 2024-07-26 | Stop reason: HOSPADM

## 2024-07-24 RX ORDER — ALBUTEROL SULFATE 90 UG/1
2 AEROSOL, METERED RESPIRATORY (INHALATION) EVERY 4 HOURS PRN
Status: DISCONTINUED | OUTPATIENT
Start: 2024-07-24 | End: 2024-07-26 | Stop reason: HOSPADM

## 2024-07-24 RX ORDER — SPIRONOLACTONE 25 MG/1
12.5 TABLET ORAL DAILY
Status: DISCONTINUED | OUTPATIENT
Start: 2024-07-25 | End: 2024-07-26 | Stop reason: HOSPADM

## 2024-07-24 RX ORDER — SERTRALINE HYDROCHLORIDE 100 MG/1
200 TABLET, FILM COATED ORAL DAILY
Status: DISCONTINUED | OUTPATIENT
Start: 2024-07-25 | End: 2024-07-26 | Stop reason: HOSPADM

## 2024-07-24 RX ORDER — ARIPIPRAZOLE 5 MG/1
5 TABLET ORAL DAILY
Status: DISCONTINUED | OUTPATIENT
Start: 2024-07-25 | End: 2024-07-26 | Stop reason: HOSPADM

## 2024-07-24 RX ORDER — ASPIRIN 325 MG
325 TABLET ORAL ONCE
Status: COMPLETED | OUTPATIENT
Start: 2024-07-24 | End: 2024-07-24

## 2024-07-24 RX ORDER — ACETAMINOPHEN 325 MG/1
650 TABLET ORAL EVERY 6 HOURS PRN
Status: DISCONTINUED | OUTPATIENT
Start: 2024-07-24 | End: 2024-07-26 | Stop reason: HOSPADM

## 2024-07-24 RX ORDER — OLANZAPINE 2.5 MG/1
5 TABLET, FILM COATED ORAL
Status: DISCONTINUED | OUTPATIENT
Start: 2024-07-24 | End: 2024-07-26 | Stop reason: HOSPADM

## 2024-07-24 RX ORDER — MAGNESIUM SULFATE 1 G/100ML
1 INJECTION INTRAVENOUS ONCE
Status: COMPLETED | OUTPATIENT
Start: 2024-07-24 | End: 2024-07-24

## 2024-07-24 RX ORDER — LABETALOL 100 MG/1
600 TABLET, FILM COATED ORAL EVERY 8 HOURS SCHEDULED
Status: DISCONTINUED | OUTPATIENT
Start: 2024-07-24 | End: 2024-07-26

## 2024-07-24 RX ADMIN — LABETALOL HYDROCHLORIDE 600 MG: 100 TABLET, FILM COATED ORAL at 21:46

## 2024-07-24 RX ADMIN — HYDRALAZINE HYDROCHLORIDE 100 MG: 25 TABLET ORAL at 21:45

## 2024-07-24 RX ADMIN — DOXAZOSIN 8 MG: 4 TABLET ORAL at 19:40

## 2024-07-24 RX ADMIN — MAGNESIUM SULFATE HEPTAHYDRATE 1 G: 1 INJECTION, SOLUTION INTRAVENOUS at 15:33

## 2024-07-24 RX ADMIN — OLANZAPINE 5 MG: 2.5 TABLET, FILM COATED ORAL at 21:45

## 2024-07-24 RX ADMIN — POTASSIUM CHLORIDE 40 MEQ: 1500 TABLET, EXTENDED RELEASE ORAL at 17:34

## 2024-07-24 RX ADMIN — ATORVASTATIN CALCIUM 40 MG: 40 TABLET, FILM COATED ORAL at 21:46

## 2024-07-24 RX ADMIN — ASPIRIN 325 MG ORAL TABLET 325 MG: 325 PILL ORAL at 16:17

## 2024-07-24 NOTE — ED PROVIDER NOTES
Pt Name: Genna Liu  MRN: [id number]  Birthdate 1965  Age/Sex: 59 y.o. male  Date of evaluation: 7/24/2024  PCP: Ugo Marina DO    CHIEF COMPLAINT    Chief Complaint   Patient presents with    Fall     BIBA from home. Pt. Felt weak and dizzy after walking the dogs and fell. Denies LOC nor head strike. Pt. Sates he has been having poor intake and feeling of weakness and fatigue lately. States he is still feeling a little dizzy.         HPI and MDM    59 y.o. male presenting with fall and dizziness. Pt states he has not been feeling well for the last 2 days, has been feeling hot and cold but uncertain about fevers, fatigue. Denies n/v. States he let the dog out today, and when he was going back inside, he got dizzy and fell on his bottom. His son helped him up, and then he fell again, then both of his sons helped him upstairs. He denies spinning sensation, localized weakness, numbness/tingling, visual changes. States this morning he did have a headache. Describes the dizziness as feeling woozy. States he felt like he may pass out but did not. While in the ED he started to also develop L sided sharp chest pain.       Differential diagnosis considered includes but not limited to ACS, electrolyte abnormalities, anemia, arrhythmia.    Provided in interpretation of EKG, heart rate of 80, narrow QRS, inverted P waves, no STEMI.    Troponin is elevated.  Although has been elevated more recently as well.  Has leukocytosis.  Discussed with internal medicine for hospitalization.            Medications   heparin (porcine) subcutaneous injection 5,000 Units (has no administration in time range)   acetaminophen (TYLENOL) tablet 650 mg (has no administration in time range)   albuterol (PROVENTIL HFA,VENTOLIN HFA) inhaler 2 puff (has no administration in time range)   amLODIPine (NORVASC) tablet 10 mg (has no administration in time range)   ARIPiprazole (ABILIFY) tablet 5 mg (has no administration in time range)    atorvastatin (LIPITOR) tablet 40 mg (40 mg Oral Given 7/24/24 2146)   doxazosin (CARDURA) tablet 8 mg (8 mg Oral Given 7/24/24 1940)   aspirin chewable tablet 81 mg (has no administration in time range)   hydrALAZINE (APRESOLINE) tablet 100 mg (100 mg Oral Given 7/24/24 2145)   labetalol (NORMODYNE) tablet 600 mg (600 mg Oral Given 7/24/24 2146)   sertraline (ZOLOFT) tablet 200 mg (has no administration in time range)   spironolactone (ALDACTONE) tablet 12.5 mg (has no administration in time range)   pantoprazole (PROTONIX) EC tablet 40 mg (has no administration in time range)   OLANZapine (ZyPREXA) tablet 5 mg (5 mg Oral Given 7/24/24 2145)   magnesium sulfate IVPB (premix) SOLN 1 g (0 g Intravenous Stopped 7/24/24 1647)   aspirin tablet 325 mg (325 mg Oral Given 7/24/24 1617)   potassium chloride (Klor-Con M20) CR tablet 40 mEq (40 mEq Oral Given 7/24/24 1734)         Past Medical and Surgical History    Past Medical History:   Diagnosis Date    Asthma     Chronic kidney disease     COPD (chronic obstructive pulmonary disease) (HCC)     CPAP (continuous positive airway pressure) dependence     NO LONGER NEEDS D/T BARIATRIC SURGERY.    Diabetes mellitus (HCC)     Emphysema of lung (HCC)     Gout     History of transfusion     pt stated they had a transfusion when they had gallbladder surgery.    Hypertension     Kidney disease     renal failure    Leg DVT (deep venous thromboembolism), acute, bilateral (HCC) 01/2018    Obesity (BMI 30-39.9)     AGUS on CPAP     setting 11    Postgastrectomy malabsorption     Sleep apnea     Systolic CHF (HCC)        Past Surgical History:   Procedure Laterality Date    ADRENALECTOMY      CARDIAC CATHETERIZATION N/A 3/5/2024    Procedure: Cardiac Left Heart Cath;  Surgeon: Servando Wiggins MD;  Location: [facility];  Service: Cardiology    CARDIAC CATHETERIZATION N/A 3/5/2024    Procedure: Cardiac Coronary Angiogram;  Surgeon: Servando Wiggins MD;  Location: [facility];  Service: Cardiology    CHOLECYSTECTOMY      COLONOSCOPY      EGD      HIATAL HERNIA REPAIR N/A 12/22/2020    Procedure: REPAIR HERNIA HIATAL LAPAROSCOPIC;  Surgeon: Shane Francis MD;  Location: [facility];  Service: Bariatrics    INCISION AND DRAINAGE OF WOUND Left 10/17/2018    Procedure: INCISION AND DRAINAGE (I&D) GROIN;  Surgeon: Rafy Pascual MD;  Location: [facility];  Service: General    IR IMAGE GUIDED ASPIRATION / DRAINAGE W TUBE  8/17/2018    IR IMAGE GUIDED ASPIRATION / DRAINAGE W TUBE  7/31/2018    JOINT REPLACEMENT Bilateral     knee    KIDNEY SURGERY  2009    nodule removal    LYMPH NODE DISSECTION Left 01/2018    left inguinal LN removed - benign     OTHER SURGICAL HISTORY      kidney nodule removal    PALATE / UVULA BIOPSY / EXCISION      PERICARDIAL WINDOW N/A 7/8/2023    Procedure: WINDOW PERICARDIAL;  Surgeon: Alonso Logan MD;  Location: [facility];  Service: Thoracic    VA LAPS GSTR RSTCV PX W/BYP JASON-EN-Y LIMB <150 CM N/A 12/22/2020    Procedure: BYPASS GASTRIC  JASON-EN-Y LAPAROSCOPIC WITH INTRAOPERATIVE EGD;  Surgeon: Shane Francis MD;  Location: [facility];  Service: Bariatrics    SINUS SURGERY      TONSILLECTOMY         Family History   Problem Relation Age of Onset    Cancer Father     Kidney disease Mother     Heart murmur Sister     Asthma Sister     Hypertension Sister     Heart disease Brother     Bell's palsy Brother     Hypertension Brother     Deep vein thrombosis Neg Hx        Social History     Tobacco Use    Smoking status: Never     Passive exposure: Past    Smokeless tobacco: Never   Vaping Use    Vaping status: Never Used   Substance Use Topics    Alcohol use: Yes     Comment: occasionally    Drug use: Yes     Types: Marijuana     Comment: Card           Allergies    Allergies   Allergen Reactions    Clonidine Anaphylaxis    Lisinopril Anaphylaxis    Nifedipine Anaphylaxis     Tolerating nicardipine    Buspirone      Headaches,     Enoxaparin Other  "(See Comments)     Injection site \"bump\" per Dr. Penny Veronicalazine Other (See Comments)     Fluid around the heart  Lose for Appetite        Minoxidil      Retains fluid around heart       Home Medications    Prior to Admission medications    Medication Sig Start Date End Date Taking? Authorizing Provider   albuterol (PROVENTIL HFA,VENTOLIN HFA) 90 mcg/act inhaler     Historical Provider, MD   amLODIPine (NORVASC) 10 mg tablet Take 10 mg by mouth 4/15/20   Historical Provider, MD   ARIPiprazole (ABILIFY) 5 mg tablet Take 5 mg by mouth daily 8/22/23   Historical Provider, MD   aspirin 81 mg chewable tablet Chew 1 tablet (81 mg total) daily 3/7/24   Nickolas Solares MD   atorvastatin (LIPITOR) 40 mg tablet Take 40 mg by mouth daily at bedtime 7/21/22   Historical Provider, MD   doxazosin (CARDURA) 8 MG tablet Take 1 tablet (8 mg total) by mouth 2 (two) times a day 10/17/23   Jaylon Vasquez MD   hydrALAZINE (APRESOLINE) 100 MG tablet Take 1 tablet (100 mg total) by mouth every 8 (eight) hours 3/6/24   Nickolas Solares MD   hydroCHLOROthiazide 25 mg tablet Take 25 mg by mouth daily    Historical Provider, MD   isosorbide dinitrate (ISORDIL) 40 MG tablet Take 1 tablet (40 mg total) by mouth 3 (three) times daily after meals 12/29/23   Nickolas Solares MD   labetalol (NORMODYNE) 300 mg tablet Take 2 tablets (600 mg total) by mouth every 8 (eight) hours 12/29/23   Nickolas Solares MD   LORazepam (ATIVAN) 1 mg tablet TAKE 1 TABLET BY MOUTH ONCE DAILY AS NEEDED FOR ANXIETY (TAKE 1 TABLET PRIOR TO MRI OR CT SCAN) 12/20/23   Historical Provider, MD   OLANZapine (ZyPREXA) 2.5 mg tablet Take 5 mg by mouth daily at bedtime 11/7/23   Historical Provider, MD   pantoprazole (PROTONIX) 40 mg tablet Take 40 mg by mouth daily    Historical Provider, MD   sertraline (ZOLOFT) 100 mg tablet Take 2 tablets by mouth daily 6/15/22   Historical Provider, MD   spironolactone (ALDACTONE) 25 mg tablet TAKE 1/2 (ONE-HALF) TABLET BY MOUTH IN THE " EVENING 4/30/24   MARBIN Preston   sucralfate (CARAFATE) 1 g tablet Take 1 g by mouth 2 (two) times a day 12/18/23   Historical Provider, MD           Physical Exam      ED Triage Vitals   Temperature Pulse Respirations Blood Pressure SpO2   07/24/24 1425 07/24/24 1425 07/24/24 1425 07/24/24 1425 07/24/24 1425   98.2 °F (36.8 °C) 74 16 150/94 98 %      Temp Source Heart Rate Source Patient Position - Orthostatic VS BP Location FiO2 (%)   07/24/24 1425 07/24/24 1425 07/24/24 1425 07/24/24 1425 --   Oral Monitor Lying Left arm       Pain Score       07/24/24 1930       No Pain               Physical Exam  Constitutional:       General: He is not in acute distress.     Appearance: He is not ill-appearing.   HENT:      Head: Normocephalic and atraumatic.      Nose: Nose normal.      Mouth/Throat:      Mouth: Mucous membranes are moist.   Eyes:      Extraocular Movements: Extraocular movements intact.      Pupils: Pupils are equal, round, and reactive to light.   Cardiovascular:      Rate and Rhythm: Normal rate and regular rhythm.   Pulmonary:      Effort: No respiratory distress.      Breath sounds: Normal breath sounds. No wheezing.   Abdominal:      General: Abdomen is flat. There is no distension.      Tenderness: There is no abdominal tenderness.   Musculoskeletal:         General: No swelling or deformity. Normal range of motion.      Cervical back: Normal range of motion and neck supple.   Skin:     General: Skin is warm.      Findings: No erythema.   Neurological:      Mental Status: He is alert and oriented to person, place, and time. Mental status is at baseline.              Diagnostic Results      Labs:    Results Reviewed       Procedure Component Value Units Date/Time    HS Troponin I 4hr [[id number]]  (Abnormal) Collected: 07/24/24 2009    Lab Status: Final result Specimen: Blood from Arm, Right Updated: 07/24/24 2053     hs TnI 4hr 183 ng/L      Delta 4hr hsTnI -13 ng/L     HS Troponin I 2hr  [[id number]]  (Abnormal) Collected: 07/24/24 1738    Lab Status: Final result Specimen: Blood from Arm, Left Updated: 07/24/24 1825     hs TnI 2hr 211 ng/L      Delta 2hr hsTnI 15 ng/L     Comprehensive metabolic panel [[id number]]  (Abnormal) Collected: 07/24/24 1605    Lab Status: Final result Specimen: Blood from Hand, Left Updated: 07/24/24 1628     Sodium 136 mmol/L      Potassium 2.9 mmol/L      Chloride 101 mmol/L      CO2 22 mmol/L      ANION GAP 13 mmol/L      BUN 61 mg/dL      Creatinine 3.14 mg/dL      Glucose 134 mg/dL      Calcium 8.8 mg/dL      Corrected Calcium 9.3 mg/dL      AST 43 U/L      ALT 32 U/L      Alkaline Phosphatase 59 U/L      Total Protein 6.4 g/dL      Albumin 3.4 g/dL      Total Bilirubin 1.30 mg/dL      eGFR 20 ml/min/1.73sq m     Narrative:      National Kidney Disease Foundation guidelines for Chronic Kidney Disease (CKD):     Stage 1 with normal or high GFR (GFR > 90 mL/min/1.73 square meters)    Stage 2 Mild CKD (GFR = 60-89 mL/min/1.73 square meters)    Stage 3A Moderate CKD (GFR = 45-59 mL/min/1.73 square meters)    Stage 3B Moderate CKD (GFR = 30-44 mL/min/1.73 square meters)    Stage 4 Severe CKD (GFR = 15-29 mL/min/1.73 square meters)    Stage 5 End Stage CKD (GFR <15 mL/min/1.73 square meters)  Note: GFR calculation is accurate only with a steady state creatinine    Magnesium [[id number]]  (Normal) Collected: 07/24/24 1605    Lab Status: Final result Specimen: Blood from Hand, Left Updated: 07/24/24 1628     Magnesium 2.2 mg/dL     FLU/RSV/COVID - if FLU/RSV clinically relevant [[id number]]  (Normal) Collected: 07/24/24 1528    Lab Status: Final result Specimen: Nares from Nose Updated: 07/24/24 1619     SARS-CoV-2 Negative     INFLUENZA A PCR Negative     INFLUENZA B PCR Negative     RSV PCR Negative    Narrative:      FOR PEDIATRIC PATIENTS - copy/paste COVID Guidelines URL to browser: [email or web address]    SARS-CoV-2  assay is a Nucleic Acid Amplification assay intended for the  qualitative detection of nucleic acid from SARS-CoV-2 in nasopharyngeal  swabs. Results are for the presumptive identification of SARS-CoV-2 RNA.    Positive results are indicative of infection with SARS-CoV-2, the virus  causing COVID-19, but do not rule out bacterial infection or co-infection  with other viruses. Laboratories within the [place] and its  territories are required to report all positive results to the appropriate  public health authorities. Negative results do not preclude SARS-CoV-2  infection and should not be used as the sole basis for treatment or other  patient management decisions. Negative results must be combined with  clinical observations, patient history, and epidemiological information.  This test has not been FDA cleared or approved.    This test has been authorized by FDA under an Emergency Use Authorization  (EUA). This test is only authorized for the duration of time the  declaration that circumstances exist justifying the authorization of the  emergency use of an in vitro diagnostic tests for detection of SARS-CoV-2  virus and/or diagnosis of COVID-19 infection under section 564(b)(1) of  the Act, 21 U.S.C. 360bbb-3(b)(1), unless the authorization is terminated  or revoked sooner. The test has been validated but independent review by FDA  and CLIA is pending.    Test performed using [employer] GeneXpert: This RT-PCR assay targets N2,  a region unique to SARS-CoV-2. A conserved region in the E-gene was chosen  for pan-Sarbecovirus detection which includes SARS-CoV-2.    According to CMS-2020-01-R, this platform meets the definition of high-throughput technology.    HS Troponin 0hr (reflex protocol) [[id number]]  (Abnormal) Collected: 07/24/24 1528    Lab Status: Final result Specimen: Blood from Arm, Left Updated: 07/24/24 1601     hs TnI 0hr 196 ng/L     B-Type Natriuretic Peptide(BNP) [[id number]]  (Abnormal) Collected:  07/24/24 1528    Lab Status: Final result Specimen: Blood from Arm, Left Updated: 07/24/24 1601     BNP 1,081 pg/mL     Protime-INR [[id number]]  (Abnormal) Collected: 07/24/24 1528    Lab Status: Final result Specimen: Blood from Arm, Left Updated: 07/24/24 1552     Protime 14.9 seconds      INR 1.11    APTT [[id number]]  (Normal) Collected: 07/24/24 1528    Lab Status: Final result Specimen: Blood from Arm, Left Updated: 07/24/24 1552     PTT 28 seconds     CBC and differential [[id number]]  (Abnormal) Collected: 07/24/24 1528    Lab Status: Final result Specimen: Blood from Arm, Left Updated: 07/24/24 1543     WBC 16.92 Thousand/uL      RBC 4.37 Million/uL      Hemoglobin 11.9 g/dL      Hematocrit 36.8 %      MCV 84 fL      MCH 27.2 pg      MCHC 32.3 g/dL      RDW 14.9 %      MPV 11.0 fL      Platelets 188 Thousands/uL      nRBC 0 /100 WBCs      Segmented % 86 %      Immature Grans % 1 %      Lymphocytes % 4 %      Monocytes % 9 %      Eosinophils Relative 0 %      Basophils Relative 0 %      Absolute Neutrophils 14.65 Thousands/µL      Absolute Immature Grans 0.12 Thousand/uL      Absolute Lymphocytes 0.68 Thousands/µL      Absolute Monocytes 1.44 Thousand/µL      Eosinophils Absolute 0.00 Thousand/µL      Basophils Absolute 0.03 Thousands/µL     Fingerstick Glucose (POCT) [[id number]]  (Normal) Collected: 07/24/24 1424    Lab Status: Final result Specimen: Blood Updated: 07/24/24 1425     POC Glucose 130 mg/dl             All labs reviewed and utilized in the medical decision making process    Radiology:    CT head without contrast   Final Result      No acute intracranial abnormality.      Stable medial left tentorial meningioma.         Workstation performed: [id number]         XR chest 1 view portable   Final Result      No acute cardiopulmonary disease.            Workstation performed: [id number]             All radiology studies independently viewed by me and interpreted by the  radiologist.    Procedure    Procedures        FINAL IMPRESSION    Final diagnoses:   Fall, initial encounter   Dizziness   Chest pain, unspecified type         DISPOSITION    Time reflects when diagnosis was documented in both MDM as applicable and the Disposition within this note       Time User Action Codes Description Comment    7/24/2024  5:06 PM Jing Frankel Add [W19.XXXA] Fall, initial encounter     7/24/2024  5:06 PM Jing Frankel Add [R42] Dizziness     7/24/2024  5:06 PM Jing Frankel Add [R07.9] Chest pain, unspecified type           ED Disposition       ED Disposition   Admit    Condition   Stable    Date/Time   Wed Jul 24, 2024  5:06 PM    Comment   Case was discussed with Dr. Solares and the patient's admission status was agreed to be Admission Status: observation status to the service of Dr. Solares .               Follow-up Information    None           PATIENT REFERRED TO:    No follow-up provider specified.    DISCHARGE MEDICATIONS:    Current Discharge Medication List        CONTINUE these medications which have NOT CHANGED    Details   albuterol (PROVENTIL HFA,VENTOLIN HFA) 90 mcg/act inhaler     Comments: <!--EPICS-->Substitution to a formulary equivalent within the same pharmaceutical class is authorized.<!--EPICE-->      amLODIPine (NORVASC) 10 mg tablet Take 10 mg by mouth      aspirin 81 mg chewable tablet Chew 1 tablet (81 mg total) daily  Qty: 30 tablet, Refills: 0    Associated Diagnoses: Hypertension      atorvastatin (LIPITOR) 40 mg tablet Take 40 mg by mouth daily at bedtime      doxazosin (CARDURA) 8 MG tablet Take 1 tablet (8 mg total) by mouth 2 (two) times a day  Qty: 90 tablet, Refills: 6    Associated Diagnoses: Primary hypertension      hydrALAZINE (APRESOLINE) 100 MG tablet Take 1 tablet (100 mg total) by mouth every 8 (eight) hours  Qty: 90 tablet, Refills: 0    Associated Diagnoses: Hypertension      hydroCHLOROthiazide 25 mg tablet Take 25 mg by mouth daily      isosorbide  dinitrate (ISORDIL) 40 MG tablet Take 1 tablet (40 mg total) by mouth 3 (three) times daily after meals  Qty: 90 tablet, Refills: 0    Associated Diagnoses: Hypertensive emergency      labetalol (NORMODYNE) 300 mg tablet Take 2 tablets (600 mg total) by mouth every 8 (eight) hours  Qty: 180 tablet, Refills: 0    Associated Diagnoses: Hypertensive emergency      LORazepam (ATIVAN) 1 mg tablet TAKE 1 TABLET BY MOUTH ONCE DAILY AS NEEDED FOR ANXIETY (TAKE 1 TABLET PRIOR TO MRI OR CT SCAN)      OLANZapine (ZyPREXA) 2.5 mg tablet Take 5 mg by mouth daily at bedtime      pantoprazole (PROTONIX) 40 mg tablet Take 40 mg by mouth daily      spironolactone (ALDACTONE) 25 mg tablet TAKE 1/2 (ONE-HALF) TABLET BY MOUTH IN THE EVENING  Qty: 30 tablet, Refills: 5    Associated Diagnoses: Hypertensive kidney disease with stage 4 chronic kidney disease (HCC); Resistant hypertension      sucralfate (CARAFATE) 1 g tablet Take 1 g by mouth 2 (two) times a day      ARIPiprazole (ABILIFY) 5 mg tablet Take 5 mg by mouth daily      sertraline (ZOLOFT) 100 mg tablet Take 2 tablets by mouth daily             No discharge procedures on file.         Jing Frankel DO        This note was partially completed using voice recognition technology, and was scanned for gross errors; however some errors may still exist. Please contact the author with any questions or requests for clarification.      Jing Frankel DO  07/25/24 0005

## 2024-07-24 NOTE — ASSESSMENT & PLAN NOTE
Temp WBC count of 16.9 K in the emergency department suspect likely reactive  No obvious source of infection  Monitor closely off antibiotics  Repeat WBC in a.m.

## 2024-07-24 NOTE — ASSESSMENT & PLAN NOTE
Noted to have troponin of 196 however chronically elevated previously and February was noted to be greater than 300 when he underwent cardiac cath which was normal  Will trend for now

## 2024-07-24 NOTE — ASSESSMENT & PLAN NOTE
59-year-old male with history of hypertension, CKD who presented with dizziness reports almost passed out however did not.  Reports falling shortly afterwards  Since arrival to the emergency department symptoms have since improved  Denies shortness of breath however did report intermittent left-sided sharp chest pain  EKG without any signs of acute ischemia  Will monitor on telemetry

## 2024-07-24 NOTE — H&P
[facility]  H&P  Name: Genna Liu 59 y.o. male I MRN: [id number]  Unit/Bed#: ED 21 I Date of Admission: 7/24/2024   Date of Service: 7/24/2024 I Hospital Day: 0      Assessment & Plan   * Pre-syncope  Assessment & Plan  59-year-old male with history of hypertension, CKD who presented with dizziness reports almost passed out however did not.  Reports falling shortly afterwards  Since arrival to the emergency department symptoms have since improved  Denies shortness of breath however did report intermittent left-sided sharp chest pain  EKG without any signs of acute ischemia  Will monitor on telemetry    Elevated troponin  Assessment & Plan  Noted to have troponin of 196 however chronically elevated previously and February was noted to be greater than 300 when he underwent cardiac cath which was normal  Will trend for now    Depression with anxiety  Assessment & Plan  Continue home Zyprexa, Abilify, Zoloft    COPD without exacerbation (HCC)  Assessment & Plan  Not in acute exacerbation  Continue as needed inhaler    Essential hypertension  Assessment & Plan  BP currently controlled  Continue labetalol, doxazosin,  Aldactone, hydralazine           VTE Prophylaxis: Heparin  / sequential compression device   Code Status: full code  POLST: There is no POLST form on file for this patient (pre-hospital)  Discussion with family: pt    Anticipated Length of Stay:  Patient will be admitted on an Emergency basis with an anticipated length of stay of  < 2 midnights.   Justification for Hospital Stay: Presyncope    Total Time for Visit, including Counseling / Coordination of Care: 60 minutes.  Greater than 50% of this total time spent on direct patient counseling and coordination of care.    Chief Complaint:   Dizziness    History of Present Illness:    Genna Liu is a 59 y.o. male with past medical history of severe COPD, CKD, hypertension initially presented lightheadedness.  Patient reports  generalized weakness and lightheadedness and dizziness while walking earlier today.  Reports falling shortly thereafter.  Otherwise denies any acute complaints.  However did note mild intermittent sharp left-sided chest pain shortly in the emergency department which is since resolved.  Denies shortness of breath, fevers, chills, cough, abdominal pain, nausea or any other complaints    Review of Systems:    Review of Systems   Constitutional:  Negative for appetite change, chills, diaphoresis, fatigue, fever and unexpected weight change.   HENT:  Negative for congestion, rhinorrhea and sore throat.    Eyes:  Negative for photophobia and visual disturbance.   Respiratory:  Negative for cough, shortness of breath and wheezing.    Cardiovascular:  Negative for chest pain, palpitations and leg swelling.   Gastrointestinal:  Negative for abdominal pain, anal bleeding, blood in stool, constipation, diarrhea, nausea and vomiting.   Genitourinary:  Negative for decreased urine volume, difficulty urinating, dysuria, flank pain, frequency, hematuria and urgency.   Musculoskeletal:  Negative for arthralgias, back pain, joint swelling and myalgias.   Skin:  Negative for color change and rash.   Neurological:  Positive for dizziness. Negative for seizures, facial asymmetry, speech difficulty, numbness and headaches.   Psychiatric/Behavioral:  Negative for agitation, confusion and decreased concentration. The patient is not nervous/anxious.        Past Medical and Surgical History:     Past Medical History:   Diagnosis Date    Asthma     Chronic kidney disease     COPD (chronic obstructive pulmonary disease) (HCC)     CPAP (continuous positive airway pressure) dependence     NO LONGER NEEDS D/T BARIATRIC SURGERY.    Diabetes mellitus (HCC)     Emphysema of lung (HCC)     Gout     History of transfusion     pt stated they had a transfusion when they had gallbladder surgery.    Hypertension     Kidney disease     renal failure     Leg DVT (deep venous thromboembolism), acute, bilateral (HCC) 01/2018    Obesity (BMI 30-39.9)     AGUS on CPAP     setting 11    Postgastrectomy malabsorption     Sleep apnea     Systolic CHF (HCC)        Past Surgical History:   Procedure Laterality Date    ADRENALECTOMY      CARDIAC CATHETERIZATION N/A 3/5/2024    Procedure: Cardiac Left Heart Cath;  Surgeon: Servando Wiggins MD;  Location: [facility];  Service: Cardiology    CARDIAC CATHETERIZATION N/A 3/5/2024    Procedure: Cardiac Coronary Angiogram;  Surgeon: Servando Wiggins MD;  Location: [facility];  Service: Cardiology    CHOLECYSTECTOMY      COLONOSCOPY      EGD      HIATAL HERNIA REPAIR N/A 12/22/2020    Procedure: REPAIR HERNIA HIATAL LAPAROSCOPIC;  Surgeon: Shane Francis MD;  Location: [facility];  Service: Bariatrics    INCISION AND DRAINAGE OF WOUND Left 10/17/2018    Procedure: INCISION AND DRAINAGE (I&D) GROIN;  Surgeon: Rafy Pascual MD;  Location: [facility];  Service: General    IR IMAGE GUIDED ASPIRATION / DRAINAGE W TUBE  8/17/2018    IR IMAGE GUIDED ASPIRATION / DRAINAGE W TUBE  7/31/2018    JOINT REPLACEMENT Bilateral     knee    KIDNEY SURGERY  2009    nodule removal    LYMPH NODE DISSECTION Left 01/2018    left inguinal LN removed - benign     OTHER SURGICAL HISTORY      kidney nodule removal    PALATE / UVULA BIOPSY / EXCISION      PERICARDIAL WINDOW N/A 7/8/2023    Procedure: WINDOW PERICARDIAL;  Surgeon: Alonso Logan MD;  Location: [facility];  Service: Thoracic    OH LAPS GSTR RSTCV PX W/BYP JASON-EN-Y LIMB <150 CM N/A 12/22/2020    Procedure: BYPASS GASTRIC  JASON-EN-Y LAPAROSCOPIC WITH INTRAOPERATIVE EGD;  Surgeon: Shane Francis MD;  Location: [facility];  Service: Bariatrics    SINUS SURGERY      TONSILLECTOMY         Meds/Allergies:    Prior to Admission medications    Medication Sig Start Date End Date Taking? Authorizing Provider   albuterol (PROVENTIL HFA,VENTOLIN HFA) 90 mcg/act inhaler      Historical Provider, MD   amLODIPine (NORVASC) 10 mg tablet Take 10 mg by mouth 4/15/20   Historical Provider, MD   ARIPiprazole (ABILIFY) 5 mg tablet Take 5 mg by mouth daily 8/22/23   Historical Provider, MD   aspirin 81 mg chewable tablet Chew 1 tablet (81 mg total) daily 3/7/24   Nickolas Solares MD   atorvastatin (LIPITOR) 40 mg tablet Take 40 mg by mouth daily at bedtime 7/21/22   Historical Provider, MD   doxazosin (CARDURA) 8 MG tablet Take 1 tablet (8 mg total) by mouth 2 (two) times a day 10/17/23   Jaylon Vasquez MD   hydrALAZINE (APRESOLINE) 100 MG tablet Take 1 tablet (100 mg total) by mouth every 8 (eight) hours 3/6/24   Nickolas Solares MD   hydroCHLOROthiazide 25 mg tablet Take 25 mg by mouth daily    Historical Provider, MD   isosorbide dinitrate (ISORDIL) 40 MG tablet Take 1 tablet (40 mg total) by mouth 3 (three) times daily after meals 12/29/23   Nickolas Solares MD   labetalol (NORMODYNE) 300 mg tablet Take 2 tablets (600 mg total) by mouth every 8 (eight) hours 12/29/23   Nickolas Solares MD   LORazepam (ATIVAN) 1 mg tablet TAKE 1 TABLET BY MOUTH ONCE DAILY AS NEEDED FOR ANXIETY (TAKE 1 TABLET PRIOR TO MRI OR CT SCAN) 12/20/23   Historical Provider, MD   OLANZapine (ZyPREXA) 2.5 mg tablet Take 5 mg by mouth daily at bedtime 11/7/23   Historical Provider, MD   pantoprazole (PROTONIX) 40 mg tablet Take 40 mg by mouth daily    Historical Provider, MD   sertraline (ZOLOFT) 100 mg tablet Take 2 tablets by mouth daily 6/15/22   Historical Provider, MD   spironolactone (ALDACTONE) 25 mg tablet TAKE 1/2 (ONE-HALF) TABLET BY MOUTH IN THE EVENING 4/30/24   MARBIN Preston   sucralfate (CARAFATE) 1 g tablet Take 1 g by mouth 2 (two) times a day 12/18/23   Historical Provider, MD MAXWELL have reviewed home medications with patient personally.    Allergies:   Allergies   Allergen Reactions    Clonidine Anaphylaxis    Lisinopril Anaphylaxis    Nifedipine Anaphylaxis     Tolerating nicardipine    Buspirone   "    Headaches,     Enoxaparin Other (See Comments)     Injection site \"bump\" per Dr. Francis    Hydralazine Other (See Comments)     Fluid around the heart  Lose for Appetite        Minoxidil      Retains fluid around heart       Social History:     Marital Status: /Civil Union     Patient Pre-hospital Living Situation: home  Patient Pre-hospital Level of Mobility: independent  Patient Pre-hospital Diet Restrictions: none  Substance Use History:   Social History     Substance and Sexual Activity   Alcohol Use Yes    Comment: occasionally     Social History     Tobacco Use   Smoking Status Never    Passive exposure: Past   Smokeless Tobacco Never     Social History     Substance and Sexual Activity   Drug Use Yes    Types: Marijuana    Comment: Card       Family History:    Family History   Problem Relation Age of Onset    Cancer Father     Kidney disease Mother     Heart murmur Sister     Asthma Sister     Hypertension Sister     Heart disease Brother     Bell's palsy Brother     Hypertension Brother     Deep vein thrombosis Neg Hx        Physical Exam:     Vitals:   Blood Pressure: 127/85 (07/24/24 1601)  Pulse: 72 (07/24/24 1601)  Temperature: 98.2 °F (36.8 °C) (07/24/24 1425)  Temp Source: Oral (07/24/24 1425)  Respirations: 18 (07/24/24 1601)  Weight - Scale: 63 kg (138 lb 14.2 oz) (07/24/24 1425)  SpO2: 100 % (07/24/24 1601)    Physical Exam  Constitutional:       General: He is not in acute distress.     Appearance: He is well-developed. He is not diaphoretic.   HENT:      Head: Normocephalic and atraumatic.      Nose: Nose normal.      Mouth/Throat:      Pharynx: No oropharyngeal exudate.   Eyes:      General: No scleral icterus.     Conjunctiva/sclera: Conjunctivae normal.   Cardiovascular:      Rate and Rhythm: Normal rate and regular rhythm.      Heart sounds: Normal heart sounds. No murmur heard.     No friction rub. No gallop.   Pulmonary:      Effort: Pulmonary effort is normal. No respiratory " distress.      Breath sounds: Normal breath sounds. No wheezing or rales.   Chest:      Chest wall: No tenderness.   Abdominal:      General: Bowel sounds are normal. There is no distension.      Palpations: Abdomen is soft.      Tenderness: There is no abdominal tenderness. There is no guarding.   Musculoskeletal:         General: No tenderness, deformity or edema. Normal range of motion.      Cervical back: Normal range of motion and neck supple.   Skin:     General: Skin is warm and dry.      Findings: No erythema.   Neurological:      Mental Status: He is alert. Mental status is at baseline.   Psychiatric:         Mood and Affect: Mood and affect normal.           Additional Data:     Lab Results: I have personally reviewed pertinent reports.      Results from last 7 days   Lab Units 07/24/24  1528   WBC Thousand/uL 16.92*   HEMOGLOBIN g/dL 11.9*   HEMATOCRIT % 36.8   PLATELETS Thousands/uL 188   SEGS PCT % 86*   LYMPHO PCT % 4*   MONO PCT % 9   EOS PCT % 0     Results from last 7 days   Lab Units 07/24/24  1605   SODIUM mmol/L 136   POTASSIUM mmol/L 2.9*   CHLORIDE mmol/L 101   CO2 mmol/L 22   BUN mg/dL 61*   CREATININE mg/dL 3.14*   ANION GAP mmol/L 13   CALCIUM mg/dL 8.8   ALBUMIN g/dL 3.4*   TOTAL BILIRUBIN mg/dL 1.30*   ALK PHOS U/L 59   ALT U/L 32   AST U/L 43*   GLUCOSE RANDOM mg/dL 134     Results from last 7 days   Lab Units 07/24/24  1528   INR  1.11     Results from last 7 days   Lab Units 07/24/24  1424   POC GLUCOSE mg/dl 130               Imaging: I have personally reviewed pertinent reports.      CT head without contrast   Final Result by Wiliam Bear MD (07/24 1540)      No acute intracranial abnormality.      Stable medial left tentorial meningioma.         Workstation performed: [id number]         XR chest 1 view portable   Final Result by Michele Espinal MD (07/24 1617)      No acute cardiopulmonary disease.            Workstation performed: [id number]             EKG, Pathology,  and Other Studies Reviewed on Admission:   EKG: reviewed    Allscripts / Epic Records Reviewed: Yes     ** Please Note: This note has been constructed using a voice recognition system. **

## 2024-07-25 LAB
ALBUMIN SERPL BCG-MCNC: 3.3 G/DL (ref 3.5–5)
ALP SERPL-CCNC: 54 U/L (ref 34–104)
ALT SERPL W P-5'-P-CCNC: 30 U/L (ref 7–52)
ANION GAP SERPL CALCULATED.3IONS-SCNC: 11 MMOL/L (ref 4–13)
AST SERPL W P-5'-P-CCNC: 31 U/L (ref 13–39)
ATRIAL RATE: 70 BPM
BASOPHILS # BLD AUTO: 0.05 THOUSANDS/ÂΜL (ref 0–0.1)
BASOPHILS NFR BLD AUTO: 0 % (ref 0–1)
BILIRUB DIRECT SERPL-MCNC: 0.26 MG/DL (ref 0–0.2)
BILIRUB SERPL-MCNC: 0.92 MG/DL (ref 0.2–1)
BUN SERPL-MCNC: 70 MG/DL (ref 5–25)
CALCIUM SERPL-MCNC: 8.6 MG/DL (ref 8.4–10.2)
CHLORIDE SERPL-SCNC: 102 MMOL/L (ref 96–108)
CO2 SERPL-SCNC: 24 MMOL/L (ref 21–32)
CREAT SERPL-MCNC: 3.36 MG/DL (ref 0.6–1.3)
EOSINOPHIL # BLD AUTO: 0.07 THOUSAND/ÂΜL (ref 0–0.61)
EOSINOPHIL NFR BLD AUTO: 1 % (ref 0–6)
ERYTHROCYTE [DISTWIDTH] IN BLOOD BY AUTOMATED COUNT: 14.7 % (ref 11.6–15.1)
GFR SERPL CREATININE-BSD FRML MDRD: 18 ML/MIN/1.73SQ M
GLUCOSE P FAST SERPL-MCNC: 98 MG/DL (ref 65–99)
GLUCOSE SERPL-MCNC: 98 MG/DL (ref 65–140)
HCT VFR BLD AUTO: 34.1 % (ref 36.5–49.3)
HGB BLD-MCNC: 11.4 G/DL (ref 12–17)
IMM GRANULOCYTES # BLD AUTO: 0.09 THOUSAND/UL (ref 0–0.2)
IMM GRANULOCYTES NFR BLD AUTO: 1 % (ref 0–2)
LYMPHOCYTES # BLD AUTO: 1.23 THOUSANDS/ÂΜL (ref 0.6–4.47)
LYMPHOCYTES NFR BLD AUTO: 8 % (ref 14–44)
MAGNESIUM SERPL-MCNC: 2.2 MG/DL (ref 1.9–2.7)
MCH RBC QN AUTO: 28 PG (ref 26.8–34.3)
MCHC RBC AUTO-ENTMCNC: 33.4 G/DL (ref 31.4–37.4)
MCV RBC AUTO: 84 FL (ref 82–98)
MONOCYTES # BLD AUTO: 1.94 THOUSAND/ÂΜL (ref 0.17–1.22)
MONOCYTES NFR BLD AUTO: 13 % (ref 4–12)
NEUTROPHILS # BLD AUTO: 11.2 THOUSANDS/ÂΜL (ref 1.85–7.62)
NEUTS SEG NFR BLD AUTO: 77 % (ref 43–75)
NRBC BLD AUTO-RTO: 0 /100 WBCS
P AXIS: 44 DEGREES
PLATELET # BLD AUTO: 162 THOUSANDS/UL (ref 149–390)
PMV BLD AUTO: 10.3 FL (ref 8.9–12.7)
POTASSIUM SERPL-SCNC: 2.9 MMOL/L (ref 3.5–5.3)
PR INTERVAL: 156 MS
PROT SERPL-MCNC: 6.2 G/DL (ref 6.4–8.4)
QRS AXIS: -70 DEGREES
QRSD INTERVAL: 96 MS
QT INTERVAL: 500 MS
QTC INTERVAL: 540 MS
RBC # BLD AUTO: 4.07 MILLION/UL (ref 3.88–5.62)
SODIUM SERPL-SCNC: 137 MMOL/L (ref 135–147)
T WAVE AXIS: 105 DEGREES
VENTRICULAR RATE: 70 BPM
WBC # BLD AUTO: 14.58 THOUSAND/UL (ref 4.31–10.16)

## 2024-07-25 PROCEDURE — 93010 ELECTROCARDIOGRAM REPORT: CPT | Performed by: INTERNAL MEDICINE

## 2024-07-25 PROCEDURE — 85025 COMPLETE CBC W/AUTO DIFF WBC: CPT | Performed by: INTERNAL MEDICINE

## 2024-07-25 PROCEDURE — 99233 SBSQ HOSP IP/OBS HIGH 50: CPT | Performed by: STUDENT IN AN ORGANIZED HEALTH CARE EDUCATION/TRAINING PROGRAM

## 2024-07-25 PROCEDURE — 83735 ASSAY OF MAGNESIUM: CPT | Performed by: INTERNAL MEDICINE

## 2024-07-25 PROCEDURE — 80048 BASIC METABOLIC PNL TOTAL CA: CPT | Performed by: INTERNAL MEDICINE

## 2024-07-25 PROCEDURE — 80076 HEPATIC FUNCTION PANEL: CPT | Performed by: INTERNAL MEDICINE

## 2024-07-25 RX ORDER — SODIUM CHLORIDE, SODIUM LACTATE, POTASSIUM CHLORIDE, CALCIUM CHLORIDE 600; 310; 30; 20 MG/100ML; MG/100ML; MG/100ML; MG/100ML
100 INJECTION, SOLUTION INTRAVENOUS CONTINUOUS
Status: DISCONTINUED | OUTPATIENT
Start: 2024-07-25 | End: 2024-07-26

## 2024-07-25 RX ORDER — POTASSIUM CHLORIDE 20 MEQ/1
40 TABLET, EXTENDED RELEASE ORAL ONCE
Status: COMPLETED | OUTPATIENT
Start: 2024-07-25 | End: 2024-07-25

## 2024-07-25 RX ADMIN — LABETALOL HYDROCHLORIDE 600 MG: 100 TABLET, FILM COATED ORAL at 13:54

## 2024-07-25 RX ADMIN — HEPARIN SODIUM 5000 UNITS: 5000 INJECTION INTRAVENOUS; SUBCUTANEOUS at 13:56

## 2024-07-25 RX ADMIN — HYDRALAZINE HYDROCHLORIDE 100 MG: 25 TABLET ORAL at 05:42

## 2024-07-25 RX ADMIN — SPIRONOLACTONE 12.5 MG: 25 TABLET ORAL at 08:45

## 2024-07-25 RX ADMIN — PANTOPRAZOLE SODIUM 40 MG: 40 TABLET, DELAYED RELEASE ORAL at 08:44

## 2024-07-25 RX ADMIN — HYDRALAZINE HYDROCHLORIDE 100 MG: 25 TABLET ORAL at 13:54

## 2024-07-25 RX ADMIN — ASPIRIN 81 MG: 81 TABLET, CHEWABLE ORAL at 08:44

## 2024-07-25 RX ADMIN — SODIUM CHLORIDE, SODIUM LACTATE, POTASSIUM CHLORIDE, AND CALCIUM CHLORIDE 100 ML/HR: .6; .31; .03; .02 INJECTION, SOLUTION INTRAVENOUS at 21:15

## 2024-07-25 RX ADMIN — POTASSIUM CHLORIDE 40 MEQ: 1500 TABLET, EXTENDED RELEASE ORAL at 10:52

## 2024-07-25 RX ADMIN — AMLODIPINE BESYLATE 10 MG: 10 TABLET ORAL at 08:44

## 2024-07-25 RX ADMIN — LABETALOL HYDROCHLORIDE 600 MG: 100 TABLET, FILM COATED ORAL at 05:42

## 2024-07-25 RX ADMIN — HYDRALAZINE HYDROCHLORIDE 100 MG: 25 TABLET ORAL at 21:13

## 2024-07-25 RX ADMIN — DOXAZOSIN 8 MG: 4 TABLET ORAL at 08:45

## 2024-07-25 RX ADMIN — SODIUM CHLORIDE, SODIUM LACTATE, POTASSIUM CHLORIDE, AND CALCIUM CHLORIDE 100 ML/HR: .6; .31; .03; .02 INJECTION, SOLUTION INTRAVENOUS at 11:19

## 2024-07-25 RX ADMIN — DOXAZOSIN 8 MG: 4 TABLET ORAL at 17:28

## 2024-07-25 RX ADMIN — LABETALOL HYDROCHLORIDE 600 MG: 100 TABLET, FILM COATED ORAL at 21:13

## 2024-07-25 RX ADMIN — HEPARIN SODIUM 5000 UNITS: 5000 INJECTION INTRAVENOUS; SUBCUTANEOUS at 21:14

## 2024-07-25 RX ADMIN — ATORVASTATIN CALCIUM 40 MG: 40 TABLET, FILM COATED ORAL at 21:14

## 2024-07-25 RX ADMIN — OLANZAPINE 5 MG: 2.5 TABLET, FILM COATED ORAL at 21:13

## 2024-07-25 NOTE — CASE MANAGEMENT
Case Management Assessment & Discharge Planning Note    Patient name Genna Liu  Location /-01 MRN [id number]  : 1965 Date 2024       Current Admission Date: 2024  Current Admission Diagnosis:Pre-syncope   Patient Active Problem List    Diagnosis Date Noted Date Diagnosed    Pre-syncope 2024     Secondary hyperparathyroidism of renal origin (HCC) 2024     Hyperuricemia 2024     Transaminitis 2024     Elevated troponin 2024     Noncompliance with medication regimen 2024     Acute renal failure superimposed on stage 4 chronic kidney disease (HCC) 2024     Chronic heart failure with preserved ejection fraction (HFpEF) ([facility]) 2024     COVID-19 2023     GERD (gastroesophageal reflux disease) 07/15/2023     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[facility]) 2023     Moderate protein-calorie malnutrition ([facility]) 2023     Electrolyte abnormality 2023     Hematochezia 2023     Depression with anxiety 2023     Abdominal pain 2023     Seizure ([facility]) 2023     Hypertensive encephalopathy 2023     Hypertensive emergency 2023     Meningioma ([facility]) 2023     Prolonged Q-T interval on ECG 2023     Hypokalemia 2021     Bradycardia 2021     Hypoglycemia 2021     History of gastric bypass 2021     Hypertensive kidney disease with stage 4 chronic kidney disease (HCC) 2021     Acute kidney injury superimposed on CKD  ([facility]) 2021     Encounter for surgical aftercare following surgery of digestive system 2021     Postsurgical malabsorption 2021     Morbid obesity due to excess calories ([facility]) 2020     Post-traumatic male urethral stricture 2020     Renal cyst 2020     Dysuria 2020     History of DVT (deep vein thrombosis) 10/17/2018     Splenic lesion 10/17/2018     Anemia 2018     Lymphedema 2018     Stage 4 chronic  kidney disease (HCC) 03/15/2018     Chronic diastolic CHF (congestive heart failure) (HCC) 02/06/2018     Pulmonary nodule, right 02/06/2018     Leukocytosis 02/06/2018     Pericardial effusion 01/22/2018     Essential hypertension 01/22/2018     COPD without exacerbation (HCC) 01/22/2018     CHF (congestive heart failure) ([facility]) 01/22/2018       LOS (days): 0  Geometric Mean LOS (GMLOS) (days):   Days to GMLOS:     OBJECTIVE:              Current admission status: Observation       Preferred Pharmacy:   [id number] - [facility] [place] - [address]  [address]  [facility] [place] [address]  Phone: [phone] Fax: [phone]    Primary Care Provider: Ugo Marina DO    Primary Insurance: [employer]  Secondary Insurance:     ASSESSMENT:  Active Health Care Proxies    There are no active Health Care Proxies on file.       Advance Directives  Does patient have a Health Care POA?: No  Was patient offered paperwork?: Yes  Does patient currently have a Health Care decision maker?: Yes, please see Health Care Proxy section  Does patient have Advance Directives?: No  Was patient offered paperwork?: Yes  Primary Contact: Kinsey         Readmission Root Cause  30 Day Readmission: No    Patient Information  Admitted from:: Home  Mental Status: Alert  During Assessment patient was accompanied by: Not accompanied during assessment  Assessment information provided by:: Patient  Primary Caregiver: Self  Support Systems: Spouse/significant other, Children  County of Residence: [place]  What city do you live in?: [place]  Home entry access options. Select all that apply.: Stairs  Number of steps to enter home.: 4  Do the steps have railings?: Yes  Type of Current Residence: [address] home  Upon entering residence, is there a bedroom on the main floor (no further steps)?: No  A bedroom is located on the following floor levels of residence (select all that apply):: 2nd Floor  Upon entering residence, is there a  bathroom on the main floor (no further steps)?: Yes  Number of steps to 2nd floor from main floor: One Flight  Living Arrangements: Lives w/ Spouse/significant other  Is patient a ?: Yes  Is patient active with [facility] ( Affairs)?: No    Activities of Daily Living Prior to Admission  Functional Status: Independent  Completes ADLs independently?: Yes  Ambulates independently?: Yes  Does patient use assisted devices?: Yes  Assisted Devices (DME) used: Straight Cane  Does patient currently own DME?: Yes  What DME does the patient currently own?: Straight Cane, Walker  Does patient have a history of Outpatient Therapy (PT/OT)?: Yes  Does the patient have a history of Short-Term Rehab?: Yes ([facility] rehab)  Does patient have a history of HHC?: Yes ([facility])  Does patient currently have HHC?: No         Patient Information Continued  Income Source: Pension/shelter  Does patient have prescription coverage?: Yes  Does patient receive dialysis treatments?: No  Does patient have a history of substance abuse?: No  Does patient have a history of Mental Health Diagnosis?: Yes (depression and anxiety)  Is patient receiving treatment for mental health?: Yes, No. Patient declined treatment information. (pt does not like taking meds. Patient declined wanting any other information on treatment options)  Has patient received inpatient treatment related to mental health in the last 2 years?: No    PHQ 2/9 Screening   Reviewed PHQ 2/9 Depression Screening Score?: No    Means of Transportation  Means of Transport to Appts:: Drives Self      Social Determinants of Health (SDOH)      Flowsheet Row Most Recent Value   Housing Stability    In the last 12 months, was there a time when you were not able to pay the mortgage or rent on time? N   In the past 12 months, how many times have you moved where you were living? 0   At any time in the past 12 months, were you homeless or living in a shelter (including now)? N    Transportation Needs    In the past 12 months, has lack of transportation kept you from medical appointments or from getting medications? no   In the past 12 months, has lack of transportation kept you from meetings, work, or from getting things needed for daily living? No   Food Insecurity    Within the past 12 months, you worried that your food would run out before you got the money to buy more. Never true   Within the past 12 months, the food you bought just didn't last and you didn't have money to get more. Never true   Utilities    In the past 12 months has the electric, gas, oil, or water company threatened to shut off services in your home? No            DISCHARGE DETAILS:    Discharge planning discussed with:: Patient at bedside  Freedom of Choice: Yes  Comments - Freedom of Choice: CM discussed freedom of choice as it pertains to discharge planning. Patient declined having any needs and refused any resources. Patient is independent and has good support system at home with family.  CM contacted family/caregiver?: No- see comments (independent)  Were Treatment Team discharge recommendations reviewed with patient/caregiver?: Yes  Did patient/caregiver verbalize understanding of patient care needs?: Yes  Were patient/caregiver advised of the risks associated with not following Treatment Team discharge recommendations?: Yes         Requested Home Health Care         Is the patient interested in HHC at discharge?: No    DME Referral Provided  Referral made for DME?: No    Other Referral/Resources/Interventions Provided:  Interventions: Declines resources, None Indicated    Would you like to participate in our [facility] service program?  : No - Declined    Treatment Team Recommendation: Home  Discharge Destination Plan:: Home  Transport at Discharge : Other (Comment) (TBD)

## 2024-07-25 NOTE — PROGRESS NOTES
[facility]  Progress Note  Name: Genna Liu I  MRN: [id number]  Unit/Bed#: -01 I Date of Admission: 7/24/2024   Date of Service: 7/25/2024 I Hospital Day: 0    Assessment & Plan   Elevated troponin  Assessment & Plan  Noted to have troponin of 196 however chronically elevated previously and February was noted to be greater than 300 when he underwent cardiac cath which was normal  Peak 211 then downtrended   No ischemic signs on EKG   No chest pain or anginal equivalents     Depression with anxiety  Assessment & Plan  Continue home Zyprexa, Abilify, Zoloft    Hypokalemia  Assessment & Plan  Noted to be 2.9 in the emergency department  Will replete with additional 40 meq klor   Recheck in the AM    Stage 4 chronic kidney disease (HCC)  Assessment & Plan  Creatinine currently near baseline approximate 3  Continue to monitor  Stable     Leukocytosis  Assessment & Plan  Temp WBC count of 16.9 K in the emergency department suspect likely reactive  No obvious source of infection  Monitor closely off antibiotics  Repeat WBC in a.m.  Possibly viral given his symptoms     COPD without exacerbation (HCC)  Assessment & Plan  Not in acute exacerbation  Continue as needed inhaler    Essential hypertension  Assessment & Plan  BP currently controlled  Continue labetalol, doxazosin,  Aldactone, hydralazine    * Pre-syncope  Assessment & Plan  59-year-old male with history of hypertension, CKD who presented with dizziness reports almost passed out however did not.  Reports falling shortly afterwards  Since arrival to the emergency department symptoms have since improved  Denies shortness of breath however did report intermittent left-sided sharp chest pain  EKG without any signs of acute ischemia  Will monitor on telemetry               VTE Pharmacologic Prophylaxis: VTE Score: 5 Moderate Risk (Score 3-4) - Pharmacological DVT Prophylaxis Ordered: heparin.    Mobility:   Basic Mobility Inpatient Raw  Score: 24  JH-HLM Goal: 8: Walk 250 feet or more  JH-HLM Achieved: 7: Walk 25 feet or more  JH-HLM Goal achieved. Continue to encourage appropriate mobility.    Patient Centered Rounds: I performed bedside rounds with nursing staff today.   Discussions with Specialists or Other Care Team Provider: none    Education and Discussions with Family / Patient: Patient declined call to .     Total Time Spent on Date of Encounter in care of patient: 45 mins. This time was spent on one or more of the following: performing physical exam; counseling and coordination of care; obtaining or reviewing history; documenting in the medical record; reviewing/ordering tests, medications or procedures; communicating with other healthcare professionals and discussing with patient's family/caregivers.    Current Length of Stay: 0 day(s)  Current Patient Status: Observation   Certification Statement: The patient will continue to require additional inpatient hospital stay due to unsteady gait, PT eval  Discharge Plan: Anticipate discharge tomorrow to TBD    Code Status: Level 1 - Full Code    Subjective:   Feels unsteady on his feet. Denied chest pain, SOB, palpitations     Objective:     Vitals:   Temp (24hrs), Av.4 °F (36.9 °C), Min:97.9 °F (36.6 °C), Max:98.9 °F (37.2 °C)    Temp:  [97.9 °F (36.6 °C)-98.9 °F (37.2 °C)] 98.3 °F (36.8 °C)  HR:  [61-80] 62  Resp:  [16-22] 16  BP: (123-162)/(79-98) 131/86  SpO2:  [94 %-98 %] 95 %  Body mass index is 21.52 kg/m².     Input and Output Summary (last 24 hours):     Intake/Output Summary (Last 24 hours) at 2024 1602  Last data filed at 2024 1230  Gross per 24 hour   Intake 1180 ml   Output --   Net 1180 ml       Physical Exam:   Physical Exam   Nad  Nonlabored resp  RRR  NTND abd   aaox3    Additional Data:     Labs:  Results from last 7 days   Lab Units 24  0504   WBC Thousand/uL 14.58*   HEMOGLOBIN g/dL 11.4*   HEMATOCRIT % 34.1*   PLATELETS Thousands/uL 162    SEGS PCT % 77*   LYMPHO PCT % 8*   MONO PCT % 13*   EOS PCT % 1     Results from last 7 days   Lab Units 07/25/24  0504   SODIUM mmol/L 137   POTASSIUM mmol/L 2.9*   CHLORIDE mmol/L 102   CO2 mmol/L 24   BUN mg/dL 70*   CREATININE mg/dL 3.36*   ANION GAP mmol/L 11   CALCIUM mg/dL 8.6   ALBUMIN g/dL 3.3*   TOTAL BILIRUBIN mg/dL 0.92   ALK PHOS U/L 54   ALT U/L 30   AST U/L 31   GLUCOSE RANDOM mg/dL 98     Results from last 7 days   Lab Units 07/24/24  1528   INR  1.11     Results from last 7 days   Lab Units 07/24/24  1424   POC GLUCOSE mg/dl 130               Lines/Drains:  Invasive Devices       Peripheral Intravenous Line  Duration             Peripheral IV 07/24/24 Distal;Left;Upper;Ventral (anterior) Arm 1 day                      Telemetry:  Telemetry Orders (From admission, onward)               24 Hour Telemetry Monitoring  Continuous x 24 Hours (Telem)        Expiring   Question:  Reason for 24 Hour Telemetry  Answer:  Syncope suspected to be cardiac in origin                     Telemetry Reviewed: Normal Sinus Rhythm  Indication for Continued Telemetry Use: Arrthymias requiring medical therapy             Imaging: Reviewed radiology reports from this admission including: chest xray and CT head    Recent Cultures (last 7 days):         Last 24 Hours Medication List:   Current Facility-Administered Medications   Medication Dose Route Frequency Provider Last Rate    acetaminophen  650 mg Oral Q6H PRN Nickolas Solares MD      albuterol  2 puff Inhalation Q4H PRN Nickolas Solares MD      amLODIPine  10 mg Oral Daily Nickolas Solares MD      ARIPiprazole  5 mg Oral Daily Nickolas Solares MD      aspirin  81 mg Oral Daily Nickolas Solares MD      atorvastatin  40 mg Oral HS Nickolas Solares MD      doxazosin  8 mg Oral BID Nickolas Solares MD      heparin (porcine)  5,000 Units Subcutaneous Q8H JOANN Nickolas Solares MD      hydrALAZINE  100 mg Oral Q8H JOANN Nickolas Solares MD      labetalol  600 mg Oral Q8H JOANN Nickolas Solares MD       lactated ringers  100 mL/hr Intravenous Continuous Espinoza Hobbs  mL/hr (07/25/24 1119)    OLANZapine  5 mg Oral HS Nickolas Solares MD      pantoprazole  40 mg Oral Daily Nickolas Solares MD      sertraline  200 mg Oral Daily Nickolas Solares MD      spironolactone  12.5 mg Oral Daily Nickolas Solares MD          Today, Patient Was Seen By: Espinoza Hobbs MD    **Please Note: This note may have been constructed using a voice recognition system.**

## 2024-07-25 NOTE — ASSESSMENT & PLAN NOTE
Temp WBC count of 16.9 K in the emergency department suspect likely reactive  No obvious source of infection  Monitor closely off antibiotics  Repeat WBC in a.m.  Possibly viral given his symptoms

## 2024-07-25 NOTE — ASSESSMENT & PLAN NOTE
Noted to be 2.9 in the emergency department  Will replete with additional 40 meq klor   Recheck in the AM

## 2024-07-25 NOTE — ASSESSMENT & PLAN NOTE
Noted to have troponin of 196 however chronically elevated previously and February was noted to be greater than 300 when he underwent cardiac cath which was normal  Peak 211 then downtrended   No ischemic signs on EKG   No chest pain or anginal equivalents

## 2024-07-25 NOTE — CASE MANAGEMENT
Case Management Discharge Planning Note    Patient name Genna Liu  Location /-01 MRN [id number]  : 1965 Date 2024       Current Admission Date: 2024  Current Admission Diagnosis:Pre-syncope   Patient Active Problem List    Diagnosis Date Noted Date Diagnosed    Pre-syncope 2024     Secondary hyperparathyroidism of renal origin (HCC) 2024     Hyperuricemia 2024     Transaminitis 2024     Elevated troponin 2024     Noncompliance with medication regimen 2024     Acute renal failure superimposed on stage 4 chronic kidney disease (HCC) 2024     Chronic heart failure with preserved ejection fraction (HFpEF) ([facility]) 2024     COVID-19 2023     GERD (gastroesophageal reflux disease) 07/15/2023     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[facility]) 2023     Moderate protein-calorie malnutrition ([facility]) 2023     Electrolyte abnormality 2023     Hematochezia 2023     Depression with anxiety 2023     Abdominal pain 2023     Seizure ([facility]) 2023     Hypertensive encephalopathy 2023     Hypertensive emergency 2023     Meningioma ([facility]) 2023     Prolonged Q-T interval on ECG 2023     Hypokalemia 2021     Bradycardia 2021     Hypoglycemia 2021     History of gastric bypass 2021     Hypertensive kidney disease with stage 4 chronic kidney disease (HCC) 2021     Acute kidney injury superimposed on CKD  ([facility]) 2021     Encounter for surgical aftercare following surgery of digestive system 2021     Postsurgical malabsorption 2021     Morbid obesity due to excess calories ([facility]) 2020     Post-traumatic male urethral stricture 2020     Renal cyst 2020     Dysuria 2020     History of DVT (deep vein thrombosis) 10/17/2018     Splenic lesion 10/17/2018     Anemia 2018     Lymphedema 2018     Stage 4 chronic kidney disease  (HCC) 03/15/2018     Chronic diastolic CHF (congestive heart failure) (HCC) 02/06/2018     Pulmonary nodule, right 02/06/2018     Leukocytosis 02/06/2018     Pericardial effusion 01/22/2018     Essential hypertension 01/22/2018     COPD without exacerbation (HCC) 01/22/2018     CHF (congestive heart failure) ([facility]) 01/22/2018       LOS (days): 0  Geometric Mean LOS (GMLOS) (days):   Days to GMLOS:     OBJECTIVE:            Current admission status: Observation   Preferred Pharmacy:   [id number] - [facility] [place] - [address]  [address]  [facility] [place] [address]  Phone: [phone] Fax: [phone]    Primary Care Provider: Ugo Marina DO    Primary Insurance: [employer] REP  Secondary Insurance:     DISCHARGE DETAILS:    Discharge planning discussed with:: Patient at bedside  Freedom of Choice: Yes  Comments - Freedom of Choice: CM discussed freedom of choice as it pertains to discharge planning. Patient declined having any needs and refused any resources. Patient is independent and has good support system at home with family.  CM contacted family/caregiver?: No- see comments (independent)  Were Treatment Team discharge recommendations reviewed with patient/caregiver?: Yes  Did patient/caregiver verbalize understanding of patient care needs?: Yes  Were patient/caregiver advised of the risks associated with not following Treatment Team discharge recommendations?: Yes         Requested Home Health Care         Is the patient interested in HHC at discharge?: No    DME Referral Provided  Referral made for DME?: No    Other Referral/Resources/Interventions Provided:  Interventions: Declines resources, None Indicated    Would you like to participate in our [facility] service program?  : No - Declined    Treatment Team Recommendation: Home  Discharge Destination Plan:: Home  Transport at Discharge : Other (Comment) (TBD)

## 2024-07-25 NOTE — UTILIZATION REVIEW
Initial Clinical Review    7/24 1708 OBS order converted to IP On 7/25 1611 for cont cardiac workup       07/25/24 1611  INPATIENT ADMISSION  Once        Transfer Service: Hospitalist   Question Answer Comment   Level of Care Med Surg    Estimated length of stay More than 2 Midnights    Certification I certify that inpatient services are medically necessary for this patient for a duration of greater than two midnights. See H&P and MD Progress Notes for additional information about the patient's course of treatment.        07/25/24 1610   07/24/24 1708  Place in Observation  Once        Transfer Service: Hospitalist   Question: Level of Care Answer: Med Surg    07/24/24 1707     ED Arrival Information       Expected   -    Arrival   7/24/2024 14:20    Acuity   Emergent              Means of arrival   Ambulance    Escorted by   [facility]   Hospitalist    Admission type   Emergency              Arrival complaint   syncope             Chief Complaint   Patient presents with    Fall     BIBA from home. Pt. Felt weak and dizzy after walking the dogs and fell. Denies LOC nor head strike. Pt. Sates he has been having poor intake and feeling of weakness and fatigue lately. States he is still feeling a little dizzy.       Initial Presentation: 59 y.o. male to ED from home via EMS w/ COPD, CKD, hypertension initially presented lightheadedness. Patient reports generalized weakness and lightheadedness and dizziness while walking earlier today.  Mild intermittent sharp left-sided chest pain while in ED , resolved . Admitted OBS status w/ presyncope , elevated trop . Plan to monitor tele , trend trop . COPD inhaler . HTN Continue labetalol, doxazosin, Aldactone, hydralazine .     Anticipated Length of Stay/Certification Statement:  Patient will be admitted on an Emergency basis with an anticipated length of stay of  < 2 midnights.   Justification for Hospital Stay: Presyncope     7/25 IM Note   Feels unsteady  on his feet . K 2.9 replete and recheck in am . Wbc 14.58 monitor off abx , repeat wbc in am . Possibly from viral infection . Awaiting PT eval for unsteady gait. Cont IVF .     7/26 IM Note   Elevated trop peaked at 211 and downtrending . likely nonischemic myocardial injury in the setting of acute stress . Repeat BMP  in 1 week  as OP . DC to home .   ED Triage Vitals   Temperature Pulse Respirations Blood Pressure SpO2 Pain Score   07/24/24 1425 07/24/24 1425 07/24/24 1425 07/24/24 1425 07/24/24 1425 07/24/24 1930   98.2 °F (36.8 °C) 74 16 150/94 98 % No Pain     Weight (last 2 days)       Date/Time Weight    07/24/24 19:15:56 61.4 (135.36)    07/24/24 1425 63 (138.89)            Vital Signs (last 3 days)       Date/Time Temp Pulse Resp BP MAP (mmHg) SpO2 O2 Device Patient Position - Orthostatic VS [place] Coma Scale Score Pain    07/26/24 03:00:03 97.9 °F (36.6 °C) 64 -- 126/74 91 95 % -- -- -- --    07/26/24 00:33:59 -- 59 18 110/62 78 97 % -- Lying -- No Pain    07/25/24 21:11:42 99.1 °F (37.3 °C) 63 16 130/84 99 -- -- Lying -- --    07/25/24 2000 -- -- -- -- -- -- -- -- 15 --    07/25/24 15:19:24 98.3 °F (36.8 °C) 62 16 131/86 101 95 % -- -- -- --    07/25/24 10:57:53 97.9 °F (36.6 °C) 68 16 132/87 102 96 % -- -- -- --    07/25/24 0848 -- -- -- -- -- -- -- -- -- No Pain    07/25/24 0845 -- -- -- -- -- 96 % -- -- 15 --    07/25/24 07:00:44 98 °F (36.7 °C) 61 16 123/79 94 94 % -- -- -- No Pain    07/25/24 0700 98 °F (36.7 °C) 61 -- 123/79 94 -- -- -- -- --    07/25/24 03:05:14 98.6 °F (37 °C) 66 -- 153/97 116 96 % -- -- -- --    07/24/24 22:05:32 98.6 °F (37 °C) 66 18 162/98 119 96 % -- -- -- --    07/24/24 1930 -- -- -- -- -- -- -- -- 15 No Pain    07/24/24 19:15:56 98.9 °F (37.2 °C) 76 18 153/95 114 95 % -- -- -- --    07/24/24 1900 -- 80 22 134/79 101 98 % None (Room air) Lying -- --    07/24/24 1800 -- 65 19 150/95 118 98 % None (Room air) Lying -- --    07/24/24 1601 -- 72 18 127/85 101 100 % None (Room  air) Lying -- --    07/24/24 1430 -- -- -- -- -- -- -- -- 15 --    07/24/24 1425 98.2 °F (36.8 °C) 74 16 150/94 116 98 % None (Room air) Lying -- --              Pertinent Labs/Diagnostic Test Results:   Radiology:  CT head without contrast   Final Interpretation by Wiliam Bear MD (07/24 1540)      No acute intracranial abnormality.      Stable medial left tentorial meningioma.         Workstation performed: [id number]         XR chest 1 view portable   Final Interpretation by Michele Espinal MD (07/24 1617)      No acute cardiopulmonary disease.            Workstation performed: [id number]           Cardiology:  ECG 12 lead   Final Result by Radha Love MD (07/25 2202)   Normal sinus rhythm   Left axis deviation   Left ventricular hypertrophy with repolarization abnormality   Prolonged QT   Abnormal ECG      Confirmed by Radha Love ([id number]) on 7/25/2024 10:02:49 PM      ECG 12 lead    by Interface, Ris Results In (07/24 1436)        GI:  No orders to display       Results from last 7 days   Lab Units 07/24/24  1528   SARS-COV-2  Negative     Results from last 7 days   Lab Units 07/26/24  0514 07/25/24  0504 07/24/24  1528   WBC Thousand/uL 9.39 14.58* 16.92*   HEMOGLOBIN g/dL 10.8* 11.4* 11.9*   HEMATOCRIT % 32.1* 34.1* 36.8   PLATELETS Thousands/uL 196 162 188   TOTAL NEUT ABS Thousands/µL 6.99 11.20* 14.65*         Results from last 7 days   Lab Units 07/26/24  0514 07/25/24  0504 07/24/24  1605   SODIUM mmol/L 138 137 136   POTASSIUM mmol/L 2.7* 2.9* 2.9*   CHLORIDE mmol/L 105 102 101   CO2 mmol/L 24 24 22   ANION GAP mmol/L 9 11 13   BUN mg/dL 73* 70* 61*   CREATININE mg/dL 3.23* 3.36* 3.14*   EGFR ml/min/1.73sq m 19 18 20   CALCIUM mg/dL 8.2* 8.6 8.8   MAGNESIUM mg/dL 2.1 2.2 2.2     Results from last 7 days   Lab Units 07/25/24  0504 07/24/24  1605   AST U/L 31 43*   ALT U/L 30 32   ALK PHOS U/L 54 59   TOTAL PROTEIN g/dL 6.2* 6.4   ALBUMIN g/dL 3.3* 3.4*   TOTAL BILIRUBIN mg/dL 0.92  1.30*   BILIRUBIN DIRECT mg/dL 0.26*  --      Results from last 7 days   Lab Units 07/24/24  1424   POC GLUCOSE mg/dl 130     Results from last 7 days   Lab Units 07/26/24  0514 07/25/24  0504 07/24/24  1605   GLUCOSE RANDOM mg/dL 95 98 134       BETA-HYDROXYBUTYRATE   Date Value Ref Range Status   07/09/2020 1.2 (H) <0.6 mmol/L Final   07/03/2020 1.5 (H) <0.6 mmol/L Final         Results from last 7 days   Lab Units 07/24/24  2009 07/24/24  1738 07/24/24  1528   HS TNI 0HR ng/L  --   --  196*   HS TNI 2HR ng/L  --  211*  --    HSTNI D2 ng/L  --  15  --    HS TNI 4HR ng/L 183*  --   --    HSTNI D4 ng/L -13  --   --          Results from last 7 days   Lab Units 07/24/24  1528   PROTIME seconds 14.9*   INR  1.11   PTT seconds 28       Results from last 7 days   Lab Units 07/24/24  1528   BNP pg/mL 1,081*       Results from last 7 days   Lab Units 07/24/24  1528   INFLUENZA A PCR  Negative   INFLUENZA B PCR  Negative   RSV PCR  Negative         ED Treatment-Medication Administration from 07/24/2024 1420 to 07/24/2024 1910         Date/Time Order Dose Route Action     07/24/2024 1533 magnesium sulfate IVPB (premix) SOLN 1 g 1 g Intravenous New Bag     07/24/2024 1617 aspirin tablet 325 mg 325 mg Oral Given     07/24/2024 1734 potassium chloride (Klor-Con M20) CR tablet 40 mEq 40 mEq Oral Given            Past Medical History:   Diagnosis Date    Asthma     Chronic kidney disease     COPD (chronic obstructive pulmonary disease) (HCC)     CPAP (continuous positive airway pressure) dependence     NO LONGER NEEDS D/T BARIATRIC SURGERY.    Diabetes mellitus (HCC)     Emphysema of lung (HCC)     Gout     History of transfusion     pt stated they had a transfusion when they had gallbladder surgery.    Hypertension     Kidney disease     renal failure    Leg DVT (deep venous thromboembolism), acute, bilateral (HCC) 01/2018    Obesity (BMI 30-39.9)     AGUS on CPAP     setting 11    Postgastrectomy malabsorption     Sleep apnea      Systolic CHF (HCC)      Present on Admission:   Essential hypertension   Depression with anxiety   COPD without exacerbation (HCC)   Elevated troponin   Stage 4 chronic kidney disease (HCC)   Leukocytosis   Hypokalemia      Admitting Diagnosis: Dizziness [R42]  Syncope [R55]  Fall, initial encounter [W19.XXXA]  Chest pain, unspecified type [R07.9]  Age/Sex: 59 y.o. male  Admission Orders:  Scheduled Medications:  amLODIPine, 10 mg, Oral, Daily  ARIPiprazole, 5 mg, Oral, Daily  aspirin, 81 mg, Oral, Daily  atorvastatin, 40 mg, Oral, HS  doxazosin, 8 mg, Oral, BID  heparin (porcine), 5,000 Units, Subcutaneous, Q8H JOANN  hydrALAZINE, 100 mg, Oral, Q8H JOANN  labetalol, 600 mg, Oral, Q8H JOANN  OLANZapine, 5 mg, Oral, HS  pantoprazole, 40 mg, Oral, Daily  sertraline, 200 mg, Oral, Daily  spironolactone, 12.5 mg, Oral, Daily      Continuous IV Infusions:  lactated ringers, 100 mL/hr, Intravenous, Continuous      PRN Meds:  acetaminophen, 650 mg, Oral, Q6H PRN  albuterol, 2 puff, Inhalation, Q4H PRN      Tele   I&O   Up and OOB   Reg diet     None    Network Utilization Review Department  ATTENTION: Please call with any questions or concerns to [phone] and carefully listen to the prompts so that you are directed to the right person. All voicemails are confidential.   For Discharge needs, contact Care Management DC Support Team at [phone] opt. [phone]  Send all requests for admission clinical reviews, approved or denied determinations and any other requests to dedicated fax number below belonging to the [facility] where the patient is receiving treatment. List of dedicated fax numbers for the Facilities:  FACILITY NAME UR FAX NUMBER   ADMISSION DENIALS (Administrative/Medical Necessity) [phone]   DISCHARGE SUPPORT TEAM (NETWORK) [phone]   PARENT CHILD HEALTH (Maternity/NICU/Pediatrics) [phone]   [facility] [phone]   [facility] [phone]   [facility]  [facility] [phone]   [facility] [phone]   [facility] [phone]   [facility] [phone]   [facility] [phone]   [facility] [phone]   [facility] [phone]   [facility] [phone]   [facility] [phone]   [facility] [phone]

## 2024-07-26 VITALS
BODY MASS INDEX: 21.52 KG/M2 | WEIGHT: 135.36 LBS | OXYGEN SATURATION: 94 % | DIASTOLIC BLOOD PRESSURE: 77 MMHG | RESPIRATION RATE: 18 BRPM | SYSTOLIC BLOOD PRESSURE: 125 MMHG | HEART RATE: 65 BPM | TEMPERATURE: 98.1 F

## 2024-07-26 LAB
ANION GAP SERPL CALCULATED.3IONS-SCNC: 9 MMOL/L (ref 4–13)
ATRIAL RATE: 80 BPM
BASOPHILS # BLD AUTO: 0.04 THOUSANDS/ÂΜL (ref 0–0.1)
BASOPHILS NFR BLD AUTO: 0 % (ref 0–1)
BUN SERPL-MCNC: 73 MG/DL (ref 5–25)
CALCIUM SERPL-MCNC: 8.2 MG/DL (ref 8.4–10.2)
CHLORIDE SERPL-SCNC: 105 MMOL/L (ref 96–108)
CO2 SERPL-SCNC: 24 MMOL/L (ref 21–32)
CREAT SERPL-MCNC: 3.23 MG/DL (ref 0.6–1.3)
EOSINOPHIL # BLD AUTO: 0.1 THOUSAND/ÂΜL (ref 0–0.61)
EOSINOPHIL NFR BLD AUTO: 1 % (ref 0–6)
ERYTHROCYTE [DISTWIDTH] IN BLOOD BY AUTOMATED COUNT: 14.6 % (ref 11.6–15.1)
GFR SERPL CREATININE-BSD FRML MDRD: 19 ML/MIN/1.73SQ M
GLUCOSE SERPL-MCNC: 95 MG/DL (ref 65–140)
HCT VFR BLD AUTO: 32.1 % (ref 36.5–49.3)
HGB BLD-MCNC: 10.8 G/DL (ref 12–17)
IMM GRANULOCYTES # BLD AUTO: 0.05 THOUSAND/UL (ref 0–0.2)
IMM GRANULOCYTES NFR BLD AUTO: 1 % (ref 0–2)
LYMPHOCYTES # BLD AUTO: 0.93 THOUSANDS/ÂΜL (ref 0.6–4.47)
LYMPHOCYTES NFR BLD AUTO: 10 % (ref 14–44)
MAGNESIUM SERPL-MCNC: 2.1 MG/DL (ref 1.9–2.7)
MCH RBC QN AUTO: 28.1 PG (ref 26.8–34.3)
MCHC RBC AUTO-ENTMCNC: 33.6 G/DL (ref 31.4–37.4)
MCV RBC AUTO: 84 FL (ref 82–98)
MONOCYTES # BLD AUTO: 1.28 THOUSAND/ÂΜL (ref 0.17–1.22)
MONOCYTES NFR BLD AUTO: 14 % (ref 4–12)
NEUTROPHILS # BLD AUTO: 6.99 THOUSANDS/ÂΜL (ref 1.85–7.62)
NEUTS SEG NFR BLD AUTO: 74 % (ref 43–75)
NRBC BLD AUTO-RTO: 0 /100 WBCS
P AXIS: 234 DEGREES
PLATELET # BLD AUTO: 196 THOUSANDS/UL (ref 149–390)
PMV BLD AUTO: 11.3 FL (ref 8.9–12.7)
POTASSIUM SERPL-SCNC: 2.7 MMOL/L (ref 3.5–5.3)
PR INTERVAL: 162 MS
QRS AXIS: 259 DEGREES
QRSD INTERVAL: 94 MS
QT INTERVAL: 446 MS
QTC INTERVAL: 514 MS
RBC # BLD AUTO: 3.84 MILLION/UL (ref 3.88–5.62)
SODIUM SERPL-SCNC: 138 MMOL/L (ref 135–147)
T WAVE AXIS: 121 DEGREES
VENTRICULAR RATE: 80 BPM
WBC # BLD AUTO: 9.39 THOUSAND/UL (ref 4.31–10.16)

## 2024-07-26 PROCEDURE — 99239 HOSP IP/OBS DSCHRG MGMT >30: CPT | Performed by: STUDENT IN AN ORGANIZED HEALTH CARE EDUCATION/TRAINING PROGRAM

## 2024-07-26 PROCEDURE — 80048 BASIC METABOLIC PNL TOTAL CA: CPT | Performed by: STUDENT IN AN ORGANIZED HEALTH CARE EDUCATION/TRAINING PROGRAM

## 2024-07-26 PROCEDURE — 83735 ASSAY OF MAGNESIUM: CPT | Performed by: STUDENT IN AN ORGANIZED HEALTH CARE EDUCATION/TRAINING PROGRAM

## 2024-07-26 PROCEDURE — 97161 PT EVAL LOW COMPLEX 20 MIN: CPT

## 2024-07-26 PROCEDURE — 85025 COMPLETE CBC W/AUTO DIFF WBC: CPT | Performed by: STUDENT IN AN ORGANIZED HEALTH CARE EDUCATION/TRAINING PROGRAM

## 2024-07-26 RX ORDER — POTASSIUM CHLORIDE 20 MEQ/1
40 TABLET, EXTENDED RELEASE ORAL ONCE
Status: COMPLETED | OUTPATIENT
Start: 2024-07-26 | End: 2024-07-26

## 2024-07-26 RX ORDER — LABETALOL 300 MG/1
300 TABLET, FILM COATED ORAL EVERY 8 HOURS SCHEDULED
Start: 2024-07-26

## 2024-07-26 RX ORDER — POTASSIUM CHLORIDE 20 MEQ/1
40 TABLET, EXTENDED RELEASE ORAL ONCE
Status: DISCONTINUED | OUTPATIENT
Start: 2024-07-26 | End: 2024-07-26

## 2024-07-26 RX ORDER — LABETALOL 100 MG/1
300 TABLET, FILM COATED ORAL EVERY 8 HOURS SCHEDULED
Status: DISCONTINUED | OUTPATIENT
Start: 2024-07-26 | End: 2024-07-26 | Stop reason: HOSPADM

## 2024-07-26 RX ORDER — POTASSIUM CHLORIDE 20 MEQ/1
20 TABLET, EXTENDED RELEASE ORAL DAILY
Qty: 15 TABLET | Refills: 0 | Status: CANCELLED | OUTPATIENT
Start: 2024-07-27

## 2024-07-26 RX ADMIN — AMLODIPINE BESYLATE 10 MG: 10 TABLET ORAL at 08:16

## 2024-07-26 RX ADMIN — ARIPIPRAZOLE 5 MG: 5 TABLET ORAL at 08:16

## 2024-07-26 RX ADMIN — SERTRALINE 200 MG: 100 TABLET, FILM COATED ORAL at 08:16

## 2024-07-26 RX ADMIN — POTASSIUM CHLORIDE 40 MEQ: 1500 TABLET, EXTENDED RELEASE ORAL at 11:56

## 2024-07-26 RX ADMIN — POTASSIUM CHLORIDE 40 MEQ: 1500 TABLET, EXTENDED RELEASE ORAL at 14:19

## 2024-07-26 RX ADMIN — SODIUM CHLORIDE, SODIUM LACTATE, POTASSIUM CHLORIDE, AND CALCIUM CHLORIDE 100 ML/HR: .6; .31; .03; .02 INJECTION, SOLUTION INTRAVENOUS at 08:16

## 2024-07-26 RX ADMIN — ASPIRIN 81 MG: 81 TABLET, CHEWABLE ORAL at 08:16

## 2024-07-26 RX ADMIN — HEPARIN SODIUM 5000 UNITS: 5000 INJECTION INTRAVENOUS; SUBCUTANEOUS at 06:33

## 2024-07-26 RX ADMIN — HYDRALAZINE HYDROCHLORIDE 100 MG: 25 TABLET ORAL at 14:19

## 2024-07-26 RX ADMIN — LABETALOL HYDROCHLORIDE 600 MG: 100 TABLET, FILM COATED ORAL at 06:33

## 2024-07-26 RX ADMIN — HEPARIN SODIUM 5000 UNITS: 5000 INJECTION INTRAVENOUS; SUBCUTANEOUS at 14:20

## 2024-07-26 RX ADMIN — HYDRALAZINE HYDROCHLORIDE 100 MG: 25 TABLET ORAL at 06:33

## 2024-07-26 RX ADMIN — DOXAZOSIN 8 MG: 4 TABLET ORAL at 08:15

## 2024-07-26 RX ADMIN — PANTOPRAZOLE SODIUM 40 MG: 40 TABLET, DELAYED RELEASE ORAL at 08:15

## 2024-07-26 RX ADMIN — SPIRONOLACTONE 12.5 MG: 25 TABLET ORAL at 08:16

## 2024-07-26 NOTE — ASSESSMENT & PLAN NOTE
Temp WBC count of 16.9 K in the emergency department suspect likely reactive  No obvious source of infection  Monitor closely off antibiotics  Possibly viral given his symptoms   Normalized without antibiotics

## 2024-07-26 NOTE — PHYSICAL THERAPY NOTE
Physical Therapy Evaluation     Patient's Name: Genna Liu    Admitting Diagnosis  Dizziness [R42]  Syncope [R55]  Fall, initial encounter [W19.XXXA]  Chest pain, unspecified type [R07.9]    Problem List  Patient Active Problem List   Diagnosis    Pericardial effusion    Essential hypertension    COPD without exacerbation (HCC)    CHF (congestive heart failure) (HCC)    Chronic diastolic CHF (congestive heart failure) (HCC)    Pulmonary nodule, right    Leukocytosis    Stage 4 chronic kidney disease (HCC)    Lymphedema    Anemia    History of DVT (deep vein thrombosis)    Splenic lesion    Dysuria    Renal cyst    Post-traumatic male urethral stricture    Morbid obesity due to excess calories (HCC)    Encounter for surgical aftercare following surgery of digestive system    Postsurgical malabsorption    Hypertensive kidney disease with stage 4 chronic kidney disease (HCC)    Acute kidney injury superimposed on CKD  (HCC)    Bradycardia    Hypoglycemia    History of gastric bypass    Hypokalemia    Seizure (HCC)    Hypertensive encephalopathy    Hypertensive emergency    Meningioma (HCC)    Prolonged Q-T interval on ECG    Abdominal pain    Hematochezia    Depression with anxiety    Electrolyte abnormality    Moderate protein-calorie malnutrition (HCC)    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HCC)    GERD (gastroesophageal reflux disease)    COVID-19    Noncompliance with medication regimen    Acute renal failure superimposed on stage 4 chronic kidney disease (HCC)    Chronic heart failure with preserved ejection fraction (HFpEF) (HCC)    Transaminitis    Elevated troponin    Secondary hyperparathyroidism of renal origin (HCC)    Hyperuricemia    Pre-syncope     Past Medical History  Past Medical History:   Diagnosis Date    Asthma     Chronic kidney disease     COPD (chronic obstructive pulmonary disease) (HCC)     CPAP (continuous positive airway pressure) dependence     NO LONGER NEEDS D/T BARIATRIC SURGERY.     Diabetes mellitus (HCC)     Emphysema of lung (HCC)     Gout     History of transfusion     pt stated they had a transfusion when they had gallbladder surgery.    Hypertension     Kidney disease     renal failure    Leg DVT (deep venous thromboembolism), acute, bilateral (HCC) 01/2018    Obesity (BMI 30-39.9)     AGUS on CPAP     setting 11    Postgastrectomy malabsorption     Sleep apnea     Systolic CHF (HCC)      Past Surgical History  Past Surgical History:   Procedure Laterality Date    ADRENALECTOMY      CARDIAC CATHETERIZATION N/A 3/5/2024    Procedure: Cardiac Left Heart Cath;  Surgeon: Servando Wiggins MD;  Location: [facility];  Service: Cardiology    CARDIAC CATHETERIZATION N/A 3/5/2024    Procedure: Cardiac Coronary Angiogram;  Surgeon: Servando Wiggins MD;  Location: [facility];  Service: Cardiology    CHOLECYSTECTOMY      COLONOSCOPY      EGD      HIATAL HERNIA REPAIR N/A 12/22/2020    Procedure: REPAIR HERNIA HIATAL LAPAROSCOPIC;  Surgeon: Shane Francis MD;  Location: [facility];  Service: Bariatrics    INCISION AND DRAINAGE OF WOUND Left 10/17/2018    Procedure: INCISION AND DRAINAGE (I&D) GROIN;  Surgeon: Rafy Pascual MD;  Location: [facility];  Service: General    IR IMAGE GUIDED ASPIRATION / DRAINAGE W TUBE  8/17/2018    IR IMAGE GUIDED ASPIRATION / DRAINAGE W TUBE  7/31/2018    JOINT REPLACEMENT Bilateral     knee    KIDNEY SURGERY  2009    nodule removal    LYMPH NODE DISSECTION Left 01/2018    left inguinal LN removed - benign     OTHER SURGICAL HISTORY      kidney nodule removal    PALATE / UVULA BIOPSY / EXCISION      PERICARDIAL WINDOW N/A 7/8/2023    Procedure: WINDOW PERICARDIAL;  Surgeon: Alonso Logan MD;  Location: [facility];  Service: Thoracic    ND LAPS GSTR RSTCV PX W/BYP JASON-EN-Y LIMB <150 CM N/A 12/22/2020    Procedure: BYPASS GASTRIC  JASON-EN-Y LAPAROSCOPIC WITH INTRAOPERATIVE EGD;  Surgeon: Shane Francis MD;  Location: [facility];   "Service: Bariatrics    SINUS SURGERY      TONSILLECTOMY        07/26/24 0748   PT Last Visit   PT Visit Date 07/26/24   Note Type   Note type Evaluation   Pain Assessment   Pain Assessment Tool 0-10   Pain Score No Pain   Restrictions/Precautions   Weight Bearing Precautions Per Order No   Other Precautions Telemetry;Fall Risk;Multiple lines   Home Living   Type of Home House   Home Layout Two level;Bed/bath upstairs;Stairs to enter with rails  (4 RAFIA; flight of stairs to 2nd floor)   Bathroom Shower/Tub Tub/shower unit   Bathroom Toilet Standard   Bathroom Equipment Grab bars in shower;Shower chair   Bathroom Accessibility Accessible   Home Equipment Walker;Cane   Additional Comments Pt ambulates without an AD; will use a cane as needed.   Prior Function   Level of [place] Independent with functional mobility;Independent with ADLs;Independent with IADLS   Lives With Spouse;Family   Receives Help From Family   IADLs Independent with driving;Independent with meal prep;Independent with medication management   Falls in the last 6 months 1 to 4  (2 falls PTA)   Vocational Retired   General   Family/Caregiver Present No   Cognition   Overall Cognitive Status WFL   Arousal/Participation Alert   Orientation Level Oriented X4   Memory Within functional limits   Following Commands Follows all commands and directions without difficulty   Comments Pt agreeable to PT.   Subjective   Subjective \"I've been up and walking.\"   RLE Assessment   RLE Assessment WFL   LLE Assessment   LLE Assessment WFL   Light Touch   RLE Light Touch Grossly intact   LLE Light Touch Grossly intact   Bed Mobility   Supine to Sit 6  Modified independent   Additional items HOB elevated;Increased time required   Additional Comments Pt denied complaints of dizziness following supine to sit transfer.   Transfers   Sit to Stand 7  Independent   Stand to Sit 7  Independent   Ambulation/Elevation   Gait pattern WNL   Gait Assistance 7  Independent "   Assistive Device None   Distance 330 feet   Ambulation/Elevation Additional Comments Pt performed alternating high marching in place, independently   Balance   Static Sitting Good   Dynamic Sitting Good   Static Standing Good   Dynamic Standing Good   Ambulatory Good   Endurance Deficit   Endurance Deficit No   Activity Tolerance   Activity Tolerance Patient tolerated treatment well   Nurse Made Aware Discussed case with STEFANIE Kovacs   Assessment   Prognosis Good   Problem List   (no acute PT impairments)   Assessment Pt is 59 year old male seen for PT evaluation s/p admit to [facility] on 7/24/2024 with Pre-syncope. PT consulted to assess pt's functional mobility and discharge needs. Order placed for PT evaluation and treatment, with up and out of bed as tolerated order. Comorbidities affecting pt's physical performance at time of assessment include essential hypertension, COPD without exacerbation, leukocytosis, stage 4 CKD, hypokalemia, depression with anxiety, and elevated troponin. Prior to hospitalization, pt was independent with all functional mobility without an AD. Pt ambulates unrestricted distances on all terrain and elevations. Pt resides with his family, in a two level house with four steps to enter and a flight of stairs to the 2nd floor. Personal factors affecting pt at time of initial evaluation include lives in a two story house and stairs to enter home. Please find objective findings from PT assessment regarding body systems outlined above. The following objective measures were performed on initial evaluation Barthel Index: 100/100, Modified Daphnie: 0 no symptoms at all, and AM-PAC 6-Clicks: 24/24. Pt's clinical presentation is currently stable seen in pt's presentation of need for ongoing medical management/monitoring. Pt is independent with transfers and functional mobility. From a PT standpoint, recommendation at time of discharge would be home with no post acute rehabilitation needs.  Pt is at his baseline functional status. Plan to discontinue PT at this time.   Barriers to Discharge None   Goals   Patient Goals to go home   Plan   PT Frequency Other (Comment)  (discontinue PT)   Discharge Recommendation   Rehab Resource Intensity Level, PT No post-acute rehabilitation needs   AM-PAC Basic Mobility Inpatient   Turning in Flat Bed Without Bedrails 4   Lying on Back to Sitting on Edge of Flat Bed Without Bedrails 4   Moving Bed to Chair 4   Standing Up From Chair Using Arms 4   Walk in Room 4   Climb 3-5 Stairs With Railing 4   Basic Mobility Inpatient Raw Score 24   Basic Mobility Standardized Score 57.68   [facility] Highest Level Of Mobility   -HLM Goal 8: Walk 250 feet or more   JH-HLM Achieved 8: Walk 250 feet ot more   Modified [place] Scale   Modified [place] Scale 0   Barthel Index   Feeding 10   Bathing 5   Grooming Score 5   Dressing Score 10   Bladder Score 10   Bowels Score 10   Toilet Use Score 10   Transfers (Bed/Chair) Score 15   Mobility (Level Surface) Score 15   Stairs Score 10   Barthel Index Score 100     PT Evaluation Time: 5092-9546  Whit Romano, PT, DPT

## 2024-07-26 NOTE — PLAN OF CARE
Problem: METABOLIC, FLUID AND ELECTROLYTES - ADULT  Goal: Electrolytes maintained within normal limits  Description: INTERVENTIONS:  - Monitor labs and assess patient for signs and symptoms of electrolyte imbalances  - Administer electrolyte replacement as ordered  - Monitor response to electrolyte replacements, including repeat lab results as appropriate  - Instruct patient on fluid and nutrition as appropriate  Outcome: Progressing  Goal: Fluid balance maintained  Description: INTERVENTIONS:  - Monitor labs   - Monitor I/O and WT  - Instruct patient on fluid and nutrition as appropriate  - Assess for signs & symptoms of volume excess or deficit  Outcome: Progressing  Goal: Glucose maintained within target range  Description: INTERVENTIONS:  - Monitor Blood Glucose as ordered  - Assess for signs and symptoms of hyperglycemia and hypoglycemia  - Administer ordered medications to maintain glucose within target range  - Assess nutritional intake and initiate nutrition service referral as needed  Outcome: Progressing     Problem: Potential for Falls  Goal: Patient will remain free of falls  Description: INTERVENTIONS:  - Educate patient/family on patient safety including physical limitations  - Instruct patient to call for assistance with activity   - Consult OT/PT to assist with strengthening/mobility   - Keep Call bell within reach  - Keep bed low and locked with side rails adjusted as appropriate  - Keep care items and personal belongings within reach  - Initiate and maintain comfort rounds  - Make Fall Risk Sign visible to staff  - Apply yellow socks and bracelet for high fall risk patients  - Consider moving patient to room near nurses station  Outcome: Progressing

## 2024-07-26 NOTE — DISCHARGE SUMMARY
[facility]  Discharge- Genna Liu 1965, 59 y.o. male MRN: [id number]  Unit/Bed#: [facility]-01 Encounter: [id number]  Primary Care Provider: Ugo Marina DO   Date and time admitted to hospital: 7/24/2024  2:20 PM    Elevated troponin  Assessment & Plan  Noted to have troponin of 196 however chronically elevated previously and February was noted to be greater than 300 when he underwent cardiac cath which was normal  Peak 211 then downtrended   No ischemic signs on EKG   No chest pain or anginal equivalents   Was likely nonischemic myocardial injury in the setting of acute stress    Depression with anxiety  Assessment & Plan  Continue home medications on discharge    Hypokalemia  Assessment & Plan  Likely in the setting of hydrochlorothiazide use  Denied diarrhea  Noted to be 2.9 in the emergency department  Will replete with additional 40 meq klor   Send on oral K-Melody for 15 days  Repeat BMP in a week  Follow-up with primary care doctor next week    Stage 4 chronic kidney disease (HCC)  Assessment & Plan  Creatinine currently near baseline approximate 3  Continue to monitor  Stable     Leukocytosis  Assessment & Plan  Temp WBC count of 16.9 K in the emergency department suspect likely reactive  No obvious source of infection  Monitor closely off antibiotics  Possibly viral given his symptoms   Normalized without antibiotics    COPD without exacerbation (HCC)  Assessment & Plan  Not in acute exacerbation  Continue as needed inhaler    Essential hypertension  Assessment & Plan  BP currently controlled  Continue labetalol, doxazosin,  Aldactone, hydralazine  Hydrochlorothiazide discontinued on discharge due to hypokalemia    * Pre-syncope  Assessment & Plan  59-year-old male with history of hypertension, CKD who presented with dizziness reports almost passed out however did not.  Reports falling shortly afterwards  Since arrival to the emergency department symptoms have since  improved  Denies shortness of breath however did report intermittent left-sided sharp chest pain  EKG without any signs of acute ischemia  No events on tele         Medical Problems       Resolved Problems  Date Reviewed: 7/24/2024   None       Discharging Physician / Practitioner: Espinoza Hobbs MD  PCP: Ugo Marina DO  Admission Date:   Admission Orders (From admission, onward)       Ordered        07/25/24 1610  INPATIENT ADMISSION  Once            07/24/24 1707  Place in Observation  Once            07/24/24 1707  Place in Observation  Once                          Discharge Date: 07/26/24    Consultations During Hospital Stay:  none    Procedures Performed:   none    Significant Findings / Test Results:   none    Incidental Findings:   none     Test Results Pending at Discharge (will require follow up):   none     Outpatient Tests Requested:  none    Complications:  none    Reason for Admission: presyncope, mechanical fall, hypokalemia     Hospital Course:   Genna Liu is a 59 y.o. male patient who originally presented to the hospital on 7/24/2024 due to presyncope, mechanical fall and found to have hypokalemia.  He reported that for the last several days he felt unwell.  Had fatigue, possible fevers.  He was walking his dog and tripped and fell.  In the ED he had mild troponin elevation without any EKG changes concerning for ACS.  He did not have any chest pain.  He was found to have hypokalemia likely from hydrochlorothiazide which was discontinued on discharge.  He was also recommended to continue oral potassium supplement for 15 days and follow-up with his outpatient PCP.  BMP prescription was provided for 1 week to check his potassium level.  He was evaluated by physical therapy and ambulated well.  Cleared for discharge home.            Please see above list of diagnoses and related plan for additional information.     Condition at Discharge: good    Discharge Day Visit / Exam:   * Please refer to  separate progress note for these details *    Discussion with Family: Patient declined call to .     Discharge instructions/Information to patient and family:   See after visit summary for information provided to patient and family.      Provisions for Follow-Up Care:  See after visit summary for information related to follow-up care and any pertinent home health orders.      Mobility at time of Discharge:   Basic Mobility Inpatient Raw Score: 24  JH-HLM Goal: 8: Walk 250 feet or more  JH-HLM Achieved: 7: Walk 25 feet or more  HLM Goal achieved. Continue to encourage appropriate mobility.     Disposition:   Home    Planned Readmission: none     Discharge Statement:  I spent 45 minutes discharging the patient. This time was spent on the day of discharge. I had direct contact with the patient on the day of discharge. Greater than 50% of the total time was spent examining patient, answering all patient questions, arranging and discussing plan of care with patient as well as directly providing post-discharge instructions.  Additional time then spent on discharge activities.    Discharge Medications:  See after visit summary for reconciled discharge medications provided to patient and/or family.      **Please Note: This note may have been constructed using a voice recognition system**

## 2024-07-26 NOTE — ASSESSMENT & PLAN NOTE
Likely in the setting of hydrochlorothiazide use  Denied diarrhea  Noted to be 2.9 in the emergency department  Will replete with additional 40 meq klor   Send on oral K-Melody for 15 days  Repeat BMP in a week  Follow-up with primary care doctor next week

## 2024-07-26 NOTE — CASE MANAGEMENT
Case Management Discharge Planning Note    Patient name Genna Liu  Location /-01 MRN [id number]  : 1965 Date 2024       Current Admission Date: 2024  Current Admission Diagnosis:Pre-syncope   Patient Active Problem List    Diagnosis Date Noted Date Diagnosed    Pre-syncope 2024     Secondary hyperparathyroidism of renal origin (HCC) 2024     Hyperuricemia 2024     Transaminitis 2024     Elevated troponin 2024     Noncompliance with medication regimen 2024     Acute renal failure superimposed on stage 4 chronic kidney disease (HCC) 2024     Chronic heart failure with preserved ejection fraction (HFpEF) ([facility]) 2024     COVID-19 2023     GERD (gastroesophageal reflux disease) 07/15/2023     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[facility]) 2023     Moderate protein-calorie malnutrition ([facility]) 2023     Electrolyte abnormality 2023     Hematochezia 2023     Depression with anxiety 2023     Abdominal pain 2023     Seizure ([facility]) 2023     Hypertensive encephalopathy 2023     Hypertensive emergency 2023     Meningioma ([facility]) 2023     Prolonged Q-T interval on ECG 2023     Hypokalemia 2021     Bradycardia 2021     Hypoglycemia 2021     History of gastric bypass 2021     Hypertensive kidney disease with stage 4 chronic kidney disease (HCC) 2021     Acute kidney injury superimposed on CKD  ([facility]) 2021     Encounter for surgical aftercare following surgery of digestive system 2021     Postsurgical malabsorption 2021     Morbid obesity due to excess calories ([facility]) 2020     Post-traumatic male urethral stricture 2020     Renal cyst 2020     Dysuria 2020     History of DVT (deep vein thrombosis) 10/17/2018     Splenic lesion 10/17/2018     Anemia 2018     Lymphedema 2018     Stage 4 chronic kidney disease  ([facility]) 03/15/2018     Chronic diastolic CHF (congestive heart failure) (HCC) 02/06/2018     Pulmonary nodule, right 02/06/2018     Leukocytosis 02/06/2018     Pericardial effusion 01/22/2018     Essential hypertension 01/22/2018     COPD without exacerbation (HCC) 01/22/2018     CHF (congestive heart failure) ([facility]) 01/22/2018       LOS (days): 1  Geometric Mean LOS (GMLOS) (days):   Days to GMLOS:     OBJECTIVE:  Risk of Unplanned Readmission Score: 27.62         Current admission status: Inpatient   Preferred Pharmacy:   [id number] - [facility] [place], [place] - [address]  [address]  [facility] [place] [address]  Phone: [phone] Fax: [phone]    Primary Care Provider: Ugo Marina DO    Primary Insurance: [employer]  Secondary Insurance:     DISCHARGE DETAILS:    Discharge planning discussed with:: Patient  Freedom of Choice: Yes  Comments - Freedom of Choice: CM met with patient at bedside to review DC plan. Patient denied any needs at this time and prefers to return home. His family will provide transportation once he is discharged.  CM contacted family/caregiver?: No- see comments (Patient declined.)  Were Treatment Team discharge recommendations reviewed with patient/caregiver?: Yes  Did patient/caregiver verbalize understanding of patient care needs?: Yes  Were patient/caregiver advised of the risks associated with not following Treatment Team discharge recommendations?: Yes    Requested Home Health Care         Is the patient interested in HHC at discharge?: No    DME Referral Provided  Referral made for DME?: No    Other Referral/Resources/Interventions Provided:  Interventions: None Indicated    Would you like to participate in our [facility] service program?  : No - Declined    Treatment Team Recommendation: Home  Discharge Destination Plan:: Home  Transport at Discharge : Family    IMM Given (Date):: 07/26/24  IMM Given to:: Patient  Additional Comments: CM reviewed IMM with  patient at bedside. He reported understanding of these rights and denied any questions or concerns at this time. Original signed and a copy was given. Original IMM placed in medical records.

## 2024-07-26 NOTE — OCCUPATIONAL THERAPY NOTE
Occupational Therapy Screen        Patient Name: Genna Liu  Today's Date: 7/26/2024 07/26/24 1047   OT Last Visit   OT Visit Date 07/26/24   Note Type   Note type Screen   Additional Comments OT orders received.  Chart received performed. Based on conversation with nursing and observation of pt, pt appears to be performing at functional baseline.  OT performed screen and discussed results with pt.  Pt does not demonstrate skilled OT needs at this time.  OT provided edu on how to contact OT should OT related needs arise and the pt decided further OT assessment might be waranted. However, at this time, pt will not be seen further by OT services. DC OT orders.     Princess Morales MS OTR/L

## 2024-07-26 NOTE — DISCHARGE INSTR - AVS FIRST PAGE
You were found to have low potassium level  Potassium supplement was sent to your pharmacy which is to be continued for 15 days.   Please re-check lab work in 1 week  Follow up with your PCP

## 2024-07-26 NOTE — ASSESSMENT & PLAN NOTE
Noted to have troponin of 196 however chronically elevated previously and February was noted to be greater than 300 when he underwent cardiac cath which was normal  Peak 211 then downtrended   No ischemic signs on EKG   No chest pain or anginal equivalents   Was likely nonischemic myocardial injury in the setting of acute stress

## 2024-07-26 NOTE — ASSESSMENT & PLAN NOTE
59-year-old male with history of hypertension, CKD who presented with dizziness reports almost passed out however did not.  Reports falling shortly afterwards  Since arrival to the emergency department symptoms have since improved  Denies shortness of breath however did report intermittent left-sided sharp chest pain  EKG without any signs of acute ischemia  No events on tele

## 2024-07-26 NOTE — NURSING NOTE
Discharge documents given to the patient. Patient demonstrated understanding of given instructions. IV removed. Clark in place. Patient satisfied with hospital stay and its staff.

## 2024-07-26 NOTE — ASSESSMENT & PLAN NOTE
BP currently controlled  Continue labetalol, doxazosin,  Aldactone, hydralazine  Hydrochlorothiazide discontinued on discharge due to hypokalemia

## 2024-08-12 ENCOUNTER — TELEPHONE (OUTPATIENT)
Age: 59
End: 2024-08-12

## 2024-08-12 NOTE — TELEPHONE ENCOUNTER
Patient is calling he would like the Provider to know he is ready to start dialysis.     Patient would like a call back regarding how to get started.

## 2024-08-14 DIAGNOSIS — N18.4 STAGE 4 CHRONIC KIDNEY DISEASE (HCC): Primary | ICD-10-CM

## 2024-08-14 NOTE — TELEPHONE ENCOUNTER
Called and spoke to pt. Advised that Dr Acosta saw   him only during the hospital stay (last in 3/2024). Based on last BMP, GFR is around 18, and he does not need dialysis but Dr Acosta would recommend to schedule appointment with 1st available provider with CKD labs. Appt was rescheduled for 09/26 with Dr Acosta. Pt will do CKD labs prior to that appt. Pt understood and was ok with that.       Called and offered 08/15 appt with Dr Bruno at 430. Pt refused stating that cannot tomorrow.

## 2024-09-12 ENCOUNTER — TELEPHONE (OUTPATIENT)
Dept: NEPHROLOGY | Facility: CLINIC | Age: 59
End: 2024-09-12

## 2024-09-12 NOTE — TELEPHONE ENCOUNTER
Called spoke with patient advised patient to get non-fasting labs done 1 week prior to 09/26/24 scheduled follow-up appointment with Dr.Jwalant Acosta. also advised as a reminder If labs / testing are not done in the appropriate time for scheduled appointment, appointment may need to be rescheduled. patient verbalized understanding and is okay with it.

## 2024-10-01 ENCOUNTER — TELEPHONE (OUTPATIENT)
Dept: NEPHROLOGY | Facility: CLINIC | Age: 59
End: 2024-10-01

## 2024-10-08 ENCOUNTER — LAB (OUTPATIENT)
Dept: LAB | Facility: HOSPITAL | Age: 59
End: 2024-10-08
Payer: COMMERCIAL

## 2024-10-08 ENCOUNTER — TELEPHONE (OUTPATIENT)
Dept: BARIATRICS | Facility: CLINIC | Age: 59
End: 2024-10-08

## 2024-10-08 DIAGNOSIS — N18.4 HYPERTENSIVE KIDNEY DISEASE WITH STAGE 4 CHRONIC KIDNEY DISEASE (HCC): ICD-10-CM

## 2024-10-08 DIAGNOSIS — E79.0 HYPERURICEMIA: ICD-10-CM

## 2024-10-08 DIAGNOSIS — N25.81 SECONDARY HYPERPARATHYROIDISM OF RENAL ORIGIN (HCC): ICD-10-CM

## 2024-10-08 DIAGNOSIS — N18.32 STAGE 3B CHRONIC KIDNEY DISEASE (HCC): ICD-10-CM

## 2024-10-08 DIAGNOSIS — I12.9 HYPERTENSIVE KIDNEY DISEASE WITH STAGE 4 CHRONIC KIDNEY DISEASE (HCC): ICD-10-CM

## 2024-10-08 DIAGNOSIS — N18.4 STAGE 4 CHRONIC KIDNEY DISEASE (HCC): ICD-10-CM

## 2024-10-08 DIAGNOSIS — K91.2 POSTSURGICAL MALABSORPTION: ICD-10-CM

## 2024-10-08 DIAGNOSIS — I1A.0 RESISTANT HYPERTENSION: ICD-10-CM

## 2024-10-08 LAB
25(OH)D3 SERPL-MCNC: 19.7 NG/ML (ref 30–100)
ALBUMIN SERPL BCG-MCNC: 4.2 G/DL (ref 3.5–5)
ALP SERPL-CCNC: 88 U/L (ref 34–104)
ALT SERPL W P-5'-P-CCNC: 32 U/L (ref 7–52)
ANION GAP SERPL CALCULATED.3IONS-SCNC: 8 MMOL/L (ref 4–13)
AST SERPL W P-5'-P-CCNC: 31 U/L (ref 13–39)
BACTERIA UR QL AUTO: ABNORMAL /HPF
BASOPHILS # BLD AUTO: 0.05 THOUSANDS/ΜL (ref 0–0.1)
BASOPHILS NFR BLD AUTO: 1 % (ref 0–1)
BILIRUB SERPL-MCNC: 0.67 MG/DL (ref 0.2–1)
BILIRUB UR QL STRIP: NEGATIVE
BUN SERPL-MCNC: 49 MG/DL (ref 5–25)
CALCIUM SERPL-MCNC: 9.1 MG/DL (ref 8.4–10.2)
CAOX CRY URNS QL MICRO: ABNORMAL /HPF
CHLORIDE SERPL-SCNC: 101 MMOL/L (ref 96–108)
CHOLEST SERPL-MCNC: 153 MG/DL
CLARITY UR: ABNORMAL
CO2 SERPL-SCNC: 32 MMOL/L (ref 21–32)
COLOR UR: YELLOW
CREAT SERPL-MCNC: 2.99 MG/DL (ref 0.6–1.3)
CREAT UR-MCNC: 172.2 MG/DL
EOSINOPHIL # BLD AUTO: 0.19 THOUSAND/ΜL (ref 0–0.61)
EOSINOPHIL NFR BLD AUTO: 4 % (ref 0–6)
ERYTHROCYTE [DISTWIDTH] IN BLOOD BY AUTOMATED COUNT: 15.6 % (ref 11.6–15.1)
EST. AVERAGE GLUCOSE BLD GHB EST-MCNC: 94 MG/DL
FERRITIN SERPL-MCNC: 114 NG/ML (ref 24–336)
FOLATE SERPL-MCNC: 11.5 NG/ML
GFR SERPL CREATININE-BSD FRML MDRD: 21 ML/MIN/1.73SQ M
GLUCOSE SERPL-MCNC: 73 MG/DL (ref 65–140)
GLUCOSE UR STRIP-MCNC: NEGATIVE MG/DL
HBA1C MFR BLD: 4.9 %
HCT VFR BLD AUTO: 32.4 % (ref 36.5–49.3)
HDLC SERPL-MCNC: 59 MG/DL
HGB BLD-MCNC: 10.3 G/DL (ref 12–17)
HGB UR QL STRIP.AUTO: NEGATIVE
IMM GRANULOCYTES # BLD AUTO: 0.01 THOUSAND/UL (ref 0–0.2)
IMM GRANULOCYTES NFR BLD AUTO: 0 % (ref 0–2)
IRON SATN MFR SERPL: 20 % (ref 15–50)
IRON SERPL-MCNC: 61 UG/DL (ref 50–212)
KETONES UR STRIP-MCNC: NEGATIVE MG/DL
LDLC SERPL CALC-MCNC: 76 MG/DL (ref 0–100)
LEUKOCYTE ESTERASE UR QL STRIP: ABNORMAL
LYMPHOCYTES # BLD AUTO: 1.04 THOUSANDS/ΜL (ref 0.6–4.47)
LYMPHOCYTES NFR BLD AUTO: 22 % (ref 14–44)
MCH RBC QN AUTO: 27 PG (ref 26.8–34.3)
MCHC RBC AUTO-ENTMCNC: 31.8 G/DL (ref 31.4–37.4)
MCV RBC AUTO: 85 FL (ref 82–98)
MICROALBUMIN UR-MCNC: 379.7 MG/L
MICROALBUMIN/CREAT 24H UR: 220 MG/G CREATININE (ref 0–30)
MONOCYTES # BLD AUTO: 0.54 THOUSAND/ΜL (ref 0.17–1.22)
MONOCYTES NFR BLD AUTO: 11 % (ref 4–12)
NEUTROPHILS # BLD AUTO: 3.01 THOUSANDS/ΜL (ref 1.85–7.62)
NEUTS SEG NFR BLD AUTO: 62 % (ref 43–75)
NITRITE UR QL STRIP: NEGATIVE
NON-SQ EPI CELLS URNS QL MICRO: ABNORMAL /HPF
NONHDLC SERPL-MCNC: 94 MG/DL
NRBC BLD AUTO-RTO: 0 /100 WBCS
PH UR STRIP.AUTO: 6 [PH]
PHOSPHATE SERPL-MCNC: 3.3 MG/DL (ref 2.3–4.1)
PLATELET # BLD AUTO: 305 THOUSANDS/UL (ref 149–390)
PMV BLD AUTO: 10 FL (ref 8.9–12.7)
POTASSIUM SERPL-SCNC: 3 MMOL/L (ref 3.5–5.3)
PROT SERPL-MCNC: 7.3 G/DL (ref 6.4–8.4)
PROT UR STRIP-MCNC: ABNORMAL MG/DL
PTH-INTACT SERPL-MCNC: 215.8 PG/ML (ref 12–88)
RBC # BLD AUTO: 3.81 MILLION/UL (ref 3.88–5.62)
RBC #/AREA URNS AUTO: ABNORMAL /HPF
SODIUM SERPL-SCNC: 141 MMOL/L (ref 135–147)
SP GR UR STRIP.AUTO: 1.02 (ref 1–1.03)
TIBC SERPL-MCNC: 305 UG/DL (ref 250–450)
TRIGL SERPL-MCNC: 89 MG/DL
UIBC SERPL-MCNC: 244 UG/DL (ref 155–355)
URATE SERPL-MCNC: 8.1 MG/DL (ref 3.5–8.5)
UROBILINOGEN UR STRIP-ACNC: <2 MG/DL
VIT B12 SERPL-MCNC: 285 PG/ML (ref 180–914)
WBC # BLD AUTO: 4.84 THOUSAND/UL (ref 4.31–10.16)
WBC #/AREA URNS AUTO: ABNORMAL /HPF

## 2024-10-08 PROCEDURE — 84425 ASSAY OF VITAMIN B-1: CPT

## 2024-10-08 PROCEDURE — 84100 ASSAY OF PHOSPHORUS: CPT

## 2024-10-08 PROCEDURE — 83970 ASSAY OF PARATHORMONE: CPT

## 2024-10-08 PROCEDURE — 83550 IRON BINDING TEST: CPT

## 2024-10-08 PROCEDURE — 84630 ASSAY OF ZINC: CPT

## 2024-10-08 PROCEDURE — 36415 COLL VENOUS BLD VENIPUNCTURE: CPT

## 2024-10-08 PROCEDURE — 82570 ASSAY OF URINE CREATININE: CPT

## 2024-10-08 PROCEDURE — 83540 ASSAY OF IRON: CPT

## 2024-10-08 PROCEDURE — 84550 ASSAY OF BLOOD/URIC ACID: CPT

## 2024-10-08 PROCEDURE — 82607 VITAMIN B-12: CPT

## 2024-10-08 PROCEDURE — 83036 HEMOGLOBIN GLYCOSYLATED A1C: CPT

## 2024-10-08 PROCEDURE — 80061 LIPID PANEL: CPT

## 2024-10-08 PROCEDURE — 85025 COMPLETE CBC W/AUTO DIFF WBC: CPT

## 2024-10-08 PROCEDURE — 82043 UR ALBUMIN QUANTITATIVE: CPT

## 2024-10-08 PROCEDURE — 82306 VITAMIN D 25 HYDROXY: CPT

## 2024-10-08 PROCEDURE — 82746 ASSAY OF FOLIC ACID SERUM: CPT

## 2024-10-08 PROCEDURE — 80053 COMPREHEN METABOLIC PANEL: CPT

## 2024-10-08 PROCEDURE — 82728 ASSAY OF FERRITIN: CPT

## 2024-10-08 PROCEDURE — 81001 URINALYSIS AUTO W/SCOPE: CPT

## 2024-10-08 NOTE — TELEPHONE ENCOUNTER
----- Message from Desi Katz PA-C sent at 10/8/2024  1:51 PM EDT -----  Please advise Genna that his potassium is low and his anemia is worsening slightly - please f/u with his nephrologist and I will be in touch when we get the rest of his labs thank you

## 2024-10-08 NOTE — TELEPHONE ENCOUNTER
Spoke to patient and informed him of results and Desi's recommendation.  Patient verbalized understanding.

## 2024-10-08 NOTE — RESULT ENCOUNTER NOTE
Please advise Genna that his potassium is low and his anemia is worsening slightly - please f/u with his nephrologist and I will be in touch when we get the rest of his labs thank you

## 2024-10-09 ENCOUNTER — DOCUMENTATION (OUTPATIENT)
Dept: OTHER | Facility: HOSPITAL | Age: 59
End: 2024-10-09

## 2024-10-09 DIAGNOSIS — E55.9 VITAMIN D DEFICIENCY: Primary | ICD-10-CM

## 2024-10-09 DIAGNOSIS — I1A.0 RESISTANT HYPERTENSION: ICD-10-CM

## 2024-10-09 DIAGNOSIS — I10 HTN (HYPERTENSION): ICD-10-CM

## 2024-10-09 DIAGNOSIS — N18.4 HYPERTENSIVE KIDNEY DISEASE WITH STAGE 4 CHRONIC KIDNEY DISEASE (HCC): ICD-10-CM

## 2024-10-09 DIAGNOSIS — E55.9 VITAMIN D DEFICIENCY: ICD-10-CM

## 2024-10-09 DIAGNOSIS — K91.2 POSTSURGICAL MALABSORPTION: Primary | ICD-10-CM

## 2024-10-09 DIAGNOSIS — I12.9 HYPERTENSIVE KIDNEY DISEASE WITH STAGE 4 CHRONIC KIDNEY DISEASE (HCC): ICD-10-CM

## 2024-10-09 DIAGNOSIS — R79.89 LOW VITAMIN B12 LEVEL: ICD-10-CM

## 2024-10-09 DIAGNOSIS — N18.4 STAGE 4 CHRONIC KIDNEY DISEASE (HCC): Primary | ICD-10-CM

## 2024-10-09 RX ORDER — HYDROCHLOROTHIAZIDE 25 MG/1
25 TABLET ORAL DAILY
Start: 2024-10-09

## 2024-10-09 RX ORDER — SPIRONOLACTONE 25 MG/1
25 TABLET ORAL DAILY
Qty: 90 TABLET | Refills: 2 | Status: SHIPPED | OUTPATIENT
Start: 2024-10-09 | End: 2025-01-07

## 2024-10-09 RX ORDER — ERGOCALCIFEROL 1.25 MG/1
50000 CAPSULE, LIQUID FILLED ORAL WEEKLY
Qty: 12 CAPSULE | Refills: 0 | Status: SHIPPED | OUTPATIENT
Start: 2024-10-09 | End: 2024-10-09 | Stop reason: ALTCHOICE

## 2024-10-09 NOTE — RESULT ENCOUNTER NOTE
Please let Genna know about his labs, thank you:    -Did he f/u with his PCP about his potassium levels and kidney function yet!?  Has he been taking his vitamins and minerals!?    -Vitamin B12 is low - Please take an additional 2,000mcg sublingual B12 daily x 3 months. This can be found inexpensively OTC. I also recommend 1,000mcg IM B12 shots in the office; once weekly x 4 doses.    -You have a vitamin D deficiency. Please start taking the vitamin D deficiency prescription that I am sending for 50,000IU WEEKLY with FOOD x 12 weeks - take with food for best absorption. We will repeat vitamin D lab in about 4 months.

## 2024-10-09 NOTE — PROGRESS NOTES
Phone call [10/9/2024]-patient had labs done on 10/8/2024 and called and discussed results with patient.    Creatinine 2.99 with EGFR of 21.  Hemoglobin 10.3.  Hemoglobin A1c 4.9%.  Urine analysis showed dysuria but currently patient is asymptomatic.  Urine microalbumin to creatinine ratio 220 mg.  + vitamin D 19 -> plan to start patient on cholecalciferol 2000 units p.o. daily.  Potassium 3.0 -> plan to increase spironolactone to 25 mg p.o. daily.  Normal uric acid [8.1], sodium, bicarb, calcium and phosphorus.    Also discussed with patient that we will plan to recheck CKD labs before upcoming visit with me which is scheduled for 2/24/2025.    Carleen Acosta MD  Nephrology  10/9/2024

## 2024-10-10 LAB — ZINC SERPL-MCNC: 56 UG/DL (ref 44–115)

## 2024-10-13 LAB — VIT B1 BLD-SCNC: 71.3 NMOL/L (ref 66.5–200)

## 2024-11-13 ENCOUNTER — APPOINTMENT (EMERGENCY)
Dept: CT IMAGING | Facility: HOSPITAL | Age: 59
DRG: 304 | End: 2024-11-13
Payer: COMMERCIAL

## 2024-11-13 ENCOUNTER — HOSPITAL ENCOUNTER (INPATIENT)
Facility: HOSPITAL | Age: 59
LOS: 2 days | Discharge: HOME | DRG: 304 | End: 2024-11-15
Attending: EMERGENCY MEDICINE | Admitting: STUDENT IN AN ORGANIZED HEALTH CARE EDUCATION/TRAINING PROGRAM
Payer: COMMERCIAL

## 2024-11-13 DIAGNOSIS — E78.5 HLD (HYPERLIPIDEMIA): ICD-10-CM

## 2024-11-13 DIAGNOSIS — E87.6 HYPOKALEMIA: ICD-10-CM

## 2024-11-13 DIAGNOSIS — I16.1 HYPERTENSIVE EMERGENCY: Primary | ICD-10-CM

## 2024-11-13 DIAGNOSIS — I12.9 HYPERTENSIVE KIDNEY DISEASE WITH STAGE 4 CHRONIC KIDNEY DISEASE (HCC): ICD-10-CM

## 2024-11-13 DIAGNOSIS — I10 PRIMARY HYPERTENSION: ICD-10-CM

## 2024-11-13 DIAGNOSIS — R41.82 ALTERED MENTAL STATUS: ICD-10-CM

## 2024-11-13 DIAGNOSIS — I10 HYPERTENSION: ICD-10-CM

## 2024-11-13 DIAGNOSIS — T68.XXXA HYPOTHERMIA: ICD-10-CM

## 2024-11-13 DIAGNOSIS — N18.4 HYPERTENSIVE KIDNEY DISEASE WITH STAGE 4 CHRONIC KIDNEY DISEASE (HCC): ICD-10-CM

## 2024-11-13 DIAGNOSIS — I1A.0 RESISTANT HYPERTENSION: ICD-10-CM

## 2024-11-13 PROBLEM — R79.89 ELEVATED TSH: Status: ACTIVE | Noted: 2024-11-13

## 2024-11-13 PROBLEM — E89.6: Status: ACTIVE | Noted: 2024-11-13

## 2024-11-13 PROBLEM — G93.40 ENCEPHALOPATHY ACUTE: Status: ACTIVE | Noted: 2024-11-13

## 2024-11-13 PROBLEM — R65.10 SIRS (SYSTEMIC INFLAMMATORY RESPONSE SYNDROME) (HCC): Status: ACTIVE | Noted: 2024-11-13

## 2024-11-13 PROBLEM — R79.89 ELEVATED LFTS: Status: ACTIVE | Noted: 2024-11-13

## 2024-11-13 LAB
2HR DELTA HS TROPONIN: 20 NG/L
4HR DELTA HS TROPONIN: 54 NG/L
ALBUMIN SERPL BCG-MCNC: 4.3 G/DL (ref 3.5–5)
ALP SERPL-CCNC: 87 U/L (ref 34–104)
ALT SERPL W P-5'-P-CCNC: 58 U/L (ref 7–52)
AMPHETAMINES SERPL QL SCN: NEGATIVE
ANION GAP SERPL CALCULATED.3IONS-SCNC: 8 MMOL/L (ref 4–13)
ANION GAP SERPL CALCULATED.3IONS-SCNC: 9 MMOL/L (ref 4–13)
APAP SERPL-MCNC: <2 UG/ML (ref 10–20)
APTT PPP: 29 SECONDS (ref 23–34)
AST SERPL W P-5'-P-CCNC: 41 U/L (ref 13–39)
ATRIAL RATE: 48 BPM
ATRIAL RATE: 50 BPM
BACTERIA UR QL AUTO: ABNORMAL /HPF
BARBITURATES UR QL: NEGATIVE
BASE EX.OXY STD BLDV CALC-SCNC: 72.6 % (ref 60–80)
BASE EXCESS BLDV CALC-SCNC: 1.1 MMOL/L
BASOPHILS # BLD AUTO: 0.07 THOUSANDS/ÂΜL (ref 0–0.1)
BASOPHILS NFR BLD AUTO: 1 % (ref 0–1)
BENZODIAZ UR QL: NEGATIVE
BILIRUB SERPL-MCNC: 0.48 MG/DL (ref 0.2–1)
BILIRUB UR QL STRIP: NEGATIVE
BUN SERPL-MCNC: 51 MG/DL (ref 5–25)
BUN SERPL-MCNC: 52 MG/DL (ref 5–25)
CALCIUM SERPL-MCNC: 9.2 MG/DL (ref 8.4–10.2)
CALCIUM SERPL-MCNC: 9.2 MG/DL (ref 8.4–10.2)
CARDIAC TROPONIN I PNL SERPL HS: 102 NG/L (ref ?–50)
CARDIAC TROPONIN I PNL SERPL HS: 136 NG/L (ref ?–50)
CARDIAC TROPONIN I PNL SERPL HS: 82 NG/L (ref ?–50)
CHLORIDE SERPL-SCNC: 102 MMOL/L (ref 96–108)
CHLORIDE SERPL-SCNC: 104 MMOL/L (ref 96–108)
CK SERPL-CCNC: 327 U/L (ref 39–308)
CLARITY UR: CLEAR
CO2 SERPL-SCNC: 29 MMOL/L (ref 21–32)
CO2 SERPL-SCNC: 31 MMOL/L (ref 21–32)
COCAINE UR QL: NEGATIVE
COLOR UR: COLORLESS
CORTIS SERPL-MCNC: 35.7 UG/DL
CREAT SERPL-MCNC: 2.38 MG/DL (ref 0.6–1.3)
CREAT SERPL-MCNC: 2.63 MG/DL (ref 0.6–1.3)
EOSINOPHIL # BLD AUTO: 0.25 THOUSAND/ÂΜL (ref 0–0.61)
EOSINOPHIL NFR BLD AUTO: 4 % (ref 0–6)
ERYTHROCYTE [DISTWIDTH] IN BLOOD BY AUTOMATED COUNT: 15.1 % (ref 11.6–15.1)
ETHANOL SERPL-MCNC: <10 MG/DL
FENTANYL UR QL SCN: NEGATIVE
GFR SERPL CREATININE-BSD FRML MDRD: 25 ML/MIN/1.73SQ M
GFR SERPL CREATININE-BSD FRML MDRD: 28 ML/MIN/1.73SQ M
GLUCOSE SERPL-MCNC: 100 MG/DL (ref 65–140)
GLUCOSE SERPL-MCNC: 122 MG/DL (ref 65–140)
GLUCOSE SERPL-MCNC: 122 MG/DL (ref 65–140)
GLUCOSE SERPL-MCNC: 132 MG/DL (ref 65–140)
GLUCOSE SERPL-MCNC: 56 MG/DL (ref 65–140)
GLUCOSE SERPL-MCNC: 56 MG/DL (ref 65–140)
GLUCOSE UR STRIP-MCNC: NEGATIVE MG/DL
HCO3 BLDV-SCNC: 27.4 MMOL/L (ref 24–30)
HCT VFR BLD AUTO: 34.1 % (ref 36.5–49.3)
HGB BLD-MCNC: 10.7 G/DL (ref 12–17)
HGB UR QL STRIP.AUTO: ABNORMAL
HYDROCODONE UR QL SCN: NEGATIVE
IMM GRANULOCYTES # BLD AUTO: 0.01 THOUSAND/UL (ref 0–0.2)
IMM GRANULOCYTES NFR BLD AUTO: 0 % (ref 0–2)
INR PPP: 1.06 (ref 0.85–1.19)
KETONES UR STRIP-MCNC: NEGATIVE MG/DL
LACTATE SERPL-SCNC: 0.9 MMOL/L (ref 0.5–2)
LEUKOCYTE ESTERASE UR QL STRIP: NEGATIVE
LYMPHOCYTES # BLD AUTO: 1.25 THOUSANDS/ÂΜL (ref 0.6–4.47)
LYMPHOCYTES NFR BLD AUTO: 21 % (ref 14–44)
MCH RBC QN AUTO: 26.4 PG (ref 26.8–34.3)
MCHC RBC AUTO-ENTMCNC: 31.4 G/DL (ref 31.4–37.4)
MCV RBC AUTO: 84 FL (ref 82–98)
METHADONE UR QL: NEGATIVE
MONOCYTES # BLD AUTO: 0.34 THOUSAND/ÂΜL (ref 0.17–1.22)
MONOCYTES NFR BLD AUTO: 6 % (ref 4–12)
NEUTROPHILS # BLD AUTO: 4.11 THOUSANDS/ÂΜL (ref 1.85–7.62)
NEUTS SEG NFR BLD AUTO: 68 % (ref 43–75)
NITRITE UR QL STRIP: NEGATIVE
NON-SQ EPI CELLS URNS QL MICRO: ABNORMAL /HPF
NRBC BLD AUTO-RTO: 0 /100 WBCS
O2 CT BLDV-SCNC: 12.4 ML/DL
OPIATES UR QL SCN: NEGATIVE
OXYCODONE+OXYMORPHONE UR QL SCN: NEGATIVE
P AXIS: 81 DEGREES
P AXIS: 90 DEGREES
PCO2 BLDV: 50.9 MM HG (ref 42–50)
PCP UR QL: NEGATIVE
PH BLDV: 7.35 [PH] (ref 7.3–7.4)
PH UR STRIP.AUTO: 7 [PH]
PLATELET # BLD AUTO: 319 THOUSANDS/UL (ref 149–390)
PMV BLD AUTO: 10 FL (ref 8.9–12.7)
PO2 BLDV: 41.4 MM HG (ref 35–45)
POTASSIUM SERPL-SCNC: 2.9 MMOL/L (ref 3.5–5.3)
POTASSIUM SERPL-SCNC: 3.2 MMOL/L (ref 3.5–5.3)
PR INTERVAL: 152 MS
PR INTERVAL: 162 MS
PROCALCITONIN SERPL-MCNC: 0.07 NG/ML
PROT SERPL-MCNC: 7.7 G/DL (ref 6.4–8.4)
PROT UR STRIP-MCNC: ABNORMAL MG/DL
PROTHROMBIN TIME: 14.6 SECONDS (ref 12.3–15)
QRS AXIS: -66 DEGREES
QRS AXIS: -75 DEGREES
QRSD INTERVAL: 104 MS
QRSD INTERVAL: 108 MS
QT INTERVAL: 560 MS
QT INTERVAL: 590 MS
QTC INTERVAL: 500 MS
QTC INTERVAL: 537 MS
RBC # BLD AUTO: 4.05 MILLION/UL (ref 3.88–5.62)
RBC #/AREA URNS AUTO: ABNORMAL /HPF
SALICYLATES SERPL-MCNC: <5 MG/DL (ref 3–20)
SODIUM SERPL-SCNC: 141 MMOL/L (ref 135–147)
SODIUM SERPL-SCNC: 142 MMOL/L (ref 135–147)
SP GR UR STRIP.AUTO: 1.01 (ref 1–1.03)
T WAVE AXIS: 71 DEGREES
T WAVE AXIS: 94 DEGREES
T4 FREE SERPL-MCNC: 0.62 NG/DL (ref 0.61–1.12)
THC UR QL: POSITIVE
TSH SERPL DL<=0.05 MIU/L-ACNC: 8.83 UIU/ML (ref 0.45–4.5)
UROBILINOGEN UR STRIP-ACNC: <2 MG/DL
VENTRICULAR RATE: 48 BPM
VENTRICULAR RATE: 50 BPM
WBC # BLD AUTO: 6.03 THOUSAND/UL (ref 4.31–10.16)
WBC #/AREA URNS AUTO: ABNORMAL /HPF

## 2024-11-13 PROCEDURE — 82948 REAGENT STRIP/BLOOD GLUCOSE: CPT

## 2024-11-13 PROCEDURE — 85025 COMPLETE CBC W/AUTO DIFF WBC: CPT | Performed by: EMERGENCY MEDICINE

## 2024-11-13 PROCEDURE — 80053 COMPREHEN METABOLIC PANEL: CPT | Performed by: EMERGENCY MEDICINE

## 2024-11-13 PROCEDURE — 93005 ELECTROCARDIOGRAM TRACING: CPT

## 2024-11-13 PROCEDURE — 96365 THER/PROPH/DIAG IV INF INIT: CPT

## 2024-11-13 PROCEDURE — 81001 URINALYSIS AUTO W/SCOPE: CPT | Performed by: EMERGENCY MEDICINE

## 2024-11-13 PROCEDURE — 80048 BASIC METABOLIC PNL TOTAL CA: CPT

## 2024-11-13 PROCEDURE — 87154 CUL TYP ID BLD PTHGN 6+ TRGT: CPT | Performed by: EMERGENCY MEDICINE

## 2024-11-13 PROCEDURE — 36415 COLL VENOUS BLD VENIPUNCTURE: CPT | Performed by: EMERGENCY MEDICINE

## 2024-11-13 PROCEDURE — 85730 THROMBOPLASTIN TIME PARTIAL: CPT | Performed by: EMERGENCY MEDICINE

## 2024-11-13 PROCEDURE — 99291 CRITICAL CARE FIRST HOUR: CPT | Performed by: EMERGENCY MEDICINE

## 2024-11-13 PROCEDURE — 96368 THER/DIAG CONCURRENT INF: CPT

## 2024-11-13 PROCEDURE — 96366 THER/PROPH/DIAG IV INF ADDON: CPT

## 2024-11-13 PROCEDURE — 85610 PROTHROMBIN TIME: CPT | Performed by: EMERGENCY MEDICINE

## 2024-11-13 PROCEDURE — 82077 ASSAY SPEC XCP UR&BREATH IA: CPT | Performed by: EMERGENCY MEDICINE

## 2024-11-13 PROCEDURE — 80143 DRUG ASSAY ACETAMINOPHEN: CPT | Performed by: EMERGENCY MEDICINE

## 2024-11-13 PROCEDURE — 99285 EMERGENCY DEPT VISIT HI MDM: CPT

## 2024-11-13 PROCEDURE — 82533 TOTAL CORTISOL: CPT

## 2024-11-13 PROCEDURE — 84484 ASSAY OF TROPONIN QUANT: CPT | Performed by: EMERGENCY MEDICINE

## 2024-11-13 PROCEDURE — 83605 ASSAY OF LACTIC ACID: CPT | Performed by: EMERGENCY MEDICINE

## 2024-11-13 PROCEDURE — 70450 CT HEAD/BRAIN W/O DYE: CPT

## 2024-11-13 PROCEDURE — 99223 1ST HOSP IP/OBS HIGH 75: CPT | Performed by: INTERNAL MEDICINE

## 2024-11-13 PROCEDURE — 84439 ASSAY OF FREE THYROXINE: CPT | Performed by: EMERGENCY MEDICINE

## 2024-11-13 PROCEDURE — 71250 CT THORAX DX C-: CPT

## 2024-11-13 PROCEDURE — 74176 CT ABD & PELVIS W/O CONTRAST: CPT

## 2024-11-13 PROCEDURE — 84443 ASSAY THYROID STIM HORMONE: CPT | Performed by: EMERGENCY MEDICINE

## 2024-11-13 PROCEDURE — 72125 CT NECK SPINE W/O DYE: CPT

## 2024-11-13 PROCEDURE — 82805 BLOOD GASES W/O2 SATURATION: CPT | Performed by: EMERGENCY MEDICINE

## 2024-11-13 PROCEDURE — 84145 PROCALCITONIN (PCT): CPT | Performed by: EMERGENCY MEDICINE

## 2024-11-13 PROCEDURE — NC001 PR NO CHARGE: Performed by: PHYSICIAN ASSISTANT

## 2024-11-13 PROCEDURE — 80179 DRUG ASSAY SALICYLATE: CPT | Performed by: EMERGENCY MEDICINE

## 2024-11-13 PROCEDURE — 80307 DRUG TEST PRSMV CHEM ANLYZR: CPT

## 2024-11-13 PROCEDURE — 93010 ELECTROCARDIOGRAM REPORT: CPT | Performed by: INTERNAL MEDICINE

## 2024-11-13 PROCEDURE — 87040 BLOOD CULTURE FOR BACTERIA: CPT | Performed by: EMERGENCY MEDICINE

## 2024-11-13 PROCEDURE — 82550 ASSAY OF CK (CPK): CPT | Performed by: EMERGENCY MEDICINE

## 2024-11-13 RX ORDER — ALBUTEROL SULFATE 90 UG/1
1 INHALANT RESPIRATORY (INHALATION) EVERY 6 HOURS PRN
Status: DISCONTINUED | OUTPATIENT
Start: 2024-11-13 | End: 2024-11-15 | Stop reason: HOSPADM

## 2024-11-13 RX ORDER — LABETALOL 100 MG/1
100 TABLET, FILM COATED ORAL EVERY 8 HOURS SCHEDULED
Status: DISCONTINUED | OUTPATIENT
Start: 2024-11-13 | End: 2024-11-13

## 2024-11-13 RX ORDER — ATROPINE SULFATE 0.1 MG/ML
INJECTION INTRAVENOUS
Status: COMPLETED
Start: 2024-11-13 | End: 2024-11-13

## 2024-11-13 RX ORDER — ATORVASTATIN CALCIUM 40 MG/1
40 TABLET, FILM COATED ORAL
Status: DISCONTINUED | OUTPATIENT
Start: 2024-11-13 | End: 2024-11-15 | Stop reason: HOSPADM

## 2024-11-13 RX ORDER — ASPIRIN 81 MG/1
81 TABLET, CHEWABLE ORAL DAILY
Status: DISCONTINUED | OUTPATIENT
Start: 2024-11-13 | End: 2024-11-15 | Stop reason: HOSPADM

## 2024-11-13 RX ORDER — ARIPIPRAZOLE 5 MG/1
5 TABLET ORAL DAILY
Status: DISCONTINUED | OUTPATIENT
Start: 2024-11-13 | End: 2024-11-13

## 2024-11-13 RX ORDER — SPIRONOLACTONE 25 MG/1
25 TABLET ORAL DAILY
Status: DISCONTINUED | OUTPATIENT
Start: 2024-11-13 | End: 2024-11-13

## 2024-11-13 RX ORDER — PANTOPRAZOLE SODIUM 40 MG/1
40 TABLET, DELAYED RELEASE ORAL DAILY
Status: DISCONTINUED | OUTPATIENT
Start: 2024-11-13 | End: 2024-11-13

## 2024-11-13 RX ORDER — AMLODIPINE BESYLATE 10 MG/1
10 TABLET ORAL DAILY
Status: DISCONTINUED | OUTPATIENT
Start: 2024-11-13 | End: 2024-11-13

## 2024-11-13 RX ORDER — SPIRONOLACTONE 25 MG/1
12.5 TABLET ORAL DAILY
Status: DISCONTINUED | OUTPATIENT
Start: 2024-11-13 | End: 2024-11-13

## 2024-11-13 RX ORDER — MAGNESIUM SULFATE HEPTAHYDRATE 40 MG/ML
2 INJECTION, SOLUTION INTRAVENOUS ONCE
Status: COMPLETED | OUTPATIENT
Start: 2024-11-13 | End: 2024-11-13

## 2024-11-13 RX ORDER — DOXAZOSIN 4 MG/1
8 TABLET ORAL 2 TIMES DAILY
Status: DISCONTINUED | OUTPATIENT
Start: 2024-11-13 | End: 2024-11-13

## 2024-11-13 RX ORDER — HYDRALAZINE HYDROCHLORIDE 25 MG/1
100 TABLET, FILM COATED ORAL EVERY 8 HOURS SCHEDULED
Status: DISCONTINUED | OUTPATIENT
Start: 2024-11-13 | End: 2024-11-15 | Stop reason: HOSPADM

## 2024-11-13 RX ORDER — DOXAZOSIN 1 MG/1
2 TABLET ORAL DAILY
Status: DISCONTINUED | OUTPATIENT
Start: 2024-11-13 | End: 2024-11-14

## 2024-11-13 RX ORDER — OLANZAPINE 2.5 MG/1
5 TABLET, FILM COATED ORAL
Status: DISCONTINUED | OUTPATIENT
Start: 2024-11-13 | End: 2024-11-13

## 2024-11-13 RX ORDER — HEPARIN SODIUM 5000 [USP'U]/ML
5000 INJECTION, SOLUTION INTRAVENOUS; SUBCUTANEOUS EVERY 8 HOURS SCHEDULED
Status: DISCONTINUED | OUTPATIENT
Start: 2024-11-13 | End: 2024-11-15 | Stop reason: HOSPADM

## 2024-11-13 RX ORDER — DEXTROSE MONOHYDRATE 25 G/50ML
INJECTION, SOLUTION INTRAVENOUS
Status: COMPLETED
Start: 2024-11-13 | End: 2024-11-13

## 2024-11-13 RX ORDER — POTASSIUM CHLORIDE 14.9 MG/ML
20 INJECTION INTRAVENOUS ONCE
Status: COMPLETED | OUTPATIENT
Start: 2024-11-13 | End: 2024-11-13

## 2024-11-13 RX ORDER — POTASSIUM CHLORIDE 1500 MG/1
40 TABLET, EXTENDED RELEASE ORAL EVERY 4 HOURS
Status: COMPLETED | OUTPATIENT
Start: 2024-11-13 | End: 2024-11-13

## 2024-11-13 RX ORDER — ATROPINE SULFATE 0.1 MG/ML
1 SYRINGE (ML) INJECTION ONCE
Status: COMPLETED | OUTPATIENT
Start: 2024-11-13 | End: 2024-11-13

## 2024-11-13 RX ORDER — LABETALOL HYDROCHLORIDE 5 MG/ML
10 INJECTION, SOLUTION INTRAVENOUS EVERY 4 HOURS PRN
Status: DISCONTINUED | OUTPATIENT
Start: 2024-11-13 | End: 2024-11-15 | Stop reason: HOSPADM

## 2024-11-13 RX ORDER — POTASSIUM CHLORIDE 14.9 MG/ML
20 INJECTION INTRAVENOUS
Status: COMPLETED | OUTPATIENT
Start: 2024-11-13 | End: 2024-11-13

## 2024-11-13 RX ORDER — CHLORHEXIDINE GLUCONATE ORAL RINSE 1.2 MG/ML
15 SOLUTION DENTAL EVERY 12 HOURS SCHEDULED
Status: DISCONTINUED | OUTPATIENT
Start: 2024-11-13 | End: 2024-11-15 | Stop reason: HOSPADM

## 2024-11-13 RX ADMIN — MAGNESIUM SULFATE HEPTAHYDRATE 2 G: 40 INJECTION, SOLUTION INTRAVENOUS at 08:57

## 2024-11-13 RX ADMIN — ATORVASTATIN CALCIUM 40 MG: 40 TABLET, FILM COATED ORAL at 21:25

## 2024-11-13 RX ADMIN — HYDRALAZINE HYDROCHLORIDE 100 MG: 25 TABLET ORAL at 21:24

## 2024-11-13 RX ADMIN — SODIUM CHLORIDE 2.5 MG/HR: 0.9 INJECTION, SOLUTION INTRAVENOUS at 01:35

## 2024-11-13 RX ADMIN — LABETALOL HYDROCHLORIDE 10 MG: 5 INJECTION, SOLUTION INTRAVENOUS at 14:11

## 2024-11-13 RX ADMIN — CEFTRIAXONE SODIUM 1000 MG: 10 INJECTION, POWDER, FOR SOLUTION INTRAVENOUS at 02:12

## 2024-11-13 RX ADMIN — HEPARIN SODIUM 5000 UNITS: 5000 INJECTION, SOLUTION INTRAVENOUS; SUBCUTANEOUS at 05:24

## 2024-11-13 RX ADMIN — HYDRALAZINE HYDROCHLORIDE 100 MG: 25 TABLET ORAL at 14:08

## 2024-11-13 RX ADMIN — POTASSIUM CHLORIDE 40 MEQ: 1500 TABLET, EXTENDED RELEASE ORAL at 14:08

## 2024-11-13 RX ADMIN — CHLORHEXIDINE GLUCONATE 0.12% ORAL RINSE 15 ML: 1.2 LIQUID ORAL at 09:00

## 2024-11-13 RX ADMIN — POTASSIUM CHLORIDE 20 MEQ: 200 INJECTION, SOLUTION INTRAVENOUS at 06:00

## 2024-11-13 RX ADMIN — MAGNESIUM SULFATE HEPTAHYDRATE 2 G: 40 INJECTION, SOLUTION INTRAVENOUS at 01:57

## 2024-11-13 RX ADMIN — DEXTROSE MONOHYDRATE: 500 INJECTION PARENTERAL at 01:06

## 2024-11-13 RX ADMIN — POTASSIUM CHLORIDE 40 MEQ: 1500 TABLET, EXTENDED RELEASE ORAL at 08:59

## 2024-11-13 RX ADMIN — LABETALOL HYDROCHLORIDE 10 MG: 5 INJECTION, SOLUTION INTRAVENOUS at 21:24

## 2024-11-13 RX ADMIN — DOXAZOSIN 2 MG: 1 TABLET ORAL at 14:08

## 2024-11-13 RX ADMIN — HYDRALAZINE HYDROCHLORIDE 100 MG: 25 TABLET ORAL at 08:59

## 2024-11-13 RX ADMIN — ASPIRIN 81 MG: 81 TABLET, CHEWABLE ORAL at 08:59

## 2024-11-13 RX ADMIN — POTASSIUM CHLORIDE 20 MEQ: 14.9 INJECTION, SOLUTION INTRAVENOUS at 02:11

## 2024-11-13 RX ADMIN — SODIUM CHLORIDE 1000 ML: 0.9 INJECTION, SOLUTION INTRAVENOUS at 01:41

## 2024-11-13 RX ADMIN — POTASSIUM CHLORIDE 20 MEQ: 200 INJECTION, SOLUTION INTRAVENOUS at 03:49

## 2024-11-13 RX ADMIN — CHLORHEXIDINE GLUCONATE 0.12% ORAL RINSE 15 ML: 1.2 LIQUID ORAL at 21:25

## 2024-11-13 RX ADMIN — HEPARIN SODIUM 5000 UNITS: 5000 INJECTION, SOLUTION INTRAVENOUS; SUBCUTANEOUS at 21:25

## 2024-11-13 RX ADMIN — HEPARIN SODIUM 5000 UNITS: 5000 INJECTION, SOLUTION INTRAVENOUS; SUBCUTANEOUS at 14:08

## 2024-11-13 NOTE — ASSESSMENT & PLAN NOTE
Patient met SIRS criteria as evidenced by hypothermia and tachypnea  No evidence of acute infection  Patient denies fever, chills, SOB, cough, dysuria, abdominal pain, nausea/vomiting, diarrhea  CT without evidence of acute infection  UA negative  Procal 0.07  Blood cultures pending, follow up results  Monitor off antibiotics

## 2024-11-13 NOTE — ASSESSMENT & PLAN NOTE
Suspect secondary to hypothermia  Received atropine by EMS  EKG showing sinus bradycardia  Monitor telemetry

## 2024-11-13 NOTE — ASSESSMENT & PLAN NOTE
Patient 90.8 degrees on arrival of unclear etiology  Possibly secondary to hypoglycemia vs hypothyroid  Glenn hugger in place  Temperature improving

## 2024-11-13 NOTE — ED PROVIDER NOTES
Time reflects when diagnosis was documented in both MDM as applicable and the Disposition within this note       Time User Action Codes Description Comment    11/13/2024  3:29 AM Mariluz Jacqui Add [I16.1] Hypertensive emergency     11/13/2024  3:40 AM Ean Smith [R41.82] Altered mental status     11/13/2024  3:40 AM Ean Smith [T68.XXXA] Hypothermia     11/13/2024  3:41 AM Ean Smith [E87.6] Hypokalemia           ED Disposition       ED Disposition   Admit    Condition   Stable    Date/Time   Wed Nov 13, 2024  3:41 AM    Comment   Case was discussed with ICU and the patient's admission status was agreed to be Admission Status: inpatient status to the service of Dr. SERGO Hernández               Assessment & Plan       Medical Decision Making  59-year-old male presents to the emergency room after being found down on the floor for an unknown period of time by his family.  EMS states that the family was unclear as to when the patient was last seen at his normal mental status.    EMS states patient was slow to respond initially though he did improve during transport to the emergency room.  Patient was initially bradycardic for EMS and they administered atropine with improvement in his heart rate.    On evaluation, patient awakens to verbal and tactile stimulation.  Patient is alert and oriented x 3 though he does not recall any of the events that led to him being in the emergency room.  Patient does not recall any of the events of the preceding day or when his symptoms may have started.    Patient's initial glucose was 56, D50 was administered and patient was placed on glucose checks.  Patient notably hypertensive upon arrival to the emergency room.  No focal deficits and patient follows commands appropriately.  Unclear downtime and multiple attempts to contact patient's family were unable to clarify any additional information about timing or onset of symptoms.    Patient has intermittent periods of  "somnolence though he awakens readily.  Patient only states \"I gotta pee and I'm cold.\"  Patient was noted to be 90 °F so he was placed on a Glenn hugger with warm blankets.    Patient denies any headache or neck pain.  Patient denies any visual changes.  Patient denies any chest pain or dyspnea.  Patient denies any cough.  Patient denies any abdominal pain.  Patient denies any nausea/vomiting or diarrhea.  Patient denies any other symptoms.    Patient denies any recent illnesses.    Patient does have a history of severe refractory hypertension and multiple allergies to antihypertensives.    Patient denies any headache or neck pain.  Patient denies any visual changes.  Patient was placed on nicardipine for blood pressure management while obtaining further information.    Patient taken to CT for imaging of his head considering his altered mental status and considering unclear history, imaging of his cervical spine was obtained though he denied any midline tenderness or pain.  CT imaging of patient's chest, abdomen, and pelvis was obtained to evaluate for potential infectious etiologies.    Impression and plan: Altered mental status with unknown downtime.  Patient with nonfocal neurologic exam and is alert and oriented.  Patient taken immediately to CT for imaging of potential intracranial injury.  Considering unknown downtime, patient would not be a candidate for intervention and does not have symptoms consistent with LVO as he has no focality to his examination.  Could represent a postictal period as he does have a history of prior seizures though waxing and waning symptoms is somewhat less consistent with this.  Considering patient's severe hypothermia, concerns for infectious etiology however there is no clear source.  Will obtain broad-spectrum metabolic, infectious, and toxicologic evaluation.  Will obtain imaging as noted to evaluate for potential sources of infectious causes.  Will obtain TSH to evaluate for " potential thyroid dysfunction.  Will monitor patient, reassess, and reevaluate.    Critical Care Time  - Authorized and Performed by: Kermit Smith MD  - Total critical care time: 52 minutes  - Due to a high probability of clinically significant, life threatening deterioration, the patient required my highest level of preparedness to intervene emergently and I personally spent this critical care time directly and personally managing the patient. This critical care time included obtaining a history; examining the patient; pulse oximetry; ordering and review of studies; arranging urgent treatment with development of a management plan; evaluation of patient's response to treatment; frequent reassessment; and, discussions with other providers.  - This critical care time was performed to assess and manage the high probability of imminent, life-threatening deterioration that could result in multi-organ failure. It was exclusive of separately billable procedures and treating other patients and teaching time.        Amount and/or Complexity of Data Reviewed  Labs: ordered.  Radiology: ordered.    Risk  Prescription drug management.  Decision regarding hospitalization.        ED Course as of 11/14/24 0256   Wed Nov 13, 2024   0135 Attempted to contact patient's wife and there was a busy signal at the phone number listed.   0220 Multiple additional attempts to contact the patient's wife were unsuccessful.   0338 Attempted to call patient's wife again.   0338 Unclear etiology of patient's symptoms, patient continues to be intermittently somnolent but awakens and follows commands.  CT imaging of patient's head is without acute findings.  Patient temperature continues to improve and is otherwise modestly hypertensive.  No hemorrhage that would warrant more aggressive treatment.  Considering patient's mental status and findings, discussed with ICU who will admit the patient for continued monitoring and evaluation.        Medications   chlorhexidine (PERIDEX) 0.12 % oral rinse 15 mL (has no administration in time range)   heparin (porcine) subcutaneous injection 5,000 Units (has no administration in time range)   albuterol (PROVENTIL HFA,VENTOLIN HFA) inhaler 1 puff (has no administration in time range)   dextrose 50 % IV solution **ADS Override Pull** (  Given by Other 11/13/24 0106)   atropine (FOR EMS ONLY) 1 mg/10 mL injection 1 mg (0 mg Does not apply Given to EMS 11/13/24 0123)   ceftriaxone (ROCEPHIN) 1 g/50 mL in dextrose IVPB (0 mg Intravenous Stopped 11/13/24 0349)   sodium chloride 0.9 % bolus 1,000 mL (1,000 mL Intravenous New Bag 11/13/24 0141)   potassium chloride 20 mEq IVPB (premix) ( Intravenous Rate/Dose Verify 11/13/24 0400)   magnesium sulfate 2 g/50 mL IVPB (premix) 2 g (0 g Intravenous Stopped 11/13/24 0350)   potassium chloride 20 mEq IVPB (premix) (0 mEq Intravenous Stopped 11/13/24 0348)       ED Risk Strat Scores                                               History of Present Illness       Chief Complaint   Patient presents with    Altered Mental Status     Pt was found on floor for unknown duration by family.  Pt was responsive to verbal stimuli when ems first arrived but slow tor respond.  Pt was bradycardic on arrival with slurred speech.  Last glucose was 71.  56 glucose on arrival       Past Medical History:   Diagnosis Date    Asthma     Chronic kidney disease     COPD (chronic obstructive pulmonary disease) (HCC)     CPAP (continuous positive airway pressure) dependence     NO LONGER NEEDS D/T BARIATRIC SURGERY.    Diabetes mellitus (HCC)     Emphysema of lung (HCC)     Gout     History of transfusion     pt stated they had a transfusion when they had gallbladder surgery.    Hypertension     Kidney disease     renal failure    Leg DVT (deep venous thromboembolism), acute, bilateral (HCC) 01/2018    Obesity (BMI 30-39.9)     AGUS on CPAP     setting 11    Postgastrectomy malabsorption     Sleep  apnea     Systolic CHF (HCC)       Past Surgical History:   Procedure Laterality Date    ADRENALECTOMY      CARDIAC CATHETERIZATION N/A 3/5/2024    Procedure: Cardiac Left Heart Cath;  Surgeon: Servando Wiggins MD;  Location: [facility];  Service: Cardiology    CARDIAC CATHETERIZATION N/A 3/5/2024    Procedure: Cardiac Coronary Angiogram;  Surgeon: Servando Wiggins MD;  Location: [facility];  Service: Cardiology    CHOLECYSTECTOMY      COLONOSCOPY      EGD      HIATAL HERNIA REPAIR N/A 12/22/2020    Procedure: REPAIR HERNIA HIATAL LAPAROSCOPIC;  Surgeon: Shane Francis MD;  Location: [facility];  Service: Bariatrics    INCISION AND DRAINAGE OF WOUND Left 10/17/2018    Procedure: INCISION AND DRAINAGE (I&D) GROIN;  Surgeon: Rafy Pascual MD;  Location: [facility];  Service: General    IR IMAGE GUIDED ASPIRATION / DRAINAGE W TUBE  8/17/2018    IR IMAGE GUIDED ASPIRATION / DRAINAGE W TUBE  7/31/2018    JOINT REPLACEMENT Bilateral     knee    KIDNEY SURGERY  2009    nodule removal    LYMPH NODE DISSECTION Left 01/2018    left inguinal LN removed - benign     OTHER SURGICAL HISTORY      kidney nodule removal    PALATE / UVULA BIOPSY / EXCISION      PERICARDIAL WINDOW N/A 7/8/2023    Procedure: WINDOW PERICARDIAL;  Surgeon: Alonso Logan MD;  Location: [facility];  Service: Thoracic    MS LAPS GSTR RSTCV PX W/BYP JASON-EN-Y LIMB <150 CM N/A 12/22/2020    Procedure: BYPASS GASTRIC  JASON-EN-Y LAPAROSCOPIC WITH INTRAOPERATIVE EGD;  Surgeon: Shane Francis MD;  Location: [facility];  Service: Bariatrics    SINUS SURGERY      TONSILLECTOMY        Family History   Problem Relation Age of Onset    Cancer Father     Kidney disease Mother     Heart murmur Sister     Asthma Sister     Hypertension Sister     Heart disease Brother     Bell's palsy Brother     Hypertension Brother     Deep vein thrombosis Neg Hx       Social History     Tobacco Use    Smoking status: Never     Passive exposure:  Past    Smokeless tobacco: Never   Vaping Use    Vaping status: Never Used   Substance Use Topics    Alcohol use: Yes     Comment: occasionally    Drug use: Yes     Types: Marijuana     Comment: Card      E-Cigarette/Vaping    E-Cigarette Use Never User       E-Cigarette/Vaping Substances    Nicotine No     THC No     CBD No     Flavoring No     Other No     Unknown No       I have reviewed and agree with the history as documented.     HPI    Review of Systems        Objective       ED Triage Vitals   Temperature Pulse Blood Pressure Respirations SpO2 Patient Position - Orthostatic VS   11/13/24 0135 11/13/24 0103 11/13/24 0103 11/13/24 0103 11/13/24 0103 11/13/24 0103   (!) 90.8 °F (32.7 °C) 57 (!) 227/127 20 99 % Lying      Temp Source Heart Rate Source BP Location FiO2 (%) Pain Score    11/13/24 0135 11/13/24 0103 11/13/24 0103 -- 11/13/24 0103    Rectal Monitor Left arm  No Pain      Vitals      Date and Time Temp Pulse SpO2 Resp BP Pain Score FACES Pain Rating User   11/13/24 2300 -- 58 98 % 15 -- No Pain -- CL   11/13/24 2229 99.1 °F (37.3 °C) -- -- -- 178/96 -- -- ANTONIO   11/13/24 2229 -- 57 97 % 18 -- -- -- AH   11/13/24 2121 99.3 °F (37.4 °C) 61 -- 22 206/112 -- -- WC   11/13/24 1900 -- -- -- -- -- No Pain -- CL   11/13/24 1600 98.6 °F (37 °C) 65 -- 18 163/87 -- -- MT   11/13/24 1559 98.1 °F (36.7 °C) -- 96 % -- -- -- -- KK   11/13/24 1530 -- 64 -- 14 174/97 -- -- MT   11/13/24 1500 -- 63 -- 16 185/111 -- -- MT   11/13/24 0900 -- -- -- -- 153/93 -- -- MT   11/13/24 0800 98.1 °F (36.7 °C) 76 -- 19 162/91 No Pain -- MT   11/13/24 0700 -- 75 -- 20 149/84 -- -- MT   11/13/24 0600 97 °F (36.1 °C) 79 98 % 18 169/92 No Pain -- CL   11/13/24 0510 96.4 °F (35.8 °C) 81 -- 19 174/96 -- -- CL   11/13/24 0500 96.4 °F (35.8 °C) 81 98 % 19 174/96 No Pain -- CL   11/13/24 0445 96.1 °F (35.6 °C) 78 98 % 21 171/106 No Pain -- CL   11/13/24 0415 -- 70 97 % 10 171/101 -- -- CL   11/13/24 0347 -- 55 -- -- 212/109 -- -- KT    11/13/24 0335 94.3 °F (34.6 °C) 55 99 % 15 194/105 -- -- KT   11/13/24 0255 -- 53 99 % 19 167/93 -- -- KT   11/13/24 0250 -- 48 98 % 15 171/91 -- -- KT   11/13/24 0245 -- 46 99 % 16 179/100 -- -- KT   11/13/24 0241 -- 54 99 % 15 179/103 -- -- KT   11/13/24 0231 -- 53 99 % 17 188/92 -- -- KT   11/13/24 0220 -- 51 100 % 9 174/92 -- -- KT   11/13/24 0218 92 °F (33.3 °C) -- -- -- -- -- -- KT   11/13/24 0215 -- 56 98 % -- 178/94 -- -- KT   11/13/24 0204 -- 52 -- -- 160/77 -- -- KT   11/13/24 0203 -- 53 98 % -- 160/77 -- -- KT   11/13/24 0139 -- 50 100 % 17 226/121 -- -- KT   11/13/24 0135 90.8 °F (32.7 °C) -- -- -- -- -- -- KT   11/13/24 0103 -- 57 99 % 20 227/127 No Pain -- KT            Physical Exam  Constitutional:       General: He is not in acute distress.  HENT:      Head: Normocephalic and atraumatic.   Eyes:      General: No visual field deficit.  Cardiovascular:      Rate and Rhythm: Regular rhythm. Bradycardia present.   Pulmonary:      Effort: Pulmonary effort is normal.      Breath sounds: Normal breath sounds.   Abdominal:      Palpations: Abdomen is soft.      Tenderness: There is no abdominal tenderness. There is no guarding.   Skin:     General: Skin is dry.      Comments: Cool   Neurological:      Mental Status: He is alert and oriented to person, place, and time.      GCS: GCS eye subscore is 3. GCS verbal subscore is 5. GCS motor subscore is 6.      Cranial Nerves: No cranial nerve deficit, dysarthria or facial asymmetry.      Sensory: No sensory deficit.      Motor: No weakness.         Results Reviewed       Procedure Component Value Units Date/Time    Blood culture #2 [[id number]] Collected: 11/13/24 0149    Lab Status: Preliminary result Specimen: Blood from Arm, Left Updated: 11/13/24 1009     Blood Culture Received in Microbiology Lab. Culture in Progress.    Blood culture #1 [[id number]] Collected: 11/13/24 0210    Lab Status: Preliminary result Specimen: Blood from Hand, Left Updated: 11/13/24  "1001     Blood Culture Received in Microbiology Lab. Culture in Progress.    T4, free [[id number]]  (Normal) Collected: 11/13/24 0109    Lab Status: Final result Specimen: Blood from Arm, Right Updated: 11/13/24 0919     Free T4 0.62 ng/dL     Narrative:        \"Therapeutic range for patients medicated with thyroid disorders: 0.61-1.24 ng/dL.\"    Cortisol [[id number]] Collected: 11/13/24 0257    Lab Status: Final result Specimen: Blood from Arm, Right Updated: 11/13/24 0857     Cortisol, Random 35.7 ug/dL     Narrative:      Reference Range (18 years and older)  7-9 AM Specimen:  6.7-22.6 ug/dL  PM Specimen:  Less than 10.0 ug/dL    HS Troponin I 4hr [[id number]]  (Abnormal) Collected: 11/13/24 0608    Lab Status: Final result Specimen: Blood from Hand, Right Updated: 11/13/24 0652     hs TnI 4hr 136 ng/L      Delta 4hr hsTnI 54 ng/L     Basic metabolic panel [[id number]]  (Abnormal) Collected: 11/13/24 0608    Lab Status: Final result Specimen: Blood from Hand, Right Updated: 11/13/24 0643     Sodium 141 mmol/L      Potassium 3.2 mmol/L      Chloride 104 mmol/L      CO2 29 mmol/L      ANION GAP 8 mmol/L      BUN 52 mg/dL      Creatinine 2.38 mg/dL      Glucose 122 mg/dL      Calcium 9.2 mg/dL      eGFR 28 ml/min/1.73sq m     Narrative:      National Kid[employer] guidelines for Chronic Kidney Disease (CKD):     Stage 1 with normal or high GFR (GFR > 90 mL/min/1.73 square meters)    Stage 2 Mild CKD (GFR = 60-89 mL/min/1.73 square meters)    Stage 3A Moderate CKD (GFR = 45-59 mL/min/1.73 square meters)    Stage 3B Moderate CKD (GFR = 30-44 mL/min/1.73 square meters)    Stage 4 Severe CKD (GFR = 15-29 mL/min/1.73 square meters)    Stage 5 End Stage CKD (GFR <15 mL/min/1.73 square meters)  Note: GFR calculation is accurate only with a steady state creatinine    Rapid drug screen, urine [[id number]]  (Abnormal) Collected: 11/13/24 0935    Lab Status: Final result Specimen: Urine, Other Updated: 11/13/24 4743     " Amph/Meth UR Negative     Barbiturate Ur Negative     Benzodiazepine Urine Negative     Cocaine Urine Negative     Methadone Urine Negative     Opiate Urine Negative     PCP Ur Negative     THC Urine Positive     Oxycodone Urine Negative     Fentanyl Urine Negative     HYDROCODONE URINE Negative    Narrative:      Presumptive report. If requested, specimen will be sent to reference lab for confirmation.  FOR MEDICAL PURPOSES ONLY.   IF CONFIRMATION NEEDED PLEASE CONTACT THE LAB WITHIN 5 DAYS.    Drug Screen Cutoff Levels:  AMPHETAMINE/METHAMPHETAMINES  1000 ng/mL  BARBITURATES     200 ng/mL  BENZODIAZEPINES     200 ng/mL  COCAINE      300 ng/mL  METHADONE      300 ng/mL  OPIATES      300 ng/mL  PHENCYCLIDINE     25 ng/mL  THC       50 ng/mL  OXYCODONE      100 ng/mL  FENTANYL      5 ng/mL  HYDROCODONE     300 ng/mL    Acetaminophen level-If concentration is detectable, please discuss with medical  on call. [[id number]]  (Abnormal) Collected: 11/13/24 0257    Lab Status: Final result Specimen: Blood from Arm, Right Updated: 11/13/24 0317     Acetaminophen Level <2 ug/mL     Ethanol [[id number]]  (Normal) Collected: 11/13/24 0257    Lab Status: Final result Specimen: Blood from Arm, Right Updated: 11/13/24 0317     Ethanol Lvl <10 mg/dL     Salicylate level [[id number]]  (Normal) Collected: 11/13/24 0257    Lab Status: Final result Specimen: Blood from Arm, Right Updated: 11/13/24 0317     Salicylate Lvl <5 mg/dL     HS Troponin I 2hr [[id number]]  (Abnormal) Collected: 11/13/24 0230    Lab Status: Final result Specimen: Blood from Hand, Left Updated: 11/13/24 0300     hs TnI 2hr 102 ng/L      Delta 2hr hsTnI 20 ng/L     TSH, 3rd generation with Free T4 reflex [[id number]]  (Abnormal) Collected: 11/13/24 0109    Lab Status: Final result Specimen: Blood from Arm, Right Updated: 11/13/24 0258     TSH 3RD GENERATON 8.830 uIU/mL     Protime-INR [[id number]]  (Normal) Collected: 11/13/24 0230    Lab Status: Final  result Specimen: Blood from Arm, Left Updated: 11/13/24 0249     Protime 14.6 seconds      INR 1.06    Narrative:      INR Therapeutic Range    Indication                                             INR Range      Atrial Fibrillation                                               2.0-3.0  Hypercoagulable State                                    2.0.2.3  Left Ventricular Asist Device                            2.0-3.0  Mechanical Heart Valve                                  -    Aortic(with afib, MI, embolism, HF, LA enlargement,    and/or coagulopathy)                                     2.0-3.0 (2.5-3.5)     Mitral                                                             2.5-3.5  Prosthetic/Bioprosthetic Heart Valve               2.0-3.0  Venous thromboembolism (VTE: VT, PE        2.0-3.0    APTT [[id number]]  (Normal) Collected: 11/13/24 0230    Lab Status: Final result Specimen: Blood from Arm, Left Updated: 11/13/24 0249     PTT 29 seconds     Urine Microscopic [[id number]]  (Abnormal) Collected: 11/13/24 0225    Lab Status: Final result Specimen: Urine, Other Updated: 11/13/24 0238     RBC, UA 30-50 /hpf      WBC, UA 2-4 /hpf      Epithelial Cells Occasional /hpf      Bacteria, UA Occasional /hpf     UA w Reflex to Microscopic w Reflex to Culture [[id number]]  (Abnormal) Collected: 11/13/24 0225    Lab Status: Final result Specimen: Urine, Other Updated: 11/13/24 0238     Color, UA Colorless     Clarity, UA Clear     Specific Gravity, UA 1.008     pH, UA 7.0     Leukocytes, UA Negative     Nitrite, UA Negative     Protein, UA 50 (1+) mg/dl      Glucose, UA Negative mg/dl      Ketones, UA Negative mg/dl      Urobilinogen, UA <2.0 mg/dl      Bilirubin, UA Negative     Occult Blood, UA Moderate    Procalcitonin [[id number]]  (Normal) Collected: 11/13/24 0109    Lab Status: Final result Specimen: Blood from Arm, Right Updated: 11/13/24 0205     Procalcitonin 0.07 ng/ml     Fingerstick Glucose (POCT) [[id number]]   (Normal) Collected: 11/13/24 0201    Lab Status: Final result Specimen: Blood Updated: 11/13/24 0202     POC Glucose 100 mg/dl     HS Troponin 0hr (reflex protocol) [[id number]]  (Abnormal) Collected: 11/13/24 0109    Lab Status: Final result Specimen: Blood from Arm, Right Updated: 11/13/24 0137     hs TnI 0hr 82 ng/L     Comprehensive metabolic panel [[id number]]  (Abnormal) Collected: 11/13/24 0109    Lab Status: Final result Specimen: Blood from Arm, Right Updated: 11/13/24 0130     Sodium 142 mmol/L      Potassium 2.9 mmol/L      Chloride 102 mmol/L      CO2 31 mmol/L      ANION GAP 9 mmol/L      BUN 51 mg/dL      Creatinine 2.63 mg/dL      Glucose 56 mg/dL      Calcium 9.2 mg/dL      AST 41 U/L      ALT 58 U/L      Alkaline Phosphatase 87 U/L      Total Protein 7.7 g/dL      Albumin 4.3 g/dL      Total Bilirubin 0.48 mg/dL      eGFR 25 ml/min/1.73sq m     Narrative:      National Kidney Disease Foundation guidelines for Chronic Kidney Disease (CKD):     Stage 1 with normal or high GFR (GFR > 90 mL/min/1.73 square meters)    Stage 2 Mild CKD (GFR = 60-89 mL/min/1.73 square meters)    Stage 3A Moderate CKD (GFR = 45-59 mL/min/1.73 square meters)    Stage 3B Moderate CKD (GFR = 30-44 mL/min/1.73 square meters)    Stage 4 Severe CKD (GFR = 15-29 mL/min/1.73 square meters)    Stage 5 End Stage CKD (GFR <15 mL/min/1.73 square meters)  Note: GFR calculation is accurate only with a steady state creatinine    CK [[id number]]  (Abnormal) Collected: 11/13/24 0109    Lab Status: Final result Specimen: Blood from Arm, Right Updated: 11/13/24 0130     Total  U/L     Fingerstick Glucose (POCT) [[id number]]  (Normal) Collected: 11/13/24 0128    Lab Status: Final result Specimen: Blood Updated: 11/13/24 0129     POC Glucose 132 mg/dl     Lactic acid, plasma (w/reflex if result > 2.0) [[id number]]  (Normal) Collected: 11/13/24 0109    Lab Status: Final result Specimen: Blood from Arm, Right Updated: 11/13/24 0128     LACTIC  ACID 0.9 mmol/L     Narrative:      Result may be elevated if tourniquet was used during collection.    CBC and differential [[id number]]  (Abnormal) Collected: 11/13/24 0109    Lab Status: Final result Specimen: Blood from Arm, Right Updated: 11/13/24 0115     WBC 6.03 Thousand/uL      RBC 4.05 Million/uL      Hemoglobin 10.7 g/dL      Hematocrit 34.1 %      MCV 84 fL      MCH 26.4 pg      MCHC 31.4 g/dL      RDW 15.1 %      MPV 10.0 fL      Platelets 319 Thousands/uL      nRBC 0 /100 WBCs      Segmented % 68 %      Immature Grans % 0 %      Lymphocytes % 21 %      Monocytes % 6 %      Eosinophils Relative 4 %      Basophils Relative 1 %      Absolute Neutrophils 4.11 Thousands/µL      Absolute Immature Grans 0.01 Thousand/uL      Absolute Lymphocytes 1.25 Thousands/µL      Absolute Monocytes 0.34 Thousand/µL      Eosinophils Absolute 0.25 Thousand/µL      Basophils Absolute 0.07 Thousands/µL     Blood gas, venous [[id number]]  (Abnormal) Collected: 11/13/24 0109    Lab Status: Final result Specimen: Blood from Arm, Right Updated: 11/13/24 0115     pH, Gold 7.349     pCO2, Gold 50.9 mm Hg      pO2, Gold 41.4 mm Hg      HCO3, Gold 27.4 mmol/L      Base Excess, Gold 1.1 mmol/L      O2 Content, Gold 12.4 ml/dL      O2 HGB, VENOUS 72.6 %     Fingerstick Glucose (POCT) [[id number]]  (Abnormal) Collected: 11/13/24 0103    Lab Status: Final result Specimen: Blood Updated: 11/13/24 0104     POC Glucose 56 mg/dl             CT head without contrast   Final Interpretation by Aniket Abdalla MD (11/13 0301)      No acute intracranial abnormality.  Stable chronic microangiopathic changes within the brain.   Stable 2.2 cm left tentorial leaflet meningioma.                  Workstation performed: [id number]         CT spine cervical without contrast   Final Interpretation by Aniket Abdalla MD (11/13 0314)      No cervical spine fracture or traumatic malalignment.                  Workstation performed: [id number]         CT chest abdomen  pelvis wo contrast   Final Interpretation by Arian Ramírez DO (11/13 5798)      Moderate volume ascites.      Status post gastric bypass, no evidence of bowel obstruction,      Focal bronchiectasis with suspected mucous plugging in the right lower lobe.      Cardiomegaly, coronary atherosclerosis, right renal cysts, moderate amount of stool in the colon, scattered colonic diverticulosis, and other findings as above.         Workstation performed: [id number]             Procedures    ED Medication and Procedure Management   Prior to Admission Medications   Prescriptions Last Dose Informant Patient Reported? Taking?   ARIPiprazole (ABILIFY) 5 mg tablet Not Taking Self Yes No   Sig: Take 5 mg by mouth daily   Patient not taking: Reported on 7/24/2024   Cholecalciferol 50 MCG (2000 UT) TABS Not Taking  No No   Sig: Take 1 tablet (2,000 Units total) by mouth daily   Patient not taking: Reported on 11/13/2024   LORazepam (ATIVAN) 1 mg tablet  Self Yes No   Sig: TAKE 1 TABLET BY MOUTH ONCE DAILY AS NEEDED FOR ANXIETY (TAKE 1 TABLET PRIOR TO MRI OR CT SCAN)   OLANZapine (ZyPREXA) 2.5 mg tablet Not Taking Self Yes No   Sig: Take 5 mg by mouth daily at bedtime   Patient not taking: Reported on 11/13/2024   albuterol (PROVENTIL HFA,VENTOLIN HFA) 90 mcg/act inhaler  Self Yes No   amLODIPine (NORVASC) 10 mg tablet 11/12/2024 Self Yes Yes   Sig: Take 10 mg by mouth   aspirin 81 mg chewable tablet 11/12/2024 Morning Self No Yes   Sig: Chew 1 tablet (81 mg total) daily   atorvastatin (LIPITOR) 40 mg tablet Not Taking Self Yes No   Sig: Take 40 mg by mouth daily at bedtime   Patient not taking: Reported on 11/13/2024   doxazosin (CARDURA) 8 MG tablet 11/12/2024 Morning Self No Yes   Sig: Take 1 tablet (8 mg total) by mouth 2 (two) times a day   hydrALAZINE (APRESOLINE) 100 MG tablet 11/12/2024 Self No Yes   Sig: Take 1 tablet (100 mg total) by mouth every 8 (eight) hours   hydroCHLOROthiazide 25 mg tablet Not Taking  No  No   Sig: Take 1 tablet (25 mg total) by mouth daily   Patient not taking: Reported on 11/13/2024   isosorbide dinitrate (ISORDIL) 40 MG tablet 11/12/2024 Self No Yes   Sig: Take 1 tablet (40 mg total) by mouth 3 (three) times daily after meals   labetalol (NORMODYNE) 300 mg tablet   No No   Sig: Take 1 tablet (300 mg total) by mouth every 8 (eight) hours   pantoprazole (PROTONIX) 40 mg tablet Unknown Self Yes No   Sig: Take 40 mg by mouth daily   sertraline (ZOLOFT) 100 mg tablet 11/12/2024 Self Yes Yes   Sig: Take 2 tablets by mouth daily   spironolactone (ALDACTONE) 25 mg tablet 11/12/2024  No Yes   Sig: Take 1 tablet (25 mg total) by mouth daily   sucralfate (CARAFATE) 1 g tablet Not Taking Self Yes No   Sig: Take 1 g by mouth 2 (two) times a day   Patient not taking: Reported on 11/13/2024      Facility-Administered Medications: None     Current Discharge Medication List        CONTINUE these medications which have NOT CHANGED    Details   amLODIPine (NORVASC) 10 mg tablet Take 10 mg by mouth      aspirin 81 mg chewable tablet Chew 1 tablet (81 mg total) daily  Qty: 30 tablet, Refills: 0    Associated Diagnoses: Hypertension      doxazosin (CARDURA) 8 MG tablet Take 1 tablet (8 mg total) by mouth 2 (two) times a day  Qty: 90 tablet, Refills: 6    Associated Diagnoses: Primary hypertension      hydrALAZINE (APRESOLINE) 100 MG tablet Take 1 tablet (100 mg total) by mouth every 8 (eight) hours  Qty: 90 tablet, Refills: 0    Associated Diagnoses: Hypertension      isosorbide dinitrate (ISORDIL) 40 MG tablet Take 1 tablet (40 mg total) by mouth 3 (three) times daily after meals  Qty: 90 tablet, Refills: 0    Associated Diagnoses: Hypertensive emergency      sertraline (ZOLOFT) 100 mg tablet Take 2 tablets by mouth daily      spironolactone (ALDACTONE) 25 mg tablet Take 1 tablet (25 mg total) by mouth daily  Qty: 90 tablet, Refills: 2    Associated Diagnoses: Hypertensive kidney disease with stage 4 chronic kidney  disease (HCC); Resistant hypertension      albuterol (PROVENTIL HFA,VENTOLIN HFA) 90 mcg/act inhaler     Comments: <!--EPICS-->Substitution to a formulary equivalent within the same pharmaceutical class is authorized.<!--EPICE-->      ARIPiprazole (ABILIFY) 5 mg tablet Take 5 mg by mouth daily      atorvastatin (LIPITOR) 40 mg tablet Take 40 mg by mouth daily at bedtime      Cholecalciferol 50 MCG (2000 UT) TABS Take 1 tablet (2,000 Units total) by mouth daily    Associated Diagnoses: Vitamin D deficiency      hydroCHLOROthiazide 25 mg tablet Take 1 tablet (25 mg total) by mouth daily    Associated Diagnoses: HTN (hypertension)      labetalol (NORMODYNE) 300 mg tablet Take 1 tablet (300 mg total) by mouth every 8 (eight) hours    Associated Diagnoses: Essential hypertension      LORazepam (ATIVAN) 1 mg tablet TAKE 1 TABLET BY MOUTH ONCE DAILY AS NEEDED FOR ANXIETY (TAKE 1 TABLET PRIOR TO MRI OR CT SCAN)      OLANZapine (ZyPREXA) 2.5 mg tablet Take 5 mg by mouth daily at bedtime      pantoprazole (PROTONIX) 40 mg tablet Take 40 mg by mouth daily      sucralfate (CARAFATE) 1 g tablet Take 1 g by mouth 2 (two) times a day           No discharge procedures on file.  ED SEPSIS DOCUMENTATION   Time reflects when diagnosis was documented in both MDM as applicable and the Disposition within this note       Time User Action Codes Description Comment    11/13/2024  3:29 AM Jacqui Mcgraw [I16.1] Hypertensive emergency     11/13/2024  3:40 AM Ean Smith [R41.82] Altered mental status     11/13/2024  3:40 AM Ean Smith [T68.XXXA] Hypothermia     11/13/2024  3:41 AM Ean Smith [E87.6] Hypokalemia            Initial Sepsis Screening       Row Name 11/13/24 6482                Is the patient's history suggestive of a new or worsening infection? No  -EN                  User Key  (r) = Recorded By, (t) = Taken By, (c) = Cosigned By      Initials Name Provider Type    EN MARBIN [place]ley Nurse  Practitioner                       Ean Smith MD  11/14/24 0256

## 2024-11-13 NOTE — ASSESSMENT & PLAN NOTE
Wt Readings from Last 3 Encounters:   11/13/24 62.8 kg (138 lb 7.2 oz)   07/24/24 61.4 kg (135 lb 5.8 oz)   07/16/24 66.7 kg (147 lb)   Previous history of G2DD with most recent echo showing G1DD  Currently appears euvolemic  Monitor I&O, DW

## 2024-11-13 NOTE — ASSESSMENT & PLAN NOTE
Likely multifactorial in the setting of hypertension, hypothermia and hypoglycemia  Slightly improved following improvement of patient's glucose to WNL, but patient remains lethargic  CTH without acute change  TSH 8.830, follow up FT4  Coma panel negative  UDS + THC  Continue to monitor neuro exam

## 2024-11-13 NOTE — ASSESSMENT & PLAN NOTE
Glucose 56 on arrival  Patient received D50 amp and glucose improved to 132  Patient denies DM following weight loss from bypass surgery  Monitor frequent accuchecks

## 2024-11-13 NOTE — H&P
H&P - Critical Care/ICU   Name: Genna Liu 59 y.o. male I MRN: [id number]  Unit/Bed#: ICU 12 I Date of Admission: 11/13/2024   Date of Service: 11/13/2024 I Hospital Day: 0       Assessment & Plan  Hypertensive emergency  /127 on arrival with elevated LFTs and troponin  Suspect encephalopathy is possibly secondary to HTN as well  History of noncompliance with antihypertensives with /98 on recent PCP visit  History of multiple allergies to antihypertensives including clonidine, lisinopril, nifedipine, hydralazine and minoxidil  Anaphylactic reaction to clonidine and lisinopril  Unclear what patient's current home regimen is, will clarify once mental status improves  Renal artery doppler from 2/28/2024 showed no evidence of significant arterial occlusive disease in main renal artery  Nicardipine infusion started in the ED  Continue nicardipine titrated to maintain SBP <180  Resume home medication regimen once clarified with patient and he is more awake to tolerate oral intake  Monitor BP closely   Chronic diastolic CHF (congestive heart failure) (HCC)  Wt Readings from Last 3 Encounters:   11/13/24 62.8 kg (138 lb 7.2 oz)   07/24/24 61.4 kg (135 lb 5.8 oz)   07/16/24 66.7 kg (147 lb)   Previous history of G2DD with most recent echo showing G1DD  Currently appears euvolemic  Monitor I&O, DW  Meningioma (HCC)  CTH showed stable 2.2 cm left tentorial leaflet meningioma  COPD without exacerbation (HCC)  No evidence of acute exacerbation  Continue PRN albuterol  Stage 4 chronic kidney disease (HCC)  Lab Results   Component Value Date    EGFR 25 11/13/2024    EGFR 23 (L) 11/01/2024    EGFR 21 10/08/2024    CREATININE 2.63 (H) 11/13/2024    CREATININE 3 (H) 11/01/2024    CREATININE 2.99 (H) 10/08/2024   At baseline creatinine  Monitor I&O  Avoid nephrotoxins  Trend renal indices  History of DVT (deep vein thrombosis)  Not on anticoagulation  Bradycardia  Suspect secondary to hypothermia  Received atropine by  EMS  EKG showing sinus bradycardia  Monitor telemetry  Hypoglycemia  Glucose 56 on arrival  Patient received D50 amp and glucose improved to 132  Patient denies DM following weight loss from bypass surgery  Monitor frequent accuchecks  History of gastric bypass  History of humaira en y bypass in 2020  Hypokalemia  Potassium 2.9 on arrival  Replete   Monitor repeat  Noncompliance with medication regimen  History of noncompliance with antihypertensives  Elevated troponin  Troponin 82 --> 102  Patient denies chest pain  EKG without ischemic changes  Suspect secondary to hypertension  Trend until peaked  Hx of partial adrenalectomy (HCC)  History of hyperaldosteronism s/p left adrenalectomy in 2012  Elevated LFTs  AST 41 and ALT 58 on arrival  Suspect secondary to hypertension  Abdominal exam benign  Trend daily  Hypothermia  Patient 90.8 degrees on arrival of unclear etiology  Possibly secondary to hypoglycemia vs hypothyroid  Glenn hugger in place  Temperature improving  Encephalopathy acute  Likely multifactorial in the setting of hypertension, hypothermia and hypoglycemia  Slightly improved following improvement of patient's glucose to WNL, but patient remains lethargic  CTH without acute change  TSH 8.830, follow up FT4  Coma panel negative  UDS + THC  Continue to monitor neuro exam  Elevated TSH  TSH 8.830 on arrival  Follow up FT4  SIRS (systemic inflammatory response syndrome) ([facility])  Patient met SIRS criteria as evidenced by hypothermia and tachypnea  No evidence of acute infection  Patient denies fever, chills, SOB, cough, dysuria, abdominal pain, nausea/vomiting, diarrhea  CT without evidence of acute infection  UA negative  Procal 0.07  Blood cultures pending, follow up results  Monitor off antibiotics  Disposition: Stepdown Level 1    History of Present Illness   Genna Liu is a 59 y.o. with PMH uncontrolled hypertension, hyperaldosteronism s/p left adrenalectomy in 2012, HFpEF, CKD 4, obesity s/p humaira-en-y  gastric bypass in 2020, DVT, meningioma, and medication noncompliance who presents to the ED with AMS and bradycardia. Patient was found on the floor. He had been down for an unknown period of time when he was found by his family and EMS was called. EMS noted patient to be responsive to verbal stimuli but lethargic with delayed responses and slurred speech. EKG showed sinus bradycardia and patient was given atropine. Glucose 56 on arrival to the ED and patient was given an amp of D50. Glucose improved to 132. Patient found to be hypothermic with a temperature of 90.8 degrees. Glenn hugger in place and temperature is slowly improving. SIRS criteria met due to hypothermia and tachypnea. Patient has no evidence of infection. Patient was also noted to have hypertensive emergency with initial /127 with transaminitis and elevated troponin level. Nicardipine infusion started in the ED. The patient is being referred to critical care medicine for admission.     History obtained from chart review and the patient.  Review of Systems: Review of Systems   Constitutional:  Negative for chills and fever.        Patient denies complaint and reports he was in his usual state of health   Respiratory:  Negative for cough and shortness of breath.    Cardiovascular:  Negative for chest pain.   Gastrointestinal:  Negative for abdominal pain, diarrhea, nausea and vomiting.   Genitourinary:  Negative for dysuria.   Neurological: Negative.        Historical Information   Past Medical History:  No date: Asthma  No date: Chronic kidney disease  No date: COPD (chronic obstructive pulmonary disease) (HCC)  No date: CPAP (continuous positive airway pressure) dependence      Comment:  NO LONGER NEEDS D/T BARIATRIC SURGERY.  No date: Diabetes mellitus (HCC)  No date: Emphysema of lung (HCC)  No date: Gout  No date: History of transfusion      Comment:  pt stated they had a transfusion when they had                gallbladder surgery.  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  No date: Sleep apnea  No date: Systolic CHF (HCC) Past Surgical History:  No date: ADRENALECTOMY  3/5/2024: CARDIAC CATHETERIZATION; N/A      Comment:  Procedure: Cardiac Left Heart Cath;  Surgeon: Servando Wiggins MD;  Location: [facility];  Service:                Cardiology  3/5/2024: CARDIAC CATHETERIZATION; N/A      Comment:  Procedure: Cardiac Coronary Angiogram;  Surgeon: Servando Wiggins MD;  Location: [facility];  Service:               Cardiology  No date: CHOLECYSTECTOMY  No date: COLONOSCOPY  No date: EGD  12/22/2020: HIATAL HERNIA REPAIR; N/A      Comment:  Procedure: REPAIR HERNIA HIATAL LAPAROSCOPIC;  Surgeon:                Shane Francis MD;  Location: [facility];  Service:                Bariatrics  10/17/2018: INCISION AND DRAINAGE OF WOUND; Left      Comment:  Procedure: INCISION AND DRAINAGE (I&D) GROIN;  Surgeon:                Rafy Pascual MD;  Location: [facility];  Service:                General  8/17/2018: IR IMAGE GUIDED ASPIRATION / DRAINAGE W TUBE  7/31/2018: IR IMAGE GUIDED ASPIRATION / DRAINAGE W TUBE  No date: JOINT REPLACEMENT; Bilateral      Comment:  knee  2009: KIDNEY SURGERY      Comment:  nodule removal  01/2018: LYMPH NODE DISSECTION; Left      Comment:  left inguinal LN removed - benign   No date: OTHER SURGICAL HISTORY      Comment:  kidney nodule removal  No date: PALATE / UVULA BIOPSY / EXCISION  7/8/2023: PERICARDIAL WINDOW; N/A      Comment:  Procedure: WINDOW PERICARDIAL;  Surgeon: Alonso Logan MD;  Location: [facility];  Service: Thoracic  12/22/2020: WA LAPS GSTR RSTCV PX W/BYP JASON-EN-Y LIMB <150 CM; N/A      Comment:   "Procedure: BYPASS GASTRIC  JASON-EN-Y LAPAROSCOPIC WITH                INTRAOPERATIVE EGD;  Surgeon: Shane Francis MD;                 Location: [facility];  Service: Bariatrics  No date: SINUS SURGERY  No date: TONSILLECTOMY   Current Outpatient Medications   Medication Instructions    albuterol (PROVENTIL HFA,VENTOLIN HFA) 90 mcg/act inhaler     amLODIPine (NORVASC) 10 mg    ARIPiprazole (ABILIFY) 5 mg, Daily    aspirin 81 mg, Oral, Daily    atorvastatin (LIPITOR) 40 mg, Daily at bedtime    Cholecalciferol 2,000 Units, Oral, Daily    doxazosin (CARDURA) 8 mg, Oral, 2 times daily    hydrALAZINE (APRESOLINE) 100 mg, Oral, Every 8 hours scheduled    hydroCHLOROthiazide 25 mg, Oral, Daily    isosorbide dinitrate (ISORDIL) 40 mg, Oral, 3 times daily after meals    labetalol (NORMODYNE) 300 mg, Oral, Every 8 hours scheduled    LORazepam (ATIVAN) 1 mg tablet TAKE 1 TABLET BY MOUTH ONCE DAILY AS NEEDED FOR ANXIETY (TAKE 1 TABLET PRIOR TO MRI OR CT SCAN)    OLANZapine (ZYPREXA) 5 mg, Daily at bedtime    pantoprazole (PROTONIX) 40 mg, Oral, Daily    sertraline (ZOLOFT) 100 mg tablet 2 tablets, Daily    spironolactone (ALDACTONE) 25 mg, Oral, Daily    sucralfate (CARAFATE) 1 g, 2 times daily    Allergies   Allergen Reactions    Clonidine Anaphylaxis    Lisinopril Anaphylaxis    Nifedipine Anaphylaxis     Tolerating nicardipine    Buspirone      Headaches,     Enoxaparin Other (See Comments)     Injection site \"bump\" per Dr. Francis    Hydralazine Other (See Comments)     Fluid around the heart  Lose for Appetite        Minoxidil      Retains fluid around heart      Social History     Tobacco Use    Smoking status: Never     Passive exposure: Past    Smokeless tobacco: Never   Vaping Use    Vaping status: Never Used   Substance Use Topics    Alcohol use: Yes     Comment: occasionally    Drug use: Yes     Types: Marijuana     Comment: Card    Family History   Problem Relation Age of Onset    Cancer Father     Kidney " disease Mother     Heart murmur Sister     Asthma Sister     Hypertension Sister     Heart disease Brother     Bell's palsy Brother     Hypertension Brother     Deep vein thrombosis Neg Hx           Objective :                   Vitals I/O      Most Recent Min/Max in 24hrs   Temp (!) 96.4 °F (35.8 °C) Temp  Min: 90.8 °F (32.7 °C)  Max: 96.4 °F (35.8 °C)   Pulse 81 Pulse  Min: 46  Max: 81   Resp 19 Resp  Min: 9  Max: 21   BP (!) 174/96 BP  Min: 160/77  Max: 227/127   O2 Sat 98 % SpO2  Min: 97 %  Max: 100 %      Intake/Output Summary (Last 24 hours) at 11/13/2024 0520  Last data filed at 11/13/2024 0445  Gross per 24 hour   Intake 226.67 ml   Output 700 ml   Net -473.33 ml       Diet NPO; Sips of clear liquids    Invasive Monitoring           Physical Exam   Physical Exam  Eyes:      Pupils: Pupils are equal, round, and reactive to light.   Skin:     General: Skin is warm and dry.   HENT:      Head: Normocephalic and atraumatic.   Cardiovascular:      Rate and Rhythm: Regular rhythm. Bradycardia present.      Pulses: Normal pulses.      Heart sounds: Normal heart sounds.   Musculoskeletal:      Right lower leg: No edema.      Left lower leg: No edema.   Abdominal:      Palpations: Abdomen is soft.   Constitutional:       General: He is not in acute distress.  Pulmonary:      Effort: Pulmonary effort is normal.      Breath sounds: Normal breath sounds.   Neurological:      General: No focal deficit present.      Mental Status: He is easily aroused and oriented to person, place and time. He is lethargic.          Diagnostic Studies        Lab Results: I have reviewed the following results:     Medications:  Scheduled PRN   chlorhexidine, 15 mL, Q12H JOANN  heparin (porcine), 5,000 Units, Q8H JOANN  pantoprazole, 40 mg, Daily  potassium chloride, 20 mEq, Q2H      albuterol, 1 puff, Q6H PRN       Continuous    niCARdipine, 1-15 mg/hr, Last Rate: 2.5 mg/hr (11/13/24 0400)         Labs:   CBC    Recent Labs     11/13/24  0109    WBC 6.03   HGB 10.7*   HCT 34.1*        BMP    Recent Labs     11/13/24 0109   SODIUM 142   K 2.9*      CO2 31   AGAP 9   BUN 51*   CREATININE 2.63*   CALCIUM 9.2       Coags    Recent Labs     11/13/24 0230   INR 1.06   PTT 29        Additional Electrolytes  No recent results       Blood Gas    No recent results  Recent Labs     11/13/24 0109   PHVEN 7.349   ERA0MGR 50.9*   PO2VEN 41.4   WAE2YRQ 27.4   BEVEN 1.1   J9UIPPV 72.6    LFTs  Recent Labs     11/13/24 0109   ALT 58*   AST 41*   ALKPHOS 87   ALB 4.3   TBILI 0.48       Infectious  Recent Labs     11/13/24 0109   PROCALCITONI 0.07     Glucose  Recent Labs     11/13/24 0109   GLUC 56*

## 2024-11-13 NOTE — PROGRESS NOTES
Progress Note - Critical Care/ICU   Name: Genna Liu 59 y.o. male I MRN: [id number]  Unit/Bed#: ICU 12 I Date of Admission: 11/13/2024   Date of Service: 11/13/2024 I Hospital Day: 0       Critical Care Interval Transfer Note:    Brief Hospital Summary:   59 year old male with PMH of uncontrolled hypertension, hyperaldosteronism s/p left adrenalectomy in 2012, HFpEF, CKD 4, obesity s/p ReYGB in 2020, DVT, meningoma, and medication non-compliance who presented early this AM after being found down. He was very lethargic and 911 was activated. On arrival he was found to be very hypertensive, bradycardic, hypoglycemic, and hypothermic. He was given dextrose and started on cardene with improvement in mental status. In review of his PCP note he recently had a change in his antihypertensive regimen and was placed on hydralazine monotherapy. This was restarted and he was taken off cardene but he remained hypertensive. He has numerous drug allergies. He also has had some relative bradycardia so he was placed on cardura, a medication he tolerated before as a second agent. He is alert, oriented x3. No complaints. Declined family update.    Barriers to discharge:   Antihypertensive mangament     Consults: IP CONSULT TO CASE MANAGEMENT    Recommended to review admission imaging for incidental findings and document in discharge navigator: Chart reviewed, no known incidental findings noted at this time.      Discharge Plan: Anticipate discharge tomorrow to home.    OT Recommendations: Home with home health rehabilitation   Patient seen and evaluated by Critical Care today and deemed to be appropriate for transfer to Med Surg with Telemetry. Spoke to Dr. Solares from [facility] to accept transfer. Critical care can be contacted via SecureChat with any questions or concerns. Please use the Critical Care AP Role in Secure Chat for any provider inquires until the patient is transferred out of the ICU or until tomorrow at 0600.

## 2024-11-13 NOTE — PLAN OF CARE
Problem: Prexisting or High Potential for Compromised Skin Integrity  Goal: Skin integrity is maintained or improved  Description: INTERVENTIONS:  - Identify patients at risk for skin breakdown  - Assess and monitor skin integrity  - Assess and monitor nutrition and hydration status  - Monitor labs   - Assess for incontinence   - Turn and reposition patient  - Assist with mobility/ambulation  - Relieve pressure over bony prominences  - Avoid friction and shearing  - Provide appropriate hygiene as needed including keeping skin clean and dry  - Evaluate need for skin moisturizer/barrier cream  - Collaborate with interdisciplinary team   - Patient/family teaching  - Consider wound care consult   Outcome: Progressing     Problem: Nutrition/Hydration-ADULT  Goal: Nutrient/Hydration intake appropriate for improving, restoring or maintaining nutritional needs  Description: Monitor and assess patient's nutrition/hydration status for malnutrition. Collaborate with interdisciplinary team and initiate plan and interventions as ordered.  Monitor patient's weight and dietary intake as ordered or per policy. Utilize nutrition screening tool and intervene as necessary. Determine patient's food preferences and provide high-protein, high-caloric foods as appropriate.     INTERVENTIONS:  - Monitor oral intake, urinary output, labs, and treatment plans  - Assess nutrition and hydration status and recommend course of action  - Evaluate amount of meals eaten  - Assist patient with eating if necessary   - Allow adequate time for meals  - Recommend/ encourage appropriate diets, oral nutritional supplements, and vitamin/mineral supplements  - Order, calculate, and assess calorie counts as needed  - Recommend, monitor, and adjust tube feedings and TPN/PPN based on assessed needs  - Assess need for intravenous fluids  - Provide specific nutrition/hydration education as appropriate  - Include patient/family/caregiver in decisions related to  nutrition  Outcome: Progressing     Problem: PAIN - ADULT  Goal: Verbalizes/displays adequate comfort level or baseline comfort level  Description: Interventions:  - Encourage patient to monitor pain and request assistance  - Assess pain using appropriate pain scale  - Administer analgesics based on type and severity of pain and evaluate response  - Implement non-pharmacological measures as appropriate and evaluate response  - Consider cultural and social influences on pain and pain management  - Notify physician/advanced practitioner if interventions unsuccessful or patient reports new pain  Outcome: Progressing     Problem: INFECTION - ADULT  Goal: Absence or prevention of progression during hospitalization  Description: INTERVENTIONS:  - Assess and monitor for signs and symptoms of infection  - Monitor lab/diagnostic results  - Monitor all insertion sites, i.e. indwelling lines, tubes, and drains  - Monitor endotracheal if appropriate and nasal secretions for changes in amount and color  - Sheldon appropriate cooling/warming therapies per order  - Administer medications as ordered  - Instruct and encourage patient and family to use good hand hygiene technique  - Identify and instruct in appropriate isolation precautions for identified infection/condition  Outcome: Progressing  Goal: Absence of fever/infection during neutropenic period  Description: INTERVENTIONS:  - Monitor WBC    Outcome: Progressing     Problem: SAFETY ADULT  Goal: Patient will remain free of falls  Description: INTERVENTIONS:  - Educate patient/family on patient safety including physical limitations  - Instruct patient to call for assistance with activity   - Consult OT/PT to assist with strengthening/mobility   - Keep Call bell within reach  - Keep bed low and locked with side rails adjusted as appropriate  - Keep care items and personal belongings within reach  - Initiate and maintain comfort rounds  - Make Fall Risk Sign visible to  staff  - Offer Toileting every  Hours, in advance of need  - Initiate/Maintain alarm  - Obtain necessary fall risk management equipment:   - Apply yellow socks and bracelet for high fall risk patients  - Consider moving patient to room near nurses station  Outcome: Progressing  Goal: Maintain or return to baseline ADL function  Description: INTERVENTIONS:  -  Assess patient's ability to carry out ADLs; assess patient's baseline for ADL function and identify physical deficits which impact ability to perform ADLs (bathing, care of mouth/teeth, toileting, grooming, dressing, etc.)  - Assess/evaluate cause of self-care deficits   - Assess range of motion  - Assess patient's mobility; develop plan if impaired  - Assess patient's need for assistive devices and provide as appropriate  - Encourage maximum independence but intervene and supervise when necessary  - Involve family in performance of ADLs  - Assess for home care needs following discharge   - Consider OT consult to assist with ADL evaluation and planning for discharge  - Provide patient education as appropriate  Outcome: Progressing  Goal: Maintains/Returns to pre admission functional level  Description: INTERVENTIONS:  - Perform AM-PAC 6 Click Basic Mobility/ Daily Activity assessment daily.  - Set and communicate daily mobility goal to care team and patient/family/caregiver.   - Collaborate with rehabilitation services on mobility goals if consulted  - Perform Range of Motion  times a day.  - Reposition patient every  hours.  - Dangle patient  times a day  - Stand patient  times a day  - Ambulate patient  times a day  - Out of bed to chair  times a day   - Out of bed for meals  times a day  - Out of bed for toileting  - Record patient progress and toleration of activity level   Outcome: Progressing     Problem: DISCHARGE PLANNING  Goal: Discharge to home or other facility with appropriate resources  Description: INTERVENTIONS:  - Identify barriers to discharge  w/patient and caregiver  - Arrange for needed discharge resources and transportation as appropriate  - Identify discharge learning needs (meds, wound care, etc.)  - Arrange for interpretive services to assist at discharge as needed  - Refer to Case Management Department for coordinating discharge planning if the patient needs post-hospital services based on physician/advanced practitioner order or complex needs related to functional status, cognitive ability, or social support system  Outcome: Progressing     Problem: Knowledge Deficit  Goal: Patient/family/caregiver demonstrates understanding of disease process, treatment plan, medications, and discharge instructions  Description: Complete learning assessment and assess knowledge base.  Interventions:  - Provide teaching at level of understanding  - Provide teaching via preferred learning methods  Outcome: Progressing     Problem: CARDIOVASCULAR - ADULT  Goal: Maintains optimal cardiac output and hemodynamic stability  Description: INTERVENTIONS:  - Monitor I/O, vital signs and rhythm  - Monitor for S/S and trends of decreased cardiac output  - Administer and titrate ordered vasoactive medications to optimize hemodynamic stability  - Assess quality of pulses, skin color and temperature  - Assess for signs of decreased coronary artery perfusion  - Instruct patient to report change in severity of symptoms  Outcome: Progressing  Goal: Absence of cardiac dysrhythmias or at baseline rhythm  Description: INTERVENTIONS:  - Continuous cardiac monitoring, vital signs, obtain 12 lead EKG if ordered  - Administer antiarrhythmic and heart rate control medications as ordered  - Monitor electrolytes and administer replacement therapy as ordered  Outcome: Progressing     Problem: METABOLIC, FLUID AND ELECTROLYTES - ADULT  Goal: Electrolytes maintained within normal limits  Description: INTERVENTIONS:  - Monitor labs and assess patient for signs and symptoms of electrolyte  imbalances  - Administer electrolyte replacement as ordered  - Monitor response to electrolyte replacements, including repeat lab results as appropriate  - Instruct patient on fluid and nutrition as appropriate  Outcome: Progressing  Goal: Fluid balance maintained  Description: INTERVENTIONS:  - Monitor labs   - Monitor I/O and WT  - Instruct patient on fluid and nutrition as appropriate  - Assess for signs & symptoms of volume excess or deficit  Outcome: Progressing  Goal: Glucose maintained within target range  Description: INTERVENTIONS:  - Monitor Blood Glucose as ordered  - Assess for signs and symptoms of hyperglycemia and hypoglycemia  - Administer ordered medications to maintain glucose within target range  - Assess nutritional intake and initiate nutrition service referral as needed  Outcome: Progressing     Problem: HEMATOLOGIC - ADULT  Goal: Maintains hematologic stability  Description: INTERVENTIONS  - Assess for signs and symptoms of bleeding or hemorrhage  - Monitor labs  - Administer supportive blood products/factors as ordered and appropriate  Outcome: Progressing

## 2024-11-13 NOTE — ASSESSMENT & PLAN NOTE
/127 on arrival with elevated LFTs and troponin  Suspect encephalopathy is possibly secondary to HTN as well  History of noncompliance with antihypertensives with /98 on recent PCP visit  History of multiple allergies to antihypertensives including clonidine, lisinopril, nifedipine, hydralazine and minoxidil  Anaphylactic reaction to clonidine and lisinopril  Unclear what patient's current home regimen is, will clarify once mental status improves  Renal artery doppler from 2/28/2024 showed no evidence of significant arterial occlusive disease in main renal artery  Nicardipine infusion started in the ED  Continue nicardipine titrated to maintain SBP <180  Resume home medication regimen once clarified with patient and he is more awake to tolerate oral intake  Monitor BP closely

## 2024-11-13 NOTE — ASSESSMENT & PLAN NOTE
Troponin 82 --> 102  Patient denies chest pain  EKG without ischemic changes  Suspect secondary to hypertension  Trend until peaked

## 2024-11-13 NOTE — ASSESSMENT & PLAN NOTE
AST 41 and ALT 58 on arrival  Suspect secondary to hypertension  Abdominal exam benign  Trend daily

## 2024-11-13 NOTE — ASSESSMENT & PLAN NOTE
Lab Results   Component Value Date    EGFR 25 11/13/2024    EGFR 23 (L) 11/01/2024    EGFR 21 10/08/2024    CREATININE 2.63 (H) 11/13/2024    CREATININE 3 (H) 11/01/2024    CREATININE 2.99 (H) 10/08/2024   At baseline creatinine  Monitor I&O  Avoid nephrotoxins  Trend renal indices

## 2024-11-14 LAB
ALL TARGETS: NOT DETECTED
ANION GAP SERPL CALCULATED.3IONS-SCNC: 9 MMOL/L (ref 4–13)
BUN SERPL-MCNC: 49 MG/DL (ref 5–25)
CA-I BLD-SCNC: 1.11 MMOL/L (ref 1.12–1.32)
CALCIUM SERPL-MCNC: 8.8 MG/DL (ref 8.4–10.2)
CHLORIDE SERPL-SCNC: 104 MMOL/L (ref 96–108)
CO2 SERPL-SCNC: 27 MMOL/L (ref 21–32)
CREAT SERPL-MCNC: 2.52 MG/DL (ref 0.6–1.3)
ERYTHROCYTE [DISTWIDTH] IN BLOOD BY AUTOMATED COUNT: 15.4 % (ref 11.6–15.1)
GFR SERPL CREATININE-BSD FRML MDRD: 26 ML/MIN/1.73SQ M
GLUCOSE SERPL-MCNC: 71 MG/DL (ref 65–140)
GLUCOSE SERPL-MCNC: 80 MG/DL (ref 65–140)
GLUCOSE SERPL-MCNC: 97 MG/DL (ref 65–140)
GLUCOSE SERPL-MCNC: 97 MG/DL (ref 65–140)
HCT VFR BLD AUTO: 28.9 % (ref 36.5–49.3)
HGB BLD-MCNC: 9.3 G/DL (ref 12–17)
MAGNESIUM SERPL-MCNC: 2.4 MG/DL (ref 1.9–2.7)
MCH RBC QN AUTO: 26.7 PG (ref 26.8–34.3)
MCHC RBC AUTO-ENTMCNC: 32.2 G/DL (ref 31.4–37.4)
MCV RBC AUTO: 83 FL (ref 82–98)
PHOSPHATE SERPL-MCNC: 4.3 MG/DL (ref 2.3–4.1)
PLATELET # BLD AUTO: 258 THOUSANDS/UL (ref 149–390)
PMV BLD AUTO: 9.8 FL (ref 8.9–12.7)
POTASSIUM SERPL-SCNC: 3.3 MMOL/L (ref 3.5–5.3)
RBC # BLD AUTO: 3.48 MILLION/UL (ref 3.88–5.62)
SODIUM SERPL-SCNC: 140 MMOL/L (ref 135–147)
WBC # BLD AUTO: 6.96 THOUSAND/UL (ref 4.31–10.16)

## 2024-11-14 PROCEDURE — 84100 ASSAY OF PHOSPHORUS: CPT | Performed by: PHYSICIAN ASSISTANT

## 2024-11-14 PROCEDURE — 82948 REAGENT STRIP/BLOOD GLUCOSE: CPT

## 2024-11-14 PROCEDURE — 83735 ASSAY OF MAGNESIUM: CPT | Performed by: PHYSICIAN ASSISTANT

## 2024-11-14 PROCEDURE — 82330 ASSAY OF CALCIUM: CPT | Performed by: PHYSICIAN ASSISTANT

## 2024-11-14 PROCEDURE — 80048 BASIC METABOLIC PNL TOTAL CA: CPT | Performed by: PHYSICIAN ASSISTANT

## 2024-11-14 PROCEDURE — 85027 COMPLETE CBC AUTOMATED: CPT | Performed by: PHYSICIAN ASSISTANT

## 2024-11-14 PROCEDURE — 99233 SBSQ HOSP IP/OBS HIGH 50: CPT | Performed by: STUDENT IN AN ORGANIZED HEALTH CARE EDUCATION/TRAINING PROGRAM

## 2024-11-14 RX ORDER — DOXAZOSIN 1 MG/1
4 TABLET ORAL DAILY
Status: DISCONTINUED | OUTPATIENT
Start: 2024-11-14 | End: 2024-11-14

## 2024-11-14 RX ORDER — SPIRONOLACTONE 25 MG/1
12.5 TABLET ORAL 2 TIMES DAILY
Status: DISCONTINUED | OUTPATIENT
Start: 2024-11-14 | End: 2024-11-15 | Stop reason: HOSPADM

## 2024-11-14 RX ORDER — ISOSORBIDE DINITRATE 10 MG/1
40 TABLET ORAL
Status: DISCONTINUED | OUTPATIENT
Start: 2024-11-14 | End: 2024-11-14

## 2024-11-14 RX ORDER — DOXAZOSIN 4 MG/1
8 TABLET ORAL 2 TIMES DAILY
Status: DISCONTINUED | OUTPATIENT
Start: 2024-11-14 | End: 2024-11-15 | Stop reason: HOSPADM

## 2024-11-14 RX ORDER — DOXAZOSIN 1 MG/1
4 TABLET ORAL ONCE
Status: COMPLETED | OUTPATIENT
Start: 2024-11-14 | End: 2024-11-14

## 2024-11-14 RX ORDER — SPIRONOLACTONE 25 MG/1
25 TABLET ORAL 2 TIMES DAILY
Status: DISCONTINUED | OUTPATIENT
Start: 2024-11-14 | End: 2024-11-14

## 2024-11-14 RX ADMIN — SPIRONOLACTONE 12.5 MG: 25 TABLET ORAL at 18:50

## 2024-11-14 RX ADMIN — LABETALOL HYDROCHLORIDE 10 MG: 5 INJECTION, SOLUTION INTRAVENOUS at 03:30

## 2024-11-14 RX ADMIN — DOXAZOSIN 4 MG: 1 TABLET ORAL at 11:32

## 2024-11-14 RX ADMIN — HYDRALAZINE HYDROCHLORIDE 100 MG: 25 TABLET ORAL at 21:24

## 2024-11-14 RX ADMIN — HEPARIN SODIUM 5000 UNITS: 5000 INJECTION, SOLUTION INTRAVENOUS; SUBCUTANEOUS at 15:55

## 2024-11-14 RX ADMIN — CHLORHEXIDINE GLUCONATE 0.12% ORAL RINSE 15 ML: 1.2 LIQUID ORAL at 21:24

## 2024-11-14 RX ADMIN — CHLORHEXIDINE GLUCONATE 0.12% ORAL RINSE 15 ML: 1.2 LIQUID ORAL at 09:06

## 2024-11-14 RX ADMIN — DOXAZOSIN 8 MG: 4 TABLET ORAL at 17:48

## 2024-11-14 RX ADMIN — ATORVASTATIN CALCIUM 40 MG: 40 TABLET, FILM COATED ORAL at 21:25

## 2024-11-14 RX ADMIN — LABETALOL HYDROCHLORIDE 10 MG: 5 INJECTION, SOLUTION INTRAVENOUS at 12:29

## 2024-11-14 RX ADMIN — HYDRALAZINE HYDROCHLORIDE 100 MG: 25 TABLET ORAL at 05:58

## 2024-11-14 RX ADMIN — LABETALOL HYDROCHLORIDE 10 MG: 5 INJECTION, SOLUTION INTRAVENOUS at 09:06

## 2024-11-14 RX ADMIN — ASPIRIN 81 MG: 81 TABLET, CHEWABLE ORAL at 09:06

## 2024-11-14 RX ADMIN — HEPARIN SODIUM 5000 UNITS: 5000 INJECTION, SOLUTION INTRAVENOUS; SUBCUTANEOUS at 06:03

## 2024-11-14 RX ADMIN — HEPARIN SODIUM 5000 UNITS: 5000 INJECTION, SOLUTION INTRAVENOUS; SUBCUTANEOUS at 21:24

## 2024-11-14 RX ADMIN — HYDRALAZINE HYDROCHLORIDE 100 MG: 25 TABLET ORAL at 13:24

## 2024-11-14 RX ADMIN — DOXAZOSIN 2 MG: 1 TABLET ORAL at 09:06

## 2024-11-14 NOTE — PROGRESS NOTES
Progress Note - Hospitalist   Name: Genna Liu 59 y.o. male I MRN: [id number]  Unit/Bed#: -01 I Date of Admission: 11/13/2024   Date of Service: 11/14/2024 I Hospital Day: 1    Assessment & Plan  Encephalopathy acute  Does have history of renal artery stenosis .  hospitalized due to acute encephalopathy likely secondary to hypertensive emergency.  Patient presented with blood pressure of 227/127.  He does have history of noncompliance with antihypertensive agents.  Also has multiple allergies to medication including clonidine, lisinopril, nifedipine, minoxidil, hydralazine however tolerating hydralazine well.    Reports not taking Isordil due to cost  Patient was under ICU care and now has been weaned off of nicardipine drip    Currently on encounter patient appears comfortable not in distress.  Blood pressure labile currently 198/118  Patient is asymptomatic and adamantly requesting to be discharged.  Had lengthy discussion regarding unsafe discharge due to uncontrolled blood pressure.  Plan is to continue Cardura at his home dose which is 8 mg twice daily, hydralazine 100 mg every 8 hours, started on spironolactone 12 5 mg twice daily.  We also considering adding Norvasc tomorrow morning since patient had tolerated in the past.  Constipation is likely getting compliance.    COPD without exacerbation (HCC)  Currently stable without exacerbation.  Chronic diastolic CHF (congestive heart failure) (HCC)  Wt Readings from Last 3 Encounters:   11/14/24 63.8 kg (140 lb 10.5 oz)   07/24/24 61.4 kg (135 lb 5.8 oz)   07/16/24 66.7 kg (147 lb)     Known history of CHF.  He appears euvolemic.          Stage 4 chronic kidney disease (HCC)  Lab Results   Component Value Date    EGFR 26 11/14/2024    EGFR 28 11/13/2024    EGFR 25 11/13/2024    CREATININE 2.52 (H) 11/14/2024    CREATININE 2.38 (H) 11/13/2024    CREATININE 2.63 (H) 11/13/2024   Currently creatinine stable at baseline around 2.3-2.6 range.  History of DVT  (deep vein thrombosis)  Not on anticoagulation.  Bradycardia  Suspect secondary to hypothermia and had received atropine by EMS.  Currently noted heart rate controlled in 60s.  Avoid AV blocking agent.  Hypoglycemia  Reportedly patient was noted to have glucose of 56 on arrival which was treated with D50 and glucose improved to 132.  Does have history of gastric bypass surgery in the past.  Continue Accu-Cheks monitoring.    History of gastric bypass  Noted history of gastric bypass.  Hypokalemia  Currently potassium 3.3  Continue to supplement as needed with caution  Hypertensive emergency  Plan as above.  Meningioma ([facility])  CT head report noted with stable 2.2 cm left tentorial leaflet meningioma.  Outpatient follow-up with PCP.  Noncompliance with medication regimen  Counseled on compliance at length.  Elevated troponin  Likely secondary to hypertensive emergency.  Currently asymptomatic.  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  Elevated LFTs  Mildly elevated AST, ALT on arrival.  Suspect secondary to hypertension.  Abdomen benign on exam.  Follow-up with repeat CMP tomorrow.  Hypothermia  Now resolved.  Elevated TSH  TSH noted 0.830 on arrival  Free T4  0.62.  Recommend outpatient follow-up with PCP and have repeat labs in 4-6 weeks  SIRS (systemic inflammatory response syndrome) ([facility])  Patient met SIRS criteria on admission with evidence of hypothermia, tachypnea however no evidence of acute infection noted.  CT without evidence of acute infection.  UA negative.  Pro-Nirmal within normal limits.  2 sets of blood culture pending.  Currently remain stable off antibiotics.      VTE Pharmacologic Prophylaxis:   Moderate Risk (Score 3-4) - Pharmacological DVT Prophylaxis Ordered: heparin.    Mobility:   Basic Mobility Inpatient Raw Score: 24  JH-HLM Goal: 8: Walk 250 feet or more  JH-HLM Achieved: 6: Walk 10 steps or more      Patient Centered Rounds: I  performed bedside rounds with nursing staff today.       Current Length of Stay: 1 day(s)  Current Patient Status: Inpatient   Certification Statement: The patient will continue to require additional inpatient hospital stay due to uncontrolled hypertension  Discharge Plan: Anticipate discharge in 24-48 hrs to home.    Code Status: Level 1 - Full Code    Subjective   ICU downgrade.  Seen during a.m. rounds and appears comfortable not in distress.  Blood pressure presently uncontrolled.  Clinically patient denies chest pain, dyspnea, fever, chills, nausea, vomiting, any other illness.  No other events reported.  Is adamantly requesting to be discharged.    Objective :  Temp:  [98.6 °F (37 °C)-99.3 °F (37.4 °C)] 98.8 °F (37.1 °C)  HR:  [54-66] 64  BP: (177-214)/() 198/118  Resp:  [13-36] 18  SpO2:  [97 %-99 %] 99 %  O2 Device: None (Room air)    Body mass index is 22.03 kg/m².     Input and Output Summary (last 24 hours):     Intake/Output Summary (Last 24 hours) at 11/14/2024 1812  Last data filed at 11/14/2024 1500  Gross per 24 hour   Intake 480 ml   Output 1595 ml   Net -1115 ml       Physical Exam  Constitutional:       General: He is not in acute distress.     Appearance: Normal appearance. He is not ill-appearing, toxic-appearing or diaphoretic.   Cardiovascular:      Rate and Rhythm: Normal rate.      Pulses: Normal pulses.   Pulmonary:      Effort: Pulmonary effort is normal. No respiratory distress.      Breath sounds: Normal breath sounds. No wheezing.   Abdominal:      General: Bowel sounds are normal. There is no distension.      Palpations: Abdomen is soft.      Tenderness: There is no abdominal tenderness.   Musculoskeletal:      Right lower leg: No edema.      Left lower leg: No edema.   Neurological:      Mental Status: He is alert and oriented to person, place, and time. Mental status is at baseline.   Psychiatric:         Mood and Affect: Mood normal.         Behavior: Behavior normal.       Comments: Patient is pleasant during encounter.           Lines/Drains:              Lab Results: I have reviewed the following results:   Results from last 7 days   Lab Units 11/14/24  0440 11/13/24  0109   WBC Thousand/uL 6.96 6.03   HEMOGLOBIN g/dL 9.3* 10.7*   HEMATOCRIT % 28.9* 34.1*   PLATELETS Thousands/uL 258 319   SEGS PCT %  --  68   LYMPHO PCT %  --  21   MONO PCT %  --  6   EOS PCT %  --  4     Results from last 7 days   Lab Units 11/14/24  0440 11/13/24  0608 11/13/24  0109   SODIUM mmol/L 140   < > 142   POTASSIUM mmol/L 3.3*   < > 2.9*   CHLORIDE mmol/L 104   < > 102   CO2 mmol/L 27   < > 31   BUN mg/dL 49*   < > 51*   CREATININE mg/dL 2.52*   < > 2.63*   ANION GAP mmol/L 9   < > 9   CALCIUM mg/dL 8.8   < > 9.2   ALBUMIN g/dL  --   --  4.3   TOTAL BILIRUBIN mg/dL  --   --  0.48   ALK PHOS U/L  --   --  87   ALT U/L  --   --  58*   AST U/L  --   --  41*   GLUCOSE RANDOM mg/dL 97   < > 56*    < > = values in this interval not displayed.     Results from last 7 days   Lab Units 11/13/24  0230   INR  1.06     Results from last 7 days   Lab Units 11/14/24  1656 11/14/24  1138 11/13/24  1957 11/13/24  0201 11/13/24  0128 11/13/24  0103   POC GLUCOSE mg/dl 80 71 122 100 132 56*         Results from last 7 days   Lab Units 11/13/24  0109   LACTIC ACID mmol/L 0.9   PROCALCITONIN ng/ml 0.07       Recent Cultures (last 7 days):   Results from last 7 days   Lab Units 11/13/24  0210 11/13/24  0149   BLOOD CULTURE  Received in Microbiology Lab. Culture in Progress. No Growth at 24 hrs.       Imaging Results Review: I reviewed radiology reports from this admission including: CT chest and CT abdomen/pelvis.  Other Study Results Review: EKG was personally reviewed and my interpretation is: Sinus bradycardia..    Last 24 Hours Medication List:     Current Facility-Administered Medications:     albuterol (PROVENTIL HFA,VENTOLIN HFA) inhaler 1 puff, Q6H PRN    aspirin chewable tablet 81 mg, Daily    atorvastatin  (LIPITOR) tablet 40 mg, HS    chlorhexidine (PERIDEX) 0.12 % oral rinse 15 mL, Q12H JOANN    doxazosin (CARDURA) tablet 8 mg, BID    heparin (porcine) subcutaneous injection 5,000 Units, Q8H JOANN    hydrALAZINE (APRESOLINE) tablet 100 mg, Q8H JOANN    labetalol (NORMODYNE) injection 10 mg, Q4H PRN    spironolactone (ALDACTONE) tablet 12.5 mg, BID    Administrative Statements   Today, Patient Was Seen By: Reza Ann MD  I have spent a total time of 50 minutes in caring for this patient on the day of the visit/encounter including Risks and benefits of tx options, Instructions for management, Patient and family education, Importance of tx compliance, Counseling / Coordination of care, Documenting in the medical record, Reviewing / ordering tests, medicine, procedures  , and Obtaining or reviewing history  .    **Please Note: This note may have been constructed using a voice recognition system.**

## 2024-11-14 NOTE — PLAN OF CARE
Problem: Prexisting or High Potential for Compromised Skin Integrity  Goal: Skin integrity is maintained or improved  Description: INTERVENTIONS:  - Identify patients at risk for skin breakdown  - Assess and monitor skin integrity  - Assess and monitor nutrition and hydration status  - Monitor labs   - Assess for incontinence   - Turn and reposition patient  - Assist with mobility/ambulation  - Relieve pressure over bony prominences  - Avoid friction and shearing  - Provide appropriate hygiene as needed including keeping skin clean and dry  - Evaluate need for skin moisturizer/barrier cream  - Collaborate with interdisciplinary team   - Patient/family teaching  - Consider wound care consult   Outcome: Progressing     Problem: Nutrition/Hydration-ADULT  Goal: Nutrient/Hydration intake appropriate for improving, restoring or maintaining nutritional needs  Description: Monitor and assess patient's nutrition/hydration status for malnutrition. Collaborate with interdisciplinary team and initiate plan and interventions as ordered.  Monitor patient's weight and dietary intake as ordered or per policy. Utilize nutrition screening tool and intervene as necessary. Determine patient's food preferences and provide high-protein, high-caloric foods as appropriate.     INTERVENTIONS:  - Monitor oral intake, urinary output, labs, and treatment plans  - Assess nutrition and hydration status and recommend course of action  - Evaluate amount of meals eaten  - Assist patient with eating if necessary   - Allow adequate time for meals  - Recommend/ encourage appropriate diets, oral nutritional supplements, and vitamin/mineral supplements  - Order, calculate, and assess calorie counts as needed  - Recommend, monitor, and adjust tube feedings and TPN/PPN based on assessed needs  - Assess need for intravenous fluids  - Provide specific nutrition/hydration education as appropriate  - Include patient/family/caregiver in decisions related to  nutrition  Outcome: Progressing     Problem: PAIN - ADULT  Goal: Verbalizes/displays adequate comfort level or baseline comfort level  Description: Interventions:  - Encourage patient to monitor pain and request assistance  - Assess pain using appropriate pain scale  - Administer analgesics based on type and severity of pain and evaluate response  - Implement non-pharmacological measures as appropriate and evaluate response  - Consider cultural and social influences on pain and pain management  - Notify physician/advanced practitioner if interventions unsuccessful or patient reports new pain  Outcome: Progressing     Problem: INFECTION - ADULT  Goal: Absence or prevention of progression during hospitalization  Description: INTERVENTIONS:  - Assess and monitor for signs and symptoms of infection  - Monitor lab/diagnostic results  - Monitor all insertion sites, i.e. indwelling lines, tubes, and drains  - Monitor endotracheal if appropriate and nasal secretions for changes in amount and color  - [place] appropriate cooling/warming therapies per order  - Administer medications as ordered  - Instruct and encourage patient and family to use good hand hygiene technique  - Identify and instruct in appropriate isolation precautions for identified infection/condition  Outcome: Progressing  Goal: Absence of fever/infection during neutropenic period  Description: INTERVENTIONS:  - Monitor WBC    Outcome: Progressing     Problem: SAFETY ADULT  Goal: Patient will remain free of falls  Description: INTERVENTIONS:  - Educate patient/family on patient safety including physical limitations  - Instruct patient to call for assistance with activity   - Consult OT/PT to assist with strengthening/mobility   - Keep Call bell within reach  - Keep bed low and locked with side rails adjusted as appropriate  - Keep care items and personal belongings within reach  - Initiate and maintain comfort rounds  - Make Fall Risk Sign visible to  staff  - Offer Toileting every    Hours, in advance of need  - Initiate/Maintain   alarm  - Obtain necessary fall risk management equipment:     - Apply yellow socks and bracelet for high fall risk patients  - Consider moving patient to room near nurses station  Outcome: Progressing  Goal: Maintain or return to baseline ADL function  Description: INTERVENTIONS:  -  Assess patient's ability to carry out ADLs; assess patient's baseline for ADL function and identify physical deficits which impact ability to perform ADLs (bathing, care of mouth/teeth, toileting, grooming, dressing, etc.)  - Assess/evaluate cause of self-care deficits   - Assess range of motion  - Assess patient's mobility; develop plan if impaired  - Assess patient's need for assistive devices and provide as appropriate  - Encourage maximum independence but intervene and supervise when necessary  - Involve family in performance of ADLs  - Assess for home care needs following discharge   - Consider OT consult to assist with ADL evaluation and planning for discharge  - Provide patient education as appropriate  Outcome: Progressing  Goal: Maintains/Returns to pre admission functional level  Description: INTERVENTIONS:  - Perform AM-PAC 6 Click Basic Mobility/ Daily Activity assessment daily.  - Set and communicate daily mobility goal to care team and patient/family/caregiver.   - Collaborate with rehabilitation services on mobility goals if consulted  - Perform Range of Motion    times a day.  - Reposition patient every    hours.  - Dangle patient    times a day  - Stand patient      times a day  - Ambulate patient    times a day  - Out of bed to chair    times a day   - Out of bed for meals  times a day  - Out of bed for toileting  - Record patient progress and toleration of activity level   Outcome: Progressing     Problem: DISCHARGE PLANNING  Goal: Discharge to home or other facility with appropriate resources  Description: INTERVENTIONS:  - Identify  barriers to discharge w/patient and caregiver  - Arrange for needed discharge resources and transportation as appropriate  - Identify discharge learning needs (meds, wound care, etc.)  - Arrange for interpretive services to assist at discharge as needed  - Refer to Case Management Department for coordinating discharge planning if the patient needs post-hospital services based on physician/advanced practitioner order or complex needs related to functional status, cognitive ability, or social support system  Outcome: Progressing     Problem: Knowledge Deficit  Goal: Patient/family/caregiver demonstrates understanding of disease process, treatment plan, medications, and discharge instructions  Description: Complete learning assessment and assess knowledge base.  Interventions:  - Provide teaching at level of understanding  - Provide teaching via preferred learning methods  Outcome: Progressing     Problem: CARDIOVASCULAR - ADULT  Goal: Maintains optimal cardiac output and hemodynamic stability  Description: INTERVENTIONS:  - Monitor I/O, vital signs and rhythm  - Monitor for S/S and trends of decreased cardiac output  - Administer and titrate ordered vasoactive medications to optimize hemodynamic stability  - Assess quality of pulses, skin color and temperature  - Assess for signs of decreased coronary artery perfusion  - Instruct patient to report change in severity of symptoms  Outcome: Progressing  Goal: Absence of cardiac dysrhythmias or at baseline rhythm  Description: INTERVENTIONS:  - Continuous cardiac monitoring, vital signs, obtain 12 lead EKG if ordered  - Administer antiarrhythmic and heart rate control medications as ordered  - Monitor electrolytes and administer replacement therapy as ordered  Outcome: Progressing     Problem: METABOLIC, FLUID AND ELECTROLYTES - ADULT  Goal: Electrolytes maintained within normal limits  Description: INTERVENTIONS:  - Monitor labs and assess patient for signs and symptoms  of electrolyte imbalances  - Administer electrolyte replacement as ordered  - Monitor response to electrolyte replacements, including repeat lab results as appropriate  - Instruct patient on fluid and nutrition as appropriate  Outcome: Progressing  Goal: Fluid balance maintained  Description: INTERVENTIONS:  - Monitor labs   - Monitor I/O and WT  - Instruct patient on fluid and nutrition as appropriate  - Assess for signs & symptoms of volume excess or deficit  Outcome: Progressing  Goal: Glucose maintained within target range  Description: INTERVENTIONS:  - Monitor Blood Glucose as ordered  - Assess for signs and symptoms of hyperglycemia and hypoglycemia  - Administer ordered medications to maintain glucose within target range  - Assess nutritional intake and initiate nutrition service referral as needed  Outcome: Progressing     Problem: HEMATOLOGIC - ADULT  Goal: Maintains hematologic stability  Description: INTERVENTIONS  - Assess for signs and symptoms of bleeding or hemorrhage  - Monitor labs  - Administer supportive blood products/factors as ordered and appropriate  Outcome: Progressing

## 2024-11-14 NOTE — CASE MANAGEMENT
Case Management Assessment & Discharge Planning Note    Patient name Genna Liu  Location ICU 12/ICU 12 MRN [id number]  : 1965 Date 2024       Current Admission Date: 2024  Current Admission Diagnosis:Encephalopathy acute   Patient Active Problem List    Diagnosis Date Noted Date Diagnosed    Hx of partial adrenalectomy ([facility]) 2024     Elevated LFTs 2024     Hypothermia 2024     Encephalopathy acute 2024     Elevated TSH 2024     SIRS (systemic inflammatory response syndrome) ([facility]) 2024     Pre-syncope 2024     Secondary hyperparathyroidism of renal origin ([facility]) 2024     Hyperuricemia 2024     Transaminitis 2024     Elevated troponin 2024     Noncompliance with medication regimen 2024     Acute renal failure superimposed on stage 4 chronic kidney disease ([facility]) 2024     Chronic heart failure with preserved ejection fraction (HFpEF) ([facility]) 2024     COVID-19 2023     GERD (gastroesophageal reflux disease) 07/15/2023     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[facility]) 2023     Moderate protein-calorie malnutrition ([facility]) 2023     Electrolyte abnormality 2023     Hematochezia 2023     Depression with anxiety 2023     Abdominal pain 2023     Seizure ([facility]) 2023     Hypertensive encephalopathy 2023     Hypertensive emergency 2023     Meningioma ([facility]) 2023     Prolonged Q-T interval on ECG 2023     Hypokalemia 2021     Bradycardia 2021     Hypoglycemia 2021     History of gastric bypass 2021     Hypertensive kidney disease with stage 4 chronic kidney disease (HCC) 2021     Acute kidney injury superimposed on CKD  ([facility]) 2021     Encounter for surgical aftercare following surgery of digestive system 2021     Postsurgical malabsorption 2021     Morbid obesity due to excess calories ([facility]) 2020      Post-traumatic male urethral stricture 11/05/2020     Renal cyst 07/11/2020     Dysuria 07/09/2020     History of DVT (deep vein thrombosis) 10/17/2018     Splenic lesion 10/17/2018     Anemia 05/23/2018     Lymphedema 03/16/2018     Stage 4 chronic kidney disease (HCC) 03/15/2018     Chronic diastolic CHF (congestive heart failure) (HCC) 02/06/2018     Pulmonary nodule, right 02/06/2018     Leukocytosis 02/06/2018     Pericardial effusion 01/22/2018     Essential hypertension 01/22/2018     COPD without exacerbation (HCC) 01/22/2018     CHF (congestive heart failure) (HCC) 01/22/2018       LOS (days): 1  Geometric Mean LOS (GMLOS) (days): 2.1  Days to GMLOS:0.9     OBJECTIVE:    Risk of Unplanned Readmission Score: 27.66         Current admission status: Inpatient       Preferred Pharmacy:   [id number] - [facility] [place] - [address]  [address]  [facility] [place] [address]  Phone: [phone] Fax: [phone]    Primary Care Provider: Ugo Marina DO    Primary Insurance: [facility] REP  Secondary Insurance:     ASSESSMENT:  Active Health Care Proxies    There are no active Health Care Proxies on file.       Advance Directives  Does patient have a Health Care POA?: No  Was patient offered paperwork?: Yes (declined;  says he has paperwork at home)  Does patient have Advance Directives?: No  Was patient offered paperwork?: Yes (declined; says he has paperwork at home)  Primary Contact: Kinsey Liu (Spouse)  [phone] (Home Phone)         Readmission Root Cause  30 Day Readmission: No    Patient Information  Admitted from:: Home  Mental Status: Alert  During Assessment patient was accompanied by: Not accompanied during assessment  Assessment information provided by:: Patient  Primary Caregiver: Self  Support Systems: Spouse/significant other, Children  County of Residence: [place]  What [place] do you live in[place]  Home entry access options. Select all that apply.: Stairs  Number of  steps to enter home.: 5  Do the steps have railings?: Yes  Type of Current Residence: 2 story home  Upon entering residence, is there a bedroom on the main floor (no further steps)?: No  A bedroom is located on the following floor levels of residence (select all that apply):: 2nd Floor  Upon entering residence, is there a bathroom on the main floor (no further steps)?:  (1/2 bath only on first floor)  Number of steps to 2nd floor from main floor: One Flight  Living Arrangements: Lives w/ Spouse/significant other, Lives w/ Children  Is patient a ?: Yes  Is [facility] with [facility] ( [employer])?: Yes (Pilo Bhatt)    Activities of Daily Living Prior to Admission  Functional Status: Independent  Completes ADLs independently?: Yes  Ambulates independently?: Yes  Does patient use assisted devices?: No  Does patient currently own DME?: Yes  What DME does the patient currently own?: Straight Cane, Walker, Wheelchair, Shower Chair, Bedside Commode, Nebulizer  Does patient have a history of Outpatient Therapy (PT/OT)?: Yes  Does the patient have a history of Short-Term Rehab?: Yes  Does patient have a history of HHC?: Yes  Does patient currently have HHC?: No         Patient Information Continued  Income Source: SSI/SSD  Does patient have prescription coverage?: Yes  Does patient receive dialysis treatments?: No  Does patient have a history of substance abuse?: No  Does patient have a history of Mental Health Diagnosis?:  (pt says he has anxiety and depression;  takes meds, [facility])         Means of Transportation  Means of Transport to [facility]:: Drives Self      Social Determinants of Health (SDOH)      Flowsheet Row Most Recent Value   Housing Stability    In the last 12 months, was there a time when you were not able to pay the mortgage or rent on time? N   In the past 12 months, how many times have you moved where you were living? 0   At any time in the past 12 months, were you  homeless or living in a shelter (including now)? N   Transportation Needs    In the past 12 months, has lack of transportation kept you from medical appointments or from getting medications? no   In the past 12 months, has lack of transportation kept you from meetings, work, or from getting things needed for daily living? No   Food Insecurity    Within the past 12 months, you worried that your food would run out before you got the money to buy more. Sometimes   Within the past 12 months, the food you bought just didn't last and you didn't have money to get more. Sometimes   Utilities    In the past 12 months has the electric, gas, oil, or water company threatened to shut off services in your home? No            DISCHARGE DETAILS:    Discharge planning discussed with:: pt  Freedom of Choice: Yes  Comments - Freedom of Choice: Met w pt at bedside; introduced self and explained role of CM, including arranging DME, STR, home health, out pt tx.  Advised pt that CM will make appropriate referrals based on treatment team recommendations and MD orders, and he can consider recommended plan of care and choose from available vendors.  CM contacted family/caregiver?: No- see comments (pt alert and oriented adult)  Were Treatment Team discharge recommendations reviewed with patient/caregiver?:  (none at present)          Contacts  Patient Contacts: Kinsey Liu (Spouse)  [phone] (Home Phone)  Relationship to Patient:: Family         DME Referral Provided  Referral made for DME?: No    Other Referral/Resources/Interventions Provided:  Referral Comments: Pt independent w ADLs, mobility, able to drive. Spouse recently discharged from [facility]; five adult children at home. CM will continue to follow for post acute needs.         Treatment Team Recommendation: Other (TBD)  Discharge Destination Plan:: Other (TBD)  Transport at Discharge : Family

## 2024-11-14 NOTE — ASSESSMENT & PLAN NOTE
Patient met SIRS criteria on admission with evidence of hypothermia, tachypnea however no evidence of acute infection noted.  CT without evidence of acute infection.  UA negative.  Pro-Nirmal within normal limits.  2 sets of blood culture pending.  Currently remain stable off antibiotics.

## 2024-11-14 NOTE — ASSESSMENT & PLAN NOTE
Reportedly patient was noted to have glucose of 56 on arrival which was treated with D50 and glucose improved to 132.  Does have history of gastric bypass surgery in the past.  Continue Accu-Cheks monitoring.

## 2024-11-14 NOTE — ASSESSMENT & PLAN NOTE
Mildly elevated AST, ALT on arrival.  Suspect secondary to hypertension.  Abdomen benign on exam.  Follow-up with repeat CMP tomorrow.

## 2024-11-14 NOTE — ASSESSMENT & PLAN NOTE
Wt Readings from Last 3 Encounters:   11/14/24 63.8 kg (140 lb 10.5 oz)   07/24/24 61.4 kg (135 lb 5.8 oz)   07/16/24 66.7 kg (147 lb)     Known history of CHF.  He appears euvolemic.

## 2024-11-14 NOTE — ASSESSMENT & PLAN NOTE
Does have history of renal artery stenosis .  hospitalized due to acute encephalopathy likely secondary to hypertensive emergency.  Patient presented with blood pressure of 227/127.  He does have history of noncompliance with antihypertensive agents.  Also has multiple allergies to medication including clonidine, lisinopril, nifedipine, minoxidil, hydralazine however tolerating hydralazine well.    Reports not taking Isordil due to cost  Patient was under ICU care and now has been weaned off of nicardipine drip    Currently on encounter patient appears comfortable not in distress.  Blood pressure labile currently 198/118  Patient is asymptomatic and adamantly requesting to be discharged.  Had lengthy discussion regarding unsafe discharge due to uncontrolled blood pressure.  Plan is to continue Cardura at his home dose which is 8 mg twice daily, hydralazine 100 mg every 8 hours, started on spironolactone 12 5 mg twice daily.  We also considering adding Norvasc tomorrow morning since patient had tolerated in the past.  Constipation is likely getting compliance.

## 2024-11-14 NOTE — ASSESSMENT & PLAN NOTE
TSH noted 0.830 on arrival  Free T4  0.62.  Recommend outpatient follow-up with PCP and have repeat labs in 4-6 weeks

## 2024-11-14 NOTE — ASSESSMENT & PLAN NOTE
Lab Results   Component Value Date    EGFR 26 11/14/2024    EGFR 28 11/13/2024    EGFR 25 11/13/2024    CREATININE 2.52 (H) 11/14/2024    CREATININE 2.38 (H) 11/13/2024    CREATININE 2.63 (H) 11/13/2024   Currently creatinine stable at baseline around 2.3-2.6 range.

## 2024-11-14 NOTE — ASSESSMENT & PLAN NOTE
Suspect secondary to hypothermia and had received atropine by EMS.  Currently noted heart rate controlled in 60s.  Avoid AV blocking agent.

## 2024-11-14 NOTE — ASSESSMENT & PLAN NOTE
CT head report noted with stable 2.2 cm left tentorial leaflet meningioma.  Outpatient follow-up with PCP.

## 2024-11-14 NOTE — UTILIZATION REVIEW
Initial Clinical Review    Admission: Date/Time/Statement:   Admission Orders (From admission, onward)       Ordered        11/13/24 0329  Inpatient Admission  Once                          Orders Placed This Encounter   Procedures    Inpatient Admission     Standing Status:   Standing     Number of Occurrences:   1     Level of Care:   Level 1 Stepdown [13]     Estimated length of stay:   More than 2 Midnights     Certification:   I certify that inpatient services are medically necessary for this patient for a duration of greater than two midnights. See H&P and MD Progress Notes for additional information about the patient's course of treatment.     ED Arrival Information       Expected   -    Arrival   11/13/2024 01:00    Acuity   Emergent              Means of arrival   Ambulance    Escorted by   [facility]   Hospitalist    Admission type   Emergency              Arrival complaint   Altered Mental Status             Chief Complaint   Patient presents with    Altered Mental Status     Pt was found on floor for unknown duration by family.  Pt was responsive to verbal stimuli when ems first arrived but slow tor respond.  Pt was bradycardic on arrival with slurred speech.  Last glucose was 71.  56 glucose on arrival       Initial Presentation: 59 y.o. male to the ED from home via EMS with complaints of altered mental status. Admitted to [facility] step down for hypertensive emergency. H/O uncontrolled hypertension, hyperaldosteronism s/p left adrenalectomy in 2012, HFpEF, CKD 4, obesity s/p humaira-en-y gastric bypass in 2020, DVT, meningioma, and medication noncompliance.Unknown down time. Upon EMS arrival, he was lethargic, verbal, slurred speech.  Found to be bradycardic, glucose 56 on arrival to the ED given D50,  temp 90.8. Warming blanket applied.  Tachypneic on arrival. Bp 227/127 with elevated troponin and transaminitis.  Started on nicardipine drip in the ED.GCs 15. Does not recall events  leading to his admission. CT head shows no acute findings. 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     Date: 11/14   Day 2:  H/O renal artery stenosis. Asymptomatic currently with labile bp.  Adamantly requesting to be discharged.  Continue Cardura.  BP currently uncontrolled.  Potassium improved. Weaned off nicardipine.     Date: 11/15  Day 3: Has surpassed a 2nd midnight with active treatments and services. Initially admitted to inpatient for hypertensive emergency. Bp remains elevated. Initially was on nicardipine now Continue Cardura, aldactone, hydralazine.         ED Treatment-Medication Administration from 11/13/2024 0100 to 11/13/2024 0413         Date/Time Order Dose Route Action     11/13/2024 0106 dextrose 50 % IV solution **ADS Override Pull** --  Given by Other     11/13/2024 0109 atropine (FOR EMS ONLY) 1 mg/10 mL injection 1 mg 0 mg Does not apply Given to EMS     11/13/2024 0123 atropine (FOR EMS ONLY) 1 mg/10 mL injection 1 mg 0 mg Does not apply Given to EMS     11/13/2024 0135 niCARdipine (CARDENE) 25 mg (STANDARD CONCENTRATION) in sodium chloride 0.9% 250 mL 2.5 mg/hr Intravenous New Bag     11/13/2024 0347 niCARdipine (CARDENE) 25 mg (STANDARD CONCENTRATION) in sodium chloride 0.9% 250 mL 2.5 mg/hr Intravenous Restarted     11/13/2024 0400 niCARdipine (CARDENE) 25 mg (STANDARD CONCENTRATION) in sodium chloride 0.9% 250 mL 2.5 mg/hr Intravenous Rate/Dose Verify     11/13/2024 0212 ceftriaxone (ROCEPHIN) 1 g/50 mL in dextrose IVPB 1,000 mg Intravenous New Bag     11/13/2024 0141 sodium chloride 0.9 % bolus 1,000 mL 1,000 mL Intravenous New Bag     11/13/2024 0349 potassium chloride 20 mEq IVPB (premix) 20 mEq Intravenous New Bag     11/13/2024 0400 potassium chloride 20 mEq IVPB (premix) -- Intravenous Rate/Dose Verify     11/13/2024 0157 magnesium sulfate 2 g/50 mL IVPB (premix) 2 g 2 g Intravenous New Bag     11/13/2024 0211 potassium chloride 20 mEq IVPB (premix) 20 mEq Intravenous New Bag            Scheduled  Medications:  amLODIPine, 10 mg, Oral, Daily  aspirin, 81 mg, Oral, Daily  atorvastatin, 40 mg, Oral, HS  chlorhexidine, 15 mL, Mouth/Throat, Q12H JOANN  doxazosin, 8 mg, Oral, BID  heparin (porcine), 5,000 Units, Subcutaneous, Q8H JOANN  hydrALAZINE, 100 mg, Oral, Q8H JOANN  isosorbide dinitrate, 40 mg, Oral, TID after meals  spironolactone, 12.5 mg, Oral, BID      Continuous IV Infusions:     PRN Meds:  albuterol, 1 puff, Inhalation, Q6H PRN  labetalol, 10 mg, Intravenous, Q4H PRN      ED Triage Vitals   Temperature Pulse Respirations Blood Pressure SpO2 Pain Score   11/13/24 0135 11/13/24 0103 11/13/24 0103 11/13/24 0103 11/13/24 0103 11/13/24 0103   (!) 90.8 °F (32.7 °C) 57 20 (!) 227/127 99 % No Pain     Weight (last 2 days)       Date/Time Weight    11/15/24 0600 62.4 (137.57)    11/14/24 0600 63.8 (140.65)    11/13/24 0600 62.8 (138.45)    11/13/24 0510 62.8 (138.45)    11/13/24 0330 62.8 (138.45)    11/13/24 0103 29.3 (64.6)            Vital Signs (last 3 days)       Date/Time Temp Pulse Resp BP MAP (mmHg) SpO2 O2 Device Patient Position - Orthostatic VS Saroj Coma Scale Score Pain    11/15/24 13:15:58 -- 74 -- 157/103 121 97 % -- -- -- --    11/15/24 1234 -- -- -- 151/97 -- -- -- -- -- --    11/15/24 11:02:50 98.3 °F (36.8 °C) 63 -- 160/102 121 98 % -- -- -- --    11/15/24 10:08:58 -- 64 -- 164/104 124 96 % -- -- -- --    11/15/24 0924 -- -- -- -- -- -- -- -- 15 No Pain    11/15/24 08:58:41 -- 68 -- 132/91 105 94 % -- -- -- --    11/15/24 08:39:47 -- 60 17 166/106 126 95 % -- -- -- --    11/15/24 08:24:08 -- 59 -- 184/112 136 97 % -- -- -- --    11/15/24 07:06:46 98.3 °F (36.8 °C) 62 -- 192/114 140 98 % -- -- -- --    11/15/24 05:01:44 -- 58 18 195/118 144 98 % -- -- -- --    11/15/24 0501 -- -- -- 195/118 -- -- -- -- -- --    11/15/24 0300 98.5 °F (36.9 °C) 58 18 189/114 139 97 % None (Room air) Lying 15 --    11/14/24 2300 98.6 °F (37 °C) 65 18 179/108 132 -- None (Room air) Lying -- --    11/14/24 21:24:40  -- 59 -- 177/111 133 98 % None (Room air) -- -- No Pain    11/14/24 2124 -- -- -- 177/111 -- -- -- -- -- --    11/14/24 2109 -- -- -- -- -- -- -- -- 15 --    11/14/24 19:23:43 -- 63 17 179/111 134 97 % -- -- -- --    11/14/24 16:49:18 98.8 °F (37.1 °C) 64 18 198/118 145 99 % -- -- -- --    11/14/24 1600 -- 64 14 192/109 144 -- -- -- -- --    11/14/24 1500 98.6 °F (37 °C) 62 15 180/100 133 97 % None (Room air) Lying -- No Pain    11/14/24 1400 -- 62 17 177/90 127 -- -- -- -- --    11/14/24 1300 -- 58 18 214/105 148 -- -- -- -- --    11/14/24 1100 -- 59 36 206/118 155 -- -- -- -- --    11/14/24 1000 -- 55 21 198/107 146 -- -- -- -- --    11/14/24 0800 -- -- -- -- -- -- -- -- 14 No Pain    11/14/24 0700 99 °F (37.2 °C) 54 15 179/93 130 -- -- -- -- --    11/14/24 0558 -- -- -- 195/115 -- -- -- -- -- --    11/14/24 0555 -- 66 21 195/115 150 98 % -- -- -- --    11/14/24 0554 -- 58 25 207/109 147 98 % -- -- -- --    11/14/24 0400 -- 58 18 193/106 143 97 % -- -- 14 No Pain    11/14/24 0330 -- 60 13 212/119 154 97 % -- -- -- --    11/14/24 0000 -- -- -- -- -- -- -- -- 14 --    11/13/24 2300 -- 58 15 -- -- 98 % -- -- 14 No Pain    11/13/24 2229 99.1 °F (37.3 °C) 57 18 178/96 131 97 % None (Room air) Lying -- --    11/13/24 2121 99.3 °F (37.4 °C) 61 22 206/112 -- -- -- -- -- --    11/13/24 1900 -- -- -- -- -- -- -- -- 14 No Pain    11/13/24 1600 98.6 °F (37 °C) 65 18 163/87 119 -- -- -- -- --    11/13/24 1559 98.1 °F (36.7 °C) -- -- -- -- 96 % None (Room air) Lying -- --    11/13/24 1530 -- 64 14 174/97 129 -- -- -- -- --    11/13/24 1500 -- 63 16 185/111 140 -- -- -- -- --    11/13/24 0900 -- -- -- 153/93 116 -- -- -- -- --    11/13/24 0800 98.1 °F (36.7 °C) 76 19 162/91 120 -- -- -- 14 No Pain    11/13/24 0700 -- 75 20 149/84 111 -- -- -- -- --    11/13/24 0600 97 °F (36.1 °C) 79 18 169/92 122 98 % None (Room air) Lying -- No Pain    11/13/24 0510 96.4 °F (35.8 °C) 81 19 174/96 -- -- -- -- -- --    11/13/24 0500 96.4 °F  (35.8 °C) 81 19 174/96 125 98 % None (Room air) Lying -- No Pain    11/13/24 0445 96.1 °F (35.6 °C) 78 21 171/106 133 98 % None (Room air) -- 14 No Pain    11/13/24 0415 -- 70 10 171/101 129 97 % -- -- -- --    11/13/24 0347 -- 55 -- 212/109 -- -- -- -- -- --    11/13/24 0335 94.3 °F (34.6 °C) 55 15 194/105 140 99 % -- -- -- --    11/13/24 0300 -- -- -- -- -- -- None (Room air) -- -- --    11/13/24 0255 -- 53 19 167/93 124 99 % None (Room air) -- -- --    11/13/24 0250 -- 48 15 171/91 124 98 % None (Room air) -- -- --    11/13/24 0245 -- 46 16 179/100 133 99 % None (Room air) -- -- --    11/13/24 0241 -- 54 15 179/103 135 99 % -- -- -- --    11/13/24 0231 -- 53 17 188/92 128 99 % -- -- -- --    11/13/24 0220 -- 51 9 174/92 126 100 % -- -- -- --    11/13/24 0218 92 °F (33.3 °C) -- -- -- -- -- -- -- -- --    11/13/24 0215 -- 56 -- 178/94 128 98 % -- -- -- --    11/13/24 0204 -- 52 -- 160/77 -- -- -- -- -- --    11/13/24 0203 -- 53 -- 160/77 111 98 % -- -- -- --    11/13/24 0139 -- 50 17 226/121 166 100 % -- -- -- --    11/13/24 0135 90.8 °F (32.7 °C) -- -- -- -- -- -- -- -- --    11/13/24 0103 -- 57 20 227/127 167 99 % None (Room air) Lying -- No Pain              Pertinent Labs/Diagnostic Test Results:   Radiology:  CT head without contrast   Final Interpretation by Aniket Abdalla MD (11/13 0301)      No acute intracranial abnormality.  Stable chronic microangiopathic changes within the brain.   Stable 2.2 cm left tentorial leaflet meningioma.                  Workstation performed: [id number]         CT spine cervical without contrast   Final Interpretation by Aniket Abdalla MD (11/13 0314)      No cervical spine fracture or traumatic malalignment.                  Workstation performed: [id number]         CT chest abdomen pelvis wo contrast   Final Interpretation by Arian Ramírez DO (11/13 0338)      Moderate volume ascites.      Status post gastric bypass, no evidence of bowel obstruction,      Focal  bronchiectasis with suspected mucous plugging in the right lower lobe.      Cardiomegaly, coronary atherosclerosis, right renal cysts, moderate amount of stool in the colon, scattered colonic diverticulosis, and other findings as above.         Workstation performed: [id number]           Cardiology:  ECG 12 lead   Final Result by Judy Jeffery MD (11/13 0830)   Sinus bradycardia   Pulmonary disease pattern   Left anterior fascicular block   Moderate voltage criteria for LVH, may be normal variant   Nonspecific ST and T wave abnormality   Prolonged QT   Abnormal ECG   Confirmed by Judy Jeffery ([id number]) on 11/13/2024 8:30:36 AM      ECG 12 lead   Final Result by Judy Jeffery MD (11/13 0831)   Sinus bradycardia with PVC   Pulmonary disease pattern   Left anterior fascicular block   Moderate voltage criteria for LVH, may be normal variant   Nonspecific ST and T wave abnormality   Prolonged QT   Abnormal ECG   When compared with ECG of 24-Jul-2024 17:18,   No significant change was found   Confirmed by Judy Jeffery ([id number]) on 11/13/2024 8:31:34 AM              Results from last 7 days   Lab Units 11/15/24  0503 11/14/24  0440 11/13/24  0109   WBC Thousand/uL 5.07 6.96 6.03   HEMOGLOBIN g/dL 9.4* 9.3* 10.7*   HEMATOCRIT % 29.5* 28.9* 34.1*   PLATELETS Thousands/uL 242 258 319   TOTAL NEUT ABS Thousands/µL  --   --  4.11         Results from last 7 days   Lab Units 11/15/24  0503 11/14/24  0440 11/13/24  0608 11/13/24  0109   SODIUM mmol/L 138 140 141 142   POTASSIUM mmol/L 3.1* 3.3* 3.2* 2.9*   CHLORIDE mmol/L 103 104 104 102   CO2 mmol/L 27 27 29 31   ANION GAP mmol/L 8 9 8 9   BUN mg/dL 41* 49* 52* 51*   CREATININE mg/dL 2.43* 2.52* 2.38* 2.63*   EGFR ml/min/1.73sq m 27 26 28 25   CALCIUM mg/dL 8.7 8.8 9.2 9.2   CALCIUM, IONIZED mmol/L  --  1.11*  --   --    MAGNESIUM mg/dL  --  2.4  --   --    PHOSPHORUS mg/dL  --  4.3*  --   --      Results from last 7 days   Lab Units 11/15/24  0500 11/13/24  0109   AST U/L  50* 41*   ALT U/L 57* 58*   ALK PHOS U/L 73 87   TOTAL PROTEIN g/dL 6.2* 7.7   ALBUMIN g/dL 3.4* 4.3   TOTAL BILIRUBIN mg/dL 0.67 0.48     Results from last 7 days   Lab Units 11/15/24  1059 11/15/24  0632 11/14/24  2045 11/14/24  1656 11/14/24  1138 11/13/24  1957 11/13/24  0201 11/13/24  0128 11/13/24  0103   POC GLUCOSE mg/dl 74 85 97 80 71 122 100 132 56*     Results from last 7 days   Lab Units 11/15/24  0503 11/14/24  0440 11/13/24  0608 11/13/24  0109   GLUCOSE RANDOM mg/dL 86 97 122 56*             BETA-HYDROXYBUTYRATE   Date Value Ref Range Status   07/09/2020 1.2 (H) <0.6 mmol/L Final   07/03/2020 1.5 (H) <0.6 mmol/L Final          Results from last 7 days   Lab Units 11/13/24  0109   PH OSKAR  7.349   PCO2 OSKAR mm Hg 50.9*   PO2 OSKAR mm Hg 41.4   HCO3 OSKAR mmol/L 27.4   BASE EXC OSKAR mmol/L 1.1   O2 CONTENT OSKAR ml/dL 12.4   O2 HGB, VENOUS % 72.6         Results from last 7 days   Lab Units 11/13/24 0109   CK TOTAL U/L 327*     Results from last 7 days   Lab Units 11/13/24  0608 11/13/24  0230 11/13/24 0109   HS TNI 0HR ng/L  --   --  82*   HS TNI 2HR ng/L  --  102*  --    HSTNI D2 ng/L  --  20*  --    HS TNI 4HR ng/L 136*  --   --    HSTNI D4 ng/L 54*  --   --          Results from last 7 days   Lab Units 11/13/24  0230   PROTIME seconds 14.6   INR  1.06   PTT seconds 29     Results from last 7 days   Lab Units 11/13/24 0109   TSH 3RD GENERATON uIU/mL 8.830*     Results from last 7 days   Lab Units 11/13/24 0109   PROCALCITONIN ng/ml 0.07     Results from last 7 days   Lab Units 11/13/24  0109   LACTIC ACID mmol/L 0.9       Results from last 7 days   Lab Units 11/13/24  0225   CLARITY UA  Clear   COLOR UA  Colorless   SPEC GRAV UA  1.008   PH UA  7.0   GLUCOSE UA mg/dl Negative   KETONES UA mg/dl Negative   BLOOD UA  Moderate*   PROTEIN UA mg/dl 50 (1+)*   NITRITE UA  Negative   BILIRUBIN UA  Negative   UROBILINOGEN UA (BE) mg/dl <2.0   LEUKOCYTES UA  Negative   WBC UA /hpf 2-4*   RBC UA /hpf 30-50*    BACTERIA UA /hpf Occasional   EPITHELIAL CELLS WET PREP /hpf Occasional     Results from last 7 days   Lab Units 11/13/24  0225   AMPH/METH  Negative   BARBITURATE UR  Negative   BENZODIAZEPINE UR  Negative   COCAINE UR  Negative   METHADONE URINE  Negative   OPIATE UR  Negative   PCP UR  Negative   THC UR  Positive*     Results from last 7 days   Lab Units 11/13/24  0257   ETHANOL LVL mg/dL <10   ACETAMINOPHEN LVL ug/mL <2*   SALICYLATE LVL mg/dL <5         Results from last 7 days   Lab Units 11/13/24  0210 11/13/24  0149   BLOOD CULTURE  Gram Negative Chemo* No Growth at 48 hrs.   GRAM STAIN RESULT  Gram negative rods*  --          Past Medical History:   Diagnosis Date    Asthma     Chronic kidney disease     COPD (chronic obstructive pulmonary disease) (HCC)     CPAP (continuous positive airway pressure) dependence     NO LONGER NEEDS D/T BARIATRIC SURGERY.    Diabetes mellitus (HCC)     Emphysema of lung (HCC)     Gout     History of transfusion     pt stated they had a transfusion when they had gallbladder surgery.    Hypertension     Kidney disease     renal failure    Leg DVT (deep venous thromboembolism), acute, bilateral (HCC) 01/2018    Obesity (BMI 30-39.9)     AGUS on CPAP     setting 11    Postgastrectomy malabsorption     Sleep apnea     Systolic CHF (HCC)      Present on Admission:   Meningioma (HCC)   Chronic diastolic CHF (congestive heart failure) (HCC)   COPD without exacerbation (HCC)   Stage 4 chronic kidney disease (HCC)   Bradycardia   Hypoglycemia   Hypokalemia   Hypertensive emergency   Elevated troponin      Admitting Diagnosis: Hypokalemia [E87.6]  Hypothermia [T68.XXXA]  Altered mental status [R41.82]  Hypertensive emergency [I16.1]  Age/Sex: 59 y.o. male    Network Utilization Review Department  ATTENTION: Please call with any questions or concerns to [phone] and carefully listen to the prompts so that you are directed to the right person. All voicemails are confidential.   For  Discharge needs, contact Care Management DC Support Team at [phone] opt. [phone]  Send all requests for admission clinical reviews, approved or denied determinations and any other requests to dedicated fax number below belonging to the campus where the patient is receiving treatment. List of dedicated fax numbers for the Facilities:  FACILITY NAME UR FAX NUMBER   ADMISSION DENIALS (Administrative/Medical Necessity) [phone]   DISCHARGE SUPPORT TEAM (NETWORK) [phone]   PARENT CHILD HEALTH (Maternity/NICU/Pediatrics) [phone]   [facility] [phone]   [facility] [phone]   [facility] [phone]   [facility] [phone]   [facility] [phone]   General acute hospital [phone]   [facility] [phone]   [facility] [phone]   [facility] [phone]   [facility] [phone]   [facility] [phone]   [facility] [phone]

## 2024-11-15 VITALS
WEIGHT: 137.57 LBS | DIASTOLIC BLOOD PRESSURE: 100 MMHG | TEMPERATURE: 98.3 F | BODY MASS INDEX: 21.59 KG/M2 | HEART RATE: 76 BPM | HEIGHT: 67 IN | RESPIRATION RATE: 20 BRPM | OXYGEN SATURATION: 96 % | SYSTOLIC BLOOD PRESSURE: 151 MMHG

## 2024-11-15 LAB
ALBUMIN SERPL BCG-MCNC: 3.4 G/DL (ref 3.5–5)
ALP SERPL-CCNC: 73 U/L (ref 34–104)
ALT SERPL W P-5'-P-CCNC: 57 U/L (ref 7–52)
ANION GAP SERPL CALCULATED.3IONS-SCNC: 8 MMOL/L (ref 4–13)
AST SERPL W P-5'-P-CCNC: 50 U/L (ref 13–39)
BILIRUB SERPL-MCNC: 0.67 MG/DL (ref 0.2–1)
BUN SERPL-MCNC: 41 MG/DL (ref 5–25)
CALCIUM ALBUM COR SERPL-MCNC: 9.2 MG/DL (ref 8.3–10.1)
CALCIUM SERPL-MCNC: 8.7 MG/DL (ref 8.4–10.2)
CHLORIDE SERPL-SCNC: 103 MMOL/L (ref 96–108)
CO2 SERPL-SCNC: 27 MMOL/L (ref 21–32)
CREAT SERPL-MCNC: 2.43 MG/DL (ref 0.6–1.3)
ERYTHROCYTE [DISTWIDTH] IN BLOOD BY AUTOMATED COUNT: 15.1 % (ref 11.6–15.1)
GFR SERPL CREATININE-BSD FRML MDRD: 27 ML/MIN/1.73SQ M
GLUCOSE SERPL-MCNC: 74 MG/DL (ref 65–140)
GLUCOSE SERPL-MCNC: 85 MG/DL (ref 65–140)
GLUCOSE SERPL-MCNC: 85 MG/DL (ref 65–140)
GLUCOSE SERPL-MCNC: 86 MG/DL (ref 65–140)
HCT VFR BLD AUTO: 29.5 % (ref 36.5–49.3)
HGB BLD-MCNC: 9.4 G/DL (ref 12–17)
MCH RBC QN AUTO: 26.8 PG (ref 26.8–34.3)
MCHC RBC AUTO-ENTMCNC: 31.9 G/DL (ref 31.4–37.4)
MCV RBC AUTO: 84 FL (ref 82–98)
PLATELET # BLD AUTO: 242 THOUSANDS/UL (ref 149–390)
PMV BLD AUTO: 10.1 FL (ref 8.9–12.7)
POTASSIUM SERPL-SCNC: 3.1 MMOL/L (ref 3.5–5.3)
PROT SERPL-MCNC: 6.2 G/DL (ref 6.4–8.4)
RBC # BLD AUTO: 3.51 MILLION/UL (ref 3.88–5.62)
SODIUM SERPL-SCNC: 138 MMOL/L (ref 135–147)
WBC # BLD AUTO: 5.07 THOUSAND/UL (ref 4.31–10.16)

## 2024-11-15 PROCEDURE — 80053 COMPREHEN METABOLIC PANEL: CPT | Performed by: STUDENT IN AN ORGANIZED HEALTH CARE EDUCATION/TRAINING PROGRAM

## 2024-11-15 PROCEDURE — 99239 HOSP IP/OBS DSCHRG MGMT >30: CPT | Performed by: STUDENT IN AN ORGANIZED HEALTH CARE EDUCATION/TRAINING PROGRAM

## 2024-11-15 PROCEDURE — 82948 REAGENT STRIP/BLOOD GLUCOSE: CPT

## 2024-11-15 PROCEDURE — 85027 COMPLETE CBC AUTOMATED: CPT | Performed by: STUDENT IN AN ORGANIZED HEALTH CARE EDUCATION/TRAINING PROGRAM

## 2024-11-15 RX ORDER — ASPIRIN 81 MG/1
81 TABLET, CHEWABLE ORAL DAILY
Qty: 30 TABLET | Refills: 0 | Status: SHIPPED | OUTPATIENT
Start: 2024-11-15

## 2024-11-15 RX ORDER — AMLODIPINE BESYLATE 10 MG/1
10 TABLET ORAL DAILY
Qty: 30 TABLET | Refills: 0 | Status: SHIPPED | OUTPATIENT
Start: 2024-11-15

## 2024-11-15 RX ORDER — DOXAZOSIN 8 MG/1
8 TABLET ORAL 2 TIMES DAILY
Qty: 60 TABLET | Refills: 0 | Status: SHIPPED | OUTPATIENT
Start: 2024-11-15

## 2024-11-15 RX ORDER — HYDRALAZINE HYDROCHLORIDE 100 MG/1
100 TABLET, FILM COATED ORAL EVERY 8 HOURS SCHEDULED
Qty: 90 TABLET | Refills: 0 | Status: SHIPPED | OUTPATIENT
Start: 2024-11-15 | End: 2024-11-15

## 2024-11-15 RX ORDER — ASPIRIN 81 MG/1
81 TABLET, CHEWABLE ORAL DAILY
Qty: 30 TABLET | Refills: 0 | Status: CANCELLED | OUTPATIENT
Start: 2024-11-15

## 2024-11-15 RX ORDER — ISOSORBIDE DINITRATE 40 MG/1
40 TABLET ORAL
Qty: 90 TABLET | Refills: 0 | Status: CANCELLED | OUTPATIENT
Start: 2024-11-15

## 2024-11-15 RX ORDER — SPIRONOLACTONE 25 MG/1
12.5 TABLET ORAL 2 TIMES DAILY
Qty: 30 TABLET | Refills: 0 | Status: CANCELLED | OUTPATIENT
Start: 2024-11-15

## 2024-11-15 RX ORDER — ATORVASTATIN CALCIUM 40 MG/1
40 TABLET, FILM COATED ORAL
Qty: 30 TABLET | Refills: 0 | Status: CANCELLED | OUTPATIENT
Start: 2024-11-15

## 2024-11-15 RX ORDER — ISOSORBIDE DINITRATE 40 MG/1
40 TABLET ORAL
Qty: 90 TABLET | Refills: 0 | Status: SHIPPED | OUTPATIENT
Start: 2024-11-15 | End: 2024-11-15

## 2024-11-15 RX ORDER — ATORVASTATIN CALCIUM 40 MG/1
40 TABLET, FILM COATED ORAL
Qty: 30 TABLET | Refills: 0 | Status: SHIPPED | OUTPATIENT
Start: 2024-11-15 | End: 2024-11-15

## 2024-11-15 RX ORDER — ATORVASTATIN CALCIUM 40 MG/1
40 TABLET, FILM COATED ORAL
Qty: 30 TABLET | Refills: 0 | Status: SHIPPED | OUTPATIENT
Start: 2024-11-15

## 2024-11-15 RX ORDER — AMLODIPINE BESYLATE 10 MG/1
10 TABLET ORAL DAILY
Qty: 30 TABLET | Refills: 0 | Status: SHIPPED | OUTPATIENT
Start: 2024-11-15 | End: 2024-11-15

## 2024-11-15 RX ORDER — DOXAZOSIN 8 MG/1
8 TABLET ORAL 2 TIMES DAILY
Qty: 60 TABLET | Refills: 0 | Status: CANCELLED | OUTPATIENT
Start: 2024-11-15

## 2024-11-15 RX ORDER — ISOSORBIDE DINITRATE 40 MG/1
40 TABLET ORAL
Qty: 90 TABLET | Refills: 0 | Status: SHIPPED | OUTPATIENT
Start: 2024-11-15

## 2024-11-15 RX ORDER — HYDRALAZINE HYDROCHLORIDE 100 MG/1
100 TABLET, FILM COATED ORAL EVERY 8 HOURS SCHEDULED
Qty: 90 TABLET | Refills: 0 | Status: CANCELLED | OUTPATIENT
Start: 2024-11-15

## 2024-11-15 RX ORDER — HYDRALAZINE HYDROCHLORIDE 100 MG/1
100 TABLET, FILM COATED ORAL EVERY 8 HOURS SCHEDULED
Qty: 90 TABLET | Refills: 0 | Status: SHIPPED | OUTPATIENT
Start: 2024-11-15

## 2024-11-15 RX ORDER — ISOSORBIDE DINITRATE 10 MG/1
40 TABLET ORAL
Status: DISCONTINUED | OUTPATIENT
Start: 2024-11-15 | End: 2024-11-15 | Stop reason: HOSPADM

## 2024-11-15 RX ORDER — SPIRONOLACTONE 25 MG/1
12.5 TABLET ORAL 2 TIMES DAILY
Qty: 30 TABLET | Refills: 0 | Status: SHIPPED | OUTPATIENT
Start: 2024-11-15 | End: 2024-11-15

## 2024-11-15 RX ORDER — ASPIRIN 81 MG/1
81 TABLET, CHEWABLE ORAL DAILY
Qty: 30 TABLET | Refills: 0 | Status: SHIPPED | OUTPATIENT
Start: 2024-11-15 | End: 2024-11-15

## 2024-11-15 RX ORDER — SPIRONOLACTONE 25 MG/1
12.5 TABLET ORAL 2 TIMES DAILY
Qty: 30 TABLET | Refills: 0 | Status: SHIPPED | OUTPATIENT
Start: 2024-11-15

## 2024-11-15 RX ORDER — DOXAZOSIN 8 MG/1
8 TABLET ORAL 2 TIMES DAILY
Qty: 60 TABLET | Refills: 1 | Status: SHIPPED | OUTPATIENT
Start: 2024-11-15 | End: 2024-11-15

## 2024-11-15 RX ORDER — AMLODIPINE BESYLATE 10 MG/1
10 TABLET ORAL DAILY
Qty: 30 TABLET | Refills: 0 | Status: CANCELLED | OUTPATIENT
Start: 2024-11-15

## 2024-11-15 RX ORDER — AMLODIPINE BESYLATE 10 MG/1
10 TABLET ORAL DAILY
Status: DISCONTINUED | OUTPATIENT
Start: 2024-11-15 | End: 2024-11-15 | Stop reason: HOSPADM

## 2024-11-15 RX ADMIN — HEPARIN SODIUM 5000 UNITS: 5000 INJECTION, SOLUTION INTRAVENOUS; SUBCUTANEOUS at 13:15

## 2024-11-15 RX ADMIN — AMLODIPINE BESYLATE 10 MG: 10 TABLET ORAL at 08:23

## 2024-11-15 RX ADMIN — ASPIRIN 81 MG: 81 TABLET, CHEWABLE ORAL at 08:23

## 2024-11-15 RX ADMIN — SPIRONOLACTONE 12.5 MG: 25 TABLET ORAL at 17:39

## 2024-11-15 RX ADMIN — SPIRONOLACTONE 12.5 MG: 25 TABLET ORAL at 08:23

## 2024-11-15 RX ADMIN — ISOSORBIDE DINITRATE 40 MG: 10 TABLET ORAL at 11:30

## 2024-11-15 RX ADMIN — HYDRALAZINE HYDROCHLORIDE 100 MG: 25 TABLET ORAL at 05:01

## 2024-11-15 RX ADMIN — DOXAZOSIN 8 MG: 4 TABLET ORAL at 17:39

## 2024-11-15 RX ADMIN — ISOSORBIDE DINITRATE 40 MG: 10 TABLET ORAL at 08:23

## 2024-11-15 RX ADMIN — HEPARIN SODIUM 5000 UNITS: 5000 INJECTION, SOLUTION INTRAVENOUS; SUBCUTANEOUS at 05:00

## 2024-11-15 RX ADMIN — CHLORHEXIDINE GLUCONATE 0.12% ORAL RINSE 15 ML: 1.2 LIQUID ORAL at 08:23

## 2024-11-15 RX ADMIN — LABETALOL HYDROCHLORIDE 10 MG: 5 INJECTION, SOLUTION INTRAVENOUS at 07:28

## 2024-11-15 RX ADMIN — ISOSORBIDE DINITRATE 40 MG: 10 TABLET ORAL at 17:39

## 2024-11-15 RX ADMIN — HYDRALAZINE HYDROCHLORIDE 100 MG: 25 TABLET ORAL at 13:14

## 2024-11-15 RX ADMIN — DOXAZOSIN 8 MG: 4 TABLET ORAL at 08:23

## 2024-11-15 NOTE — PLAN OF CARE
Problem: Prexisting or High Potential for Compromised Skin Integrity  Goal: Skin integrity is maintained or improved  Description: INTERVENTIONS:  - Identify patients at risk for skin breakdown  - Assess and monitor skin integrity  - Assess and monitor nutrition and hydration status  - Monitor labs   - Assess for incontinence   - Turn and reposition patient  - Assist with mobility/ambulation  - Relieve pressure over bony prominences  - Avoid friction and shearing  - Provide appropriate hygiene as needed including keeping skin clean and dry  - Evaluate need for skin moisturizer/barrier cream  - Collaborate with interdisciplinary team   - Patient/family teaching  - Consider wound care consult   11/15/2024 1827 by Adelaide Crawford  Outcome: Adequate for Discharge  11/15/2024 0932 by Adelaide Crawford  Outcome: Progressing     Problem: Nutrition/Hydration-ADULT  Goal: Nutrient/Hydration intake appropriate for improving, restoring or maintaining nutritional needs  Description: Monitor and assess patient's nutrition/hydration status for malnutrition. Collaborate with interdisciplinary team and initiate plan and interventions as ordered.  Monitor patient's weight and dietary intake as ordered or per policy. Utilize nutrition screening tool and intervene as necessary. Determine patient's food preferences and provide high-protein, high-caloric foods as appropriate.     INTERVENTIONS:  - Monitor oral intake, urinary output, labs, and treatment plans  - Assess nutrition and hydration status and recommend course of action  - Evaluate amount of meals eaten  - Assist patient with eating if necessary   - Allow adequate time for meals  - Recommend/ encourage appropriate diets, oral nutritional supplements, and vitamin/mineral supplements  - Order, calculate, and assess calorie counts as needed  - Recommend, monitor, and adjust tube feedings and TPN/PPN based on assessed needs  - Assess need for intravenous fluids  - Provide  specific nutrition/hydration education as appropriate  - Include patient/family/caregiver in decisions related to nutrition  11/15/2024 1827 by Adelaide Crawford  Outcome: Adequate for Discharge  11/15/2024 0932 by Adelaide Crawford  Outcome: Progressing     Problem: PAIN - ADULT  Goal: Verbalizes/displays adequate comfort level or baseline comfort level  Description: Interventions:  - Encourage patient to monitor pain and request assistance  - Assess pain using appropriate pain scale  - Administer analgesics based on type and severity of pain and evaluate response  - Implement non-pharmacological measures as appropriate and evaluate response  - Consider cultural and social influences on pain and pain management  - Notify physician/advanced practitioner if interventions unsuccessful or patient reports new pain  11/15/2024 1827 by Adelaide Crawford  Outcome: Adequate for Discharge  11/15/2024 0932 by Adelaide Crawford  Outcome: Progressing     Problem: INFECTION - ADULT  Goal: Absence or prevention of progression during hospitalization  Description: INTERVENTIONS:  - Assess and monitor for signs and symptoms of infection  - Monitor lab/diagnostic results  - Monitor all insertion sites, i.e. indwelling lines, tubes, and drains  - Monitor endotracheal if appropriate and nasal secretions for changes in amount and color  - Morton appropriate cooling/warming therapies per order  - Administer medications as ordered  - Instruct and encourage patient and family to use good hand hygiene technique  - Identify and instruct in appropriate isolation precautions for identified infection/condition  11/15/2024 1827 by Adelaide Crawford  Outcome: Adequate for Discharge  11/15/2024 0932 by Adelaide Crawford  Outcome: Progressing  Goal: Absence of fever/infection during neutropenic period  Description: INTERVENTIONS:  - Monitor WBC    11/15/2024 1827 by Adelaide Crawford  Outcome: Adequate for Discharge  11/15/2024 0932 by Adelaide  Ty  Outcome: Progressing     Problem: SAFETY ADULT  Goal: Patient will remain free of falls  Description: INTERVENTIONS:  - Educate patient/family on patient safety including physical limitations  - Instruct patient to call for assistance with activity   - Consult OT/PT to assist with strengthening/mobility   - Keep Call bell within reach  - Keep bed low and locked with side rails adjusted as appropriate  - Keep care items and personal belongings within reach  - Initiate and maintain comfort rounds  - Make Fall Risk Sign visible to staff  - Offer Toileting every  Hours, in advance of need  - Initiate/Maintain alarm  - Obtain necessary fall risk management equipment:   - Apply yellow socks and bracelet for high fall risk patients  - Consider moving patient to room near nurses station  11/15/2024 1827 by Adelaide Crawford  Outcome: Adequate for Discharge  11/15/2024 0932 by Adelaide Crawford  Outcome: Progressing  Goal: Maintain or return to baseline ADL function  Description: INTERVENTIONS:  -  Assess patient's ability to carry out ADLs; assess patient's baseline for ADL function and identify physical deficits which impact ability to perform ADLs (bathing, care of mouth/teeth, toileting, grooming, dressing, etc.)  - Assess/evaluate cause of self-care deficits   - Assess range of motion  - Assess patient's mobility; develop plan if impaired  - Assess patient's need for assistive devices and provide as appropriate  - Encourage maximum independence but intervene and supervise when necessary  - Involve family in performance of ADLs  - Assess for home care needs following discharge   - Consider OT consult to assist with ADL evaluation and planning for discharge  - Provide patient education as appropriate  11/15/2024 1827 by Adelaide Crawford  Outcome: Adequate for Discharge  11/15/2024 0932 by Adelaide Crawford  Outcome: Progressing  Goal: Maintains/Returns to pre admission functional level  Description:  INTERVENTIONS:  - Perform AM-PAC 6 Click Basic Mobility/ Daily Activity assessment daily.  - Set and communicate daily mobility goal to care team and patient/family/caregiver.   - Collaborate with rehabilitation services on mobility goals if consulted  - Perform Range of Motion  times a day.  - Reposition patient every hours.  - Dangle patient  times a day  - Stand patient  times a day  - Ambulate patient  times a day  - Out of bed to chair  times a day   - Out of bed for meals times a day  - Out of bed for toileting  - Record patient progress and toleration of activity level   11/15/2024 1827 by Adelaide Crawford  Outcome: Adequate for Discharge  11/15/2024 0932 by Adelaide Crawford  Outcome: Progressing     Problem: DISCHARGE PLANNING  Goal: Discharge to home or other facility with appropriate resources  Description: INTERVENTIONS:  - Identify barriers to discharge w/patient and caregiver  - Arrange for needed discharge resources and transportation as appropriate  - Identify discharge learning needs (meds, wound care, etc.)  - Arrange for interpretive services to assist at discharge as needed  - Refer to Case Management Department for coordinating discharge planning if the patient needs post-hospital services based on physician/advanced practitioner order or complex needs related to functional status, cognitive ability, or social support system  11/15/2024 1827 by Adelaide Crawford  Outcome: Adequate for Discharge  11/15/2024 0932 by Adelaide Crawford  Outcome: Progressing     Problem: Knowledge Deficit  Goal: Patient/family/caregiver demonstrates understanding of disease process, treatment plan, medications, and discharge instructions  Description: Complete learning assessment and assess knowledge base.  Interventions:  - Provide teaching at level of understanding  - Provide teaching via preferred learning methods  11/15/2024 1827 by Adelaide Crawford  Outcome: Adequate for Discharge  11/15/2024 0932 by Adelaide  Ty  Outcome: Progressing     Problem: CARDIOVASCULAR - ADULT  Goal: Maintains optimal cardiac output and hemodynamic stability  Description: INTERVENTIONS:  - Monitor I/O, vital signs and rhythm  - Monitor for S/S and trends of decreased cardiac output  - Administer and titrate ordered vasoactive medications to optimize hemodynamic stability  - Assess quality of pulses, skin color and temperature  - Assess for signs of decreased coronary artery perfusion  - Instruct patient to report change in severity of symptoms  11/15/2024 1827 by Adelaide Crawford  Outcome: Adequate for Discharge  11/15/2024 0932 by Adelaide Crawford  Outcome: Progressing  Goal: Absence of cardiac dysrhythmias or at baseline rhythm  Description: INTERVENTIONS:  - Continuous cardiac monitoring, vital signs, obtain 12 lead EKG if ordered  - Administer antiarrhythmic and heart rate control medications as ordered  - Monitor electrolytes and administer replacement therapy as ordered  11/15/2024 1827 by Adelaide Crawford  Outcome: Adequate for Discharge  11/15/2024 0932 by Adelaide Crawford  Outcome: Progressing     Problem: METABOLIC, FLUID AND ELECTROLYTES - ADULT  Goal: Electrolytes maintained within normal limits  Description: INTERVENTIONS:  - Monitor labs and assess patient for signs and symptoms of electrolyte imbalances  - Administer electrolyte replacement as ordered  - Monitor response to electrolyte replacements, including repeat lab results as appropriate  - Instruct patient on fluid and nutrition as appropriate  11/15/2024 1827 by Adelaide Crawford  Outcome: Adequate for Discharge  11/15/2024 0932 by Adelaide Crawford  Outcome: Progressing  Goal: Fluid balance maintained  Description: INTERVENTIONS:  - Monitor labs   - Monitor I/O and WT  - Instruct patient on fluid and nutrition as appropriate  - Assess for signs & symptoms of volume excess or deficit  11/15/2024 1827 by Adelaide Crawford  Outcome: Adequate for  Discharge  11/15/2024 0932 by Adelaide Crawford  Outcome: Progressing  Goal: Glucose maintained within target range  Description: INTERVENTIONS:  - Monitor Blood Glucose as ordered  - Assess for signs and symptoms of hyperglycemia and hypoglycemia  - Administer ordered medications to maintain glucose within target range  - Assess nutritional intake and initiate nutrition service referral as needed  11/15/2024 1827 by Adelaide Crawford  Outcome: Adequate for Discharge  11/15/2024 0932 by Adelaide Crawford  Outcome: Progressing     Problem: HEMATOLOGIC - ADULT  Goal: Maintains hematologic stability  Description: INTERVENTIONS  - Assess for signs and symptoms of bleeding or hemorrhage  - Monitor labs  - Administer supportive blood products/factors as ordered and appropriate  11/15/2024 1827 by Adelaide Crawford  Outcome: Adequate for Discharge  11/15/2024 0932 by Adelaide Crawford  Outcome: Progressing

## 2024-11-15 NOTE — CASE MANAGEMENT
Case Management Discharge Planning Note    Patient name Genna Liu  Location /-01 MRN [id number]  : 1965 Date 11/15/2024       Current Admission Date: 2024  Current Admission Diagnosis:Encephalopathy acute   Patient Active Problem List    Diagnosis Date Noted Date Diagnosed    Hx of partial adrenalectomy (HCC) 2024     Elevated LFTs 2024     Hypothermia 2024     Encephalopathy acute 2024     Elevated TSH 2024     SIRS (systemic inflammatory response syndrome) ([facility]) 2024     Pre-syncope 2024     Secondary hyperparathyroidism of renal origin ([facility]) 2024     Hyperuricemia 2024     Transaminitis 2024     Elevated troponin 2024     Noncompliance with medication regimen 2024     Acute renal failure superimposed on stage 4 chronic kidney disease ([facility]) 2024     Chronic heart failure with preserved ejection fraction (HFpEF) ([facility]) 2024     COVID-19 2023     GERD (gastroesophageal reflux disease) 07/15/2023     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[facility]) 2023     Moderate protein-calorie malnutrition ([facility]) 2023     Electrolyte abnormality 2023     Hematochezia 2023     Depression with anxiety 2023     Abdominal pain 2023     Seizure ([facility]) 2023     Hypertensive encephalopathy 2023     Hypertensive emergency 2023     Meningioma ([facility]) 2023     Prolonged Q-T interval on ECG 2023     Hypokalemia 2021     Bradycardia 2021     Hypoglycemia 2021     History of gastric bypass 2021     Hypertensive kidney disease with stage 4 chronic kidney disease (HCC) 2021     Acute kidney injury superimposed on CKD  ([facility]) 2021     Encounter for surgical aftercare following surgery of digestive system 2021     Postsurgical malabsorption 2021     Morbid obesity due to excess calories ([facility]) 2020     Post-traumatic  male urethral stricture 11/05/2020     Renal cyst 07/11/2020     Dysuria 07/09/2020     History of DVT (deep vein thrombosis) 10/17/2018     Splenic lesion 10/17/2018     Anemia 05/23/2018     Lymphedema 03/16/2018     Stage 4 chronic kidney disease (HCC) 03/15/2018     Chronic diastolic CHF (congestive heart failure) ([facility]) 02/06/2018     Pulmonary nodule, right 02/06/2018     Leukocytosis 02/06/2018     Pericardial effusion 01/22/2018     Essential hypertension 01/22/2018     COPD without exacerbation ([facility]) 01/22/2018     CHF (congestive heart failure) ([facility]) 01/22/2018       LOS (days): 2  Geometric Mean LOS (GMLOS) (days): 3.1  Days to GMLOS:0.5     OBJECTIVE:  Risk of Unplanned Readmission Score: 28.64         Current admission status: Inpatient   Preferred Pharmacy:   [facility] - [facility] [place] - [address]  [address]  [facility] [address]  Phone: [phone] Fax: [phone]    [facility] - [place] - [address]  [address] [facility] [address]  Phone: [phone] Fax: [phone]    Primary Care Provider: Ugo aMrina DO    Primary Insurance: [facility] REP  Secondary Insurance:     DISCHARGE DETAILS:    Discharge planning discussed with:: Pt  Freedom of Choice: Yes  Comments - Freedom of Choice: CM discussed freedom of choice as it pertains to discharge planning. Patient was agreeable to cm providing food panty information. Patient provided with list.  CM contacted family/caregiver?: No- see comments  Were Treatment Team discharge recommendations reviewed with patient/caregiver?: Yes  Did patient/caregiver verbalize understanding of patient care needs?: Yes  Were patient/caregiver advised of the risks associated with not following Treatment Team discharge recommendations?: Yes         Requested Home Health Care         Is the patient interested in HHC at discharge?: No    DME Referral Provided  Referral made for DME?: No    Other  Referral/Resources/Interventions Provided:  Financial Resources Provided: Financial Counselor, Indigent Medication  Referral Comments: patient provided with list of foodpantries.    Would you like to participate in our [facility] service program?  : Yes     IMM Given (Date):: 11/15/24  IMM Given to:: Patient     Additional Comments: CM informed by slim that patient has been unable to afford his medications. CM contacted financial counselors over the phone and was informed patient was 100% eligible for indigent meds. CM informed provider who sent prescriptions to [facility] pharmacy. CM was informed prescriptions were 335.55. CM informed [facility] clinical coordinator of case management who verbally approved indigent meds at 3/42. Provider informed patient able to  his medications prior to 1pm.

## 2024-11-15 NOTE — DISCHARGE SUMMARY
Discharge Summary - Hospitalist   Name: Genna Liu 59 y.o. male I MRN: [id number]  Unit/Bed#: -01 I Date of Admission: 11/13/2024   Date of Service: 11/15/2024 I Hospital Day: 2   { ?Quick Links I Problem List I PORCH I Billing Tip:65495}  Assessment & Plan  Encephalopathy acute  Does have history of renal artery stenosis .  hospitalized due to acute encephalopathy likely secondary to hypertensive emergency.  Patient presented with blood pressure of 227/127.  He does have history of noncompliance with antihypertensive agents.  Also has multiple allergies to medication including clonidine, lisinopril, nifedipine, minoxidil, hydralazine however tolerating hydralazine well.    Reports not taking Isordil due to cost  Patient was under ICU care and now has been weaned off of nicardipine drip    Currently on encounter patient appears comfortable not in distress.  Blood pressure labile currently 198/118  Patient is asymptomatic and adamantly requesting to be discharged.  Had lengthy discussion regarding unsafe discharge due to uncontrolled blood pressure.  Plan is to continue Cardura at his home dose which is 8 mg twice daily, hydralazine 100 mg every 8 hours, started on spironolactone 12 5 mg twice daily.  We also considering adding Norvasc tomorrow morning since patient had tolerated in the past.  Constipation is likely getting compliance.    COPD without exacerbation (HCC)  Currently stable without exacerbation.  Chronic diastolic CHF (congestive heart failure) (HCC)  Wt Readings from Last 3 Encounters:   11/15/24 62.4 kg (137 lb 9.1 oz)   07/24/24 61.4 kg (135 lb 5.8 oz)   07/16/24 66.7 kg (147 lb)     Known history of CHF.  He appears euvolemic.          Stage 4 chronic kidney disease (HCC)  Lab Results   Component Value Date    EGFR 27 11/15/2024    EGFR 26 11/14/2024    EGFR 28 11/13/2024    CREATININE 2.43 (H) 11/15/2024    CREATININE 2.52 (H) 11/14/2024    CREATININE 2.38 (H) 11/13/2024   Currently  creatinine stable at baseline around 2.3-2.6 range.  History of DVT (deep vein thrombosis)  Not on anticoagulation.  Bradycardia  Suspect secondary to hypothermia and had received atropine by EMS.  Currently noted heart rate controlled in 60s.  Avoid AV blocking agent.  Hypoglycemia  Reportedly patient was noted to have glucose of 56 on arrival which was treated with D50 and glucose improved to 132.  Does have history of gastric bypass surgery in the past.  Continue Accu-Cheks monitoring.    History of gastric bypass  Noted history of gastric bypass.  Hypokalemia  Currently potassium 3.3  Continue to supplement as needed with caution  Hypertensive emergency  Plan as above.  Meningioma (HCC)  CT head report noted with stable 2.2 cm left tentorial leaflet meningioma.  Outpatient follow-up with PCP.  Noncompliance with medication regimen  Counseled on compliance at length.  Elevated troponin  Likely secondary to hypertensive emergency.  Currently asymptomatic.  Hx of partial adrenalectomy (HCC)  History of hyperaldosteronism status post left adrenalectomy in 2012.  Elevated LFTs  Mildly elevated AST, ALT on arrival.  Suspect secondary to hypertension.  Abdomen benign on exam.  Follow-up with repeat CMP tomorrow.  Hypothermia  Now resolved.  Elevated TSH  TSH noted 0.830 on arrival  Free T4  0.62.  Recommend outpatient follow-up with PCP and have repeat labs in 4-6 weeks  SIRS (systemic inflammatory response syndrome) (HCC)  Patient met SIRS criteria on admission with evidence of hypothermia, tachypnea however no evidence of acute infection noted.  CT without evidence of acute infection.  UA negative.  Pro-Nirmal within normal limits.  2 sets of blood culture pending.  Currently remain stable off antibiotics.       Medical Problems       Resolved Problems  Date Reviewed: 7/24/2024   None       Discharging Physician / Practitioner: Reza Ann MD  PCP: Ugo Marina DO  Admission Date:   Admission Orders (From  "admission, onward)       Ordered        11/13/24 0329  Inpatient Admission  Once                          Discharge Date: 11/15/24    Consultations During Hospital Stay:  ***    Procedures Performed:   ***    Significant Findings / Test Results:   ***    Incidental Findings:   ***   {SLIM Discharge Incidential Findings:20567}    Test Results Pending at Discharge (will require follow up):   ***     Outpatient Tests Requested:  ***    Complications:  ***    Reason for Admission: ***    Hospital Course:   Genna Liu is a 59 y.o. male patient who originally presented to the hospital on 11/13/2024 due to ***    {Hospital Course:62927}    {Complete this smartphrase if the patient is an inpatient and being discharged earlier than 2 midnights. If this does not apply, please delete this line:00449}  Please see above list of diagnoses and related plan for additional information.     Condition at Discharge: {Condition:55466}    Discharge Day Visit / Exam:   Subjective:  ***  Vitals: Blood Pressure: 151/100 (11/15/24 1558)  Pulse: 76 (11/15/24 1558)  Temperature: 98.3 °F (36.8 °C) (11/15/24 1102)  Temp Source: Oral (11/15/24 0300)  Respirations: 20 (11/15/24 1558)  Height: 5' 7\" (170.2 cm) (11/13/24 0510)  Weight - Scale: 62.4 kg (137 lb 9.1 oz) (11/15/24 0600)  SpO2: 96 % (11/15/24 1558)  Physical Exam ***    Discussion with Family: {Family Communication:69719}    Discharge instructions/Information to patient and family:   See after visit summary for information provided to patient and family.      Provisions for Follow-Up Care:  See after visit summary for information related to follow-up care and any pertinent home health orders.      Mobility at time of Discharge:   Basic Mobility Inpatient Raw Score: 24  JH-HLM Goal: 8: Walk 250 feet or more  JH-HLM Achieved: 8: Walk 250 feet ot more  {Mobility:09601}     Disposition:   {Disposition on Discharge:24241}    Planned Readmission: ***    Discharge Medications:  See after " visit summary for reconciled discharge medications provided to patient and/or family.      Administrative Statements   Discharge Statement:  I have spent a total time of *** minutes in caring for this patient on the day of the visit/encounter. {>30 minutes of time was spent on:72225}.    **Please Note: This note may have been constructed using a voice recognition system**   pulses.   Pulmonary:      Effort: Pulmonary effort is normal. No respiratory distress.      Breath sounds: Normal breath sounds. No wheezing.   Abdominal:      General: Bowel sounds are normal. There is no distension.      Palpations: Abdomen is soft.      Tenderness: There is no abdominal tenderness.   Musculoskeletal:      Right lower leg: No edema.      Left lower leg: No edema.   Neurological:      Mental Status: He is alert and oriented to person, place, and time. Mental status is at baseline.   Psychiatric:         Mood and Affect: Mood normal.         Behavior: Behavior normal.      Comments: Patient is pleasant during encounter.            Discharge instructions/Information to patient and family:   See after visit summary for information provided to patient and family.      Provisions for Follow-Up Care:  See after visit summary for information related to follow-up care and any pertinent home health orders.      Mobility at time of Discharge:   Basic Mobility Inpatient Raw Score: 24  JH-HLM Goal: 8: Walk 250 feet or more  JH-HLM Achieved: 8: Walk 250 feet ot more       Disposition:   Home    Planned Readmission: At risk of readmission due to his noncompliance.    Discharge Medications:  See after visit summary for reconciled discharge medications provided to patient and/or family.      Administrative Statements   Discharge Statement:  I have spent a total time of 35 minutes in caring for this patient on the day of the visit/encounter. >30 minutes of time was spent on: Risks and benefits of tx options, Instructions for management, Patient and family education, Importance of tx compliance, Counseling / Coordination of care, Documenting in the medical record, and Reviewing / ordering tests, medicine, procedures  .    **Please Note: This note may have been constructed using a voice recognition system**

## 2024-11-15 NOTE — PLAN OF CARE
Problem: PAIN - ADULT  Goal: Verbalizes/displays adequate comfort level or baseline comfort level  Description: Interventions:  - Encourage patient to monitor pain and request assistance  - Assess pain using appropriate pain scale  - Administer analgesics based on type and severity of pain and evaluate response  - Implement non-pharmacological measures as appropriate and evaluate response  - Consider cultural and social influences on pain and pain management  - Notify physician/advanced practitioner if interventions unsuccessful or patient reports new pain  Outcome: Progressing     Problem: INFECTION - ADULT  Goal: Absence or prevention of progression during hospitalization  Description: INTERVENTIONS:  - Assess and monitor for signs and symptoms of infection  - Monitor lab/diagnostic results  - Monitor all insertion sites, i.e. indwelling lines, tubes, and drains  - Monitor endotracheal if appropriate and nasal secretions for changes in amount and color  - [place] appropriate cooling/warming therapies per order  - Administer medications as ordered  - Instruct and encourage patient and family to use good hand hygiene technique  - Identify and instruct in appropriate isolation precautions for identified infection/condition  Outcome: Progressing     Problem: SAFETY ADULT  Goal: Patient will remain free of falls  Description: INTERVENTIONS:  - Educate patient/family on patient safety including physical limitations  - Instruct patient to call for assistance with activity   - Consult OT/PT to assist with strengthening/mobility   - Keep Call bell within reach  - Keep bed low and locked with side rails adjusted as appropriate  - Keep care items and personal belongings within reach  - Initiate and maintain comfort rounds  - Make Fall Risk Sign visible to staff  - Offer Toileting every 2 Hours, in advance of need  - Initiate/Maintain bed alarm  - Obtain necessary fall risk management equipment:    - Apply yellow socks  and bracelet for high fall risk patients  - Consider moving patient to room near nurses station  Outcome: Progressing

## 2024-11-15 NOTE — DISCHARGE INSTR - AVS FIRST PAGE
Recommend consult with primary care provider within 1 week of discharge.  Strongly encouraged to  prescription for [place] pharmacy at [facility] tomorrow morning before 1 PM.

## 2024-11-15 NOTE — ASSESSMENT & PLAN NOTE
CHILD LIFE - INITIAL CONTACT      Patient seen in  Surgery    Services introduced to  Patient and Patient's mother    Patient/Family Not Familiar to Child Life Specialist/services    Child Life information provided yes    Patient/Family concerns No con TSH noted 0.830 on arrival  Free T4  0.62.  Recommend outpatient follow-up with PCP and have repeat labs in 4-6 weeks

## 2024-11-15 NOTE — ASSESSMENT & PLAN NOTE
Wt Readings from Last 3 Encounters:   11/15/24 62.4 kg (137 lb 9.1 oz)   07/24/24 61.4 kg (135 lb 5.8 oz)   07/16/24 66.7 kg (147 lb)     Known history of CHF.  He appears euvolemic.

## 2024-11-15 NOTE — CASE MANAGEMENT
Case Management Discharge Planning Note    Patient name Genna Liu  Location /-01 MRN [id number]  : 1965 Date 11/15/2024       Current Admission Date: 2024  Current Admission Diagnosis:Encephalopathy acute   Patient Active Problem List    Diagnosis Date Noted Date Diagnosed    Hx of partial adrenalectomy (HCC) 2024     Elevated LFTs 2024     Hypothermia 2024     Encephalopathy acute 2024     Elevated TSH 2024     SIRS (systemic inflammatory response syndrome) ([facility]) 2024     Pre-syncope 2024     Secondary hyperparathyroidism of renal origin ([facility]) 2024     Hyperuricemia 2024     Transaminitis 2024     Elevated troponin 2024     Noncompliance with medication regimen 2024     Acute renal failure superimposed on stage 4 chronic kidney disease ([facility]) 2024     Chronic heart failure with preserved ejection fraction (HFpEF) ([facility]) 2024     COVID-19 2023     GERD (gastroesophageal reflux disease) 07/15/2023     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[facility]) 2023     Moderate protein-calorie malnutrition ([facility]) 2023     Electrolyte abnormality 2023     Hematochezia 2023     Depression with anxiety 2023     Abdominal pain 2023     Seizure ([facility]) 2023     Hypertensive encephalopathy 2023     Hypertensive emergency 2023     Meningioma ([facility]) 2023     Prolonged Q-T interval on ECG 2023     Hypokalemia 2021     Bradycardia 2021     Hypoglycemia 2021     History of gastric bypass 2021     Hypertensive kidney disease with stage 4 chronic kidney disease (HCC) 2021     Acute kidney injury superimposed on CKD  ([facility]) 2021     Encounter for surgical aftercare following surgery of digestive system 2021     Postsurgical malabsorption 2021     Morbid obesity due to excess calories ([facility]) 2020     Post-traumatic  male urethral stricture 11/05/2020     Renal cyst 07/11/2020     Dysuria 07/09/2020     History of DVT (deep vein thrombosis) 10/17/2018     Splenic lesion 10/17/2018     Anemia 05/23/2018     Lymphedema 03/16/2018     Stage 4 chronic kidney disease (HCC) 03/15/2018     Chronic diastolic CHF (congestive heart failure) ([facility]) 02/06/2018     Pulmonary nodule, right 02/06/2018     Leukocytosis 02/06/2018     Pericardial effusion 01/22/2018     Essential hypertension 01/22/2018     COPD without exacerbation ([facility]) 01/22/2018     CHF (congestive heart failure) ([facility]) 01/22/2018       LOS (days): 2  Geometric Mean LOS (GMLOS) (days): 3.1  Days to GMLOS:0.5     OBJECTIVE:  Risk of Unplanned Readmission Score: 28.64         Current admission status: Inpatient   Preferred Pharmacy:   [facility] - [facility] [place] - [address]  [address]  [facility] [address]  Phone: [phone] Fax: [phone]    [facility] - [place] - [address]  [address] [facility] [address]  Phone: [phone] Fax: [phone]    Primary Care Provider: Ugo Marina DO    Primary Insurance: [facility] REP  Secondary Insurance:     DISCHARGE DETAILS:    Discharge planning discussed with:: Pt  Freedom of Choice: Yes  Comments - Freedom of Choice: CM discussed freedom of choice as it pertains to discharge planning. Patient was agreeable to cm providing food panty information. Patient provided with list.  CM contacted family/caregiver?: No- see comments  Were Treatment Team discharge recommendations reviewed with patient/caregiver?: Yes  Did patient/caregiver verbalize understanding of patient care needs?: Yes  Were patient/caregiver advised of the risks associated with not following Treatment Team discharge recommendations?: Yes  Requested Home Health Care         Is the patient interested in HHC at discharge?: No    DME Referral Provided  Referral made for DME?: No    Other  Referral/Resources/Interventions Provided:  Financial Resources Provided: Financial Counselor, Indigent Medication  Referral Comments: patient provided with list of foodpantries.    Would you like to participate in our [facility] service program?  : Yes        IMM Given (Date):: 11/15/24  IMM Given to:: Patient     Additional Comments: CM informed by slim that patient has been unable to afford his medications. CM contacted financial counselors over the phone and was informed patient was 100% eligible for indigent meds. CM informed provider who sent prescriptions to \[facility]"" pharmacy. CM was informed prescriptions were 335.55. CM informed [facility] clinical coordinator of case management who verbally approved indigent meds at 3/42. Provider informed patient able to  his medications prior to 1pm.

## 2024-11-15 NOTE — PLAN OF CARE
Problem: Prexisting or High Potential for Compromised Skin Integrity  Goal: Skin integrity is maintained or improved  Description: INTERVENTIONS:  - Identify patients at risk for skin breakdown  - Assess and monitor skin integrity  - Assess and monitor nutrition and hydration status  - Monitor labs   - Assess for incontinence   - Turn and reposition patient  - Assist with mobility/ambulation  - Relieve pressure over bony prominences  - Avoid friction and shearing  - Provide appropriate hygiene as needed including keeping skin clean and dry  - Evaluate need for skin moisturizer/barrier cream  - Collaborate with interdisciplinary team   - Patient/family teaching  - Consider wound care consult   Outcome: Progressing     Problem: Nutrition/Hydration-ADULT  Goal: Nutrient/Hydration intake appropriate for improving, restoring or maintaining nutritional needs  Description: Monitor and assess patient's nutrition/hydration status for malnutrition. Collaborate with interdisciplinary team and initiate plan and interventions as ordered.  Monitor patient's weight and dietary intake as ordered or per policy. Utilize nutrition screening tool and intervene as necessary. Determine patient's food preferences and provide high-protein, high-caloric foods as appropriate.     INTERVENTIONS:  - Monitor oral intake, urinary output, labs, and treatment plans  - Assess nutrition and hydration status and recommend course of action  - Evaluate amount of meals eaten  - Assist patient with eating if necessary   - Allow adequate time for meals  - Recommend/ encourage appropriate diets, oral nutritional supplements, and vitamin/mineral supplements  - Order, calculate, and assess calorie counts as needed  - Recommend, monitor, and adjust tube feedings and TPN/PPN based on assessed needs  - Assess need for intravenous fluids  - Provide specific nutrition/hydration education as appropriate  - Include patient/family/caregiver in decisions related to  nutrition  Outcome: Progressing     Problem: PAIN - ADULT  Goal: Verbalizes/displays adequate comfort level or baseline comfort level  Description: Interventions:  - Encourage patient to monitor pain and request assistance  - Assess pain using appropriate pain scale  - Administer analgesics based on type and severity of pain and evaluate response  - Implement non-pharmacological measures as appropriate and evaluate response  - Consider cultural and social influences on pain and pain management  - Notify physician/advanced practitioner if interventions unsuccessful or patient reports new pain  Outcome: Progressing     Problem: INFECTION - ADULT  Goal: Absence or prevention of progression during hospitalization  Description: INTERVENTIONS:  - Assess and monitor for signs and symptoms of infection  - Monitor lab/diagnostic results  - Monitor all insertion sites, i.e. indwelling lines, tubes, and drains  - Monitor endotracheal if appropriate and nasal secretions for changes in amount and color  - [place] appropriate cooling/warming therapies per order  - Administer medications as ordered  - Instruct and encourage patient and family to use good hand hygiene technique  - Identify and instruct in appropriate isolation precautions for identified infection/condition  Outcome: Progressing  Goal: Absence of fever/infection during neutropenic period  Description: INTERVENTIONS:  - Monitor WBC    Outcome: Progressing     Problem: SAFETY ADULT  Goal: Patient will remain free of falls  Description: INTERVENTIONS:  - Educate patient/family on patient safety including physical limitations  - Instruct patient to call for assistance with activity   - Consult OT/PT to assist with strengthening/mobility   - Keep Call bell within reach  - Keep bed low and locked with side rails adjusted as appropriate  - Keep care items and personal belongings within reach  - Initiate and maintain comfort rounds  - Make Fall Risk Sign visible to  staff  - Offer Toileting every 3 Hours, in advance of need  - Initiate/Maintain bed alarm  - Obtain necessary fall risk management equipment:   - Apply yellow socks and bracelet for high fall risk patients  - Consider moving patient to room near nurses station  Outcome: Progressing  Goal: Maintain or return to baseline ADL function  Description: INTERVENTIONS:  -  Assess patient's ability to carry out ADLs; assess patient's baseline for ADL function and identify physical deficits which impact ability to perform ADLs (bathing, care of mouth/teeth, toileting, grooming, dressing, etc.)  - Assess/evaluate cause of self-care deficits   - Assess range of motion  - Assess patient's mobility; develop plan if impaired  - Assess patient's need for assistive devices and provide as appropriate  - Encourage maximum independence but intervene and supervise when necessary  - Involve family in performance of ADLs  - Assess for home care needs following discharge   - Consider OT consult to assist with ADL evaluation and planning for discharge  - Provide patient education as appropriate  Outcome: Progressing  Goal: Maintains/Returns to pre admission functional level  Description: INTERVENTIONS:  - Perform AM-PAC 6 Click Basic Mobility/ Daily Activity assessment daily.  - Set and communicate daily mobility goal to care team and patient/family/caregiver.   - Collaborate with rehabilitation services on mobility goals if consulted  - Perform Range of Motion 3 times a day.  - Reposition patient every 3 hours.  - Dangle patient 3 times a day  - Stand patient 3 times a day  - Ambulate patient 3 times a day  - Out of bed to chair 3 times a day   - Out of bed for meals 3 times a day  - Out of bed for toileting  - Record patient progress and toleration of activity level   Outcome: Progressing     Problem: DISCHARGE PLANNING  Goal: Discharge to home or other facility with appropriate resources  Description: INTERVENTIONS:  - Identify barriers  to discharge w/patient and caregiver  - Arrange for needed discharge resources and transportation as appropriate  - Identify discharge learning needs (meds, wound care, etc.)  - Arrange for interpretive services to assist at discharge as needed  - Refer to Case Management Department for coordinating discharge planning if the patient needs post-hospital services based on physician/advanced practitioner order or complex needs related to functional status, cognitive ability, or social support system  Outcome: Progressing     Problem: Knowledge Deficit  Goal: Patient/family/caregiver demonstrates understanding of disease process, treatment plan, medications, and discharge instructions  Description: Complete learning assessment and assess knowledge base.  Interventions:  - Provide teaching at level of understanding  - Provide teaching via preferred learning methods  Outcome: Progressing     Problem: CARDIOVASCULAR - ADULT  Goal: Maintains optimal cardiac output and hemodynamic stability  Description: INTERVENTIONS:  - Monitor I/O, vital signs and rhythm  - Monitor for S/S and trends of decreased cardiac output  - Administer and titrate ordered vasoactive medications to optimize hemodynamic stability  - Assess quality of pulses, skin color and temperature  - Assess for signs of decreased coronary artery perfusion  - Instruct patient to report change in severity of symptoms  Outcome: Progressing  Goal: Absence of cardiac dysrhythmias or at baseline rhythm  Description: INTERVENTIONS:  - Continuous cardiac monitoring, vital signs, obtain 12 lead EKG if ordered  - Administer antiarrhythmic and heart rate control medications as ordered  - Monitor electrolytes and administer replacement therapy as ordered  Outcome: Progressing     Problem: METABOLIC, FLUID AND ELECTROLYTES - ADULT  Goal: Electrolytes maintained within normal limits  Description: INTERVENTIONS:  - Monitor labs and assess patient for signs and symptoms of  electrolyte imbalances  - Administer electrolyte replacement as ordered  - Monitor response to electrolyte replacements, including repeat lab results as appropriate  - Instruct patient on fluid and nutrition as appropriate  Outcome: Progressing  Goal: Fluid balance maintained  Description: INTERVENTIONS:  - Monitor labs   - Monitor I/O and WT  - Instruct patient on fluid and nutrition as appropriate  - Assess for signs & symptoms of volume excess or deficit  Outcome: Progressing  Goal: Glucose maintained within target range  Description: INTERVENTIONS:  - Monitor Blood Glucose as ordered  - Assess for signs and symptoms of hyperglycemia and hypoglycemia  - Administer ordered medications to maintain glucose within target range  - Assess nutritional intake and initiate nutrition service referral as needed  Outcome: Progressing     Problem: HEMATOLOGIC - ADULT  Goal: Maintains hematologic stability  Description: INTERVENTIONS  - Assess for signs and symptoms of bleeding or hemorrhage  - Monitor labs  - Administer supportive blood products/factors as ordered and appropriate  Outcome: Progressing

## 2024-11-15 NOTE — ASSESSMENT & PLAN NOTE
Lab Results   Component Value Date    EGFR 27 11/15/2024    EGFR 26 11/14/2024    EGFR 28 11/13/2024    CREATININE 2.43 (H) 11/15/2024    CREATININE 2.52 (H) 11/14/2024    CREATININE 2.38 (H) 11/13/2024   Currently creatinine stable at baseline around 2.3-2.6 range.

## 2024-11-17 LAB
BACTERIA BLD CULT: ABNORMAL
BACTERIA BLD CULT: NORMAL
GRAM STN SPEC: ABNORMAL

## 2024-11-18 LAB — BACTERIA BLD CULT: NORMAL

## 2024-11-18 NOTE — UTILIZATION REVIEW
NOTIFICATION OF INPATIENT ADMISSION   AUTHORIZATION REQUEST   SERVICING FACILITY:   [place] [address]  Tax ID: [id number]  NPI: [id number] ATTENDING PROVIDER:  Attending Name and NPI#: Reza EDUARDO Md [[id number]]  Address: [address] [place] [address]  Phone: [phone]     ADMISSION INFORMATION:  Place of Service: Inpatient [facility]  Place of Service Code: 21  Inpatient Admission Date/Time: 11/13/24  3:29 AM  Discharge Date/Time: 11/15/2024  6:28 PM  Admitting Diagnosis Code/Description:  Hypokalemia [E87.6]  Hypothermia [T68.XXXA]  Altered mental status [R41.82]  Hypertensive emergency [I16.1]     UTILIZATION REVIEW CONTACT:  Andreea Morgan, Utilization   Network Utilization Review Department  Phone: [phone]  Fax [phone]  Email: [email or web address]  Contact for approvals/pending authorizations, clinical reviews, and discharge.     PHYSICIAN ADVISORY SERVICES:  Medical Necessity Denial & [facility] Review  Phone: [phone]  Fax: [phone]  Email: [email or web address]     DISCHARGE SUPPORT TEAM:  For Patients Discharge Needs & Updates  Phone: [phone] opt. 2 Fax: [phone]  Email: [email or web address]

## 2024-11-19 ENCOUNTER — RESULTS FOLLOW-UP (OUTPATIENT)
Dept: EMERGENCY DEPT | Facility: HOSPITAL | Age: 59
End: 2024-11-19

## 2024-11-19 ENCOUNTER — DOCUMENTATION (OUTPATIENT)
Dept: OTHER | Facility: HOSPITAL | Age: 59
End: 2024-11-19

## 2024-11-19 LAB
ALL TARGETS: NOT DETECTED
BACTERIA BLD CULT: ABNORMAL
GRAM STN SPEC: ABNORMAL

## 2024-11-19 NOTE — PROGRESS NOTES
1/2 blood cultures from 11/13/2024 growing a gram-negative myrna however final culture sensitivity pending.  UA from that date negative for UTI.  CT chest abdomen pelvis report noted with moderate ascites, cardiomegaly, focal bronchiectasis with suspected mucous plugging in the right lower lobe, otherwise negative for acute finding.  Spoke with phone over the phone today around 12:15 pm.  Currently patient reports he is feeling overall well at his baseline health.  Currently denies fever, chills, nausea, vomiting, generalized weakness, fatigue, abdominal pain, dysuria, any other new complaints.  I have informed him about positive blood culture and discussed with patient at length that I will follow-up with culture sensitivity and follow-up with him once results in a mean time recommended to seek immediate medical attention to nearest emergency room if he started having fever or any other worsening signs of infection explained over the phone at length.   Patient is in agreement with above plan.

## 2024-11-26 ENCOUNTER — DOCUMENTATION (OUTPATIENT)
Dept: OTHER | Facility: HOSPITAL | Age: 59
End: 2024-11-26

## 2024-11-26 NOTE — PROGRESS NOTES
Follow up on earlier call: 1/2 blood cultures from 11/13/2024 grew gram-negative myrna however unable to identify.  Called patient this morning at 11:18 am.  Patient denies fever, chills, body aches, myalgia, generalized fatigue, weakness or any other signs or symptoms.  Patient reports that he is doing overall great and denies any complaints at this time.  Recommended follow-up with primary care provider for routine visits after hospital discharge.  Patient is in agreement with above plan.

## 2025-01-06 ENCOUNTER — TELEPHONE (OUTPATIENT)
Dept: UROLOGY | Facility: CLINIC | Age: 60
End: 2025-01-06

## 2025-01-12 ENCOUNTER — HOSPITAL ENCOUNTER (INPATIENT)
Facility: HOSPITAL | Age: 60
LOS: 2 days | Discharge: HOME | DRG: 638 | End: 2025-01-14
Attending: EMERGENCY MEDICINE | Admitting: ANESTHESIOLOGY
Payer: COMMERCIAL

## 2025-01-12 ENCOUNTER — APPOINTMENT (EMERGENCY)
Dept: RADIOLOGY | Facility: HOSPITAL | Age: 60
DRG: 638 | End: 2025-01-12
Payer: COMMERCIAL

## 2025-01-12 ENCOUNTER — APPOINTMENT (INPATIENT)
Dept: CT IMAGING | Facility: HOSPITAL | Age: 60
DRG: 638 | End: 2025-01-12
Payer: COMMERCIAL

## 2025-01-12 ENCOUNTER — APPOINTMENT (EMERGENCY)
Dept: CT IMAGING | Facility: HOSPITAL | Age: 60
DRG: 638 | End: 2025-01-12
Payer: COMMERCIAL

## 2025-01-12 DIAGNOSIS — G93.40 ENCEPHALOPATHY ACUTE: ICD-10-CM

## 2025-01-12 DIAGNOSIS — E16.2 HYPOGLYCEMIA: Primary | ICD-10-CM

## 2025-01-12 DIAGNOSIS — I10 PRIMARY HYPERTENSION: ICD-10-CM

## 2025-01-12 DIAGNOSIS — I1A.0 RESISTANT HYPERTENSION: ICD-10-CM

## 2025-01-12 DIAGNOSIS — I10 HYPERTENSION: ICD-10-CM

## 2025-01-12 DIAGNOSIS — I16.1 HYPERTENSIVE EMERGENCY: ICD-10-CM

## 2025-01-12 DIAGNOSIS — N18.4 STAGE 4 CHRONIC KIDNEY DISEASE (HCC): ICD-10-CM

## 2025-01-12 PROBLEM — K56.1 INTUSSUSCEPTION INTESTINE (HCC): Status: ACTIVE | Noted: 2025-01-12

## 2025-01-12 LAB
2HR DELTA HS TROPONIN: 47 NG/L
4HR DELTA HS TROPONIN: 70 NG/L
ABO GROUP BLD: NORMAL
ALBUMIN SERPL BCG-MCNC: 3.9 G/DL (ref 3.5–5)
ALP SERPL-CCNC: 98 U/L (ref 34–104)
ALT SERPL W P-5'-P-CCNC: 49 U/L (ref 7–52)
AMMONIA PLAS-SCNC: 32 UMOL/L (ref 18–72)
AMPHETAMINES SERPL QL SCN: NEGATIVE
ANION GAP SERPL CALCULATED.3IONS-SCNC: 9 MMOL/L (ref 4–13)
APAP SERPL-MCNC: <2 UG/ML (ref 10–20)
AST SERPL W P-5'-P-CCNC: 45 U/L (ref 13–39)
ATRIAL RATE: 60 BPM
ATRIAL RATE: 66 BPM
BACTERIA UR QL AUTO: NORMAL /HPF
BARBITURATES UR QL: NEGATIVE
BASOPHILS # BLD AUTO: 0.05 THOUSANDS/ΜL (ref 0–0.1)
BASOPHILS NFR BLD AUTO: 1 % (ref 0–1)
BENZODIAZ UR QL: NEGATIVE
BILIRUB SERPL-MCNC: 0.53 MG/DL (ref 0.2–1)
BILIRUB UR QL STRIP: NEGATIVE
BLD GP AB SCN SERPL QL: NEGATIVE
BNP SERPL-MCNC: 373 PG/ML (ref 0–100)
BUN SERPL-MCNC: 49 MG/DL (ref 5–25)
CALCIUM SERPL-MCNC: 8.2 MG/DL (ref 8.4–10.2)
CARDIAC TROPONIN I PNL SERPL HS: 112 NG/L (ref ?–50)
CARDIAC TROPONIN I PNL SERPL HS: 135 NG/L (ref ?–50)
CARDIAC TROPONIN I PNL SERPL HS: 65 NG/L (ref ?–50)
CHLORIDE SERPL-SCNC: 107 MMOL/L (ref 96–108)
CK SERPL-CCNC: 349 U/L (ref 39–308)
CK SERPL-CCNC: 566 U/L (ref 39–308)
CLARITY UR: CLEAR
CO2 SERPL-SCNC: 23 MMOL/L (ref 21–32)
COCAINE UR QL: NEGATIVE
COLOR UR: COLORLESS
CREAT SERPL-MCNC: 2.68 MG/DL (ref 0.6–1.3)
EOSINOPHIL # BLD AUTO: 0.15 THOUSAND/ΜL (ref 0–0.61)
EOSINOPHIL NFR BLD AUTO: 3 % (ref 0–6)
ERYTHROCYTE [DISTWIDTH] IN BLOOD BY AUTOMATED COUNT: 16.4 % (ref 11.6–15.1)
ETHANOL SERPL-MCNC: <10 MG/DL
FENTANYL UR QL SCN: NEGATIVE
GFR SERPL CREATININE-BSD FRML MDRD: 24 ML/MIN/1.73SQ M
GLUCOSE SERPL-MCNC: 103 MG/DL (ref 65–140)
GLUCOSE SERPL-MCNC: 115 MG/DL (ref 65–140)
GLUCOSE SERPL-MCNC: 117 MG/DL (ref 65–140)
GLUCOSE SERPL-MCNC: 124 MG/DL (ref 65–140)
GLUCOSE SERPL-MCNC: 128 MG/DL (ref 65–140)
GLUCOSE SERPL-MCNC: 140 MG/DL (ref 65–140)
GLUCOSE SERPL-MCNC: 152 MG/DL (ref 65–140)
GLUCOSE SERPL-MCNC: 153 MG/DL (ref 65–140)
GLUCOSE SERPL-MCNC: 154 MG/DL (ref 65–140)
GLUCOSE SERPL-MCNC: 163 MG/DL (ref 65–140)
GLUCOSE SERPL-MCNC: 47 MG/DL (ref 65–140)
GLUCOSE SERPL-MCNC: 55 MG/DL (ref 65–140)
GLUCOSE SERPL-MCNC: 84 MG/DL (ref 65–140)
GLUCOSE SERPL-MCNC: 84 MG/DL (ref 65–140)
GLUCOSE UR STRIP-MCNC: NEGATIVE MG/DL
HCT VFR BLD AUTO: 34.5 % (ref 36.5–49.3)
HGB BLD-MCNC: 10.7 G/DL (ref 12–17)
HGB UR QL STRIP.AUTO: ABNORMAL
HYDROCODONE UR QL SCN: NEGATIVE
IMM GRANULOCYTES # BLD AUTO: 0.02 THOUSAND/UL (ref 0–0.2)
IMM GRANULOCYTES NFR BLD AUTO: 0 % (ref 0–2)
KETONES UR STRIP-MCNC: NEGATIVE MG/DL
LEUKOCYTE ESTERASE UR QL STRIP: NEGATIVE
LYMPHOCYTES # BLD AUTO: 1.05 THOUSANDS/ΜL (ref 0.6–4.47)
LYMPHOCYTES NFR BLD AUTO: 18 % (ref 14–44)
MCH RBC QN AUTO: 26.3 PG (ref 26.8–34.3)
MCHC RBC AUTO-ENTMCNC: 31 G/DL (ref 31.4–37.4)
MCV RBC AUTO: 85 FL (ref 82–98)
METHADONE UR QL: NEGATIVE
MONOCYTES # BLD AUTO: 0.45 THOUSAND/ΜL (ref 0.17–1.22)
MONOCYTES NFR BLD AUTO: 8 % (ref 4–12)
NEUTROPHILS # BLD AUTO: 4.29 THOUSANDS/ΜL (ref 1.85–7.62)
NEUTS SEG NFR BLD AUTO: 70 % (ref 43–75)
NITRITE UR QL STRIP: NEGATIVE
NON-SQ EPI CELLS URNS QL MICRO: NORMAL /HPF
NRBC BLD AUTO-RTO: 0 /100 WBCS
OPIATES UR QL SCN: NEGATIVE
OXYCODONE+OXYMORPHONE UR QL SCN: NEGATIVE
P AXIS: 75 DEGREES
P AXIS: 81 DEGREES
PCP UR QL: NEGATIVE
PH UR STRIP.AUTO: 7.5 [PH]
PLATELET # BLD AUTO: 268 THOUSANDS/UL (ref 149–390)
PMV BLD AUTO: 10.3 FL (ref 8.9–12.7)
POTASSIUM SERPL-SCNC: 3.2 MMOL/L (ref 3.5–5.3)
PR INTERVAL: 164 MS
PR INTERVAL: 164 MS
PROCALCITONIN SERPL-MCNC: 0.08 NG/ML
PROT SERPL-MCNC: 6.9 G/DL (ref 6.4–8.4)
PROT UR STRIP-MCNC: ABNORMAL MG/DL
QRS AXIS: -76 DEGREES
QRS AXIS: -77 DEGREES
QRSD INTERVAL: 100 MS
QRSD INTERVAL: 100 MS
QT INTERVAL: 508 MS
QT INTERVAL: 556 MS
QTC INTERVAL: 532 MS
QTC INTERVAL: 556 MS
RBC # BLD AUTO: 4.07 MILLION/UL (ref 3.88–5.62)
RBC #/AREA URNS AUTO: NORMAL /HPF
RH BLD: POSITIVE
SALICYLATES SERPL-MCNC: <5 MG/DL (ref 3–20)
SODIUM SERPL-SCNC: 139 MMOL/L (ref 135–147)
SP GR UR STRIP.AUTO: 1.01 (ref 1–1.03)
SPECIMEN EXPIRATION DATE: NORMAL
T WAVE AXIS: 83 DEGREES
T WAVE AXIS: 97 DEGREES
THC UR QL: POSITIVE
TSH SERPL DL<=0.05 MIU/L-ACNC: 2.44 UIU/ML (ref 0.45–4.5)
UROBILINOGEN UR STRIP-ACNC: <2 MG/DL
VENTRICULAR RATE: 60 BPM
VENTRICULAR RATE: 66 BPM
WBC # BLD AUTO: 6.01 THOUSAND/UL (ref 4.31–10.16)
WBC #/AREA URNS AUTO: NORMAL /HPF

## 2025-01-12 PROCEDURE — 86850 RBC ANTIBODY SCREEN: CPT | Performed by: EMERGENCY MEDICINE

## 2025-01-12 PROCEDURE — 93005 ELECTROCARDIOGRAM TRACING: CPT

## 2025-01-12 PROCEDURE — 74177 CT ABD & PELVIS W/CONTRAST: CPT

## 2025-01-12 PROCEDURE — NC001 PR NO CHARGE: Performed by: NURSE PRACTITIONER

## 2025-01-12 PROCEDURE — 80179 DRUG ASSAY SALICYLATE: CPT | Performed by: EMERGENCY MEDICINE

## 2025-01-12 PROCEDURE — 83880 ASSAY OF NATRIURETIC PEPTIDE: CPT | Performed by: NURSE PRACTITIONER

## 2025-01-12 PROCEDURE — 84443 ASSAY THYROID STIM HORMONE: CPT | Performed by: NURSE PRACTITIONER

## 2025-01-12 PROCEDURE — 80143 DRUG ASSAY ACETAMINOPHEN: CPT | Performed by: EMERGENCY MEDICINE

## 2025-01-12 PROCEDURE — 99291 CRITICAL CARE FIRST HOUR: CPT | Performed by: EMERGENCY MEDICINE

## 2025-01-12 PROCEDURE — 96366 THER/PROPH/DIAG IV INF ADDON: CPT

## 2025-01-12 PROCEDURE — 96376 TX/PRO/DX INJ SAME DRUG ADON: CPT

## 2025-01-12 PROCEDURE — 96375 TX/PRO/DX INJ NEW DRUG ADDON: CPT

## 2025-01-12 PROCEDURE — 84425 ASSAY OF VITAMIN B-1: CPT | Performed by: NURSE PRACTITIONER

## 2025-01-12 PROCEDURE — 96365 THER/PROPH/DIAG IV INF INIT: CPT

## 2025-01-12 PROCEDURE — 81001 URINALYSIS AUTO W/SCOPE: CPT | Performed by: EMERGENCY MEDICINE

## 2025-01-12 PROCEDURE — 84145 PROCALCITONIN (PCT): CPT | Performed by: NURSE PRACTITIONER

## 2025-01-12 PROCEDURE — 86901 BLOOD TYPING SEROLOGIC RH(D): CPT | Performed by: EMERGENCY MEDICINE

## 2025-01-12 PROCEDURE — 71260 CT THORAX DX C+: CPT

## 2025-01-12 PROCEDURE — 71045 X-RAY EXAM CHEST 1 VIEW: CPT

## 2025-01-12 PROCEDURE — 85025 COMPLETE CBC W/AUTO DIFF WBC: CPT | Performed by: EMERGENCY MEDICINE

## 2025-01-12 PROCEDURE — 70450 CT HEAD/BRAIN W/O DYE: CPT

## 2025-01-12 PROCEDURE — 86900 BLOOD TYPING SEROLOGIC ABO: CPT | Performed by: EMERGENCY MEDICINE

## 2025-01-12 PROCEDURE — 99285 EMERGENCY DEPT VISIT HI MDM: CPT

## 2025-01-12 PROCEDURE — 93010 ELECTROCARDIOGRAM REPORT: CPT | Performed by: INTERNAL MEDICINE

## 2025-01-12 PROCEDURE — 92610 EVALUATE SWALLOWING FUNCTION: CPT | Performed by: SPEECH-LANGUAGE PATHOLOGIST

## 2025-01-12 PROCEDURE — 36415 COLL VENOUS BLD VENIPUNCTURE: CPT | Performed by: EMERGENCY MEDICINE

## 2025-01-12 PROCEDURE — 82140 ASSAY OF AMMONIA: CPT | Performed by: NURSE PRACTITIONER

## 2025-01-12 PROCEDURE — 82077 ASSAY SPEC XCP UR&BREATH IA: CPT | Performed by: EMERGENCY MEDICINE

## 2025-01-12 PROCEDURE — 99223 1ST HOSP IP/OBS HIGH 75: CPT | Performed by: ANESTHESIOLOGY

## 2025-01-12 PROCEDURE — 80307 DRUG TEST PRSMV CHEM ANLYZR: CPT | Performed by: EMERGENCY MEDICINE

## 2025-01-12 PROCEDURE — 82550 ASSAY OF CK (CPK): CPT | Performed by: EMERGENCY MEDICINE

## 2025-01-12 PROCEDURE — 82948 REAGENT STRIP/BLOOD GLUCOSE: CPT

## 2025-01-12 PROCEDURE — 82550 ASSAY OF CK (CPK): CPT | Performed by: NURSE PRACTITIONER

## 2025-01-12 PROCEDURE — 84484 ASSAY OF TROPONIN QUANT: CPT | Performed by: EMERGENCY MEDICINE

## 2025-01-12 PROCEDURE — 80053 COMPREHEN METABOLIC PANEL: CPT | Performed by: EMERGENCY MEDICINE

## 2025-01-12 PROCEDURE — 96367 TX/PROPH/DG ADDL SEQ IV INF: CPT

## 2025-01-12 PROCEDURE — 72125 CT NECK SPINE W/O DYE: CPT

## 2025-01-12 PROCEDURE — 74176 CT ABD & PELVIS W/O CONTRAST: CPT

## 2025-01-12 RX ORDER — PANTOPRAZOLE SODIUM 40 MG/1
40 TABLET, DELAYED RELEASE ORAL DAILY
Status: DISCONTINUED | OUTPATIENT
Start: 2025-01-12 | End: 2025-01-14 | Stop reason: HOSPADM

## 2025-01-12 RX ORDER — AMLODIPINE BESYLATE 5 MG/1
10 TABLET ORAL DAILY
Status: DISCONTINUED | OUTPATIENT
Start: 2025-01-12 | End: 2025-01-14 | Stop reason: HOSPADM

## 2025-01-12 RX ORDER — DOXAZOSIN 4 MG/1
8 TABLET ORAL EVERY 12 HOURS
Status: DISCONTINUED | OUTPATIENT
Start: 2025-01-12 | End: 2025-01-14 | Stop reason: HOSPADM

## 2025-01-12 RX ORDER — HYDRALAZINE HYDROCHLORIDE 25 MG/1
100 TABLET, FILM COATED ORAL EVERY 8 HOURS SCHEDULED
Status: DISCONTINUED | OUTPATIENT
Start: 2025-01-12 | End: 2025-01-14 | Stop reason: HOSPADM

## 2025-01-12 RX ORDER — SUCRALFATE 1 G/1
1 TABLET ORAL 2 TIMES DAILY
Status: DISCONTINUED | OUTPATIENT
Start: 2025-01-12 | End: 2025-01-14 | Stop reason: HOSPADM

## 2025-01-12 RX ORDER — HYDRALAZINE HYDROCHLORIDE 20 MG/ML
20 INJECTION INTRAMUSCULAR; INTRAVENOUS ONCE
Status: COMPLETED | OUTPATIENT
Start: 2025-01-12 | End: 2025-01-12

## 2025-01-12 RX ORDER — LABETALOL HYDROCHLORIDE 5 MG/ML
20 INJECTION, SOLUTION INTRAVENOUS ONCE
Status: COMPLETED | OUTPATIENT
Start: 2025-01-12 | End: 2025-01-12

## 2025-01-12 RX ORDER — DEXTROSE MONOHYDRATE 25 G/50ML
25 INJECTION, SOLUTION INTRAVENOUS ONCE
Status: COMPLETED | OUTPATIENT
Start: 2025-01-12 | End: 2025-01-12

## 2025-01-12 RX ORDER — HYDRALAZINE HYDROCHLORIDE 20 MG/ML
20 INJECTION INTRAMUSCULAR; INTRAVENOUS EVERY 6 HOURS PRN
Status: DISCONTINUED | OUTPATIENT
Start: 2025-01-12 | End: 2025-01-14 | Stop reason: HOSPADM

## 2025-01-12 RX ORDER — ISOSORBIDE DINITRATE 10 MG/1
40 TABLET ORAL
Status: DISCONTINUED | OUTPATIENT
Start: 2025-01-12 | End: 2025-01-14 | Stop reason: HOSPADM

## 2025-01-12 RX ORDER — DEXTROSE MONOHYDRATE AND SODIUM CHLORIDE 5; .45 G/100ML; G/100ML
100 INJECTION, SOLUTION INTRAVENOUS CONTINUOUS
Status: DISCONTINUED | OUTPATIENT
Start: 2025-01-12 | End: 2025-01-12

## 2025-01-12 RX ORDER — LABETALOL HYDROCHLORIDE 5 MG/ML
20 INJECTION, SOLUTION INTRAVENOUS ONCE
Status: DISCONTINUED | OUTPATIENT
Start: 2025-01-12 | End: 2025-01-12

## 2025-01-12 RX ORDER — SPIRONOLACTONE 25 MG/1
12.5 TABLET ORAL 2 TIMES DAILY
Status: DISCONTINUED | OUTPATIENT
Start: 2025-01-12 | End: 2025-01-14 | Stop reason: HOSPADM

## 2025-01-12 RX ORDER — SODIUM CHLORIDE, SODIUM GLUCONATE, SODIUM ACETATE, POTASSIUM CHLORIDE, MAGNESIUM CHLORIDE, SODIUM PHOSPHATE, DIBASIC, AND POTASSIUM PHOSPHATE .53; .5; .37; .037; .03; .012; .00082 G/100ML; G/100ML; G/100ML; G/100ML; G/100ML; G/100ML; G/100ML
125 INJECTION, SOLUTION INTRAVENOUS CONTINUOUS
Status: DISPENSED | OUTPATIENT
Start: 2025-01-12 | End: 2025-01-12

## 2025-01-12 RX ORDER — ARIPIPRAZOLE 5 MG/1
5 TABLET ORAL DAILY
Status: DISCONTINUED | OUTPATIENT
Start: 2025-01-12 | End: 2025-01-14 | Stop reason: HOSPADM

## 2025-01-12 RX ORDER — CHLORHEXIDINE GLUCONATE ORAL RINSE 1.2 MG/ML
15 SOLUTION DENTAL EVERY 12 HOURS SCHEDULED
Status: DISCONTINUED | OUTPATIENT
Start: 2025-01-12 | End: 2025-01-12

## 2025-01-12 RX ORDER — HEPARIN SODIUM 5000 [USP'U]/ML
5000 INJECTION, SOLUTION INTRAVENOUS; SUBCUTANEOUS EVERY 8 HOURS SCHEDULED
Status: DISCONTINUED | OUTPATIENT
Start: 2025-01-12 | End: 2025-01-14 | Stop reason: HOSPADM

## 2025-01-12 RX ORDER — POTASSIUM CHLORIDE 14.9 MG/ML
20 INJECTION INTRAVENOUS
Status: COMPLETED | OUTPATIENT
Start: 2025-01-12 | End: 2025-01-12

## 2025-01-12 RX ORDER — ASPIRIN 81 MG/1
81 TABLET ORAL DAILY
Status: DISCONTINUED | OUTPATIENT
Start: 2025-01-12 | End: 2025-01-14 | Stop reason: HOSPADM

## 2025-01-12 RX ORDER — ATORVASTATIN CALCIUM 40 MG/1
40 TABLET, FILM COATED ORAL
Status: DISCONTINUED | OUTPATIENT
Start: 2025-01-12 | End: 2025-01-14 | Stop reason: HOSPADM

## 2025-01-12 RX ADMIN — ISOSORBIDE DINITRATE 40 MG: 10 TABLET ORAL at 21:20

## 2025-01-12 RX ADMIN — ISOSORBIDE DINITRATE 40 MG: 10 TABLET ORAL at 11:18

## 2025-01-12 RX ADMIN — SUCRALFATE 1 G: 1 TABLET ORAL at 18:13

## 2025-01-12 RX ADMIN — LABETALOL HYDROCHLORIDE 20 MG: 5 INJECTION, SOLUTION INTRAVENOUS at 01:57

## 2025-01-12 RX ADMIN — SODIUM CHLORIDE 100 ML/HR: 4 INJECTION, SOLUTION, CONCENTRATE INTRAVENOUS at 02:19

## 2025-01-12 RX ADMIN — DEXTROSE MONOHYDRATE 25 ML: 25 INJECTION, SOLUTION INTRAVENOUS at 02:05

## 2025-01-12 RX ADMIN — HYDRALAZINE HYDROCHLORIDE 20 MG: 20 INJECTION INTRAMUSCULAR; INTRAVENOUS at 03:44

## 2025-01-12 RX ADMIN — ATORVASTATIN CALCIUM 40 MG: 40 TABLET, FILM COATED ORAL at 15:40

## 2025-01-12 RX ADMIN — HEPARIN SODIUM 5000 UNITS: 5000 INJECTION, SOLUTION INTRAVENOUS; SUBCUTANEOUS at 21:21

## 2025-01-12 RX ADMIN — POTASSIUM CHLORIDE 20 MEQ: 14.9 INJECTION, SOLUTION INTRAVENOUS at 08:29

## 2025-01-12 RX ADMIN — DOXAZOSIN 8 MG: 4 TABLET ORAL at 08:28

## 2025-01-12 RX ADMIN — LABETALOL HYDROCHLORIDE 20 MG: 5 INJECTION, SOLUTION INTRAVENOUS at 02:45

## 2025-01-12 RX ADMIN — HEPARIN SODIUM 5000 UNITS: 5000 INJECTION, SOLUTION INTRAVENOUS; SUBCUTANEOUS at 15:39

## 2025-01-12 RX ADMIN — HYDRALAZINE HYDROCHLORIDE 100 MG: 25 TABLET ORAL at 21:21

## 2025-01-12 RX ADMIN — HYDRALAZINE HYDROCHLORIDE 20 MG: 20 INJECTION INTRAMUSCULAR; INTRAVENOUS at 04:26

## 2025-01-12 RX ADMIN — SPIRONOLACTONE 12.5 MG: 25 TABLET ORAL at 18:13

## 2025-01-12 RX ADMIN — SODIUM CHLORIDE, SODIUM GLUCONATE, SODIUM ACETATE, POTASSIUM CHLORIDE, MAGNESIUM CHLORIDE, SODIUM PHOSPHATE, DIBASIC, AND POTASSIUM PHOSPHATE 125 ML/HR: .53; .5; .37; .037; .03; .012; .00082 INJECTION, SOLUTION INTRAVENOUS at 11:03

## 2025-01-12 RX ADMIN — HEPARIN SODIUM 5000 UNITS: 5000 INJECTION, SOLUTION INTRAVENOUS; SUBCUTANEOUS at 06:09

## 2025-01-12 RX ADMIN — PANTOPRAZOLE SODIUM 40 MG: 40 TABLET, DELAYED RELEASE ORAL at 11:23

## 2025-01-12 RX ADMIN — AMLODIPINE BESYLATE 10 MG: 5 TABLET ORAL at 08:25

## 2025-01-12 RX ADMIN — HYDRALAZINE HYDROCHLORIDE 100 MG: 25 TABLET ORAL at 15:40

## 2025-01-12 RX ADMIN — ASPIRIN 81 MG: 81 TABLET, COATED ORAL at 08:25

## 2025-01-12 RX ADMIN — SERTRALINE HYDROCHLORIDE 200 MG: 50 TABLET ORAL at 11:26

## 2025-01-12 RX ADMIN — DOXAZOSIN 8 MG: 4 TABLET ORAL at 18:13

## 2025-01-12 RX ADMIN — DEXTROSE MONOHYDRATE 25 ML: 25 INJECTION, SOLUTION INTRAVENOUS at 02:46

## 2025-01-12 RX ADMIN — IOHEXOL 100 ML: 350 INJECTION, SOLUTION INTRAVENOUS at 01:26

## 2025-01-12 RX ADMIN — POTASSIUM CHLORIDE 20 MEQ: 14.9 INJECTION, SOLUTION INTRAVENOUS at 11:06

## 2025-01-12 RX ADMIN — ARIPIPRAZOLE 5 MG: 5 TABLET ORAL at 08:25

## 2025-01-12 RX ADMIN — DEXTROSE AND SODIUM CHLORIDE 100 ML/HR: 5; .45 INJECTION, SOLUTION INTRAVENOUS at 01:38

## 2025-01-12 RX ADMIN — SPIRONOLACTONE 12.5 MG: 25 TABLET ORAL at 11:19

## 2025-01-12 RX ADMIN — Medication 2000 UNITS: at 11:23

## 2025-01-12 RX ADMIN — HYDRALAZINE HYDROCHLORIDE 20 MG: 20 INJECTION INTRAMUSCULAR; INTRAVENOUS at 11:31

## 2025-01-12 RX ADMIN — SUCRALFATE 1 G: 1 TABLET ORAL at 11:29

## 2025-01-12 NOTE — SPEECH THERAPY NOTE
Speech-Language Pathology Bedside Swallow Evaluation        Patient Name: Genna Liu    Today's Date: 1/12/2025     Problem List  Patient Active Problem List   Diagnosis    Pericardial effusion    Essential hypertension    COPD without exacerbation (HCC)    CHF (congestive heart failure) (HCC)    Chronic diastolic CHF (congestive heart failure) (HCC)    Pulmonary nodule, right    Leukocytosis    Stage 4 chronic kidney disease (HCC)    Lymphedema    Anemia    History of DVT (deep vein thrombosis)    Splenic lesion    Dysuria    Renal cyst    Post-traumatic male urethral stricture    Morbid obesity due to excess calories (HCC)    Encounter for surgical aftercare following surgery of digestive system    Postsurgical malabsorption    Hypertensive kidney disease with stage 4 chronic kidney disease (HCC)    Acute kidney injury superimposed on CKD  (HCC)    Bradycardia    Hypoglycemia    History of gastric bypass    Hypokalemia    Seizure (HCC)    Hypertensive encephalopathy    Hypertensive emergency    Meningioma (HCC)    Prolonged Q-T interval on ECG    Abdominal pain    Hematochezia    Depression with anxiety    Electrolyte abnormality    Moderate protein-calorie malnutrition (HCC)    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HCC)    GERD (gastroesophageal reflux disease)    COVID-19    Noncompliance with medication regimen    Acute renal failure superimposed on stage 4 chronic kidney disease (HCC)    Chronic heart failure with preserved ejection fraction (HFpEF) (HCC)    Transaminitis    Elevated troponin    Secondary hyperparathyroidism of renal origin (HCC)    Hyperuricemia    Pre-syncope    Hx of partial adrenalectomy (HCC)    Elevated LFTs    Hypothermia    Encephalopathy acute    Elevated TSH    SIRS (systemic inflammatory response syndrome) (HCC)    Intussusception intestine (HCC)       Past Medical History  Past Medical History:   Diagnosis Date    Asthma     Chronic kidney disease     COPD (chronic  obstructive pulmonary disease) (HCC)     CPAP (continuous positive airway pressure) dependence     NO LONGER NEEDS D/T BARIATRIC SURGERY.    Diabetes mellitus (HCC)     Emphysema of lung (HCC)     Gout     History of transfusion     pt stated they had a transfusion when they had gallbladder surgery.    Hypertension     Kidney disease     renal failure    Leg DVT (deep venous thromboembolism), acute, bilateral (HCC) 01/2018    Obesity (BMI 30-39.9)     AGUS on CPAP     setting 11    Postgastrectomy malabsorption     Sleep apnea     Systolic CHF (HCC)        Past Surgical History  Past Surgical History:   Procedure Laterality Date    ADRENALECTOMY      CARDIAC CATHETERIZATION N/A 3/5/2024    Procedure: Cardiac Left Heart Cath;  Surgeon: Servando Wiggins MD;  Location: [facility];  Service: Cardiology    CARDIAC CATHETERIZATION N/A 3/5/2024    Procedure: Cardiac Coronary Angiogram;  Surgeon: Servando Wiggins MD;  Location: [facility];  Service: Cardiology    CHOLECYSTECTOMY      COLONOSCOPY      EGD      HIATAL HERNIA REPAIR N/A 12/22/2020    Procedure: REPAIR HERNIA HIATAL LAPAROSCOPIC;  Surgeon: Shane Francis MD;  Location: [facility];  Service: Bariatrics    INCISION AND DRAINAGE OF WOUND Left 10/17/2018    Procedure: INCISION AND DRAINAGE (I&D) GROIN;  Surgeon: Rafy Pascual MD;  Location: [facility];  Service: General    IR IMAGE GUIDED ASPIRATION / DRAINAGE W TUBE  8/17/2018    IR IMAGE GUIDED ASPIRATION / DRAINAGE W TUBE  7/31/2018    JOINT REPLACEMENT Bilateral     knee    KIDNEY SURGERY  2009    nodule removal    LYMPH NODE DISSECTION Left 01/2018    left inguinal LN removed - benign     OTHER SURGICAL HISTORY      kidney nodule removal    PALATE / UVULA BIOPSY / EXCISION      PERICARDIAL WINDOW N/A 7/8/2023    Procedure: WINDOW PERICARDIAL;  Surgeon: Alonso Logan MD;  Location: [facility];  Service: Thoracic    OH LAPS GSTR RSTCV PX W/BYP JASON-EN-Y LIMB <150 CM N/A 12/22/2020     Procedure: BYPASS GASTRIC  JASON-EN-Y LAPAROSCOPIC WITH INTRAOPERATIVE EGD;  Surgeon: Shane Francis MD;  Location: [facility];  Service: Bariatrics    SINUS SURGERY      TONSILLECTOMY           Current Medical Status  Pt is a 59 y.o. male who presented to [facility] with altered mental status per his family reporting confusion and tremors. Of note, patient is hypertensive and hypoglycemic on presentation, and is known to be noncompliant with his medication regimen.  Patient with similar symptoms on admission on 11/13/2024, which improved with better control of his hypertension.  Evaluation in the emergency room revealed positive THC on tox screen, and no acute intracranial abnormalities on CT.  Additionally, CT did reveal short segment of jejunojejunal intussusception and suggested intussusception at the gastroduodenal junction with no bowl obstruction which has since resolved.     SLP consult requested to assess the swallow given encephalopathy which is also improving. Upon my arrival the patient is awake and alert. He is oriented x3 although admits he does not know why he is here. He denies a hx of dysphagia, recent weight loss.     Past medical history:   Please see H&P for details    Special Studies:  CT abdomen pelvis wo contrast  Narrative: CT ABDOMEN AND PELVIS WITHOUT IV CONTRAST    INDICATION: interval evaluation of intussusception. Per ED notes: Presented for hypoglycemia and altered mental status. Probably transient jejunojejunal intussusception on prior CT. History of gastrectomy, adrenalectomy, kidney nodule resection and   cholecystectomy.    COMPARISON: 1/12/2025 and 11/13/2024    TECHNIQUE: CT examination of the abdomen and pelvis was performed without intravenous contrast. Multiplanar 2D reformatted images were created from the source data.    This examination, like all CT scans performed [facility], was performed utilizing techniques to minimize radiation dose  exposure, including the use of iterative reconstruction and automated exposure control. Radiation dose length   product (DLP) for this visit: 573 mGy-cm    Enteric Contrast: Administered.    FINDINGS:    ABDOMEN    LOWER CHEST: Mild linear scar and dependent atelectasis. Cardiomegaly.    LIVER/BILIARY TREE:  Within normal limits.    GALLBLADDER:  Within normal limits.    SPLEEN:  Within normal limits.    PANCREAS:  Unchanged 0.6 cm body cyst.  No duct dilation or secondary signs of acute pancreatitis.    ADRENAL GLANDS: Unchanged right adrenal thickening. Left adrenalectomy.    KIDNEYS/URETERS: Unchanged bilateral cortical cysts and small circumscribed hypodensities too small to characterize, statistically likely cysts. No follow-up indicated (Radiology 6/2019).    STOMACH AND BOWEL:  Gastric bypass.  Resolved jejunojejunal intussusception.  No bowel dilation, wall thickening or fluid levels.  Oral contrast has reached mid to distal small bowel.  Colonic diverticulosis.    APPENDIX: No evidence of appendicitis.    ABDOMINOPELVIC CAVITY:  Unchanged edema and mild ascites.    No new collections or free air. No enlarged lymph nodes.    VESSELS:  Limited evaluation without IV contrast.    PELVIS:    REPRODUCTIVE ORGANS: Unchanged enlarged prostate. Small TURP defect.    URINARY BLADDER:  Within normal limits.    ABDOMINAL WALL/INGUINAL REGIONS: Unchanged diffuse edema.    BONES:  Degenerative changes.  Impression: Resolved intussusception.    No acute change compared to earlier today.    Workstation performed: [id number]  XR Trauma chest portable  Narrative: XR CHEST PORTABLE    INDICATION: TRAUMA.    COMPARISON: 7/24/2024.    FINDINGS:    Clear lungs. No pneumothorax or pleural effusion.    Stable cardiomegaly.    Bones are unremarkable for age.    Normal upper abdomen.  Impression: No acute cardiopulmonary disease.    Workstation performed: [id number]  TRAUMA - CT spine cervical wo contrast  Narrative: CT CERVICAL SPINE  - WITHOUT CONTRAST    INDICATION:   TRAUMA.    COMPARISON: 11/13/2024.    TECHNIQUE:  CT examination of the cervical spine was performed without intravenous contrast.  Contiguous axial images were obtained. Multiplanar 2D reformatted images were created from the source data.    Radiation dose length product (DLP) for this visit:  418 mGy-cm .  This examination, like all CT scans performed in the [facility], was performed utilizing techniques to minimize radiation dose exposure, including the use of iterative   reconstruction and automated exposure control.    IMAGE QUALITY:  Diagnostic.    FINDINGS:    ALIGNMENT: Similar grade 1 anterolisthesis of C3 on C4.    VERTEBRAE:  No fracture.    DEGENERATIVE CHANGES: Moderate multilevel cervical degenerative changes are noted. No critical central canal stenosis.    PREVERTEBRAL AND PARASPINAL SOFT TISSUES: Unremarkable    THORACIC INLET: Please refer to the concurrent chest CT report for thoracic inlet findings.    OTHER: Again seen is a meningioma along the left tentorial leaflet.  Impression: No cervical spine fracture or traumatic malalignment.    I personally discussed this study with SUNNY LYNN on 1/12/2025 2:39 AM.    Workstation performed: [id number]  TRAUMA - CT head wo contrast  Narrative: CT BRAIN - WITHOUT CONTRAST    INDICATION:   TRAUMA.    COMPARISON: 11/13/2024.    TECHNIQUE:  CT examination of the brain was performed.  Multiplanar 2D reformatted images were created from the source data.    Radiation dose length product (DLP) for this visit:  884 mGy-cm .  This examination, like all CT scans performed in the [facility], was performed utilizing techniques to minimize radiation dose exposure, including the use of iterative   reconstruction and automated exposure control.    IMAGE QUALITY:  Diagnostic.    FINDINGS:    PARENCHYMA: Decreased attenuation is noted in periventricular and subcortical white matter demonstrating  an appearance that is statistically most likely to represent chronic microangiopathic change; this appearance is similar when compared to most recent   prior examination.    No CT signs of acute infarction.  No intracranial mass, mass effect or midline shift.  No acute parenchymal hemorrhage.    VENTRICLES AND EXTRA-AXIAL SPACES: Stable 2.2 cm extra-axial peripherally calcified lesion along the left tentorial leaflet compatible with meningioma. No hydrocephalus or extra-axial collection.    VISUALIZED ORBITS:  Normal visualized orbits.    PARANASAL SINUSES: Normal visualized paranasal sinuses.    CALVARIUM AND EXTRACRANIAL SOFT TISSUES: Left parietal scalp edema.  Impression: No acute intracranial abnormality.    I personally discussed this study with SUNNY LYNN on 1/12/2025 2:39 AM.    Workstation performed: [id number]  TRAUMA - CT chest abdomen pelvis w contrast  Narrative: CT CHEST, ABDOMEN AND PELVIS WITH IV CONTRAST    INDICATION: TRAUMA.    COMPARISON: 11/13/2024.    TECHNIQUE: CT examination of the chest, abdomen and pelvis was performed. Multiplanar 2D reformatted images were created from the source data.    This examination, like all CT scans performed in the [facility], was performed utilizing techniques to minimize radiation dose exposure, including the use of iterative reconstruction and automated exposure control. Radiation dose length   product (DLP) for this visit: 627 mGy-cm    IV Contrast: 100 mL of iohexol (OMNIPAQUE)  Enteric Contrast: Not administered.    FINDINGS:    Evaluation is limited by streak and motion artifact, especially in the abdomen and pelvis.    CHEST    LUNGS: Evaluation is compromised by motion artifact. Similar focal bronchiectasis with probable mucoid impaction in the right lower lobe.    PLEURA: Unremarkable.    HEART/GREAT VESSELS: Cardiomegaly. No thoracic aortic aneurysm. Coronary artery calcification.    MEDIASTINUM AND VIJAYA: Small hiatal hernia.  No mediastinal or hilar lymphadenopathy.    CHEST WALL AND LOWER NECK: Unremarkable.    ABDOMEN    LIVER/BILIARY TREE: Subcentimeter hypoattenuating lesion(s), too small to characterize but statistically likely benign, which do not require follow-up (ACR White Paper 2017). No suspicious mass. Normal hepatic contours. No biliary dilation.    GALLBLADDER: Post cholecystectomy.    SPLEEN: Unremarkable.    PANCREAS: 6 mm cystic lesion in the pancreatic neck (2/134), unchanged since CT from 7/9/2020.    ADRENAL GLANDS: Left adrenal gland is surgically absent. Similar diffuse right adrenal thickening    KIDNEYS/URETERS: No hydronephrosis. 4.5 cm right renal cyst. Subcentimeter hypoattenuating renal lesion(s), too small to characterize but statistically likely benign, which do not warrant follow-up (Radiology June 2019).    STOMACH AND BOWEL: Limited evaluation of the GI tract due to artifact and lack of enteric contrast. Surgical changes of prior gastric bypass. Short segment of nonobstructing jejunojejunal intussusception (2/138). Similarly, there appears to be   intussusception at the gastroduodenal junction (2/141). No bowel obstruction. Colonic diverticulosis without acute diverticulitis.    APPENDIX: No findings to suggest appendicitis.    ABDOMINOPELVIC CAVITY: Diffuse mesenteric edema. Small volume of ascites. No pneumoperitoneum. No lymphadenopathy.    VESSELS: Atherosclerosis without abdominal aortic aneurysm.    PELVIS    REPRODUCTIVE ORGANS: Enlarged prostate.    URINARY BLADDER: Mild diffuse wall thickening.    ABDOMINAL WALL/INGUINAL REGIONS: Small right inguinal hernia containing fluid. Body wall edema.    BONES: No acute fracture or suspicious osseous lesion. Spinal degenerative changes.  Impression: 1.  No definite evidence of traumatic injury within study limitations.  2.  Limited evaluation of the GI tract due to artifact and lack of enteric contrast. There is a short segment of jejunojejunal  intussusception and suggested intussusception at the gastroduodenal junction. No evidence of bowel obstruction. These findings   are likely transient in nature. Follow-up CT abdomen and pelvis with enteric contrast can be considered for complete evaluation as clinically appropriate.  3.  Mild diffuse bladder wall thickening which can be seen with chronic outlet obstruction or cystitis.  4.  Small volume of ascites and diffuse mesenteric edema.    I personally discussed this study with SUNNY LYNN on 1/12/2025 2:39 AM.    Workstation performed: [id number]    Social/Education/Vocational Hx:  Pt lives with family ( wife and 5 adopted children)    Swallow Information   Current Risks for Dysphagia & Aspiration: AMS     Current Symptoms/Concerns:  none reported    Current Diet:  clear liquids       Baseline Diet: regular diet and thin liquids    Baseline Assessment   Behavior/Cognition: alert    Speech/Language Status: able to participate in conversation and able to follow commands    Patient Positioning: upright in bed    Swallow Mechanism Exam   Facial: symmetrical  Labial: WFL  Lingual: WFL  Velum: symmetrical  Mandible: adequate ROM  Dentition: adequate  Vocal quality:clear/adequate   Volitional Cough: strong/productive   Resp: RA    Consistencies Assessed and Performance   Consistencies Administered: thin liquids, puree, soft solids, and hard solids  Specific materials administered included: applesauce, denisse cracker, cookies, thin liquids      Oral Stage: WFL  Mastication was adequate with the materials administered today.  Bolus formation and transfer were functional with nosignificant oral residue noted.  No overt s/s reduced oral control.    Pharyngeal Stage: WFL  Swallowing initiation appeared prompt.  Laryngeal rise was palpated and judged to be within functional limits.  No coughing, throat clearing, change in vocal quality or respiratory status noted today.     Esophageal Concerns: none  reported    Summary   Pts oropharyngeal swallow function appears generally WFL at this time with the materials administered today. He does not appear to be at risk for aspiration at this time.    Recommendations: regular diet and thin liquids     Recommended Form of Meds:  as desired/tolerated      Results Reviewed with: patient, RN, and CRNP       Plan  No further SLP services indicated at this time    Criselda Gomez M.S., CCC-SLP  Speech Language Pathologist   Available via Secure Chat  [place] #[id number]  [place] #[id number]

## 2025-01-12 NOTE — ASSESSMENT & PLAN NOTE
Wt Readings from Last 3 Encounters:   11/15/24 62.4 kg (137 lb 9.1 oz)   07/24/24 61.4 kg (135 lb 5.8 oz)   07/16/24 66.7 kg (147 lb)     Continue aspirin and antihypertensives.

## 2025-01-12 NOTE — ED PROVIDER NOTES
Time reflects when diagnosis was documented in both MDM as applicable and the Disposition within this note       Time User Action Codes Description Comment    1/12/2025  4:29 AM Joycelyn Lin [E16.2] Hypoglycemia     1/12/2025  4:29 AM Joycelyn Lin [I16.1] Hypertensive emergency           ED Disposition       ED Disposition   Admit    Condition   Stable    Date/Time   Sun Jan 12, 2025  4:29 AM    Comment   Case was discussed with Dr. Molina and the patient's admission status was agreed to be Admission Status: inpatient status to the service of Dr. Molina .               Assessment & Plan       Medical Decision Making  Patient is a 59-year-old male past medical history of hypertension, COPD, CHF, DVT on aspirin, seizure disorder, CKD stage IV, meningioma, encephalopathy presenting for hypoglycemia, altered mental status.  Patient is well-appearing at bedside with stable vitals and in no acute distress however tremulous, oriented to person and place but not time with left parietal hematoma, no other signs of trauma or tenderness, no focal deficits on neurologic exam currently other than altered mental status.  Will obtain trauma imaging and as patient is not able to provide full history will obtain CT pan scan as well as labs to assess for electrolyte abnormalities, anemia, TRUDI, transaminitis, intoxication, arrhythmia,, continue to monitor blood sugars, give fluids and reassess.    Amount and/or Complexity of Data Reviewed  Labs: ordered.  Radiology: ordered and independent interpretation performed.    Risk  Prescription drug management.  Decision regarding hospitalization.        ED Course as of 01/12/25 0436   Sun Jan 12, 2025   0135 Patient recurrently hypoglycemic, will start dextrose infusion.   0241 Trauma scans unremarkable, will give further labetalol for repeat hypertension will admit.       Medications   sodium chloride 0.9 % bolus 500 mL (0 mL Intravenous Hold 1/12/25 0152)   dextrose 5 %  and sodium chloride 0.45 % infusion (0 mL/hr Intravenous Stopped 1/12/25 0219)   dextrose 10 % and normal saline infusion (50 mL/hr Intravenous Rate/Dose Change 1/12/25 0434)   iohexol (OMNIPAQUE) 350 MG/ML injection (MULTI-DOSE) 100 mL (100 mL Intravenous Given 1/12/25 0126)   labetalol (NORMODYNE) injection 20 mg (20 mg Intravenous Given 1/12/25 0157)   dextrose 50 % IV solution 25 mL (25 mL Intravenous Given 1/12/25 0205)   dextrose 50 % IV solution 25 mL (25 mL Intravenous Given 1/12/25 0246)   labetalol (NORMODYNE) injection 20 mg (20 mg Intravenous Given 1/12/25 0245)   hydrALAZINE (APRESOLINE) injection 20 mg (20 mg Intravenous Given 1/12/25 0344)   hydrALAZINE (APRESOLINE) injection 20 mg (20 mg Intravenous Given 1/12/25 0426)       ED Risk Strat Scores                          SBIRT 22yo+      Flowsheet Row Most Recent Value   Initial Alcohol Screen: US AUDIT-C     1. How often do you have a drink containing alcohol? 0 Filed at: 01/12/2025 0058   2. How many drinks containing alcohol do you have on a typical day you are drinking?  0 Filed at: 01/12/2025 0058   3a. Male UNDER 65: How often do you have five or more drinks on one occasion? 0 Filed at: 01/12/2025 0058   Audit-C Score 0 Filed at: 01/12/2025 0058   DARRIN: How many times in the past year have you...    Used an illegal drug or used a prescription medication for non-medical reasons? Never Filed at: 01/12/2025 0058                            History of Present Illness       Chief Complaint   Patient presents with    Fall     Pt BIBA with unwitnessed fall. +HS, unknown LOC, -BT. Pt found to be hypoglycemic by EMS 47.     Hypoglycemia - Symptomatic       Past Medical History:   Diagnosis Date    Asthma     Chronic kidney disease     COPD (chronic obstructive pulmonary disease) (HCC)     CPAP (continuous positive airway pressure) dependence     NO LONGER NEEDS D/T BARIATRIC SURGERY.    Diabetes mellitus (HCC)     Emphysema of lung (HCC)     Gout      History of transfusion     pt stated they had a transfusion when they had gallbladder surgery.    Hypertension     Kidney disease     renal failure    Leg DVT (deep venous thromboembolism), acute, bilateral (HCC) 01/2018    Obesity (BMI 30-39.9)     AGUS on CPAP     setting 11    Postgastrectomy malabsorption     Sleep apnea     Systolic CHF (HCC)       Past Surgical History:   Procedure Laterality Date    ADRENALECTOMY      CARDIAC CATHETERIZATION N/A 3/5/2024    Procedure: Cardiac Left Heart Cath;  Surgeon: Servando Wiggins MD;  Location: [facility];  Service: Cardiology    CARDIAC CATHETERIZATION N/A 3/5/2024    Procedure: Cardiac Coronary Angiogram;  Surgeon: Servando Wiggins MD;  Location: [facility];  Service: Cardiology    CHOLECYSTECTOMY      COLONOSCOPY      EGD      HIATAL HERNIA REPAIR N/A 12/22/2020    Procedure: REPAIR HERNIA HIATAL LAPAROSCOPIC;  Surgeon: Shane Francis MD;  Location: [facility];  Service: Bariatrics    INCISION AND DRAINAGE OF WOUND Left 10/17/2018    Procedure: INCISION AND DRAINAGE (I&D) GROIN;  Surgeon: Rafy Pascual MD;  Location: [facility];  Service: General    IR IMAGE GUIDED ASPIRATION / DRAINAGE W TUBE  8/17/2018    IR IMAGE GUIDED ASPIRATION / DRAINAGE W TUBE  7/31/2018    JOINT REPLACEMENT Bilateral     knee    KIDNEY SURGERY  2009    nodule removal    LYMPH NODE DISSECTION Left 01/2018    left inguinal LN removed - benign     OTHER SURGICAL HISTORY      kidney nodule removal    PALATE / UVULA BIOPSY / EXCISION      PERICARDIAL WINDOW N/A 7/8/2023    Procedure: WINDOW PERICARDIAL;  Surgeon: Alonso Logan MD;  Location: [facility];  Service: Thoracic    MT LAPS GSTR RSTCV PX W/BYP JASON-EN-Y LIMB <150 CM N/A 12/22/2020    Procedure: BYPASS GASTRIC  JASON-EN-Y LAPAROSCOPIC WITH INTRAOPERATIVE EGD;  Surgeon: Shane Francis MD;  Location: [facility];  Service: Bariatrics    SINUS SURGERY      TONSILLECTOMY        Family History   Problem  Relation Age of Onset    Cancer Father     Kidney disease Mother     Heart murmur Sister     Asthma Sister     Hypertension Sister     Heart disease Brother     Bell's palsy Brother     Hypertension Brother     Deep vein thrombosis Neg Hx       Social History     Tobacco Use    Smoking status: Never     Passive exposure: Past    Smokeless tobacco: Never   Vaping Use    Vaping status: Never Used   Substance Use Topics    Alcohol use: Yes     Comment: occasionally    Drug use: Yes     Types: Marijuana     Comment: Card      E-Cigarette/Vaping    E-Cigarette Use Never User       E-Cigarette/Vaping Substances    Nicotine No     THC No     CBD No     Flavoring No     Other No     Unknown No       I have reviewed and agree with the history as documented.     Patient is a 59-year-old male past medical history of COPD, CHF, hypertension, CKD stage IV, DVT on aspirin, seizure disorder, meningioma, encephalopathy presenting for hypoglycemia, altered mental status.  Per EMS patient's family states that he has been acting abnormally for some time now and they are concerned he is having seizures.  They state that he was in his room alone when they heard a crash and came in to find that the closet door had fallen down on top of him and that he had fallen.  Unknown head trauma.  States that they did not report any seizure activity but states he was mumbling to himself.  They waited 2 hours to call EMS when EMS arrived they stated he was sonorous, diaphoretic and hypoglycemic, received total of 2 A of dextrose with some improvement but still appears disoriented.  Had some bleeding in his mouth per EMS.  Patient has no complaints currently other than being cold        Review of Systems   Unable to perform ROS: Acuity of condition           Objective       ED Triage Vitals   Temperature Pulse Blood Pressure Respirations SpO2 Patient Position - Orthostatic VS   01/12/25 0145 01/12/25 0115 01/12/25 0115 01/12/25 0115 01/12/25 0115  01/12/25 0115   98.4 °F (36.9 °C) 64 (!) 183/104 22 99 % Lying      Temp Source Heart Rate Source BP Location FiO2 (%) Pain Score    01/12/25 0145 01/12/25 0115 01/12/25 0115 -- --    Oral Monitor Right arm        Vitals      Date and Time Temp Pulse SpO2 Resp BP Pain Score FACES Pain Rating User   01/12/25 0435 -- 74 98 % 20 180/97 -- -- ML   01/12/25 0426 -- -- -- -- 193/117 -- -- ML   01/12/25 0415 -- 75 94 % 22  193/117 provider aware - orders placed -- -- ML   01/12/25 0400 -- 73 98 % 17  176/114 provider aware -- -- ML   01/12/25 0345 -- 72 97 % 20 193/116 -- -- ML   01/12/25 0330 -- 68 97 % 18  199/123 provider made aware -- -- ML   01/12/25 0315 -- 65 98 % 16  210/123 provider made aware -- -- ML   01/12/25 0300 -- 63 96 % 20 182/120 -- -- ML   01/12/25 0245 -- 66 96 % 20 196/125 -- -- ML   01/12/25 0238 -- 75 96 % 20  213/131 provider made aware -- -- ML   01/12/25 0230 -- 62 98 % 20 210/122 -- -- ML   01/12/25 0215 -- 62 96 % 18 208/112 -- -- ML   01/12/25 0200 -- 59 96 % 18 184/118 -- -- ML   01/12/25 0145 98.4 °F (36.9 °C) 72 99 % 22  220/126 provider made aware -- -- ML   01/12/25 0115 -- 64 99 % 22 183/104 -- -- ML            Physical Exam  Vitals reviewed.   Constitutional:       General: He is not in acute distress.     Appearance: Normal appearance. He is not ill-appearing.   HENT:      Head:      Comments: Left parietal hematoma     Mouth/Throat:      Mouth: Mucous membranes are moist.   Eyes:      Extraocular Movements: Extraocular movements intact.      Conjunctiva/sclera: Conjunctivae normal.      Pupils: Pupils are equal, round, and reactive to light.   Cardiovascular:      Rate and Rhythm: Normal rate and regular rhythm.      Pulses: Normal pulses.      Heart sounds: Normal heart sounds.   Pulmonary:      Effort: Pulmonary effort is normal.      Breath sounds: Normal breath sounds.   Chest:      Chest wall: No tenderness.   Abdominal:      General: Abdomen is flat.      Palpations: Abdomen is  soft.      Tenderness: There is no abdominal tenderness.   Musculoskeletal:         General: No swelling. Normal range of motion.      Cervical back: Neck supple.      Right lower leg: No edema.      Left lower leg: No edema.   Skin:     General: Skin is warm and dry.   Neurological:      General: No focal deficit present.      Mental Status: He is alert.      Motor: No weakness.      Comments: Oriented to person and place but not   Psychiatric:         Mood and Affect: Mood normal.         Results Reviewed       Procedure Component Value Units Date/Time    Fingerstick Glucose (POCT) [[id number]]  (Abnormal) Collected: 01/12/25 0430    Lab Status: Final result Specimen: Blood Updated: 01/12/25 0430     POC Glucose 154 mg/dl     HS Troponin I 2hr [[id number]]  (Abnormal) Collected: 01/12/25 0334    Lab Status: Final result Specimen: Blood from Arm, Right Updated: 01/12/25 0408     hs TnI 2hr 112 ng/L      Delta 2hr hsTnI 47 ng/L     Fingerstick Glucose (POCT) [[id number]]  (Normal) Collected: 01/12/25 0350    Lab Status: Final result Specimen: Blood Updated: 01/12/25 0351     POC Glucose 124 mg/dl     Fingerstick Glucose (POCT) [[id number]]  (Normal) Collected: 01/12/25 0313    Lab Status: Final result Specimen: Blood Updated: 01/12/25 0314     POC Glucose 117 mg/dl     HS Troponin I 4hr [[id number]]     Lab Status: No result Specimen: Blood     Rapid drug screen, urine [[id number]]  (Abnormal) Collected: 01/12/25 0101    Lab Status: Final result Specimen: Urine, Clean Catch Updated: 01/12/25 0304     Amph/Meth UR Negative     Barbiturate Ur Negative     Benzodiazepine Urine Negative     Cocaine Urine Negative     Methadone Urine Negative     Opiate Urine Negative     PCP Ur Negative     THC Urine Positive     Oxycodone Urine Negative     Fentanyl Urine Negative     HYDROCODONE URINE Negative    Narrative:      Presumptive report. If requested, specimen will be sent to reference lab for confirmation.  FOR MEDICAL PURPOSES  ONLY.   IF CONFIRMATION NEEDED PLEASE CONTACT THE LAB WITHIN 5 DAYS.    Drug Screen Cutoff Levels:  AMPHETAMINE/METHAMPHETAMINES  1000 ng/mL  BARBITURATES     200 ng/mL  BENZODIAZEPINES     200 ng/mL  COCAINE      300 ng/mL  METHADONE      300 ng/mL  OPIATES      300 ng/mL  PHENCYCLIDINE     25 ng/mL  THC       50 ng/mL  OXYCODONE      100 ng/mL  FENTANYL      5 ng/mL  HYDROCODONE     300 ng/mL    Fingerstick Glucose (POCT) [[id number]]  (Normal) Collected: 01/12/25 0236    Lab Status: Final result Specimen: Blood Updated: 01/12/25 0237     POC Glucose 84 mg/dl     Fingerstick Glucose (POCT) [[id number]]  (Abnormal) Collected: 01/12/25 0201    Lab Status: Final result Specimen: Blood Updated: 01/12/25 0204     POC Glucose 55 mg/dl     Salicylate level [[id number]]  (Normal) Collected: 01/12/25 0111    Lab Status: Final result Specimen: Blood from Arm, Right Updated: 01/12/25 0154     Salicylate Lvl <5 mg/dL     Acetaminophen level-If concentration is detectable, please discuss with medical  on call. [[id number]]  (Abnormal) Collected: 01/12/25 0111    Lab Status: Final result Specimen: Blood from Arm, Right Updated: 01/12/25 0145     Acetaminophen Level <2 ug/mL     Comprehensive metabolic panel [[id number]]  (Abnormal) Collected: 01/12/25 0111    Lab Status: Final result Specimen: Blood from Arm, Right Updated: 01/12/25 0145     Sodium 139 mmol/L      Potassium 3.2 mmol/L      Chloride 107 mmol/L      CO2 23 mmol/L      ANION GAP 9 mmol/L      BUN 49 mg/dL      Creatinine 2.68 mg/dL      Glucose 84 mg/dL      Calcium 8.2 mg/dL      AST 45 U/L      ALT 49 U/L      Alkaline Phosphatase 98 U/L      Total Protein 6.9 g/dL      Albumin 3.9 g/dL      Total Bilirubin 0.53 mg/dL      eGFR 24 ml/min/1.73sq m     Narrative:      [employer] guidelines for Chronic Kidney Disease (CKD):     Stage 1 with normal or high GFR (GFR > 90 mL/min/1.73 square meters)    Stage 2 Mild CKD (GFR = 60-89  mL/min/1.73 square meters)    Stage 3A Moderate CKD (GFR = 45-59 mL/min/1.73 square meters)    Stage 3B Moderate CKD (GFR = 30-44 mL/min/1.73 square meters)    Stage 4 Severe CKD (GFR = 15-29 mL/min/1.73 square meters)    Stage 5 End Stage CKD (GFR <15 mL/min/1.73 square meters)  Note: GFR calculation is accurate only with a steady state creatinine    CK [[id number]]  (Abnormal) Collected: 01/12/25 0111    Lab Status: Final result Specimen: Blood from Arm, Right Updated: 01/12/25 0145     Total  U/L     HS Troponin 0hr (reflex protocol) [[id number]]  (Abnormal) Collected: 01/12/25 0111    Lab Status: Final result Specimen: Blood from Arm, Right Updated: 01/12/25 0143     hs TnI 0hr 65 ng/L     Ethanol [[id number]]  (Normal) Collected: 01/12/25 0111    Lab Status: Final result Specimen: Blood from Arm, Right Updated: 01/12/25 0135     Ethanol Lvl <10 mg/dL     Fingerstick Glucose (POCT) [[id number]]  (Abnormal) Collected: 01/12/25 0134    Lab Status: Final result Specimen: Blood Updated: 01/12/25 0135     POC Glucose 47 mg/dl     Urine Microscopic [[id number]]  (Normal) Collected: 01/12/25 0111    Lab Status: Final result Specimen: Urine, Clean Catch Updated: 01/12/25 0126     RBC, UA 1-2 /hpf      WBC, UA None Seen /hpf      Epithelial Cells Occasional /hpf      Bacteria, UA None Seen /hpf     UA w Reflex to Microscopic w Reflex to Culture [[id number]]  (Abnormal) Collected: 01/12/25 0111    Lab Status: Final result Specimen: Urine, Clean Catch Updated: 01/12/25 0126     Color, UA Colorless     Clarity, UA Clear     Specific Gravity, UA 1.007     pH, UA 7.5     Leukocytes, UA Negative     Nitrite, UA Negative     Protein, UA 70 (1+) mg/dl      Glucose, UA Negative mg/dl      Ketones, UA Negative mg/dl      Urobilinogen, UA <2.0 mg/dl      Bilirubin, UA Negative     Occult Blood, UA Small    CBC and differential [[id number]]  (Abnormal) Collected: 01/12/25 0111    Lab Status: Final result Specimen: Blood from Arm,  Right Updated: 01/12/25 0123     WBC 6.01 Thousand/uL      RBC 4.07 Million/uL      Hemoglobin 10.7 g/dL      Hematocrit 34.5 %      MCV 85 fL      MCH 26.3 pg      MCHC 31.0 g/dL      RDW 16.4 %      MPV 10.3 fL      Platelets 268 Thousands/uL      nRBC 0 /100 WBCs      Segmented % 70 %      Immature Grans % 0 %      Lymphocytes % 18 %      Monocytes % 8 %      Eosinophils Relative 3 %      Basophils Relative 1 %      Absolute Neutrophils 4.29 Thousands/µL      Absolute Immature Grans 0.02 Thousand/uL      Absolute Lymphocytes 1.05 Thousands/µL      Absolute Monocytes 0.45 Thousand/µL      Eosinophils Absolute 0.15 Thousand/µL      Basophils Absolute 0.05 Thousands/µL     Fingerstick Glucose (POCT) [[id number]]  (Normal) Collected: 01/12/25 0057    Lab Status: Final result Specimen: Blood Updated: 01/12/25 0058     POC Glucose 128 mg/dl             TRAUMA - CT head wo contrast   Final Interpretation by Sia Nguyen MD (01/12 0253)      No acute intracranial abnormality.      I personally discussed this study with SUNNY LYNN on 1/12/2025 2:39 AM.                  Workstation performed: [id number]         TRAUMA - CT spine cervical wo contrast   Final Interpretation by Sia Nguyen MD (01/12 0254)      No cervical spine fracture or traumatic malalignment.      I personally discussed this study with SUNNY LYNN on 1/12/2025 2:39 AM.            Workstation performed: [id number]         TRAUMA - CT chest abdomen pelvis w contrast   Final Interpretation by Sia Nguyen MD (01/12 0251)      1.  No definite evidence of traumatic injury within study limitations.   2.  Limited evaluation of the GI tract due to artifact and lack of enteric contrast. There is a short segment of jejunojejunal intussusception and suggested intussusception at the gastroduodenal junction. No evidence of bowel obstruction. These findings    are likely transient in nature. Follow-up CT abdomen and pelvis with enteric  contrast can be considered for complete evaluation as clinically appropriate.   3.  Mild diffuse bladder wall thickening which can be seen with chronic outlet obstruction or cystitis.   4.  Small volume of ascites and diffuse mesenteric edema.         I personally discussed this study with JOYCELYN LIN on 1/12/2025 2:39 AM.               Workstation performed: [id number]         XR Trauma chest portable   ED Interpretation by Joycelyn Lin DO (01/12 0128)   NAD      Final Interpretation by Sia Nguyen MD (01/12 0254)      No acute cardiopulmonary disease.            Workstation performed: [id number]             ECG 12 Lead Documentation Only    Date/Time: 1/12/2025 1:49 AM    Performed by: Joycelyn Lin DO  Authorized by: Joycelyn Lin DO    Patient location:  ED  Previous ECG:     Previous ECG:  Compared to current    Similarity:  No change  Interpretation:     Interpretation: normal    Quality:     Tracing quality:  Limited by artifact  Rate:     ECG rate assessment: normal    Rhythm:     Rhythm: sinus rhythm    Ectopy:     Ectopy: none    QRS:     QRS axis:  Left    QRS intervals:  Normal  Conduction:     Conduction: normal    ST segments:     ST segments:  Normal  T waves:     T waves: inverted      Inverted:  AVL  CriticalCare Time    Date/Time: 1/12/2025 4:36 AM    Performed by: Joycelyn Lin DO  Authorized by: Joycelyn Lin DO    Critical care provider statement:     Critical care time (minutes):  45    Critical care time was exclusive of:  Teaching time and separately billable procedures and treating other patients    Critical care was necessary to treat or prevent imminent or life-threatening deterioration of the following conditions:  CNS failure or compromise and cardiac failure    Critical care was time spent personally by me on the following activities:  Obtaining history from patient or surrogate, development of treatment plan with patient or surrogate,  discussions with consultants, evaluation of patient's response to treatment, examination of patient, interpretation of cardiac output measurements, ordering and performing treatments and interventions, ordering and review of laboratory studies, ordering and review of radiographic studies, re-evaluation of patient's condition and review of old charts      ED Medication and Procedure Management   Prior to Admission Medications   Prescriptions Last Dose Informant Patient Reported? Taking?   ARIPiprazole (ABILIFY) 5 mg tablet  Self Yes No   Sig: Take 5 mg by mouth daily   Patient not taking: Reported on 7/24/2024   Cholecalciferol 50 MCG (2000 UT) TABS   No No   Sig: Take 1 tablet (2,000 Units total) by mouth daily   Patient not taking: Reported on 11/13/2024   LORazepam (ATIVAN) 1 mg tablet  Self Yes No   Sig: TAKE 1 TABLET BY MOUTH ONCE DAILY AS NEEDED FOR ANXIETY (TAKE 1 TABLET PRIOR TO MRI OR CT SCAN)   OLANZapine (ZyPREXA) 2.5 mg tablet  Self Yes No   Sig: Take 5 mg by mouth daily at bedtime   Patient not taking: Reported on 11/13/2024   albuterol (PROVENTIL HFA,VENTOLIN HFA) 90 mcg/act inhaler  Self Yes No   amLODIPine (NORVASC) 10 mg tablet   No No   Sig: Take 1 tablet (10 mg total) by mouth daily   aspirin 81 mg chewable tablet   No No   Sig: Chew 1 tablet (81 mg total) daily   atorvastatin (LIPITOR) 40 mg tablet   No No   Sig: Take 1 tablet (40 mg total) by mouth daily at bedtime   doxazosin (CARDURA) 8 MG tablet   No No   Sig: Take 1 tablet (8 mg total) by mouth 2 (two) times a day   hydrALAZINE (APRESOLINE) 100 MG tablet   No No   Sig: Take 1 tablet (100 mg total) by mouth every 8 (eight) hours   isosorbide dinitrate (ISORDIL) 40 MG tablet   No No   Sig: Take 1 tablet (40 mg total) by mouth 3 (three) times daily after meals   pantoprazole (PROTONIX) 40 mg tablet  Self Yes No   Sig: Take 40 mg by mouth daily   sertraline (ZOLOFT) 100 mg tablet  Self Yes No   Sig: Take 2 tablets by mouth daily   spironolactone  (ALDACTONE) 25 mg tablet   No No   Sig: Take 0.5 tablets (12.5 mg total) by mouth 2 (two) times a day   sucralfate (CARAFATE) 1 g tablet  Self Yes No   Sig: Take 1 g by mouth 2 (two) times a day   Patient not taking: Reported on 11/13/2024      Facility-Administered Medications: None     Patient's Medications   Discharge Prescriptions    No medications on file     No discharge procedures on file.  ED SEPSIS DOCUMENTATION   Time reflects when diagnosis was documented in both MDM as applicable and the Disposition within this note       Time User Action Codes Description Comment    1/12/2025  4:29 AM Joycelyn Lin [E16.2] Hypoglycemia     1/12/2025  4:29 AM Joycelyn Lin [I16.1] Hypertensive emergency                  Joycelyn Lin DO  01/12/25 0436

## 2025-01-12 NOTE — ASSESSMENT & PLAN NOTE
Incidental finding  1.  No definite evidence of traumatic injury within study limitations.  2.  Limited evaluation of the GI tract due to artifact and lack of enteric contrast. There is a short segment of jejunojejunal intussusception and suggested intussusception at the gastroduodenal junction. No evidence of bowel obstruction. These findings   are likely transient in nature. Follow-up CT abdomen and pelvis with enteric contrast can be considered for complete evaluation as clinically appropriate.  3.  Mild diffuse bladder wall thickening which can be seen with chronic outlet obstruction or cystitis.  4.  Small volume of ascites and diffuse mesenteric edema.    Patient with benign abdominal exam  Follow-up with CT abdomen pelvis with p.o. contrast

## 2025-01-12 NOTE — ASSESSMENT & PLAN NOTE
Lab Results   Component Value Date    EGFR 24 01/12/2025    EGFR 27 11/15/2024    EGFR 26 11/14/2024    CREATININE 2.68 (H) 01/12/2025    CREATININE 2.43 (H) 11/15/2024    CREATININE 2.52 (H) 11/14/2024   Creatinine at baseline

## 2025-01-12 NOTE — PROGRESS NOTES
"Progress Note - Critical Care/ICU   Name: Genna Liu 59 y.o. male I MRN: [id number]  Unit/Bed#: ICU 01 I Date of Admission: 1/12/2025   Date of Service: 1/12/2025 I Hospital Day: 0       Critical Care Interval Transfer Note:    Brief Hospital Summary:   Per Jacqui ZAZUETA, \" Genna Liu is a 59 y.o. with past medical history significant for hypertension, chronic diastolic heart failure, COPD, hyperlipidemia, DVT on aspirin, meningioma, and CKD 4 associated with hypertensive disease who presents with altered mental status per his family reporting confusion and tremors. Of note, patient is hypertensive and hypoglycemic on presentation, and is known to be noncompliant with his medication regimen.  Patient with similar symptoms on admission on 11/13/2024, which improved with better control of his hypertension.  Evaluation in the emergency room revealed positive THC on tox screen, and no acute intracranial abnormalities on CT.  Critical care was asked see the patient for admission due to altered mental status at the request of internal medicine. \"    CT head negative  CT cspine negative  CT chest abdomen and pelvis with contrast noting \"a short segment of jejunojejunal intussusception and suggested intussusception at the gastroduodenal junction. No evidence of bowel obstruction. \"  Interval CT with oral contrast noting resolution of intussusception.    Mental status improved with correction of hypertension.  Home medications restarted       Barriers to discharge:   Monitoring BP for adjustment of agents if necessary   Serial neurologic examinations   Monitor CK for downtrend   Speech evaluation      Consults: None    Recommended to review admission imaging for incidental findings and document in discharge navigator: Incidental findings have been documented in discharge navigator. Patient and/or family was informed and they expressed understanding.      Discharge Plan: Anticipate discharge in 24-48 hrs to " home.    OT Recommendations: Home with home health rehabilitation   Patient seen and evaluated by Critical Care today and deemed to be appropriate for transfer to Med Surg with Telemetry. Spoke to Dr. Ann from [facility] to accept transfer. Critical care can be contacted via SecureChat with any questions or concerns. Please use the Critical Care AP Role in Secure Chat for any provider inquires until the patient is transferred out of the ICU or until tomorrow at 0600.

## 2025-01-12 NOTE — INCIDENTAL FINDINGS
The following findings require follow up:  Radiographic finding   Findin.  No definite evidence of traumatic injury within study limitations.  2.  Limited evaluation of the GI tract due to artifact and lack of enteric contrast. There is a short segment of jejunojejunal intussusception and suggested intussusception at the gastroduodenal junction. No evidence of bowel obstruction. These findings   are likely transient in nature. Follow-up CT abdomen and pelvis with enteric contrast can be considered for complete evaluation as clinically appropriate.  3.  Mild diffuse bladder wall thickening which can be seen with chronic outlet obstruction or cystitis.  4.  Small volume of ascites and diffuse mesenteric edema.   Follow up required: Repeat CT with oral contrast   Follow up should be done within prior to discharge     Please notify the following clinician to assist with the follow up:   Critical care team    Unable to discuss with pt secondary to encephalopathy

## 2025-01-12 NOTE — PLAN OF CARE
Problem: PAIN - ADULT  Goal: Verbalizes/displays adequate comfort level or baseline comfort level  Description: Interventions:  - Encourage patient to monitor pain and request assistance  - Assess pain using appropriate pain scale  - Administer analgesics based on type and severity of pain and evaluate response  - Implement non-pharmacological measures as appropriate and evaluate response  - Consider cultural and social influences on pain and pain management  - Notify physician/advanced practitioner if interventions unsuccessful or patient reports new pain  Outcome: Progressing     Problem: INFECTION - ADULT  Goal: Absence or prevention of progression during hospitalization  Description: INTERVENTIONS:  - Assess and monitor for signs and symptoms of infection  - Monitor lab/diagnostic results  - Monitor all insertion sites, i.e. indwelling lines, tubes, and drains  - Monitor endotracheal if appropriate and nasal secretions for changes in amount and color  - [place] appropriate cooling/warming therapies per order  - Administer medications as ordered  - Instruct and encourage patient and family to use good hand hygiene technique  - Identify and instruct in appropriate isolation precautions for identified infection/condition  Outcome: Progressing  Goal: Absence of fever/infection during neutropenic period  Description: INTERVENTIONS:  - Monitor WBC    Outcome: Progressing     Problem: SAFETY ADULT  Goal: Patient will remain free of falls  Description: INTERVENTIONS:  - Educate patient/family on patient safety including physical limitations  - Instruct patient to call for assistance with activity   - Consult OT/PT to assist with strengthening/mobility   - Keep Call bell within reach  - Keep bed low and locked with side rails adjusted as appropriate  - Keep care items and personal belongings within reach  - Initiate and maintain comfort rounds  - Make Fall Risk Sign visible to staff  - Offer Toileting every 2 Hours,  in advance of need  - Initiate/Maintain bed alarm  - Obtain necessary fall risk management equipment: fall   - Apply yellow socks and bracelet for high fall risk patients  - Consider moving patient to room near nurses station  Outcome: Progressing  Goal: Maintain or return to baseline ADL function  Description: INTERVENTIONS:  -  Assess patient's ability to carry out ADLs; assess patient's baseline for ADL function and identify physical deficits which impact ability to perform ADLs (bathing, care of mouth/teeth, toileting, grooming, dressing, etc.)  - Assess/evaluate cause of self-care deficits   - Assess range of motion  - Assess patient's mobility; develop plan if impaired  - Assess patient's need for assistive devices and provide as appropriate  - Encourage maximum independence but intervene and supervise when necessary  - Involve family in performance of ADLs  - Assess for home care needs following discharge   - Consider OT consult to assist with ADL evaluation and planning for discharge  - Provide patient education as appropriate  Outcome: Progressing  Goal: Maintains/Returns to pre admission functional level  Description: INTERVENTIONS:  - Perform AM-PAC 6 Click Basic Mobility/ Daily Activity assessment daily.  - Set and communicate daily mobility goal to care team and patient/family/caregiver.   - Collaborate with rehabilitation services on mobility goals if consulted  - Perform Range of Motion 3 times a day.  - Reposition patient every 2 hours.  - Dangle patient 3 times a day  - Stand patient 3 times a day  - Ambulate patient 3 times a day  - Out of bed to chair 3 times a day   - Out of bed for meals 3 times a day  - Out of bed for toileting  - Record patient progress and toleration of activity level   Outcome: Progressing     Problem: DISCHARGE PLANNING  Goal: Discharge to home or other facility with appropriate resources  Description: INTERVENTIONS:  - Identify barriers to discharge w/patient and  caregiver  - Arrange for needed discharge resources and transportation as appropriate  - Identify discharge learning needs (meds, wound care, etc.)  - Arrange for interpretive services to assist at discharge as needed  - Refer to Case Management Department for coordinating discharge planning if the patient needs post-hospital services based on physician/advanced practitioner order or complex needs related to functional status, cognitive ability, or social support system  Outcome: Progressing     Problem: Knowledge Deficit  Goal: Patient/family/caregiver demonstrates understanding of disease process, treatment plan, medications, and discharge instructions  Description: Complete learning assessment and assess knowledge base.  Interventions:  - Provide teaching at level of understanding  - Provide teaching via preferred learning methods  Outcome: Progressing

## 2025-01-12 NOTE — PLAN OF CARE
Problem: PAIN - ADULT  Goal: Verbalizes/displays adequate comfort level or baseline comfort level  Description: Interventions:  - Encourage patient to monitor pain and request assistance  - Assess pain using appropriate pain scale  - Administer analgesics based on type and severity of pain and evaluate response  - Implement non-pharmacological measures as appropriate and evaluate response  - Consider cultural and social influences on pain and pain management  - Notify physician/advanced practitioner if interventions unsuccessful or patient reports new pain  Outcome: Progressing     Problem: INFECTION - ADULT  Goal: Absence or prevention of progression during hospitalization  Description: INTERVENTIONS:  - Assess and monitor for signs and symptoms of infection  - Monitor lab/diagnostic results  - Monitor all insertion sites, i.e. indwelling lines, tubes, and drains  - Monitor endotracheal if appropriate and nasal secretions for changes in amount and color  - [place] appropriate cooling/warming therapies per order  - Administer medications as ordered  - Instruct and encourage patient and family to use good hand hygiene technique  - Identify and instruct in appropriate isolation precautions for identified infection/condition  Outcome: Progressing  Goal: Absence of fever/infection during neutropenic period  Description: INTERVENTIONS:  - Monitor WBC    Outcome: Progressing     Problem: SAFETY ADULT  Goal: Patient will remain free of falls  Description: INTERVENTIONS:  - Educate patient/family on patient safety including physical limitations  - Instruct patient to call for assistance with activity   - Consult OT/PT to assist with strengthening/mobility   - Keep Call bell within reach  - Keep bed low and locked with side rails adjusted as appropriate  - Keep care items and personal belongings within reach  - Initiate and maintain comfort rounds  - Make Fall Risk Sign visible to staff  - Offer Toileting every 2 Hours,  in advance of need  - Initiate/Maintain  Bed alarm    - Apply yellow socks and bracelet for high fall risk patients  - Consider moving patient to room near nurses station  Outcome: Progressing  Goal: Maintain or return to baseline ADL function  Description: INTERVENTIONS:  -  Assess patient's ability to carry out ADLs; assess patient's baseline for ADL function and identify physical deficits which impact ability to perform ADLs (bathing, care of mouth/teeth, toileting, grooming, dressing, etc.)  - Assess/evaluate cause of self-care deficits   - Assess range of motion  - Assess patient's mobility; develop plan if impaired  - Assess patient's need for assistive devices and provide as appropriate  - Encourage maximum independence but intervene and supervise when necessary  - Involve family in performance of ADLs  - Assess for home care needs following discharge   - Consider OT consult to assist with ADL evaluation and planning for discharge  - Provide patient education as appropriate  Outcome: Progressing  Goal: Maintains/Returns to pre admission functional level  Description: INTERVENTIONS:  - Perform AM-PAC 6 Click Basic Mobility/ Daily Activity assessment daily.  - Set and communicate daily mobility goal to care team and patient/family/caregiver.   - Collaborate with rehabilitation services on mobility goals if consulted  - Perform Range of Motion 3 times a day.  - Reposition patient every 2 hours.  - Dangle patient 3 times a day  - Stand patient 3 times a day  - Ambulate patient 3 times a day  - Out of bed to chair 3 times a day   - Out of bed for meals 3 times a day  - Out of bed for toileting  - Record patient progress and toleration of activity level   Outcome: Progressing     Problem: DISCHARGE PLANNING  Goal: Discharge to home or other facility with appropriate resources  Description: INTERVENTIONS:  - Identify barriers to discharge w/patient and caregiver  - Arrange for needed discharge resources and  transportation as appropriate  - Identify discharge learning needs (meds, wound care, etc.)  - Arrange for interpretive services to assist at discharge as needed  - Refer to Case Management Department for coordinating discharge planning if the patient needs post-hospital services based on physician/advanced practitioner order or complex needs related to functional status, cognitive ability, or social support system  Outcome: Progressing     Problem: Knowledge Deficit  Goal: Patient/family/caregiver demonstrates understanding of disease process, treatment plan, medications, and discharge instructions  Description: Complete learning assessment and assess knowledge base.  Interventions:  - Provide teaching at level of understanding  - Provide teaching via preferred learning methods  Outcome: Progressing

## 2025-01-12 NOTE — H&P
H&P - Critical Care/ICU   Name: Genna Liu 59 y.o. male I MRN: [id number]  Unit/Bed#: ICU 01 I Date of Admission: 1/12/2025   Date of Service: 1/12/2025 I Hospital Day: 0       Assessment & Plan  Chronic heart failure with preserved ejection fraction (HFpEF) (HCC)  Wt Readings from Last 3 Encounters:   11/15/24 62.4 kg (137 lb 9.1 oz)   07/24/24 61.4 kg (135 lb 5.8 oz)   07/16/24 66.7 kg (147 lb)     Continue aspirin and antihypertensives.        COPD without exacerbation (HCC)  Bronchodilators as needed  Respiratory protocol  Stage 4 chronic kidney disease (HCC)  Lab Results   Component Value Date    EGFR 24 01/12/2025    EGFR 27 11/15/2024    EGFR 26 11/14/2024    CREATININE 2.68 (H) 01/12/2025    CREATININE 2.43 (H) 11/15/2024    CREATININE 2.52 (H) 11/14/2024   Creatinine at baseline  Hypoglycemia  Continue D10/0.9 normal saline to support glucose  Accu-Cheks  Noncompliance with medication regimen  Patient with known noncompliance with antihypertensives  Encephalopathy acute  Patient with previous admissions for hypertensive encephalopathy secondary to hypertensive emergency, with similar presentations.  Hypoglycemia and hypothermia also noted on previous presentations.  Family is concerned about seizure activity.  None observed while in the emergency department.  Tox screen positive for THC.  Patient awakens to verbal stimuli and moves all extremities without neurologic deficits.  Predominant symptom is sleepiness and disorientation.    Serial neuroexams  Delirium precautions  Blood pressure control to maintain systolic blood pressure 160-180  Maintain blood glucose 80-1 40  Neurology consultation  Seizure precautions  Spot EEG  Check TSH and ammonia levels  Check thiamine  Disposition: Stepdown Level 1    History of Present Illness   Genna Liu is a 59 y.o. with past medical history significant for hypertension, chronic diastolic heart failure, COPD, hyperlipidemia, DVT on aspirin, meningioma, and CKD 4  associated with hypertensive disease who presents with altered mental status per his family reporting confusion and tremors. Of note, patient is hypertensive and hypoglycemic on presentation, and is known to be noncompliant with his medication regimen.  Patient with similar symptoms on admission on 11/13/2024, which improved with better control of his hypertension.  Evaluation in the emergency room revealed positive THC on tox screen, and no acute intracranial abnormalities on CT.  Critical care was asked see the patient for admission due to altered mental status at the request of internal medicine.    History obtained from chart review and unobtainable from patient due to mental status.  Review of Systems: Review of Systems not obtainable due to Altered mental status    Historical Information   Past Medical History:  No date: Asthma  No date: Chronic kidney disease  No date: COPD (chronic obstructive pulmonary disease) (HCC)  No date: CPAP (continuous positive airway pressure) dependence      Comment:  NO LONGER NEEDS D/T BARIATRIC SURGERY.  No date: Diabetes mellitus (HCC)  No date: Emphysema of lung (HCC)  No date: Gout  No date: History of transfusion      Comment:  pt stated they had a transfusion when they had                gallbladder surgery.  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  No date: Sleep apnea  No date: Systolic CHF (HCC) Past Surgical History:  No date: ADRENALECTOMY  3/5/2024: CARDIAC CATHETERIZATION; N/A      Comment:  Procedure: Cardiac Left Heart Cath;  Surgeon: Servando Wiggins MD;  Location: [facility];  Service:                Cardiology  3/5/2024: CARDIAC CATHETERIZATION; N/A      Comment:  Procedure: Cardiac Coronary Angiogram;  Surgeon:  Servando Wiggins MD;  Location: [facility];  Service:               Cardiology  No date: CHOLECYSTECTOMY  No date: COLONOSCOPY  No date: EGD  12/22/2020: HIATAL HERNIA REPAIR; N/A      Comment:  Procedure: REPAIR HERNIA HIATAL LAPAROSCOPIC;  Surgeon:                Shane Francis MD;  Location: [facility];  Service:                Bariatrics  10/17/2018: INCISION AND DRAINAGE OF WOUND; Left      Comment:  Procedure: INCISION AND DRAINAGE (I&D) GROIN;  Surgeon:                Rafy Pascual MD;  Location: [facility];  Service:                General  8/17/2018: IR IMAGE GUIDED ASPIRATION / DRAINAGE W TUBE  7/31/2018: IR IMAGE GUIDED ASPIRATION / DRAINAGE W TUBE  No date: JOINT REPLACEMENT; Bilateral      Comment:  knee  2009: KIDNEY SURGERY      Comment:  nodule removal  01/2018: LYMPH NODE DISSECTION; Left      Comment:  left inguinal LN removed - benign   No date: OTHER SURGICAL HISTORY      Comment:  kidney nodule removal  No date: PALATE / UVULA BIOPSY / EXCISION  7/8/2023: PERICARDIAL WINDOW; N/A      Comment:  Procedure: WINDOW PERICARDIAL;  Surgeon: Alonso Logan MD;  Location: [facility];  Service: Thoracic  12/22/2020: CA LAPS GSTR RSTCV PX W/BYP JASON-EN-Y LIMB <150 CM; N/A      Comment:  Procedure: BYPASS GASTRIC  JASON-EN-Y LAPAROSCOPIC WITH                INTRAOPERATIVE EGD;  Surgeon: Shane Francis MD;                 Location: [facility];  Service: Bariatrics  No date: SINUS SURGERY  No date: TONSILLECTOMY   Current Outpatient Medications   Medication Instructions    albuterol (PROVENTIL HFA,VENTOLIN HFA) 90 mcg/act inhaler     amLODIPine (NORVASC) 10 mg, Oral, Daily    ARIPiprazole (ABILIFY) 5 mg, Daily    aspirin 81 mg, Oral, Daily    atorvastatin (LIPITOR) 40 mg, Oral, Daily at bedtime    Cholecalciferol 2,000 Units, Oral, Daily    doxazosin (CARDURA) 8 mg, Oral, 2 times daily    hydrALAZINE (APRESOLINE) 100 mg, Oral, Every 8 hours scheduled     isosorbide dinitrate (ISORDIL) 40 mg, Oral, 3 times daily after meals    LORazepam (ATIVAN) 1 mg tablet TAKE 1 TABLET BY MOUTH ONCE DAILY AS NEEDED FOR ANXIETY (TAKE 1 TABLET PRIOR TO MRI OR CT SCAN)    OLANZapine (ZYPREXA) 5 mg, Daily at bedtime    pantoprazole (PROTONIX) 40 mg, Oral, Daily    sertraline (ZOLOFT) 100 mg tablet 2 tablets, Daily    spironolactone (ALDACTONE) 12.5 mg, Oral, 2 times daily    sucralfate (CARAFATE) 1 g, 2 times daily    Allergies   Allergen Reactions    Clonidine Anaphylaxis    Lisinopril Anaphylaxis    Nifedipine Anaphylaxis     Tolerating nicardipine    Buspirone      Headaches,     Hydralazine Other (See Comments)     Fluid around the heart  Lose for Appetite        Minoxidil      Retains fluid around heart      Social History     Tobacco Use    Smoking status: Never     Passive exposure: Past    Smokeless tobacco: Never   Vaping Use    Vaping status: Never Used   Substance Use Topics    Alcohol use: Yes     Comment: occasionally    Drug use: Yes     Types: Marijuana     Comment: Card    Family History   Problem Relation Age of Onset    Cancer Father     Kidney disease Mother     Heart murmur Sister     Asthma Sister     Hypertension Sister     Heart disease Brother     Bell's palsy Brother     Hypertension Brother     Deep vein thrombosis Neg Hx           Objective :                   Vitals I/O      Most Recent Min/Max in 24hrs   Temp 98.4 °F (36.9 °C) Temp  Min: 98.4 °F (36.9 °C)  Max: 98.4 °F (36.9 °C)   Pulse 77 Pulse  Min: 59  Max: 77   Resp 18 Resp  Min: 16  Max: 22   /86 BP  Min: 165/86  Max: 220/126   O2 Sat 98 % SpO2  Min: 94 %  Max: 99 %      Intake/Output Summary (Last 24 hours) at 1/12/2025 0544  Last data filed at 1/12/2025 0416  Gross per 24 hour   Intake --   Output 1700 ml   Net -1700 ml       Diet NPO    Invasive Monitoring           Physical Exam   Physical Exam  Vitals and nursing note reviewed.   Eyes:      General: No scleral icterus.     Extraocular  Movements: Extraocular movements intact.      Conjunctiva/sclera: Conjunctivae normal.      Comments: No nystagmus   Skin:     General: Skin is warm and dry.      Capillary Refill: Capillary refill takes less than 2 seconds.   HENT:      Head: Normocephalic.      Mouth/Throat:      Mouth: Mucous membranes are dry.   Neck:      Vascular: No JVD.   Cardiovascular:      Rate and Rhythm: Normal rate and regular rhythm.      Heart sounds: Normal heart sounds.   Musculoskeletal:         General: Normal range of motion.      Right lower le+ Edema present.      Left lower le+ Edema present.   Abdominal: General: There is distension.     Palpations: Abdomen is soft.      Tenderness: There is no abdominal tenderness. There is no guarding.   Constitutional:       General: He is not in acute distress.  Pulmonary:      Effort: Pulmonary effort is normal. No accessory muscle usage, respiratory distress or accessory muscle usage.      Breath sounds: Normal breath sounds.   Secretions are normal.Chest:      Chest wall: No tenderness.   Neurological:      General: No focal deficit present.      Mental Status: He is somnolent and disoriented to time.      Cranial Nerves: No dysarthria or facial asymmetry.      Motor: gross motor function is at baseline for patient.        Corneal reflex present and no clonus.      Comments: Eyes open to voice, falls asleep while answering questions  Oriented to name and place  Follows commands.          Diagnostic Studies        Lab Results: I have reviewed the following results:     Medications:  Scheduled PRN   amLODIPine, 10 mg, Daily  ARIPiprazole, 5 mg, Daily  aspirin, 81 mg, Daily  atorvastatin, 40 mg, Daily With Dinner  chlorhexidine, 15 mL, Q12H JOANN  doxazosin, 8 mg, Q12H  heparin (porcine), 5,000 Units, Q8H JOANN      hydrALAZINE, 20 mg, Q6H PRN       Continuous    dextrose 10 % and normal saline infusion, 50 mL/hr, Last Rate: 50 mL/hr (25 [phone])         Labs:   CBC    Recent Labs      01/12/25  0111   WBC 6.01   HGB 10.7*   HCT 34.5*        BMP    Recent Labs     01/12/25 0111   SODIUM 139   K 3.2*      CO2 23   AGAP 9   BUN 49*   CREATININE 2.68*   CALCIUM 8.2*       Coags    No recent results     Additional Electrolytes  No recent results       Blood Gas    No recent results  No recent results LFTs  Recent Labs     01/12/25 0111   ALT 49   AST 45*   ALKPHOS 98   ALB 3.9   TBILI 0.53       Infectious  No recent results  Glucose  Recent Labs     01/12/25  0111   GLUC 84

## 2025-01-12 NOTE — QUICK NOTE
Interval CT with oral contrast reviewed with resolution of previously documented intussusception.

## 2025-01-13 ENCOUNTER — APPOINTMENT (INPATIENT)
Dept: NON INVASIVE DIAGNOSTICS | Facility: HOSPITAL | Age: 60
DRG: 638 | End: 2025-01-13
Payer: COMMERCIAL

## 2025-01-13 LAB
ALBUMIN SERPL BCG-MCNC: 3.5 G/DL (ref 3.5–5)
ALP SERPL-CCNC: 78 U/L (ref 34–104)
ALT SERPL W P-5'-P-CCNC: 37 U/L (ref 7–52)
ANION GAP SERPL CALCULATED.3IONS-SCNC: 8 MMOL/L (ref 4–13)
AORTIC ROOT: 3.4 CM
ASCENDING AORTA: 3.4 CM
AST SERPL W P-5'-P-CCNC: 39 U/L (ref 13–39)
BASOPHILS # BLD AUTO: 0.04 THOUSANDS/ΜL (ref 0–0.1)
BASOPHILS NFR BLD AUTO: 1 % (ref 0–1)
BILIRUB SERPL-MCNC: 0.81 MG/DL (ref 0.2–1)
BSA FOR ECHO PROCEDURE: 1.76 M2
BUN SERPL-MCNC: 46 MG/DL (ref 5–25)
CALCIUM SERPL-MCNC: 8.5 MG/DL (ref 8.4–10.2)
CHLORIDE SERPL-SCNC: 104 MMOL/L (ref 96–108)
CK SERPL-CCNC: 439 U/L (ref 39–308)
CO2 SERPL-SCNC: 25 MMOL/L (ref 21–32)
CREAT SERPL-MCNC: 2.87 MG/DL (ref 0.6–1.3)
E WAVE DECELERATION TIME: 264 MS
E/A RATIO: 0.6
EOSINOPHIL # BLD AUTO: 0.11 THOUSAND/ΜL (ref 0–0.61)
EOSINOPHIL NFR BLD AUTO: 2 % (ref 0–6)
ERYTHROCYTE [DISTWIDTH] IN BLOOD BY AUTOMATED COUNT: 16.5 % (ref 11.6–15.1)
FRACTIONAL SHORTENING: 27 (ref 28–44)
GFR SERPL CREATININE-BSD FRML MDRD: 22 ML/MIN/1.73SQ M
GLUCOSE SERPL-MCNC: 104 MG/DL (ref 65–140)
GLUCOSE SERPL-MCNC: 116 MG/DL (ref 65–140)
GLUCOSE SERPL-MCNC: 241 MG/DL (ref 65–140)
GLUCOSE SERPL-MCNC: 93 MG/DL (ref 65–140)
GLUCOSE SERPL-MCNC: 95 MG/DL (ref 65–140)
HCT VFR BLD AUTO: 30 % (ref 36.5–49.3)
HGB BLD-MCNC: 9.6 G/DL (ref 12–17)
IMM GRANULOCYTES # BLD AUTO: 0.02 THOUSAND/UL (ref 0–0.2)
IMM GRANULOCYTES NFR BLD AUTO: 0 % (ref 0–2)
INTERVENTRICULAR SEPTUM IN DIASTOLE (PARASTERNAL SHORT AXIS VIEW): 1.4 CM
INTERVENTRICULAR SEPTUM: 1.4 CM (ref 0.6–1.1)
LA/AORTA RATIO 2D: 1.06
LAAS-AP2: 23.6 CM2
LAAS-AP4: 25.8 CM2
LEFT ATRIUM SIZE: 3.6 CM
LEFT ATRIUM VOLUME (MOD BIPLANE): 69 ML
LEFT ATRIUM VOLUME INDEX (MOD BIPLANE): 39 ML/M2
LEFT INTERNAL DIMENSION IN SYSTOLE: 3.7 CM (ref 2.1–4)
LEFT VENTRICULAR INTERNAL DIMENSION IN DIASTOLE: 5.1 CM (ref 3.5–6)
LEFT VENTRICULAR POSTERIOR WALL IN END DIASTOLE: 1.5 CM
LEFT VENTRICULAR STROKE VOLUME: 64 ML
LV EF US.2D.A4C+ESTIMATED: 51 %
LVSV (TEICH): 64 ML
LYMPHOCYTES # BLD AUTO: 1.45 THOUSANDS/ΜL (ref 0.6–4.47)
LYMPHOCYTES NFR BLD AUTO: 21 % (ref 14–44)
MAGNESIUM SERPL-MCNC: 2 MG/DL (ref 1.9–2.7)
MCH RBC QN AUTO: 26.9 PG (ref 26.8–34.3)
MCHC RBC AUTO-ENTMCNC: 32 G/DL (ref 31.4–37.4)
MCV RBC AUTO: 84 FL (ref 82–98)
MONOCYTES # BLD AUTO: 0.61 THOUSAND/ΜL (ref 0.17–1.22)
MONOCYTES NFR BLD AUTO: 9 % (ref 4–12)
MV E'TISSUE VEL-SEP: 8 CM/S
MV PEAK A VEL: 1.06 M/S
MV PEAK E VEL: 64 CM/S
MV STENOSIS PRESSURE HALF TIME: 77 MS
MV VALVE AREA P 1/2 METHOD: 2.86
NEUTROPHILS # BLD AUTO: 4.59 THOUSANDS/ΜL (ref 1.85–7.62)
NEUTS SEG NFR BLD AUTO: 67 % (ref 43–75)
NRBC BLD AUTO-RTO: 0 /100 WBCS
PHOSPHATE SERPL-MCNC: 4.8 MG/DL (ref 2.3–4.1)
PLATELET # BLD AUTO: 262 THOUSANDS/UL (ref 149–390)
PMV BLD AUTO: 9.8 FL (ref 8.9–12.7)
POTASSIUM SERPL-SCNC: 3.8 MMOL/L (ref 3.5–5.3)
PROT SERPL-MCNC: 6.3 G/DL (ref 6.4–8.4)
RBC # BLD AUTO: 3.57 MILLION/UL (ref 3.88–5.62)
RIGHT VENTRICLE ID DIMENSION: 3.3 CM
SL CV LV EF: 51
SL CV PED ECHO LEFT VENTRICLE DIASTOLIC VOLUME (MOD BIPLANE) 2D: 124 ML
SL CV PED ECHO LEFT VENTRICLE SYSTOLIC VOLUME (MOD BIPLANE) 2D: 60 ML
SODIUM SERPL-SCNC: 137 MMOL/L (ref 135–147)
TR MAX PG: 24 MMHG
TR PEAK VELOCITY: 2.4 M/S
TRICUSPID ANNULAR PLANE SYSTOLIC EXCURSION: 3 CM
TRICUSPID VALVE PEAK REGURGITATION VELOCITY: 2.44 M/S
WBC # BLD AUTO: 6.82 THOUSAND/UL (ref 4.31–10.16)

## 2025-01-13 PROCEDURE — 99223 1ST HOSP IP/OBS HIGH 75: CPT | Performed by: INTERNAL MEDICINE

## 2025-01-13 PROCEDURE — 93306 TTE W/DOPPLER COMPLETE: CPT | Performed by: INTERNAL MEDICINE

## 2025-01-13 PROCEDURE — 83735 ASSAY OF MAGNESIUM: CPT | Performed by: NURSE PRACTITIONER

## 2025-01-13 PROCEDURE — 82550 ASSAY OF CK (CPK): CPT | Performed by: NURSE PRACTITIONER

## 2025-01-13 PROCEDURE — 84100 ASSAY OF PHOSPHORUS: CPT | Performed by: NURSE PRACTITIONER

## 2025-01-13 PROCEDURE — 82948 REAGENT STRIP/BLOOD GLUCOSE: CPT

## 2025-01-13 PROCEDURE — 93306 TTE W/DOPPLER COMPLETE: CPT

## 2025-01-13 PROCEDURE — 99233 SBSQ HOSP IP/OBS HIGH 50: CPT | Performed by: FAMILY MEDICINE

## 2025-01-13 PROCEDURE — 85025 COMPLETE CBC W/AUTO DIFF WBC: CPT | Performed by: NURSE PRACTITIONER

## 2025-01-13 PROCEDURE — 80053 COMPREHEN METABOLIC PANEL: CPT | Performed by: NURSE PRACTITIONER

## 2025-01-13 RX ORDER — LABETALOL 200 MG/1
600 TABLET, FILM COATED ORAL EVERY 8 HOURS SCHEDULED
Status: DISCONTINUED | OUTPATIENT
Start: 2025-01-13 | End: 2025-01-14 | Stop reason: HOSPADM

## 2025-01-13 RX ORDER — HYDROCHLOROTHIAZIDE 25 MG/1
25 TABLET ORAL DAILY
Status: DISCONTINUED | OUTPATIENT
Start: 2025-01-13 | End: 2025-01-14 | Stop reason: HOSPADM

## 2025-01-13 RX ADMIN — SUCRALFATE 1 G: 1 TABLET ORAL at 09:00

## 2025-01-13 RX ADMIN — ARIPIPRAZOLE 5 MG: 5 TABLET ORAL at 09:00

## 2025-01-13 RX ADMIN — ISOSORBIDE DINITRATE 40 MG: 10 TABLET ORAL at 17:22

## 2025-01-13 RX ADMIN — HEPARIN SODIUM 5000 UNITS: 5000 INJECTION, SOLUTION INTRAVENOUS; SUBCUTANEOUS at 21:56

## 2025-01-13 RX ADMIN — HYDRALAZINE HYDROCHLORIDE 100 MG: 25 TABLET ORAL at 21:58

## 2025-01-13 RX ADMIN — HYDROCHLOROTHIAZIDE 25 MG: 25 TABLET ORAL at 10:00

## 2025-01-13 RX ADMIN — ATORVASTATIN CALCIUM 40 MG: 40 TABLET, FILM COATED ORAL at 17:22

## 2025-01-13 RX ADMIN — HEPARIN SODIUM 5000 UNITS: 5000 INJECTION, SOLUTION INTRAVENOUS; SUBCUTANEOUS at 14:21

## 2025-01-13 RX ADMIN — ISOSORBIDE DINITRATE 40 MG: 10 TABLET ORAL at 09:00

## 2025-01-13 RX ADMIN — HYDRALAZINE HYDROCHLORIDE 100 MG: 25 TABLET ORAL at 14:21

## 2025-01-13 RX ADMIN — LABETALOL HYDROCHLORIDE 600 MG: 200 TABLET, FILM COATED ORAL at 21:58

## 2025-01-13 RX ADMIN — SERTRALINE HYDROCHLORIDE 200 MG: 50 TABLET ORAL at 09:01

## 2025-01-13 RX ADMIN — PANTOPRAZOLE SODIUM 40 MG: 40 TABLET, DELAYED RELEASE ORAL at 09:00

## 2025-01-13 RX ADMIN — SPIRONOLACTONE 12.5 MG: 25 TABLET ORAL at 17:22

## 2025-01-13 RX ADMIN — DOXAZOSIN 8 MG: 4 TABLET ORAL at 04:41

## 2025-01-13 RX ADMIN — DOXAZOSIN 8 MG: 4 TABLET ORAL at 17:24

## 2025-01-13 RX ADMIN — SPIRONOLACTONE 12.5 MG: 25 TABLET ORAL at 09:00

## 2025-01-13 RX ADMIN — ASPIRIN 81 MG: 81 TABLET, COATED ORAL at 09:00

## 2025-01-13 RX ADMIN — HYDRALAZINE HYDROCHLORIDE 100 MG: 25 TABLET ORAL at 05:03

## 2025-01-13 RX ADMIN — Medication 2000 UNITS: at 09:00

## 2025-01-13 RX ADMIN — HEPARIN SODIUM 5000 UNITS: 5000 INJECTION, SOLUTION INTRAVENOUS; SUBCUTANEOUS at 05:03

## 2025-01-13 RX ADMIN — ISOSORBIDE DINITRATE 40 MG: 10 TABLET ORAL at 12:17

## 2025-01-13 RX ADMIN — LABETALOL HYDROCHLORIDE 600 MG: 200 TABLET, FILM COATED ORAL at 10:00

## 2025-01-13 RX ADMIN — SUCRALFATE 1 G: 1 TABLET ORAL at 17:22

## 2025-01-13 RX ADMIN — AMLODIPINE BESYLATE 10 MG: 5 TABLET ORAL at 09:00

## 2025-01-13 NOTE — CASE MANAGEMENT
Case Management Assessment & Discharge Planning Note    Patient name Genna Liu  Location ICU ICU  MRN [id number]  : 1965 Date 2025       Current Admission Date: 2025  Current Admission Diagnosis:Hypertensive emergency   Patient Active Problem List    Diagnosis Date Noted Date Diagnosed    Intussusception intestine (HCC) 2025     Hx of partial adrenalectomy ([facility]) 2024     Elevated LFTs 2024     Hypothermia 2024     Encephalopathy acute 2024     Elevated TSH 2024     SIRS (systemic inflammatory response syndrome) ([facility]) 2024     Pre-syncope 2024     Secondary hyperparathyroidism of renal origin ([facility]) 2024     Hyperuricemia 2024     Transaminitis 2024     Elevated troponin 2024     Noncompliance with medication regimen 2024     Acute renal failure superimposed on stage 4 chronic kidney disease ([facility]) 2024     Chronic heart failure with preserved ejection fraction (HFpEF) ([facility]) 2024     COVID-19 2023     GERD (gastroesophageal reflux disease) 07/15/2023     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[facility]) 2023     Moderate protein-calorie malnutrition ([facility]) 2023     Electrolyte abnormality 2023     Hematochezia 2023     Depression with anxiety 2023     Abdominal pain 2023     Seizure ([facility]) 2023     Hypertensive encephalopathy 2023     Hypertensive emergency 2023     Meningioma ([facility]) 2023     Prolonged Q-T interval on ECG 2023     Hypokalemia 2021     Bradycardia 2021     Hypoglycemia 2021     History of gastric bypass 2021     Hypertensive kidney disease with stage 4 chronic kidney disease (HCC) 2021     Acute kidney injury superimposed on CKD  ([facility]) 2021     Encounter for surgical aftercare following surgery of digestive system 2021     Postsurgical malabsorption 2021     Morbid obesity due  to excess calories ([facility]) 12/22/2020     Post-traumatic male urethral stricture 11/05/2020     Renal cyst 07/11/2020     Dysuria 07/09/2020     History of DVT (deep vein thrombosis) 10/17/2018     Splenic lesion 10/17/2018     Anemia 05/23/2018     Lymphedema 03/16/2018     Stage 4 chronic kidney disease ([facility]) 03/15/2018     Chronic diastolic CHF (congestive heart failure) ([facility]) 02/06/2018     Pulmonary nodule, right 02/06/2018     Leukocytosis 02/06/2018     Pericardial effusion 01/22/2018     Essential hypertension 01/22/2018     COPD without exacerbation ([facility]) 01/22/2018     CHF (congestive heart failure) ([facility]) 01/22/2018       LOS (days): 1  Geometric Mean LOS (GMLOS) (days): 3  Days to GMLOS:1.5     OBJECTIVE:    Risk of Unplanned Readmission Score: 31.15         Current admission status: Inpatient       Preferred Pharmacy:   [id number] - [facility] [place] - [address]  [address]  [facility] [address]  Phone: [phone] Fax: [phone]    [facility] [place], [place] - [address]  [address] [facility] [address] [address]  Phone: [phone] Fax: [phone]    Primary Care Provider: Ugo Marina DO    Primary Insurance: [employer] [facility]  Secondary Insurance:     ASSESSMENT:  Active Health Care Proxies    There are no active Health Care Proxies on file.       Advance Directives  Does patient have a Health Care POA?: No  Was patient offered paperwork?: Yes (pt declined)  Does patient have Advance Directives?: No  Was patient offered paperwork?: Yes (pt declined)  Primary Contact: Kinsey Liu (Spouse)  [phone] (Home Phone)         Readmission Root Cause  30 Day Readmission: No    Patient Information  Admitted from:: Home  Mental Status: Alert  During Assessment patient was accompanied by: Not accompanied during assessment  Assessment information provided by:: Patient  Primary Caregiver: Self  Support Systems: Children, Spouse/significant  other  County of Residence: [place]  What [place] do you live in[place]  Home entry access options. Select all that apply.: Stairs  Number of steps to enter home.: 4  Do the steps have railings?: Yes  Type of Current Residence: 2 story home  Upon entering residence, is there a bedroom on the main floor (no further steps)?: No  A bedroom is located on the following floor levels of residence (select all that apply):: 2nd Floor  Upon entering residence, is there a bathroom on the main floor (no further steps)?:  (1/2 bath only)  Number of steps to 2nd floor from main floor: One Flight  Living Arrangements: Lives w/ Spouse/significant other, Lives w/ Children  Is patient a ?: No    Activities of Daily Living Prior to Admission  Functional Status: Independent  Completes ADLs independently?: Yes  Does patient use assisted devices?: Yes (cane as needed)  Assisted Devices (DME) used: Straight Cane, Shower Chair, Nebulizer  DME Company Name (respiratory supplies): [facility]  Does patient currently own DME?: Yes  What DME does the patient currently own?: Straight Cane, Walker, Bedside Commode, Shower Chair, Wheelchair, Nebulizer  Does patient have a history of Outpatient Therapy (PT/OT)?: Yes  Does the patient have a history of Short-Term Rehab?: Yes  Does patient have a history of HHC?: Yes  Does patient currently have HHC?: No         Patient Information Continued  Income Source: SSI/SSD  Does patient have prescription coverage?: Yes (gets meds through [facility])  Does patient receive dialysis treatments?: No  Does patient have a history of substance abuse?: No  Does patient have a history of Mental Health Diagnosis?: Yes (pt reports PTSD;  [facility])  Is patient receiving treatment for mental health?: Yes  Has patient received inpatient treatment related to mental health in the last 2 years?: No         Means of Transportation  Means of Transport to [facility]:: Drives Self      Social  Determinants of Health (SDOH)      Flowsheet Row Most Recent Value   Housing Stability    In the last 12 months, was there a time when you were not able to pay the mortgage or rent on time? N   In the past 12 months, how many times have you moved where you were living? 0   At any time in the past 12 months, were you homeless or living in a shelter (including now)? N   Transportation Needs    In the past 12 months, has lack of transportation kept you from medical appointments or from getting medications? no   In the past 12 months, has lack of transportation kept you from meetings, work, or from getting things needed for daily living? No   Food Insecurity    Within the past 12 months, you worried that your food would run out before you got the money to buy more. Never true   Within the past 12 months, the food you bought just didn't last and you didn't have money to get more. Never true   Utilities    In the past 12 months has the electric, gas, oil, or water company threatened to shut off services in your home? No            DISCHARGE DETAILS:    Discharge planning discussed with:: pt  Freedom of Choice: Yes  Comments - Freedom of Choice: Met w pt at bedside; introduced self and explained role of CM, including arranging DME, STR, home health, out pt tx.  Advised pt that CM will make appropriate referrals based on treatment team recommendations and MD orders, and he can consider recommended plan of care and choose from available vendors.  CM contacted family/caregiver?: No- see comments (pt alert and oriented adult)  Were Treatment Team discharge recommendations reviewed with patient/caregiver?:  (none at present)          Contacts  Patient Contacts: Kinsey Liu (Spouse)  [phone] (Home Phone)  Relationship to Patient:: Family         DME Referral Provided  Referral made for DME?: No    Other Referral/Resources/Interventions Provided:  Referral Comments: Pt denies post d/c needs.  Pt resides w spouse and 5 adult  children; 4 of the children work outside the home.  Pt reports he is independent w ADLs, driving, ambulation.  Uses cane periodically outside the home.  Pt has AM-PAC of 20.  CM will follow for any tx team recommendations.         Treatment Team Recommendation: Home  Discharge Destination Plan:: Home  Transport at Discharge : Family

## 2025-01-13 NOTE — ASSESSMENT & PLAN NOTE
Lab Results   Component Value Date    EGFR 22 01/13/2025    EGFR 24 01/12/2025    EGFR 27 11/15/2024    CREATININE 2.87 (H) 01/13/2025    CREATININE 2.68 (H) 01/12/2025    CREATININE 2.43 (H) 11/15/2024     Etiology of CKD has been hypertensive kidney disease.  Baseline creatinine ranges between 2.8-3.2.    Current Cr is 2.8 mg.dl and stable

## 2025-01-13 NOTE — CONSULTS
NEPHROLOGY HOSPITAL CONSULTATION   Genna Liu 59 y.o. male MRN: [id number]  Unit/Bed#: ICU 01 Encounter: [id number]    Brief History of Admission - 59 y.o. M with PMH of resistant hypertension, CKD IV, adrenal nodule resection, hypokalemia who p/w AMS and found to have hypertensive emergency    Assessment & Plan  Hypertensive emergency  Presented with systolic BP exceeding 220 mm Hg. Etiology likely non compliance with home BP medications as patient agreed.  Current BP is 160 mm Hg systolic and improving.  On multiple anti hypertensive medications.  Stage 4 chronic kidney disease (HCC)  Lab Results   Component Value Date    EGFR 22 01/13/2025    EGFR 24 01/12/2025    EGFR 27 11/15/2024    CREATININE 2.87 (H) 01/13/2025    CREATININE 2.68 (H) 01/12/2025    CREATININE 2.43 (H) 11/15/2024     Etiology of CKD has been hypertensive kidney disease.  Baseline creatinine ranges between 2.8-3.2.    Current Cr is 2.8 mg.dl and stable  Chronic heart failure with preserved ejection fraction (HFpEF) ([facility])  Wt Readings from Last 3 Encounters:   01/13/25 65.8 kg (145 lb)   11/15/24 62.4 kg (137 lb 9.1 oz)   07/24/24 61.4 kg (135 lb 5.8 oz)    Euvolemic.   Encephalopathy acute  Now resolved.      HISTORY OF PRESENT ILLNESS:  Requesting Physician: Donis Acosta MD  Reason for Consult: Hypertensive emergency    Genna Liu is a 59 y.o. male who was admitted to [facility] after presenting with elevated BP. A renal consultation is requested today for assistance in the management of resistant hypertension. Patient is known to us from past with h/o CKD stage IV due to hypertensive kidney disease. He had h/o adrenalectomy as well  at [facility] in the past. Patient presented yesterday with altered mental status and found to have systolic BP exceeding 220 mm Hg. Patient was admitted to ICU and received IV Hydralazine PRN medication. His home BP medications were resumed during his stay. Currently patient is AAO x 3.     PAST MEDICAL  HISTORY:  Past Medical History:   Diagnosis Date    Asthma     Chronic kidney disease     COPD (chronic obstructive pulmonary disease) (HCC)     CPAP (continuous positive airway pressure) dependence     NO LONGER NEEDS D/T BARIATRIC SURGERY.    Diabetes mellitus (HCC)     Emphysema of lung (HCC)     Gout     History of transfusion     pt stated they had a transfusion when they had gallbladder surgery.    Hypertension     Kidney disease     renal failure    Leg DVT (deep venous thromboembolism), acute, bilateral (HCC) 01/2018    Obesity (BMI 30-39.9)     AGUS on CPAP     setting 11    Postgastrectomy malabsorption     Sleep apnea     Systolic CHF (HCC)        PAST SURGICAL HISTORY:  Past Surgical History:   Procedure Laterality Date    ADRENALECTOMY      CARDIAC CATHETERIZATION N/A 3/5/2024    Procedure: Cardiac Left Heart Cath;  Surgeon: Servando Wiggins MD;  Location: [facility];  Service: Cardiology    CARDIAC CATHETERIZATION N/A 3/5/2024    Procedure: Cardiac Coronary Angiogram;  Surgeon: Servando Wiggins MD;  Location: [facility];  Service: Cardiology    CHOLECYSTECTOMY      COLONOSCOPY      EGD      HIATAL HERNIA REPAIR N/A 12/22/2020    Procedure: REPAIR HERNIA HIATAL LAPAROSCOPIC;  Surgeon: Shane Francis MD;  Location: [facility] MA[facility];  Service: Bariatrics    INCISION AND DRAINAGE OF WOUND Left 10/17/2018    Procedure: INCISION AND DRAINAGE (I&D) GROIN;  Surgeon: Rafy Pascual MD;  Location: [facility];  Service: General    IR IMAGE GUIDED ASPIRATION / DRAINAGE W TUBE  8/17/2018    IR IMAGE GUIDED ASPIRATION / DRAINAGE W TUBE  7/31/2018    JOINT REPLACEMENT Bilateral     knee    KIDNEY SURGERY  2009    nodule removal    LYMPH NODE DISSECTION Left 01/2018    left inguinal LN removed - benign     OTHER SURGICAL HISTORY      kidney nodule removal    PALATE / UVULA BIOPSY / EXCISION      PERICARDIAL WINDOW N/A 7/8/2023    Procedure: WINDOW PERICARDIAL;  Surgeon: Alonso Logan MD;   Location: [facility] MA[facility];  Service: Thoracic    CT LAPS GSTR RSTCV PX W/BYP JASON-EN-Y LIMB <150 CM N/A 12/22/2020    Procedure: BYPASS GASTRIC  JASON-EN-Y LAPAROSCOPIC WITH INTRAOPERATIVE EGD;  Surgeon: Shane Francis MD;  Location: [facility];  Service: Bariatrics    SINUS SURGERY      TONSILLECTOMY         ALLERGIES:  Allergies   Allergen Reactions    Clonidine Anaphylaxis    Lisinopril Anaphylaxis    Nifedipine Anaphylaxis     Tolerating nicardipine    Buspirone      Headaches,     Hydralazine Other (See Comments)     Fluid around the heart  Lose for Appetite        Minoxidil      Retains fluid around heart       SOCIAL HISTORY:  Social History     Substance and Sexual Activity   Alcohol Use Yes    Comment: occasionally     Social History     Substance and Sexual Activity   Drug Use Yes    Types: Marijuana    Comment: Card     Social History     Tobacco Use   Smoking Status Never    Passive exposure: Past   Smokeless Tobacco Never       FAMILY HISTORY:  Family History   Problem Relation Age of Onset    Cancer Father     Kidney disease Mother     Heart murmur Sister     Asthma Sister     Hypertension Sister     Heart disease Brother     Bell's palsy Brother     Hypertension Brother     Deep vein thrombosis Neg Hx        MEDICATIONS:    Current Facility-Administered Medications:     amLODIPine (NORVASC) tablet 10 mg, 10 mg, Oral, Daily, MARBIN Mauricio, 10 mg at 01/13/25 0900    ARIPiprazole (ABILIFY) tablet 5 mg, 5 mg, Oral, Daily, MARBIN Mauricio, 5 mg at 01/13/25 0900    aspirin (ECOTRIN LOW STRENGTH) EC tablet 81 mg, 81 mg, Oral, Daily, MARBIN Mauricio, 81 mg at 01/13/25 0900    atorvastatin (LIPITOR) tablet 40 mg, 40 mg, Oral, Daily With Dinner, MARBIN Mauricio, 40 mg at 01/12/25 1540    Cholecalciferol (VITAMIN D3) tablet 2,000 Units, 2,000 Units, Oral, Daily, MARBIN Mauricio, 2,000 Units at 01/13/25 0900    doxazosin (CARDURA) tablet 8 mg, 8 mg, Oral, Q12H, MARBIN Mauricio, 8 mg  at 01/13/25 0441    heparin (porcine) subcutaneous injection 5,000 Units, 5,000 Units, Subcutaneous, Q8H JOANN, MARBIN Mauricio, 5,000 Units at 01/13/25 0503    hydrALAZINE (APRESOLINE) injection 20 mg, 20 mg, Intravenous, Q6H PRN, MARBIN Mauricio, 20 mg at 01/12/25 1131    hydrALAZINE (APRESOLINE) tablet 100 mg, 100 mg, Oral, Q8H JOANN, MARBIN Mauricio, 100 mg at 01/13/25 0503    hydroCHLOROthiazide tablet 25 mg, 25 mg, Oral, Daily, Donis Acosta MD, 25 mg at 01/13/25 1000    isosorbide dinitrate (ISORDIL) tablet 40 mg, 40 mg, Oral, TID after meals, MARBIN Mauricio, 40 mg at 01/13/25 0900    labetalol (NORMODYNE) tablet 600 mg, 600 mg, Oral, Q8H JOANN, Donis Acosta MD, 600 mg at 01/13/25 1000    pantoprazole (PROTONIX) EC tablet 40 mg, 40 mg, Oral, Daily, MARBIN Mauricio, 40 mg at 01/13/25 0900    sertraline (ZOLOFT) tablet 200 mg, 200 mg, Oral, Daily, MARBIN Mauricio, 200 mg at 01/13/25 0901    spironolactone (ALDACTONE) tablet 12.5 mg, 12.5 mg, Oral, BID, MARBIN Mauricio, 12.5 mg at 01/13/25 0900    sucralfate (CARAFATE) tablet 1 g, 1 g, Oral, BID, MARBIN Mauricio, 1 g at 01/13/25 0900    REVIEW OF SYSTEMS:  Constitutional: Negative for fatigue, anorexia, fever, chills, diaphoresis  HENT: Negative for postnasal drip  Eyes: Negative for visual disturbance.   Respiratory: Negative for cough, shortness of breath and wheezing.   Cardiovascular: Negative for chest pain, palpitations and leg swelling.   Gastrointestinal: Negative for abdominal pain, constipation, diarrhea, nausea and vomiting.   Genitourinary: No dysuria, hematuria  Endocrine: Negative for polyuria.   Musculoskeletal: Negative for arthralgias, back pain and joint swelling.   Skin: Negative for rash.   Neurological: Negative for focal weakness, headaches, dizziness.  Hematological: Negative for easy bruising or bleeding.  Psychiatric/Behavioral: Negative for confusion and sleep disturbance.   All the systems were reviewed  "and were negative except as documented on the HPI.    PHYSICAL EXAM:  Current Weight: Weight - Scale: 65.8 kg (145 lb)  First Weight: Weight - Scale: 63.5 kg (139 lb 15.9 oz)  Vitals:    01/13/25 0540 01/13/25 0800 01/13/25 0900 01/13/25 1000   BP: (!) 191/102 (!) 191/102 (!) 175/102 (!) 167/101   BP Location:   Left arm    Pulse: 73 73 77 77   Resp:   18    Temp:   98 °F (36.7 °C)    TempSrc:   Oral    SpO2: 96%  98% 96%   Weight: 65.9 kg (145 lb 4.5 oz) 65.8 kg (145 lb)     Height:  5' 7\" (1.702 m)         Intake/Output Summary (Last 24 hours) at 1/13/2025 1111  Last data filed at 1/13/2025 0959  Gross per 24 hour   Intake 1941.66 ml   Output 675 ml   Net 1266.66 ml     Physical Exam  HENT:      Head: Normocephalic and atraumatic.   Eyes:      Pupils: Pupils are equal, round, and reactive to light.   Neck:      Vascular: No JVD.   Cardiovascular:      Rate and Rhythm: Normal rate and regular rhythm.      Heart sounds: Normal heart sounds. No murmur heard.     No friction rub.   Pulmonary:      Effort: Pulmonary effort is normal.      Breath sounds: Normal breath sounds.   Abdominal:      General: Bowel sounds are normal. There is no distension.      Palpations: Abdomen is soft.      Tenderness: There is no abdominal tenderness. There is no rebound.   Musculoskeletal:         General: No tenderness.      Cervical back: Neck supple.   Skin:     General: Skin is dry.      Findings: No rash.   Neurological:      Mental Status: He is alert and oriented to person, place, and time.           Invasive Devices:      Lab Results:   Results from last 7 days   Lab Units 01/13/25  0446 01/12/25  0111   WBC Thousand/uL 6.82 6.01   HEMOGLOBIN g/dL 9.6* 10.7*   HEMATOCRIT % 30.0* 34.5*   PLATELETS Thousands/uL 262 268   POTASSIUM mmol/L 3.8 3.2*   CHLORIDE mmol/L 104 107   CO2 mmol/L 25 23   BUN mg/dL 46* 49*   CREATININE mg/dL 2.87* 2.68*   CALCIUM mg/dL 8.5 8.2*   MAGNESIUM mg/dL 2.0  --    PHOSPHORUS mg/dL 4.8*  --    ALK PHOS " "U/L 78 98   ALT U/L 37 49   AST U/L 39 45*     Other Studies:     Portions of the record may have been created with voice recognition software. Occasional wrong word or \"sound a like\" substitutions may have occurred due to the inherent limitations of voice recognition software. Read the chart carefully and recognize, using context, where substitutions have occurred.If you have any questions, please contact the dictating provider.    "

## 2025-01-13 NOTE — ASSESSMENT & PLAN NOTE
Resolved, S/p icu stay, transferred out 1/12/25  Likely hypertensive encephalopathy  Neuroimaging with CT head no acute abnormality  Metabolic and hematologic panel not indicative of other etiology  No clinical signs of infection  As per icu notes initial concern for intussception has now resolved with PO contrast CT

## 2025-01-13 NOTE — ASSESSMENT & PLAN NOTE
Pt was in in icu initially   BP fluctuating   191/102 this am  Reviewed OP nephrology notes and added hctz and labetalol as per OP dosing today  Nephrology consult for further recommendations  Allergy to clonidine, ace inh noted

## 2025-01-13 NOTE — ASSESSMENT & PLAN NOTE
Lab Results   Component Value Date    EGFR 22 01/13/2025    EGFR 24 01/12/2025    EGFR 27 11/15/2024    CREATININE 2.87 (H) 01/13/2025    CREATININE 2.68 (H) 01/12/2025    CREATININE 2.43 (H) 11/15/2024   Follows OP with [facility] nephro  Baseline 2.5-3.2  Nephrology consult for uncontrolled hypertensive emergency   Monitor UO and renal function closely

## 2025-01-13 NOTE — ASSESSMENT & PLAN NOTE
Wt Readings from Last 3 Encounters:   01/13/25 65.8 kg (145 lb)   11/15/24 62.4 kg (137 lb 9.1 oz)   07/24/24 61.4 kg (135 lb 5.8 oz)    Euvolemic.

## 2025-01-13 NOTE — PLAN OF CARE
Problem: PAIN - ADULT  Goal: Verbalizes/displays adequate comfort level or baseline comfort level  Description: Interventions:  - Encourage patient to monitor pain and request assistance  - Assess pain using appropriate pain scale  - Administer analgesics based on type and severity of pain and evaluate response  - Implement non-pharmacological measures as appropriate and evaluate response  - Consider cultural and social influences on pain and pain management  - Notify physician/advanced practitioner if interventions unsuccessful or patient reports new pain  Outcome: Progressing     Problem: INFECTION - ADULT  Goal: Absence or prevention of progression during hospitalization  Description: INTERVENTIONS:  - Assess and monitor for signs and symptoms of infection  - Monitor lab/diagnostic results  - Monitor all insertion sites, i.e. indwelling lines, tubes, and drains  - Monitor endotracheal if appropriate and nasal secretions for changes in amount and color  - [place] appropriate cooling/warming therapies per order  - Administer medications as ordered  - Instruct and encourage patient and family to use good hand hygiene technique  - Identify and instruct in appropriate isolation precautions for identified infection/condition  Outcome: Progressing  Goal: Absence of fever/infection during neutropenic period  Description: INTERVENTIONS:  - Monitor WBC    Outcome: Progressing     Problem: SAFETY ADULT  Goal: Patient will remain free of falls  Description: INTERVENTIONS:  - Educate patient/family on patient safety including physical limitations  - Instruct patient to call for assistance with activity   - Consult OT/PT to assist with strengthening/mobility   - Keep Call bell within reach  - Keep bed low and locked with side rails adjusted as appropriate  - Keep care items and personal belongings within reach  - Initiate and maintain comfort rounds  - Make Fall Risk Sign visible to staff  - Offer Toileting every 2 Hours,  in advance of need  - Initiate/Maintain bed alarm  - Obtain necessary fall risk management equipment:   - Apply yellow socks and bracelet for high fall risk patients  - Consider moving patient to room near nurses station  Outcome: Progressing  Goal: Maintain or return to baseline ADL function  Description: INTERVENTIONS:  -  Assess patient's ability to carry out ADLs; assess patient's baseline for ADL function and identify physical deficits which impact ability to perform ADLs (bathing, care of mouth/teeth, toileting, grooming, dressing, etc.)  - Assess/evaluate cause of self-care deficits   - Assess range of motion  - Assess patient's mobility; develop plan if impaired  - Assess patient's need for assistive devices and provide as appropriate  - Encourage maximum independence but intervene and supervise when necessary  - Involve family in performance of ADLs  - Assess for home care needs following discharge   - Consider OT consult to assist with ADL evaluation and planning for discharge  - Provide patient education as appropriate  Outcome: Progressing  Goal: Maintains/Returns to pre admission functional level  Description: INTERVENTIONS:  - Perform AM-PAC 6 Click Basic Mobility/ Daily Activity assessment daily.  - Set and communicate daily mobility goal to care team and patient/family/caregiver.   - Collaborate with rehabilitation services on mobility goals if consulted  - Perform Range of Motion 3 times a day.  - Reposition patient every 2 hours.  - Dangle patient 3 times a day  - Stand patient 3 times a day  - Ambulate patient 3 times a day  - Out of bed to chair 3 times a day   - Out of bed for meals 3 times a day  - Out of bed for toileting  - Record patient progress and toleration of activity level   Outcome: Progressing     Problem: DISCHARGE PLANNING  Goal: Discharge to home or other facility with appropriate resources  Description: INTERVENTIONS:  - Identify barriers to discharge w/patient and caregiver  -  Arrange for needed discharge resources and transportation as appropriate  - Identify discharge learning needs (meds, wound care, etc.)  - Arrange for interpretive services to assist at discharge as needed  - Refer to Case Management Department for coordinating discharge planning if the patient needs post-hospital services based on physician/advanced practitioner order or complex needs related to functional status, cognitive ability, or social support system  Outcome: Progressing     Problem: Knowledge Deficit  Goal: Patient/family/caregiver demonstrates understanding of disease process, treatment plan, medications, and discharge instructions  Description: Complete learning assessment and assess knowledge base.  Interventions:  - Provide teaching at level of understanding  - Provide teaching via preferred learning methods  Outcome: Progressing

## 2025-01-13 NOTE — ASSESSMENT & PLAN NOTE
Wt Readings from Last 3 Encounters:   01/13/25 65.8 kg (145 lb)   11/15/24 62.4 kg (137 lb 9.1 oz)   07/24/24 61.4 kg (135 lb 5.8 oz)     No volume overload on clinical exam  Cont aldactone

## 2025-01-13 NOTE — PROGRESS NOTES
Progress Note - Hospitalist   Name: Genna Liu 59 y.o. male I MRN: [id number]  Unit/Bed#: ICU 01 I Date of Admission: 1/12/2025   Date of Service: 1/13/2025 I Hospital Day: 1    Assessment & Plan  Hypertensive emergency  Pt was in in icu initially   BP fluctuating   191/102 this am  Reviewed OP nephrology notes and added hctz and labetalol as per OP dosing today  Nephrology consult for further recommendations  Allergy to clonidine, ace inh noted  Encephalopathy acute  Resolved, S/p icu stay, transferred out 1/12/25  Likely hypertensive encephalopathy  Neuroimaging with CT head no acute abnormality  Metabolic and hematologic panel not indicative of other etiology  No clinical signs of infection  As per icu notes initial concern for intussception has now resolved with PO contrast CT  COPD without exacerbation (HCC)  Stable  Cont current meds  Stage 4 chronic kidney disease (HCC)  Lab Results   Component Value Date    EGFR 22 01/13/2025    EGFR 24 01/12/2025    EGFR 27 11/15/2024    CREATININE 2.87 (H) 01/13/2025    CREATININE 2.68 (H) 01/12/2025    CREATININE 2.43 (H) 11/15/2024   Follows OP with [facility]  Baseline 2.5-3.2  Nephrology consult for uncontrolled hypertensive emergency   Monitor UO and renal function closely  Hypoglycemia  No events in last 24 hours noted  Stable BG   Noncompliance with medication regimen  Counseled to comply  Chronic heart failure with preserved ejection fraction (HFpEF) (HCC)  Wt Readings from Last 3 Encounters:   01/13/25 65.8 kg (145 lb)   11/15/24 62.4 kg (137 lb 9.1 oz)   07/24/24 61.4 kg (135 lb 5.8 oz)     No volume overload on clinical exam  Cont aldactone        Intussusception intestine (HCC)  Likely a false read initially as it has resolved on ct with contrast as per icu notes    VTE Pharmacologic Prophylaxis:    heparin    Mobility:   Basic Mobility Inpatient Raw Score: 20  JH-HLM Goal: 6: Walk 10 steps or more  JH-HLM Achieved: 2: Bed activities/Dependent  transfer  JH-HLM Goal NOT achieved. Continue with multidisciplinary rounding and encourage appropriate mobility to improve upon JH-HLM goals.    Patient Centered Rounds: I performed bedside rounds with nursing staff today.   Discussions with Specialists or Other Care Team Provider: nephrology    Education and Discussions with Family / Patient:  offered to call pt declined states he will keep his family informed and does not need me to call.     Current Length of Stay: 1 day(s)  Current Patient Status: Inpatient   Certification Statement: The patient will continue to require additional inpatient hospital stay due to uncontrolled BP  Discharge Plan: Anticipate discharge in 48 hrs to home.    Code Status: Level 1 - Full Code    Subjective   Pt seen and examined at bedside during am rounds  No acute overnight events  BP elevated  No confusion noted  No nursing concerns    Objective :  Temp:  [97.9 °F (36.6 °C)-98.4 °F (36.9 °C)] 98.4 °F (36.9 °C)  HR:  [66-85] 73  BP: (143-208)/() 191/102  Resp:  [10-22] 16  SpO2:  [94 %-98 %] 96 %  O2 Device: None (Room air)    Body mass index is 22.71 kg/m².     Input and Output Summary (last 24 hours):     Intake/Output Summary (Last 24 hours) at 1/13/2025 0856  Last data filed at 1/13/2025 0200  Gross per 24 hour   Intake 1701.66 ml   Output 475 ml   Net 1226.66 ml       Physical Exam  General- Awake, alert and oriented x 3, looks comfortable  HEENT- Normocephalic, atraumatic, oral mucosa- moist  Neck- Supple, No carotid bruit, no JVD  CVS- Normal S1/ S2, Regular rate and rhythm, No murmur, No edema  Respiratory system- B/L clear breath sounds, no wheezing  Abdomen- Soft, Non distended, no tenderness, Bowel sound- present 4 quads  Genitourinary- No suprapubic tenderness, No CVA tenderness  Skin- No new bruise or rash  Musculoskeletal- No gross deformity  Psych- No acute psychosis  CNS- CN II- XII grossly intact, No acute focal neurologic deficit  noted      Lines/Drains:        Telemetry:  Telemetry Orders (From admission, onward)               24 Hour Telemetry Monitoring  Continuous x 24 Hours (Telem)        Expiring   Question:  Reason for 24 Hour Telemetry  Answer:  Metabolic/electrolyte disturbance with high probability of dysrhythmia. K level <3 or >6 OR KCL infusion >10mEq/hr                     Telemetry Reviewed: Normal Sinus Rhythm  Indication for Continued Telemetry Use: No indication for continued use. Will discontinue.                Lab Results: I have reviewed the following results:   Results from last 7 days   Lab Units 01/13/25  0446   WBC Thousand/uL 6.82   HEMOGLOBIN g/dL 9.6*   HEMATOCRIT % 30.0*   PLATELETS Thousands/uL 262   SEGS PCT % 67   LYMPHO PCT % 21   MONO PCT % 9   EOS PCT % 2     Results from last 7 days   Lab Units 01/13/25  0446   SODIUM mmol/L 137   POTASSIUM mmol/L 3.8   CHLORIDE mmol/L 104   CO2 mmol/L 25   BUN mg/dL 46*   CREATININE mg/dL 2.87*   ANION GAP mmol/L 8   CALCIUM mg/dL 8.5   ALBUMIN g/dL 3.5   TOTAL BILIRUBIN mg/dL 0.81   ALK PHOS U/L 78   ALT U/L 37   AST U/L 39   GLUCOSE RANDOM mg/dL 104         Results from last 7 days   Lab Units 01/12/25  2123 01/12/25  1551 01/12/25  1430 01/12/25  1142 01/12/25  0558 01/12/25  0519 01/12/25  0430 01/12/25  0350 01/12/25  0313 01/12/25  0236 01/12/25  0201 01/12/25  0134   POC GLUCOSE mg/dl 115 140 163* 103 153* 152* 154* 124 117 84 55* 47*         Results from last 7 days   Lab Units 01/12/25  0559   PROCALCITONIN ng/ml 0.08       Recent Cultures (last 7 days):         Imaging Results Review: I reviewed radiology reports from this admission including: CT chest, CT abdomen/pelvis, and CT spine.  Other Study Results Review: EKG was reviewed.  Normal sinus rhythm  Possible Left atrial enlargement  Left anterior fascicular block  Nonspecific ST and T wave abnormality  Prolonged QT    Last 24 Hours Medication List:     Current Facility-Administered Medications:      amLODIPine (NORVASC) tablet 10 mg, Daily    ARIPiprazole (ABILIFY) tablet 5 mg, Daily    aspirin (ECOTRIN LOW STRENGTH) EC tablet 81 mg, Daily    atorvastatin (LIPITOR) tablet 40 mg, Daily With Dinner    Cholecalciferol (VITAMIN D3) tablet 2,000 Units, Daily    doxazosin (CARDURA) tablet 8 mg, Q12H    heparin (porcine) subcutaneous injection 5,000 Units, Q8H JOANN    hydrALAZINE (APRESOLINE) injection 20 mg, Q6H PRN    hydrALAZINE (APRESOLINE) tablet 100 mg, Q8H JOANN    hydroCHLOROthiazide tablet 25 mg, Daily    isosorbide dinitrate (ISORDIL) tablet 40 mg, TID after meals    labetalol (NORMODYNE) tablet 600 mg, Q8H JOANN    pantoprazole (PROTONIX) EC tablet 40 mg, Daily    sertraline (ZOLOFT) tablet 200 mg, Daily    spironolactone (ALDACTONE) tablet 12.5 mg, BID    sucralfate (CARAFATE) tablet 1 g, BID    Administrative Statements   Today, Patient Was Seen By: Donis Acosta MD  I have spent a total time of 52 minutes in caring for this patient on the day of the visit/encounter including Diagnostic results, Prognosis, Risks and benefits of tx options, Instructions for management, Patient and family education, Importance of tx compliance, Risk factor reductions, Impressions, Counseling / Coordination of care, Documenting in the medical record, Reviewing / ordering tests, medicine, procedures  , Obtaining or reviewing history  , and Communicating with other healthcare professionals .    **Please Note: This note may have been constructed using a voice recognition system.**

## 2025-01-13 NOTE — ASSESSMENT & PLAN NOTE
Presented with systolic BP exceeding 220 mm Hg. Etiology likely non compliance with home BP medications as patient agreed.  Current BP is 160 mm Hg systolic and improving.  On multiple anti hypertensive medications.

## 2025-01-14 VITALS
RESPIRATION RATE: 18 BRPM | OXYGEN SATURATION: 95 % | BODY MASS INDEX: 23.01 KG/M2 | SYSTOLIC BLOOD PRESSURE: 170 MMHG | WEIGHT: 146.61 LBS | HEIGHT: 67 IN | HEART RATE: 70 BPM | TEMPERATURE: 98.6 F | DIASTOLIC BLOOD PRESSURE: 95 MMHG

## 2025-01-14 LAB
ALBUMIN SERPL BCG-MCNC: 3.5 G/DL (ref 3.5–5)
ALP SERPL-CCNC: 73 U/L (ref 34–104)
ALT SERPL W P-5'-P-CCNC: 32 U/L (ref 7–52)
ANION GAP SERPL CALCULATED.3IONS-SCNC: 7 MMOL/L (ref 4–13)
AST SERPL W P-5'-P-CCNC: 28 U/L (ref 13–39)
BASOPHILS # BLD AUTO: 0.04 THOUSANDS/ΜL (ref 0–0.1)
BASOPHILS NFR BLD AUTO: 1 % (ref 0–1)
BILIRUB SERPL-MCNC: 0.73 MG/DL (ref 0.2–1)
BUN SERPL-MCNC: 47 MG/DL (ref 5–25)
CALCIUM SERPL-MCNC: 8.6 MG/DL (ref 8.4–10.2)
CHLORIDE SERPL-SCNC: 103 MMOL/L (ref 96–108)
CO2 SERPL-SCNC: 26 MMOL/L (ref 21–32)
CREAT SERPL-MCNC: 3.03 MG/DL (ref 0.6–1.3)
EOSINOPHIL # BLD AUTO: 0.1 THOUSAND/ΜL (ref 0–0.61)
EOSINOPHIL NFR BLD AUTO: 2 % (ref 0–6)
ERYTHROCYTE [DISTWIDTH] IN BLOOD BY AUTOMATED COUNT: 16.5 % (ref 11.6–15.1)
GFR SERPL CREATININE-BSD FRML MDRD: 21 ML/MIN/1.73SQ M
GLUCOSE SERPL-MCNC: 105 MG/DL (ref 65–140)
GLUCOSE SERPL-MCNC: 108 MG/DL (ref 65–140)
GLUCOSE SERPL-MCNC: 195 MG/DL (ref 65–140)
GLUCOSE SERPL-MCNC: 88 MG/DL (ref 65–140)
HCT VFR BLD AUTO: 28.9 % (ref 36.5–49.3)
HGB BLD-MCNC: 9 G/DL (ref 12–17)
IMM GRANULOCYTES # BLD AUTO: 0.01 THOUSAND/UL (ref 0–0.2)
IMM GRANULOCYTES NFR BLD AUTO: 0 % (ref 0–2)
LYMPHOCYTES # BLD AUTO: 1.61 THOUSANDS/ΜL (ref 0.6–4.47)
LYMPHOCYTES NFR BLD AUTO: 33 % (ref 14–44)
MAGNESIUM SERPL-MCNC: 1.8 MG/DL (ref 1.9–2.7)
MCH RBC QN AUTO: 26.2 PG (ref 26.8–34.3)
MCHC RBC AUTO-ENTMCNC: 31.1 G/DL (ref 31.4–37.4)
MCV RBC AUTO: 84 FL (ref 82–98)
MONOCYTES # BLD AUTO: 0.58 THOUSAND/ΜL (ref 0.17–1.22)
MONOCYTES NFR BLD AUTO: 12 % (ref 4–12)
NEUTROPHILS # BLD AUTO: 2.58 THOUSANDS/ΜL (ref 1.85–7.62)
NEUTS SEG NFR BLD AUTO: 52 % (ref 43–75)
NRBC BLD AUTO-RTO: 0 /100 WBCS
PHOSPHATE SERPL-MCNC: 5 MG/DL (ref 2.3–4.1)
PLATELET # BLD AUTO: 239 THOUSANDS/UL (ref 149–390)
PMV BLD AUTO: 10.1 FL (ref 8.9–12.7)
POTASSIUM SERPL-SCNC: 3.1 MMOL/L (ref 3.5–5.3)
PROT SERPL-MCNC: 6.2 G/DL (ref 6.4–8.4)
RBC # BLD AUTO: 3.43 MILLION/UL (ref 3.88–5.62)
SODIUM SERPL-SCNC: 136 MMOL/L (ref 135–147)
WBC # BLD AUTO: 4.92 THOUSAND/UL (ref 4.31–10.16)

## 2025-01-14 PROCEDURE — 80053 COMPREHEN METABOLIC PANEL: CPT | Performed by: NURSE PRACTITIONER

## 2025-01-14 PROCEDURE — 99232 SBSQ HOSP IP/OBS MODERATE 35: CPT | Performed by: INTERNAL MEDICINE

## 2025-01-14 PROCEDURE — 85025 COMPLETE CBC W/AUTO DIFF WBC: CPT | Performed by: NURSE PRACTITIONER

## 2025-01-14 PROCEDURE — 84100 ASSAY OF PHOSPHORUS: CPT | Performed by: NURSE PRACTITIONER

## 2025-01-14 PROCEDURE — 83735 ASSAY OF MAGNESIUM: CPT | Performed by: NURSE PRACTITIONER

## 2025-01-14 PROCEDURE — 82948 REAGENT STRIP/BLOOD GLUCOSE: CPT

## 2025-01-14 PROCEDURE — 99239 HOSP IP/OBS DSCHRG MGMT >30: CPT | Performed by: INTERNAL MEDICINE

## 2025-01-14 RX ORDER — HYDRALAZINE HYDROCHLORIDE 100 MG/1
100 TABLET, FILM COATED ORAL EVERY 8 HOURS SCHEDULED
Qty: 90 TABLET | Refills: 0 | Status: SHIPPED | OUTPATIENT
Start: 2025-01-14

## 2025-01-14 RX ORDER — POTASSIUM CHLORIDE 1500 MG/1
40 TABLET, EXTENDED RELEASE ORAL ONCE
Status: COMPLETED | OUTPATIENT
Start: 2025-01-14 | End: 2025-01-14

## 2025-01-14 RX ORDER — ISOSORBIDE DINITRATE 40 MG/1
40 TABLET ORAL
Qty: 90 TABLET | Refills: 0 | Status: SHIPPED | OUTPATIENT
Start: 2025-01-14

## 2025-01-14 RX ORDER — DOXAZOSIN 8 MG/1
8 TABLET ORAL 2 TIMES DAILY
Qty: 60 TABLET | Refills: 0 | Status: SHIPPED | OUTPATIENT
Start: 2025-01-14

## 2025-01-14 RX ORDER — SPIRONOLACTONE 25 MG/1
12.5 TABLET ORAL 2 TIMES DAILY
Qty: 30 TABLET | Refills: 0 | Status: SHIPPED | OUTPATIENT
Start: 2025-01-14

## 2025-01-14 RX ORDER — AMLODIPINE BESYLATE 10 MG/1
10 TABLET ORAL DAILY
Qty: 30 TABLET | Refills: 0 | Status: SHIPPED | OUTPATIENT
Start: 2025-01-14

## 2025-01-14 RX ORDER — LABETALOL 300 MG/1
600 TABLET, FILM COATED ORAL EVERY 8 HOURS SCHEDULED
Qty: 180 TABLET | Refills: 0 | Status: SHIPPED | OUTPATIENT
Start: 2025-01-14

## 2025-01-14 RX ADMIN — HYDROCHLOROTHIAZIDE 25 MG: 25 TABLET ORAL at 08:43

## 2025-01-14 RX ADMIN — AMLODIPINE BESYLATE 10 MG: 5 TABLET ORAL at 08:43

## 2025-01-14 RX ADMIN — DOXAZOSIN 8 MG: 4 TABLET ORAL at 05:44

## 2025-01-14 RX ADMIN — SERTRALINE HYDROCHLORIDE 200 MG: 50 TABLET ORAL at 08:41

## 2025-01-14 RX ADMIN — LABETALOL HYDROCHLORIDE 600 MG: 200 TABLET, FILM COATED ORAL at 06:36

## 2025-01-14 RX ADMIN — ASPIRIN 81 MG: 81 TABLET, COATED ORAL at 08:43

## 2025-01-14 RX ADMIN — ARIPIPRAZOLE 5 MG: 5 TABLET ORAL at 08:44

## 2025-01-14 RX ADMIN — SPIRONOLACTONE 12.5 MG: 25 TABLET ORAL at 08:43

## 2025-01-14 RX ADMIN — Medication 2000 UNITS: at 08:42

## 2025-01-14 RX ADMIN — HYDRALAZINE HYDROCHLORIDE 100 MG: 25 TABLET ORAL at 05:44

## 2025-01-14 RX ADMIN — ISOSORBIDE DINITRATE 40 MG: 10 TABLET ORAL at 14:15

## 2025-01-14 RX ADMIN — HEPARIN SODIUM 5000 UNITS: 5000 INJECTION, SOLUTION INTRAVENOUS; SUBCUTANEOUS at 14:05

## 2025-01-14 RX ADMIN — ATORVASTATIN CALCIUM 40 MG: 40 TABLET, FILM COATED ORAL at 16:12

## 2025-01-14 RX ADMIN — ISOSORBIDE DINITRATE 40 MG: 10 TABLET ORAL at 08:42

## 2025-01-14 RX ADMIN — SUCRALFATE 1 G: 1 TABLET ORAL at 08:42

## 2025-01-14 RX ADMIN — LABETALOL HYDROCHLORIDE 600 MG: 200 TABLET, FILM COATED ORAL at 14:10

## 2025-01-14 RX ADMIN — HYDRALAZINE HYDROCHLORIDE 100 MG: 25 TABLET ORAL at 14:10

## 2025-01-14 RX ADMIN — PANTOPRAZOLE SODIUM 40 MG: 40 TABLET, DELAYED RELEASE ORAL at 08:42

## 2025-01-14 RX ADMIN — HEPARIN SODIUM 5000 UNITS: 5000 INJECTION, SOLUTION INTRAVENOUS; SUBCUTANEOUS at 05:44

## 2025-01-14 RX ADMIN — POTASSIUM CHLORIDE 40 MEQ: 1500 TABLET, EXTENDED RELEASE ORAL at 08:43

## 2025-01-14 NOTE — NURSING NOTE
Peripheral iv removed, discharge instructions reviewed, all questions answered. Patient left unit via walking with daughter.

## 2025-01-14 NOTE — ASSESSMENT & PLAN NOTE
Lab Results   Component Value Date    EGFR 21 01/14/2025    EGFR 22 01/13/2025    EGFR 24 01/12/2025    CREATININE 3.03 (H) 01/14/2025    CREATININE 2.87 (H) 01/13/2025    CREATININE 2.68 (H) 01/12/2025     Etiology of CKD has been hypertensive kidney disease.  Baseline creatinine ranges between 2.8-3.2.    Current serum creatinine is 3.0 and acceptable

## 2025-01-14 NOTE — PLAN OF CARE
Problem: PAIN - ADULT  Goal: Verbalizes/displays adequate comfort level or baseline comfort level  Description: Interventions:  - Encourage patient to monitor pain and request assistance  - Assess pain using appropriate pain scale  - Administer analgesics based on type and severity of pain and evaluate response  - Implement non-pharmacological measures as appropriate and evaluate response  - Consider cultural and social influences on pain and pain management  - Notify physician/advanced practitioner if interventions unsuccessful or patient reports new pain  Outcome: Progressing     Problem: INFECTION - ADULT  Goal: Absence or prevention of progression during hospitalization  Description: INTERVENTIONS:  - Assess and monitor for signs and symptoms of infection  - Monitor lab/diagnostic results  - Monitor all insertion sites, i.e. indwelling lines, tubes, and drains  - Monitor endotracheal if appropriate and nasal secretions for changes in amount and color  - [place] appropriate cooling/warming therapies per order  - Administer medications as ordered  - Instruct and encourage patient and family to use good hand hygiene technique  - Identify and instruct in appropriate isolation precautions for identified infection/condition  Outcome: Progressing  Goal: Absence of fever/infection during neutropenic period  Description: INTERVENTIONS:  - Monitor WBC    Outcome: Progressing     Problem: SAFETY ADULT  Goal: Patient will remain free of falls  Description: INTERVENTIONS:  - Educate patient/family on patient safety including physical limitations  - Instruct patient to call for assistance with activity   - Consult OT/PT to assist with strengthening/mobility   - Keep Call bell within reach  - Keep bed low and locked with side rails adjusted as appropriate  - Keep care items and personal belongings within reach  - Initiate and maintain comfort rounds  - Make Fall Risk Sign visible to staff  - Offer Toileting every 2 Hours,  in advance of need  - Initiate/Maintain bed alarm  - Obtain necessary fall risk management equipment:    - Apply yellow socks and bracelet for high fall risk patients  - Consider moving patient to room near nurses station  Outcome: Progressing  Goal: Maintain or return to baseline ADL function  Description: INTERVENTIONS:  -  Assess patient's ability to carry out ADLs; assess patient's baseline for ADL function and identify physical deficits which impact ability to perform ADLs (bathing, care of mouth/teeth, toileting, grooming, dressing, etc.)  - Assess/evaluate cause of self-care deficits   - Assess range of motion  - Assess patient's mobility; develop plan if impaired  - Assess patient's need for assistive devices and provide as appropriate  - Encourage maximum independence but intervene and supervise when necessary  - Involve family in performance of ADLs  - Assess for home care needs following discharge   - Consider OT consult to assist with ADL evaluation and planning for discharge  - Provide patient education as appropriate  Outcome: Progressing  Goal: Maintains/Returns to pre admission functional level  Description: INTERVENTIONS:  - Perform AM-PAC 6 Click Basic Mobility/ Daily Activity assessment daily.  - Set and communicate daily mobility goal to care team and patient/family/caregiver.   - Collaborate with rehabilitation services on mobility goals if consulted  - Perform Range of Motion 3 times a day.  - Reposition patient every 2 hours.  - Dangle patient 3 times a day  - Stand patient 3 times a day  - Ambulate patient 3 times a day  - Out of bed to chair 3 times a day   - Out of bed for meals 3 times a day  - Out of bed for toileting  - Record patient progress and toleration of activity level   Outcome: Progressing     Problem: DISCHARGE PLANNING  Goal: Discharge to home or other facility with appropriate resources  Description: INTERVENTIONS:  - Identify barriers to discharge w/patient and caregiver  -  Arrange for needed discharge resources and transportation as appropriate  - Identify discharge learning needs (meds, wound care, etc.)  - Arrange for interpretive services to assist at discharge as needed  - Refer to Case Management Department for coordinating discharge planning if the patient needs post-hospital services based on physician/advanced practitioner order or complex needs related to functional status, cognitive ability, or social support system  Outcome: Progressing     Problem: Knowledge Deficit  Goal: Patient/family/caregiver demonstrates understanding of disease process, treatment plan, medications, and discharge instructions  Description: Complete learning assessment and assess knowledge base.  Interventions:  - Provide teaching at level of understanding  - Provide teaching via preferred learning methods  Outcome: Progressing

## 2025-01-14 NOTE — PROGRESS NOTES
Progress Note - Nephrology   Name: Genna Liu 59 y.o. male I MRN: [id number]  Unit/Bed#: ICU 01 I Date of Admission: 1/12/2025   Date of Service: 1/14/2025 I Hospital Day: 2     Assessment & Plan  Hypertensive emergency  Presented with systolic BP exceeding 220 mm Hg. Etiology likely non compliance with home BP medications as patient agreed.  Current BP is 160 mm Hg systolic and improving.  On multiple anti hypertensive medications.  Stage 4 chronic kidney disease (HCC)  Lab Results   Component Value Date    EGFR 21 01/14/2025    EGFR 22 01/13/2025    EGFR 24 01/12/2025    CREATININE 3.03 (H) 01/14/2025    CREATININE 2.87 (H) 01/13/2025    CREATININE 2.68 (H) 01/12/2025     Etiology of CKD has been hypertensive kidney disease.  Baseline creatinine ranges between 2.8-3.2.    Current serum creatinine is 3.0 and acceptable  Chronic heart failure with preserved ejection fraction (HFpEF) ([facility])  Wt Readings from Last 3 Encounters:   01/14/25 66.5 kg (146 lb 9.7 oz)   11/15/24 62.4 kg (137 lb 9.1 oz)   07/24/24 61.4 kg (135 lb 5.8 oz)   Patient appears euvolemic  Encephalopathy acute  Now resolved.      Subjective   Brief History of Admission -59 years old male with past medical history of resistant hypertension, chronic kidney disease stage IV, adrenalectomy who presented to our facility with altered mental status and in a state of hypertensive emergency    Patient is laying in bed and feels well.  Denies any headaches, shortness of breath, chest pain    Objective :  Temp:  [98.2 °F (36.8 °C)-98.8 °F (37.1 °C)] 98.4 °F (36.9 °C)  HR:  [60-77] 64  BP: (134-203)/() 160/95  Resp:  [16-18] 18  SpO2:  [95 %-97 %] 95 %  O2 Device: None (Room air)    Current Weight: Weight - Scale: 66.5 kg (146 lb 9.7 oz)  First Weight: Weight - Scale: 63.5 kg (139 lb 15.9 oz)  I/O         01/12 0701 01/13 0700 01/13 0701 01/14 0700 01/14 0701  01/15 0700    P.O. 720 840 120    I.V. (mL/kg) 781.7 (11.9)      IV Piggyback 200       Total Intake(mL/kg) 1701.7 (25.8) 840 (12.6) 120 (1.8)    Urine (mL/kg/hr) 1075 (0.7) 745 (0.5)     Total Output 1075 745     Net +626.7 +95 +120                 Physical Exam  HENT:      Head: Normocephalic and atraumatic.   Eyes:      Pupils: Pupils are equal, round, and reactive to light.   Neck:      Vascular: No JVD.   Cardiovascular:      Rate and Rhythm: Normal rate and regular rhythm.      Heart sounds: Normal heart sounds. No murmur heard.     No friction rub.   Pulmonary:      Effort: Pulmonary effort is normal.      Breath sounds: Normal breath sounds.   Abdominal:      General: Bowel sounds are normal. There is no distension.      Palpations: Abdomen is soft.      Tenderness: There is no abdominal tenderness. There is no rebound.   Musculoskeletal:         General: No tenderness.      Cervical back: Neck supple.   Skin:     General: Skin is dry.      Findings: No rash.   Neurological:      Mental Status: He is alert and oriented to person, place, and time.         Medications:    Current Facility-Administered Medications:     amLODIPine (NORVASC) tablet 10 mg, 10 mg, Oral, Daily, MARBIN Mauricio, 10 mg at 01/14/25 0843    ARIPiprazole (ABILIFY) tablet 5 mg, 5 mg, Oral, Daily, MARBIN Mauricio, 5 mg at 01/14/25 0844    aspirin (ECOTRIN LOW STRENGTH) EC tablet 81 mg, 81 mg, Oral, Daily, MARBIN Mauricio, 81 mg at 01/14/25 0843    atorvastatin (LIPITOR) tablet 40 mg, 40 mg, Oral, Daily With Dinner, SVEN MauricioNP, 40 mg at 01/13/25 1722    Cholecalciferol (VITAMIN D3) tablet 2,000 Units, 2,000 Units, Oral, Daily, MARBIN Mauricio, 2,000 Units at 01/14/25 0842    doxazosin (CARDURA) tablet 8 mg, 8 mg, Oral, Q12H, MARBIN Mauricio, 8 mg at 01/14/25 0544    heparin (porcine) subcutaneous injection 5,000 Units, 5,000 Units, Subcutaneous, Q8H Jacy DAVID CRNP, 5,000 Units at 01/14/25 0544    hydrALAZINE (APRESOLINE) injection 20 mg, 20 mg, Intravenous, Q6H PRN, Jacy Alcaraz,  "MARBIN, 20 mg at 01/12/25 1131    hydrALAZINE (APRESOLINE) tablet 100 mg, 100 mg, Oral, Q8H JOANN, MARBIN Mauricio, 100 mg at 01/14/25 0544    hydroCHLOROthiazide tablet 25 mg, 25 mg, Oral, Daily, Donis Acosta MD, 25 mg at 01/14/25 0843    isosorbide dinitrate (ISORDIL) tablet 40 mg, 40 mg, Oral, TID after meals, MARBIN Mauricio, 20 mg at 01/14/25 0842    labetalol (NORMODYNE) tablet 600 mg, 600 mg, Oral, Q8H JOANN, Donis Acosta MD, 600 mg at 01/14/25 0636    pantoprazole (PROTONIX) EC tablet 40 mg, 40 mg, Oral, Daily, MARBIN Mauricio, 40 mg at 01/14/25 0842    sertraline (ZOLOFT) tablet 200 mg, 200 mg, Oral, Daily, MARBIN Mauricio, 200 mg at 01/14/25 0841    spironolactone (ALDACTONE) tablet 12.5 mg, 12.5 mg, Oral, BID, MARIBN Mauricio, 12.5 mg at 01/14/25 0843    sucralfate (CARAFATE) tablet 1 g, 1 g, Oral, BID, MARBIN Mauricio, 1 g at 01/14/25 0842      Lab Results: I have reviewed the following results:  Results from last 7 days   Lab Units 01/14/25  0518 01/13/25  0446 01/12/25  0111   WBC Thousand/uL 4.92 6.82 6.01   HEMOGLOBIN g/dL 9.0* 9.6* 10.7*   HEMATOCRIT % 28.9* 30.0* 34.5*   PLATELETS Thousands/uL 239 262 268   POTASSIUM mmol/L 3.1* 3.8 3.2*   CHLORIDE mmol/L 103 104 107   CO2 mmol/L 26 25 23   BUN mg/dL 47* 46* 49*   CREATININE mg/dL 3.03* 2.87* 2.68*   CALCIUM mg/dL 8.6 8.5 8.2*   MAGNESIUM mg/dL 1.8* 2.0  --    PHOSPHORUS mg/dL 5.0* 4.8*  --    ALBUMIN g/dL 3.5 3.5 3.9       Administrative Statements     Portions of the record may have been created with voice recognition software. Occasional wrong word or \"sound a like\" substitutions may have occurred due to the inherent limitations of voice recognition software. Read the chart carefully and recognize, using context, where substitutions have occurred.If you have any questions, please contact the dictating provider.  "

## 2025-01-14 NOTE — PLAN OF CARE
Problem: PAIN - ADULT  Goal: Verbalizes/displays adequate comfort level or baseline comfort level  Description: Interventions:  - Encourage patient to monitor pain and request assistance  - Assess pain using appropriate pain scale  - Administer analgesics based on type and severity of pain and evaluate response  - Implement non-pharmacological measures as appropriate and evaluate response  - Consider cultural and social influences on pain and pain management  - Notify physician/advanced practitioner if interventions unsuccessful or patient reports new pain  Outcome: Progressing     Problem: INFECTION - ADULT  Goal: Absence or prevention of progression during hospitalization  Description: INTERVENTIONS:  - Assess and monitor for signs and symptoms of infection  - Monitor lab/diagnostic results  - Monitor all insertion sites, i.e. indwelling lines, tubes, and drains  - Monitor endotracheal if appropriate and nasal secretions for changes in amount and color  - [place] appropriate cooling/warming therapies per order  - Administer medications as ordered  - Instruct and encourage patient and family to use good hand hygiene technique  - Identify and instruct in appropriate isolation precautions for identified infection/condition  Outcome: Progressing     Problem: DISCHARGE PLANNING  Goal: Discharge to home or other facility with appropriate resources  Description: INTERVENTIONS:  - Identify barriers to discharge w/patient and caregiver  - Arrange for needed discharge resources and transportation as appropriate  - Identify discharge learning needs (meds, wound care, etc.)  - Arrange for interpretive services to assist at discharge as needed  - Refer to Case Management Department for coordinating discharge planning if the patient needs post-hospital services based on physician/advanced practitioner order or complex needs related to functional status, cognitive ability, or social support system  Outcome: Progressing      Problem: Knowledge Deficit  Goal: Patient/family/caregiver demonstrates understanding of disease process, treatment plan, medications, and discharge instructions  Description: Complete learning assessment and assess knowledge base.  Interventions:  - Provide teaching at level of understanding  - Provide teaching via preferred learning methods  Outcome: Progressing

## 2025-01-14 NOTE — ASSESSMENT & PLAN NOTE
Lab Results   Component Value Date    EGFR 21 01/14/2025    EGFR 22 01/13/2025    EGFR 24 01/12/2025    CREATININE 3.03 (H) 01/14/2025    CREATININE 2.87 (H) 01/13/2025    CREATININE 2.68 (H) 01/12/2025   Follows OP with [facility]  Baseline 2.5-3.2  Nephrology consult for uncontrolled hypertensive emergency   Monitor UO and renal function closely

## 2025-01-14 NOTE — ASSESSMENT & PLAN NOTE
Wt Readings from Last 3 Encounters:   01/14/25 66.5 kg (146 lb 9.7 oz)   11/15/24 62.4 kg (137 lb 9.1 oz)   07/24/24 61.4 kg (135 lb 5.8 oz)   Patient appears euvolemic

## 2025-01-14 NOTE — ASSESSMENT & PLAN NOTE
Wt Readings from Last 3 Encounters:   01/14/25 66.5 kg (146 lb 9.7 oz)   11/15/24 62.4 kg (137 lb 9.1 oz)   07/24/24 61.4 kg (135 lb 5.8 oz)   No volume overload on clinical exam  Cont aldactone

## 2025-01-14 NOTE — DISCHARGE SUMMARY
Discharge Summary - Hospitalist   Name: Genna Liu 59 y.o. male I MRN: [id number]  Unit/Bed#: ICU 01 I Date of Admission: 1/12/2025   Date of Service: 1/14/2025 I Hospital Day: 2         Discharge Diagnosis:    Hypertensive emergency  Hypertensive encephalopathy, resolved  COPD, stable  Stage IV CKD  Hypoglycemia, resolved  Chronic heart failure with preserved ejection fraction  Intussusception     BRIEF OVERVIEW  Admitting Provider: Tyler Molina MD  Discharge Provider: Tim Azar,*  Primary Care Physician at Discharge: Ugo Marina DO    Discharge To:  Home.  Facility / Family Member Name: Wife (Kinsey).  Phone Number: [phone].     Admission Date: 1/12/2025     Discharge Date: No discharge date for patient encounter.    Primary Discharge Diagnosis  Hypertensive emergency    Other Problems Addressed: None.    Consulting Providers   Dr. Wayne Bruno (Nephrology)     Therapeutic Operative Procedures Performed  None.    Diagnostic Procedures Performed  labs: CMP/CBC/UA, radiology: CXR: normal and CT scan: normal, and cardiac graphics: Echocardiogram: LVEF 51%    Discharge Disposition: Home/Self Care  Discharged With Lines: no    Test Results Pending at Discharge: None.    Outpatient Follow-Up  none required  Follow up with consulting providers  none required   Active Issues Requiring Follow-up   none    Code Status: Level 1 - Full Code    Medications      Medication List        START taking these medications      labetalol 300 mg tablet  Commonly known as: NORMODYNE  Take 2 tablets (600 mg total) by mouth every 8 (eight) hours            CONTINUE taking these medications      albuterol 90 mcg/act inhaler  Commonly known as: PROVENTIL HFA,VENTOLIN HFA     amLODIPine 10 mg tablet  Commonly known as: NORVASC  Take 1 tablet (10 mg total) by mouth daily     aspirin 81 mg chewable tablet  Chew 1 tablet (81 mg total) daily     atorvastatin 40 mg tablet  Commonly known as: LIPITOR  Take  1 tablet (40 mg total) by mouth daily at bedtime     doxazosin 8 MG tablet  Commonly known as: CARDURA  Take 1 tablet (8 mg total) by mouth 2 (two) times a day     hydrALAZINE 100 MG tablet  Commonly known as: APRESOLINE  Take 1 tablet (100 mg total) by mouth every 8 (eight) hours     isosorbide dinitrate 40 MG tablet  Commonly known as: ISORDIL  Take 1 tablet (40 mg total) by mouth 3 (three) times daily after meals     LORazepam 1 mg tablet  Commonly known as: ATIVAN     pantoprazole 40 mg tablet  Commonly known as: PROTONIX     sertraline 100 mg tablet  Commonly known as: ZOLOFT     spironolactone 25 mg tablet  Commonly known as: ALDACTONE  Take 0.5 tablets (12.5 mg total) by mouth 2 (two) times a day            ASK your doctor about these medications      ARIPiprazole 5 mg tablet  Commonly known as: ABILIFY     Cholecalciferol 50 MCG (2000 UT) Tabs  Take 1 tablet (2,000 Units total) by mouth daily     OLANZapine 2.5 mg tablet  Commonly known as: ZyPREXA     sucralfate 1 g tablet  Commonly known as: CARAFATE             Allergies  Allergies   Allergen Reactions    Clonidine Anaphylaxis    Lisinopril Anaphylaxis    Nifedipine Anaphylaxis     Tolerating nicardipine    Buspirone      Headaches,     Hydralazine Other (See Comments)     Fluid around the heart  Lose for Appetite        Minoxidil      Retains fluid around heart     Discharge Diet: regular diet  Activity restrictions: none    DETAILS OF HOSPITAL STAY  Hospital Course:     The patient was hospitalized initially in the ICU.  Suspect that his symptoms are primarily driven by hypertensive encephalopathy given that despite correction of hypoglycemia he was still slightly symptomatic.  Eventually with improvement in blood pressure patient is now back to baseline AAOx4, no focal neurological deficits.  He is ambulating without difficulty.  His CT of the head was normal.  The patient was seen by nephrology, patient is currently tolerating a regimen of  antihypertensives which include amlodipine 10, doxazosin 8 twice a day, hydrochlorothiazide 25 daily, isosorbide did nitrate 43 times a day, labetalol 600 every 8 hours, spironolactone 12.5 twice a day.  Patient has been sent with refills of most of these medicines.  Given that he is back to baseline he is stable for discharge.  Discussed with patient and wife.  At this time low suspicion for seizure activity but should be monitored closely.  Patient oriented to keep a blood pressure log and to bring that to his outpatient primary care appointments.  No clinical evidence of intussusception clinically abdominal exam benign tolerating PO intake no n/v/d. Given that he has CKD 4 he is also recommended outpatient follow-up with nephrology at this time.  Patient discharged in stable condition.     Presenting Problem/History of Present Illness  Principal Problem:    Hypertensive emergency  Active Problems:    COPD without exacerbation (HCC)    Stage 4 chronic kidney disease (HCC)    Hypoglycemia    Noncompliance with medication regimen    Chronic heart failure with preserved ejection fraction (HFpEF) (HCC)    Encephalopathy acute    Intussusception intestine (HCC)  Resolved Problems:    * No resolved hospital problems. *    Other Pertinent Test Results  None.    Discharge Condition: good  Patient is resting comfortably, not in acute distress, and in a good mood. He denies any chest pain, dyspnea, headache, or palpitations. He is able to eat, drink, and go to the bathroom normally.     Discharge Statement:  I have spent a total time of 76 minutes in caring for this patient on the day of the visit/encounter. >30 minutes of time was spent on: Counseling / Coordination of care, Reviewing / ordering tests, medicine, procedures  , and Communicating with other healthcare professionals .

## 2025-01-14 NOTE — ASSESSMENT & PLAN NOTE
Pt in ICU initially   BP fluctuating   191/102 on morning of 1/13/25   Reviewed OP nephrology notes and added hctz and labetalol as per OP dosing today  Nephrology consult for further recommendations  Allergy to clonidine, ace inh noted  170/90 on afternoon of 1/14/25

## 2025-01-16 LAB — VIT B1 BLD-SCNC: 98.9 NMOL/L (ref 66.5–200)

## 2025-01-24 NOTE — ED PROVIDER NOTES
History  Chief Complaint   Patient presents with    Evaluation of Abnormal Diagnostic Test     reports abnormal kidney function test, sent by nephro     56yo male with a history of type 2 diabetes, hypertension, CKD, chronic diastolic hear failure, COPD, and obesity presenting for evaluation of an abnormal outpatient lab  Patient was sent for routine labs on 4/5/21 and creatinine was 2 6  His previous baseline creatinine was around 1 3-1 5  Patient's labs were then rechecked yesterday and creatinine worsened to 3 5  Patient was called by his nephrologist and told to go to the ER for IV fluids and admission  Patient is minimally symptomatic at this time  He denies any fatigue, weakness, vomiting, diarrhea  Patient does admit to some difficulty urinating and initiating his urine stream  He reports a normal PO intake  Of note, patient underwent Shamika-en-y gastric bypass surgery in December 2020 and has lost about 60 pounds since that time  History provided by:  Patient and medical records   used: No    Evaluation of Abnormal Diagnostic Test  Time since result:  1 day  Patient referred by:  Specialist  Resulting agency:  External  Result type: chemistry    Chemistry:     Creatinine:  High      Prior to Admission Medications   Prescriptions Last Dose Informant Patient Reported? Taking?    Acetaminophen 325 MG CAPS   Yes No   Sig: Take 2 tablets by mouth   BD Pen Needle Amelia U/F 32G X 4 MM MISC   Yes No   Cholecalciferol (VITAMIN D-3) 1000 units CAPS  Self Yes No   Sig: Take 1,000 Units by mouth daily   Continuous Blood Gluc Sensor (FreeStyle Michelle 14 Day Sensor) MISC   Yes No   Sig: Use as directed   Glycopyrrolate-Formoterol (BEVESPI AEROSPHERE) 9-4 8 MCG/ACT AERO  Self Yes No   Sig: Inhale 2 puffs daily after breakfast   LORazepam (ATIVAN) 0 5 mg tablet  Self Yes No   Sig: TAKE ONE TABLET BY MOUTH Q6 PRN   MAGNESIUM PO  Self Yes No   Sig: Take 400 mg by mouth daily   Milk Thistle 500 MG CAPS Self Yes No   Sig: Take 500 mg by mouth 2 (two) times a day   Omega-3 Fatty Acids (FISH OIL) 1200 MG CAPS  Self Yes No   Sig: Take 1,200 mg by mouth daily at bedtime   Pulmicort Flexhaler 90 MCG/ACT inhaler   Yes No   Si puffs 2 (two) times a day   Semaglutide,0 25 or 0 5MG/DOS, (Ozempic, 0 25 or 0 5 MG/DOSE,) 2 MG/1 5ML SOPN   Yes No   Sig: INJECT 0 25 0 5MG SUBCUTANEOUSLY ONCE A WEEK AS DIRECTED BY CLINIC    albuterol (PROVENTIL HFA,VENTOLIN HFA) 90 mcg/act inhaler  Self Yes No   Sig: INHALE 2 PUFFS BY MOUTH EVERY 6 HOURS   amLODIPine (NORVASC) 10 mg tablet  Self Yes No   Sig: Take 10 mg by mouth   atorvastatin (LIPITOR) 40 mg tablet  Self Yes No   Sig: Take 40 mg by mouth daily at bedtime   azilsartan medoxomil (EDARBI) 80 MG tablet  Self Yes No   Sig: Take 80 mg by mouth daily    busPIRone (BUSPAR) 15 mg tablet  Self Yes No   Sig: TAKE ONE TABLET BY MOUTH TWICE A DAY FOR PTSD   doxazosin (CARDURA) 8 MG tablet   Yes No   Sig: Take 8 mg by mouth 2 (two) times a day   eplerenone (INSPRA) 50 MG tablet  Self Yes No   Sig: Take 50 mg by mouth daily   escitalopram (LEXAPRO) 20 mg tablet   Yes No   Sig: Take 20 mg by mouth daily   ferrous sulfate 325 (65 Fe) mg tablet  Self Yes No   Sig: Take 325 mg by mouth 2 (two) times a day     furosemide (LASIX) 80 mg tablet  Self Yes No   Sig: Take 80 mg by mouth daily   glucose monitoring kit (FREESTYLE) monitoring kit  Self No No   Si each by Does not apply route 2 (two) times a day Any brand covered by insurance:  Dispense one glucometer, test strips #50, lancets #100   Patient not taking: Reported on 2021   hydrALAZINE (APRESOLINE) 50 mg tablet   No No   Sig: Take 1 tablet (50 mg total) by mouth every 8 (eight) hours Disregard 25 mg order     multivitamin-iron-minerals-folic acid (CENTRUM) chewable tablet  Self Yes No   Sig: Chew 1 tablet daily   nebivolol (BYSTOLIC) 20 MG tablet  Self Yes No   Sig: Take 20 mg by mouth 2 (two) times a day neomycin-polymyxin-hydrocortisone (CORTISPORIN) otic solution  Self Yes No   Sig: Administer 4 drops into ears   omeprazole (PriLOSEC) 20 mg delayed release capsule   No No   Sig: Take 1 capsule (20 mg total) by mouth daily   potassium chloride (KLOR-CON M20) 20 mEq tablet  Self No No   Sig: Take 1 tablet (20 mEq total) by mouth daily Restart with your lasix next monday   umeclidinium-vilanterol (Anoro Ellipta) 62 5-25 MCG/INH inhaler  Self Yes No   Sig: Inhale 1 puff daily      Facility-Administered Medications: None       Past Medical History:   Diagnosis Date    Asthma     Chronic kidney disease     COPD (chronic obstructive pulmonary disease) (HCC)     CPAP (continuous positive airway pressure) dependence     Diabetes mellitus (HCC)     Emphysema of lung (HCC)     Gout     History of transfusion     pt stated they had a transfusion when they had gallbladder surgery   Hypertension     Kidney disease     renal failure    Leg DVT (deep venous thromboembolism), acute, bilateral ([address] ) 01/2018    Obesity (BMI 30-39  9)     AGUS on CPAP     setting 11    Postgastrectomy malabsorption     Sleep apnea     Systolic CHF [facility])        Past Surgical History:   Procedure Laterality Date    ADRENALECTOMY      CHOLECYSTECTOMY      COLONOSCOPY      EGD      HIATAL HERNIA REPAIR N/A 12/22/2020    Procedure: REPAIR HERNIA HIATAL LAPAROSCOPIC;  Surgeon: Remi Gallegos MD;  [facility];  Service: Bariatrics    INCISION AND DRAINAGE OF WOUND Left 10/17/2018    Procedure: INCISION AND DRAINAGE (I&D) GROIN;  Surgeon: Piyush Velez MD;  [facility];  Service: General    IR IMAGE [address] / DRAINAGE W TUBE  8/17/2018    IR IMAGE GUIDED ASPIRATION / DRAINAGE W TUBE  7/31/2018    JOINT REPLACEMENT Bilateral     knee    KIDNEY SURGERY  2009    nodule removal    LYMPH NODE DISSECTION Left 01/2018    left inguinal LN removed - benign     OTHER SURGICAL HISTORY      kidney nodule removal    PALATE / UVULA BIOPSY / EXCISION      OR LAP GASTRIC BYPASS/JASON-EN-Y N/A 12/22/2020    Procedure: BYPASS GASTRIC  JASON-EN-Y LAPAROSCOPIC WITH INTRAOPERATIVE EGD;  Surgeon: Tomás Pollock MD;  [facility];  Service: Donavon Serra SINUS SURGERY      TONSILLECTOMY         Family History   Problem Relation Age of Onset    Heart murmur Sister     Asthma Sister     Hypertension Sister     Kidney disease Mother     Cancer Father     Bell's palsy Brother     Kidney disease Brother     Deep vein thrombosis Neg Hx      I have reviewed and agree with the history as documented  E-Cigarette/Vaping    E-Cigarette Use Never User      E-Cigarette/Vaping Substances    Nicotine No     THC No     CBD No     Flavoring No     Other No     Unknown No      Social History     Tobacco Use    Smoking status: Never Smoker    Smokeless tobacco: Never Used   Substance Use Topics    Alcohol use: Not Currently     Alcohol/week: 1 0 standard drinks     Types: 1 Shots of liquor per week     Frequency: 4 or more times a week     Drinks per session: 1 or 2     Binge frequency: Daily or almost daily     Comment: reportd 1 beer every 2 days    Drug use: No       Review of Systems   Constitutional: Negative for chills and fever  Eyes: Negative for discharge and redness  Respiratory: Negative for shortness of breath and stridor  Cardiovascular: Negative for chest pain and leg swelling  Gastrointestinal: Negative for abdominal pain, diarrhea and vomiting  Genitourinary: Positive for difficulty urinating  Negative for dysuria and hematuria  Musculoskeletal: Negative for neck pain and neck stiffness  Skin: Negative for color change and rash  Neurological: Negative for seizures and syncope  Psychiatric/Behavioral: Negative for confusion  The patient is not nervous/anxious  All other systems reviewed and are negative        Physical Exam  Physical Exam  Vitals signs and nursing note reviewed  Constitutional:       General: He is not in acute distress  Appearance: He is well-developed  He is not diaphoretic  HENT:      Head: Normocephalic and atraumatic  Right Ear: External ear normal       Left Ear: External ear normal    Eyes:      General: No scleral icterus  Right eye: No discharge  Left eye: No discharge  Conjunctiva/sclera: Conjunctivae normal    Neck:      Musculoskeletal: Normal range of motion and neck supple  Cardiovascular:      Rate and Rhythm: Normal rate and regular rhythm  Heart sounds: Normal heart sounds  No murmur  Pulmonary:      Effort: Pulmonary effort is normal  No respiratory distress  Breath sounds: Normal breath sounds  No stridor  No wheezing or rales  Abdominal:      General: Bowel sounds are normal  There is no distension  Palpations: Abdomen is soft  Tenderness: There is no abdominal tenderness  There is no guarding  Musculoskeletal: Normal range of motion  General: No deformity  Skin:     General: Skin is warm and dry  Neurological:      Mental Status: He is alert  He is not disoriented  GCS: GCS eye subscore is 4  GCS verbal subscore is 5  GCS motor subscore is 6     Psychiatric:         Behavior: Behavior normal          Vital Signs  ED Triage Vitals [04/15/21 1133]   Temperature Pulse Respirations Blood Pressure SpO2   98 2 °F (36 8 °C) 58 18 153/84 97 %      Temp Source Heart Rate Source Patient Position - Orthostatic VS BP Location FiO2 (%)   Oral Monitor Sitting Left arm --      Pain Score       No Pain           Vitals:    04/15/21 1133   BP: 153/84   Pulse: 58   Patient Position - Orthostatic VS: Sitting         Visual Acuity      ED Medications  Medications   sodium chloride 0 9 % bolus 1,000 mL (has no administration in time range)       Diagnostic Studies  Results Reviewed     Procedure Component Value Units Date/Time    Hepatic function panel [[id number]]  (Abnormal) Collected: 04/15/21 1221    Lab Status: Final result Specimen: Blood from Arm, Right Updated: 04/15/21 1251     Total Bilirubin 0 87 mg/dL      Bilirubin, Direct 0 22 mg/dL      Alkaline Phosphatase 99 U/L      AST 23 U/L      ALT 77 U/L      Total Protein 7 2 g/dL      Albumin 3 5 g/dL     CK Total with Reflex CKMB [[id number]]  (Normal) Collected: 04/15/21 1221    Lab Status: Final result Specimen: Blood from Arm, Right Updated: 04/15/21 1251     Total CK 52 U/L     Basic metabolic panel [[id number]]  (Abnormal) Collected: 04/15/21 1221    Lab Status: Final result Specimen: Blood from Arm, Right Updated: 04/15/21 1251     Sodium 140 mmol/L      Potassium 3 4 mmol/L      Chloride 104 mmol/L      CO2 26 mmol/L      ANION GAP 10 mmol/L      BUN 48 mg/dL      Creatinine 3 43 mg/dL      Glucose 78 mg/dL      Calcium 8 7 mg/dL      eGFR 22 ml/min/1 73sq m     Narrative:      [place] guidelines for Chronic Kidney Disease (CKD):     Stage 1 with normal or high GFR (GFR > 90 mL/min/1 73 square meters)    Stage 2 Mild CKD (GFR = 60-89 mL/min/1 73 square meters)    Stage 3A Moderate CKD (GFR = 45-59 mL/min/1 73 square meters)    Stage 3B Moderate CKD (GFR = 30-44 mL/min/1 73 square meters)    Stage 4 Severe CKD (GFR = 15-29 mL/min/1 73 square meters)    Stage 5 End Stage CKD (GFR <15 mL/min/1 73 square meters)  Note: GFR calculation is accurate only with a steady state creatinine    Phosphorus [[id number]]  (Normal) Collected: 04/15/21 1221    Lab Status: Final result Specimen: Blood from Arm, Right Updated: 04/15/21 1251     Phosphorus 4 5 mg/dL     Magnesium [[id number]]  (Normal) Collected: 04/15/21 1221    Lab Status: Final result Specimen: Blood from Arm, Right Updated: 04/15/21 1251     Magnesium 1 8 mg/dL     UA w Reflex to Microscopic w Reflex to Culture [[id number]] Collected: 04/15/21 1230    Lab Status: Final result Specimen: Urine, Clean Catch Updated: 04/15/21 1240     Color, UA Yellow Clarity, UA Clear     Specific Gravity, UA 1 015     pH, UA 5 5     Leukocytes, UA Negative     Nitrite, UA Negative     Protein, UA Negative mg/dl      Glucose, UA Negative mg/dl      Ketones, UA Negative mg/dl      Urobilinogen, UA 0 2 E U /dl      Bilirubin, UA Negative     Blood, UA Negative    CBC and differential [[id number]]  (Abnormal) Collected: 04/15/21 1221    Lab Status: Final result Specimen: Blood from Arm, Right Updated: 04/15/21 1238     WBC 7 17 Thousand/uL      RBC 4 01 Million/uL      Hemoglobin 10 8 g/dL      Hematocrit 34 1 %      MCV 85 fL      MCH 26 9 pg      MCHC 31 7 g/dL      RDW 15 0 %      MPV 9 2 fL      Platelets 209 Thousands/uL      nRBC 0 /100 WBCs      Neutrophils Relative 61 %      Immat GRANS % 0 %      Lymphocytes Relative 22 %      Monocytes Relative 10 %      Eosinophils Relative 6 %      Basophils Relative 1 %      Neutrophils Absolute 4 43 Thousands/µL      Immature Grans Absolute 0 01 Thousand/uL      Lymphocytes Absolute 1 58 Thousands/µL      Monocytes Absolute 0 71 Thousand/µL      Eosinophils Absolute 0 40 Thousand/µL      Basophils Absolute 0 04 Thousands/µL                  No orders to display              Procedures  Procedures         ED Course  ED Course as of Apr 15 1300   Thu Apr 15, 2021   1254 Creatinine(!): 3 43                         MDM  Number of Diagnoses or Management Options  Acute kidney injury [facility]): new and requires workup  Diagnosis management comments: 58yo male presenting for an abnormal outpatient lab  He was found to have a creatinine of 2 6 on routine labs last week (previous baseline 1 3-1 5)  Labs were repeated yesterday and creatinine worsened to 3 5  Patient was referred to the ED by his nephrologist for IV fluids and admission  He admits to difficulty initiation his urine stream but is otherwise asymptomatic  Initial ED plan: Check CBC, CMP, Mg, Phos, CK, UA, and bladder scan  Will give IV fluid bolus and admit          Amount and/or Complexity of Data Reviewed  Clinical lab tests: ordered and reviewed  Tests in the radiology section of CPT®: ordered and reviewed  Review and summarize past medical records: yes  Independent visualization of images, tracings, or specimens: yes    Risk of Complications, Morbidity, and/or Mortality  Presenting problems: moderate  Diagnostic procedures: moderate  Management options: moderate        Disposition  Final diagnoses:   Acute kidney injury ([facility] [address] )     Time reflects when diagnosis was documented in both MDM as applicable and the Disposition within this note     Time User Action Codes Description Comment    4/15/2021  1:20 PM [address] [place] [address] [N17 [id number]] Acute kidney injury [facility])       ED Disposition     ED Disposition Condition Date/Time Comment    Admit Stable Thu Apr 15, 2021  1:20 PM Case was discussed with Dr Gaby Mcdonald and the patient's admission status was agreed to be Admission Status: inpatient status to the service of Dr Gaby Mcdonald   Follow-up Information    None         Patient's Medications   Discharge Prescriptions    No medications on file     No discharge procedures on file      PDMP Review       Value Time User    PDMP Reviewed  Yes 12/24/2020  8:11 AM Barb Brown PA-C          ED Provider  Electronically Signed by           Umesh Mckeon PA-C  04/15/21 3696 Patient is not pregnant (male or female)

## 2025-02-10 ENCOUNTER — TELEPHONE (OUTPATIENT)
Dept: NEPHROLOGY | Facility: CLINIC | Age: 60
End: 2025-02-10

## 2025-02-10 NOTE — TELEPHONE ENCOUNTER
I called and spoke to the patient about his upcoming appointment on Monday 2/24/2025 nephrology follow up with Dr. SERGO Acosta and explained that [facility] is Non-Par and out of Network with his current [employer] Medicare Replacement HMO plan. Patient stated that he did understand and that he tried to call our office about canceling his appointment. Patient stated that at this time he would like to cancel his appointment for Monday 2/24/2025 with Dr. SERGO Acosta and call us back when he changes his insurance.

## 2025-04-30 ENCOUNTER — TELEPHONE (OUTPATIENT)
Dept: BARIATRICS | Facility: CLINIC | Age: 60
End: 2025-04-30

## 2025-04-30 NOTE — TELEPHONE ENCOUNTER
Reached out to patient in attempt to schedule an OD 4 yr annual follow up with surgical but pt declined and stated his insurance does not cover . [place]'s providers----- Message from Dwight ENRIQUEZ sent at 2025 11:31 AM EDT -----  Regardin year follow up  Please contact patient to schedule a 4 year follow up appointment and document the results of the call in EPIC.  Thank You

## (undated) PROCEDURE — 4A023N7 MEASUREMENT OF CARDIAC SAMPLING AND PRESSURE, LEFT HEART, PERCUTANEOUS APPROACH: ICD-10-PCS | Performed by: INTERNAL MEDICINE

## (undated) DEVICE — INTENDED FOR TISSUE SEPARATION, AND OTHER PROCEDURES THAT REQUIRE A SHARP SURGICAL BLADE TO PUNCTURE OR CUT.: Brand: BARD-PARKER SAFETY BLADES SIZE 11, STERILE

## (undated) DEVICE — ELECTRODE BLADE MOD E-Z CLEAN 2.5IN 6.4CM -0012M

## (undated) DEVICE — MEDI-VAC YANK SUCT HNDL W/TPRD BULBOUS TIP: Brand: CARDINAL HEALTH

## (undated) DEVICE — TISSUE RETRIEVAL SYSTEM: Brand: INZII RETRIEVAL SYSTEM

## (undated) DEVICE — TELFA NON-ADHERENT ABSORBENT DRESSING: Brand: TELFA

## (undated) DEVICE — DRAPE EQUIPMENT RF WAND

## (undated) DEVICE — ADHESIVE SKIN CLSR DERMABOND NX

## (undated) DEVICE — DGW .035 FC J3MM 260CM TEF: Brand: EMERALD

## (undated) DEVICE — SUT SILK 2-0 SH 30 IN K833H

## (undated) DEVICE — INTENDED FOR TISSUE SEPARATION, AND OTHER PROCEDURES THAT REQUIRE A SHARP SURGICAL BLADE TO PUNCTURE OR CUT.: Brand: BARD-PARKER SAFETY BLADES SIZE 15, STERILE

## (undated) DEVICE — ABDOMINAL PAD: Brand: DERMACEA

## (undated) DEVICE — TROCAR: Brand: KII FIOS FIRST ENTRY

## (undated) DEVICE — MAYO STAND COVER: Brand: CONVERTORS

## (undated) DEVICE — LAPAROSCOPIC DUAL RIGID APPLICATOR: Brand: ETHICON

## (undated) DEVICE — SUT MONOCRYL 4-0 PS-2 27 IN Y426H

## (undated) DEVICE — GLOVE INDICATOR PI UNDERGLOVE SZ 7.5 BLUE

## (undated) DEVICE — LAPAROSCOPIC TROCAR SLEEVE/SINGLE USE: Brand: KII® SLEEVE

## (undated) DEVICE — 3000CC GUARDIAN II: Brand: GUARDIAN

## (undated) DEVICE — LIGHT HANDLE COVER SLEEVE DISP BLUE STELLAR

## (undated) DEVICE — PLUMEPEN PRO 10FT

## (undated) DEVICE — GLOVE SRG BIOGEL ECLIPSE 7.5

## (undated) DEVICE — TUBING SUCTION 5MM X 12 FT

## (undated) DEVICE — SPONGE,TONSIL,DBL STRNG,XRAY,SM,7/8",ST: Brand: MEDLINE INDUSTRIES, INC.

## (undated) DEVICE — ASTOUND STANDARD SURGICAL GOWN, XL: Brand: CONVERTORS

## (undated) DEVICE — SUT VICRYL 2-0 CT-1 27 IN J259H

## (undated) DEVICE — NEEDLE SPINAL 20G X 3.5 LF

## (undated) DEVICE — GAUZE SPONGES,16 PLY: Brand: CURITY

## (undated) DEVICE — STAPLE RELOAD ENDO EGIA ARTICULATING MEDIUM TAN 2MM.5MM.3MM

## (undated) DEVICE — 5 MM CURVED DISSECTORS WITH MONOPOLAR CAUTERY: Brand: ENDOPATH

## (undated) DEVICE — STRETCH BANDAGE: Brand: CURITY

## (undated) DEVICE — SUT MONOCRYL 4-0 PS-2 18 IN Y496G

## (undated) DEVICE — GLOVE SRG BIOGEL 7

## (undated) DEVICE — PENCIL ELECTROSURG E-Z CLEAN -0035H

## (undated) DEVICE — CATH DIAG 5FR IMPULSE 100CM FL3.5

## (undated) DEVICE — TUBING SMOKE EVAC W/FILTRATION DEVICE PLUMEPORT ACTIV

## (undated) DEVICE — ADHESIVE SKIN HIGH VISCOSITY EXOFIN 1ML

## (undated) DEVICE — Device

## (undated) DEVICE — CHLORAPREP HI-LITE 26ML ORANGE

## (undated) DEVICE — SUT ETHIBOND 2-0 RB-1 30 IN X873H

## (undated) DEVICE — PAD GROUNDING ADULT

## (undated) DEVICE — UNDYED MONOFILAMENT (POLYDIOXANONE), ABSORBABLE SURGICAL SUTURE: Brand: PDS

## (undated) DEVICE — GLOVE INDICATOR PI UNDERGLOVE SZ 7 BLUE

## (undated) DEVICE — SUT VICRYL 0 REEL 54 IN J287G

## (undated) DEVICE — SPECIMEN TRAP: Brand: ARGYLE

## (undated) DEVICE — TIBURON LAPAROSCOPIC ABDOMINAL DRAPE: Brand: CONVERTORS

## (undated) DEVICE — POWER SHELL SIGNIA

## (undated) DEVICE — 3M™ IOBAN™ 2 ANTIMICROBIAL INCISE DRAPE 6650EZ: Brand: IOBAN™ 2

## (undated) DEVICE — ALLENTOWN LAP CHOLE APP PACK: Brand: CARDINAL HEALTH

## (undated) DEVICE — IRRIG ENDO FLO TUBING

## (undated) DEVICE — HARMONIC 1100 SHEARS, 36CM SHAFT LENGTH: Brand: HARMONIC

## (undated) DEVICE — CATH DIAG 5FR IMPULSE 100CM FR4

## (undated) DEVICE — INTENDED FOR TISSUE SEPARATION, AND OTHER PROCEDURES THAT REQUIRE A SHARP SURGICAL BLADE TO PUNCTURE OR CUT.: Brand: BARD-PARKER SAFETY BLADES SIZE 10, STERILE

## (undated) DEVICE — ELECTRODE LAP SPATULA STR E-Z CLEAN 33CM -0018

## (undated) DEVICE — LIGAMAX 5 MM ENDOSCOPIC MULTIPLE CLIP APPLIER: Brand: LIGAMAX

## (undated) DEVICE — VISUALIZATION SYSTEM: Brand: CLEARIFY

## (undated) DEVICE — POOLE SUCTION HANDLE: Brand: CARDINAL HEALTH

## (undated) DEVICE — [HIGH FLOW INSUFFLATOR,  DO NOT USE IF PACKAGE IS DAMAGED,  KEEP DRY,  KEEP AWAY FROM SUNLIGHT,  PROTECT FROM HEAT AND RADIOACTIVE SOURCES.]: Brand: PNEUMOSURE

## (undated) DEVICE — SUT VICRYL 0 CT-1 27 IN J260H

## (undated) DEVICE — PMI DISPOSABLE PUNCTURE CLOSURE DEVICE / SUTURE GRASPER: Brand: PMI

## (undated) DEVICE — BETHLEHEM UNIVERSAL MINOR GEN: Brand: CARDINAL HEALTH

## (undated) DEVICE — COVIDIEN ENDO GIA PURPLE (MED) RELOAD 60MM

## (undated) DEVICE — GLIDESHEATH SLENDER STAINLESS STEEL KIT: Brand: GLIDESHEATH SLENDER

## (undated) DEVICE — ROSEBUD DISSECTORS: Brand: DEROYAL

## (undated) DEVICE — SCD SEQUENTIAL COMPRESSION COMFORT SLEEVE MEDIUM KNEE LENGTH: Brand: KENDALL SCD

## (undated) DEVICE — SEALANT FIBRIN VISTASEAL 10ML

## (undated) DEVICE — BETHLEHEM MAJOR GENERAL PACK: Brand: CARDINAL HEALTH

## (undated) DEVICE — TRUE CONTENT TO BE POPULATED AS PART OF REBRANDING: Brand: ARGYLE

## (undated) DEVICE — ENDOPATH XCEL BLADELESS TROCARS WITH STABILITY SLEEVES: Brand: ENDOPATH XCEL

## (undated) DEVICE — SYRINGE 20ML LL

## (undated) DEVICE — TRAVELKIT CONTAINS FIRST STEP KIT (200ML EP-4 KIT) AND SOILED SCOPE BAG - 1 KIT: Brand: TRAVELKIT CONTAINS FIRST STEP KIT AND SOILED SCOPE BAG

## (undated) DEVICE — ENDOPATH 5MM CURVED SCISSORS WITH MONOPOLAR CAUTERY: Brand: ENDOPATH

## (undated) DEVICE — GLOVE INDICATOR PI UNDERGLOVE SZ 8 BLUE

## (undated) DEVICE — WEBRIL 6 IN UNSTERILE

## (undated) DEVICE — TR BAND RADIAL ARTERY COMPRESSION DEVICE: Brand: TR BAND